# Patient Record
Sex: FEMALE | Race: WHITE | NOT HISPANIC OR LATINO | Employment: UNEMPLOYED | URBAN - METROPOLITAN AREA
[De-identification: names, ages, dates, MRNs, and addresses within clinical notes are randomized per-mention and may not be internally consistent; named-entity substitution may affect disease eponyms.]

---

## 2018-03-01 ENCOUNTER — AMBULATORY CONVERSION ENCOUNTER (OUTPATIENT)
Dept: FAMILY MEDICINE | Facility: CLINIC | Age: 33
End: 2018-03-01

## 2018-03-14 RX ORDER — QUETIAPINE FUMARATE 200 MG/1
1 TABLET, FILM COATED ORAL 2 TIMES DAILY
COMMUNITY
Start: 2018-02-08 | End: 2018-03-15 | Stop reason: SDUPTHER

## 2018-03-15 RX ORDER — BUTALBITAL, ACETAMINOPHEN AND CAFFEINE 50; 325; 40 MG/1; MG/1; MG/1
1-2 TABLET ORAL SEE ADMIN INSTRUCTIONS
Qty: 20 TABLET | Refills: 0 | Status: SHIPPED | OUTPATIENT
Start: 2018-03-15 | End: 2018-03-15 | Stop reason: SDUPTHER

## 2018-03-15 RX ORDER — BUTALBITAL, ACETAMINOPHEN AND CAFFEINE 50; 325; 40 MG/1; MG/1; MG/1
1-2 TABLET ORAL SEE ADMIN INSTRUCTIONS
COMMUNITY
Start: 2018-02-07 | End: 2018-03-15 | Stop reason: SDUPTHER

## 2018-03-15 RX ORDER — BUTALBITAL, ACETAMINOPHEN AND CAFFEINE 50; 325; 40 MG/1; MG/1; MG/1
1-2 TABLET ORAL SEE ADMIN INSTRUCTIONS
Qty: 20 TABLET | Refills: 0 | Status: SHIPPED | OUTPATIENT
Start: 2018-03-15 | End: 2018-12-10 | Stop reason: SDUPTHER

## 2018-04-10 RX ORDER — QUETIAPINE FUMARATE 200 MG/1
200 TABLET, FILM COATED ORAL 2 TIMES DAILY
Qty: 60 TABLET | Refills: 0 | Status: SHIPPED | OUTPATIENT
Start: 2018-04-10 | End: 2018-05-08 | Stop reason: SDUPTHER

## 2018-04-24 ENCOUNTER — TELEPHONE (OUTPATIENT)
Dept: FAMILY MEDICINE | Facility: CLINIC | Age: 33
End: 2018-04-24

## 2018-04-24 RX ORDER — BUTALBITAL, ACETAMINOPHEN AND CAFFEINE 50; 325; 40 MG/1; MG/1; MG/1
1 TABLET ORAL EVERY 4 HOURS PRN
COMMUNITY
End: 2018-05-16 | Stop reason: SDUPTHER

## 2018-04-25 RX ORDER — BUTALBITAL, ACETAMINOPHEN AND CAFFEINE 50; 325; 40 MG/1; MG/1; MG/1
1 TABLET ORAL EVERY 4 HOURS PRN
OUTPATIENT
Start: 2018-04-25

## 2018-05-08 RX ORDER — QUETIAPINE FUMARATE 200 MG/1
200 TABLET, FILM COATED ORAL 2 TIMES DAILY
Qty: 60 TABLET | Refills: 0 | Status: SHIPPED | OUTPATIENT
Start: 2018-05-08 | End: 2018-06-04 | Stop reason: SDUPTHER

## 2018-05-16 ENCOUNTER — TELEPHONE (OUTPATIENT)
Dept: FAMILY MEDICINE | Facility: CLINIC | Age: 33
End: 2018-05-16

## 2018-05-16 RX ORDER — PREDNISONE 10 MG/1
4 TABLET ORAL DAILY
COMMUNITY
Start: 2017-05-30 | End: 2018-05-16 | Stop reason: SDUPTHER

## 2018-05-16 NOTE — TELEPHONE ENCOUNTER
Pt would like Rx Prednisone 10mg 1 time a day and Rx Fioricet -40 mg called into [facility]. If any questions pt can be reached at

## 2018-05-18 RX ORDER — BUTALBITAL, ACETAMINOPHEN AND CAFFEINE 50; 325; 40 MG/1; MG/1; MG/1
1 TABLET ORAL EVERY 4 HOURS PRN
Qty: 40 TABLET | Refills: 0 | OUTPATIENT
Start: 2018-05-18 | End: 2018-06-28 | Stop reason: SDUPTHER

## 2018-05-18 RX ORDER — PREDNISONE 10 MG/1
10 TABLET ORAL DAILY
Qty: 20 TABLET | Refills: 1 | Status: SHIPPED | OUTPATIENT
Start: 2018-05-18 | End: 2018-06-11 | Stop reason: SDUPTHER

## 2018-06-04 RX ORDER — QUETIAPINE FUMARATE 200 MG/1
200 TABLET, FILM COATED ORAL 2 TIMES DAILY
Qty: 60 TABLET | Refills: 0 | Status: SHIPPED | OUTPATIENT
Start: 2018-06-04 | End: 2018-06-28 | Stop reason: SDUPTHER

## 2018-06-04 NOTE — TELEPHONE ENCOUNTER
Patient is out of Seroquel and has been all weekend. She schedule appt for Thursday. Is there anyway you could fill this for her today.

## 2018-06-06 PROBLEM — M50.90 CERVICAL DISC DISEASE: Status: ACTIVE | Noted: 2018-06-06

## 2018-06-06 RX ORDER — PRAZOSIN HYDROCHLORIDE 1 MG/1
1 CAPSULE ORAL DAILY
COMMUNITY
Start: 2017-01-30 | End: 2019-04-07

## 2018-06-06 RX ORDER — CALCIUM CARBONATE/VITAMIN D3 500-10/5ML
1 LIQUID (ML) ORAL 2 TIMES DAILY
COMMUNITY
Start: 2017-05-09 | End: 2019-04-07

## 2018-06-06 RX ORDER — ONDANSETRON 4 MG/1
1 TABLET, ORALLY DISINTEGRATING ORAL DAILY
COMMUNITY
Start: 2017-11-27 | End: 2020-07-09

## 2018-06-06 RX ORDER — PANTOPRAZOLE SODIUM 40 MG/1
1 TABLET, DELAYED RELEASE ORAL DAILY
COMMUNITY
Start: 2017-05-09 | End: 2019-04-07

## 2018-06-06 RX ORDER — CYCLOBENZAPRINE HCL 10 MG
1 TABLET ORAL 2 TIMES DAILY
COMMUNITY
Start: 2017-11-27 | End: 2019-04-07

## 2018-06-06 RX ORDER — DICYCLOMINE HYDROCHLORIDE 10 MG/1
1 CAPSULE ORAL 3 TIMES DAILY
COMMUNITY
Start: 2017-11-27 | End: 2019-04-07

## 2018-06-06 RX ORDER — DULOXETIN HYDROCHLORIDE 60 MG/1
1 CAPSULE, DELAYED RELEASE ORAL DAILY
COMMUNITY
Start: 2017-06-29 | End: 2018-06-11 | Stop reason: SDUPTHER

## 2018-06-11 ENCOUNTER — OFFICE VISIT (OUTPATIENT)
Dept: FAMILY MEDICINE | Facility: CLINIC | Age: 33
End: 2018-06-11

## 2018-06-11 VITALS
TEMPERATURE: 98.2 F | SYSTOLIC BLOOD PRESSURE: 130 MMHG | WEIGHT: 267 LBS | OXYGEN SATURATION: 98 % | HEART RATE: 84 BPM | DIASTOLIC BLOOD PRESSURE: 90 MMHG | HEIGHT: 63 IN | BODY MASS INDEX: 47.31 KG/M2

## 2018-06-11 DIAGNOSIS — F41.9 ANXIETY: ICD-10-CM

## 2018-06-11 DIAGNOSIS — G43.909 MIGRAINE WITHOUT STATUS MIGRAINOSUS, NOT INTRACTABLE, UNSPECIFIED MIGRAINE TYPE: Primary | ICD-10-CM

## 2018-06-11 PROCEDURE — 99214 OFFICE O/P EST MOD 30 MIN: CPT | Performed by: FAMILY MEDICINE

## 2018-06-11 RX ORDER — CARISOPRODOL 350 MG/1
350 TABLET ORAL 2 TIMES DAILY
Qty: 56 TABLET | Refills: 0 | Status: SHIPPED | OUTPATIENT
Start: 2018-06-11 | End: 2018-07-09

## 2018-06-11 RX ORDER — DULOXETIN HYDROCHLORIDE 60 MG/1
60 CAPSULE, DELAYED RELEASE ORAL DAILY
Qty: 90 CAPSULE | Refills: 0 | Status: SHIPPED | OUTPATIENT
Start: 2018-06-11 | End: 2018-11-29 | Stop reason: SDUPTHER

## 2018-06-11 RX ORDER — PREDNISONE 10 MG/1
10 TABLET ORAL DAILY
Qty: 90 TABLET | Refills: 1 | Status: SHIPPED | OUTPATIENT
Start: 2018-06-11 | End: 2018-09-09 | Stop reason: HOSPADM

## 2018-06-11 NOTE — PROGRESS NOTES
Signs stable. Pt seen for renewal of Cymbalta, Prednisone and #28 Soma 350 for 2 weeks of muscle spasm.  Migraine stable, Physical exam unchanged and stablal

## 2018-06-12 PROBLEM — G43.909 MIGRAINE WITHOUT STATUS MIGRAINOSUS, NOT INTRACTABLE: Status: ACTIVE | Noted: 2018-06-12

## 2018-06-12 ASSESSMENT — ENCOUNTER SYMPTOMS: NERVOUS/ANXIOUS: 1

## 2018-06-12 NOTE — PROGRESS NOTES
"  Subjective     Patient ID: Carolyn Redmond is a 32 y.o. female.    Signs stable. Pt seen for renewal of Cymbalta, Prednisone and #28 Soma 350 for 2 weeks of muscle spasm.  Migraine stable, Physical exam unchanged and stable.         Review of Systems   Psychiatric/Behavioral: The patient is nervous/anxious.    All other systems reviewed and are negative.      Objective     Vitals:    06/11/18 1436   BP: 130/90   BP Location: Left upper arm   Patient Position: Sitting   Pulse: 84   Temp: 36.8 °C (98.2 °F)   TempSrc: Oral   SpO2: 98%   Weight: 121 kg (267 lb)   Height: 1.6 m (5' 3\")     Body mass index is 47.3 kg/m².    Physical Exam   Constitutional: She is oriented to person, place, and time. She appears well-developed and well-nourished.   HENT:   Head: Normocephalic.   Eyes: Pupils are equal, round, and reactive to light.   Cardiovascular: Normal rate, regular rhythm and normal heart sounds.    Pulmonary/Chest: Effort normal and breath sounds normal.   Neurological: She is alert and oriented to person, place, and time.   Skin: Skin is warm and dry.   Psychiatric: Her mood appears anxious.   Vitals reviewed.      Assessment/Plan   Problem List Items Addressed This Visit     Anxiety    Migraine without status migrainosus, not intractable - Primary    Relevant Medications    DULoxetine (CYMBALTA) 60 mg capsule    carisoprodol (SOMA) 350 mg tablet            "

## 2018-06-28 RX ORDER — QUETIAPINE FUMARATE 200 MG/1
200 TABLET, FILM COATED ORAL 2 TIMES DAILY
Qty: 60 TABLET | Refills: 0 | Status: SHIPPED | OUTPATIENT
Start: 2018-06-28 | End: 2018-07-19 | Stop reason: SDUPTHER

## 2018-06-28 RX ORDER — BUTALBITAL, ACETAMINOPHEN AND CAFFEINE 50; 325; 40 MG/1; MG/1; MG/1
1 TABLET ORAL EVERY 4 HOURS PRN
Qty: 40 TABLET | Refills: 0 | OUTPATIENT
Start: 2018-06-28 | End: 2018-09-19 | Stop reason: SDUPTHER

## 2018-06-28 NOTE — TELEPHONE ENCOUNTER
Last seen 6/11/18 Dr Bran  Last filled Quetiapine #60 6/4/ 18 Dr Bran    Fioricet 5/11/18 #40 Dr Bran

## 2018-07-19 RX ORDER — QUETIAPINE FUMARATE 200 MG/1
200 TABLET, FILM COATED ORAL 2 TIMES DAILY
Qty: 60 TABLET | Refills: 0 | Status: SHIPPED | OUTPATIENT
Start: 2018-07-19 | End: 2018-08-07 | Stop reason: SDUPTHER

## 2018-08-07 ENCOUNTER — TELEPHONE (OUTPATIENT)
Dept: FAMILY MEDICINE | Facility: CLINIC | Age: 33
End: 2018-08-07

## 2018-08-07 RX ORDER — QUETIAPINE FUMARATE 200 MG/1
200 TABLET, FILM COATED ORAL 2 TIMES DAILY
Qty: 60 TABLET | Refills: 0 | Status: SHIPPED | OUTPATIENT
Start: 2018-08-07 | End: 2018-08-30 | Stop reason: SDUPTHER

## 2018-08-07 NOTE — TELEPHONE ENCOUNTER
PATIENT WOULD LIKE TO TALK TO YOU, STATES LEFT [facility] IN [place] IN [place] AND NEEDS NEW SCRIPT. PLEASE CALL [phone]

## 2018-08-13 RX ORDER — BUTALBITAL, ACETAMINOPHEN AND CAFFEINE 50; 325; 40 MG/1; MG/1; MG/1
1 TABLET ORAL EVERY 4 HOURS PRN
Qty: 40 TABLET | Refills: 0 | OUTPATIENT
Start: 2018-08-13

## 2018-08-30 RX ORDER — QUETIAPINE FUMARATE 200 MG/1
200 TABLET, FILM COATED ORAL 2 TIMES DAILY
Qty: 60 TABLET | Refills: 0 | Status: SHIPPED | OUTPATIENT
Start: 2018-08-30 | End: 2018-09-26 | Stop reason: SDUPTHER

## 2018-08-30 NOTE — TELEPHONE ENCOUNTER
FYI - SEEMS AS THOUGH SHE KEEPS SWITCHING PHARMACIES. LAST FEW REFILL REQUEST SHE KEPT CHANGING TO ANOTHER PHARMACY

## 2018-09-13 NOTE — TELEPHONE ENCOUNTER
Last visit 06/11/18  Last refill 08/30/18    Carolyn had script filled at [facility] is the regular pharmacy. Still has 16 days left,

## 2018-09-19 RX ORDER — BUTALBITAL, ACETAMINOPHEN AND CAFFEINE 50; 325; 40 MG/1; MG/1; MG/1
1 TABLET ORAL EVERY 4 HOURS PRN
Qty: 40 TABLET | Refills: 0 | Status: SHIPPED | OUTPATIENT
Start: 2018-09-19 | End: 2018-11-08 | Stop reason: SDUPTHER

## 2018-09-21 RX ORDER — DULOXETIN HYDROCHLORIDE 60 MG/1
60 CAPSULE, DELAYED RELEASE ORAL DAILY
Qty: 90 CAPSULE | Refills: 0 | Status: SHIPPED | OUTPATIENT
Start: 2018-09-21 | End: 2019-04-07

## 2018-09-24 ENCOUNTER — HOSPITAL ENCOUNTER (EMERGENCY)
Facility: HOSPITAL | Age: 33
Discharge: HOME | End: 2018-09-24
Attending: EMERGENCY MEDICINE | Admitting: EMERGENCY MEDICINE

## 2018-09-24 VITALS
BODY MASS INDEX: 35.44 KG/M2 | HEART RATE: 90 BPM | HEIGHT: 63 IN | DIASTOLIC BLOOD PRESSURE: 74 MMHG | RESPIRATION RATE: 18 BRPM | TEMPERATURE: 98 F | OXYGEN SATURATION: 98 % | SYSTOLIC BLOOD PRESSURE: 140 MMHG | WEIGHT: 200 LBS

## 2018-09-24 DIAGNOSIS — R10.13 EPIGASTRIC PAIN: Primary | ICD-10-CM

## 2018-09-24 LAB
ALBUMIN SERPL-MCNC: 4.8 G/DL (ref 3.4–5)
ALP SERPL-CCNC: 141 IU/L (ref 35–126)
ALT SERPL-CCNC: 54 IU/L (ref 11–54)
ANION GAP SERPL CALC-SCNC: 13 MEQ/L (ref 3–15)
AST SERPL-CCNC: 43 IU/L (ref 15–41)
BASOPHILS # BLD: 0.02 K/UL (ref 0.01–0.1)
BASOPHILS NFR BLD: 0.2 %
BILIRUB SERPL-MCNC: 1.1 MG/DL (ref 0.3–1.2)
BUN SERPL-MCNC: 9 MG/DL (ref 8–20)
CALCIUM SERPL-MCNC: 9.6 MG/DL (ref 8.9–10.3)
CHLORIDE SERPL-SCNC: 105 MEQ/L (ref 98–109)
CO2 SERPL-SCNC: 20 MEQ/L (ref 22–32)
CREAT SERPL-MCNC: 0.7 MG/DL (ref 0.6–1.1)
DIFFERENTIAL METHOD BLD: ABNORMAL
EOSINOPHIL # BLD: 0.02 K/UL (ref 0.04–0.36)
EOSINOPHIL NFR BLD: 0.2 %
ERYTHROCYTE [DISTWIDTH] IN BLOOD BY AUTOMATED COUNT: 14.2 % (ref 11.7–14.4)
GFR SERPL CREATININE-BSD FRML MDRD: >60 ML/MIN/1.73M*2
GLUCOSE SERPL-MCNC: 104 MG/DL (ref 70–99)
HCG UR QL: NEGATIVE
HCT VFR BLDCO AUTO: 47.1 % (ref 35–45)
HGB BLD-MCNC: 15.3 G/DL (ref 11.8–15.7)
IMM GRANULOCYTES # BLD AUTO: 0.09 K/UL (ref 0–0.08)
IMM GRANULOCYTES NFR BLD AUTO: 0.9 %
LIPASE SERPL-CCNC: 30 U/L (ref 20–51)
LYMPHOCYTES # BLD: 1.54 K/UL (ref 1.2–3.5)
LYMPHOCYTES NFR BLD: 15.3 %
MCH RBC QN AUTO: 28.9 PG (ref 28–33.2)
MCHC RBC AUTO-ENTMCNC: 32.5 G/DL (ref 32.2–35.5)
MCV RBC AUTO: 88.9 FL (ref 83–98)
MONOCYTES # BLD: 0.6 K/UL (ref 0.28–0.8)
MONOCYTES NFR BLD: 6 %
NEUTROPHILS # BLD: 7.77 K/UL (ref 1.7–7)
NEUTS SEG NFR BLD: 77.4 %
NRBC BLD-RTO: 0 %
PDW BLD AUTO: 9.6 FL (ref 9.4–12.3)
PLATELET # BLD AUTO: 268 K/UL (ref 150–369)
POTASSIUM SERPL-SCNC: 4.9 MEQ/L (ref 3.6–5.1)
PROT SERPL-MCNC: 9.2 G/DL (ref 6–8.2)
RBC # BLD AUTO: 5.3 M/UL (ref 3.93–5.22)
SODIUM SERPL-SCNC: 138 MEQ/L (ref 136–144)
WBC # BLD AUTO: 10.04 K/UL (ref 3.8–10.5)

## 2018-09-24 PROCEDURE — 85025 COMPLETE CBC W/AUTO DIFF WBC: CPT | Performed by: PHYSICIAN ASSISTANT

## 2018-09-24 PROCEDURE — 83690 ASSAY OF LIPASE: CPT | Performed by: PHYSICIAN ASSISTANT

## 2018-09-24 PROCEDURE — 25800000 HC PHARMACY IV SOLUTIONS: Performed by: PHYSICIAN ASSISTANT

## 2018-09-24 PROCEDURE — 36415 COLL VENOUS BLD VENIPUNCTURE: CPT | Performed by: PHYSICIAN ASSISTANT

## 2018-09-24 PROCEDURE — 96375 TX/PRO/DX INJ NEW DRUG ADDON: CPT

## 2018-09-24 PROCEDURE — 96361 HYDRATE IV INFUSION ADD-ON: CPT

## 2018-09-24 PROCEDURE — 80053 COMPREHEN METABOLIC PANEL: CPT | Performed by: PHYSICIAN ASSISTANT

## 2018-09-24 PROCEDURE — 99284 EMERGENCY DEPT VISIT MOD MDM: CPT | Mod: 25

## 2018-09-24 PROCEDURE — 3E0337Z INTRODUCTION OF ELECTROLYTIC AND WATER BALANCE SUBSTANCE INTO PERIPHERAL VEIN, PERCUTANEOUS APPROACH: ICD-10-PCS | Performed by: EMERGENCY MEDICINE

## 2018-09-24 PROCEDURE — 63600000 HC DRUGS/DETAIL CODE: Performed by: PHYSICIAN ASSISTANT

## 2018-09-24 PROCEDURE — 3E033GC INTRODUCTION OF OTHER THERAPEUTIC SUBSTANCE INTO PERIPHERAL VEIN, PERCUTANEOUS APPROACH: ICD-10-PCS | Performed by: EMERGENCY MEDICINE

## 2018-09-24 PROCEDURE — 63600000 HC DRUGS/DETAIL CODE: Performed by: EMERGENCY MEDICINE

## 2018-09-24 PROCEDURE — 96374 THER/PROPH/DIAG INJ IV PUSH: CPT

## 2018-09-24 PROCEDURE — 84703 CHORIONIC GONADOTROPIN ASSAY: CPT | Performed by: PHYSICIAN ASSISTANT

## 2018-09-24 PROCEDURE — 63700000 HC SELF-ADMINISTRABLE DRUG: Performed by: PHYSICIAN ASSISTANT

## 2018-09-24 RX ORDER — MORPHINE SULFATE 4 MG/ML
4 INJECTION, SOLUTION INTRAMUSCULAR; INTRAVENOUS ONCE
Status: COMPLETED | OUTPATIENT
Start: 2018-09-24 | End: 2018-09-24

## 2018-09-24 RX ORDER — PREDNISONE 10 MG/1
10 TABLET ORAL DAILY
COMMUNITY
End: 2018-11-01 | Stop reason: SDUPTHER

## 2018-09-24 RX ORDER — ONDANSETRON 4 MG/1
4 TABLET, ORALLY DISINTEGRATING ORAL ONCE
Status: COMPLETED | OUTPATIENT
Start: 2018-09-24 | End: 2018-09-24

## 2018-09-24 RX ORDER — DIPHENHYDRAMINE HYDROCHLORIDE 50 MG/ML
50 INJECTION INTRAMUSCULAR; INTRAVENOUS ONCE
Status: COMPLETED | OUTPATIENT
Start: 2018-09-24 | End: 2018-09-24

## 2018-09-24 RX ORDER — METOCLOPRAMIDE HYDROCHLORIDE 5 MG/ML
5 INJECTION INTRAMUSCULAR; INTRAVENOUS ONCE
Status: DISCONTINUED | OUTPATIENT
Start: 2018-09-24 | End: 2018-09-24 | Stop reason: HOSPADM

## 2018-09-24 RX ADMIN — DIPHENHYDRAMINE HYDROCHLORIDE 50 MG: 50 INJECTION INTRAMUSCULAR; INTRAVENOUS at 20:09

## 2018-09-24 RX ADMIN — SODIUM CHLORIDE 1000 ML: 9 INJECTION, SOLUTION INTRAVENOUS at 17:45

## 2018-09-24 RX ADMIN — MORPHINE SULFATE 4 MG: 4 INJECTION INTRAVENOUS at 17:45

## 2018-09-24 RX ADMIN — ONDANSETRON 4 MG: 4 TABLET, ORALLY DISINTEGRATING ORAL at 17:46

## 2018-09-24 ASSESSMENT — ENCOUNTER SYMPTOMS
NAUSEA: 1
DIAPHORESIS: 1
HEMATURIA: 0
DYSURIA: 0
FATIGUE: 1
FEVER: 0
DIARRHEA: 1
ABDOMINAL PAIN: 1
VOMITING: 1
FREQUENCY: 0
CHILLS: 1

## 2018-09-24 NOTE — ED PROVIDER NOTES
HPI     Chief Complaint   Patient presents with   • Vomiting   • Abdominal Pain   • Diarrhea     33 y.o. female with pmh of cholecystectomy and pancreatitis presents to ED c/o upper abdominal pain and vomiting x last night. Notes pain is sharp and stabbing localized to the epigastric region. Pain radiates across the upper abdomen. Pt. Is unable to tolerate po intake at this time due to nausea and continuous non-bloody vomiting.  Admits to associated diarrhea x yesterday. Pt states her symptoms began with persistent nausea and fatigue x 1 week ago. She has not been taking any of her daily medications for the past week secondary to nausea. Admits similar symptoms in the past with previous episodes of pancreatitis. Admits to chills, diaphoresis, decreased urine output. Denies dysuria, hematuria, fever, black/ bloody stool, vaginal pain/ discharge.    GI: [facility]    History provided by:  Patient and relative   used: No         Patient History     Past Medical History:   Diagnosis Date   • Anxiety    • Depressed    • Endocarditis    • Migraines    • Pancreatitis    • Vasculitis (CMS/HCC) (HCC)        Past Surgical History:   Procedure Laterality Date   • CHOLECYSTECTOMY     • ERCP     • GALLBLADDER SURGERY         Family History   Problem Relation Age of Onset   • No Known Problems Mother    • Lung cancer Father        Social History   Substance Use Topics   • Smoking status: Current Every Day Smoker   • Smokeless tobacco: Current User   • Alcohol use Yes      Comment: social       Systems Reviewed from Nursing Triage:          Review of Systems     Review of Systems   Constitutional: Positive for chills, diaphoresis and fatigue. Negative for fever.   Gastrointestinal: Positive for abdominal pain, diarrhea, nausea and vomiting.   Genitourinary: Positive for decreased urine volume. Negative for dysuria, frequency and hematuria.   Musculoskeletal: Negative for gait problem.        Physical Exam     ED  "Triage Vitals [09/24/18 1639]   Temp Heart Rate Resp BP SpO2   36.1 °C (97 °F) 91 (!) 22 130/88 100 %      Temp Source Heart Rate Source Patient Position BP Location FiO2 (%) (Set)   Oral -- Sitting Right upper arm --                     Patient Vitals for the past 24 hrs:   BP Temp Temp src Pulse Resp SpO2 Height Weight   09/24/18 1639 130/88 36.1 °C (97 °F) Oral 91 (!) 22 100 % 1.6 m (5' 3\") 90.7 kg (200 lb)           Physical Exam   Constitutional: She is oriented to person, place, and time. She appears well-developed and well-nourished.   HENT:   Head: Normocephalic and atraumatic.   Right Ear: External ear normal.   Left Ear: External ear normal.   Nose: Nose normal.   Neck: Neck supple.   Cardiovascular: Normal rate, regular rhythm and normal heart sounds.    Pulmonary/Chest: Effort normal and breath sounds normal. No respiratory distress. She has no decreased breath sounds. She has no wheezes. She has no rhonchi. She has no rales.   Abdominal: Soft. Bowel sounds are normal. There is tenderness (upper abdominal tenderness). There is rebound and guarding. There is no CVA tenderness.   Neurological: She is alert and oriented to person, place, and time.   Skin: Skin is warm and dry.   Nursing note and vitals reviewed.           Procedures    ED Course & MDM     Labs Reviewed - No data to display    No orders to display           MDM         ED Course as of Sep 27 1932   Mon Sep 24, 2018   1707 Impression: epigastric abdominal pain, N/V/D  Plan: CT abd/pelv, labs, lipase, HCG, zofran, fluids, morphine  REU complete  [MM]   1806 Pt. Notes continued nausea- will give reglan and observe  [MM]      ED Course User Index  [MM] Kathleen Lorenzo PA         Clinical Impressions as of Sep 27 1932   Epigastric pain      By signing my name below, Fiona TUBBS, attest that this documentation has been prepared under the direction and in the presence of NHI Lorenzo PA-C    9/24/2018 5:01 PM      Kathleen TUBBS, personally " performed the services described in this documentation, as documented by the scribe in my presence, and it is both accurate and complete.  9/27/2018 7:35 PM           Fiona Manrique  09/24/18 1709       Kathleen Lorenzo PA  09/27/18 1935

## 2018-09-25 NOTE — ED ATTESTATION NOTE
I have personally seen and examined the patient.  I reviewed and agree with physician assistant / nurse practitioner’s assessment and plan of care, with the following exceptions: None  My examination, assessment, and plan of care of Carolyn Redmond is as follows:    The patient is a 33-year-old female with past history of pancreatitis, cholecystectomy, chronic abdominal pain under care of a pain management specialist who presents emergency department complaining of nausea and vomiting.  The patient states she was unable to tolerate p.o.'s at home.  The patient denies fever, chills, diarrhea, constipation, chest pain, dyspnea, or rash.  The patient states this is her typical exacerbation of her chronic abdominal pain.  The patient states she takes OxyContin 4 times a day at home for similar pain but that because of the vomiting she was unable to tolerate p.o.'s.  The patient states she took Zofran at home without relief.    Physical exam: Vital signs reviewed.  General: Patient appears comfortable, sitting up in bed, relaxed.  Patient does not appear in severe pain.  HEENT: NCAT, EOMI, MMM, sclera anicteric.  Neck:: Supple, nontender, full range of motion.  Chest: Lungs clear to auscultation bilaterally.  Heart: Regular rate and rhythm no murmurs rubs or gallops.  Abdomen: Soft, mild tenderness epigastric.  No guarding, no rebound, 2+ femoral pulses present bilaterally, normal active bowel sounds.  Extremities: No cyanosis clubbing or edema.  Skin: Warm and dry, no rash, no petechiae.    Plan: Check labs, IV fluids, pain medication, Zofran IV.    The patient's labs and medical records were reviewed.  The patient was apparently seen at an outside hospital [facility] and was given Zofran prior to her arrival at [place].  The patient left presumably AMA.  The patient was not forthcoming about this interaction and denied recent visit to an outside hospital.  The patient has no current evidence of infection  or acute pancreatitis.  I discussed with the patient the need to follow-up with her chronic pain specialist.  The patient agrees to follow-up with her specialist tomorrow.  The patient attempted to negotiate for additional IV narcotic pain medication. I suspect drug-seeking behavior.  I advised patient avoid narcotic pain medication due to side effect of nausea and vomiting which may exacerbate her complaint of nausea and vomiting.    Impression: Acute exacerbation of chronic abdominal pain. Suspect drug-seeking behavior.        I was physically present for the key/critical portions of the following procedures: None       Nestor Rg MD  09/24/18 3449

## 2018-09-26 ENCOUNTER — TELEPHONE (OUTPATIENT)
Dept: FAMILY MEDICINE | Facility: CLINIC | Age: 33
End: 2018-09-26

## 2018-09-26 RX ORDER — QUETIAPINE FUMARATE 200 MG/1
200 TABLET, FILM COATED ORAL 2 TIMES DAILY
Qty: 60 TABLET | Refills: 3 | Status: SHIPPED | OUTPATIENT
Start: 2018-09-26 | End: 2019-11-08

## 2018-10-25 ENCOUNTER — TELEPHONE (OUTPATIENT)
Dept: FAMILY MEDICINE | Facility: CLINIC | Age: 33
End: 2018-10-25

## 2018-10-25 RX ORDER — FLUTICASONE PROPIONATE 110 UG/1
1 AEROSOL, METERED RESPIRATORY (INHALATION) 2 TIMES DAILY
Qty: 12 G | Refills: 1 | Status: SHIPPED | OUTPATIENT
Start: 2018-10-25 | End: 2019-04-07

## 2018-10-26 ENCOUNTER — TELEPHONE (OUTPATIENT)
Dept: FAMILY MEDICINE | Facility: CLINIC | Age: 33
End: 2018-10-26

## 2018-10-26 NOTE — TELEPHONE ENCOUNTER
Patient called advised she is having pain on right side from glut to ankle for over 1 week . Does not remember hurting leg.. Advised to go to urgent care but declined due to no insurance. Would like a muscle relaxer sent to pharm Any questions patient can be reached at

## 2018-11-02 RX ORDER — PREDNISONE 10 MG/1
10 TABLET ORAL DAILY
Qty: 90 TABLET | Refills: 0 | Status: SHIPPED | OUTPATIENT
Start: 2018-11-02 | End: 2019-11-08

## 2018-11-06 ENCOUNTER — TELEPHONE (OUTPATIENT)
Dept: FAMILY MEDICINE | Facility: CLINIC | Age: 33
End: 2018-11-06

## 2018-11-08 RX ORDER — BUTALBITAL, ACETAMINOPHEN AND CAFFEINE 50; 325; 40 MG/1; MG/1; MG/1
1 TABLET ORAL EVERY 4 HOURS PRN
Qty: 40 TABLET | Refills: 0 | Status: SHIPPED | OUTPATIENT
Start: 2018-11-08 | End: 2020-07-09

## 2018-11-08 RX ORDER — BUTALBITAL, ACETAMINOPHEN AND CAFFEINE 50; 325; 40 MG/1; MG/1; MG/1
1 TABLET ORAL EVERY 4 HOURS PRN
Qty: 40 TABLET | Refills: 0 | Status: CANCELLED | OUTPATIENT
Start: 2018-11-08

## 2018-11-26 ENCOUNTER — TELEPHONE (OUTPATIENT)
Dept: FAMILY MEDICINE | Facility: CLINIC | Age: 33
End: 2018-11-26

## 2018-11-29 ENCOUNTER — TELEPHONE (OUTPATIENT)
Dept: FAMILY MEDICINE | Facility: CLINIC | Age: 33
End: 2018-11-29

## 2018-11-29 RX ORDER — DULOXETIN HYDROCHLORIDE 60 MG/1
60 CAPSULE, DELAYED RELEASE ORAL DAILY
Qty: 90 CAPSULE | Refills: 0 | Status: SHIPPED | OUTPATIENT
Start: 2018-11-29 | End: 2019-11-08

## 2018-11-29 NOTE — TELEPHONE ENCOUNTER
Patient has appt [facility] for Monday. Needs cymbalta refill. Wanted to come tomorrow but nothing open

## 2018-12-10 RX ORDER — BUTALBITAL, ACETAMINOPHEN AND CAFFEINE 50; 325; 40 MG/1; MG/1; MG/1
1-2 TABLET ORAL SEE ADMIN INSTRUCTIONS
Qty: 20 TABLET | Refills: 0 | Status: SHIPPED | OUTPATIENT
Start: 2018-12-10 | End: 2019-01-11 | Stop reason: SDUPTHER

## 2018-12-10 NOTE — TELEPHONE ENCOUNTER
[facility] [place]

## 2018-12-10 NOTE — TELEPHONE ENCOUNTER
Last seen 6/11/18 Dr Bran  Last filled 11/8/18 #40 Dr Stout    Patient says you gave her enough for he week end but headaches are bad and she does have appointment to see you on Thursday at 1:00

## 2018-12-11 PROBLEM — R00.2 PALPITATIONS: Status: ACTIVE | Noted: 2018-06-15

## 2018-12-11 PROBLEM — L95.9 VASCULITIS DETERMINED BY BIOPSY OF SKIN: Status: ACTIVE | Noted: 2017-09-26

## 2018-12-11 PROBLEM — G43.019 INTRACTABLE MIGRAINE WITHOUT AURA: Status: ACTIVE | Noted: 2017-09-26

## 2018-12-11 PROBLEM — R40.4 TRANSIENT ALTERATION OF AWARENESS: Status: ACTIVE | Noted: 2017-11-22

## 2018-12-11 PROBLEM — S06.0XAA CONCUSSION: Status: ACTIVE | Noted: 2017-11-10

## 2018-12-11 PROBLEM — G47.52 REM BEHAVIORAL DISORDER: Status: ACTIVE | Noted: 2017-09-26

## 2018-12-11 RX ORDER — NAPROXEN 500 MG/1
500 TABLET ORAL EVERY 12 HOURS PRN
COMMUNITY
Start: 2018-08-16 | End: 2020-07-09

## 2018-12-11 RX ORDER — ZOLPIDEM TARTRATE 10 MG/1
1 TABLET ORAL NIGHTLY PRN
Refills: 0 | COMMUNITY
Start: 2018-11-27 | End: 2019-04-07

## 2018-12-11 RX ORDER — NALOXONE HYDROCHLORIDE 4 MG/.1ML
1 SPRAY NASAL
Refills: 0 | COMMUNITY
Start: 2018-10-24 | End: 2019-04-07

## 2018-12-11 RX ORDER — SUMATRIPTAN 20 MG/1
1 SPRAY NASAL ONCE AS NEEDED
COMMUNITY
Start: 2018-05-25 | End: 2020-07-09

## 2018-12-11 RX ORDER — SERTRALINE HYDROCHLORIDE 50 MG/1
50 TABLET, FILM COATED ORAL DAILY
COMMUNITY
End: 2019-04-07

## 2018-12-11 RX ORDER — TOPIRAMATE 50 MG/1
1 TABLET, FILM COATED ORAL 2 TIMES DAILY
COMMUNITY
Start: 2017-12-20 | End: 2019-04-07

## 2018-12-11 RX ORDER — TERAZOSIN 2 MG/1
2 CAPSULE ORAL DAILY
COMMUNITY
Start: 2018-06-02 | End: 2019-04-07

## 2019-01-11 RX ORDER — ZOLPIDEM TARTRATE 10 MG/1
10 TABLET ORAL NIGHTLY PRN
Qty: 30 TABLET | Refills: 0 | OUTPATIENT
Start: 2019-01-11

## 2019-01-11 RX ORDER — BUTALBITAL, ACETAMINOPHEN AND CAFFEINE 50; 325; 40 MG/1; MG/1; MG/1
1-2 TABLET ORAL SEE ADMIN INSTRUCTIONS
Qty: 10 TABLET | Refills: 0 | Status: SHIPPED | OUTPATIENT
Start: 2019-01-11 | End: 2019-01-15 | Stop reason: SDUPTHER

## 2019-01-11 NOTE — TELEPHONE ENCOUNTER
Patient called for refill of Fioricet -40mg 1-2 tabs every 4 hrsPRN sent to [facility] contact  last seen 6/11/18  Patient stated she has had insurance issue that is why she has not been here. Is presently looking for a new PCP would appreciate one more refill

## 2019-01-11 NOTE — TELEPHONE ENCOUNTER
Patient also advised having difficulty with sleep which can be causing her headaches would like to know if you can also send refill [id number] 1/d [facility] to  [facility] [address] only one more time has appt with new PCP in Mid February  Pt contact

## 2019-01-15 RX ORDER — BUTALBITAL, ACETAMINOPHEN AND CAFFEINE 50; 325; 40 MG/1; MG/1; MG/1
1-2 TABLET ORAL SEE ADMIN INSTRUCTIONS
Qty: 10 TABLET | Refills: 0 | Status: SHIPPED | OUTPATIENT
Start: 2019-01-15 | End: 2019-01-25

## 2019-04-06 ENCOUNTER — HOSPITAL ENCOUNTER (EMERGENCY)
Facility: HOSPITAL | Age: 34
Discharge: HOME | End: 2019-04-06
Attending: EMERGENCY MEDICINE | Admitting: EMERGENCY MEDICINE
Payer: COMMERCIAL

## 2019-04-06 ENCOUNTER — APPOINTMENT (EMERGENCY)
Dept: RADIOLOGY | Facility: HOSPITAL | Age: 34
End: 2019-04-06
Attending: EMERGENCY MEDICINE
Payer: COMMERCIAL

## 2019-04-06 VITALS
HEART RATE: 70 BPM | DIASTOLIC BLOOD PRESSURE: 58 MMHG | RESPIRATION RATE: 18 BRPM | WEIGHT: 220 LBS | OXYGEN SATURATION: 96 % | HEIGHT: 62 IN | TEMPERATURE: 97.8 F | SYSTOLIC BLOOD PRESSURE: 113 MMHG | BODY MASS INDEX: 40.48 KG/M2

## 2019-04-06 DIAGNOSIS — B34.9 VIRAL SYNDROME: Primary | ICD-10-CM

## 2019-04-06 DIAGNOSIS — Z86.79 HISTORY OF VASCULITIS: ICD-10-CM

## 2019-04-06 LAB
ALBUMIN SERPL-MCNC: 3.7 G/DL (ref 3.4–5)
ALP SERPL-CCNC: 72 IU/L (ref 35–126)
ALT SERPL-CCNC: 37 IU/L (ref 11–54)
ANION GAP SERPL CALC-SCNC: 11 MEQ/L (ref 3–15)
APTT PPP: 28 SEC (ref 23–35)
AST SERPL-CCNC: 28 IU/L (ref 15–41)
B-HCG UR QL: NEGATIVE
BACTERIA URNS QL MICRO: ABNORMAL /HPF
BASOPHILS # BLD: 0.03 K/UL (ref 0.01–0.1)
BASOPHILS NFR BLD: 0.3 %
BILIRUB SERPL-MCNC: 0.8 MG/DL (ref 0.3–1.2)
BILIRUB UR QL STRIP.AUTO: NEGATIVE MG/DL
BUN SERPL-MCNC: 14 MG/DL (ref 8–20)
CALCIUM SERPL-MCNC: 9.1 MG/DL (ref 8.9–10.3)
CHLORIDE SERPL-SCNC: 100 MEQ/L (ref 98–109)
CLARITY UR REFRACT.AUTO: ABNORMAL
CO2 SERPL-SCNC: 26 MEQ/L (ref 22–32)
COLOR UR AUTO: YELLOW
CREAT SERPL-MCNC: 0.7 MG/DL
CRP SERPL-MCNC: 13 MG/L
DIFFERENTIAL METHOD BLD: ABNORMAL
EOSINOPHIL # BLD: 0.02 K/UL (ref 0.04–0.36)
EOSINOPHIL NFR BLD: 0.2 %
ERYTHROCYTE [DISTWIDTH] IN BLOOD BY AUTOMATED COUNT: 15.9 % (ref 11.7–14.4)
ERYTHROCYTE [SEDIMENTATION RATE] IN BLOOD BY WESTERGREN METHOD: 25 MM/HR
FLUAV RNA SPEC QL NAA+PROBE: NEGATIVE
FLUBV RNA SPEC QL NAA+PROBE: NEGATIVE
GFR SERPL CREATININE-BSD FRML MDRD: >60 ML/MIN/1.73M*2
GLUCOSE SERPL-MCNC: 120 MG/DL (ref 70–99)
GLUCOSE UR STRIP.AUTO-MCNC: NEGATIVE MG/DL
HCT VFR BLDCO AUTO: 36.2 %
HGB BLD-MCNC: 11.6 G/DL
HGB UR QL STRIP.AUTO: NEGATIVE
HYALINE CASTS #/AREA URNS LPF: ABNORMAL /LPF
IMM GRANULOCYTES # BLD AUTO: 0.04 K/UL (ref 0–0.08)
IMM GRANULOCYTES NFR BLD AUTO: 0.4 %
INR PPP: 0.9 INR
KETONES UR STRIP.AUTO-MCNC: NEGATIVE MG/DL
LEUKOCYTE ESTERASE UR QL STRIP.AUTO: NEGATIVE
LIPASE SERPL-CCNC: 35 U/L (ref 20–51)
LYMPHOCYTES # BLD: 1.62 K/UL (ref 1.2–3.5)
LYMPHOCYTES NFR BLD: 14.6 %
MCH RBC QN AUTO: 28.3 PG (ref 28–33.2)
MCHC RBC AUTO-ENTMCNC: 32 G/DL (ref 32.2–35.5)
MCV RBC AUTO: 88.3 FL (ref 83–98)
MONOCYTES # BLD: 0.41 K/UL (ref 0.28–0.8)
MONOCYTES NFR BLD: 3.7 %
NEUTROPHILS # BLD: 8.99 K/UL (ref 1.7–7)
NEUTS SEG NFR BLD: 80.8 %
NITRITE UR QL STRIP.AUTO: NEGATIVE
NRBC BLD-RTO: 0 %
PDW BLD AUTO: 9.5 FL (ref 9.4–12.3)
PH UR STRIP.AUTO: 7.5 [PH]
PLATELET # BLD AUTO: 239 K/UL
POCT TEST: NORMAL
POTASSIUM SERPL-SCNC: 4.2 MEQ/L (ref 3.6–5.1)
PROT SERPL-MCNC: 7.1 G/DL (ref 6–8.2)
PROT UR QL STRIP.AUTO: NEGATIVE
PROTHROMBIN TIME: 12.2 SEC (ref 12.2–14.5)
RBC # BLD AUTO: 4.1 M/UL (ref 3.93–5.22)
RBC #/AREA URNS HPF: ABNORMAL /HPF
RSV RNA SPEC QL NAA+PROBE: NEGATIVE
SODIUM SERPL-SCNC: 137 MEQ/L (ref 136–144)
SP GR UR REFRACT.AUTO: 1.02
SQUAMOUS URNS QL MICRO: 1 /HPF
UROBILINOGEN UR STRIP-ACNC: 1 EU/DL
WBC # BLD AUTO: 11.11 K/UL
WBC #/AREA URNS HPF: ABNORMAL /HPF

## 2019-04-06 PROCEDURE — 87631 RESP VIRUS 3-5 TARGETS: CPT | Performed by: EMERGENCY MEDICINE

## 2019-04-06 PROCEDURE — 85610 PROTHROMBIN TIME: CPT | Performed by: EMERGENCY MEDICINE

## 2019-04-06 PROCEDURE — 96374 THER/PROPH/DIAG INJ IV PUSH: CPT

## 2019-04-06 PROCEDURE — 99284 EMERGENCY DEPT VISIT MOD MDM: CPT | Mod: 25

## 2019-04-06 PROCEDURE — 36415 COLL VENOUS BLD VENIPUNCTURE: CPT | Performed by: EMERGENCY MEDICINE

## 2019-04-06 PROCEDURE — 80053 COMPREHEN METABOLIC PANEL: CPT | Performed by: EMERGENCY MEDICINE

## 2019-04-06 PROCEDURE — 83690 ASSAY OF LIPASE: CPT | Performed by: EMERGENCY MEDICINE

## 2019-04-06 PROCEDURE — 96376 TX/PRO/DX INJ SAME DRUG ADON: CPT

## 2019-04-06 PROCEDURE — 3E033GC INTRODUCTION OF OTHER THERAPEUTIC SUBSTANCE INTO PERIPHERAL VEIN, PERCUTANEOUS APPROACH: ICD-10-PCS | Performed by: EMERGENCY MEDICINE

## 2019-04-06 PROCEDURE — 63600000 HC DRUGS/DETAIL CODE: Performed by: EMERGENCY MEDICINE

## 2019-04-06 PROCEDURE — 85730 THROMBOPLASTIN TIME PARTIAL: CPT | Performed by: EMERGENCY MEDICINE

## 2019-04-06 PROCEDURE — 96375 TX/PRO/DX INJ NEW DRUG ADDON: CPT

## 2019-04-06 PROCEDURE — 86140 C-REACTIVE PROTEIN: CPT | Performed by: EMERGENCY MEDICINE

## 2019-04-06 PROCEDURE — 81003 URINALYSIS AUTO W/O SCOPE: CPT | Performed by: EMERGENCY MEDICINE

## 2019-04-06 PROCEDURE — 85652 RBC SED RATE AUTOMATED: CPT | Performed by: EMERGENCY MEDICINE

## 2019-04-06 PROCEDURE — 85025 COMPLETE CBC W/AUTO DIFF WBC: CPT | Performed by: EMERGENCY MEDICINE

## 2019-04-06 PROCEDURE — 71046 X-RAY EXAM CHEST 2 VIEWS: CPT

## 2019-04-06 RX ORDER — ONDANSETRON HYDROCHLORIDE 2 MG/ML
4 INJECTION, SOLUTION INTRAVENOUS ONCE
Status: COMPLETED | OUTPATIENT
Start: 2019-04-06 | End: 2019-04-06

## 2019-04-06 RX ORDER — METOCLOPRAMIDE HYDROCHLORIDE 5 MG/ML
10 INJECTION INTRAMUSCULAR; INTRAVENOUS ONCE
Status: COMPLETED | OUTPATIENT
Start: 2019-04-06 | End: 2019-04-06

## 2019-04-06 RX ORDER — KETOROLAC TROMETHAMINE 15 MG/ML
15 INJECTION, SOLUTION INTRAMUSCULAR; INTRAVENOUS ONCE
Status: COMPLETED | OUTPATIENT
Start: 2019-04-06 | End: 2019-04-06

## 2019-04-06 RX ORDER — DIPHENHYDRAMINE HCL 50 MG/ML
25 VIAL (ML) INJECTION ONCE
Status: COMPLETED | OUTPATIENT
Start: 2019-04-06 | End: 2019-04-06

## 2019-04-06 RX ADMIN — ONDANSETRON HYDROCHLORIDE 4 MG: 2 SOLUTION INTRAMUSCULAR; INTRAVENOUS at 15:53

## 2019-04-06 RX ADMIN — METOCLOPRAMIDE 10 MG: 5 INJECTION, SOLUTION INTRAMUSCULAR; INTRAVENOUS at 21:37

## 2019-04-06 RX ADMIN — KETOROLAC TROMETHAMINE 15 MG: 15 INJECTION, SOLUTION INTRAMUSCULAR; INTRAVENOUS at 15:54

## 2019-04-06 RX ADMIN — KETOROLAC TROMETHAMINE 15 MG: 15 INJECTION, SOLUTION INTRAMUSCULAR; INTRAVENOUS at 19:41

## 2019-04-06 RX ADMIN — DIPHENHYDRAMINE HYDROCHLORIDE 25 MG: 50 INJECTION INTRAMUSCULAR; INTRAVENOUS at 21:37

## 2019-04-06 ASSESSMENT — ENCOUNTER SYMPTOMS
SHORTNESS OF BREATH: 0
BRUISES/BLEEDS EASILY: 1
FATIGUE: 1
SORE THROAT: 0
DIZZINESS: 1
LIGHT-HEADEDNESS: 1
BACK PAIN: 0
SEIZURES: 0
VOMITING: 0
PALPITATIONS: 0
HEMATURIA: 0
MYALGIAS: 1
CHILLS: 0
COUGH: 1
NAUSEA: 1
ABDOMINAL PAIN: 1
COLOR CHANGE: 0
EYE PAIN: 0
ARTHRALGIAS: 1
DYSURIA: 0
FEVER: 1

## 2019-04-06 NOTE — ED PROVIDER NOTES
HPI     Chief Complaint   Patient presents with   • Rash       33-year-old female with history of pancreatitis, migraines, endocarditis, vasculitis presents with rash. Symptoms started approximately 2 weeks ago, noting fatigue, malaise, joint stiffness, myalgias.  Also associated nonproductive cough and nausea.  Scattered rashes noted along back, legs, with evidence of old lesions along her right arm.  Patient states she saw her primary doctor last week who prescribed a cream for the rash and cough suppressant.    Patient states this is similar to her history of vasculitis, she states she has been on prednisone 10 mg for over 2 years however not entirely compliant on a daily basis.    No new exposures in the past 2 weeks.             Patient History     Past Medical History:   Diagnosis Date   • Anxiety    • Depressed    • Endocarditis    • Migraines    • Pancreatitis    • Vasculitis (CMS/HCC) (HCC)        Past Surgical History:   Procedure Laterality Date   • CHOLECYSTECTOMY     • ERCP     • GALLBLADDER SURGERY         Family History   Problem Relation Age of Onset   • No Known Problems Mother    • Lung cancer Father        Social History   Substance Use Topics   • Smoking status: Current Every Day Smoker     Packs/day: 0.50     Types: Cigarettes   • Smokeless tobacco: Current User   • Alcohol use Yes      Comment: social       Systems Reviewed from Nursing Triage:          Review of Systems     Review of Systems   Constitutional: Positive for fatigue and fever. Negative for chills.   HENT: Negative for ear pain and sore throat.    Eyes: Negative for pain and visual disturbance.   Respiratory: Positive for cough. Negative for shortness of breath.    Cardiovascular: Negative for chest pain and palpitations.   Gastrointestinal: Positive for abdominal pain and nausea. Negative for vomiting.   Genitourinary: Negative for dysuria and hematuria.   Musculoskeletal: Positive for arthralgias and myalgias. Negative for back  "pain.   Skin: Positive for rash. Negative for color change.   Neurological: Positive for dizziness and light-headedness. Negative for seizures and syncope.   Hematological: Bruises/bleeds easily.   All other systems reviewed and are negative.       Physical Exam     ED Triage Vitals [04/06/19 1431]   Temp Heart Rate Resp BP SpO2   36.4 °C (97.6 °F) 70 18 (!) 146/76 97 %      Temp Source Heart Rate Source Patient Position BP Location FiO2 (%) (Set)   Temporal Monitor Sitting Right upper arm --                     Patient Vitals for the past 24 hrs:   BP Temp Temp src Pulse Resp SpO2 Height Weight   04/06/19 2103 (!) 113/58 36.6 °C (97.8 °F) Temporal 70 18 96 % - -   04/06/19 1955 125/60 36.7 °C (98 °F) Oral 83 18 95 % - -   04/06/19 1743 124/64 - - 86 16 98 % - -   04/06/19 1658 138/75 - - 76 16 98 % - -   04/06/19 1558 138/80 - - 68 16 98 % - -   04/06/19 1431 (!) 146/76 36.4 °C (97.6 °F) Temporal 70 18 97 % 1.575 m (5' 2\") 99.8 kg (220 lb)           Physical Exam   Constitutional: She appears well-developed and well-nourished. No distress.   HENT:   Head: Normocephalic and atraumatic.   Mouth/Throat: Oropharynx is clear and moist.   Eyes: Conjunctivae are normal.   Neck: Neck supple.   Cardiovascular: Normal rate, regular rhythm and normal heart sounds.    No murmur heard.  Pulmonary/Chest: Effort normal and breath sounds normal. No respiratory distress.   Abdominal: Soft. There is tenderness (Epigastric).   Musculoskeletal: She exhibits no edema.   Neurological: She is alert.   Skin: Skin is warm and dry. She is not diaphoretic.   Scattered ecchymosis primarily noted along abdomen, bilateral lower extremities low back.  Focal nonblanching papule, noted along right inner thigh.   Psychiatric: She has a normal mood and affect.   Nursing note and vitals reviewed.           Procedures    ED Course & MDM     Labs Reviewed   COMPREHENSIVE METABOLIC PANEL - Abnormal        Result Value    Sodium 137      Potassium 4.2   "    Chloride 100      CO2 26      BUN 14      Creatinine 0.7      Glucose 120 (*)     Calcium 9.1      AST (SGOT) 28      ALT (SGPT) 37      Alkaline Phosphatase 72      Total Protein 7.1      Albumin 3.7      Bilirubin, Total 0.8      eGFR >60.0      Anion Gap 11     CBC - Abnormal     WBC 11.11 (*)     RBC 4.10      Hemoglobin 11.6 (*)     Hematocrit 36.2      MCV 88.3      MCH 28.3      MCHC 32.0 (*)     RDW 15.9 (*)     Platelets 239      MPV 9.5     DIFF COUNT - Abnormal     Differential Type Auto      nRBC 0.0      Immature Granulocytes 0.4      Neutrophils 80.8      Lymphocytes 14.6      Monocytes 3.7      Eosinophils 0.2      Basophils 0.3      Immature Granulocytes, Absolute 0.04      Neutrophils, Absolute 8.99 (*)     Lymphocytes, Absolute 1.62      Monocytes, Absolute 0.41      Eosinophils, Absolute 0.02 (*)     Basophils, Absolute 0.03     UA REFLEX CULTURE (MACROSCOPIC) - Abnormal     Color, Urine Yellow      Clarity, Urine Cloudy (*)     Specific Gravity, Urine 1.018      pH, Urine 7.5      Leukocyte Esterase Negative      Nitrite, Urine Negative      Protein, Urine Negative      Glucose, Urine Negative      Ketones, Urine Negative      Urobilinogen, Urine 1.0      Bilirubin, Urine Negative      Blood, Urine Negative     UA MICROSCOPIC - Abnormal     RBC, Urine 0 TO 4      WBC, Urine 0 TO 3      Squamous Epithelial +1 (*)     Hyaline Cast None Seen      Bacteria, Urine Rare (*)    C-REACTIVE PROTEIN - Abnormal     CRP 13.00 (*)    RSV AND INFLUENZA A/B NUCLEIC ACIDS, PCR - Normal    Influenza A Negative      Influenza B Negative      Respiratory Syncytial Virus Negative     LIPASE - Normal    Lipase 35     PROTIME-INR - Normal    PT 12.2      INR 0.9     PTT - Normal    PTT 28     SEDIMENTATION RATE - Normal    Sed Rate 25     CBC AND DIFFERENTIAL    Narrative:     The following orders were created for panel order CBC and differential.  Procedure                               Abnormality         Status                      ---------                               -----------         ------                     CBC[[id number]]                           Abnormal            Final result               Diff Count[[id number]]                    Abnormal            Final result                 Please view results for these tests on the individual orders.   URINALYSIS REFLEX CULTURE    Narrative:     The following orders were created for panel order Urinalysis w/ reflex culture.  Procedure                               Abnormality         Status                     ---------                               -----------         ------                     UA Reflex to Culture (Mac...[[id number]]  Abnormal            Final result               UA Microscopic[[id number]]                Abnormal            Final result                 Please view results for these tests on the individual orders.   RAINBOW DRAW PANEL    Narrative:     The following orders were created for panel order [place] Draw Panel.  Procedure                               Abnormality         Status                     ---------                               -----------         ------                     RAINBOW RED[[id number]]                                       In process                 RAINBOW LT GREEN[[id number]]                                  In process                 RAINBOW GOLD[[id number]]                                      In process                   Please view results for these tests on the individual orders.   BEDSIDE PREGNANCY, URINE   POCT BHCG, URINE, QUAL (BEAKER)    POCT BhCG, Urine Qual Negative      POC Test POC     RAINBOW RED   RAINBOW LT GREEN   RAINBOW GOLD       X-RAY CHEST 2 VIEWS   Final Result   IMPRESSION: No active disease in the chest      COMMENT: The current two view examination of the chest was compared to that   dated 4/5/2017.      The lungs remain well aerated and clear.  The cardiomediastinal silhouette is   normal in size and  configuration.  The costophrenic sulci and bony thorax are   intact.                  MDM  Number of Diagnoses or Management Options  Diagnosis management comments: Multiple complaints including fevers, malaise, myalgias, arthralgias in the setting of history of vasculitis.  Also notes cough and nausea.  Presentation similar to past episode of vasculitis.  Also, these may be infectious in origin.  Differential includes viral syndrome, flu, pneumonia.  Patient does have a history of iatrogenic pancreatitis so this is on the differential given nausea.  Lower suspicion for gastritis, hepatobiliary pathology.  Concern for coagulopathy given atraumatic ecchymosis. Will obtain chest x-ray, labs, urinalysis.  Will treat symptomatically with Zofran and Toradol.           ED Course as of Apr 06 2209   Sat Apr 06, 2019   1641 Platelets: 239 [CT]   1642 Protime: 12.2 [CT]   1642 INR: 0.9 [CT]   1642 PTT: 28 [CT]   1655 IMPRESSION: No active disease in the chest X-RAY CHEST 2 VIEWS [CT]   1702 Basophils, Absolute: 0.03 [CT]   1834 Squamous Epithelial: (!) +1 [CT]   1928  I discussed unremarkable workup with the patient.  The patient did not feel comfortable going home so observation was offered to the patient.  [facility] paged for observation admission.  [CT]      ED Course User Index  [CT] Graham Healy MD         Clinical Impressions as of Apr 06 2209   Viral syndrome   History of vasculitis        Graham Healy MD  Resident  04/06/19 2209

## 2019-04-06 NOTE — ED ATTESTATION NOTE
"-----------------------------------------------------------  ED Attending Note.  4/6/2019, 3:51 PM    I supervised care provided by the Resident (Niraj). We have discussed the case. I have reviewed their note and agree with the plan of treatment. I personally interviewed the patient and examined the patient.    Carolyn Redmond is a 33 y.o. female with the following   Past Medical History:   Diagnosis Date   • Anxiety    • Depressed    • Endocarditis    • Migraines    • Pancreatitis    • Vasculitis (CMS/HCC) ([facility])    ,   Patient Active Problem List   Diagnosis   • Anxiety   • Cervical disc disease   • Chronic pancreatitis (CMS/HCC)   • Depression   • Insomnia   • Migraine without status migrainosus, not intractable   • Concussion   • Intractable migraine without aura   • Palpitations   • REM behavioral disorder   • Transient alteration of awareness   • Vasculitis determined by biopsy of skin (CMS/HCC) ([facility])    and   Past Surgical History:   Procedure Laterality Date   • CHOLECYSTECTOMY     • ERCP     • GALLBLADDER SURGERY      who comes to the ED today with   Chief Complaint   Patient presents with   • Rash   PMH as above sx of myalgias, arthralgias, rash (areas of ecchymosis), c/w prior sx of vasculitis began 2 weeks ago.     PHYSICAL EXAM  Vital Signs: BP (!) 146/76 (BP Location: Right upper arm, Patient Position: Sitting)   Pulse 70   Temp 36.4 °C (97.6 °F) (Temporal)   Resp 18   Ht 1.575 m (5' 2\")   Wt 99.8 kg (220 lb)   LMP 03/29/2019 (Exact Date)   SpO2 97%   BMI 40.24 kg/m²   Physical Exam   Constitutional: She appears well-developed and well-nourished. No distress.   Cardiovascular: Normal rate, regular rhythm and normal heart sounds.    Pulmonary/Chest: Effort normal and breath sounds normal. No respiratory distress.   Abdominal: Soft. Bowel sounds are normal.   Multiple areas of ecchymosis to abdominal wall.   Vitals reviewed.      RESULTS: LABS, IMAGING, EKG  SpO2: 97 % on room air. " Interpretation: normal  Labs Reviewed   COMPREHENSIVE METABOLIC PANEL - Abnormal        Result Value    Sodium 137      Potassium 4.2      Chloride 100      CO2 26      BUN 14      Creatinine 0.7      Glucose 120 (*)     Calcium 9.1      AST (SGOT) 28      ALT (SGPT) 37      Alkaline Phosphatase 72      Total Protein 7.1      Albumin 3.7      Bilirubin, Total 0.8      eGFR >60.0      Anion Gap 11     CBC - Abnormal     WBC 11.11 (*)     RBC 4.10      Hemoglobin 11.6 (*)     Hematocrit 36.2      MCV 88.3      MCH 28.3      MCHC 32.0 (*)     RDW 15.9 (*)     Platelets 239      MPV 9.5     DIFF COUNT - Abnormal     Differential Type Auto      nRBC 0.0      Immature Granulocytes 0.4      Neutrophils 80.8      Lymphocytes 14.6      Monocytes 3.7      Eosinophils 0.2      Basophils 0.3      Immature Granulocytes, Absolute 0.04      Neutrophils, Absolute 8.99 (*)     Lymphocytes, Absolute 1.62      Monocytes, Absolute 0.41      Eosinophils, Absolute 0.02 (*)     Basophils, Absolute 0.03     UA REFLEX CULTURE (MACROSCOPIC) - Abnormal     Color, Urine Yellow      Clarity, Urine Cloudy (*)     Specific Gravity, Urine 1.018      pH, Urine 7.5      Leukocyte Esterase Negative      Nitrite, Urine Negative      Protein, Urine Negative      Glucose, Urine Negative      Ketones, Urine Negative      Urobilinogen, Urine 1.0      Bilirubin, Urine Negative      Blood, Urine Negative     UA MICROSCOPIC - Abnormal     RBC, Urine 0 TO 4      WBC, Urine 0 TO 3      Squamous Epithelial +1 (*)     Hyaline Cast None Seen      Bacteria, Urine Rare (*)    RSV AND INFLUENZA A/B NUCLEIC ACIDS, PCR - Normal    Influenza A Negative      Influenza B Negative      Respiratory Syncytial Virus Negative     LIPASE - Normal    Lipase 35     PROTIME-INR - Normal    PT 12.2      INR 0.9     PTT - Normal    PTT 28     CBC AND DIFFERENTIAL    Narrative:     The following orders were created for panel order CBC and differential.  Procedure                                Abnormality         Status                     ---------                               -----------         ------                     CBC[[id number]]                           Abnormal            Final result               Diff Count[[id number]]                    Abnormal            Final result                 Please view results for these tests on the individual orders.   URINALYSIS REFLEX CULTURE    Narrative:     The following orders were created for panel order Urinalysis w/ reflex culture.  Procedure                               Abnormality         Status                     ---------                               -----------         ------                     UA Reflex to Culture (Mac...[[id number]]  Abnormal            Final result               UA Microscopic[[id number]]                Abnormal            Final result                 Please view results for these tests on the individual orders.   RAINBOW DRAW PANEL    Narrative:     The following orders were created for panel order [facility] Draw Panel.  Procedure                               Abnormality         Status                     ---------                               -----------         ------                     RAINBOW RED[[id number]]                                       In process                 RAINBOW LT GREEN[[id number]]                                  In process                 RAINBOW GOLD[[id number]]                                      In process                   Please view results for these tests on the individual orders.   BEDSIDE PREGNANCY, URINE   POCT BHCG, URINE, QUAL (BEAKER)    POCT BhCG, Urine Qual Negative      POC Test POC     RAINBOW RED   RAINBOW LT GREEN   RAINBOW GOLD     X-RAY CHEST 2 VIEWS   Final Result   IMPRESSION: No active disease in the chest      COMMENT: The current two view examination of the chest was compared to that   dated 4/5/2017.      The lungs remain well aerated and clear.  The cardiomediastinal  silhouette is   normal in size and configuration.  The costophrenic sulci and bony thorax are   intact.          CLINICAL IMPRESSION  Final diagnoses:   [B34.9] Viral syndrome   [Z86.79] History of vasculitis       -----------------------------  Марина Barrow MD  4/6/2019, 4:45 PM  -----------------------------------------------------------     Марина Barrow MD  04/07/19 [id number]

## 2019-04-07 ENCOUNTER — APPOINTMENT (EMERGENCY)
Dept: RADIOLOGY | Facility: HOSPITAL | Age: 34
End: 2019-04-07
Attending: EMERGENCY MEDICINE
Payer: COMMERCIAL

## 2019-04-07 ENCOUNTER — HOSPITAL ENCOUNTER (INPATIENT)
Facility: HOSPITAL | Age: 34
LOS: 2 days | Discharge: HOME | End: 2019-04-09
Attending: HOSPITALIST | Admitting: HOSPITALIST
Payer: COMMERCIAL

## 2019-04-07 DIAGNOSIS — R10.84 GENERALIZED ABDOMINAL PAIN: Primary | ICD-10-CM

## 2019-04-07 PROBLEM — R52 PAIN: Status: ACTIVE | Noted: 2019-04-07

## 2019-04-07 LAB
ALBUMIN SERPL-MCNC: 4.2 G/DL (ref 3.4–5)
ALP SERPL-CCNC: 78 IU/L (ref 35–126)
ALT SERPL-CCNC: 41 IU/L (ref 11–54)
ANION GAP SERPL CALC-SCNC: 11 MEQ/L (ref 3–15)
AST SERPL-CCNC: 32 IU/L (ref 15–41)
BASOPHILS # BLD: 0.05 K/UL (ref 0.01–0.1)
BASOPHILS NFR BLD: 0.4 %
BILIRUB SERPL-MCNC: 0.9 MG/DL (ref 0.3–1.2)
BUN SERPL-MCNC: 11 MG/DL (ref 8–20)
CALCIUM SERPL-MCNC: 9.5 MG/DL (ref 8.9–10.3)
CHLORIDE SERPL-SCNC: 101 MEQ/L (ref 98–109)
CO2 SERPL-SCNC: 27 MEQ/L (ref 22–32)
CREAT SERPL-MCNC: 0.8 MG/DL
CRP SERPL-MCNC: 9.7 MG/L
DIFFERENTIAL METHOD BLD: ABNORMAL
EOSINOPHIL # BLD: 0.05 K/UL (ref 0.04–0.36)
EOSINOPHIL NFR BLD: 0.4 %
ERYTHROCYTE [DISTWIDTH] IN BLOOD BY AUTOMATED COUNT: 16.2 % (ref 11.7–14.4)
ERYTHROCYTE [SEDIMENTATION RATE] IN BLOOD BY WESTERGREN METHOD: 22 MM/HR
GFR SERPL CREATININE-BSD FRML MDRD: >60 ML/MIN/1.73M*2
GLUCOSE SERPL-MCNC: 108 MG/DL (ref 70–99)
HCT VFR BLDCO AUTO: 41.2 %
HGB BLD-MCNC: 13 G/DL
IMM GRANULOCYTES # BLD AUTO: 0.05 K/UL (ref 0–0.08)
IMM GRANULOCYTES NFR BLD AUTO: 0.4 %
LIPASE SERPL-CCNC: 36 U/L (ref 20–51)
LYMPHOCYTES # BLD: 2.22 K/UL (ref 1.2–3.5)
LYMPHOCYTES NFR BLD: 18.7 %
MCH RBC QN AUTO: 28.1 PG (ref 28–33.2)
MCHC RBC AUTO-ENTMCNC: 31.6 G/DL (ref 32.2–35.5)
MCV RBC AUTO: 89.2 FL (ref 83–98)
MONOCYTES # BLD: 0.47 K/UL (ref 0.28–0.8)
MONOCYTES NFR BLD: 4 %
NEUTROPHILS # BLD: 9.05 K/UL (ref 1.7–7)
NEUTS SEG NFR BLD: 76.1 %
NRBC BLD-RTO: 0 %
PDW BLD AUTO: 9.7 FL (ref 9.4–12.3)
PLATELET # BLD AUTO: 275 K/UL
POTASSIUM SERPL-SCNC: 4 MEQ/L (ref 3.6–5.1)
PROT SERPL-MCNC: 8.3 G/DL (ref 6–8.2)
RBC # BLD AUTO: 4.62 M/UL (ref 3.93–5.22)
SODIUM SERPL-SCNC: 139 MEQ/L (ref 136–144)
WBC # BLD AUTO: 11.89 K/UL

## 2019-04-07 PROCEDURE — 99222 1ST HOSP IP/OBS MODERATE 55: CPT | Performed by: HOSPITALIST

## 2019-04-07 PROCEDURE — 85652 RBC SED RATE AUTOMATED: CPT | Performed by: HOSPITALIST

## 2019-04-07 PROCEDURE — 80053 COMPREHEN METABOLIC PANEL: CPT | Performed by: PHYSICIAN ASSISTANT

## 2019-04-07 PROCEDURE — 93005 ELECTROCARDIOGRAM TRACING: CPT | Performed by: HOSPITALIST

## 2019-04-07 PROCEDURE — 86140 C-REACTIVE PROTEIN: CPT | Performed by: HOSPITALIST

## 2019-04-07 PROCEDURE — 12000000 HC ROOM AND CARE MED/SURG

## 2019-04-07 PROCEDURE — 85025 COMPLETE CBC W/AUTO DIFF WBC: CPT | Performed by: PHYSICIAN ASSISTANT

## 2019-04-07 PROCEDURE — 96374 THER/PROPH/DIAG INJ IV PUSH: CPT

## 2019-04-07 PROCEDURE — 99285 EMERGENCY DEPT VISIT HI MDM: CPT | Mod: 25

## 2019-04-07 PROCEDURE — 63600000 HC DRUGS/DETAIL CODE: Performed by: HOSPITALIST

## 2019-04-07 PROCEDURE — 63700000 HC SELF-ADMINISTRABLE DRUG: Performed by: HOSPITALIST

## 2019-04-07 PROCEDURE — 83690 ASSAY OF LIPASE: CPT | Performed by: EMERGENCY MEDICINE

## 2019-04-07 PROCEDURE — 25800000 HC PHARMACY IV SOLUTIONS: Performed by: HOSPITALIST

## 2019-04-07 PROCEDURE — 63600000 HC DRUGS/DETAIL CODE: Performed by: PHYSICIAN ASSISTANT

## 2019-04-07 PROCEDURE — 74176 CT ABD & PELVIS W/O CONTRAST: CPT

## 2019-04-07 PROCEDURE — 36415 COLL VENOUS BLD VENIPUNCTURE: CPT | Performed by: PHYSICIAN ASSISTANT

## 2019-04-07 RX ORDER — DOCUSATE SODIUM 100 MG/1
100 CAPSULE, LIQUID FILLED ORAL 2 TIMES DAILY
Status: DISCONTINUED | OUTPATIENT
Start: 2019-04-07 | End: 2019-04-09 | Stop reason: HOSPADM

## 2019-04-07 RX ORDER — ONDANSETRON HYDROCHLORIDE 2 MG/ML
4 INJECTION, SOLUTION INTRAVENOUS EVERY 8 HOURS PRN
Status: DISCONTINUED | OUTPATIENT
Start: 2019-04-07 | End: 2019-04-07 | Stop reason: SDUPTHER

## 2019-04-07 RX ORDER — BISACODYL 10 MG/1
10 SUPPOSITORY RECTAL DAILY PRN
Status: DISCONTINUED | OUTPATIENT
Start: 2019-04-07 | End: 2019-04-09 | Stop reason: HOSPADM

## 2019-04-07 RX ORDER — NAPROXEN 250 MG/1
500 TABLET ORAL EVERY 12 HOURS PRN
Status: DISCONTINUED | OUTPATIENT
Start: 2019-04-07 | End: 2019-04-07

## 2019-04-07 RX ORDER — ONDANSETRON 4 MG/1
4 TABLET, ORALLY DISINTEGRATING ORAL EVERY 8 HOURS PRN
Status: DISCONTINUED | OUTPATIENT
Start: 2019-04-07 | End: 2019-04-07 | Stop reason: SDUPTHER

## 2019-04-07 RX ORDER — ONDANSETRON HYDROCHLORIDE 2 MG/ML
4 INJECTION, SOLUTION INTRAVENOUS EVERY 8 HOURS PRN
Status: DISCONTINUED | OUTPATIENT
Start: 2019-04-07 | End: 2019-04-09 | Stop reason: HOSPADM

## 2019-04-07 RX ORDER — ACETAMINOPHEN 650 MG/20.3ML
650 LIQUID ORAL EVERY 4 HOURS PRN
Status: DISCONTINUED | OUTPATIENT
Start: 2019-04-07 | End: 2019-04-08

## 2019-04-07 RX ORDER — MORPHINE SULFATE 4 MG/ML
4 INJECTION, SOLUTION INTRAMUSCULAR; INTRAVENOUS ONCE
Status: COMPLETED | OUTPATIENT
Start: 2019-04-07 | End: 2019-04-07

## 2019-04-07 RX ORDER — DIPHENHYDRAMINE HCL 50 MG/ML
25 VIAL (ML) INJECTION EVERY 6 HOURS PRN
Status: DISCONTINUED | OUTPATIENT
Start: 2019-04-07 | End: 2019-04-08

## 2019-04-07 RX ORDER — OXYCODONE HYDROCHLORIDE 5 MG/1
5 TABLET ORAL EVERY 6 HOURS PRN
Status: DISCONTINUED | OUTPATIENT
Start: 2019-04-07 | End: 2019-04-08

## 2019-04-07 RX ORDER — SODIUM CHLORIDE 9 MG/ML
INJECTION, SOLUTION INTRAVENOUS CONTINUOUS
Status: DISCONTINUED | OUTPATIENT
Start: 2019-04-07 | End: 2019-04-09 | Stop reason: HOSPADM

## 2019-04-07 RX ORDER — ZOLPIDEM TARTRATE 10 MG/1
10 TABLET ORAL NIGHTLY PRN
Status: DISCONTINUED | OUTPATIENT
Start: 2019-04-07 | End: 2019-04-09 | Stop reason: HOSPADM

## 2019-04-07 RX ORDER — ACETAMINOPHEN 650 MG/1
650 SUPPOSITORY RECTAL EVERY 4 HOURS PRN
Status: DISCONTINUED | OUTPATIENT
Start: 2019-04-07 | End: 2019-04-08

## 2019-04-07 RX ORDER — ONDANSETRON HYDROCHLORIDE 2 MG/ML
4 INJECTION, SOLUTION INTRAVENOUS ONCE
Status: COMPLETED | OUTPATIENT
Start: 2019-04-07 | End: 2019-04-07

## 2019-04-07 RX ORDER — DULOXETIN HYDROCHLORIDE 60 MG/1
60 CAPSULE, DELAYED RELEASE ORAL DAILY
Status: DISCONTINUED | OUTPATIENT
Start: 2019-04-08 | End: 2019-04-09 | Stop reason: HOSPADM

## 2019-04-07 RX ORDER — TRAMADOL HYDROCHLORIDE 50 MG/1
50 TABLET ORAL EVERY 6 HOURS PRN
Status: DISCONTINUED | OUTPATIENT
Start: 2019-04-07 | End: 2019-04-07 | Stop reason: SINTOL

## 2019-04-07 RX ORDER — SUMATRIPTAN 6 MG/.5ML
6 INJECTION, SOLUTION SUBCUTANEOUS 2 TIMES DAILY PRN
Status: DISCONTINUED | OUTPATIENT
Start: 2019-04-07 | End: 2019-04-09 | Stop reason: HOSPADM

## 2019-04-07 RX ORDER — ENOXAPARIN SODIUM 100 MG/ML
40 INJECTION SUBCUTANEOUS
Status: DISCONTINUED | OUTPATIENT
Start: 2019-04-07 | End: 2019-04-09 | Stop reason: HOSPADM

## 2019-04-07 RX ORDER — NAPROXEN 250 MG/1
500 TABLET ORAL EVERY 12 HOURS PRN
Status: DISCONTINUED | OUTPATIENT
Start: 2019-04-07 | End: 2019-04-08

## 2019-04-07 RX ORDER — DEXTROSE 40 %
15-30 GEL (GRAM) ORAL AS NEEDED
Status: DISCONTINUED | OUTPATIENT
Start: 2019-04-07 | End: 2019-04-09 | Stop reason: HOSPADM

## 2019-04-07 RX ORDER — BUTALBITAL, ACETAMINOPHEN AND CAFFEINE 50; 325; 40 MG/1; MG/1; MG/1
1 TABLET ORAL EVERY 4 HOURS PRN
Status: DISCONTINUED | OUTPATIENT
Start: 2019-04-07 | End: 2019-04-09 | Stop reason: HOSPADM

## 2019-04-07 RX ORDER — ONDANSETRON 4 MG/1
4 TABLET, ORALLY DISINTEGRATING ORAL EVERY 8 HOURS PRN
Status: DISCONTINUED | OUTPATIENT
Start: 2019-04-07 | End: 2019-04-09 | Stop reason: HOSPADM

## 2019-04-07 RX ORDER — DEXTROSE 50 % IN WATER (D50W) INTRAVENOUS SYRINGE
25 AS NEEDED
Status: DISCONTINUED | OUTPATIENT
Start: 2019-04-07 | End: 2019-04-09 | Stop reason: HOSPADM

## 2019-04-07 RX ORDER — DIPHENHYDRAMINE HCL 25 MG
25 CAPSULE ORAL EVERY 6 HOURS PRN
Status: DISCONTINUED | OUTPATIENT
Start: 2019-04-07 | End: 2019-04-08

## 2019-04-07 RX ORDER — SENNOSIDES 8.6 MG/1
1 TABLET ORAL 2 TIMES DAILY PRN
Status: DISCONTINUED | OUTPATIENT
Start: 2019-04-07 | End: 2019-04-09 | Stop reason: HOSPADM

## 2019-04-07 RX ORDER — OXYCODONE HYDROCHLORIDE 5 MG/1
5 TABLET ORAL EVERY 6 HOURS PRN
Status: DISCONTINUED | OUTPATIENT
Start: 2019-04-07 | End: 2019-04-07

## 2019-04-07 RX ORDER — QUETIAPINE FUMARATE 200 MG/1
400 TABLET, FILM COATED ORAL NIGHTLY PRN
Status: DISCONTINUED | OUTPATIENT
Start: 2019-04-07 | End: 2019-04-09 | Stop reason: HOSPADM

## 2019-04-07 RX ORDER — ACETAMINOPHEN 325 MG/1
650 TABLET ORAL EVERY 4 HOURS PRN
Status: DISCONTINUED | OUTPATIENT
Start: 2019-04-07 | End: 2019-04-08

## 2019-04-07 RX ORDER — MORPHINE SULFATE 4 MG/ML
4 INJECTION, SOLUTION INTRAMUSCULAR; INTRAVENOUS
Status: DISCONTINUED | OUTPATIENT
Start: 2019-04-07 | End: 2019-04-08

## 2019-04-07 RX ORDER — PREDNISONE 10 MG/1
10 TABLET ORAL DAILY
Status: DISCONTINUED | OUTPATIENT
Start: 2019-04-08 | End: 2019-04-09 | Stop reason: HOSPADM

## 2019-04-07 RX ORDER — IBUPROFEN 200 MG
16-32 TABLET ORAL AS NEEDED
Status: DISCONTINUED | OUTPATIENT
Start: 2019-04-07 | End: 2019-04-09 | Stop reason: HOSPADM

## 2019-04-07 RX ADMIN — MORPHINE SULFATE 4 MG: 4 INJECTION INTRAVENOUS at 13:45

## 2019-04-07 RX ADMIN — SODIUM CHLORIDE: 9 INJECTION, SOLUTION INTRAVENOUS at 20:31

## 2019-04-07 RX ADMIN — MORPHINE SULFATE 4 MG: 4 INJECTION INTRAVENOUS at 18:48

## 2019-04-07 RX ADMIN — MORPHINE SULFATE 4 MG: 4 INJECTION INTRAVENOUS at 15:49

## 2019-04-07 RX ADMIN — MORPHINE SULFATE 4 MG: 4 INJECTION INTRAVENOUS at 22:38

## 2019-04-07 RX ADMIN — ONDANSETRON HYDROCHLORIDE 4 MG: 2 SOLUTION INTRAMUSCULAR; INTRAVENOUS at 15:49

## 2019-04-07 RX ADMIN — ONDANSETRON HYDROCHLORIDE 4 MG: 2 SOLUTION INTRAMUSCULAR; INTRAVENOUS at 13:45

## 2019-04-07 RX ADMIN — ZOLPIDEM TARTRATE 10 MG: 10 TABLET, FILM COATED ORAL at 22:37

## 2019-04-07 RX ADMIN — QUETIAPINE FUMARATE 400 MG: 200 TABLET ORAL at 22:37

## 2019-04-07 ASSESSMENT — ENCOUNTER SYMPTOMS
CHILLS: 0
ARTHRALGIAS: 0
FEVER: 0
SHORTNESS OF BREATH: 0
DYSURIA: 0
JOINT SWELLING: 0
COUGH: 0
ABDOMINAL PAIN: 1
HEADACHES: 0
DIARRHEA: 0
NAUSEA: 1
MYALGIAS: 1
PALPITATIONS: 0
FATIGUE: 1
VOMITING: 0
DIFFICULTY URINATING: 0
SORE THROAT: 0

## 2019-04-07 ASSESSMENT — COGNITIVE AND FUNCTIONAL STATUS - GENERAL
STANDING UP FROM CHAIR USING ARMS: 4 - NONE
TOILETING: 4 - NONE
WALKING IN HOSPITAL ROOM: 4 - NONE
HELP NEEDED FOR BATHING: 4 - NONE
CLIMB 3 TO 5 STEPS WITH RAILING: 4 - NONE
DRESSING REGULAR LOWER BODY CLOTHING: 4 - NONE
HELP NEEDED FOR PERSONAL GROOMING: 4 - NONE
MOVING TO AND FROM BED TO CHAIR: 4 - NONE
EATING MEALS: 4 - NONE
DRESSING REGULAR UPPER BODY CLOTHING: 4 - NONE

## 2019-04-07 NOTE — DISCHARGE INSTRUCTIONS
Labs that were done here are unremarkable.  We do not think you have anything serious that we can diagnose at the moment.  May use Tylenol for discomfort.  Be sure to try to drink lots of fluids.  Follow-up with your doctor and your rheumatologist.  A copy of all the labs that were done here are attached.

## 2019-04-07 NOTE — ED ATTESTATION NOTE
Procedures  Physical Exam  Review of Systems    4/7/20192:26 PM  I have personally seen and examined the patient. I have reviewed and agree with the PA/NP/Resident's assessment and ED plan of care. My examination, assessment and plan of care of Carolyn Redmond is as follows:    Patient is a 33-year-old female who presents with multiple complaints including upper abdominal pain, patient was seen in the emergency department yesterday and had workup which did not reveal any acute issues, returns today with continued upper abdominal pain, no fevers reported    Exam:   Heart: RRR, normal S1/S2  Lungs: CTA bilaterally  Abdo: soft, mild epigastric tenderness    Plan: Patient is a 33-year-old female who presents with upper abdominal pain, patient was seen yesterday with similar complaints, will review that workup, recheck labs and imaging, reevaluate afterwards          Godshall, Duane K, MD  04/07/19 [id number]

## 2019-04-07 NOTE — ED PROVIDER NOTES
HPI     Chief Complaint   Patient presents with   • Abdominal Pain     33 y.o. female with pmh of endocarditis, pancreatitis, vasculitis, and migraines presents to ED with multiple complaints x 2 weeks. Pt reports  malaise, nausea, abdominal pain and generalized myalgias. Notes associated rash on back and bilateral legs. Pt was seen here yesterday for the same issue all labs were unremarkable. The pt was offered admission but elected to go home.Today, pt notes symptoms have not improved. Pt has been taking 10 mg prednisone for 1 year as directed by her rheumatologist. Denies vomiting, diarrhea, fever, cough,urinary symptoms, chest pain, headache.           History provided by:  Patient   used: No         Patient History     Past Medical History:   Diagnosis Date   • Anxiety    • Depressed    • Endocarditis    • Migraines    • Pancreatitis    • Vasculitis (CMS/HCC) (HCC)        Past Surgical History:   Procedure Laterality Date   • CHOLECYSTECTOMY     • ERCP     • GALLBLADDER SURGERY         Family History   Problem Relation Age of Onset   • No Known Problems Mother    • Lung cancer Father        Social History   Substance Use Topics   • Smoking status: Current Every Day Smoker     Packs/day: 0.50     Types: Cigarettes   • Smokeless tobacco: Current User   • Alcohol use Yes      Comment: social       Systems Reviewed from Nursing Triage:          Review of Systems     Review of Systems   Constitutional: Positive for fatigue. Negative for chills and fever.   HENT: Negative for ear pain and sore throat.    Respiratory: Negative for cough and shortness of breath.    Cardiovascular: Negative for chest pain, palpitations and leg swelling.   Gastrointestinal: Positive for abdominal pain and nausea. Negative for diarrhea and vomiting.   Endocrine: Negative for polyuria.   Genitourinary: Negative for difficulty urinating and dysuria.   Musculoskeletal: Positive for myalgias. Negative for arthralgias and  "joint swelling.   Skin: Positive for rash.   Neurological: Negative for headaches.        Physical Exam     ED Triage Vitals [04/07/19 1146]   Temp Heart Rate Resp BP SpO2   36.8 °C (98.3 °F) 68 16 (!) 148/78 99 %      Temp Source Heart Rate Source Patient Position BP Location FiO2 (%) (Set)   Temporal -- -- -- --                     Patient Vitals for the past 24 hrs:   BP Temp Temp src Pulse Resp SpO2 Height Weight   04/07/19 1146 (!) 148/78 36.8 °C (98.3 °F) Temporal 68 16 99 % 1.6 m (5' 3\") 99.8 kg (220 lb)           Physical Exam   Constitutional: She is oriented to person, place, and time. She appears well-developed. No distress.   HENT:   Head: Normocephalic and atraumatic.   Eyes: Conjunctivae are normal.   Neck: Neck supple.   Cardiovascular: Normal rate, regular rhythm and normal heart sounds.    No murmur heard.  Pulmonary/Chest: Effort normal and breath sounds normal. No respiratory distress. She exhibits no tenderness.   Abdominal: Soft. There is no tenderness.   Musculoskeletal: Normal range of motion. She exhibits no tenderness.   Neurological: She is alert and oriented to person, place, and time.   Skin: Skin is warm and dry. Capillary refill takes less than 2 seconds. Rash noted. She is not diaphoretic.   Scattered ecchymosis to abdomen, bilateral lower extremities and lower back.    Focal nonblanching papule, noted along right inner thigh.   Nursing note and vitals reviewed.           Procedures    ED Course & MDM     Labs Reviewed - No data to display    No orders to display               MDM         ED Course as of Apr 08 1754   Sun Apr 07, 2019   1215 Impression: rash  likely vasculitis and abdominal pain x 2 weeks   Plan: zofran, morphine, labs   [KP]   1415 WBC: (!) 11.89 [KP]   1456 Will CT abd   [KP]   1523 CT unremarkable.   [KP]   1544 D/w [facility] [place]   [KP]      ED Course User Index  [KP] Ivett Rodriguez PA C         Clinical Impressions as of Apr 08 1754   Generalized abdominal pain    "       By signing my name below, I, Suzette Hansel, attest that this documentation has been prepared under the direction and in the presence of ADÁN Rodriguez PA-C.    4/7/2019 12:08 PM           Ivett Rodriguez PA C  04/08/19 7107

## 2019-04-07 NOTE — H&P
Hospital Medicine Service -  History & Physical        CHIEF COMPLAINT   Chief Complaint   Patient presents with   • Abdominal Pain       HISTORY OF PRESENT ILLNESS      Carolyn Redmond is a 33 y.o. female who came in for generalized uncomfortable and pain progressively in the last 2 weeks.  She was seen by ER physician yesterday and CT of the abdomen did not show any significant finding or pathology process.  The patient still complaining nauseous and generalized pain including abdominal pain.  She noticed skin rash at the low back skin and right thigh with vasculitis rash features since patient had a similar skin rash with vasculitis before.  She also feel the pain uncontrolled by home medication and requesting IV morphine here.  She denies fever chills.  no vomiting.  Voiding habit and bowel movement habit has no changes.  Clinical patient appears stressed and want to see rheumatologist as soon as possible.  PAST MEDICAL AND SURGICAL HISTORY      Past Medical History:   Diagnosis Date   • Anxiety    • Depressed    • Endocarditis    • Migraines    • Pancreatitis    • Vasculitis (CMS/HCC) (HCC)      Past Surgical History:   Procedure Laterality Date   • CHOLECYSTECTOMY     • ERCP     • GALLBLADDER SURGERY       MEDICATIONS      Prior to Admission medications    Medication Sig Start Date End Date Taking? Authorizing Provider   butalbital-acetaminophen-caff (FIORICET, ESGIC) -40 mg per tablet Take 1 tablet by mouth every 4 (four) hours as needed for headaches. 11/8/18   Yanick Stout, DO   DULoxetine (CYMBALTA) 60 mg capsule Take 1 capsule (60 mg total) by mouth daily. 11/29/18 2/27/19  Kwame Bran, DO   naproxen (NAPROSYN) 500 mg tablet Take 500 mg by mouth every 12 (twelve) hours as needed. 8/16/18   Bari Ji MD   ondansetron ODT (ZOFRAN ODT) 4 mg disintegrating tablet Take 1 tablet by mouth daily. 11/27/17   Bari Ji MD   predniSONE (DELTASONE) 10 mg tablet Take 1  tablet (10 mg total) by mouth daily. 11/2/18   Yanick Stout, DO   QUEtiapine (SEROquel) 200 mg tablet Take 1 tablet (200 mg total) by mouth 2 (two) times a day. 9/26/18   Yanick Stout DO   SUMAtriptan (IMITREX) 20 mg/actuation nasal spray Administer 1 spray into affected nostril(s) once as needed. 5/25/18   Bari Ji MD   cyclobenzaprine (FLEXERIL) 10 mg tablet Take 1 tablet by mouth 2 (two) times a day. 11/27/17 4/7/19  Bari Ji MD   dicyclomine (BENTYL) 10 mg capsule Take 1 tablet by mouth 3 (three) times a day. 11/27/17 4/7/19  Bari Ji MD   DULoxetine (CYMBALTA) 60 mg capsule Take 1 capsule (60 mg total) by mouth daily. 9/21/18 4/7/19  Kwame Bran DO   fluticasone HFA (FLOVENT HFA) 110 mcg/actuation inhaler Inhale 1 puff 2 (two) times a day. Rinse mouth with water after use to reduce aftertaste and incidence of candidiasis. Do not swallow. 10/25/18 4/7/19  Kwame Bran DO   magnesium oxide 400 mg capsule Take 1 tablet by mouth 2 (two) times a day. 5/9/17 4/7/19  Bari Ji MD   NARCAN 4 mg/actuation spray,non-aerosol nasal spray Administer 1 spray into one nostril. Instill 1 spray in nostril 1 nostril if needed for opiod overdose may re... (refer to prescription notes) 10/24/18 4/7/19  Bari Ji MD   oxycodone HCl (OXYCODONE ORAL) Take 10 mg by mouth 4 (four) times a day.  4/7/19  Bari Ji MD   pantoprazole (PROTONIX) 40 mg EC tablet Take 1 mg by mouth daily. 5/9/17 4/7/19  Bari Ji MD   prazosin (MINIPRESS) 1 mg capsule Take 1 mg by mouth daily. 1/30/17 4/7/19  Bari Ji MD   sertraline (ZOLOFT) 50 mg tablet Take 50 mg by mouth daily.  4/7/19  ProviderBari MD   terazosin (HYTRIN) 2 mg capsule Take 2 mg by mouth daily. 6/2/18 4/7/19  Bari Ji MD   topiramate (TOPAMAX) 50 mg tablet Take 1 tablet by mouth 2 (two) times a day. 12/20/17 4/7/19  Bari Ji MD   zolpidem  "(AMBIEN) 10 mg tablet Take 1 tablet by mouth nightly as needed. 11/27/18 4/7/19  Provider, Historical, MD     ALLERGIES      Cefazolin; Ibuprofen; Metronidazole; and Tramadol  FAMILY HISTORY      Family History   Problem Relation Age of Onset   • No Known Problems Mother    • Lung cancer Father      SOCIAL HISTORY      Social History     Social History   • Marital status: Single     Spouse name: N/A   • Number of children: N/A   • Years of education: N/A     Social History Main Topics   • Smoking status: Current Every Day Smoker     Packs/day: 0.50     Types: Cigarettes   • Smokeless tobacco: Current User   • Alcohol use Yes      Comment: social   • Drug use: Yes     Types: Marijuana   • Sexual activity: Defer     Other Topics Concern   • None     Social History Narrative   • None     REVIEW OF SYSTEMS      Review of Systems  Constitutional: Negative for fatigue and unexpected weight change.   Eyes: Negative for visual disturbance.   Respiratory: Negative for cough and shortness of breath.    Cardiovascular: Negative for chest pain and palpitations.   Gastrointestinal: Negative for abdominal pain, constipation, diarrhea, nausea and vomiting.   Genitourinary: Negative for difficulty urinating.   Musculoskeletal: Negative for arthralgias.   Skin: Negative for rash.   Neurological: Negative for dizziness and headaches.   Hematological: Does not bruise/bleed easily.   Psychiatric/Behavioral: Negative for confusion and dysphoric mood  PHYSICAL EXAMINATION      /75 (BP Location: Left upper arm, Patient Position: Sitting)   Pulse 62   Temp 36.5 °C (97.7 °F) (Temporal)   Resp 16   Ht 1.6 m (5' 3\")   Wt 99.8 kg (220 lb)   LMP 03/29/2019 (Exact Date)   SpO2 97%   BMI 38.97 kg/m²   Body mass index is 38.97 kg/m².  Physical Exam  Constitutional: She is oriented to person, place, and time. She appears well-developed and well-nourished. No distress.   HENT:   Right Ear: External ear normal.   Left Ear: External ear " normal.   Nose: Nose normal.   Eyes: EOM are normal. Pupils are equal, round, and reactive to light. Right eye exhibits no discharge. Left eye exhibits no discharge.   Neck: Normal range of motion. Neck supple.   Cardiovascular: Normal rate, regular rhythm, normal heart sounds and intact distal pulses.    Pulmonary/Chest: Effort normal and breath sounds normal.   Abdominal: Bowel sounds are normal. There is no tenderness.   Musculoskeletal: Normal range of motion.   Neurological: She is alert and oriented to person, place, and time.   Skin: Skin is warm and dry. Multiple skin elevated nonblanchable erythemal rashes at back and front ABD and R thigh shin, tender.   LABS / IMAGING / EKG        Labs  Lab Results   Component Value Date    WBC 11.89 (H) 04/07/2019    HGB 13.0 04/07/2019    HCT 41.2 04/07/2019    MCV 89.2 04/07/2019     04/07/2019     Lab Results   Component Value Date    GLUCOSE 108 (H) 04/07/2019    CALCIUM 9.5 04/07/2019     04/07/2019    K 4.0 04/07/2019    CO2 27 04/07/2019     04/07/2019    BUN 11 04/07/2019    CREATININE 0.8 04/07/2019     )  Lab Results   Component Value Date    INR 0.9 04/06/2019    INR 1.0 04/16/2017    INR 1.1 03/24/2017       Imaging  Ct Abdomen Pelvis Without Iv Contrast    Result Date: 4/7/2019  IMPRESSION: 1.  No evidence of acute inflammatory or obstructive pathology in the abdomen or pelvis. 2.  Trace bilateral pleural effusions.     X-ray Chest 2 Views    Result Date: 4/6/2019  IMPRESSION: No active disease in the chest COMMENT: The current two view examination of the chest was compared to that dated 4/5/2017. The lungs remain well aerated and clear.  The cardiomediastinal silhouette is normal in size and configuration.  The costophrenic sulci and bony thorax are intact.        ASSESSMENT AND PLAN         Assessment   Pain   Assessment & Plan    All over, with vasculitis Rash with pain  Want IV Morphine  Pain Service evaluation     Vasculitis  determined by biopsy of skin (CMS/HCC) (HCC)   Assessment & Plan    Low back , extremities/R thigh  With tenderness.   Possible Rheumatoid disease related vasculitis.  Consult Dr. Harper for assessment and Tx plan  C/w Prednisone 10mg daily for now     Insomnia   Assessment & Plan    On Seroquel and Ambien     Depression   Assessment & Plan    Cymbarta             VTE Assessment: Padua    Code Status: No Order  Estimated discharge date: 4/10/2019   No discharge date for patient encounter.     Ty Leon MD  4/7/2019  5:10 PM

## 2019-04-07 NOTE — ASSESSMENT & PLAN NOTE
Low back , extremities/R thigh  With tenderness.   Waiting for the Consult of  Dr. Harper for assessment and Tx plan

## 2019-04-08 PROBLEM — F11.20 OPIOID TYPE DEPENDENCE, CONTINUOUS USE (CMS/HCC): Status: ACTIVE | Noted: 2019-04-08

## 2019-04-08 PROBLEM — F41.9 ANXIETY: Status: ACTIVE | Noted: 2019-04-08

## 2019-04-08 PROBLEM — K59.01 SLOW TRANSIT CONSTIPATION: Status: ACTIVE | Noted: 2019-04-08

## 2019-04-08 PROBLEM — R10.10 PAIN OF UPPER ABDOMEN: Status: ACTIVE | Noted: 2019-04-08

## 2019-04-08 LAB
ATRIAL RATE: 60
P AXIS: 18
PR INTERVAL: 128
QRS DURATION: 88
QT INTERVAL: 446
QTC CALCULATION(BAZETT): 446
R AXIS: 11
T WAVE AXIS: 30
VENTRICULAR RATE: 60

## 2019-04-08 PROCEDURE — 12000000 HC ROOM AND CARE MED/SURG

## 2019-04-08 PROCEDURE — 99233 SBSQ HOSP IP/OBS HIGH 50: CPT | Performed by: HOSPITALIST

## 2019-04-08 PROCEDURE — 63600000 HC DRUGS/DETAIL CODE: Performed by: HOSPITALIST

## 2019-04-08 PROCEDURE — 63700000 HC SELF-ADMINISTRABLE DRUG: Performed by: HOSPITALIST

## 2019-04-08 PROCEDURE — 63700000 HC SELF-ADMINISTRABLE DRUG: Performed by: NURSE PRACTITIONER

## 2019-04-08 PROCEDURE — 99253 IP/OBS CNSLTJ NEW/EST LOW 45: CPT | Performed by: NURSE PRACTITIONER

## 2019-04-08 RX ORDER — LORAZEPAM 1 MG/1
1 TABLET ORAL EVERY 4 HOURS PRN
Status: DISCONTINUED | OUTPATIENT
Start: 2019-04-08 | End: 2019-04-09 | Stop reason: HOSPADM

## 2019-04-08 RX ORDER — NAPROXEN 250 MG/1
500 TABLET ORAL EVERY 12 HOURS PRN
Status: DISCONTINUED | OUTPATIENT
Start: 2019-04-08 | End: 2019-04-09 | Stop reason: HOSPADM

## 2019-04-08 RX ORDER — IBUPROFEN 200 MG
1 TABLET ORAL DAILY
Status: DISCONTINUED | OUTPATIENT
Start: 2019-04-08 | End: 2019-04-09 | Stop reason: HOSPADM

## 2019-04-08 RX ORDER — ACETAMINOPHEN 325 MG/1
650 TABLET ORAL EVERY 6 HOURS
Status: DISCONTINUED | OUTPATIENT
Start: 2019-04-08 | End: 2019-04-08

## 2019-04-08 RX ORDER — ACETAMINOPHEN 325 MG/1
650 TABLET ORAL EVERY 8 HOURS
Status: DISCONTINUED | OUTPATIENT
Start: 2019-04-08 | End: 2019-04-09 | Stop reason: HOSPADM

## 2019-04-08 RX ORDER — OXYCODONE HYDROCHLORIDE 5 MG/1
5 TABLET ORAL EVERY 6 HOURS PRN
Status: DISCONTINUED | OUTPATIENT
Start: 2019-04-09 | End: 2019-04-09 | Stop reason: HOSPADM

## 2019-04-08 RX ORDER — OXYCODONE HYDROCHLORIDE 5 MG/1
5 TABLET ORAL EVERY 4 HOURS PRN
Status: DISPENSED | OUTPATIENT
Start: 2019-04-08 | End: 2019-04-09

## 2019-04-08 RX ORDER — DIPHENHYDRAMINE HCL 25 MG
25 CAPSULE ORAL EVERY 6 HOURS PRN
Status: DISCONTINUED | OUTPATIENT
Start: 2019-04-08 | End: 2019-04-09 | Stop reason: HOSPADM

## 2019-04-08 RX ORDER — PANTOPRAZOLE SODIUM 40 MG/1
40 TABLET, DELAYED RELEASE ORAL DAILY
Status: DISCONTINUED | OUTPATIENT
Start: 2019-04-08 | End: 2019-04-09 | Stop reason: HOSPADM

## 2019-04-08 RX ORDER — OXYCODONE HYDROCHLORIDE 5 MG/1
5 TABLET ORAL EVERY 4 HOURS PRN
Status: DISCONTINUED | OUTPATIENT
Start: 2019-04-08 | End: 2019-04-08

## 2019-04-08 RX ADMIN — MORPHINE SULFATE 4 MG: 4 INJECTION INTRAVENOUS at 06:36

## 2019-04-08 RX ADMIN — PREDNISONE 10 MG: 10 TABLET ORAL at 08:27

## 2019-04-08 RX ADMIN — QUETIAPINE FUMARATE 400 MG: 200 TABLET ORAL at 21:47

## 2019-04-08 RX ADMIN — MORPHINE SULFATE 4 MG: 4 INJECTION INTRAVENOUS at 09:56

## 2019-04-08 RX ADMIN — ONDANSETRON 4 MG: 2 INJECTION INTRAMUSCULAR; INTRAVENOUS at 08:30

## 2019-04-08 RX ADMIN — ACETAMINOPHEN 650 MG: 325 TABLET, FILM COATED ORAL at 21:47

## 2019-04-08 RX ADMIN — BUTALBITAL, ACETAMINOPHEN AND CAFFEINE 1 TABLET: 50; 325; 40 TABLET ORAL at 08:27

## 2019-04-08 RX ADMIN — DULOXETINE 60 MG: 60 CAPSULE, DELAYED RELEASE ORAL at 08:28

## 2019-04-08 RX ADMIN — ACETAMINOPHEN 650 MG: 325 TABLET, FILM COATED ORAL at 15:32

## 2019-04-08 RX ADMIN — OXYCODONE HYDROCHLORIDE 5 MG: 5 TABLET ORAL at 16:54

## 2019-04-08 RX ADMIN — OXYCODONE HYDROCHLORIDE 5 MG: 5 TABLET ORAL at 21:47

## 2019-04-08 RX ADMIN — DIPHENHYDRAMINE HYDROCHLORIDE 25 MG: 25 CAPSULE ORAL at 13:38

## 2019-04-08 RX ADMIN — MORPHINE SULFATE 4 MG: 4 INJECTION INTRAVENOUS at 03:16

## 2019-04-08 RX ADMIN — ZOLPIDEM TARTRATE 10 MG: 10 TABLET, FILM COATED ORAL at 21:47

## 2019-04-08 RX ADMIN — BUTALBITAL, ACETAMINOPHEN AND CAFFEINE 1 TABLET: 50; 325; 40 TABLET ORAL at 12:58

## 2019-04-08 RX ADMIN — NICOTINE 1 PATCH: 14 PATCH TRANSDERMAL at 09:56

## 2019-04-08 RX ADMIN — PANTOPRAZOLE SODIUM 40 MG: 40 TABLET, DELAYED RELEASE ORAL at 15:33

## 2019-04-08 RX ADMIN — LORAZEPAM 1 MG: 1 TABLET ORAL at 18:08

## 2019-04-08 RX ADMIN — MORPHINE SULFATE 4 MG: 4 INJECTION INTRAVENOUS at 12:58

## 2019-04-08 RX ADMIN — NAPROXEN 500 MG: 250 TABLET ORAL at 15:39

## 2019-04-08 NOTE — PROGRESS NOTES
Hospital Medicine Service -  Daily Progress Note       SUBJECTIVE     Interval History: No acute events overnight. Anxious.   Pain stable.     OBJECTIVE        Vital signs in last 24 hours:  Temp:  [36.7 °C (98 °F)-37.1 °C (98.8 °F)] 37.1 °C (98.8 °F)  Heart Rate:  [62-81] 66  Resp:  [17-18] 18  BP: (125-160)/(60-83) 149/83  No intake/output data recorded.    PHYSICAL EXAMINATION        GEN: well-developed and well-nourished; not in acute distress  HEENT: normocephalic; atraumatic  NECK: no JVD; no bruits  CARDIO: regular rate and rhythm; no murmurs or rubs  RESP: clear to auscultation bilaterally; no rales, rhonchi, or wheezes  ABD: soft, non-distended, non-tender, normal bowel sounds  EXT: no cyanosis, clubbing, or edema  SKIN: clean, dry, warm, and intact  MUSCULOSKELETAL: no injury or deformity  NEURO: alert and oriented x 3; nonfocal  BEHAVIOR/EMOTIONAL: appropriate; cooperative     LABS / IMAGING / TELE        Labs  Lab Results   Component Value Date    WBC 11.89 (H) 04/07/2019    HGB 13.0 04/07/2019    HCT 41.2 04/07/2019    MCV 89.2 04/07/2019     04/07/2019     Lab Results   Component Value Date    GLUCOSE 108 (H) 04/07/2019    CALCIUM 9.5 04/07/2019     04/07/2019    K 4.0 04/07/2019    CO2 27 04/07/2019     04/07/2019    BUN 11 04/07/2019    CREATININE 0.8 04/07/2019     Lab Results   Component Value Date    INR 0.9 04/06/2019    INR 1.0 04/16/2017    INR 1.1 03/24/2017       Imaging  Ct Abdomen Pelvis Without Iv Contrast    Result Date: 4/7/2019  IMPRESSION: 1.  No evidence of acute inflammatory or obstructive pathology in the abdomen or pelvis. 2.  Trace bilateral pleural effusions.     X-ray Chest 2 Views    Result Date: 4/6/2019  IMPRESSION: No active disease in the chest COMMENT: The current two view examination of the chest was compared to that dated 4/5/2017. The lungs remain well aerated and clear.  The cardiomediastinal silhouette is normal in size and configuration.  The  costophrenic sulci and bony thorax are intact.          ASSESSMENT AND PLAN      Pain   Assessment & Plan    All over, with vasculitis Rash with pain  Want IV Morphine  Pain Service evaluation     Vasculitis determined by biopsy of skin (CMS/HCC) ([facility])   Assessment & Plan    Low back , extremities/R thigh  With tenderness.   Waiting for the Consult of  Dr. Harper for assessment and Tx plan     Anxiety   Assessment & Plan    Want Ativan  monitoring     Insomnia   Assessment & Plan    On Seroquel and Ambien     Depression   Assessment & Plan    Cymbarta          VTE Assessment: Padua    Code Status: Full Code  Estimated discharge date: 4/10/2019     Ty Leon MD  4/8/2019  5:09 PM

## 2019-04-08 NOTE — ASSESSMENT & PLAN NOTE
"Unclear etiology. Reports ongoing intermittent abdominal pain for the past 2 years since having post ERCP pancreatitis. She reports worsening pain over the past 2 weeks that was not relieved by  Home regimen of suboxone 8mg-2mg. Located across upper abdomen in epigastric area, wrapping around from right to left side. Severe at times. Feels like \"squeezing pressure.\" Unable to report any alleviating factors. Last utilized suboxone 4 days ago. Has received morphine 4 mg IV (x6) since last evening. She also has oxycodone available which she has not utilized. No clear etiology of pain per workup thus far. Possibly related to vasculitis flair? Rheumatology has been consulted. She does have new rash over various aspects of her body. She has chronic back pain and now left ankle pain due to recent fracture in February which is now healing. I spoke with Dr. Edvin Lang who she currently follows as an outpatient. She is aware and gives permission to contact him. He recommends an opioid sparing approach to pain management and provided some direction for patient to resume suboxone after discharge.       Changd acetaminophen to scheduled 650 mg po q 8 hours (pt receiving fiorecet for headache so will not schedule q 6 hours)  Stop IV morphine- discussed with patient  Continue oxycodone 5 mg. Change to q 4 hours to end tomorrow morning at 0600. Then decrease to 5 mg q 6 hours.   Continue naprosyn 500 mg po q 12 hours prn-ordered by medical team  Would NOT discharge with any opioid scripts. Patient has outpatient pain management provider.   Added protonix 40 mg po daily with being prescribed steroid and PPI.                   "

## 2019-04-08 NOTE — ASSESSMENT & PLAN NOTE
Patient reports loose stools at the time of admission but reports that now she is moving bowels daily only once. She is at risk for opioid induced constipation.       Continue senokot 1 po bid prn constipation

## 2019-04-08 NOTE — PLAN OF CARE
Problem: Patient Care Overview  Goal: Discharge Needs Assessment  MSW CC met with pt. Pt is expected for dc to home, once medically stable. Pt states he lives at home with a roommate in a 2 story house. No needs identified. Pt was with her mother. Pt was waiting to see rhumatology. MS[facility] CC following to assist, as needed.     04/07/19 2019   DC Needs Assessment   Concerns To Be Addressed no discharge needs identified   Readmission Within The Last 30 Days no previous admission in last 30 days   Provider Choice List(s) Given no   Anticipated Discharge Disposition home without services   Equipment Needed After Discharge none   Activity/Self Care ROS   Equipment Currently Used at Home none   Current Health   Anticipated Changes Related to Illness none

## 2019-04-08 NOTE — CONSULTS
Palliative Care Consult      Patient Name: Carolyn Redmond                                                                                        Patient MRN: [id number]  Date / Time Note Created: 4/8/2019 3:19 PM  Attending Physician: Ty Leon MD  Referring Physician: No ref. provider found  Primary Care Physician: Pt States, No Pcp   This is Hospital Day: 2      Reason for Consult:  Pain (Palliative Care)    HPI      Source: chart review, the patient and RN caring for patient.     Carolyn Redmond is an 33 y.o. female with a history of vasculitis and chronic pain, utilizing suboxone, admitted yesterday with 2 week history of skin rash with recent history of increased abdominal pain that was unrelieved by home medications. She was admitted and started on IV morphine 4 mg q 3 hours prn which she reports is not helping her pain at this time. She Follows with Dr. Edvin Lang for suboxone therapy and has been utilizing suboxone 8mg-2mg film bid and this was last filled 3/19. She stopped utilizing this 4 days ago due to nausea that was accompanying pain.   Imaging studies have not identified acute cause of pain and lipase was not elevated. She reports that she has had intermittent abdominal pain for the past 2 years ever since she developed post ERCP pancreatitis.       In addition to abdominal pain patient has new onset of rash on lower back, abdominal with various lesions over lower extremities. She is due to appointment with new rheumatologist on the 18th but could not wait until then due to pain and discomfort.      Currently denies nausea and has been tolerating food. She has intermittent fever and chills, abdominal pain, headache. She feels fatigued.     Reports that she feels tense and anxious. Reports that suboxone was helping her pain prior to new onset of worsening of pain.              Chart Review:  The following portions of the patient’s history were reviewed and updated as appropriate:    Code  Status History:  Current Code Status: Full Code  Prior to Consult: Full Code  Code status was changed during visit?      Past Family History  Family History   Problem Relation Age of Onset   • No Known Problems Mother    • Lung cancer Father        Past Medical History  Past Medical History:   Diagnosis Date   • Anxiety    • Depressed    • Endocarditis    • Migraines    • Pancreatitis    • Vasculitis (CMS/HCC) (HCC)        Past Surgical History  Past Surgical History:   Procedure Laterality Date   • CHOLECYSTECTOMY     • ERCP     • GALLBLADDER SURGERY         Social History   Social History     Social History   • Marital status: Single     Spouse name: N/A   • Number of children: N/A   • Years of education: N/A     Social History Main Topics   • Smoking status: Current Every Day Smoker     Packs/day: 0.50     Types: Cigarettes   • Smokeless tobacco: Current User   • Alcohol use Yes      Comment: social   • Drug use: Yes     Types: Marijuana   • Sexual activity: Defer     Other Topics Concern   • None     Social History Narrative   • None       Worship:  No Synagogue on file  Jehovah's witness / Spiritual:   No spiritual concerns identified    Review of Systems:  All other systems reviewed and negative except as noted in the HPI.    Home Medications:  •  butalbital-acetaminophen-caff, Take 1 tablet by mouth every 4 (four) hours as needed for headaches.  •  DULoxetine, Take 1 capsule (60 mg total) by mouth daily.  •  naproxen, Take 500 mg by mouth every 12 (twelve) hours as needed.  •  ondansetron ODT, Take 1 tablet by mouth daily.  •  predniSONE, Take 1 tablet (10 mg total) by mouth daily.  •  QUEtiapine, Take 1 tablet (200 mg total) by mouth 2 (two) times a day.  •  SUMAtriptan, Administer 1 spray into affected nostril(s) once as needed.     [place], [place]  AWARxE query reviewed with the following concerns: Utilizing suboxone. Multiple prescribers.      Current Medications:  •  acetaminophen, 650 mg, oral,  q8h JAIDEN  •  bisacodyl, 10 mg, rectal, Daily PRN  •  butalbital-acetaminophen-caff, 1 tablet, oral, q4h PRN  •  glucose, 16-32 g of dextrose, oral, PRN **OR** dextrose, 15-30 g of dextrose, oral, PRN **OR** glucagon, 1 mg, intramuscular, PRN **OR** dextrose in water, 25 mL, intravenous, PRN  •  diphenhydrAMINE, 25 mg, oral, q6h PRN  •  docusate sodium, 100 mg, oral, BID  •  DULoxetine, 60 mg, oral, Daily  •  enoxaparin, 40 mg, subcutaneous, Daily (6p)  •  naproxen, 500 mg, oral, q12h PRN  •  nicotine, 1 patch, transdermal, Daily  •  ondansetron ODT, 4 mg, oral, q8h PRN **OR** ondansetron, 4 mg, intravenous, q8h PRN  •  oxyCODONE, 5 mg, oral, q4h PRN  •  [START ON 4/9/2019] oxyCODONE, 5 mg, oral, q6h PRN  •  pantoprazole, 40 mg, oral, Daily  •  predniSONE, 10 mg, oral, Daily  •  QUEtiapine, 400 mg, oral, Nightly PRN  •  senna, 1 tablet, oral, 2x daily PRN  •  sodium chloride 0.9 %, , intravenous, Continuous  •  SUMAtriptan succinate, 6 mg, subcutaneous, 2x daily PRN  •  zolpidem, 10 mg, oral, Nightly PRN    Allergies:  Allergies   Allergen Reactions   • Cefazolin    • Ibuprofen      Other reaction(s): Unknown   • Metronidazole      Other reaction(s): Nausea/vomiting/tremulousness   • Tramadol        Capacity for Medical Decision Making: intact    Emergency Contact: Rolando Corbin; Relation: Friend; Ching Redmond; Relation: Mother        Objective    Physical Exam:  General:   No Acute Distress  Eyes:  Sclera Anicteric  ENMT:  Mucus Membranes Moist  CV:  Rate  normal  Lungs:  Respiratory Rate  Normal  Abdomen:  Bowel Sounds present  Psych:  Appropriate and Cooperative  Neuro:  Awake, Alert and Oriented  person, place, time/date and situation  Skin:  Rash present  MS: No sarcopenia. Recent fracture of left ankle that is healing. .     Vitals:  Vitals:    04/08/19 0600   BP: (!) 149/83   Pulse: 66   Resp: 18   Temp: 37.1 °C (98.8 °F)   SpO2: 97%       Intake & Output:  No intake/output data recorded.    Laboratory  Studies:  CBC Results       04/07/19 04/06/19 09/24/18                    1344 1550 1747         WBC 11.89 (H) 11.11 (H) 10.04         RBC 4.62 4.10 5.30 (H)         HGB 13.0 11.6 (L) 15.3         HCT 41.2 36.2 47.1 (H)         MCV 89.2 88.3 88.9         MCH 28.1 28.3 28.9         MCHC 31.6 (L) 32.0 (L) 32.5          239 268                     CMP Results       04/07/19 04/06/19 09/24/18                    1344 1550 1747          137 138         K 4.0 4.2 4.9         Cl 101 100 105         CO2 27 26 20 (L)         Glucose 108 (H) 120 (H) 104 (H)         BUN 11 14 9         Creatinine 0.8 0.7 0.7         Calcium 9.5 9.1 9.6         Anion Gap 11 11 13         AST 32 28 43 (H)         ALT 41 37 54         Albumin 4.2 3.7 4.8         EGFR &gt;60.0 &gt;60.0 &gt;60.0         Comment for K at 1344 on 04/07/19:    Results obtained on plasma. Plasma Potassium values may be up to 0.4 mEQ/L less than serum values. The differences may be greater for patients with high platelet or white cell counts.    Comment for K at 1550 on 04/06/19:    Results obtained on plasma. Plasma Potassium values may be up to 0.4 mEQ/L less than serum values. The differences may be greater for patients with high platelet or white cell counts.    Comment for K at 1747 on 09/24/18:    Results obtained on plasma. Plasma Potassium values may be up to 0.4 mEQ/L less than serum values. The differences may be greater for patients with high platelet or white cell counts.  MODERATE HEMOLYSIS, RESULT MAY BE INCREASED.    Comment for AST at 1747 on 09/24/18:    MODERATE HEMOLYSIS, RESULT MAY BE INCREASED.    Comment for ALT at 1747 on 09/24/18:    MODERATE HEMOLYSIS, RESULT MAY BE INCREASED.        Troponin I Results       05/03/17 04/05/17                       1606 0945          Troponin I &lt;0.02  A rise or fall of troponin with at least one abnormal value is consistent with myocardial injury or necrosis in the presence of appropriate clinical,  ECG, and/or imaging abnormalities.   &lt;0.02  A rise or fall of troponin with at least one abnormal value is consistent with myocardial injury or necrosis in the presence of appropriate clinical, ECG, and/or imaging abnormalities.                         ABG Results       03/21/17 2115           pH 7.48 (H)           pCO2 29.0 (L)           pO2 66 (L)           HCO3 21.6           Base Excess NEG 1.0           Source Of Oxygen ROOM AIR                           Labs reveiwed. Mild elevation in CRP, creatinine, wnl,     Imaging and Other Studies:     Radiology Imaging   XR CHEST 2 VW    Narrative CLINICAL HISTORY: Cough.  Fever.      Impression IMPRESSION: No active disease in the chest    COMMENT: The current two view examination of the chest was compared to that  dated 4/5/2017.    The lungs remain well aerated and clear.  The cardiomediastinal silhouette is  normal in size and configuration.  The costophrenic sulci and bony thorax are  intact.                                                                       Cardiac Imaging   ECHOCARDIOGRAM STRESS TEST     Narrative Ordered by an unspecified provider.        Imagining reviewed.     CT of abdomen 4/7    IMPRESSION:  1.  No evidence of acute inflammatory or obstructive pathology in the abdomen or  pelvis.  2.  Trace bilateral pleural effusions.             Assessment & Plan  Opioid type dependence, continuous use (CMS/[facility]) ([facility])   Assessment & Plan    [place] patient has been utilizing opioids for at least 2 years for chronic pain and now has been following with Dr. Edvin Lang as outpatient for suboxone. Opioid use has decreased over that period of time and now she is utilizing suboxone 8 mg-2 mg for the past 3 months per [place] PDM.   Per Dr. Lang he reports that he does have a plan to continue weaning her. He prefers to minimize opioid use here as much as possible.  He provided instructions for patient to resume suboxone at home  "after discharge which will be shared with patient. He also reported that the office is open Tuesday and Thursday this week for her resume appointment. She needs to call  for appointment time, [place], phone is [phone].      Per , patient should resume suboxone per induction instructions that she has at home, 24 hours after last dose of opioid.   See plan for pain which was discussed with Dr. Lang.   DO not discharge with prescriptions for opioids              Slow transit constipation   Assessment & Plan    Patient reports loose stools at the time of admission but reports that now she is moving bowels daily only once. She is at risk for opioid induced constipation.       Continue senokot 1 po bid prn constipation        Pain of upper abdomen   Assessment & Plan    Unclear etiology. Reports ongoing intermittent abdominal pain for the past 2 years since having post ERCP pancreatitis. She reports worsening pain over the past 2 weeks that was not relieved by  Home regimen of suboxone 8mg-2mg. Located across upper abdomen in epigastric area, wrapping around from right to left side. Severe at times. Feels like \"squeezing pressure.\" Unable to report any alleviating factors. Last utilized suboxone 4 days ago. Has received morphine 4 mg IV (x6) since last evening. She also has oxycodone available which she has not utilized. No clear etiology of pain per workup thus far. Possibly related to vasculitis flair? Rheumatology has been consulted. She does have new rash over various aspects of her body. She has chronic back pain and now left ankle pain due to recent fracture in February which is now healing. I spoke with Dr. Edvin Lang who she currently follows as an outpatient. She is aware and gives permission to contact him. He recommends an opioid sparing approach to pain management and provided some direction for patient to resume suboxone after discharge.       Changd acetaminophen to scheduled 650 " mg po q 8 hours (pt receiving fiorecet for headache so will not schedule q 6 hours)  Stop IV morphine- discussed with patient  Continue oxycodone 5 mg. Change to q 4 hours to end tomorrow morning at 0600. Then decrease to 5 mg q 6 hours.   Continue naprosyn 500 mg po q 12 hours prn-ordered by medical team  Would NOT discharge with any opioid scripts. Patient has outpatient pain management provider.   Added protonix 40 mg po daily with being prescribed steroid and PPI.                            Discussed with RN, patient, Mother at bedside, Dr. Gold. .. Thank you for the opportunity to participate in this patient's hospital plan of care.       SHERITA Cannon  Office [phone]

## 2019-04-08 NOTE — PLAN OF CARE
Problem: Patient Care Overview  Goal: Plan of Care Review  Outcome: Ongoing (interventions implemented as appropriate)   04/07/19 6035   Coping/Psychosocial   Plan Of Care Reviewed With patient   Plan of Care Review   Progress no change   Outcome Summary Pt. amb in room with steady gait.      Goal: Individualization & Mutuality  Outcome: Ongoing (interventions implemented as appropriate)      Problem: Fall Risk (Adult)  Goal: Identify Related Risk Factors and Signs and Symptoms  Outcome: Ongoing (interventions implemented as appropriate)    Goal: Absence of Falls  Outcome: Ongoing (interventions implemented as appropriate)      Problem: Pain, Acute (Adult)  Goal: Identify Related Risk Factors and Signs and Symptoms  Outcome: Ongoing (interventions implemented as appropriate)    Goal: Acceptable Pain Control/Comfort Level  Outcome: Ongoing (interventions implemented as appropriate)

## 2019-04-08 NOTE — ASSESSMENT & PLAN NOTE
[place] patient has been utilizing opioids for at least 2 years for chronic pain and now has been following with Dr. Edvin Lang as outpatient for suboxone. Opioid use has decreased over that period of time and now she is utilizing suboxone 8 mg-2 mg for the past 3 months per [place] PD[place].   Per Dr. Lang he reports that he does have a plan to continue weaning her. He prefers to minimize opioid use here as much as possible.  He provided instructions for patient to resume suboxone at home after discharge which will be shared with patient. He also reported that the office is open Tuesday and Thursday this week for her resume appointment. She needs to call  for appointment time, [place], phone is [phone].      Per , patient should resume suboxone per induction instructions that she has at home, 24 hours after last dose of opioid.   See plan for pain which was discussed with Dr. Lang.   DO not discharge with prescriptions for opioids

## 2019-04-08 NOTE — PATIENT CARE CONFERENCE
Care Progression Rounds Note  Date: 4/8/2019  Time: 10:35 AM     Patient Name: Carolyn Redmond     Medical Record Number: [id number]   YOB: 1985  Sex: Female      Room/Bed: 4209    Admitting Diagnosis: Generalized abdominal pain [R10.84]   Admit Date/Time: 4/7/2019 11:48 AM    Primary Diagnosis: No Principal Problem: There is no principal problem currently on the Problem List. Please update the Problem List and refresh.  Principal Problem: No Principal Problem: There is no principal problem currently on the Problem List. Please update the Problem List and refresh.  x  GMLOS: pending  Anticipated Discharge Date: 4/10/2019    AM-PAC  Mobility Score: 24    Discharge Planning:  Concerns To Be Addressed: no discharge needs identified  Anticipated Discharge Disposition: home without services    Barriers to Discharge:  Barriers to Discharge: Consult pending  Comment: Pm to see,     Participants:  , social work/services, nursing, physical therapy

## 2019-04-09 VITALS
BODY MASS INDEX: 40.75 KG/M2 | HEART RATE: 72 BPM | SYSTOLIC BLOOD PRESSURE: 131 MMHG | DIASTOLIC BLOOD PRESSURE: 63 MMHG | WEIGHT: 230 LBS | OXYGEN SATURATION: 96 % | RESPIRATION RATE: 16 BRPM | HEIGHT: 63 IN | TEMPERATURE: 98 F

## 2019-04-09 PROCEDURE — 83516 IMMUNOASSAY NONANTIBODY: CPT | Performed by: INTERNAL MEDICINE

## 2019-04-09 PROCEDURE — 86431 RHEUMATOID FACTOR QUANT: CPT | Performed by: INTERNAL MEDICINE

## 2019-04-09 PROCEDURE — 99238 HOSP IP/OBS DSCHRG MGMT 30/<: CPT | Performed by: HOSPITALIST

## 2019-04-09 PROCEDURE — 63600000 HC DRUGS/DETAIL CODE: Performed by: HOSPITALIST

## 2019-04-09 PROCEDURE — 82784 ASSAY IGA/IGD/IGG/IGM EACH: CPT | Performed by: INTERNAL MEDICINE

## 2019-04-09 PROCEDURE — 86706 HEP B SURFACE ANTIBODY: CPT | Performed by: INTERNAL MEDICINE

## 2019-04-09 PROCEDURE — 87340 HEPATITIS B SURFACE AG IA: CPT | Performed by: INTERNAL MEDICINE

## 2019-04-09 PROCEDURE — 86803 HEPATITIS C AB TEST: CPT | Performed by: INTERNAL MEDICINE

## 2019-04-09 PROCEDURE — 36415 COLL VENOUS BLD VENIPUNCTURE: CPT | Performed by: INTERNAL MEDICINE

## 2019-04-09 PROCEDURE — 63700000 HC SELF-ADMINISTRABLE DRUG: Performed by: NURSE PRACTITIONER

## 2019-04-09 PROCEDURE — 86235 NUCLEAR ANTIGEN ANTIBODY: CPT | Performed by: INTERNAL MEDICINE

## 2019-04-09 PROCEDURE — 86038 ANTINUCLEAR ANTIBODIES: CPT | Performed by: INTERNAL MEDICINE

## 2019-04-09 PROCEDURE — 63700000 HC SELF-ADMINISTRABLE DRUG: Performed by: HOSPITALIST

## 2019-04-09 PROCEDURE — 86200 CCP ANTIBODY: CPT | Performed by: INTERNAL MEDICINE

## 2019-04-09 RX ORDER — PREGABALIN 75 MG/1
75 CAPSULE ORAL NIGHTLY
Status: DISCONTINUED | OUTPATIENT
Start: 2019-04-09 | End: 2019-04-09 | Stop reason: HOSPADM

## 2019-04-09 RX ORDER — PREGABALIN 75 MG/1
75 CAPSULE ORAL NIGHTLY
Qty: 10 CAPSULE | Refills: 0 | Status: SHIPPED | OUTPATIENT
Start: 2019-04-09 | End: 2019-11-08

## 2019-04-09 RX ADMIN — PREDNISONE 10 MG: 10 TABLET ORAL at 09:37

## 2019-04-09 RX ADMIN — NICOTINE 1 PATCH: 14 PATCH TRANSDERMAL at 09:39

## 2019-04-09 RX ADMIN — ACETAMINOPHEN 650 MG: 325 TABLET, FILM COATED ORAL at 05:56

## 2019-04-09 RX ADMIN — BUTALBITAL, ACETAMINOPHEN AND CAFFEINE 1 TABLET: 50; 325; 40 TABLET ORAL at 12:49

## 2019-04-09 RX ADMIN — OXYCODONE HYDROCHLORIDE 5 MG: 5 TABLET ORAL at 12:47

## 2019-04-09 RX ADMIN — LORAZEPAM 1 MG: 1 TABLET ORAL at 10:01

## 2019-04-09 RX ADMIN — NAPROXEN 500 MG: 250 TABLET ORAL at 10:01

## 2019-04-09 RX ADMIN — OXYCODONE HYDROCHLORIDE 5 MG: 5 TABLET ORAL at 05:55

## 2019-04-09 RX ADMIN — DULOXETINE 60 MG: 60 CAPSULE, DELAYED RELEASE ORAL at 09:37

## 2019-04-09 RX ADMIN — PANTOPRAZOLE SODIUM 40 MG: 40 TABLET, DELAYED RELEASE ORAL at 09:37

## 2019-04-09 RX ADMIN — LORAZEPAM 1 MG: 1 TABLET ORAL at 06:00

## 2019-04-09 RX ADMIN — ONDANSETRON 4 MG: 2 INJECTION INTRAMUSCULAR; INTRAVENOUS at 06:42

## 2019-04-09 NOTE — PROGRESS NOTES
Hospital Medicine Service  Daily Progress Note       SUBJECTIVE     Patient reports abdominal pain about the same.  Looks comfortable     OBJECTIVE        Vital Signs  Temp:  [36.7 °C (98 °F)-36.9 °C (98.4 °F)] 36.7 °C (98 °F)  Heart Rate:  [72-91] 72  Resp:  [16-18] 16  BP: (131-151)/(63-84) 131/63     SpO2 Readings from Last 3 Encounters:   04/09/19 96%   04/06/19 96%   09/24/18 98%       No intake/output data recorded.    PHYSICAL EXAMINATION        GEN:; not in acute distress  HEENT: normocephalic; atraumatic    CARDIO: regular rate and rhythm;   RESP: decreased at bases  ABD: soft, non-tender, normal bowel sounds  EXT: no  edema  NEURO: alert and oriented x 3; nonfocal  Skin-low back erythematous rash very minimal       LABS / IMAGING / TELE        Labs  Lab Results   Component Value Date    WBC 11.89 (H) 04/07/2019    HGB 13.0 04/07/2019    HCT 41.2 04/07/2019    MCV 89.2 04/07/2019     04/07/2019     Lab Results   Component Value Date    GLUCOSE 108 (H) 04/07/2019    CALCIUM 9.5 04/07/2019     04/07/2019    K 4.0 04/07/2019    CO2 27 04/07/2019     04/07/2019    BUN 11 04/07/2019    CREATININE 0.8 04/07/2019     Lab Results   Component Value Date    INR 0.9 04/06/2019    INR 1.0 04/16/2017    INR 1.1 03/24/2017       Imaging  Ct Abdomen Pelvis Without Iv Contrast    Result Date: 4/7/2019  IMPRESSION: 1.  No evidence of acute inflammatory or obstructive pathology in the abdomen or pelvis. 2.  Trace bilateral pleural effusions.     X-ray Chest 2 Views    Result Date: 4/6/2019  IMPRESSION: No active disease in the chest COMMENT: The current two view examination of the chest was compared to that dated 4/5/2017. The lungs remain well aerated and clear.  The cardiomediastinal silhouette is normal in size and configuration.  The costophrenic sulci and bony thorax are intact.          ASSESSMENT AND PLAN      * Pain   Assessment & Plan    All over, with vasculitis Rash with pain    Pain Service  evaluation noted-not send on any opioid prescriptions at discharge.  Patient to start Suboxone 24 hours of her last dose of oxycodone.     Vasculitis determined by biopsy of skin (CMS/HCC) ([facility])   Assessment & Plan    Low back , extremities/R thigh  With tenderness.   Rheumatology noted-commended pregabalin 75 mg at night for fibromyalgia pain     Insomnia   Assessment & Plan    On Seroquel and Ambien     Depression   Assessment & Plan    Cymbarta     Discharge home today and follow-up with rheumatology as outpatient.  Patient reports she has rheumatology appointment on 4/18/19     VTE Assessment:  Code Status: Full Code  Estimated discharge date: 4/10/2019     Melinda Wang MD  4/9/2019  11:24 AM

## 2019-04-09 NOTE — DISCHARGE SUMMARY
Hospital Medicine Service -  Inpatient Discharge Summary        BRIEF OVERVIEW   Admitting Provider: Ty Leon MD  Attending Provider: Melinda Wang,Ezekiel Attending phys phone: [phone]  PCP: Chaitanya States, No Pcp [phone]    Admission Date: 4/7/2019  Discharge Date: 4/9/2019     DISCHARGE DIAGNOSES      Primary Discharge Diagnosis  Pain    Secondary Discharge Diagnoses  Active Hospital Problems    Diagnosis Date Noted   • Pain 04/07/2019     Priority: High   • Pain of upper abdomen 04/08/2019   • Slow transit constipation 04/08/2019   • Opioid type dependence, continuous use (CMS/[facility]) ([facility]) 04/08/2019   • Anxiety 04/08/2019   • Vasculitis determined by biopsy of skin (CMS/[facility]) ([facility]) 09/26/2017   • Insomnia 06/01/2016   • Depression 06/01/2016      Resolved Hospital Problems    Diagnosis Date Noted Date Resolved   No resolved problems to display.     SUMMARY OF HOSPITALIZATION      Presenting Problem/History of Present Illness  Generalized abdominal pain  Carolyn Redmond is a 33 y.o. female who came in for generalized uncomfortable and pain progressively in the last 2 weeks.  She was seen by ER physician yesterday and CT of the abdomen did not show any significant finding or pathology process.  The patient still complaining nauseous and generalized pain including abdominal pain.  She noticed skin rash at the low back skin and right thigh with vasculitis rash features since patient had a similar skin rash with vasculitis before.  See history and physical done by Dr. leon on 4/7/19 for details      Hospital Course  See below.  Patient was seen by rheumatology.  Rheumatology recommended pregabalin 75 mg nightly-patient failed gabapentin as outpatient  Patient will not be prescribed any narcotics at discharge.   PDMP was checked-patient was given pregabalin 75 mg tablets 10 tablets prescription.  Patient had blood work ordered by rheumatology which are all pending has JOHNNA and ANCA anti-CCP antibody  rheumatoid factor, cryoglobulin, hepatitis antibodies which are all pending  Patient was advised to follow-up with rheumatology as outpatient.  Patient reports that she is seeing a rheumatologist on the 19th next week.  Exam on Day of Discharge  See note same day    * Pain   Assessment & Plan    All over, with vasculitis Rash with pain    Pain Service evaluation noted-not send on any opioid prescriptions at discharge.  Patient to start Suboxone 24 hours of her last dose of oxycodone.     Vasculitis determined by biopsy of skin (CMS/HCC) ([facility])   Assessment & Plan    Low back , extremities/R thigh  With tenderness.   Rheumatology noted-commended pregabalin 75 mg at night for fibromyalgia pain     Insomnia   Assessment & Plan    On Seroquel and Ambien     Depression   Assessment & Plan    Cymbarta         Consults During Admission  IP CONSULT TO CASE MANAGEMENT  IP CONSULT TO PAIN/PALLIATIVE CARE  IP CONSULT TO PAIN/PALLIATIVE CARE  IP CONSULT TO RHEUMATOLOGY    DISCHARGE MEDICATIONS      Medication List      START taking these medications    pregabalin 75 mg capsule  Commonly known as:  LYRICA  Take 1 capsule (75 mg total) by mouth nightly for 10 days.  Dose:  75 mg        CONTINUE taking these medications    butalbital-acetaminophen-caff -40 mg per tablet  Commonly known as:  FIORICET, ESGIC  Take 1 tablet by mouth every 4 (four) hours as needed for headaches.  Dose:  1 tablet     DULoxetine 60 mg capsule  Commonly known as:  CYMBALTA  Take 1 capsule (60 mg total) by mouth daily.  Dose:  60 mg     naproxen 500 mg tablet  Commonly known as:  NAPROSYN  Take 500 mg by mouth every 12 (twelve) hours as needed.  Dose:  500 mg     predniSONE 10 mg tablet  Commonly known as:  DELTASONE  Take 1 tablet (10 mg total) by mouth daily.  Dose:  10 mg     QUEtiapine 200 mg tablet  Commonly known as:  SEROquel  Take 1 tablet (200 mg total) by mouth 2 (two) times a day.  Dose:  200 mg     SUMAtriptan 20 mg/actuation nasal  spray  Commonly known as:  IMITREX  Administer 1 spray into affected nostril(s) once as needed.  Dose:  1 spray     ZOFRAN ODT 4 mg disintegrating tablet  Take 1 tablet by mouth daily.  Dose:  1 tablet  Generic drug:  ondansetron ODT           Ruy Carolyn A   Home Medication Instructions CARROLL:[id number]    Printed on:04/09/19 9017   Medication Information                      butalbital-acetaminophen-caff (FIORICET, ESGIC) -40 mg per tablet  Take 1 tablet by mouth every 4 (four) hours as needed for headaches.             DULoxetine (CYMBALTA) 60 mg capsule  Take 1 capsule (60 mg total) by mouth daily.             naproxen (NAPROSYN) 500 mg tablet  Take 500 mg by mouth every 12 (twelve) hours as needed.             ondansetron ODT (ZOFRAN ODT) 4 mg disintegrating tablet  Take 1 tablet by mouth daily.             predniSONE (DELTASONE) 10 mg tablet  Take 1 tablet (10 mg total) by mouth daily.             pregabalin (LYRICA) 75 mg capsule  Take 1 capsule (75 mg total) by mouth nightly for 10 days.             QUEtiapine (SEROquel) 200 mg tablet  Take 1 tablet (200 mg total) by mouth 2 (two) times a day.             SUMAtriptan (IMITREX) 20 mg/actuation nasal spray  Administer 1 spray into affected nostril(s) once as needed.                    PROCEDURES / LABS / IMAGING        Pertinent Labs  WBC   Date Value Ref Range Status   04/07/2019 11.89 (H) 3.80 - 10.50 K/uL Final     Hemoglobin   Date Value Ref Range Status   04/07/2019 13.0 11.8 - 15.7 g/dL Final     Hematocrit   Date Value Ref Range Status   04/07/2019 41.2 35.0 - 45.0 % Final     MCV   Date Value Ref Range Status   04/07/2019 89.2 83.0 - 98.0 fL Final     Platelets   Date Value Ref Range Status   04/07/2019 275 150 - 369 K/uL Final     Glucose   Date Value Ref Range Status   04/07/2019 108 (H) 70 - 99 mg/dL Final     Calcium   Date Value Ref Range Status   04/07/2019 9.5 8.9 - 10.3 mg/dL Final     Sodium   Date Value Ref Range Status   04/07/2019  139 136 - 144 mEQ/L Final     Potassium   Date Value Ref Range Status   04/07/2019 4.0 3.6 - 5.1 mEQ/L Final     Comment:       Results obtained on plasma. Plasma Potassium values may be up to 0.4 mEQ/L less than serum values. The differences may be greater for patients with high platelet or white cell counts.     CO2   Date Value Ref Range Status   04/07/2019 27 22 - 32 mEQ/L Final     Chloride   Date Value Ref Range Status   04/07/2019 101 98 - 109 mEQ/L Final     BUN   Date Value Ref Range Status   04/07/2019 11 8 - 20 mg/dL Final     Creatinine   Date Value Ref Range Status   04/07/2019 0.8 0.6 - 1.1 mg/dL Final     INR   Date Value Ref Range Status   04/06/2019 0.9   INR Final     Comment:       INR has no defined significance when PT is within Reference Range.       Pertinent Imaging  Ct Abdomen Pelvis Without Iv Contrast    Result Date: 4/7/2019  IMPRESSION: 1.  No evidence of acute inflammatory or obstructive pathology in the abdomen or pelvis. 2.  Trace bilateral pleural effusions.     X-ray Chest 2 Views    Result Date: 4/6/2019  IMPRESSION: No active disease in the chest COMMENT: The current two view examination of the chest was compared to that dated 4/5/2017. The lungs remain well aerated and clear.  The cardiomediastinal silhouette is normal in size and configuration.  The costophrenic sulci and bony thorax are intact.      OUTPATIENT  FOLLOW-UP / REFERRALS / PENDING TESTS        Instructions     Follow-up with rheumatologist next week     Start Suboxone 24 hours after last dose of pain medication here in the hospital as recommended by pain management specialist.     Follow-up with pain management specialist dr.greg shah as before      Test Results Pending at Discharge  Unresulted Labs     Start     Ordered    04/09/19 0806  Cryoglobulin  Once      04/09/19 0805    04/09/19 0806  IgG Subclasses Panel  Once      04/09/19 0805    04/09/19 0805  Hepatitis C antibody  Once      04/09/19 0805    04/09/19  0805  JOHNNA, IFA  Once      04/09/19 0805    04/09/19 0805  Sjogrens syndrome-A&B extractable nuclear antibody  Once      04/09/19 0805    04/09/19 0805  Rheumatoid factor  Once      04/09/19 0805    04/09/19 0805  CCP IgG/IgA Antibodies  Once      04/09/19 0805    04/09/19 0805  ANCA-Myeloperoxidase IgG Antibody  Once      04/09/19 0805    04/09/19 0805  ANCA-PR3 IgG Antibody  Once      04/09/19 0805    04/09/19 0804  Hepatitis B surface antigen  Once      04/09/19 0805    04/09/19 0804  Hepatitis B surface antibody  Once      04/09/19 0805      Please send copy of this discharge summary to Dr. Edvin Lang pain management specialist and also dr.victoria harper at [facility] medical specialists    DISCHARGE DISPOSITION      Disposition: Home     Code Status At Discharge: Full Code    Physician Order for Life-Sustaining Treatment Document Status      No documents found

## 2019-04-09 NOTE — NURSING NOTE
D/C INSTRUCTIONS REVIEWED WITH PT AND MOTHER, SCRIPT PROVIDED FOR dR. Oumar ESPOSITO PAGED FOR SCRIPT FOR NOTE TO COVER HOSPITALIZATION

## 2019-04-09 NOTE — ASSESSMENT & PLAN NOTE
All over, with vasculitis Rash with pain    Pain Service evaluation noted-not send on any opioid prescriptions at discharge.  Patient to start Suboxone 24 hours of her last dose of oxycodone.

## 2019-04-09 NOTE — PATIENT CARE CONFERENCE
Care Progression Rounds Note  Date: 4/9/2019  Time: 10:16 AM     Patient Name: Carolyn Redmond     Medical Record Number: [id number]   YOB: 1985  Sex: Female      Room/Bed: 4209    Admitting Diagnosis: Generalized abdominal pain [R10.84]   Admit Date/Time: 4/7/2019 11:48 AM    Primary Diagnosis: No Principal Problem: There is no principal problem currently on the Problem List. Please update the Problem List and refresh.  Principal Problem: No Principal Problem: There is no principal problem currently on the Problem List. Please update the Problem List and refresh.    GMLOS: pending  Anticipated Discharge Date: 4/10/2019    AM-PAC  Mobility Score: 24    Discharge Planning:  Concerns To Be Addressed: no discharge needs identified  Anticipated Discharge Disposition: home without services    Barriers to Discharge:  Barriers to Discharge: Consult pending  Comment: RHemotology to see then D/C      Participants:  , social work/services, nursing, physical therapy
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
## 2019-04-09 NOTE — ASSESSMENT & PLAN NOTE
Low back , extremities/R thigh  With tenderness.   Rheumatology noted-commended pregabalin 75 mg at night for fibromyalgia pain

## 2019-04-09 NOTE — PLAN OF CARE
Problem: Patient Care Overview  Goal: Plan of Care Review  Outcome: Ongoing (interventions implemented as appropriate)   04/08/19 5265   Coping/Psychosocial   Plan Of Care Reviewed With patient   Plan of Care Review   Progress no change   Outcome Summary Amb in padmini [place].        Problem: Fall Risk (Adult)  Goal: Identify Related Risk Factors and Signs and Symptoms  Outcome: Ongoing (interventions implemented as appropriate)    Goal: Absence of Falls  Outcome: Ongoing (interventions implemented as appropriate)      Problem: Pain, Acute (Adult)  Goal: Identify Related Risk Factors and Signs and Symptoms  Outcome: Ongoing (interventions implemented as appropriate)    Goal: Acceptable Pain Control/Comfort Level  Outcome: Ongoing (interventions implemented as appropriate)

## 2019-04-09 NOTE — PROGRESS NOTES
Patient was seen this afternoon for smoking cessation eduction. She smokes .5 pack of cigarettes a day for 4 years. Thinking of quitting. Educated her on the programs and products to assist in the quitting process. Accepted a cessation packet. Agreed to follow-up calls.

## 2019-04-09 NOTE — DISCHARGE INSTRUCTIONS
Follow-up with rheumatologist next week    Start Suboxone 24 hours after last dose of pain medication here in the hospital as recommended by pain management specialist.    Follow-up with pain management specialist dr.greg shah as before    Can take otc prilosec if taking naproxen at home

## 2019-04-10 LAB
ENA SS-A IGG SER-ACNC: 10 AU/ML
ENA SS-B IGG SER-ACNC: 14 AU/ML
HBV SURFACE AB SER QL: NONREACTIVE
HBV SURFACE AG SER QL: NONREACTIVE
HCV AB SER QL: NONREACTIVE
MYELOPEROXIDASE AB SER IA-ACNC: <0.3 U/ML
PROTEINASE3 AB SER IA-ACNC: <0.7 U/ML
RHEUMATOID FACT SERPL-ACNC: <20 IU/ML

## 2019-04-11 LAB — ANA SER QL HEP2 SUBST: NEGATIVE

## 2019-04-12 LAB
IGG SERPL-MCNC: 910 MG/DL (ref 694–1618)
IGG1 SER-MCNC: 388 MG/DL (ref 382–929)
IGG2 SER-MCNC: 278 MG/DL (ref 241–700)
IGG3 SER-MCNC: 62 MG/DL (ref 22–178)
IGG4 SER-MCNC: 106.7 MG/DL (ref 4–86)

## 2019-04-16 LAB — CCP IGA+IGG SERPL IA-ACNC: <8 UNITS

## 2019-07-26 ENCOUNTER — TRANSCRIBE ORDERS (OUTPATIENT)
Dept: SCHEDULING | Age: 34
End: 2019-07-26

## 2019-07-26 DIAGNOSIS — R33.9 RETENTION OF URINE: Primary | ICD-10-CM

## 2019-08-18 ENCOUNTER — APPOINTMENT (EMERGENCY)
Dept: RADIOLOGY | Facility: HOSPITAL | Age: 34
End: 2019-08-18
Attending: EMERGENCY MEDICINE
Payer: COMMERCIAL

## 2019-08-18 ENCOUNTER — HOSPITAL ENCOUNTER (EMERGENCY)
Facility: HOSPITAL | Age: 34
Discharge: HOME | End: 2019-08-18
Attending: EMERGENCY MEDICINE
Payer: COMMERCIAL

## 2019-08-18 VITALS
BODY MASS INDEX: 37.21 KG/M2 | HEIGHT: 63 IN | OXYGEN SATURATION: 100 % | HEART RATE: 88 BPM | WEIGHT: 210 LBS | RESPIRATION RATE: 16 BRPM | DIASTOLIC BLOOD PRESSURE: 53 MMHG | SYSTOLIC BLOOD PRESSURE: 98 MMHG | TEMPERATURE: 99 F

## 2019-08-18 DIAGNOSIS — N39.0 LOWER URINARY TRACT INFECTIOUS DISEASE: Primary | ICD-10-CM

## 2019-08-18 LAB
ANION GAP SERPL CALC-SCNC: 8 MEQ/L (ref 3–15)
BACTERIA URNS QL MICRO: 1 /HPF
BASOPHILS # BLD: 0.03 K/UL (ref 0.01–0.1)
BASOPHILS NFR BLD: 0.3 %
BILIRUB UR QL STRIP.AUTO: NEGATIVE MG/DL
BUN SERPL-MCNC: 7 MG/DL (ref 8–20)
CALCIUM SERPL-MCNC: 8.7 MG/DL (ref 8.9–10.3)
CHLORIDE SERPL-SCNC: 101 MEQ/L (ref 98–109)
CLARITY UR REFRACT.AUTO: ABNORMAL
CO2 SERPL-SCNC: 26 MEQ/L (ref 22–32)
COLOR UR AUTO: YELLOW
CREAT SERPL-MCNC: 0.9 MG/DL
DIFFERENTIAL METHOD BLD: NORMAL
EOSINOPHIL # BLD: 0.09 K/UL (ref 0.04–0.36)
EOSINOPHIL NFR BLD: 0.9 %
ERYTHROCYTE [DISTWIDTH] IN BLOOD BY AUTOMATED COUNT: 13.4 % (ref 11.7–14.4)
GFR SERPL CREATININE-BSD FRML MDRD: >60 ML/MIN/1.73M*2
GLUCOSE SERPL-MCNC: 100 MG/DL (ref 70–99)
GLUCOSE UR STRIP.AUTO-MCNC: NEGATIVE MG/DL
HCG UR QL: NEGATIVE
HCT VFR BLDCO AUTO: 33.5 %
HGB BLD-MCNC: 11 G/DL
HGB UR QL STRIP.AUTO: 3
HYALINE CASTS #/AREA URNS LPF: ABNORMAL /LPF
IMM GRANULOCYTES # BLD AUTO: 0.03 K/UL (ref 0–0.08)
IMM GRANULOCYTES NFR BLD AUTO: 0.3 %
KETONES UR STRIP.AUTO-MCNC: NEGATIVE MG/DL
LEUKOCYTE ESTERASE UR QL STRIP.AUTO: 3
LYMPHOCYTES # BLD: 3.29 K/UL (ref 1.2–3.5)
LYMPHOCYTES NFR BLD: 34.3 %
MCH RBC QN AUTO: 28.1 PG (ref 28–33.2)
MCHC RBC AUTO-ENTMCNC: 32.8 G/DL (ref 32.2–35.5)
MCV RBC AUTO: 85.5 FL (ref 83–98)
MONOCYTES # BLD: 0.63 K/UL (ref 0.28–0.8)
MONOCYTES NFR BLD: 6.6 %
NEUTROPHILS # BLD: 5.51 K/UL (ref 1.7–7)
NEUTS SEG NFR BLD: 57.6 %
NITRITE UR QL STRIP.AUTO: NEGATIVE
NRBC BLD-RTO: 0 %
PDW BLD AUTO: 9.7 FL (ref 9.4–12.3)
PH UR STRIP.AUTO: 6.5 [PH]
PLATELET # BLD AUTO: 221 K/UL
POTASSIUM SERPL-SCNC: 3.8 MEQ/L (ref 3.6–5.1)
PROT UR QL STRIP.AUTO: 1
RBC # BLD AUTO: 3.92 M/UL (ref 3.93–5.22)
RBC #/AREA URNS HPF: ABNORMAL /HPF
SODIUM SERPL-SCNC: 135 MEQ/L (ref 136–144)
SP GR UR REFRACT.AUTO: 1.01
SQUAMOUS URNS QL MICRO: 1 /HPF
UROBILINOGEN UR STRIP-ACNC: 0.2 EU/DL
WBC # BLD AUTO: 9.58 K/UL
WBC #/AREA URNS HPF: ABNORMAL /HPF

## 2019-08-18 PROCEDURE — 99284 EMERGENCY DEPT VISIT MOD MDM: CPT | Mod: 25

## 2019-08-18 PROCEDURE — 63700000 HC SELF-ADMINISTRABLE DRUG: Performed by: PHYSICIAN ASSISTANT

## 2019-08-18 PROCEDURE — 84703 CHORIONIC GONADOTROPIN ASSAY: CPT | Performed by: PHYSICIAN ASSISTANT

## 2019-08-18 PROCEDURE — 74176 CT ABD & PELVIS W/O CONTRAST: CPT

## 2019-08-18 PROCEDURE — 85025 COMPLETE CBC W/AUTO DIFF WBC: CPT | Performed by: PHYSICIAN ASSISTANT

## 2019-08-18 PROCEDURE — 36415 COLL VENOUS BLD VENIPUNCTURE: CPT | Performed by: PHYSICIAN ASSISTANT

## 2019-08-18 PROCEDURE — 25800000 HC PHARMACY IV SOLUTIONS: Performed by: PHYSICIAN ASSISTANT

## 2019-08-18 PROCEDURE — 3E03329 INTRODUCTION OF OTHER ANTI-INFECTIVE INTO PERIPHERAL VEIN, PERCUTANEOUS APPROACH: ICD-10-PCS | Performed by: EMERGENCY MEDICINE

## 2019-08-18 PROCEDURE — 96365 THER/PROPH/DIAG IV INF INIT: CPT

## 2019-08-18 PROCEDURE — 81003 URINALYSIS AUTO W/O SCOPE: CPT | Performed by: PHYSICIAN ASSISTANT

## 2019-08-18 PROCEDURE — 63600000 HC DRUGS/DETAIL CODE: Performed by: PHYSICIAN ASSISTANT

## 2019-08-18 PROCEDURE — 80048 BASIC METABOLIC PNL TOTAL CA: CPT | Mod: 59 | Performed by: PHYSICIAN ASSISTANT

## 2019-08-18 PROCEDURE — 87086 URINE CULTURE/COLONY COUNT: CPT | Performed by: PHYSICIAN ASSISTANT

## 2019-08-18 RX ORDER — CEFUROXIME AXETIL 500 MG/1
500 TABLET ORAL 2 TIMES DAILY
Qty: 14 TABLET | Refills: 0 | Status: SHIPPED | OUTPATIENT
Start: 2019-08-18 | End: 2019-08-25

## 2019-08-18 RX ORDER — POLYETHYLENE GLYCOL 3350 17 G/17G
17 POWDER, FOR SOLUTION ORAL DAILY
Qty: 3 PACKET | Refills: 0 | Status: SHIPPED | OUTPATIENT
Start: 2019-08-18 | End: 2020-12-09 | Stop reason: ENTERED-IN-ERROR

## 2019-08-18 RX ORDER — ONDANSETRON HYDROCHLORIDE 2 MG/ML
4 INJECTION, SOLUTION INTRAVENOUS ONCE
Status: COMPLETED | OUTPATIENT
Start: 2019-08-18 | End: 2019-08-18

## 2019-08-18 RX ORDER — PHENAZOPYRIDINE HYDROCHLORIDE 200 MG/1
200 TABLET, FILM COATED ORAL 3 TIMES DAILY PRN
Qty: 6 TABLET | Refills: 0 | Status: SHIPPED | OUTPATIENT
Start: 2019-08-18 | End: 2019-08-20

## 2019-08-18 RX ORDER — ONDANSETRON 4 MG/1
4 TABLET, FILM COATED ORAL EVERY 8 HOURS PRN
Qty: 12 TABLET | Refills: 0 | Status: SHIPPED | OUTPATIENT
Start: 2019-08-18 | End: 2020-07-09

## 2019-08-18 RX ORDER — PHENAZOPYRIDINE HYDROCHLORIDE 95 MG/1
190 TABLET ORAL ONCE
Status: COMPLETED | OUTPATIENT
Start: 2019-08-18 | End: 2019-08-18

## 2019-08-18 RX ADMIN — SODIUM CHLORIDE 1000 ML: 9 INJECTION, SOLUTION INTRAVENOUS at 16:56

## 2019-08-18 RX ADMIN — CEFTRIAXONE SODIUM 1 G: 1 INJECTION, POWDER, FOR SOLUTION INTRAVENOUS at 17:36

## 2019-08-18 RX ADMIN — ONDANSETRON 4 MG: 2 INJECTION INTRAMUSCULAR; INTRAVENOUS at 16:56

## 2019-08-18 RX ADMIN — PHENAZOPYRIDINE HYDROCHLORIDE 190 MG: 95 TABLET ORAL at 16:57

## 2019-08-18 ASSESSMENT — ENCOUNTER SYMPTOMS
BACK PAIN: 1
ABDOMINAL PAIN: 1
FATIGUE: 1
DYSURIA: 1
VOMITING: 1
DIARRHEA: 0
FEVER: 0
CHILLS: 0
HEMATURIA: 0
NAUSEA: 1
SHORTNESS OF BREATH: 0

## 2019-08-18 NOTE — ED ATTESTATION NOTE
I have personally seen and examined the patient.  I reviewed and agree with physician assistant / nurse practitioner’s assessment and plan of care, with the following exceptions: None  My examination, assessment, and plan of care of Carolyn Redmond is as follows:     Exam: Well-appearing, no acute distress, awake alert oriented x3, regular rate and rhythm, lungs clear to auscultation, mild bilateral CVA tenderness to palpation, abdomen soft, mild suprapubic tenderness palpation, normal pulses    Assessment and plan: Patient with likely early Shay, no stone, antibiotics and close outpatient valve given strict return follow-up instructions     Bobby Marsh,   08/18/19 1949

## 2019-08-22 LAB
BACTERIA UR CULT: ABNORMAL
BACTERIA UR CULT: ABNORMAL

## 2019-09-29 ENCOUNTER — APPOINTMENT (EMERGENCY)
Dept: RADIOLOGY | Facility: HOSPITAL | Age: 34
End: 2019-09-29
Attending: EMERGENCY MEDICINE
Payer: COMMERCIAL

## 2019-09-29 ENCOUNTER — HOSPITAL ENCOUNTER (EMERGENCY)
Facility: HOSPITAL | Age: 34
Discharge: HOME | End: 2019-09-29
Attending: EMERGENCY MEDICINE
Payer: COMMERCIAL

## 2019-09-29 VITALS
HEIGHT: 62 IN | HEART RATE: 72 BPM | DIASTOLIC BLOOD PRESSURE: 57 MMHG | TEMPERATURE: 96.8 F | BODY MASS INDEX: 36.8 KG/M2 | RESPIRATION RATE: 17 BRPM | SYSTOLIC BLOOD PRESSURE: 100 MMHG | WEIGHT: 200 LBS | OXYGEN SATURATION: 97 %

## 2019-09-29 DIAGNOSIS — B34.9 VIRAL SYNDROME: Primary | ICD-10-CM

## 2019-09-29 LAB
ANION GAP SERPL CALC-SCNC: 6 MEQ/L (ref 3–15)
BASOPHILS # BLD: 0.02 K/UL (ref 0.01–0.1)
BASOPHILS NFR BLD: 0.2 %
BUN SERPL-MCNC: 15 MG/DL (ref 8–20)
CALCIUM SERPL-MCNC: 8.7 MG/DL (ref 8.9–10.3)
CHLORIDE SERPL-SCNC: 106 MEQ/L (ref 98–109)
CO2 SERPL-SCNC: 22 MEQ/L (ref 22–32)
CREAT SERPL-MCNC: 0.7 MG/DL
D DIMER PPP IA.FEU-MCNC: 0.47 UG/ML FEU (ref 0–0.5)
DIFFERENTIAL METHOD BLD: NORMAL
EOSINOPHIL # BLD: 0.19 K/UL (ref 0.04–0.36)
EOSINOPHIL NFR BLD: 2.3 %
ERYTHROCYTE [DISTWIDTH] IN BLOOD BY AUTOMATED COUNT: 13.4 % (ref 11.7–14.4)
GFR SERPL CREATININE-BSD FRML MDRD: >60 ML/MIN/1.73M*2
GLUCOSE SERPL-MCNC: 100 MG/DL (ref 70–99)
HCG UR QL: NEGATIVE
HCT VFR BLDCO AUTO: 36.6 %
HGB BLD-MCNC: 11.9 G/DL
IMM GRANULOCYTES # BLD AUTO: 0.01 K/UL (ref 0–0.08)
IMM GRANULOCYTES NFR BLD AUTO: 0.1 %
LYMPHOCYTES # BLD: 3.14 K/UL (ref 1.2–3.5)
LYMPHOCYTES NFR BLD: 38.3 %
MCH RBC QN AUTO: 27.4 PG (ref 28–33.2)
MCHC RBC AUTO-ENTMCNC: 32.5 G/DL (ref 32.2–35.5)
MCV RBC AUTO: 84.3 FL (ref 83–98)
MONOCYTES # BLD: 0.42 K/UL (ref 0.28–0.8)
MONOCYTES NFR BLD: 5.1 %
NEUTROPHILS # BLD: 4.42 K/UL (ref 1.7–7)
NEUTS SEG NFR BLD: 54 %
NRBC BLD-RTO: 0 %
PDW BLD AUTO: 9.7 FL (ref 9.4–12.3)
PLATELET # BLD AUTO: 243 K/UL
POTASSIUM SERPL-SCNC: 4.1 MEQ/L (ref 3.6–5.1)
RBC # BLD AUTO: 4.34 M/UL (ref 3.93–5.22)
SODIUM SERPL-SCNC: 134 MEQ/L (ref 136–144)
TROPONIN I SERPL-MCNC: <0.02 NG/ML
WBC # BLD AUTO: 8.2 K/UL

## 2019-09-29 PROCEDURE — 71046 X-RAY EXAM CHEST 2 VIEWS: CPT

## 2019-09-29 PROCEDURE — 85025 COMPLETE CBC W/AUTO DIFF WBC: CPT | Performed by: EMERGENCY MEDICINE

## 2019-09-29 PROCEDURE — 84484 ASSAY OF TROPONIN QUANT: CPT

## 2019-09-29 PROCEDURE — 93005 ELECTROCARDIOGRAM TRACING: CPT

## 2019-09-29 PROCEDURE — 84484 ASSAY OF TROPONIN QUANT: CPT | Performed by: EMERGENCY MEDICINE

## 2019-09-29 PROCEDURE — 84703 CHORIONIC GONADOTROPIN ASSAY: CPT | Performed by: EMERGENCY MEDICINE

## 2019-09-29 PROCEDURE — 93005 ELECTROCARDIOGRAM TRACING: CPT | Performed by: EMERGENCY MEDICINE

## 2019-09-29 PROCEDURE — 99283 EMERGENCY DEPT VISIT LOW MDM: CPT | Mod: 25

## 2019-09-29 PROCEDURE — 36415 COLL VENOUS BLD VENIPUNCTURE: CPT

## 2019-09-29 PROCEDURE — 80048 BASIC METABOLIC PNL TOTAL CA: CPT | Performed by: EMERGENCY MEDICINE

## 2019-09-29 PROCEDURE — 63700000 HC SELF-ADMINISTRABLE DRUG: Performed by: EMERGENCY MEDICINE

## 2019-09-29 PROCEDURE — 80048 BASIC METABOLIC PNL TOTAL CA: CPT

## 2019-09-29 PROCEDURE — 84703 CHORIONIC GONADOTROPIN ASSAY: CPT

## 2019-09-29 PROCEDURE — 85025 COMPLETE CBC W/AUTO DIFF WBC: CPT

## 2019-09-29 PROCEDURE — 85379 FIBRIN DEGRADATION QUANT: CPT | Performed by: PHYSICIAN ASSISTANT

## 2019-09-29 RX ORDER — ACETAMINOPHEN 325 MG/1
650 TABLET ORAL ONCE
Status: COMPLETED | OUTPATIENT
Start: 2019-09-29 | End: 2019-09-29

## 2019-09-29 RX ORDER — BENZONATATE 100 MG/1
100 CAPSULE ORAL
Qty: 21 CAPSULE | Refills: 0 | Status: SHIPPED | OUTPATIENT
Start: 2019-09-29 | End: 2019-10-09

## 2019-09-29 RX ADMIN — ACETAMINOPHEN 650 MG: 325 TABLET, FILM COATED ORAL at 18:59

## 2019-09-29 ASSESSMENT — ENCOUNTER SYMPTOMS
SEIZURES: 0
PALPITATIONS: 0
NAUSEA: 0
DIARRHEA: 0
FEVER: 0
SHORTNESS OF BREATH: 1
CHEST TIGHTNESS: 0
PSYCHIATRIC NEGATIVE: 1
HEADACHES: 0
DIZZINESS: 0
SORE THROAT: 0
TROUBLE SWALLOWING: 0
ABDOMINAL PAIN: 0
DYSURIA: 0
COLOR CHANGE: 0
BACK PAIN: 0
LIGHT-HEADEDNESS: 0
COUGH: 1
NECK PAIN: 0
HEMATURIA: 0
WEAKNESS: 0
FLANK PAIN: 0
VOMITING: 0
CHILLS: 0

## 2019-09-29 NOTE — ED ATTESTATION NOTE
Agree with Pas findings and plan.I evaluated and performed a history and physical examination of the patient.  34-year-old lady has had about 3 weeks of constant chest pain and coughing.  She was placed on antibiotic which ended today but is continued with the cough.  She has slight stuffy nose.  Denies sore throat or fever.  She is somewhat short of breath.  Denies leg pain or swelling.  Still smokes but states she is quitting and encouraged to do so.  Examination alert lady pleasant cooperative.Cor- rrr without murmur, rub or gallop.Lungs-clear to auscultation. Abdomen soft and nontender.  Legs show no stigmata of DVT.  Considered PE,  CAD, TAD, vascular disease, carditis, valvular disease and abdominal etiologies and others.       Saeed Gonzalez MD  09/29/19 1933

## 2019-09-29 NOTE — ED PROVIDER NOTES
HPI     Chief Complaint   Patient presents with   • Cough   • Chest Pain     Patient is a 35-year-old female with past medical history of anxiety pancreatitis fibromyalgia depression migraines who was presented to the emergency room cough and chest pain.  Patient reports the symptoms started about 3 weeks ago a week after this started she went to the urgent care center who told her that she probably had bronchitis she started her on doxycycline as well as something for cough.  Patient reports the last dose of doxycycline ended today and she is still not feeling any better patient reports couple days ago she did have a fever but does not know what the temperature was.  Patient reports continues to have a cough and some pain to her chest little bit of shortness of breath so she came into the emergency room for evaluation.      History provided by:  Patient  Chest Pain   Pain location:  L chest  Pain quality: pressure and tightness    Pain radiates to:  Does not radiate  Pain severity:  Mild  Onset quality:  Gradual  Duration:  3 weeks  Timing:  Intermittent  Progression:  Waxing and waning  Relieved by:  Nothing  Worsened by:  Nothing  Ineffective treatments: antibiotics, cough medicine.  Associated symptoms: cough and shortness of breath    Associated symptoms: no abdominal pain, no back pain, no dizziness, no dysphagia, no fever, no headache, no nausea, no palpitations, no vomiting and no weakness         Patient History     Past Medical History:   Diagnosis Date   • Anxiety    • Depressed    • Endocarditis    • Fibromyalgia    • Migraines    • Pancreatitis    • Vasculitis (CMS/HCC)        Past Surgical History:   Procedure Laterality Date   • CHOLECYSTECTOMY     • ERCP     • GALLBLADDER SURGERY         Family History   Problem Relation Age of Onset   • No Known Problems Biological Mother    • Lung cancer Biological Father        Social History   Substance Use Topics   • Smoking status: Current Every Day Smoker      "Packs/day: 0.50     Types: Cigarettes   • Smokeless tobacco: Current User   • Alcohol use Yes      Comment: social       Systems Reviewed from Nursing Triage:  Tobacco  Allergies  Meds  Problems  Med Hx  Surg Hx  Soc Hx         Review of Systems     Review of Systems   Constitutional: Negative for chills and fever.   HENT: Negative for sore throat and trouble swallowing.    Respiratory: Positive for cough and shortness of breath. Negative for chest tightness.    Cardiovascular: Positive for chest pain. Negative for palpitations.   Gastrointestinal: Negative for abdominal pain, diarrhea, nausea and vomiting.   Genitourinary: Negative for dysuria, flank pain and hematuria.   Musculoskeletal: Negative for back pain and neck pain.   Skin: Negative for color change and rash.   Neurological: Negative for dizziness, seizures, syncope, weakness, light-headedness and headaches.   Psychiatric/Behavioral: Negative.    All other systems reviewed and are negative.       Physical Exam     ED Triage Vitals   Temp Heart Rate Resp BP SpO2   09/29/19 1638 09/29/19 1638 09/29/19 1638 09/29/19 1638 09/29/19 1638   36.6 °C (97.9 °F) 84 18 112/65 98 %      Temp Source Heart Rate Source Patient Position BP Location FiO2 (%) (Set)   09/29/19 1946 09/29/19 1946 09/29/19 1946 09/29/19 1946 --   Tympanic Monitor Sitting Right upper arm                      Patient Vitals for the past 24 hrs:   BP Temp Temp src Pulse Resp SpO2 Height Weight   09/29/19 1946 (!) 100/57 (!) 36 °C (96.8 °F) Tympanic 72 17 97 % - -   09/29/19 1638 112/65 36.6 °C (97.9 °F) - 84 18 98 % 1.575 m (5' 2\") 90.7 kg (200 lb)           Physical Exam   Constitutional: She is oriented to person, place, and time. She appears well-developed and well-nourished. No distress.   HENT:   Head: Normocephalic and atraumatic.   Neck: Normal range of motion. Neck supple.   Cardiovascular: Normal rate and regular rhythm.    No murmur heard.  Pulmonary/Chest: Effort normal and " breath sounds normal. No respiratory distress. She has no wheezes. She has no rales. She exhibits no tenderness.   Abdominal: Soft. Bowel sounds are normal. She exhibits no distension. There is no tenderness. There is no rebound and no guarding.   Musculoskeletal: Normal range of motion.   Neurological: She is alert and oriented to person, place, and time. No cranial nerve deficit or sensory deficit. Coordination normal.   Skin: Skin is warm and dry. Capillary refill takes less than 2 seconds. She is not diaphoretic.   Psychiatric: She has a normal mood and affect. Her behavior is normal.   Nursing note and vitals reviewed.           ECG 12 lead  Date/Time: 9/29/2019 5:58 PM  Performed by: ADI JUÁREZ  Authorized by: GISELLE KING     ECG reviewed by ED Physician in the absence of a cardiologist: yes    Rate:     ECG rate:  77    ECG rate assessment: normal    Rhythm:     Rhythm: sinus rhythm    Ectopy:     Ectopy: none    QRS:     QRS axis:  Normal    QRS intervals:  Normal  Conduction:     Conduction: normal    ST segments:     ST segments:  Non-specific  T waves:     T waves: normal            ED Course & MDM     Labs Reviewed   BASIC METABOLIC PANEL - Abnormal        Result Value    Sodium 134 (*)     Potassium 4.1      Chloride 106      CO2 22      BUN 15      Creatinine 0.7      Glucose 100 (*)     Calcium 8.7 (*)     eGFR >60.0      Anion Gap 6     CBC - Abnormal     WBC 8.20      RBC 4.34      Hemoglobin 11.9      Hematocrit 36.6      MCV 84.3      MCH 27.4 (*)     MCHC 32.5      RDW 13.4      Platelets 243      MPV 9.7     TROPONIN I - Normal    Troponin I <0.02     BHCG, SERUM, QUAL - Normal    Preg Test, Serum Negative     D-DIMER - Normal    D-Dimer, Quant 0.47     CBC AND DIFFERENTIAL    Narrative:     The following orders were created for panel order CBC and differential.  Procedure                               Abnormality         Status                     ---------                                -----------         ------                     CBC[[id number]]                           Abnormal            Final result               Diff Count[[id number]]                                        Final result                 Please view results for these tests on the individual orders.   DIFF COUNT    Differential Type Auto      nRBC 0.0      Immature Granulocytes 0.1      Neutrophils 54.0      Lymphocytes 38.3      Monocytes 5.1      Eosinophils 2.3      Basophils 0.2      Immature Granulocytes, Absolute 0.01      Neutrophils, Absolute 4.42      Lymphocytes, Absolute 3.14      Monocytes, Absolute 0.42      Eosinophils, Absolute 0.19      Basophils, Absolute 0.02         ECG 12 lead    (Results Pending)   X-RAY CHEST 2 VIEWS    (Results Pending)               [facility]         ED Course as of Sep 30 0032   Sun Sep 29, 2019   3891 I asked the patient specifically about vaping.  Patient reports she did vape but the last time she did was about a month ago.  [TK]   1838 Pt reports she does take Oral Birth control pills will add on a D-dimer  [TK]   1839 EKG no change from prior  [FK]      ED Course User Index  [FK] Saeed Gonzalez MD  [TK] Ronald Aden PA C         Clinical Impressions as of Sep 30 0032   Viral syndrome        Ronald Aden PA C  09/30/19 0032

## 2019-09-29 NOTE — DISCHARGE INSTRUCTIONS
Please call and follow up with your primary care DrRupinder in the next 2-3 days. Return here if your symptoms change, get worse or if you have any other concerns.

## 2019-09-30 LAB
ATRIAL RATE: 77
P AXIS: 19
PR INTERVAL: 136
QRS DURATION: 98
QT INTERVAL: 390
QTC CALCULATION(BAZETT): 441
R AXIS: 40
T WAVE AXIS: 38
VENTRICULAR RATE: 77

## 2019-11-07 ENCOUNTER — HOSPITAL ENCOUNTER (EMERGENCY)
Facility: HOSPITAL | Age: 34
End: 2019-11-08
Attending: STUDENT IN AN ORGANIZED HEALTH CARE EDUCATION/TRAINING PROGRAM | Admitting: STUDENT IN AN ORGANIZED HEALTH CARE EDUCATION/TRAINING PROGRAM
Payer: COMMERCIAL

## 2019-11-07 DIAGNOSIS — T50.904A DRUG OVERDOSE, UNDETERMINED INTENT, INITIAL ENCOUNTER: Primary | ICD-10-CM

## 2019-11-07 LAB
ALBUMIN SERPL-MCNC: 3.5 G/DL (ref 3.4–5)
ALP SERPL-CCNC: 58 IU/L (ref 35–126)
ALT SERPL-CCNC: 16 IU/L (ref 11–54)
ANION GAP SERPL CALC-SCNC: 8 MEQ/L (ref 3–15)
APAP SERPL-MCNC: <10 UG/ML (ref 10–30)
AST SERPL-CCNC: 22 IU/L (ref 15–41)
BASOPHILS # BLD: 0.03 K/UL (ref 0.01–0.1)
BASOPHILS NFR BLD: 0.5 %
BILIRUB SERPL-MCNC: 0.3 MG/DL (ref 0.3–1.2)
BUN SERPL-MCNC: 8 MG/DL (ref 8–20)
CALCIUM SERPL-MCNC: 8.7 MG/DL (ref 8.9–10.3)
CHLORIDE SERPL-SCNC: 102 MEQ/L (ref 98–109)
CO2 SERPL-SCNC: 24 MEQ/L (ref 22–32)
CREAT SERPL-MCNC: 0.8 MG/DL
DIFFERENTIAL METHOD BLD: NORMAL
EOSINOPHIL # BLD: 0.21 K/UL (ref 0.04–0.36)
EOSINOPHIL NFR BLD: 3.3 %
ERYTHROCYTE [DISTWIDTH] IN BLOOD BY AUTOMATED COUNT: 13.8 % (ref 11.7–14.4)
ETHANOL SERPL-MCNC: <5 MG/DL
GFR SERPL CREATININE-BSD FRML MDRD: >60 ML/MIN/1.73M*2
GLUCOSE SERPL-MCNC: 106 MG/DL (ref 70–99)
HCG UR QL: NEGATIVE
HCT VFR BLDCO AUTO: 35.9 %
HGB BLD-MCNC: 11.9 G/DL (ref 11.8–15.7)
IMM GRANULOCYTES # BLD AUTO: 0.01 K/UL (ref 0–0.08)
IMM GRANULOCYTES NFR BLD AUTO: 0.2 %
LYMPHOCYTES # BLD: 3.08 K/UL (ref 1.2–3.5)
LYMPHOCYTES NFR BLD: 47.7 %
MCH RBC QN AUTO: 27 PG (ref 28–33.2)
MCHC RBC AUTO-ENTMCNC: 33.1 G/DL (ref 32.2–35.5)
MCV RBC AUTO: 81.4 FL (ref 83–98)
MONOCYTES # BLD: 0.47 K/UL (ref 0.28–0.8)
MONOCYTES NFR BLD: 7.3 %
NEUTROPHILS # BLD: 2.66 K/UL (ref 1.7–7)
NEUTS SEG NFR BLD: 41 %
NRBC BLD-RTO: 0 %
PDW BLD AUTO: 9.6 FL (ref 9.4–12.3)
PLATELET # BLD AUTO: 202 K/UL
POTASSIUM SERPL-SCNC: 3.7 MEQ/L (ref 3.6–5.1)
PROT SERPL-MCNC: 7 G/DL (ref 6–8.2)
RBC # BLD AUTO: 4.41 M/UL (ref 3.93–5.22)
SALICYLATES SERPL-MCNC: <4 MG/DL
SODIUM SERPL-SCNC: 134 MEQ/L (ref 136–144)
WBC # BLD AUTO: 6.46 K/UL

## 2019-11-07 PROCEDURE — G0480 DRUG TEST DEF 1-7 CLASSES: HCPCS | Performed by: PHYSICIAN ASSISTANT

## 2019-11-07 PROCEDURE — 25800000 HC PHARMACY IV SOLUTIONS: Performed by: PHYSICIAN ASSISTANT

## 2019-11-07 PROCEDURE — 99284 EMERGENCY DEPT VISIT MOD MDM: CPT | Mod: 25

## 2019-11-07 PROCEDURE — 84703 CHORIONIC GONADOTROPIN ASSAY: CPT | Performed by: PHYSICIAN ASSISTANT

## 2019-11-07 PROCEDURE — 93005 ELECTROCARDIOGRAM TRACING: CPT | Performed by: PHYSICIAN ASSISTANT

## 2019-11-07 PROCEDURE — 80053 COMPREHEN METABOLIC PANEL: CPT | Performed by: PHYSICIAN ASSISTANT

## 2019-11-07 PROCEDURE — 96360 HYDRATION IV INFUSION INIT: CPT

## 2019-11-07 PROCEDURE — 36415 COLL VENOUS BLD VENIPUNCTURE: CPT | Performed by: PHYSICIAN ASSISTANT

## 2019-11-07 PROCEDURE — 85025 COMPLETE CBC W/AUTO DIFF WBC: CPT | Performed by: PHYSICIAN ASSISTANT

## 2019-11-07 RX ADMIN — SODIUM CHLORIDE 1000 ML: 9 INJECTION, SOLUTION INTRAVENOUS at 21:40

## 2019-11-08 VITALS
OXYGEN SATURATION: 100 % | SYSTOLIC BLOOD PRESSURE: 147 MMHG | HEART RATE: 82 BPM | DIASTOLIC BLOOD PRESSURE: 79 MMHG | RESPIRATION RATE: 18 BRPM | WEIGHT: 220 LBS | HEIGHT: 62 IN | TEMPERATURE: 98.1 F | BODY MASS INDEX: 40.48 KG/M2

## 2019-11-08 LAB
AMPHET UR QL SCN: ABNORMAL
ATRIAL RATE: 100
BARBITURATES UR QL SCN: POSITIVE
BENZODIAZ UR QL SCN: ABNORMAL
CANNABINOIDS UR QL SCN: ABNORMAL
COCAINE UR QL SCN: ABNORMAL
OPIATES UR QL SCN: ABNORMAL
P AXIS: 40
PCP UR QL SCN: ABNORMAL
PR INTERVAL: 166
QRS DURATION: 98
QT INTERVAL: 346
QTC CALCULATION(BAZETT): 446
R AXIS: 66
T WAVE AXIS: 33
VENTRICULAR RATE: 100

## 2019-11-08 PROCEDURE — 63700000 HC SELF-ADMINISTRABLE DRUG: Performed by: EMERGENCY MEDICINE

## 2019-11-08 PROCEDURE — 63700000 HC SELF-ADMINISTRABLE DRUG: Performed by: PHYSICIAN ASSISTANT

## 2019-11-08 PROCEDURE — 3E0337Z INTRODUCTION OF ELECTROLYTIC AND WATER BALANCE SUBSTANCE INTO PERIPHERAL VEIN, PERCUTANEOUS APPROACH: ICD-10-PCS | Performed by: STUDENT IN AN ORGANIZED HEALTH CARE EDUCATION/TRAINING PROGRAM

## 2019-11-08 PROCEDURE — 80307 DRUG TEST PRSMV CHEM ANLYZR: CPT | Performed by: PHYSICIAN ASSISTANT

## 2019-11-08 PROCEDURE — 96360 HYDRATION IV INFUSION INIT: CPT

## 2019-11-08 PROCEDURE — 99284 EMERGENCY DEPT VISIT MOD MDM: CPT | Mod: 25

## 2019-11-08 RX ORDER — TAMSULOSIN HYDROCHLORIDE 0.4 MG/1
0.4 CAPSULE ORAL ONCE
Status: COMPLETED | OUTPATIENT
Start: 2019-11-08 | End: 2019-11-08

## 2019-11-08 RX ORDER — BUPRENORPHINE HYDROCHLORIDE AND NALOXONE HYDROCHLORIDE DIHYDRATE 8; 2 MG/1; MG/1
1 TABLET SUBLINGUAL 3 TIMES DAILY
COMMUNITY
End: 2021-02-03 | Stop reason: HOSPADM

## 2019-11-08 RX ORDER — BUPRENORPHINE HYDROCHLORIDE AND NALOXONE HYDROCHLORIDE DIHYDRATE 8; 2 MG/1; MG/1
1 TABLET SUBLINGUAL ONCE
Status: COMPLETED | OUTPATIENT
Start: 2019-11-08 | End: 2019-11-08

## 2019-11-08 RX ORDER — PREGABALIN 75 MG/1
75 CAPSULE ORAL ONCE
Status: COMPLETED | OUTPATIENT
Start: 2019-11-08 | End: 2019-11-08

## 2019-11-08 RX ORDER — SULFAMETHOXAZOLE AND TRIMETHOPRIM 800; 160 MG/1; MG/1
1 TABLET ORAL ONCE
Status: COMPLETED | OUTPATIENT
Start: 2019-11-08 | End: 2019-11-08

## 2019-11-08 RX ORDER — SULFAMETHOXAZOLE AND TRIMETHOPRIM 400; 80 MG/1; MG/1
1 TABLET ORAL 2 TIMES DAILY
COMMUNITY
End: 2020-07-09

## 2019-11-08 RX ORDER — METOPROLOL SUCCINATE 25 MG/1
25 TABLET, EXTENDED RELEASE ORAL DAILY
Status: ON HOLD | COMMUNITY
End: 2021-02-02 | Stop reason: SDUPTHER

## 2019-11-08 RX ORDER — PRAZOSIN HYDROCHLORIDE 1 MG/1
2 CAPSULE ORAL NIGHTLY
COMMUNITY
End: 2021-02-03 | Stop reason: HOSPADM

## 2019-11-08 RX ORDER — DM/P-EPHED/ACETAMINOPH/DOXYLAM 30-7.5/3
2 LIQUID (ML) ORAL EVERY 2 HOUR PRN
Status: DISCONTINUED | OUTPATIENT
Start: 2019-11-08 | End: 2019-11-08 | Stop reason: HOSPADM

## 2019-11-08 RX ORDER — TAMSULOSIN HYDROCHLORIDE 0.4 MG/1
0.4 CAPSULE ORAL EVERY MORNING
COMMUNITY
End: 2020-07-09

## 2019-11-08 RX ORDER — BUTALBITAL, ACETAMINOPHEN AND CAFFEINE 50; 325; 40 MG/1; MG/1; MG/1
1 TABLET ORAL ONCE
Status: COMPLETED | OUTPATIENT
Start: 2019-11-08 | End: 2019-11-08

## 2019-11-08 RX ORDER — PREGABALIN 75 MG/1
150 CAPSULE ORAL 2 TIMES DAILY
COMMUNITY
End: 2021-02-03 | Stop reason: HOSPADM

## 2019-11-08 RX ORDER — METOPROLOL SUCCINATE 25 MG/1
25 TABLET, EXTENDED RELEASE ORAL ONCE
Status: COMPLETED | OUTPATIENT
Start: 2019-11-08 | End: 2019-11-08

## 2019-11-08 RX ORDER — BUPROPION HYDROCHLORIDE 75 MG/1
75 TABLET ORAL ONCE
Status: COMPLETED | OUTPATIENT
Start: 2019-11-08 | End: 2019-11-08

## 2019-11-08 RX ORDER — CHLORPROMAZINE HYDROCHLORIDE 100 MG/1
200 TABLET, FILM COATED ORAL NIGHTLY
COMMUNITY
End: 2021-02-03 | Stop reason: HOSPADM

## 2019-11-08 RX ORDER — BUPROPION HYDROCHLORIDE 75 MG/1
75 TABLET ORAL 3 TIMES DAILY
Status: ON HOLD | COMMUNITY
End: 2021-02-02 | Stop reason: SDUPTHER

## 2019-11-08 RX ORDER — FLUTICASONE PROPIONATE 50 MCG
2 SPRAY, SUSPENSION (ML) NASAL DAILY PRN
Status: ON HOLD | COMMUNITY
End: 2021-02-02 | Stop reason: SDUPTHER

## 2019-11-08 RX ORDER — PAROXETINE 10 MG/1
20 TABLET, FILM COATED ORAL DAILY
COMMUNITY
End: 2021-02-03 | Stop reason: HOSPADM

## 2019-11-08 RX ADMIN — BUPRENORPHINE AND NALOXONE 1 TABLET: 8; 2 TABLET SUBLINGUAL at 06:55

## 2019-11-08 RX ADMIN — BUPROPION HYDROCHLORIDE 75 MG: 75 TABLET ORAL at 06:56

## 2019-11-08 RX ADMIN — SULFAMETHOXAZOLE AND TRIMETHOPRIM 1 TABLET: 800; 160 TABLET ORAL at 06:53

## 2019-11-08 RX ADMIN — PREGABALIN 75 MG: 75 CAPSULE ORAL at 06:54

## 2019-11-08 RX ADMIN — METOPROLOL SUCCINATE 25 MG: 25 TABLET, EXTENDED RELEASE ORAL at 06:55

## 2019-11-08 RX ADMIN — BUPRENORPHINE AND NALOXONE 1 TABLET: 8; 2 TABLET SUBLINGUAL at 14:02

## 2019-11-08 RX ADMIN — TAMSULOSIN HYDROCHLORIDE 0.4 MG: 0.4 CAPSULE ORAL at 06:54

## 2019-11-08 RX ADMIN — BUPROPION HYDROCHLORIDE 75 MG: 75 TABLET ORAL at 14:03

## 2019-11-08 RX ADMIN — NICOTINE POLACRILEX 2 MG: 2 LOZENGE ORAL at 11:56

## 2019-11-08 RX ADMIN — NICOTINE POLACRILEX 2 MG: 2 LOZENGE ORAL at 02:01

## 2019-11-08 RX ADMIN — BUTALBITAL, ACETAMINOPHEN AND CAFFEINE 1 TABLET: 50; 325; 40 TABLET ORAL at 09:17

## 2019-11-08 NOTE — ED PROVIDER NOTES
HPI     Chief Complaint   Patient presents with   • Drug Overdose       35 y/o F with PMH fibromyalgia, anxiety, migraines, substance abuse (on Suboxone) presents today for possible drug overdose. EMS and police were called by staff at longterm house for altered mental status and falling asleep during group. Pt reports took 2 10 mg Ambien tablets today for sleep aid. Bottle found by patient shows only 14 tablets in bottle (was filled today and 30 were dispensed). Pt unsure why there are tablets missing. Pt denies suicidal thoughts or attempt to overdose. History very limited as patient appears intoxicated.       History provided by:  Patient, EMS personnel and police       Patient History     Past Medical History:   Diagnosis Date   • Anxiety    • Depressed    • Endocarditis    • Fibromyalgia    • Migraines    • Pancreatitis    • Vasculitis (CMS/HCC)        Past Surgical History:   Procedure Laterality Date   • CHOLECYSTECTOMY     • ERCP     • GALLBLADDER SURGERY         Family History   Problem Relation Age of Onset   • No Known Problems Biological Mother    • Lung cancer Biological Father        Social History     Tobacco Use   • Smoking status: Current Every Day Smoker     Packs/day: 0.50     Types: Cigarettes   • Smokeless tobacco: Current User   Substance Use Topics   • Alcohol use: Yes     Comment: social   • Drug use: Yes     Types: Marijuana       Systems Reviewed from Nursing Triage:          Review of Systems     Review of Systems   Unable to perform ROS: Psychiatric disorder        Physical Exam     ED Triage Vitals   Temp Heart Rate Resp BP SpO2   11/07/19 2134 11/07/19 2315 11/07/19 2134 11/07/19 2134 11/07/19 2134   36.7 °C (98.1 °F) 76 16 112/69 98 %      Temp Source Heart Rate Source Patient Position BP Location FiO2 (%) (Set)   11/07/19 2134 11/08/19 0305 11/07/19 2134 11/07/19 2134 --   Tympanic Monitor Lying Right upper arm              Heart Rate: 1115 (11/07/19 2134)  Rhythm Strip Interpretation:  "Sinus Tachycardia (11/07/19 2134)    Patient Vitals for the past 24 hrs:   BP Temp Temp src Pulse Resp SpO2 Height Weight   11/08/19 1344 (!) 147/79 -- -- 82 18 100 % -- --   11/08/19 0655 131/74 -- -- 78 -- -- -- --   11/08/19 0616 131/61 -- -- 76 16 100 % -- --   11/08/19 0530 130/65 -- -- 74 16 99 % -- --   11/08/19 0405 (!) 125/58 -- -- 79 15 100 % -- --   11/08/19 0305 120/63 -- -- -- 16 99 % -- --   11/08/19 0207 127/62 -- -- 62 16 100 % -- --   11/08/19 0146 121/75 -- -- 80 16 100 % -- --   11/08/19 0107 103/75 -- -- 80 16 95 % -- --   11/07/19 2315 100/61 -- -- 76 16 100 % -- --   11/07/19 2134 112/69 36.7 °C (98.1 °F) Tympanic -- 16 98 % 1.575 m (5' 2\") 99.8 kg (220 lb)                                       Physical Exam   Constitutional: She is oriented to person, place, and time. She appears well-developed and well-nourished. No distress.   HENT:   Head: Atraumatic.   Eyes: Pupils are equal, round, and reactive to light. EOM are normal.   Neck: Normal range of motion. Neck supple.   Cardiovascular: Regular rhythm. Tachycardia present.   Pulmonary/Chest: Effort normal and breath sounds normal.   Abdominal: Soft. There is no tenderness.   Neurological: She is oriented to person, place, and time.   Somnolent but easily arousable  Pt with slight slurring of speech and appears slightly confused, otherwise neuro non-focal   Skin: Skin is warm and dry.   Psychiatric: Her speech is tangential and slurred. She expresses no homicidal and no suicidal ideation.   Nursing note and vitals reviewed.           Procedures    ED Course & MDM     Labs Reviewed   COMPREHENSIVE METABOLIC PANEL - Abnormal       Result Value    Sodium 134 (*)     Potassium 3.7      Chloride 102      CO2 24      BUN 8      Creatinine 0.8      Glucose 106 (*)     Calcium 8.7 (*)     AST (SGOT) 22      ALT (SGPT) 16      Alkaline Phosphatase 58      Total Protein 7.0      Albumin 3.5      Bilirubin, Total 0.3      eGFR >60.0      Anion Gap 8   "   ER TOXICOLOGY SCR, SERUM - Abnormal    Salicylate <4.0      Acetaminophen <10.0 (*)     Ethanol <5     DRUG SCREEN PANEL, URINE - Abnormal    PCP Scrn, Ur None Detected      Benzodiazepine Ur Qual None Detected      Cocaine Screen, Urine None Detected      Amphetamine+Methamphetamine Screen, Ur None Detected      Cannabinoid Screen, Urine None Detected      Opiate Scrn, Ur None Detected      Barbiturate Screen, Ur Positive (*)    CBC - Abnormal    WBC 6.46      RBC 4.41      Hemoglobin 11.9      Hematocrit 35.9      MCV 81.4 (*)     MCH 27.0 (*)     MCHC 33.1      RDW 13.8      Platelets 202      MPV 9.6     BHCG, SERUM, QUAL - Normal    Preg Test, Serum Negative     CBC AND DIFFERENTIAL    Narrative:     The following orders were created for panel order CBC and differential.  Procedure                               Abnormality         Status                     ---------                               -----------         ------                     CBC[[id number]]                          Abnormal            Final result               Diff Count[[id number]]                                       Final result                 Please view results for these tests on the individual orders.   DIFF COUNT    Differential Type Auto      nRBC 0.0      Immature Granulocytes 0.2      Neutrophils 41.0      Lymphocytes 47.7      Monocytes 7.3      Eosinophils 3.3      Basophils 0.5      Immature Granulocytes, Absolute 0.01      Neutrophils, Absolute 2.66      Lymphocytes, Absolute 3.08      Monocytes, Absolute 0.47      Eosinophils, Absolute 0.21      Basophils, Absolute 0.03         ECG 12 lead                     MDM         ED Course as of Nov 08 1403   Thu Nov 07, 2019   2132 Police brought patient in ED. Back up 302 filed. Pt appears confused and somnolent but easily arousable. Pt states only took 2 tablets of Ambien but 16 tablets missing (Ambien was filled today). Concern for intentional overdose even though patient is  denying. Will place on 1:1 and d/w poison control    [NH]   2135 EKG 21:35 - nsr 100 bpm, nl axis, good rwp, no st/t wave changes, qt/qtc 346/446 ms.     [NS]   2137 D/w poison control-slurred speech, hallucinations, CMS depression, ataxia  4-6 hour observation    [NH]   2139 Pt seen and evaluated along with [facility]. Police and EMS provided history. Concern for intentional overdose of Ambien, possibly 14 tablets. Police filled out 302. Pt is drowsy with poor thought process. No sign or report of trauma. Will check crisis screening labs. Monitor on tele. Poison control contacted and recommend 6 hour observation. Will monitor in ED until medically cleared. Pt informed and is cooperative with plan at this time.     [NS]   2218 Labs unremarkable. Pt sleeping, easily arousable O2 sat 99%.     [NH]   Fri Nov 08, 2019   0022 Pt remains sleeping but easily arousable, on end tital CO2    [NH]   0212 Pt is still sleeping but normal end tital CO2    [NH]   0300 SW evaluated patient. Pt still adamant that she did not intentionally overdose. Will consult telepsych    [NH]   0602 Telepsych evaluated patient, pt now agreeable to sign 201. 302 will be upheld if needed.    [NH]   0635 Pt resting comfortably in bed, requesting morning medications. Case signed out to attending.    [NH]   0907 1 Fioricet ordered for pt's HA    [TC]   1321 Pt's afternoon dose of wellbutrin ordered    [TC]      ED Course User Index  [NH] Breann Stephens PA C  [NS] Alfonso Mendez,   [TC] Lena Mcleod PA C         Clinical Impressions as of Nov 08 1403   Drug overdose, undetermined intent, initial encounter          Breann Stephens PA C  11/08/19 1403

## 2019-11-08 NOTE — ED NOTES
Met with pt bedside, she states she only took 3-4 pills to sleep, denies this was a suicide attempt. Explained 302 process and rights, pt verbalized understanding.   Spoke with Rosita at [facility] , pt repeatedly falling asleep during evening meeting, 911 called and came to residence. Confirmed nighttime meds: Lyrica 75mg, Sulfamethoxazole, Wellbutrin 75mg, Chlorpromazine 200mg, Buprenorphine HCl-Naloxone 8-2mg SL Film. Med rec w/ [email or web address] documents.   250a Faxed 302 to access center, confirmed consult with Morenita.   530a Dr Acosta met with pt, inpt, pt is agreeable.   Met with pt bedside, reviewed: 201, rights and transfer consent.    Spoke with Valentino at [facility], can review, faxed clinical.  Spoke with Jose in pharmacy about pt's Suboxone, they have the pt's dosage in-house, Dr Lizama can call in a verbal.

## 2019-11-08 NOTE — ED ATTESTATION NOTE
I saw and evaluated the patient, participated in the management, and agree with the findings in the above note. We discussed the case and the treatment plan.  Hx: [facility] EMS secondary to suspected intentional overdose. Reportedly pt was out with mother today and had a prescription of Ambien filled; 30 tablets. Routinely pt is suppose to turn over medication to recovery home staff but did not. Pt was found to be drowsy and pill bottle with only 14 tablets remaining were discovered. Pt arrives to ED drowsy and unable to provide meaningful history.   Exam: vs reviewed, drowsy, head at/nc, dry mucous membranes, PERRL, neck supple, lungs ctab, cardiac rrr without m/r/g, abd soft nt/nd, extremity exam unremarkable.      Alfonso Mendez, DO  11/07/19 6606
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
## 2019-11-08 NOTE — ED NOTES
11/08/2019 @ 10:00 -  called Patty at the [facility] Admissions Office and she advised the patient's referral would be the next one reviewed.  She called this author back a short time later and accepted the patient with Receiving Physician being Dr. Ocampo and UR being Herber Bauman.  Patty agreed to complete the pre-cert and asked this author to call the patient's group home to obtain a 7-day supply of her Suboxone prescription.  This author called the [facility]onia who agreed to send over the requested medication with the patient's clothes and toiletry items.   then called back to Patty and she advised this author could schedule transport once the medication was received by the ED.    11/08/2019 @ 11:45 -  was advised the patient's medication and personal supplies have been delivered to the patient's ED RN.  This author then called Patient at [facility] who provided an ETA of 13:30.  This author then called the [facility] and obtained the pre-certification number for the patient's transport trip (#[id number]).  This author the called back to [facility] and provided the authorization number to Ty.   updated the patient with her placement and transportation arrangements and provided her with a [facility] brochure.  The patient 201 and transport forms were completed by the overnight .  This author has placed the patient's completed transport packet on her ED chart.

## 2020-07-09 ENCOUNTER — HOSPITAL ENCOUNTER (EMERGENCY)
Facility: HOSPITAL | Age: 35
Discharge: HOME | End: 2020-07-09
Attending: EMERGENCY MEDICINE
Payer: COMMERCIAL

## 2020-07-09 VITALS
SYSTOLIC BLOOD PRESSURE: 150 MMHG | HEART RATE: 112 BPM | BODY MASS INDEX: 39.56 KG/M2 | WEIGHT: 215 LBS | DIASTOLIC BLOOD PRESSURE: 81 MMHG | RESPIRATION RATE: 20 BRPM | TEMPERATURE: 98.1 F | HEIGHT: 62 IN | OXYGEN SATURATION: 96 %

## 2020-07-09 DIAGNOSIS — T19.2XXA FOREIGN BODY IN VAGINA, INITIAL ENCOUNTER: Primary | ICD-10-CM

## 2020-07-09 PROCEDURE — 99281 EMR DPT VST MAYX REQ PHY/QHP: CPT

## 2020-07-09 ASSESSMENT — ENCOUNTER SYMPTOMS
COUGH: 0
SHORTNESS OF BREATH: 0
BACK PAIN: 1
CHILLS: 0
FEVER: 0
ABDOMINAL PAIN: 0

## 2020-07-09 NOTE — ED PROVIDER NOTES
HPI     Chief Complaint   Patient presents with   • Foreign Body in Vagina           35 y/o female presents for evaluation of foreign body in vagina since last night. States she put tampon in vagina at 6 PM last night and had sex 2-3 hours later (forgot she had tampon in). States she realized around midnight that she still had tampon in. States she can feel tampon with fingers when she bears down but unable to retrieve it. Reports low back pain which is worse with movement, no pain otherwise. No fevers, abdominal pain, vaginal discharge, or vaginal bleeding.           Patient History     Past Medical History:   Diagnosis Date   • Anxiety    • Depressed    • Endocarditis    • Fibromyalgia    • Migraines    • Pancreatitis    • Vasculitis (CMS/HCC)        Past Surgical History:   Procedure Laterality Date   • CHOLECYSTECTOMY     • ERCP     • GALLBLADDER SURGERY         Family History   Problem Relation Age of Onset   • No Known Problems Biological Mother    • Lung cancer Biological Father        Social History     Tobacco Use   • Smoking status: Former Smoker     Packs/day: 0.50     Types: Cigarettes   • Smokeless tobacco: Former User   Substance Use Topics   • Alcohol use: Not Currently     Comment: social   • Drug use: Not Currently     Types: Marijuana       Systems Reviewed from Nursing Triage:          Review of Systems     Review of Systems   Constitutional: Negative for chills and fever.   Respiratory: Negative for cough and shortness of breath.    Cardiovascular: Negative for chest pain.   Gastrointestinal: Negative for abdominal pain.   Genitourinary: Negative for vaginal bleeding, vaginal discharge and vaginal pain.   Musculoskeletal: Positive for back pain.        Physical Exam     ED Triage Vitals [07/09/20 0954]   Temp Pulse Resp BP SpO2   36.7 °C (98.1 °F) -- 20 (!) 150/81 96 %      Temp Source Heart Rate Source Patient Position BP Location FiO2 (%) (Set)   Oral -- Sitting Right upper arm --               "       Patient Vitals for the past 24 hrs:   BP Temp Temp src Resp SpO2 Height Weight   07/09/20 0954 (!) 150/81 36.7 °C (98.1 °F) Oral 20 96 % 1.575 m (5' 2\") 97.5 kg (215 lb)                                          Physical Exam   Constitutional: She is oriented to person, place, and time. She appears well-developed and well-nourished.   HENT:   Head: Normocephalic.   Eyes: EOM are normal.   Neck: Neck supple.   Cardiovascular: Normal rate, regular rhythm and normal heart sounds.   Pulmonary/Chest: Effort normal and breath sounds normal.   Abdominal: Soft. There is no tenderness.   Genitourinary:   Genitourinary Comments: Vagina: tampon within vaginal vault, normal exam otherwise   Neurological: She is alert and oriented to person, place, and time.   Skin: Skin is warm and dry.   Psychiatric: She has a normal mood and affect.   Nursing note and vitals reviewed.           Procedures    Labs Reviewed - No data to display    No orders to display               ED Course & MDM     MDM         ED Course as of Jul 09 1028   Thu Jul 09, 2020   1017 Mild hypertension, mild tachycardia, normal VS otherwise. Tampon removed with forceps without difficulty.    [TT]   1025 Repeat . States she has tachycardia at baseline and did not take her metoprolol this morning, she will take it when she gets home.    [TT]      ED Course User Index  [TT] Aby Vega PA C         Clinical Impressions as of Jul 09 1028   Foreign body in vagina, initial encounter - removed        Aby Vega PA C  07/09/20 1028    "

## 2020-07-09 NOTE — ED ATTESTATION NOTE
The patient was evaluated and managed by the physician assistant / nurse practitioner.     Hiram Luna MD  07/09/20 8544

## 2020-07-09 NOTE — DISCHARGE INSTRUCTIONS
Return to the ER for severe pain, high fevers, significant vaginal discharge, or any other concerning symptoms.

## 2020-07-10 ENCOUNTER — HOSPITAL ENCOUNTER (EMERGENCY)
Facility: HOSPITAL | Age: 35
Discharge: HOME | End: 2020-07-10
Attending: EMERGENCY MEDICINE | Admitting: EMERGENCY MEDICINE
Payer: COMMERCIAL

## 2020-07-10 VITALS
RESPIRATION RATE: 20 BRPM | TEMPERATURE: 98.3 F | DIASTOLIC BLOOD PRESSURE: 86 MMHG | HEART RATE: 105 BPM | SYSTOLIC BLOOD PRESSURE: 130 MMHG | OXYGEN SATURATION: 97 %

## 2020-07-10 DIAGNOSIS — Z13.9 ENCOUNTER FOR MEDICAL SCREENING EXAMINATION: Primary | ICD-10-CM

## 2020-07-10 LAB
AMPHET UR QL SCN: NOT DETECTED
BARBITURATES UR QL SCN: NOT DETECTED
BENZODIAZ UR QL SCN: NOT DETECTED
CANNABINOIDS UR QL SCN: NOT DETECTED
COCAINE UR QL SCN: NOT DETECTED
OPIATES UR QL SCN: NOT DETECTED
PCP UR QL SCN: NOT DETECTED

## 2020-07-10 PROCEDURE — 99283 EMERGENCY DEPT VISIT LOW MDM: CPT

## 2020-07-10 PROCEDURE — 80307 DRUG TEST PRSMV CHEM ANLYZR: CPT | Performed by: PHYSICIAN ASSISTANT

## 2020-07-10 ASSESSMENT — ENCOUNTER SYMPTOMS
HEADACHES: 0
NAUSEA: 0
FATIGUE: 0
HEMATURIA: 0
HALLUCINATIONS: 0
CONFUSION: 1
BACK PAIN: 0
WOUND: 0
WEAKNESS: 0
SHORTNESS OF BREATH: 0
NECK STIFFNESS: 0
FEVER: 0
ABDOMINAL PAIN: 0
CHILLS: 0
LIGHT-HEADEDNESS: 0
DIZZINESS: 0
VOMITING: 0
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
## 2020-07-10 NOTE — ED ATTESTATION NOTE
The patient was evaluated and managed by the physician assistant / nurse practitioner.       Ritika Watkins,   07/10/20 1957

## 2020-08-05 ENCOUNTER — APPOINTMENT (EMERGENCY)
Dept: RADIOLOGY | Facility: HOSPITAL | Age: 35
End: 2020-08-05
Attending: EMERGENCY MEDICINE
Payer: COMMERCIAL

## 2020-08-05 ENCOUNTER — HOSPITAL ENCOUNTER (EMERGENCY)
Facility: HOSPITAL | Age: 35
Discharge: HOME | End: 2020-08-05
Attending: EMERGENCY MEDICINE
Payer: COMMERCIAL

## 2020-08-05 VITALS
BODY MASS INDEX: 41.41 KG/M2 | HEART RATE: 98 BPM | RESPIRATION RATE: 16 BRPM | SYSTOLIC BLOOD PRESSURE: 108 MMHG | OXYGEN SATURATION: 94 % | DIASTOLIC BLOOD PRESSURE: 88 MMHG | TEMPERATURE: 98.7 F | HEIGHT: 62 IN | WEIGHT: 225 LBS

## 2020-08-05 DIAGNOSIS — S92.302A CLOSED DISPLACED FRACTURE OF METATARSAL BONE OF LEFT FOOT, UNSPECIFIED METATARSAL, INITIAL ENCOUNTER: Primary | ICD-10-CM

## 2020-08-05 PROCEDURE — 99283 EMERGENCY DEPT VISIT LOW MDM: CPT | Mod: 25

## 2020-08-05 PROCEDURE — 73630 X-RAY EXAM OF FOOT: CPT | Mod: LT

## 2020-08-05 PROCEDURE — 63700000 HC SELF-ADMINISTRABLE DRUG: Performed by: PHYSICIAN ASSISTANT

## 2020-08-05 RX ORDER — NAPROXEN 250 MG/1
500 TABLET ORAL ONCE
Status: COMPLETED | OUTPATIENT
Start: 2020-08-05 | End: 2020-08-05

## 2020-08-05 RX ORDER — NAPROXEN 500 MG/1
500 TABLET ORAL 2 TIMES DAILY WITH MEALS
Qty: 20 TABLET | Refills: 0 | Status: SHIPPED | OUTPATIENT
Start: 2020-08-05 | End: 2020-12-09 | Stop reason: ENTERED-IN-ERROR

## 2020-08-05 RX ADMIN — NAPROXEN 500 MG: 250 TABLET ORAL at 17:30

## 2020-08-05 ASSESSMENT — ENCOUNTER SYMPTOMS
NAUSEA: 0
BACK PAIN: 0
NECK STIFFNESS: 0
FEVER: 0
VOMITING: 0
SHORTNESS OF BREATH: 0
COLOR CHANGE: 0
NUMBNESS: 0
HEADACHES: 0
NECK PAIN: 0
WEAKNESS: 0

## 2020-08-05 NOTE — ED ATTESTATION NOTE
The patient was evaluated and managed by the physician assistant / nurse practitioner.     Jamshid Sanford MD  08/05/20 5534

## 2020-08-05 NOTE — ED PROVIDER NOTES
HPI     Chief Complaint   Patient presents with   • Foot Injury       34-year-old female here for left foot injury that occurred just prior to arrival.  Patient reports that she got her foot caught on her flip-flop and rolled her foot complains to pain to the top of the foot.  Is able to apply minimal weight.  Denies associated injuries.  Has not taken anything for pain prior to arrival.  Denies numbness, tingling, head or neck injury.      Foot Injury   Associated symptoms: no back pain, no fever and no neck pain         Patient History     Past Medical History:   Diagnosis Date   • Anxiety    • Depressed    • Endocarditis    • Fibromyalgia    • Migraines    • Pancreatitis    • Vasculitis (CMS/HCC)        Past Surgical History:   Procedure Laterality Date   • CHOLECYSTECTOMY     • ERCP     • GALLBLADDER SURGERY         Family History   Problem Relation Age of Onset   • No Known Problems Biological Mother    • Lung cancer Biological Father        Social History     Tobacco Use   • Smoking status: Former Smoker     Packs/day: 0.50     Types: Cigarettes   • Smokeless tobacco: Former User   • Tobacco comment: vapes    Substance Use Topics   • Alcohol use: Not Currently     Comment: social   • Drug use: Yes     Types: Marijuana       Systems Reviewed from Nursing Triage:          Review of Systems     Review of Systems   Constitutional: Negative for fever.   Respiratory: Negative for shortness of breath.    Cardiovascular: Negative for chest pain.   Gastrointestinal: Negative for nausea and vomiting.   Musculoskeletal: Negative for back pain, neck pain and neck stiffness.   Skin: Negative for color change and rash.   Neurological: Negative for weakness, numbness and headaches.        Physical Exam     ED Triage Vitals [08/05/20 1626]   Temp Heart Rate Resp BP SpO2   37.1 °C (98.7 °F) 98 16 108/88 94 %      Temp Source Heart Rate Source Patient Position BP Location FiO2 (%) (Set)   Oral Monitor Sitting Left upper arm --  "      Pulse Ox %: 95 % (08/05/20 1721)  Pulse Ox Interpretation: Normal (08/05/20 1721)          Patient Vitals for the past 24 hrs:   BP Temp Temp src Pulse Resp SpO2 Height Weight   08/05/20 1626 108/88 37.1 °C (98.7 °F) Oral 98 16 94 % 1.575 m (5' 2\") 102 kg (225 lb)                                          Physical Exam   Constitutional: She is oriented to person, place, and time. She appears well-developed and well-nourished. No distress.   HENT:   Head: Normocephalic and atraumatic.   Right Ear: External ear normal.   Left Ear: External ear normal.   Neck: Neck supple.   Cardiovascular: Normal rate.   Pulmonary/Chest: Effort normal.   Musculoskeletal: Normal range of motion. She exhibits edema and tenderness.   Tender palpation to dorsal aspect of left foot.  Strong pedal pulse.  There is some bruising and moderate swelling.  Cap refill less than 2 seconds.  Good sensation distally.   Neurological: She is alert and oriented to person, place, and time.   Skin: Skin is warm. She is not diaphoretic.   Nursing note and vitals reviewed.           Procedures    Labs Reviewed - No data to display    X-RAY FOOT LEFT 3+ VIEWS   Final Result   IMPRESSION: Comminuted, displaced fractures of the second, third and fourth   metatarsal heads.  No left foot dislocation.  Soft tissue swelling greatest over   the dorsal forefoot.                  ED Course & [facility]         ED Course as of Aug 05 1742   Wed Aug 05, 2020   1714 Explained to patient in detail that she has 3 broken bones in her foot and she will likely need surgical intervention.  I told her she should call podiatry tomorrow morning for follow-up appointment.  She should rest ice and elevate.  Patient requested a walking boot as she does not feel like she can ambulate with crutches, safely  Pt currently appears to be intoxicated and I do not feel as If she can safely walk to with crutches, either.  Will place ortho boot  And have close ortho/podiatry follow up "     [DE]   1719 Tried to ambulate pt with crutches and she was unable to maintain balance,   Cane and ortho boot provided     [DE]   1725 Pt asking for something stronger than naproxen- I explained I did not feel comfortable given her anything stronger as she already seems intoxicated at this point and is also on suboxone     [DE]      ED Course User Index  [DE] Monik Draper, RAVI CASE         Clinical Impressions as of Aug 05 1742   Closed displaced fracture of metatarsal bone of left foot, unspecified metatarsal, initial encounter - multiple        Monik Draper PA C  08/05/20 1742

## 2020-12-09 ENCOUNTER — HOSPITAL ENCOUNTER (INPATIENT)
Facility: HOSPITAL | Age: 35
LOS: 56 days | Discharge: HOME | End: 2021-02-03
Attending: EMERGENCY MEDICINE | Admitting: HOSPITALIST
Payer: COMMERCIAL

## 2020-12-09 ENCOUNTER — APPOINTMENT (EMERGENCY)
Dept: RADIOLOGY | Facility: HOSPITAL | Age: 35
End: 2020-12-09
Attending: EMERGENCY MEDICINE
Payer: COMMERCIAL

## 2020-12-09 ENCOUNTER — APPOINTMENT (EMERGENCY)
Dept: RADIOLOGY | Facility: HOSPITAL | Age: 35
End: 2020-12-09
Payer: COMMERCIAL

## 2020-12-09 DIAGNOSIS — F11.10 OPIOID ABUSE (CMS/HCC): ICD-10-CM

## 2020-12-09 DIAGNOSIS — I76 SEPTIC EMBOLISM (CMS/HCC): ICD-10-CM

## 2020-12-09 DIAGNOSIS — M46.26 INFECTION OF LUMBAR SPINE (CMS/HCC): ICD-10-CM

## 2020-12-09 DIAGNOSIS — F11.20: ICD-10-CM

## 2020-12-09 DIAGNOSIS — I33.0 ACUTE BACTERIAL ENDOCARDITIS: ICD-10-CM

## 2020-12-09 DIAGNOSIS — M54.50 ACUTE LEFT-SIDED LOW BACK PAIN WITHOUT SCIATICA: ICD-10-CM

## 2020-12-09 DIAGNOSIS — I33.0 TRICUSPID VALVE VEGETATION: Primary | ICD-10-CM

## 2020-12-09 LAB
ALBUMIN SERPL-MCNC: 3 G/DL (ref 3.4–5)
ALP SERPL-CCNC: 85 IU/L (ref 35–126)
ALT SERPL-CCNC: 11 IU/L (ref 11–54)
AMPHET UR QL SCN: NOT DETECTED
ANION GAP SERPL CALC-SCNC: 11 MEQ/L (ref 3–15)
AST SERPL-CCNC: 14 IU/L (ref 15–41)
BACTERIA URNS QL MICRO: 1 /HPF
BARBITURATES UR QL SCN: POSITIVE
BASOPHILS # BLD: 0.02 K/UL (ref 0.01–0.1)
BASOPHILS NFR BLD: 0.2 %
BENZODIAZ UR QL SCN: NOT DETECTED
BILIRUB SERPL-MCNC: 0.4 MG/DL (ref 0.3–1.2)
BILIRUB UR QL STRIP.AUTO: NEGATIVE MG/DL
BUN SERPL-MCNC: 8 MG/DL (ref 8–20)
CALCIUM SERPL-MCNC: 8.9 MG/DL (ref 8.9–10.3)
CANNABINOIDS UR QL SCN: POSITIVE
CHLORIDE SERPL-SCNC: 103 MEQ/L (ref 98–109)
CLARITY UR REFRACT.AUTO: ABNORMAL
CO2 SERPL-SCNC: 22 MEQ/L (ref 22–32)
COCAINE UR QL SCN: NOT DETECTED
COLOR UR AUTO: YELLOW
CREAT SERPL-MCNC: 0.8 MG/DL (ref 0.6–1.1)
CRP SERPL-MCNC: 214.2 MG/L
DIFFERENTIAL METHOD BLD: ABNORMAL
EOSINOPHIL # BLD: 0.13 K/UL (ref 0.04–0.36)
EOSINOPHIL NFR BLD: 1.5 %
ERYTHROCYTE [DISTWIDTH] IN BLOOD BY AUTOMATED COUNT: 14.3 % (ref 11.7–14.4)
GFR SERPL CREATININE-BSD FRML MDRD: >60 ML/MIN/1.73M*2
GLUCOSE SERPL-MCNC: 92 MG/DL (ref 70–99)
GLUCOSE UR STRIP.AUTO-MCNC: NEGATIVE MG/DL
HCG UR QL: NEGATIVE
HCT VFR BLDCO AUTO: 28.2 % (ref 35–45)
HGB BLD-MCNC: 9.1 G/DL (ref 11.8–15.7)
HGB UR QL STRIP.AUTO: NEGATIVE
HYALINE CASTS #/AREA URNS LPF: ABNORMAL /LPF
IMM GRANULOCYTES # BLD AUTO: 0.12 K/UL (ref 0–0.08)
IMM GRANULOCYTES NFR BLD AUTO: 1.4 %
KETONES UR STRIP.AUTO-MCNC: NEGATIVE MG/DL
LACTATE SERPL-SCNC: 0.7 MMOL/L (ref 0.4–2)
LEUKOCYTE ESTERASE UR QL STRIP.AUTO: NEGATIVE
LYMPHOCYTES # BLD: 2.29 K/UL (ref 1.2–3.5)
LYMPHOCYTES NFR BLD: 25.8 %
MCH RBC QN AUTO: 27.2 PG (ref 28–33.2)
MCHC RBC AUTO-ENTMCNC: 32.3 G/DL (ref 32.2–35.5)
MCV RBC AUTO: 84.4 FL (ref 83–98)
MONOCYTES # BLD: 0.78 K/UL (ref 0.28–0.8)
MONOCYTES NFR BLD: 8.8 %
NEUTROPHILS # BLD: 5.54 K/UL (ref 1.7–7)
NEUTS SEG NFR BLD: 62.3 %
NITRITE UR QL STRIP.AUTO: NEGATIVE
NRBC BLD-RTO: 0 %
OPIATES UR QL SCN: NOT DETECTED
PCP UR QL SCN: NOT DETECTED
PDW BLD AUTO: 10.3 FL (ref 9.4–12.3)
PH UR STRIP.AUTO: 6.5 [PH]
PLATELET # BLD AUTO: 162 K/UL (ref 150–369)
POTASSIUM SERPL-SCNC: 4.3 MEQ/L (ref 3.6–5.1)
PROT SERPL-MCNC: 7.2 G/DL (ref 6–8.2)
PROT UR QL STRIP.AUTO: NEGATIVE
RBC # BLD AUTO: 3.34 M/UL (ref 3.93–5.22)
RBC #/AREA URNS HPF: ABNORMAL /HPF
SODIUM SERPL-SCNC: 136 MEQ/L (ref 136–144)
SP GR UR REFRACT.AUTO: 1.01
SQUAMOUS URNS QL MICRO: 2 /HPF
TROPONIN I SERPL-MCNC: <0.02 NG/ML
UROBILINOGEN UR STRIP-ACNC: 0.2 EU/DL
WBC # BLD AUTO: 8.88 K/UL (ref 3.8–10.5)
WBC #/AREA URNS HPF: ABNORMAL /HPF

## 2020-12-09 PROCEDURE — 99285 EMERGENCY DEPT VISIT HI MDM: CPT | Mod: 25

## 2020-12-09 PROCEDURE — 25800000 HC PHARMACY IV SOLUTIONS: Performed by: HOSPITALIST

## 2020-12-09 PROCEDURE — 84703 CHORIONIC GONADOTROPIN ASSAY: CPT | Performed by: PHYSICIAN ASSISTANT

## 2020-12-09 PROCEDURE — G1004 CDSM NDSC: HCPCS

## 2020-12-09 PROCEDURE — 63600000 HC DRUGS/DETAIL CODE: Performed by: PHYSICIAN ASSISTANT

## 2020-12-09 PROCEDURE — 85025 COMPLETE CBC W/AUTO DIFF WBC: CPT | Performed by: PHYSICIAN ASSISTANT

## 2020-12-09 PROCEDURE — 84484 ASSAY OF TROPONIN QUANT: CPT | Performed by: PHYSICIAN ASSISTANT

## 2020-12-09 PROCEDURE — 87040 BLOOD CULTURE FOR BACTERIA: CPT | Performed by: PHYSICIAN ASSISTANT

## 2020-12-09 PROCEDURE — 25800000 HC PHARMACY IV SOLUTIONS: Performed by: PHYSICIAN ASSISTANT

## 2020-12-09 PROCEDURE — 83605 ASSAY OF LACTIC ACID: CPT | Performed by: PHYSICIAN ASSISTANT

## 2020-12-09 PROCEDURE — 72158 MRI LUMBAR SPINE W/O & W/DYE: CPT | Mod: MG

## 2020-12-09 PROCEDURE — A9579 GAD-BASE MR CONTRAST NOS,1ML: HCPCS | Performed by: PHYSICIAN ASSISTANT

## 2020-12-09 PROCEDURE — 63600105 HC IODINE BASED CONTRAST: Mod: JW | Performed by: PHYSICIAN ASSISTANT

## 2020-12-09 PROCEDURE — 80307 DRUG TEST PRSMV CHEM ANLYZR: CPT | Performed by: PHYSICIAN ASSISTANT

## 2020-12-09 PROCEDURE — 63600000 HC DRUGS/DETAIL CODE: Performed by: HOSPITALIST

## 2020-12-09 PROCEDURE — 20600000 HC ROOM AND CARE INTERMEDIATE/TELEMETRY

## 2020-12-09 PROCEDURE — 96376 TX/PRO/DX INJ SAME DRUG ADON: CPT | Mod: 59

## 2020-12-09 PROCEDURE — 63700000 HC SELF-ADMINISTRABLE DRUG: Performed by: PHYSICIAN ASSISTANT

## 2020-12-09 PROCEDURE — 96374 THER/PROPH/DIAG INJ IV PUSH: CPT | Mod: 59

## 2020-12-09 PROCEDURE — 63700000 HC SELF-ADMINISTRABLE DRUG: Performed by: HOSPITALIST

## 2020-12-09 PROCEDURE — 96375 TX/PRO/DX INJ NEW DRUG ADDON: CPT | Mod: 59

## 2020-12-09 PROCEDURE — 71045 X-RAY EXAM CHEST 1 VIEW: CPT

## 2020-12-09 PROCEDURE — 80053 COMPREHEN METABOLIC PANEL: CPT | Performed by: PHYSICIAN ASSISTANT

## 2020-12-09 PROCEDURE — 93005 ELECTROCARDIOGRAM TRACING: CPT | Performed by: PHYSICIAN ASSISTANT

## 2020-12-09 PROCEDURE — 81001 URINALYSIS AUTO W/SCOPE: CPT | Performed by: PHYSICIAN ASSISTANT

## 2020-12-09 PROCEDURE — 36415 COLL VENOUS BLD VENIPUNCTURE: CPT | Performed by: PHYSICIAN ASSISTANT

## 2020-12-09 PROCEDURE — 86140 C-REACTIVE PROTEIN: CPT | Performed by: PHYSICIAN ASSISTANT

## 2020-12-09 PROCEDURE — A9585 GADOBUTROL INJECTION: HCPCS | Performed by: PHYSICIAN ASSISTANT

## 2020-12-09 RX ORDER — BUTALBITAL, ACETAMINOPHEN AND CAFFEINE 50; 325; 40 MG/1; MG/1; MG/1
1 TABLET ORAL EVERY 6 HOURS PRN
Status: DISCONTINUED | OUTPATIENT
Start: 2020-12-09 | End: 2020-12-10

## 2020-12-09 RX ORDER — GADOBUTROL 604.72 MG/ML
0.1 INJECTION INTRAVENOUS
Status: COMPLETED | OUTPATIENT
Start: 2020-12-09 | End: 2020-12-09

## 2020-12-09 RX ORDER — MORPHINE SULFATE 4 MG/ML
INJECTION, SOLUTION INTRAMUSCULAR; INTRAVENOUS
Status: DISPENSED
Start: 2020-12-09 | End: 2020-12-10

## 2020-12-09 RX ORDER — SODIUM CHLORIDE 9 MG/ML
INJECTION, SOLUTION INTRAVENOUS CONTINUOUS
Status: DISCONTINUED | OUTPATIENT
Start: 2020-12-10 | End: 2020-12-14

## 2020-12-09 RX ORDER — METOPROLOL SUCCINATE 25 MG/1
25 TABLET, EXTENDED RELEASE ORAL DAILY
Status: DISCONTINUED | OUTPATIENT
Start: 2020-12-10 | End: 2021-02-03 | Stop reason: HOSPADM

## 2020-12-09 RX ORDER — HEPARIN SODIUM 5000 [USP'U]/ML
5000 INJECTION, SOLUTION INTRAVENOUS; SUBCUTANEOUS EVERY 8 HOURS
Status: DISCONTINUED | OUTPATIENT
Start: 2020-12-10 | End: 2020-12-20

## 2020-12-09 RX ORDER — FLUTICASONE PROPIONATE 50 MCG
2 SPRAY, SUSPENSION (ML) NASAL DAILY PRN
Status: DISCONTINUED | OUTPATIENT
Start: 2020-12-09 | End: 2021-02-03 | Stop reason: HOSPADM

## 2020-12-09 RX ORDER — CYCLOBENZAPRINE HCL 10 MG
10 TABLET ORAL 3 TIMES DAILY PRN
COMMUNITY
End: 2021-02-03 | Stop reason: HOSPADM

## 2020-12-09 RX ORDER — HYDROMORPHONE HYDROCHLORIDE 1 MG/ML
0.5 INJECTION, SOLUTION INTRAMUSCULAR; INTRAVENOUS; SUBCUTANEOUS EVERY 30 MIN PRN
Status: COMPLETED | OUTPATIENT
Start: 2020-12-09 | End: 2020-12-10

## 2020-12-09 RX ORDER — DEXTROSE 40 %
15-30 GEL (GRAM) ORAL AS NEEDED
Status: DISCONTINUED | OUTPATIENT
Start: 2020-12-09 | End: 2021-02-03 | Stop reason: HOSPADM

## 2020-12-09 RX ORDER — ZOLPIDEM TARTRATE 5 MG/1
10 TABLET ORAL NIGHTLY
Status: DISCONTINUED | OUTPATIENT
Start: 2020-12-10 | End: 2021-02-03 | Stop reason: HOSPADM

## 2020-12-09 RX ORDER — PAROXETINE 10 MG/1
20 TABLET, FILM COATED ORAL DAILY
Status: DISCONTINUED | OUTPATIENT
Start: 2020-12-10 | End: 2020-12-10

## 2020-12-09 RX ORDER — POLYETHYLENE GLYCOL 3350 17 G/17G
17 POWDER, FOR SOLUTION ORAL DAILY
Status: DISCONTINUED | OUTPATIENT
Start: 2020-12-10 | End: 2020-12-09

## 2020-12-09 RX ORDER — BUPRENORPHINE 2 MG/1
2 TABLET SUBLINGUAL EVERY 8 HOURS PRN
Status: DISCONTINUED | OUTPATIENT
Start: 2020-12-09 | End: 2020-12-09

## 2020-12-09 RX ORDER — CHLORPROMAZINE HYDROCHLORIDE 25 MG/1
200 TABLET, FILM COATED ORAL NIGHTLY
Status: DISCONTINUED | OUTPATIENT
Start: 2020-12-10 | End: 2020-12-10

## 2020-12-09 RX ORDER — ACETAMINOPHEN 325 MG/1
650 TABLET ORAL ONCE
Status: COMPLETED | OUTPATIENT
Start: 2020-12-09 | End: 2020-12-09

## 2020-12-09 RX ORDER — DEXTROSE 50 % IN WATER (D50W) INTRAVENOUS SYRINGE
25 AS NEEDED
Status: DISCONTINUED | OUTPATIENT
Start: 2020-12-09 | End: 2021-02-03 | Stop reason: HOSPADM

## 2020-12-09 RX ORDER — BUPROPION HYDROCHLORIDE 75 MG/1
75 TABLET ORAL 3 TIMES DAILY
Status: DISCONTINUED | OUTPATIENT
Start: 2020-12-10 | End: 2020-12-15

## 2020-12-09 RX ORDER — PRAZOSIN HYDROCHLORIDE 1 MG/1
2 CAPSULE ORAL NIGHTLY
Status: DISCONTINUED | OUTPATIENT
Start: 2020-12-10 | End: 2021-02-03 | Stop reason: HOSPADM

## 2020-12-09 RX ORDER — MORPHINE SULFATE 4 MG/ML
4 INJECTION, SOLUTION INTRAMUSCULAR; INTRAVENOUS ONCE
Status: DISCONTINUED | OUTPATIENT
Start: 2020-12-09 | End: 2020-12-09

## 2020-12-09 RX ORDER — KETOROLAC TROMETHAMINE 15 MG/ML
15 INJECTION, SOLUTION INTRAMUSCULAR; INTRAVENOUS ONCE
Status: COMPLETED | OUTPATIENT
Start: 2020-12-09 | End: 2020-12-09

## 2020-12-09 RX ORDER — DIPHENHYDRAMINE HCL 50 MG/ML
25 VIAL (ML) INJECTION ONCE
Status: COMPLETED | OUTPATIENT
Start: 2020-12-09 | End: 2020-12-09

## 2020-12-09 RX ORDER — HYDROMORPHONE HYDROCHLORIDE 1 MG/ML
1 INJECTION, SOLUTION INTRAMUSCULAR; INTRAVENOUS; SUBCUTANEOUS ONCE
Status: COMPLETED | OUTPATIENT
Start: 2020-12-09 | End: 2020-12-09

## 2020-12-09 RX ORDER — HYDROMORPHONE HYDROCHLORIDE 1 MG/ML
0.5 INJECTION, SOLUTION INTRAMUSCULAR; INTRAVENOUS; SUBCUTANEOUS ONCE
Status: COMPLETED | OUTPATIENT
Start: 2020-12-09 | End: 2020-12-09

## 2020-12-09 RX ORDER — METOCLOPRAMIDE HYDROCHLORIDE 5 MG/ML
10 INJECTION INTRAMUSCULAR; INTRAVENOUS ONCE
Status: COMPLETED | OUTPATIENT
Start: 2020-12-09 | End: 2020-12-09

## 2020-12-09 RX ORDER — QUETIAPINE FUMARATE 100 MG/1
100 TABLET, FILM COATED ORAL NIGHTLY
Status: DISCONTINUED | OUTPATIENT
Start: 2020-12-10 | End: 2020-12-27

## 2020-12-09 RX ORDER — IBUPROFEN 200 MG
16-32 TABLET ORAL AS NEEDED
Status: DISCONTINUED | OUTPATIENT
Start: 2020-12-09 | End: 2021-02-03 | Stop reason: HOSPADM

## 2020-12-09 RX ORDER — BUTALBITAL, ACETAMINOPHEN AND CAFFEINE 50; 325; 40 MG/1; MG/1; MG/1
2 TABLET ORAL EVERY 6 HOURS PRN
COMMUNITY
Start: 2020-11-11 | End: 2020-12-09 | Stop reason: ENTERED-IN-ERROR

## 2020-12-09 RX ORDER — BUTALBITAL, ACETAMINOPHEN AND CAFFEINE 300; 40; 50 MG/1; MG/1; MG/1
2 CAPSULE ORAL EVERY 4 HOURS PRN
Status: ON HOLD | COMMUNITY
End: 2021-02-02 | Stop reason: SDUPTHER

## 2020-12-09 RX ORDER — ZOLPIDEM TARTRATE 10 MG/1
10 TABLET ORAL NIGHTLY
Status: ON HOLD | COMMUNITY
End: 2021-02-02 | Stop reason: SDUPTHER

## 2020-12-09 RX ORDER — QUETIAPINE FUMARATE 100 MG/1
100 TABLET, FILM COATED ORAL NIGHTLY
Status: ON HOLD | COMMUNITY
End: 2021-02-02 | Stop reason: SDUPTHER

## 2020-12-09 RX ORDER — PREGABALIN 75 MG/1
150 CAPSULE ORAL 2 TIMES DAILY
Status: DISCONTINUED | OUTPATIENT
Start: 2020-12-09 | End: 2021-02-03 | Stop reason: HOSPADM

## 2020-12-09 RX ORDER — BUPRENORPHINE HYDROCHLORIDE 8 MG/1
8 TABLET SUBLINGUAL EVERY 8 HOURS PRN
Status: DISCONTINUED | OUTPATIENT
Start: 2020-12-09 | End: 2020-12-10

## 2020-12-09 RX ORDER — KETOROLAC TROMETHAMINE 15 MG/ML
15 INJECTION, SOLUTION INTRAMUSCULAR; INTRAVENOUS EVERY 6 HOURS PRN
Status: DISPENSED | OUTPATIENT
Start: 2020-12-09 | End: 2020-12-13

## 2020-12-09 RX ORDER — VANCOMYCIN/0.9 % SOD CHLORIDE 1.5G/250ML
1.5 PLASTIC BAG, INJECTION (ML) INTRAVENOUS ONCE
Status: COMPLETED | OUTPATIENT
Start: 2020-12-09 | End: 2020-12-09

## 2020-12-09 RX ORDER — DIAZEPAM 10 MG/2ML
5 INJECTION INTRAMUSCULAR EVERY 8 HOURS PRN
Status: DISCONTINUED | OUTPATIENT
Start: 2020-12-09 | End: 2020-12-14

## 2020-12-09 RX ADMIN — SODIUM CHLORIDE 1000 ML: 9 INJECTION, SOLUTION INTRAVENOUS at 22:15

## 2020-12-09 RX ADMIN — HEPARIN SODIUM 5000 UNITS: 5000 INJECTION, SOLUTION INTRAVENOUS; SUBCUTANEOUS at 23:56

## 2020-12-09 RX ADMIN — DIPHENHYDRAMINE HYDROCHLORIDE 25 MG: 50 INJECTION INTRAMUSCULAR; INTRAVENOUS at 14:03

## 2020-12-09 RX ADMIN — BUTALBITAL, ACETAMINOPHEN AND CAFFEINE 1 TABLET: 50; 325; 40 TABLET ORAL at 23:40

## 2020-12-09 RX ADMIN — HYDROMORPHONE HYDROCHLORIDE 0.5 MG: 1 INJECTION, SOLUTION INTRAMUSCULAR; INTRAVENOUS; SUBCUTANEOUS at 13:07

## 2020-12-09 RX ADMIN — SODIUM CHLORIDE 1000 ML: 9 INJECTION, SOLUTION INTRAVENOUS at 20:25

## 2020-12-09 RX ADMIN — ACETAMINOPHEN 650 MG: 325 TABLET, FILM COATED ORAL at 19:53

## 2020-12-09 RX ADMIN — ZOLPIDEM TARTRATE 10 MG: 5 TABLET, COATED ORAL at 23:55

## 2020-12-09 RX ADMIN — QUETIAPINE 100 MG: 100 TABLET, FILM COATED ORAL at 23:40

## 2020-12-09 RX ADMIN — KETOROLAC TROMETHAMINE 15 MG: 15 INJECTION, SOLUTION INTRAMUSCULAR; INTRAVENOUS at 18:59

## 2020-12-09 RX ADMIN — GADOBUTROL 10.5 ML: 604.72 INJECTION INTRAVENOUS at 18:08

## 2020-12-09 RX ADMIN — HYDROMORPHONE HYDROCHLORIDE 1 MG: 1 INJECTION, SOLUTION INTRAMUSCULAR; INTRAVENOUS; SUBCUTANEOUS at 16:22

## 2020-12-09 RX ADMIN — METOCLOPRAMIDE 10 MG: 5 INJECTION, SOLUTION INTRAMUSCULAR; INTRAVENOUS at 14:03

## 2020-12-09 RX ADMIN — VANCOMYCIN HYDROCHLORIDE 1500 MG: 1 INJECTION, POWDER, LYOPHILIZED, FOR SOLUTION INTRAVENOUS at 20:58

## 2020-12-09 RX ADMIN — SODIUM CHLORIDE: 9 INJECTION, SOLUTION INTRAVENOUS at 23:43

## 2020-12-09 RX ADMIN — IOHEXOL 80 ML: 350 INJECTION, SOLUTION INTRAVENOUS at 20:15

## 2020-12-09 RX ADMIN — PREGABALIN 150 MG: 75 CAPSULE ORAL at 23:40

## 2020-12-09 RX ADMIN — HYDROMORPHONE HYDROCHLORIDE 0.5 MG: 1 INJECTION, SOLUTION INTRAMUSCULAR; INTRAVENOUS; SUBCUTANEOUS at 20:26

## 2020-12-09 RX ADMIN — PRAZOSIN HYDROCHLORIDE 2 MG: 1 CAPSULE ORAL at 23:40

## 2020-12-09 RX ADMIN — HYDROMORPHONE HYDROCHLORIDE 0.5 MG: 1 INJECTION, SOLUTION INTRAMUSCULAR; INTRAVENOUS; SUBCUTANEOUS at 23:57

## 2020-12-09 ASSESSMENT — ENCOUNTER SYMPTOMS
CONFUSION: 0
ABDOMINAL PAIN: 0
SHORTNESS OF BREATH: 0
WEAKNESS: 0
NUMBNESS: 0
WOUND: 0
FEVER: 1
BACK PAIN: 1

## 2020-12-09 NOTE — Clinical Note
Patient position: flat. JANUSZ probe inserted with jaw lift maneuver. Probe insertion attempts: 1. JANUSZ in progress. Complications: no complications.

## 2020-12-09 NOTE — ED ATTESTATION NOTE
I have personally seen and examined the patient.  I reviewed and agree with physician assistant / nurse practitioner’s assessment and plan of care, with the following exceptions: None  My examination, assessment, and plan of care of  Carolyn Redmond is as follows:       Vital Signs Review: Vital signs have been reviewed. The oxygen saturation is  SpO2: 97 % which is normal.     The patient is a 35-year-old female with past medical history of anxiety, migraines, vasculitis, depression, pancreatitis, endocarditis, IV drug abuse, who presents to the emergency department for evaluation of back pain.  Patient states she had onset of lower back pain lumbar region 2 weeks ago.  The patient's pain has been constant, progressively worsening.  The patient was seen in urgent care and had x-rays done that by the patient report showed scoliosis and DJD.  The patient was hospitalized at [facility] 3 days ago where they were concerned enough to order MRI testing.  The patient left the hospital AMA prior to imaging.  Patient does report having intermittent fevers at home.  The patient complains of continued pain in the lumbar back more toward the left.  The patient complains the pain is worse with movement.  She denies bowel or bladder incontinence, abdominal pain, nausea, vomiting, diarrhea, constipation, dysuria, frequency, urgency, new focal neurologic weakness, numbness, or rash.    Physical exam: Vital signs reviewed.  General: Patient appears comfortable sitting up in bed.  Patient does appear uncomfortable with back pain in the lumbar region with any attempted movement.  The patient was unable to sit up in bed due to back pain.  HEENT: NCAT, EOMI, MMM.  Neck: Supple, nontender, full range of motion.  Chest: Lungs clear to auscultation bilaterally, no rales.  Heart: Regular rate and rhythm no murmurs.  Abdomen: Soft, nontender, obese, no guarding, no rebound, no pulsatile mass.  Extremities: No cyanosis, clubbing,  edema, or calf tenderness.  Back: Moderate tenderness palpation over the lumbar spine lower and left paraspinal muscles.  Skin: Warm and dry, no rash.  Neuro: GCS 15.  5/5 strength bilateral lower extremities however strength testing limited to hip flexion due to pain and lower back with attempted hip flexion.  5/5 dorsiflexion and plantarflexion.  Sensation normal.  DTRs +1 bilateral infrapatellar.      Plan: Check labs, IV fluids, blood cultures, MRI lumbar spine rule out spinal epidural abscess.    Labs Reviewed   CBC AND DIFF - Abnormal       Result Value    WBC 8.88      RBC 3.34 (*)     Hemoglobin 9.1 (*)     Hematocrit 28.2 (*)     MCV 84.4      MCH 27.2 (*)     MCHC 32.3      RDW 14.3      Platelets 162      MPV 10.3      Differential Type Auto      nRBC 0.0      Immature Granulocytes 1.4      Neutrophils 62.3      Lymphocytes 25.8      Monocytes 8.8      Eosinophils 1.5      Basophils 0.2      Immature Granulocytes, Absolute 0.12 (*)     Neutrophils, Absolute 5.54      Lymphocytes, Absolute 2.29      Monocytes, Absolute 0.78      Eosinophils, Absolute 0.13      Basophils, Absolute 0.02     COMPREHENSIVE METABOLIC PANEL - Abnormal    Sodium 136      Potassium 4.3      Chloride 103      CO2 22      BUN 8      Creatinine 0.8      Glucose 92      Calcium 8.9      AST (SGOT) 14 (*)     ALT (SGPT) 11      Alkaline Phosphatase 85      Total Protein 7.2      Albumin 3.0 (*)     Bilirubin, Total 0.4      eGFR >60.0      Anion Gap 11     DRUG SCREEN PANEL, URINE - Abnormal    PCP Scrn, Ur Not Detected      Benzodiazepine Ur Qual Not Detected      Cocaine Screen, Urine Not Detected      Amphetamine+Methamphetamine Screen, Ur Not Detected      Cannabinoid Screen, Urine Positive (*)     Opiate Scrn, Ur Not Detected      Barbiturate Screen, Ur Positive (*)    UA REFLEX CULTURE (MACROSCOPIC) - Abnormal    Color, Urine Yellow      Clarity, Urine Cloudy (*)     Specific Gravity, Urine 1.006      pH, Urine 6.5      Leukocyte  Esterase Negative      Nitrite, Urine Negative      Protein, Urine Negative      Glucose, Urine Negative      Ketones, Urine Negative      Urobilinogen, Urine 0.2      Bilirubin, Urine Negative      Blood, Urine Negative     C-REACTIVE PROTEIN - Abnormal    .20 (*)    UA MICROSCOPIC - Abnormal    WBC, Urine 0 TO 3      Squamous Epithelial +2 (*)     Hyaline Cast 0 TO 2 (*)     Bacteria, Urine +1 (*)     RBC, Urine 0 TO 4     BHCG, SERUM, QUAL - Normal    Preg Test, Serum Negative     LACTATE, VENOUS - Normal    Lactate 0.7     BLOOD CULTURE   BLOOD CULTURE   URINALYSIS REFLEX CULTURE (ED AND OUTPATIENT ONLY)    Narrative:     The following orders were created for panel order UA with reflex culture.  Procedure                               Abnormality         Status                     ---------                               -----------         ------                     UA Reflex to Culture (Ma...[[id number]]  Abnormal            Final result               UA Microscopic[[id number]]               Abnormal            Final result                 Please view results for these tests on the individual orders.   RAINBOW DRAW PANEL    Narrative:     The following orders were created for panel order [place] Draw Panel.  Procedure                               Abnormality         Status                     ---------                               -----------         ------                     RAINBOW RED[[id number]]                                      In process                 RAINBOW LT BLUE[[id number]]                                  In process                 RAINBOW LT GREEN[[id number]]                                 In process                   Please view results for these tests on the individual orders.   TROPONIN I   RAINBOW RED   RAINBOW LT BLUE   RAINBOW LT GREEN     MRI pending.  Patient signed out to Dr. Monzon MRI results pending.  Disposition pending.      Impression: Acute low back pain.  History  NERIS TUBBS was physically present for the key/critical portions of the following procedures: None     Nestor Rg MD  12/09/20 7078

## 2020-12-09 NOTE — ED PROVIDER NOTES
HPI     Chief Complaint   Patient presents with   • Fever   • Back Pain       HPI    34 yo female with h/o fibromyalgia, endocarditis, pancreatitis, migraines, sepsis, and previous IV drug use present with fever and back pain x 1 week.  Tmax 105F. Pt was admitted to [facility] for sepsis workup but left AMA this morning. She was given last dose vancomycin last night.  CT brain WNL, CT abd/pelvis - no kidney stones, echo WNL except questionable tricuspid vegitation, US right upper extremity - superficial thrombus distal basilic vein, no DVT. COVID/flu/RSV negative 12/7/20.     Patient History     Past Medical History:   Diagnosis Date   • Anxiety    • Depressed    • Endocarditis    • Fibromyalgia    • Migraines    • Pancreatitis    • Vasculitis (CMS/HCC)        Past Surgical History:   Procedure Laterality Date   • CHOLECYSTECTOMY     • ERCP     • GALLBLADDER SURGERY         Family History   Problem Relation Age of Onset   • No Known Problems Biological Mother    • Lung cancer Biological Father        Social History     Tobacco Use   • Smoking status: Former Smoker     Packs/day: 0.50     Types: Cigarettes   • Smokeless tobacco: Former User   • Tobacco comment: vapes    Substance Use Topics   • Alcohol use: Not Currently     Comment: social   • Drug use: Yes     Types: Marijuana       Systems Reviewed from Nursing Triage:  Allergies          Review of Systems     Review of Systems   Constitutional: Positive for fever.   HENT: Negative for congestion.    Eyes: Negative for visual disturbance.   Respiratory: Negative for shortness of breath.    Cardiovascular: Negative for chest pain.   Gastrointestinal: Negative for abdominal pain.   Musculoskeletal: Positive for back pain.   Skin: Negative for wound.   Neurological: Negative for weakness and numbness.   Psychiatric/Behavioral: Negative for confusion.        Physical Exam     ED Vitals    Date/Time Temp Pulse Resp BP SpO2 [facility]   12/09/20 2114 37.3 °C (99.2 °F)  112 18 139/61 96 % PB   12/09/20 2014 -- 115 18 145/68 96 % PB   12/09/20 1953 38.8 °C (101.8 °F) -- -- -- -- MTT   12/09/20 1850 38.2 °C (100.8 °F) -- 16 141/77 98 % MTT   12/09/20 1627 37.6 °C (99.7 °F) -- 18 161/94 97 % MTT   12/09/20 1310 37.7 °C (99.8 °F) 104 16 139/65 96 % MTT   12/09/20 1120 37.4 °C (99.4 °F) 112 18 139/83 97 % BL          Pulse Ox %: 97 % (12/09/20 1202)  Pulse Ox Interpretation: Normal (12/09/20 1202)  Heart Rate: 112 (12/09/20 1202)  Rhythm Strip Interpretation: Sinus Tachycardia (12/09/20 1202)                                       Physical Exam  Vitals signs and nursing note reviewed.   Constitutional:       Appearance: Normal appearance. She is well-developed.   HENT:      Head: Normocephalic and atraumatic.      Mouth/Throat:      Mouth: Mucous membranes are moist.   Eyes:      Extraocular Movements: Extraocular movements intact.   Neck:      Musculoskeletal: Neck supple.   Cardiovascular:      Rate and Rhythm: Normal rate and regular rhythm.   Pulmonary:      Effort: Pulmonary effort is normal.      Breath sounds: Normal breath sounds.   Abdominal:      Palpations: Abdomen is soft.      Tenderness: There is no abdominal tenderness.   Musculoskeletal: Normal range of motion.   Skin:     General: Skin is warm and dry.   Neurological:      Mental Status: She is alert and oriented to person, place, and time.      Sensory: No sensory deficit.      Motor: No weakness.   Psychiatric:         Mood and Affect: Mood normal.              Procedures    Results     Procedure Component Value Units Date/Time    Troponin I [[id number]]  (Normal) Collected: 12/09/20 1348    Specimen: Blood, Venous Updated: 12/09/20 1537     Troponin I <0.02 ng/mL     C-reactive protein [[id number]]  (Abnormal) Collected: 12/09/20 1348    Specimen: Blood, Venous Updated: 12/09/20 1443     .20 mg/L     Comprehensive metabolic panel [[id number]]  (Abnormal) Collected: 12/09/20 1348    Specimen: Blood, Venous Updated:  12/09/20 1417     Sodium 136 mEQ/L      Potassium 4.3 mEQ/L      Comment: Results obtained on plasma. Plasma Potassium values may be up to 0.4 mEQ/L less than serum values. The differences may be greater for patients with high platelet or white cell counts.        Chloride 103 mEQ/L      CO2 22 mEQ/L      BUN 8 mg/dL      Creatinine 0.8 mg/dL      Glucose 92 mg/dL      Calcium 8.9 mg/dL      AST (SGOT) 14 IU/L      ALT (SGPT) 11 IU/L      Alkaline Phosphatase 85 IU/L      Total Protein 7.2 g/dL      Comment: Test performed on plasma which typically contains approximately 0.4 g/dL more protein than serum.        Albumin 3.0 g/dL      Bilirubin, Total 0.4 mg/dL      eGFR >60.0 mL/min/1.73m*2      Anion Gap 11 mEQ/L     BhCG, Serum, Qual [[id number]]  (Normal) Collected: 12/09/20 1348    Specimen: Blood, Venous Updated: 12/09/20 1411     Preg Test, Serum Negative    Blood Culture Blood, Venous [[id number]] Collected: 12/09/20 1348    Specimen: Blood, Venous Updated: 12/09/20 1404    CBC and differential [[id number]]  (Abnormal) Collected: 12/09/20 1348    Specimen: Blood, Venous Updated: 12/09/20 1400     WBC 8.88 K/uL      RBC 3.34 M/uL      Hemoglobin 9.1 g/dL      Hematocrit 28.2 %      MCV 84.4 fL      MCH 27.2 pg      MCHC 32.3 g/dL      RDW 14.3 %      Platelets 162 K/uL      MPV 10.3 fL      Differential Type Auto     nRBC 0.0 %      Immature Granulocytes 1.4 %      Neutrophils 62.3 %      Lymphocytes 25.8 %      Monocytes 8.8 %      Eosinophils 1.5 %      Basophils 0.2 %      Immature Granulocytes, Absolute 0.12 K/uL      Neutrophils, Absolute 5.54 K/uL      Lymphocytes, Absolute 2.29 K/uL      Monocytes, Absolute 0.78 K/uL      Eosinophils, Absolute 0.13 K/uL      Basophils, Absolute 0.02 K/uL     Lactate, w/ reflex repeat if > 2.0 [[id number]]  (Normal) Collected: 12/09/20 1349    Specimen: Blood, Venous Updated: 12/09/20 1359     Lactate 0.7 mmol/L     RAINBOW LT BLUE [[id number]] Collected: 12/09/20 1343     Specimen: Blood, Venous Updated: 12/09/20 1357    RAINBOW RED [[id number]] Collected: 12/09/20 1348    Specimen: Blood, Venous Updated: 12/09/20 1356    [place] Draw Panel [[id number]] Collected: 12/09/20 1348    Specimen: Blood, Venous Updated: 12/09/20 1356    Narrative:      The following orders were created for panel order [place] Draw Panel.  Procedure                               Abnormality         Status                     ---------                               -----------         ------                     RAINBOW RED[[id number]]                                      In process                 RAINBOW LT BLUE[[id number]]                                  In process                 RAINBOW LT GREEN[[id number]]                                 In process                   Please view results for these tests on the individual orders.    RAINBOW LT GREEN [[id number]] Collected: 12/09/20 1348    Specimen: Blood, Venous Updated: 12/09/20 1356    Blood Culture Blood, Venous [[id number]] Collected: 12/09/20 1304    Specimen: Blood, Venous Updated: 12/09/20 1310    UA with reflex culture [[id number]]  (Abnormal) Collected: 12/09/20 1202    Specimen: Urine, Clean Catch Updated: 12/09/20 1232    Narrative:      The following orders were created for panel order UA with reflex culture.  Procedure                               Abnormality         Status                     ---------                               -----------         ------                     UA Reflex to Culture (Ma...[[id number]]  Abnormal            Final result               UA Microscopic[[id number]]               Abnormal            Final result                 Please view results for these tests on the individual orders.    UA Microscopic [[id number]]  (Abnormal) Collected: 12/09/20 1202    Specimen: Urine, Clean Catch Updated: 12/09/20 1232     WBC, Urine 0 TO 3 /HPF      Squamous Epithelial +2 /hpf      Hyaline Cast 0 TO 2 /lpf      Bacteria, Urine +1 /HPF       RBC, Urine 0 TO 4 /HPF     Urine drug screen (UDS) [[id number]]  (Abnormal) Collected: 12/09/20 1202    Specimen: Urine, Clean Catch Updated: 12/09/20 1226     PCP Scrn, Ur Not Detected     Comment: Assay Detects: phencyclidine in urine. Lowest detectable concentration is 25 ng/mL of phencyclidine.        Benzodiazepine Ur Qual Not Detected     Comment: Assay Detects: benzodiazepines and metabolites at varying concentrations. Lowest detectable concentration is 200 ng/mL of oxazepam.        Cocaine Screen, Urine Not Detected     Comment: Assay Detects: benzoylecgonine and cocaine in urine. Lowest detectable concentration is 300 ng/mL of benzoylecgonine.        Amphetamine+Methamphetamine Screen, Ur Not Detected     Comment: Assay Detects: d-methamphetamine, d-amphetamine, methlyenedioxyamphetamine (MDA), and methlyenendioxymethamphetamine (MDMA) in urine. Lowest detectable concentration is 1000 ng/mL of d-methamphetamine.  Assay is less sensitive to MDA and MDMA (lowest detectable concentration, 2500 ng/mL) and could produce a false negative result. If MDMA overdose is suspected and the result is negative, a more specific test should be requested.        Cannabinoid Screen, Urine Positive     Comment: Confirmation will be ordered upon request. If clinically warranted, please call [phone] to request drug confirmatory test. This specimen may be used for confirmation and will be saved for 10 days. Unconfirmed results are to be used for medical purposes (not legal).  Assay Detects: cannabinoid metabolites in urine. Lowest detectable concentration is 50 ng/mL        Opiate Scrn, Ur Not Detected     Comment: Assay Detects: codeine, dihydrocodeine, hydrocodone, hydromorphone, levorphanol, morphine, morphine-3-glucuronide, norcodeine, oxycodone in urine. Lowest detectable concentration is 300 ng/mL of morphine.        Barbiturate Screen, Ur Positive     Comment: Confirmation will be ordered upon request. If  clinically warranted, please call [phone] to request drug confirmatory test. This specimen may be used for confirmation and will be saved for 10 days. Unconfirmed results are to be used for medical purposes (not legal).  Assay Detects: alphenal, amobarbital, aprobarbital, barbital, butabarbital, butalbital, butethal, diallybarbital, pentobarbital, secobarbital,talbutal, and thiopental. Lowest detectable concentration is 200 ng/mL of secobarbital.       UA Reflex to Culture (Macroscopic) [[id number]]  (Abnormal) Collected: 12/09/20 1202    Specimen: Urine, Clean Catch Updated: 12/09/20 1213     Color, Urine Yellow     Clarity, Urine Cloudy     Specific Gravity, Urine 1.006     pH, Urine 6.5     Leukocyte Esterase Negative     Comment: Results can be falsely negative due to high specific gravity, some antibiotics, glucose >3 g/dl, or WBC other than neutrophils.        Nitrite, Urine Negative     Protein, Urine Negative     Glucose, Urine Negative mg/dL      Ketones, Urine Negative mg/dL      Urobilinogen, Urine 0.2 EU/dL      Bilirubin, Urine Negative mg/dL      Blood, Urine Negative     Comment: The sensitivity of the occult blood test is equivalent to approximately 4 intact RBC/HPF.             Imaging Results          CT CHEST PULMONARY EMBOLISM WITH IV CONTRAST (Final result)  Result time 12/09/20 21:27:35    Final result                 Impression:    IMPRESSION:  1.  No evidence of main, lobar or segmental pulmonary embolism.  2.  However, there are multifocal nodular lesions throughout all lung fields  including a cavitary lesion in the right upper lobe all suspicious for septic  emboli.  Some of the tiny subsegmental pulmonary arterial branches demonstrate  hypoenhancement and it is difficult to determinate if this is related to tiny  pulmonary emboli or technique.  3.  Small bilateral pleural effusions.  4.  Mosaic pattern at the lung bases could represent mild edema or sequela of  small airways or small  vessels disease.  5.  Follow-up chest CT after appropriate treatment is recommended to document  lung opacities and mediastinal lymphadenopathy which is probably reactive.  6.  Additional findings discussed below.        Finding:    Other   Acuity: Critical  Status:  CLOSED    Critical read back for the first impression performed with Bertha Bowie PA  9:25 PM 12/9/2020    COMMENT:  Comparison: Chest x-ray from 12/9/2020  Technique: Chest CT pulmonary embolism protocol performed with intravenous  contrast.  80 mL Omnipaque 350 given.  CT DOSE:  One or more dose reduction techniques (e.g. automated exposure  control, adjustment of the mA and/or kV according to patient size, use of  iterative reconstruction technique) utilized for this examination.    FINDINGS:  LUNG, PLEURA AND LARGE AIRWAYS: The trachea and proximal bronchi remain clear.  There are multifocal nodular lesions throughout the lungs.  For example in the  right upper lobe there is a cavitary lesion measuring 1.4 cm on axial image 63.  A 1.6 cm nodular lesion in the left upper lobe on image 69.  Multiple additional  lesions are present elsewhere.  His are suspicious for septic emboli  particularly given the history of endocarditis and sepsis.  There are small bilateral pleural effusions.  There is a nonspecific mild degree of groundglass opacity in the lower lung  fields.  This could represent mild degree of pulmonary edema or possibly a  mosaic pattern from small airways or small vessel disease.  No pneumothorax.      VESSELS: Normal caliber of the thoracic aorta.  Thoracic aorta appears within  normal limits.  Main pulmonary artery is normal in caliber.  No evidence of a  main, lobar or segmental pulmonary embolism.  However, the subsegmental vessels  are difficult to assess and some demonstrate a slightly hypoechoic enhancing  appearance possibly artifact from bolus timing although tiny subsegmental  pulmonary emboli would be difficult to exclude.   For example, in the lingula  there is some hypoenhancement of the subsegmental vessels on image 116 where  subsegmental PE cannot be excluded.  No evidence of right heart strain.  HEART: normal size. No pericardial effusion.  MEDIASTINUM AND PAULINA: There is a pathologically enlarged lymph node in the AP  window measuring 1.8 cm short axis.  Additional prominent but nonpathologic by  size video lymph nodes elsewhere.  CHEST WALL AND LOWER NECK: within normal limits.    UPPER ABDOMEN:Limited assessment of the upper abdominal structures with this  technique.  There is splenomegaly with the spleen measuring 14.5 cm.  The liver  may also be enlarged but is incompletely visualized.  Cholecystectomy changes.    BONES: No suspicious bony erosive changes.  Multilevel Schmorl's nodes and  degenerative changes in the spine are noted.  Calcific tendinosis of the right  rotator cuff.  There is limited assessment of the spine with this technique.  No  gross paraspinal inflammatory changes.               Narrative:    CLINICAL HISTORY: PE suspected, high pretest prob   with h/o fibromyalgia,  endocarditis, pancreatitis, migraines, sepsis, and previous IV drug use present  with fever and back pain x 1 week. Tmax 105F. Pt was admitted to [facility] for sepsis workup but left AMA this morning. She was given last dose  vancomycin last night. CT brain WNL, CT abd/pelvis - no kidney stones, echo  WNL, US right upper extremity - superficial thrombus distal basilic vein, no  DVT. COVID/flu/RSV negative 12/7/20.                                 MRI THORACIC SPINE WITH AND WITHOUT CONTRAST (Final result)  Result time 12/09/20 19:39:38    Final result                 Impression:    IMPRESSION:    Diffuse anterior and posterior epidural enhancement within lumbar spine more  pronounced at L4-L5 raising the suspicion for underlying infectious  process/phlegmon.  Left greater than right L4-L5 facet joint signal and  enhancement which  although may be degenerative in nature, early changes of  septic facet arthritis is in the differential.  Follow-up is recommended.    Thoracic and lumbar degenerative change as described more pronounced at L4-L5.    Ring-enhancing lesion in right upper lobe which may be infectious in etiology  including in the setting of septic emboli.  Correlation with CT chest with  contrast is recommended to exclude other etiologies.    The results were discussed with Rylee Bowie on 12/9/2020 7:35 PM.             Narrative:    CLINICAL HISTORY: Extradural or subdural abscess    COMPARISON: None    COMMENT:  Technique: MRI of the thoracic and lumbar spine performed with and without 10.5  cc of Gadavist gadolinium contrast.    Thoracic spine:  The study is motion degraded.  There is normal thoracic kyphosis with anatomic  alignment.  There are scattered Schmorl's nodes.  There is no edema within the  vertebral bodies.  There is no abnormal enhancement.  There are a few scattered  tiny disc herniations and minimal disc bulges with mild central canal narrowing  and T9-T10 and T10-T11.. The spinal cord is normal in signal and caliber.  There  is a 1.5 cm right upper lung nodular density.  There is suggestion for  peripheral enhancement with central non-enhancement on the postcontrast images.    Lumbar spine:  There is normal lumbar lordosis. The vertebral body heights are maintained.  There is facet arthropathy with fluid signal noted within the left greater than  the right L4-L5 facet joints.  Although this may be degenerative in nature,  early infectious involvement cannot be excluded especially given the enhancement  around the facets more so along the left facets.  There is also diffuse anterior  and posterior epidural enhancement from the level of L2 through the sacrum but  more pronounced around L4 and L5.  There is no rim-enhancing collection. The  conus is at T12 and is normal in signal and morphology.    L1-2: No disc  herniation, central or foraminal narrowing.  L2-3: No disc herniation, central or foraminal narrowing.  L3-4: Facet arthropathy.  No disc herniation, central or foraminal narrowing.  L4-5: There is mild anterolisthesis of L4 on L5.  There is a disc bulge with  superimposed right foraminal disc herniation resulting in moderate central canal  narrowing and severe right and moderate to severe left foraminal narrowing.  L5-S1: Disc bulge with superimposed central disc herniation with mild inferior  extrusion resulting in mild central canal narrowing noting that the thecal sac  is tapering at this point.  There is facet arthropathy with mild foraminal  narrowing.                               MRI LUMBAR SPINE WITH AND WITHOUT CONTRAST (Final result)  Result time 12/09/20 19:39:38    Final result                 Impression:    IMPRESSION:    Diffuse anterior and posterior epidural enhancement within lumbar spine more  pronounced at L4-L5 raising the suspicion for underlying infectious  process/phlegmon.  Left greater than right L4-L5 facet joint signal and  enhancement which although may be degenerative in nature, early changes of  septic facet arthritis is in the differential.  Follow-up is recommended.    Thoracic and lumbar degenerative change as described more pronounced at L4-L5.    Ring-enhancing lesion in right upper lobe which may be infectious in etiology  including in the setting of septic emboli.  Correlation with CT chest with  contrast is recommended to exclude other etiologies.    The results were discussed with Rylee Bowie on 12/9/2020 7:35 PM.             Narrative:    CLINICAL HISTORY: Extradural or subdural abscess    COMPARISON: None    COMMENT:  Technique: MRI of the thoracic and lumbar spine performed with and without 10.5  cc of Gadavist gadolinium contrast.    Thoracic spine:  The study is motion degraded.  There is normal thoracic kyphosis with anatomic  alignment.  There are scattered Schmorl's  nodes.  There is no edema within the  vertebral bodies.  There is no abnormal enhancement.  There are a few scattered  tiny disc herniations and minimal disc bulges with mild central canal narrowing  and T9-T10 and T10-T11.. The spinal cord is normal in signal and caliber.  There  is a 1.5 cm right upper lung nodular density.  There is suggestion for  peripheral enhancement with central non-enhancement on the postcontrast images.    Lumbar spine:  There is normal lumbar lordosis. The vertebral body heights are maintained.  There is facet arthropathy with fluid signal noted within the left greater than  the right L4-L5 facet joints.  Although this may be degenerative in nature,  early infectious involvement cannot be excluded especially given the enhancement  around the facets more so along the left facets.  There is also diffuse anterior  and posterior epidural enhancement from the level of L2 through the sacrum but  more pronounced around L4 and L5.  There is no rim-enhancing collection. The  conus is at T12 and is normal in signal and morphology.    L1-2: No disc herniation, central or foraminal narrowing.  L2-3: No disc herniation, central or foraminal narrowing.  L3-4: Facet arthropathy.  No disc herniation, central or foraminal narrowing.  L4-5: There is mild anterolisthesis of L4 on L5.  There is a disc bulge with  superimposed right foraminal disc herniation resulting in moderate central canal  narrowing and severe right and moderate to severe left foraminal narrowing.  L5-S1: Disc bulge with superimposed central disc herniation with mild inferior  extrusion resulting in mild central canal narrowing noting that the thecal sac  is tapering at this point.  There is facet arthropathy with mild foraminal  narrowing.                               X-RAY CHEST 1 VIEW (Final result)  Result time 12/09/20 13:37:44    Final result                 Impression:    IMPRESSION:  No radiographic evidence of acute  cardiopulmonary disease.             Narrative:      CLINICAL HISTORY: Fever.    COMMENT:  A portable upright AP view of the chest was performed.    Comparison: 9/29/2019    Cardiac monitoring leads obscure portions of the underlying lungs.  There is no  focal consolidation or pleural effusion. The cardiac and mediastinal structures  are unremarkable.  A corticated ossific density structure is present adjacent to  the right humeral head.                                          ED Course & MDM     MDM         ED Course as of Dec 09 2156   Wed Dec 09, 2020   1241 Unable to get IV. Waiting for IV team. IM morphine ordered. MRI notified of stat MRI order and will try to expedite.    [KK]   1605 Update from MRI, will call for patient in 20 min    [KK]   1934 Call from radiology - Contrast enhancing lesion in lumbar spine ?phlegmon infection, no abscess. L4/L5 facet arthritic changes with possible infetion; rim enhancing lesions in right lung, r/o septic emboli. Recommend CT chest with IV contrast, ok to give contrast now following MRI    [KK]   2001 Case discussed and MRI results reviewed with Dr. Melvin of neurosurgery who agrees with treatment plan for vancomycin and admission for further evaluation. Pt currently at CT for r/o septic emboli.    [KK]   2124 Call from radiology - septic emboli, cannot r/o subsegmental emboli    [KK]      ED Course User Index  [KK] Bertha Bowie PA C         Clinical Impressions as of Dec 09 2156   Acute left-sided low back pain without sciatica   Infection of lumbar spine (CMS/HCC)   Septic embolism (CMS/HCC)        Bertha Bowie PA C  12/09/20 2156

## 2020-12-10 PROBLEM — I33.0 ACUTE BACTERIAL ENDOCARDITIS: Status: ACTIVE | Noted: 2020-12-10

## 2020-12-10 PROBLEM — D64.9 ANEMIA: Status: ACTIVE | Noted: 2020-12-10

## 2020-12-10 PROBLEM — M46.50: Status: ACTIVE | Noted: 2020-12-10

## 2020-12-10 PROBLEM — F11.10 OPIOID ABUSE (CMS/HCC): Status: ACTIVE | Noted: 2020-12-10

## 2020-12-10 PROBLEM — F39 UNSPECIFIED MOOD (AFFECTIVE) DISORDER (CMS/HCC): Status: ACTIVE | Noted: 2020-12-10

## 2020-12-10 PROBLEM — K59.03 DRUG-INDUCED CONSTIPATION: Status: ACTIVE | Noted: 2020-12-10

## 2020-12-10 PROBLEM — I10 ESSENTIAL HYPERTENSION: Status: ACTIVE | Noted: 2020-12-10

## 2020-12-10 PROBLEM — I33.0 TRICUSPID VALVE VEGETATION: Status: ACTIVE | Noted: 2020-12-10

## 2020-12-10 LAB
ANION GAP SERPL CALC-SCNC: 10 MEQ/L (ref 3–15)
ATRIAL RATE: 108
BUN SERPL-MCNC: 8 MG/DL (ref 8–20)
CALCIUM SERPL-MCNC: 8.6 MG/DL (ref 8.9–10.3)
CHLORIDE SERPL-SCNC: 103 MEQ/L (ref 98–109)
CO2 SERPL-SCNC: 25 MEQ/L (ref 22–32)
CREAT SERPL-MCNC: 0.8 MG/DL (ref 0.6–1.1)
CRP SERPL-MCNC: 184.2 MG/L
ERYTHROCYTE [DISTWIDTH] IN BLOOD BY AUTOMATED COUNT: 14.4 % (ref 11.7–14.4)
GFR SERPL CREATININE-BSD FRML MDRD: >60 ML/MIN/1.73M*2
GLUCOSE SERPL-MCNC: 112 MG/DL (ref 70–99)
HCT VFR BLDCO AUTO: 28.7 % (ref 35–45)
HGB BLD-MCNC: 9.2 G/DL (ref 11.8–15.7)
MAGNESIUM SERPL-MCNC: 2.3 MG/DL (ref 1.8–2.5)
MCH RBC QN AUTO: 27.4 PG (ref 28–33.2)
MCHC RBC AUTO-ENTMCNC: 32.1 G/DL (ref 32.2–35.5)
MCV RBC AUTO: 85.4 FL (ref 83–98)
P AXIS: 49
PDW BLD AUTO: 10.5 FL (ref 9.4–12.3)
PLATELET # BLD AUTO: 186 K/UL (ref 150–369)
POTASSIUM SERPL-SCNC: 4 MEQ/L (ref 3.6–5.1)
PR INTERVAL: 142
QRS DURATION: 92
QT INTERVAL: 342
QTC CALCULATION(BAZETT): 458
R AXIS: 61
RBC # BLD AUTO: 3.36 M/UL (ref 3.93–5.22)
SODIUM SERPL-SCNC: 138 MEQ/L (ref 136–144)
T WAVE AXIS: 35
VENTRICULAR RATE: 108
WBC # BLD AUTO: 7.05 K/UL (ref 3.8–10.5)

## 2020-12-10 PROCEDURE — 87040 BLOOD CULTURE FOR BACTERIA: CPT | Performed by: HOSPITALIST

## 2020-12-10 PROCEDURE — 85027 COMPLETE CBC AUTOMATED: CPT | Performed by: HOSPITALIST

## 2020-12-10 PROCEDURE — 25800000 HC PHARMACY IV SOLUTIONS: Performed by: HOSPITALIST

## 2020-12-10 PROCEDURE — 99222 1ST HOSP IP/OBS MODERATE 55: CPT | Performed by: HOSPITALIST

## 2020-12-10 PROCEDURE — 82310 ASSAY OF CALCIUM: CPT | Performed by: HOSPITALIST

## 2020-12-10 PROCEDURE — 63700000 HC SELF-ADMINISTRABLE DRUG: Performed by: NURSE PRACTITIONER

## 2020-12-10 PROCEDURE — 63600000 HC DRUGS/DETAIL CODE: Performed by: PHYSICIAN ASSISTANT

## 2020-12-10 PROCEDURE — 20600000 HC ROOM AND CARE INTERMEDIATE/TELEMETRY

## 2020-12-10 PROCEDURE — 63600000 HC DRUGS/DETAIL CODE: Performed by: HOSPITALIST

## 2020-12-10 PROCEDURE — 99223 1ST HOSP IP/OBS HIGH 75: CPT | Performed by: NURSE PRACTITIONER

## 2020-12-10 PROCEDURE — 86140 C-REACTIVE PROTEIN: CPT | Performed by: HOSPITALIST

## 2020-12-10 PROCEDURE — 99233 SBSQ HOSP IP/OBS HIGH 50: CPT | Performed by: HOSPITALIST

## 2020-12-10 PROCEDURE — 25000000 HC PHARMACY GENERAL: Performed by: HOSPITALIST

## 2020-12-10 PROCEDURE — 99222 1ST HOSP IP/OBS MODERATE 55: CPT | Performed by: INTERNAL MEDICINE

## 2020-12-10 PROCEDURE — 63700000 HC SELF-ADMINISTRABLE DRUG: Performed by: HOSPITALIST

## 2020-12-10 PROCEDURE — 36415 COLL VENOUS BLD VENIPUNCTURE: CPT | Performed by: HOSPITALIST

## 2020-12-10 PROCEDURE — 99254 IP/OBS CNSLTJ NEW/EST MOD 60: CPT | Performed by: NEUROLOGICAL SURGERY

## 2020-12-10 PROCEDURE — 99253 IP/OBS CNSLTJ NEW/EST LOW 45: CPT | Performed by: PSYCHIATRY & NEUROLOGY

## 2020-12-10 PROCEDURE — 83735 ASSAY OF MAGNESIUM: CPT | Performed by: HOSPITALIST

## 2020-12-10 RX ORDER — ACETAMINOPHEN 325 MG/1
650 TABLET ORAL EVERY 4 HOURS PRN
Status: DISCONTINUED | OUTPATIENT
Start: 2020-12-10 | End: 2020-12-14

## 2020-12-10 RX ORDER — VANCOMYCIN HYDROCHLORIDE
1250
Status: DISCONTINUED | OUTPATIENT
Start: 2020-12-10 | End: 2020-12-11

## 2020-12-10 RX ORDER — HYDROMORPHONE HYDROCHLORIDE 1 MG/ML
1 INJECTION, SOLUTION INTRAMUSCULAR; INTRAVENOUS; SUBCUTANEOUS ONCE
Status: COMPLETED | OUTPATIENT
Start: 2020-12-10 | End: 2020-12-10

## 2020-12-10 RX ORDER — NALOXONE HYDROCHLORIDE 0.4 MG/ML
0.2 INJECTION, SOLUTION INTRAMUSCULAR; INTRAVENOUS; SUBCUTANEOUS ONCE AS NEEDED
Status: DISCONTINUED | OUTPATIENT
Start: 2020-12-10 | End: 2021-02-03 | Stop reason: HOSPADM

## 2020-12-10 RX ORDER — OXYCODONE HYDROCHLORIDE 5 MG/1
5 TABLET ORAL EVERY 4 HOURS PRN
Status: DISCONTINUED | OUTPATIENT
Start: 2020-12-10 | End: 2020-12-11

## 2020-12-10 RX ORDER — POTASSIUM CHLORIDE 750 MG/1
40 TABLET, FILM COATED, EXTENDED RELEASE ORAL DAILY PRN
Status: DISCONTINUED | OUTPATIENT
Start: 2020-12-10 | End: 2021-02-03 | Stop reason: HOSPADM

## 2020-12-10 RX ORDER — POLYETHYLENE GLYCOL 3350 17 G/17G
17 POWDER, FOR SOLUTION ORAL DAILY
Status: DISCONTINUED | OUTPATIENT
Start: 2020-12-10 | End: 2020-12-19

## 2020-12-10 RX ORDER — BUTALBITAL, ACETAMINOPHEN AND CAFFEINE 50; 325; 40 MG/1; MG/1; MG/1
2 TABLET ORAL EVERY 8 HOURS PRN
Status: DISCONTINUED | OUTPATIENT
Start: 2020-12-10 | End: 2020-12-14

## 2020-12-10 RX ORDER — LIDOCAINE 560 MG/1
1 PATCH PERCUTANEOUS; TOPICAL; TRANSDERMAL DAILY
Status: DISCONTINUED | OUTPATIENT
Start: 2020-12-10 | End: 2021-02-03 | Stop reason: HOSPADM

## 2020-12-10 RX ORDER — VANCOMYCIN HYDROCHLORIDE
1250
Status: DISCONTINUED | OUTPATIENT
Start: 2020-12-10 | End: 2020-12-10

## 2020-12-10 RX ORDER — POTASSIUM CHLORIDE 14.9 MG/ML
20 INJECTION INTRAVENOUS DAILY PRN
Status: DISCONTINUED | OUTPATIENT
Start: 2020-12-10 | End: 2021-02-03 | Stop reason: HOSPADM

## 2020-12-10 RX ORDER — POTASSIUM CHLORIDE 750 MG/1
20 TABLET, FILM COATED, EXTENDED RELEASE ORAL DAILY PRN
Status: DISCONTINUED | OUTPATIENT
Start: 2020-12-10 | End: 2021-02-03 | Stop reason: HOSPADM

## 2020-12-10 RX ADMIN — DIAZEPAM 5 MG: 5 INJECTION, SOLUTION INTRAMUSCULAR; INTRAVENOUS at 20:13

## 2020-12-10 RX ADMIN — BUPROPION HYDROCHLORIDE 75 MG: 75 TABLET, FILM COATED ORAL at 13:52

## 2020-12-10 RX ADMIN — OXYCODONE HYDROCHLORIDE 5 MG: 5 TABLET ORAL at 17:43

## 2020-12-10 RX ADMIN — VANCOMYCIN HYDROCHLORIDE 1750 MG: 1 INJECTION, POWDER, LYOPHILIZED, FOR SOLUTION INTRAVENOUS at 11:57

## 2020-12-10 RX ADMIN — DIAZEPAM 5 MG: 5 INJECTION, SOLUTION INTRAMUSCULAR; INTRAVENOUS at 12:02

## 2020-12-10 RX ADMIN — KETOROLAC TROMETHAMINE 15 MG: 15 INJECTION, SOLUTION INTRAMUSCULAR; INTRAVENOUS at 06:12

## 2020-12-10 RX ADMIN — BUPROPION HYDROCHLORIDE 75 MG: 75 TABLET, FILM COATED ORAL at 08:29

## 2020-12-10 RX ADMIN — SODIUM CHLORIDE: 9 INJECTION, SOLUTION INTRAVENOUS at 12:07

## 2020-12-10 RX ADMIN — METOPROLOL SUCCINATE 25 MG: 25 TABLET, EXTENDED RELEASE ORAL at 08:29

## 2020-12-10 RX ADMIN — DIAZEPAM 5 MG: 5 INJECTION, SOLUTION INTRAMUSCULAR; INTRAVENOUS at 04:15

## 2020-12-10 RX ADMIN — PRAZOSIN HYDROCHLORIDE 2 MG: 1 CAPSULE ORAL at 21:25

## 2020-12-10 RX ADMIN — PREGABALIN 150 MG: 75 CAPSULE ORAL at 08:29

## 2020-12-10 RX ADMIN — VANCOMYCIN HYDROCHLORIDE 1250 MG: 500 INJECTION, POWDER, LYOPHILIZED, FOR SOLUTION INTRAVENOUS at 20:14

## 2020-12-10 RX ADMIN — BUTALBITAL, ACETAMINOPHEN AND CAFFEINE 1 TABLET: 50; 325; 40 TABLET ORAL at 06:15

## 2020-12-10 RX ADMIN — KETOROLAC TROMETHAMINE 15 MG: 15 INJECTION, SOLUTION INTRAMUSCULAR; INTRAVENOUS at 11:56

## 2020-12-10 RX ADMIN — HYDROMORPHONE HYDROCHLORIDE 0.5 MG: 1 INJECTION, SOLUTION INTRAMUSCULAR; INTRAVENOUS; SUBCUTANEOUS at 04:17

## 2020-12-10 RX ADMIN — QUETIAPINE 100 MG: 100 TABLET, FILM COATED ORAL at 21:29

## 2020-12-10 RX ADMIN — ACETAMINOPHEN 650 MG: 325 TABLET, FILM COATED ORAL at 17:43

## 2020-12-10 RX ADMIN — BUTALBITAL, ACETAMINOPHEN AND CAFFEINE 2 TABLET: 50; 325; 40 TABLET ORAL at 17:43

## 2020-12-10 RX ADMIN — HYDROMORPHONE HYDROCHLORIDE 1 MG: 1 INJECTION, SOLUTION INTRAMUSCULAR; INTRAVENOUS; SUBCUTANEOUS at 06:51

## 2020-12-10 RX ADMIN — PAROXETINE HYDROCHLORIDE 20 MG: 10 TABLET, FILM COATED ORAL at 08:29

## 2020-12-10 RX ADMIN — BUTALBITAL, ACETAMINOPHEN AND CAFFEINE 1 TABLET: 50; 325; 40 TABLET ORAL at 11:55

## 2020-12-10 RX ADMIN — KETOROLAC TROMETHAMINE 15 MG: 15 INJECTION, SOLUTION INTRAMUSCULAR; INTRAVENOUS at 17:44

## 2020-12-10 RX ADMIN — ZOLPIDEM TARTRATE 10 MG: 5 TABLET, COATED ORAL at 21:29

## 2020-12-10 RX ADMIN — PREGABALIN 150 MG: 75 CAPSULE ORAL at 20:12

## 2020-12-10 ASSESSMENT — ENCOUNTER SYMPTOMS
WEAKNESS: 0
HEMOPTYSIS: 0
MYALGIAS: 0
NIGHT SWEATS: 0
ODYNOPHAGIA: 0
NAUSEA: 0
BRUISES/BLEEDS EASILY: 0
CLAUDICATION: 0
HEMATOCHEZIA: 0
FALLS: 0
JOINT SWELLING: 0
ALTERED MENTAL STATUS: 0
BACK PAIN: 1
BLURRED VISION: 0
NERVOUS/ANXIOUS: 0
DIARRHEA: 0
SNORING: 0
LIGHT-HEADEDNESS: 0
LOSS OF BALANCE: 0
DEPRESSION: 0
ORTHOPNEA: 0
SHORTNESS OF BREATH: 0
FOCAL WEAKNESS: 0
BRIEF PARALYSIS: 0
HEMATEMESIS: 0
VOMITING: 0
NEAR-SYNCOPE: 0
WEIGHT GAIN: 0
SPUTUM PRODUCTION: 0
COUGH: 0
WEIGHT LOSS: 0
DYSPNEA ON EXERTION: 0
MUSCLE CRAMPS: 0
SLEEP DISTURBANCES DUE TO BREATHING: 0
DOUBLE VISION: 0
WHEEZING: 0
HEMATURIA: 0
PALPITATIONS: 0
HEADACHES: 0
FREQUENCY: 0
FEVER: 1
IRREGULAR HEARTBEAT: 0
DIAPHORESIS: 0
DIZZINESS: 0
SYNCOPE: 0
ABDOMINAL PAIN: 0
PND: 0

## 2020-12-10 NOTE — ASSESSMENT & PLAN NOTE
-Evidence of tricuspid valve endocarditis as outlined in detailed report of JANUSZ above.  Today she is hemodynamically stable  -ID on board and aware  -Continue ABX recs per ID  -Need to monitor closely as outpatient as at some point she could require surgical intervention.     12/29: check f/u limited echo this thursday

## 2020-12-10 NOTE — CONSULTS
Cardiology Consult Note  Subjective     Carolyn Redmond is a 35 y.o. female who was admitted for Septic embolism (CMS/Formerly [facility]) [I76]  Infection of lumbar spine (CMS/Formerly [facility]) [M46.26]  Acute left-sided low back pain without sciatica [M54.5]. @Carolyn Redmond was referred by Dr. Angela for patient co-management. Carolyn Redmond is a 35-year-old female known to me from a history of prior endocarditis 3 years ago.  She presented to an outside hospital with delirium and high fever when her mother called 911.  She left the hospital AGAINST MEDICAL ADVICE came to our facility.  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  She did have high fever and very severe low back pain but feels better today after antibiotics.  She has no weakness, dysarthria, visual changes.  For 2 weeks    Outside records reviewed. Pertinent radiology results reviewed. Pertinent lab results reviewed..    Medical History:   Past Medical History:   Diagnosis Date   • Anxiety    • Depressed    • Endocarditis    • Fibromyalgia    • Migraines    • Pancreatitis    • Vasculitis (CMS/Formerly [facility])        Surgical History:   Past Surgical History:   Procedure Laterality Date   • CHOLECYSTECTOMY     • ERCP     • GALLBLADDER SURGERY         Social History:   Social History     Social History Narrative   • Not on file       Family History:   Family History   Problem Relation Age of Onset   • No Known Problems Biological Mother    • Lung cancer Biological Father        Allergies: Amoxicillin, Nsaids (non-steroidal anti-inflammatory drug), Trazodone, and Tramadol    Current Facility-Administered Medications   Medication Dose Route Frequency Provider Last Rate Last Dose   • buprenorphine HCL (SUBUTEX) SL tablet 8 mg  8 mg sublingual q8h PRN Perfecto Angela MD       • buPROPion (WELLBUTRIN) tablet 75 mg  75 mg oral TID Perfecto Angela MD   75 mg at 12/10/20 0829   • butalbital-acetaminophen-caff (FIORICET,  ESGIC) per tablet 1 tablet  1 tablet oral q6h PRN Perfecto Angela MD   1 tablet at 12/10/20 0615   • chlorproMAZINE (THORAZINE) tablet 200 mg  200 mg oral Nightly Perfecto Angela MD       • glucose chewable tablet 16-32 g of dextrose  16-32 g of dextrose oral PRN Perfecto Angela MD        Or   • dextrose 40 % oral gel 15-30 g of dextrose  15-30 g of dextrose oral PRN Perfecto Angela MD        Or   • glucagon (GLUCAGEN) injection 1 mg  1 mg intramuscular PRN Perfecto Angela MD        Or   • dextrose in water injection 12.5 g  25 mL intravenous PRN Perfecto Angela MD       • diazePAM (VALIUM) injection 5 mg  5 mg intravenous q8h PRN Perfecto Angela MD   5 mg at 12/10/20 0415   • fluticasone propionate (FLONASE) 50 mcg/actuation nasal spray 2 spray  2 spray Each Nostril Daily PRN Perfecto Angela MD       • heparin (porcine) 5,000 unit/mL injection 5,000 Units  5,000 Units subcutaneous q8h JAIDEN Perfecto Angela MD   5,000 Units at 12/09/20 2356   • ketorolac (TORADOL) injection 15 mg  15 mg intravenous q6h PRN Perfecto Angela MD   15 mg at 12/10/20 0612   • magnesium sulfate IVPB 2g in 50 mL NSS/D5W/SWFI  2 g intravenous PRN Perfecto Angela MD       • metoprolol succinate XL (TOPROL-XL) 24 hr ER tablet 25 mg  25 mg oral Daily Perfecto Angela MD   25 mg at 12/10/20 0829   • PARoxetine (PAXIL) tablet 20 mg  20 mg oral Daily Perfecto Angela MD   20 mg at 12/10/20 0829   • potassium chloride (KLOR-CON) tablet extended release 20 mEq  20 mEq oral Daily PRN Perfecto Angela MD        Or   • potassium chloride (KLOR-CON) tablet extended release 40 mEq  40 mEq oral Daily PRN Perfecto Angela MD        Or   • potassium chloride 20 mEq in 100 mL IVPB  (premix)  20 mEq intravenous Daily PRN Perfecto Angela MD        Or   • potassium chloride (KCL) 40 mEq/250 mL IVPB in NSS 40 mEq  40 mEq intravenous Daily PRN Perfecto Angela MD       • prazosin (MINIPRESS) capsule 2 mg  2 mg oral Nightly Julio César,  Perfecto CASE MD   2 mg at 12/09/20 2340   • pregabalin (LYRICA) capsule 150 mg  150 mg oral BID Perfecto Angela MD   150 mg at 12/10/20 0829   • QUEtiapine (SEROquel) tablet 100 mg  100 mg oral Nightly Perfecto Angela MD   100 mg at 12/09/20 2340   • sodium chloride 0.9 % infusion   intravenous Continuous Perfecto Angela MD 80 mL/hr at 12/09/20 2343     • zolpidem (AMBIEN) tablet 10 mg  10 mg oral Nightly Perfecto Angela MD   10 mg at 12/09/20 2355       Review of Systems  Review of Systems   Constitution: Positive for fever and malaise/fatigue. Negative for diaphoresis, night sweats, weight gain and weight loss.   HENT: Negative for odynophagia.    Eyes: Negative for blurred vision, double vision and visual disturbance.   Cardiovascular: Negative for chest pain, claudication, dyspnea on exertion, irregular heartbeat, leg swelling, near-syncope, orthopnea, palpitations, paroxysmal nocturnal dyspnea and syncope.   Respiratory: Negative for cough, hemoptysis, shortness of breath, sleep disturbances due to breathing, snoring, sputum production and wheezing.    Endocrine: Negative for polyuria.   Hematologic/Lymphatic: Negative for bleeding problem. Does not bruise/bleed easily.   Skin: Negative for rash.   Musculoskeletal: Positive for back pain. Negative for falls, joint pain, joint swelling, muscle cramps, muscle weakness and myalgias.   Gastrointestinal: Negative for abdominal pain, diarrhea, hematemesis, hematochezia, melena, nausea and vomiting.   Genitourinary: Negative for frequency, hematuria and nocturia.   Neurological: Negative for brief paralysis, dizziness, focal weakness, headaches, light-headedness, loss of balance and weakness.   Psychiatric/Behavioral: Negative for altered mental status and depression. The patient is not nervous/anxious.        Objective     Vitals:    12/10/20 0742   BP: 120/76   Pulse: 99   Resp: 18   Temp: 37.3 °C (99.1 °F)   SpO2: 97%       Physical Exam   Constitutional: She  is oriented to person, place, and time. No distress.   Obese   HENT:   Head: Normocephalic.   Eyes: Pupils are equal, round, and reactive to light. Conjunctivae are normal.   Neck: Neck supple. No JVD present. No tracheal deviation present. No thyromegaly present.   Cardiovascular: Regular rhythm and normal heart sounds. Tachycardia present. Exam reveals no gallop and no friction rub.   No murmur heard.  Distant, no murmur   Pulmonary/Chest: Breath sounds normal. No stridor. No respiratory distress. She has no wheezes. She has no rales. She exhibits no tenderness.   Abdominal: Bowel sounds are normal. She exhibits no distension and no mass. There is no abdominal tenderness. There is no rebound and no guarding.   Musculoskeletal: Normal range of motion.         General: No tenderness, deformity or edema.   Neurological: She is alert and oriented to person, place, and time. No cranial nerve deficit.   Skin: Skin is warm and dry. She is not diaphoretic.   Psychiatric: She has a normal mood and affect. Her behavior is normal. Judgment normal.        Labs   Lab Results   Component Value Date    WBC 7.05 12/10/2020    HGB 9.2 (L) 12/10/2020    HCT 28.7 (L) 12/10/2020     12/10/2020    ALT 11 12/09/2020    AST 14 (L) 12/09/2020     12/09/2020    K 4.3 12/09/2020     12/09/2020    CREATININE 0.8 12/09/2020    BUN 8 12/09/2020    CO2 22 12/09/2020    INR 0.9 04/06/2019    MG 2.2 06/14/2017       Imaging  Mri Thoracic Spine With And Without Contrast    Result Date: 12/9/2020  CLINICAL HISTORY: Extradural or subdural abscess COMPARISON: None COMMENT: Technique: MRI of the thoracic and lumbar spine performed with and without 10.5 cc of Gadavist gadolinium contrast. Thoracic spine: The study is motion degraded.  There is normal thoracic kyphosis with anatomic alignment.  There are scattered Schmorl's nodes.  There is no edema within the vertebral bodies.  There is no abnormal enhancement.  There are a  few scattered tiny disc herniations and minimal disc bulges with mild central canal narrowing and T9-T10 and T10-T11.. The spinal cord is normal in signal and caliber.  There is a 1.5 cm right upper lung nodular density.  There is suggestion for peripheral enhancement with central non-enhancement on the postcontrast images. Lumbar spine: There is normal lumbar lordosis. The vertebral body heights are maintained. There is facet arthropathy with fluid signal noted within the left greater than the right L4-L5 facet joints.  Although this may be degenerative in nature, early infectious involvement cannot be excluded especially given the enhancement around the facets more so along the left facets.  There is also diffuse anterior and posterior epidural enhancement from the level of L2 through the sacrum but more pronounced around L4 and L5.  There is no rim-enhancing collection. The conus is at T12 and is normal in signal and morphology. L1-2: No disc herniation, central or foraminal narrowing. L2-3: No disc herniation, central or foraminal narrowing. L3-4: Facet arthropathy.  No disc herniation, central or foraminal narrowing. L4-5: There is mild anterolisthesis of L4 on L5.  There is a disc bulge with superimposed right foraminal disc herniation resulting in moderate central canal narrowing and severe right and moderate to severe left foraminal narrowing. L5-S1: Disc bulge with superimposed central disc herniation with mild inferior extrusion resulting in mild central canal narrowing noting that the thecal sac is tapering at this point.  There is facet arthropathy with mild foraminal narrowing.     IMPRESSION: Diffuse anterior and posterior epidural enhancement within lumbar spine more pronounced at L4-L5 raising the suspicion for underlying infectious process/phlegmon.  Left greater than right L4-L5 facet joint signal and enhancement which although may be degenerative in nature, early changes of septic facet arthritis  is in the differential.  Follow-up is recommended. Thoracic and lumbar degenerative change as described more pronounced at L4-L5. Ring-enhancing lesion in right upper lobe which may be infectious in etiology including in the setting of septic emboli.  Correlation with CT chest with contrast is recommended to exclude other etiologies. The results were discussed with Rylee Bowie on 12/9/2020 7:35 PM.    Mri Lumbar Spine With And Without Contrast    Result Date: 12/9/2020  CLINICAL HISTORY: Extradural or subdural abscess COMPARISON: None COMMENT: Technique: MRI of the thoracic and lumbar spine performed with and without 10.5 cc of Gadavist gadolinium contrast. Thoracic spine: The study is motion degraded.  There is normal thoracic kyphosis with anatomic alignment.  There are scattered Schmorl's nodes.  There is no edema within the vertebral bodies.  There is no abnormal enhancement.  There are a few scattered tiny disc herniations and minimal disc bulges with mild central canal narrowing and T9-T10 and T10-T11.. The spinal cord is normal in signal and caliber.  There is a 1.5 cm right upper lung nodular density.  There is suggestion for peripheral enhancement with central non-enhancement on the postcontrast images. Lumbar spine: There is normal lumbar lordosis. The vertebral body heights are maintained. There is facet arthropathy with fluid signal noted within the left greater than the right L4-L5 facet joints.  Although this may be degenerative in nature, early infectious involvement cannot be excluded especially given the enhancement around the facets more so along the left facets.  There is also diffuse anterior and posterior epidural enhancement from the level of L2 through the sacrum but more pronounced around L4 and L5.  There is no rim-enhancing collection. The conus is at T12 and is normal in signal and morphology. L1-2: No disc herniation, central or foraminal narrowing. L2-3: No disc herniation, central or  foraminal narrowing. L3-4: Facet arthropathy.  No disc herniation, central or foraminal narrowing. L4-5: There is mild anterolisthesis of L4 on L5.  There is a disc bulge with superimposed right foraminal disc herniation resulting in moderate central canal narrowing and severe right and moderate to severe left foraminal narrowing. L5-S1: Disc bulge with superimposed central disc herniation with mild inferior extrusion resulting in mild central canal narrowing noting that the thecal sac is tapering at this point.  There is facet arthropathy with mild foraminal narrowing.     IMPRESSION: Diffuse anterior and posterior epidural enhancement within lumbar spine more pronounced at L4-L5 raising the suspicion for underlying infectious process/phlegmon.  Left greater than right L4-L5 facet joint signal and enhancement which although may be degenerative in nature, early changes of septic facet arthritis is in the differential.  Follow-up is recommended. Thoracic and lumbar degenerative change as described more pronounced at L4-L5. Ring-enhancing lesion in right upper lobe which may be infectious in etiology including in the setting of septic emboli.  Correlation with CT chest with contrast is recommended to exclude other etiologies. The results were discussed with Rylee Bowie on 12/9/2020 7:35 PM.    X-ray Chest 1 View    Result Date: 12/9/2020  CLINICAL HISTORY: Fever. COMMENT: A portable upright AP view of the chest was performed. Comparison: 9/29/2019 Cardiac monitoring leads obscure portions of the underlying lungs.  There is no focal consolidation or pleural effusion. The cardiac and mediastinal structures are unremarkable.  A corticated ossific density structure is present adjacent to the right humeral head.     IMPRESSION: No radiographic evidence of acute cardiopulmonary disease.    Ct Chest Pulmonary Embolism With Iv Contrast    Result Date: 12/9/2020  CLINICAL HISTORY: PE suspected, high pretest prob   with h/o  fibromyalgia, endocarditis, pancreatitis, migraines, sepsis, and previous IV drug use present with fever and back pain x 1 week. Tmax 105F. Pt was admitted to [facility] for sepsis workup but left AMA this morning. She was given last dose vancomycin last night. CT brain WNL, CT abd/pelvis - no kidney stones, echo WNL, US right upper extremity - superficial thrombus distal basilic vein, no DVT. COVID/flu/RSV negative 12/7/20.     IMPRESSION: 1.  No evidence of main, lobar or segmental pulmonary embolism. 2.  However, there are multifocal nodular lesions throughout all lung fields including a cavitary lesion in the right upper lobe all suspicious for septic emboli.  Some of the tiny subsegmental pulmonary arterial branches demonstrate hypoenhancement and it is difficult to determinate if this is related to tiny pulmonary emboli or technique. 3.  Small bilateral pleural effusions. 4.  Mosaic pattern at the lung bases could represent mild edema or sequela of small airways or small vessels disease. 5.  Follow-up chest CT after appropriate treatment is recommended to document lung opacities and mediastinal lymphadenopathy which is probably reactive. 6.  Additional findings discussed below. Finding:    Other   Acuity: Critical  Status:  CLOSED Critical read back for the first impression performed with Bertha Bowie PA 9:25 PM 12/9/2020 COMMENT: Comparison: Chest x-ray from 12/9/2020 Technique: Chest CT pulmonary embolism protocol performed with intravenous contrast.  80 mL Omnipaque 350 given. CT DOSE:  One or more dose reduction techniques (e.g. automated exposure control, adjustment of the mA and/or kV according to patient size, use of iterative reconstruction technique) utilized for this examination. FINDINGS: LUNG, PLEURA AND LARGE AIRWAYS: The trachea and proximal bronchi remain clear. There are multifocal nodular lesions throughout the lungs.  For example in the right upper lobe there is a cavitary  lesion measuring 1.4 cm on axial image 63. A 1.6 cm nodular lesion in the left upper lobe on image 69.  Multiple additional lesions are present elsewhere.  His are suspicious for septic emboli particularly given the history of endocarditis and sepsis. There are small bilateral pleural effusions. There is a nonspecific mild degree of groundglass opacity in the lower lung fields.  This could represent mild degree of pulmonary edema or possibly a mosaic pattern from small airways or small vessel disease. No pneumothorax. VESSELS: Normal caliber of the thoracic aorta.  Thoracic aorta appears within normal limits.  Main pulmonary artery is normal in caliber.  No evidence of a main, lobar or segmental pulmonary embolism.  However, the subsegmental vessels are difficult to assess and some demonstrate a slightly hypoechoic enhancing appearance possibly artifact from bolus timing although tiny subsegmental pulmonary emboli would be difficult to exclude.  For example, in the lingula there is some hypoenhancement of the subsegmental vessels on image 116 where subsegmental PE cannot be excluded.  No evidence of right heart strain. HEART: normal size. No pericardial effusion. MEDIASTINUM AND PAULINA: There is a pathologically enlarged lymph node in the AP window measuring 1.8 cm short axis.  Additional prominent but nonpathologic by size video lymph nodes elsewhere. CHEST WALL AND LOWER NECK: within normal limits. UPPER ABDOMEN:Limited assessment of the upper abdominal structures with this technique.  There is splenomegaly with the spleen measuring 14.5 cm.  The liver may also be enlarged but is incompletely visualized.  Cholecystectomy changes. BONES: No suspicious bony erosive changes.  Multilevel Schmorl's nodes and degenerative changes in the spine are noted.  Calcific tendinosis of the right rotator cuff.  There is limited assessment of the spine with this technique.  No gross paraspinal inflammatory changes.     CT chest  12/9/2020 reviewed by me personally-right ventricle appears enlarged.  There are suspected multiple bilateral septic pulmonary nodules.    MRI personally reviewed by me in the spine 12/9/2020-L4-L5 disc bases are abnormal along with the retropulsion appears to be disc material or phlegmon.    ECG   sinus tachycardia otherwise normal    Telemetry -reviewed by me personally  sinus tachycardia  No significant arrhythmia, bradycardia, pauses      35 y.o. female being consulted for {reason for consult:[id number]    Assessment and Plan:  Acute bacterial endocarditis  Assessment & Plan  Presumptive tricuspid valve endocarditis with multiple bilateral septic pulmonary emboli and abnormal echo on 12/8.  Has epidural phlegmon lumbar spine.  Plan:  JANUSZ tomorrow morning.  Antibiotics per ID.  Depending on the nature and size of the vegetations, Angiocath vegetation extraction has been a useful modality with good success in recent patients with right-sided endocarditis.    Tricuspid valve vegetation  Assessment & Plan  As detailed in the overview, suspected tricuspid valve endocarditis.  I doubt this is due to old disease since she has septic pulmonary emboli.  She was at higher risk for infection given endocarditis in 2017.  JANUSZ pending.    Essential hypertension  Assessment & Plan  Overall stable.    * Septic embolism (CMS/HCC)  Assessment & Plan  Pulmonary emboli noted on CT scan.  RV appears generous on this same CT.  JANUSZ pending.          Speech recognition software was used to create this note. Please disregard any inadvertent translation errors. Please call the office for any clarifications.    Yanick Eldridge, DO FACC, FACOI  12/10/2020  10:07 AM

## 2020-12-10 NOTE — CONSULTS
Neurosurgery Consultation    Requesting Provider:  Dr. Angela    Patient seen and examined 12/10/2020    CC: Low back pain, fever    Reason for Consultation:  Epidural infection of lumbar spine    History of Present Illness:   Carolyn Redmond is a 35 y.o. female, with significant past medical history of anxiety, depression, endocarditis, fibromyalgia, migraines, pancreatitis, IV drug use (denies any recent use) and vasculitis, who presented to [place] emergency department after signing out AMA from [facility] where she presented with about 3 days of fever and progressively worsening low back pain over 2 weeks.  Patient states all of her care has been at [place] which is why she signed out from [place].    She notes that she is having low back pain nonradiating down both legs to about the knee.  Pain is worse when she is standing up and moving around better when she is at rest.  Pain is 10 out of 10 in nature and severe described as a sharp stabbing pain.  Denies any weakness, saddle anesthesia.  She notes 2 episodes of incontinence overnight but has not had any daytime bowel/bladder incontinence.  She notes that her fevers have improved but she is having some intermittent chills.  She denies any chest pain, shortness of breath, abdominal pain, nausea, vomiting or diarrhea.  Denies any neck pain or upper extremity paresthesias.    Medical History:   Past Medical History:   Diagnosis Date   • Anxiety    • Depressed    • Endocarditis    • Fibromyalgia    • Migraines    • Pancreatitis    • Vasculitis (CMS/[facility])        Surgical History:   Past Surgical History:   Procedure Laterality Date   • CHOLECYSTECTOMY     • ERCP     • GALLBLADDER SURGERY         Family History: Reviewed and deemed not pertinent to current admission.    Social History:   Social History     Socioeconomic History   • Marital status: Single     Spouse name: None   • Number of children: None   • Years of education: None   • Highest education  level: None   Occupational History   • None   Social Needs   • Financial resource strain: None   • Food insecurity     Worry: None     Inability: None   • Transportation needs     Medical: None     Non-medical: None   Tobacco Use   • Smoking status: Former Smoker     Packs/day: 0.50     Types: Cigarettes   • Smokeless tobacco: Former User   • Tobacco comment: vapes    Substance and Sexual Activity   • Alcohol use: Not Currently     Comment: social   • Drug use: Yes     Types: Marijuana   • Sexual activity: Defer   Lifestyle   • Physical activity     Days per week: None     Minutes per session: None   • Stress: None   Relationships   • Social connections     Talks on phone: None     Gets together: None     Attends [employer] service: None     Active member of club or organization: None     Attends meetings of clubs or organizations: None     Relationship status: None   • Intimate partner violence     Fear of current or ex partner: None     Emotionally abused: None     Physically abused: None     Forced sexual activity: None   Other Topics Concern   • None   Social History Narrative   • None       Allergies: Amoxicillin, Nsaids (non-steroidal anti-inflammatory drug), Trazodone, and Tramadol    Home Medications:   •  buprenorphine-naloxone, Place 1 tablet under the tongue 3 (three) times a day. 6a, 11a, 4p  •  buPROPion, Take 75 mg by mouth 3 (three) times a day.  •  butalbital-acetaminophen-caff, Take 2 capsules by mouth every 4 (four) hours as needed for headaches.  •  cyclobenzaprine, Take 10 mg by mouth 3 (three) times a day as needed for muscle spasms.  •  fluticasone propionate, Administer 2 sprays into each nostril daily as needed.    •  metoprolol succinate XL, Take 25 mg by mouth daily.  •  PARoxetine, Take 20 mg by mouth daily.    •  prazosin, Take 2 mg by mouth nightly.  •  pregabalin, Take 150 mg by mouth 2 (two) times a day. 8a & 8p    •  QUEtiapine, Take 100 mg by mouth nightly.  •  zolpidem, Take 10 mg by  mouth nightly.  •  chlorproMAZINE, Take 200 mg by mouth nightly.    Current Medications:   •  buprenorphine, 8 mg, sublingual, q8h PRN  •  buPROPion, 75 mg, oral, TID  •  butalbital-acetaminophen-caff, 1 tablet, oral, q6h PRN  •  chlorproMAZINE, 200 mg, oral, Nightly  •  glucose, 16-32 g of dextrose, oral, PRN **OR** dextrose, 15-30 g of dextrose, oral, PRN **OR** glucagon, 1 mg, intramuscular, PRN **OR** dextrose in water, 25 mL, intravenous, PRN  •  diazePAM, 5 mg, intravenous, q8h PRN  •  fluticasone propionate, 2 spray, Each Nostril, Daily PRN  •  heparin (porcine), 5,000 Units, subcutaneous, q8h JAIDEN  •  ketorolac, 15 mg, intravenous, q6h PRN  •  magnesium sulfate, 2 g, intravenous, PRN  •  metoprolol succinate XL, 25 mg, oral, Daily  •  PARoxetine, 20 mg, oral, Daily  •  potassium chloride, 20 mEq, oral, Daily PRN **OR** potassium chloride, 40 mEq, oral, Daily PRN **OR** potassium chloride, 20 mEq, intravenous, Daily PRN **OR** potassium chloride, 40 mEq, intravenous, Daily PRN  •  prazosin, 2 mg, oral, Nightly  •  pregabalin, 150 mg, oral, BID  •  QUEtiapine, 100 mg, oral, Nightly  •  sodium chloride 0.9 %, , intravenous, Continuous  •  vancomycin, 15-20 mg/kg, intravenous, PRN **AND** [] IV Vancomycin Therapy by Pharmacy Protocol, , , Once  •  zolpidem, 10 mg, oral, Nightly    Review of Systems: A 14 point review of systems was performed and aside from what is mentioned above is otherwise negative.    General physical examination:  Physical Examination:  Physical Exam   Constitutional: Oriented to person, place, and time. Appears well-developed and well-nourished. No distress.  HENT:   Head: Normocephalic and atraumatic.   Right Ear: External ear normal.   Left Ear: External ear normal.   Nose: Nose normal.   Mouth/Throat: Oropharynx is clear and moist.   Eyes: Conjunctivae and EOM are normal. Pupils are equal, round, and reactive to light. No scleral icterus.   Neck: Normal range of motion. Neck  supple. No JVD present. No tracheal deviation present.   Cardiovascular: Normal rate and regular rhythm.    Pulmonary/Chest: Effort normal   Abdominal: Soft. Exhibits no distension. There is no tenderness. There is no rebound and no guarding.   Musculoskeletal: Diffuse midline tenderness in the lumbar spine with no step-off or deformity.  Patient tearful after minimal palpation to the lumbar spine.   Skin: Skin is warm and dry. No rash noted. No erythema.   Psychiatric: Normal mood and affect. Speech is normal and behavior is normal. Thought content normal.   Vitals reviewed.    Neurologic examination:    Mental status:  The patient is alert, attentive, and oriented. Speech is clear and fluent with good repetition, comprehension, and naming.  Remote and recent memory are normal as well as fund of knowledge.    Cranial nerves:  CN II: Visual fields are full to confrontation.  Pupils are equal and briskly reactive to light.   CN III, IV, VI: Extra-occular muscles are intact  CN V: Facial sensation is intact and equal in V1-V3 distributions bilaterally.   CN VII: Face is symmetric with normal eye closure and smile.  CN VII: Hearing is normal to rubbing fingers  CN IX, X: Palate elevates symmetrically. Phonation is normal.  CN XI: Head turning and shoulder shrug are intact  CN XII: Tongue is midline with normal movements and no atrophy.    Motor:     Deltoid Biceps Triceps Wrist ext Finger ext Hand Intrinsics Hip flexion Knee ext Dorsi-  flexion EHL Plantar Flexion   R 5 5 5 5 5 5 5 5 5 5 5   L 5 5 5 5 5 5 5 5 5 5 5     There is no pronator drift. Muscle bulk and tone are normal.     Reflexes:  Reflexes are 2+ and symmetric at the biceps, brachialis, triceps, patellar, and Achilli's. There is no Stephens's sign or ankle clonus.    Sensory:  Intact light touch, pinprick, and position sense, throughout all 4 extremities.    Coordination:  There is no dysmetria on finger-to-nose testing.      Gait:  Deferred    Data  Review:    Labs  WBC   Date Value Ref Range Status   12/10/2020 7.05 3.80 - 10.50 K/uL Final     Hemoglobin   Date Value Ref Range Status   12/10/2020 9.2 (L) 11.8 - 15.7 g/dL Final     Hematocrit   Date Value Ref Range Status   12/10/2020 28.7 (L) 35.0 - 45.0 % Final     MCV   Date Value Ref Range Status   12/10/2020 85.4 83.0 - 98.0 fL Final     Platelets   Date Value Ref Range Status   12/10/2020 186 150 - 369 K/uL Final     Sodium   Date Value Ref Range Status   12/10/2020 138 136 - 144 mEQ/L Final     Potassium   Date Value Ref Range Status   12/10/2020 4.0 3.6 - 5.1 mEQ/L Final     BUN   Date Value Ref Range Status   12/10/2020 8 8 - 20 mg/dL Final     Creatinine   Date Value Ref Range Status   12/10/2020 0.8 0.6 - 1.1 mg/dL Final     PT   Date Value Ref Range Status   04/06/2019 12.2 12.2 - 14.5 sec Final     PTT   Date Value Ref Range Status   04/06/2019 28 23 - 35 sec Final     INR   Date Value Ref Range Status   04/06/2019 0.9   INR Final     Comment:       INR has no defined significance when PT is within Reference Range.       Imaging:  Independent review of all imaging was done by myself as well as review of the radiologists readings and comparison to prior films.     Lumbar and thoracic MRI 12/9/2020  Diffuse anterior and posterior epidural enhancement within lumbar spine more   pronounced at L4-L5 raising the suspicion for underlying infectious   process/phlegmon.  Left greater than right L4-L5 facet joint signal and   enhancement which although may be degenerative in nature, early changes of   septic facet arthritis is in the differential.  Follow-up is recommended.     Thoracic and lumbar degenerative change as described more pronounced at L4-L5.     Ring-enhancing lesion in right upper lobe which may be infectious in etiology   including in the setting of septic emboli.  Correlation with CT chest with   contrast is recommended to exclude other etiologies.     Non-contrast scan                                            Post-contrast scan      CT chest pulmonary embolism study 12/9/2020    1.  No evidence of main, lobar or segmental pulmonary embolism.   2.  However, there are multifocal nodular lesions throughout all lung fields   including a cavitary lesion in the right upper lobe all suspicious for septic   emboli.  Some of the tiny subsegmental pulmonary arterial branches demonstrate   hypoenhancement and it is difficult to determinate if this is related to tiny   pulmonary emboli or technique.   3.  Small bilateral pleural effusions.   4.  Mosaic pattern at the lung bases could represent mild edema or sequela of   small airways or small vessels disease.   5.  Follow-up chest CT after appropriate treatment is recommended to document   lung opacities and mediastinal lymphadenopathy which is probably reactive.   6.  Additional findings discussed below.     Assessment and Plan:  In summary, Carolyn Redmond is a 35 y.o. female ith significant past medical history of anxiety, depression, endocarditis, fibromyalgia, migraines, pancreatitis, IV drug use and vasculitis, who presented to [place] emergency department after signing out AMA from [facility] where she presented with about 3 days of fever and progressively worsening low back pain over 2 weeks.  She appears comfortable resting in bed but notes when she moves she has bilateral radicular symptoms in the L4 distribution.  On exam, she is full strength throughout without any pathologic reflexes.  Her MRI imaging does not demonstrate any significant cord compression or fluid collection that would require immediate neurosurgical intervention.  She has maintained alignment on her MRI with no significant bony erosion.  Her pain appears fairly well controlled and pain management has been consulted.    -No acute surgical intervention required  -Pending infectious disease consult.  Management with IV antibiotic for 6 to 8 further guidance.  Repeat imaging at  completion of antibiotics and follow-up with neurosurgery at that time.  -Blood cultures obtained 12/9/2020.  Results pending.  -She could use an LSO brace as needed  -Multimodal pain control per pain management   - Management of endocarditis per cardiology  - Indications to proceed with surgery would include neurological loss attributed to instability, nerve compression, stenosis or an epidural abscess, severe mechanical pain due to the destructive process that limits ambulation and mobility, development of riki instability across the infected segment or persistent infection in the disc space or bones that does not clear with appropriate antibiotic therapy.  --Please call neurosurgery with any additional questions    RAVI Renteria     Patient seen and examined with Dr. Ness.     Patient seen earlier today. Signature timestamp does not reflect patient encounter time.   More than 50% of the time is spent counseling the patient and family and coordinating care.

## 2020-12-10 NOTE — ASSESSMENT & PLAN NOTE
-MSSA positive BC  -Septic emboli to lungs in addition to lumbar spine infection.  This may be the etiology of her back pain & pleuritic pain.    -Secondary to tricuspid endocarditis.  I discussed treatment, intravenous antibiotics, close clinical follow-up and periodic echocardiograms.  Recommended she return in 1-2 weeks for cardiac evaluation after discharge.  This was stressed.  -Continue tx per ID.  Stable on exam.    12/29: patient on IV Cefazolin day 21/42 through PICC line.  Patient in good spirits today.  Pain much better since methadone started.  Denies any chest pain or sob.  Off tele.  General lab work and VS have been stable.  Will check limited echo this Thursday.     1/21 update:  I personally reviewed the echo from 12/30.  There is mild thickening of the septal leaflet and mild associated tricuspid regurgitation but no definite vegetation seen.

## 2020-12-10 NOTE — ASSESSMENT & PLAN NOTE
(unstable)    Pt at risk for constipation secondary to methadone use.  LBM unknown.        Plan:  - Pt is not currently on bowel regimen.  Would recommend senna 1tab PO BID PRN and miralax daily PRN  - Monitor bowel movements and adjust regimen accordingly.

## 2020-12-10 NOTE — ASSESSMENT & PLAN NOTE
1. methadone 65mg qam (add 5mg) (Hold for sedation; O2 < 95; SBP <100; DBP < 60; HR < 60; RR< 10; QTc > 470).  Most recent qtc 440 - please recheck.  2. DC 1:1 sitter for now but please insure patient swallows all pills given  3. Patient would benefit from inpatient rehab where she can receive iv antibioitcs  4. Please encourage her to do intake with methadone clinic so that she can continue treatment without a lapse upon discharge.  5. Pain management consult appreciated.

## 2020-12-10 NOTE — ASSESSMENT & PLAN NOTE
Neurovascular intact  Pain control with oxycodone 10 mg q4 hours plus toradol as discussed with palliative care

## 2020-12-10 NOTE — CONSULTS
"Patient Name: Carolyn Redmond                                                                                        Patient MRN: [id number]  Attending Physician: Dominique Powell DO  Referring Physician: No ref. provider found  Primary Care Physician: Katie Brewer CRNP   This is Hospital Day: 2    Capacity for Medical Decision Making: pt has capacity for medical decision making but poor insight to addiction issues      Healthcare Proxy: in the absence of a POA it would be her Mother  Emergency Contact: Rolando Corbin; Relation: Friend    Reason for Consult:  pain management        Assessment and Plan  Drug-induced constipation  Assessment & Plan  Abdomen soft, non tender, pt reports bm this am  Uses miralax at home prn    Pt at risk for opioid induced constipation  Add miralax daily, hold for diarrhea    Opioid abuse (CMS/[facility])  Assessment & Plan  35yoF admitted with acute bacterial endocarditis, suspected tricuspid valve endocarditis likely related to IVDAbuse   Imaging shows c/f current IV injections though pt denies.  Patient appears to have some early osteo/discitis? Septic arthritis of L4-5, epidural space.      Patient with long h/o IVDA, opioid misuse and etoh   UDS on admission + cannabinoids and barbituates (likely fioricet)  PMDP has 23 prescribers and 114 scripts in past 12 months  Psych and SW consulted for addiction support  Recommend inpt treatment to complete antibiotic course if IV delivery is recommended    She was on suboxone until 8/2020 for addiction treatment but has been also misusing opioids, she states that was percocet but imaging is concerning for current IVDA.  Pt states she did inject fentanyl and heroin \"but not for months\".  She reports stopping her suboxone when getting to treatment w/ Dr. Brewer in [place] was too difficult.    UDS on admission + for cannabinoids and barbituates (fioricet)  Pt states she currently sees her PCP Dr. Mcleod in [place] for pain related " "issues, she does not want to go back on suboxone though recommended by psych.   She denies withdrawal and shows no s/s of same.    ID following for ABx  pending JANUSZ    Psych and Social Work following for addiction support  Recommend inpt med psych if pt requires long course of IV ABx  Pt is at HIGH risk for opioid abuse relapse based on ORT tool.  Her Father  of etoh.  She lives with her Mother and aunt and other family members.  She states she feels safe at home and that no one is using drugs illegally in the house.  She is resistant to psych input.    Opioid type dependence, continuous use (CMS/[facility])  Assessment & Plan        Pain  Assessment & Plan  Pt has a history of vasculitis, chronic abdominal and back pain, fibromyalgia, mirgraines and intermittent pancreatitis. She was at [facility] for lower back pain x 2 weeks and left AMA telling me \"I didn't like the way they were treating me\"    Neurosurgery following for worsening low back pain over 2 weeks.  Per Neurosurgery MRI imaging does not demonstrate significant central canal stenosis or empyema that would require immediate neurosurgical intervention and maintained alignment on her MRI with no significant bony erosion. She has mild enhancement of the posterior epidural space and bilateral L4-5 facet joints, concerning for early osteomyelitis.      On exam pt reports left lumbar pain non radiating, stabbing, constant goes to 15/10, and reduced to 7/10 with prn IV dilaudid.  She asks about Ketamine and states her pain is best controlled with IV Dilaudid.  She refuses MSIR stating it 'upsets my stomach\".  Lengthy discussion about risks and benefits of opioid therapy.  Relayed my concerns for relapse in addiction.  Much emotional support provided    No acute Neurosurgical intervention required at this lizzie  IV Abx per ID for possible osteo/discitis  LSO brace as needed  Monitor for any neurological defiicts    Multimodal Pain Management " Plan  Creatinine 0.8. liver enzymes wnl  Tylenol prn, do not exceed 4gms per day from all sources  Fioricet for migraines  Toradol q6hrs prn x 2 days   When toradol complete transition to Ibuprofen 400mg po q6hrs prn, consider adding ppi  Pregabalin 150mg po q12hrs., monitor CrCl currenlty wnl  Add oxycodone 5mg po q4hrs prn, taper down as able  Add lidoderm patch lower back  Continuous pulse ox  Make sure patient has no opioids in her personal belongings  Do NOT recommend discharge with Opioids  Psych following  Pt encouraged to pursue a new pain management physician  At this time she will f/u with her PCP Dr. [facility] [place] on Discharge  I discontinued prn Subutex as pt is refusing                  Anxiety  Assessment & Plan  Pt with long history of anxiety  Patient has a h/o suicidality and le[facility]ings [facility] [place].  She is currently on many psych meds including wellbutrin, paxil and seroquel  She denies using thorazine but was given this at outside hospital and refused it here    Psych following  Avoid benzos with opioid therapy          HPI    Source: pt, RN, attending, chart review    Subjective:    Carolyn Redmond is an 35 y.o. female admitted with lower back pain after leaving [facility].  On exam she endorses L  lumbar pain non radiating, stabbing, constant goes to 15/10, and reduced to 7/10 with prn IV dilaudid. She states she has intermittent migraines and uses fioricet with refief.  Pt denies cp, sob, n/v/d/c at this time    Chart Review:  The following portions of the patient’s history were reviewed and updated as appropriate:    Code Status History:  Current Code Status: Full Code    Past Family History  Family History   Problem Relation Age of Onset   • No Known Problems Biological Mother    • Lung cancer Biological Father        Past Medical History  Past Medical History:   Diagnosis Date   • Anxiety    • Depressed    • Endocarditis    • Fibromyalgia    • Migraines    •  Pancreatitis    • Vasculitis (CMS/HCC)        Past Surgical History  Past Surgical History:   Procedure Laterality Date   • CHOLECYSTECTOMY     • ERCP     • GALLBLADDER SURGERY         Social History   Social History     Socioeconomic History   • Marital status: Single     Spouse name: None   • Number of children: None   • Years of education: None   • Highest education level: None   Occupational History   • None   Social Needs   • Financial resource strain: None   • Food insecurity     Worry: None     Inability: None   • Transportation needs     Medical: None     Non-medical: None   Tobacco Use   • Smoking status: Former Smoker     Packs/day: 0.50     Types: Cigarettes   • Smokeless tobacco: Former User   • Tobacco comment: vapes    Substance and Sexual Activity   • Alcohol use: Not Currently     Comment: social   • Drug use: Yes     Types: Marijuana   • Sexual activity: Defer   Lifestyle   • Physical activity     Days per week: None     Minutes per session: None   • Stress: None   Relationships   • Social connections     Talks on phone: None     Gets together: None     Attends Congregation service: None     Active member of club or organization: None     Attends meetings of clubs or organizations: None     Relationship status: None   • Intimate partner violence     Fear of current or ex partner: None     Emotionally abused: None     Physically abused: None     Forced sexual activity: None   Other Topics Concern   • None   Social History Narrative   • None       Mu-ism:  No Yazdanism on file  Spiritism / Spiritual:   Identified spiritual needs and/or concerns:  consult placed    Review of Systems:  Reviewed and All other systems reviewed and negative except as noted in the HPI.    Home Medications:  •  buprenorphine-naloxone, Place 1 tablet under the tongue 3 (three) times a day. 6a, 11a, 4p  •  buPROPion, Take 75 mg by mouth 3 (three) times a day.  •  butalbital-acetaminophen-caff, Take 2 capsules by mouth  every 4 (four) hours as needed for headaches.  •  cyclobenzaprine, Take 10 mg by mouth 3 (three) times a day as needed for muscle spasms.  •  fluticasone propionate, Administer 2 sprays into each nostril daily as needed.    •  metoprolol succinate XL, Take 25 mg by mouth daily.  •  PARoxetine, Take 20 mg by mouth daily.    •  prazosin, Take 2 mg by mouth nightly.  •  pregabalin, Take 150 mg by mouth 2 (two) times a day. 8a & 8p    •  QUEtiapine, Take 100 mg by mouth nightly.  •  zolpidem, Take 10 mg by mouth nightly.  •  chlorproMAZINE, Take 200 mg by mouth nightly.  [facility] AWARxE query reviewed with the following concerns: 23 providers and 114 scripts in one year period    Current Medications:  •  acetaminophen, 650 mg, oral, q4h PRN  •  buPROPion, 75 mg, oral, TID  •  butalbital-acetaminophen-caff, 2 tablet, oral, q8h PRN  •  chlorproMAZINE, 200 mg, oral, Nightly  •  glucose, 16-32 g of dextrose, oral, PRN **OR** dextrose, 15-30 g of dextrose, oral, PRN **OR** glucagon, 1 mg, intramuscular, PRN **OR** dextrose in water, 25 mL, intravenous, PRN  •  diazePAM, 5 mg, intravenous, q8h PRN  •  fluticasone propionate, 2 spray, Each Nostril, Daily PRN  •  heparin (porcine), 5,000 Units, subcutaneous, q8h JAIDEN  •  ketorolac, 15 mg, intravenous, q6h PRN  •  lidocaine, 1 patch, Topical, Daily  •  magnesium sulfate, 2 g, intravenous, PRN  •  metoprolol succinate XL, 25 mg, oral, Daily  •  oxyCODONE, 5 mg, oral, q4h PRN  •  [START ON 12/11/2020] PARoxetine, 30 mg, oral, Daily  •  polyethylene glycol, 17 g, oral, Daily  •  potassium chloride, 20 mEq, oral, Daily PRN **OR** potassium chloride, 40 mEq, oral, Daily PRN **OR** potassium chloride, 20 mEq, intravenous, Daily PRN **OR** potassium chloride, 40 mEq, intravenous, Daily PRN  •  prazosin, 2 mg, oral, Nightly  •  pregabalin, 150 mg, oral, BID  •  QUEtiapine, 100 mg, oral, Nightly  •  sodium chloride 0.9 %, , intravenous, Continuous  •  vancomycin,  1,250 mg, intravenous, q8h INT **AND** [] IV Vancomycin Therapy by Pharmacy Protocol, , , Once  •  zolpidem, 10 mg, oral, Nightly    Allergies:  Allergies   Allergen Reactions   • Amoxicillin    • Nsaids (Non-Steroidal Anti-Inflammatory Drug) Nausea Only   • Trazodone      Other reaction(s): Headaches, Other (see comments)   • Tramadol Palpitations       Objective    Physical Exam:  General:   No Acute Distress, obese  Eyes:  Sclera Anicteric, EOM intact   ENMT:  Mmm  CV:  HRR, Radial Pulses 2+  Bilateral  Extremities: no edema    Lungs:  Effort normal, CTA, no rales/rhonchi/wheeze  Abdomen:  Soft, non tender, + Bowel Sounds   Psych:  Anxious   Neuro:  Awake and Alert, moves all extremities   Skin:  No rash, no jaundice, ?IV injection sites on hands?    Vitals:  Vitals:    12/10/20 1919   BP: (!) 153/90   Pulse: 78   Resp: 18   Temp: 36.7 °C (98.1 °F)   SpO2: 97%       Intake & Output:  I/O last 3 completed shifts:  In: 1840 [P.O.:840; IV Piggyback:1000]  Out: 600 [Urine:600]    Laboratory Studies:    CBC Results       12/10/20 12/09/20 11/07/19                    0948 1348 2138         WBC 7.05 8.88 6.46         RBC 3.36 3.34 4.41         HGB 9.2 9.1 11.9         HCT 28.7 28.2 35.9         MCV 85.4 84.4 81.4         MCH 27.4 27.2 27.0         MCHC 32.1 32.3 33.1          162 202                     CMP Results       12/10/20 12/09/20 11/07/19                    0948 1348 2139          136 134         K 4.0 4.3 3.7         Cl 103 103 102         CO2 25 22 24         Glucose 112 92 106         BUN 8 8 8         Creatinine 0.8 0.8 0.8         Calcium 8.6 8.9 8.7         Anion Gap 10 11 8         AST -- 14 22         ALT -- 11 16         Albumin -- 3.0 3.5         EGFR >60.0 >60.0 >60.0         Comment for K at 1348 on 20: Results obtained on plasma. Plasma Potassium values may be up to 0.4 mEQ/L less than serum values. The differences may be greater for patients with high platelet or white  cell counts.    Comment for K at 2139 on 11/07/19:   Results obtained on plasma. Plasma Potassium values may be up to 0.4 mEQ/L less than serum values. The differences may be greater for patients with high platelet or white cell counts.            Imaging and Other Studies:     Radiology Imaging   XR CHEST 1 VW    Narrative CLINICAL HISTORY: Fever.    COMMENT:  A portable upright AP view of the chest was performed.    Comparison: 9/29/2019    Cardiac monitoring leads obscure portions of the underlying lungs.  There is no  focal consolidation or pleural effusion. The cardiac and mediastinal structures  are unremarkable.  A corticated ossific density structure is present adjacent to  the right humeral head.      Impression IMPRESSION:  No radiographic evidence of acute cardiopulmonary disease.                                                                       Cardiac Imaging   ECHOCARDIOGRAM STRESS TEST     Narrative Ordered by an unspecified provider.           Discussed with pt, RN, Attending..     SHERITA Baer  Palliative Care Nurse Practitioner  [facility]  [phone] [facility] office  [address] [place] [address]  Pager [phone]    SHERITA Barrera  12/10/2020  7:29 PM

## 2020-12-10 NOTE — ASSESSMENT & PLAN NOTE
Suspected tricuspid valve endocarditis  Sepsis due to acute bacterial endocarditis present on admission  Continue antibiotics  Blood cultures from [place] [facility]

## 2020-12-10 NOTE — CONSULTS
CC: The patient is a 35 y.o. female seen on consultation for opioid use disorder and affective disorder    REASON FOR CONSULT:   The patient is seen on consultation along with  and medical student after presenting to the hospital with endocarditis and infectious process around spine.  Patient has a known history of opioid use disorder and was using IV heroin a few years ago.  She states that she is no longer using IV drugs for several years but had been buying heroin/fentanyl on the streets and snorting it over the last couple of months.  At this time she is likely facing 6 weeks of IV antibiotics.  She also states that she is intermittently struggled with anxiety and depression through the years.  She is currently on a combination of Wellbutrin and Paxil as well as Seroquel for sleep.  She denies any psychotic symptoms denies major depressive symptoms denies any thoughts to harm/kill herself or anyone else whatsoever.  However she does state that she has been a little bit down recently and will have trouble getting out of bed and attacking the day.  She acknowledges that sometimes it is difficult to tease out what is related to substance use/withdrawal and was related to true depression.  Having said that she is interested in potentially increasing Paxil at this time.    SOCIAL HISTORY:   The patient is originally from [place] and went to [employer].  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  She is not  has no children currently lives with her mother as well as aunt and uncle.    PAST PSYCHIATRIC HISTORY:  Patient states she has been in psychiatric care intermittently for many years.  She denies ever having any psychosis/delusions or sarah/hypomania.  She denies ever self injuring but does endorse to suicide attempts in the past and 3 psychiatric hospitalizations surrounding those attempts as well as depressive episodes.  She  was most recently at [facility] in 2019 following her most recent suicide attempt.  Again she denies any thoughts to harm/kill herself or anyone else whatsoever at this time.  She also states that she struggles with anxiety intermittently but denies this is a major symptom at this time.  She is interested in making some changes to her antidepressants and even answer questions she has around ketamine and ECT options.  She is appreciative of the information.    SUBSTANCE HISTORY:  The patient denies ever abusing cocaine, nonprescribed pills, marijuana, alcohol.  She denies any history of withdrawal seizure shakes or DTs.  She states that she started using Vicodin to treat headaches around age 22 and then in 2016 began buying drugs on the streets and used IV for a time and developed tricuspid vegetations.  She states that she was on Suboxone for a period of time and now will take it sporadically but has not been on regular regimen.  She is reluctant to consider methadone because she does not want to have to go to a methadone clinic every day.  She is also not really made motivated to restart Suboxone maintenance that she is interested in being on regular opioids to deal with pain that she has although she is not observed to be in a lot of pain at this time.  She is willing to consider going to a place like [facility] for rehab/IV antibiotics but would like to think about it.    FAMILY HISTORY:  The patient's father struggled with alcoholism and her maternal aunt has bipolar disorder.    LEGAL HISTORY:  Denies ever being arrested    CURRENT MEDICATIONS:  •  buprenorphine, 8 mg, sublingual, q8h PRN  •  buPROPion, 75 mg, oral, TID  •  butalbital-acetaminophen-caff, 1 tablet, oral, q6h PRN  •  chlorproMAZINE, 200 mg, oral, Nightly  •  glucose, 16-32 g of dextrose, oral, PRN **OR** dextrose, 15-30 g of dextrose, oral, PRN **OR** glucagon, 1 mg, intramuscular, PRN **OR** dextrose in water, 25 mL, intravenous, PRN  •   diazePAM, 5 mg, intravenous, q8h PRN  •  fluticasone propionate, 2 spray, Each Nostril, Daily PRN  •  heparin (porcine), 5,000 Units, subcutaneous, q8h JAIDEN  •  ketorolac, 15 mg, intravenous, q6h PRN  •  magnesium sulfate, 2 g, intravenous, PRN  •  metoprolol succinate XL, 25 mg, oral, Daily  •  PARoxetine, 20 mg, oral, Daily  •  potassium chloride, 20 mEq, oral, Daily PRN **OR** potassium chloride, 40 mEq, oral, Daily PRN **OR** potassium chloride, 20 mEq, intravenous, Daily PRN **OR** potassium chloride, 40 mEq, intravenous, Daily PRN  •  prazosin, 2 mg, oral, Nightly  •  pregabalin, 150 mg, oral, BID  •  QUEtiapine, 100 mg, oral, Nightly  •  sodium chloride 0.9 %, , intravenous, Continuous  •  vancomycin, 1,750 mg, intravenous, Once  •  vancomycin, 15-20 mg/kg, intravenous, PRN **AND** [] IV Vancomycin Therapy by Pharmacy Protocol, , , Once  •  zolpidem, 10 mg, oral, Nightly    Home Medications:  •  buprenorphine-naloxone, Place 1 tablet under the tongue 3 (three) times a day. 6a, 11a, 4p  •  buPROPion, Take 75 mg by mouth 3 (three) times a day.  •  butalbital-acetaminophen-caff, Take 2 capsules by mouth every 4 (four) hours as needed for headaches.  •  cyclobenzaprine, Take 10 mg by mouth 3 (three) times a day as needed for muscle spasms.  •  fluticasone propionate, Administer 2 sprays into each nostril daily as needed.    •  metoprolol succinate XL, Take 25 mg by mouth daily.  •  PARoxetine, Take 20 mg by mouth daily.    •  prazosin, Take 2 mg by mouth nightly.  •  pregabalin, Take 150 mg by mouth 2 (two) times a day. 8a & 8p    •  QUEtiapine, Take 100 mg by mouth nightly.  •  zolpidem, Take 10 mg by mouth nightly.  •  chlorproMAZINE, Take 200 mg by mouth nightly.    [place]IT[place]:  2020  1   2020  Zolpidem Tartrate 10 MG Tablet  30.00  30 Ja Stevie   [id number]   Pen (2815)   [place]   [place]  1   2020  Pregabalin 150 MG Capsule  60.00  30 Ja Cor    [id number]   Pen (1816)   0   [place]   11/26/2020  4   11/25/2020  Zolpidem Tartrate 10 MG Tablet  14.00  14 Ma Benjy   [id number]   Pen (1816)   [place]   11/18/2020  4   10/19/2020  Pregabalin 150 MG Capsule  34.00  17 [facility]   [id number]   Pen (1816)   2   [place]       MEDICAL HISTORY:   Past Medical History:   Diagnosis Date   • Anxiety    • Depressed    • Endocarditis    • Fibromyalgia    • Migraines    • Pancreatitis    • Vasculitis (CMS/HCC)        SURGICAL HISTORY:   Past Surgical History:   Procedure Laterality Date   • CHOLECYSTECTOMY     • ERCP     • GALLBLADDER SURGERY         Vital Signs for the last 24 hours:  Temp:  [36.8 °C (98.2 °F)-38.8 °C (101.8 °F)] 37.3 °C (99.1 °F)  Heart Rate:  [] 99  Resp:  [16-18] 18  BP: (119-161)/(61-94) 120/76    ROS:   Psychiatric: denies sleep difficulties. No trouble with attention  Neurologic: denies headaches    Medication Side-effects: Discussed risks and benefits of paroxetine as well as buprenorphine with patient.    MSE:  Orientation: AAO x3  Behavior: Appropriate  Appearance: well groomed  Eye Contact: Fair throughout  Mood: “i've been a little depressed”  Affect: incongruent; mostly stable and appropriate  Speech: Coherent  Thought Process: Goal Directed and linear   Suicidal Ideation: Denies suicidal thoughts; intent or plan  Homicidal Ideation: Denies having homicidal thoughts, intent or plan  Hallucinations: Denies  Delusions: Denies  Cognition: No gross cognitive deficits  Memory: No gross memory deficits  Insight: marginal  Judgment: Fair     Outpatient Psychiatrist - None (but she states she has an appointment next month with a doctor whose name she cannot recall)  Outpatient Therapist - None    Assessment and Plan:    The patient is a  35 y.o. female with a past medical history significant for multiple comorbidities as above and a past psychiatric history significant for opioid dependence and affective symptoms seen now on  consultation.    Unspecified mood (affective) disorder (CMS/[facility])  Assessment & Plan  Patient with a history of depression and anxiety that is likely exacerbated by her substance use issues.  She is interested in making a slight change to increase paroxetine to 30 mg qam, which is reasonable.  She is aware of the risk of serotonin syndrome and the difficulties associated with paroxetine discontinuation.  She also asks some insightful questions about ketamine treatment and ECT and information provided so she can gather additional outpatient information about ECT at [facility] should she be a candidate at some point.  1. Increase paroxetine to 30mg qam  2. Continue bupropion 75mg TID  3.  to please speak with patient about options for inpatient/outpatient rehab and follow-up with a psychiatrist/therapist      Opioid type dependence, continuous use (CMS/[facility])  Assessment & Plan  Patient acknowledges continued street opioid use over the last few months though she insists she has not used IV.  In any case she is an excellent candidate for MAT and has reportedly done well on suboxone in the past but is reluctant to restart now.  Cannot rule out her desire for using prescription opioid full agonists in the hospital clouding her judgment about restarting buprenorphine.  1. Recommend restarting buprenorphine but patient currently refusing  2.  to please speak with patient about options for inpatient rehab at [facility] or [facility] where she can do rehab while receiving iv antibiotics.  Patient is contemplating this.        The undersigned spent 60 minutes in treatment; greater than 50% of time in consultation    This patient note may have been dictated using speech recognition software.  Inadvertent speech recognition errors should be disregarded.  Please do not hesitate to call undersigned for clarifications.

## 2020-12-10 NOTE — CONSULTS
Reason for Consult: septic emboli    History of Present Illness:      Carolyn Redmond is a 35 y.o. female with    M obesity  Depression c suicide attempt  IVDU, denies use in long time  Pancreatitis, chr pain  Vasculitis  choly    2wk ago c low lumbar pain, sudden onset and worsening  Went to [facility] c MSSA on 12/7, 12/8  Pt wanted to be xfer to PH  MRI c L4/5 inflam change  Fever here to 38.8      Allergies:   Amoxicillin, Nsaids (non-steroidal anti-inflammatory drug), Trazodone, and Tramadol    Current Facility-Administered Medications   Medication Dose Route Frequency Provider Last Rate Last Dose   • buprenorphine HCL (SUBUTEX) SL tablet 8 mg  8 mg sublingual q8h PRN Perfecto Angela MD       • buPROPion (WELLBUTRIN) tablet 75 mg  75 mg oral TID Perfecto Angela MD   75 mg at 12/10/20 1352   • butalbital-acetaminophen-caff (FIORICET, ESGIC) per tablet 1 tablet  1 tablet oral q6h PRN Perfecto Angela MD   1 tablet at 12/10/20 1155   • chlorproMAZINE (THORAZINE) tablet 200 mg  200 mg oral Nightly Perfecto Angela MD       • glucose chewable tablet 16-32 g of dextrose  16-32 g of dextrose oral PRN Perfecto Angela MD        Or   • dextrose 40 % oral gel 15-30 g of dextrose  15-30 g of dextrose oral PRN Perfecto Angela MD        Or   • glucagon (GLUCAGEN) injection 1 mg  1 mg intramuscular PRN Perfecto Angela MD        Or   • dextrose in water injection 12.5 g  25 mL intravenous PRN Perfecto Angela MD       • diazePAM (VALIUM) injection 5 mg  5 mg intravenous q8h PRN Perfecto Angela MD   5 mg at 12/10/20 1202   • fluticasone propionate (FLONASE) 50 mcg/actuation nasal spray 2 spray  2 spray Each Nostril Daily PRN Perfecto Angela MD       • heparin (porcine) 5,000 unit/mL injection 5,000 Units  5,000 Units subcutaneous q8h [facility] Perfecto Angela MD   5,000 Units at 12/09/20 1784   • ketorolac (TORADOL) injection 15 mg  15 mg intravenous q6h PRN Perfecto Angela MD   15 mg at 12/10/20  1156   • magnesium sulfate IVPB 2g in 50 mL NSS/D5W/SWFI  2 g intravenous PRN Perfecto Angela MD       • metoprolol succinate XL (TOPROL-XL) 24 hr ER tablet 25 mg  25 mg oral Daily Perfecto Angela MD   25 mg at 12/10/20 0829   • [START ON 12/11/2020] PARoxetine (PAXIL) tablet 30 mg  30 mg oral Daily Marlon Padron MD       • potassium chloride (KLOR-CON) tablet extended release 20 mEq  20 mEq oral Daily PRN Perfecto Angela MD        Or   • potassium chloride (KLOR-CON) tablet extended release 40 mEq  40 mEq oral Daily PRN Perfecto Angela MD        Or   • potassium chloride 20 mEq in 100 mL IVPB  (premix)  20 mEq intravenous Daily PRN Perfecto Angela MD        Or   • potassium chloride (KCL) 40 mEq/250 mL IVPB in NSS 40 mEq  40 mEq intravenous Daily PRN Perfecto Angela MD       • prazosin (MINIPRESS) capsule 2 mg  2 mg oral Nightly Perfecto Angela MD   2 mg at 12/09/20 2340   • pregabalin (LYRICA) capsule 150 mg  150 mg oral BID Perfecto Angela MD   150 mg at 12/10/20 0829   • QUEtiapine (SEROquel) tablet 100 mg  100 mg oral Nightly Perfecto Angela MD   100 mg at 12/09/20 2340   • sodium chloride 0.9 % infusion   intravenous Continuous Perfecto Angela MD 80 mL/hr at 12/10/20 1207     • vancomycin 1.25 gram/250 mL IVPB in NSS  1,250 mg intravenous q8h INT Dominique Powell DO       • zolpidem (AMBIEN) tablet 10 mg  10 mg oral Nightly Perfecto Angela MD   10 mg at 12/09/20 2355      Social History:   Lives c parents  Former tob, EtOH  MJ    Family History: NC    Review of Systems  Negative x as stated above    Temp:  [36.8 °C (98.2 °F)-38.8 °C (101.8 °F)] 36.9 °C (98.4 °F)  Heart Rate:  [] 94  Resp:  [16-18] 18  BP: (119-161)/(61-94) 131/79  SpO2 Readings from Last 3 Encounters:   12/10/20 96%   08/05/20 94%   07/10/20 97%     Physical Exam  WD M obese NAD    Head: Atraumatic  Mouth: Clear  Skin: No rash  Sclera: Anicteric  Neck: Supple  LN:  No significant enlargement  Lungs: clr  x bases  CV: Nl S1 and S2, no m, no embolic changes  Abd: Soft, NT, no HSM  Extr: No jt effusions, no edema  Neuro: Alert, lucid    Labs  I have reviewed the patient's pertinent labs.     Imaging:     Indep Rev    Mri T and L Spine With And Without Contrast  Result Date: 12/9/2020  IMPRESSION: Diffuse anterior and posterior epidural enhancement within lumbar spine more pronounced at L4-L5 raising the suspicion for underlying infectious process/phlegmon.  Left greater than right L4-L5 facet joint signal and enhancement     Ct Chest Pulmonary Embolism With Iv Contrast  Result Date: 12/9/2020  IMPRESSION: 1.  No evidence of main, lobar or segmental pulmonary embolism. 2.  However, there are multifocal nodular lesions throughout all lung fields including a cavitary lesion in the right upper lobe all suspicious for septic emboli. 3.  Small bilateral pleural effusions. 4.  Mosaic pattern at the lung bases could represent mild edema or sequela of small airways or small vessels disease    Impression    MSSA TV IE c emboli to lungs  JANUSZ for staging  Give cefazolin  BC here pnd    Above indicates that pt is likely still to be using IVDU    ASHLEY Jones MD  Pager [phone]

## 2020-12-10 NOTE — H&P
Hospital Medicine Service -  History & Physical        CHIEF COMPLAINT     Chief Complaint   Patient presents with   • Fever   • Back Pain        HISTORY OF PRESENT ILLNESS      35 y.o. female with a past medical history of anxiety Disorder with suicide attempt on June 2017, migraines, Chronic abdominal pain, vasculitis, depression, pancreatitis, endocarditis 2017, IV drug abuse. Patient denies any use of IV drug use over the past year as patient lives with at home with mother. Patient presents to the emergency department for evaluation of back pain.  Patient states she had onset of lower back pain lumbar region 2 weeks ago.  The patient's pain has been constant, progressively worsening.  The patient was seen in urgent care and had x-rays done that by the patient report showed scoliosis and DJD.  The patient was hospitalized at [facility] 3 days ago where they were concerned enough to order MRI testing.  She was given last dose vancomycin last night.  CT brain WNL, CT abd/pelvis - no kidney stones, echo WNL except questionable tricuspid vegetation, US right upper extremity - superficial thrombus distal basilic vein, no DVT. COVID/flu/RSV negative 12/7/20The patient left the hospital AMA prior to imaging.  Patient does report having intermittent fevers at home.  The patient complains of continued pain in the lumbar back more toward the left.  The patient complains the pain is worse with movement .Here in the ED patient was requesting for Dilaudid which relieve her pain.  Patient did present a SIRS presentation as patient's CRP was 214 troponins were 0.02 lactic acid was 0.7 WBC was 8.8 hemoglobin 9.1.  MRI of the thoracic and lumbar sacral area revealed a diffuse anterior and posterior epidural enhancement within the lumbar spine at the L4-L5 for an infectious process.  CT of the chest showed multi nodule lesions on the lung fields especially on the right upper lobe suspicious for septic emboli.  Patient  is willing to stay and will be admitted for further evaluation and treatment    PAST MEDICAL AND SURGICAL HISTORY      Past Medical History:   Diagnosis Date   • Anxiety    • Depressed    • Endocarditis    • Fibromyalgia    • Migraines    • Pancreatitis    • Vasculitis (CMS/HCC)        Past Surgical History:   Procedure Laterality Date   • CHOLECYSTECTOMY     • ERCP     • GALLBLADDER SURGERY         MEDICATIONS      Prior to Admission medications    Medication Sig Start Date End Date Taking? Authorizing Provider   buprenorphine-naloxone (SUBOXONE) 8-2 mg per SL tablet Place 1 tablet under the tongue 3 (three) times a day. 6a, 11a, 4p   Yes Bari Ji MD   buPROPion (WELLBUTRIN) 75 mg tablet Take 75 mg by mouth 3 (three) times a day.   Yes June Mchugh   butalbital-acetaminophen-caff -40 mg per capsule Take 2 capsules by mouth every 4 (four) hours as needed for headaches.   Yes Bari Ji MD   cyclobenzaprine (FLEXERIL) 10 mg tablet Take 10 mg by mouth 3 (three) times a day as needed for muscle spasms.   Yes Bari Ji MD   fluticasone propionate (FLONASE) 50 mcg/actuation nasal spray Administer 2 sprays into each nostril daily as needed.     Yes Shona Dewey NP   metoprolol succinate XL (TOPROL-XL) 25 mg 24 hr tablet Take 25 mg by mouth daily.   Yes Shona Dewey NP   PARoxetine (PAXIL) 10 mg tablet Take 20 mg by mouth daily.     Yes June Mchugh   prazosin (MINIPRESS) 1 mg capsule Take 2 mg by mouth nightly.   Yes June Mchugh   pregabalin (LYRICA) 75 mg capsule Take 150 mg by mouth 2 (two) times a day. 8a & 8p     Yes Shona Dewey NP   QUEtiapine (SEROquel) 100 mg tablet Take 100 mg by mouth nightly.   Yes ProviderBari MD   zolpidem (AMBIEN) 10 mg tablet Take 10 mg by mouth nightly.   Yes Bari Ji MD   chlorproMAZINE (THORAZINE) 100 mg tablet Take 200 mg by mouth nightly.    June Mchugh       ALLERGIES      Amoxicillin, Nsaids  (non-steroidal anti-inflammatory drug), Trazodone, and Tramadol    FAMILY HISTORY      Family History   Problem Relation Age of Onset   • No Known Problems Biological Mother    • Lung cancer Biological Father        SOCIAL HISTORY      Social History     Socioeconomic History   • Marital status: Single     Spouse name: None   • Number of children: None   • Years of education: None   • Highest education level: None   Occupational History   • None   Social Needs   • Financial resource strain: None   • Food insecurity     Worry: None     Inability: None   • Transportation needs     Medical: None     Non-medical: None   Tobacco Use   • Smoking status: Former Smoker     Packs/day: 0.50     Types: Cigarettes   • Smokeless tobacco: Former User   • Tobacco comment: vapes    Substance and Sexual Activity   • Alcohol use: Not Currently     Comment: social   • Drug use: Yes     Types: Marijuana   • Sexual activity: Defer   Lifestyle   • Physical activity     Days per week: None     Minutes per session: None   • Stress: None   Relationships   • Social connections     Talks on phone: None     Gets together: None     Attends [employer] service: None     Active member of club or organization: None     Attends meetings of clubs or organizations: None     Relationship status: None   • Intimate partner violence     Fear of current or ex partner: None     Emotionally abused: None     Physically abused: None     Forced sexual activity: None   Other Topics Concern   • None   Social History Narrative   • None       REVIEW OF SYSTEMS        Review of Systems  Constitutional: Negative for fatigue and unexpected weight change. Fever and chills  Eyes: Negative for visual disturbance. Mouth no sore throat  Respiratory: Negative for cough and shortness of breath.    Cardiovascular: Negative for chest pain and palpitations.   Gastrointestinal: Negative for abdominal pain, constipation, diarrhea, nausea and vomiting. PoorAppetite  Genitourinary:  Negative for difficulty urinating. no hematuria no frequency no urgency no discharge  Musculoskeletal: Negative for arthralgias. Back pain  Skin:Right arm rash.   Neurological: On and off dizziness and headaches.   Hematological: Does not bruise/bleed easily.   Psychiatric/Behavioral: Negative for confusion and dysphoric mood no suicidal ideations          PHYSICAL EXAMINATION      Temp:  [36.8 °C (98.2 °F)-38.8 °C (101.8 °F)] 36.8 °C (98.2 °F)  Heart Rate:  [104-115] 105  Resp:  [16-18] 18  BP: (126-161)/(61-94) 126/78  Body mass index is 43.13 kg/m².    General exam : appears age stated, well nourished, not in distress  Head: atraumatic, normocephalic  Eyes : PERRLA, EOMI, no pallor, no icterus  ENT: no lesions, oropharynx pink, mucous membranes moist   Neck: supple, no Lymph nodes, no Thyromegaly, no JVD   CVS : normal rate, normal rhythm, S1 and S2 heard, 2/6 murmurs, rubs or gallops  Resp:normal accessory muscle usage, clear to auscultation Bilaterally  Abdomen : soft, Nt, BS +, no organomegaly   Extremities : no edema, no cyanosis   MSK: no DJD, no joint swellings, Paraspinous tenderness SLR at 20-30 degrees bilaterally  Skin: intact, warm, Erythema rash right biceps  Neuro: AAO x3, CN 2-12 intact,  motor strength in all extremities, sensations, DTRs, coordination intact.  Psych: normal mood.cooperative      LABS / IMAGING / EKG        Labs  CBC Results       12/09/20 11/07/19 09/29/19                    1348 2138 1639         WBC 8.88 6.46 8.20         RBC 3.34 4.41 4.34         HGB 9.1 11.9 11.9         HCT 28.2 35.9 36.6         MCV 84.4 81.4 84.3         MCH 27.2 27.0 27.4         MCHC 32.3 33.1 32.5          202 243                     CMP Results       12/09/20 11/07/19 09/29/19                    1348 2139 1639          134 134         K 4.3 3.7 4.1         Cl 103 102 106         CO2 22 24 22         Glucose 92 106 100         BUN 8 8 15         Creatinine 0.8 0.8 0.7         Calcium 8.9  8.7 8.7         Anion Gap 11 8 6         AST 14 22 --         ALT 11 16 --         Albumin 3.0 3.5 --         EGFR >60.0 >60.0 >60.0         Comment for K at 1348 on 12/09/20: Results obtained on plasma. Plasma Potassium values may be up to 0.4 mEQ/L less than serum values. The differences may be greater for patients with high platelet or white cell counts.    Comment for K at 2139 on 11/07/19:   Results obtained on plasma. Plasma Potassium values may be up to 0.4 mEQ/L less than serum values. The differences may be greater for patients with high platelet or white cell counts.    Comment for K at 1639 on 09/29/19:   Results obtained on plasma. Plasma Potassium values may be up to 0.4 mEQ/L less than serum values. The differences may be greater for patients with high platelet or white cell counts.          Troponin I Results       12/09/20 09/29/19 05/03/17                    1348 1639 1606         Troponin I <0.02 <0.02 <0.02  A rise or fall of troponin with at least one abnormal value is consistent with myocardial injury or necrosis in the presence of appropriate clinical, ECG, and/or imaging abnormalities.                       Microbiology Results     ** No results found for the last 720 hours. **        UA Results       12/09/20                          1202           Color Yellow           Clarity Cloudy           Glucose Negative           Bilirubin Negative           Ketones Negative           Sp Grav 1.006           Blood Negative           Ph 6.5           Protein Negative           Urobilinogen 0.2           Nitrite Negative           Leuk Est Negative           WBC 0 TO 3           RBC 0 TO 4           Bacteria +1           Comment for Blood at 1202 on 12/09/20: The sensitivity of the occult blood test is equivalent to approximately 4 intact RBC/HPF.    Comment for Leuk Est at 1202 on 12/09/20: Results can be falsely negative due to high specific gravity, some antibiotics, glucose >3 g/dl, or WBC other  than neutrophils.          Imaging  CT CHEST PULMONARY EMBOLISM WITH IV CONTRAST   Final Result   IMPRESSION:   1.  No evidence of main, lobar or segmental pulmonary embolism.   2.  However, there are multifocal nodular lesions throughout all lung fields   including a cavitary lesion in the right upper lobe all suspicious for septic   emboli.  Some of the tiny subsegmental pulmonary arterial branches demonstrate   hypoenhancement and it is difficult to determinate if this is related to tiny   pulmonary emboli or technique.   3.  Small bilateral pleural effusions.   4.  Mosaic pattern at the lung bases could represent mild edema or sequela of   small airways or small vessels disease.   5.  Follow-up chest CT after appropriate treatment is recommended to document   lung opacities and mediastinal lymphadenopathy which is probably reactive.   6.  Additional findings discussed below.            Finding:    Other   Acuity: Critical  Status:  CLOSED      Critical read back for the first impression performed with Bertha Bowie PA   9:25 PM 12/9/2020      COMMENT:   Comparison: Chest x-ray from 12/9/2020   Technique: Chest CT pulmonary embolism protocol performed with intravenous   contrast.  80 mL Omnipaque 350 given.   CT DOSE:  One or more dose reduction techniques (e.g. automated exposure   control, adjustment of the mA and/or kV according to patient size, use of   iterative reconstruction technique) utilized for this examination.      FINDINGS:   LUNG, PLEURA AND LARGE AIRWAYS: The trachea and proximal bronchi remain clear.   There are multifocal nodular lesions throughout the lungs.  For example in the   right upper lobe there is a cavitary lesion measuring 1.4 cm on axial image 63.   A 1.6 cm nodular lesion in the left upper lobe on image 69.  Multiple additional   lesions are present elsewhere.  His are suspicious for septic emboli   particularly given the history of endocarditis and sepsis.   There are small  bilateral pleural effusions.   There is a nonspecific mild degree of groundglass opacity in the lower lung   fields.  This could represent mild degree of pulmonary edema or possibly a   mosaic pattern from small airways or small vessel disease.   No pneumothorax.         VESSELS: Normal caliber of the thoracic aorta.  Thoracic aorta appears within   normal limits.  Main pulmonary artery is normal in caliber.  No evidence of a   main, lobar or segmental pulmonary embolism.  However, the subsegmental vessels   are difficult to assess and some demonstrate a slightly hypoechoic enhancing   appearance possibly artifact from bolus timing although tiny subsegmental   pulmonary emboli would be difficult to exclude.  For example, in the lingula   there is some hypoenhancement of the subsegmental vessels on image 116 where   subsegmental PE cannot be excluded.  No evidence of right heart strain.   HEART: normal size. No pericardial effusion.   MEDIASTINUM AND PAULINA: There is a pathologically enlarged lymph node in the AP   window measuring 1.8 cm short axis.  Additional prominent but nonpathologic by   size video lymph nodes elsewhere.   CHEST WALL AND LOWER NECK: within normal limits.      UPPER ABDOMEN:Limited assessment of the upper abdominal structures with this   technique.  There is splenomegaly with the spleen measuring 14.5 cm.  The liver   may also be enlarged but is incompletely visualized.  Cholecystectomy changes.      BONES: No suspicious bony erosive changes.  Multilevel Schmorl's nodes and   degenerative changes in the spine are noted.  Calcific tendinosis of the right   rotator cuff.  There is limited assessment of the spine with this technique.  No   gross paraspinal inflammatory changes.         MRI THORACIC SPINE WITH AND WITHOUT CONTRAST   Final Result   IMPRESSION:      Diffuse anterior and posterior epidural enhancement within lumbar spine more   pronounced at L4-L5 raising the suspicion for underlying  infectious   process/phlegmon.  Left greater than right L4-L5 facet joint signal and   enhancement which although may be degenerative in nature, early changes of   septic facet arthritis is in the differential.  Follow-up is recommended.      Thoracic and lumbar degenerative change as described more pronounced at L4-L5.      Ring-enhancing lesion in right upper lobe which may be infectious in etiology   including in the setting of septic emboli.  Correlation with CT chest with   contrast is recommended to exclude other etiologies.      The results were discussed with Rylee Bowie on 12/9/2020 7:35 PM.      MRI LUMBAR SPINE WITH AND WITHOUT CONTRAST   Final Result   IMPRESSION:      Diffuse anterior and posterior epidural enhancement within lumbar spine more   pronounced at L4-L5 raising the suspicion for underlying infectious   process/phlegmon.  Left greater than right L4-L5 facet joint signal and   enhancement which although may be degenerative in nature, early changes of   septic facet arthritis is in the differential.  Follow-up is recommended.      Thoracic and lumbar degenerative change as described more pronounced at L4-L5.      Ring-enhancing lesion in right upper lobe which may be infectious in etiology   including in the setting of septic emboli.  Correlation with CT chest with   contrast is recommended to exclude other etiologies.      The results were discussed with Rylee Bowie on 12/9/2020 7:35 PM.      X-RAY CHEST 1 VIEW   Final Result   IMPRESSION:   No radiographic evidence of acute cardiopulmonary disease.      ECG 12 lead   ED Interpretation   Rhythm: Sinus tachycardia  Rate: 108  P waves: Normal interval  QRS: Normal QRS  Axis: Normal  ST Segments: No obvious ST elevation or ischemia                    ECG/Telemetry  reviewed by me    ASSESSMENT AND PLAN           * Septic embolism (CMS/HCC)  Assessment & Plan  To telemetry under [facility]  IV Vanco  Await blood cultures  Trends CRP  Consult  ID    Tricuspid valve vegetation  Assessment & Plan  Monitor on telemetry  Continue O2 therapy to maintain saturations  Trend troponin  Obtain ECHO from [place]  Cardiology consult    Anemia  Assessment & Plan  Repeat labs in the a.m.      Septic arthritis of vertebra (CMS/HCC)  Assessment & Plan  Same as above  Consult neurosurgery  Neurovascular intact  Pain control      Essential hypertension  Assessment & Plan  Continue with home meds  bp parameters for hypotension  dvt prophylaxis with heparin   Pt is full code        VTE Assessment: Padua VTE Score: 5  VTE Prophylaxis Plan:   Code Status: Full Code       Perfecto Angela MD  12/10/2020

## 2020-12-10 NOTE — ASSESSMENT & PLAN NOTE
(unstable)   Pt has a history of vasculitis, chronic abdominal and back pain, fibromyalgia, mirgraines and intermittent pancreatitis. On admission MRI imaging showed enhancement of the posterior epidural space and bilateral L4-5 facet joints, concerning for early osteomyelitis.  On IV antibiotics for  osteo/discitis and TV endocarditis.      1/24 Pt reports 7/10 low back pain, chronic in nature.  Back pain has increased since hospitalization due to limited activity.  She is currently on multi-modal regimen, including tylenol atc, lido patch, methadone, lyrica.  Pt is asking whether she could be given dose of methadone at night.  I spoke with psych (Dr. Padron) about split dosing and unfortunately, this cannot be continued as outpt at her methadone clinic so would not be a good long term plan for her.  I encouraged pt to continue multimodal regimen as well as using heating pad, stretching.  If pain is severe, I recommend utilizing motrin PRN.  She is agreeable to plan.      Multi-modal pain regimen recommend.   Cr- 0.8, CrCl-112.  Liver enzymes WNL on  1/3/2021    Would recommend the following:  - continue methadone 60mg daily per psych.   - continue acetaminophen 650mg PO q6hr.  Recheck liver enzymes  - C/w ibuprofen 600mg PO q8hr PRN    Note Epic states that pt has allergy to NSAIDs with nausea as only symptom.  Pt has previously taken on this admission.  .     - continue lyrica 150mg PO BID (home med per PDMP)  - continue lidocaine patch  - Reiki consult  - Heating pad   - Encourage mobility  - note pt is currently taking baclofen 10mg PO TID.  Pt is at risk for baclofen withdrawal if this is abruptly discontinued.     - do NOT recommend discharge with Opioids  - recommend pt follow-up with outpatient pain management specialist at [facility].

## 2020-12-10 NOTE — PROGRESS NOTES
Hospital Medicine Service -  Daily Progress Note       SUBJECTIVE   Interval History: Patient with complaint of pain in her lower back. Has had persistent headache.     OBJECTIVE      Vital signs in last 24 hours:  Temp:  [36.8 °C (98.2 °F)-38.8 °C (101.8 °F)] 37.1 °C (98.8 °F)  Heart Rate:  [] 98  Resp:  [16-18] 18  BP: (110-145)/(61-82) 110/80    Intake/Output Summary (Last 24 hours) at 12/10/2020 1700  Last data filed at 12/10/2020 1300  Gross per 24 hour   Intake 1840 ml   Output 600 ml   Net 1240 ml       PHYSICAL EXAMINATION      Physical Exam  Constitutional:       General: She is not in acute distress.     Appearance: She is well-developed.   HENT:      Head: Normocephalic and atraumatic.   Eyes:      Pupils: Pupils are equal, round, and reactive to light.   Cardiovascular:      Rate and Rhythm: Normal rate and regular rhythm.      Heart sounds: No murmur.   Pulmonary:      Effort: Pulmonary effort is normal.      Breath sounds: Normal breath sounds. No wheezing or rales.   Abdominal:      General: Bowel sounds are normal.      Palpations: Abdomen is soft.   Skin:     General: Skin is warm and dry.      Findings: No rash.   Neurological:      Mental Status: She is alert and oriented to person, place, and time.        LABS / IMAGING / TELE      Labs  Lab Results   Component Value Date    GLUCOSE 112 (H) 12/10/2020    CALCIUM 8.6 (L) 12/10/2020     12/10/2020    K 4.0 12/10/2020    CO2 25 12/10/2020     12/10/2020    BUN 8 12/10/2020    CREATININE 0.8 12/10/2020     Lab Results   Component Value Date    WBC 7.05 12/10/2020    HGB 9.2 (L) 12/10/2020    HCT 28.7 (L) 12/10/2020    MCV 85.4 12/10/2020     12/10/2020     Microbiology Results     Procedure Component Value Units Date/Time    Blood Culture Blood, Venous [[id number]] Collected: 12/09/20 1304    Specimen: Blood, Venous Updated: 12/10/20 1501     Culture No growth at 18-24 hours        Imaging  Mri Thoracic Spine With And  Without Contrast    Result Date: 12/9/2020  IMPRESSION: Diffuse anterior and posterior epidural enhancement within lumbar spine more pronounced at L4-L5 raising the suspicion for underlying infectious process/phlegmon.  Left greater than right L4-L5 facet joint signal and enhancement which although may be degenerative in nature, early changes of septic facet arthritis is in the differential.  Follow-up is recommended. Thoracic and lumbar degenerative change as described more pronounced at L4-L5. Ring-enhancing lesion in right upper lobe which may be infectious in etiology including in the setting of septic emboli.  Correlation with CT chest with contrast is recommended to exclude other etiologies. The results were discussed with Rylee Bowie on 12/9/2020 7:35 PM.    Mri Lumbar Spine With And Without Contrast    Result Date: 12/9/2020  IMPRESSION: Diffuse anterior and posterior epidural enhancement within lumbar spine more pronounced at L4-L5 raising the suspicion for underlying infectious process/phlegmon.  Left greater than right L4-L5 facet joint signal and enhancement which although may be degenerative in nature, early changes of septic facet arthritis is in the differential.  Follow-up is recommended. Thoracic and lumbar degenerative change as described more pronounced at L4-L5. Ring-enhancing lesion in right upper lobe which may be infectious in etiology including in the setting of septic emboli.  Correlation with CT chest with contrast is recommended to exclude other etiologies. The results were discussed with Rylee Bowie on 12/9/2020 7:35 PM.    X-ray Chest 1 View    Result Date: 12/9/2020  IMPRESSION: No radiographic evidence of acute cardiopulmonary disease.    Ct Chest Pulmonary Embolism With Iv Contrast    Result Date: 12/9/2020  IMPRESSION: 1.  No evidence of main, lobar or segmental pulmonary embolism. 2.  However, there are multifocal nodular lesions throughout all lung fields including a cavitary  lesion in the right upper lobe all suspicious for septic emboli.  Some of the tiny subsegmental pulmonary arterial branches demonstrate hypoenhancement and it is difficult to determinate if this is related to tiny pulmonary emboli or technique. 3.  Small bilateral pleural effusions. 4.  Mosaic pattern at the lung bases could represent mild edema or sequela of small airways or small vessels disease. 5.  Follow-up chest CT after appropriate treatment is recommended to document lung opacities and mediastinal lymphadenopathy which is probably reactive. 6.  Additional findings discussed below. Finding:    Other   Acuity: Critical  Status:  CLOSED Critical read back for the first impression performed with Bertha Bowie PA 9:25 PM 12/9/2020 COMMENT: Comparison: Chest x-ray from 12/9/2020 Technique: Chest CT pulmonary embolism protocol performed with intravenous contrast.  80 mL Omnipaque 350 given. CT DOSE:  One or more dose reduction techniques (e.g. automated exposure control, adjustment of the mA and/or kV according to patient size, use of iterative reconstruction technique) utilized for this examination. FINDINGS: LUNG, PLEURA AND LARGE AIRWAYS: The trachea and proximal bronchi remain clear. There are multifocal nodular lesions throughout the lungs.  For example in the right upper lobe there is a cavitary lesion measuring 1.4 cm on axial image 63. A 1.6 cm nodular lesion in the left upper lobe on image 69.  Multiple additional lesions are present elsewhere.  His are suspicious for septic emboli particularly given the history of endocarditis and sepsis. There are small bilateral pleural effusions. There is a nonspecific mild degree of groundglass opacity in the lower lung fields.  This could represent mild degree of pulmonary edema or possibly a mosaic pattern from small airways or small vessel disease. No pneumothorax. VESSELS: Normal caliber of the thoracic aorta.  Thoracic aorta appears within normal limits.   Main pulmonary artery is normal in caliber.  No evidence of a main, lobar or segmental pulmonary embolism.  However, the subsegmental vessels are difficult to assess and some demonstrate a slightly hypoechoic enhancing appearance possibly artifact from bolus timing although tiny subsegmental pulmonary emboli would be difficult to exclude.  For example, in the lingula there is some hypoenhancement of the subsegmental vessels on image 116 where subsegmental PE cannot be excluded.  No evidence of right heart strain. HEART: normal size. No pericardial effusion. MEDIASTINUM AND PAULINA: There is a pathologically enlarged lymph node in the AP window measuring 1.8 cm short axis.  Additional prominent but nonpathologic by size video lymph nodes elsewhere. CHEST WALL AND LOWER NECK: within normal limits. UPPER ABDOMEN:Limited assessment of the upper abdominal structures with this technique.  There is splenomegaly with the spleen measuring 14.5 cm.  The liver may also be enlarged but is incompletely visualized.  Cholecystectomy changes. BONES: No suspicious bony erosive changes.  Multilevel Schmorl's nodes and degenerative changes in the spine are noted.  Calcific tendinosis of the right rotator cuff.  There is limited assessment of the spine with this technique.  No gross paraspinal inflammatory changes.       ECG/Telemetry  I have independently reviewed the telemetry. No events for the last 24 hours.    ASSESSMENT AND PLAN      * Acute bacterial endocarditis  Assessment & Plan  Suspected tricuspid valve endocarditis  Continue antibiotics  Obtain more cultures today  Follow-up cultures from [place], latest was +GPC in clusters    Tricuspid valve vegetation  Assessment & Plan  Suspected on outpatient TTE, plan for JANUSZ tomorrow  Continue antibiotics    Anemia  Assessment & Plan        Septic arthritis of vertebra (CMS/HCC)  Assessment & Plan  Consult neurosurgery  Neurovascular intact  Pain control      Essential  hypertension  Assessment & Plan  Continue with home meds  bp parameters for hypotension    Septic embolism (CMS/HCC)  Assessment & Plan  Continue IV vancomycin  Blood cultures performed on admission  Evidence of discitis on MRI, neurosurgery consulted as well as ID         VTE Assessment: Padua VTE Score: 5  Code Status: Full Code  Estimated discharge date: 12/11/2020     Dominique Powell DO  12/10/2020  5:00 PM

## 2020-12-10 NOTE — ASSESSMENT & PLAN NOTE
Patient with history of depression and addiction.  1. Continue paroxetine 30mg qam  2. Continue bupropion 75mg TID  3. Continue quetiapine 100mg qhs  4.  to please speak with patient about options for inpatient/outpatient rehab and follow-up with a psychiatrist/therapist

## 2020-12-10 NOTE — ASSESSMENT & PLAN NOTE
35yoF admitted with tricuspid valve endocarditis with severe TR, septic pulmonary emboli and seeding of lumbar spine.   Pt completed 6w of antibiotics on 1/18/21.  MRI spine 1/19/21 improvement in the previously seen diffuse anterior and posterior epidural enhancement within the lumbar spine with only mildly prominent enhancement persisting from L4 to L5 and residual enhancement about the left greater than right facet joints.  Due to imaging, pt is being continued on IV antibiotics for two more weeks.      Patient with history of opioid misuse.  Urine drug screen on 12/9 positive for cannabinoids and barbituates.  [facility] has 21 prescribers and 110 scripts in past 12 months.  Per PDMP, pt previously was on suboxone, most recent script dating back to Aug 2020.      Plan:   - Currently on methadon 60mg PO daily ordered by psych  - Psych and SW consulted for addiction support  - Recommend inpt treatment to complete IV antibiotic course.   - Pt is at HIGH risk for opioid abuse relapse based on ORT tool.

## 2020-12-10 NOTE — ASSESSMENT & PLAN NOTE
-Pulmonary emboli noted on CT scan.  -JANUSZ demonstrating tricuspid endocarditis  -See endocarditis section for details.    12/29: pleuritic pain improved significantly.  Expect that this will resolve.

## 2020-12-10 NOTE — CONSULTS
Behavioral Health Consult    Chief Complaint/Reason for consult: opioid use/ affective d/o.    Interval History:     Carolyn Redmond is a 35 y.o. female who was admitted with endocarditis and infection. She has a known hx of opioid use and had at one point been using by IV. This was a few years ago and she reports she has not used by IV recently, however had a relapse and bought heroin/fentanyl on the streets the last few months, but she has inhaled. As well the pt discussed she has struggled with depression/anxiety over the years and currently she is taking wellbutrin and paxil as well as seroquel for sleep. She denies any past psychotic and manic symptoms and any thoughts of major depression/Suicide.     SOCIAL HISTORY:   The pt reports that she is originally from and grew up in [place], went to [employer]  and then went to [employer]. 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     PAST PSYCHIATRIC HISTORY:  The patient admits that she has been in and out of psychiatric care for years. She admits to 3 previous hospitalizations and endorsed prior suicide attempt and further depressive symptoms. Last hospitalization was at [facility] in 2019, following the attempted suicide by OD. She reports struggling with anxiety, but seems to think this is well managed at this time with the use of paxil. Discussed with psychiatry increasing the paxil as well the pt had further questions in regards to ketamine as well as ECT tx, to which this was discussed. The patient denies any history of major depressions, psychosis/delusions, sarah/hypomania.  The patient denies ever self-injuring or attempting suicide or being inpatient on a psychiatric unit.      Suicide Attempts: no     Risk Factors: Previous suicide attempt(s) and Alcohol and/or substance abuse     Protective Factors: Effective and accessible mental health care  and  Connectedness to individuals, family, community, and social institutions   Current Psychiatrist: no  Past psychiatric Hospitalization: yes - the pt reports 3 and last hospitalization at 2019 at [facility]  Medication Trials:  no  ECT trials: no, the pt is interested in ECT information.     SUBSTANCE HISTORY:  The pt admits hx of opiate use by way of prescription and reports beginning to buy from the streets as well as then used heroin/fentanyl. Admits to use by IV for a short period of time. Denies any use of cocaine, ETOH and any history of withdrawal seizures, shakes or dts    Substance Use History:  Substance use: opioids  Consequences of use: No  Past D&A Treatment: No    Current Medications:  •  buprenorphine, 8 mg, sublingual, q8h PRN•  buPROPion, 75 mg, oral, TID•  butalbital-acetaminophen-caff, 1 tablet, oral, q6h PRN•  chlorproMAZINE, 200 mg, oral, Nightly•  glucose, 16-32 g of dextrose, oral, PRN **OR** dextrose, 15-30 g of dextrose, oral, PRN **OR** glucagon, 1 mg, intramuscular, PRN **OR** dextrose in water, 25 mL, intravenous, PRN•  diazePAM, 5 mg, intravenous, q8h PRN•  fluticasone propionate, 2 spray, Each Nostril, Daily PRN•  heparin (porcine), 5,000 Units, subcutaneous, q8h JAIDEN•  ketorolac, 15 mg, intravenous, q6h PRN•  magnesium sulfate, 2 g, intravenous, PRN•  metoprolol succinate XL, 25 mg, oral, Daily•  [START ON 2020] PARoxetine, 30 mg, oral, Daily•  potassium chloride, 20 mEq, oral, Daily PRN **OR** potassium chloride, 40 mEq, oral, Daily PRN **OR** potassium chloride, 20 mEq, intravenous, Daily PRN **OR** potassium chloride, 40 mEq, intravenous, Daily PRN•  prazosin, 2 mg, oral, Nightly•  pregabalin, 150 mg, oral, BID•  QUEtiapine, 100 mg, oral, Nightly•  sodium chloride 0.9 %, , intravenous, Continuous•  vancomycin, 1,750 mg, intravenous, Once•  vancomycin, 15-20 mg/kg, intravenous, PRN **AND** [] IV Vancomycin Therapy by Pharmacy Protocol, , , Once•  zolpidem, 10 mg, oral,  Nightly    Home Medications:  •  buprenorphine-naloxone, Place 1 tablet under the tongue 3 (three) times a day. 6a, 11a, 4p•  buPROPion, Take 75 mg by mouth 3 (three) times a day.•  butalbital-acetaminophen-caff, Take 2 capsules by mouth every 4 (four) hours as needed for headaches.•  cyclobenzaprine, Take 10 mg by mouth 3 (three) times a day as needed for muscle spasms.•  fluticasone propionate, Administer 2 sprays into each nostril daily as needed.  •  metoprolol succinate XL, Take 25 mg by mouth daily.•  PARoxetine, Take 20 mg by mouth daily.  •  prazosin, Take 2 mg by mouth nightly.•  pregabalin, Take 150 mg by mouth 2 (two) times a day. 8a & 8p  •  QUEtiapine, Take 100 mg by mouth nightly.•  zolpidem, Take 10 mg by mouth nightly.•  chlorproMAZINE, Take 200 mg by mouth nightly.    Objective    Vital Signs for the last 24 hours:  Temp:  [36.8 °C (98.2 °F)-38.8 °C (101.8 °F)] 37.3 °C (99.1 °F)  Heart Rate:  [] 99  Resp:  [16-18] 18  BP: (119-161)/(61-94) 120/76    MENTAL STATUS EXAM  Appearance: well groomed  Gait and Motor: no abnormal movements  Speech: normal rate/rhythm/volume  Mood: 'depressed'  Affect: normal  Associations: coherent  Thought Process: goal-directed  Thought Content: no auditory or visual hallucinations.  Suicidality/Homicidality: denies  Judgement/Insight: fair  Orientation: AAOx3  Memory: recall  Attention: alert  Knowledge: normal  Language: normal    Outpatient Psychiatrist - None, following with PCP currently and states she has an appointment next month with a new psychiatrist  Outpatient Therapist - None    Assessment    35 y.o. female being consulted for opioid use and affective d/o. At this time the pt is very calm and cooperative and has understandable concerns for her presentation medically and current infections. Recommendation from psychiatry to restart buprenorphine but patient currently refusing. Began discussion about the benefits of the pt transferring to a setting such  as [facility] where she can receive iv antibiotics.      Plan    Will continue to follow with the pt for further needs and dispo if necessary. Provided resources to the pt for outpt follow as well and ECT information as requested by the pt.

## 2020-12-10 NOTE — PROGRESS NOTES
Vancomycin Dosing by Pharmacy Consult Initiated    Carolyn Redmond is a 35 y.o. female who has been consulted for vancomycin dosing for endocarditis, bloodstream infection, and bone/joint infection prescribed by Dr. Dominique Powell .    Reviewed relevant clinical data including weight, renal function, previous vancomycin doses, and vancomycin levels:  Creatinine   Date/Time Value Ref Range Status   12/10/2020 0948 0.8 0.6 - 1.1 mg/dL Final   12/09/2020 1348 0.8 0.6 - 1.1 mg/dL Final           Vancomycin Administrations (last 96 hours)       Date/Time Action Medication Dose Rate    12/09/20 2058 New Bag    vancomycin 1.5 g/500 mL IVPB in NSS 1,500 mg 250 mL/hr            Assessment/Plan  The patient is ordered vancomycin dosing by pharmacy.    The dose will be based on:         Wt Readings from Last 1 Encounters:   12/09/20 107 kg (235 lb 12.8 oz)         Estimated Creatinine Clearance: 115.1 mL/min by (C-G formula)      Patient received 1500 mg in ED. Will add 500 mg to first maintenance dose to complete 2000 mg loading dose, so will initiate a one time dose of 1750 mg IV x1 at 1157 today and maintenance dose of 1250 mg IV every 8 hours starting 12/10 @ 2000.    Target a trough of 15-20 ug/mL per vancomycin dosing per pharmacy order.  Pharmacy will continue to follow the patient's vancomycin dosing daily.      Please call vancomycin levels to the pharmacy.  Marissa Cushing, LauryD

## 2020-12-11 ENCOUNTER — APPOINTMENT (INPATIENT)
Dept: CARDIOLOGY | Facility: HOSPITAL | Age: 35
End: 2020-12-11
Attending: INTERNAL MEDICINE
Payer: COMMERCIAL

## 2020-12-11 ENCOUNTER — ANESTHESIA (INPATIENT)
Dept: CARDIOLOGY | Facility: HOSPITAL | Age: 35
End: 2020-12-11
Payer: COMMERCIAL

## 2020-12-11 ENCOUNTER — ANESTHESIA EVENT (INPATIENT)
Dept: CARDIOLOGY | Facility: HOSPITAL | Age: 35
End: 2020-12-11
Payer: COMMERCIAL

## 2020-12-11 LAB
ANION GAP SERPL CALC-SCNC: 12 MEQ/L (ref 3–15)
BASOPHILS # BLD: 0.04 K/UL (ref 0.01–0.1)
BASOPHILS NFR BLD: 0.5 %
BSA FOR ECHO PROCEDURE: 2.16 M2
BUN SERPL-MCNC: 7 MG/DL (ref 8–20)
CALCIUM SERPL-MCNC: 9 MG/DL (ref 8.9–10.3)
CHLORIDE SERPL-SCNC: 105 MEQ/L (ref 98–109)
CO2 SERPL-SCNC: 23 MEQ/L (ref 22–32)
CREAT SERPL-MCNC: 0.6 MG/DL (ref 0.6–1.1)
DATE+TIME DOSE: 459
DATE+TIME DOSE: ABNORMAL
DIFFERENTIAL METHOD BLD: ABNORMAL
EOSINOPHIL # BLD: 0.25 K/UL (ref 0.04–0.36)
EOSINOPHIL NFR BLD: 3.1 %
ERYTHROCYTE [DISTWIDTH] IN BLOOD BY AUTOMATED COUNT: 14.5 % (ref 11.7–14.4)
EST RIGHT VENT SYSTOLIC PRESSURE BY TRICUSPID REGURGITATION JET: 48 MMHG
GFR SERPL CREATININE-BSD FRML MDRD: >60 ML/MIN/1.73M*2
GLUCOSE SERPL-MCNC: 95 MG/DL (ref 70–99)
HCT VFR BLDCO AUTO: 26.5 % (ref 35–45)
HGB BLD-MCNC: 8.5 G/DL (ref 11.8–15.7)
IMM GRANULOCYTES # BLD AUTO: 0.23 K/UL (ref 0–0.08)
IMM GRANULOCYTES NFR BLD AUTO: 2.9 %
LYMPHOCYTES # BLD: 2.29 K/UL (ref 1.2–3.5)
LYMPHOCYTES NFR BLD: 28.7 %
MCH RBC QN AUTO: 26.8 PG (ref 28–33.2)
MCHC RBC AUTO-ENTMCNC: 32.1 G/DL (ref 32.2–35.5)
MCV RBC AUTO: 83.6 FL (ref 83–98)
MONOCYTES # BLD: 0.65 K/UL (ref 0.28–0.8)
MONOCYTES NFR BLD: 8.2 %
NEUTROPHILS # BLD: 4.51 K/UL (ref 1.7–7)
NEUTS SEG NFR BLD: 56.6 %
NRBC BLD-RTO: 0 %
PDW BLD AUTO: 10.1 FL (ref 9.4–12.3)
PLATELET # BLD AUTO: 186 K/UL (ref 150–369)
POTASSIUM SERPL-SCNC: 3.9 MEQ/L (ref 3.6–5.1)
RBC # BLD AUTO: 3.17 M/UL (ref 3.93–5.22)
SODIUM SERPL-SCNC: 140 MEQ/L (ref 136–144)
TR MAX PG: 44 MMHG
TRICUSPID VALVE PEAK REGURGITATION VELOCITY: 3.3 M/S
VANCOMYCIN TROUGH SERPL-MCNC: 21.9 UG/ML (ref 10–15)
WBC # BLD AUTO: 7.97 K/UL (ref 3.8–10.5)

## 2020-12-11 PROCEDURE — 99233 SBSQ HOSP IP/OBS HIGH 50: CPT | Performed by: NURSE PRACTITIONER

## 2020-12-11 PROCEDURE — 25000000 HC PHARMACY GENERAL: Performed by: INTERNAL MEDICINE

## 2020-12-11 PROCEDURE — 63600000 HC DRUGS/DETAIL CODE: Performed by: NURSE PRACTITIONER

## 2020-12-11 PROCEDURE — 37000001 HC ANESTHESIA GENERAL

## 2020-12-11 PROCEDURE — 63600000 HC DRUGS/DETAIL CODE: Performed by: HOSPITALIST

## 2020-12-11 PROCEDURE — 93312 ECHO TRANSESOPHAGEAL: CPT | Mod: 26 | Performed by: INTERNAL MEDICINE

## 2020-12-11 PROCEDURE — 63700000 HC SELF-ADMINISTRABLE DRUG: Performed by: HOSPITALIST

## 2020-12-11 PROCEDURE — 25800000 HC PHARMACY IV SOLUTIONS: Performed by: HOSPITALIST

## 2020-12-11 PROCEDURE — 25800000 HC PHARMACY IV SOLUTIONS: Performed by: INTERNAL MEDICINE

## 2020-12-11 PROCEDURE — 36415 COLL VENOUS BLD VENIPUNCTURE: CPT | Performed by: HOSPITALIST

## 2020-12-11 PROCEDURE — 20600000 HC ROOM AND CARE INTERMEDIATE/TELEMETRY

## 2020-12-11 PROCEDURE — 63600000 HC DRUGS/DETAIL CODE: Performed by: NURSE ANESTHETIST, CERTIFIED REGISTERED

## 2020-12-11 PROCEDURE — 63700000 HC SELF-ADMINISTRABLE DRUG: Performed by: PSYCHIATRY & NEUROLOGY

## 2020-12-11 PROCEDURE — 87040 BLOOD CULTURE FOR BACTERIA: CPT | Performed by: HOSPITALIST

## 2020-12-11 PROCEDURE — B24BZZ4 ULTRASONOGRAPHY OF HEART WITH AORTA, TRANSESOPHAGEAL: ICD-10-PCS | Performed by: INTERNAL MEDICINE

## 2020-12-11 PROCEDURE — 80048 BASIC METABOLIC PNL TOTAL CA: CPT | Performed by: HOSPITALIST

## 2020-12-11 PROCEDURE — 93312 ECHO TRANSESOPHAGEAL: CPT

## 2020-12-11 PROCEDURE — 25000000 HC PHARMACY GENERAL: Performed by: HOSPITALIST

## 2020-12-11 PROCEDURE — 63600000 HC DRUGS/DETAIL CODE: Performed by: ANESTHESIOLOGY

## 2020-12-11 PROCEDURE — 63600000 HC DRUGS/DETAIL CODE: Performed by: INTERNAL MEDICINE

## 2020-12-11 PROCEDURE — 99233 SBSQ HOSP IP/OBS HIGH 50: CPT | Performed by: HOSPITALIST

## 2020-12-11 PROCEDURE — 71000001 HC PACU PHASE 1 INITIAL 30MIN

## 2020-12-11 PROCEDURE — 71000011 HC PACU PHASE 1 EA ADDL MIN

## 2020-12-11 PROCEDURE — 85025 COMPLETE CBC W/AUTO DIFF WBC: CPT | Performed by: HOSPITALIST

## 2020-12-11 PROCEDURE — 99233 SBSQ HOSP IP/OBS HIGH 50: CPT | Performed by: INTERNAL MEDICINE

## 2020-12-11 PROCEDURE — 80202 ASSAY OF VANCOMYCIN: CPT | Performed by: HOSPITALIST

## 2020-12-11 PROCEDURE — 25000000 HC PHARMACY GENERAL: Performed by: NURSE ANESTHETIST, CERTIFIED REGISTERED

## 2020-12-11 PROCEDURE — 63700000 HC SELF-ADMINISTRABLE DRUG: Performed by: NURSE PRACTITIONER

## 2020-12-11 RX ORDER — HYDROMORPHONE HYDROCHLORIDE 1 MG/ML
INJECTION, SOLUTION INTRAMUSCULAR; INTRAVENOUS; SUBCUTANEOUS AS NEEDED
Status: DISCONTINUED | OUTPATIENT
Start: 2020-12-11 | End: 2020-12-11 | Stop reason: SURG

## 2020-12-11 RX ORDER — DIPHENHYDRAMINE HCL 25 MG
4 CAPSULE ORAL EVERY 4 HOURS PRN
Status: DISCONTINUED | OUTPATIENT
Start: 2020-12-11 | End: 2021-02-03 | Stop reason: HOSPADM

## 2020-12-11 RX ORDER — SODIUM CHLORIDE 9 MG/ML
INJECTION, SOLUTION INTRAVENOUS CONTINUOUS
Status: ACTIVE | OUTPATIENT
Start: 2020-12-11 | End: 2020-12-11

## 2020-12-11 RX ORDER — ONDANSETRON HYDROCHLORIDE 2 MG/ML
4 INJECTION, SOLUTION INTRAVENOUS
Status: DISCONTINUED | OUTPATIENT
Start: 2020-12-11 | End: 2020-12-19

## 2020-12-11 RX ORDER — PROPOFOL 10 MG/ML
INJECTION, EMULSION INTRAVENOUS AS NEEDED
Status: DISCONTINUED | OUTPATIENT
Start: 2020-12-11 | End: 2020-12-11 | Stop reason: SURG

## 2020-12-11 RX ORDER — HYDROMORPHONE HYDROCHLORIDE 1 MG/ML
1 INJECTION, SOLUTION INTRAMUSCULAR; INTRAVENOUS; SUBCUTANEOUS EVERY 10 MIN PRN
Status: DISCONTINUED | OUTPATIENT
Start: 2020-12-11 | End: 2020-12-12

## 2020-12-11 RX ORDER — FENTANYL CITRATE 50 UG/ML
INJECTION, SOLUTION INTRAMUSCULAR; INTRAVENOUS AS NEEDED
Status: DISCONTINUED | OUTPATIENT
Start: 2020-12-11 | End: 2020-12-11 | Stop reason: SURG

## 2020-12-11 RX ORDER — MIDAZOLAM HYDROCHLORIDE 2 MG/2ML
INJECTION, SOLUTION INTRAMUSCULAR; INTRAVENOUS AS NEEDED
Status: DISCONTINUED | OUTPATIENT
Start: 2020-12-11 | End: 2020-12-11 | Stop reason: SURG

## 2020-12-11 RX ORDER — OXYCODONE HYDROCHLORIDE 5 MG/1
10 TABLET ORAL EVERY 4 HOURS PRN
Status: DISCONTINUED | OUTPATIENT
Start: 2020-12-11 | End: 2020-12-16

## 2020-12-11 RX ORDER — LIDOCAINE HYDROCHLORIDE 10 MG/ML
INJECTION, SOLUTION INFILTRATION; PERINEURAL AS NEEDED
Status: DISCONTINUED | OUTPATIENT
Start: 2020-12-11 | End: 2020-12-11 | Stop reason: SURG

## 2020-12-11 RX ORDER — HYDROMORPHONE HYDROCHLORIDE 1 MG/ML
1 INJECTION, SOLUTION INTRAMUSCULAR; INTRAVENOUS; SUBCUTANEOUS EVERY 10 MIN PRN
Status: DISCONTINUED | OUTPATIENT
Start: 2020-12-11 | End: 2020-12-11

## 2020-12-11 RX ADMIN — PAROXETINE 30 MG: 20 TABLET, FILM COATED ORAL at 08:19

## 2020-12-11 RX ADMIN — NICOTINE POLACRILEX 4 MG: 4 GUM, CHEWING ORAL at 07:07

## 2020-12-11 RX ADMIN — MIDAZOLAM HYDROCHLORIDE 2 MG: 1 INJECTION, SOLUTION INTRAMUSCULAR; INTRAVENOUS at 09:59

## 2020-12-11 RX ADMIN — KETOROLAC TROMETHAMINE 15 MG: 15 INJECTION, SOLUTION INTRAMUSCULAR; INTRAVENOUS at 16:33

## 2020-12-11 RX ADMIN — HEPARIN SODIUM 5000 UNITS: 5000 INJECTION, SOLUTION INTRAVENOUS; SUBCUTANEOUS at 13:35

## 2020-12-11 RX ADMIN — KETOROLAC TROMETHAMINE 15 MG: 15 INJECTION, SOLUTION INTRAMUSCULAR; INTRAVENOUS at 05:44

## 2020-12-11 RX ADMIN — SODIUM CHLORIDE: 9 INJECTION, SOLUTION INTRAVENOUS at 09:52

## 2020-12-11 RX ADMIN — DIAZEPAM 5 MG: 5 INJECTION, SOLUTION INTRAMUSCULAR; INTRAVENOUS at 04:59

## 2020-12-11 RX ADMIN — BUPROPION HYDROCHLORIDE 75 MG: 75 TABLET, FILM COATED ORAL at 13:44

## 2020-12-11 RX ADMIN — OXYCODONE HYDROCHLORIDE 10 MG: 5 TABLET ORAL at 17:58

## 2020-12-11 RX ADMIN — PRAZOSIN HYDROCHLORIDE 2 MG: 1 CAPSULE ORAL at 21:09

## 2020-12-11 RX ADMIN — CEFAZOLIN SODIUM 2 G: 10 POWDER, FOR SOLUTION INTRAVENOUS at 20:15

## 2020-12-11 RX ADMIN — VANCOMYCIN HYDROCHLORIDE 1250 MG: 500 INJECTION, POWDER, LYOPHILIZED, FOR SOLUTION INTRAVENOUS at 04:59

## 2020-12-11 RX ADMIN — OXYCODONE HYDROCHLORIDE 10 MG: 5 TABLET ORAL at 23:49

## 2020-12-11 RX ADMIN — PREGABALIN 150 MG: 75 CAPSULE ORAL at 08:18

## 2020-12-11 RX ADMIN — OXYCODONE HYDROCHLORIDE 5 MG: 5 TABLET ORAL at 01:38

## 2020-12-11 RX ADMIN — ZOLPIDEM TARTRATE 10 MG: 5 TABLET, COATED ORAL at 21:09

## 2020-12-11 RX ADMIN — BUPROPION HYDROCHLORIDE 75 MG: 75 TABLET, FILM COATED ORAL at 08:18

## 2020-12-11 RX ADMIN — QUETIAPINE 100 MG: 100 TABLET, FILM COATED ORAL at 21:09

## 2020-12-11 RX ADMIN — HYDROMORPHONE HYDROCHLORIDE 1 MG: 1 INJECTION, SOLUTION INTRAMUSCULAR; INTRAVENOUS; SUBCUTANEOUS at 10:53

## 2020-12-11 RX ADMIN — SUCCINYLCHOLINE CHLORIDE 120 MG: 20 INJECTION, SOLUTION INTRAMUSCULAR; INTRAVENOUS; PARENTERAL at 10:04

## 2020-12-11 RX ADMIN — DIAZEPAM 5 MG: 5 INJECTION, SOLUTION INTRAMUSCULAR; INTRAVENOUS at 16:34

## 2020-12-11 RX ADMIN — FENTANYL CITRATE 100 MCG: 50 INJECTION INTRAMUSCULAR; INTRAVENOUS at 09:59

## 2020-12-11 RX ADMIN — OXYCODONE HYDROCHLORIDE 5 MG: 5 TABLET ORAL at 05:46

## 2020-12-11 RX ADMIN — OXYCODONE HYDROCHLORIDE 10 MG: 5 TABLET ORAL at 13:43

## 2020-12-11 RX ADMIN — METOPROLOL SUCCINATE 25 MG: 25 TABLET, EXTENDED RELEASE ORAL at 08:18

## 2020-12-11 RX ADMIN — SODIUM CHLORIDE: 9 INJECTION, SOLUTION INTRAVENOUS at 04:59

## 2020-12-11 RX ADMIN — HYDROMORPHONE HYDROCHLORIDE 1 MG: 1 INJECTION, SOLUTION INTRAMUSCULAR; INTRAVENOUS; SUBCUTANEOUS at 09:55

## 2020-12-11 RX ADMIN — LIDOCAINE HYDROCHLORIDE 5 ML: 10 INJECTION, SOLUTION INFILTRATION; PERINEURAL at 10:04

## 2020-12-11 RX ADMIN — BUTALBITAL, ACETAMINOPHEN AND CAFFEINE 2 TABLET: 50; 325; 40 TABLET ORAL at 20:03

## 2020-12-11 RX ADMIN — PREGABALIN 150 MG: 75 CAPSULE ORAL at 20:03

## 2020-12-11 RX ADMIN — NICOTINE POLACRILEX 4 MG: 4 GUM, CHEWING ORAL at 16:34

## 2020-12-11 RX ADMIN — PROPOFOL 200 MG: 10 INJECTION, EMULSION INTRAVENOUS at 10:04

## 2020-12-11 RX ADMIN — BUTALBITAL, ACETAMINOPHEN AND CAFFEINE 2 TABLET: 50; 325; 40 TABLET ORAL at 04:58

## 2020-12-11 ASSESSMENT — ENCOUNTER SYMPTOMS
WEIGHT GAIN: 0
HEMATOCHEZIA: 0
IRREGULAR HEARTBEAT: 0
DIZZINESS: 0
SNORING: 0
NERVOUS/ANXIOUS: 0
WEAKNESS: 0
WHEEZING: 0
DYSPNEA ON EXERTION: 0
FALLS: 0
HEMOPTYSIS: 0
HEMATEMESIS: 0
SHORTNESS OF BREATH: 0
PND: 0
COUGH: 0
NAUSEA: 0
HEMATURIA: 0
CLAUDICATION: 0
ABDOMINAL PAIN: 0
MYALGIAS: 0
ODYNOPHAGIA: 0
ALTERED MENTAL STATUS: 0
MUSCLE CRAMPS: 0
SPUTUM PRODUCTION: 0
BLURRED VISION: 0
LIGHT-HEADEDNESS: 0
ORTHOPNEA: 0
BRUISES/BLEEDS EASILY: 0
PALPITATIONS: 0
LOSS OF BALANCE: 0
HEADACHES: 1
DOUBLE VISION: 0
SLEEP DISTURBANCES DUE TO BREATHING: 0
WEIGHT LOSS: 0
DIARRHEA: 0
DEPRESSION: 0
FOCAL WEAKNESS: 0
VOMITING: 0
BRIEF PARALYSIS: 0
DIAPHORESIS: 0
FEVER: 1
NEAR-SYNCOPE: 0
FREQUENCY: 0
SYNCOPE: 0
HEADACHES: 0
NIGHT SWEATS: 1
JOINT SWELLING: 0

## 2020-12-11 NOTE — ANESTHESIOLOGIST PRE-PROCEDURE ATTESTATION
Pre-Procedure Patient Identification:  I am the Primary Anesthesiologist and have identified the patient on 12/11/20 at 9:51 AM.   I have confirmed the following procedure(s) * No procedures listed * will be performed by the following surgeon/proceduralist * No surgeons listed *.

## 2020-12-11 NOTE — PROGRESS NOTES
Cardiology Daily Progress Note       Subjective    Carolyn Redmond is a 35 y.o. female who was admitted for Septic embolism (CMS/[facility]) [I76]  Infection of lumbar spine (CMS/[facility]) [M46.26]  Acute left-sided low back pain without sciatica [M54.5].          @Summary@      Amoxicillin, Nsaids (non-steroidal anti-inflammatory drug), Trazodone, and Tramadol  Current Facility-Administered Medications   Medication Dose Route Frequency Provider Last Rate Last Dose   • acetaminophen (TYLENOL) tablet 650 mg  650 mg oral q4h PRN Jacki Boss CRNP   650 mg at 12/10/20 1743   • buPROPion (WELLBUTRIN) tablet 75 mg  75 mg oral TID Perfecto Angela MD   75 mg at 12/11/20 1344   • butalbital-acetaminophen-caff (FIORICET, ESGIC) per tablet 2 tablet  2 tablet oral q8h PRN Dominique Powell DO   2 tablet at 12/11/20 0458   • glucose chewable tablet 16-32 g of dextrose  16-32 g of dextrose oral PRN Perfecto Angela MD        Or   • dextrose 40 % oral gel 15-30 g of dextrose  15-30 g of dextrose oral PRN Perfecto Angela MD        Or   • glucagon (GLUCAGEN) injection 1 mg  1 mg intramuscular PRN Perfecto Angela MD        Or   • dextrose in water injection 12.5 g  25 mL intravenous PRN Perfecto Angela MD       • diazePAM (VALIUM) injection 5 mg  5 mg intravenous q8h PRN Perfecto Angela MD   5 mg at 12/11/20 0459   • fluticasone propionate (FLONASE) 50 mcg/actuation nasal spray 2 spray  2 spray Each Nostril Daily PRN Perfecto Angela MD       • heparin (porcine) 5,000 unit/mL injection 5,000 Units  5,000 Units subcutaneous q8h JAIDEN Perfecto Angela MD   5,000 Units at 12/11/20 1335   • HYDROmorphone (DILAUDID) 1 mg/mL injection 1 mg  1 mg intravenous q10 min PRN Judy Rosales MD       • ketorolac (TORADOL) injection 15 mg  15 mg intravenous q6h PRN Jacki Boss CRNP   15 mg at 12/11/20 0544   • lidocaine (ASPERCREME) 4 % topical patch 1 patch  1 patch Topical Daily Jacki Boss CRNP       • magnesium  sulfate IVPB 2g in 50 mL NSS/D5W/SWFI  2 g intravenous PRN Perfecto Angela MD       • metoprolol succinate XL (TOPROL-XL) 24 hr ER tablet 25 mg  25 mg oral Daily Perfecto Angela MD   25 mg at 12/11/20 0818   • naloxone (NARCAN) injection 0.2 mg  0.2 mg intravenous Once PRN Jacki Boss CRNP       • nicotine polacrilex (NICORETTE) gum 4 mg  4 mg buccal q4h PRN Perfecto Angela MD   4 mg at 12/11/20 0707   • ondansetron (ZOFRAN) injection 4 mg  4 mg intravenous q15 min PRN Judy Rosales MD       • oxyCODONE (ROXICODONE) immediate release tablet 10 mg  10 mg oral q4h PRN Jacki Boss CRNP   10 mg at 12/11/20 1343   • PARoxetine (PAXIL) tablet 30 mg  30 mg oral Daily Marlon Padron MD   30 mg at 12/11/20 0819   • polyethylene glycol (MIRALAX) 17 gram packet 17 g  17 g oral Daily Jacki Boss CRNP       • potassium chloride (KLOR-CON) tablet extended release 20 mEq  20 mEq oral Daily PRN Perfecto Angela MD        Or   • potassium chloride (KLOR-CON) tablet extended release 40 mEq  40 mEq oral Daily PRN Perfecto Angela MD        Or   • potassium chloride 20 mEq in 100 mL IVPB  (premix)  20 mEq intravenous Daily PRN Perfecto Angela MD        Or   • potassium chloride (KCL) 40 mEq/250 mL IVPB in NSS 40 mEq  40 mEq intravenous Daily PRN Perfecto Angela MD       • prazosin (MINIPRESS) capsule 2 mg  2 mg oral Nightly Perfecto Angela MD   2 mg at 12/10/20 2125   • pregabalin (LYRICA) capsule 150 mg  150 mg oral BID Perfecto Angela MD   150 mg at 12/11/20 0818   • QUEtiapine (SEROquel) tablet 100 mg  100 mg oral Nightly Perfecto Angela MD   100 mg at 12/10/20 2129   • sodium chloride 0.9 % infusion   intravenous Continuous Perfecto Angela MD 80 mL/hr at 12/11/20 0459     • vancomycin 1 g/250 mL IVPB in NSS  1,000 mg intravenous q8h INT Dominique Powell DO       • zolpidem (AMBIEN) tablet 10 mg  10 mg oral Nightly Perfecto Angela MD   10 mg at 12/10/20 2129       Interval  History: Complains of recurrent fevers, chills, diaphoresis all night long with difficulty sleeping.  No chest pain, dyspnea, time, edema.    Review of Systems   Constitution: Positive for fever, malaise/fatigue and night sweats. Negative for diaphoresis, weight gain and weight loss.   HENT: Negative for odynophagia.    Eyes: Negative for blurred vision, double vision and visual disturbance.   Cardiovascular: Negative for chest pain, claudication, dyspnea on exertion, irregular heartbeat, leg swelling, near-syncope, orthopnea, palpitations, paroxysmal nocturnal dyspnea and syncope.   Respiratory: Negative for cough, hemoptysis, shortness of breath, sleep disturbances due to breathing, snoring, sputum production and wheezing.    Endocrine: Negative for polyuria.   Hematologic/Lymphatic: Negative for bleeding problem. Does not bruise/bleed easily.   Skin: Negative for rash.   Musculoskeletal: Negative for falls, joint pain, joint swelling, muscle cramps, muscle weakness and myalgias.   Gastrointestinal: Negative for abdominal pain, diarrhea, hematemesis, hematochezia, melena, nausea and vomiting.   Genitourinary: Negative for frequency, hematuria and nocturia.   Neurological: Negative for brief paralysis, dizziness, focal weakness, headaches, light-headedness, loss of balance and weakness.   Psychiatric/Behavioral: Negative for altered mental status and depression. The patient is not nervous/anxious.        Objective   Vital signs in last 24 hours:  Temp:  [36.6 °C (97.8 °F)-37.1 °C (98.8 °F)] 36.7 °C (98 °F)  Heart Rate:  [] 91  Resp:  [14-18] 16  BP: (110-165)/(60-96) 126/77      Intake/Output Summary (Last 24 hours) at 12/11/2020 6373  Last data filed at 12/10/2020 2000  Gross per 24 hour   Intake --   Output 725 ml   Net -725 ml     Intake/Output this shift:  No intake/output data recorded.    Physical Exam   Constitutional: She is oriented to person, place, and time. She appears well-developed. No distress.    HENT:   Head: Normocephalic.   Eyes: Pupils are equal, round, and reactive to light. Conjunctivae are normal.   Neck: Neck supple. No JVD present. No tracheal deviation present. No thyromegaly present.   Cardiovascular: Normal rate, regular rhythm and normal heart sounds. Exam reveals no gallop and no friction rub.   No murmur heard.  Pulmonary/Chest: Breath sounds normal. No stridor. No respiratory distress. She has no wheezes. She has no rales. She exhibits no tenderness.   Abdominal: Bowel sounds are normal. She exhibits no distension and no mass. There is no abdominal tenderness. There is no rebound and no guarding.   Musculoskeletal: Normal range of motion.         General: No tenderness, deformity or edema.   Neurological: She is alert and oriented to person, place, and time. No cranial nerve deficit.   Skin: Skin is warm and dry. No rash noted. She is not diaphoretic. No erythema. No pallor.   Psychiatric: She has a normal mood and affect. Her behavior is normal. Judgment normal.           Labs   Lab Results   Component Value Date    WBC 7.97 12/11/2020    HGB 8.5 (L) 12/11/2020    HCT 26.5 (L) 12/11/2020     12/11/2020    ALT 11 12/09/2020    AST 14 (L) 12/09/2020     12/11/2020    K 3.9 12/11/2020     12/11/2020    CREATININE 0.6 12/11/2020    BUN 7 (L) 12/11/2020    CO2 23 12/11/2020    INR 0.9 04/06/2019       Imaging  Significant findings include: See separate JANUSZ report regarding 0.6 x 1.1 cm tricuspid valve vegetation      Telemetry  Normal sinus rhythm      Assessment & Plan    Tricuspid valve vegetation  Assessment & Plan  As detailed in the overview, suspected tricuspid valve endocarditis.  I doubt this is due to old disease since she has septic pulmonary emboli.  She was at higher risk for infection given endocarditis in 2017.  JANUSZ completed.  Full report pending this afternoon.  Normal mitral, aortic, pulmonic valves.  Tricuspid valve vegetation is sessile and not pedunculated  located on the body of the (anterior) tricuspid leaflet.  There is a small perforation of the leaflet.  95% of the regurgitation occurs through the valve orifice.  Moderate to severe regurgitation.  Normal AV, MV, PV LV, RV, atria.  No PFO by injection of agitated saline.      Essential hypertension  Assessment & Plan  Overall stable.    Septic embolism (CMS/HCC)  Assessment & Plan  Pulmonary emboli noted on CT scan.  RV appears generous on this same CT.  JANUSZ pending.    * Acute bacterial endocarditis  Assessment & Plan  Present of endocarditis with multiple bilateral septic pulmonary emboli and abnormal echo on 12/8.  Has epidural phlegmon lumbar spine.  Plan:  JANUSZ today   antibiotics per ID.    12/11:  Tricuspid endocarditis confirmed.  The lesion is sessile and is on the atrial and ventricular side of the valve.  There is a small leaflet perforation.  There is borderline severe regurgitation.  Size of the vegetation will be measured offline but greater than 1 cm largely nonmobile with a tiny mobile component on the surface of the vegetation..             Yanick Eldridge DO FAC, FACOI  12/11/2020  3:53 PM     This patient note has been dictated using speech recognition software. Inadvertent speech recognition errors should be disregarded. Please do not hesitate to call my office for clarifications.

## 2020-12-11 NOTE — PATIENT CARE CONFERENCE
Care Progression Rounds Note  Date: 12/11/2020  Time: 10:28 AM     Patient Name: Carolyn Redmond     Medical Record Number: [id number]   YOB: 1985  Sex: Female      Room/Bed: 3023    Admitting Diagnosis: Septic embolism (CMS/[facility]) [I76]  Infection of lumbar spine (CMS/[facility]) [M46.26]  Acute left-sided low back pain without sciatica [M54.5]   Admit Date/Time: 12/9/2020 11:10 AM    Primary Diagnosis: Acute bacterial endocarditis  Principal Problem: Acute bacterial endocarditis    GMLOS: pending  Anticipated Discharge Date: 12/11/2020    AM-PAC  Mobility Score:      Discharge Planning:       Barriers to Discharge:  Barriers to Discharge: Medical issues not resolved    Participants:  nursing, social work/services,

## 2020-12-11 NOTE — PROGRESS NOTES
MSWCC attempted to meet with patient, introduced self and role.  Patient shared that she has a migraine and recently had a procedure, she does not feel up to talking right now.  She requested that MSWCC return at a later time.

## 2020-12-11 NOTE — ANESTHESIA POSTPROCEDURE EVALUATION
Patient: Carolyn Redmond    Procedure Summary     Date: 12/11/20 Room / Location: [facility]; [facility] Cardiac Cath/Electrophysiology Holding    Anesthesia Start: 1001 Anesthesia Stop: 1040    Procedure: TRANSESOPHAGEAL ECHO (JANUSZ) Diagnosis:       Septic embolism (CMS/HCC)      Tricuspid valve vegetation    Scheduled Providers: Yanick Blas DO Responsible Provider: Judy Rosales MD    Anesthesia Type: general ASA Status: 3          Anesthesia Type: general  PACU Vitals  12/11/2020 1033 - 12/11/2020 1042      12/11/2020  1036             BP:  116/65    Temp:  36.7 °C (98.1 °F)    Pulse:  (!) 109    Resp:  15    SpO2:  100 %            Anesthesia Post Evaluation    Pain management: satisfactory to patient  Patient location during evaluation: PACU  Patient participation: complete - patient participated  Level of consciousness: awake and alert  Cardiovascular status: acceptable  Airway Patency: adequate  Respiratory status: acceptable  Hydration status: stable  Anesthetic complications: no

## 2020-12-11 NOTE — ASSESSMENT & PLAN NOTE
Pt with history of anxiety.  Pt e is currently taking wellbutrin 75mg, paxil 30mg, seroquel 100mg and ambien 10mg.         Plan:  - Psych following  - Avoid benzos with opioid therapy  - Recommend pulse ox to monitor for resp depression

## 2020-12-11 NOTE — ANESTHESIA PREPROCEDURE EVALUATION
Relevant Problems   CARDIOVASCULAR   (+) Essential hypertension   (+) Tricuspid valve vegetation      HEMATOLOGY   (+) Anemia      MUSCULOSKELETAL   (+) Septic arthritis of vertebra (CMS/HCC)      NEUROLOGY   (+) Anxiety   (+) Depression   (+) Opioid abuse (CMS/HCC)      Other   (+) Acute bacterial endocarditis   (+) Septic arthritis of vertebra (CMS/HCC)       Anesthesia ROS/MED HX    Anesthesia History    Previous anesthetics  Pulmonary - neg  Neuro/Psych    Headaches   Substance abuse  Cardiovascular   Valvular problems/murmurs   hypertension   Covid19 Test Reviewed  Comments: Denies loose  Hematological - neg  GI/Hepatic- neg  Musculoskeletal- neg  Renal Disease- neg  Endo/Other- neg  Body Habitus: Morbidly Obese  ROS/MED HX Comments:    Cardiology: Endocarditis w septic emboli       Past Surgical History:   Procedure Laterality Date   • CHOLECYSTECTOMY     • ERCP     • GALLBLADDER SURGERY         Physical Exam    Airway   Mallampati: I   TM distance: >3 FB   Neck ROM: full  Cardiovascular - normal   Rhythm: regular   Rate: normalPulmonary - normal   clear to auscultation  Other Findings   Denies loose  Dental    Teeth Problems: missing        Anesthesia Plan    Plan: general    Technique: general endotracheal     Airway: oral intubation and video laryngoscope   ASA 3  Anesthetic plan and risks discussed with: patient       Wt Readings from Last 3 Encounters:   12/09/20 107 kg (235 lb 12.8 oz)   08/05/20 102 kg (225 lb)   07/09/20 97.5 kg (215 lb)       Temp Readings from Last 3 Encounters:   12/11/20 36.6 °C (97.8 °F) (Oral)   08/05/20 37.1 °C (98.7 °F) (Oral)   07/10/20 36.8 °C (98.3 °F) (Oral)       BP Readings from Last 3 Encounters:   12/11/20 137/80   08/05/20 108/88   07/10/20 130/86       Pulse Readings from Last 3 Encounters:   12/11/20 89   08/05/20 98   07/10/20 (!) 105       Lab Results   Component Value Date    WBC 7.97 12/11/2020    HGB 8.5 (L) 12/11/2020    HCT 26.5 (L) 12/11/2020    MCV 83.6  12/11/2020     12/11/2020       Lab Results   Component Value Date    GLUCOSE 95 12/11/2020    CALCIUM 9.0 12/11/2020     12/11/2020    K 3.9 12/11/2020    CO2 23 12/11/2020     12/11/2020    BUN 7 (L) 12/11/2020    CREATININE 0.6 12/11/2020       Lab Results   Component Value Date    HCGPREGUR Negative 04/06/2019             Current Facility-Administered Medications   Medication Dose Route Frequency Provider Last Rate Last Dose   • acetaminophen (TYLENOL) tablet 650 mg  650 mg oral q4h PRN Jacki Boss CRNP   650 mg at 12/10/20 1743   • buPROPion (WELLBUTRIN) tablet 75 mg  75 mg oral TID Perfecto Angela MD   75 mg at 12/11/20 0818   • butalbital-acetaminophen-caff (FIORICET, ESGIC) per tablet 2 tablet  2 tablet oral q8h PRN Dominique Powell DO   2 tablet at 12/11/20 0458   • glucose chewable tablet 16-32 g of dextrose  16-32 g of dextrose oral PRN Perfecto Angela MD        Or   • dextrose 40 % oral gel 15-30 g of dextrose  15-30 g of dextrose oral PRN Perfecto Angela MD        Or   • glucagon (GLUCAGEN) injection 1 mg  1 mg intramuscular PRN Perfecto Angela MD        Or   • dextrose in water injection 12.5 g  25 mL intravenous PRN Perfecto Angela MD       • diazePAM (VALIUM) injection 5 mg  5 mg intravenous q8h PRN Perfecto Angela MD   5 mg at 12/11/20 0457   • fluticasone propionate (FLONASE) 50 mcg/actuation nasal spray 2 spray  2 spray Each Nostril Daily PRN Perfecto Angela MD       • heparin (porcine) 5,000 unit/mL injection 5,000 Units  5,000 Units subcutaneous q8h JAIDEN Perfecto Angela MD   5,000 Units at 12/09/20 7181   • ketorolac (TORADOL) injection 15 mg  15 mg intravenous q6h PRN Perfecto Angela MD   15 mg at 12/11/20 0567   • lidocaine (ASPERCREME) 4 % topical patch 1 patch  1 patch Topical Daily Jacki oBss CRNP       • magnesium sulfate IVPB 2g in 50 mL NSS/D5W/SWFI  2 g intravenous PRN Perfecto Angela MD       • metoprolol succinate XL (TOPROL-XL)  24 hr ER tablet 25 mg  25 mg oral Daily Perfecto Angela MD   25 mg at 12/11/20 0818   • naloxone (NARCAN) injection 0.2 mg  0.2 mg intravenous Once PRN Jacki Boss CRNP       • nicotine polacrilex (NICORETTE) gum 4 mg  4 mg buccal q4h PRN Perfecto Angela MD   4 mg at 12/11/20 0707   • oxyCODONE (ROXICODONE) immediate release tablet 5 mg  5 mg oral q4h PRN Jacki Boss CRNP   5 mg at 12/11/20 0546   • PARoxetine (PAXIL) tablet 30 mg  30 mg oral Daily Marlon Padron MD   30 mg at 12/11/20 0819   • polyethylene glycol (MIRALAX) 17 gram packet 17 g  17 g oral Daily Jacki Boss CRNP       • potassium chloride (KLOR-CON) tablet extended release 20 mEq  20 mEq oral Daily PRN Perfecto Angela MD        Or   • potassium chloride (KLOR-CON) tablet extended release 40 mEq  40 mEq oral Daily PRN Perfecto Angela MD        Or   • potassium chloride 20 mEq in 100 mL IVPB  (premix)  20 mEq intravenous Daily PRN Perfecto Angela MD        Or   • potassium chloride (KCL) 40 mEq/250 mL IVPB in NSS 40 mEq  40 mEq intravenous Daily PRN Perfecto Angela MD       • prazosin (MINIPRESS) capsule 2 mg  2 mg oral Nightly Perfecto Angela MD   2 mg at 12/10/20 2125   • pregabalin (LYRICA) capsule 150 mg  150 mg oral BID Perfecto Angela MD   150 mg at 12/11/20 0818   • QUEtiapine (SEROquel) tablet 100 mg  100 mg oral Nightly Perfecto Angela MD   100 mg at 12/10/20 2129   • sodium chloride 0.9 % infusion   intravenous Continuous Perfecto Angela MD 80 mL/hr at 12/11/20 0459     • vancomycin 1.25 gram/250 mL IVPB in NSS  1,250 mg intravenous q8h INT Dominique Powell DO   Stopped at 12/11/20 0711   • zolpidem (AMBIEN) tablet 10 mg  10 mg oral Nightly Perfecto Angela MD   10 mg at 12/10/20 2124       Prior to Admission medications    Medication Sig Start Date End Date Taking? Authorizing Provider   buprenorphine-naloxone (SUBOXONE) 8-2 mg per SL tablet Place 1 tablet under the tongue 3 (three) times  a day. 6a, 11a, 4p   Yes ProviderBari MD   buPROPion (WELLBUTRIN) 75 mg tablet Take 75 mg by mouth 3 (three) times a day.   Yes June Mchugh   butalbital-acetaminophen-caff -40 mg per capsule Take 2 capsules by mouth every 4 (four) hours as needed for headaches.   Yes Bari Ji MD   cyclobenzaprine (FLEXERIL) 10 mg tablet Take 10 mg by mouth 3 (three) times a day as needed for muscle spasms.   Yes Bari Ji MD   fluticasone propionate (FLONASE) 50 mcg/actuation nasal spray Administer 2 sprays into each nostril daily as needed.     Yes Shona Dewey NP   metoprolol succinate XL (TOPROL-XL) 25 mg 24 hr tablet Take 25 mg by mouth daily.   Yes Shona Dewey NP   PARoxetine (PAXIL) 10 mg tablet Take 20 mg by mouth daily.     Yes June Mchugh   prazosin (MINIPRESS) 1 mg capsule Take 2 mg by mouth nightly.   Yes June Mchugh   pregabalin (LYRICA) 75 mg capsule Take 150 mg by mouth 2 (two) times a day. 8a & 8p     Yes Shona Dewey NP   QUEtiapine (SEROquel) 100 mg tablet Take 100 mg by mouth nightly.   Yes ProviderBari MD   zolpidem (AMBIEN) 10 mg tablet Take 10 mg by mouth nightly.   Yes ProviderBari MD   chlorproMAZINE (THORAZINE) 100 mg tablet Take 200 mg by mouth nightly.    June Mchugh       Patient Active Problem List   Diagnosis   • Anxiety   • Cervical disc disease   • Chronic pancreatitis (CMS/[facility])   • Depression   • Insomnia   • Migraine without status migrainosus, not intractable   • Concussion   • Intractable migraine without aura   • Palpitations   • REM behavioral disorder   • Transient alteration of awareness   • Vasculitis determined by biopsy of skin (CMS/[facility])   • Generalized abdominal pain   • Pain   • Pain of upper abdomen   • Slow transit constipation   • Opioid type dependence, continuous use (CMS/[facility])   • Anxiety   • Septic embolism (CMS/[facility])   • Essential hypertension   • Septic arthritis of vertebra (CMS/[facility])   • Anemia   •  Tricuspid valve vegetation   • Acute bacterial endocarditis   • Unspecified mood (affective) disorder (CMS/HCC)   • Opioid abuse (CMS/HCC)   • Drug-induced constipation       Past Medical History:   Diagnosis Date   • Anxiety    • Depressed    • Endocarditis    • Fibromyalgia    • Migraines    • Pancreatitis    • Vasculitis (CMS/HCC)        Past Surgical History:   Procedure Laterality Date   • CHOLECYSTECTOMY     • ERCP     • GALLBLADDER SURGERY

## 2020-12-11 NOTE — POST-PROCEDURE NOTE
Pt cleared for transfer to floor per dr jara at [facility]. Pt stated her back pain level has improved. Pt with stable VS and no other c/o pain. Pt seen by dr Blas at bedside post procedure. Patient education complete and pt verbalizes understanding.

## 2020-12-11 NOTE — PROGRESS NOTES
12/11/20    Pharmacokinetic Consult Discontinued -- IV Vancomycin Therapy Dosing by Pharmacist Sign Off    Received order to discontinue consult Pharmacy to Dose IV Vancomycin.     All Vancomycin IV orders have been discontinued by Dr Jones.    Pharmacy will no longer be dosing IV Vancomycin.    Isis Whitlock, LauryD

## 2020-12-11 NOTE — PROGRESS NOTES
[facility] Palliative Care Progress Note    Patient Name: Carolyn Redmond  Patient MRN: [id number]  Date / Time: 12/11/2020 1:15 PM   Attending Physician: Dominique Powell, DO    This is Hospital Day: 3    Capacity for Medical Decision Making: pt has capacity for medical decision making but poor insight to addiction issues     Healthcare Proxy: in the absence of a POA it would be her Mother  Emergency Contact: Rolando Corbin; Relation: Friend     Conversation/Goals of Care:  F/u pain management      Interval History (Subjective)  Source: pt, RN, Attending, chart review    saw pt jointly with attending Dr. Santos.  Pt reports stabbing L lumbar pain radiating down bilateral buttock to feet minimally relieved with current dose of oxyIR prn x 5/24hrs.  Pt states pain is 7/10 after medication received for JANUSZ (fentanyl, dilaudid per anesthesia).  Pt is also taking valium prn for spasms x3/24hours.  Education reinforced on safe pain management and tapering opioids as able.  ID following for ABx which should relieve pain over time.        Drug-induced constipation  Assessment & Plan  Abdomen soft, non tender, pt reports bm this am  Uses miralax at home prn    Pt at risk for opioid induced constipation  Add miralax daily, hold for diarrhea    Opioid abuse (CMS/[facility])  Assessment & Plan  35yoF admitted with acute bacterial endocarditis, suspected tricuspid valve endocarditis likely related to IVDAbuse   Imaging shows c/f current IV injections though pt denies.  Patient appears to have some early osteo/discitis? Septic arthritis of L4-5, epidural space.      Patient with long h/o IVDA, opioid misuse and etoh   UDS on admission + cannabinoids and barbituates (likely fioricet)  PMDP has 23 prescribers and 114 scripts in past 12 months  Psych and SW consulted for addiction support  Recommend inpt treatment to complete antibiotic course if IV delivery is recommended    She was on suboxone until 8/2020 for addiction  "treatment but has been also misusing opioids, she states that was percocet but imaging is concerning for current IVDA.  Pt states she did inject fentanyl and heroin \"but not for months\".  She reports stopping her suboxone when getting to treatment w/ Dr. Brewer in [place] was too difficult.    UDS on admission + for cannabinoids and barbituates (fioricet)  Pt states she currently sees her PCP Dr. Mcleod in [place] for pain related issues, she does not want to go back on suboxone though recommended by psych.   She denies withdrawal and shows no s/s of same.    ID following for ABx  JANUSZ    Psych and Social Work following for addiction support  Recommend inpt med psych if pt requires long course of IV ABx  Pt is at HIGH risk for opioid abuse relapse based on ORT tool.  Her Father  of etoh.  She lives with her Mother and aunt and other family members.  She states she feels safe at home and that no one is using drugs illegally in the house.  She is resistant to psych input.    Opioid type dependence, continuous use (CMS/[facility])  Assessment & Plan        Pain  Assessment & Plan  Pt has a history of vasculitis, chronic abdominal and back pain, fibromyalgia, mirgraines and intermittent pancreatitis. She was at [facility] for lower back pain x 2 weeks and left AMA telling me \"I didn't like the way they were treating me\"    Neurosurgery following for worsening low back pain over 2 weeks.  Per Neurosurgery MRI imaging does not demonstrate significant central canal stenosis or empyema that would require immediate neurosurgical intervention and maintained alignment on her MRI with no significant bony erosion. She has mild enhancement of the posterior epidural space and bilateral L4-5 facet joints, concerning for early osteomyelitis.      On exam pt reports left lumbar pain non radiating, stabbing, constant goes to 15/10, and reduced to 7/10 with prn IV dilaudid.  She asks about Ketamine and states her pain " "is best controlled with IV Dilaudid.  She refuses MSIR stating it 'upsets my stomach\".  Lengthy discussion about risks and benefits of opioid therapy.  Relayed my concerns for relapse in addiction.  Much emotional support provided    12/11 saw pt jointly with attending Dr. Santos.  Pt reports stabbing L lumbar pain radiating down bilateral buttock to feet minimally relieved with current dose of oxyIR prn x 5/24hrs.  Pt states pain is 7/10 after medication received for JANUSZ.      No acute Neurosurgical intervention required at this lizzie  IV Abx per ID for possible osteo/discitis  LSO brace as needed  Monitor for any neurological defiicts    Multimodal Pain Management Plan  Creatinine 0.8. liver enzymes wnl  Tylenol prn, do not exceed 4gms per day from all sources  Fioricet for migraines  Extend Toradol q6hrs prn x 2 days   When toradol complete transition to Ibuprofen 400mg po q6hrs prn, consider adding ppi  Pregabalin 150mg po q12hrs., monitor CrCl currenlty wnl  Increase oxycodone to 10mg po q4hrs prn, taper down as able  Add lidoderm patch lower back  Continuous pulse ox  Make sure patient has no opioids in her personal belongings  Do NOT recommend discharge with Opioids  Attending agreeable with plan as above  Psych following  Pt encouraged to pursue a new pain management physician  At this time she will f/u with her PCP Dr. [facility] [place] on Discharge  I discontinued prn Subutex as pt is refusing                  Anxiety  Assessment & Plan  Pt with long history of anxiety  Patient has a h/o suicidality and [facility] ama.  She is currently on many psych meds including wellbutrin, paxil and seroquel  She denies using thorazine but was given this at outside hospital and refused it here    Psych following  Avoid benzos with opioid therapy  Recommend pulse ox to monitor for resp depression          Current Medications:  •  acetaminophen, 650 mg, oral, q4h PRN  •  buPROPion, 75 mg, oral, TID  •  " butalbital-acetaminophen-caff, 2 tablet, oral, q8h PRN  •  glucose, 16-32 g of dextrose, oral, PRN **OR** dextrose, 15-30 g of dextrose, oral, PRN **OR** glucagon, 1 mg, intramuscular, PRN **OR** dextrose in water, 25 mL, intravenous, PRN  •  diazePAM, 5 mg, intravenous, q8h PRN  •  fluticasone propionate, 2 spray, Each Nostril, Daily PRN  •  heparin (porcine), 5,000 Units, subcutaneous, q8h JAIDEN  •  HYDROmorphone, 1 mg, intravenous, q10 min PRN  •  ketorolac, 15 mg, intravenous, q6h PRN  •  lidocaine, 1 patch, Topical, Daily  •  magnesium sulfate, 2 g, intravenous, PRN  •  metoprolol succinate XL, 25 mg, oral, Daily  •  naloxone, 0.2 mg, intravenous, Once PRN  •  nicotine polacrilex, 4 mg, buccal, q4h PRN  •  ondansetron, 4 mg, intravenous, q15 min PRN  •  oxyCODONE, 10 mg, oral, q4h PRN  •  PARoxetine, 30 mg, oral, Daily  •  polyethylene glycol, 17 g, oral, Daily  •  potassium chloride, 20 mEq, oral, Daily PRN **OR** potassium chloride, 40 mEq, oral, Daily PRN **OR** potassium chloride, 20 mEq, intravenous, Daily PRN **OR** potassium chloride, 40 mEq, intravenous, Daily PRN  •  prazosin, 2 mg, oral, Nightly  •  pregabalin, 150 mg, oral, BID  •  QUEtiapine, 100 mg, oral, Nightly  •  sodium chloride 0.9 %, , intravenous, Continuous  •  sodium chloride 0.9 %, , intravenous, Continuous  •  vancomycin, 1,250 mg, intravenous, q8h INT **AND** [] IV Vancomycin Therapy by Pharmacy Protocol, , , Once  •  zolpidem, 10 mg, oral, Nightly    Objective    Physical Exam:  General:   No Acute Distress, obese  Eyes:  Sclera Anicteric, EOM intact   ENMT:  Mmm  CV:  HRR  Extremities: no edema  Lungs:  No accessory breathing  Abdomen:  Soft, non distended  Psych: Anxious   Neuro:  Awake and Alert, moves all extremities   Skin:  No rash, no jaundice       Vitals:  Vitals:    20 1120   BP: 137/76   Pulse: 86   Resp: 15   Temp:    SpO2: 96%       Intake & Output:  I/O last 3 completed shifts:  In: 1840 [P.O.:840; IV  Piggyback:1000]  Out: 1325 [Urine:1325]    Laboratory Studies:    CBC Results       12/11/20 12/10/20 12/09/20                    0444 0948 1348         WBC 7.97 7.05 8.88         RBC 3.17 3.36 3.34         HGB 8.5 9.2 9.1         HCT 26.5 28.7 28.2         MCV 83.6 85.4 84.4         MCH 26.8 27.4 27.2         MCHC 32.1 32.1 32.3          186 162                     CMP Results       12/11/20 12/10/20 12/09/20                    0444 0948 1348          138 136         K 3.9 4.0 4.3         Cl 105 103 103         CO2 23 25 22         Glucose 95 112 92         BUN 7 8 8         Creatinine 0.6 0.8 0.8         Calcium 9.0 8.6 8.9         Anion Gap 12 10 11         AST -- -- 14         ALT -- -- 11         Albumin -- -- 3.0         EGFR >60.0 >60.0 >60.0         Comment for K at 1348 on 12/09/20: Results obtained on plasma. Plasma Potassium values may be up to 0.4 mEQ/L less than serum values. The differences may be greater for patients with high platelet or white cell counts.            Imaging and Other Studies:     Radiology Imaging   XR CHEST 1 VW    Narrative CLINICAL HISTORY: Fever.    COMMENT:  A portable upright AP view of the chest was performed.    Comparison: 9/29/2019    Cardiac monitoring leads obscure portions of the underlying lungs.  There is no  focal consolidation or pleural effusion. The cardiac and mediastinal structures  are unremarkable.  A corticated ossific density structure is present adjacent to  the right humeral head.      Impression IMPRESSION:  No radiographic evidence of acute cardiopulmonary disease.                                                                       Cardiac Imaging   ECHOCARDIOGRAM STRESS TEST     Narrative Ordered by an unspecified provider.         Discussed with pt, RN, Attending..     SHERITA Barrera  Palliative Care Nurse Practitioner  [facility]  [phone] [facility] office  [address] [place] [phone]  Pager  [phone]

## 2020-12-11 NOTE — PROGRESS NOTES
Vancomycin Dosing by Pharmacy Consult Follow up    Carolyn Redmond is a 35 y.o. female who has been consulted for vancomycin dosing for endocarditis, bone/joint, bloodstream infection.    Reviewed relevant clinical data including weight, renal function, previous vancomycin doses, and vancomycin levels  Creatinine   Date/Time Value Ref Range Status   12/11/2020 0444 0.6 0.6 - 1.1 mg/dL Final   12/10/2020 0948 0.8 0.6 - 1.1 mg/dL Final   12/09/2020 1348 0.8 0.6 - 1.1 mg/dL Final     Vancomycin Tr   Date/Time Value Ref Range Status   12/11/2020 1341 21.9 (HH) 10.0 - 15.0 ug/mL Final     Comment:     For all patients with complicated infections with methicillin resistant Staphylococcus aureus (MRSA), e.g. endocarditis, osteomyelitis, meningitis, pneumonia; the vancomycin trough therpeutic range is 15-20 ug/mL.          Vancomycin Administrations (last 96 hours)       Date/Time Action Medication Dose Rate    12/11/20 1337 New Bag    vancomycin 1.25 gram/250 mL IVPB in NSS 1,250 mg 166.7 mL/hr    12/11/20 0459 New Bag    vancomycin 1.25 gram/250 mL IVPB in NSS 1,250 mg 166.7 mL/hr    12/10/20 2014 New Bag    vancomycin 1.25 gram/250 mL IVPB in NSS 1,250 mg 166.7 mL/hr    12/10/20 1157 New Bag    vancomycin (VANCOCIN) 1,750 mg in sodium chloride 0.9 % 500 mL IVPB 1,750 mg 250 mL/hr    12/09/20 2058 New Bag    vancomycin 1.5 g/500 mL IVPB in NSS 1,500 mg 250 mL/hr              Assessment/Plan  The patient is ordered vancomycin dosing by pharmacy.      The patient’s renal function; is stable    Vancomycin trough level 21.9 mcg/ml on maintenance dose of 1250 mg IV Q 8 H with a goal trough 15-20 ug/mL.      Will adjust dose to vancomycin 1000 mg IV Q 8 H .      Next trough:  12/12/2020 @ 21:00    Pharmacy will continue to follow the patient’s vancomycin dosing daily during this course of therapy.      Please call vancomycin levels to the pharmacy.  Isis Whitlock, LauryD

## 2020-12-11 NOTE — PROGRESS NOTES
Hospital Medicine Service -  Daily Progress Note       SUBJECTIVE   Interval History: Had significant pain following JANUSZ. Notes difficulty with getting out of bed and walking to the bathroom. No fevers.      OBJECTIVE      Vital signs in last 24 hours:  Temp:  [36.6 °C (97.8 °F)-36.9 °C (98.5 °F)] 36.7 °C (98 °F)  Heart Rate:  [] 91  Resp:  [14-18] 16  BP: (116-165)/(60-96) 126/77    Intake/Output Summary (Last 24 hours) at 12/11/2020 1801  Last data filed at 12/10/2020 2000  Gross per 24 hour   Intake --   Output 725 ml   Net -725 ml       PHYSICAL EXAMINATION      Physical Exam  Constitutional:       Appearance: She is well-developed.   HENT:      Head: Normocephalic and atraumatic.   Eyes:      Pupils: Pupils are equal, round, and reactive to light.   Cardiovascular:      Rate and Rhythm: Normal rate and regular rhythm.      Heart sounds: No murmur.   Pulmonary:      Effort: Pulmonary effort is normal.      Breath sounds: Normal breath sounds. No wheezing or rales.   Abdominal:      General: Bowel sounds are normal.      Palpations: Abdomen is soft.   Musculoskeletal:         General: No swelling.   Skin:     General: Skin is warm and dry.      Findings: No rash.   Neurological:      Mental Status: She is alert and oriented to person, place, and time.        LABS / IMAGING / TELE      Labs  Lab Results   Component Value Date    GLUCOSE 95 12/11/2020    CALCIUM 9.0 12/11/2020     12/11/2020    K 3.9 12/11/2020    CO2 23 12/11/2020     12/11/2020    BUN 7 (L) 12/11/2020    CREATININE 0.6 12/11/2020     Lab Results   Component Value Date    WBC 7.97 12/11/2020    HGB 8.5 (L) 12/11/2020    HCT 26.5 (L) 12/11/2020    MCV 83.6 12/11/2020     12/11/2020     Microbiology Results       Procedure Component Value Units Date/Time    Blood Culture Blood, Venous [[id number]] Collected: 12/09/20 1348    Specimen: Blood, Venous Updated: 12/10/20 1800     Culture No growth at 18-24 hours    Blood  Culture Blood, Venous [[id number]] Collected: 12/09/20 1304    Specimen: Blood, Venous Updated: 12/11/20 1501     Culture No growth at 48 hours          Imaging  Mri Thoracic Spine With And Without Contrast    Result Date: 12/9/2020  IMPRESSION: Diffuse anterior and posterior epidural enhancement within lumbar spine more pronounced at L4-L5 raising the suspicion for underlying infectious process/phlegmon.  Left greater than right L4-L5 facet joint signal and enhancement which although may be degenerative in nature, early changes of septic facet arthritis is in the differential.  Follow-up is recommended. Thoracic and lumbar degenerative change as described more pronounced at L4-L5. Ring-enhancing lesion in right upper lobe which may be infectious in etiology including in the setting of septic emboli.  Correlation with CT chest with contrast is recommended to exclude other etiologies. The results were discussed with Rylee Bowie on 12/9/2020 7:35 PM.    Mri Lumbar Spine With And Without Contrast    Result Date: 12/9/2020  IMPRESSION: Diffuse anterior and posterior epidural enhancement within lumbar spine more pronounced at L4-L5 raising the suspicion for underlying infectious process/phlegmon.  Left greater than right L4-L5 facet joint signal and enhancement which although may be degenerative in nature, early changes of septic facet arthritis is in the differential.  Follow-up is recommended. Thoracic and lumbar degenerative change as described more pronounced at L4-L5. Ring-enhancing lesion in right upper lobe which may be infectious in etiology including in the setting of septic emboli.  Correlation with CT chest with contrast is recommended to exclude other etiologies. The results were discussed with Rylee Bowie on 12/9/2020 7:35 PM.    X-ray Chest 1 View    Result Date: 12/9/2020  IMPRESSION: No radiographic evidence of acute cardiopulmonary disease.    Ct Chest Pulmonary Embolism With Iv Contrast    Result Date:  12/9/2020  IMPRESSION: 1.  No evidence of main, lobar or segmental pulmonary embolism. 2.  However, there are multifocal nodular lesions throughout all lung fields including a cavitary lesion in the right upper lobe all suspicious for septic emboli.  Some of the tiny subsegmental pulmonary arterial branches demonstrate hypoenhancement and it is difficult to determinate if this is related to tiny pulmonary emboli or technique. 3.  Small bilateral pleural effusions. 4.  Mosaic pattern at the lung bases could represent mild edema or sequela of small airways or small vessels disease. 5.  Follow-up chest CT after appropriate treatment is recommended to document lung opacities and mediastinal lymphadenopathy which is probably reactive. 6.  Additional findings discussed below. Finding:    Other   Acuity: Critical  Status:  CLOSED Critical read back for the first impression performed with Bertha Bowie PA 9:25 PM 12/9/2020 COMMENT: Comparison: Chest x-ray from 12/9/2020 Technique: Chest CT pulmonary embolism protocol performed with intravenous contrast.  80 mL Omnipaque 350 given. CT DOSE:  One or more dose reduction techniques (e.g. automated exposure control, adjustment of the mA and/or kV according to patient size, use of iterative reconstruction technique) utilized for this examination. FINDINGS: LUNG, PLEURA AND LARGE AIRWAYS: The trachea and proximal bronchi remain clear. There are multifocal nodular lesions throughout the lungs.  For example in the right upper lobe there is a cavitary lesion measuring 1.4 cm on axial image 63. A 1.6 cm nodular lesion in the left upper lobe on image 69.  Multiple additional lesions are present elsewhere.  His are suspicious for septic emboli particularly given the history of endocarditis and sepsis. There are small bilateral pleural effusions. There is a nonspecific mild degree of groundglass opacity in the lower lung fields.  This could represent mild degree of pulmonary edema  or possibly a mosaic pattern from small airways or small vessel disease. No pneumothorax. VESSELS: Normal caliber of the thoracic aorta.  Thoracic aorta appears within normal limits.  Main pulmonary artery is normal in caliber.  No evidence of a main, lobar or segmental pulmonary embolism.  However, the subsegmental vessels are difficult to assess and some demonstrate a slightly hypoechoic enhancing appearance possibly artifact from bolus timing although tiny subsegmental pulmonary emboli would be difficult to exclude.  For example, in the lingula there is some hypoenhancement of the subsegmental vessels on image 116 where subsegmental PE cannot be excluded.  No evidence of right heart strain. HEART: normal size. No pericardial effusion. MEDIASTINUM AND PAULINA: There is a pathologically enlarged lymph node in the AP window measuring 1.8 cm short axis.  Additional prominent but nonpathologic by size video lymph nodes elsewhere. CHEST WALL AND LOWER NECK: within normal limits. UPPER ABDOMEN:Limited assessment of the upper abdominal structures with this technique.  There is splenomegaly with the spleen measuring 14.5 cm.  The liver may also be enlarged but is incompletely visualized.  Cholecystectomy changes. BONES: No suspicious bony erosive changes.  Multilevel Schmorl's nodes and degenerative changes in the spine are noted.  Calcific tendinosis of the right rotator cuff.  There is limited assessment of the spine with this technique.  No gross paraspinal inflammatory changes.       ECG/Telemetry  I have independently reviewed the telemetry. No events for the last 24 hours.    ASSESSMENT AND PLAN      * Acute bacterial endocarditis  Assessment & Plan  Suspected tricuspid valve endocarditis  Sepsis due to acute bacterial endocarditis present on admission  Continue antibiotics  [facility] from [facility]    Tricuspid valve vegetation  Assessment & Plan  Sessile vegetation on tricuspid valve leaflet  Continue  antibiotics    Anemia  Assessment & Plan        Septic arthritis of vertebra (CMS/HCC)  Assessment & Plan  Neurovascular intact  Pain control with oxycodone 10 mg q4 hours plus toradol as discussed with palliative care      Essential hypertension  Assessment & Plan  Continue with home meds  bp parameters for hypotension    Septic embolism (CMS/HCC)  Assessment & Plan  Continue IV vancomycin  Blood cultures performed on admission  Evidence of discitis on MRI, neurosurgery consulted as well as ID    Morbid obesity     VTE Assessment: Padua VTE Score: 5  Code Status: Full Code  Estimated discharge date: 12/11/2020     Dominique Powell DO  12/11/2020  6:01 PM

## 2020-12-11 NOTE — PROGRESS NOTES
Major Events:  JANUSZ noted    Subjective:  placid    Temp:  [36.6 °C (97.8 °F)-36.9 °C (98.5 °F)] 36.7 °C (98 °F)  Heart Rate:  [] 91  Resp:  [14-18] 16  BP: (116-165)/(60-96) 126/77     SpO2 Readings from Last 3 Encounters:   12/11/20 95%   08/05/20 94%   07/10/20 97%     Anti-infectives (From admission, onward)    Start     Dose/Rate Route Frequency Ordered Stop    12/11/20 2130  vancomycin 1 g/250 mL IVPB in NSS      1,000 mg  166.7 mL/hr over 90 Minutes intravenous Every 8 hours interval 12/11/20 1457          Physical Exam:  Skin: No rash  Sclera: Anicteric  Lungs: clr  CV: S1, S2 nl, soft m, no embolic changes  Abd: Soft, nt, no hsm  Extr: No jt eff, no edema  Neuro: Alert, lucid    Lab Results   Component Value Date    WBC 7.97 12/11/2020    HGB 8.5 (L) 12/11/2020    HCT 26.5 (L) 12/11/2020    MCV 83.6 12/11/2020     12/11/2020     Lab Results   Component Value Date    GLUCOSE 95 12/11/2020    CALCIUM 9.0 12/11/2020     12/11/2020    K 3.9 12/11/2020    CO2 23 12/11/2020     12/11/2020    BUN 7 (L) 12/11/2020    CREATININE 0.6 12/11/2020     Lab Results   Component Value Date    ALBUMIN 3.0 (L) 12/09/2020    BILITOT 0.4 12/09/2020    ALKPHOS 85 12/09/2020    BILIDIR <0.1 04/12/2017    AST 14 (L) 12/09/2020    ALT 11 12/09/2020    PROTEIN 7.2 12/09/2020     Microbiology Results     Procedure Component Value Units Date/Time    Blood Culture Blood, Venous [[id number]] Collected: 12/09/20 1348    Specimen: Blood, Venous Updated: 12/11/20 1801     Culture No growth at 48 hours    Blood Culture Blood, Venous [[id number]] Collected: 12/09/20 1304    Specimen: Blood, Venous Updated: 12/11/20 1501     Culture No growth at 48 hours      Impression    MSSA TV IE c pul emboli  Also c lumbar infection  BC here neg so far    Disc cefazolin c pt  She had stomach distress c cefazolin  Disc risk benefit of cefazolin/vanco  She is willing to try cefazolin here    If similar rxn will go with vanco    YS  MD Karen  Pager [phone]

## 2020-12-11 NOTE — ANESTHESIA PROCEDURE NOTES
Airway  Urgency: elective    Start Time: 12/11/2020 10:05 AM  Airway not difficult    General Information and Staff    Patient location during procedure: OR  Anesthesiologist: Judy Rosales MD  Resident/CRNA: Krys Terrazas CRNA  Performed: anesthesiologist and resident/CRNA     Indications and Patient Condition  Indications for airway management: anesthesia  Sedation level: general  Preoxygenated: yes  Patient position: sniffing  MILS maintained throughout  Mask difficulty assessment: 0 - not attempted    Final Airway Details  Final airway type: endotracheal airway      Successful airway: ETT  Cuffed: yes   Successful intubation technique: direct laryngoscopy  Facilitating devices/methods: intubating stylet  Endotracheal tube insertion site: oral  Blade: Choudhary  Blade size: #2  ETT size (mm): 7.5  Cormack-Lehane Classification: grade I - full view of glottis  Placement verified by: chest auscultation and capnometry   Cuff volume (mL): 7  Measured from: lips  ETT to lips (cm): 22  Number of attempts at approach: 1  Ventilation between attempts: none  Number of other approaches attempted: 0  Atraumatic airway insertion

## 2020-12-12 LAB
ANION GAP SERPL CALC-SCNC: 15 MEQ/L (ref 3–15)
BASOPHILS # BLD: 0.04 K/UL (ref 0.01–0.1)
BASOPHILS NFR BLD: 0.5 %
BUN SERPL-MCNC: 6 MG/DL (ref 8–20)
CALCIUM SERPL-MCNC: 9.2 MG/DL (ref 8.9–10.3)
CHLORIDE SERPL-SCNC: 102 MEQ/L (ref 98–109)
CO2 SERPL-SCNC: 21 MEQ/L (ref 22–32)
CREAT SERPL-MCNC: 0.7 MG/DL (ref 0.6–1.1)
DIFFERENTIAL METHOD BLD: ABNORMAL
EOSINOPHIL # BLD: 0.17 K/UL (ref 0.04–0.36)
EOSINOPHIL NFR BLD: 2 %
ERYTHROCYTE [DISTWIDTH] IN BLOOD BY AUTOMATED COUNT: 14.3 % (ref 11.7–14.4)
GFR SERPL CREATININE-BSD FRML MDRD: >60 ML/MIN/1.73M*2
GLUCOSE SERPL-MCNC: 88 MG/DL (ref 70–99)
HCT VFR BLDCO AUTO: 31.9 % (ref 35–45)
HGB BLD-MCNC: 10.1 G/DL (ref 11.8–15.7)
IMM GRANULOCYTES # BLD AUTO: 0.37 K/UL (ref 0–0.08)
IMM GRANULOCYTES NFR BLD AUTO: 4.5 %
LYMPHOCYTES # BLD: 1.58 K/UL (ref 1.2–3.5)
LYMPHOCYTES NFR BLD: 19 %
MCH RBC QN AUTO: 26.9 PG (ref 28–33.2)
MCHC RBC AUTO-ENTMCNC: 31.7 G/DL (ref 32.2–35.5)
MCV RBC AUTO: 84.8 FL (ref 83–98)
MONOCYTES # BLD: 0.53 K/UL (ref 0.28–0.8)
MONOCYTES NFR BLD: 6.4 %
NEUTROPHILS # BLD: 5.62 K/UL (ref 1.7–7)
NEUTS SEG NFR BLD: 67.6 %
NRBC BLD-RTO: 0 %
PDW BLD AUTO: 9.9 FL (ref 9.4–12.3)
PLATELET # BLD AUTO: 276 K/UL (ref 150–369)
POTASSIUM SERPL-SCNC: 4.5 MEQ/L (ref 3.6–5.1)
RBC # BLD AUTO: 3.76 M/UL (ref 3.93–5.22)
SODIUM SERPL-SCNC: 138 MEQ/L (ref 136–144)
WBC # BLD AUTO: 8.31 K/UL (ref 3.8–10.5)

## 2020-12-12 PROCEDURE — 63700000 HC SELF-ADMINISTRABLE DRUG: Performed by: PSYCHIATRY & NEUROLOGY

## 2020-12-12 PROCEDURE — 25800000 HC PHARMACY IV SOLUTIONS: Performed by: INTERNAL MEDICINE

## 2020-12-12 PROCEDURE — 99232 SBSQ HOSP IP/OBS MODERATE 35: CPT | Performed by: INTERNAL MEDICINE

## 2020-12-12 PROCEDURE — 63600000 HC DRUGS/DETAIL CODE: Performed by: INTERNAL MEDICINE

## 2020-12-12 PROCEDURE — 80048 BASIC METABOLIC PNL TOTAL CA: CPT | Performed by: HOSPITALIST

## 2020-12-12 PROCEDURE — 85025 COMPLETE CBC W/AUTO DIFF WBC: CPT | Performed by: HOSPITALIST

## 2020-12-12 PROCEDURE — 63600000 HC DRUGS/DETAIL CODE: Performed by: HOSPITALIST

## 2020-12-12 PROCEDURE — 63700000 HC SELF-ADMINISTRABLE DRUG: Performed by: NURSE PRACTITIONER

## 2020-12-12 PROCEDURE — 25000000 HC PHARMACY GENERAL: Performed by: INTERNAL MEDICINE

## 2020-12-12 PROCEDURE — 20600000 HC ROOM AND CARE INTERMEDIATE/TELEMETRY

## 2020-12-12 PROCEDURE — 63600000 HC DRUGS/DETAIL CODE: Performed by: NURSE PRACTITIONER

## 2020-12-12 PROCEDURE — 63700000 HC SELF-ADMINISTRABLE DRUG: Performed by: HOSPITALIST

## 2020-12-12 RX ADMIN — ZOLPIDEM TARTRATE 10 MG: 5 TABLET, COATED ORAL at 21:10

## 2020-12-12 RX ADMIN — ACETAMINOPHEN 650 MG: 325 TABLET, FILM COATED ORAL at 12:21

## 2020-12-12 RX ADMIN — OXYCODONE HYDROCHLORIDE 10 MG: 5 TABLET ORAL at 12:23

## 2020-12-12 RX ADMIN — CEFAZOLIN SODIUM 2 G: 10 POWDER, FOR SOLUTION INTRAVENOUS at 03:04

## 2020-12-12 RX ADMIN — PRAZOSIN HYDROCHLORIDE 2 MG: 1 CAPSULE ORAL at 21:10

## 2020-12-12 RX ADMIN — BUPROPION HYDROCHLORIDE 75 MG: 75 TABLET, FILM COATED ORAL at 14:21

## 2020-12-12 RX ADMIN — OXYCODONE HYDROCHLORIDE 10 MG: 5 TABLET ORAL at 03:57

## 2020-12-12 RX ADMIN — OXYCODONE HYDROCHLORIDE 10 MG: 5 TABLET ORAL at 07:53

## 2020-12-12 RX ADMIN — PREGABALIN 150 MG: 75 CAPSULE ORAL at 20:26

## 2020-12-12 RX ADMIN — DIAZEPAM 5 MG: 5 INJECTION, SOLUTION INTRAMUSCULAR; INTRAVENOUS at 10:14

## 2020-12-12 RX ADMIN — BUPROPION HYDROCHLORIDE 75 MG: 75 TABLET, FILM COATED ORAL at 08:00

## 2020-12-12 RX ADMIN — PAROXETINE 30 MG: 20 TABLET, FILM COATED ORAL at 08:01

## 2020-12-12 RX ADMIN — PREGABALIN 150 MG: 75 CAPSULE ORAL at 07:59

## 2020-12-12 RX ADMIN — ACETAMINOPHEN 650 MG: 325 TABLET, FILM COATED ORAL at 20:25

## 2020-12-12 RX ADMIN — KETOROLAC TROMETHAMINE 15 MG: 15 INJECTION, SOLUTION INTRAMUSCULAR; INTRAVENOUS at 07:53

## 2020-12-12 RX ADMIN — DIAZEPAM 5 MG: 5 INJECTION, SOLUTION INTRAMUSCULAR; INTRAVENOUS at 01:02

## 2020-12-12 RX ADMIN — NICOTINE POLACRILEX 4 MG: 4 GUM, CHEWING ORAL at 08:21

## 2020-12-12 RX ADMIN — CEFAZOLIN SODIUM 2 G: 10 POWDER, FOR SOLUTION INTRAVENOUS at 20:26

## 2020-12-12 RX ADMIN — CEFAZOLIN SODIUM 2 G: 10 POWDER, FOR SOLUTION INTRAVENOUS at 10:14

## 2020-12-12 RX ADMIN — ACETAMINOPHEN 650 MG: 325 TABLET, FILM COATED ORAL at 16:22

## 2020-12-12 RX ADMIN — METOPROLOL SUCCINATE 25 MG: 25 TABLET, EXTENDED RELEASE ORAL at 08:00

## 2020-12-12 RX ADMIN — OXYCODONE HYDROCHLORIDE 10 MG: 5 TABLET ORAL at 20:25

## 2020-12-12 RX ADMIN — DIAZEPAM 5 MG: 5 INJECTION, SOLUTION INTRAMUSCULAR; INTRAVENOUS at 18:13

## 2020-12-12 RX ADMIN — OXYCODONE HYDROCHLORIDE 10 MG: 5 TABLET ORAL at 16:19

## 2020-12-12 RX ADMIN — QUETIAPINE 100 MG: 100 TABLET, FILM COATED ORAL at 21:10

## 2020-12-12 RX ADMIN — NICOTINE POLACRILEX 4 MG: 4 GUM, CHEWING ORAL at 14:35

## 2020-12-12 RX ADMIN — ACETAMINOPHEN 650 MG: 325 TABLET, FILM COATED ORAL at 07:52

## 2020-12-12 NOTE — ASSESSMENT & PLAN NOTE
Suspected tricuspid valve endocarditis  Sepsis due to acute bacterial endocarditis present on admission  Continue antibiotics  Blood cultures from [facility]  Repeat blood cultures NGTD  PICC line in place

## 2020-12-12 NOTE — ASSESSMENT & PLAN NOTE
-Methicillin sensitive staph bacteremia with tricuspid, cultures at [facility], endocarditis  -Evidence of discitis on MRI, plan per neurosurgery is follow-up MRI after completion of antibiotics  -Continue tx per ID, neuro sx

## 2020-12-12 NOTE — ASSESSMENT & PLAN NOTE
Neurovascular intact  Palliative care medicine and psych following   Off oxycodone, on methadone

## 2020-12-12 NOTE — PROGRESS NOTES
Cardiology Daily Progress Note       Subjective    Carolyn Redmond is a 35 y.o. female.    Overview: PMH of IV drug abuse, suicide attempt 2017, vasculitis, known to Dr. Eldridge from a history of prior endocarditis 3 years ago. Admitted for lumbar back pain and intermittent fevers.  CT of the chest demonstrating multi nodule lesions on the lung fields especially on the right upper lobe suspicious for septic emboli.  Also evidence of Lumbar infection on MRI imaging.  MSSA positive blood cultures.      This was followed by JANUSZ with bubble study and found to have 1 x 0.6 cm echolucent spongy sessile mass attached to the mid body of the posterior tricuspid leaflet consistent with vegetation and severe TR.  ID following and currently receiving cefazolin, will need long term IV ABX.     Interval history 12/12: patient still with on and off again low grade fevers however appears relatively stable.  VS & lab work stable.  No events noted on tele.  Continue tx for IE per ID recs.         Amoxicillin, Nsaids (non-steroidal anti-inflammatory drug), Trazodone, and Tramadol  Current Facility-Administered Medications   Medication Dose Route Frequency Provider Last Rate Last Dose   • acetaminophen (TYLENOL) tablet 650 mg  650 mg oral q4h PRN Jacki Boss CRNP   650 mg at 12/12/20 1221   • buPROPion (WELLBUTRIN) tablet 75 mg  75 mg oral TID Perfecto Angela MD   75 mg at 12/12/20 0800   • butalbital-acetaminophen-caff (FIORICET, ESGIC) per tablet 2 tablet  2 tablet oral q8h PRN Dominique Powell DO   2 tablet at 12/11/20 2003   • ceFAZolin (ANCEF) NSS IVPB piggyback 2 g  2 g intravenous q8h INT Vikas Jones MD   Stopped at 12/12/20 1131   • glucose chewable tablet 16-32 g of dextrose  16-32 g of dextrose oral PRN Perfecto Angela MD        Or   • dextrose 40 % oral gel 15-30 g of dextrose  15-30 g of dextrose oral PRN Perfecto Angela MD        Or   • glucagon (GLUCAGEN) injection 1 mg  1 mg intramuscular PRN  Perfecto Angela MD        Or   • dextrose in water injection 12.5 g  25 mL intravenous PRN Perfecto Angela MD       • diazePAM (VALIUM) injection 5 mg  5 mg intravenous q8h PRN Perfecto Angela MD   5 mg at 12/12/20 1014   • fluticasone propionate (FLONASE) 50 mcg/actuation nasal spray 2 spray  2 spray Each Nostril Daily PRN Perfecto Angela MD       • heparin (porcine) 5,000 unit/mL injection 5,000 Units  5,000 Units subcutaneous q8h JAIDEN Perfecto Angela MD   5,000 Units at 12/11/20 1335   • ketorolac (TORADOL) injection 15 mg  15 mg intravenous q6h PRN Jacki Boss CRNP   15 mg at 12/12/20 0753   • lidocaine (ASPERCREME) 4 % topical patch 1 patch  1 patch Topical Daily Jacki Boss CRNP       • magnesium sulfate IVPB 2g in 50 mL NSS/D5W/SWFI  2 g intravenous PRN Perfecto Angela MD       • metoprolol succinate XL (TOPROL-XL) 24 hr ER tablet 25 mg  25 mg oral Daily Perfecto Angela MD   25 mg at 12/12/20 0800   • naloxone (NARCAN) injection 0.2 mg  0.2 mg intravenous Once PRN Jacki Boss CRNP       • nicotine polacrilex (NICORETTE) gum 4 mg  4 mg buccal q4h PRN Perfecto Angela MD   4 mg at 12/12/20 0821   • ondansetron (ZOFRAN) injection 4 mg  4 mg intravenous q15 min PRN Judy Rosales MD       • oxyCODONE (ROXICODONE) immediate release tablet 10 mg  10 mg oral q4h PRN Jacki Boss CRNP   10 mg at 12/12/20 1223   • PARoxetine (PAXIL) tablet 30 mg  30 mg oral Daily Marlon Padron MD   30 mg at 12/12/20 0801   • polyethylene glycol (MIRALAX) 17 gram packet 17 g  17 g oral Daily Jacki Boss CRNP       • potassium chloride (KLOR-CON) tablet extended release 20 mEq  20 mEq oral Daily PRN Perfecto Angela MD        Or   • potassium chloride (KLOR-CON) tablet extended release 40 mEq  40 mEq oral Daily PRN Perfecto Angela MD        Or   • potassium chloride 20 mEq in 100 mL IVPB  (premix)  20 mEq intravenous Daily PRN Perfecto Angela MD        Or   • potassium  "chloride (KCL) 40 mEq/250 mL IVPB in NSS 40 mEq  40 mEq intravenous Daily PRN Perfecto Angela MD       • prazosin (MINIPRESS) capsule 2 mg  2 mg oral Nightly Perfecto Angela MD   2 mg at 12/11/20 2109   • pregabalin (LYRICA) capsule 150 mg  150 mg oral BID Perfecto Angela MD   150 mg at 12/12/20 0759   • QUEtiapine (SEROquel) tablet 100 mg  100 mg oral Nightly Perfecto Angela MD   100 mg at 12/11/20 2109   • sodium chloride 0.9 % infusion   intravenous Continuous Perfecto Angela MD 80 mL/hr at 12/11/20 0459     • zolpidem (AMBIEN) tablet 10 mg  10 mg oral Nightly Perfecto Angela MD   10 mg at 12/11/20 2109         Objective   Visit Vitals  /84 (BP Location: Right upper arm, Patient Position: Lying)   Pulse 82   Temp 37.1 °C (98.7 °F) (Oral)   Resp 18   Ht 1.575 m (5' 2\")   Wt 107 kg (235 lb 12.8 oz)   LMP 11/18/2020   SpO2 92%   Breastfeeding No   BMI 43.13 kg/m²       No intake or output data in the 24 hours ending 12/12/20 1317    Physical Exam   Constitutional: She is oriented to person, place, and time. She appears well-developed and well-nourished. No distress.   HENT:   Head: Normocephalic.   Neck: Normal range of motion.   Pulmonary/Chest: Breath sounds normal. No respiratory distress. She has no wheezes. She exhibits no tenderness.   Abdominal: Soft. She exhibits no distension. There is no abdominal tenderness.   Musculoskeletal: Normal range of motion.   Neurological: She is alert and oriented to person, place, and time.   Skin: Skin is warm and dry. She is not diaphoretic.   Psychiatric: She has a normal mood and affect.       Labs   Lab Results   Component Value Date    WBC 8.31 12/12/2020    HGB 10.1 (L) 12/12/2020    HCT 31.9 (L) 12/12/2020     12/12/2020    ALT 11 12/09/2020    AST 14 (L) 12/09/2020     12/12/2020    K 4.5 12/12/2020     12/12/2020    CREATININE 0.7 12/12/2020    BUN 6 (L) 12/12/2020    CO2 21 (L) 12/12/2020    INR 0.9 04/06/2019    GLUCOSE 88 " 12/12/2020    TROPONINI <0.02 12/09/2020       Imaging      Cardiac Imaging   TRANSESOPHAGEAL ECHO (JANUSZ) 12/11/2020    Narrative · Tricuspid valve endocarditis.  · 1 x 0.6 cm echolucent spongy sessile mass attached to the mid body of   the posterior tricuspid leaflet consistent with vegetation. There is a   tiny mobile component on the atrial surface of the mass. There appears to   be a smaller segment of vegetation on the ventricular surface of the valve   leaflet opposite the larger component. There is a tiny perforation at the   edge of the vegetation 5 mm from the annulus of the leaflet.  · Borderline severe tricuspid regurgitation through the valve orifice.   There is a small regurgitant jet through the perforation.  · Estimated RVSP = 48 mmHg assuming a right atrial pressure 5 mmHg  · Normal-appearing aortic, mitral, pulmonic valves. Trace mitral   regurgitation.  · Normal-sized LV. Normal LV wall thickness.  · Preserved LV systolic function. Estimated EF 60- 65%. Normal LV septal   wall motion. No regional wall motion abnormalities.  · Normal RV size and function.  · Normal-sized LA. Intact atrial septum. Normal pulmonary veins  · Normal-sized RA. No PFO by saline contrast injection or color-flow   imaging.  · Trace mitral valve regurgitation.  · Normal-sized IVC. Normal SVC  · Normal aorta.          Telemetry  NSR, some tachycardia at times    Assessment & Plan    Tricuspid valve vegetation  Overview  JANUSZ 12/11/20  · Tricuspid valve endocarditis.  · 1 x 0.6 cm echolucent spongy sessile mass attached to the mid body of the posterior tricuspid leaflet consistent with vegetation. There is a tiny mobile component on the atrial surface of the mass. There appears to be a smaller segment of vegetation on the ventricular surface of the valve leaflet opposite the larger component. There is a tiny perforation at the edge of the vegetation 5 mm from the annulus of the leaflet.  · Borderline severe tricuspid  regurgitation through the valve orifice. There is a small regurgitant jet through the perforation.  · Estimated RVSP = 48 mmHg assuming a right atrial pressure 5 mmHg  · Normal-appearing aortic, mitral, pulmonic valves. Trace mitral regurgitation.  · Normal-sized LV. Normal LV wall thickness.  · Preserved LV systolic function. Estimated EF 60- 65%. Normal LV septal wall motion. No regional wall motion abnormalities.  · Normal RV size and function.  · Normal-sized LA. Intact atrial septum. Normal pulmonary veins  · Normal-sized RA. No PFO by saline contrast injection or color-flow imaging.  · Trace mitral valve regurgitation.  · Normal-sized IVC. Normal SVC  · Normal aorta.    Assessment & Plan  -Evidence of tricuspid valve endocarditis as outlined in detailed report of JANUSZ above  -ID on board and aware  -Continue ABX recs per ID  -Need to monitor closely as outpatient as at some point will likely need replacement      Septic arthritis of vertebra (CMS/HCC)  Overview  MRI Spine 12/2020  Diffuse anterior and posterior epidural enhancement within lumbar spine more  pronounced at L4-L5 raising the suspicion for underlying infectious  process/phlegmon.    Assessment & Plan  -Due to IE confirmed via JANUSZ yesterday  -Continue tx per ID    Essential hypertension  Assessment & Plan  Overall stable.    Septic embolism (CMS/HCC)  Assessment & Plan  -Pulmonary emboli noted on CT scan.  -JANUSZ demonstrating tricuspid endocarditis  -See endocarditis section for details    * Acute bacterial endocarditis  Assessment & Plan  -MSSA positive BC  -Septic emboli to lungs in addition to lumbar spine infection  -Secondary to tricuspid endocarditis which was confirmed with JANUSZ yesterday  -Continue tx per ID          Discussed with SHERITA Manley  12/12/2020  1:17 PM     This patient note has been dictated using speech recognition software. Inadvertent speech recognition errors should be disregarded. Please do not hesitate to call  my office for clarifications.

## 2020-12-12 NOTE — ASSESSMENT & PLAN NOTE
Continue IV abx per ID   Blood cultures performed on admission, NGTD  Evidence of discitis on MRI, plan per neurosurgery is follow-up MRI after completion of antibiotics

## 2020-12-12 NOTE — PROGRESS NOTES
Hospital Medicine Service -  Daily Progress Note       SUBJECTIVE   Interval History: no overnight events  C/o back pain, more tolerable, no dyspnea, chest pain, abd pain/n/v, afebrile      OBJECTIVE      Vital signs in last 24 hours:  Temp:  [36.7 °C (98 °F)-37.2 °C (99 °F)] 37.2 °C (99 °F)  Heart Rate:  [] 93  Resp:  [14-18] 18  BP: (122-171)/(60-99) 133/78  No intake or output data in the 24 hours ending 12/12/20 1037    PHYSICAL EXAMINATION      Physical Exam   Constitutional: NAD  Neck: Supple. No JVD.    Cardiovascular: Normal rate, regular rhythm    Pulmonary/Chest: Effort normal and breath sounds normal. No wheezing or crackles.  Abdominal: Soft. Bowel sounds are normal. There is no tenderness, no rebound and no guarding.   Musculoskeletal: No edema   Neurological: Alert and oriented       LINES, CATHETERS, DRAINS, AIRWAYS, AND WOUNDS   Lines, Drains, Airways, Wounds:  Peripheral IV 12/11/20 Right Forearm (Active)   Number of days: 1       Comments:      LABS / IMAGING / TELE      Labs  Lab Results   Component Value Date    WBC 7.97 12/11/2020    HGB 8.5 (L) 12/11/2020    HCT 26.5 (L) 12/11/2020     12/11/2020    ALT 11 12/09/2020    AST 14 (L) 12/09/2020     12/11/2020    K 3.9 12/11/2020     12/11/2020    CREATININE 0.6 12/11/2020    BUN 7 (L) 12/11/2020    CO2 23 12/11/2020    INR 0.9 04/06/2019       Imaging  Mri Thoracic Spine With And Without Contrast    Result Date: 12/9/2020  IMPRESSION: Diffuse anterior and posterior epidural enhancement within lumbar spine more pronounced at L4-L5 raising the suspicion for underlying infectious process/phlegmon.  Left greater than right L4-L5 facet joint signal and enhancement which although may be degenerative in nature, early changes of septic facet arthritis is in the differential.  Follow-up is recommended. Thoracic and lumbar degenerative change as described more pronounced at L4-L5. Ring-enhancing lesion in right upper lobe which  may be infectious in etiology including in the setting of septic emboli.  Correlation with CT chest with contrast is recommended to exclude other etiologies. The results were discussed with Rylee Bowie on 12/9/2020 7:35 PM.    Mri Lumbar Spine With And Without Contrast    Result Date: 12/9/2020  IMPRESSION: Diffuse anterior and posterior epidural enhancement within lumbar spine more pronounced at L4-L5 raising the suspicion for underlying infectious process/phlegmon.  Left greater than right L4-L5 facet joint signal and enhancement which although may be degenerative in nature, early changes of septic facet arthritis is in the differential.  Follow-up is recommended. Thoracic and lumbar degenerative change as described more pronounced at L4-L5. Ring-enhancing lesion in right upper lobe which may be infectious in etiology including in the setting of septic emboli.  Correlation with CT chest with contrast is recommended to exclude other etiologies. The results were discussed with Rylee Bowie on 12/9/2020 7:35 PM.    X-ray Chest 1 View    Result Date: 12/9/2020  IMPRESSION: No radiographic evidence of acute cardiopulmonary disease.    Ct Chest Pulmonary Embolism With Iv Contrast    Result Date: 12/9/2020  IMPRESSION: 1.  No evidence of main, lobar or segmental pulmonary embolism. 2.  However, there are multifocal nodular lesions throughout all lung fields including a cavitary lesion in the right upper lobe all suspicious for septic emboli.  Some of the tiny subsegmental pulmonary arterial branches demonstrate hypoenhancement and it is difficult to determinate if this is related to tiny pulmonary emboli or technique. 3.  Small bilateral pleural effusions. 4.  Mosaic pattern at the lung bases could represent mild edema or sequela of small airways or small vessels disease. 5.  Follow-up chest CT after appropriate treatment is recommended to document lung opacities and mediastinal lymphadenopathy which is probably  reactive. 6.  Additional findings discussed below. Finding:    Other   Acuity: Critical  Status:  CLOSED Critical read back for the first impression performed with Bertha Bowie PA 9:25 PM 12/9/2020 COMMENT: Comparison: Chest x-ray from 12/9/2020 Technique: Chest CT pulmonary embolism protocol performed with intravenous contrast.  80 mL Omnipaque 350 given. CT DOSE:  One or more dose reduction techniques (e.g. automated exposure control, adjustment of the mA and/or kV according to patient size, use of iterative reconstruction technique) utilized for this examination. FINDINGS: LUNG, PLEURA AND LARGE AIRWAYS: The trachea and proximal bronchi remain clear. There are multifocal nodular lesions throughout the lungs.  For example in the right upper lobe there is a cavitary lesion measuring 1.4 cm on axial image 63. A 1.6 cm nodular lesion in the left upper lobe on image 69.  Multiple additional lesions are present elsewhere.  His are suspicious for septic emboli particularly given the history of endocarditis and sepsis. There are small bilateral pleural effusions. There is a nonspecific mild degree of groundglass opacity in the lower lung fields.  This could represent mild degree of pulmonary edema or possibly a mosaic pattern from small airways or small vessel disease. No pneumothorax. VESSELS: Normal caliber of the thoracic aorta.  Thoracic aorta appears within normal limits.  Main pulmonary artery is normal in caliber.  No evidence of a main, lobar or segmental pulmonary embolism.  However, the subsegmental vessels are difficult to assess and some demonstrate a slightly hypoechoic enhancing appearance possibly artifact from bolus timing although tiny subsegmental pulmonary emboli would be difficult to exclude.  For example, in the lingula there is some hypoenhancement of the subsegmental vessels on image 116 where subsegmental PE cannot be excluded.  No evidence of right heart strain. HEART: normal size. No  pericardial effusion. MEDIASTINUM AND PAULINA: There is a pathologically enlarged lymph node in the AP window measuring 1.8 cm short axis.  Additional prominent but nonpathologic by size video lymph nodes elsewhere. CHEST WALL AND LOWER NECK: within normal limits. UPPER ABDOMEN:Limited assessment of the upper abdominal structures with this technique.  There is splenomegaly with the spleen measuring 14.5 cm.  The liver may also be enlarged but is incompletely visualized.  Cholecystectomy changes. BONES: No suspicious bony erosive changes.  Multilevel Schmorl's nodes and degenerative changes in the spine are noted.  Calcific tendinosis of the right rotator cuff.  There is limited assessment of the spine with this technique.  No gross paraspinal inflammatory changes.         ECG/Telemetry  I have independently reviewed the telemetry. No events for the last 24 hours.    ASSESSMENT AND PLAN      Tricuspid valve vegetation  Assessment & Plan  Sessile vegetation on tricuspid valve leaflet  Continue antibiotics  Cards consulted.     Septic arthritis of vertebra (CMS/HCC)  Assessment & Plan  Neurovascular intact  Pain control with oxycodone 10 mg q4 hours plus toradol as discussed with palliative care      Essential hypertension  Assessment & Plan  Continue with home meds  bp parameters for hypotension    Septic embolism (CMS/HCC)  Assessment & Plan  Continue IV abx per ID   Blood cultures performed on admission, NGTD  Evidence of discitis on MRI, neurosurgery consulted as well as ID    * Acute bacterial endocarditis  Assessment & Plan  Suspected tricuspid valve endocarditis  Sepsis due to acute bacterial endocarditis present on admission  Continue antibiotics  Blood cultures from [place] [facility]         VTE Assessment: [place] VTE Score: 5  VTE Prophylaxis Plan: heparin  Code Status: Full Code  Estimated Discharge Date: 12/14/2020  Disposition Planning: rehab     Gen Lennie MD  12/12/2020

## 2020-12-12 NOTE — PLAN OF CARE
Problem: Adult Inpatient Plan of Care  Goal: Plan of Care Review  Flowsheets (Taken 12/12/2020 1707)  Progress: improving  Plan of Care Reviewed With: patient  Outcome Summary: PT AAOX4. pain controlled with PO  pain meds. VSS after pain controlled. IVcefozolin continue. will continue to monitor   Plan of Care Review  Plan of Care Reviewed With: patient  Progress: improving  Outcome Summary: PT AAOX4. pain controlled with PO  pain meds. VSS after pain controlled. IVcefozolin continue. will continue to monitor

## 2020-12-13 PROCEDURE — 63700000 HC SELF-ADMINISTRABLE DRUG: Performed by: PSYCHIATRY & NEUROLOGY

## 2020-12-13 PROCEDURE — 63700000 HC SELF-ADMINISTRABLE DRUG: Performed by: HOSPITALIST

## 2020-12-13 PROCEDURE — 63600000 HC DRUGS/DETAIL CODE: Mod: JW | Performed by: HOSPITALIST

## 2020-12-13 PROCEDURE — 25000000 HC PHARMACY GENERAL: Performed by: INTERNAL MEDICINE

## 2020-12-13 PROCEDURE — 25800000 HC PHARMACY IV SOLUTIONS: Performed by: INTERNAL MEDICINE

## 2020-12-13 PROCEDURE — 99232 SBSQ HOSP IP/OBS MODERATE 35: CPT | Performed by: INTERNAL MEDICINE

## 2020-12-13 PROCEDURE — 63700000 HC SELF-ADMINISTRABLE DRUG: Performed by: NURSE PRACTITIONER

## 2020-12-13 PROCEDURE — 20600000 HC ROOM AND CARE INTERMEDIATE/TELEMETRY

## 2020-12-13 PROCEDURE — 63700000 HC SELF-ADMINISTRABLE DRUG: Performed by: INTERNAL MEDICINE

## 2020-12-13 PROCEDURE — 63600000 HC DRUGS/DETAIL CODE: Performed by: INTERNAL MEDICINE

## 2020-12-13 RX ORDER — CLONIDINE HYDROCHLORIDE 0.1 MG/1
0.1 TABLET ORAL EVERY 6 HOURS PRN
Status: DISCONTINUED | OUTPATIENT
Start: 2020-12-13 | End: 2020-12-14

## 2020-12-13 RX ORDER — CLONIDINE HYDROCHLORIDE 0.1 MG/1
0.1 TABLET ORAL EVERY 6 HOURS PRN
Status: DISCONTINUED | OUTPATIENT
Start: 2020-12-13 | End: 2020-12-13

## 2020-12-13 RX ADMIN — OXYCODONE HYDROCHLORIDE 10 MG: 5 TABLET ORAL at 18:06

## 2020-12-13 RX ADMIN — OXYCODONE HYDROCHLORIDE 10 MG: 5 TABLET ORAL at 13:51

## 2020-12-13 RX ADMIN — METOPROLOL SUCCINATE 25 MG: 25 TABLET, EXTENDED RELEASE ORAL at 09:36

## 2020-12-13 RX ADMIN — ACETAMINOPHEN 650 MG: 325 TABLET, FILM COATED ORAL at 13:51

## 2020-12-13 RX ADMIN — ACETAMINOPHEN 650 MG: 325 TABLET, FILM COATED ORAL at 00:25

## 2020-12-13 RX ADMIN — ACETAMINOPHEN 650 MG: 325 TABLET, FILM COATED ORAL at 04:19

## 2020-12-13 RX ADMIN — OXYCODONE HYDROCHLORIDE 10 MG: 5 TABLET ORAL at 22:12

## 2020-12-13 RX ADMIN — ZOLPIDEM TARTRATE 10 MG: 5 TABLET, COATED ORAL at 21:03

## 2020-12-13 RX ADMIN — ACETAMINOPHEN 650 MG: 325 TABLET, FILM COATED ORAL at 22:12

## 2020-12-13 RX ADMIN — PREGABALIN 150 MG: 75 CAPSULE ORAL at 20:15

## 2020-12-13 RX ADMIN — NICOTINE POLACRILEX 4 MG: 4 GUM, CHEWING ORAL at 09:37

## 2020-12-13 RX ADMIN — OXYCODONE HYDROCHLORIDE 10 MG: 5 TABLET ORAL at 00:25

## 2020-12-13 RX ADMIN — OXYCODONE HYDROCHLORIDE 10 MG: 5 TABLET ORAL at 09:33

## 2020-12-13 RX ADMIN — DIAZEPAM 5 MG: 5 INJECTION, SOLUTION INTRAMUSCULAR; INTRAVENOUS at 03:20

## 2020-12-13 RX ADMIN — OXYCODONE HYDROCHLORIDE 10 MG: 5 TABLET ORAL at 04:19

## 2020-12-13 RX ADMIN — PRAZOSIN HYDROCHLORIDE 2 MG: 1 CAPSULE ORAL at 21:03

## 2020-12-13 RX ADMIN — DIAZEPAM 5 MG: 5 INJECTION, SOLUTION INTRAMUSCULAR; INTRAVENOUS at 20:15

## 2020-12-13 RX ADMIN — BUPROPION HYDROCHLORIDE 75 MG: 75 TABLET, FILM COATED ORAL at 09:36

## 2020-12-13 RX ADMIN — DIAZEPAM 5 MG: 5 INJECTION, SOLUTION INTRAMUSCULAR; INTRAVENOUS at 11:17

## 2020-12-13 RX ADMIN — PAROXETINE 30 MG: 20 TABLET, FILM COATED ORAL at 09:36

## 2020-12-13 RX ADMIN — BUPROPION HYDROCHLORIDE 75 MG: 75 TABLET, FILM COATED ORAL at 13:52

## 2020-12-13 RX ADMIN — CEFAZOLIN SODIUM 2 G: 10 POWDER, FOR SOLUTION INTRAVENOUS at 03:15

## 2020-12-13 RX ADMIN — CLONIDINE HYDROCHLORIDE 0.1 MG: 0.1 TABLET ORAL at 09:59

## 2020-12-13 RX ADMIN — CEFAZOLIN SODIUM 2 G: 10 POWDER, FOR SOLUTION INTRAVENOUS at 18:06

## 2020-12-13 RX ADMIN — QUETIAPINE 100 MG: 100 TABLET, FILM COATED ORAL at 21:04

## 2020-12-13 RX ADMIN — PREGABALIN 150 MG: 75 CAPSULE ORAL at 09:32

## 2020-12-13 RX ADMIN — ACETAMINOPHEN 650 MG: 325 TABLET, FILM COATED ORAL at 18:06

## 2020-12-13 RX ADMIN — CEFAZOLIN SODIUM 2 G: 10 POWDER, FOR SOLUTION INTRAVENOUS at 11:17

## 2020-12-13 NOTE — PROGRESS NOTES
Cardiology Daily Progress Note       Subjective    Carolyn Redmond is a 35 y.o. female.    Overview: PMH of IV drug abuse, suicide attempt 2017, vasculitis, known to Dr. Eldridge from a history of prior endocarditis 3 years ago. Admitted for lumbar back pain and intermittent fevers.  CT of the chest demonstrating multi nodule lesions on the lung fields especially on the right upper lobe suspicious for septic emboli.  Also evidence of Lumbar infection on MRI imaging.  MSSA positive blood cultures.       This was followed by [facility] with bubble study and found to have 1 x 0.6 cm echolucent spongy sessile mass attached to the mid body of the posterior tricuspid leaflet consistent with vegetation and severe TR.  ID following and currently receiving cefazolin, will need long term IV ABX.     Interval history: She does have back pain which is pleuritic proximal septic emboli.  This is stable but she is struggling with the issue of adequate analgesia without narcotic use which could precipitate her addiction issue but also wanting to get back on Suboxone.  She denies any dyspnea, palpitation, tachycardia or orthopnea.    We had a long discussion as she was tearful about everything.  I agree with psychiatry for help to adjust medications and pain relief in the setting of her narcotic addiction problem and wanting to get back on Suboxone.  In addition, all mental health issues could benefit from intervention.        Amoxicillin, Nsaids (non-steroidal anti-inflammatory drug), Trazodone, and Tramadol  Current Facility-Administered Medications   Medication Dose Route Frequency Provider Last Rate Last Dose   • acetaminophen (TYLENOL) tablet 650 mg  650 mg oral q4h PRN Jacki Boss CRNP   650 mg at 12/13/20 1219   • buPROPion (WELLBUTRIN) tablet 75 mg  75 mg oral TID Perfecto Angela MD   75 mg at 12/12/20 1421   • butalbital-acetaminophen-caff (FIORICET, ESGIC) per tablet 2 tablet  2 tablet oral q8h PRN Andre  Dominique SANCHEZ DO   2 tablet at 12/11/20 2003   • ceFAZolin (ANCEF) NSS IVPB piggyback 2 g  2 g intravenous q8h INT Vikas Jones MD   Stopped at 12/13/20 0345   • cloNIDine (CATAPRES) tablet 0.1 mg  0.1 mg oral q6h PRN Gen Hogue MD       • glucose chewable tablet 16-32 g of dextrose  16-32 g of dextrose oral PRN Perfecto Angela MD        Or   • dextrose 40 % oral gel 15-30 g of dextrose  15-30 g of dextrose oral PRN Perfecto Angela MD        Or   • glucagon (GLUCAGEN) injection 1 mg  1 mg intramuscular PRN Perfecto Angela MD        Or   • dextrose in water injection 12.5 g  25 mL intravenous PRN Perfecto Angela MD       • diazePAM (VALIUM) injection 5 mg  5 mg intravenous q8h PRN Perfecto Angela MD   5 mg at 12/13/20 0320   • fluticasone propionate (FLONASE) 50 mcg/actuation nasal spray 2 spray  2 spray Each Nostril Daily PRN Perfecto Angela MD       • heparin (porcine) 5,000 unit/mL injection 5,000 Units  5,000 Units subcutaneous q8h JAIDEN Perfecto Angela MD   5,000 Units at 12/11/20 1335   • ketorolac (TORADOL) injection 15 mg  15 mg intravenous q6h PRN Jacki Boss CRNP   15 mg at 12/12/20 0753   • lidocaine (ASPERCREME) 4 % topical patch 1 patch  1 patch Topical Daily Jacki Boss CRNP       • magnesium sulfate IVPB 2g in 50 mL NSS/D5W/SWFI  2 g intravenous PRN Perfecto Angela MD       • metoprolol succinate XL (TOPROL-XL) 24 hr ER tablet 25 mg  25 mg oral Daily Perfecto Angela MD   25 mg at 12/12/20 0800   • naloxone (NARCAN) injection 0.2 mg  0.2 mg intravenous Once PRN Jacki Boss CRNP       • nicotine polacrilex (NICORETTE) gum 4 mg  4 mg buccal q4h PRN Perfecto Angela MD   4 mg at 12/12/20 1435   • ondansetron (ZOFRAN) injection 4 mg  4 mg intravenous q15 min PRN Judy Rosales MD       • oxyCODONE (ROXICODONE) immediate release tablet 10 mg  10 mg oral q4h PRN Jacki Boss CRNP   10 mg at 12/13/20 9623   • PARoxetine (PAXIL) tablet 30 mg  30 mg oral Daily  "Marlon Padron MD   30 mg at 12/12/20 0801   • polyethylene glycol (MIRALAX) 17 gram packet 17 g  17 g oral Daily Jacki Boss CRNP       • potassium chloride (KLOR-CON) tablet extended release 20 mEq  20 mEq oral Daily PRN Perfecto Angela MD        Or   • potassium chloride (KLOR-CON) tablet extended release 40 mEq  40 mEq oral Daily PRN Perfecto Angela MD        Or   • potassium chloride 20 mEq in 100 mL IVPB  (premix)  20 mEq intravenous Daily PRN Perfecto Angela MD        Or   • potassium chloride (KCL) 40 mEq/250 mL IVPB in NSS 40 mEq  40 mEq intravenous Daily PRN Perfecto Angela MD       • prazosin (MINIPRESS) capsule 2 mg  2 mg oral Nightly Perfecto Angela MD   2 mg at 12/12/20 2110   • pregabalin (LYRICA) capsule 150 mg  150 mg oral BID Perfecto Angela MD   150 mg at 12/12/20 2026   • QUEtiapine (SEROquel) tablet 100 mg  100 mg oral Nightly Perfecto Angela MD   100 mg at 12/12/20 2110   • sodium chloride 0.9 % infusion   intravenous Continuous Perfecto Angela MD 80 mL/hr at 12/11/20 0459     • zolpidem (AMBIEN) tablet 10 mg  10 mg oral Nightly Perfecto Angela MD   10 mg at 12/12/20 2110         Objective   Visit Vitals  BP (!) 157/98 (BP Location: Left upper arm, Patient Position: Sitting)   Pulse 95   Temp 36.8 °C (98.2 °F) (Oral)   Resp 18   Ht 1.575 m (5' 2\")   Wt 107 kg (235 lb 12.8 oz)   LMP 11/18/2020   SpO2 94%   Breastfeeding No   BMI 43.13 kg/m²       No intake or output data in the 24 hours ending 12/13/20 0928    Physical Exam   Constitutional: She is oriented to person, place, and time.   HENT:   Mouth/Throat: Oropharynx is clear and moist.   Neck: No JVD present. No tracheal deviation present.   Cardiovascular: Regular rhythm. Exam reveals no gallop and no friction rub.   Murmur heard.  Very soft tricuspid regurg murmur   Pulmonary/Chest: Effort normal and breath sounds normal. No respiratory distress. She has no wheezes. She has no rales.   Abdominal: Soft. Bowel " sounds are normal. She exhibits no distension.   Musculoskeletal:         General: No tenderness or edema.   Neurological: She is alert and oriented to person, place, and time. No cranial nerve deficit.   Skin: Skin is warm and dry. She is not diaphoretic.   Psychiatric: She has a normal mood and affect.   Vitals reviewed.      Labs   Lab Results   Component Value Date    WBC 8.31 12/12/2020    HGB 10.1 (L) 12/12/2020    HCT 31.9 (L) 12/12/2020     12/12/2020    ALT 11 12/09/2020    AST 14 (L) 12/09/2020     12/12/2020    K 4.5 12/12/2020     12/12/2020    CREATININE 0.7 12/12/2020    BUN 6 (L) 12/12/2020    CO2 21 (L) 12/12/2020    INR 0.9 04/06/2019    GLUCOSE 88 12/12/2020    TROPONINI <0.02 12/09/2020       Imaging  No new imaging for review    Cardiac Imaging   TRANSESOPHAGEAL ECHO ([facility]) 12/11/2020    Narrative · Tricuspid valve endocarditis.  · 1 x 0.6 cm echolucent spongy sessile mass attached to the mid body of   the posterior tricuspid leaflet consistent with vegetation. There is a   tiny mobile component on the atrial surface of the mass. There appears to   be a smaller segment of vegetation on the ventricular surface of the valve   leaflet opposite the larger component. There is a tiny perforation at the   edge of the vegetation 5 mm from the annulus of the leaflet.  · Borderline severe tricuspid regurgitation through the valve orifice.   There is a small regurgitant jet through the perforation.  · Estimated RVSP = 48 mmHg assuming a right atrial pressure 5 mmHg  · Normal-appearing aortic, mitral, pulmonic valves. Trace mitral   regurgitation.  · Normal-sized LV. Normal LV wall thickness.  · Preserved LV systolic function. Estimated EF 60- 65%. Normal LV septal   wall motion. No regional wall motion abnormalities.  · Normal RV size and function.  · Normal-sized LA. Intact atrial septum. Normal pulmonary veins  · Normal-sized RA. No PFO by saline contrast injection or color-flow    imaging.  · Trace mitral valve regurgitation.  · Normal-sized IVC. Normal SVC  · Normal aorta.          Telemetry  On my review sinus without significant arrhythmias    Assessment & Plan    Tricuspid valve vegetation  Overview  JAN[facility]Z 12/11/20  · Tricuspid valve endocarditis.  · 1 x 0.6 cm echolucent spongy sessile mass attached to the mid body of the posterior tricuspid leaflet consistent with vegetation. There is a tiny mobile component on the atrial surface of the mass. There appears to be a smaller segment of vegetation on the ventricular surface of the valve leaflet opposite the larger component. There is a tiny perforation at the edge of the vegetation 5 mm from the annulus of the leaflet.  · Borderline severe tricuspid regurgitation through the valve orifice. There is a small regurgitant jet through the perforation.  · Estimated RVSP = 48 mmHg assuming a right atrial pressure 5 mmHg  · Normal-appearing aortic, mitral, pulmonic valves. Trace mitral regurgitation.  · Normal-sized LV. Normal LV wall thickness.  · Preserved LV systolic function. Estimated EF 60- 65%. Normal LV septal wall motion. No regional wall motion abnormalities.  · Normal RV size and function.  · Normal-sized LA. Intact atrial septum. Normal pulmonary veins  · Normal-sized RA. No PFO by saline contrast injection or color-flow imaging.  · Trace mitral valve regurgitation.  · Normal-sized IVC. Normal SVC  · Normal aorta.    Assessment & Plan  -Evidence of tricuspid valve endocarditis as outlined in detailed report of JANUSZ above.  Today she is hemodynamically stable  -ID on board and aware  -Continue ABX recs per ID  -Need to monitor closely as outpatient as at some point she could require surgical intervention.      Septic embolism (CMS/HCC)  Assessment & Plan  -Pulmonary emboli noted on CT scan.  -JANUSZ demonstrating tricuspid endocarditis  -See endocarditis section for details    * Acute bacterial endocarditis  Assessment & Plan  -MSSA positive  BC  -Septic emboli to lungs in addition to lumbar spine infection.  This may be the etiology of her back pain etc. which is pleuritic.  Of note she is quite upset as treating pain in someone with an opioid issue is obviously a very difficult problem.  Agree with psychiatry for help with medical management and she wants to go back on Suboxone but on the other hand, understandably not unreasonable, also wants relief from her pain.  -Secondary to tricuspid endocarditis which was confirmed with JAN[facility] yesterday  -Continue tx per ID.  Stable on exam.        Septic arthritis of vertebra (CMS/HCC)  Overview  MRI Spine 12/2020  Diffuse anterior and posterior epidural enhancement within lumbar spine more  pronounced at L4-L5 raising the suspicion for underlying infectious  process/phlegmon.    Assessment & Plan  -Due to IE confirmed via JAN[facility] yesterday  -Continue tx per ID    Essential hypertension  Assessment & Plan  Overall stable.           Michael Loza, DO  12/13/2020  9:28 AM     This patient note has been dictated using speech recognition software. Inadvertent speech recognition errors should be disregarded. Please do not hesitate to call my office for clarifications.

## 2020-12-13 NOTE — PLAN OF CARE
Problem: Adult Inpatient Plan of Care  Goal: Plan of Care Review  Flowsheets (Taken 12/13/2020 1774)  Progress: no change  Outcome Summary: PT AAOx4. pain controlled with oxy and tylenol Q 4 hours. Pt complains of sweats and chills CIWA 6.Catapress order for withdrawal PRN V Cefazolin  Q 8 hours continued.HR tachy with exertion will continue to monitor   Plan of Care Review  Plan of Care Reviewed With: patient  Progress: no change  Outcome Summary: PT AAOx4. pain controlled with oxy and tylenol Q 4 hours. Pt complains of sweats and chills CIWA 6.Catapress order for withdrawal PRN V Cefazolin  Q 8 hours continued.HR tachy with exertion will continue to monitor

## 2020-12-13 NOTE — PLAN OF CARE
Plan of Care Review  Plan of Care Reviewed With: patient  Progress: improving  Outcome Summary: Pt has been controlled with oxycodone 10mg with Tylenol every 4hours. Pt has not asked any more pain meds. Afebrile overnight,but reported that having chills and sweating. will continue to monitor pt telly.

## 2020-12-13 NOTE — PROGRESS NOTES
Hospital Medicine Service -  Daily Progress Note       SUBJECTIVE   Interval History: no overnight events  C/o intermittent sweating, chills, not sleeping well, no dyspnea, chest pain, abd pain/n/v, afebrile.      OBJECTIVE      Vital signs in last 24 hours:  Temp:  [36.4 °C (97.6 °F)-37.1 °C (98.7 °F)] 36.8 °C (98.2 °F)  Heart Rate:  [] 95  Resp:  [18] 18  BP: (120-157)/(66-98) 157/98  No intake or output data in the 24 hours ending 12/13/20 0949    PHYSICAL EXAMINATION      Physical Exam   Constitutional: NAD  Neck: Supple. No JVD.    Cardiovascular: Normal rate, regular rhythm    Pulmonary/Chest: Effort normal and breath sounds normal. No wheezing or crackles.  Abdominal: Soft. Bowel sounds are normal. There is no tenderness, no rebound and no guarding.   Musculoskeletal: No edema   Neurological: Alert and oriented       LINES, CATHETERS, DRAINS, AIRWAYS, AND WOUNDS   Lines, Drains, Airways, Wounds:  Peripheral IV 12/11/20 Right Forearm (Active)   Number of days: 1       Comments:      LABS / IMAGING / TELE      Labs  Lab Results   Component Value Date    WBC 8.31 12/12/2020    HGB 10.1 (L) 12/12/2020    HCT 31.9 (L) 12/12/2020     12/12/2020    ALT 11 12/09/2020    AST 14 (L) 12/09/2020     12/12/2020    K 4.5 12/12/2020     12/12/2020    CREATININE 0.7 12/12/2020    BUN 6 (L) 12/12/2020    CO2 21 (L) 12/12/2020    INR 0.9 04/06/2019       Imaging  Mri Thoracic Spine With And Without Contrast    Result Date: 12/9/2020  IMPRESSION: Diffuse anterior and posterior epidural enhancement within lumbar spine more pronounced at L4-L5 raising the suspicion for underlying infectious process/phlegmon.  Left greater than right L4-L5 facet joint signal and enhancement which although may be degenerative in nature, early changes of septic facet arthritis is in the differential.  Follow-up is recommended. Thoracic and lumbar degenerative change as described more pronounced at L4-L5. Ring-enhancing  lesion in right upper lobe which may be infectious in etiology including in the setting of septic emboli.  Correlation with CT chest with contrast is recommended to exclude other etiologies. The results were discussed with Rylee Bowie on 12/9/2020 7:35 PM.    Mri Lumbar Spine With And Without Contrast    Result Date: 12/9/2020  IMPRESSION: Diffuse anterior and posterior epidural enhancement within lumbar spine more pronounced at L4-L5 raising the suspicion for underlying infectious process/phlegmon.  Left greater than right L4-L5 facet joint signal and enhancement which although may be degenerative in nature, early changes of septic facet arthritis is in the differential.  Follow-up is recommended. Thoracic and lumbar degenerative change as described more pronounced at L4-L5. Ring-enhancing lesion in right upper lobe which may be infectious in etiology including in the setting of septic emboli.  Correlation with CT chest with contrast is recommended to exclude other etiologies. The results were discussed with Rylee Bowie on 12/9/2020 7:35 PM.    X-ray Chest 1 View    Result Date: 12/9/2020  IMPRESSION: No radiographic evidence of acute cardiopulmonary disease.    Ct Chest Pulmonary Embolism With Iv Contrast    Result Date: 12/9/2020  IMPRESSION: 1.  No evidence of main, lobar or segmental pulmonary embolism. 2.  However, there are multifocal nodular lesions throughout all lung fields including a cavitary lesion in the right upper lobe all suspicious for septic emboli.  Some of the tiny subsegmental pulmonary arterial branches demonstrate hypoenhancement and it is difficult to determinate if this is related to tiny pulmonary emboli or technique. 3.  Small bilateral pleural effusions. 4.  Mosaic pattern at the lung bases could represent mild edema or sequela of small airways or small vessels disease. 5.  Follow-up chest CT after appropriate treatment is recommended to document lung opacities and mediastinal  lymphadenopathy which is probably reactive. 6.  Additional findings discussed below. Finding:    Other   Acuity: Critical  Status:  CLOSED Critical read back for the first impression performed with Bertha Bowie PA 9:25 PM 12/9/2020 COMMENT: Comparison: Chest x-ray from 12/9/2020 Technique: Chest CT pulmonary embolism protocol performed with intravenous contrast.  80 mL Omnipaque 350 given. CT DOSE:  One or more dose reduction techniques (e.g. automated exposure control, adjustment of the mA and/or kV according to patient size, use of iterative reconstruction technique) utilized for this examination. FINDINGS: LUNG, PLEURA AND LARGE AIRWAYS: The trachea and proximal bronchi remain clear. There are multifocal nodular lesions throughout the lungs.  For example in the right upper lobe there is a cavitary lesion measuring 1.4 cm on axial image 63. A 1.6 cm nodular lesion in the left upper lobe on image 69.  Multiple additional lesions are present elsewhere.  His are suspicious for septic emboli particularly given the history of endocarditis and sepsis. There are small bilateral pleural effusions. There is a nonspecific mild degree of groundglass opacity in the lower lung fields.  This could represent mild degree of pulmonary edema or possibly a mosaic pattern from small airways or small vessel disease. No pneumothorax. VESSELS: Normal caliber of the thoracic aorta.  Thoracic aorta appears within normal limits.  Main pulmonary artery is normal in caliber.  No evidence of a main, lobar or segmental pulmonary embolism.  However, the subsegmental vessels are difficult to assess and some demonstrate a slightly hypoechoic enhancing appearance possibly artifact from bolus timing although tiny subsegmental pulmonary emboli would be difficult to exclude.  For example, in the lingula there is some hypoenhancement of the subsegmental vessels on image 116 where subsegmental PE cannot be excluded.  No evidence of right heart  strain. HEART: normal size. No pericardial effusion. MEDIASTINUM AND PAULINA: There is a pathologically enlarged lymph node in the AP window measuring 1.8 cm short axis.  Additional prominent but nonpathologic by size video lymph nodes elsewhere. CHEST WALL AND LOWER NECK: within normal limits. UPPER ABDOMEN:Limited assessment of the upper abdominal structures with this technique.  There is splenomegaly with the spleen measuring 14.5 cm.  The liver may also be enlarged but is incompletely visualized.  Cholecystectomy changes. BONES: No suspicious bony erosive changes.  Multilevel Schmorl's nodes and degenerative changes in the spine are noted.  Calcific tendinosis of the right rotator cuff.  There is limited assessment of the spine with this technique.  No gross paraspinal inflammatory changes.         ECG/Telemetry  I have independently reviewed the telemetry. No events for the last 24 hours.    ASSESSMENT AND PLAN      Tricuspid valve vegetation  Overview  JANUSZ 12/11/20  · Tricuspid valve endocarditis.  · 1 x 0.6 cm echolucent spongy sessile mass attached to the mid body of the posterior tricuspid leaflet consistent with vegetation. There is a tiny mobile component on the atrial surface of the mass. There appears to be a smaller segment of vegetation on the ventricular surface of the valve leaflet opposite the larger component. There is a tiny perforation at the edge of the vegetation 5 mm from the annulus of the leaflet.  · Borderline severe tricuspid regurgitation through the valve orifice. There is a small regurgitant jet through the perforation.  · Estimated RVSP = 48 mmHg assuming a right atrial pressure 5 mmHg  · Normal-appearing aortic, mitral, pulmonic valves. Trace mitral regurgitation.  · Normal-sized LV. Normal LV wall thickness.  · Preserved LV systolic function. Estimated EF 60- 65%. Normal LV septal wall motion. No regional wall motion abnormalities.  · Normal RV size and function.  · Normal-sized LA.  Intact atrial septum. Normal pulmonary veins  · Normal-sized RA. No PFO by saline contrast injection or color-flow imaging.  · Trace mitral valve regurgitation.  · Normal-sized IVC. Normal SVC  · Normal aorta.    Assessment & Plan  Sessile vegetation on tricuspid valve leaflet  Continue antibiotics  Cards consulted.     Septic arthritis of vertebra (CMS/HCC)  Overview  MRI Spine 12/2020  Diffuse anterior and posterior epidural enhancement within lumbar spine more  pronounced at L4-L5 raising the suspicion for underlying infectious  process/phlegmon.    Assessment & Plan  Neurovascular intact  Pain control with oxycodone 10 mg q4 hours plus toradol as discussed with palliative care      Essential hypertension  Assessment & Plan  Continue with home meds  bp parameters for hypotension    Septic embolism (CMS/HCC)  Assessment & Plan  Continue IV abx per ID   Blood cultures performed on admission, NGTD  Evidence of discitis on MRI, neurosurgery consulted as well as ID    Depression  Assessment & Plan  Continue home meds   Psych consulted.     * Acute bacterial endocarditis  Assessment & Plan  Suspected tricuspid valve endocarditis  Sepsis due to acute bacterial endocarditis present on admission  Continue antibiotics  Blood cultures from [facility]       VTE Assessment: [place] VTE Score: 5  VTE Prophylaxis Plan: heparin  Code Status: Full Code  Estimated Discharge Date: 12/14/2020  Disposition Planning: rehab     Gen Lennie MD  12/13/2020

## 2020-12-14 LAB
BACTERIA BLD CULT: NORMAL
BACTERIA BLD CULT: NORMAL

## 2020-12-14 PROCEDURE — 63700000 HC SELF-ADMINISTRABLE DRUG: Performed by: INTERNAL MEDICINE

## 2020-12-14 PROCEDURE — 63700000 HC SELF-ADMINISTRABLE DRUG: Performed by: NURSE PRACTITIONER

## 2020-12-14 PROCEDURE — 99232 SBSQ HOSP IP/OBS MODERATE 35: CPT | Performed by: INTERNAL MEDICINE

## 2020-12-14 PROCEDURE — 99233 SBSQ HOSP IP/OBS HIGH 50: CPT | Performed by: PSYCHIATRY & NEUROLOGY

## 2020-12-14 PROCEDURE — 20600000 HC ROOM AND CARE INTERMEDIATE/TELEMETRY

## 2020-12-14 PROCEDURE — 63700000 HC SELF-ADMINISTRABLE DRUG: Performed by: PSYCHIATRY & NEUROLOGY

## 2020-12-14 PROCEDURE — 63700000 HC SELF-ADMINISTRABLE DRUG: Performed by: HOSPITALIST

## 2020-12-14 PROCEDURE — 99233 SBSQ HOSP IP/OBS HIGH 50: CPT | Performed by: NURSE PRACTITIONER

## 2020-12-14 PROCEDURE — 63600000 HC DRUGS/DETAIL CODE: Performed by: INTERNAL MEDICINE

## 2020-12-14 PROCEDURE — 63600000 HC DRUGS/DETAIL CODE: Mod: JW | Performed by: HOSPITALIST

## 2020-12-14 PROCEDURE — 25800000 HC PHARMACY IV SOLUTIONS: Performed by: INTERNAL MEDICINE

## 2020-12-14 PROCEDURE — 25000000 HC PHARMACY GENERAL: Performed by: INTERNAL MEDICINE

## 2020-12-14 RX ORDER — ACETAMINOPHEN 325 MG/1
975 TABLET ORAL EVERY 8 HOURS
Status: DISPENSED | OUTPATIENT
Start: 2020-12-14 | End: 2020-12-16

## 2020-12-14 RX ORDER — CYCLOBENZAPRINE HCL 10 MG
10 TABLET ORAL 3 TIMES DAILY PRN
Status: DISCONTINUED | OUTPATIENT
Start: 2020-12-14 | End: 2021-01-22

## 2020-12-14 RX ORDER — KETOROLAC TROMETHAMINE 15 MG/ML
15 INJECTION, SOLUTION INTRAMUSCULAR; INTRAVENOUS EVERY 6 HOURS PRN
Status: DISPENSED | OUTPATIENT
Start: 2020-12-14 | End: 2020-12-16

## 2020-12-14 RX ORDER — CLONIDINE HYDROCHLORIDE 0.1 MG/1
0.2 TABLET ORAL EVERY 6 HOURS PRN
Status: DISCONTINUED | OUTPATIENT
Start: 2020-12-14 | End: 2020-12-16

## 2020-12-14 RX ADMIN — ACETAMINOPHEN 650 MG: 325 TABLET, FILM COATED ORAL at 02:11

## 2020-12-14 RX ADMIN — BUPROPION HYDROCHLORIDE 75 MG: 75 TABLET, FILM COATED ORAL at 13:13

## 2020-12-14 RX ADMIN — CLONIDINE HYDROCHLORIDE 0.2 MG: 0.1 TABLET ORAL at 10:30

## 2020-12-14 RX ADMIN — DIAZEPAM 5 MG: 5 INJECTION, SOLUTION INTRAMUSCULAR; INTRAVENOUS at 05:21

## 2020-12-14 RX ADMIN — OXYCODONE HYDROCHLORIDE 10 MG: 5 TABLET ORAL at 02:11

## 2020-12-14 RX ADMIN — ZOLPIDEM TARTRATE 10 MG: 5 TABLET, COATED ORAL at 21:16

## 2020-12-14 RX ADMIN — OXYCODONE HYDROCHLORIDE 10 MG: 5 TABLET ORAL at 10:30

## 2020-12-14 RX ADMIN — PAROXETINE 30 MG: 20 TABLET, FILM COATED ORAL at 08:48

## 2020-12-14 RX ADMIN — CEFAZOLIN SODIUM 2 G: 10 POWDER, FOR SOLUTION INTRAVENOUS at 02:04

## 2020-12-14 RX ADMIN — CEFAZOLIN SODIUM 2 G: 10 POWDER, FOR SOLUTION INTRAVENOUS at 10:55

## 2020-12-14 RX ADMIN — BUTALBITAL, ACETAMINOPHEN AND CAFFEINE 2 TABLET: 50; 325; 40 TABLET ORAL at 02:27

## 2020-12-14 RX ADMIN — METOPROLOL SUCCINATE 25 MG: 25 TABLET, EXTENDED RELEASE ORAL at 08:47

## 2020-12-14 RX ADMIN — PREGABALIN 150 MG: 75 CAPSULE ORAL at 08:47

## 2020-12-14 RX ADMIN — OXYCODONE HYDROCHLORIDE 10 MG: 5 TABLET ORAL at 15:01

## 2020-12-14 RX ADMIN — POLYETHYLENE GLYCOL 3350 17 G: 17 POWDER, FOR SOLUTION ORAL at 08:48

## 2020-12-14 RX ADMIN — BUPROPION HYDROCHLORIDE 75 MG: 75 TABLET, FILM COATED ORAL at 20:14

## 2020-12-14 RX ADMIN — NICOTINE POLACRILEX 4 MG: 4 GUM, CHEWING ORAL at 12:59

## 2020-12-14 RX ADMIN — PREGABALIN 150 MG: 75 CAPSULE ORAL at 20:13

## 2020-12-14 RX ADMIN — OXYCODONE HYDROCHLORIDE 10 MG: 5 TABLET ORAL at 06:25

## 2020-12-14 RX ADMIN — OXYCODONE HYDROCHLORIDE 10 MG: 5 TABLET ORAL at 20:13

## 2020-12-14 RX ADMIN — DIAZEPAM 5 MG: 5 INJECTION, SOLUTION INTRAMUSCULAR; INTRAVENOUS at 12:59

## 2020-12-14 RX ADMIN — PRAZOSIN HYDROCHLORIDE 2 MG: 1 CAPSULE ORAL at 21:16

## 2020-12-14 RX ADMIN — ACETAMINOPHEN 650 MG: 325 TABLET, FILM COATED ORAL at 06:25

## 2020-12-14 RX ADMIN — CEFAZOLIN SODIUM 2 G: 10 POWDER, FOR SOLUTION INTRAVENOUS at 20:09

## 2020-12-14 RX ADMIN — ACETAMINOPHEN 975 MG: 325 TABLET, FILM COATED ORAL at 16:08

## 2020-12-14 RX ADMIN — BUPROPION HYDROCHLORIDE 75 MG: 75 TABLET, FILM COATED ORAL at 08:48

## 2020-12-14 RX ADMIN — QUETIAPINE 100 MG: 100 TABLET, FILM COATED ORAL at 21:16

## 2020-12-14 RX ADMIN — BUTALBITAL, ACETAMINOPHEN AND CAFFEINE 2 TABLET: 50; 325; 40 TABLET ORAL at 15:01

## 2020-12-14 NOTE — PATIENT CARE CONFERENCE
Care Progression Rounds Note  Date: 12/14/2020  Time: 10:24 AM     Patient Name: Carolyn Redmond     Medical Record Number: [id number]   YOB: 1985  Sex: Female      Room/Bed: 3023    Admitting Diagnosis: Septic embolism (CMS/[facility]) [I76]  Infection of lumbar spine (CMS/[facility]) [M46.26]  Acute left-sided low back pain without sciatica [M54.5]   Admit Date/Time: 12/9/2020 11:10 AM    Primary Diagnosis: Acute bacterial endocarditis  Principal Problem: Acute bacterial endocarditis    GMLOS: 7.1  Anticipated Discharge Date: 12/11/2020    AM-PAC  Mobility Score:      Discharge Planning:       Barriers to Discharge:  Barriers to Discharge: Medical issues not resolved  Comment: IV antibiotisc    Participants:  , social work/services, nursing

## 2020-12-14 NOTE — PROGRESS NOTES
Psychiatry Progress Note    Chief Complaint/Reason for follow-up: opioid dependence and mood disorder    Interval History: The patient is seen again for follow-up in the setting of need for several weeks iv antibiotics for tricuspid vegetation, now with emboli to lung and lumbar infection.  The patient has elected to take oxycodone 10mg prn q4hrs through the weekend in lieu of starting suboxone or methadone.  We again discuss both of these options.  She is agreeable to transition to [facility] rehab to continue antibiotics if they will accept her.  She did not agree to switching to an opioid maintenance medication at this time.  She denies any thoughts to harm/kill herself or anyone else whatsoever.    Review of Systems:   Sleep:  Fair and Appetite: Fair    Vital Signs for the last 24 hours:  Temp:  [36.4 °C (97.5 °F)-36.9 °C (98.4 °F)] 36.6 °C (97.9 °F)  Heart Rate:  [] 77  Resp:  [18] 18  BP: (113-159)/(54-94) 132/75      Current Facility-Administered Medications:   •  acetaminophen (TYLENOL) tablet 650 mg, 650 mg, oral, q4h PRN, Jacki Boss CRNP, 650 mg at 12/14/20 0625  •  buPROPion (WELLBUTRIN) tablet 75 mg, 75 mg, oral, TID, Perfecto Angela MD, 75 mg at 12/14/20 1313  •  butalbital-acetaminophen-caff (FIORICET, ESGIC) per tablet 2 tablet, 2 tablet, oral, q8h PRN, Dominique Powell DO, 2 tablet at 12/14/20 0227  •  ceFAZolin (ANCEF) NSS IVPB piggyback 2 g, 2 g, intravenous, q8h INT, Vikas Jones MD, Stopped at 12/14/20 1129  •  cloNIDine (CATAPRES) tablet 0.2 mg, 0.2 mg, oral, q6h PRN, Gen Hogue MD, 0.2 mg at 12/14/20 1030  •  glucose chewable tablet 16-32 g of dextrose, 16-32 g of dextrose, oral, PRN **OR** dextrose 40 % oral gel 15-30 g of dextrose, 15-30 g of dextrose, oral, PRN **OR** glucagon (GLUCAGEN) injection 1 mg, 1 mg, intramuscular, PRN **OR** dextrose in water injection 12.5 g, 25 mL, intravenous, PRN, Perfecto Angela MD  •  diazePAM (VALIUM) injection 5 mg, 5 mg,  intravenous, q8h PRN, Perfecto Angela MD, 5 mg at 12/14/20 1259  •  fluticasone propionate (FLONASE) 50 mcg/actuation nasal spray 2 spray, 2 spray, Each Nostril, Daily PRN, Perfecto Angela MD  •  heparin (porcine) 5,000 unit/mL injection 5,000 Units, 5,000 Units, subcutaneous, q8h JAIDEN, Perfecto Angela MD, 5,000 Units at 12/11/20 1335  •  lidocaine (ASPERCREME) 4 % topical patch 1 patch, 1 patch, Topical, Daily, Jacki Boss CRNP  •  magnesium sulfate IVPB 2g in 50 mL NSS/D5W/SWFI, 2 g, intravenous, PRN, Perfecto Angela MD  •  metoprolol succinate XL (TOPROL-XL) 24 hr ER tablet 25 mg, 25 mg, oral, Daily, Perfecto Angela MD, 25 mg at 12/14/20 0847  •  naloxone (NARCAN) injection 0.2 mg, 0.2 mg, intravenous, Once PRN, Jakci Boss CRNP  •  nicotine polacrilex (NICORETTE) gum 4 mg, 4 mg, buccal, q4h PRN, Perfecto Angela MD, 4 mg at 12/14/20 1259  •  ondansetron (ZOFRAN) injection 4 mg, 4 mg, intravenous, q15 min PRN, Judy Rosales MD  •  oxyCODONE (ROXICODONE) immediate release tablet 10 mg, 10 mg, oral, q4h PRN, Jacki Boss CRNP, 10 mg at 12/14/20 1030  •  PARoxetine (PAXIL) tablet 30 mg, 30 mg, oral, Daily, Marlon Padron MD, 30 mg at 12/14/20 0848  •  polyethylene glycol (MIRALAX) 17 gram packet 17 g, 17 g, oral, Daily, Jacki Boss CRNP, 17 g at 12/14/20 0848  •  potassium chloride (KLOR-CON) tablet extended release 20 mEq, 20 mEq, oral, Daily PRN **OR** potassium chloride (KLOR-CON) tablet extended release 40 mEq, 40 mEq, oral, Daily PRN **OR** potassium chloride 20 mEq in 100 mL IVPB  (premix), 20 mEq, intravenous, Daily PRN **OR** potassium chloride (KCL) 40 mEq/250 mL IVPB in NSS 40 mEq, 40 mEq, intravenous, Daily PRN, Perfecto Angela MD  •  prazosin (MINIPRESS) capsule 2 mg, 2 mg, oral, Nightly, Perfecto Angela MD, 2 mg at 12/13/20 2103  •  pregabalin (LYRICA) capsule 150 mg, 150 mg, oral, BID, Perfecto Angela MD, 150 mg at 12/14/20 0847  •  QUEtiapine  (SEROquel) tablet 100 mg, 100 mg, oral, Nightly, Perfecto Angela MD, 100 mg at 12/13/20 2104  •  zolpidem (AMBIEN) tablet 10 mg, 10 mg, oral, Nightly, Perfecto Angela MD, 10 mg at 12/13/20 2103    MSE:  Orientation: AAO x 3  Behavior: Appropriate  Appearance: well groomed but a bit sweaty  Eye Contact: Fair throughout  Mood: “just really stressed”  Affect: congruent; somewhat tearful, mostly stable and appropriate  Speech: Coherent  Thought Process: Goal Directed and linear   Suicidal Ideation: Denies suicidal thoughts; intent or plan; denies passive death wish  Homicidal Ideation: Denies having homicidal thoughts, intent or plan  Hallucinations: Denies  Delusions: Denies  Cognition: No gross cognitive deficits  Memory: Remote; Immediate; all intact  Insight: marginal  Judgment: marginal      Assessment/Plan  Unspecified mood (affective) disorder (CMS/HCC)  Assessment & Plan  Patient with history of depression and addiction.  1. Continue paroxetine 30mg qam  2. Continue bupropion 75mg TID  3.  to please speak with patient about options for inpatient/outpatient rehab and follow-up with a psychiatrist/therapist      Tricuspid valve vegetation  Overview  JANUSZ 12/11/20  · Tricuspid valve endocarditis.  · 1 x 0.6 cm echolucent spongy sessile mass attached to the mid body of the posterior tricuspid leaflet consistent with vegetation. There is a tiny mobile component on the atrial surface of the mass. There appears to be a smaller segment of vegetation on the ventricular surface of the valve leaflet opposite the larger component. There is a tiny perforation at the edge of the vegetation 5 mm from the annulus of the leaflet.  · Borderline severe tricuspid regurgitation through the valve orifice. There is a small regurgitant jet through the perforation.  · Estimated RVSP = 48 mmHg assuming a right atrial pressure 5 mmHg  · Normal-appearing aortic, mitral, pulmonic valves. Trace mitral  regurgitation.  · Normal-sized LV. Normal LV wall thickness.  · Preserved LV systolic function. Estimated EF 60- 65%. Normal LV septal wall motion. No regional wall motion abnormalities.  · Normal RV size and function.  · Normal-sized LA. Intact atrial septum. Normal pulmonary veins  · Normal-sized RA. No PFO by saline contrast injection or color-flow imaging.  · Trace mitral valve regurgitation.  · Normal-sized IVC. Normal SVC  · Normal aorta.    Septic arthritis of vertebra (CMS/[facility])  Overview  MRI Spine 12/2020  Diffuse anterior and posterior epidural enhancement within lumbar spine more  pronounced at L4-L5 raising the suspicion for underlying infectious  process/phlegmon.  Left greater than right L4-L5 facet joint signal and  enhancement which although may be degenerative in nature, early changes of  septic facet arthritis is in the differential.  Follow-up is recommended.     Thoracic and lumbar degenerative change as described more pronounced at L4-L5.     Ring-enhancing lesion in right upper lobe which may be infectious in etiology  including in the setting of septic emboli.  Correlation with CT chest with  contrast is recommended to exclude other etiologies.    Methicillin sensitive staphylococcal aureus cultures done at [facility] per ID note.    Opioid type dependence, continuous use (CMS/[facility])  Assessment & Plan  Patient acknowledges continued street opioid use over the last few months and now admits to relapse on IV drugs and feels very guilty and mad at herself for the relapse.  As I feared, her desire to continue on opioids in the hospital seems to be overwhelming any willingness to start suboxone and the idea of a methadone clinic is possible but she is not willing to commit to that yet and wants to still think about it.  1. Recommend restarting buprenorphine but patient currently refusing  2.  to please speak with patient about options for inpatient rehab at [place] or  equivalent where she can do rehab while receiving iv antibiotics.  Patient is agreeable to this.      Spent 45 minutes with patient, > 50% in consultation    Marlon Padron MD  12/14/2020

## 2020-12-14 NOTE — NURSING NOTE
Writer entered room to flush line and start 1900 IV Abx. Line would not flush and appears dislodged. Writer paged IV team.

## 2020-12-14 NOTE — PROGRESS NOTES
Cardiology Daily Progress Note       Subjective    Carolyn Redmond is a 35 y.o. female.    Overview:PMH of IV drug , suicide attempt 2017, vasculitis, known to Dr. Eldridge from a history of prior endocarditis 3 years ago. Admitted for lumbar back pain and intermittent fevers.  CT of the chest demonstrating multi nodule lesions on the lung fields especially on the right upper lobe suspicious for septic emboli.  Also evidence of Lumbar infection on MRI imaging.  MSSA positive blood cultures.  The cultures were performed at [facility].  This was followed by JANUSZ with bubble study and found to have 1 x 0.6 cm echolucent spongy sessile mass attached to the mid body of the posterior tricuspid leaflet consistent with vegetation and severe TR.  ID following and currently receiving cefazolin, will need long term IV ABX.     Interval history: She continues with back pain and is concerned about how to treat this as an outpatient balancing history of narcotic addiction, significant pain, desire to be comfortable but also desire not to restart any pathway towards narcotic use.    A long discussion about this.  First and foremost recommended discussion with psychiatry as how to balance these needs.  In addition discussed the use of nonsteroidals for the pleuritic pain relief short-term.  GI side effects reviewed.  She has had GI side effects in the past.  Discussed that Celebrex although it still has GI side effects the side effects are less with this selective inhibitor.  This may be a partial but not a complete option of course, for pain relief for her spinal infection as well as pain which is pleuritic from her septic pulmonary emboli.  .        Amoxicillin, Nsaids (non-steroidal anti-inflammatory drug), Trazodone, and Tramadol  Current Facility-Administered Medications   Medication Dose Route Frequency Provider Last Rate Last Dose   • acetaminophen (TYLENOL) tablet 650 mg  650 mg oral q4h PRN Jacki Boss  JUAREZNP   650 mg at 12/14/20 0625   • buPROPion (WELLBUTRIN) tablet 75 mg  75 mg oral TID Perfecto Angela MD   75 mg at 12/14/20 0848   • butalbital-acetaminophen-caff (FIORICET, ESGIC) per tablet 2 tablet  2 tablet oral q8h PRN Dominique Pwoell DO   2 tablet at 12/14/20 0227   • ceFAZolin (ANCEF) NSS IVPB piggyback 2 g  2 g intravenous q8h INT Vikas Jones MD   Stopped at 12/14/20 0234   • cloNIDine (CATAPRES) tablet 0.1 mg  0.1 mg oral q6h PRN Gen Hogue MD   0.1 mg at 12/13/20 0959   • glucose chewable tablet 16-32 g of dextrose  16-32 g of dextrose oral PRN Perfecto Angela MD        Or   • dextrose 40 % oral gel 15-30 g of dextrose  15-30 g of dextrose oral PRN Perfecto Angela MD        Or   • glucagon (GLUCAGEN) injection 1 mg  1 mg intramuscular PRN Perfecto Angela MD        Or   • dextrose in water injection 12.5 g  25 mL intravenous PRN Perfecto Angela MD       • diazePAM (VALIUM) injection 5 mg  5 mg intravenous q8h PRN Perfecto Angela MD   5 mg at 12/14/20 0521   • fluticasone propionate (FLONASE) 50 mcg/actuation nasal spray 2 spray  2 spray Each Nostril Daily PRN Perfecto Angela MD       • heparin (porcine) 5,000 unit/mL injection 5,000 Units  5,000 Units subcutaneous q8h JAIDEN Perfecto Angela MD   5,000 Units at 12/11/20 1335   • lidocaine (ASPERCREME) 4 % topical patch 1 patch  1 patch Topical Daily Jacki Boss CRNP       • magnesium sulfate IVPB 2g in 50 mL NSS/D5W/SWFI  2 g intravenous PRN Perfecto Angela MD       • metoprolol succinate XL (TOPROL-XL) 24 hr ER tablet 25 mg  25 mg oral Daily Perfecto Angela MD   25 mg at 12/14/20 0847   • naloxone (NARCAN) injection 0.2 mg  0.2 mg intravenous Once PRN Jacki Boss CRNP       • nicotine polacrilex (NICORETTE) gum 4 mg  4 mg buccal q4h PRN Perfecto Angela MD   4 mg at 12/13/20 0937   • ondansetron (ZOFRAN) injection 4 mg  4 mg intravenous q15 min PRN Judy Rosales MD       • oxyCODONE (ROXICODONE) immediate release  "tablet 10 mg  10 mg oral q4h PRN Jacki Boss CRNP   10 mg at 12/14/20 0625   • PARoxetine (PAXIL) tablet 30 mg  30 mg oral Daily Marlon Padron MD   30 mg at 12/14/20 0848   • polyethylene glycol (MIRALAX) 17 gram packet 17 g  17 g oral Daily Jacki Boss CRNP   17 g at 12/14/20 0848   • potassium chloride (KLOR-CON) tablet extended release 20 mEq  20 mEq oral Daily PRN Perfecto Angela MD        Or   • potassium chloride (KLOR-CON) tablet extended release 40 mEq  40 mEq oral Daily PRN Perfecto Angela MD        Or   • potassium chloride 20 mEq in 100 mL IVPB  (premix)  20 mEq intravenous Daily PRN Perfecto Angela MD        Or   • potassium chloride (KCL) 40 mEq/250 mL IVPB in NSS 40 mEq  40 mEq intravenous Daily PRN Perfecto Angela MD       • prazosin (MINIPRESS) capsule 2 mg  2 mg oral Nightly Perfecto Angela MD   2 mg at 12/13/20 2103   • pregabalin (LYRICA) capsule 150 mg  150 mg oral BID Perfecto Angela MD   150 mg at 12/14/20 0847   • QUEtiapine (SEROquel) tablet 100 mg  100 mg oral Nightly Perfecto Angela MD   100 mg at 12/13/20 2104   • sodium chloride 0.9 % infusion   intravenous Continuous Perfecto Angela MD 80 mL/hr at 12/11/20 0459     • zolpidem (AMBIEN) tablet 10 mg  10 mg oral Nightly Perfecto Angela MD   10 mg at 12/13/20 2103         Objective   Visit Vitals  /82 (BP Location: Right upper arm, Patient Position: Lying)   Pulse 96   Temp 36.4 °C (97.6 °F) (Oral)   Resp 18   Ht 1.575 m (5' 2\")   Wt 107 kg (235 lb 12.8 oz)   LMP 11/18/2020   SpO2 95%   Breastfeeding No   BMI 43.13 kg/m²       No intake or output data in the 24 hours ending 12/14/20 0932    Physical Exam   Constitutional: She is oriented to person, place, and time.   HENT:   Mouth/Throat: Oropharynx is clear and moist.   Neck: No JVD present. No tracheal deviation present.   Cardiovascular: Regular rhythm. Exam reveals no gallop and no friction rub.   Murmur heard.  Very soft systolic murmur at " the left lower sternal border   Pulmonary/Chest: Effort normal and breath sounds normal. No respiratory distress. She has no wheezes. She has no rales.   Abdominal: Soft. Bowel sounds are normal. She exhibits no distension.   Musculoskeletal:         General: No tenderness or edema.   Neurological: She is alert and oriented to person, place, and time. No cranial nerve deficit.   Skin: Skin is warm and dry. She is not diaphoretic.   Psychiatric: She has a normal mood and affect.   Vitals reviewed.      Labs   Lab Results   Component Value Date    WBC 8.31 12/12/2020    HGB 10.1 (L) 12/12/2020    HCT 31.9 (L) 12/12/2020     12/12/2020    ALT 11 12/09/2020    AST 14 (L) 12/09/2020     12/12/2020    K 4.5 12/12/2020     12/12/2020    CREATININE 0.7 12/12/2020    BUN 6 (L) 12/12/2020    CO2 21 (L) 12/12/2020    INR 0.9 04/06/2019    GLUCOSE 88 12/12/2020    TROPONINI <0.02 12/09/2020       Imaging  No new imaging studies.  Some pertinent results embedded below.      Cardiac Imaging   TRANSESOPHAGEAL ECHO (JANUSZ) 12/11/2020    Narrative · Tricuspid valve endocarditis.  · 1 x 0.6 cm echolucent spongy sessile mass attached to the mid body of   the posterior tricuspid leaflet consistent with vegetation. There is a   tiny mobile component on the atrial surface of the mass. There appears to   be a smaller segment of vegetation on the ventricular surface of the valve   leaflet opposite the larger component. There is a tiny perforation at the   edge of the vegetation 5 mm from the annulus of the leaflet.  · Borderline severe tricuspid regurgitation through the valve orifice.   There is a small regurgitant jet through the perforation.  · Estimated RVSP = 48 mmHg assuming a right atrial pressure 5 mmHg  · Normal-appearing aortic, mitral, pulmonic valves. Trace mitral   regurgitation.  · Normal-sized LV. Normal LV wall thickness.  · Preserved LV systolic function. Estimated EF 60- 65%. Normal LV septal   wall  motion. No regional wall motion abnormalities.  · Normal RV size and function.  · Normal-sized LA. Intact atrial septum. Normal pulmonary veins  · Normal-sized RA. No PFO by saline contrast injection or color-flow   imaging.  · Trace mitral valve regurgitation.  · Normal-sized IVC. Normal SVC  · Normal aorta.          Telemetry  On my review no significant arrhythmias, sinus    Assessment & Plan    Tricuspid valve vegetation  Overview  JANUSZ 12/11/20  · Tricuspid valve endocarditis.  · 1 x 0.6 cm echolucent spongy sessile mass attached to the mid body of the posterior tricuspid leaflet consistent with vegetation. There is a tiny mobile component on the atrial surface of the mass. There appears to be a smaller segment of vegetation on the ventricular surface of the valve leaflet opposite the larger component. There is a tiny perforation at the edge of the vegetation 5 mm from the annulus of the leaflet.  · Borderline severe tricuspid regurgitation through the valve orifice. There is a small regurgitant jet through the perforation.  · Estimated RVSP = 48 mmHg assuming a right atrial pressure 5 mmHg  · Normal-appearing aortic, mitral, pulmonic valves. Trace mitral regurgitation.  · Normal-sized LV. Normal LV wall thickness.  · Preserved LV systolic function. Estimated EF 60- 65%. Normal LV septal wall motion. No regional wall motion abnormalities.  · Normal RV size and function.  · Normal-sized LA. Intact atrial septum. Normal pulmonary veins  · Normal-sized RA. No PFO by saline contrast injection or color-flow imaging.  · Trace mitral valve regurgitation.  · Normal-sized IVC. Normal SVC  · Normal aorta.    Assessment & Plan  -Evidence of tricuspid valve endocarditis as outlined in detailed report of JANUSZ above.  Today she is hemodynamically stable  -ID on board and aware  -Continue ABX recs per ID  -Need to monitor closely as outpatient as at some point she could require surgical intervention.      Septic embolism  (CMS/[facility])  Assessment & Plan  -Pulmonary emboli noted on CT scan.  -JANUSZ demonstrating tricuspid endocarditis  -See endocarditis section for details.    She does have pleuritic pain at times with pleuritic pain nonsteroidals can be helpful.  She has some GI intolerance in the past with NSAIDs.  Perhaps using Celebrex which has less, but I discussed not no, GI side effects is an option for oral treatment until the pulmonary infarct pain subsides.    * Acute bacterial endocarditis  Assessment & Plan  -MSSA positive BC  -Septic emboli to lungs in addition to lumbar spine infection.  This may be the etiology of her back pain etc. which is pleuritic.  Of note she is quite upset as treating pain in someone with an opioid issue is obviously a very difficult problem.  Agree with psychiatry assistance with medical management and she wants to go back on Suboxone but on the other hand, understandably not unreasonable, also wants relief from her pain.  -Secondary to tricuspid endocarditis.  I discussed treatment, intravenous antibiotics, close clinical follow-up and periodic echocardiograms.  Recommended she return in 1-2 weeks for cardiac evaluation after discharge.  This was stressed.  -Continue tx per ID.  Stable on exam.        Septic arthritis of vertebra (CMS/[facility])  Overview  MRI Spine 12/2020  Diffuse anterior and posterior epidural enhancement within lumbar spine more  pronounced at L4-L5 raising the suspicion for underlying infectious  process/phlegmon.  Left greater than right L4-L5 facet joint signal and  enhancement which although may be degenerative in nature, early changes of  septic facet arthritis is in the differential.  Follow-up is recommended.     Thoracic and lumbar degenerative change as described more pronounced at L4-L5.     Ring-enhancing lesion in right upper lobe which may be infectious in etiology  including in the setting of septic emboli.  Correlation with CT chest with  contrast is recommended to  exclude other etiologies.    Methicillin sensitive staphylococcal aureus cultures done at [facility] per ID note.    Assessment & Plan  -Methicillin sensitive staph bacteremia with tricuspid, cultures at [facility], endocarditis  -Continue tx per ID    Essential hypertension  Assessment & Plan  Overall stable.         Discussed with MARIA A Loza DO  12/14/2020  9:32 AM     This patient note has been dictated using speech recognition software. Inadvertent speech recognition errors should be disregarded. Please do not hesitate to call my office for clarifications.

## 2020-12-14 NOTE — PROGRESS NOTES
"Palliative Care Progress Note    Patient Name: Carolyn Redmond  Patient MRN: [id number]  Date / Time: 12/14/2020 3:33 PM   Attending Physician: Gen Hogue MD    This is Hospital Day: 6        Pain  Assessment & Plan  Pt has a history of vasculitis, chronic abdominal and back pain, fibromyalgia, mirgraines and intermittent pancreatitis. She was at [facility] for 2 days w/ lower back pain and endocarditis on 12/9/20 and left AMA telling me \"I didn't like the way they were treating me\"    Neurosurgery following for worsening low back pain over 2 weeks.  Per Neurosurgery MRI imaging does not demonstrate significant central canal stenosis or empyema that would require immediate neurosurgical intervention and maintained alignment on her MRI with no significant bony erosion. She has mild enhancement of the posterior epidural space and bilateral L4-5 facet joints, concerning for early osteomyelitis.      12/14: saw patient w/ Susan Lesch, patient reports continued pain in her low back, at worse 15/10 at best 7/10 can be sharp and spasm like; feels the IV valium has not helped and was taking flexeril prior to admission and that was more helpful; she asked the oxycodone increased to every 3 hours prn, she is agreeable to restarting anti-inflammatory, she has tried robaxin but says what worked the best is SOMA, she denies any recent gastritis or GI bleeding ice/heat are not helpful and lidocaine patch not working due to her excessive sweating. Her sleep is poor and tells me ambien is not even working, I offered melatonin and she said that makes her stomach sick. She did find benefit with Reiki therapy.     No acute Neurosurgical intervention required at this lizzie  IV Abx per ID for possible osteo/discitis and TV endocarditis  LSO brace as needed  Monitor for any neurological defiicts    Multimodal Pain Management Plan  Creatinine 0.7. liver enzymes wnl  Stop IV Valium  Start flexeril 10 mg every 8 hours prn  Stop " "Fiorcet (used 1 dose in 72 hours)  Start tylenol 975 mg every 8 hours x 3 days  Restart Toradol q6hrs prn x 2 days (she has received 7 doses since admission and none in 2 days)   When toradol complete transition to Ibuprofen 400mg po q6hrs prn, consider adding ppi  Pregabalin 150mg po q12hrs., monitor CrCl currenlty wnl  C/w oxycodone to 10mg po q4hrs prn, taper down as able  lidoderm patch lower back  Reiki  Continuous pulse ox  Make sure patient has no opioids in her personal belongings  Do NOT recommend discharge with Opioids  Attending agreeable with plan as above  Psych following  Pt encouraged to pursue a new pain management physician  At this time she will f/u with her PCP Dr. 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  Patient appears to have some early osteo/discitis? Septic arthritis of L4-5, epidural space.      Patient with long h/o IVDA, opioid misuse and etoh   UDS on admission + cannabinoids and barbituates (likely fioricet)  PMDP has 23 prescribers and 114 scripts in past 12 months  Psych and SW consulted for addiction support  Recommend inpt treatment to complete antibiotic course if IV delivery is recommended    She was on suboxone until 8/2020 for addiction treatment but has been also misusing opioids, she states that was percocet but imaging is concerning for current IVDA.  Pt states she did inject fentanyl and heroin \"but not for months\".  She reports stopping her suboxone when getting to treatment w/ Dr. Brewer in [place] was too difficult.    UDS on admission + for cannabinoids and barbituates (fioricet)  Pt " states she currently sees her PCP Dr. Mcleod in [place] for pain related issues, she does not want to go back on suboxone though recommended by psych.   She denies withdrawal and shows no s/s of same.    ID following for ABx  JANUSZ 12/11   Psych and Social Work following for addiction support  Recommend inpt med psych if pt requires long course of IV ABx  Pt is at HIGH risk for opioid abuse relapse based on ORT tool.      Tricuspid valve vegetation  Overview  JANUSZ 12/11/20  · Tricuspid valve endocarditis.  · 1 x 0.6 cm echolucent spongy sessile mass attached to the mid body of the posterior tricuspid leaflet consistent with vegetation. There is a tiny mobile component on the atrial surface of the mass. There appears to be a smaller segment of vegetation on the ventricular surface of the valve leaflet opposite the larger component. There is a tiny perforation at the edge of the vegetation 5 mm from the annulus of the leaflet.  · Borderline severe tricuspid regurgitation through the valve orifice. There is a small regurgitant jet through the perforation.  · Estimated RVSP = 48 mmHg assuming a right atrial pressure 5 mmHg  · Normal-appearing aortic, mitral, pulmonic valves. Trace mitral regurgitation.  · Normal-sized LV. Normal LV wall thickness.  · Preserved LV systolic function. Estimated EF 60- 65%. Normal LV septal wall motion. No regional wall motion abnormalities.  · Normal RV size and function.  · Normal-sized LA. Intact atrial septum. Normal pulmonary veins  · Normal-sized RA. No PFO by saline contrast injection or color-flow imaging.  · Trace mitral valve regurgitation.  · Normal-sized IVC. Normal SVC  · Normal aorta.    Septic arthritis of vertebra (CMS/HCC)  Overview  MRI Spine 12/2020  Diffuse anterior and posterior epidural enhancement within lumbar spine more  pronounced at L4-L5 raising the suspicion for underlying infectious  process/phlegmon.  Left greater than right L4-L5 facet joint signal  and  enhancement which although may be degenerative in nature, early changes of  septic facet arthritis is in the differential.  Follow-up is recommended.     Thoracic and lumbar degenerative change as described more pronounced at L4-L5.     Ring-enhancing lesion in right upper lobe which may be infectious in etiology  including in the setting of septic emboli.  Correlation with CT chest with  contrast is recommended to exclude other etiologies.    Methicillin sensitive staphylococcal aureus cultures done at [facility] per ID note.    Opioid type dependence, continuous use (CMS/[facility])  Assessment & Plan        Anxiety  Assessment & Plan  Pt with long history of anxiety  Patient has a h/o suicidality and [facility] ama.  She is currently on many psych meds including wellbutrin, paxil and seroquel  She denies using thorazine but was given this at outside hospital and refused it here    Psych following  Avoid benzos with opioid therapy  Recommend pulse ox to monitor for resp depression            Interval History (Subjective)    Source: chart review and the patient      Conversation/Goals of Care    Saw patient w/ Susan Lesch, patient reports continued pain in her low back, at worse 15/10 at best 7/10 can be sharp and spasm like; feels the IV valium has not helped and was taking flexeril prior to admission and that was more helpful; she asked the oxycodone increased to every 3 hours prn, she is agreeable to restarting anti-inflammatory, she has tried robaxin but says what worked the best is SOMA, she denies any recent gastritis or GI bleeding ice/heat are not helpful and lidocaine patch not working due to her excessive sweating. Her sleep is poor and tells me ambien is not even working, I offered melatonin and she said that makes her stomach sick. She did find benefit with Reiki therapy.       Current Medications:  •  acetaminophen, 975 mg, oral, q8h JAIDEN  •  buPROPion, 75 mg, oral, TID  •  ceFAZolin, 2 g,  intravenous, q8h INT  •  cloNIDine, 0.2 mg, oral, q6h PRN  •  cyclobenzaprine, 10 mg, oral, 3x daily PRN  •  glucose, 16-32 g of dextrose, oral, PRN **OR** dextrose, 15-30 g of dextrose, oral, PRN **OR** glucagon, 1 mg, intramuscular, PRN **OR** dextrose in water, 25 mL, intravenous, PRN  •  fluticasone propionate, 2 spray, Each Nostril, Daily PRN  •  heparin (porcine), 5,000 Units, subcutaneous, q8h JAIDEN  •  ketorolac, 15 mg, intravenous, q6h PRN  •  lidocaine, 1 patch, Topical, Daily  •  magnesium sulfate, 2 g, intravenous, PRN  •  metoprolol succinate XL, 25 mg, oral, Daily  •  naloxone, 0.2 mg, intravenous, Once PRN  •  nicotine polacrilex, 4 mg, buccal, q4h PRN  •  ondansetron, 4 mg, intravenous, q15 min PRN  •  oxyCODONE, 10 mg, oral, q4h PRN  •  PARoxetine, 30 mg, oral, Daily  •  polyethylene glycol, 17 g, oral, Daily  •  potassium chloride, 20 mEq, oral, Daily PRN **OR** potassium chloride, 40 mEq, oral, Daily PRN **OR** potassium chloride, 20 mEq, intravenous, Daily PRN **OR** potassium chloride, 40 mEq, intravenous, Daily PRN  •  prazosin, 2 mg, oral, Nightly  •  pregabalin, 150 mg, oral, BID  •  QUEtiapine, 100 mg, oral, Nightly  •  zolpidem, 10 mg, oral, Nightly      Objective    Physical Exam:  Physical Exam  Vitals signs and nursing note reviewed.   Constitutional:       General: She is not in acute distress.     Appearance: Normal appearance. She is obese. She is not ill-appearing, toxic-appearing or diaphoretic.   HENT:      Head: Normocephalic and atraumatic.   Eyes:      General: No scleral icterus.     Conjunctiva/sclera: Conjunctivae normal.   Cardiovascular:      Rate and Rhythm: Normal rate.   Pulmonary:      Effort: No respiratory distress.      Breath sounds: No wheezing.   Abdominal:      General: There is no distension.   Musculoskeletal:         General: Swelling present.   Skin:     General: Skin is warm and dry.      Coloration: Skin is pale.   Neurological:      Mental Status: She is  alert and oriented to person, place, and time.      Comments: Grossly non-focal   Psychiatric:         Behavior: Behavior normal.      Comments: anxious         Vitals:  Vitals:    12/14/20 1525   BP:    Pulse: 91   Resp:    Temp:    SpO2:        Intake & Output:  No intake/output data recorded.      Laboratory Studies: results reviewed, significant for anemia    CBC  Results from last 7 days   Lab Units 12/12/20  1048 12/11/20  0444 12/10/20  0948   WBC K/uL 8.31 7.97 7.05   RBC M/uL 3.76* 3.17* 3.36*   HEMOGLOBIN g/dL 10.1* 8.5* 9.2*   HEMATOCRIT % 31.9* 26.5* 28.7*   MCV fL 84.8 83.6 85.4   MCH pg 26.9* 26.8* 27.4*   MCHC g/dL 31.7* 32.1* 32.1*   PLATELETS K/uL 276 186 186   RDW % 14.3 14.5* 14.4   MPV fL 9.9 10.1 10.5     BMP  Results from last 7 days   Lab Units 12/12/20  1048 12/11/20  0444 12/10/20  0948 12/09/20  1348   SODIUM mEQ/L 138 140 138 136   POTASSIUM mEQ/L 4.5 3.9 4.0 4.3   CHLORIDE mEQ/L 102 105 103 103   CO2 mEQ/L 21* 23 25 22   BUN mg/dL 6* 7* 8 8   CREATININE mg/dL 0.7 0.6 0.8 0.8   CALCIUM mg/dL 9.2 9.0 8.6* 8.9   ALBUMIN g/dL  --   --   --  3.0*   BILIRUBIN TOTAL mg/dL  --   --   --  0.4   ALK PHOS IU/L  --   --   --  85   ALT IU/L  --   --   --  11   AST IU/L  --   --   --  14*   GLUCOSE mg/dL 88 95 112* 92     Coag     Total CK   Date Value Ref Range Status   03/21/2017 16 15 - 200 U/L Final     Troponin I   Date Value Ref Range Status   12/09/2020 <0.02 <0.05 ng/mL Final          Imaging  Recent imaging reviewed by me  No results found.    Imaging and Other Studies:     Radiology Imaging   XR CHEST 1 VW    Narrative CLINICAL HISTORY: Fever.    COMMENT:  A portable upright AP view of the chest was performed.    Comparison: 9/29/2019    Cardiac monitoring leads obscure portions of the underlying lungs.  There is no  focal consolidation or pleural effusion. The cardiac and mediastinal structures  are unremarkable.  A corticated ossific density structure is present adjacent to  the right  humeral head.      Impression IMPRESSION:  No radiographic evidence of acute cardiopulmonary disease.          Tele reviewed  JANUSZ reviewed    Discussed with [facility] and nursing and SW      SHERITA Toribio

## 2020-12-14 NOTE — PROGRESS NOTES
Hospital Medicine Service -  Daily Progress Note       SUBJECTIVE   Interval History: no overnight events  C/o uncontrolled back pain, does not want narcotics weaned at this point, no dyspnea or chest pain, afebrile.      OBJECTIVE      Vital signs in last 24 hours:  Temp:  [36.4 °C (97.5 °F)-37.2 °C (98.9 °F)] 36.4 °C (97.6 °F)  Heart Rate:  [] 96  Resp:  [18] 18  BP: (113-159)/(54-94) 125/82  No intake or output data in the 24 hours ending 12/14/20 1017    PHYSICAL EXAMINATION      Physical Exam   Constitutional: NAD  Neck: Supple. No JVD.    Cardiovascular: Normal rate, regular rhythm    Pulmonary/Chest: Effort normal and breath sounds normal. No wheezing or crackles.  Abdominal: Soft. Bowel sounds are normal. There is no tenderness, no rebound and no guarding.   Musculoskeletal: No edema   Neurological: Alert and oriented       LINES, CATHETERS, DRAINS, AIRWAYS, AND WOUNDS   Lines, Drains, Airways, Wounds:  Peripheral IV 12/11/20 Right Forearm (Active)   Number of days: 1       Comments:      LABS / IMAGING / TELE      Labs  Lab Results   Component Value Date    WBC 8.31 12/12/2020    HGB 10.1 (L) 12/12/2020    HCT 31.9 (L) 12/12/2020     12/12/2020    ALT 11 12/09/2020    AST 14 (L) 12/09/2020     12/12/2020    K 4.5 12/12/2020     12/12/2020    CREATININE 0.7 12/12/2020    BUN 6 (L) 12/12/2020    CO2 21 (L) 12/12/2020    INR 0.9 04/06/2019       Imaging  Mri Thoracic Spine With And Without Contrast    Result Date: 12/9/2020  IMPRESSION: Diffuse anterior and posterior epidural enhancement within lumbar spine more pronounced at L4-L5 raising the suspicion for underlying infectious process/phlegmon.  Left greater than right L4-L5 facet joint signal and enhancement which although may be degenerative in nature, early changes of septic facet arthritis is in the differential.  Follow-up is recommended. Thoracic and lumbar degenerative change as described more pronounced at L4-L5.  Ring-enhancing lesion in right upper lobe which may be infectious in etiology including in the setting of septic emboli.  Correlation with CT chest with contrast is recommended to exclude other etiologies. The results were discussed with Rylee Bowie on 12/9/2020 7:35 PM.    Mri Lumbar Spine With And Without Contrast    Result Date: 12/9/2020  IMPRESSION: Diffuse anterior and posterior epidural enhancement within lumbar spine more pronounced at L4-L5 raising the suspicion for underlying infectious process/phlegmon.  Left greater than right L4-L5 facet joint signal and enhancement which although may be degenerative in nature, early changes of septic facet arthritis is in the differential.  Follow-up is recommended. Thoracic and lumbar degenerative change as described more pronounced at L4-L5. Ring-enhancing lesion in right upper lobe which may be infectious in etiology including in the setting of septic emboli.  Correlation with CT chest with contrast is recommended to exclude other etiologies. The results were discussed with Rylee Bowie on 12/9/2020 7:35 PM.    X-ray Chest 1 View    Result Date: 12/9/2020  IMPRESSION: No radiographic evidence of acute cardiopulmonary disease.    Ct Chest Pulmonary Embolism With Iv Contrast    Result Date: 12/9/2020  IMPRESSION: 1.  No evidence of main, lobar or segmental pulmonary embolism. 2.  However, there are multifocal nodular lesions throughout all lung fields including a cavitary lesion in the right upper lobe all suspicious for septic emboli.  Some of the tiny subsegmental pulmonary arterial branches demonstrate hypoenhancement and it is difficult to determinate if this is related to tiny pulmonary emboli or technique. 3.  Small bilateral pleural effusions. 4.  Mosaic pattern at the lung bases could represent mild edema or sequela of small airways or small vessels disease. 5.  Follow-up chest CT after appropriate treatment is recommended to document lung opacities and  mediastinal lymphadenopathy which is probably reactive. 6.  Additional findings discussed below. Finding:    Other   Acuity: Critical  Status:  CLOSED Critical read back for the first impression performed with Bertha Bowie PA 9:25 PM 12/9/2020 COMMENT: Comparison: Chest x-ray from 12/9/2020 Technique: Chest CT pulmonary embolism protocol performed with intravenous contrast.  80 mL Omnipaque 350 given. CT DOSE:  One or more dose reduction techniques (e.g. automated exposure control, adjustment of the mA and/or kV according to patient size, use of iterative reconstruction technique) utilized for this examination. FINDINGS: LUNG, PLEURA AND LARGE AIRWAYS: The trachea and proximal bronchi remain clear. There are multifocal nodular lesions throughout the lungs.  For example in the right upper lobe there is a cavitary lesion measuring 1.4 cm on axial image 63. A 1.6 cm nodular lesion in the left upper lobe on image 69.  Multiple additional lesions are present elsewhere.  His are suspicious for septic emboli particularly given the history of endocarditis and sepsis. There are small bilateral pleural effusions. There is a nonspecific mild degree of groundglass opacity in the lower lung fields.  This could represent mild degree of pulmonary edema or possibly a mosaic pattern from small airways or small vessel disease. No pneumothorax. VESSELS: Normal caliber of the thoracic aorta.  Thoracic aorta appears within normal limits.  Main pulmonary artery is normal in caliber.  No evidence of a main, lobar or segmental pulmonary embolism.  However, the subsegmental vessels are difficult to assess and some demonstrate a slightly hypoechoic enhancing appearance possibly artifact from bolus timing although tiny subsegmental pulmonary emboli would be difficult to exclude.  For example, in the lingula there is some hypoenhancement of the subsegmental vessels on image 116 where subsegmental PE cannot be excluded.  No evidence of  right heart strain. HEART: normal size. No pericardial effusion. MEDIASTINUM AND PAULINA: There is a pathologically enlarged lymph node in the AP window measuring 1.8 cm short axis.  Additional prominent but nonpathologic by size video lymph nodes elsewhere. CHEST WALL AND LOWER NECK: within normal limits. UPPER ABDOMEN:Limited assessment of the upper abdominal structures with this technique.  There is splenomegaly with the spleen measuring 14.5 cm.  The liver may also be enlarged but is incompletely visualized.  Cholecystectomy changes. BONES: No suspicious bony erosive changes.  Multilevel Schmorl's nodes and degenerative changes in the spine are noted.  Calcific tendinosis of the right rotator cuff.  There is limited assessment of the spine with this technique.  No gross paraspinal inflammatory changes.         ECG/Telemetry  I have independently reviewed the telemetry. No events for the last 24 hours.    ASSESSMENT AND PLAN      Tricuspid valve vegetation  Overview  JANUSZ 12/11/20  · Tricuspid valve endocarditis.  · 1 x 0.6 cm echolucent spongy sessile mass attached to the mid body of the posterior tricuspid leaflet consistent with vegetation. There is a tiny mobile component on the atrial surface of the mass. There appears to be a smaller segment of vegetation on the ventricular surface of the valve leaflet opposite the larger component. There is a tiny perforation at the edge of the vegetation 5 mm from the annulus of the leaflet.  · Borderline severe tricuspid regurgitation through the valve orifice. There is a small regurgitant jet through the perforation.  · Estimated RVSP = 48 mmHg assuming a right atrial pressure 5 mmHg  · Normal-appearing aortic, mitral, pulmonic valves. Trace mitral regurgitation.  · Normal-sized LV. Normal LV wall thickness.  · Preserved LV systolic function. Estimated EF 60- 65%. Normal LV septal wall motion. No regional wall motion abnormalities.  · Normal RV size and  function.  · Normal-sized LA. Intact atrial septum. Normal pulmonary veins  · Normal-sized RA. No PFO by saline contrast injection or color-flow imaging.  · Trace mitral valve regurgitation.  · Normal-sized IVC. Normal SVC  · Normal aorta.    Assessment & Plan  Sessile vegetation on tricuspid valve leaflet  Continue antibiotics  Cards consulted.     Septic arthritis of vertebra (CMS/HCC)  Overview  MRI Spine 12/2020  Diffuse anterior and posterior epidural enhancement within lumbar spine more  pronounced at L4-L5 raising the suspicion for underlying infectious  process/phlegmon.  Left greater than right L4-L5 facet joint signal and  enhancement which although may be degenerative in nature, early changes of  septic facet arthritis is in the differential.  Follow-up is recommended.     Thoracic and lumbar degenerative change as described more pronounced at L4-L5.     Ring-enhancing lesion in right upper lobe which may be infectious in etiology  including in the setting of septic emboli.  Correlation with CT chest with  contrast is recommended to exclude other etiologies.    Methicillin sensitive staphylococcal aureus cultures done at [facility] per ID note.    Assessment & Plan  Neurovascular intact  Pain control with oxycodone 10 mg q4 hours plus toradol as discussed with palliative care  Palliative care medicine following   Will need to wean oxycodone prior to discharge.       Essential hypertension  Assessment & Plan  Continue with home meds  bp parameters for hypotension    Septic embolism (CMS/HCC)  Assessment & Plan  Continue IV abx per ID   Blood cultures performed on admission, NGTD  Evidence of discitis on MRI, neurosurgery consulted as well as ID    Depression  Assessment & Plan  Continue home meds   Psych consulted.     * Acute bacterial endocarditis  Assessment & Plan  Suspected tricuspid valve endocarditis  Sepsis due to acute bacterial endocarditis present on admission  Continue  antibiotics  Blood cultures from [facility]       VTE Assessment: [place] VTE Score: 5  VTE Prophylaxis Plan: heparin  Code Status: Full Code  Estimated Discharge Date: 12/16/2020  Disposition Planning: rehab     Gen Lennie MD  12/14/2020

## 2020-12-14 NOTE — PROGRESS NOTES
Major Events:  none    Subjective:  insomnia    Temp:  [36.4 °C (97.5 °F)-37.1 °C (98.8 °F)] 37.1 °C (98.8 °F)  Heart Rate:  [] 77  Resp:  [16-18] 16  BP: (113-159)/(54-94) 139/82     SpO2 Readings from Last 3 Encounters:   12/14/20 98%   08/05/20 94%   07/10/20 97%     Anti-infectives (From admission, onward)    Start     Dose/Rate Route Frequency Ordered Stop    12/11/20 1900  ceFAZolin (ANCEF) NSS IVPB piggyback 2 g      2 g  100 mL/hr over 30 Minutes intravenous Every 8 hours interval 12/11/20 1823          Physical Exam:  Skin: No rash  Sclera: Anicteric  Lungs: clr  CV: S1, S2 nl, soft m, no embolic changes  Abd: Soft, nt, no hsm  Extr: No jt eff, no edema  Neuro: Alert, lucid    Lab Results   Component Value Date    WBC 8.31 12/12/2020    HGB 10.1 (L) 12/12/2020    HCT 31.9 (L) 12/12/2020    MCV 84.8 12/12/2020     12/12/2020     Lab Results   Component Value Date    GLUCOSE 88 12/12/2020    CALCIUM 9.2 12/12/2020     12/12/2020    K 4.5 12/12/2020    CO2 21 (L) 12/12/2020     12/12/2020    BUN 6 (L) 12/12/2020    CREATININE 0.7 12/12/2020     Lab Results   Component Value Date    ALBUMIN 3.0 (L) 12/09/2020    BILITOT 0.4 12/09/2020    ALKPHOS 85 12/09/2020    BILIDIR <0.1 04/12/2017    AST 14 (L) 12/09/2020    ALT 11 12/09/2020    PROTEIN 7.2 12/09/2020     Microbiology Results     Procedure Component Value Units Date/Time    Blood Culture Blood, Venous [[id number]] Collected: 12/09/20 1348    Specimen: Blood, Venous Updated: 12/11/20 1801     Culture No growth at 48 hours    Blood Culture Blood, Venous [[id number]] Collected: 12/09/20 1304    Specimen: Blood, Venous Updated: 12/11/20 1501     Culture No growth at 48 hours      Impression    MSSA TV IE c pul emboli  Also c lumbar infection  BC here neg so far  Tolerating cefazolin well  Now day 7/42  Check crp tomorrow    YS MD Karen  Pager [phone]

## 2020-12-14 NOTE — PROGRESS NOTES
Behavioral Health Progress Note    Chief Complaint/Reason for follow-up: opioid dependence/ mood disorder    Interval History: The pt is seen on follow up again with psychiatry and medical student. The pt will need up to 6 weeks antibiotics for multiple conditions and at this time as well is being medicated with oxycodone 10mg pRN, she has been in need q4hrs.     Much discussion with the pt in regards to further dispo. Discussed that the pt will need to go to a tx facility in order to receive this level of care, and she will not be able to go home. Much discussion of the option of transferring to [facility] and answered any questions the pt had. The pt has some reservations in regards to placement at the rehab, therefore further discussed a possibility of looking into a SNF as well.     Review of Systems:   Sleep:  Good, Appetite: Good and GI: No complaints    Vital Signs for the last 24 hours:  Temp:  [36.4 °C (97.5 °F)-36.9 °C (98.4 °F)] 36.6 °C (97.9 °F)  Heart Rate:  [] 77  Resp:  [18] 18  BP: (113-159)/(54-94) 132/75    Mental Status Exam:  Appearance: well groomed  Gait and Motor: no abnormal movements  Speech: normal rate/rhythm/volume  Mood: 'stressed'  Affect: normal  Associations: coherent  Thought Process: goal-directed  Thought Content: no auditory or visual hallucinations.  Suicidality/Homicidality: denies  Judgement/Insight: fair   Orientation: AAOx3   Memory: recall   Attention: alert  Knowledge: normal  Language: normal    Outpatient Psychiatrist: none  Outpatient Therapist: none    Assessment/Plan    At this time the pt will continue paroxetine 30 mg qam as well as bupropion 75 mg TID. She continues to prefer to remain on oxycodone while here in the hospital, however conversation occurred to explain beginning suboxone as well as the option of beginning methadone and the follow up with a clinic there after her tx. The pt in not committable to these options at this point.    Behavioral Health Clinician will continue to follow along with the pt, met with Care Coordinator PREET to discuss dispo who will further works towards placement for the pt as long as she remains agreeable.

## 2020-12-15 LAB
BACTERIA BLD CULT: NORMAL
BACTERIA BLD CULT: NORMAL
CRP SERPL-MCNC: 26.1 MG/L

## 2020-12-15 PROCEDURE — 25800000 HC PHARMACY IV SOLUTIONS: Performed by: INTERNAL MEDICINE

## 2020-12-15 PROCEDURE — 99232 SBSQ HOSP IP/OBS MODERATE 35: CPT | Performed by: INTERNAL MEDICINE

## 2020-12-15 PROCEDURE — 63600000 HC DRUGS/DETAIL CODE: Performed by: ANESTHESIOLOGY

## 2020-12-15 PROCEDURE — 63700000 HC SELF-ADMINISTRABLE DRUG: Performed by: NURSE PRACTITIONER

## 2020-12-15 PROCEDURE — 20600000 HC ROOM AND CARE INTERMEDIATE/TELEMETRY

## 2020-12-15 PROCEDURE — 63700000 HC SELF-ADMINISTRABLE DRUG: Performed by: INTERNAL MEDICINE

## 2020-12-15 PROCEDURE — 63700000 HC SELF-ADMINISTRABLE DRUG: Performed by: PSYCHIATRY & NEUROLOGY

## 2020-12-15 PROCEDURE — 63600000 HC DRUGS/DETAIL CODE: Performed by: INTERNAL MEDICINE

## 2020-12-15 PROCEDURE — 63600000 HC DRUGS/DETAIL CODE: Performed by: NURSE PRACTITIONER

## 2020-12-15 PROCEDURE — 25000000 HC PHARMACY GENERAL: Performed by: INTERNAL MEDICINE

## 2020-12-15 PROCEDURE — 99233 SBSQ HOSP IP/OBS HIGH 50: CPT | Performed by: PSYCHIATRY & NEUROLOGY

## 2020-12-15 PROCEDURE — 63700000 HC SELF-ADMINISTRABLE DRUG: Performed by: HOSPITALIST

## 2020-12-15 PROCEDURE — 86140 C-REACTIVE PROTEIN: CPT | Performed by: INTERNAL MEDICINE

## 2020-12-15 RX ORDER — BUPROPION HYDROCHLORIDE 75 MG/1
75 TABLET ORAL ONCE
Status: DISCONTINUED | OUTPATIENT
Start: 2020-12-15 | End: 2020-12-15 | Stop reason: DRUGHIGH

## 2020-12-15 RX ORDER — BUPROPION HYDROCHLORIDE 75 MG/1
150 TABLET ORAL DAILY
Status: DISCONTINUED | OUTPATIENT
Start: 2020-12-15 | End: 2020-12-15 | Stop reason: DRUGHIGH

## 2020-12-15 RX ORDER — BUPROPION HYDROCHLORIDE 75 MG/1
75 TABLET ORAL 3 TIMES DAILY
Status: DISCONTINUED | OUTPATIENT
Start: 2020-12-15 | End: 2021-02-03 | Stop reason: HOSPADM

## 2020-12-15 RX ORDER — BUPROPION HYDROCHLORIDE 75 MG/1
75 TABLET ORAL
Status: DISCONTINUED | OUTPATIENT
Start: 2020-12-15 | End: 2020-12-15 | Stop reason: DRUGHIGH

## 2020-12-15 RX ADMIN — ACETAMINOPHEN 975 MG: 325 TABLET, FILM COATED ORAL at 14:09

## 2020-12-15 RX ADMIN — CEFAZOLIN SODIUM 2 G: 10 POWDER, FOR SOLUTION INTRAVENOUS at 11:29

## 2020-12-15 RX ADMIN — METOPROLOL SUCCINATE 25 MG: 25 TABLET, EXTENDED RELEASE ORAL at 09:02

## 2020-12-15 RX ADMIN — BUPROPION HYDROCHLORIDE 75 MG: 75 TABLET, FILM COATED ORAL at 12:36

## 2020-12-15 RX ADMIN — CLONIDINE HYDROCHLORIDE 0.2 MG: 0.1 TABLET ORAL at 17:15

## 2020-12-15 RX ADMIN — OXYCODONE HYDROCHLORIDE 10 MG: 5 TABLET ORAL at 00:39

## 2020-12-15 RX ADMIN — CLONIDINE HYDROCHLORIDE 0.2 MG: 0.1 TABLET ORAL at 09:19

## 2020-12-15 RX ADMIN — QUETIAPINE 100 MG: 100 TABLET, FILM COATED ORAL at 21:03

## 2020-12-15 RX ADMIN — OXYCODONE HYDROCHLORIDE 10 MG: 5 TABLET ORAL at 04:18

## 2020-12-15 RX ADMIN — OXYCODONE HYDROCHLORIDE 10 MG: 5 TABLET ORAL at 18:16

## 2020-12-15 RX ADMIN — CEFAZOLIN SODIUM 2 G: 10 POWDER, FOR SOLUTION INTRAVENOUS at 19:10

## 2020-12-15 RX ADMIN — CEFAZOLIN SODIUM 2 G: 10 POWDER, FOR SOLUTION INTRAVENOUS at 03:17

## 2020-12-15 RX ADMIN — ZOLPIDEM TARTRATE 10 MG: 5 TABLET, COATED ORAL at 21:03

## 2020-12-15 RX ADMIN — ONDANSETRON HYDROCHLORIDE 4 MG: 2 SOLUTION INTRAMUSCULAR; INTRAVENOUS at 14:10

## 2020-12-15 RX ADMIN — ACETAMINOPHEN 975 MG: 325 TABLET, FILM COATED ORAL at 21:02

## 2020-12-15 RX ADMIN — PREGABALIN 150 MG: 75 CAPSULE ORAL at 08:58

## 2020-12-15 RX ADMIN — OXYCODONE HYDROCHLORIDE 10 MG: 5 TABLET ORAL at 08:57

## 2020-12-15 RX ADMIN — NICOTINE POLACRILEX 4 MG: 4 GUM, CHEWING ORAL at 18:50

## 2020-12-15 RX ADMIN — OXYCODONE HYDROCHLORIDE 10 MG: 5 TABLET ORAL at 14:09

## 2020-12-15 RX ADMIN — BUPROPION HYDROCHLORIDE 75 MG: 75 TABLET, FILM COATED ORAL at 17:15

## 2020-12-15 RX ADMIN — PREGABALIN 150 MG: 75 CAPSULE ORAL at 21:02

## 2020-12-15 RX ADMIN — PAROXETINE 30 MG: 20 TABLET, FILM COATED ORAL at 09:01

## 2020-12-15 RX ADMIN — CYCLOBENZAPRINE HYDROCHLORIDE 10 MG: 5 TABLET, FILM COATED ORAL at 06:30

## 2020-12-15 RX ADMIN — NICOTINE POLACRILEX 4 MG: 4 GUM, CHEWING ORAL at 06:30

## 2020-12-15 RX ADMIN — KETOROLAC TROMETHAMINE 15 MG: 15 INJECTION, SOLUTION INTRAMUSCULAR; INTRAVENOUS at 06:05

## 2020-12-15 RX ADMIN — BUPROPION HYDROCHLORIDE 75 MG: 75 TABLET, FILM COATED ORAL at 09:01

## 2020-12-15 RX ADMIN — NICOTINE POLACRILEX 4 MG: 4 GUM, CHEWING ORAL at 12:36

## 2020-12-15 ASSESSMENT — ENCOUNTER SYMPTOMS
JOINT SWELLING: 0
BLURRED VISION: 0
ABDOMINAL PAIN: 0
WEIGHT GAIN: 0
IRREGULAR HEARTBEAT: 0
VOMITING: 0
MYALGIAS: 0
DIARRHEA: 0
ORTHOPNEA: 0
NIGHT SWEATS: 1
HEADACHES: 0
HEMATOCHEZIA: 0
DIZZINESS: 0
FEVER: 0
SLEEP DISTURBANCES DUE TO BREATHING: 0
LOSS OF BALANCE: 0
DOUBLE VISION: 0
LIGHT-HEADEDNESS: 0
SNORING: 0
DYSPNEA ON EXERTION: 0
HEMATURIA: 0
NAUSEA: 0
HEMATEMESIS: 0
FALLS: 0
FREQUENCY: 0
DEPRESSION: 0
SPUTUM PRODUCTION: 0
PND: 0
NEAR-SYNCOPE: 0
BRUISES/BLEEDS EASILY: 0
WEAKNESS: 0
HEMOPTYSIS: 0
FOCAL WEAKNESS: 0
DIAPHORESIS: 0
SYNCOPE: 0
ODYNOPHAGIA: 0
PALPITATIONS: 0
WEIGHT LOSS: 0
NERVOUS/ANXIOUS: 0
WHEEZING: 0
BRIEF PARALYSIS: 0
COUGH: 0
MUSCLE CRAMPS: 0
ALTERED MENTAL STATUS: 0
SHORTNESS OF BREATH: 0
CLAUDICATION: 0

## 2020-12-15 NOTE — BEHAVIORAL HEALTH CRISIS PROGRESS NOTE
Behavioral Health Progress Note    Chief Complaint/Reason for follow-up: opioid dependence/ mood disorder    Interval History: The pt is seen again on follow up along with psychiatry and medical student. Much discussion in the way of beginning methadone for pain control and answered many questions the pt had in regards to starting this dose as well as the follow up maintenance. Continued questions in regards to her dcp and where she will get follow up tx. Again explained to the pt the follow up options of where she will be able to receive IV antibiotics. The pt is very understanding and knowledgeable about her current medical condition and tx and need for 6 weeks of antibiotics.     Review of Systems:   Sleep:  Good, Appetite: Good and GI: No complaints    Vital Signs for the last 24 hours:  Temp:  [36.4 °C (97.5 °F)-37.1 °C (98.8 °F)] 36.6 °C (97.9 °F)  Heart Rate:  [77-98] 94  Resp:  [16-18] 16  BP: (132-164)/(75-82) 142/79    Mental Status Exam:  Appearance: well groomed  Gait and Motor: no abnormal movements  Speech: normal rate/rhythm/volume  Mood: 'good'  Affect: normal  Associations: coherent  Thought Process: goal-directed  Thought Content: no auditory or visual hallucinations.  Suicidality/Homicidality: denies  Judgement/Insight: fair  Orientation: AAOx3   Memory: recall   Attention: alert  Knowledge: normal  Language: normal    Outpatient Psychiatrist: none  Outpatient Therapist: none    Assessment/Plan  Will continue to follow with the pt for support, the pt is thinking about starting methadone as form of pain management. She is understanding that she cannot remain in the hospital to receive the antibiotics, as she had reviewed this with [facility] therefore she is open to being placed in a facility for this tx.

## 2020-12-15 NOTE — PROGRESS NOTES
Hospital Medicine Service -  Daily Progress Note       SUBJECTIVE   Interval History: no overnight events  C/o poor sleep and oral intake, back pain appears better controlled, no dyspnea or chest pain, afebrile       OBJECTIVE      Vital signs in last 24 hours:  Temp:  [36.4 °C (97.5 °F)-37.1 °C (98.8 °F)] 36.6 °C (97.9 °F)  Heart Rate:  [77-98] 94  Resp:  [16-18] 16  BP: (132-164)/(75-82) 142/79  No intake or output data in the 24 hours ending 12/15/20 0955    PHYSICAL EXAMINATION      Physical Exam   Constitutional: NAD  Neck: Supple. No JVD.    Cardiovascular: Normal rate, regular rhythm    Pulmonary/Chest: Effort normal and breath sounds normal. No wheezing or crackles.  Abdominal: Soft. Bowel sounds are normal. There is no tenderness, no rebound and no guarding.   Musculoskeletal: No edema   Neurological: Alert and oriented       LINES, CATHETERS, DRAINS, AIRWAYS, AND WOUNDS   Lines, Drains, Airways, Wounds:  Peripheral IV 12/11/20 Right Forearm (Active)   Number of days: 1       Comments:      LABS / IMAGING / TELE      Labs  Lab Results   Component Value Date    WBC 8.31 12/12/2020    HGB 10.1 (L) 12/12/2020    HCT 31.9 (L) 12/12/2020     12/12/2020    ALT 11 12/09/2020    AST 14 (L) 12/09/2020     12/12/2020    K 4.5 12/12/2020     12/12/2020    CREATININE 0.7 12/12/2020    BUN 6 (L) 12/12/2020    CO2 21 (L) 12/12/2020    INR 0.9 04/06/2019       Imaging  Mri Thoracic Spine With And Without Contrast    Result Date: 12/9/2020  IMPRESSION: Diffuse anterior and posterior epidural enhancement within lumbar spine more pronounced at L4-L5 raising the suspicion for underlying infectious process/phlegmon.  Left greater than right L4-L5 facet joint signal and enhancement which although may be degenerative in nature, early changes of septic facet arthritis is in the differential.  Follow-up is recommended. Thoracic and lumbar degenerative change as described more pronounced at L4-L5.  Ring-enhancing lesion in right upper lobe which may be infectious in etiology including in the setting of septic emboli.  Correlation with CT chest with contrast is recommended to exclude other etiologies. The results were discussed with Rylee Bowie on 12/9/2020 7:35 PM.    Mri Lumbar Spine With And Without Contrast    Result Date: 12/9/2020  IMPRESSION: Diffuse anterior and posterior epidural enhancement within lumbar spine more pronounced at L4-L5 raising the suspicion for underlying infectious process/phlegmon.  Left greater than right L4-L5 facet joint signal and enhancement which although may be degenerative in nature, early changes of septic facet arthritis is in the differential.  Follow-up is recommended. Thoracic and lumbar degenerative change as described more pronounced at L4-L5. Ring-enhancing lesion in right upper lobe which may be infectious in etiology including in the setting of septic emboli.  Correlation with CT chest with contrast is recommended to exclude other etiologies. The results were discussed with Rylee Bowie on 12/9/2020 7:35 PM.    X-ray Chest 1 View    Result Date: 12/9/2020  IMPRESSION: No radiographic evidence of acute cardiopulmonary disease.    Ct Chest Pulmonary Embolism With Iv Contrast    Result Date: 12/9/2020  IMPRESSION: 1.  No evidence of main, lobar or segmental pulmonary embolism. 2.  However, there are multifocal nodular lesions throughout all lung fields including a cavitary lesion in the right upper lobe all suspicious for septic emboli.  Some of the tiny subsegmental pulmonary arterial branches demonstrate hypoenhancement and it is difficult to determinate if this is related to tiny pulmonary emboli or technique. 3.  Small bilateral pleural effusions. 4.  Mosaic pattern at the lung bases could represent mild edema or sequela of small airways or small vessels disease. 5.  Follow-up chest CT after appropriate treatment is recommended to document lung opacities and  mediastinal lymphadenopathy which is probably reactive. 6.  Additional findings discussed below. Finding:    Other   Acuity: Critical  Status:  CLOSED Critical read back for the first impression performed with Bertha Bowie PA 9:25 PM 12/9/2020 COMMENT: Comparison: Chest x-ray from 12/9/2020 Technique: Chest CT pulmonary embolism protocol performed with intravenous contrast.  80 mL Omnipaque 350 given. CT DOSE:  One or more dose reduction techniques (e.g. automated exposure control, adjustment of the mA and/or kV according to patient size, use of iterative reconstruction technique) utilized for this examination. FINDINGS: LUNG, PLEURA AND LARGE AIRWAYS: The trachea and proximal bronchi remain clear. There are multifocal nodular lesions throughout the lungs.  For example in the right upper lobe there is a cavitary lesion measuring 1.4 cm on axial image 63. A 1.6 cm nodular lesion in the left upper lobe on image 69.  Multiple additional lesions are present elsewhere.  His are suspicious for septic emboli particularly given the history of endocarditis and sepsis. There are small bilateral pleural effusions. There is a nonspecific mild degree of groundglass opacity in the lower lung fields.  This could represent mild degree of pulmonary edema or possibly a mosaic pattern from small airways or small vessel disease. No pneumothorax. VESSELS: Normal caliber of the thoracic aorta.  Thoracic aorta appears within normal limits.  Main pulmonary artery is normal in caliber.  No evidence of a main, lobar or segmental pulmonary embolism.  However, the subsegmental vessels are difficult to assess and some demonstrate a slightly hypoechoic enhancing appearance possibly artifact from bolus timing although tiny subsegmental pulmonary emboli would be difficult to exclude.  For example, in the lingula there is some hypoenhancement of the subsegmental vessels on image 116 where subsegmental PE cannot be excluded.  No evidence of  right heart strain. HEART: normal size. No pericardial effusion. MEDIASTINUM AND PAULINA: There is a pathologically enlarged lymph node in the AP window measuring 1.8 cm short axis.  Additional prominent but nonpathologic by size video lymph nodes elsewhere. CHEST WALL AND LOWER NECK: within normal limits. UPPER ABDOMEN:Limited assessment of the upper abdominal structures with this technique.  There is splenomegaly with the spleen measuring 14.5 cm.  The liver may also be enlarged but is incompletely visualized.  Cholecystectomy changes. BONES: No suspicious bony erosive changes.  Multilevel Schmorl's nodes and degenerative changes in the spine are noted.  Calcific tendinosis of the right rotator cuff.  There is limited assessment of the spine with this technique.  No gross paraspinal inflammatory changes.         ECG/Telemetry  I have independently reviewed the telemetry. No events for the last 24 hours.    ASSESSMENT AND PLAN      Tricuspid valve vegetation  Overview  JANUSZ 12/11/20  · Tricuspid valve endocarditis.  · 1 x 0.6 cm echolucent spongy sessile mass attached to the mid body of the posterior tricuspid leaflet consistent with vegetation. There is a tiny mobile component on the atrial surface of the mass. There appears to be a smaller segment of vegetation on the ventricular surface of the valve leaflet opposite the larger component. There is a tiny perforation at the edge of the vegetation 5 mm from the annulus of the leaflet.  · Borderline severe tricuspid regurgitation through the valve orifice. There is a small regurgitant jet through the perforation.  · Estimated RVSP = 48 mmHg assuming a right atrial pressure 5 mmHg  · Normal-appearing aortic, mitral, pulmonic valves. Trace mitral regurgitation.  · Normal-sized LV. Normal LV wall thickness.  · Preserved LV systolic function. Estimated EF 60- 65%. Normal LV septal wall motion. No regional wall motion abnormalities.  · Normal RV size and  function.  · Normal-sized LA. Intact atrial septum. Normal pulmonary veins  · Normal-sized RA. No PFO by saline contrast injection or color-flow imaging.  · Trace mitral valve regurgitation.  · Normal-sized IVC. Normal SVC  · Normal aorta.    Assessment & Plan  Sessile vegetation on tricuspid valve leaflet  Continue antibiotics  Cards consulted.     Septic arthritis of vertebra (CMS/HCC)  Overview  MRI Spine 12/2020  Diffuse anterior and posterior epidural enhancement within lumbar spine more  pronounced at L4-L5 raising the suspicion for underlying infectious  process/phlegmon.  Left greater than right L4-L5 facet joint signal and  enhancement which although may be degenerative in nature, early changes of  septic facet arthritis is in the differential.  Follow-up is recommended.     Thoracic and lumbar degenerative change as described more pronounced at L4-L5.     Ring-enhancing lesion in right upper lobe which may be infectious in etiology  including in the setting of septic emboli.  Correlation with CT chest with  contrast is recommended to exclude other etiologies.    Methicillin sensitive staphylococcal aureus cultures done at [facility] per ID note.    Assessment & Plan  Neurovascular intact  Pain control with oxycodone 10 mg q4 hours plus toradol as discussed with palliative care  Palliative care medicine following   Will need to wean oxycodone prior to discharge.       Essential hypertension  Assessment & Plan  Continue with home meds  bp parameters for hypotension    Septic embolism (CMS/HCC)  Assessment & Plan  Continue IV abx per ID   Blood cultures performed on admission, NGTD  Evidence of discitis on MRI, neurosurgery consulted as well as ID    Depression  Assessment & Plan  Continue home meds   Psych consulted.     * Acute bacterial endocarditis  Assessment & Plan  Suspected tricuspid valve endocarditis  Sepsis due to acute bacterial endocarditis present on admission  Continue  antibiotics  Blood cultures from [facility]       VTE Assessment: [place] VTE Score: 5  VTE Prophylaxis Plan: heparin  Code Status: Full Code  Estimated Discharge Date: 12/16/2020  Disposition Planning: rehab     Gen Lennie MD  12/15/2020

## 2020-12-15 NOTE — PATIENT CARE CONFERENCE
Care Progression Rounds Note  Date: 12/15/2020  Time: 10:29 AM     Patient Name: Carolyn Redmond     Medical Record Number: [id number]   YOB: 1985  Sex: Female      Room/Bed: 3023    Admitting Diagnosis: Septic embolism (CMS/[facility]) [I76]  Infection of lumbar spine (CMS/[facility]) [M46.26]  Acute left-sided low back pain without sciatica [M54.5]   Admit Date/Time: 12/9/2020 11:10 AM    Primary Diagnosis: Acute bacterial endocarditis  Principal Problem: Acute bacterial endocarditis    GMLOS: 4.8  Anticipated Discharge Date: 12/11/2020    AM-PAC  Mobility Score:      Discharge Planning:       Barriers to Discharge:  Barriers to Discharge: Medical issues not resolved  Comment: d/c with IV atb to psych    Participants:  , nursing, pharmacy, advanced practice provider

## 2020-12-15 NOTE — PROGRESS NOTES
MD indicated pt is stable for transfer to an inpatient program for treatment and IV antibiotics.  Call placed to admissions/[facility] and clinical faxed.  MSW left a message for admissions at [facility].      Addendum @1531: MSWCC spoke with admissions/[facility], clinical was received, however, they do not have a bed available today.  MSW was advised to call back tomorrow.  Call placed to [facility], MSW was advised that this facility is unable to accept patients on IV abx.  MSW contacted [facility] inquiring if the facility would consider a patient with known history of IV drug use and need for IV abx, [facility] stated that she would consider patient and the facility accepts [facility].  MSW met with patient updating her that [facility] does not have a bed available at this time.  MSWCC spoke with her regarding the [facility] and pt stated that she would look up the facility, however, she is not comfortable with a SNF due to concerns for COVID and interest in engaging in a treatment program.  [facility] also spoke with pt regarding [facility] and pt does not wish to consider this option.  Patient shared that she has been to [facility] for past substance use treatment and requested that MSWCC check in to see if this program can provide IV abx.  Message left for [facility], call received directing [facility] to contact [place]  or Kathleen (who is not working today) [phone].  Message left for [facility].

## 2020-12-15 NOTE — PROGRESS NOTES
Psychiatry Progress Note    Chief Complaint/Reason for follow-up: opioid dependence and mood disorder    Interval History: The patient is seen again for follow-up along with social work and medical student.  She continues to contemplate initiation of methadone maintenance treatment but fears being in withdrawal/pain as it is uptitrated.  She also continues to be curious about [facility] for discharge.  Awaiting updates from social work as to the viability of this option.  She states her mood is okay but she just doesn't feel great physically.  Much emotional support offered.  She denies any thoughts to harm/kill herself or anyone else. She states she is committed to receiving the 6 weeks of iv antibiotics being recommended by ID.    Review of Systems:   Sleep:  Fair and Appetite: Fair    Vital Signs for the last 24 hours:  Temp:  [36.4 °C (97.5 °F)-37.1 °C (98.8 °F)] 36.6 °C (97.9 °F)  Heart Rate:  [77-98] 94  Resp:  [16-18] 16  BP: (132-164)/(75-82) 142/79      Current Facility-Administered Medications:   •  acetaminophen (TYLENOL) tablet 975 mg, 975 mg, oral, q8h JAIDEN, Marizol Ortiz CRNP, 975 mg at 12/14/20 1608  •  buPROPion (WELLBUTRIN) tablet 75 mg, 75 mg, oral, TID, Perfecto Angela MD, 75 mg at 12/15/20 0901  •  ceFAZolin (ANCEF) NSS IVPB piggyback 2 g, 2 g, intravenous, q8h INT, Vikas Jones MD, Stopped at 12/15/20 0413  •  cloNIDine (CATAPRES) tablet 0.2 mg, 0.2 mg, oral, q6h PRN, Gen Hogue MD, 0.2 mg at 12/15/20 0919  •  cyclobenzaprine (FLEXERIL) tablet 10 mg, 10 mg, oral, 3x daily PRN, Marizol Ortiz CRNP, 10 mg at 12/15/20 0630  •  glucose chewable tablet 16-32 g of dextrose, 16-32 g of dextrose, oral, PRN **OR** dextrose 40 % oral gel 15-30 g of dextrose, 15-30 g of dextrose, oral, PRN **OR** glucagon (GLUCAGEN) injection 1 mg, 1 mg, intramuscular, PRN **OR** dextrose in water injection 12.5 g, 25 mL, intravenous, PRN, Perfecto Angela MD  •  fluticasone propionate (FLONASE) 50  mcg/actuation nasal spray 2 spray, 2 spray, Each Nostril, Daily PRN, Perfecto Angela MD  •  heparin (porcine) 5,000 unit/mL injection 5,000 Units, 5,000 Units, subcutaneous, q8h JAIDEN, Perfecto Angela MD, 5,000 Units at 12/11/20 1335  •  ketorolac (TORADOL) injection 15 mg, 15 mg, intravenous, q6h PRN, Marizol Ortiz CRNP, 15 mg at 12/15/20 0605  •  lidocaine (ASPERCREME) 4 % topical patch 1 patch, 1 patch, Topical, Daily, Jacki Boss CRNP  •  magnesium sulfate IVPB 2g in 50 mL NSS/D5W/SWFI, 2 g, intravenous, PRN, Perfecto Angela MD  •  metoprolol succinate XL (TOPROL-XL) 24 hr ER tablet 25 mg, 25 mg, oral, Daily, Perfecto Angela MD, 25 mg at 12/15/20 0902  •  naloxone (NARCAN) injection 0.2 mg, 0.2 mg, intravenous, Once PRN, Jacki Boss CRNP  •  nicotine polacrilex (NICORETTE) gum 4 mg, 4 mg, buccal, q4h PRN, Perfecto Angela MD, 4 mg at 12/15/20 0630  •  ondansetron (ZOFRAN) injection 4 mg, 4 mg, intravenous, q15 min PRN, Judy Rosales MD  •  oxyCODONE (ROXICODONE) immediate release tablet 10 mg, 10 mg, oral, q4h PRN, Jacki Boss CRNP, 10 mg at 12/15/20 0857  •  PARoxetine (PAXIL) tablet 30 mg, 30 mg, oral, Daily, Marlon Padron MD, 30 mg at 12/15/20 0901  •  polyethylene glycol (MIRALAX) 17 gram packet 17 g, 17 g, oral, Daily, DENISHA'Jacki Mendez CRNP, 17 g at 12/14/20 0848  •  potassium chloride (KLOR-CON) tablet extended release 20 mEq, 20 mEq, oral, Daily PRN **OR** potassium chloride (KLOR-CON) tablet extended release 40 mEq, 40 mEq, oral, Daily PRN **OR** potassium chloride 20 mEq in 100 mL IVPB  (premix), 20 mEq, intravenous, Daily PRN **OR** potassium chloride (KCL) 40 mEq/250 mL IVPB in NSS 40 mEq, 40 mEq, intravenous, Daily PRN, Perfecto nAgela MD  •  prazosin (MINIPRESS) capsule 2 mg, 2 mg, oral, Nightly, Perfecto Angela MD, 2 mg at 12/14/20 2116  •  pregabalin (LYRICA) capsule 150 mg, 150 mg, oral, BID, Perfecto Angela MD, 150 mg at 12/15/20 0858  •   QUEtiapine (SEROquel) tablet 100 mg, 100 mg, oral, Nightly, Perfecto Angela MD, 100 mg at 12/14/20 2116  •  zolpidem (AMBIEN) tablet 10 mg, 10 mg, oral, Nightly, Perfecto Angela MD, 10 mg at 12/14/20 2116    MSE:  Orientation: AAO x3  Behavior: Appropriate  Appearance: well groomed  Eye Contact: Fair throughout  Mood: “kind of all over the place”  Affect: perhaps a bit anxious/overwhelmed but mostly stable and appropriate  Speech: Coherent  Thought Process: Goal Directed and linear   Suicidal Ideation: Denies suicidal thoughts; intent or plan; denies passive death wish  Homicidal Ideation: Denies having homicidal thoughts, intent or plan  Hallucinations: Denies  Delusions: Denies  Cognition: No gross cognitive deficits  Memory: Remote; Immediate; all intact  Insight: Fair  Judgment: Fair      Assessment/Plan  Unspecified mood (affective) disorder (CMS/HCC)  Assessment & Plan  Patient with history of depression and addiction.  1. Continue paroxetine 30mg qam  2. Continue bupropion 75mg TID  3.  to please speak with patient about options for inpatient/outpatient rehab and follow-up with a psychiatrist/therapist      Tricuspid valve vegetation  Overview  JANUSZ 12/11/20  · Tricuspid valve endocarditis.  · 1 x 0.6 cm echolucent spongy sessile mass attached to the mid body of the posterior tricuspid leaflet consistent with vegetation. There is a tiny mobile component on the atrial surface of the mass. There appears to be a smaller segment of vegetation on the ventricular surface of the valve leaflet opposite the larger component. There is a tiny perforation at the edge of the vegetation 5 mm from the annulus of the leaflet.  · Borderline severe tricuspid regurgitation through the valve orifice. There is a small regurgitant jet through the perforation.  · Estimated RVSP = 48 mmHg assuming a right atrial pressure 5 mmHg  · Normal-appearing aortic, mitral, pulmonic valves. Trace mitral  regurgitation.  · Normal-sized LV. Normal LV wall thickness.  · Preserved LV systolic function. Estimated EF 60- 65%. Normal LV septal wall motion. No regional wall motion abnormalities.  · Normal RV size and function.  · Normal-sized LA. Intact atrial septum. Normal pulmonary veins  · Normal-sized RA. No PFO by saline contrast injection or color-flow imaging.  · Trace mitral valve regurgitation.  · Normal-sized IVC. Normal SVC  · Normal aorta.    Septic arthritis of vertebra (CMS/[facility])  Overview  MRI Spine 12/2020  Diffuse anterior and posterior epidural enhancement within lumbar spine more  pronounced at L4-L5 raising the suspicion for underlying infectious  process/phlegmon.  Left greater than right L4-L5 facet joint signal and  enhancement which although may be degenerative in nature, early changes of  septic facet arthritis is in the differential.  Follow-up is recommended.     Thoracic and lumbar degenerative change as described more pronounced at L4-L5.     Ring-enhancing lesion in right upper lobe which may be infectious in etiology  including in the setting of septic emboli.  Correlation with CT chest with  contrast is recommended to exclude other etiologies.    Methicillin sensitive staphylococcal aureus cultures done at [facility] per ID note.    Opioid type dependence, continuous use (CMS/[facility])  Assessment & Plan  1. Recommend MAT. patient currently contemplating  2.  to please speak with patient about options for inpatient rehab at [place] or [facility] where she can do rehab while receiving iv antibiotics.  Patient is agreeable to this.      Spent 35 minutes with patient, > 50% in consultation    Marlon Padron MD  12/15/2020

## 2020-12-15 NOTE — PROGRESS NOTES
Cardiology Daily Progress Note       Subjective    Carolyn Redmond is a 35 y.o. female who was admitted for Septic embolism (CMS/[facility]) [I76]  Infection of lumbar spine (CMS/[facility]) [M46.26]  Acute left-sided low back pain without sciatica [M54.5].              Amoxicillin, Nsaids (non-steroidal anti-inflammatory drug), Trazodone, and Tramadol  Current Facility-Administered Medications   Medication Dose Route Frequency Provider Last Rate Last Dose   • acetaminophen (TYLENOL) tablet 975 mg  975 mg oral q8h [facility] Marizol Ortiz CRNP   975 mg at 12/14/20 1608   • buPROPion (WELLBUTRIN) tablet 75 mg  75 mg oral TID Perfecto Angela MD   75 mg at 12/15/20 0901   • ceFAZolin (ANCEF) NSS IVPB piggyback 2 g  2 g intravenous q8h INT Vikas Jones MD   Stopped at 12/15/20 0413   • cloNIDine (CATAPRES) tablet 0.2 mg  0.2 mg oral q6h PRN Gen Hogue MD   0.2 mg at 12/15/20 0919   • cyclobenzaprine (FLEXERIL) tablet 10 mg  10 mg oral 3x daily PRN Marizol Ortiz CRNP   10 mg at 12/15/20 0630   • glucose chewable tablet 16-32 g of dextrose  16-32 g of dextrose oral PRN Perfecto Angela MD        Or   • dextrose 40 % oral gel 15-30 g of dextrose  15-30 g of dextrose oral PRN Perfecto Angela MD        Or   • glucagon (GLUCAGEN) injection 1 mg  1 mg intramuscular PRN Perfecto Angela MD        Or   • dextrose in water injection 12.5 g  25 mL intravenous PRN Perfecto Angela MD       • fluticasone propionate (FLONASE) 50 mcg/actuation nasal spray 2 spray  2 spray Each Nostril Daily PRN Perfecto Angela MD       • heparin (porcine) 5,000 unit/mL injection 5,000 Units  5,000 Units subcutaneous q8h [facility] Perfecto Angela MD   5,000 Units at 12/11/20 1335   • ketorolac (TORADOL) injection 15 mg  15 mg intravenous q6h PRN Marizol Ortiz CRNP   15 mg at 12/15/20 0605   • lidocaine (ASPERCREME) 4 % topical patch 1 patch  1 patch Topical Daily Jacki Boss CRNP       • magnesium sulfate IVPB 2g in 50 mL  NSS/D5W/SWFI  2 g intravenous PRN Perfecto Angela MD       • metoprolol succinate XL (TOPROL-XL) 24 hr ER tablet 25 mg  25 mg oral Daily Perfecto Angela MD   25 mg at 12/15/20 0902   • naloxone (NARCAN) injection 0.2 mg  0.2 mg intravenous Once PRN Jacki Boss CRNP       • nicotine polacrilex (NICORETTE) gum 4 mg  4 mg buccal q4h PRN Perfecto Angela MD   4 mg at 12/15/20 0630   • ondansetron (ZOFRAN) injection 4 mg  4 mg intravenous q15 min PRN Judy Rosales MD       • oxyCODONE (ROXICODONE) immediate release tablet 10 mg  10 mg oral q4h PRN Jacki Boss CRNP   10 mg at 12/15/20 0857   • PARoxetine (PAXIL) tablet 30 mg  30 mg oral Daily Marlon Padron MD   30 mg at 12/15/20 0901   • polyethylene glycol (MIRALAX) 17 gram packet 17 g  17 g oral Daily Jacki Boss CRNP   17 g at 12/14/20 0848   • potassium chloride (KLOR-CON) tablet extended release 20 mEq  20 mEq oral Daily PRN Perfecto Angela MD        Or   • potassium chloride (KLOR-CON) tablet extended release 40 mEq  40 mEq oral Daily PRN Perfecto Angela MD        Or   • potassium chloride 20 mEq in 100 mL IVPB  (premix)  20 mEq intravenous Daily PRN Perfecto Angela MD        Or   • potassium chloride (KCL) 40 mEq/250 mL IVPB in NSS 40 mEq  40 mEq intravenous Daily PRN Perfecto Angela MD       • prazosin (MINIPRESS) capsule 2 mg  2 mg oral Nightly Perfecto Angela MD   2 mg at 12/14/20 2116   • pregabalin (LYRICA) capsule 150 mg  150 mg oral BID Perfecto Angela MD   150 mg at 12/15/20 0858   • QUEtiapine (SEROquel) tablet 100 mg  100 mg oral Nightly Perfecto Angela MD   100 mg at 12/14/20 2116   • zolpidem (AMBIEN) tablet 10 mg  10 mg oral Nightly Perfecto Angela MD   10 mg at 12/14/20 2116       Interval History: complains of Back pain.     Review of Systems   Constitution: Positive for night sweats. Negative for diaphoresis, fever, malaise/fatigue, weight gain and weight loss.   HENT: Negative for odynophagia.     Eyes: Negative for blurred vision, double vision and visual disturbance.   Cardiovascular: Negative for chest pain, claudication, dyspnea on exertion, irregular heartbeat, leg swelling, near-syncope, orthopnea, palpitations, paroxysmal nocturnal dyspnea and syncope.   Respiratory: Negative for cough, hemoptysis, shortness of breath, sleep disturbances due to breathing, snoring, sputum production and wheezing.    Endocrine: Negative for polyuria.   Hematologic/Lymphatic: Negative for bleeding problem. Does not bruise/bleed easily.   Skin: Negative for rash.   Musculoskeletal: Negative for falls, joint pain, joint swelling, muscle cramps, muscle weakness and myalgias.   Gastrointestinal: Negative for abdominal pain, diarrhea, hematemesis, hematochezia, melena, nausea and vomiting.   Genitourinary: Negative for frequency, hematuria and nocturia.   Neurological: Negative for brief paralysis, dizziness, focal weakness, headaches, light-headedness, loss of balance and weakness.   Psychiatric/Behavioral: Negative for altered mental status and depression. The patient is not nervous/anxious.        Objective   Vital signs in last 24 hours:  Temp:  [36.4 °C (97.5 °F)-37.1 °C (98.8 °F)] 36.6 °C (97.9 °F)  Heart Rate:  [77-98] 94  Resp:  [16-18] 16  BP: (132-164)/(75-82) 142/79    No intake or output data in the 24 hours ending 12/15/20 1002  Intake/Output this shift:  No intake/output data recorded.    Physical Exam   Constitutional: She is oriented to person, place, and time. She appears well-developed. No distress.   HENT:   Head: Normocephalic.   Eyes: Pupils are equal, round, and reactive to light. Conjunctivae are normal.   Neck: Neck supple. No JVD present. No tracheal deviation present. No thyromegaly present.   Cardiovascular: Normal rate, regular rhythm and normal heart sounds. Exam reveals no gallop and no friction rub.   No murmur heard.  Pulmonary/Chest: Breath sounds normal. No stridor. No respiratory distress.  She has no wheezes. She has no rales. She exhibits no tenderness.   Abdominal: Bowel sounds are normal. She exhibits no distension and no mass. There is no abdominal tenderness. There is no rebound and no guarding.   Musculoskeletal: Normal range of motion.         General: No tenderness, deformity or edema.   Neurological: She is alert and oriented to person, place, and time. No cranial nerve deficit.   Skin: Skin is warm and dry. No rash noted. She is not diaphoretic. No erythema. No pallor.   Psychiatric: She has a normal mood and affect. Her behavior is normal. Judgment normal.           Labs   Lab Results   Component Value Date    WBC 8.31 12/12/2020    HGB 10.1 (L) 12/12/2020    HCT 31.9 (L) 12/12/2020     12/12/2020    ALT 11 12/09/2020    AST 14 (L) 12/09/2020     12/12/2020    K 4.5 12/12/2020     12/12/2020    CREATININE 0.7 12/12/2020    BUN 6 (L) 12/12/2020    CO2 21 (L) 12/12/2020    INR 0.9 04/06/2019       Imaging  Significant findings include: Chest CT 1214 reviewed by me personally-normal heart size, multiple nodular densities in the lungs with tiny cavitations.  Septic pulmonary emboli suspected.      Telemetry  Sinus rhythm      Assessment & Plan    Tricuspid valve vegetation  Overview  JANUSZ 12/11/20  · Tricuspid valve endocarditis.  · 1 x 0.6 cm echolucent spongy sessile mass attached to the mid body of the posterior tricuspid leaflet consistent with vegetation. There is a tiny mobile component on the atrial surface of the mass. There appears to be a smaller segment of vegetation on the ventricular surface of the valve leaflet opposite the larger component. There is a tiny perforation at the edge of the vegetation 5 mm from the annulus of the leaflet.  · Borderline severe tricuspid regurgitation through the valve orifice. There is a small regurgitant jet through the perforation.  · Estimated RVSP = 48 mmHg assuming a right atrial pressure 5 mmHg  · Normal-appearing aortic,  mitral, pulmonic valves. Trace mitral regurgitation.  · Normal-sized LV. Normal LV wall thickness.  · Preserved LV systolic function. Estimated EF 60- 65%. Normal LV septal wall motion. No regional wall motion abnormalities.  · Normal RV size and function.  · Normal-sized LA. Intact atrial septum. Normal pulmonary veins  · Normal-sized RA. No PFO by saline contrast injection or color-flow imaging.  · Trace mitral valve regurgitation.  · Normal-sized IVC. Normal SVC  · Normal aorta.    Assessment & Plan  -Evidence of tricuspid valve endocarditis as outlined in detailed report of JANUSZ above.  Today she is hemodynamically stable  -ID on board and aware  -Continue ABX recs per ID  -Need to monitor closely as outpatient as at some point she could require surgical intervention.      Septic arthritis of vertebra (CMS/HCC)  Overview  MRI Spine 12/2020  Diffuse anterior and posterior epidural enhancement within lumbar spine more  pronounced at L4-L5 raising the suspicion for underlying infectious  process/phlegmon.  Left greater than right L4-L5 facet joint signal and  enhancement which although may be degenerative in nature, early changes of  septic facet arthritis is in the differential.  Follow-up is recommended.     Thoracic and lumbar degenerative change as described more pronounced at L4-L5.     Ring-enhancing lesion in right upper lobe which may be infectious in etiology  including in the setting of septic emboli.  Correlation with CT chest with  contrast is recommended to exclude other etiologies.    Methicillin sensitive staphylococcal aureus cultures done at [facility] per ID note.    Assessment & Plan  -Methicillin sensitive staph bacteremia with tricuspid, cultures at [facility], endocarditis  -Continue tx per ID    Essential hypertension  Assessment & Plan  Overall stable.    Septic embolism (CMS/HCC)  Assessment & Plan  -Pulmonary emboli noted on CT scan.  -JANUSZ demonstrating tricuspid  endocarditis  -See endocarditis section for details.    She does have pleuritic pain at times with pleuritic pain nonsteroidals can be helpful.  She has some GI intolerance in the past with NSAIDs.  Perhaps using Celebrex which has less, but I discussed not no, GI side effects is an option for oral treatment until the pulmonary infarct pain subsides.    * Acute bacterial endocarditis  Assessment & Plan  -MSSA positive BC  -Septic emboli to lungs in addition to lumbar spine infection.  This may be the etiology of her back pain etc. which is pleuritic.  Of note she is quite upset as treating pain in someone with an opioid issue is obviously a very difficult problem.  Agree with psychiatry assistance with medical management and she wants to go back on Suboxone but on the other hand, understandably not unreasonable, also wants relief from her pain.  -Secondary to tricuspid endocarditis.  I discussed treatment, intravenous antibiotics, close clinical follow-up and periodic echocardiograms.  Recommended she return in 1-2 weeks for cardiac evaluation after discharge.  This was stressed.  -Continue tx per ID.  Stable on exam.        Stable endocarditis with septic pulmonary emboli in lumbar spine septic arthritis and phlegmon.  To new antibiotic therapy.    D/C tele       Yanick Eldridge DO FAC, FACOI  12/15/2020  10:02 AM     This patient note has been dictated using speech recognition software. Inadvertent speech recognition errors should be disregarded. Please do not hesitate to call my office for clarifications.

## 2020-12-15 NOTE — PROGRESS NOTES
Major Events:  none    Subjective:  insomnia    Temp:  [36.4 °C (97.5 °F)-37.1 °C (98.8 °F)] 36.4 °C (97.6 °F)  Heart Rate:  [77-98] 83  Resp:  [16-18] 18  BP: (111-164)/(65-82) 111/65     SpO2 Readings from Last 3 Encounters:   12/15/20 99%   08/05/20 94%   07/10/20 97%     Anti-infectives (From admission, onward)    Start     Dose/Rate Route Frequency Ordered Stop    12/11/20 1900  ceFAZolin (ANCEF) NSS IVPB piggyback 2 g      2 g  100 mL/hr over 30 Minutes intravenous Every 8 hours interval 12/11/20 1823          Physical Exam:  Skin: No rash  Sclera: Anicteric  Lungs: clr  CV: S1, S2 nl, soft m, no embolic changes  Abd: Soft, nt, no hsm  Extr: No jt eff, no edema  Neuro: Alert, lucid    Lab Results   Component Value Date    WBC 8.31 12/12/2020    HGB 10.1 (L) 12/12/2020    HCT 31.9 (L) 12/12/2020    MCV 84.8 12/12/2020     12/12/2020     Lab Results   Component Value Date    GLUCOSE 88 12/12/2020    CALCIUM 9.2 12/12/2020     12/12/2020    K 4.5 12/12/2020    CO2 21 (L) 12/12/2020     12/12/2020    BUN 6 (L) 12/12/2020    CREATININE 0.7 12/12/2020     Lab Results   Component Value Date    ALBUMIN 3.0 (L) 12/09/2020    BILITOT 0.4 12/09/2020    ALKPHOS 85 12/09/2020    BILIDIR <0.1 04/12/2017    AST 14 (L) 12/09/2020    ALT 11 12/09/2020    PROTEIN 7.2 12/09/2020     Microbiology Results     Procedure Component Value Units Date/Time    Blood Culture Blood, Venous [[id number]] Collected: 12/09/20 1348    Specimen: Blood, Venous Updated: 12/11/20 1801     Culture No growth at 48 hours    Blood Culture Blood, Venous [[id number]] Collected: 12/09/20 1304    Specimen: Blood, Venous Updated: 12/11/20 1501     Culture No growth at 48 hours      Impression    MSSA TV IE c pul emboli  Also c lumbar infection  BC here neg so far  Tolerating cefazolin well  Now day 8/42  CRP was 184 now 26    YS MD Karen  Pager [phone]

## 2020-12-16 LAB — BACTERIA BLD CULT: NORMAL

## 2020-12-16 PROCEDURE — 99232 SBSQ HOSP IP/OBS MODERATE 35: CPT | Performed by: INTERNAL MEDICINE

## 2020-12-16 PROCEDURE — 25800000 HC PHARMACY IV SOLUTIONS: Performed by: INTERNAL MEDICINE

## 2020-12-16 PROCEDURE — 25000000 HC PHARMACY GENERAL: Performed by: INTERNAL MEDICINE

## 2020-12-16 PROCEDURE — 63700000 HC SELF-ADMINISTRABLE DRUG: Performed by: INTERNAL MEDICINE

## 2020-12-16 PROCEDURE — 63600000 HC DRUGS/DETAIL CODE: Performed by: INTERNAL MEDICINE

## 2020-12-16 PROCEDURE — 99233 SBSQ HOSP IP/OBS HIGH 50: CPT | Performed by: PSYCHIATRY & NEUROLOGY

## 2020-12-16 PROCEDURE — 99231 SBSQ HOSP IP/OBS SF/LOW 25: CPT | Performed by: INTERNAL MEDICINE

## 2020-12-16 PROCEDURE — 20600000 HC ROOM AND CARE INTERMEDIATE/TELEMETRY

## 2020-12-16 PROCEDURE — 63700000 HC SELF-ADMINISTRABLE DRUG: Performed by: HOSPITALIST

## 2020-12-16 PROCEDURE — 63700000 HC SELF-ADMINISTRABLE DRUG: Performed by: PSYCHIATRY & NEUROLOGY

## 2020-12-16 PROCEDURE — 63700000 HC SELF-ADMINISTRABLE DRUG: Performed by: NURSE PRACTITIONER

## 2020-12-16 PROCEDURE — 63600000 HC DRUGS/DETAIL CODE: Performed by: NURSE PRACTITIONER

## 2020-12-16 RX ORDER — METHADONE HYDROCHLORIDE 10 MG/1
20 TABLET ORAL
Status: DISCONTINUED | OUTPATIENT
Start: 2020-12-16 | End: 2020-12-17

## 2020-12-16 RX ADMIN — OXYCODONE HYDROCHLORIDE 10 MG: 5 TABLET ORAL at 01:03

## 2020-12-16 RX ADMIN — CEFAZOLIN SODIUM 2 G: 10 POWDER, FOR SOLUTION INTRAVENOUS at 10:56

## 2020-12-16 RX ADMIN — ZOLPIDEM TARTRATE 10 MG: 5 TABLET, COATED ORAL at 21:05

## 2020-12-16 RX ADMIN — QUETIAPINE 100 MG: 100 TABLET, FILM COATED ORAL at 21:05

## 2020-12-16 RX ADMIN — PREGABALIN 150 MG: 75 CAPSULE ORAL at 20:19

## 2020-12-16 RX ADMIN — CEFAZOLIN SODIUM 2 G: 10 POWDER, FOR SOLUTION INTRAVENOUS at 03:24

## 2020-12-16 RX ADMIN — PRAZOSIN HYDROCHLORIDE 2 MG: 1 CAPSULE ORAL at 21:04

## 2020-12-16 RX ADMIN — CLONIDINE HYDROCHLORIDE 0.2 MG: 0.1 TABLET ORAL at 08:28

## 2020-12-16 RX ADMIN — CEFAZOLIN SODIUM 2 G: 10 POWDER, FOR SOLUTION INTRAVENOUS at 20:19

## 2020-12-16 RX ADMIN — ACETAMINOPHEN 975 MG: 325 TABLET, FILM COATED ORAL at 05:08

## 2020-12-16 RX ADMIN — BUPROPION HYDROCHLORIDE 75 MG: 75 TABLET, FILM COATED ORAL at 16:00

## 2020-12-16 RX ADMIN — BUPROPION HYDROCHLORIDE 75 MG: 75 TABLET, FILM COATED ORAL at 11:00

## 2020-12-16 RX ADMIN — BUPROPION HYDROCHLORIDE 75 MG: 75 TABLET, FILM COATED ORAL at 08:23

## 2020-12-16 RX ADMIN — OXYCODONE HYDROCHLORIDE 10 MG: 5 TABLET ORAL at 05:08

## 2020-12-16 RX ADMIN — KETOROLAC TROMETHAMINE 15 MG: 15 INJECTION, SOLUTION INTRAMUSCULAR; INTRAVENOUS at 08:29

## 2020-12-16 RX ADMIN — METOPROLOL SUCCINATE 25 MG: 25 TABLET, EXTENDED RELEASE ORAL at 08:23

## 2020-12-16 RX ADMIN — PREGABALIN 150 MG: 75 CAPSULE ORAL at 08:24

## 2020-12-16 RX ADMIN — OXYCODONE HYDROCHLORIDE 10 MG: 5 TABLET ORAL at 09:12

## 2020-12-16 RX ADMIN — NICOTINE POLACRILEX 4 MG: 4 GUM, CHEWING ORAL at 08:29

## 2020-12-16 RX ADMIN — PAROXETINE 30 MG: 20 TABLET, FILM COATED ORAL at 08:24

## 2020-12-16 RX ADMIN — METHADONE HYDROCHLORIDE 20 MG: 10 TABLET ORAL at 13:00

## 2020-12-16 ASSESSMENT — ENCOUNTER SYMPTOMS
SNORING: 0
DOUBLE VISION: 0
IRREGULAR HEARTBEAT: 0
MYALGIAS: 0
LOSS OF BALANCE: 0
ODYNOPHAGIA: 0
BLURRED VISION: 0
NERVOUS/ANXIOUS: 0
ABDOMINAL PAIN: 0
NEAR-SYNCOPE: 0
ORTHOPNEA: 0
HEADACHES: 0
WEIGHT LOSS: 0
SHORTNESS OF BREATH: 0
SLEEP DISTURBANCES DUE TO BREATHING: 0
FREQUENCY: 0
WEIGHT GAIN: 0
PALPITATIONS: 0
HEMATEMESIS: 0
HEMOPTYSIS: 0
NAUSEA: 0
WEAKNESS: 0
FALLS: 0
DIZZINESS: 0
COUGH: 0
DIARRHEA: 0
BRIEF PARALYSIS: 0
ALTERED MENTAL STATUS: 0
DYSPNEA ON EXERTION: 0
SPUTUM PRODUCTION: 0
HEMATOCHEZIA: 0
CLAUDICATION: 0
DEPRESSION: 0
BACK PAIN: 1
VOMITING: 0
MUSCLE CRAMPS: 0
NIGHT SWEATS: 0
LIGHT-HEADEDNESS: 0
WHEEZING: 0
FOCAL WEAKNESS: 0
FEVER: 0
DIAPHORESIS: 0
HEMATURIA: 0
JOINT SWELLING: 0
SYNCOPE: 0
BRUISES/BLEEDS EASILY: 0
PND: 0

## 2020-12-16 NOTE — PATIENT CARE CONFERENCE
Care Progression Rounds Note  Date: 12/16/2020  Time: 10:43 AM     Patient Name: Carolyn Redmond     Medical Record Number: [id number]   YOB: 1985  Sex: Female      Room/Bed: 3023    Admitting Diagnosis: Septic embolism (CMS/[facility]) [I76]  Infection of lumbar spine (CMS/[facility]) [M46.26]  Acute left-sided low back pain without sciatica [M54.5]   Admit Date/Time: 12/9/2020 11:10 AM    Primary Diagnosis: Acute bacterial endocarditis  Principal Problem: Acute bacterial endocarditis    GMLOS: 4.8  Anticipated Discharge Date: 12/11/2020    AM-PAC  Mobility Score:      Discharge Planning:       Barriers to Discharge:  Barriers to Discharge: Facility availability  Comment: needs OP IV ATB at psych    Participants:  , nursing, social work/services

## 2020-12-16 NOTE — PROGRESS NOTES
Cardiology Daily Progress Note       Subjective    Carolyn Redmond is a 35 y.o. female who was admitted for Septic embolism (CMS/[facility]) [I76]  Infection of lumbar spine (CMS/[facility]) [M46.26]  Acute left-sided low back pain without sciatica [M54.5].          @Summary@      Amoxicillin, Nsaids (non-steroidal anti-inflammatory drug), Trazodone, and Tramadol  Current Facility-Administered Medications   Medication Dose Route Frequency Provider Last Rate Last Dose   • acetaminophen (TYLENOL) tablet 975 mg  975 mg oral q8h JAIDEN Marizol Ortiz CRNP   975 mg at 12/16/20 0508   • buPROPion (WELLBUTRIN) tablet 75 mg  75 mg oral TID Marlon Padron MD   75 mg at 12/16/20 1100   • ceFAZolin (ANCEF) NSS IVPB piggyback 2 g  2 g intravenous q8h INT Vikas Jones  mL/hr at 12/16/20 1056 2 g at 12/16/20 1056   • cyclobenzaprine (FLEXERIL) tablet 10 mg  10 mg oral 3x daily PRN Marizol Ortiz CRNP   10 mg at 12/15/20 0630   • glucose chewable tablet 16-32 g of dextrose  16-32 g of dextrose oral PRN Perfecto Angela MD        Or   • dextrose 40 % oral gel 15-30 g of dextrose  15-30 g of dextrose oral PRN Perfecto Angela MD        Or   • glucagon (GLUCAGEN) injection 1 mg  1 mg intramuscular PRN Perfecto Angela MD        Or   • dextrose in water injection 12.5 g  25 mL intravenous PRN Perfecto Angela MD       • fluticasone propionate (FLONASE) 50 mcg/actuation nasal spray 2 spray  2 spray Each Nostril Daily PRN Perfecto Angela MD       • heparin (porcine) 5,000 unit/mL injection 5,000 Units  5,000 Units subcutaneous q8h [facility] Perfecto Angela MD   5,000 Units at 12/11/20 1335   • ketorolac (TORADOL) injection 15 mg  15 mg intravenous q6h PRN Marizol Ortiz CRNP   15 mg at 12/16/20 0829   • lidocaine (ASPERCREME) 4 % topical patch 1 patch  1 patch Topical Daily Jacki Boss CRNP       • magnesium sulfate IVPB 2g in 50 mL NSS/D5W/SWFI  2 g intravenous PRN Perfecto Angela MD       •  metoprolol succinate XL (TOPROL-XL) 24 hr ER tablet 25 mg  25 mg oral Daily Perfecto Angela MD   25 mg at 12/16/20 0823   • naloxone (NARCAN) injection 0.2 mg  0.2 mg intravenous Once PRN Jacki Boss CRNP       • nicotine polacrilex (NICORETTE) gum 4 mg  4 mg buccal q4h PRN Perfecto Angela MD   4 mg at 12/16/20 0829   • ondansetron (ZOFRAN) injection 4 mg  4 mg intravenous q15 min PRN Judy Rosales MD   4 mg at 12/15/20 1410   • PARoxetine (PAXIL) tablet 30 mg  30 mg oral Daily Marlon Padron MD   30 mg at 12/16/20 0824   • polyethylene glycol (MIRALAX) 17 gram packet 17 g  17 g oral Daily Jacki Boss CRNP   17 g at 12/14/20 0848   • potassium chloride (KLOR-CON) tablet extended release 20 mEq  20 mEq oral Daily PRN Perfecto Angela MD        Or   • potassium chloride (KLOR-CON) tablet extended release 40 mEq  40 mEq oral Daily PRN Perfecto Angela MD        Or   • potassium chloride 20 mEq in 100 mL IVPB  (premix)  20 mEq intravenous Daily PRN Perfecto Angela MD        Or   • potassium chloride (KCL) 40 mEq/250 mL IVPB in NSS 40 mEq  40 mEq intravenous Daily PRN Perfecto Angela MD       • prazosin (MINIPRESS) capsule 2 mg  2 mg oral Nightly Perfecto Angela MD   2 mg at 12/14/20 2116   • pregabalin (LYRICA) capsule 150 mg  150 mg oral BID Perfecto Angela MD   150 mg at 12/16/20 0824   • QUEtiapine (SEROquel) tablet 100 mg  100 mg oral Nightly Perfecto Angela MD   100 mg at 12/15/20 2103   • zolpidem (AMBIEN) tablet 10 mg  10 mg oral Nightly Perfecto Angela MD   10 mg at 12/15/20 2103       Interval History: complains of Shaking chills but otherwise no fevers.  No chest pain or pressure or dyspnea.  No coughing wheezing..     Review of Systems   Constitution: Negative for diaphoresis, fever, malaise/fatigue, night sweats, weight gain and weight loss.   HENT: Negative for odynophagia.    Eyes: Negative for blurred vision, double vision and visual disturbance.    Cardiovascular: Negative for chest pain, claudication, dyspnea on exertion, irregular heartbeat, leg swelling, near-syncope, orthopnea, palpitations, paroxysmal nocturnal dyspnea and syncope.   Respiratory: Negative for cough, hemoptysis, shortness of breath, sleep disturbances due to breathing, snoring, sputum production and wheezing.    Endocrine: Negative for polyuria.   Hematologic/Lymphatic: Negative for bleeding problem. Does not bruise/bleed easily.   Skin: Negative for rash.   Musculoskeletal: Positive for back pain. Negative for falls, joint pain, joint swelling, muscle cramps, muscle weakness and myalgias.   Gastrointestinal: Negative for abdominal pain, diarrhea, hematemesis, hematochezia, melena, nausea and vomiting.   Genitourinary: Negative for frequency, hematuria and nocturia.   Neurological: Negative for brief paralysis, dizziness, focal weakness, headaches, light-headedness, loss of balance and weakness.   Psychiatric/Behavioral: Negative for altered mental status and depression. The patient is not nervous/anxious.        Objective   Vital signs in last 24 hours:  Temp:  [36.1 °C (97 °F)-36.8 °C (98.2 °F)] 36.4 °C (97.5 °F)  Heart Rate:  [62-86] 85  Resp:  [16-18] 18  BP: (105-124)/(55-81) 124/77      Intake/Output Summary (Last 24 hours) at 12/16/2020 1123  Last data filed at 12/15/2020 2102  Gross per 24 hour   Intake 120 ml   Output --   Net 120 ml     Intake/Output this shift:  No intake/output data recorded.    Physical Exam   Constitutional: She is oriented to person, place, and time. She appears well-developed. No distress.   HENT:   Head: Normocephalic.   Eyes: Pupils are equal, round, and reactive to light. Conjunctivae are normal.   Neck: Neck supple. No JVD present. No tracheal deviation present. No thyromegaly present.   Cardiovascular: Normal rate, regular rhythm and normal heart sounds. Exam reveals no gallop and no friction rub.   No murmur heard.  Pulmonary/Chest: Breath sounds  normal. No stridor. No respiratory distress. She has no wheezes. She has no rales. She exhibits no tenderness.   Abdominal: Bowel sounds are normal. She exhibits no distension and no mass. There is no abdominal tenderness. There is no rebound and no guarding.   Musculoskeletal: Normal range of motion.         General: No tenderness, deformity or edema.   Neurological: She is alert and oriented to person, place, and time. No cranial nerve deficit.   Skin: Skin is warm and dry. No rash noted. She is not diaphoretic. No erythema. No pallor.   Psychiatric: She has a normal mood and affect. Her behavior is normal. Judgment normal.           Labs   Lab Results   Component Value Date    WBC 8.31 12/12/2020    HGB 10.1 (L) 12/12/2020    HCT 31.9 (L) 12/12/2020     12/12/2020    ALT 11 12/09/2020    AST 14 (L) 12/09/2020     12/12/2020    K 4.5 12/12/2020     12/12/2020    CREATININE 0.7 12/12/2020    BUN 6 (L) 12/12/2020    CO2 21 (L) 12/12/2020    INR 0.9 04/06/2019       Imaging  Not applicable      Telemetry  n/a      Assessment & Plan    Tricuspid valve vegetation  Overview  JANUSZ 12/11/20  · Tricuspid valve endocarditis.  · 1 x 0.6 cm echolucent spongy sessile mass attached to the mid body of the posterior tricuspid leaflet consistent with vegetation. There is a tiny mobile component on the atrial surface of the mass. There appears to be a smaller segment of vegetation on the ventricular surface of the valve leaflet opposite the larger component. There is a tiny perforation at the edge of the vegetation 5 mm from the annulus of the leaflet.  · Borderline severe tricuspid regurgitation through the valve orifice. There is a small regurgitant jet through the perforation.  · Estimated RVSP = 48 mmHg assuming a right atrial pressure 5 mmHg  · Normal-appearing aortic, mitral, pulmonic valves. Trace mitral regurgitation.  · Normal-sized LV. Normal LV wall thickness.  · Preserved LV systolic function.  Estimated EF 60- 65%. Normal LV septal wall motion. No regional wall motion abnormalities.  · Normal RV size and function.  · Normal-sized LA. Intact atrial septum. Normal pulmonary veins  · Normal-sized RA. No PFO by saline contrast injection or color-flow imaging.  · Trace mitral valve regurgitation.  · Normal-sized IVC. Normal SVC  · Normal aorta.    Assessment & Plan  -Evidence of tricuspid valve endocarditis as outlined in detailed report of JANUSZ above.  Today she is hemodynamically stable  -ID on board and aware  -Continue ABX recs per ID  -Need to monitor closely as outpatient as at some point she could require surgical intervention.      Septic arthritis of vertebra (CMS/HCC)  Overview  MRI Spine 12/2020  Diffuse anterior and posterior epidural enhancement within lumbar spine more  pronounced at L4-L5 raising the suspicion for underlying infectious  process/phlegmon.  Left greater than right L4-L5 facet joint signal and  enhancement which although may be degenerative in nature, early changes of  septic facet arthritis is in the differential.  Follow-up is recommended.     Thoracic and lumbar degenerative change as described more pronounced at L4-L5.     Ring-enhancing lesion in right upper lobe which may be infectious in etiology  including in the setting of septic emboli.  Correlation with CT chest with  contrast is recommended to exclude other etiologies.    Methicillin sensitive staphylococcal aureus cultures done at [facility] per ID note.    Assessment & Plan  -Methicillin sensitive staph bacteremia with tricuspid, cultures at [facility], endocarditis  -Continue tx per ID    Essential hypertension  Assessment & Plan  Overall stable.    Septic embolism (CMS/HCC)  Assessment & Plan  -Pulmonary emboli noted on CT scan.  -JANUSZ demonstrating tricuspid endocarditis  -See endocarditis section for details.    She does have pleuritic pain at times with pleuritic pain nonsteroidals can be helpful.   She has some GI intolerance in the past with NSAIDs.  Perhaps using Celebrex which has less, but I discussed not no, GI side effects is an option for oral treatment until the pulmonary infarct pain subsides.    * Acute bacterial endocarditis  Assessment & Plan  -MSSA positive BC  -Septic emboli to lungs in addition to lumbar spine infection.  This may be the etiology of her back pain etc. which is pleuritic.  Of note she is quite upset as treating pain in someone with an opioid issue is obviously a very difficult problem.  Agree with psychiatry assistance with medical management and she wants to go back on Suboxone but on the other hand, understandably not unreasonable, also wants relief from her pain.  -Secondary to tricuspid endocarditis.  I discussed treatment, intravenous antibiotics, close clinical follow-up and periodic echocardiograms.  Recommended she return in 1-2 weeks for cardiac evaluation after discharge.  This was stressed.  -Continue tx per ID.  Stable on exam.          Stable for discharge.  To continue antibiotics and skilled nursing center acceptable.  Follow-up with me in about 6 weeks in the office.  Echo in about 8 to 10 weeks         Yanick Eldridge, DO FACC, FACOI  12/16/2020  11:23 AM     This patient note has been dictated using speech recognition software. Inadvertent speech recognition errors should be disregarded. Please do not hesitate to call my office for clarifications.

## 2020-12-16 NOTE — CONSULTS
Nutrition Status Classification: Mild nutritional compromise     Clinical Course: Patient is a 35 y.o. female who was admitted on 12/9/2020 with a diagnosis of Septic embolism (CMS/[facility]) [I76]  Infection of lumbar spine (CMS/[facility]) [M46.26]  Acute left-sided low back pain without sciatica [M54.5].   Past Medical History:   Diagnosis Date   • Anxiety    • Depressed    • Endocarditis    • Fibromyalgia    • Migraines    • Pancreatitis    • Tachycardia    • Vasculitis (CMS/HCC)      Past Surgical History:   Procedure Laterality Date   • CHOLECYSTECTOMY     • ERCP     • GALLBLADDER SURGERY         Nutrition Interventions/Recommendations:   1. Continue current diet and encourage intake  2. Monitor % meal intake and tolerance       - discussed appropriate choices for better tolerance  3. Will add Ensure TID (220kcal, 9g protein) d/t poor intake  4. Monitor and treat labs/lytes, replete prn  5. Monitor meds  6. Monitor GI symptoms, diarrhea  7. Diet education attached to pt's chart for reference on d/c         Dietary Orders   (From admission, onward)             Start     Ordered    12/11/20 1201  Adult Diet Regular; RD/LDN may adjust order  Diet effective now     Question Answer Comment   Diet Texture Regular    Delegation of Authority. Diet orders written by PA/CRNPs may not be adjusted by RD/LDNs. RD/LDN may adjust order        12/11/20 1201                Reason for Assessment  Reason For Assessment: identified at risk by screening criteria(LOS x 8 days)  Diagnosis: other (see comments)(bacterial endocarditis)    [facility] Nutrition Screen Tool  Has patient lost weight without trying?: 0-->No  If yes,how much weight has been lost?: 0-->Patient has not lost weight  Has patient been eating poorly due to decreased appetite?: 0-->No  [facility] Nutrition Screen Score: 0    Nutrition/Diet History  Typical Food/Fluid Intake: (small meals and snacks)  Diet Prior to Admission: (regular (low fat, low fiber))  Intake (%): 75%  Food  "Allergies: other (see comments)(NKFA)  Factors Affecting Nutritional Intake: anorexia, nausea, pain    Physical Findings  Overall Physical Appearance: obese  Gastrointestinal: other (see comments), nausea, diarrhea(pain)  Last Bowel Movement: 12/15/20  Skin: other (see comments)(no active wound)    Last Bowel Movement: 12/15/20    Nutrition Order  Nutrition Order Review: meets nutritional requirements  Nutrition Order Comments: (regular)    Anthropometrics  Height: 157.5 cm (5' 2\")           Current Weight  Weight Method: Bed scale  Weight: 107 kg (235 lb 12.8 oz)    Ideal Body Weight (IBW)  Ideal Body Weight (IBW) (kg): 50.43  % Ideal Body Weight: 211.36         Body Mass Index (BMI)  BMI (Calculated): 43.1  BMI (kg/m2): 43.07  BMI Assessment: BMI 40 or greater: obesity grade III                        Labs/Procedures/Meds  Lab Results Reviewed: reviewed      Results from last 7 days   Lab Units 12/12/20  1048 12/11/20  0444 12/10/20  0948   SODIUM mEQ/L 138 140 138   POTASSIUM mEQ/L 4.5 3.9 4.0   CHLORIDE mEQ/L 102 105 103   CO2 mEQ/L 21* 23 25   BUN mg/dL 6* 7* 8   CREATININE mg/dL 0.7 0.6 0.8   GLUCOSE mg/dL 88 95 112*   CALCIUM mg/dL 9.2 9.0 8.6*      Results from last 7 days   Lab Units 12/09/20  1348   ALK PHOS IU/L 85   BILIRUBIN TOTAL mg/dL 0.4   ALBUMIN g/dL 3.0*   ALT IU/L 11   AST IU/L 14*          No results found for: HGBA1C  Lab Results   Component Value Date    [id number] 346 04/18/2017     Lab Results   Component Value Date    CALCIUM 9.2 12/12/2020    PHOS 3.2 03/25/2017     Results from last 7 days   Lab Units 12/12/20  1048 12/11/20  0444 12/10/20  0948   WBC K/uL 8.31 7.97 7.05   HEMOGLOBIN g/dL 10.1* 8.5* 9.2*   HEMATOCRIT % 31.9* 26.5* 28.7*   PLATELETS K/uL 276 186 186               Results from last 7 days   Lab Units 12/10/20  0948   MAGNESIUM mg/dL 2.3               Medications  Pertinent Medications Reviewed: reviewed  • acetaminophen  975 mg oral q8h JAIDEN   • buPROPion  75 mg oral TID   • " ceFAZolin  2 g intravenous q8h INT   • heparin (porcine)  5,000 Units subcutaneous q8h JAIDEN   • lidocaine  1 patch Topical Daily   • metoprolol succinate XL  25 mg oral Daily   • PARoxetine  30 mg oral Daily   • polyethylene glycol  17 g oral Daily   • prazosin  2 mg oral Nightly   • pregabalin  150 mg oral BID   • QUEtiapine  100 mg oral Nightly   • zolpidem  10 mg oral Nightly                              Weights (last 7 days)     Date/Time   Weight    12/09/20 2313   107 kg (235 lb 12.8 oz)    12/09/20 1120   107 kg (235 lb)                Clinical Comments:  Pt seen for LOS approaching 8 days. Admitted with back pain. PMH includes anxiety disorder with suicide attempt June 2017, chronic abdominal pain, depression and IV drug abuse. Now with acute bacterial endocarditis and septic arthritis of vertebra. She is being followed by psych and social work, plan for inpatient treatment program on d/c. Pt visited, she is only able to pick at her meals and says she has diarrhea that started this morning. She has a history of pancreatitis and says her ongoing GI issues are a result. We discussed nutrition supplements while her intake remains sub-optimal and pt very much in agreement. Will order. Also discussed diet going forward. Nutrition will continue to follow.             Date: 12/16/20  Signature: JERALD Shirley

## 2020-12-16 NOTE — PROGRESS NOTES
Call placed to [facility] Admissions Department, [facility] was advised that the facility does not have a Level 4 bed available today. [facility] contacted Kathleen/[facility]  who reports this program does not accept [facility].  She states [facility] may contact [employer] to see if they will authorize an out of [facility] network agreement.  She requested that [facility] fax clinical to her attention at fax # [phone] - clinical faxed.  She does not have a bed available today.  She states if Israel[facility]ammad will consider an out of [facility] network agreement [facility] should give Paul[facility] Fiore Katt/[facility] phone number - [phone].    Addendum @1144: [facility] met with pt, pt placed her mother on speaker phone noting she wanted her mother to be a part of the discussion.  [facility] advised pt that [facility] has contacted [facility] and they do not accept [facility].  [facility] advised pt that Kathleen/ Abdoulaye encouraged [facility] to fax clinical (as pt would need to be approved for the program) and [facility] an [facility] of [facility] network agreement.  [facility] per Kathleen's report does not have availability at this time.  [facility] advised pt that [facility] contacted [facility] and the facility does not have a level 4 bed available at this time.  [facility] advised pt that she is medically stable for discharger per discussion with MD and that [facility] will continue to work on placement for pt.  Patient states she will not consider going to [facility] and will get a  if needed.  [facility] updated Dr. Padron, and Aris/Behavioral Health Clinician regarding the above and pt's questions regarding outpatient f/u for Methadone.

## 2020-12-16 NOTE — PROGRESS NOTES
"Psychiatry Progress Note    Chief Complaint/Reason for follow-up: opioid dependence and methadone    Interval History: the patient is much more upbeat today.  She is interested in going to a rehab where she can get treatment for addiction as well as iv antibiotics.  She also states she did \"a ton\" of research on methadone over the last 24 hours and understands the risks and benefits including qtc prolongation and associated arrhthymias and states she would like to give it a try.  She has been using opioids since 2011 and has been on suboxone for many years but now finds herself back on iv street drugs and with a very dangerous infection and states she is committed to methadone initiation and outpatient follow-up at a clinic.  She denies feelings of depression and denies any thoughts to harm/kill herself or anyone else whatsoever.    Review of Systems:   Sleep:  Fair and Appetite: Poor    Vital Signs for the last 24 hours:  Temp:  [36.1 °C (97 °F)-36.8 °C (98.2 °F)] 36.4 °C (97.5 °F)  Heart Rate:  [62-86] 85  Resp:  [16-18] 18  BP: (105-124)/(55-81) 124/77      Current Facility-Administered Medications:   •  acetaminophen (TYLENOL) tablet 975 mg, 975 mg, oral, q8h JAIDEN, Marizol Ortiz CRNP, 975 mg at 12/16/20 0508  •  buPROPion (WELLBUTRIN) tablet 75 mg, 75 mg, oral, TID, Marlon Padron MD, 75 mg at 12/16/20 1100  •  ceFAZolin (ANCEF) NSS IVPB piggyback 2 g, 2 g, intravenous, q8h INT, Vikas Jones MD, Last Rate: 100 mL/hr at 12/16/20 1056, 2 g at 12/16/20 1056  •  cloNIDine (CATAPRES) tablet 0.2 mg, 0.2 mg, oral, q6h PRN, Gen Hogue MD, 0.2 mg at 12/16/20 0828  •  cyclobenzaprine (FLEXERIL) tablet 10 mg, 10 mg, oral, 3x daily PRN, Marizol Ortiz CRNP, 10 mg at 12/15/20 0630  •  glucose chewable tablet 16-32 g of dextrose, 16-32 g of dextrose, oral, PRN **OR** dextrose 40 % oral gel 15-30 g of dextrose, 15-30 g of dextrose, oral, PRN **OR** glucagon (GLUCAGEN) injection 1 mg, 1 mg, " intramuscular, PRN **OR** dextrose in water injection 12.5 g, 25 mL, intravenous, PRN, Perfecto Angela MD  •  fluticasone propionate (FLONASE) 50 mcg/actuation nasal spray 2 spray, 2 spray, Each Nostril, Daily PRN, Perfecto Angela MD  •  heparin (porcine) 5,000 unit/mL injection 5,000 Units, 5,000 Units, subcutaneous, q8h JAIDEN, Perfecto Angela MD, 5,000 Units at 12/11/20 1335  •  ketorolac (TORADOL) injection 15 mg, 15 mg, intravenous, q6h PRN, Marizol Ortiz CRNP, 15 mg at 12/16/20 0829  •  lidocaine (ASPERCREME) 4 % topical patch 1 patch, 1 patch, Topical, Daily, Jacki Boss CRNP  •  magnesium sulfate IVPB 2g in 50 mL NSS/D5W/SWFI, 2 g, intravenous, PRN, Perfecto Angela MD  •  metoprolol succinate XL (TOPROL-XL) 24 hr ER tablet 25 mg, 25 mg, oral, Daily, Perfecto Angela MD, 25 mg at 12/16/20 0823  •  naloxone (NARCAN) injection 0.2 mg, 0.2 mg, intravenous, Once PRN, Jacki Boss CRNP  •  nicotine polacrilex (NICORETTE) gum 4 mg, 4 mg, buccal, q4h PRN, Perfecto Angela MD, 4 mg at 12/16/20 0829  •  ondansetron (ZOFRAN) injection 4 mg, 4 mg, intravenous, q15 min PRN, Judy Rosales MD, 4 mg at 12/15/20 1410  •  oxyCODONE (ROXICODONE) immediate release tablet 10 mg, 10 mg, oral, q4h PRN, Jacki Boss CRNP, 10 mg at 12/16/20 0912  •  PARoxetine (PAXIL) tablet 30 mg, 30 mg, oral, Daily, Marlon Padron MD, 30 mg at 12/16/20 0824  •  polyethylene glycol (MIRALAX) 17 gram packet 17 g, 17 g, oral, Daily, Jacki Boss CRNP, 17 g at 12/14/20 0848  •  potassium chloride (KLOR-CON) tablet extended release 20 mEq, 20 mEq, oral, Daily PRN **OR** potassium chloride (KLOR-CON) tablet extended release 40 mEq, 40 mEq, oral, Daily PRN **OR** potassium chloride 20 mEq in 100 mL IVPB  (premix), 20 mEq, intravenous, Daily PRN **OR** potassium chloride (KCL) 40 mEq/250 mL IVPB in NSS 40 mEq, 40 mEq, intravenous, Daily PRN, Perfecto Angela MD  •  prazosin (MINIPRESS) capsule 2 mg, 2  mg, oral, Nightly, Perfecto Angela MD, 2 mg at 12/14/20 2116  •  pregabalin (LYRICA) capsule 150 mg, 150 mg, oral, BID, Perfecto Angela MD, 150 mg at 12/16/20 0824  •  QUEtiapine (SEROquel) tablet 100 mg, 100 mg, oral, Nightly, Perfecto Angela MD, 100 mg at 12/15/20 2103  •  zolpidem (AMBIEN) tablet 10 mg, 10 mg, oral, Nightly, Perfecto Angela MD, 10 mg at 12/15/20 2103    MSE:  Orientation: AAO x3  Behavior: Appropriate  Appearance: well groomed  Eye Contact: Fair throughout  Mood: “pretty good today”  Affect: congruent; mostly stable and appropriate  Speech: Coherent  Thought Process: Goal Directed and linear   Suicidal Ideation: Denies suicidal thoughts; intent or plan; denies passive death wish  Homicidal Ideation: Denies having homicidal thoughts, intent or plan  Hallucinations: Denies  Delusions: Denies  Cognition: No gross cognitive deficits  Memory: Remote; Immediate; all intact  Insight: Fair  Judgment: Fair      Assessment/Plan  Unspecified mood (affective) disorder (CMS/HCC)  Assessment & Plan  Patient with history of depression and addiction.  1. Continue paroxetine 30mg qam  2. Continue bupropion 75mg TID  3.  to please speak with patient about options for inpatient/outpatient rehab and follow-up with a psychiatrist/therapist      Tricuspid valve vegetation  Overview  JANUSZ 12/11/20  · Tricuspid valve endocarditis.  · 1 x 0.6 cm echolucent spongy sessile mass attached to the mid body of the posterior tricuspid leaflet consistent with vegetation. There is a tiny mobile component on the atrial surface of the mass. There appears to be a smaller segment of vegetation on the ventricular surface of the valve leaflet opposite the larger component. There is a tiny perforation at the edge of the vegetation 5 mm from the annulus of the leaflet.  · Borderline severe tricuspid regurgitation through the valve orifice. There is a small regurgitant jet through the perforation.  · Estimated RVSP = 48  mmHg assuming a right atrial pressure 5 mmHg  · Normal-appearing aortic, mitral, pulmonic valves. Trace mitral regurgitation.  · Normal-sized LV. Normal LV wall thickness.  · Preserved LV systolic function. Estimated EF 60- 65%. Normal LV septal wall motion. No regional wall motion abnormalities.  · Normal RV size and function.  · Normal-sized LA. Intact atrial septum. Normal pulmonary veins  · Normal-sized RA. No PFO by saline contrast injection or color-flow imaging.  · Trace mitral valve regurgitation.  · Normal-sized IVC. Normal SVC  · Normal aorta.    Septic arthritis of vertebra (CMS/HCC)  Overview  MRI Spine 12/2020  Diffuse anterior and posterior epidural enhancement within lumbar spine more  pronounced at L4-L5 raising the suspicion for underlying infectious  process/phlegmon.  Left greater than right L4-L5 facet joint signal and  enhancement which although may be degenerative in nature, early changes of  septic facet arthritis is in the differential.  Follow-up is recommended.     Thoracic and lumbar degenerative change as described more pronounced at L4-L5.     Ring-enhancing lesion in right upper lobe which may be infectious in etiology  including in the setting of septic emboli.  Correlation with CT chest with  contrast is recommended to exclude other etiologies.    Methicillin sensitive staphylococcal aureus cultures done at [facility] per ID note.    Opioid type dependence, continuous use (CMS/HCC)  Assessment & Plan  1. QTc reviewed.    2. DC PRN opioids  3. Initiate methadone 20mg today (Hold for sedation; O2 < 95; SBP <100; DBP < 60; HR < 60; RR< 10; QTc > 470)  4.  to please speak with patient about options for inpatient rehab at [facility] or [facility] where she can do rehab while receiving iv antibiotics.  Patient is agreeable to this.    Spent 35 minutes with patient, > 50% in consultation    Marlon Padron MD  12/16/2020

## 2020-12-16 NOTE — PROGRESS NOTES
Hospital Medicine Service -  Daily Progress Note       SUBJECTIVE   Interval History: no overnight events  Feeling better, back pain more tolerable, no chest pain, dyspnea, abd pain/n/v, afebrile      OBJECTIVE      Vital signs in last 24 hours:  Temp:  [36.1 °C (97 °F)-36.8 °C (98.2 °F)] 36.4 °C (97.5 °F)  Heart Rate:  [62-86] 85  Resp:  [16-18] 18  BP: (105-124)/(55-81) 124/77    Intake/Output Summary (Last 24 hours) at 12/16/2020 0921  Last data filed at 12/15/2020 2102  Gross per 24 hour   Intake 120 ml   Output --   Net 120 ml       PHYSICAL EXAMINATION      Physical Exam   Constitutional: NAD  Neck: Supple. No JVD.    Cardiovascular: Normal rate, regular rhythm    Pulmonary/Chest: Effort normal and breath sounds normal. No wheezing or crackles.  Abdominal: Soft. Bowel sounds are normal. There is no tenderness, no rebound and no guarding.   Musculoskeletal: No edema   Neurological: Alert and oriented       LINES, CATHETERS, DRAINS, AIRWAYS, AND WOUNDS   Lines, Drains, Airways, Wounds:  Peripheral IV 12/11/20 Right Forearm (Active)   Number of days: 1       Comments:      LABS / IMAGING / TELE      Labs  Lab Results   Component Value Date    WBC 8.31 12/12/2020    HGB 10.1 (L) 12/12/2020    HCT 31.9 (L) 12/12/2020     12/12/2020    ALT 11 12/09/2020    AST 14 (L) 12/09/2020     12/12/2020    K 4.5 12/12/2020     12/12/2020    CREATININE 0.7 12/12/2020    BUN 6 (L) 12/12/2020    CO2 21 (L) 12/12/2020    INR 0.9 04/06/2019       Imaging  Mri Thoracic Spine With And Without Contrast    Result Date: 12/9/2020  IMPRESSION: Diffuse anterior and posterior epidural enhancement within lumbar spine more pronounced at L4-L5 raising the suspicion for underlying infectious process/phlegmon.  Left greater than right L4-L5 facet joint signal and enhancement which although may be degenerative in nature, early changes of septic facet arthritis is in the differential.  Follow-up is recommended. Thoracic and  lumbar degenerative change as described more pronounced at L4-L5. Ring-enhancing lesion in right upper lobe which may be infectious in etiology including in the setting of septic emboli.  Correlation with CT chest with contrast is recommended to exclude other etiologies. The results were discussed with Rylee Bowie on 12/9/2020 7:35 PM.    Mri Lumbar Spine With And Without Contrast    Result Date: 12/9/2020  IMPRESSION: Diffuse anterior and posterior epidural enhancement within lumbar spine more pronounced at L4-L5 raising the suspicion for underlying infectious process/phlegmon.  Left greater than right L4-L5 facet joint signal and enhancement which although may be degenerative in nature, early changes of septic facet arthritis is in the differential.  Follow-up is recommended. Thoracic and lumbar degenerative change as described more pronounced at L4-L5. Ring-enhancing lesion in right upper lobe which may be infectious in etiology including in the setting of septic emboli.  Correlation with CT chest with contrast is recommended to exclude other etiologies. The results were discussed with Rylee Bowie on 12/9/2020 7:35 PM.    X-ray Chest 1 View    Result Date: 12/9/2020  IMPRESSION: No radiographic evidence of acute cardiopulmonary disease.    Ct Chest Pulmonary Embolism With Iv Contrast    Result Date: 12/9/2020  IMPRESSION: 1.  No evidence of main, lobar or segmental pulmonary embolism. 2.  However, there are multifocal nodular lesions throughout all lung fields including a cavitary lesion in the right upper lobe all suspicious for septic emboli.  Some of the tiny subsegmental pulmonary arterial branches demonstrate hypoenhancement and it is difficult to determinate if this is related to tiny pulmonary emboli or technique. 3.  Small bilateral pleural effusions. 4.  Mosaic pattern at the lung bases could represent mild edema or sequela of small airways or small vessels disease. 5.  Follow-up chest CT after  appropriate treatment is recommended to document lung opacities and mediastinal lymphadenopathy which is probably reactive. 6.  Additional findings discussed below. Finding:    Other   Acuity: Critical  Status:  CLOSED Critical read back for the first impression performed with Bertha Bowie PA 9:25 PM 12/9/2020 COMMENT: Comparison: Chest x-ray from 12/9/2020 Technique: Chest CT pulmonary embolism protocol performed with intravenous contrast.  80 mL Omnipaque 350 given. CT DOSE:  One or more dose reduction techniques (e.g. automated exposure control, adjustment of the mA and/or kV according to patient size, use of iterative reconstruction technique) utilized for this examination. FINDINGS: LUNG, PLEURA AND LARGE AIRWAYS: The trachea and proximal bronchi remain clear. There are multifocal nodular lesions throughout the lungs.  For example in the right upper lobe there is a cavitary lesion measuring 1.4 cm on axial image 63. A 1.6 cm nodular lesion in the left upper lobe on image 69.  Multiple additional lesions are present elsewhere.  His are suspicious for septic emboli particularly given the history of endocarditis and sepsis. There are small bilateral pleural effusions. There is a nonspecific mild degree of groundglass opacity in the lower lung fields.  This could represent mild degree of pulmonary edema or possibly a mosaic pattern from small airways or small vessel disease. No pneumothorax. VESSELS: Normal caliber of the thoracic aorta.  Thoracic aorta appears within normal limits.  Main pulmonary artery is normal in caliber.  No evidence of a main, lobar or segmental pulmonary embolism.  However, the subsegmental vessels are difficult to assess and some demonstrate a slightly hypoechoic enhancing appearance possibly artifact from bolus timing although tiny subsegmental pulmonary emboli would be difficult to exclude.  For example, in the lingula there is some hypoenhancement of the subsegmental vessels on  image 116 where subsegmental PE cannot be excluded.  No evidence of right heart strain. HEART: normal size. No pericardial effusion. MEDIASTINUM AND PAULINA: There is a pathologically enlarged lymph node in the AP window measuring 1.8 cm short axis.  Additional prominent but nonpathologic by size video lymph nodes elsewhere. CHEST WALL AND LOWER NECK: within normal limits. UPPER ABDOMEN:Limited assessment of the upper abdominal structures with this technique.  There is splenomegaly with the spleen measuring 14.5 cm.  The liver may also be enlarged but is incompletely visualized.  Cholecystectomy changes. BONES: No suspicious bony erosive changes.  Multilevel Schmorl's nodes and degenerative changes in the spine are noted.  Calcific tendinosis of the right rotator cuff.  There is limited assessment of the spine with this technique.  No gross paraspinal inflammatory changes.         ECG/Telemetry  I have independently reviewed the telemetry. No events for the last 24 hours.    ASSESSMENT AND PLAN      Tricuspid valve vegetation  Overview  JANUSZ 12/11/20  · Tricuspid valve endocarditis.  · 1 x 0.6 cm echolucent spongy sessile mass attached to the mid body of the posterior tricuspid leaflet consistent with vegetation. There is a tiny mobile component on the atrial surface of the mass. There appears to be a smaller segment of vegetation on the ventricular surface of the valve leaflet opposite the larger component. There is a tiny perforation at the edge of the vegetation 5 mm from the annulus of the leaflet.  · Borderline severe tricuspid regurgitation through the valve orifice. There is a small regurgitant jet through the perforation.  · Estimated RVSP = 48 mmHg assuming a right atrial pressure 5 mmHg  · Normal-appearing aortic, mitral, pulmonic valves. Trace mitral regurgitation.  · Normal-sized LV. Normal LV wall thickness.  · Preserved LV systolic function. Estimated EF 60- 65%. Normal LV septal wall motion. No regional  wall motion abnormalities.  · Normal RV size and function.  · Normal-sized LA. Intact atrial septum. Normal pulmonary veins  · Normal-sized RA. No PFO by saline contrast injection or color-flow imaging.  · Trace mitral valve regurgitation.  · Normal-sized IVC. Normal SVC  · Normal aorta.    Assessment & Plan  Sessile vegetation on tricuspid valve leaflet  Continue antibiotics  Cards consulted.     Septic arthritis of vertebra (CMS/HCC)  Overview  MRI Spine 12/2020  Diffuse anterior and posterior epidural enhancement within lumbar spine more  pronounced at L4-L5 raising the suspicion for underlying infectious  process/phlegmon.  Left greater than right L4-L5 facet joint signal and  enhancement which although may be degenerative in nature, early changes of  septic facet arthritis is in the differential.  Follow-up is recommended.     Thoracic and lumbar degenerative change as described more pronounced at L4-L5.     Ring-enhancing lesion in right upper lobe which may be infectious in etiology  including in the setting of septic emboli.  Correlation with CT chest with  contrast is recommended to exclude other etiologies.    Methicillin sensitive staphylococcal aureus cultures done at [facility] per ID note.    Assessment & Plan  Neurovascular intact  Pain control with oxycodone 10 mg q4 hours plus toradol as discussed with palliative care  Palliative care medicine following   Will need to wean oxycodone prior to discharge.       Essential hypertension  Assessment & Plan  Continue with home meds  bp parameters for hypotension    Septic embolism (CMS/HCC)  Assessment & Plan  Continue IV abx per ID   Blood cultures performed on admission, NGTD  Evidence of discitis on MRI, neurosurgery consulted as well as ID    Depression  Assessment & Plan  Continue home meds   Psych consulted.     * Acute bacterial endocarditis  Assessment & Plan  Suspected tricuspid valve endocarditis  Sepsis due to acute bacterial endocarditis  present on admission  Continue antibiotics  Blood cultures from [place] [facility]       VTE Assessment: Padua VTE Score: 5  VTE Prophylaxis Plan: heparin  Code Status: Full Code  Estimated Discharge Date: 12/16/2020  Disposition Planning: rehab     Gen Lennie MD  12/16/2020

## 2020-12-16 NOTE — PLAN OF CARE
Problem: Adult Inpatient Plan of Care  Goal: Plan of Care Review  Outcome: Progressing  Flowsheets (Taken 12/15/2020 2022)  Progress: improving  Plan of Care Reviewed With: patient  Outcome Summary:   Pt reports feeling much better today   IV ancef continues. PRN clonidine given for withdrawal sx's. Pain management plan reviewed.   Plan of Care Review  Plan of Care Reviewed With: patient  Progress: improving  Outcome Summary: Pt reports feeling much better today; IV ancef continues. PRN clonidine given for withdrawal sx's. Pain management plan reviewed.

## 2020-12-17 ENCOUNTER — APPOINTMENT (INPATIENT)
Dept: RADIOLOGY | Facility: HOSPITAL | Age: 35
End: 2020-12-17
Attending: INTERNAL MEDICINE
Payer: COMMERCIAL

## 2020-12-17 LAB — SARS-COV-2 RNA RESP QL NAA+PROBE: NEGATIVE

## 2020-12-17 PROCEDURE — 63700000 HC SELF-ADMINISTRABLE DRUG: Performed by: HOSPITALIST

## 2020-12-17 PROCEDURE — 63600000 HC DRUGS/DETAIL CODE: Performed by: INTERNAL MEDICINE

## 2020-12-17 PROCEDURE — 63700000 HC SELF-ADMINISTRABLE DRUG: Performed by: PSYCHIATRY & NEUROLOGY

## 2020-12-17 PROCEDURE — U0002 COVID-19 LAB TEST NON-CDC: HCPCS | Performed by: INTERNAL MEDICINE

## 2020-12-17 PROCEDURE — 25000000 HC PHARMACY GENERAL: Performed by: INTERNAL MEDICINE

## 2020-12-17 PROCEDURE — 99233 SBSQ HOSP IP/OBS HIGH 50: CPT | Performed by: PSYCHIATRY & NEUROLOGY

## 2020-12-17 PROCEDURE — 12000000 HC ROOM AND CARE MED/SURG

## 2020-12-17 PROCEDURE — 25800000 HC PHARMACY IV SOLUTIONS: Performed by: INTERNAL MEDICINE

## 2020-12-17 PROCEDURE — 99232 SBSQ HOSP IP/OBS MODERATE 35: CPT | Performed by: INTERNAL MEDICINE

## 2020-12-17 PROCEDURE — 76705 ECHO EXAM OF ABDOMEN: CPT

## 2020-12-17 PROCEDURE — 63700000 HC SELF-ADMINISTRABLE DRUG: Performed by: INTERNAL MEDICINE

## 2020-12-17 RX ORDER — BUTALBITAL, ACETAMINOPHEN AND CAFFEINE 50; 325; 40 MG/1; MG/1; MG/1
2 TABLET ORAL EVERY 6 HOURS PRN
Status: DISCONTINUED | OUTPATIENT
Start: 2020-12-17 | End: 2021-01-03

## 2020-12-17 RX ORDER — METHADONE HYDROCHLORIDE 10 MG/1
20 TABLET ORAL DAILY
Status: DISCONTINUED | OUTPATIENT
Start: 2020-12-17 | End: 2020-12-21

## 2020-12-17 RX ORDER — BUTALBITAL, ACETAMINOPHEN AND CAFFEINE 50; 325; 40 MG/1; MG/1; MG/1
1 TABLET ORAL EVERY 6 HOURS PRN
Status: DISCONTINUED | OUTPATIENT
Start: 2020-12-17 | End: 2020-12-17

## 2020-12-17 RX ORDER — METHADONE HYDROCHLORIDE 10 MG/1
10 TABLET ORAL DAILY PRN
Status: DISCONTINUED | OUTPATIENT
Start: 2020-12-17 | End: 2021-01-12

## 2020-12-17 RX ADMIN — METHADONE HYDROCHLORIDE 20 MG: 10 TABLET ORAL at 11:17

## 2020-12-17 RX ADMIN — BUTALBITAL, ACETAMINOPHEN AND CAFFEINE 2 TABLET: 50; 325; 40 TABLET ORAL at 09:06

## 2020-12-17 RX ADMIN — NICOTINE POLACRILEX 4 MG: 4 GUM, CHEWING ORAL at 16:51

## 2020-12-17 RX ADMIN — QUETIAPINE 100 MG: 100 TABLET, FILM COATED ORAL at 22:56

## 2020-12-17 RX ADMIN — PREGABALIN 150 MG: 75 CAPSULE ORAL at 20:59

## 2020-12-17 RX ADMIN — PAROXETINE 30 MG: 20 TABLET, FILM COATED ORAL at 09:06

## 2020-12-17 RX ADMIN — METHADONE HYDROCHLORIDE 10 MG: 10 TABLET ORAL at 18:39

## 2020-12-17 RX ADMIN — METOPROLOL SUCCINATE 25 MG: 25 TABLET, EXTENDED RELEASE ORAL at 09:06

## 2020-12-17 RX ADMIN — NICOTINE POLACRILEX 4 MG: 4 GUM, CHEWING ORAL at 11:27

## 2020-12-17 RX ADMIN — NICOTINE POLACRILEX 4 MG: 4 GUM, CHEWING ORAL at 06:38

## 2020-12-17 RX ADMIN — BUTALBITAL, ACETAMINOPHEN AND CAFFEINE 2 TABLET: 50; 325; 40 TABLET ORAL at 18:39

## 2020-12-17 RX ADMIN — ZOLPIDEM TARTRATE 10 MG: 5 TABLET, COATED ORAL at 22:55

## 2020-12-17 RX ADMIN — BUPROPION HYDROCHLORIDE 75 MG: 75 TABLET, FILM COATED ORAL at 12:14

## 2020-12-17 RX ADMIN — PREGABALIN 150 MG: 75 CAPSULE ORAL at 09:06

## 2020-12-17 RX ADMIN — BUPROPION HYDROCHLORIDE 75 MG: 75 TABLET, FILM COATED ORAL at 16:51

## 2020-12-17 RX ADMIN — CEFAZOLIN SODIUM 2 G: 10 POWDER, FOR SOLUTION INTRAVENOUS at 22:57

## 2020-12-17 RX ADMIN — CEFAZOLIN SODIUM 2 G: 10 POWDER, FOR SOLUTION INTRAVENOUS at 03:52

## 2020-12-17 RX ADMIN — NICOTINE POLACRILEX 4 MG: 4 GUM, CHEWING ORAL at 22:55

## 2020-12-17 RX ADMIN — CEFAZOLIN SODIUM 2 G: 10 POWDER, FOR SOLUTION INTRAVENOUS at 12:14

## 2020-12-17 RX ADMIN — BUPROPION HYDROCHLORIDE 75 MG: 75 TABLET, FILM COATED ORAL at 09:05

## 2020-12-17 NOTE — PLAN OF CARE
Plan of Care Review  Plan of Care Reviewed With: patient  Progress: improving  Outcome Summary: Since starting Methadone, pt feels so much better as per pt. Pt didn't ask any extra pain med or relaxants. VSS. waiting for placement.

## 2020-12-17 NOTE — PROGRESS NOTES
Psychiatry Progress Note    Chief Complaint/Reason for follow-up: opioid dependence and depression    Interval History: The patient tolerated first dose of methadone well.  She states she feels much better and is bright and positive and forward thinking.  She denies excessive sedation and denies any thoughts to harm/kill herself or anyone else whatsoever.  Still awaiting placement in a rehab facility that can handle iv antibiotics.    Review of Systems:   Sleep:  Good and Appetite: Good    Vital Signs for the last 24 hours:  Temp:  [36.4 °C (97.5 °F)-36.7 °C (98.1 °F)] 36.7 °C (98 °F)  Heart Rate:  [71-99] 99  Resp:  [18] 18  BP: (102-166)/(60-93) 133/67      Current Facility-Administered Medications:   •  buPROPion (WELLBUTRIN) tablet 75 mg, 75 mg, oral, TID, Marlon Padron MD, 75 mg at 12/17/20 0905  •  butalbital-acetaminophen-caff (FIORICET, ESGIC) per tablet 2 tablet, 2 tablet, oral, q6h PRN, Gen Hogue MD, 2 tablet at 12/17/20 0906  •  ceFAZolin (ANCEF) NSS IVPB piggyback 2 g, 2 g, intravenous, q8h INT, Vikas Jones MD, Stopped at 12/17/20 0424  •  cyclobenzaprine (FLEXERIL) tablet 10 mg, 10 mg, oral, 3x daily PRN, Marizol Ortiz CRNP, 10 mg at 12/15/20 0630  •  glucose chewable tablet 16-32 g of dextrose, 16-32 g of dextrose, oral, PRN **OR** dextrose 40 % oral gel 15-30 g of dextrose, 15-30 g of dextrose, oral, PRN **OR** glucagon (GLUCAGEN) injection 1 mg, 1 mg, intramuscular, PRN **OR** dextrose in water injection 12.5 g, 25 mL, intravenous, PRN, Perfecot Angela MD  •  fluticasone propionate (FLONASE) 50 mcg/actuation nasal spray 2 spray, 2 spray, Each Nostril, Daily PRN, Perfecto Angela MD  •  heparin (porcine) 5,000 unit/mL injection 5,000 Units, 5,000 Units, subcutaneous, q8h JAIDEN, Perfecto Angela MD, 5,000 Units at 12/11/20 1335  •  lidocaine (ASPERCREME) 4 % topical patch 1 patch, 1 patch, Topical, Daily, Jacki Boss CRNP  •  magnesium sulfate IVPB 2g in 50 mL  NSS/D5W/SWFI, 2 g, intravenous, PRN, Perfecto Angela MD  •  methadone (DOLOPHINE) tablet 20 mg, 20 mg, oral, Daily (1p), Marlon Padron MD, 20 mg at 12/16/20 1300  •  metoprolol succinate XL (TOPROL-XL) 24 hr ER tablet 25 mg, 25 mg, oral, Daily, Perfecto Angela MD, 25 mg at 12/17/20 0906  •  naloxone (NARCAN) injection 0.2 mg, 0.2 mg, intravenous, Once PRN, Jacki Boss CRNP  •  nicotine polacrilex (NICORETTE) gum 4 mg, 4 mg, buccal, q4h PRN, Perfecto Angela MD, 4 mg at 12/17/20 0638  •  ondansetron (ZOFRAN) injection 4 mg, 4 mg, intravenous, q15 min PRN, Wex, Judy ROSARIO MD, 4 mg at 12/15/20 1410  •  PARoxetine (PAXIL) tablet 30 mg, 30 mg, oral, Daily, Marlon Padron MD, 30 mg at 12/17/20 0906  •  polyethylene glycol (MIRALAX) 17 gram packet 17 g, 17 g, oral, Daily, Jacki Boss CRNP, 17 g at 12/14/20 0848  •  potassium chloride (KLOR-CON) tablet extended release 20 mEq, 20 mEq, oral, Daily PRN **OR** potassium chloride (KLOR-CON) tablet extended release 40 mEq, 40 mEq, oral, Daily PRN **OR** potassium chloride 20 mEq in 100 mL IVPB  (premix), 20 mEq, intravenous, Daily PRN **OR** potassium chloride (KCL) 40 mEq/250 mL IVPB in NSS 40 mEq, 40 mEq, intravenous, Daily PRN, Perfecto Angela MD  •  prazosin (MINIPRESS) capsule 2 mg, 2 mg, oral, Nightly, Perfecto Angela MD, 2 mg at 12/16/20 2104  •  pregabalin (LYRICA) capsule 150 mg, 150 mg, oral, BID, Perfecto Angela MD, 150 mg at 12/17/20 0906  •  QUEtiapine (SEROquel) tablet 100 mg, 100 mg, oral, Nightly, Perfecto Angela MD, 100 mg at 12/16/20 2105  •  zolpidem (AMBIEN) tablet 10 mg, 10 mg, oral, Nightly, Perfecto Angela MD, 10 mg at 12/16/20 2105    MSE:  Orientation: AAO x3  Behavior: Appropriate  Appearance: well groomed  Eye Contact: Fair throughout  Mood: “pretty good”  Affect: congruent; stable and appropriate  Speech: Coherent  Thought Process: Goal Directed and linear   Suicidal Ideation: Denies suicidal thoughts;  intent or plan; denies passive death wish  Homicidal Ideation: Denies having homicidal thoughts, intent or plan  Hallucinations: Denies  Delusions: Denies  Cognition: No gross cognitive deficits  Memory: Remote; Immediate; all intact  Insight: Fair  Judgment: Fair      Assessment/Plan  Unspecified mood (affective) disorder (CMS/HCC)  Assessment & Plan  Patient with history of depression and addiction.  1. Continue paroxetine 30mg qam  2. Continue bupropion 75mg TID  3.  to please speak with patient about options for inpatient/outpatient rehab and follow-up with a psychiatrist/therapist      Tricuspid valve vegetation  Overview  JANUSZ 12/11/20  · Tricuspid valve endocarditis.  · 1 x 0.6 cm echolucent spongy sessile mass attached to the mid body of the posterior tricuspid leaflet consistent with vegetation. There is a tiny mobile component on the atrial surface of the mass. There appears to be a smaller segment of vegetation on the ventricular surface of the valve leaflet opposite the larger component. There is a tiny perforation at the edge of the vegetation 5 mm from the annulus of the leaflet.  · Borderline severe tricuspid regurgitation through the valve orifice. There is a small regurgitant jet through the perforation.  · Estimated RVSP = 48 mmHg assuming a right atrial pressure 5 mmHg  · Normal-appearing aortic, mitral, pulmonic valves. Trace mitral regurgitation.  · Normal-sized LV. Normal LV wall thickness.  · Preserved LV systolic function. Estimated EF 60- 65%. Normal LV septal wall motion. No regional wall motion abnormalities.  · Normal RV size and function.  · Normal-sized LA. Intact atrial septum. Normal pulmonary veins  · Normal-sized RA. No PFO by saline contrast injection or color-flow imaging.  · Trace mitral valve regurgitation.  · Normal-sized IVC. Normal SVC  · Normal aorta.    Septic arthritis of vertebra (CMS/HCC)  Overview  MRI Spine 12/2020  Diffuse anterior and posterior epidural  enhancement within lumbar spine more  pronounced at L4-L5 raising the suspicion for underlying infectious  process/phlegmon.  Left greater than right L4-L5 facet joint signal and  enhancement which although may be degenerative in nature, early changes of  septic facet arthritis is in the differential.  Follow-up is recommended.     Thoracic and lumbar degenerative change as described more pronounced at L4-L5.     Ring-enhancing lesion in right upper lobe which may be infectious in etiology  including in the setting of septic emboli.  Correlation with CT chest with  contrast is recommended to exclude other etiologies.    Methicillin sensitive staphylococcal aureus cultures done at [facility] per ID note.    Opioid type dependence, continuous use (CMS/[facility])  Assessment & Plan  1. Continue methadone 20mg today (Hold for sedation; O2 < 95; SBP <100; DBP < 60; HR < 60; RR< 10; QTc > 470)  2. Add a prn dose of methadone 10mg if patient requires for total dose in a day of 30mg.  Long discussion had about this and patient would like the option to increase today.  3.  to please speak with patient about options for inpatient rehab at [facility] or [facility] where she can do rehab while receiving iv antibiotics.  Patient is agreeable to this.      Spent 35 minutes with patient, > 50% in consultation    Marlon Padron MD  12/17/2020

## 2020-12-17 NOTE — PATIENT CARE CONFERENCE
Care Progression Rounds Note  Date: 12/17/2020  Time: 10:42 AM     Patient Name: Carolyn Redmond     Medical Record Number: [id number]   YOB: 1985  Sex: Female      Room/Bed: 3023    Admitting Diagnosis: Septic embolism (CMS/[facility]) [I76]  Infection of lumbar spine (CMS/[facility]) [M46.26]  Acute left-sided low back pain without sciatica [M54.5]   Admit Date/Time: 12/9/2020 11:10 AM    Primary Diagnosis: Acute bacterial endocarditis  Principal Problem: Acute bacterial endocarditis    GMLOS: 4.8  Anticipated Discharge Date: 12/11/2020    AM-PAC  Mobility Score:      Discharge Planning:       Barriers to Discharge:  Barriers to Discharge: None  Comment: d/c placement for IV ATB    Participants:  advanced practice provider, , nursing, social work/services

## 2020-12-17 NOTE — PROGRESS NOTES
Message left for Kathleen/[facility] inquiring about availability.  Call placed to [place] [place] the facility has a level 4 bed available for a male, they do not have a level 4 female bed available.

## 2020-12-17 NOTE — BEHAVIORAL HEALTH CRISIS PROGRESS NOTE
Behavioral Health Progress Note    Chief Complaint/Reason for follow-up: opioid dependence/mood disorder    Interval History: The pt is seen on follow up with psychiatry today. She is very pleasant and states she is feeling a lot better. Discussion of her first dosing of methadone and the pt felt that it was very beneficial for her. She states that she was able to get up out of bed and walk around a little bit as well as take a shower. She does states around 1am she began feeling a bit uncomfortable, however overall she felt it to be more beneficial for her. Discussion of increasing her dose and it will be increased today to 20mg in the AM and 10gPRN for the pt to take later in the day.   The pt discussed that she has reached out to a personal connection she has who works at [facility] and states how she will be following up this morning to see if this person can assist with helping to get her into the tx. As well discussed with the pt follow up after her  Medical care for methadone and any other outpt mental health/ substance abuse needs. Discussed IOP programs the pt has completed prior as well as beginning something new. The pt is agreeable to have this clinician follow up with [facility] as well as [facility] to see if she can begin IOP while in the hospital, while she awaits transfer for antibiotic care.     Review of Systems:   Sleep:  Good, Appetite: Good and GI: No complaints    Vital Signs for the last 24 hours:  Temp:  [36.4 °C (97.5 °F)-36.7 °C (98.1 °F)] 36.7 °C (98 °F)  Heart Rate:  [71-99] 99  Resp:  [18] 18  BP: (102-166)/(60-93) 133/67    Mental Status Exam:  Appearance: well groomed  Gait and Motor: no abnormal movements  Speech: normal rate/rhythm/volume  Mood: 'much better'  Affect: normal  Associations: coherent  Thought Process: goal-directed  Thought Content: no auditory or visual hallucinations.  Suicidality/Homicidality: denies  Judgement/Insight: fair   Orientation: AAOx3   Memory:  recall   Attention: alert  Knowledge: normal  Language: normal    Outpatient Psychiatrist: none  Outpatient Therapist: none    Assessment/Plan  At this time the pt is still awaiting placement for nursing care for IV antibiotics. Will be following up with the pt for information in regards to a follow up methadone clinic as well as IOP mental health/substance abuse care.    1250 Have been in communication with [facility] who has checked that they are able to set the pt up for IOP while the pt is in the hospital.   Spoke to Natalie/ [facility] and set the pt up for an IOP intake. The first available is Monday at 2pm with Chelsey. [facility] will send the pt an email to [email or web address], and on Monday she will need to log into Zoom 10 minutes early.     The pt is set up with [facility], scheduled for Monday @ 2pm

## 2020-12-17 NOTE — PROGRESS NOTES
Hospital Medicine Service -  Daily Progress Note       SUBJECTIVE   Interval History: no overnight events  C/o migraine headache, intermittent epigastric pain, back pain better controlled with methadone, no other complaints, afebrile       OBJECTIVE      Vital signs in last 24 hours:  Temp:  [36.4 °C (97.5 °F)-36.7 °C (98.1 °F)] 36.7 °C (98 °F)  Heart Rate:  [71-99] 99  Resp:  [18] 18  BP: (102-166)/(60-93) 133/67    Intake/Output Summary (Last 24 hours) at 12/17/2020 0958  Last data filed at 12/16/2020 1200  Gross per 24 hour   Intake 100 ml   Output --   Net 100 ml       PHYSICAL EXAMINATION      Physical Exam   Constitutional: NAD  Neck: Supple. No JVD.    Cardiovascular: Normal rate, regular rhythm    Pulmonary/Chest: Effort normal and breath sounds normal. No wheezing or crackles.  Abdominal: Soft. Bowel sounds are normal. There is no tenderness, no rebound and no guarding.   Musculoskeletal: No edema   Neurological: Alert and oriented       LINES, CATHETERS, DRAINS, AIRWAYS, AND WOUNDS   Lines, Drains, Airways, Wounds:  Peripheral IV 12/11/20 Right Forearm (Active)   Number of days: 1       Comments:      LABS / IMAGING / TELE      Labs  Lab Results   Component Value Date    WBC 8.31 12/12/2020    HGB 10.1 (L) 12/12/2020    HCT 31.9 (L) 12/12/2020     12/12/2020    ALT 11 12/09/2020    AST 14 (L) 12/09/2020     12/12/2020    K 4.5 12/12/2020     12/12/2020    CREATININE 0.7 12/12/2020    BUN 6 (L) 12/12/2020    CO2 21 (L) 12/12/2020    INR 0.9 04/06/2019       Imaging  Mri Thoracic Spine With And Without Contrast    Result Date: 12/9/2020  IMPRESSION: Diffuse anterior and posterior epidural enhancement within lumbar spine more pronounced at L4-L5 raising the suspicion for underlying infectious process/phlegmon.  Left greater than right L4-L5 facet joint signal and enhancement which although may be degenerative in nature, early changes of septic facet arthritis is in the differential.   Follow-up is recommended. Thoracic and lumbar degenerative change as described more pronounced at L4-L5. Ring-enhancing lesion in right upper lobe which may be infectious in etiology including in the setting of septic emboli.  Correlation with CT chest with contrast is recommended to exclude other etiologies. The results were discussed with Rylee Bowie on 12/9/2020 7:35 PM.    Mri Lumbar Spine With And Without Contrast    Result Date: 12/9/2020  IMPRESSION: Diffuse anterior and posterior epidural enhancement within lumbar spine more pronounced at L4-L5 raising the suspicion for underlying infectious process/phlegmon.  Left greater than right L4-L5 facet joint signal and enhancement which although may be degenerative in nature, early changes of septic facet arthritis is in the differential.  Follow-up is recommended. Thoracic and lumbar degenerative change as described more pronounced at L4-L5. Ring-enhancing lesion in right upper lobe which may be infectious in etiology including in the setting of septic emboli.  Correlation with CT chest with contrast is recommended to exclude other etiologies. The results were discussed with Rylee Bowie on 12/9/2020 7:35 PM.    X-ray Chest 1 View    Result Date: 12/9/2020  IMPRESSION: No radiographic evidence of acute cardiopulmonary disease.    Ct Chest Pulmonary Embolism With Iv Contrast    Result Date: 12/9/2020  IMPRESSION: 1.  No evidence of main, lobar or segmental pulmonary embolism. 2.  However, there are multifocal nodular lesions throughout all lung fields including a cavitary lesion in the right upper lobe all suspicious for septic emboli.  Some of the tiny subsegmental pulmonary arterial branches demonstrate hypoenhancement and it is difficult to determinate if this is related to tiny pulmonary emboli or technique. 3.  Small bilateral pleural effusions. 4.  Mosaic pattern at the lung bases could represent mild edema or sequela of small airways or small vessels  disease. 5.  Follow-up chest CT after appropriate treatment is recommended to document lung opacities and mediastinal lymphadenopathy which is probably reactive. 6.  Additional findings discussed below. Finding:    Other   Acuity: Critical  Status:  CLOSED Critical read back for the first impression performed with Bertha Bowie PA 9:25 PM 12/9/2020 COMMENT: Comparison: Chest x-ray from 12/9/2020 Technique: Chest CT pulmonary embolism protocol performed with intravenous contrast.  80 mL Omnipaque 350 given. CT DOSE:  One or more dose reduction techniques (e.g. automated exposure control, adjustment of the mA and/or kV according to patient size, use of iterative reconstruction technique) utilized for this examination. FINDINGS: LUNG, PLEURA AND LARGE AIRWAYS: The trachea and proximal bronchi remain clear. There are multifocal nodular lesions throughout the lungs.  For example in the right upper lobe there is a cavitary lesion measuring 1.4 cm on axial image 63. A 1.6 cm nodular lesion in the left upper lobe on image 69.  Multiple additional lesions are present elsewhere.  His are suspicious for septic emboli particularly given the history of endocarditis and sepsis. There are small bilateral pleural effusions. There is a nonspecific mild degree of groundglass opacity in the lower lung fields.  This could represent mild degree of pulmonary edema or possibly a mosaic pattern from small airways or small vessel disease. No pneumothorax. VESSELS: Normal caliber of the thoracic aorta.  Thoracic aorta appears within normal limits.  Main pulmonary artery is normal in caliber.  No evidence of a main, lobar or segmental pulmonary embolism.  However, the subsegmental vessels are difficult to assess and some demonstrate a slightly hypoechoic enhancing appearance possibly artifact from bolus timing although tiny subsegmental pulmonary emboli would be difficult to exclude.  For example, in the lingula there is some  hypoenhancement of the subsegmental vessels on image 116 where subsegmental PE cannot be excluded.  No evidence of right heart strain. HEART: normal size. No pericardial effusion. MEDIASTINUM AND PAULINA: There is a pathologically enlarged lymph node in the AP window measuring 1.8 cm short axis.  Additional prominent but nonpathologic by size video lymph nodes elsewhere. CHEST WALL AND LOWER NECK: within normal limits. UPPER ABDOMEN:Limited assessment of the upper abdominal structures with this technique.  There is splenomegaly with the spleen measuring 14.5 cm.  The liver may also be enlarged but is incompletely visualized.  Cholecystectomy changes. BONES: No suspicious bony erosive changes.  Multilevel Schmorl's nodes and degenerative changes in the spine are noted.  Calcific tendinosis of the right rotator cuff.  There is limited assessment of the spine with this technique.  No gross paraspinal inflammatory changes.         ECG/Telemetry  I have independently reviewed the telemetry. No events for the last 24 hours.    ASSESSMENT AND PLAN      Tricuspid valve vegetation  Overview  JANUSZ 12/11/20  · Tricuspid valve endocarditis.  · 1 x 0.6 cm echolucent spongy sessile mass attached to the mid body of the posterior tricuspid leaflet consistent with vegetation. There is a tiny mobile component on the atrial surface of the mass. There appears to be a smaller segment of vegetation on the ventricular surface of the valve leaflet opposite the larger component. There is a tiny perforation at the edge of the vegetation 5 mm from the annulus of the leaflet.  · Borderline severe tricuspid regurgitation through the valve orifice. There is a small regurgitant jet through the perforation.  · Estimated RVSP = 48 mmHg assuming a right atrial pressure 5 mmHg  · Normal-appearing aortic, mitral, pulmonic valves. Trace mitral regurgitation.  · Normal-sized LV. Normal LV wall thickness.  · Preserved LV systolic function. Estimated EF 60-  65%. Normal LV septal wall motion. No regional wall motion abnormalities.  · Normal RV size and function.  · Normal-sized LA. Intact atrial septum. Normal pulmonary veins  · Normal-sized RA. No PFO by saline contrast injection or color-flow imaging.  · Trace mitral valve regurgitation.  · Normal-sized IVC. Normal SVC  · Normal aorta.    Assessment & Plan  Sessile vegetation on tricuspid valve leaflet  Continue antibiotics  Cards consulted.     Septic arthritis of vertebra (CMS/HCC)  Overview  MRI Spine 12/2020  Diffuse anterior and posterior epidural enhancement within lumbar spine more  pronounced at L4-L5 raising the suspicion for underlying infectious  process/phlegmon.  Left greater than right L4-L5 facet joint signal and  enhancement which although may be degenerative in nature, early changes of  septic facet arthritis is in the differential.  Follow-up is recommended.     Thoracic and lumbar degenerative change as described more pronounced at L4-L5.     Ring-enhancing lesion in right upper lobe which may be infectious in etiology  including in the setting of septic emboli.  Correlation with CT chest with  contrast is recommended to exclude other etiologies.    Methicillin sensitive staphylococcal aureus cultures done at [facility] per ID note.    Assessment & Plan  Neurovascular intact  Palliative care medicine and psych following   Off oxycodone, on methadone       Essential hypertension  Assessment & Plan  Continue with home meds  bp parameters for hypotension    Septic embolism (CMS/HCC)  Assessment & Plan  Continue IV abx per ID   Blood cultures performed on admission, NGTD  Evidence of discitis on MRI, neurosurgery consulted as well as ID    Depression  Assessment & Plan  Continue home meds   Psych consulted.     * Acute bacterial endocarditis  Assessment & Plan  Suspected tricuspid valve endocarditis  Sepsis due to acute bacterial endocarditis present on admission  Continue antibiotics  Blood  cultures from [facility]       VTE Assessment: Padua VTE Score: 5  VTE Prophylaxis Plan: heparin  Code Status: Full Code  Estimated Discharge Date: 12/16/2020  Disposition Planning: rehab     Gen Lennie MD  12/17/2020

## 2020-12-18 ENCOUNTER — APPOINTMENT (INPATIENT)
Dept: RADIOLOGY | Facility: HOSPITAL | Age: 35
End: 2020-12-18
Attending: INTERNAL MEDICINE
Payer: COMMERCIAL

## 2020-12-18 PROCEDURE — 02HV33Z INSERTION OF INFUSION DEVICE INTO SUPERIOR VENA CAVA, PERCUTANEOUS APPROACH: ICD-10-PCS | Performed by: INTERNAL MEDICINE

## 2020-12-18 PROCEDURE — 63700000 HC SELF-ADMINISTRABLE DRUG: Performed by: NURSE PRACTITIONER

## 2020-12-18 PROCEDURE — 99232 SBSQ HOSP IP/OBS MODERATE 35: CPT | Performed by: INTERNAL MEDICINE

## 2020-12-18 PROCEDURE — C1751 CATH, INF, PER/CENT/MIDLINE: HCPCS

## 2020-12-18 PROCEDURE — 63700000 HC SELF-ADMINISTRABLE DRUG: Performed by: INTERNAL MEDICINE

## 2020-12-18 PROCEDURE — 71045 X-RAY EXAM CHEST 1 VIEW: CPT

## 2020-12-18 PROCEDURE — 25000000 HC PHARMACY GENERAL: Performed by: INTERNAL MEDICINE

## 2020-12-18 PROCEDURE — 12000000 HC ROOM AND CARE MED/SURG

## 2020-12-18 PROCEDURE — 63700000 HC SELF-ADMINISTRABLE DRUG: Performed by: HOSPITALIST

## 2020-12-18 PROCEDURE — 25800000 HC PHARMACY IV SOLUTIONS: Performed by: INTERNAL MEDICINE

## 2020-12-18 PROCEDURE — 36573 INSJ PICC RS&I 5 YR+: CPT

## 2020-12-18 PROCEDURE — 63700000 HC SELF-ADMINISTRABLE DRUG: Performed by: PSYCHIATRY & NEUROLOGY

## 2020-12-18 PROCEDURE — 63600000 HC DRUGS/DETAIL CODE: Performed by: INTERNAL MEDICINE

## 2020-12-18 RX ORDER — SODIUM CHLORIDE 0.9 % (FLUSH) 0.9 %
10 SYRINGE (ML) INJECTION AS NEEDED
Status: DISCONTINUED | OUTPATIENT
Start: 2020-12-18 | End: 2020-12-29

## 2020-12-18 RX ORDER — SODIUM CHLORIDE 0.9 % (FLUSH) 0.9 %
10 SYRINGE (ML) INJECTION
Status: DISCONTINUED | OUTPATIENT
Start: 2020-12-18 | End: 2020-12-29

## 2020-12-18 RX ORDER — IBUPROFEN 200 MG
1 TABLET ORAL DAILY
Status: DISCONTINUED | OUTPATIENT
Start: 2020-12-18 | End: 2021-02-03 | Stop reason: HOSPADM

## 2020-12-18 RX ADMIN — METHADONE HYDROCHLORIDE 10 MG: 10 TABLET ORAL at 16:28

## 2020-12-18 RX ADMIN — NICOTINE POLACRILEX 4 MG: 4 GUM, CHEWING ORAL at 21:55

## 2020-12-18 RX ADMIN — METHADONE HYDROCHLORIDE 20 MG: 10 TABLET ORAL at 08:17

## 2020-12-18 RX ADMIN — BUPROPION HYDROCHLORIDE 75 MG: 75 TABLET, FILM COATED ORAL at 12:26

## 2020-12-18 RX ADMIN — BUPROPION HYDROCHLORIDE 75 MG: 75 TABLET, FILM COATED ORAL at 08:18

## 2020-12-18 RX ADMIN — BUTALBITAL, ACETAMINOPHEN AND CAFFEINE 2 TABLET: 50; 325; 40 TABLET ORAL at 06:55

## 2020-12-18 RX ADMIN — NICOTINE POLACRILEX 4 MG: 4 GUM, CHEWING ORAL at 09:20

## 2020-12-18 RX ADMIN — PREGABALIN 150 MG: 75 CAPSULE ORAL at 08:19

## 2020-12-18 RX ADMIN — BUPROPION HYDROCHLORIDE 75 MG: 75 TABLET, FILM COATED ORAL at 16:29

## 2020-12-18 RX ADMIN — Medication 1 PATCH: at 12:26

## 2020-12-18 RX ADMIN — ZOLPIDEM TARTRATE 10 MG: 5 TABLET, COATED ORAL at 21:03

## 2020-12-18 RX ADMIN — QUETIAPINE 100 MG: 100 TABLET, FILM COATED ORAL at 21:03

## 2020-12-18 RX ADMIN — METOPROLOL SUCCINATE 25 MG: 25 TABLET, EXTENDED RELEASE ORAL at 08:17

## 2020-12-18 RX ADMIN — PAROXETINE 30 MG: 20 TABLET, FILM COATED ORAL at 08:17

## 2020-12-18 RX ADMIN — PREGABALIN 150 MG: 75 CAPSULE ORAL at 20:56

## 2020-12-18 RX ADMIN — PRAZOSIN HYDROCHLORIDE 2 MG: 1 CAPSULE ORAL at 21:01

## 2020-12-18 RX ADMIN — CEFAZOLIN SODIUM 2 G: 10 POWDER, FOR SOLUTION INTRAVENOUS at 06:56

## 2020-12-18 RX ADMIN — CYCLOBENZAPRINE HYDROCHLORIDE 10 MG: 5 TABLET, FILM COATED ORAL at 21:03

## 2020-12-18 RX ADMIN — CEFAZOLIN SODIUM 2 G: 10 POWDER, FOR SOLUTION INTRAVENOUS at 17:20

## 2020-12-18 RX ADMIN — Medication 10 ML: at 20:56

## 2020-12-18 RX ADMIN — BUTALBITAL, ACETAMINOPHEN AND CAFFEINE 2 TABLET: 50; 325; 40 TABLET ORAL at 18:57

## 2020-12-18 RX ADMIN — NICOTINE POLACRILEX 4 MG: 4 GUM, CHEWING ORAL at 16:29

## 2020-12-18 RX ADMIN — BUTALBITAL, ACETAMINOPHEN AND CAFFEINE 2 TABLET: 50; 325; 40 TABLET ORAL at 13:00

## 2020-12-18 RX ADMIN — CYCLOBENZAPRINE HYDROCHLORIDE 10 MG: 5 TABLET, FILM COATED ORAL at 09:15

## 2020-12-18 NOTE — BEHAVIORAL HEALTH CRISIS PROGRESS NOTE
Behavioral Health Progress Note    Chief Complaint/Reason for follow-up: opioid dependence/mood disorder    Interval History: The pt again today states she is feeling a lot better. She states she tolerated the methadone well and it lasted longer than with just the 10mg BID the day before. The pt reports that she logged into two virtual AA meetings and plans to continue. She continues to be in good spirits and states she is doing a lot of research into a facility to transfer to for the antibiotic tx.     Vital Signs for the last 24 hours:  Temp:  [36.5 °C (97.7 °F)-36.6 °C (97.9 °F)] 36.6 °C (97.9 °F)  Heart Rate:  [68-85] 85  Resp:  [16-18] 16  BP: (113-135)/(61-80) 135/77    Assessment/Plan  Yesterday the pt was set up with [facility] to begin an intake on Monday, however call from Natalie/ [facility] who informed the pt's chart was reviewed and due to her being started on methadone they will not be able to treat her.   Call to [facility] where the pt has been before for tx, as well they explain they will not treat her under methadone.   Call to [facility] to inquire if they work with any [facility] services for their patients, VM received will try and call back.     1245 Follow up call to the [facility], JOSE left. No one answering.     1345 Follow up with the pt again to discuss further how she felt on the increase dose of methadone. She reports she feels really good. Again today she is able to be up and ambulating, stated she took a shower again.     She has requested for  to come talk to her in regards to placement as she has been researching into some places and as well states she has called [facility] daily to check their bed status. Discussed a few nursing facilities as well that could accommodate the pt's need for antibiotic tx, ie [facility], [facility]. The pt as well states she has spoken to a few friends who were in need of tx and discussed the [facility] and she  looked into them and she is concerned about the care provided, however has further questions for SW.     Sent message to [facility]B SW with updates on pt and request to speak to her. Will continue to follow along with the pt for further needs.

## 2020-12-18 NOTE — DISCHARGE INSTRUCTIONS
PICC Insertion    A peripherally inserted central catheter (PICC) is a form of IV access that allows medicines and IV fluids to be quickly distributed throughout the body. The PICC is a thin, flexible tube (catheter) that is inserted into a vein in your arm or leg. The PICC is guided through your veins until the tip sits in a large vein just outside your heart (superior vena cava or SVC). After the PICC is inserted, a chest X-ray may be done to make sure that it is in the correct place.  Only a health care provider trained in PICC insertion will do the procedure.  You may have a PICC placed:  · To give medicines and nutrition.  · To rapidly give fluids or blood products.  · To take frequent blood samples.  · If there is a problem placing a peripheral intravenous (PIV) catheter.  Tell a health care provider about:  · Any allergies you have.  · All medicines you are taking, including vitamins, herbs, eye drops, creams, and over-the-counter medicines.  · Any problems you or family members have had with anesthetic medicines.  · Any blood disorders you have.  · Any surgeries you have had.  · Any medical conditions you have.  · Whether you are pregnant or may be pregnant.  What are the risks?  Generally, this is a safe procedure. However, problems may occur, including:  · Bleeding. This may happen at the insertion site or internally.  · Infection. This may occur at the insertion site or in the blood.  · Movement or malposition of the PICC.  · Inflammation of the vein (phlebitis).  · Nerve injury or irritation.  · Clot formation at the tip of the PICC line.  · Blood clot in the lung (pulmonary embolus).  · Injury or collapse of the lung (pneumothorax).  · Injury to the large blood vessels or the heart. This is rare.  What happens before the procedure?  · Ask your health care provider about:  ? Changing or stopping your regular medicines. This is especially important if you are taking diabetes medicines or blood  thinners.  ? Taking over-the-counter medicines, vitamins, herbs, and supplements.  ? Taking medicines such as aspirin and ibuprofen. These medicines can thin your blood. Do not take these medicines unless your health care provider tells you to take them.  · You may have blood tests. These tests can help tell how well your blood clots.  · Plan to have someone take you home from the hospital or clinic.  · Plan to have a responsible adult care for you for at least 24 hours after you leave the hospital or clinic. This is important.  · Follow instructions from your health care provider about eating or drinking restrictions.  What happens during the procedure?  · To reduce your risk of infection:  ? Your health care team will wash or sanitize their hands.  ? Your skin will be washed with soap.  ? Hair may be removed from the surgical area.  · Your health care provider will identify a vein into which the PICC line will be inserted. This may be done using ultrasound or X-ray guidance (fluoroscopy).  · You will be given one or more of the following:  ? A medicine to help you relax (sedative).  ? A medicine to numb the area (local anesthetic).  · A tourniquet will be placed on your arm.  · A small needle will be inserted into the vein and then a small guidewire will be advanced into the SVC.  · The catheter will be advanced over the guidewire and moved into position. The guidewire will be removed.  · The catheter will be flushed and blood will be drawn back to make sure it is in the vein.  · If this was done without X-ray guidance, an X-ray will be needed to make sure that the catheter tip is in the correct position.  · Once the placement is confirmed, the PICC will be secured to the skin. This may be done with a device, such as tape, or sutures (stitches).  · A bandage (dressing) will be placed over the PICC insertion site.  The procedure may vary among health care providers and hospitals.  What happens after the  procedure?  · You will be told how to care for your PICC line.  · Do not drive for 24 hours if you were given a sedative.  · Your blood pressure, heart rate, breathing rate, and blood oxygen level will be monitored until the medicines you were given have worn off.  Summary  · A peripherally inserted central catheter (PICC) is a form of IV access that allows medicines and IV fluids to be quickly distributed throughout the body.  · The PICC is a long, thin, flexible tube that is inserted into a vein in your arm or leg and then guided to a large vein (SVC) in your chest.  · You will be instructed on how to care for your PICC line.  This information is not intended to replace advice given to you by your health care provider. Make sure you discuss any questions you have with your health care provider.  Document Released: 10/08/2014 Document Revised: 11/30/2018 Document Reviewed: 02/12/2018  [employer] Patient Education © 2020 [employer]  PICC Home Care Guide    A peripherally inserted central catheter (PICC) is a form of IV access that allows medicines and IV fluids to be quickly distributed throughout the body. The PICC is a long, thin, flexible tube (catheter) that is inserted into a vein in the upper arm. The catheter ends in a large vein in the chest (superior vena cava, or SVC). After the PICC is inserted, a chest X-ray may be done to make sure that it is in the correct place.  A PICC may be placed for different reasons, such as:  · To give medicines and liquid nutrition.  · To give IV fluids and blood products.  · If there is trouble placing a peripheral intravenous (PIV) catheter.  If taken care of properly, a PICC can remain in place for several months. Having a PICC can also allow a person to go home from the hospital sooner. Medicine and PICC care can be managed at home by a family member, caregiver, or home health care team.  What are the risks?  Generally, having a PICC is safe. However, problems may occur,  including:  · A blood clot (thrombus) forming in or at the tip of the PICC.  · A blood clot forming in a vein (deep vein thrombosis) or traveling to the lung (pulmonary embolism).  · Inflammation of the vein (phlebitis) in which the PICC is placed.  · Infection. Central line associated blood stream infection (CLABSI) is a serious infection that often requires hospitalization.  · PICC movement (malposition). The PICC tip may move from its original position due to excessive physical activity, forceful coughing, sneezing, or vomiting.  · A break or cut in the PICC. It is important not to use scissors near the PICC.  · Nerve or tendon irritation or injury during PICC insertion.  How to take care of your PICC  Preventing problems  · You and any caregivers should wash your hands often with soap. Wash hands:  ? Before touching the PICC line or the infusion device.  ? Before changing a bandage (dressing).  · Flush the PICC as told by your health care provider. Let your health care provider know right away if the PICC is hard to flush or does not flush. Do not use force to flush the PICC.  · Do not use a syringe that is less than 10 mL to flush the PICC.  · Avoid blood pressure checks on the arm in which the PICC is placed.  · Never pull or tug on the PICC.  · Do not take the PICC out yourself. Only a trained clinical professional should remove the PICC.  · Use clean and sterile supplies only. Keep the supplies in a dry place. Do not reuse needles, syringes, or any other supplies. Doing that can lead to infection.  · Keep pets and children away from your PICC line.  · Check the PICC insertion site every day for signs of infection. Check for:  ? Leakage.  ? Redness, swelling, or pain.  ? Fluid or blood.  ? Warmth.  ? Pus or a bad smell.  PICC dressing care  · Keep your PICC bandage (dressing) clean and dry to prevent infection.  · Do not take baths, swim, or use a hot tub until your health care provider approves. Ask your  health care provider if you can take showers. You may only be allowed to take sponge baths for bathing. When you are allowed to shower:  ? Ask your health care provider to teach you how to wrap the PICC line.  ? Cover the PICC line with clear plastic wrap and tape to keep it dry while showering.  · Follow instructions from your health care provider about how to take care of your insertion site and dressing. Make sure you:  ? Wash your hands with soap and water before you change your bandage (dressing). If soap and water are not available, use hand .  ? Change your dressing as told by your health care provider.  ? Leave stitches (sutures), skin glue, or adhesive strips in place. These skin closures may need to stay in place for 2 weeks or longer. If adhesive strip edges start to loosen and curl up, you may trim the loose edges. Do not remove adhesive strips completely unless your health care provider tells you to do that.  · Change your PICC dressing if it becomes loose or wet.  General instructions    · Carry your PICC identification card or wear a medical alert bracelet at all times.  · Keep the tube clamped at all times, unless it is being used.  · Carry a smooth-edge clamp with you at all times to place on the tube if it breaks.  · Do not use scissors or sharp objects near the tube.  · You may bend your arm and move it freely. If your PICC is near or at the bend of your elbow, avoid activity with repeated motion at the elbow.  · Avoid lifting heavy objects as told by your health care provider.  · Keep all follow-up visits as told by your health care provider. This is important.  Disposal of supplies  · Throw away any syringes in a disposal container that is meant for sharp items (sharps container). You can buy a sharps container from a pharmacy, or you can make one by using an empty hard plastic bottle with a cover.  · Place any used dressings or infusion bags into a plastic bag. Throw that bag in the  "trash.  Contact a health care provider if:  · You have pain in your arm, ear, face, or teeth.  · You have a fever or chills.  · You have redness, swelling, or pain around the insertion site.  · You have fluid or blood coming from the insertion site.  · Your insertion site feels warm to the touch.  · You have pus or a bad smell coming from the insertion site.  · Your skin feels hard and raised around the insertion site.  Get help right away if:  · Your PICC is accidentally pulled all the way out. If this happens, cover the insertion site with a bandage or gauze dressing. Do not throw the PICC away. Your health care provider will need to check it.  · Your PICC was tugged or pulled and has partially come out. Do not  push the PICC back in.  · You cannot flush the PICC, it is hard to flush, or the PICC leaks around the insertion site when it is flushed.  · You hear a \"flushing\" sound when the PICC is flushed.  · You feel your heart racing or skipping beats.  · There is a hole or tear in the PICC.  · You have swelling in the arm in which the PICC was inserted.  · You have a red streak going up your arm from where the PICC was inserted.  Summary  · A peripherally inserted central catheter (PICC) is a long, thin, flexible tube (catheter) that is inserted into a vein in the upper arm.  · The PICC is inserted using a sterile technique by a specially trained nurse or physician. Only a trained clinical professional should remove it.  · Keep your PICC identification card with you at all times.  · Avoid blood pressure checks on the arm in which the PICC is placed.  · If cared for properly, a PICC can remain in place for several months. Having a PICC can also allow a person to go home from the hospital sooner.  This information is not intended to replace advice given to you by your health care provider. Make sure you discuss any questions you have with your health care provider.  Document Released: 06/23/2004 Document Revised: " 11/30/2018 Document Reviewed: 01/20/2018  [employer] Patient Education © 2020 [employer]

## 2020-12-18 NOTE — DISCHARGE INSTR - ACTIVITY
PICC Information:    Insertion Date: {DATE:12/18/20    Length: 37    Chest X-Ray Results: svc    External Catheter Length: 0    Last Dressing Change: 12/18/20    Power Inject PICC Line? : yes    Last Cap Change: 12/18/20    Keep this information in a safe place and share with your provider  and/or infusion nurse as needed.

## 2020-12-18 NOTE — PROGRESS NOTES
Hospital Medicine Service -  Daily Progress Note       SUBJECTIVE   Interval History: poor peripheral IV access overnight   Feeling better, pain better controlled, no other complaints, afebrile      OBJECTIVE      Vital signs in last 24 hours:  Temp:  [36.5 °C (97.7 °F)-36.7 °C (98 °F)] 36.6 °C (97.9 °F)  Heart Rate:  [68-85] 85  Resp:  [16-18] 16  BP: (113-135)/(61-80) 135/77  No intake or output data in the 24 hours ending 12/18/20 1046    PHYSICAL EXAMINATION      Physical Exam   Constitutional: NAD  Neck: Supple. No JVD.    Cardiovascular: Normal rate, regular rhythm    Pulmonary/Chest: Effort normal and breath sounds normal. No wheezing or crackles.  Abdominal: Soft. Bowel sounds are normal. There is no tenderness, no rebound and no guarding.   Musculoskeletal: No edema   Neurological: Alert and oriented       LINES, CATHETERS, DRAINS, AIRWAYS, AND WOUNDS   Lines, Drains, Airways, Wounds:  Peripheral IV 12/11/20 Right Forearm (Active)   Number of days: 1       Comments:      LABS / IMAGING / TELE      Labs  Lab Results   Component Value Date    WBC 8.31 12/12/2020    HGB 10.1 (L) 12/12/2020    HCT 31.9 (L) 12/12/2020     12/12/2020    ALT 11 12/09/2020    AST 14 (L) 12/09/2020     12/12/2020    K 4.5 12/12/2020     12/12/2020    CREATININE 0.7 12/12/2020    BUN 6 (L) 12/12/2020    CO2 21 (L) 12/12/2020    INR 0.9 04/06/2019       Imaging  Mri Thoracic Spine With And Without Contrast    Result Date: 12/9/2020  IMPRESSION: Diffuse anterior and posterior epidural enhancement within lumbar spine more pronounced at L4-L5 raising the suspicion for underlying infectious process/phlegmon.  Left greater than right L4-L5 facet joint signal and enhancement which although may be degenerative in nature, early changes of septic facet arthritis is in the differential.  Follow-up is recommended. Thoracic and lumbar degenerative change as described more pronounced at L4-L5. Ring-enhancing lesion in right  upper lobe which may be infectious in etiology including in the setting of septic emboli.  Correlation with CT chest with contrast is recommended to exclude other etiologies. The results were discussed with Rylee Bowie on 12/9/2020 7:35 PM.    Mri Lumbar Spine With And Without Contrast    Result Date: 12/9/2020  IMPRESSION: Diffuse anterior and posterior epidural enhancement within lumbar spine more pronounced at L4-L5 raising the suspicion for underlying infectious process/phlegmon.  Left greater than right L4-L5 facet joint signal and enhancement which although may be degenerative in nature, early changes of septic facet arthritis is in the differential.  Follow-up is recommended. Thoracic and lumbar degenerative change as described more pronounced at L4-L5. Ring-enhancing lesion in right upper lobe which may be infectious in etiology including in the setting of septic emboli.  Correlation with CT chest with contrast is recommended to exclude other etiologies. The results were discussed with Rylee Bowie on 12/9/2020 7:35 PM.    X-ray Chest 1 View    Result Date: 12/9/2020  IMPRESSION: No radiographic evidence of acute cardiopulmonary disease.    Ct Chest Pulmonary Embolism With Iv Contrast    Result Date: 12/9/2020  IMPRESSION: 1.  No evidence of main, lobar or segmental pulmonary embolism. 2.  However, there are multifocal nodular lesions throughout all lung fields including a cavitary lesion in the right upper lobe all suspicious for septic emboli.  Some of the tiny subsegmental pulmonary arterial branches demonstrate hypoenhancement and it is difficult to determinate if this is related to tiny pulmonary emboli or technique. 3.  Small bilateral pleural effusions. 4.  Mosaic pattern at the lung bases could represent mild edema or sequela of small airways or small vessels disease. 5.  Follow-up chest CT after appropriate treatment is recommended to document lung opacities and mediastinal lymphadenopathy which is  probably reactive. 6.  Additional findings discussed below. Finding:    Other   Acuity: Critical  Status:  CLOSED Critical read back for the first impression performed with Bertha Bowie PA 9:25 PM 12/9/2020 COMMENT: Comparison: Chest x-ray from 12/9/2020 Technique: Chest CT pulmonary embolism protocol performed with intravenous contrast.  80 mL Omnipaque 350 given. CT DOSE:  One or more dose reduction techniques (e.g. automated exposure control, adjustment of the mA and/or kV according to patient size, use of iterative reconstruction technique) utilized for this examination. FINDINGS: LUNG, PLEURA AND LARGE AIRWAYS: The trachea and proximal bronchi remain clear. There are multifocal nodular lesions throughout the lungs.  For example in the right upper lobe there is a cavitary lesion measuring 1.4 cm on axial image 63. A 1.6 cm nodular lesion in the left upper lobe on image 69.  Multiple additional lesions are present elsewhere.  His are suspicious for septic emboli particularly given the history of endocarditis and sepsis. There are small bilateral pleural effusions. There is a nonspecific mild degree of groundglass opacity in the lower lung fields.  This could represent mild degree of pulmonary edema or possibly a mosaic pattern from small airways or small vessel disease. No pneumothorax. VESSELS: Normal caliber of the thoracic aorta.  Thoracic aorta appears within normal limits.  Main pulmonary artery is normal in caliber.  No evidence of a main, lobar or segmental pulmonary embolism.  However, the subsegmental vessels are difficult to assess and some demonstrate a slightly hypoechoic enhancing appearance possibly artifact from bolus timing although tiny subsegmental pulmonary emboli would be difficult to exclude.  For example, in the lingula there is some hypoenhancement of the subsegmental vessels on image 116 where subsegmental PE cannot be excluded.  No evidence of right heart strain. HEART: normal size.  No pericardial effusion. MEDIASTINUM AND PAULINA: There is a pathologically enlarged lymph node in the AP window measuring 1.8 cm short axis.  Additional prominent but nonpathologic by size video lymph nodes elsewhere. CHEST WALL AND LOWER NECK: within normal limits. UPPER ABDOMEN:Limited assessment of the upper abdominal structures with this technique.  There is splenomegaly with the spleen measuring 14.5 cm.  The liver may also be enlarged but is incompletely visualized.  Cholecystectomy changes. BONES: No suspicious bony erosive changes.  Multilevel Schmorl's nodes and degenerative changes in the spine are noted.  Calcific tendinosis of the right rotator cuff.  There is limited assessment of the spine with this technique.  No gross paraspinal inflammatory changes.     Ultrasound Abdomen Limited    Result Date: 12/17/2020  IMPRESSION: Mild increase in echotexture of the liver may represent fibrofatty infiltration. COMMENT: Ultrasound evaluation of the right upper quadrant was performed and compared to a CT exam dated 8/18/2019.  The pancreas is of normal echotexture and unremarkable.  The aorta is obscured by bowel gas.  The liver is 17.6 cm in length and of increased echogenicity consistent with fibrofatty infiltration. There is no ductal dilatation or mass lesion.  Common bile duct measures 2.3 mm at the pito hepatis.  The right kidney is 10.5 cm in length.  There is no nephrolithiasis, hydronephrosis or mass lesion.  The renal cortex of normal thickness and echotexture.        ECG/Telemetry  I have independently reviewed the telemetry. No events for the last 24 hours.    ASSESSMENT AND PLAN      Tricuspid valve vegetation  Overview  JANUSZ 12/11/20  · Tricuspid valve endocarditis.  · 1 x 0.6 cm echolucent spongy sessile mass attached to the mid body of the posterior tricuspid leaflet consistent with vegetation. There is a tiny mobile component on the atrial surface of the mass. There appears to be a smaller segment  of vegetation on the ventricular surface of the valve leaflet opposite the larger component. There is a tiny perforation at the edge of the vegetation 5 mm from the annulus of the leaflet.  · Borderline severe tricuspid regurgitation through the valve orifice. There is a small regurgitant jet through the perforation.  · Estimated RVSP = 48 mmHg assuming a right atrial pressure 5 mmHg  · Normal-appearing aortic, mitral, pulmonic valves. Trace mitral regurgitation.  · Normal-sized LV. Normal LV wall thickness.  · Preserved LV systolic function. Estimated EF 60- 65%. Normal LV septal wall motion. No regional wall motion abnormalities.  · Normal RV size and function.  · Normal-sized LA. Intact atrial septum. Normal pulmonary veins  · Normal-sized RA. No PFO by saline contrast injection or color-flow imaging.  · Trace mitral valve regurgitation.  · Normal-sized IVC. Normal SVC  · Normal aorta.    Assessment & Plan  Sessile vegetation on tricuspid valve leaflet  Continue antibiotics  Cards consulted.     Septic arthritis of vertebra (CMS/HCC)  Overview  MRI Spine 12/2020  Diffuse anterior and posterior epidural enhancement within lumbar spine more  pronounced at L4-L5 raising the suspicion for underlying infectious  process/phlegmon.  Left greater than right L4-L5 facet joint signal and  enhancement which although may be degenerative in nature, early changes of  septic facet arthritis is in the differential.  Follow-up is recommended.     Thoracic and lumbar degenerative change as described more pronounced at L4-L5.     Ring-enhancing lesion in right upper lobe which may be infectious in etiology  including in the setting of septic emboli.  Correlation with CT chest with  contrast is recommended to exclude other etiologies.    Methicillin sensitive staphylococcal aureus cultures done at [facility] per ID note.    Assessment & Plan  Neurovascular intact  Palliative care medicine and psych following   Off oxycodone,  on methadone       Essential hypertension  Assessment & Plan  Continue with home meds  bp parameters for hypotension    Septic embolism (CMS/HCC)  Assessment & Plan  Continue IV abx per ID   Blood cultures performed on admission, NGTD  Evidence of discitis on MRI, neurosurgery consulted as well as ID    Depression  Assessment & Plan  Continue home meds   Psych consulted.     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

## 2020-12-18 NOTE — PATIENT CARE CONFERENCE
Care Progression Rounds Note  Date: 12/18/2020  Time: 10:33 AM     Patient Name: Carolyn Redmond     Medical Record Number: [id number]   YOB: 1985  Sex: Female      Room/Bed: 4211    Admitting Diagnosis: Septic embolism (CMS/[facility]) [I76]  Infection of lumbar spine (CMS/[facility]) [M46.26]  Acute left-sided low back pain without sciatica [M54.5]   Admit Date/Time: 12/9/2020 11:10 AM    Primary Diagnosis: Acute bacterial endocarditis  Principal Problem: Acute bacterial endocarditis    GMLOS: 4.8  Anticipated Discharge Date: 12/11/2020    AM-PAC  Mobility Score:      Discharge Planning:       Barriers to Discharge:  Barriers to Discharge: Medical issues not resolved  Comment: endocarditis, IV meds    Participants:  social work/services, nursing

## 2020-12-18 NOTE — NURSING NOTE
Nursing report given to Dorian on 4B this afternoon; no covid test obtained at ; MD ordered and specimen sent. Results received and pt transferred to 4B room 4211 with personal belongings

## 2020-12-18 NOTE — NURSING NOTE
Patient was unable to get dose earlier this afternoon because PICC line was not placed yet. PICC line now placed and position confirmed.

## 2020-12-19 PROCEDURE — 63700000 HC SELF-ADMINISTRABLE DRUG: Performed by: HOSPITALIST

## 2020-12-19 PROCEDURE — 99232 SBSQ HOSP IP/OBS MODERATE 35: CPT | Performed by: HOSPITALIST

## 2020-12-19 PROCEDURE — 63700000 HC SELF-ADMINISTRABLE DRUG: Performed by: NURSE PRACTITIONER

## 2020-12-19 PROCEDURE — 25000000 HC PHARMACY GENERAL: Performed by: INTERNAL MEDICINE

## 2020-12-19 PROCEDURE — 25800000 HC PHARMACY IV SOLUTIONS: Performed by: INTERNAL MEDICINE

## 2020-12-19 PROCEDURE — 63700000 HC SELF-ADMINISTRABLE DRUG: Performed by: PSYCHIATRY & NEUROLOGY

## 2020-12-19 PROCEDURE — 63600000 HC DRUGS/DETAIL CODE: Performed by: INTERNAL MEDICINE

## 2020-12-19 PROCEDURE — 63700000 HC SELF-ADMINISTRABLE DRUG: Performed by: INTERNAL MEDICINE

## 2020-12-19 PROCEDURE — 12000000 HC ROOM AND CARE MED/SURG

## 2020-12-19 RX ORDER — HYDROXYZINE HYDROCHLORIDE 25 MG/1
25 TABLET, FILM COATED ORAL NIGHTLY PRN
Status: DISCONTINUED | OUTPATIENT
Start: 2020-12-19 | End: 2021-01-16

## 2020-12-19 RX ORDER — HYDROCORTISONE ACETATE PRAMOXINE HCL 2.5; 1 G/100G; G/100G
CREAM TOPICAL 4 TIMES DAILY PRN
Status: DISCONTINUED | OUTPATIENT
Start: 2020-12-19 | End: 2021-02-03 | Stop reason: HOSPADM

## 2020-12-19 RX ORDER — TRIMETHOBENZAMIDE HYDROCHLORIDE 300 MG/1
300 CAPSULE ORAL EVERY 8 HOURS PRN
Status: DISCONTINUED | OUTPATIENT
Start: 2020-12-19 | End: 2020-12-20

## 2020-12-19 RX ORDER — PANTOPRAZOLE SODIUM 40 MG/1
40 TABLET, DELAYED RELEASE ORAL
Status: DISCONTINUED | OUTPATIENT
Start: 2020-12-19 | End: 2021-01-02

## 2020-12-19 RX ADMIN — CEFAZOLIN SODIUM 2 G: 10 POWDER, FOR SOLUTION INTRAVENOUS at 08:36

## 2020-12-19 RX ADMIN — PANTOPRAZOLE SODIUM 40 MG: 40 TABLET, DELAYED RELEASE ORAL at 21:21

## 2020-12-19 RX ADMIN — QUETIAPINE 100 MG: 100 TABLET, FILM COATED ORAL at 20:57

## 2020-12-19 RX ADMIN — TRIMETHOBENZAMIDE HYDROCHLORIDE 300 MG: 300 CAPSULE ORAL at 18:03

## 2020-12-19 RX ADMIN — PAROXETINE 30 MG: 20 TABLET, FILM COATED ORAL at 08:28

## 2020-12-19 RX ADMIN — PRAMOXINE HYDROCHLORIDE AND HYDROCORTISONE ACETATE: 10; 25 CREAM TOPICAL at 16:43

## 2020-12-19 RX ADMIN — Medication 10 ML: at 20:58

## 2020-12-19 RX ADMIN — PSYLLIUM HUSK 1 PACKET: 3.4 POWDER ORAL at 12:20

## 2020-12-19 RX ADMIN — BUPROPION HYDROCHLORIDE 75 MG: 75 TABLET, FILM COATED ORAL at 08:28

## 2020-12-19 RX ADMIN — Medication 10 ML: at 12:21

## 2020-12-19 RX ADMIN — NICOTINE POLACRILEX 4 MG: 4 GUM, CHEWING ORAL at 08:36

## 2020-12-19 RX ADMIN — PREGABALIN 150 MG: 75 CAPSULE ORAL at 08:29

## 2020-12-19 RX ADMIN — PRAZOSIN HYDROCHLORIDE 2 MG: 1 CAPSULE ORAL at 21:01

## 2020-12-19 RX ADMIN — CEFAZOLIN SODIUM 2 G: 10 POWDER, FOR SOLUTION INTRAVENOUS at 17:46

## 2020-12-19 RX ADMIN — CYCLOBENZAPRINE HYDROCHLORIDE 10 MG: 5 TABLET, FILM COATED ORAL at 20:57

## 2020-12-19 RX ADMIN — PROBIOTIC PRODUCT - TAB 1 TABLET: TAB at 21:21

## 2020-12-19 RX ADMIN — BUTALBITAL, ACETAMINOPHEN AND CAFFEINE 2 TABLET: 50; 325; 40 TABLET ORAL at 14:49

## 2020-12-19 RX ADMIN — PREGABALIN 150 MG: 75 CAPSULE ORAL at 20:57

## 2020-12-19 RX ADMIN — Medication 10 ML: at 03:10

## 2020-12-19 RX ADMIN — CYCLOBENZAPRINE HYDROCHLORIDE 10 MG: 5 TABLET, FILM COATED ORAL at 12:21

## 2020-12-19 RX ADMIN — NICOTINE POLACRILEX 4 MG: 4 GUM, CHEWING ORAL at 13:44

## 2020-12-19 RX ADMIN — BUPROPION HYDROCHLORIDE 75 MG: 75 TABLET, FILM COATED ORAL at 12:21

## 2020-12-19 RX ADMIN — BUTALBITAL, ACETAMINOPHEN AND CAFFEINE 2 TABLET: 50; 325; 40 TABLET ORAL at 08:27

## 2020-12-19 RX ADMIN — METHADONE HYDROCHLORIDE 10 MG: 10 TABLET ORAL at 16:25

## 2020-12-19 RX ADMIN — BUPROPION HYDROCHLORIDE 75 MG: 75 TABLET, FILM COATED ORAL at 16:36

## 2020-12-19 RX ADMIN — METHADONE HYDROCHLORIDE 20 MG: 10 TABLET ORAL at 08:27

## 2020-12-19 RX ADMIN — Medication 1 PATCH: at 08:31

## 2020-12-19 RX ADMIN — ZOLPIDEM TARTRATE 10 MG: 5 TABLET, COATED ORAL at 20:58

## 2020-12-19 RX ADMIN — BUTALBITAL, ACETAMINOPHEN AND CAFFEINE 2 TABLET: 50; 325; 40 TABLET ORAL at 20:57

## 2020-12-19 RX ADMIN — CEFAZOLIN SODIUM 2 G: 10 POWDER, FOR SOLUTION INTRAVENOUS at 01:35

## 2020-12-19 RX ADMIN — METOPROLOL SUCCINATE 25 MG: 25 TABLET, EXTENDED RELEASE ORAL at 08:32

## 2020-12-19 NOTE — NURSING NOTE
Pt night uneventful, reports chronic lower back pain that radiates to b/l, prn pain medication given as requested. Ad oz independently in room. Pt denies n/v, dizziness, numbness/tingling, sob or chest. VSS. Call bell within reach, bed alarm refused.

## 2020-12-19 NOTE — PROGRESS NOTES
Hospital Medicine Service -  Daily Progress Note       SUBJECTIVE   Interval History: Feels much better overall. She has had 1-2 loose BM's over the last 24 hours. She notes she has an inflamed hemorrhoid.     OBJECTIVE      Vital signs in last 24 hours:  Temp:  [36.7 °C (98 °F)-37 °C (98.6 °F)] 36.7 °C (98 °F)  Heart Rate:  [68-96] 87  Resp:  [17-18] 18  BP: ()/(53-82) 121/65    Intake/Output Summary (Last 24 hours) at 12/19/2020 1203  Last data filed at 12/19/2020 0310  Gross per 24 hour   Intake 60 ml   Output --   Net 60 ml       PHYSICAL EXAMINATION      Physical Exam  Constitutional:       Appearance: She is well-developed.   HENT:      Head: Normocephalic and atraumatic.   Eyes:      Pupils: Pupils are equal, round, and reactive to light.   Cardiovascular:      Rate and Rhythm: Normal rate and regular rhythm.   Pulmonary:      Effort: Pulmonary effort is normal.      Breath sounds: Normal breath sounds. No wheezing or rales.   Abdominal:      General: Bowel sounds are normal.      Palpations: Abdomen is soft.   Skin:     General: Skin is warm and dry.      Findings: No rash.   Neurological:      Mental Status: She is alert and oriented to person, place, and time.        LABS / IMAGING / TELE      Labs  Lab Results   Component Value Date    GLUCOSE 88 12/12/2020    CALCIUM 9.2 12/12/2020     12/12/2020    K 4.5 12/12/2020    CO2 21 (L) 12/12/2020     12/12/2020    BUN 6 (L) 12/12/2020    CREATININE 0.7 12/12/2020     Lab Results   Component Value Date    WBC 8.31 12/12/2020    HGB 10.1 (L) 12/12/2020    HCT 31.9 (L) 12/12/2020    MCV 84.8 12/12/2020     12/12/2020     Microbiology Results     Procedure Component Value Units Date/Time    SARS-CoV-2 (COVID-19), PCR Nasopharynx [[id number]]  (Normal) Collected: 12/17/20 9021    Specimen: Nasopharyngeal Swab from Nasopharynx Updated: 12/17/20 2812    Narrative:      The following orders were created for panel order SARS-CoV-2 (COVID-19),  PCR Nasopharynx.  Procedure                               Abnormality         Status                     ---------                               -----------         ------                     SARS-CoV-2 (COVID-19), P...[[id number]]  Normal              Final result                 Please view results for these tests on the individual orders.    SARS-CoV-2 (COVID-19), PCR Nasopharynx [[id number]]  (Normal) Collected: 12/17/20 1653    Specimen: Nasopharyngeal Swab from Nasopharynx Updated: 12/17/20 1747     SARS-CoV-2 (COVID-19) Negative    Blood Culture Blood, Venous [[id number]] Collected: 12/11/20 1339    Specimen: Blood, Venous Updated: 12/16/20 1801     Culture No growth at 120 hours    Blood Culture Blood, Venous [[id number]] Collected: 12/10/20 1504    Specimen: Blood, Venous Updated: 12/15/20 2100     Culture No growth at 120 hours    Blood Culture Blood, Venous [[id number]] Collected: 12/10/20 1504    Specimen: Blood, Venous Updated: 12/15/20 2100     Culture No growth at 120 hours    Blood Culture Blood, Venous [[id number]] Collected: 12/09/20 1348    Specimen: Blood, Venous Updated: 12/14/20 1800     Culture No growth at 120 hours    Blood Culture Blood, Venous [[id number]] Collected: 12/09/20 1304    Specimen: Blood, Venous Updated: 12/14/20 1501     Culture No growth at 120 hours        Imaging  Mri Thoracic Spine With And Without Contrast    Result Date: 12/9/2020  IMPRESSION: Diffuse anterior and posterior epidural enhancement within lumbar spine more pronounced at L4-L5 raising the suspicion for underlying infectious process/phlegmon.  Left greater than right L4-L5 facet joint signal and enhancement which although may be degenerative in nature, early changes of septic facet arthritis is in the differential.  Follow-up is recommended. Thoracic and lumbar degenerative change as described more pronounced at L4-L5. Ring-enhancing lesion in right upper lobe which may be infectious in etiology including in the  setting of septic emboli.  Correlation with CT chest with contrast is recommended to exclude other etiologies. The results were discussed with Rylee Bowie on 12/9/2020 7:35 PM.    Mri Lumbar Spine With And Without Contrast    Result Date: 12/9/2020  IMPRESSION: Diffuse anterior and posterior epidural enhancement within lumbar spine more pronounced at L4-L5 raising the suspicion for underlying infectious process/phlegmon.  Left greater than right L4-L5 facet joint signal and enhancement which although may be degenerative in nature, early changes of septic facet arthritis is in the differential.  Follow-up is recommended. Thoracic and lumbar degenerative change as described more pronounced at L4-L5. Ring-enhancing lesion in right upper lobe which may be infectious in etiology including in the setting of septic emboli.  Correlation with CT chest with contrast is recommended to exclude other etiologies. The results were discussed with Rylee Bowie on 12/9/2020 7:35 PM.    X-ray Chest 1 View    Result Date: 12/18/2020  IMPRESSION: Interval placement of a right-sided PICC line catheter as described above.     X-ray Chest 1 View    Result Date: 12/18/2020  IMPRESSION: Interval placement of a right-sided PICC line catheter as described above.     X-ray Chest 1 View    Result Date: 12/9/2020  IMPRESSION: No radiographic evidence of acute cardiopulmonary disease.    Ct Chest Pulmonary Embolism With Iv Contrast    Result Date: 12/9/2020  IMPRESSION: 1.  No evidence of main, lobar or segmental pulmonary embolism. 2.  However, there are multifocal nodular lesions throughout all lung fields including a cavitary lesion in the right upper lobe all suspicious for septic emboli.  Some of the tiny subsegmental pulmonary arterial branches demonstrate hypoenhancement and it is difficult to determinate if this is related to tiny pulmonary emboli or technique. 3.  Small bilateral pleural effusions. 4.  Mosaic pattern at the lung bases  could represent mild edema or sequela of small airways or small vessels disease. 5.  Follow-up chest CT after appropriate treatment is recommended to document lung opacities and mediastinal lymphadenopathy which is probably reactive. 6.  Additional findings discussed below. Finding:    Other   Acuity: Critical  Status:  CLOSED Critical read back for the first impression performed with Bertha Bowie PA 9:25 PM 12/9/2020 COMMENT: Comparison: Chest x-ray from 12/9/2020 Technique: Chest CT pulmonary embolism protocol performed with intravenous contrast.  80 mL Omnipaque 350 given. CT DOSE:  One or more dose reduction techniques (e.g. automated exposure control, adjustment of the mA and/or kV according to patient size, use of iterative reconstruction technique) utilized for this examination. FINDINGS: LUNG, PLEURA AND LARGE AIRWAYS: The trachea and proximal bronchi remain clear. There are multifocal nodular lesions throughout the lungs.  For example in the right upper lobe there is a cavitary lesion measuring 1.4 cm on axial image 63. A 1.6 cm nodular lesion in the left upper lobe on image 69.  Multiple additional lesions are present elsewhere.  His are suspicious for septic emboli particularly given the history of endocarditis and sepsis. There are small bilateral pleural effusions. There is a nonspecific mild degree of groundglass opacity in the lower lung fields.  This could represent mild degree of pulmonary edema or possibly a mosaic pattern from small airways or small vessel disease. No pneumothorax. VESSELS: Normal caliber of the thoracic aorta.  Thoracic aorta appears within normal limits.  Main pulmonary artery is normal in caliber.  No evidence of a main, lobar or segmental pulmonary embolism.  However, the subsegmental vessels are difficult to assess and some demonstrate a slightly hypoechoic enhancing appearance possibly artifact from bolus timing although tiny subsegmental pulmonary emboli would be  difficult to exclude.  For example, in the lingula there is some hypoenhancement of the subsegmental vessels on image 116 where subsegmental PE cannot be excluded.  No evidence of right heart strain. HEART: normal size. No pericardial effusion. MEDIASTINUM AND PAULINA: There is a pathologically enlarged lymph node in the AP window measuring 1.8 cm short axis.  Additional prominent but nonpathologic by size video lymph nodes elsewhere. CHEST WALL AND LOWER NECK: within normal limits. UPPER ABDOMEN:Limited assessment of the upper abdominal structures with this technique.  There is splenomegaly with the spleen measuring 14.5 cm.  The liver may also be enlarged but is incompletely visualized.  Cholecystectomy changes. BONES: No suspicious bony erosive changes.  Multilevel Schmorl's nodes and degenerative changes in the spine are noted.  Calcific tendinosis of the right rotator cuff.  There is limited assessment of the spine with this technique.  No gross paraspinal inflammatory changes.     Ultrasound Abdomen Limited    Result Date: 12/17/2020  IMPRESSION: Mild increase in echotexture of the liver may represent fibrofatty infiltration. COMMENT: Ultrasound evaluation of the right upper quadrant was performed and compared to a CT exam dated 8/18/2019.  The pancreas is of normal echotexture and unremarkable.  The aorta is obscured by bowel gas.  The liver is 17.6 cm in length and of increased echogenicity consistent with fibrofatty infiltration. There is no ductal dilatation or mass lesion.  Common bile duct measures 2.3 mm at the pito hepatis.  The right kidney is 10.5 cm in length.  There is no nephrolithiasis, hydronephrosis or mass lesion.  The renal cortex of normal thickness and echotexture.      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  · 1 x 0.6 cm echolucent spongy sessile mass attached to the mid body of the posterior tricuspid leaflet consistent with vegetation. There is a tiny mobile component on the atrial surface of the mass. There appears to be a smaller segment of vegetation on the ventricular surface of the valve leaflet opposite the larger component. There is a tiny perforation at the edge of the vegetation 5 mm from the annulus of the leaflet.  · Borderline severe tricuspid regurgitation through the valve orifice. There is a small regurgitant jet through the perforation.  · Estimated RVSP = 48 mmHg assuming a right atrial pressure 5 mmHg  · Normal-appearing aortic, mitral, pulmonic valves. Trace mitral regurgitation.  · Normal-sized LV. Normal LV wall thickness.  · Preserved LV systolic function. Estimated EF 60- 65%. Normal LV septal wall motion. No regional wall motion abnormalities.  · Normal RV size and function.  · Normal-sized LA. Intact atrial septum. Normal pulmonary veins  · Normal-sized RA. No PFO by saline contrast injection or color-flow imaging.  · Trace mitral valve regurgitation.  · Normal-sized IVC. Normal SVC  · Normal aorta.    Assessment & Plan  Sessile vegetation on tricuspid valve leaflet  Continue antibiotics    Septic arthritis of vertebra (CMS/HCC)  Overview  MRI Spine 12/2020  Diffuse anterior and posterior epidural enhancement within lumbar spine more  pronounced at L4-L5 raising the suspicion for underlying infectious  process/phlegmon.  Left greater than right L4-L5 facet joint signal and  enhancement which although may be degenerative in nature, early changes of  septic facet arthritis is in the differential.  Follow-up is recommended.     Thoracic and lumbar degenerative change as described more pronounced at L4-L5.     Ring-enhancing lesion in right upper  lobe which may be infectious in etiology  including in the setting of septic emboli.  Correlation with CT chest with  contrast is recommended to exclude other etiologies.    Methicillin sensitive staphylococcal aureus cultures done at [facility] per ID note.    Assessment & Plan  Neurovascular intact  Palliative care medicine and psych following   Off oxycodone, on methadone       Essential hypertension  Assessment & Plan  Continue with home meds  bp parameters for hypotension    Septic embolism (CMS/HCC)  Assessment & Plan  Continue IV abx per ID   Blood cultures performed on admission, NGTD  Evidence of discitis on MRI, plan per neurosurgery is follow-up MRI after completion of antibiotics    Depression  Assessment & Plan  Continue home meds          VTE Assessment: [place] VTE Score: 5  Code Status: Full Code  Estimated discharge date: 12/11/2020     Dominique Powell DO  12/19/2020  12:03 PM

## 2020-12-19 NOTE — NURSING NOTE
Patient is keeping her family updated. Spoke to her mom today. Reviewed plan of care with patient.

## 2020-12-20 PROCEDURE — 93005 ELECTROCARDIOGRAM TRACING: CPT | Performed by: HOSPITALIST

## 2020-12-20 PROCEDURE — 63700000 HC SELF-ADMINISTRABLE DRUG: Performed by: HOSPITALIST

## 2020-12-20 PROCEDURE — 25000000 HC PHARMACY GENERAL: Performed by: INTERNAL MEDICINE

## 2020-12-20 PROCEDURE — 25800000 HC PHARMACY IV SOLUTIONS: Performed by: INTERNAL MEDICINE

## 2020-12-20 PROCEDURE — 63700000 HC SELF-ADMINISTRABLE DRUG: Performed by: NURSE PRACTITIONER

## 2020-12-20 PROCEDURE — 63700000 HC SELF-ADMINISTRABLE DRUG: Performed by: PSYCHIATRY & NEUROLOGY

## 2020-12-20 PROCEDURE — 63600000 HC DRUGS/DETAIL CODE: Performed by: INTERNAL MEDICINE

## 2020-12-20 PROCEDURE — 63700000 HC SELF-ADMINISTRABLE DRUG: Performed by: INTERNAL MEDICINE

## 2020-12-20 PROCEDURE — 99232 SBSQ HOSP IP/OBS MODERATE 35: CPT | Performed by: HOSPITALIST

## 2020-12-20 PROCEDURE — 12000000 HC ROOM AND CARE MED/SURG

## 2020-12-20 RX ORDER — TRIMETHOBENZAMIDE HYDROCHLORIDE 300 MG/1
300 CAPSULE ORAL EVERY 8 HOURS
Status: DISCONTINUED | OUTPATIENT
Start: 2020-12-20 | End: 2021-01-16

## 2020-12-20 RX ADMIN — CEFAZOLIN SODIUM 2 G: 10 POWDER, FOR SOLUTION INTRAVENOUS at 08:40

## 2020-12-20 RX ADMIN — METHADONE HYDROCHLORIDE 20 MG: 10 TABLET ORAL at 08:36

## 2020-12-20 RX ADMIN — ZOLPIDEM TARTRATE 10 MG: 5 TABLET, COATED ORAL at 21:13

## 2020-12-20 RX ADMIN — BUTALBITAL, ACETAMINOPHEN AND CAFFEINE 2 TABLET: 50; 325; 40 TABLET ORAL at 20:46

## 2020-12-20 RX ADMIN — Medication 10 ML: at 12:57

## 2020-12-20 RX ADMIN — PREGABALIN 150 MG: 75 CAPSULE ORAL at 07:46

## 2020-12-20 RX ADMIN — NICOTINE POLACRILEX 4 MG: 4 GUM, CHEWING ORAL at 12:56

## 2020-12-20 RX ADMIN — CYCLOBENZAPRINE HYDROCHLORIDE 10 MG: 5 TABLET, FILM COATED ORAL at 16:36

## 2020-12-20 RX ADMIN — PREGABALIN 150 MG: 75 CAPSULE ORAL at 20:46

## 2020-12-20 RX ADMIN — Medication 1 PATCH: at 08:40

## 2020-12-20 RX ADMIN — BUPROPION HYDROCHLORIDE 75 MG: 75 TABLET, FILM COATED ORAL at 07:46

## 2020-12-20 RX ADMIN — PROBIOTIC PRODUCT - TAB 1 TABLET: TAB at 08:36

## 2020-12-20 RX ADMIN — PSYLLIUM HUSK 1 PACKET: 3.4 POWDER ORAL at 08:39

## 2020-12-20 RX ADMIN — QUETIAPINE 100 MG: 100 TABLET, FILM COATED ORAL at 21:13

## 2020-12-20 RX ADMIN — PROBIOTIC PRODUCT - TAB 1 TABLET: TAB at 20:46

## 2020-12-20 RX ADMIN — Medication 10 ML: at 20:48

## 2020-12-20 RX ADMIN — CYCLOBENZAPRINE HYDROCHLORIDE 10 MG: 5 TABLET, FILM COATED ORAL at 08:53

## 2020-12-20 RX ADMIN — PAROXETINE 30 MG: 20 TABLET, FILM COATED ORAL at 08:38

## 2020-12-20 RX ADMIN — BUPROPION HYDROCHLORIDE 75 MG: 75 TABLET, FILM COATED ORAL at 12:57

## 2020-12-20 RX ADMIN — PANTOPRAZOLE SODIUM 40 MG: 40 TABLET, DELAYED RELEASE ORAL at 05:47

## 2020-12-20 RX ADMIN — BUTALBITAL, ACETAMINOPHEN AND CAFFEINE 2 TABLET: 50; 325; 40 TABLET ORAL at 12:56

## 2020-12-20 RX ADMIN — BUPROPION HYDROCHLORIDE 75 MG: 75 TABLET, FILM COATED ORAL at 16:36

## 2020-12-20 RX ADMIN — BUTALBITAL, ACETAMINOPHEN AND CAFFEINE 2 TABLET: 50; 325; 40 TABLET ORAL at 05:46

## 2020-12-20 RX ADMIN — NICOTINE POLACRILEX 4 MG: 4 GUM, CHEWING ORAL at 08:53

## 2020-12-20 RX ADMIN — TRIMETHOBENZAMIDE HYDROCHLORIDE 300 MG: 300 CAPSULE ORAL at 16:36

## 2020-12-20 RX ADMIN — Medication 10 ML: at 04:24

## 2020-12-20 RX ADMIN — CEFAZOLIN SODIUM 2 G: 10 POWDER, FOR SOLUTION INTRAVENOUS at 16:48

## 2020-12-20 RX ADMIN — METHADONE HYDROCHLORIDE 10 MG: 10 TABLET ORAL at 16:47

## 2020-12-20 RX ADMIN — METOPROLOL SUCCINATE 25 MG: 25 TABLET, EXTENDED RELEASE ORAL at 08:37

## 2020-12-20 RX ADMIN — CEFAZOLIN SODIUM 2 G: 10 POWDER, FOR SOLUTION INTRAVENOUS at 00:40

## 2020-12-20 RX ADMIN — PRAZOSIN HYDROCHLORIDE 2 MG: 1 CAPSULE ORAL at 21:13

## 2020-12-20 NOTE — NURSING NOTE
Pt refused bed alarm and chair alarm; pt educated on fall risks and fall prevention while in the hospital. Pt educated on medication side effects as well. Pt verbalized understanding and continues to refuse all safety alarms. Will continue to monitor.

## 2020-12-20 NOTE — NURSING NOTE
Reassessed patient. No change in previous assessment. Pt OOB ambulatory in room throughout shift. Neurovascular checks unchanged; VSS. Afebrile. Pt denies N/V during shift; pt reports adequate pain control with medication regimen. Will continue to monitor.

## 2020-12-20 NOTE — PROGRESS NOTES
MSW CC following to assist for dc planning needs. MSW CC met with pt. Pt was very conversational. She knows what her options are and informed [facility] of the work she is doing to look into things and she sees that there are no beds available at this time. She said that is ok. She is aware that [facility] called [facility] today and they said they have no beds still and are unsure of when one may open. MSW CC following to assist, as needed. Pt was appreciative of [facility] stopping into the room with an update.    PLAN: pt is for IV antibiotic treatment.

## 2020-12-20 NOTE — ASSESSMENT & PLAN NOTE
Suspected tricuspid valve endocarditis  Sepsis due to acute bacterial endocarditis present on admission  Continue antibiotics  Blood cultures from [facility]  Repeat blood cultures NGTD  PICC line in place  Had nausea again with cefazolin, will utilize tigan prior to antibiotics

## 2020-12-20 NOTE — NURSING NOTE
Pt showed me a photo from her phone which she reports is her emesis from earlier today, and that it has blood in it.  She requests that I tell the physician that she is having epigastric pain.  Her mom thinks she may have an ulcer.  Requested Carafate from on call NP, and instead protonix and bacid were ordered.    Pt also insisted on taking her Prazosin, even though her DBP was below parameter.  She insisted that she takes it for night terrors.  I suggested that all the meds I was giving her (and she insisted on taking all at the same time) could further drop her BP.  She still insisted on taking it.  Will continue to monitor

## 2020-12-20 NOTE — PROGRESS NOTES
Hospital Medicine Service -  Daily Progress Note       SUBJECTIVE   Interval History: Had nausea and vomiting last night. Now resolved. She has had intermittent nausea associated with cefazolin. No fevers. Pain reasonably well controlled.     OBJECTIVE      Vital signs in last 24 hours:  Temp:  [36.6 °C (97.9 °F)-36.9 °C (98.4 °F)] 36.6 °C (97.9 °F)  Heart Rate:  [73-84] 84  Resp:  [17-19] 18  BP: (114-130)/(58-75) 122/58    Intake/Output Summary (Last 24 hours) at 12/20/2020 1246  Last data filed at 12/19/2020 1816  Gross per 24 hour   Intake 100 ml   Output --   Net 100 ml       PHYSICAL EXAMINATION      Physical Exam  Constitutional:       General: She is not in acute distress.     Appearance: She is well-developed.   HENT:      Head: Normocephalic and atraumatic.   Cardiovascular:      Rate and Rhythm: Normal rate and regular rhythm.      Heart sounds: No murmur.   Pulmonary:      Effort: Pulmonary effort is normal.      Breath sounds: Normal breath sounds. No wheezing or rales.   Abdominal:      General: Bowel sounds are normal.      Palpations: Abdomen is soft.   Skin:     General: Skin is warm and dry.      Findings: No rash.   Neurological:      Mental Status: She is alert and oriented to person, place, and time.        LABS / IMAGING / TELE      Labs  Lab Results   Component Value Date    GLUCOSE 88 12/12/2020    CALCIUM 9.2 12/12/2020     12/12/2020    K 4.5 12/12/2020    CO2 21 (L) 12/12/2020     12/12/2020    BUN 6 (L) 12/12/2020    CREATININE 0.7 12/12/2020     Lab Results   Component Value Date    WBC 8.31 12/12/2020    HGB 10.1 (L) 12/12/2020    HCT 31.9 (L) 12/12/2020    MCV 84.8 12/12/2020     12/12/2020     Microbiology Results     Procedure Component Value Units Date/Time    SARS-CoV-2 (COVID-19), PCR Nasopharynx [[id number]]  (Normal) Collected: 12/17/20 0341    Specimen: Nasopharyngeal Swab from Nasopharynx Updated: 12/17/20 2611    Narrative:      The following orders were  created for panel order SARS-CoV-2 (COVID-19), PCR Nasopharynx.  Procedure                               Abnormality         Status                     ---------                               -----------         ------                     SARS-CoV-2 (COVID-19), P...[[id number]]  Normal              Final result                 Please view results for these tests on the individual orders.    SARS-CoV-2 (COVID-19), PCR Nasopharynx [[id number]]  (Normal) Collected: 12/17/20 1653    Specimen: Nasopharyngeal Swab from Nasopharynx Updated: 12/17/20 1747     SARS-CoV-2 (COVID-19) Negative    Blood Culture Blood, Venous [[id number]] Collected: 12/11/20 1339    Specimen: Blood, Venous Updated: 12/16/20 1801     Culture No growth at 120 hours    Blood Culture Blood, Venous [[id number]] Collected: 12/10/20 1504    Specimen: Blood, Venous Updated: 12/15/20 2100     Culture No growth at 120 hours    Blood Culture Blood, Venous [[id number]] Collected: 12/10/20 1504    Specimen: Blood, Venous Updated: 12/15/20 2100     Culture No growth at 120 hours    Blood Culture Blood, Venous [[id number]] Collected: 12/09/20 1348    Specimen: Blood, Venous Updated: 12/14/20 1800     Culture No growth at 120 hours    Blood Culture Blood, Venous [[id number]] Collected: 12/09/20 1304    Specimen: Blood, Venous Updated: 12/14/20 1501     Culture No growth at 120 hours        Imaging  Mri Thoracic Spine With And Without Contrast    Result Date: 12/9/2020  IMPRESSION: Diffuse anterior and posterior epidural enhancement within lumbar spine more pronounced at L4-L5 raising the suspicion for underlying infectious process/phlegmon.  Left greater than right L4-L5 facet joint signal and enhancement which although may be degenerative in nature, early changes of septic facet arthritis is in the differential.  Follow-up is recommended. Thoracic and lumbar degenerative change as described more pronounced at L4-L5. Ring-enhancing lesion in right upper lobe which may  be infectious in etiology including in the setting of septic emboli.  Correlation with CT chest with contrast is recommended to exclude other etiologies. The results were discussed with Rylee Bowie on 12/9/2020 7:35 PM.    Mri Lumbar Spine With And Without Contrast    Result Date: 12/9/2020  IMPRESSION: Diffuse anterior and posterior epidural enhancement within lumbar spine more pronounced at L4-L5 raising the suspicion for underlying infectious process/phlegmon.  Left greater than right L4-L5 facet joint signal and enhancement which although may be degenerative in nature, early changes of septic facet arthritis is in the differential.  Follow-up is recommended. Thoracic and lumbar degenerative change as described more pronounced at L4-L5. Ring-enhancing lesion in right upper lobe which may be infectious in etiology including in the setting of septic emboli.  Correlation with CT chest with contrast is recommended to exclude other etiologies. The results were discussed with Rylee Bowie on 12/9/2020 7:35 PM.    X-ray Chest 1 View    Result Date: 12/18/2020  IMPRESSION: Interval placement of a right-sided PICC line catheter as described above.     X-ray Chest 1 View    Result Date: 12/18/2020  IMPRESSION: Interval placement of a right-sided PICC line catheter as described above.     X-ray Chest 1 View    Result Date: 12/9/2020  IMPRESSION: No radiographic evidence of acute cardiopulmonary disease.    Ct Chest Pulmonary Embolism With Iv Contrast    Result Date: 12/9/2020  IMPRESSION: 1.  No evidence of main, lobar or segmental pulmonary embolism. 2.  However, there are multifocal nodular lesions throughout all lung fields including a cavitary lesion in the right upper lobe all suspicious for septic emboli.  Some of the tiny subsegmental pulmonary arterial branches demonstrate hypoenhancement and it is difficult to determinate if this is related to tiny pulmonary emboli or technique. 3.  Small bilateral pleural  effusions. 4.  Mosaic pattern at the lung bases could represent mild edema or sequela of small airways or small vessels disease. 5.  Follow-up chest CT after appropriate treatment is recommended to document lung opacities and mediastinal lymphadenopathy which is probably reactive. 6.  Additional findings discussed below. Finding:    Other   Acuity: Critical  Status:  CLOSED Critical read back for the first impression performed with Bertah Bowie PA 9:25 PM 12/9/2020 COMMENT: Comparison: Chest x-ray from 12/9/2020 Technique: Chest CT pulmonary embolism protocol performed with intravenous contrast.  80 mL Omnipaque 350 given. CT DOSE:  One or more dose reduction techniques (e.g. automated exposure control, adjustment of the mA and/or kV according to patient size, use of iterative reconstruction technique) utilized for this examination. FINDINGS: LUNG, PLEURA AND LARGE AIRWAYS: The trachea and proximal bronchi remain clear. There are multifocal nodular lesions throughout the lungs.  For example in the right upper lobe there is a cavitary lesion measuring 1.4 cm on axial image 63. A 1.6 cm nodular lesion in the left upper lobe on image 69.  Multiple additional lesions are present elsewhere.  His are suspicious for septic emboli particularly given the history of endocarditis and sepsis. There are small bilateral pleural effusions. There is a nonspecific mild degree of groundglass opacity in the lower lung fields.  This could represent mild degree of pulmonary edema or possibly a mosaic pattern from small airways or small vessel disease. No pneumothorax. VESSELS: Normal caliber of the thoracic aorta.  Thoracic aorta appears within normal limits.  Main pulmonary artery is normal in caliber.  No evidence of a main, lobar or segmental pulmonary embolism.  However, the subsegmental vessels are difficult to assess and some demonstrate a slightly hypoechoic enhancing appearance possibly artifact from bolus timing although  tiny subsegmental pulmonary emboli would be difficult to exclude.  For example, in the lingula there is some hypoenhancement of the subsegmental vessels on image 116 where subsegmental PE cannot be excluded.  No evidence of right heart strain. HEART: normal size. No pericardial effusion. MEDIASTINUM AND PAULINA: There is a pathologically enlarged lymph node in the AP window measuring 1.8 cm short axis.  Additional prominent but nonpathologic by size video lymph nodes elsewhere. CHEST WALL AND LOWER NECK: within normal limits. UPPER ABDOMEN:Limited assessment of the upper abdominal structures with this technique.  There is splenomegaly with the spleen measuring 14.5 cm.  The liver may also be enlarged but is incompletely visualized.  Cholecystectomy changes. BONES: No suspicious bony erosive changes.  Multilevel Schmorl's nodes and degenerative changes in the spine are noted.  Calcific tendinosis of the right rotator cuff.  There is limited assessment of the spine with this technique.  No gross paraspinal inflammatory changes.     Ultrasound Abdomen Limited    Result Date: 12/17/2020  IMPRESSION: Mild increase in echotexture of the liver may represent fibrofatty infiltration. COMMENT: Ultrasound evaluation of the right upper quadrant was performed and compared to a CT exam dated 8/18/2019.  The pancreas is of normal echotexture and unremarkable.  The aorta is obscured by bowel gas.  The liver is 17.6 cm in length and of increased echogenicity consistent with fibrofatty infiltration. There is no ductal dilatation or mass lesion.  Common bile duct measures 2.3 mm at the pito hepatis.  The right kidney is 10.5 cm in length.  There is no nephrolithiasis, hydronephrosis or mass lesion.  The renal cortex of normal thickness and echotexture.      ASSESSMENT AND PLAN      * Acute bacterial endocarditis  Assessment & Plan  Suspected tricuspid valve endocarditis  Sepsis due to acute bacterial endocarditis present on  admission  Continue antibiotics  Blood cultures from [place] [facility]  Repeat blood cultures NGTD  PICC line in place  Had nausea again with cefazolin, will utilize tigan prior to antibiotics    Opioid abuse (CMS/[facility])  Assessment & Plan  Currently on methadone  Will need continued outpatient treatment    Essential hypertension  Assessment & Plan  Continue with home meds  bp parameters for hypotension    Septic embolism (CMS/[facility])  Assessment & Plan  Continue IV abx per ID   Blood cultures performed on admission, NGTD  Evidence of discitis on MRI, plan per neurosurgery is follow-up MRI after completion of antibiotics         VTE Assessment: Padua VTE Score: 5  Code Status: Full Code  Estimated discharge date: 12/11/2020     Dominique Powell DO  12/20/2020  12:46 PM

## 2020-12-21 LAB
ANION GAP SERPL CALC-SCNC: 11 MEQ/L (ref 3–15)
ATRIAL RATE: 89
BASOPHILS # BLD: 0.04 K/UL (ref 0.01–0.1)
BASOPHILS NFR BLD: 0.6 %
BUN SERPL-MCNC: 7 MG/DL (ref 8–20)
CALCIUM SERPL-MCNC: 9.2 MG/DL (ref 8.9–10.3)
CHLORIDE SERPL-SCNC: 103 MEQ/L (ref 98–109)
CO2 SERPL-SCNC: 23 MEQ/L (ref 22–32)
CREAT SERPL-MCNC: 0.7 MG/DL (ref 0.6–1.1)
CRP SERPL-MCNC: 6.2 MG/L
DIFFERENTIAL METHOD BLD: ABNORMAL
EOSINOPHIL # BLD: 0.22 K/UL (ref 0.04–0.36)
EOSINOPHIL NFR BLD: 3.1 %
ERYTHROCYTE [DISTWIDTH] IN BLOOD BY AUTOMATED COUNT: 14.8 % (ref 11.7–14.4)
ERYTHROCYTE [SEDIMENTATION RATE] IN BLOOD BY WESTERGREN METHOD: 46 MM/HR
GFR SERPL CREATININE-BSD FRML MDRD: >60 ML/MIN/1.73M*2
GLUCOSE SERPL-MCNC: 96 MG/DL (ref 70–99)
HCT VFR BLDCO AUTO: 36.9 % (ref 35–45)
HGB BLD-MCNC: 11.3 G/DL (ref 11.8–15.7)
IMM GRANULOCYTES # BLD AUTO: 0.03 K/UL (ref 0–0.08)
IMM GRANULOCYTES NFR BLD AUTO: 0.4 %
LYMPHOCYTES # BLD: 3.01 K/UL (ref 1.2–3.5)
LYMPHOCYTES NFR BLD: 41.7 %
MCH RBC QN AUTO: 26.8 PG (ref 28–33.2)
MCHC RBC AUTO-ENTMCNC: 30.6 G/DL (ref 32.2–35.5)
MCV RBC AUTO: 87.4 FL (ref 83–98)
MONOCYTES # BLD: 0.4 K/UL (ref 0.28–0.8)
MONOCYTES NFR BLD: 5.5 %
NEUTROPHILS # BLD: 3.51 K/UL (ref 1.7–7)
NEUTS SEG NFR BLD: 48.7 %
NRBC BLD-RTO: 0 %
P AXIS: 44
PDW BLD AUTO: 10.5 FL (ref 9.4–12.3)
PLATELET # BLD AUTO: 380 K/UL (ref 150–369)
POTASSIUM SERPL-SCNC: 4.2 MEQ/L (ref 3.6–5.1)
PR INTERVAL: 146
QRS DURATION: 100
QT INTERVAL: 382
QTC CALCULATION(BAZETT): 464
R AXIS: 20
RBC # BLD AUTO: 4.22 M/UL (ref 3.93–5.22)
SODIUM SERPL-SCNC: 137 MEQ/L (ref 136–144)
T WAVE AXIS: 32
VENTRICULAR RATE: 89
WBC # BLD AUTO: 7.21 K/UL (ref 3.8–10.5)

## 2020-12-21 PROCEDURE — 12000000 HC ROOM AND CARE MED/SURG

## 2020-12-21 PROCEDURE — 80048 BASIC METABOLIC PNL TOTAL CA: CPT | Performed by: HOSPITALIST

## 2020-12-21 PROCEDURE — 85025 COMPLETE CBC W/AUTO DIFF WBC: CPT | Performed by: HOSPITALIST

## 2020-12-21 PROCEDURE — 63700000 HC SELF-ADMINISTRABLE DRUG: Performed by: NURSE PRACTITIONER

## 2020-12-21 PROCEDURE — 99231 SBSQ HOSP IP/OBS SF/LOW 25: CPT | Mod: GT | Performed by: PSYCHIATRY & NEUROLOGY

## 2020-12-21 PROCEDURE — 63700000 HC SELF-ADMINISTRABLE DRUG: Performed by: STUDENT IN AN ORGANIZED HEALTH CARE EDUCATION/TRAINING PROGRAM

## 2020-12-21 PROCEDURE — 25000000 HC PHARMACY GENERAL: Performed by: INTERNAL MEDICINE

## 2020-12-21 PROCEDURE — 93005 ELECTROCARDIOGRAM TRACING: CPT | Performed by: HOSPITALIST

## 2020-12-21 PROCEDURE — 63600000 HC DRUGS/DETAIL CODE: Performed by: INTERNAL MEDICINE

## 2020-12-21 PROCEDURE — 85652 RBC SED RATE AUTOMATED: CPT | Performed by: HOSPITALIST

## 2020-12-21 PROCEDURE — 63700000 HC SELF-ADMINISTRABLE DRUG: Performed by: INTERNAL MEDICINE

## 2020-12-21 PROCEDURE — 63700000 HC SELF-ADMINISTRABLE DRUG: Performed by: PSYCHIATRY & NEUROLOGY

## 2020-12-21 PROCEDURE — 36415 COLL VENOUS BLD VENIPUNCTURE: CPT | Performed by: HOSPITALIST

## 2020-12-21 PROCEDURE — 63700000 HC SELF-ADMINISTRABLE DRUG: Performed by: HOSPITALIST

## 2020-12-21 PROCEDURE — 86140 C-REACTIVE PROTEIN: CPT | Performed by: HOSPITALIST

## 2020-12-21 PROCEDURE — 25800000 HC PHARMACY IV SOLUTIONS: Performed by: INTERNAL MEDICINE

## 2020-12-21 PROCEDURE — 99232 SBSQ HOSP IP/OBS MODERATE 35: CPT | Performed by: STUDENT IN AN ORGANIZED HEALTH CARE EDUCATION/TRAINING PROGRAM

## 2020-12-21 RX ORDER — METHADONE HYDROCHLORIDE 10 MG/1
30 TABLET ORAL DAILY
Status: DISCONTINUED | OUTPATIENT
Start: 2020-12-22 | End: 2020-12-29

## 2020-12-21 RX ORDER — METHADONE HYDROCHLORIDE 10 MG/1
5 TABLET ORAL ONCE
Status: COMPLETED | OUTPATIENT
Start: 2020-12-21 | End: 2020-12-21

## 2020-12-21 RX ADMIN — NICOTINE POLACRILEX 4 MG: 4 GUM, CHEWING ORAL at 13:49

## 2020-12-21 RX ADMIN — PROBIOTIC PRODUCT - TAB 1 TABLET: TAB at 08:19

## 2020-12-21 RX ADMIN — METOPROLOL SUCCINATE 25 MG: 25 TABLET, EXTENDED RELEASE ORAL at 08:19

## 2020-12-21 RX ADMIN — TRIMETHOBENZAMIDE HYDROCHLORIDE 300 MG: 300 CAPSULE ORAL at 08:20

## 2020-12-21 RX ADMIN — Medication 10 ML: at 18:42

## 2020-12-21 RX ADMIN — BUPROPION HYDROCHLORIDE 75 MG: 75 TABLET, FILM COATED ORAL at 08:18

## 2020-12-21 RX ADMIN — Medication 10 ML: at 11:58

## 2020-12-21 RX ADMIN — PREGABALIN 150 MG: 75 CAPSULE ORAL at 08:20

## 2020-12-21 RX ADMIN — QUETIAPINE 100 MG: 100 TABLET, FILM COATED ORAL at 21:17

## 2020-12-21 RX ADMIN — ZOLPIDEM TARTRATE 10 MG: 5 TABLET, COATED ORAL at 21:17

## 2020-12-21 RX ADMIN — PSYLLIUM HUSK 1 PACKET: 3.4 POWDER ORAL at 08:17

## 2020-12-21 RX ADMIN — PROBIOTIC PRODUCT - TAB 1 TABLET: TAB at 21:17

## 2020-12-21 RX ADMIN — NICOTINE POLACRILEX 4 MG: 4 GUM, CHEWING ORAL at 08:32

## 2020-12-21 RX ADMIN — CEFAZOLIN SODIUM 2 G: 10 POWDER, FOR SOLUTION INTRAVENOUS at 18:03

## 2020-12-21 RX ADMIN — METHADONE HYDROCHLORIDE 10 MG: 10 TABLET ORAL at 18:03

## 2020-12-21 RX ADMIN — METHADONE HYDROCHLORIDE 20 MG: 10 TABLET ORAL at 08:19

## 2020-12-21 RX ADMIN — TRIMETHOBENZAMIDE HYDROCHLORIDE 300 MG: 300 CAPSULE ORAL at 01:11

## 2020-12-21 RX ADMIN — CEFAZOLIN SODIUM 2 G: 10 POWDER, FOR SOLUTION INTRAVENOUS at 01:52

## 2020-12-21 RX ADMIN — PREGABALIN 150 MG: 75 CAPSULE ORAL at 21:17

## 2020-12-21 RX ADMIN — Medication 10 ML: at 04:15

## 2020-12-21 RX ADMIN — NICOTINE POLACRILEX 4 MG: 4 GUM, CHEWING ORAL at 21:18

## 2020-12-21 RX ADMIN — Medication 1 PATCH: at 08:27

## 2020-12-21 RX ADMIN — PRAZOSIN HYDROCHLORIDE 2 MG: 1 CAPSULE ORAL at 21:17

## 2020-12-21 RX ADMIN — BUTALBITAL, ACETAMINOPHEN AND CAFFEINE 2 TABLET: 50; 325; 40 TABLET ORAL at 05:40

## 2020-12-21 RX ADMIN — BUPROPION HYDROCHLORIDE 75 MG: 75 TABLET, FILM COATED ORAL at 17:18

## 2020-12-21 RX ADMIN — CYCLOBENZAPRINE HYDROCHLORIDE 10 MG: 5 TABLET, FILM COATED ORAL at 21:18

## 2020-12-21 RX ADMIN — BUTALBITAL, ACETAMINOPHEN AND CAFFEINE 2 TABLET: 50; 325; 40 TABLET ORAL at 13:41

## 2020-12-21 RX ADMIN — BUPROPION HYDROCHLORIDE 75 MG: 75 TABLET, FILM COATED ORAL at 11:57

## 2020-12-21 RX ADMIN — METHADONE HYDROCHLORIDE 5 MG: 10 TABLET ORAL at 18:42

## 2020-12-21 RX ADMIN — PAROXETINE 30 MG: 20 TABLET, FILM COATED ORAL at 08:19

## 2020-12-21 RX ADMIN — HYDROXYZINE HYDROCHLORIDE 25 MG: 25 TABLET, FILM COATED ORAL at 21:19

## 2020-12-21 RX ADMIN — CEFAZOLIN SODIUM 2 G: 10 POWDER, FOR SOLUTION INTRAVENOUS at 08:20

## 2020-12-21 RX ADMIN — TRIMETHOBENZAMIDE HYDROCHLORIDE 300 MG: 300 CAPSULE ORAL at 17:18

## 2020-12-21 RX ADMIN — CYCLOBENZAPRINE HYDROCHLORIDE 10 MG: 5 TABLET, FILM COATED ORAL at 08:32

## 2020-12-21 RX ADMIN — PANTOPRAZOLE SODIUM 40 MG: 40 TABLET, DELAYED RELEASE ORAL at 08:18

## 2020-12-21 NOTE — ASSESSMENT & PLAN NOTE
-Tricuspid valve endocarditis  -Sepsis due to acute bacterial endocarditis present on admission  -Blood cultures from [place] [facility]  -Repeat blood cultures NGTD  -PICC line in place  -JANUSZ with TV endocarditis, Limited echo 12/30 with no major change in TV/TR  -Continue abx (started 12/9, changed to Cefazolin) -6 wks, (1/19/20 last day),  today is 25/42  -Checking labs every monday

## 2020-12-21 NOTE — ASSESSMENT & PLAN NOTE
-CT PE protocol 12/9  multifocal nodular lesions throughout all lung fields including a cavitary lesion RUL concerning for septic emboli  -Septic emboli, infectious endocarditis, discitis, MSSA  -Blood cultures performed on admission, NGTD  -Continue IV abx per ID total duration 42 days  -Will need f/u chest CT to document resollution

## 2020-12-21 NOTE — ASSESSMENT & PLAN NOTE
-MRI with L4-5 infecitous process  -Neurovascular intact  -Evidence of discitis on MRI, plan per neurosurgery is follow-up MRI after completion of antibiotics

## 2020-12-21 NOTE — PROGRESS NOTES
Hospital Medicine Service -  Daily Progress Note       SUBJECTIVE   Interval History: Seen and examined at bedside. Complains of diarrhea early morning, resolved now. Denies any nausea/vomiting, but does have decreased appetite.     Awaiting placement.      OBJECTIVE      Vital signs in last 24 hours:  Temp:  [36.6 °C (97.9 °F)-36.8 °C (98.3 °F)] 36.6 °C (97.9 °F)  Heart Rate:  [71-88] 88  Resp:  [16-18] 16  BP: (115-120)/(54-70) 116/69  No intake or output data in the 24 hours ending 12/21/20 2371    PHYSICAL EXAMINATION      Physical Exam   Constitutional:       General: She is not in acute distress.     Appearance: She is well-developed.   HENT:      Head: Normocephalic and atraumatic.   Cardiovascular:      Rate and Rhythm: Normal rate and regular rhythm.      Heart sounds: No murmur.   Pulmonary:      Effort: Pulmonary effort is normal.      Breath sounds: Normal breath sounds. No wheezing or rales.   Abdominal:      General: Bowel sounds are normal.      Palpations: Abdomen is soft.   Skin:     General: Skin is warm and dry.      Findings: No rash.   Neurological:      Mental Status: She is alert and oriented to person, place, and time.        LINES, CATHETERS, DRAINS, AIRWAYS, AND WOUNDS   Lines, Drains, Airways, Wounds:  PICC Single Lumen 12/18/20 Right (Active)   Number of days: 3       Comments:      LABS / IMAGING / TELE      Labs  Lab Results   Component Value Date    WBC 7.21 12/21/2020    HGB 11.3 (L) 12/21/2020    HCT 36.9 12/21/2020    MCV 87.4 12/21/2020     (H) 12/21/2020     Lab Results   Component Value Date    GLUCOSE 96 12/21/2020    CALCIUM 9.2 12/21/2020     12/21/2020    K 4.2 12/21/2020    CO2 23 12/21/2020     12/21/2020    BUN 7 (L) 12/21/2020    CREATININE 0.7 12/21/2020     Lab Results   Component Value Date    INR 0.9 04/06/2019    INR 1.0 04/16/2017    INR 1.1 03/24/2017       Micro  Microbiology Results     Procedure Component Value Units Date/Time    SARS-CoV-2  (COVID-19), PCR Nasopharynx [[id number]]  (Normal) Collected: 12/17/20 1653    Specimen: Nasopharyngeal Swab from Nasopharynx Updated: 12/17/20 1747    Narrative:      The following orders were created for panel order SARS-CoV-2 (COVID-19), PCR Nasopharynx.  Procedure                               Abnormality         Status                     ---------                               -----------         ------                     SARS-CoV-2 (COVID-19), P...[[id number]]  Normal              Final result                 Please view results for these tests on the individual orders.    SARS-CoV-2 (COVID-19), PCR Nasopharynx [[id number]]  (Normal) Collected: 12/17/20 1653    Specimen: Nasopharyngeal Swab from Nasopharynx Updated: 12/17/20 1747     SARS-CoV-2 (COVID-19) Negative    Blood Culture Blood, Venous [[id number]] Collected: 12/11/20 1339    Specimen: Blood, Venous Updated: 12/16/20 1801     Culture No growth at 120 hours    Blood Culture Blood, Venous [[id number]] Collected: 12/10/20 1504    Specimen: Blood, Venous Updated: 12/15/20 2100     Culture No growth at 120 hours    Blood Culture Blood, Venous [[id number]] Collected: 12/10/20 1504    Specimen: Blood, Venous Updated: 12/15/20 2100     Culture No growth at 120 hours    Blood Culture Blood, Venous [[id number]] Collected: 12/09/20 1348    Specimen: Blood, Venous Updated: 12/14/20 1800     Culture No growth at 120 hours    Blood Culture Blood, Venous [[id number]] Collected: 12/09/20 1304    Specimen: Blood, Venous Updated: 12/14/20 1501     Culture No growth at 120 hours          Imaging  Mri Thoracic Spine With And Without Contrast    Result Date: 12/9/2020  IMPRESSION: Diffuse anterior and posterior epidural enhancement within lumbar spine more pronounced at L4-L5 raising the suspicion for underlying infectious process/phlegmon.  Left greater than right L4-L5 facet joint signal and enhancement which although may be degenerative in nature, early changes of septic  facet arthritis is in the differential.  Follow-up is recommended. Thoracic and lumbar degenerative change as described more pronounced at L4-L5. Ring-enhancing lesion in right upper lobe which may be infectious in etiology including in the setting of septic emboli.  Correlation with CT chest with contrast is recommended to exclude other etiologies. The results were discussed with Rylee Bowie on 12/9/2020 7:35 PM.    Mri Lumbar Spine With And Without Contrast    Result Date: 12/9/2020  IMPRESSION: Diffuse anterior and posterior epidural enhancement within lumbar spine more pronounced at L4-L5 raising the suspicion for underlying infectious process/phlegmon.  Left greater than right L4-L5 facet joint signal and enhancement which although may be degenerative in nature, early changes of septic facet arthritis is in the differential.  Follow-up is recommended. Thoracic and lumbar degenerative change as described more pronounced at L4-L5. Ring-enhancing lesion in right upper lobe which may be infectious in etiology including in the setting of septic emboli.  Correlation with CT chest with contrast is recommended to exclude other etiologies. The results were discussed with Rylee Bowie on 12/9/2020 7:35 PM.    X-ray Chest 1 View    Result Date: 12/18/2020  IMPRESSION: Interval placement of a right-sided PICC line catheter as described above.     X-ray Chest 1 View    Result Date: 12/18/2020  IMPRESSION: Interval placement of a right-sided PICC line catheter as described above.     X-ray Chest 1 View    Result Date: 12/9/2020  IMPRESSION: No radiographic evidence of acute cardiopulmonary disease.    Ct Chest Pulmonary Embolism With Iv Contrast    Result Date: 12/9/2020  IMPRESSION: 1.  No evidence of main, lobar or segmental pulmonary embolism. 2.  However, there are multifocal nodular lesions throughout all lung fields including a cavitary lesion in the right upper lobe all suspicious for septic emboli.  Some of the tiny  subsegmental pulmonary arterial branches demonstrate hypoenhancement and it is difficult to determinate if this is related to tiny pulmonary emboli or technique. 3.  Small bilateral pleural effusions. 4.  Mosaic pattern at the lung bases could represent mild edema or sequela of small airways or small vessels disease. 5.  Follow-up chest CT after appropriate treatment is recommended to document lung opacities and mediastinal lymphadenopathy which is probably reactive. 6.  Additional findings discussed below. Finding:    Other   Acuity: Critical  Status:  CLOSED Critical read back for the first impression performed with Bertha Bowie PA 9:25 PM 12/9/2020 COMMENT: Comparison: Chest x-ray from 12/9/2020 Technique: Chest CT pulmonary embolism protocol performed with intravenous contrast.  80 mL Omnipaque 350 given. CT DOSE:  One or more dose reduction techniques (e.g. automated exposure control, adjustment of the mA and/or kV according to patient size, use of iterative reconstruction technique) utilized for this examination. FINDINGS: LUNG, PLEURA AND LARGE AIRWAYS: The trachea and proximal bronchi remain clear. There are multifocal nodular lesions throughout the lungs.  For example in the right upper lobe there is a cavitary lesion measuring 1.4 cm on axial image 63. A 1.6 cm nodular lesion in the left upper lobe on image 69.  Multiple additional lesions are present elsewhere.  His are suspicious for septic emboli particularly given the history of endocarditis and sepsis. There are small bilateral pleural effusions. There is a nonspecific mild degree of groundglass opacity in the lower lung fields.  This could represent mild degree of pulmonary edema or possibly a mosaic pattern from small airways or small vessel disease. No pneumothorax. VESSELS: Normal caliber of the thoracic aorta.  Thoracic aorta appears within normal limits.  Main pulmonary artery is normal in caliber.  No evidence of a main, lobar or  segmental pulmonary embolism.  However, the subsegmental vessels are difficult to assess and some demonstrate a slightly hypoechoic enhancing appearance possibly artifact from bolus timing although tiny subsegmental pulmonary emboli would be difficult to exclude.  For example, in the lingula there is some hypoenhancement of the subsegmental vessels on image 116 where subsegmental PE cannot be excluded.  No evidence of right heart strain. HEART: normal size. No pericardial effusion. MEDIASTINUM AND PAULINA: There is a pathologically enlarged lymph node in the AP window measuring 1.8 cm short axis.  Additional prominent but nonpathologic by size video lymph nodes elsewhere. CHEST WALL AND LOWER NECK: within normal limits. UPPER ABDOMEN:Limited assessment of the upper abdominal structures with this technique.  There is splenomegaly with the spleen measuring 14.5 cm.  The liver may also be enlarged but is incompletely visualized.  Cholecystectomy changes. BONES: No suspicious bony erosive changes.  Multilevel Schmorl's nodes and degenerative changes in the spine are noted.  Calcific tendinosis of the right rotator cuff.  There is limited assessment of the spine with this technique.  No gross paraspinal inflammatory changes.     Ultrasound Abdomen Limited    Result Date: 12/17/2020  IMPRESSION: Mild increase in echotexture of the liver may represent fibrofatty infiltration. COMMENT: Ultrasound evaluation of the right upper quadrant was performed and compared to a CT exam dated 8/18/2019.  The pancreas is of normal echotexture and unremarkable.  The aorta is obscured by bowel gas.  The liver is 17.6 cm in length and of increased echogenicity consistent with fibrofatty infiltration. There is no ductal dilatation or mass lesion.  Common bile duct measures 2.3 mm at the pito hepatis.  The right kidney is 10.5 cm in length.  There is no nephrolithiasis, hydronephrosis or mass lesion.  The renal cortex of normal thickness and  echotexture.          ASSESSMENT AND PLAN      Opioid abuse (CMS/[facility])  Assessment & Plan        Tricuspid valve vegetation  Assessment & Plan  -Sessile vegetation on tricuspid valve leaflet  -Continue antibiotics- with Cefazolin- today is Day 14    Septic arthritis of vertebra (CMS/[facility])  Assessment & Plan  -Neurovascular intact  -Palliative care medicine and psych following   -Off oxycodone, on methadone       Essential hypertension  Assessment & Plan  -Continue with home meds  -bp parameters for hypotension    Septic embolism (CMS/[facility])  Assessment & Plan  -Continue IV abx per ID   -Blood cultures performed on admission, NGTD  -Evidence of discitis on MRI, plan per neurosurgery is follow-up MRI after completion of antibiotics    Opioid type dependence, continuous use (CMS/[facility])  Assessment & Plan  -Currently on methadone  -Requests psych consult for adjusting methadone dosage as she reports symptoms of withdrawal with the current dose  -Tele psych consult placed      * Acute bacterial endocarditis  Assessment & Plan  -Suspected tricuspid valve endocarditis  -Sepsis due to acute bacterial endocarditis present on admission  -Blood cultures from [place] [facility]  -Repeat blood cultures NGTD  -PICC line in place  -Continue antibiotic Cefazolin- will require 6 week therapy- today is day 14  -Had nausea again with cefazolin, will utilize tigan prior to antibiotics       VTE Assessment: Padua VTE Score: 5  VTE Prophylaxis Plan: Ambulation  Code Status: Full Code  Estimated Discharge Date: 12/11/2020  Disposition Planning: placement     Mike Jordan MD  12/21/2020

## 2020-12-21 NOTE — ASSESSMENT & PLAN NOTE
-likely still using IV drugs, given presentation  -Psych consulted, increased methadone to 40mg scheduled + 10mg PRN.  Requests repeat telepsych consult to increase dosing further  -QTc monitoring with EKG, ok so far.  -Palliative/Pain consult, patient reports continued pain.   -Doesn't like Lidoderm patch; it makes her sweat.  Will schedule Tylenol

## 2020-12-21 NOTE — CONSULTS
This exam was done through synchronous audio-video services.  The patient consented to the telehealth exam.    The provider was located in: [place]  The patient was located in: [place] [address]  The following other people were present during the exam: Nurse via telemedicine  Inpatient Psychiatric Consultation   Follow-up Note         Subjective     Interval History  Re-consulted to comment on pt methadone dose changes.    She reports today she is doing well. States she gets withdrawal symptoms, and some worsening of pain in evening with cravings. Discussed how she is not interested in ideally split dosing, and that it may be her morning dose is not holding her appropriately yet. Willing to work with titration and understands standing dose cannot be above 40mg without a place for her to continue treatment.    Inpatient Medications    Current Facility-Administered Medications:   •  buPROPion (WELLBUTRIN) tablet 75 mg, 75 mg, oral, TID, Marlon Padron MD, 75 mg at 12/21/20 1157  •  butalbital-acetaminophen-caff (FIORICET, ESGIC) per tablet 2 tablet, 2 tablet, oral, q6h PRN, Gen Hogue MD, 2 tablet at 12/21/20 1341  •  ceFAZolin (ANCEF) NSS IVPB piggyback 2 g, 2 g, intravenous, q8h INT, Vikas Jones MD, Stopped at 12/21/20 0850  •  cyclobenzaprine (FLEXERIL) tablet 10 mg, 10 mg, oral, 3x daily PRN, Marizol Ortiz CRNP, 10 mg at 12/21/20 0832  •  glucose chewable tablet 16-32 g of dextrose, 16-32 g of dextrose, oral, PRN **OR** dextrose 40 % oral gel 15-30 g of dextrose, 15-30 g of dextrose, oral, PRN **OR** glucagon (GLUCAGEN) injection 1 mg, 1 mg, intramuscular, PRN **OR** dextrose in water injection 12.5 g, 25 mL, intravenous, PRN, Perfecto Angela MD  •  fluticasone propionate (FLONASE) 50 mcg/actuation nasal spray 2 spray, 2 spray, Each Nostril, Daily PRN, Perfecto Angela MD  •  hydrocortisone-pramoxine (ANALPRAM-HC) 2.5-1 % rectal cream, , rectal, 4x daily PRN, Dominique Powell, DO,  Given at 12/19/20 1643  •  hydrOXYzine (ATARAX) tablet 25 mg, 25 mg, oral, Nightly PRN, Dominique Powell, DO  •  lactobacillus acidophilus complex (BACID) 1 billion cell- 250 mg tablet 1 tablet, 1 tablet, oral, BID, Heidi Toscano CRNP, 1 tablet at 12/21/20 0819  •  lidocaine (ASPERCREME) 4 % topical patch 1 patch, 1 patch, Topical, Daily, Jacki Boss CRNP  •  magnesium sulfate IVPB 2g in 50 mL NSS/D5W/SWFI, 2 g, intravenous, PRN, Perfecto Angela MD  •  methadone (DOLOPHINE) tablet 10 mg, 10 mg, oral, Daily PRN, Marlon Padron MD, 10 mg at 12/20/20 1647  •  methadone (DOLOPHINE) tablet 20 mg, 20 mg, oral, Daily, Marlon Padron MD, 20 mg at 12/21/20 0819  •  metoprolol succinate XL (TOPROL-XL) 24 hr ER tablet 25 mg, 25 mg, oral, Daily, Perfecto Angela MD, 25 mg at 12/21/20 0819  •  naloxone (NARCAN) injection 0.2 mg, 0.2 mg, intravenous, Once PRN, Jacki Boss CRNP  •  nicotine (NICODERM CQ) 14 mg/24 hr patch 1 patch, 1 patch, transdermal, Daily, Gen Hogue MD, 1 patch at 12/21/20 0827  •  nicotine polacrilex (NICORETTE) gum 4 mg, 4 mg, buccal, q4h PRN, Perfecto Angela MD, 4 mg at 12/21/20 1349  •  pantoprazole (PROTONIX) tablet,delayed release (DR/EC) 40 mg, 40 mg, oral, Daily before breakfast, Heidi Toscano CRNP, 40 mg at 12/21/20 0818  •  PARoxetine (PAXIL) tablet 30 mg, 30 mg, oral, Daily, Marlon Padron MD, 30 mg at 12/21/20 0819  •  potassium chloride (KLOR-CON) tablet extended release 20 mEq, 20 mEq, oral, Daily PRN **OR** potassium chloride (KLOR-CON) tablet extended release 40 mEq, 40 mEq, oral, Daily PRN **OR** potassium chloride 20 mEq in 100 mL IVPB  (premix), 20 mEq, intravenous, Daily PRN **OR** potassium chloride (KCL) 40 mEq/250 mL IVPB in NSS 40 mEq, 40 mEq, intravenous, Daily PRN, Perfecto Angela MD  •  prazosin (MINIPRESS) capsule 2 mg, 2 mg, oral, Nightly, Perfecto Angela MD, 2 mg at 12/20/20 2113  •  pregabalin (LYRICA) capsule 150 mg, 150  "mg, oral, BID, Perfecto Angela MD, 150 mg at 12/21/20 0820  •  psyllium husk powder 1 packet, 1 packet, oral, Daily, Dominique Powell DO, 1 packet at 12/21/20 0817  •  QUEtiapine (SEROquel) tablet 100 mg, 100 mg, oral, Nightly, Perfecto Angela MD, 100 mg at 12/20/20 2113  •  sodium chloride flush 10 mL, 10 mL, intravenous, q8h INT, Gen Hogue MD, 10 mL at 12/21/20 1158  •  sodium chloride flush 10 mL, 10 mL, intravenous, PRN, Gen Hogue MD  •  trimethobenzamide (TIGAN) capsule 300 mg, 300 mg, oral, q8h JAIDEN, Dominique Powell DO, 300 mg at 12/21/20 0820  •  zolpidem (AMBIEN) tablet 10 mg, 10 mg, oral, Nightly, Perfecto Angela MD, 10 mg at 12/20/20 2113          Objective     Vitals    Range and most recent vitals over last 24 hours  Temp:  [36.6 °C (97.9 °F)-36.8 °C (98.3 °F)] 36.6 °C (97.9 °F)  Heart Rate:  [71-88] 88  Resp:  [16-18] 16  BP: (115-120)/(54-70) 116/69     Body mass index is 43.13 kg/m².      Mental Status  Examination:  Appearance: 35 y.o. female black sweater, dirty blonde hair, relatively well kempt   Behavior: Calm, cooperative   Speech: Normal rate, tone, rhythm   Mood: \"I'm alright\"   Affect: Slightly constricted   Thought Process: Linear, logical, goal directed   Thought Content: Denies SI/HI, A/VH   Insight: Fair to poor   Judgment: poor   Orientation AAOX3       Results  Labs:  CBC Results       12/21/20 12/12/20 12/11/20                    0553 1048 0444         WBC 7.21 8.31 7.97         RBC 4.22 3.76 3.17         HGB 11.3 10.1 8.5         HCT 36.9 31.9 26.5         MCV 87.4 84.8 83.6         MCH 26.8 26.9 26.8         MCHC 30.6 31.7 32.1          276 186         Comment for PLT at 1048 on 12/12/20: ALL RESULTS HAVE BEEN RECHECKED. SPECIMEN QUALITY CHECKED        CMP Results       12/21/20 12/12/20 12/11/20                    0553 1048 0444          138 140         K 4.2 4.5 3.9         Cl 103 102 105         CO2 23 21 23         Glucose 96 88 95         BUN 7 6 7     "     Creatinine 0.7 0.7 0.6         Calcium 9.2 9.2 9.0         Anion Gap 11 15 12         EGFR >60.0 >60.0 >60.0                     Other labs: UDS not pos for opiates. No methadone or buprenorphine or fentanyl checked on admission.    Imaging:  I have reviewed the Imaging from the last 24 hrs.      QTc: 464 with a heart rate of 89, drops to 430s with [place], obtained on 12/20/20.    Has used 10mg PRN daily since started on Methadone for last 5 days.        Assessment/Plan     Assessment:  Carolyn Redmond is a 35 y.o. female with PMH pancreatitis, endocarditis, migraine HA's, vasculitis, PPH OUD severe, mood disorder (substance vs primary)    Recommendations:   - Can increase standing methadone to 30mg in AM with another 10mg PRN in the evening.  - Rec monitoring of QTc to ensure not increasing  - For tonight can add 5mg for 15mg total dose tonight only.  - Cont buproprion 75mg TID, nicotine patch, paxil 30mg daily, prazosin 2mg PO qHS, seroquel 100mg PO qHS, ambien 10mg PO qHS per primary consult team  - Agree with long term MMT while on IV antibiotics      Cali Alfonso MD

## 2020-12-21 NOTE — PATIENT CARE CONFERENCE
Care Progression Rounds Note  Date: 12/21/2020  Time: 10:33 AM     Patient Name: Carolyn Redmond     Medical Record Number: [id number]   YOB: 1985  Sex: Female      Room/Bed: 4211    Admitting Diagnosis: Septic embolism (CMS/[facility]) [I76]  Infection of lumbar spine (CMS/[facility]) [M46.26]  Acute left-sided low back pain without sciatica [M54.5]   Admit Date/Time: 12/9/2020 11:10 AM    Primary Diagnosis: Acute bacterial endocarditis  Principal Problem: Acute bacterial endocarditis    GMLOS: 4.8  Anticipated Discharge Date: 12/11/2020    AM-PAC  Mobility Score:      Discharge Planning:       Barriers to Discharge:  Barriers to Discharge: Medical issues not resolved  Comment: IV antibiotics.     Participants:  social work/services, nursing

## 2020-12-21 NOTE — STUDENT
Pt looks a lot better than when saw last week. Her color has returned and she was ambulating while in room without any difficulty. Her only complaint is some residual epigastric pain which she attributes to vomiting a small amount of blood. Pt reports that she is a lot happier and feeling the most like herself than she has had in a long time. She attributes this to having several deep conversations with her mother and being engaged in virtual AA meetings and an online support group during her stay.     She is still awaiting a bed to become available at [facility]. She is fine with waiting stating that it will give her more time to continue to be stabilized prior to her transfer. Her only concern is being able to receive her medications while at [facility]- in specific her Lyrica, Ambien, and Furocet. Reassured patient that will discuss with care team and verify if she would be able to receive those medications while she is there.     She reports that the Methadone is helping her a lot. She does report that she is experiencing some anxiety and restlessness between doses, specifically between her morning (20mg) and afternoon (10mg) dose. Because of this she is hoping to adjust her afternoon dose to 20mg for a total of 40mg daily, rather than the current 30mg. Or even adjusting her morning dose to 25mg and afternoon dose to 15mg. Told patient would discuss with care team her concerns and update her.     Discussed in depth with Hubert MACK to coordinate patient's care.

## 2020-12-22 LAB
ATRIAL RATE: 88
P AXIS: 20
PR INTERVAL: 138
QRS DURATION: 96
QT INTERVAL: 366
QTC CALCULATION(BAZETT): 442
R AXIS: 39
T WAVE AXIS: 34
VENTRICULAR RATE: 88

## 2020-12-22 PROCEDURE — 63700000 HC SELF-ADMINISTRABLE DRUG: Performed by: HOSPITALIST

## 2020-12-22 PROCEDURE — 63700000 HC SELF-ADMINISTRABLE DRUG: Performed by: INTERNAL MEDICINE

## 2020-12-22 PROCEDURE — 63600000 HC DRUGS/DETAIL CODE: Performed by: INTERNAL MEDICINE

## 2020-12-22 PROCEDURE — 63700000 HC SELF-ADMINISTRABLE DRUG: Performed by: PSYCHIATRY & NEUROLOGY

## 2020-12-22 PROCEDURE — 63700000 HC SELF-ADMINISTRABLE DRUG: Performed by: NURSE PRACTITIONER

## 2020-12-22 PROCEDURE — 93005 ELECTROCARDIOGRAM TRACING: CPT | Performed by: HOSPITALIST

## 2020-12-22 PROCEDURE — 99232 SBSQ HOSP IP/OBS MODERATE 35: CPT | Performed by: STUDENT IN AN ORGANIZED HEALTH CARE EDUCATION/TRAINING PROGRAM

## 2020-12-22 PROCEDURE — 25800000 HC PHARMACY IV SOLUTIONS: Performed by: INTERNAL MEDICINE

## 2020-12-22 PROCEDURE — 12000000 HC ROOM AND CARE MED/SURG

## 2020-12-22 PROCEDURE — 93005 ELECTROCARDIOGRAM TRACING: CPT | Performed by: STUDENT IN AN ORGANIZED HEALTH CARE EDUCATION/TRAINING PROGRAM

## 2020-12-22 PROCEDURE — 63700000 HC SELF-ADMINISTRABLE DRUG: Performed by: STUDENT IN AN ORGANIZED HEALTH CARE EDUCATION/TRAINING PROGRAM

## 2020-12-22 PROCEDURE — 25000000 HC PHARMACY GENERAL: Performed by: INTERNAL MEDICINE

## 2020-12-22 RX ADMIN — METHADONE HYDROCHLORIDE 10 MG: 10 TABLET ORAL at 16:03

## 2020-12-22 RX ADMIN — CEFAZOLIN SODIUM 2 G: 10 POWDER, FOR SOLUTION INTRAVENOUS at 01:59

## 2020-12-22 RX ADMIN — BUPROPION HYDROCHLORIDE 75 MG: 75 TABLET, FILM COATED ORAL at 07:55

## 2020-12-22 RX ADMIN — BUTALBITAL, ACETAMINOPHEN AND CAFFEINE 2 TABLET: 50; 325; 40 TABLET ORAL at 20:05

## 2020-12-22 RX ADMIN — Medication 10 ML: at 20:58

## 2020-12-22 RX ADMIN — Medication 10 ML: at 02:00

## 2020-12-22 RX ADMIN — METHADONE HYDROCHLORIDE 30 MG: 10 TABLET ORAL at 08:23

## 2020-12-22 RX ADMIN — CEFAZOLIN SODIUM 2 G: 10 POWDER, FOR SOLUTION INTRAVENOUS at 09:26

## 2020-12-22 RX ADMIN — Medication 1 PATCH: at 08:23

## 2020-12-22 RX ADMIN — PROBIOTIC PRODUCT - TAB 1 TABLET: TAB at 20:58

## 2020-12-22 RX ADMIN — TRIMETHOBENZAMIDE HYDROCHLORIDE 300 MG: 300 CAPSULE ORAL at 08:22

## 2020-12-22 RX ADMIN — PANTOPRAZOLE SODIUM 40 MG: 40 TABLET, DELAYED RELEASE ORAL at 06:36

## 2020-12-22 RX ADMIN — PAROXETINE 30 MG: 20 TABLET, FILM COATED ORAL at 08:22

## 2020-12-22 RX ADMIN — CYCLOBENZAPRINE HYDROCHLORIDE 10 MG: 5 TABLET, FILM COATED ORAL at 16:31

## 2020-12-22 RX ADMIN — NICOTINE POLACRILEX 4 MG: 4 GUM, CHEWING ORAL at 16:31

## 2020-12-22 RX ADMIN — PREGABALIN 150 MG: 75 CAPSULE ORAL at 20:58

## 2020-12-22 RX ADMIN — BUPROPION HYDROCHLORIDE 75 MG: 75 TABLET, FILM COATED ORAL at 16:03

## 2020-12-22 RX ADMIN — PRAZOSIN HYDROCHLORIDE 2 MG: 1 CAPSULE ORAL at 20:58

## 2020-12-22 RX ADMIN — BUTALBITAL, ACETAMINOPHEN AND CAFFEINE 2 TABLET: 50; 325; 40 TABLET ORAL at 14:10

## 2020-12-22 RX ADMIN — QUETIAPINE 100 MG: 100 TABLET, FILM COATED ORAL at 20:58

## 2020-12-22 RX ADMIN — ZOLPIDEM TARTRATE 10 MG: 5 TABLET, COATED ORAL at 20:58

## 2020-12-22 RX ADMIN — NICOTINE POLACRILEX 4 MG: 4 GUM, CHEWING ORAL at 08:22

## 2020-12-22 RX ADMIN — METOPROLOL SUCCINATE 25 MG: 25 TABLET, EXTENDED RELEASE ORAL at 08:22

## 2020-12-22 RX ADMIN — TRIMETHOBENZAMIDE HYDROCHLORIDE 300 MG: 300 CAPSULE ORAL at 16:03

## 2020-12-22 RX ADMIN — Medication 10 ML: at 11:42

## 2020-12-22 RX ADMIN — PROBIOTIC PRODUCT - TAB 1 TABLET: TAB at 08:22

## 2020-12-22 RX ADMIN — BUTALBITAL, ACETAMINOPHEN AND CAFFEINE 2 TABLET: 50; 325; 40 TABLET ORAL at 06:32

## 2020-12-22 RX ADMIN — PREGABALIN 150 MG: 75 CAPSULE ORAL at 07:55

## 2020-12-22 RX ADMIN — BUPROPION HYDROCHLORIDE 75 MG: 75 TABLET, FILM COATED ORAL at 11:41

## 2020-12-22 RX ADMIN — PSYLLIUM HUSK 1 PACKET: 3.4 POWDER ORAL at 08:22

## 2020-12-22 RX ADMIN — CEFAZOLIN SODIUM 2 G: 10 POWDER, FOR SOLUTION INTRAVENOUS at 16:32

## 2020-12-22 RX ADMIN — CYCLOBENZAPRINE HYDROCHLORIDE 10 MG: 5 TABLET, FILM COATED ORAL at 08:22

## 2020-12-22 NOTE — PROGRESS NOTES
Hospital Medicine Service -  Daily Progress Note       SUBJECTIVE   Interval History: Seen and examined at bedside. No complaints of diarrhea today morning. Denies any new symptoms.     Awaiting placement.      OBJECTIVE      Vital signs in last 24 hours:  Temp:  [36.5 °C (97.7 °F)-36.7 °C (98.1 °F)] 36.5 °C (97.7 °F)  Heart Rate:  [] 85  Resp:  [15-16] 16  BP: (112-137)/(65-84) 112/65    Intake/Output Summary (Last 24 hours) at 12/22/2020 1456  Last data filed at 12/22/2020 0956  Gross per 24 hour   Intake 150 ml   Output --   Net 150 ml       PHYSICAL EXAMINATION      Physical Exam   Constitutional:       General: She is not in acute distress.     Appearance: She is well-developed.   HENT:      Head: Normocephalic and atraumatic.   Cardiovascular:      Rate and Rhythm: Normal rate and regular rhythm.      Heart sounds: No murmur.   Pulmonary:      Effort: Pulmonary effort is normal.      Breath sounds: Normal breath sounds. No wheezing or rales.   Abdominal:      General: Bowel sounds are normal.      Palpations: Abdomen is soft.   Skin:     General: Skin is warm and dry.      Findings: No rash.   Neurological:      Mental Status: She is alert and oriented to person, place, and time.        LINES, CATHETERS, DRAINS, AIRWAYS, AND WOUNDS   Lines, Drains, Airways, Wounds:  PICC Single Lumen 12/18/20 Right (Active)   Number of days: 3       Comments:      LABS / IMAGING / TELE      Labs  Lab Results   Component Value Date    WBC 7.21 12/21/2020    HGB 11.3 (L) 12/21/2020    HCT 36.9 12/21/2020    MCV 87.4 12/21/2020     (H) 12/21/2020     Lab Results   Component Value Date    GLUCOSE 96 12/21/2020    CALCIUM 9.2 12/21/2020     12/21/2020    K 4.2 12/21/2020    CO2 23 12/21/2020     12/21/2020    BUN 7 (L) 12/21/2020    CREATININE 0.7 12/21/2020     Lab Results   Component Value Date    INR 0.9 04/06/2019    INR 1.0 04/16/2017    INR 1.1 03/24/2017       Micro  Microbiology Results      Procedure Component Value Units Date/Time    SARS-CoV-2 (COVID-19), PCR Nasopharynx [[id number]]  (Normal) Collected: 12/17/20 1653    Specimen: Nasopharyngeal Swab from Nasopharynx Updated: 12/17/20 1747    Narrative:      The following orders were created for panel order SARS-CoV-2 (COVID-19), PCR Nasopharynx.  Procedure                               Abnormality         Status                     ---------                               -----------         ------                     SARS-CoV-2 (COVID-19), P...[[id number]]  Normal              Final result                 Please view results for these tests on the individual orders.    SARS-CoV-2 (COVID-19), PCR Nasopharynx [[id number]]  (Normal) Collected: 12/17/20 1653    Specimen: Nasopharyngeal Swab from Nasopharynx Updated: 12/17/20 1747     SARS-CoV-2 (COVID-19) Negative    Blood Culture Blood, Venous [[id number]] Collected: 12/11/20 1339    Specimen: Blood, Venous Updated: 12/16/20 1801     Culture No growth at 120 hours    Blood Culture Blood, Venous [[id number]] Collected: 12/10/20 1504    Specimen: Blood, Venous Updated: 12/15/20 2100     Culture No growth at 120 hours    Blood Culture Blood, Venous [[id number]] Collected: 12/10/20 1504    Specimen: Blood, Venous Updated: 12/15/20 2100     Culture No growth at 120 hours    Blood Culture Blood, Venous [[id number]] Collected: 12/09/20 1348    Specimen: Blood, Venous Updated: 12/14/20 1800     Culture No growth at 120 hours    Blood Culture Blood, Venous [[id number]] Collected: 12/09/20 1304    Specimen: Blood, Venous Updated: 12/14/20 1501     Culture No growth at 120 hours          Imaging  Mri Thoracic Spine With And Without Contrast    Result Date: 12/9/2020  IMPRESSION: Diffuse anterior and posterior epidural enhancement within lumbar spine more pronounced at L4-L5 raising the suspicion for underlying infectious process/phlegmon.  Left greater than right L4-L5 facet joint signal and enhancement which although  may be degenerative in nature, early changes of septic facet arthritis is in the differential.  Follow-up is recommended. Thoracic and lumbar degenerative change as described more pronounced at L4-L5. Ring-enhancing lesion in right upper lobe which may be infectious in etiology including in the setting of septic emboli.  Correlation with CT chest with contrast is recommended to exclude other etiologies. The results were discussed with Rylee Bowie on 12/9/2020 7:35 PM.    Mri Lumbar Spine With And Without Contrast    Result Date: 12/9/2020  IMPRESSION: Diffuse anterior and posterior epidural enhancement within lumbar spine more pronounced at L4-L5 raising the suspicion for underlying infectious process/phlegmon.  Left greater than right L4-L5 facet joint signal and enhancement which although may be degenerative in nature, early changes of septic facet arthritis is in the differential.  Follow-up is recommended. Thoracic and lumbar degenerative change as described more pronounced at L4-L5. Ring-enhancing lesion in right upper lobe which may be infectious in etiology including in the setting of septic emboli.  Correlation with CT chest with contrast is recommended to exclude other etiologies. The results were discussed with Rylee Bowie on 12/9/2020 7:35 PM.    X-ray Chest 1 View    Result Date: 12/18/2020  IMPRESSION: Interval placement of a right-sided PICC line catheter as described above.     X-ray Chest 1 View    Result Date: 12/18/2020  IMPRESSION: Interval placement of a right-sided PICC line catheter as described above.     X-ray Chest 1 View    Result Date: 12/9/2020  IMPRESSION: No radiographic evidence of acute cardiopulmonary disease.    Ct Chest Pulmonary Embolism With Iv Contrast    Result Date: 12/9/2020  IMPRESSION: 1.  No evidence of main, lobar or segmental pulmonary embolism. 2.  However, there are multifocal nodular lesions throughout all lung fields including a cavitary lesion in the right upper  lobe all suspicious for septic emboli.  Some of the tiny subsegmental pulmonary arterial branches demonstrate hypoenhancement and it is difficult to determinate if this is related to tiny pulmonary emboli or technique. 3.  Small bilateral pleural effusions. 4.  Mosaic pattern at the lung bases could represent mild edema or sequela of small airways or small vessels disease. 5.  Follow-up chest CT after appropriate treatment is recommended to document lung opacities and mediastinal lymphadenopathy which is probably reactive. 6.  Additional findings discussed below. Finding:    Other   Acuity: Critical  Status:  CLOSED Critical read back for the first impression performed with Bertha Bowie PA 9:25 PM 12/9/2020 COMMENT: Comparison: Chest x-ray from 12/9/2020 Technique: Chest CT pulmonary embolism protocol performed with intravenous contrast.  80 mL Omnipaque 350 given. CT DOSE:  One or more dose reduction techniques (e.g. automated exposure control, adjustment of the mA and/or kV according to patient size, use of iterative reconstruction technique) utilized for this examination. FINDINGS: LUNG, PLEURA AND LARGE AIRWAYS: The trachea and proximal bronchi remain clear. There are multifocal nodular lesions throughout the lungs.  For example in the right upper lobe there is a cavitary lesion measuring 1.4 cm on axial image 63. A 1.6 cm nodular lesion in the left upper lobe on image 69.  Multiple additional lesions are present elsewhere.  His are suspicious for septic emboli particularly given the history of endocarditis and sepsis. There are small bilateral pleural effusions. There is a nonspecific mild degree of groundglass opacity in the lower lung fields.  This could represent mild degree of pulmonary edema or possibly a mosaic pattern from small airways or small vessel disease. No pneumothorax. VESSELS: Normal caliber of the thoracic aorta.  Thoracic aorta appears within normal limits.  Main pulmonary artery is  normal in caliber.  No evidence of a main, lobar or segmental pulmonary embolism.  However, the subsegmental vessels are difficult to assess and some demonstrate a slightly hypoechoic enhancing appearance possibly artifact from bolus timing although tiny subsegmental pulmonary emboli would be difficult to exclude.  For example, in the lingula there is some hypoenhancement of the subsegmental vessels on image 116 where subsegmental PE cannot be excluded.  No evidence of right heart strain. HEART: normal size. No pericardial effusion. MEDIASTINUM AND PAULINA: There is a pathologically enlarged lymph node in the AP window measuring 1.8 cm short axis.  Additional prominent but nonpathologic by size video lymph nodes elsewhere. CHEST WALL AND LOWER NECK: within normal limits. UPPER ABDOMEN:Limited assessment of the upper abdominal structures with this technique.  There is splenomegaly with the spleen measuring 14.5 cm.  The liver may also be enlarged but is incompletely visualized.  Cholecystectomy changes. BONES: No suspicious bony erosive changes.  Multilevel Schmorl's nodes and degenerative changes in the spine are noted.  Calcific tendinosis of the right rotator cuff.  There is limited assessment of the spine with this technique.  No gross paraspinal inflammatory changes.     Ultrasound Abdomen Limited    Result Date: 12/17/2020  IMPRESSION: Mild increase in echotexture of the liver may represent fibrofatty infiltration. COMMENT: Ultrasound evaluation of the right upper quadrant was performed and compared to a CT exam dated 8/18/2019.  The pancreas is of normal echotexture and unremarkable.  The aorta is obscured by bowel gas.  The liver is 17.6 cm in length and of increased echogenicity consistent with fibrofatty infiltration. There is no ductal dilatation or mass lesion.  Common bile duct measures 2.3 mm at the pito hepatis.  The right kidney is 10.5 cm in length.  There is no nephrolithiasis, hydronephrosis or mass  lesion.  The renal cortex of normal thickness and echotexture.          ASSESSMENT AND PLAN      Opioid abuse (CMS/[facility])  Assessment & Plan        Tricuspid valve vegetation  Assessment & Plan  -Sessile vegetation on tricuspid valve leaflet  -Continue antibiotics- with Cefazolin- today is Day 15    Septic arthritis of vertebra (CMS/[facility])  Assessment & Plan  -Neurovascular intact  -Palliative care medicine and psych following   -Off oxycodone, on methadone       Essential hypertension  Assessment & Plan  -Continue with home meds  -bp parameters for hypotension    Septic embolism (CMS/[facility])  Assessment & Plan  -Continue IV abx per ID   -Blood cultures performed on admission, NGTD  -Evidence of discitis on MRI, plan per neurosurgery is follow-up MRI after completion of antibiotics    Opioid type dependence, continuous use (CMS/[facility])  Assessment & Plan  -Currently on methadone- adjustment of dosage   -Psych recs appreciated      * Acute bacterial endocarditis  Assessment & Plan  -Suspected tricuspid valve endocarditis  -Sepsis due to acute bacterial endocarditis present on admission  -Blood cultures from [facility]  -Repeat blood cultures NGTD  -PICC line in place  -Continue antibiotic Cefazolin- will require 6 week therapy- today is day 15  -Had nausea again with cefazolin, will utilize tigan prior to antibiotics       VTE Assessment: Padua VTE Score: 5  VTE Prophylaxis Plan: Ambulation  Code Status: Full Code  Estimated Discharge Date: 12/11/2020  Disposition Planning: placement     Miek Jordan MD  12/22/2020

## 2020-12-22 NOTE — PROGRESS NOTES
GIOVANY SOLORIO called [facility] today to check about pts placement with her IV antibiotics. [facility] said that they dont have bed available at this time for this pt. GIOVANY SOLORIO also call [facility] [place]  or [facility] . Message left for [facility] and GIOVANY SOLORIO spoke with [facility] who said that they dont accept pts insurance. She said that Macarena Bolivar can be contacted in finance at [facility] at [phone] to see if she can assist with a single case agreement for this pt. They said that pt has a [facility] address while living in [facility] so that would be a problem for them for payment so GIOVANY SOLORIO contacted Macarena on her phone as she is working from home. GIOVANY SOLORIO reached Macarena at [phone]. She said she hasnt had this request before so she will need time to look into it. GIOVANY SOLORIO provided information to help. She wants to look into it. She will call GIOVANY SOLORIO back. She said she isnt sure they have the capability to do that but she will look into it.    PLAN: pt needs IV antibiotics.

## 2020-12-22 NOTE — BEHAVIORAL HEALTH CRISIS PROGRESS NOTE
Behavioral Health Progress Note    Chief Complaint/Reason for follow-up: opioid dependence/mood disorder     Interval History: Follow up with the pt along with the medical student. The pt again seems to be in good spirits and reports she is continuing to complete tx within recovery. She states she is attending at least 1 AA virtual meeting a day. Discussed her current methadone dose as she was seen by Dr. Alfonso yesterday and changed dosing to 30mg am and 10mg PRN at night. She feels comfortable with this dosing at this time.     Vital Signs for the last 24 hours:  Temp:  [36.5 °C (97.7 °F)-36.7 °C (98.1 °F)] 36.5 °C (97.7 °F)  Heart Rate:  [] 85  Resp:  [15-16] 16  BP: (112-137)/(65-84) 112/65    Assessment/Plan  Will continue to be following with the pt in regards to her management of methadone and continued recovering. Awaiting placement for the pt for her IV antibiotic tx at this time.

## 2020-12-23 PROCEDURE — 63700000 HC SELF-ADMINISTRABLE DRUG: Performed by: STUDENT IN AN ORGANIZED HEALTH CARE EDUCATION/TRAINING PROGRAM

## 2020-12-23 PROCEDURE — 63700000 HC SELF-ADMINISTRABLE DRUG: Performed by: INTERNAL MEDICINE

## 2020-12-23 PROCEDURE — 200200 PR NO CHARGE: Performed by: NURSE PRACTITIONER

## 2020-12-23 PROCEDURE — 25000000 HC PHARMACY GENERAL: Performed by: INTERNAL MEDICINE

## 2020-12-23 PROCEDURE — 63600000 HC DRUGS/DETAIL CODE: Performed by: INTERNAL MEDICINE

## 2020-12-23 PROCEDURE — 99232 SBSQ HOSP IP/OBS MODERATE 35: CPT | Performed by: STUDENT IN AN ORGANIZED HEALTH CARE EDUCATION/TRAINING PROGRAM

## 2020-12-23 PROCEDURE — 63700000 HC SELF-ADMINISTRABLE DRUG: Performed by: PSYCHIATRY & NEUROLOGY

## 2020-12-23 PROCEDURE — 25800000 HC PHARMACY IV SOLUTIONS: Performed by: INTERNAL MEDICINE

## 2020-12-23 PROCEDURE — 63700000 HC SELF-ADMINISTRABLE DRUG: Performed by: NURSE PRACTITIONER

## 2020-12-23 PROCEDURE — 63700000 HC SELF-ADMINISTRABLE DRUG: Performed by: HOSPITALIST

## 2020-12-23 PROCEDURE — 12000000 HC ROOM AND CARE MED/SURG

## 2020-12-23 RX ADMIN — NICOTINE POLACRILEX 4 MG: 4 GUM, CHEWING ORAL at 21:13

## 2020-12-23 RX ADMIN — PANTOPRAZOLE SODIUM 40 MG: 40 TABLET, DELAYED RELEASE ORAL at 06:11

## 2020-12-23 RX ADMIN — PROBIOTIC PRODUCT - TAB 1 TABLET: TAB at 07:47

## 2020-12-23 RX ADMIN — NICOTINE POLACRILEX 4 MG: 4 GUM, CHEWING ORAL at 11:51

## 2020-12-23 RX ADMIN — PREGABALIN 150 MG: 75 CAPSULE ORAL at 07:47

## 2020-12-23 RX ADMIN — PROBIOTIC PRODUCT - TAB 1 TABLET: TAB at 19:44

## 2020-12-23 RX ADMIN — Medication 10 ML: at 10:37

## 2020-12-23 RX ADMIN — PREGABALIN 150 MG: 75 CAPSULE ORAL at 19:45

## 2020-12-23 RX ADMIN — TRIMETHOBENZAMIDE HYDROCHLORIDE 300 MG: 300 CAPSULE ORAL at 16:58

## 2020-12-23 RX ADMIN — ZOLPIDEM TARTRATE 10 MG: 5 TABLET, COATED ORAL at 21:13

## 2020-12-23 RX ADMIN — METOPROLOL SUCCINATE 25 MG: 25 TABLET, EXTENDED RELEASE ORAL at 07:54

## 2020-12-23 RX ADMIN — BUPROPION HYDROCHLORIDE 75 MG: 75 TABLET, FILM COATED ORAL at 11:51

## 2020-12-23 RX ADMIN — Medication 1 PATCH: at 07:51

## 2020-12-23 RX ADMIN — BUTALBITAL, ACETAMINOPHEN AND CAFFEINE 2 TABLET: 50; 325; 40 TABLET ORAL at 13:28

## 2020-12-23 RX ADMIN — BUPROPION HYDROCHLORIDE 75 MG: 75 TABLET, FILM COATED ORAL at 15:16

## 2020-12-23 RX ADMIN — BUTALBITAL, ACETAMINOPHEN AND CAFFEINE 2 TABLET: 50; 325; 40 TABLET ORAL at 19:45

## 2020-12-23 RX ADMIN — TRIMETHOBENZAMIDE HYDROCHLORIDE 300 MG: 300 CAPSULE ORAL at 09:06

## 2020-12-23 RX ADMIN — CEFAZOLIN SODIUM 2 G: 10 POWDER, FOR SOLUTION INTRAVENOUS at 09:42

## 2020-12-23 RX ADMIN — CEFAZOLIN SODIUM 2 G: 10 POWDER, FOR SOLUTION INTRAVENOUS at 02:02

## 2020-12-23 RX ADMIN — CYCLOBENZAPRINE HYDROCHLORIDE 10 MG: 5 TABLET, FILM COATED ORAL at 07:46

## 2020-12-23 RX ADMIN — BUPROPION HYDROCHLORIDE 75 MG: 75 TABLET, FILM COATED ORAL at 07:47

## 2020-12-23 RX ADMIN — Medication 10 ML: at 19:48

## 2020-12-23 RX ADMIN — Medication 10 ML: at 02:31

## 2020-12-23 RX ADMIN — PAROXETINE 30 MG: 20 TABLET, FILM COATED ORAL at 07:46

## 2020-12-23 RX ADMIN — METHADONE HYDROCHLORIDE 10 MG: 10 TABLET ORAL at 16:06

## 2020-12-23 RX ADMIN — BUTALBITAL, ACETAMINOPHEN AND CAFFEINE 2 TABLET: 50; 325; 40 TABLET ORAL at 06:11

## 2020-12-23 RX ADMIN — NICOTINE POLACRILEX 4 MG: 4 GUM, CHEWING ORAL at 07:49

## 2020-12-23 RX ADMIN — CEFAZOLIN SODIUM 2 G: 10 POWDER, FOR SOLUTION INTRAVENOUS at 17:28

## 2020-12-23 RX ADMIN — METHADONE HYDROCHLORIDE 30 MG: 10 TABLET ORAL at 07:48

## 2020-12-23 RX ADMIN — QUETIAPINE 100 MG: 100 TABLET, FILM COATED ORAL at 21:12

## 2020-12-23 NOTE — PATIENT CARE CONFERENCE
Care Progression Rounds Note  Date: 12/23/2020  Time: 11:10 AM     Patient Name: Carloyn Redmond     Medical Record Number: [id number]   YOB: 1985  Sex: Female      Room/Bed: 4211    Admitting Diagnosis: Septic embolism (CMS/[facility]) [I76]  Infection of lumbar spine (CMS/[facility]) [M46.26]  Acute left-sided low back pain without sciatica [M54.5]   Admit Date/Time: 12/9/2020 11:10 AM    Primary Diagnosis: Acute bacterial endocarditis  Principal Problem: Acute bacterial endocarditis    GMLOS: 4.8  Anticipated Discharge Date: 12/11/2020    AM-PAC  Mobility Score:      Discharge Planning:       Barriers to Discharge:  Barriers to Discharge: Social barriers (other), Medical issues not resolved  Comment: difficult placement due to IV abx and hx of IV drug use    Participants:  nursing, social work/services

## 2020-12-23 NOTE — PROGRESS NOTES
Discussed with attending, Dr. Jordan, patient stable, pending dc.  Palliative Care will sign off at this time.   SHERITA Baer  Palliative Care  [phone]

## 2020-12-23 NOTE — PROGRESS NOTES
Hospital Medicine Service -  Daily Progress Note       SUBJECTIVE   Interval History: Seen and examined at bedside. No new complaints.    Awaiting placement.      OBJECTIVE      Vital signs in last 24 hours:  Temp:  [36.5 °C (97.7 °F)-36.8 °C (98.2 °F)] 36.6 °C (97.9 °F)  Heart Rate:  [69-89] 80  Resp:  [15-16] 16  BP: (110-127)/(57-79) 119/68    Intake/Output Summary (Last 24 hours) at 12/23/2020 1116  Last data filed at 12/23/2020 1037  Gross per 24 hour   Intake 160 ml   Output --   Net 160 ml       PHYSICAL EXAMINATION      Physical Exam   Constitutional:       General: She is not in acute distress.     Appearance: She is well-developed.   HENT:      Head: Normocephalic and atraumatic.   Cardiovascular:      Rate and Rhythm: Normal rate and regular rhythm.      Heart sounds: No murmur.   Pulmonary:      Effort: Pulmonary effort is normal.      Breath sounds: Normal breath sounds. No wheezing or rales.   Abdominal:      General: Bowel sounds are normal.      Palpations: Abdomen is soft.   Skin:     General: Skin is warm and dry.      Findings: No rash.   Neurological:      Mental Status: She is alert and oriented to person, place, and time.        LINES, CATHETERS, DRAINS, AIRWAYS, AND WOUNDS   Lines, Drains, Airways, Wounds:  PICC Single Lumen 12/18/20 Right (Active)   Number of days: 3       Comments:      LABS / IMAGING / TELE      Labs  Lab Results   Component Value Date    WBC 7.21 12/21/2020    HGB 11.3 (L) 12/21/2020    HCT 36.9 12/21/2020    MCV 87.4 12/21/2020     (H) 12/21/2020     Lab Results   Component Value Date    GLUCOSE 96 12/21/2020    CALCIUM 9.2 12/21/2020     12/21/2020    K 4.2 12/21/2020    CO2 23 12/21/2020     12/21/2020    BUN 7 (L) 12/21/2020    CREATININE 0.7 12/21/2020     Lab Results   Component Value Date    INR 0.9 04/06/2019    INR 1.0 04/16/2017    INR 1.1 03/24/2017       Micro  Microbiology Results     Procedure Component Value Units Date/Time     SARS-CoV-2 (COVID-19), PCR Nasopharynx [[id number]]  (Normal) Collected: 12/17/20 1653    Specimen: Nasopharyngeal Swab from Nasopharynx Updated: 12/17/20 1747    Narrative:      The following orders were created for panel order SARS-CoV-2 (COVID-19), PCR Nasopharynx.  Procedure                               Abnormality         Status                     ---------                               -----------         ------                     SARS-CoV-2 (COVID-19), P...[[id number]]  Normal              Final result                 Please view results for these tests on the individual orders.    SARS-CoV-2 (COVID-19), PCR Nasopharynx [[id number]]  (Normal) Collected: 12/17/20 1653    Specimen: Nasopharyngeal Swab from Nasopharynx Updated: 12/17/20 1747     SARS-CoV-2 (COVID-19) Negative    Blood Culture Blood, Venous [[id number]] Collected: 12/11/20 1339    Specimen: Blood, Venous Updated: 12/16/20 1801     Culture No growth at 120 hours    Blood Culture Blood, Venous [[id number]] Collected: 12/10/20 1504    Specimen: Blood, Venous Updated: 12/15/20 2100     Culture No growth at 120 hours    Blood Culture Blood, Venous [[id number]] Collected: 12/10/20 1504    Specimen: Blood, Venous Updated: 12/15/20 2100     Culture No growth at 120 hours    Blood Culture Blood, Venous [[id number]] Collected: 12/09/20 1348    Specimen: Blood, Venous Updated: 12/14/20 1800     Culture No growth at 120 hours    Blood Culture Blood, Venous [[id number]] Collected: 12/09/20 1304    Specimen: Blood, Venous Updated: 12/14/20 1501     Culture No growth at 120 hours          Imaging  Mri Thoracic Spine With And Without Contrast    Result Date: 12/9/2020  IMPRESSION: Diffuse anterior and posterior epidural enhancement within lumbar spine more pronounced at L4-L5 raising the suspicion for underlying infectious process/phlegmon.  Left greater than right L4-L5 facet joint signal and enhancement which although may be degenerative in nature, early changes of  septic facet arthritis is in the differential.  Follow-up is recommended. Thoracic and lumbar degenerative change as described more pronounced at L4-L5. Ring-enhancing lesion in right upper lobe which may be infectious in etiology including in the setting of septic emboli.  Correlation with CT chest with contrast is recommended to exclude other etiologies. The results were discussed with Rylee Bowie on 12/9/2020 7:35 PM.    Mri Lumbar Spine With And Without Contrast    Result Date: 12/9/2020  IMPRESSION: Diffuse anterior and posterior epidural enhancement within lumbar spine more pronounced at L4-L5 raising the suspicion for underlying infectious process/phlegmon.  Left greater than right L4-L5 facet joint signal and enhancement which although may be degenerative in nature, early changes of septic facet arthritis is in the differential.  Follow-up is recommended. Thoracic and lumbar degenerative change as described more pronounced at L4-L5. Ring-enhancing lesion in right upper lobe which may be infectious in etiology including in the setting of septic emboli.  Correlation with CT chest with contrast is recommended to exclude other etiologies. The results were discussed with Rylee Bowie on 12/9/2020 7:35 PM.    X-ray Chest 1 View    Result Date: 12/18/2020  IMPRESSION: Interval placement of a right-sided PICC line catheter as described above.     X-ray Chest 1 View    Result Date: 12/18/2020  IMPRESSION: Interval placement of a right-sided PICC line catheter as described above.     X-ray Chest 1 View    Result Date: 12/9/2020  IMPRESSION: No radiographic evidence of acute cardiopulmonary disease.    Ct Chest Pulmonary Embolism With Iv Contrast    Result Date: 12/9/2020  IMPRESSION: 1.  No evidence of main, lobar or segmental pulmonary embolism. 2.  However, there are multifocal nodular lesions throughout all lung fields including a cavitary lesion in the right upper lobe all suspicious for septic emboli.  Some of  the tiny subsegmental pulmonary arterial branches demonstrate hypoenhancement and it is difficult to determinate if this is related to tiny pulmonary emboli or technique. 3.  Small bilateral pleural effusions. 4.  Mosaic pattern at the lung bases could represent mild edema or sequela of small airways or small vessels disease. 5.  Follow-up chest CT after appropriate treatment is recommended to document lung opacities and mediastinal lymphadenopathy which is probably reactive. 6.  Additional findings discussed below. Finding:    Other   Acuity: Critical  Status:  CLOSED Critical read back for the first impression performed with Bertha Bowie PA 9:25 PM 12/9/2020 COMMENT: Comparison: Chest x-ray from 12/9/2020 Technique: Chest CT pulmonary embolism protocol performed with intravenous contrast.  80 mL Omnipaque 350 given. CT DOSE:  One or more dose reduction techniques (e.g. automated exposure control, adjustment of the mA and/or kV according to patient size, use of iterative reconstruction technique) utilized for this examination. FINDINGS: LUNG, PLEURA AND LARGE AIRWAYS: The trachea and proximal bronchi remain clear. There are multifocal nodular lesions throughout the lungs.  For example in the right upper lobe there is a cavitary lesion measuring 1.4 cm on axial image 63. A 1.6 cm nodular lesion in the left upper lobe on image 69.  Multiple additional lesions are present elsewhere.  His are suspicious for septic emboli particularly given the history of endocarditis and sepsis. There are small bilateral pleural effusions. There is a nonspecific mild degree of groundglass opacity in the lower lung fields.  This could represent mild degree of pulmonary edema or possibly a mosaic pattern from small airways or small vessel disease. No pneumothorax. VESSELS: Normal caliber of the thoracic aorta.  Thoracic aorta appears within normal limits.  Main pulmonary artery is normal in caliber.  No evidence of a main, lobar or  segmental pulmonary embolism.  However, the subsegmental vessels are difficult to assess and some demonstrate a slightly hypoechoic enhancing appearance possibly artifact from bolus timing although tiny subsegmental pulmonary emboli would be difficult to exclude.  For example, in the lingula there is some hypoenhancement of the subsegmental vessels on image 116 where subsegmental PE cannot be excluded.  No evidence of right heart strain. HEART: normal size. No pericardial effusion. MEDIASTINUM AND PAULINA: There is a pathologically enlarged lymph node in the AP window measuring 1.8 cm short axis.  Additional prominent but nonpathologic by size video lymph nodes elsewhere. CHEST WALL AND LOWER NECK: within normal limits. UPPER ABDOMEN:Limited assessment of the upper abdominal structures with this technique.  There is splenomegaly with the spleen measuring 14.5 cm.  The liver may also be enlarged but is incompletely visualized.  Cholecystectomy changes. BONES: No suspicious bony erosive changes.  Multilevel Schmorl's nodes and degenerative changes in the spine are noted.  Calcific tendinosis of the right rotator cuff.  There is limited assessment of the spine with this technique.  No gross paraspinal inflammatory changes.     Ultrasound Abdomen Limited    Result Date: 12/17/2020  IMPRESSION: Mild increase in echotexture of the liver may represent fibrofatty infiltration. COMMENT: Ultrasound evaluation of the right upper quadrant was performed and compared to a CT exam dated 8/18/2019.  The pancreas is of normal echotexture and unremarkable.  The aorta is obscured by bowel gas.  The liver is 17.6 cm in length and of increased echogenicity consistent with fibrofatty infiltration. There is no ductal dilatation or mass lesion.  Common bile duct measures 2.3 mm at the pito hepatis.  The right kidney is 10.5 cm in length.  There is no nephrolithiasis, hydronephrosis or mass lesion.  The renal cortex of normal thickness and  echotexture.          ASSESSMENT AND PLAN      Tricuspid valve vegetation  Assessment & Plan  -Sessile vegetation on tricuspid valve leaflet  -Continue antibiotics- with Cefazolin- today is Day 16    Septic arthritis of vertebra (CMS/[facility])  Assessment & Plan  -Neurovascular intact  -Evidence of discitis on MRI, plan per neurosurgery is follow-up MRI after completion of antibiotics      Essential hypertension  Assessment & Plan  -Continue with home meds  -bp parameters for hypotension    Septic embolism (CMS/[facility])  Assessment & Plan  -Continue IV abx per ID   -Blood cultures performed on admission, NGTD  -Evidence of discitis on MRI, plan per neurosurgery is follow-up MRI after completion of antibiotics    Opioid type dependence, continuous use (CMS/[facility])  Assessment & Plan  -Currently on methadone- adjustment of dosage   -Psych recs appreciated      * Acute bacterial endocarditis  Assessment & Plan  -Suspected tricuspid valve endocarditis  -Sepsis due to acute bacterial endocarditis present on admission  -Blood cultures from [facility] [facility]  -Repeat blood cultures NGTD  -PICC line in place  -Continue antibiotic Cefazolin- will require 6 week therapy- today is day 16  -Had nausea again with cefazolin, will utilize tigan prior to antibiotics       VTE Assessment: Padua VTE Score: 5  VTE Prophylaxis Plan: Ambulation  Code Status: Full Code  Estimated Discharge Date: 12/11/2020  Disposition Planning: placement     Mike Jordan MD  12/23/2020

## 2020-12-24 PROCEDURE — 63600000 HC DRUGS/DETAIL CODE: Performed by: INTERNAL MEDICINE

## 2020-12-24 PROCEDURE — 63700000 HC SELF-ADMINISTRABLE DRUG: Performed by: NURSE PRACTITIONER

## 2020-12-24 PROCEDURE — 12000000 HC ROOM AND CARE MED/SURG

## 2020-12-24 PROCEDURE — 63700000 HC SELF-ADMINISTRABLE DRUG: Performed by: HOSPITALIST

## 2020-12-24 PROCEDURE — 63700000 HC SELF-ADMINISTRABLE DRUG: Performed by: INTERNAL MEDICINE

## 2020-12-24 PROCEDURE — 63700000 HC SELF-ADMINISTRABLE DRUG: Performed by: STUDENT IN AN ORGANIZED HEALTH CARE EDUCATION/TRAINING PROGRAM

## 2020-12-24 PROCEDURE — 25800000 HC PHARMACY IV SOLUTIONS: Performed by: INTERNAL MEDICINE

## 2020-12-24 PROCEDURE — 25000000 HC PHARMACY GENERAL: Performed by: INTERNAL MEDICINE

## 2020-12-24 PROCEDURE — 99232 SBSQ HOSP IP/OBS MODERATE 35: CPT | Performed by: STUDENT IN AN ORGANIZED HEALTH CARE EDUCATION/TRAINING PROGRAM

## 2020-12-24 PROCEDURE — 63700000 HC SELF-ADMINISTRABLE DRUG: Performed by: PSYCHIATRY & NEUROLOGY

## 2020-12-24 RX ADMIN — PROBIOTIC PRODUCT - TAB 1 TABLET: TAB at 21:27

## 2020-12-24 RX ADMIN — CEFAZOLIN SODIUM 2 G: 10 POWDER, FOR SOLUTION INTRAVENOUS at 17:08

## 2020-12-24 RX ADMIN — BUTALBITAL, ACETAMINOPHEN AND CAFFEINE 2 TABLET: 50; 325; 40 TABLET ORAL at 12:28

## 2020-12-24 RX ADMIN — ZOLPIDEM TARTRATE 10 MG: 5 TABLET, COATED ORAL at 21:27

## 2020-12-24 RX ADMIN — TRIMETHOBENZAMIDE HYDROCHLORIDE 300 MG: 300 CAPSULE ORAL at 16:38

## 2020-12-24 RX ADMIN — PREGABALIN 150 MG: 75 CAPSULE ORAL at 08:36

## 2020-12-24 RX ADMIN — NICOTINE POLACRILEX 4 MG: 4 GUM, CHEWING ORAL at 08:35

## 2020-12-24 RX ADMIN — PROBIOTIC PRODUCT - TAB 1 TABLET: TAB at 08:36

## 2020-12-24 RX ADMIN — CEFAZOLIN SODIUM 2 G: 10 POWDER, FOR SOLUTION INTRAVENOUS at 09:41

## 2020-12-24 RX ADMIN — NICOTINE POLACRILEX 4 MG: 4 GUM, CHEWING ORAL at 16:38

## 2020-12-24 RX ADMIN — BUPROPION HYDROCHLORIDE 75 MG: 75 TABLET, FILM COATED ORAL at 08:35

## 2020-12-24 RX ADMIN — Medication 1 PATCH: at 08:37

## 2020-12-24 RX ADMIN — PAROXETINE 30 MG: 20 TABLET, FILM COATED ORAL at 08:36

## 2020-12-24 RX ADMIN — BUPROPION HYDROCHLORIDE 75 MG: 75 TABLET, FILM COATED ORAL at 15:45

## 2020-12-24 RX ADMIN — METHADONE HYDROCHLORIDE 30 MG: 10 TABLET ORAL at 08:36

## 2020-12-24 RX ADMIN — PRAZOSIN HYDROCHLORIDE 2 MG: 1 CAPSULE ORAL at 21:27

## 2020-12-24 RX ADMIN — Medication 10 ML: at 21:28

## 2020-12-24 RX ADMIN — CEFAZOLIN SODIUM 2 G: 10 POWDER, FOR SOLUTION INTRAVENOUS at 01:13

## 2020-12-24 RX ADMIN — BUTALBITAL, ACETAMINOPHEN AND CAFFEINE 2 TABLET: 50; 325; 40 TABLET ORAL at 18:06

## 2020-12-24 RX ADMIN — TRIMETHOBENZAMIDE HYDROCHLORIDE 300 MG: 300 CAPSULE ORAL at 08:36

## 2020-12-24 RX ADMIN — BUPROPION HYDROCHLORIDE 75 MG: 75 TABLET, FILM COATED ORAL at 12:28

## 2020-12-24 RX ADMIN — PREGABALIN 150 MG: 75 CAPSULE ORAL at 21:27

## 2020-12-24 RX ADMIN — NICOTINE POLACRILEX 4 MG: 4 GUM, CHEWING ORAL at 12:36

## 2020-12-24 RX ADMIN — CYCLOBENZAPRINE HYDROCHLORIDE 10 MG: 5 TABLET, FILM COATED ORAL at 16:38

## 2020-12-24 RX ADMIN — QUETIAPINE 100 MG: 100 TABLET, FILM COATED ORAL at 21:27

## 2020-12-24 RX ADMIN — BUTALBITAL, ACETAMINOPHEN AND CAFFEINE 2 TABLET: 50; 325; 40 TABLET ORAL at 06:02

## 2020-12-24 RX ADMIN — METOPROLOL SUCCINATE 25 MG: 25 TABLET, EXTENDED RELEASE ORAL at 08:35

## 2020-12-24 RX ADMIN — METHADONE HYDROCHLORIDE 10 MG: 10 TABLET ORAL at 18:06

## 2020-12-24 RX ADMIN — PANTOPRAZOLE SODIUM 40 MG: 40 TABLET, DELAYED RELEASE ORAL at 08:36

## 2020-12-24 RX ADMIN — CYCLOBENZAPRINE HYDROCHLORIDE 10 MG: 5 TABLET, FILM COATED ORAL at 08:36

## 2020-12-24 NOTE — PROGRESS NOTES
Nutrition Status Classification: Mild nutritional compromise     Clinical Course: Patient is a 35 y.o. female who was admitted on 12/9/2020 with a diagnosis of Septic embolism (CMS/[facility]) [I76]  Infection of lumbar spine (CMS/[facility]) [M46.26]  Acute left-sided low back pain without sciatica [M54.5].   Past Medical History:   Diagnosis Date   • Anxiety    • Depressed    • Endocarditis    • Fibromyalgia    • Migraines    • Pancreatitis    • Tachycardia    • Vasculitis (CMS/HCC)      Past Surgical History:   Procedure Laterality Date   • CHOLECYSTECTOMY     • ERCP     • GALLBLADDER SURGERY         Nutrition Interventions/Recommendations:     1. Continue Regular diet  2. Continue Boost Plus (360 kcals / 14 g protein each)- BID   3. Treat and trend labs /lytes  4. Pt requests standing scale weight today- RN aware         Dietary Orders   (From admission, onward)             Start     Ordered    12/16/20 0934  Dietary nutrition supplements  Once     Question Answer Comment   Select Supplement (PH): Ensure Compact Chocolate    Meal period Lunch        12/16/20 0933    12/16/20 0933  Dietary nutrition supplements  Once     Question Answer Comment   Select Supplement (PH): Ensure Compact Vanilla    Meal period Breakfast Dinner        12/16/20 0933    12/11/20 1201  Adult Diet Regular; RD/LDN may adjust order  Diet effective now     Question Answer Comment   Diet Texture Regular    Delegation of Authority. Diet orders written by PA/CRStephen may not be adjusted by RD/LDNs. RD/LDN may adjust order        12/11/20 1201                Reason for Assessment  Reason For Assessment: per organizational policy(follow up)  Diagnosis: other (see comments)(bacterial endocarditis)    [facility] Nutrition Screen Tool  Has patient lost weight without trying?: 0-->No  If yes,how much weight has been lost?: 0-->Patient has not lost weight  Has patient been eating poorly due to decreased appetite?: 0-->No  MST Nutrition Screen Score: 0    Nutrition/Diet  "History  Typical Food/Fluid Intake: (small meals and snacks)  Diet Prior to Admission: (regular (low fat, low fiber))  Intake (%): 100%  Food Allergies: other (see comments)(NKFA)  Factors Affecting Nutritional Intake: anorexia, nausea, pain    Physical Findings  Overall Physical Appearance: obese  Gastrointestinal: other (see comments), nausea, diarrhea(pain)  Last Bowel Movement: 12/22/20  Skin: other (see comments)(no active wound)    Last Bowel Movement: 12/22/20    Nutrition Order  Nutrition Order Review: meets nutritional requirements  Nutrition Order Comments: (regular)    Anthropometrics  Height: 157.5 cm (5' 2\")           Current Weight  Weight Method: Bed scale  Weight: 102 kg (225 lb 11.2 oz)    Ideal Body Weight (IBW)  Ideal Body Weight (IBW) (kg): 50.43  % Ideal Body Weight: 211.36         Body Mass Index (BMI)  BMI (Calculated): 41.3  BMI (kg/m2): 43.07  BMI Assessment: BMI 40 or greater: obesity grade III                        Labs/Procedures/Meds  Lab Results Reviewed: reviewed      Results from last 7 days   Lab Units 12/21/20  0553   SODIUM mEQ/L 137   POTASSIUM mEQ/L 4.2   CHLORIDE mEQ/L 103   CO2 mEQ/L 23   BUN mg/dL 7*   CREATININE mg/dL 0.7   GLUCOSE mg/dL 96   CALCIUM mg/dL 9.2               No results found for: HGBA1C  Lab Results   Component Value Date    ALESDOXV05 346 04/18/2017     Lab Results   Component Value Date    CALCIUM 9.2 12/21/2020    PHOS 3.2 03/25/2017     Results from last 7 days   Lab Units 12/21/20  0553   WBC K/uL 7.21   HEMOGLOBIN g/dL 11.3*   HEMATOCRIT % 36.9   PLATELETS K/uL 380*                             Medications  Pertinent Medications Reviewed: reviewed  • buPROPion  75 mg oral TID   • ceFAZolin  2 g intravenous q8h INT   • lactobacillus acidophilus complex  1 tablet oral BID   • lidocaine  1 patch Topical Daily   • methadone  30 mg oral Daily   • metoprolol succinate XL  25 mg oral Daily   • nicotine  1 patch transdermal Daily   • pantoprazole  40 mg oral " Daily before breakfast   • PARoxetine  30 mg oral Daily   • prazosin  2 mg oral Nightly   • pregabalin  150 mg oral BID   • psyllium husk  1 packet oral Daily   • QUEtiapine  100 mg oral Nightly   • sodium chloride  10 mL intravenous q8h INT   • trimethobenzamide  300 mg oral q8h JAIDEN   • zolpidem  10 mg oral Nightly                              Weights (last 7 days)     Date/Time   Weight    12/24/20 1054   102 kg (225 lb 11.2 oz)                Clinical Comments:    Pt seen in follow up. Pt reports overall appetite is improving however she has experienced occasional vomiting due to antibiotics. She was started on PPI and she is eating yogurt at least 1-2x /day. She does like the Boost shakes and drinks about 1 / day if she doesn't eat well. Encouraged snacks / meals as tolerated. Standing weight obtained today- down 10 lb in 2 weeks (5%) - significant. Pt is happy to lose weight however. BMI 41; obese.         Date: 12/24/20  Signature: Malu Luis RD

## 2020-12-24 NOTE — PATIENT CARE CONFERENCE
Care Progression Rounds Note  Date: 12/24/2020  Time: 10:34 AM     Patient Name: Carolyn Redmond     Medical Record Number: [id number]   YOB: 1985  Sex: Female      Room/Bed: 4211    Admitting Diagnosis: Septic embolism (CMS/[facility]) [I76]  Infection of lumbar spine (CMS/[facility]) [M46.26]  Acute left-sided low back pain without sciatica [M54.5]   Admit Date/Time: 12/9/2020 11:10 AM    Primary Diagnosis: Acute bacterial endocarditis  Principal Problem: Acute bacterial endocarditis    GMLOS: 4.8  Anticipated Discharge Date: 12/11/2020    AM-PAC  Mobility Score:      Discharge Planning:       Barriers to Discharge:  Barriers to Discharge: Medical issues not resolved  Comment: placement for iv antibiotics    Participants:  social work/services, nursing

## 2020-12-24 NOTE — PROGRESS NOTES
Hospital Medicine Service -  Daily Progress Note       SUBJECTIVE   Interval History: Seen and examined at bedside. No new complaints.    Awaiting placement.      OBJECTIVE      Vital signs in last 24 hours:  Temp:  [36.7 °C (98.1 °F)-36.8 °C (98.2 °F)] 36.7 °C (98.1 °F)  Heart Rate:  [72-88] 78  Resp:  [18] 18  BP: ()/(56-68) 87/56    Intake/Output Summary (Last 24 hours) at 12/24/2020 1451  Last data filed at 12/23/2020 1805  Gross per 24 hour   Intake 50 ml   Output --   Net 50 ml       PHYSICAL EXAMINATION      Physical Exam   Constitutional:       General: She is not in acute distress.     Appearance: She is well-developed.   HENT:      Head: Normocephalic and atraumatic.   Cardiovascular:      Rate and Rhythm: Normal rate and regular rhythm.      Heart sounds: No murmur.   Pulmonary:      Effort: Pulmonary effort is normal.      Breath sounds: Normal breath sounds. No wheezing or rales.   Abdominal:      General: Bowel sounds are normal.      Palpations: Abdomen is soft.   Skin:     General: Skin is warm and dry.      Findings: No rash.   Neurological:      Mental Status: She is alert and oriented to person, place, and time.        LINES, CATHETERS, DRAINS, AIRWAYS, AND WOUNDS   Lines, Drains, Airways, Wounds:  PICC Single Lumen 12/18/20 Right (Active)   Number of days: 3       Comments:      LABS / IMAGING / TELE      Labs  Lab Results   Component Value Date    WBC 7.21 12/21/2020    HGB 11.3 (L) 12/21/2020    HCT 36.9 12/21/2020    MCV 87.4 12/21/2020     (H) 12/21/2020     Lab Results   Component Value Date    GLUCOSE 96 12/21/2020    CALCIUM 9.2 12/21/2020     12/21/2020    K 4.2 12/21/2020    CO2 23 12/21/2020     12/21/2020    BUN 7 (L) 12/21/2020    CREATININE 0.7 12/21/2020     Lab Results   Component Value Date    INR 0.9 04/06/2019    INR 1.0 04/16/2017    INR 1.1 03/24/2017       Micro  Microbiology Results     Procedure Component Value Units Date/Time    SARS-CoV-2  (COVID-19), PCR Nasopharynx [[id number]]  (Normal) Collected: 12/17/20 1653    Specimen: Nasopharyngeal Swab from Nasopharynx Updated: 12/17/20 1747    Narrative:      The following orders were created for panel order SARS-CoV-2 (COVID-19), PCR Nasopharynx.  Procedure                               Abnormality         Status                     ---------                               -----------         ------                     SARS-CoV-2 (COVID-19), P...[[id number]]  Normal              Final result                 Please view results for these tests on the individual orders.    SARS-CoV-2 (COVID-19), PCR Nasopharynx [[id number]]  (Normal) Collected: 12/17/20 1653    Specimen: Nasopharyngeal Swab from Nasopharynx Updated: 12/17/20 1747     SARS-CoV-2 (COVID-19) Negative    Blood Culture Blood, Venous [[id number]] Collected: 12/11/20 1339    Specimen: Blood, Venous Updated: 12/16/20 1801     Culture No growth at 120 hours    Blood Culture Blood, Venous [[id number]] Collected: 12/10/20 1504    Specimen: Blood, Venous Updated: 12/15/20 2100     Culture No growth at 120 hours    Blood Culture Blood, Venous [[id number]] Collected: 12/10/20 1504    Specimen: Blood, Venous Updated: 12/15/20 2100     Culture No growth at 120 hours    Blood Culture Blood, Venous [[id number]] Collected: 12/09/20 1348    Specimen: Blood, Venous Updated: 12/14/20 1800     Culture No growth at 120 hours    Blood Culture Blood, Venous [[id number]] Collected: 12/09/20 1304    Specimen: Blood, Venous Updated: 12/14/20 1501     Culture No growth at 120 hours          Imaging  Mri Thoracic Spine With And Without Contrast    Result Date: 12/9/2020  IMPRESSION: Diffuse anterior and posterior epidural enhancement within lumbar spine more pronounced at L4-L5 raising the suspicion for underlying infectious process/phlegmon.  Left greater than right L4-L5 facet joint signal and enhancement which although may be degenerative in nature, early changes of septic  facet arthritis is in the differential.  Follow-up is recommended. Thoracic and lumbar degenerative change as described more pronounced at L4-L5. Ring-enhancing lesion in right upper lobe which may be infectious in etiology including in the setting of septic emboli.  Correlation with CT chest with contrast is recommended to exclude other etiologies. The results were discussed with Rylee Bowie on 12/9/2020 7:35 PM.    Mri Lumbar Spine With And Without Contrast    Result Date: 12/9/2020  IMPRESSION: Diffuse anterior and posterior epidural enhancement within lumbar spine more pronounced at L4-L5 raising the suspicion for underlying infectious process/phlegmon.  Left greater than right L4-L5 facet joint signal and enhancement which although may be degenerative in nature, early changes of septic facet arthritis is in the differential.  Follow-up is recommended. Thoracic and lumbar degenerative change as described more pronounced at L4-L5. Ring-enhancing lesion in right upper lobe which may be infectious in etiology including in the setting of septic emboli.  Correlation with CT chest with contrast is recommended to exclude other etiologies. The results were discussed with Rylee Bowie on 12/9/2020 7:35 PM.    X-ray Chest 1 View    Result Date: 12/18/2020  IMPRESSION: Interval placement of a right-sided PICC line catheter as described above.     X-ray Chest 1 View    Result Date: 12/18/2020  IMPRESSION: Interval placement of a right-sided PICC line catheter as described above.     X-ray Chest 1 View    Result Date: 12/9/2020  IMPRESSION: No radiographic evidence of acute cardiopulmonary disease.    Ct Chest Pulmonary Embolism With Iv Contrast    Result Date: 12/9/2020  IMPRESSION: 1.  No evidence of main, lobar or segmental pulmonary embolism. 2.  However, there are multifocal nodular lesions throughout all lung fields including a cavitary lesion in the right upper lobe all suspicious for septic emboli.  Some of the tiny  subsegmental pulmonary arterial branches demonstrate hypoenhancement and it is difficult to determinate if this is related to tiny pulmonary emboli or technique. 3.  Small bilateral pleural effusions. 4.  Mosaic pattern at the lung bases could represent mild edema or sequela of small airways or small vessels disease. 5.  Follow-up chest CT after appropriate treatment is recommended to document lung opacities and mediastinal lymphadenopathy which is probably reactive. 6.  Additional findings discussed below. Finding:    Other   Acuity: Critical  Status:  CLOSED Critical read back for the first impression performed with Bertha Bowie PA 9:25 PM 12/9/2020 COMMENT: Comparison: Chest x-ray from 12/9/2020 Technique: Chest CT pulmonary embolism protocol performed with intravenous contrast.  80 mL Omnipaque 350 given. CT DOSE:  One or more dose reduction techniques (e.g. automated exposure control, adjustment of the mA and/or kV according to patient size, use of iterative reconstruction technique) utilized for this examination. FINDINGS: LUNG, PLEURA AND LARGE AIRWAYS: The trachea and proximal bronchi remain clear. There are multifocal nodular lesions throughout the lungs.  For example in the right upper lobe there is a cavitary lesion measuring 1.4 cm on axial image 63. A 1.6 cm nodular lesion in the left upper lobe on image 69.  Multiple additional lesions are present elsewhere.  His are suspicious for septic emboli particularly given the history of endocarditis and sepsis. There are small bilateral pleural effusions. There is a nonspecific mild degree of groundglass opacity in the lower lung fields.  This could represent mild degree of pulmonary edema or possibly a mosaic pattern from small airways or small vessel disease. No pneumothorax. VESSELS: Normal caliber of the thoracic aorta.  Thoracic aorta appears within normal limits.  Main pulmonary artery is normal in caliber.  No evidence of a main, lobar or  segmental pulmonary embolism.  However, the subsegmental vessels are difficult to assess and some demonstrate a slightly hypoechoic enhancing appearance possibly artifact from bolus timing although tiny subsegmental pulmonary emboli would be difficult to exclude.  For example, in the lingula there is some hypoenhancement of the subsegmental vessels on image 116 where subsegmental PE cannot be excluded.  No evidence of right heart strain. HEART: normal size. No pericardial effusion. MEDIASTINUM AND PAULINA: There is a pathologically enlarged lymph node in the AP window measuring 1.8 cm short axis.  Additional prominent but nonpathologic by size video lymph nodes elsewhere. CHEST WALL AND LOWER NECK: within normal limits. UPPER ABDOMEN:Limited assessment of the upper abdominal structures with this technique.  There is splenomegaly with the spleen measuring 14.5 cm.  The liver may also be enlarged but is incompletely visualized.  Cholecystectomy changes. BONES: No suspicious bony erosive changes.  Multilevel Schmorl's nodes and degenerative changes in the spine are noted.  Calcific tendinosis of the right rotator cuff.  There is limited assessment of the spine with this technique.  No gross paraspinal inflammatory changes.     Ultrasound Abdomen Limited    Result Date: 12/17/2020  IMPRESSION: Mild increase in echotexture of the liver may represent fibrofatty infiltration. COMMENT: Ultrasound evaluation of the right upper quadrant was performed and compared to a CT exam dated 8/18/2019.  The pancreas is of normal echotexture and unremarkable.  The aorta is obscured by bowel gas.  The liver is 17.6 cm in length and of increased echogenicity consistent with fibrofatty infiltration. There is no ductal dilatation or mass lesion.  Common bile duct measures 2.3 mm at the pito hepatis.  The right kidney is 10.5 cm in length.  There is no nephrolithiasis, hydronephrosis or mass lesion.  The renal cortex of normal thickness and  echotexture.          ASSESSMENT AND PLAN      Tricuspid valve vegetation  Assessment & Plan  -Sessile vegetation on tricuspid valve leaflet  -Continue antibiotics- with Cefazolin- today is Day 17    Septic arthritis of vertebra (CMS/[facility])  Assessment & Plan  -Neurovascular intact  -Evidence of discitis on MRI, plan per neurosurgery is follow-up MRI after completion of antibiotics      Essential hypertension  Assessment & Plan  -Continue with home meds  -bp parameters for hypotension    Septic embolism (CMS/[facility])  Assessment & Plan  -Continue IV abx per ID   -Blood cultures performed on admission, NGTD  -Evidence of discitis on MRI, plan per neurosurgery is follow-up MRI after completion of antibiotics    Opioid type dependence, continuous use (CMS/[facility])  Assessment & Plan  -Currently on methadone- adjustment of dosage   -Psych recs appreciated      * Acute bacterial endocarditis  Assessment & Plan  -Suspected tricuspid valve endocarditis  -Sepsis due to acute bacterial endocarditis present on admission  -Blood cultures from [place] [facility]  -Repeat blood cultures NGTD  -PICC line in place  -Continue antibiotic Cefazolin- will require 6 week therapy- today is day 17  -Had nausea again with cefazolin, will utilize tigan prior to antibiotics       VTE Assessment: Padua VTE Score: 5  VTE Prophylaxis Plan: Ambulation  Code Status: Full Code  Estimated Discharge Date: 12/11/2020  Disposition Planning: placement     Mike Jordan MD  12/24/2020

## 2020-12-24 NOTE — PROGRESS NOTES
GIOVANY SOLORIO contacted [facility]. They reviewed the case but said they cannot accept the pt due to pts methodone. They are not aware of another facility that may be able to assist. GIOVANY SOLORIO also called [facility] today to ask them if they have availability. A message was left for admissions, due to them being short staffed. Awaiting return call.    PLAN: dc with iv antibiotics.    Addendum- 2:39 PM-[facility] called back and said they dont have any beds available on that unit at this time. He said that they dont expect a bed to open for about 2 weeks. He said a call can be made back to him for follow up, around that time, at [phone] and GIOVANY SOLORIO believes his name is Lu.

## 2020-12-25 PROCEDURE — 63700000 HC SELF-ADMINISTRABLE DRUG: Performed by: HOSPITALIST

## 2020-12-25 PROCEDURE — 63700000 HC SELF-ADMINISTRABLE DRUG: Performed by: NURSE PRACTITIONER

## 2020-12-25 PROCEDURE — 25000000 HC PHARMACY GENERAL: Performed by: INTERNAL MEDICINE

## 2020-12-25 PROCEDURE — 25800000 HC PHARMACY IV SOLUTIONS: Performed by: INTERNAL MEDICINE

## 2020-12-25 PROCEDURE — 63700000 HC SELF-ADMINISTRABLE DRUG: Performed by: STUDENT IN AN ORGANIZED HEALTH CARE EDUCATION/TRAINING PROGRAM

## 2020-12-25 PROCEDURE — 12000000 HC ROOM AND CARE MED/SURG

## 2020-12-25 PROCEDURE — 63700000 HC SELF-ADMINISTRABLE DRUG: Performed by: PSYCHIATRY & NEUROLOGY

## 2020-12-25 PROCEDURE — 63700000 HC SELF-ADMINISTRABLE DRUG: Performed by: INTERNAL MEDICINE

## 2020-12-25 PROCEDURE — 99232 SBSQ HOSP IP/OBS MODERATE 35: CPT | Performed by: STUDENT IN AN ORGANIZED HEALTH CARE EDUCATION/TRAINING PROGRAM

## 2020-12-25 PROCEDURE — 63600000 HC DRUGS/DETAIL CODE: Performed by: INTERNAL MEDICINE

## 2020-12-25 RX ADMIN — BUPROPION HYDROCHLORIDE 75 MG: 75 TABLET, FILM COATED ORAL at 11:54

## 2020-12-25 RX ADMIN — TRIMETHOBENZAMIDE HYDROCHLORIDE 300 MG: 300 CAPSULE ORAL at 08:06

## 2020-12-25 RX ADMIN — QUETIAPINE 100 MG: 100 TABLET, FILM COATED ORAL at 21:11

## 2020-12-25 RX ADMIN — Medication 10 ML: at 20:00

## 2020-12-25 RX ADMIN — PREGABALIN 150 MG: 75 CAPSULE ORAL at 07:51

## 2020-12-25 RX ADMIN — BUPROPION HYDROCHLORIDE 75 MG: 75 TABLET, FILM COATED ORAL at 16:24

## 2020-12-25 RX ADMIN — NICOTINE POLACRILEX 4 MG: 4 GUM, CHEWING ORAL at 07:52

## 2020-12-25 RX ADMIN — TRIMETHOBENZAMIDE HYDROCHLORIDE 300 MG: 300 CAPSULE ORAL at 17:51

## 2020-12-25 RX ADMIN — CYCLOBENZAPRINE HYDROCHLORIDE 10 MG: 5 TABLET, FILM COATED ORAL at 08:08

## 2020-12-25 RX ADMIN — BUTALBITAL, ACETAMINOPHEN AND CAFFEINE 2 TABLET: 50; 325; 40 TABLET ORAL at 13:59

## 2020-12-25 RX ADMIN — METHADONE HYDROCHLORIDE 10 MG: 10 TABLET ORAL at 16:24

## 2020-12-25 RX ADMIN — PREGABALIN 150 MG: 75 CAPSULE ORAL at 19:55

## 2020-12-25 RX ADMIN — ZOLPIDEM TARTRATE 10 MG: 5 TABLET, COATED ORAL at 21:11

## 2020-12-25 RX ADMIN — PAROXETINE 30 MG: 20 TABLET, FILM COATED ORAL at 08:05

## 2020-12-25 RX ADMIN — BUTALBITAL, ACETAMINOPHEN AND CAFFEINE 2 TABLET: 50; 325; 40 TABLET ORAL at 19:56

## 2020-12-25 RX ADMIN — CEFAZOLIN SODIUM 2 G: 10 POWDER, FOR SOLUTION INTRAVENOUS at 18:20

## 2020-12-25 RX ADMIN — PROBIOTIC PRODUCT - TAB 1 TABLET: TAB at 08:07

## 2020-12-25 RX ADMIN — METOPROLOL SUCCINATE 25 MG: 25 TABLET, EXTENDED RELEASE ORAL at 08:06

## 2020-12-25 RX ADMIN — BUTALBITAL, ACETAMINOPHEN AND CAFFEINE 2 TABLET: 50; 325; 40 TABLET ORAL at 06:23

## 2020-12-25 RX ADMIN — BUPROPION HYDROCHLORIDE 75 MG: 75 TABLET, FILM COATED ORAL at 07:52

## 2020-12-25 RX ADMIN — Medication 1 PATCH: at 08:09

## 2020-12-25 RX ADMIN — METHADONE HYDROCHLORIDE 30 MG: 10 TABLET ORAL at 08:07

## 2020-12-25 RX ADMIN — PRAZOSIN HYDROCHLORIDE 2 MG: 1 CAPSULE ORAL at 21:11

## 2020-12-25 RX ADMIN — Medication 10 ML: at 10:42

## 2020-12-25 RX ADMIN — PANTOPRAZOLE SODIUM 40 MG: 40 TABLET, DELAYED RELEASE ORAL at 06:23

## 2020-12-25 RX ADMIN — CEFAZOLIN SODIUM 2 G: 10 POWDER, FOR SOLUTION INTRAVENOUS at 10:07

## 2020-12-25 RX ADMIN — PROBIOTIC PRODUCT - TAB 1 TABLET: TAB at 19:56

## 2020-12-25 NOTE — PROGRESS NOTES
Hospital Medicine Service -  Daily Progress Note       SUBJECTIVE   Interval History: Overnight events noted- IV line now functioning properly.  No new complaints.    Awaiting placement.      OBJECTIVE      Vital signs in last 24 hours:  Temp:  [36.7 °C (98.1 °F)-36.9 °C (98.4 °F)] 36.9 °C (98.4 °F)  Heart Rate:  [67-91] 91  Resp:  [18] 18  BP: ()/(56-71) 112/70  No intake or output data in the 24 hours ending 12/25/20 1137    PHYSICAL EXAMINATION      Physical Exam   Constitutional:       General: She is not in acute distress.     Appearance: She is well-developed.   HENT:      Head: Normocephalic and atraumatic.   Cardiovascular:      Rate and Rhythm: Normal rate and regular rhythm.      Heart sounds: No murmur.   Pulmonary:      Effort: Pulmonary effort is normal.      Breath sounds: Normal breath sounds. No wheezing or rales.   Abdominal:      General: Bowel sounds are normal.      Palpations: Abdomen is soft.   Skin:     General: Skin is warm and dry.      Findings: No rash.   Neurological:      Mental Status: She is alert and oriented to person, place, and time.        LINES, CATHETERS, DRAINS, AIRWAYS, AND WOUNDS   Lines, Drains, Airways, Wounds:  PICC Single Lumen 12/18/20 Right (Active)   Number of days: 3       Comments:      LABS / IMAGING / TELE      Labs  Lab Results   Component Value Date    WBC 7.21 12/21/2020    HGB 11.3 (L) 12/21/2020    HCT 36.9 12/21/2020    MCV 87.4 12/21/2020     (H) 12/21/2020     Lab Results   Component Value Date    GLUCOSE 96 12/21/2020    CALCIUM 9.2 12/21/2020     12/21/2020    K 4.2 12/21/2020    CO2 23 12/21/2020     12/21/2020    BUN 7 (L) 12/21/2020    CREATININE 0.7 12/21/2020     Lab Results   Component Value Date    INR 0.9 04/06/2019    INR 1.0 04/16/2017    INR 1.1 03/24/2017       Micro  Microbiology Results     Procedure Component Value Units Date/Time    SARS-CoV-2 (COVID-19), PCR Nasopharynx [[id number]]  (Normal) Collected: 12/17/20  1653    Specimen: Nasopharyngeal Swab from Nasopharynx Updated: 12/17/20 1747    Narrative:      The following orders were created for panel order SARS-CoV-2 (COVID-19), PCR Nasopharynx.  Procedure                               Abnormality         Status                     ---------                               -----------         ------                     SARS-CoV-2 (COVID-19), P...[[id number]]  Normal              Final result                 Please view results for these tests on the individual orders.    SARS-CoV-2 (COVID-19), PCR Nasopharynx [[id number]]  (Normal) Collected: 12/17/20 1653    Specimen: Nasopharyngeal Swab from Nasopharynx Updated: 12/17/20 1747     SARS-CoV-2 (COVID-19) Negative    Blood Culture Blood, Venous [[id number]] Collected: 12/11/20 1339    Specimen: Blood, Venous Updated: 12/16/20 1801     Culture No growth at 120 hours    Blood Culture Blood, Venous [[id number]] Collected: 12/10/20 1504    Specimen: Blood, Venous Updated: 12/15/20 2100     Culture No growth at 120 hours    Blood Culture Blood, Venous [[id number]] Collected: 12/10/20 1504    Specimen: Blood, Venous Updated: 12/15/20 2100     Culture No growth at 120 hours    Blood Culture Blood, Venous [[id number]] Collected: 12/09/20 1348    Specimen: Blood, Venous Updated: 12/14/20 1800     Culture No growth at 120 hours    Blood Culture Blood, Venous [[id number]] Collected: 12/09/20 1304    Specimen: Blood, Venous Updated: 12/14/20 1501     Culture No growth at 120 hours          Imaging  Mri Thoracic Spine With And Without Contrast    Result Date: 12/9/2020  IMPRESSION: Diffuse anterior and posterior epidural enhancement within lumbar spine more pronounced at L4-L5 raising the suspicion for underlying infectious process/phlegmon.  Left greater than right L4-L5 facet joint signal and enhancement which although may be degenerative in nature, early changes of septic facet arthritis is in the differential.  Follow-up is recommended.  Thoracic and lumbar degenerative change as described more pronounced at L4-L5. Ring-enhancing lesion in right upper lobe which may be infectious in etiology including in the setting of septic emboli.  Correlation with CT chest with contrast is recommended to exclude other etiologies. The results were discussed with Rylee Bowie on 12/9/2020 7:35 PM.    Mri Lumbar Spine With And Without Contrast    Result Date: 12/9/2020  IMPRESSION: Diffuse anterior and posterior epidural enhancement within lumbar spine more pronounced at L4-L5 raising the suspicion for underlying infectious process/phlegmon.  Left greater than right L4-L5 facet joint signal and enhancement which although may be degenerative in nature, early changes of septic facet arthritis is in the differential.  Follow-up is recommended. Thoracic and lumbar degenerative change as described more pronounced at L4-L5. Ring-enhancing lesion in right upper lobe which may be infectious in etiology including in the setting of septic emboli.  Correlation with CT chest with contrast is recommended to exclude other etiologies. The results were discussed with Rylee Bowie on 12/9/2020 7:35 PM.    X-ray Chest 1 View    Result Date: 12/18/2020  IMPRESSION: Interval placement of a right-sided PICC line catheter as described above.     X-ray Chest 1 View    Result Date: 12/18/2020  IMPRESSION: Interval placement of a right-sided PICC line catheter as described above.     X-ray Chest 1 View    Result Date: 12/9/2020  IMPRESSION: No radiographic evidence of acute cardiopulmonary disease.    Ct Chest Pulmonary Embolism With Iv Contrast    Result Date: 12/9/2020  IMPRESSION: 1.  No evidence of main, lobar or segmental pulmonary embolism. 2.  However, there are multifocal nodular lesions throughout all lung fields including a cavitary lesion in the right upper lobe all suspicious for septic emboli.  Some of the tiny subsegmental pulmonary arterial branches demonstrate  hypoenhancement and it is difficult to determinate if this is related to tiny pulmonary emboli or technique. 3.  Small bilateral pleural effusions. 4.  Mosaic pattern at the lung bases could represent mild edema or sequela of small airways or small vessels disease. 5.  Follow-up chest CT after appropriate treatment is recommended to document lung opacities and mediastinal lymphadenopathy which is probably reactive. 6.  Additional findings discussed below. Finding:    Other   Acuity: Critical  Status:  CLOSED Critical read back for the first impression performed with Bertha Bowie PA 9:25 PM 12/9/2020 COMMENT: Comparison: Chest x-ray from 12/9/2020 Technique: Chest CT pulmonary embolism protocol performed with intravenous contrast.  80 mL Omnipaque 350 given. CT DOSE:  One or more dose reduction techniques (e.g. automated exposure control, adjustment of the mA and/or kV according to patient size, use of iterative reconstruction technique) utilized for this examination. FINDINGS: LUNG, PLEURA AND LARGE AIRWAYS: The trachea and proximal bronchi remain clear. There are multifocal nodular lesions throughout the lungs.  For example in the right upper lobe there is a cavitary lesion measuring 1.4 cm on axial image 63. A 1.6 cm nodular lesion in the left upper lobe on image 69.  Multiple additional lesions are present elsewhere.  His are suspicious for septic emboli particularly given the history of endocarditis and sepsis. There are small bilateral pleural effusions. There is a nonspecific mild degree of groundglass opacity in the lower lung fields.  This could represent mild degree of pulmonary edema or possibly a mosaic pattern from small airways or small vessel disease. No pneumothorax. VESSELS: Normal caliber of the thoracic aorta.  Thoracic aorta appears within normal limits.  Main pulmonary artery is normal in caliber.  No evidence of a main, lobar or segmental pulmonary embolism.  However, the subsegmental  vessels are difficult to assess and some demonstrate a slightly hypoechoic enhancing appearance possibly artifact from bolus timing although tiny subsegmental pulmonary emboli would be difficult to exclude.  For example, in the lingula there is some hypoenhancement of the subsegmental vessels on image 116 where subsegmental PE cannot be excluded.  No evidence of right heart strain. HEART: normal size. No pericardial effusion. MEDIASTINUM AND PAULINA: There is a pathologically enlarged lymph node in the AP window measuring 1.8 cm short axis.  Additional prominent but nonpathologic by size video lymph nodes elsewhere. CHEST WALL AND LOWER NECK: within normal limits. UPPER ABDOMEN:Limited assessment of the upper abdominal structures with this technique.  There is splenomegaly with the spleen measuring 14.5 cm.  The liver may also be enlarged but is incompletely visualized.  Cholecystectomy changes. BONES: No suspicious bony erosive changes.  Multilevel Schmorl's nodes and degenerative changes in the spine are noted.  Calcific tendinosis of the right rotator cuff.  There is limited assessment of the spine with this technique.  No gross paraspinal inflammatory changes.     Ultrasound Abdomen Limited    Result Date: 12/17/2020  IMPRESSION: Mild increase in echotexture of the liver may represent fibrofatty infiltration. COMMENT: Ultrasound evaluation of the right upper quadrant was performed and compared to a CT exam dated 8/18/2019.  The pancreas is of normal echotexture and unremarkable.  The aorta is obscured by bowel gas.  The liver is 17.6 cm in length and of increased echogenicity consistent with fibrofatty infiltration. There is no ductal dilatation or mass lesion.  Common bile duct measures 2.3 mm at the pito hepatis.  The right kidney is 10.5 cm in length.  There is no nephrolithiasis, hydronephrosis or mass lesion.  The renal cortex of normal thickness and echotexture.          ASSESSMENT AND PLAN      Tricuspid  valve vegetation  Assessment & Plan  -Sessile vegetation on tricuspid valve leaflet  -Continue antibiotics- with Cefazolin- today is Day 18    Septic arthritis of vertebra (CMS/[facility])  Assessment & Plan  -Neurovascular intact  -Evidence of discitis on MRI, plan per neurosurgery is follow-up MRI after completion of antibiotics      Essential hypertension  Assessment & Plan  -Continue with home meds  -bp parameters for hypotension    Septic embolism (CMS/[facility])  Assessment & Plan  -Continue IV abx per ID   -Blood cultures performed on admission, NGTD  -Evidence of discitis on MRI, plan per neurosurgery is follow-up MRI after completion of antibiotics    Opioid type dependence, continuous use (CMS/[facility])  Assessment & Plan  -Currently on methadone- adjustment of dosage   -Psych recs appreciated      * Acute bacterial endocarditis  Assessment & Plan  -Suspected tricuspid valve endocarditis  -Sepsis due to acute bacterial endocarditis present on admission  -Blood cultures from [place] [facility]  -Repeat blood cultures NGTD  -PICC line in place  -Continue antibiotic Cefazolin- will require 6 week therapy- today is day 18  -Had nausea again with cefazolin, will utilize tigan prior to antibiotics       VTE Assessment: Padua VTE Score: 5  VTE Prophylaxis Plan: Ambulation  Code Status: Full Code  Estimated Discharge Date: 12/11/2020  Disposition Planning: placement     Mike Jordan MD  12/25/2020

## 2020-12-25 NOTE — NURSING NOTE
Attempted to administered 0130 Cefazolin per MAR, Patient PICC line clogged. Of note, PICC line flushed without difficulty per MAR. Had patient reposition arm and neck, pulsed flush to no success. Called ICU and paged supervisor to have PICC line flushed, unable at this time due to IV team unavailable. Attempted to start new peripheral IV, unable to attain IV access. Paged [facility] to notify to situation, patient to miss 0130 dose of cefazolin, IV team to be paged at 0700. No further interactions at this time, patient remains in stable condition with no complaints.

## 2020-12-26 PROCEDURE — 63700000 HC SELF-ADMINISTRABLE DRUG: Performed by: NURSE PRACTITIONER

## 2020-12-26 PROCEDURE — 63600000 HC DRUGS/DETAIL CODE: Performed by: INTERNAL MEDICINE

## 2020-12-26 PROCEDURE — 63700000 HC SELF-ADMINISTRABLE DRUG: Performed by: HOSPITALIST

## 2020-12-26 PROCEDURE — 25800000 HC PHARMACY IV SOLUTIONS: Performed by: INTERNAL MEDICINE

## 2020-12-26 PROCEDURE — 12000000 HC ROOM AND CARE MED/SURG

## 2020-12-26 PROCEDURE — 25000000 HC PHARMACY GENERAL: Performed by: INTERNAL MEDICINE

## 2020-12-26 PROCEDURE — 63700000 HC SELF-ADMINISTRABLE DRUG: Performed by: STUDENT IN AN ORGANIZED HEALTH CARE EDUCATION/TRAINING PROGRAM

## 2020-12-26 PROCEDURE — 99232 SBSQ HOSP IP/OBS MODERATE 35: CPT | Performed by: INTERNAL MEDICINE

## 2020-12-26 PROCEDURE — 63700000 HC SELF-ADMINISTRABLE DRUG: Performed by: PSYCHIATRY & NEUROLOGY

## 2020-12-26 PROCEDURE — 63700000 HC SELF-ADMINISTRABLE DRUG: Performed by: INTERNAL MEDICINE

## 2020-12-26 RX ORDER — FAMOTIDINE 10 MG/1
10 TABLET ORAL 2 TIMES DAILY PRN
Status: DISCONTINUED | OUTPATIENT
Start: 2020-12-26 | End: 2020-12-28

## 2020-12-26 RX ADMIN — PROBIOTIC PRODUCT - TAB 1 TABLET: TAB at 08:24

## 2020-12-26 RX ADMIN — PANTOPRAZOLE SODIUM 40 MG: 40 TABLET, DELAYED RELEASE ORAL at 08:23

## 2020-12-26 RX ADMIN — PREGABALIN 150 MG: 75 CAPSULE ORAL at 08:23

## 2020-12-26 RX ADMIN — BUTALBITAL, ACETAMINOPHEN AND CAFFEINE 2 TABLET: 50; 325; 40 TABLET ORAL at 12:23

## 2020-12-26 RX ADMIN — CEFAZOLIN SODIUM 2 G: 10 POWDER, FOR SOLUTION INTRAVENOUS at 02:08

## 2020-12-26 RX ADMIN — BUPROPION HYDROCHLORIDE 75 MG: 75 TABLET, FILM COATED ORAL at 16:10

## 2020-12-26 RX ADMIN — PREGABALIN 150 MG: 75 CAPSULE ORAL at 19:49

## 2020-12-26 RX ADMIN — Medication 10 ML: at 02:40

## 2020-12-26 RX ADMIN — NICOTINE POLACRILEX 4 MG: 4 GUM, CHEWING ORAL at 08:33

## 2020-12-26 RX ADMIN — TRIMETHOBENZAMIDE HYDROCHLORIDE 300 MG: 300 CAPSULE ORAL at 08:23

## 2020-12-26 RX ADMIN — METHADONE HYDROCHLORIDE 30 MG: 10 TABLET ORAL at 08:23

## 2020-12-26 RX ADMIN — Medication 10 ML: at 19:30

## 2020-12-26 RX ADMIN — TRIMETHOBENZAMIDE HYDROCHLORIDE 300 MG: 300 CAPSULE ORAL at 16:10

## 2020-12-26 RX ADMIN — ZOLPIDEM TARTRATE 10 MG: 5 TABLET, COATED ORAL at 21:09

## 2020-12-26 RX ADMIN — QUETIAPINE 100 MG: 100 TABLET, FILM COATED ORAL at 21:05

## 2020-12-26 RX ADMIN — BUTALBITAL, ACETAMINOPHEN AND CAFFEINE 2 TABLET: 50; 325; 40 TABLET ORAL at 06:17

## 2020-12-26 RX ADMIN — PAROXETINE 30 MG: 20 TABLET, FILM COATED ORAL at 08:23

## 2020-12-26 RX ADMIN — CYCLOBENZAPRINE HYDROCHLORIDE 10 MG: 5 TABLET, FILM COATED ORAL at 08:33

## 2020-12-26 RX ADMIN — PROBIOTIC PRODUCT - TAB 1 TABLET: TAB at 19:49

## 2020-12-26 RX ADMIN — Medication 10 ML: at 11:53

## 2020-12-26 RX ADMIN — BUPROPION HYDROCHLORIDE 75 MG: 75 TABLET, FILM COATED ORAL at 11:52

## 2020-12-26 RX ADMIN — METOPROLOL SUCCINATE 25 MG: 25 TABLET, EXTENDED RELEASE ORAL at 08:23

## 2020-12-26 RX ADMIN — PRAZOSIN HYDROCHLORIDE 2 MG: 1 CAPSULE ORAL at 21:05

## 2020-12-26 RX ADMIN — Medication 1 PATCH: at 08:24

## 2020-12-26 RX ADMIN — CEFAZOLIN SODIUM 2 G: 10 POWDER, FOR SOLUTION INTRAVENOUS at 16:52

## 2020-12-26 RX ADMIN — METHADONE HYDROCHLORIDE 10 MG: 10 TABLET ORAL at 16:10

## 2020-12-26 RX ADMIN — BUTALBITAL, ACETAMINOPHEN AND CAFFEINE 2 TABLET: 50; 325; 40 TABLET ORAL at 19:49

## 2020-12-26 RX ADMIN — NICOTINE POLACRILEX 4 MG: 4 GUM, CHEWING ORAL at 16:10

## 2020-12-26 RX ADMIN — BUPROPION HYDROCHLORIDE 75 MG: 75 TABLET, FILM COATED ORAL at 08:23

## 2020-12-26 RX ADMIN — Medication 10 ML: at 19:55

## 2020-12-26 RX ADMIN — CEFAZOLIN SODIUM 2 G: 10 POWDER, FOR SOLUTION INTRAVENOUS at 09:13

## 2020-12-26 NOTE — PLAN OF CARE
Plan of Care Review  Plan of Care Reviewed With: patient  Progress: improving  Outcome Summary: Patient cooperative with care. Ambulating independently in room. Reports having a migraine. Medicated with fiorcet per request. Assessment otherwise unchanged.

## 2020-12-26 NOTE — ASSESSMENT & PLAN NOTE
-continue buproprion 75mg TID, nicotine patch, paxil 30mg daily, prazosin 2mg PO qHS, ambien 10mg PO qHS, seroquel 100mg QHS.  -patient appreciates seeing psych

## 2020-12-26 NOTE — PROGRESS NOTES
Hospital Medicine Service  Daily Progress Note     SUBJECTIVE     Interval History: No acute events overnight. Rimma helping with abx-associated nausea.   Wants to talk with psych again, increased anxiety due to her prolonged hospitalization. Reports no hx of anxiolytic abuse in the past. Has not had much response with hydroxyzine in the past. Discussed that due to IVDU opioid history, at risk of benzo dependence/abuse.     ROS negative except those mentioned above.    Last Bowel Movement: 12/25/20     OBJECTIVE     Vital signs in last 24 hours:  Temp:  [36.4 °C (97.5 °F)-36.8 °C (98.2 °F)] 36.7 °C (98.1 °F)  Heart Rate:  [69-81] 81  Resp:  [16-18] 18  BP: ()/(52-78) 121/70  No intake or output data in the 24 hours ending 12/26/20 1338    Weight trend (per Epic records)  Wt Readings from Last 3 Encounters:   12/24/20 102 kg (225 lb 11.2 oz)   08/05/20 102 kg (225 lb)   07/09/20 97.5 kg (215 lb)      PHYSICAL EXAMINATION     Physical Exam    GEN: Awake, resting comfortably, no acute distress  HEENT: normocephalic, atraumatic, EOMI, no conjunctival injection, hearing intact grossly  CV: RRR, S1+S2, no murmurs, rubs, gallops  RESP: Clear to auscultation bilaterally, no wheezes rales ronchi  GI: Soft, mild-tender to palpation upper/epigastric, no guarding, rebound. BS present  : baker not present  MSK: No edema. PICC RUE.   Neuro: A&O x3, no focal deficits  Psych: appropriate affect and mood, cooperative     LINES, CATHETERS, DRAINS, AIRWAYS, AND WOUNDS   Lines, Drains, Airways, Wounds:  PICC Single Lumen 12/18/20 Right (Active)   Number of days: 8       Comments:    LABS / IMAGING / TELE     Labs  Reviewed     Results from last 7 days   Lab Units 12/21/20  0553   WBC K/uL 7.21   HEMOGLOBIN g/dL 11.3*   HEMATOCRIT % 36.9   MCV fL 87.4   PLATELETS K/uL 380*       Results from last 7 days   Lab Units 12/21/20  0553   SODIUM mEQ/L 137   POTASSIUM mEQ/L 4.2   CHLORIDE mEQ/L 103   CO2 mEQ/L 23   BUN mg/dL 7*    CREATININE mg/dL 0.7   GLUCOSE mg/dL 96                               Results from last 7 days   Lab Units 12/21/20  0553   SED RATE mm/hr 46*   CRP mg/L 6.20         Imaging  Reviewed     Mri Thoracic Spine With And Without Contrast    Result Date: 12/9/2020  IMPRESSION: Diffuse anterior and posterior epidural enhancement within lumbar spine more pronounced at L4-L5 raising the suspicion for underlying infectious process/phlegmon.  Left greater than right L4-L5 facet joint signal and enhancement which although may be degenerative in nature, early changes of septic facet arthritis is in the differential.  Follow-up is recommended. Thoracic and lumbar degenerative change as described more pronounced at L4-L5. Ring-enhancing lesion in right upper lobe which may be infectious in etiology including in the setting of septic emboli.  Correlation with CT chest with contrast is recommended to exclude other etiologies. The results were discussed with Rylee Bowie on 12/9/2020 7:35 PM.    Mri Lumbar Spine With And Without Contrast    Result Date: 12/9/2020  IMPRESSION: Diffuse anterior and posterior epidural enhancement within lumbar spine more pronounced at L4-L5 raising the suspicion for underlying infectious process/phlegmon.  Left greater than right L4-L5 facet joint signal and enhancement which although may be degenerative in nature, early changes of septic facet arthritis is in the differential.  Follow-up is recommended. Thoracic and lumbar degenerative change as described more pronounced at L4-L5. Ring-enhancing lesion in right upper lobe which may be infectious in etiology including in the setting of septic emboli.  Correlation with CT chest with contrast is recommended to exclude other etiologies. The results were discussed with Rylee Bowie on 12/9/2020 7:35 PM.    X-ray Chest 1 View    Result Date: 12/18/2020  IMPRESSION: Interval placement of a right-sided PICC line catheter as described above.     X-ray Chest  1 View    Result Date: 12/18/2020  IMPRESSION: Interval placement of a right-sided PICC line catheter as described above.     X-ray Chest 1 View    Result Date: 12/9/2020  IMPRESSION: No radiographic evidence of acute cardiopulmonary disease.    Ct Chest Pulmonary Embolism With Iv Contrast    Result Date: 12/9/2020  IMPRESSION: 1.  No evidence of main, lobar or segmental pulmonary embolism. 2.  However, there are multifocal nodular lesions throughout all lung fields including a cavitary lesion in the right upper lobe all suspicious for septic emboli.  Some of the tiny subsegmental pulmonary arterial branches demonstrate hypoenhancement and it is difficult to determinate if this is related to tiny pulmonary emboli or technique. 3.  Small bilateral pleural effusions. 4.  Mosaic pattern at the lung bases could represent mild edema or sequela of small airways or small vessels disease. 5.  Follow-up chest CT after appropriate treatment is recommended to document lung opacities and mediastinal lymphadenopathy which is probably reactive. 6.  Additional findings discussed below. Finding:    Other   Acuity: Critical  Status:  CLOSED Critical read back for the first impression performed with Bertha Bowie PA 9:25 PM 12/9/2020 COMMENT: Comparison: Chest x-ray from 12/9/2020 Technique: Chest CT pulmonary embolism protocol performed with intravenous contrast.  80 mL Omnipaque 350 given. CT DOSE:  One or more dose reduction techniques (e.g. automated exposure control, adjustment of the mA and/or kV according to patient size, use of iterative reconstruction technique) utilized for this examination. FINDINGS: LUNG, PLEURA AND LARGE AIRWAYS: The trachea and proximal bronchi remain clear. There are multifocal nodular lesions throughout the lungs.  For example in the right upper lobe there is a cavitary lesion measuring 1.4 cm on axial image 63. A 1.6 cm nodular lesion in the left upper lobe on image 69.  Multiple additional  lesions are present elsewhere.  His are suspicious for septic emboli particularly given the history of endocarditis and sepsis. There are small bilateral pleural effusions. There is a nonspecific mild degree of groundglass opacity in the lower lung fields.  This could represent mild degree of pulmonary edema or possibly a mosaic pattern from small airways or small vessel disease. No pneumothorax. VESSELS: Normal caliber of the thoracic aorta.  Thoracic aorta appears within normal limits.  Main pulmonary artery is normal in caliber.  No evidence of a main, lobar or segmental pulmonary embolism.  However, the subsegmental vessels are difficult to assess and some demonstrate a slightly hypoechoic enhancing appearance possibly artifact from bolus timing although tiny subsegmental pulmonary emboli would be difficult to exclude.  For example, in the lingula there is some hypoenhancement of the subsegmental vessels on image 116 where subsegmental PE cannot be excluded.  No evidence of right heart strain. HEART: normal size. No pericardial effusion. MEDIASTINUM AND PAULINA: There is a pathologically enlarged lymph node in the AP window measuring 1.8 cm short axis.  Additional prominent but nonpathologic by size video lymph nodes elsewhere. CHEST WALL AND LOWER NECK: within normal limits. UPPER ABDOMEN:Limited assessment of the upper abdominal structures with this technique.  There is splenomegaly with the spleen measuring 14.5 cm.  The liver may also be enlarged but is incompletely visualized.  Cholecystectomy changes. BONES: No suspicious bony erosive changes.  Multilevel Schmorl's nodes and degenerative changes in the spine are noted.  Calcific tendinosis of the right rotator cuff.  There is limited assessment of the spine with this technique.  No gross paraspinal inflammatory changes.     Ultrasound Abdomen Limited    Result Date: 12/17/2020  IMPRESSION: Mild increase in echotexture of the liver may represent fibrofatty  infiltration. COMMENT: Ultrasound evaluation of the right upper quadrant was performed and compared to a CT exam dated 8/18/2019.  The pancreas is of normal echotexture and unremarkable.  The aorta is obscured by bowel gas.  The liver is 17.6 cm in length and of increased echogenicity consistent with fibrofatty infiltration. There is no ductal dilatation or mass lesion.  Common bile duct measures 2.3 mm at the pito hepatis.  The right kidney is 10.5 cm in length.  There is no nephrolithiasis, hydronephrosis or mass lesion.  The renal cortex of normal thickness and echotexture.      ECG/Telemetry  Med-surg    Echo  Cardiac Imaging   TRANSESOPHAGEAL ECHO (JANUSZ) 12/11/2020    Narrative · Tricuspid valve endocarditis.  · 1 x 0.6 cm echolucent spongy sessile mass attached to the mid body of   the posterior tricuspid leaflet consistent with vegetation. There is a   tiny mobile component on the atrial surface of the mass. There appears to   be a smaller segment of vegetation on the ventricular surface of the valve   leaflet opposite the larger component. There is a tiny perforation at the   edge of the vegetation 5 mm from the annulus of the leaflet.  · Borderline severe tricuspid regurgitation through the valve orifice.   There is a small regurgitant jet through the perforation.  · Estimated RVSP = 48 mmHg assuming a right atrial pressure 5 mmHg  · Normal-appearing aortic, mitral, pulmonic valves. Trace mitral   regurgitation.  · Normal-sized LV. Normal LV wall thickness.  · Preserved LV systolic function. Estimated EF 60- 65%. Normal LV septal   wall motion. No regional wall motion abnormalities.  · Normal RV size and function.  · Normal-sized LA. Intact atrial septum. Normal pulmonary veins  · Normal-sized RA. No PFO by saline contrast injection or color-flow   imaging.  · Trace mitral valve regurgitation.  · Normal-sized IVC. Normal SVC  · Normal aorta.          Scheduled Meds:  • buPROPion  75 mg oral TID   •  ceFAZolin  2 g intravenous q8h INT   • lactobacillus acidophilus complex  1 tablet oral BID   • lidocaine  1 patch Topical Daily   • methadone  30 mg oral Daily   • metoprolol succinate XL  25 mg oral Daily   • nicotine  1 patch transdermal Daily   • pantoprazole  40 mg oral Daily before breakfast   • PARoxetine  30 mg oral Daily   • prazosin  2 mg oral Nightly   • pregabalin  150 mg oral BID   • psyllium husk  1 packet oral Daily   • QUEtiapine  100 mg oral Nightly   • sodium chloride  10 mL intravenous q8h INT   • trimethobenzamide  300 mg oral q8h JAIDEN   • zolpidem  10 mg oral Nightly     Continuous Infusions:  PRN Meds:.butalbital-acetaminophen-caff  •  cyclobenzaprine  •  glucose **OR** dextrose **OR** glucagon **OR** dextrose in water  •  famotidine  •  fluticasone propionate  •  hydrocortisone-pramoxine  •  hydrOXYzine  •  magnesium sulfate  •  methadone  •  naloxone  •  nicotine polacrilex  •  potassium chloride **OR** potassium chloride **OR** potassium chloride **OR** potassium chloride  •  sodium chloride     ASSESSMENT AND PLAN     Drug-induced constipation  Assessment & Plan  -bowel regimen prn    Opioid abuse (CMS/HCC)  Assessment & Plan        Unspecified mood (affective) disorder (CMS/HCC)  Assessment & Plan  -see anxiety/depression  -seen by psych, wants to talk to psych again for anxiety. Discussed increased risk of benzo dependence given her opioid dependence.    Tricuspid valve vegetation  Assessment & Plan  -Sessile vegetation on tricuspid valve leaflet  -Continue antibiotics- with Cefazolin- today is Day 14    Septic arthritis of vertebra (CMS/HCC)  Assessment & Plan  -Neurovascular intact  -seen by neurosurgery -- Rec no acute surgical intervention required. Repeat imaging at completion of antibiotics and follow-up at that time.      Essential hypertension  Assessment & Plan  -Continue with home meds  -bp parameters for hypotension    Septic embolism (CMS/HCC)  Assessment & Plan  -Septic  emboli, infectious endocarditis, discitis, MSSA  -Blood cultures performed on admission, NGTD  -Continue IV abx per ID total duration 42 days  -Evidence of discitis on MRI, plan per neurosurgery is follow-up MRI after completion of antibiotics    Opioid type dependence, continuous use (CMS/HCC)  Assessment & Plan  -likely still using IV drugs, given presentation  -Psych consulted, increased methadone to 30mg this admission  -QTc monitoring      Depression  Assessment & Plan  -continue current medication regimen as per psych, as below:  -buproprion 75mg TID, nicotine patch, paxil 30mg daily, prazosin 2mg PO qHS, seroquel 100mg PO qHS, ambien 10mg PO qHS    Anxiety  Assessment & Plan  -continue current medication regimen as per psych    * Acute bacterial endocarditis  Assessment & Plan  -Suspected tricuspid valve endocarditis  -Sepsis due to acute bacterial endocarditis present on admission  -Blood cultures from [facility]  -Repeat blood cultures NGTD  -PICC line in place  -Continue antibiotic (started 12/9 and streamlined to Cefazolin) - will require 6 week therapy- today is day 18/42          Plan of care discussed with: patient, nursing    VTE Prophylaxis Plan: [place] VTE Score: 2  Code Status: Full Code  Estimated Discharge Date: 12/11/2020  Disposition Planning: medically stable for discharge but needs 42 days of IV abx and needs skilled nursing placement due to IVDU history.      Lili Crawford MD  12/26/2020          Dietary Orders   (From admission, onward)             Start     Ordered    12/16/20 0934  Dietary nutrition supplements  Once     Question Answer Comment   Select Supplement (PH): Ensure Compact Chocolate    Meal period Lunch        12/16/20 0933    12/16/20 0933  Dietary nutrition supplements  Once     Question Answer Comment   Select Supplement (PH): Ensure Compact Vanilla    Meal period Breakfast Dinner        12/16/20 0933    12/11/20 1201  Adult Diet Regular; RD/LDN may adjust order   Diet effective now     Question Answer Comment   Diet Texture Regular    Delegation of Authority. Diet orders written by PA/CRNPs may not be adjusted by RD/LDNs. RD/LDN may adjust order        12/11/20 1200

## 2020-12-27 PROCEDURE — 63700000 HC SELF-ADMINISTRABLE DRUG: Performed by: INTERNAL MEDICINE

## 2020-12-27 PROCEDURE — 63700000 HC SELF-ADMINISTRABLE DRUG: Performed by: NURSE PRACTITIONER

## 2020-12-27 PROCEDURE — 63600000 HC DRUGS/DETAIL CODE: Performed by: INTERNAL MEDICINE

## 2020-12-27 PROCEDURE — 25800000 HC PHARMACY IV SOLUTIONS: Performed by: INTERNAL MEDICINE

## 2020-12-27 PROCEDURE — 12000000 HC ROOM AND CARE MED/SURG

## 2020-12-27 PROCEDURE — 63700000 HC SELF-ADMINISTRABLE DRUG: Performed by: HOSPITALIST

## 2020-12-27 PROCEDURE — 99233 SBSQ HOSP IP/OBS HIGH 50: CPT | Performed by: INTERNAL MEDICINE

## 2020-12-27 PROCEDURE — 25000000 HC PHARMACY GENERAL: Performed by: INTERNAL MEDICINE

## 2020-12-27 PROCEDURE — 99232 SBSQ HOSP IP/OBS MODERATE 35: CPT | Mod: GT | Performed by: PSYCHIATRY & NEUROLOGY

## 2020-12-27 PROCEDURE — 63700000 HC SELF-ADMINISTRABLE DRUG: Performed by: PSYCHIATRY & NEUROLOGY

## 2020-12-27 PROCEDURE — 63700000 HC SELF-ADMINISTRABLE DRUG: Performed by: STUDENT IN AN ORGANIZED HEALTH CARE EDUCATION/TRAINING PROGRAM

## 2020-12-27 RX ORDER — QUETIAPINE FUMARATE 25 MG/1
25 TABLET, FILM COATED ORAL
Status: DISCONTINUED | OUTPATIENT
Start: 2020-12-27 | End: 2020-12-29

## 2020-12-27 RX ADMIN — BUTALBITAL, ACETAMINOPHEN AND CAFFEINE 2 TABLET: 50; 325; 40 TABLET ORAL at 06:43

## 2020-12-27 RX ADMIN — Medication 1 PATCH: at 08:29

## 2020-12-27 RX ADMIN — METOPROLOL SUCCINATE 25 MG: 25 TABLET, EXTENDED RELEASE ORAL at 15:15

## 2020-12-27 RX ADMIN — BUPROPION HYDROCHLORIDE 75 MG: 75 TABLET, FILM COATED ORAL at 15:35

## 2020-12-27 RX ADMIN — Medication 10 ML: at 20:03

## 2020-12-27 RX ADMIN — BUPROPION HYDROCHLORIDE 75 MG: 75 TABLET, FILM COATED ORAL at 12:49

## 2020-12-27 RX ADMIN — ZOLPIDEM TARTRATE 10 MG: 5 TABLET, COATED ORAL at 21:09

## 2020-12-27 RX ADMIN — BUPROPION HYDROCHLORIDE 75 MG: 75 TABLET, FILM COATED ORAL at 08:27

## 2020-12-27 RX ADMIN — QUETIAPINE 150 MG: 100 TABLET, FILM COATED ORAL at 21:12

## 2020-12-27 RX ADMIN — PREGABALIN 150 MG: 75 CAPSULE ORAL at 20:03

## 2020-12-27 RX ADMIN — TRIMETHOBENZAMIDE HYDROCHLORIDE 300 MG: 300 CAPSULE ORAL at 16:35

## 2020-12-27 RX ADMIN — NICOTINE POLACRILEX 4 MG: 4 GUM, CHEWING ORAL at 08:27

## 2020-12-27 RX ADMIN — PAROXETINE 30 MG: 20 TABLET, FILM COATED ORAL at 08:27

## 2020-12-27 RX ADMIN — BUTALBITAL, ACETAMINOPHEN AND CAFFEINE 2 TABLET: 50; 325; 40 TABLET ORAL at 12:49

## 2020-12-27 RX ADMIN — CEFAZOLIN SODIUM 2 G: 10 POWDER, FOR SOLUTION INTRAVENOUS at 18:30

## 2020-12-27 RX ADMIN — METHADONE HYDROCHLORIDE 10 MG: 10 TABLET ORAL at 16:43

## 2020-12-27 RX ADMIN — CYCLOBENZAPRINE HYDROCHLORIDE 10 MG: 5 TABLET, FILM COATED ORAL at 08:27

## 2020-12-27 RX ADMIN — PROBIOTIC PRODUCT - TAB 1 TABLET: TAB at 08:27

## 2020-12-27 RX ADMIN — QUETIAPINE 25 MG: 25 TABLET, FILM COATED ORAL at 12:49

## 2020-12-27 RX ADMIN — CEFAZOLIN SODIUM 2 G: 10 POWDER, FOR SOLUTION INTRAVENOUS at 01:38

## 2020-12-27 RX ADMIN — CEFAZOLIN SODIUM 2 G: 10 POWDER, FOR SOLUTION INTRAVENOUS at 09:56

## 2020-12-27 RX ADMIN — CYCLOBENZAPRINE HYDROCHLORIDE 10 MG: 5 TABLET, FILM COATED ORAL at 21:12

## 2020-12-27 RX ADMIN — TRIMETHOBENZAMIDE HYDROCHLORIDE 300 MG: 300 CAPSULE ORAL at 08:50

## 2020-12-27 RX ADMIN — Medication 10 ML: at 12:50

## 2020-12-27 RX ADMIN — Medication 10 ML: at 03:30

## 2020-12-27 RX ADMIN — BUTALBITAL, ACETAMINOPHEN AND CAFFEINE 2 TABLET: 50; 325; 40 TABLET ORAL at 20:03

## 2020-12-27 RX ADMIN — PROBIOTIC PRODUCT - TAB 1 TABLET: TAB at 20:03

## 2020-12-27 RX ADMIN — PANTOPRAZOLE SODIUM 40 MG: 40 TABLET, DELAYED RELEASE ORAL at 08:28

## 2020-12-27 RX ADMIN — METHADONE HYDROCHLORIDE 30 MG: 10 TABLET ORAL at 08:50

## 2020-12-27 RX ADMIN — PREGABALIN 150 MG: 75 CAPSULE ORAL at 08:27

## 2020-12-27 RX ADMIN — PRAZOSIN HYDROCHLORIDE 2 MG: 1 CAPSULE ORAL at 21:08

## 2020-12-27 NOTE — PROGRESS NOTES
Hospital Medicine Service  Daily Progress Note     SUBJECTIVE     Interval History: No acute events overnight. Staying positive. Has her usual migraines. fioricet and methadone are helpful. Feels better after talking with psych today.    ROS negative except those mentioned above.    Last Bowel Movement: 12/26/20     OBJECTIVE     Vital signs in last 24 hours:  Temp:  [36.6 °C (97.9 °F)-36.9 °C (98.4 °F)] 36.9 °C (98.4 °F)  Heart Rate:  [68-87] 87  Resp:  [16-18] 18  BP: (106-144)/(51-63) 144/63    Intake/Output Summary (Last 24 hours) at 12/27/2020 1204  Last data filed at 12/26/2020 1652  Gross per 24 hour   Intake 50 ml   Output --   Net 50 ml       Weight trend (per Epic records)  Wt Readings from Last 3 Encounters:   12/24/20 102 kg (225 lb 11.2 oz)   08/05/20 102 kg (225 lb)   07/09/20 97.5 kg (215 lb)      PHYSICAL EXAMINATION     Physical Exam    GEN: Awake, resting comfortably, no acute distress  HEENT: normocephalic, atraumatic, EOMI, no conjunctival injection, hearing intact grossly  CV: RRR, S1+S2, no murmurs, rubs, gallops  RESP: Clear to auscultation bilaterally, no wheezes rales ronchi  GI: Soft, mild-tender to palpation upper/epigastric, no guarding, rebound. BS present  : baker not present  MSK: No edema. PICC RUE.   Neuro: A&O x3, no focal deficits  Psych: appropriate affect and mood, cooperative     LINES, CATHETERS, DRAINS, AIRWAYS, AND WOUNDS   Lines, Drains, Airways, Wounds:  PICC Single Lumen 12/18/20 Right (Active)   Number of days: 8       Comments:    LABS / IMAGING / TELE     Labs  Reviewed     Results from last 7 days   Lab Units 12/21/20  0553   WBC K/uL 7.21   HEMOGLOBIN g/dL 11.3*   HEMATOCRIT % 36.9   MCV fL 87.4   PLATELETS K/uL 380*       Results from last 7 days   Lab Units 12/21/20  0553   SODIUM mEQ/L 137   POTASSIUM mEQ/L 4.2   CHLORIDE mEQ/L 103   CO2 mEQ/L 23   BUN mg/dL 7*   CREATININE mg/dL 0.7   GLUCOSE mg/dL 96                               Results from last 7 days    Lab Units 12/21/20  0553   SED RATE mm/hr 46*   CRP mg/L 6.20         Imaging  Reviewed     Mri Thoracic Spine With And Without Contrast    Result Date: 12/9/2020  IMPRESSION: Diffuse anterior and posterior epidural enhancement within lumbar spine more pronounced at L4-L5 raising the suspicion for underlying infectious process/phlegmon.  Left greater than right L4-L5 facet joint signal and enhancement which although may be degenerative in nature, early changes of septic facet arthritis is in the differential.  Follow-up is recommended. Thoracic and lumbar degenerative change as described more pronounced at L4-L5. Ring-enhancing lesion in right upper lobe which may be infectious in etiology including in the setting of septic emboli.  Correlation with CT chest with contrast is recommended to exclude other etiologies. The results were discussed with Rylee Bowie on 12/9/2020 7:35 PM.    Mri Lumbar Spine With And Without Contrast    Result Date: 12/9/2020  IMPRESSION: Diffuse anterior and posterior epidural enhancement within lumbar spine more pronounced at L4-L5 raising the suspicion for underlying infectious process/phlegmon.  Left greater than right L4-L5 facet joint signal and enhancement which although may be degenerative in nature, early changes of septic facet arthritis is in the differential.  Follow-up is recommended. Thoracic and lumbar degenerative change as described more pronounced at L4-L5. Ring-enhancing lesion in right upper lobe which may be infectious in etiology including in the setting of septic emboli.  Correlation with CT chest with contrast is recommended to exclude other etiologies. The results were discussed with Rylee Bowie on 12/9/2020 7:35 PM.    X-ray Chest 1 View    Result Date: 12/18/2020  IMPRESSION: Interval placement of a right-sided PICC line catheter as described above.     X-ray Chest 1 View    Result Date: 12/18/2020  IMPRESSION: Interval placement of a right-sided PICC line  catheter as described above.     X-ray Chest 1 View    Result Date: 12/9/2020  IMPRESSION: No radiographic evidence of acute cardiopulmonary disease.    Ct Chest Pulmonary Embolism With Iv Contrast    Result Date: 12/9/2020  IMPRESSION: 1.  No evidence of main, lobar or segmental pulmonary embolism. 2.  However, there are multifocal nodular lesions throughout all lung fields including a cavitary lesion in the right upper lobe all suspicious for septic emboli.  Some of the tiny subsegmental pulmonary arterial branches demonstrate hypoenhancement and it is difficult to determinate if this is related to tiny pulmonary emboli or technique. 3.  Small bilateral pleural effusions. 4.  Mosaic pattern at the lung bases could represent mild edema or sequela of small airways or small vessels disease. 5.  Follow-up chest CT after appropriate treatment is recommended to document lung opacities and mediastinal lymphadenopathy which is probably reactive. 6.  Additional findings discussed below. Finding:    Other   Acuity: Critical  Status:  CLOSED Critical read back for the first impression performed with Bertha Bowie PA 9:25 PM 12/9/2020 COMMENT: Comparison: Chest x-ray from 12/9/2020 Technique: Chest CT pulmonary embolism protocol performed with intravenous contrast.  80 mL Omnipaque 350 given. CT DOSE:  One or more dose reduction techniques (e.g. automated exposure control, adjustment of the mA and/or kV according to patient size, use of iterative reconstruction technique) utilized for this examination. FINDINGS: LUNG, PLEURA AND LARGE AIRWAYS: The trachea and proximal bronchi remain clear. There are multifocal nodular lesions throughout the lungs.  For example in the right upper lobe there is a cavitary lesion measuring 1.4 cm on axial image 63. A 1.6 cm nodular lesion in the left upper lobe on image 69.  Multiple additional lesions are present elsewhere.  His are suspicious for septic emboli particularly given the  history of endocarditis and sepsis. There are small bilateral pleural effusions. There is a nonspecific mild degree of groundglass opacity in the lower lung fields.  This could represent mild degree of pulmonary edema or possibly a mosaic pattern from small airways or small vessel disease. No pneumothorax. VESSELS: Normal caliber of the thoracic aorta.  Thoracic aorta appears within normal limits.  Main pulmonary artery is normal in caliber.  No evidence of a main, lobar or segmental pulmonary embolism.  However, the subsegmental vessels are difficult to assess and some demonstrate a slightly hypoechoic enhancing appearance possibly artifact from bolus timing although tiny subsegmental pulmonary emboli would be difficult to exclude.  For example, in the lingula there is some hypoenhancement of the subsegmental vessels on image 116 where subsegmental PE cannot be excluded.  No evidence of right heart strain. HEART: normal size. No pericardial effusion. MEDIASTINUM AND PAULINA: There is a pathologically enlarged lymph node in the AP window measuring 1.8 cm short axis.  Additional prominent but nonpathologic by size video lymph nodes elsewhere. CHEST WALL AND LOWER NECK: within normal limits. UPPER ABDOMEN:Limited assessment of the upper abdominal structures with this technique.  There is splenomegaly with the spleen measuring 14.5 cm.  The liver may also be enlarged but is incompletely visualized.  Cholecystectomy changes. BONES: No suspicious bony erosive changes.  Multilevel Schmorl's nodes and degenerative changes in the spine are noted.  Calcific tendinosis of the right rotator cuff.  There is limited assessment of the spine with this technique.  No gross paraspinal inflammatory changes.     Ultrasound Abdomen Limited    Result Date: 12/17/2020  IMPRESSION: Mild increase in echotexture of the liver may represent fibrofatty infiltration. COMMENT: Ultrasound evaluation of the right upper quadrant was performed and  compared to a CT exam dated 8/18/2019.  The pancreas is of normal echotexture and unremarkable.  The aorta is obscured by bowel gas.  The liver is 17.6 cm in length and of increased echogenicity consistent with fibrofatty infiltration. There is no ductal dilatation or mass lesion.  Common bile duct measures 2.3 mm at the pito hepatis.  The right kidney is 10.5 cm in length.  There is no nephrolithiasis, hydronephrosis or mass lesion.  The renal cortex of normal thickness and echotexture.      ECG/Telemetry  Med-surg    Echo  Cardiac Imaging   TRANSESOPHAGEAL ECHO (JANUSZ) 12/11/2020    Narrative · Tricuspid valve endocarditis.  · 1 x 0.6 cm echolucent spongy sessile mass attached to the mid body of   the posterior tricuspid leaflet consistent with vegetation. There is a   tiny mobile component on the atrial surface of the mass. There appears to   be a smaller segment of vegetation on the ventricular surface of the valve   leaflet opposite the larger component. There is a tiny perforation at the   edge of the vegetation 5 mm from the annulus of the leaflet.  · Borderline severe tricuspid regurgitation through the valve orifice.   There is a small regurgitant jet through the perforation.  · Estimated RVSP = 48 mmHg assuming a right atrial pressure 5 mmHg  · Normal-appearing aortic, mitral, pulmonic valves. Trace mitral   regurgitation.  · Normal-sized LV. Normal LV wall thickness.  · Preserved LV systolic function. Estimated EF 60- 65%. Normal LV septal   wall motion. No regional wall motion abnormalities.  · Normal RV size and function.  · Normal-sized LA. Intact atrial septum. Normal pulmonary veins  · Normal-sized RA. No PFO by saline contrast injection or color-flow   imaging.  · Trace mitral valve regurgitation.  · Normal-sized IVC. Normal SVC  · Normal aorta.          Scheduled Meds:  • buPROPion  75 mg oral TID   • ceFAZolin  2 g intravenous q8h INT   • lactobacillus acidophilus complex  1 tablet oral BID   •  lidocaine  1 patch Topical Daily   • methadone  30 mg oral Daily   • metoprolol succinate XL  25 mg oral Daily   • nicotine  1 patch transdermal Daily   • pantoprazole  40 mg oral Daily before breakfast   • PARoxetine  30 mg oral Daily   • prazosin  2 mg oral Nightly   • pregabalin  150 mg oral BID   • psyllium husk  1 packet oral Daily   • QUEtiapine  150 mg oral Nightly   • QUEtiapine  25 mg oral Daily (1p)   • sodium chloride  10 mL intravenous q8h INT   • trimethobenzamide  300 mg oral q8h JAIDEN   • zolpidem  10 mg oral Nightly     Continuous Infusions:  PRN Meds:.butalbital-acetaminophen-caff  •  cyclobenzaprine  •  glucose **OR** dextrose **OR** glucagon **OR** dextrose in water  •  famotidine  •  fluticasone propionate  •  hydrocortisone-pramoxine  •  hydrOXYzine  •  magnesium sulfate  •  methadone  •  naloxone  •  nicotine polacrilex  •  potassium chloride **OR** potassium chloride **OR** potassium chloride **OR** potassium chloride  •  sodium chloride     ASSESSMENT AND PLAN     Drug-induced constipation  Assessment & Plan  -bowel regimen prn    Opioid abuse (CMS/HCC)  Assessment & Plan        Unspecified mood (affective) disorder (CMS/HCC)  Assessment & Plan  -seen by psych, wants to talk to psych again for anxiety. Discussed increased risk of benzo dependence given her opioid dependence.  -seen by psych again 12/27 -- add seroquel 25mg at 1pm, increase to 50mg if not working. Increase evening seroquel to 150mg.   -continue buproprion 75mg TID, nicotine patch, paxil 30mg daily, prazosin 2mg PO qHS, ambien 10mg PO qHS    Tricuspid valve vegetation  Assessment & Plan  -Sessile vegetation on tricuspid valve leaflet  -Continue antibiotics- with Cefazolin- today is Day 14    Septic arthritis of vertebra (CMS/HCC)  Assessment & Plan  -Neurovascular intact  -seen by neurosurgery -- Rec no acute surgical intervention required. Repeat imaging at completion of antibiotics and follow-up at that time.      Essential  hypertension  Assessment & Plan  -Continue with home meds  -bp parameters for hypotension    Septic embolism (CMS/[facility])  Assessment & Plan  -Septic emboli, infectious endocarditis, discitis, MSSA  -Blood cultures performed on admission, NGTD  -Continue IV abx per ID total duration 42 days  -Evidence of discitis on MRI, plan per neurosurgery is follow-up MRI after completion of antibiotics    Opioid type dependence, continuous use (CMS/HCC)  Assessment & Plan  -likely still using IV drugs, given presentation  -Psych consulted, increased methadone to 30mg this admission  -QTc monitoring      Migraine without status migrainosus, not intractable  Assessment & Plan  -Continue home Fioricet  -Continue methadone for opioid dependence  -Ice for additional relief     Insomnia  Assessment & Plan  -Seroquel dose adjustment as rec by psych -- increase to 150mg QHS  -Continue rest of current meds    Depression  Assessment & Plan  -see mood d/w section      Anxiety  Assessment & Plan  -continue current medication regimen as per psych    * Acute bacterial endocarditis  Assessment & Plan  -Suspected tricuspid valve endocarditis  -Sepsis due to acute bacterial endocarditis present on admission  -Blood cultures from [place] MSSA  -Repeat blood cultures NGTD  -PICC line in place  -Continue antibiotic (started 12/9 and streamlined to Cefazolin) - will require 6 week therapy- today is day 19/42          Plan of care discussed with: patient, nursing  Ok to take shower after covering RUE PICC area and securing it and keeping it covered/protected.     VTE Prophylaxis Plan: [place] VTE Score: 2  Code Status: Full Code  Estimated Discharge Date: 12/11/2020  Disposition Planning: medically stable for discharge but needs 42 days of IV abx and needs skilled nursing placement due to IVDU history.      Lili Crawford MD  12/27/2020          Dietary Orders   (From admission, onward)             Start     Ordered    12/16/20 0934  Dietary  nutrition supplements  Once     Question Answer Comment   Select Supplement (PH): Ensure Compact Chocolate    Meal period Lunch        12/16/20 0933    12/16/20 0933  Dietary nutrition supplements  Once     Question Answer Comment   Select Supplement (PH): Ensure Compact Vanilla    Meal period Breakfast Dinner        12/16/20 0933    12/11/20 1201  Adult Diet Regular; RD/LDN may adjust order  Diet effective now     Question Answer Comment   Diet Texture Regular    Delegation of Authority. Diet orders written by PA/Jose may not be adjusted by RD/LDNs. RD/LDN may adjust order        12/11/20 1201

## 2020-12-27 NOTE — ASSESSMENT & PLAN NOTE
-S/p Sumatriptan, Fioricet, IV Benadryl.  No relief and describes ongoing migraine since admit  -Continue Methadone for opioid dependence, dose increased to 40mg scheduled + 10mg PRN per psych  -She requests neuro consult

## 2020-12-27 NOTE — CONSULTS
"This exam was done through synchronous audio-video services.  The patient consented to the telehealth exam.    The provider was located in: [place]  The patient was located in: [place] [id number]  The following other people were present during the exam: Nurse via telemedicine  Inpatient Psychiatric Consultation   Follow-up Note         Subjective     Interval History  Re-consulted to comment on pt anxiety    Discussed with her - she reports 4-5 days ago her anxiety and depression got worse in the afternoon \"until I fall asleep\" - reports this is feeling she needs to jump out of skin. Very frustrated.  She has 25 days left of IV antibiotics and \"I just want to see if there is anything I can do\" Does AA meetings on zoom. Tried to get SW to get virtual IOP \"they can't find one that accepts my insurance\" Is trying but is worried it will get worse. Reports wakes up well.    Does report some withdrawal symptoms at night. Unclear if due to medications not working for sleep or in withdrawal. Asked if can do 25-15 instead.    Inpatient Medications    Current Facility-Administered Medications:   •  buPROPion (WELLBUTRIN) tablet 75 mg, 75 mg, oral, TID, Marlon Padron MD, 75 mg at 12/27/20 0827  •  butalbital-acetaminophen-caff (FIORICET, ESGIC) per tablet 2 tablet, 2 tablet, oral, q6h PRN, Gen Hogue MD, 2 tablet at 12/27/20 0643  •  ceFAZolin (ANCEF) NSS IVPB piggyback 2 g, 2 g, intravenous, q8h INT, Vikas Jones MD, Last Rate: 100 mL/hr at 12/27/20 0956, 2 g at 12/27/20 0956  •  cyclobenzaprine (FLEXERIL) tablet 10 mg, 10 mg, oral, 3x daily PRN, Marizol Ortiz CRNP, 10 mg at 12/27/20 0827  •  glucose chewable tablet 16-32 g of dextrose, 16-32 g of dextrose, oral, PRN **OR** dextrose 40 % oral gel 15-30 g of dextrose, 15-30 g of dextrose, oral, PRN **OR** glucagon (GLUCAGEN) injection 1 mg, 1 mg, intramuscular, PRN **OR** dextrose in water injection 12.5 g, 25 mL, intravenous, PRN, Perfecto Angela, " MD  •  famotidine (PEPCID) tablet 10 mg, 10 mg, oral, 2x daily PRN, Lili Crawford MD  •  fluticasone propionate (FLONASE) 50 mcg/actuation nasal spray 2 spray, 2 spray, Each Nostril, Daily PRN, Perfecto Angela MD  •  hydrocortisone-pramoxine (ANALPRAM-HC) 2.5-1 % rectal cream, , rectal, 4x daily PRN, Dominique Pwoell, , Given at 12/19/20 1643  •  hydrOXYzine (ATARAX) tablet 25 mg, 25 mg, oral, Nightly PRN, Dominique Powell DO, 25 mg at 12/21/20 2119  •  lactobacillus acidophilus complex (BACID) 1 billion cell- 250 mg tablet 1 tablet, 1 tablet, oral, BID, Heidi Toscano CRNP, 1 tablet at 12/27/20 0827  •  lidocaine (ASPERCREME) 4 % topical patch 1 patch, 1 patch, Topical, Daily, Jacki Boss CRNP  •  magnesium sulfate IVPB 2g in 50 mL NSS/D5W/SWFI, 2 g, intravenous, PRN, Perfecto Angela MD  •  methadone (DOLOPHINE) tablet 10 mg, 10 mg, oral, Daily PRN, Marlon Padron MD, 10 mg at 12/26/20 1610  •  methadone (DOLOPHINE) tablet 30 mg, 30 mg, oral, Daily, Mike Jordan MD, 30 mg at 12/27/20 0850  •  metoprolol succinate XL (TOPROL-XL) 24 hr ER tablet 25 mg, 25 mg, oral, Daily, Perfecto Angela MD, 25 mg at 12/26/20 0823  •  naloxone (NARCAN) injection 0.2 mg, 0.2 mg, intravenous, Once PRN, Jacki Boss CRNP  •  nicotine (NICODERM CQ) 14 mg/24 hr patch 1 patch, 1 patch, transdermal, Daily, Gen Hogue MD, 1 patch at 12/27/20 0829  •  nicotine polacrilex (NICORETTE) gum 4 mg, 4 mg, buccal, q4h PRN, Perfecto Angela MD, 4 mg at 12/27/20 0827  •  pantoprazole (PROTONIX) tablet,delayed release (DR/EC) 40 mg, 40 mg, oral, Daily before breakfast, Heidi Toscano CRNP, 40 mg at 12/27/20 0828  •  PARoxetine (PAXIL) tablet 30 mg, 30 mg, oral, Daily, Marlon Padron MD, 30 mg at 12/27/20 0827  •  potassium chloride (KLOR-CON) tablet extended release 20 mEq, 20 mEq, oral, Daily PRN **OR** potassium chloride (KLOR-CON) tablet extended release 40 mEq, 40 mEq, oral, Daily PRN **OR**  "potassium chloride 20 mEq in 100 mL IVPB  (premix), 20 mEq, intravenous, Daily PRN **OR** potassium chloride (KCL) 40 mEq/250 mL IVPB in NSS 40 mEq, 40 mEq, intravenous, Daily PRN, Perfecto Angela MD  •  prazosin (MINIPRESS) capsule 2 mg, 2 mg, oral, Nightly, Perfecto Angela MD, 2 mg at 12/26/20 2105  •  pregabalin (LYRICA) capsule 150 mg, 150 mg, oral, BID, Perfecto Angela MD, 150 mg at 12/27/20 0827  •  psyllium husk powder 1 packet, 1 packet, oral, Daily, Dominique Powell DO, 1 packet at 12/22/20 0822  •  QUEtiapine (SEROquel) tablet 100 mg, 100 mg, oral, Nightly, Perfecto Angela MD, 100 mg at 12/26/20 2105  •  sodium chloride flush 10 mL, 10 mL, intravenous, q8h INT, Gen Hogue MD, 10 mL at 12/27/20 0330  •  sodium chloride flush 10 mL, 10 mL, intravenous, PRN, Gen Hogue MD, 10 mL at 12/26/20 1955  •  trimethobenzamide (TIGAN) capsule 300 mg, 300 mg, oral, q8h JAIDEN, Dominique Powell DO, 300 mg at 12/27/20 0850  •  zolpidem (AMBIEN) tablet 10 mg, 10 mg, oral, Nightly, Perfecto Angela MD, 10 mg at 12/26/20 2109          Objective     Vitals    Range and most recent vitals over last 24 hours  Temp:  [36.6 °C (97.9 °F)-36.9 °C (98.4 °F)] 36.9 °C (98.4 °F)  Heart Rate:  [68-87] 87  Resp:  [16-18] 18  BP: (106-144)/(51-63) 144/63     Body mass index is 41.28 kg/m².      Mental Status  Examination:  Appearance: 35 y.o. female black T-shirt, glasses, brown hair.   Behavior: Calm, cooperative   Speech: Normal rate, tone, rhythm   Mood: \"I'm okay for now\"   Affect: normal   Thought Process: Linear, logical, goal directed   Thought Content: Denies SI/HI, A/VH   Insight: Fair to poor   Judgment: poor   Orientation AAOX3       Results  Labs:  CBC Results       12/21/20 12/12/20 12/11/20                    0553 [id number] [id number]         WBC 7.21 8.31 7.97         RBC 4.22 3.76 3.17         HGB 11.3 10.1 8.5         HCT 36.9 31.9 26.5         MCV 87.4 84.8 83.6         MCH 26.8 26.9 26.8         MCHC 30.6 31.7 32.1     "      276 [id number]         Comment for PLT at 1048 on 12/12/20: ALL RESULTS HAVE BEEN RECHECKED. SPECIMEN QUALITY CHECKED        CMP Results       12/21/20 12/12/20 12/11/20                    0553 1048 0444          138 140         K 4.2 4.5 3.9         Cl 103 102 105         CO2 23 21 23         Glucose 96 88 95         BUN 7 6 7         Creatinine 0.7 0.7 0.6         Calcium 9.2 9.2 9.0         Anion Gap 11 15 12         EGFR >60.0 >60.0 >60.0                     Other labs: UDS not pos for opiates. No methadone or buprenorphine or fentanyl checked on admission.    Imaging:  I have reviewed the Imaging from the last 24 hrs.      QTc: 464 with a heart rate of 89, drops to 430s with Rickman, obtained on 12/20/20.    Has used 10mg PRN daily since started on Methadone for last 5 days.        Assessment/Plan     Assessment:  Carolyn Redmond is a 35 y.o. female with PMH pancreatitis, endocarditis, migraine HA's, vasculitis, PPH OUD severe, mood disorder (substance vs primary)    Recommendations:   - Cont methadone 30mg with another 10mg PRN in the evening.  - Rec monitoring of QTc to ensure not increasing  - Cont buproprion 75mg TID, nicotine patch, paxil 30mg daily, prazosin 2mg PO qHS, ambien 10mg PO qHS per primary consult team  - Agree with long term MMT while on IV antibiotics  - Can add Seroquel 25mg Q1PM.  - Can inc to max 50mg if not working  - Inc qHS seroquel to 150mg PO qHS  - If seroquel not helpful in afternoon, consider adding 1mg minipress to 1PM instead - if bp allows it  - If these do not work, can try to split methadone dosing to 25 in AM instead of 30mg, and then inc afternoon to 15mg.    Cali Alfonso MD

## 2020-12-28 PROBLEM — R10.13 DYSPEPSIA: Status: ACTIVE | Noted: 2020-12-28

## 2020-12-28 LAB
ANION GAP SERPL CALC-SCNC: 8 MEQ/L (ref 3–15)
BUN SERPL-MCNC: 12 MG/DL (ref 8–20)
CALCIUM SERPL-MCNC: 9.1 MG/DL (ref 8.9–10.3)
CHLORIDE SERPL-SCNC: 104 MEQ/L (ref 98–109)
CO2 SERPL-SCNC: 27 MEQ/L (ref 22–32)
CREAT SERPL-MCNC: 0.8 MG/DL (ref 0.6–1.1)
ERYTHROCYTE [DISTWIDTH] IN BLOOD BY AUTOMATED COUNT: 15.4 % (ref 11.7–14.4)
GFR SERPL CREATININE-BSD FRML MDRD: >60 ML/MIN/1.73M*2
GLUCOSE SERPL-MCNC: 111 MG/DL (ref 70–99)
HCT VFR BLDCO AUTO: 36.4 % (ref 35–45)
HGB BLD-MCNC: 11.2 G/DL (ref 11.8–15.7)
MCH RBC QN AUTO: 27.3 PG (ref 28–33.2)
MCHC RBC AUTO-ENTMCNC: 30.8 G/DL (ref 32.2–35.5)
MCV RBC AUTO: 88.6 FL (ref 83–98)
PDW BLD AUTO: 10.8 FL (ref 9.4–12.3)
PLATELET # BLD AUTO: 239 K/UL (ref 150–369)
POTASSIUM SERPL-SCNC: 3.9 MEQ/L (ref 3.6–5.1)
RBC # BLD AUTO: 4.11 M/UL (ref 3.93–5.22)
SODIUM SERPL-SCNC: 139 MEQ/L (ref 136–144)
WBC # BLD AUTO: 7.02 K/UL (ref 3.8–10.5)

## 2020-12-28 PROCEDURE — 25000000 HC PHARMACY GENERAL: Performed by: INTERNAL MEDICINE

## 2020-12-28 PROCEDURE — 63600000 HC DRUGS/DETAIL CODE: Performed by: INTERNAL MEDICINE

## 2020-12-28 PROCEDURE — 63700000 HC SELF-ADMINISTRABLE DRUG: Performed by: INTERNAL MEDICINE

## 2020-12-28 PROCEDURE — 25800000 HC PHARMACY IV SOLUTIONS: Performed by: INTERNAL MEDICINE

## 2020-12-28 PROCEDURE — 63700000 HC SELF-ADMINISTRABLE DRUG: Performed by: HOSPITALIST

## 2020-12-28 PROCEDURE — 63700000 HC SELF-ADMINISTRABLE DRUG: Performed by: NURSE PRACTITIONER

## 2020-12-28 PROCEDURE — 63700000 HC SELF-ADMINISTRABLE DRUG: Performed by: PSYCHIATRY & NEUROLOGY

## 2020-12-28 PROCEDURE — 63700000 HC SELF-ADMINISTRABLE DRUG: Performed by: STUDENT IN AN ORGANIZED HEALTH CARE EDUCATION/TRAINING PROGRAM

## 2020-12-28 PROCEDURE — 36415 COLL VENOUS BLD VENIPUNCTURE: CPT | Performed by: INTERNAL MEDICINE

## 2020-12-28 PROCEDURE — 80048 BASIC METABOLIC PNL TOTAL CA: CPT | Performed by: INTERNAL MEDICINE

## 2020-12-28 PROCEDURE — 99233 SBSQ HOSP IP/OBS HIGH 50: CPT | Performed by: INTERNAL MEDICINE

## 2020-12-28 PROCEDURE — 85027 COMPLETE CBC AUTOMATED: CPT | Performed by: INTERNAL MEDICINE

## 2020-12-28 PROCEDURE — 12000000 HC ROOM AND CARE MED/SURG

## 2020-12-28 RX ORDER — FAMOTIDINE 10 MG/1
10 TABLET ORAL
Status: DISCONTINUED | OUTPATIENT
Start: 2020-12-28 | End: 2021-02-03 | Stop reason: HOSPADM

## 2020-12-28 RX ORDER — ALUMINUM HYDROXIDE, MAGNESIUM HYDROXIDE, AND SIMETHICONE 1200; 120; 1200 MG/30ML; MG/30ML; MG/30ML
30 SUSPENSION ORAL EVERY 6 HOURS PRN
Status: DISCONTINUED | OUTPATIENT
Start: 2020-12-28 | End: 2021-02-03 | Stop reason: HOSPADM

## 2020-12-28 RX ADMIN — PROBIOTIC PRODUCT - TAB 1 TABLET: TAB at 08:27

## 2020-12-28 RX ADMIN — BUPROPION HYDROCHLORIDE 75 MG: 75 TABLET, FILM COATED ORAL at 11:46

## 2020-12-28 RX ADMIN — Medication 10 ML: at 03:15

## 2020-12-28 RX ADMIN — METOPROLOL SUCCINATE 25 MG: 25 TABLET, EXTENDED RELEASE ORAL at 08:29

## 2020-12-28 RX ADMIN — PREGABALIN 150 MG: 75 CAPSULE ORAL at 21:27

## 2020-12-28 RX ADMIN — BUTALBITAL, ACETAMINOPHEN AND CAFFEINE 2 TABLET: 50; 325; 40 TABLET ORAL at 05:47

## 2020-12-28 RX ADMIN — Medication 10 ML: at 18:32

## 2020-12-28 RX ADMIN — CEFAZOLIN SODIUM 2 G: 10 POWDER, FOR SOLUTION INTRAVENOUS at 18:31

## 2020-12-28 RX ADMIN — TRIMETHOBENZAMIDE HYDROCHLORIDE 300 MG: 300 CAPSULE ORAL at 17:56

## 2020-12-28 RX ADMIN — METHADONE HYDROCHLORIDE 30 MG: 10 TABLET ORAL at 08:28

## 2020-12-28 RX ADMIN — BUTALBITAL, ACETAMINOPHEN AND CAFFEINE 2 TABLET: 50; 325; 40 TABLET ORAL at 19:59

## 2020-12-28 RX ADMIN — PANTOPRAZOLE SODIUM 40 MG: 40 TABLET, DELAYED RELEASE ORAL at 05:47

## 2020-12-28 RX ADMIN — ZOLPIDEM TARTRATE 10 MG: 5 TABLET, COATED ORAL at 21:28

## 2020-12-28 RX ADMIN — METHADONE HYDROCHLORIDE 10 MG: 10 TABLET ORAL at 18:04

## 2020-12-28 RX ADMIN — PAROXETINE 30 MG: 20 TABLET, FILM COATED ORAL at 08:28

## 2020-12-28 RX ADMIN — PROBIOTIC PRODUCT - TAB 1 TABLET: TAB at 21:27

## 2020-12-28 RX ADMIN — QUETIAPINE 25 MG: 25 TABLET, FILM COATED ORAL at 12:33

## 2020-12-28 RX ADMIN — CEFAZOLIN SODIUM 2 G: 10 POWDER, FOR SOLUTION INTRAVENOUS at 02:59

## 2020-12-28 RX ADMIN — BUTALBITAL, ACETAMINOPHEN AND CAFFEINE 2 TABLET: 50; 325; 40 TABLET ORAL at 11:46

## 2020-12-28 RX ADMIN — PRAZOSIN HYDROCHLORIDE 2 MG: 1 CAPSULE ORAL at 21:28

## 2020-12-28 RX ADMIN — BUPROPION HYDROCHLORIDE 75 MG: 75 TABLET, FILM COATED ORAL at 08:17

## 2020-12-28 RX ADMIN — FAMOTIDINE 10 MG: 10 TABLET, FILM COATED ORAL at 16:07

## 2020-12-28 RX ADMIN — Medication 1 PATCH: at 08:29

## 2020-12-28 RX ADMIN — QUETIAPINE 150 MG: 100 TABLET, FILM COATED ORAL at 21:27

## 2020-12-28 RX ADMIN — Medication 10 ML: at 11:47

## 2020-12-28 RX ADMIN — PREGABALIN 150 MG: 75 CAPSULE ORAL at 08:17

## 2020-12-28 RX ADMIN — CEFAZOLIN SODIUM 2 G: 10 POWDER, FOR SOLUTION INTRAVENOUS at 09:40

## 2020-12-28 RX ADMIN — TRIMETHOBENZAMIDE HYDROCHLORIDE 300 MG: 300 CAPSULE ORAL at 08:29

## 2020-12-28 RX ADMIN — BUPROPION HYDROCHLORIDE 75 MG: 75 TABLET, FILM COATED ORAL at 16:07

## 2020-12-28 NOTE — NURSING NOTE
Pt showered today, independent in room, pt pleasant, psych consult done . Pt resting,  Family brought clothes in bag checked, given to patient.

## 2020-12-28 NOTE — PLAN OF CARE
Plan of Care Review  Plan of Care Reviewed With: patient  Progress: no change  Outcome Summary: patient pleasant, cooperative.  right single lumen PICC flushes at times and does not at times.  independent in room.  migrane HA relieved with fiorcet.

## 2020-12-28 NOTE — PROGRESS NOTES
Hospital Medicine Service  Daily Progress Note     SUBJECTIVE     Interval History: No acute events overnight. Regurgitation after meals and acid reflux. Not really nauseated per say that causes vomiting. PICC didn't flush last night but when IV team came by today, flushed okay. This has been a recurring event.     ROS negative except those mentioned above.    Last Bowel Movement: 12/26/20     OBJECTIVE     Vital signs in last 24 hours:  Temp:  [36.7 °C (98.1 °F)-37.2 °C (98.9 °F)] 36.7 °C (98.1 °F)  Heart Rate:  [] 85  Resp:  [16-18] 18  BP: (113-145)/(78-85) 113/85    Intake/Output Summary (Last 24 hours) at 12/28/2020 1145  Last data filed at 12/28/2020 0259  Gross per 24 hour   Intake 50 ml   Output --   Net 50 ml       Weight trend (per Epic records)  Wt Readings from Last 3 Encounters:   12/24/20 102 kg (225 lb 11.2 oz)   08/05/20 102 kg (225 lb)   07/09/20 97.5 kg (215 lb)      PHYSICAL EXAMINATION     Physical Exam    GEN: Awake, resting comfortably, no acute distress  HEENT: normocephalic, atraumatic, EOMI, no conjunctival injection, hearing intact grossly  CV: RRR, S1+S2, no murmurs, rubs, gallops  RESP: Clear to auscultation bilaterally, no wheezes rales ronchi  GI: Soft, mild-tender to palpation upper/epigastric, no guarding, rebound. BS present  : baker not present  MSK: No edema. PICC RUE.   Neuro: A&O x3, no focal deficits  Psych: appropriate affect and mood, cooperative     LINES, CATHETERS, DRAINS, AIRWAYS, AND WOUNDS   Lines, Drains, Airways, Wounds:  PICC Single Lumen 12/18/20 Right (Active)   Number of days: 8       Comments:    LABS / IMAGING / TELE     Labs  Reviewed     Results from last 7 days   Lab Units 12/28/20  0253   WBC K/uL 7.02   HEMOGLOBIN g/dL 11.2*   HEMATOCRIT % 36.4   MCV fL 88.6   PLATELETS K/uL 239       Results from last 7 days   Lab Units 12/28/20  0253   SODIUM mEQ/L 139   POTASSIUM mEQ/L 3.9   CHLORIDE mEQ/L 104   CO2 mEQ/L 27   BUN mg/dL 12   CREATININE mg/dL  0.8   GLUCOSE mg/dL 111*                                       Imaging  Reviewed     Mri Thoracic Spine With And Without Contrast    Result Date: 12/9/2020  IMPRESSION: Diffuse anterior and posterior epidural enhancement within lumbar spine more pronounced at L4-L5 raising the suspicion for underlying infectious process/phlegmon.  Left greater than right L4-L5 facet joint signal and enhancement which although may be degenerative in nature, early changes of septic facet arthritis is in the differential.  Follow-up is recommended. Thoracic and lumbar degenerative change as described more pronounced at L4-L5. Ring-enhancing lesion in right upper lobe which may be infectious in etiology including in the setting of septic emboli.  Correlation with CT chest with contrast is recommended to exclude other etiologies. The results were discussed with Rylee Bowie on 12/9/2020 7:35 PM.    Mri Lumbar Spine With And Without Contrast    Result Date: 12/9/2020  IMPRESSION: Diffuse anterior and posterior epidural enhancement within lumbar spine more pronounced at L4-L5 raising the suspicion for underlying infectious process/phlegmon.  Left greater than right L4-L5 facet joint signal and enhancement which although may be degenerative in nature, early changes of septic facet arthritis is in the differential.  Follow-up is recommended. Thoracic and lumbar degenerative change as described more pronounced at L4-L5. Ring-enhancing lesion in right upper lobe which may be infectious in etiology including in the setting of septic emboli.  Correlation with CT chest with contrast is recommended to exclude other etiologies. The results were discussed with Rylee Bowie on 12/9/2020 7:35 PM.    X-ray Chest 1 View    Result Date: 12/18/2020  IMPRESSION: Interval placement of a right-sided PICC line catheter as described above.     X-ray Chest 1 View    Result Date: 12/18/2020  IMPRESSION: Interval placement of a right-sided PICC line catheter as  described above.     X-ray Chest 1 View    Result Date: 12/9/2020  IMPRESSION: No radiographic evidence of acute cardiopulmonary disease.    Ct Chest Pulmonary Embolism With Iv Contrast    Result Date: 12/9/2020  IMPRESSION: 1.  No evidence of main, lobar or segmental pulmonary embolism. 2.  However, there are multifocal nodular lesions throughout all lung fields including a cavitary lesion in the right upper lobe all suspicious for septic emboli.  Some of the tiny subsegmental pulmonary arterial branches demonstrate hypoenhancement and it is difficult to determinate if this is related to tiny pulmonary emboli or technique. 3.  Small bilateral pleural effusions. 4.  Mosaic pattern at the lung bases could represent mild edema or sequela of small airways or small vessels disease. 5.  Follow-up chest CT after appropriate treatment is recommended to document lung opacities and mediastinal lymphadenopathy which is probably reactive. 6.  Additional findings discussed below. Finding:    Other   Acuity: Critical  Status:  CLOSED Critical read back for the first impression performed with Bertha Bowie PA 9:25 PM 12/9/2020 COMMENT: Comparison: Chest x-ray from 12/9/2020 Technique: Chest CT pulmonary embolism protocol performed with intravenous contrast.  80 mL Omnipaque 350 given. CT DOSE:  One or more dose reduction techniques (e.g. automated exposure control, adjustment of the mA and/or kV according to patient size, use of iterative reconstruction technique) utilized for this examination. FINDINGS: LUNG, PLEURA AND LARGE AIRWAYS: The trachea and proximal bronchi remain clear. There are multifocal nodular lesions throughout the lungs.  For example in the right upper lobe there is a cavitary lesion measuring 1.4 cm on axial image 63. A 1.6 cm nodular lesion in the left upper lobe on image 69.  Multiple additional lesions are present elsewhere.  His are suspicious for septic emboli particularly given the history of  endocarditis and sepsis. There are small bilateral pleural effusions. There is a nonspecific mild degree of groundglass opacity in the lower lung fields.  This could represent mild degree of pulmonary edema or possibly a mosaic pattern from small airways or small vessel disease. No pneumothorax. VESSELS: Normal caliber of the thoracic aorta.  Thoracic aorta appears within normal limits.  Main pulmonary artery is normal in caliber.  No evidence of a main, lobar or segmental pulmonary embolism.  However, the subsegmental vessels are difficult to assess and some demonstrate a slightly hypoechoic enhancing appearance possibly artifact from bolus timing although tiny subsegmental pulmonary emboli would be difficult to exclude.  For example, in the lingula there is some hypoenhancement of the subsegmental vessels on image 116 where subsegmental PE cannot be excluded.  No evidence of right heart strain. HEART: normal size. No pericardial effusion. MEDIASTINUM AND PAULINA: There is a pathologically enlarged lymph node in the AP window measuring 1.8 cm short axis.  Additional prominent but nonpathologic by size video lymph nodes elsewhere. CHEST WALL AND LOWER NECK: within normal limits. UPPER ABDOMEN:Limited assessment of the upper abdominal structures with this technique.  There is splenomegaly with the spleen measuring 14.5 cm.  The liver may also be enlarged but is incompletely visualized.  Cholecystectomy changes. BONES: No suspicious bony erosive changes.  Multilevel Schmorl's nodes and degenerative changes in the spine are noted.  Calcific tendinosis of the right rotator cuff.  There is limited assessment of the spine with this technique.  No gross paraspinal inflammatory changes.     Ultrasound Abdomen Limited    Result Date: 12/17/2020  IMPRESSION: Mild increase in echotexture of the liver may represent fibrofatty infiltration. COMMENT: Ultrasound evaluation of the right upper quadrant was performed and compared to a  CT exam dated 8/18/2019.  The pancreas is of normal echotexture and unremarkable.  The aorta is obscured by bowel gas.  The liver is 17.6 cm in length and of increased echogenicity consistent with fibrofatty infiltration. There is no ductal dilatation or mass lesion.  Common bile duct measures 2.3 mm at the pito hepatis.  The right kidney is 10.5 cm in length.  There is no nephrolithiasis, hydronephrosis or mass lesion.  The renal cortex of normal thickness and echotexture.      ECG/Telemetry  Med-surg    Echo  Cardiac Imaging   TRANSESOPHAGEAL ECHO (JANUSZ) 12/11/2020    Narrative · Tricuspid valve endocarditis.  · 1 x 0.6 cm echolucent spongy sessile mass attached to the mid body of   the posterior tricuspid leaflet consistent with vegetation. There is a   tiny mobile component on the atrial surface of the mass. There appears to   be a smaller segment of vegetation on the ventricular surface of the valve   leaflet opposite the larger component. There is a tiny perforation at the   edge of the vegetation 5 mm from the annulus of the leaflet.  · Borderline severe tricuspid regurgitation through the valve orifice.   There is a small regurgitant jet through the perforation.  · Estimated RVSP = 48 mmHg assuming a right atrial pressure 5 mmHg  · Normal-appearing aortic, mitral, pulmonic valves. Trace mitral   regurgitation.  · Normal-sized LV. Normal LV wall thickness.  · Preserved LV systolic function. Estimated EF 60- 65%. Normal LV septal   wall motion. No regional wall motion abnormalities.  · Normal RV size and function.  · Normal-sized LA. Intact atrial septum. Normal pulmonary veins  · Normal-sized RA. No PFO by saline contrast injection or color-flow   imaging.  · Trace mitral valve regurgitation.  · Normal-sized IVC. Normal SVC  · Normal aorta.          Scheduled Meds:  • buPROPion  75 mg oral TID   • ceFAZolin  2 g intravenous q8h INT   • famotidine  10 mg oral BID AC   • lactobacillus acidophilus complex  1  tablet oral BID   • lidocaine  1 patch Topical Daily   • methadone  30 mg oral Daily   • metoprolol succinate XL  25 mg oral Daily   • nicotine  1 patch transdermal Daily   • pantoprazole  40 mg oral Daily before breakfast   • PARoxetine  30 mg oral Daily   • prazosin  2 mg oral Nightly   • pregabalin  150 mg oral BID   • psyllium husk  1 packet oral Daily   • QUEtiapine  150 mg oral Nightly   • QUEtiapine  25 mg oral Daily (1p)   • sodium chloride  10 mL intravenous q8h INT   • trimethobenzamide  300 mg oral q8h JAIDEN   • zolpidem  10 mg oral Nightly     Continuous Infusions:  PRN Meds:.•  alum-mag hydroxide-simeth  •  butalbital-acetaminophen-caff  •  cyclobenzaprine  •  glucose **OR** dextrose **OR** glucagon **OR** dextrose in water  •  fluticasone propionate  •  hydrocortisone-pramoxine  •  hydrOXYzine  •  magnesium sulfate  •  methadone  •  naloxone  •  nicotine polacrilex  •  potassium chloride **OR** potassium chloride **OR** potassium chloride **OR** potassium chloride  •  sodium chloride     ASSESSMENT AND PLAN     * Acute bacterial endocarditis  Assessment & Plan  -Suspected tricuspid valve endocarditis  -Sepsis due to acute bacterial endocarditis present on admission  [facility]  -Repeat blood cultures NGTD  -PICC line in place  -Continue antibiotic (started 12/9 and streamlined to Cefazolin) - will require 6 week therapy (1/19/20 last day) - today is day 20/42    Dyspepsia  Assessment & Plan  -dyspepsia with meals with episodes of acid reflux regurgitation within 30 min.    -continue daily protonix, can uptitrate to BID if needed  -change pepcid to BID before breakfast/dinner (her main meals)  -maalox PRN    Drug-induced constipation  Assessment & Plan  -bowel regimen prn    Opioid abuse (CMS/HCC)  Assessment & Plan        Unspecified mood (affective) disorder (CMS/HCC)  Assessment & Plan  -seen by psych, wants to talk to psych again for anxiety. Discussed increased risk of benzo  dependence given her opioid dependence.  -seen by psych again 12/27 -- add seroquel 25mg at 1pm, increase to 50mg if not working. Increase evening seroquel to 150mg.   -continue buproprion 75mg TID, nicotine patch, paxil 30mg daily, prazosin 2mg PO qHS, ambien 10mg PO qHS    Tricuspid valve vegetation  Assessment & Plan  -Sessile vegetation on tricuspid valve leaflet  -Continue antibiotics- with Cefazolin    Septic arthritis of vertebra (CMS/HCC)  Assessment & Plan  -Neurovascular intact  -seen by neurosurgery -- Rec no acute surgical intervention required. Repeat imaging at completion of antibiotics and follow-up at that time.      Essential hypertension  Assessment & Plan  -Continue with home meds  -bp parameters for hypotension    Septic embolism (CMS/[facility])  Assessment & Plan  -Septic emboli, infectious endocarditis, discitis, MSSA  -Blood cultures performed on admission, NGTD  -Continue IV abx per ID total duration 42 days  -Evidence of discitis on MRI, plan per neurosurgery is follow-up MRI after completion of antibiotics    Opioid type dependence, continuous use (CMS/[facility])  Assessment & Plan  -likely still using IV drugs, given presentation  -Psych consulted, increased methadone to 30mg this admission  -QTc monitoring      Migraine without status migrainosus, not intractable  Assessment & Plan  -Continue home Fioricet  -Continue methadone for opioid dependence  -Ice for additional relief     Insomnia  Assessment & Plan  -Seroquel dose adjustment as rec by psych -- increase to 150mg QHS  -Continue rest of current meds    Depression  Assessment & Plan  -see mood d/w section      Anxiety  Assessment & Plan  -continue current medication regimen as per psych          Plan of care discussed with: patient, nursing    VTE Prophylaxis Plan: Padua VTE Score: 2  Code Status: Full Code  Estimated Discharge Date: 12/11/2020  Disposition Planning: medically stable for discharge but needs 42 days of IV abx and needs skilled  nursing placement due to IVDU history.      Lili Crawford MD  12/28/2020          Dietary Orders   (From admission, onward)             Start     Ordered    12/16/20 0934  Dietary nutrition supplements  Once     Question Answer Comment   Select Supplement (PH): Ensure Compact Chocolate    Meal period Lunch        12/16/20 0933    12/16/20 0933  Dietary nutrition supplements  Once     Question Answer Comment   Select Supplement (PH): Ensure Compact Vanilla    Meal period Breakfast Dinner        12/16/20 0933    12/11/20 1201  Adult Diet Regular; RD/LDN may adjust order  Diet effective now     Question Answer Comment   Diet Texture Regular    Delegation of Authority. Diet orders written by PA/CRStephen may not be adjusted by RD/LDNs. RD/LDN may adjust order        12/11/20 1201

## 2020-12-28 NOTE — PATIENT CARE CONFERENCE
Care Progression Rounds Note  Date: 12/28/2020  Time: 10:58 AM     Patient Name: Carolyn Redmond     Medical Record Number: [id number]   YOB: 1985  Sex: Female      Room/Bed: 4211    Admitting Diagnosis: Septic embolism (CMS/[facility]) [I76]  Infection of lumbar spine (CMS/[facility]) [M46.26]  Acute left-sided low back pain without sciatica [M54.5]   Admit Date/Time: 12/9/2020 11:10 AM    Primary Diagnosis: Acute bacterial endocarditis  Principal Problem: Acute bacterial endocarditis    GMLOS: 4.8  Anticipated Discharge Date: 12/11/2020    AM-PAC  Mobility Score:      Discharge Planning:       Barriers to Discharge:  Barriers to Discharge: Medical issues not resolved, Social barriers (other)  Comment: Methadone use    Participants:  , nursing, physician, physical therapy, occupational therapy

## 2020-12-28 NOTE — ASSESSMENT & PLAN NOTE
-dyspepsia with meals with episodes of acid reflux regurgitation within 30 min.  -Protonix BID, Pepcid BID  -maalox PRN

## 2020-12-29 PROCEDURE — 63600000 HC DRUGS/DETAIL CODE: Performed by: INTERNAL MEDICINE

## 2020-12-29 PROCEDURE — 99232 SBSQ HOSP IP/OBS MODERATE 35: CPT | Performed by: INTERNAL MEDICINE

## 2020-12-29 PROCEDURE — 63700000 HC SELF-ADMINISTRABLE DRUG: Performed by: STUDENT IN AN ORGANIZED HEALTH CARE EDUCATION/TRAINING PROGRAM

## 2020-12-29 PROCEDURE — 63700000 HC SELF-ADMINISTRABLE DRUG: Performed by: HOSPITALIST

## 2020-12-29 PROCEDURE — 25000000 HC PHARMACY GENERAL: Performed by: INTERNAL MEDICINE

## 2020-12-29 PROCEDURE — 63700000 HC SELF-ADMINISTRABLE DRUG: Performed by: INTERNAL MEDICINE

## 2020-12-29 PROCEDURE — 25800000 HC PHARMACY IV SOLUTIONS: Performed by: INTERNAL MEDICINE

## 2020-12-29 PROCEDURE — 99233 SBSQ HOSP IP/OBS HIGH 50: CPT | Performed by: PSYCHIATRY & NEUROLOGY

## 2020-12-29 PROCEDURE — 12000000 HC ROOM AND CARE MED/SURG

## 2020-12-29 PROCEDURE — 63700000 HC SELF-ADMINISTRABLE DRUG: Performed by: PSYCHIATRY & NEUROLOGY

## 2020-12-29 PROCEDURE — 63700000 HC SELF-ADMINISTRABLE DRUG: Performed by: NURSE PRACTITIONER

## 2020-12-29 RX ORDER — SUMATRIPTAN SUCCINATE 50 MG/1
50 TABLET ORAL ONCE AS NEEDED
Status: COMPLETED | OUTPATIENT
Start: 2020-12-29 | End: 2021-01-01

## 2020-12-29 RX ORDER — METHADONE HYDROCHLORIDE 10 MG/1
40 TABLET ORAL DAILY
Status: DISCONTINUED | OUTPATIENT
Start: 2020-12-30 | End: 2020-12-31

## 2020-12-29 RX ORDER — QUETIAPINE FUMARATE 100 MG/1
100 TABLET, FILM COATED ORAL NIGHTLY
Status: DISCONTINUED | OUTPATIENT
Start: 2020-12-29 | End: 2021-02-03 | Stop reason: HOSPADM

## 2020-12-29 RX ADMIN — BUTALBITAL, ACETAMINOPHEN AND CAFFEINE 2 TABLET: 50; 325; 40 TABLET ORAL at 06:29

## 2020-12-29 RX ADMIN — ZOLPIDEM TARTRATE 10 MG: 5 TABLET, COATED ORAL at 21:10

## 2020-12-29 RX ADMIN — METOPROLOL SUCCINATE 25 MG: 25 TABLET, EXTENDED RELEASE ORAL at 08:19

## 2020-12-29 RX ADMIN — TRIMETHOBENZAMIDE HYDROCHLORIDE 300 MG: 300 CAPSULE ORAL at 08:18

## 2020-12-29 RX ADMIN — QUETIAPINE FUMARATE 100 MG: 100 TABLET ORAL at 21:10

## 2020-12-29 RX ADMIN — BUTALBITAL, ACETAMINOPHEN AND CAFFEINE 2 TABLET: 50; 325; 40 TABLET ORAL at 12:52

## 2020-12-29 RX ADMIN — Medication 1 PATCH: at 08:20

## 2020-12-29 RX ADMIN — Medication 10 ML: at 10:22

## 2020-12-29 RX ADMIN — CEFAZOLIN SODIUM 2 G: 10 POWDER, FOR SOLUTION INTRAVENOUS at 10:22

## 2020-12-29 RX ADMIN — QUETIAPINE 25 MG: 25 TABLET, FILM COATED ORAL at 11:59

## 2020-12-29 RX ADMIN — PANTOPRAZOLE SODIUM 40 MG: 40 TABLET, DELAYED RELEASE ORAL at 08:18

## 2020-12-29 RX ADMIN — METHADONE HYDROCHLORIDE 30 MG: 10 TABLET ORAL at 08:19

## 2020-12-29 RX ADMIN — TRIMETHOBENZAMIDE HYDROCHLORIDE 300 MG: 300 CAPSULE ORAL at 16:22

## 2020-12-29 RX ADMIN — Medication 10 ML: at 02:18

## 2020-12-29 RX ADMIN — PRAZOSIN HYDROCHLORIDE 2 MG: 1 CAPSULE ORAL at 21:09

## 2020-12-29 RX ADMIN — FAMOTIDINE 10 MG: 10 TABLET, FILM COATED ORAL at 16:22

## 2020-12-29 RX ADMIN — CEFAZOLIN SODIUM 2 G: 10 POWDER, FOR SOLUTION INTRAVENOUS at 19:03

## 2020-12-29 RX ADMIN — PROBIOTIC PRODUCT - TAB 1 TABLET: TAB at 08:18

## 2020-12-29 RX ADMIN — CEFAZOLIN SODIUM 2 G: 10 POWDER, FOR SOLUTION INTRAVENOUS at 01:48

## 2020-12-29 RX ADMIN — PREGABALIN 150 MG: 75 CAPSULE ORAL at 08:19

## 2020-12-29 RX ADMIN — FAMOTIDINE 10 MG: 10 TABLET, FILM COATED ORAL at 08:18

## 2020-12-29 RX ADMIN — BUTALBITAL, ACETAMINOPHEN AND CAFFEINE 2 TABLET: 50; 325; 40 TABLET ORAL at 20:07

## 2020-12-29 RX ADMIN — PROBIOTIC PRODUCT - TAB 1 TABLET: TAB at 20:03

## 2020-12-29 RX ADMIN — BUPROPION HYDROCHLORIDE 75 MG: 75 TABLET, FILM COATED ORAL at 08:19

## 2020-12-29 RX ADMIN — PAROXETINE 30 MG: 20 TABLET, FILM COATED ORAL at 08:18

## 2020-12-29 RX ADMIN — METHADONE HYDROCHLORIDE 10 MG: 10 TABLET ORAL at 18:06

## 2020-12-29 RX ADMIN — PREGABALIN 150 MG: 75 CAPSULE ORAL at 20:03

## 2020-12-29 RX ADMIN — BUPROPION HYDROCHLORIDE 75 MG: 75 TABLET, FILM COATED ORAL at 16:22

## 2020-12-29 RX ADMIN — BUPROPION HYDROCHLORIDE 75 MG: 75 TABLET, FILM COATED ORAL at 11:59

## 2020-12-29 NOTE — ASSESSMENT & PLAN NOTE
-hx of IV drug abuse  -admitted with IE  -palliative care team and psych on board for recommendations   -problems in addition to dependence include depression, anxiety and insomnia    1/21 update: patient in good spirits today even though she found out today she has to stay in hospital for 2 more weeks of antibiotics..  She tells me her pain is fairly well controlled

## 2020-12-29 NOTE — PROGRESS NOTES
Major Events:  none    Subjective:  HA    Temp:  [36.5 °C (97.7 °F)-37.1 °C (98.8 °F)] 36.8 °C (98.3 °F)  Heart Rate:  [70-82] 82  Resp:  [16] 16  BP: ()/(51-81) 121/63     SpO2 Readings from Last 3 Encounters:   12/29/20 97%   08/05/20 94%   07/10/20 97%     Anti-infectives (From admission, onward)    Start     Dose/Rate Route Frequency Ordered Stop    12/11/20 1900  ceFAZolin (ANCEF) NSS IVPB piggyback 2 g      2 g  100 mL/hr over 30 Minutes intravenous Every 8 hours interval 12/11/20 1823          Physical Exam:  Skin: No rash  Sclera: Anicteric  Lungs: clr  CV: S1, S2 nl, soft m, no embolic changes  Abd: Soft, nt, no hsm  Extr: No jt eff, no edema  Neuro: Alert, lucid    Lab Results   Component Value Date    WBC 7.02 12/28/2020    HGB 11.2 (L) 12/28/2020    HCT 36.4 12/28/2020    MCV 88.6 12/28/2020     12/28/2020     Lab Results   Component Value Date    GLUCOSE 111 (H) 12/28/2020    CALCIUM 9.1 12/28/2020     12/28/2020    K 3.9 12/28/2020    CO2 27 12/28/2020     12/28/2020    BUN 12 12/28/2020    CREATININE 0.8 12/28/2020     Lab Results   Component Value Date    ALBUMIN 3.0 (L) 12/09/2020    BILITOT 0.4 12/09/2020    ALKPHOS 85 12/09/2020    BILIDIR <0.1 04/12/2017    AST 14 (L) 12/09/2020    ALT 11 12/09/2020    PROTEIN 7.2 12/09/2020       Impression    MSSA TV IE c pul emboli  Also c lumbar infection  BC here neg so far  Tolerating cefazolin well  Now day 22/42  CRP was 184; 6 on 12/21    ASHLEY Jones MD  Pager [phone]     64

## 2020-12-29 NOTE — NURSING NOTE
Have not heard from Dr Padron.  Pt requesting tele psych.  Paged Dr Crawford.  Pt agitated, screaming at me and demanding her methadone.  Explained to pt I can not give with the order parameters that I have.  Pt still not understanding.  Waiting to hear about tele psych.

## 2020-12-29 NOTE — NURSING NOTE
Pt upset she can not receive prn methadone at this time as her COW score is a 5.  The order states to give if >7.  Explained to pt I can not give at this time.  Pt wants to speak to Dr Padron.  Paged him and tried calling him.  Have not heard back.  Made  aware of the situation and the nursing supervisor as pt might escalate.  Made pt aware that I did notified Dr Padron

## 2020-12-29 NOTE — NURSING NOTE
Patient VSS, continues to refuse bed alarm, ambulates independently in room, voiding in bathroom w/o difficulty, BM today, continues to c/o head/headache pains and occasional lower back pain and 1 episode of nausea which resolved with encouraged sips of water and sleep. Tolerating regular diet, encouraged to get OOB and ambulate today but patient refused. No difficulties flushing RUE PICC line, continued w/ IV antibiotics. Call bell in reach, will continue to monitor.

## 2020-12-29 NOTE — PROGRESS NOTES
Hospital Medicine Service  Daily Progress Note     SUBJECTIVE     Interval History: pretty bad migraine overnight, wants to see if triptan can abort it. Still with anxiety, only been few days with seroquel. Worried about weight gain though and wants to speak with Dr. Padron.    ROS negative except those mentioned above.    Last Bowel Movement: 12/29/20     OBJECTIVE     Vital signs in last 24 hours:  Temp:  [36.5 °C (97.7 °F)-37.1 °C (98.8 °F)] 36.8 °C (98.3 °F)  Heart Rate:  [70-82] 82  Resp:  [16] 16  BP: ()/(51-81) 121/63    Intake/Output Summary (Last 24 hours) at 12/29/2020 1129  Last data filed at 12/29/2020 0218  Gross per 24 hour   Intake 60 ml   Output --   Net 60 ml       Weight trend (per Epic records)  Wt Readings from Last 3 Encounters:   12/24/20 102 kg (225 lb 11.2 oz)   08/05/20 102 kg (225 lb)   07/09/20 97.5 kg (215 lb)      PHYSICAL EXAMINATION     Physical Exam    GEN: Awake, resting comfortably, no acute distress  HEENT: normocephalic, atraumatic, EOMI, no conjunctival injection, hearing intact grossly  CV: RRR, S1+S2, no murmurs, rubs, gallops  RESP: Clear to auscultation bilaterally, no wheezes rales ronchi  GI: Soft, mild-tender to palpation upper/epigastric, no guarding, rebound. BS present  : baker not present  MSK: No edema. PICC RUE.   Neuro: A&O x3, no focal deficits  Psych: appropriate affect and mood, cooperative     LINES, CATHETERS, DRAINS, AIRWAYS, AND WOUNDS   Lines, Drains, Airways, Wounds:  PICC Single Lumen 12/18/20 Right (Active)   Number of days: 8       Comments:    LABS / IMAGING / TELE     Labs  Reviewed     Results from last 7 days   Lab Units 12/28/20  0253   WBC K/uL 7.02   HEMOGLOBIN g/dL 11.2*   HEMATOCRIT % 36.4   MCV fL 88.6   PLATELETS K/uL 239       Results from last 7 days   Lab Units 12/28/20  0253   SODIUM mEQ/L 139   POTASSIUM mEQ/L 3.9   CHLORIDE mEQ/L 104   CO2 mEQ/L 27   BUN mg/dL 12   CREATININE mg/dL 0.8   GLUCOSE mg/dL 111*                                        Imaging  Reviewed     Mri Thoracic Spine With And Without Contrast    Result Date: 12/9/2020  IMPRESSION: Diffuse anterior and posterior epidural enhancement within lumbar spine more pronounced at L4-L5 raising the suspicion for underlying infectious process/phlegmon.  Left greater than right L4-L5 facet joint signal and enhancement which although may be degenerative in nature, early changes of septic facet arthritis is in the differential.  Follow-up is recommended. Thoracic and lumbar degenerative change as described more pronounced at L4-L5. Ring-enhancing lesion in right upper lobe which may be infectious in etiology including in the setting of septic emboli.  Correlation with CT chest with contrast is recommended to exclude other etiologies. The results were discussed with Rylee Bowie on 12/9/2020 7:35 PM.    Mri Lumbar Spine With And Without Contrast    Result Date: 12/9/2020  IMPRESSION: Diffuse anterior and posterior epidural enhancement within lumbar spine more pronounced at L4-L5 raising the suspicion for underlying infectious process/phlegmon.  Left greater than right L4-L5 facet joint signal and enhancement which although may be degenerative in nature, early changes of septic facet arthritis is in the differential.  Follow-up is recommended. Thoracic and lumbar degenerative change as described more pronounced at L4-L5. Ring-enhancing lesion in right upper lobe which may be infectious in etiology including in the setting of septic emboli.  Correlation with CT chest with contrast is recommended to exclude other etiologies. The results were discussed with Rylee Bowie on 12/9/2020 7:35 PM.    X-ray Chest 1 View    Result Date: 12/18/2020  IMPRESSION: Interval placement of a right-sided PICC line catheter as described above.     X-ray Chest 1 View    Result Date: 12/18/2020  IMPRESSION: Interval placement of a right-sided PICC line catheter as described above.     X-ray Chest 1  View    Result Date: 12/9/2020  IMPRESSION: No radiographic evidence of acute cardiopulmonary disease.    Ct Chest Pulmonary Embolism With Iv Contrast    Result Date: 12/9/2020  IMPRESSION: 1.  No evidence of main, lobar or segmental pulmonary embolism. 2.  However, there are multifocal nodular lesions throughout all lung fields including a cavitary lesion in the right upper lobe all suspicious for septic emboli.  Some of the tiny subsegmental pulmonary arterial branches demonstrate hypoenhancement and it is difficult to determinate if this is related to tiny pulmonary emboli or technique. 3.  Small bilateral pleural effusions. 4.  Mosaic pattern at the lung bases could represent mild edema or sequela of small airways or small vessels disease. 5.  Follow-up chest CT after appropriate treatment is recommended to document lung opacities and mediastinal lymphadenopathy which is probably reactive. 6.  Additional findings discussed below. Finding:    Other   Acuity: Critical  Status:  CLOSED Critical read back for the first impression performed with Bertha Bowie PA 9:25 PM 12/9/2020 COMMENT: Comparison: Chest x-ray from 12/9/2020 Technique: Chest CT pulmonary embolism protocol performed with intravenous contrast.  80 mL Omnipaque 350 given. CT DOSE:  One or more dose reduction techniques (e.g. automated exposure control, adjustment of the mA and/or kV according to patient size, use of iterative reconstruction technique) utilized for this examination. FINDINGS: LUNG, PLEURA AND LARGE AIRWAYS: The trachea and proximal bronchi remain clear. There are multifocal nodular lesions throughout the lungs.  For example in the right upper lobe there is a cavitary lesion measuring 1.4 cm on axial image 63. A 1.6 cm nodular lesion in the left upper lobe on image 69.  Multiple additional lesions are present elsewhere.  His are suspicious for septic emboli particularly given the history of endocarditis and sepsis. There are small  bilateral pleural effusions. There is a nonspecific mild degree of groundglass opacity in the lower lung fields.  This could represent mild degree of pulmonary edema or possibly a mosaic pattern from small airways or small vessel disease. No pneumothorax. VESSELS: Normal caliber of the thoracic aorta.  Thoracic aorta appears within normal limits.  Main pulmonary artery is normal in caliber.  No evidence of a main, lobar or segmental pulmonary embolism.  However, the subsegmental vessels are difficult to assess and some demonstrate a slightly hypoechoic enhancing appearance possibly artifact from bolus timing although tiny subsegmental pulmonary emboli would be difficult to exclude.  For example, in the lingula there is some hypoenhancement of the subsegmental vessels on image 116 where subsegmental PE cannot be excluded.  No evidence of right heart strain. HEART: normal size. No pericardial effusion. MEDIASTINUM AND PAULINA: There is a pathologically enlarged lymph node in the AP window measuring 1.8 cm short axis.  Additional prominent but nonpathologic by size video lymph nodes elsewhere. CHEST WALL AND LOWER NECK: within normal limits. UPPER ABDOMEN:Limited assessment of the upper abdominal structures with this technique.  There is splenomegaly with the spleen measuring 14.5 cm.  The liver may also be enlarged but is incompletely visualized.  Cholecystectomy changes. BONES: No suspicious bony erosive changes.  Multilevel Schmorl's nodes and degenerative changes in the spine are noted.  Calcific tendinosis of the right rotator cuff.  There is limited assessment of the spine with this technique.  No gross paraspinal inflammatory changes.     Ultrasound Abdomen Limited    Result Date: 12/17/2020  IMPRESSION: Mild increase in echotexture of the liver may represent fibrofatty infiltration. COMMENT: Ultrasound evaluation of the right upper quadrant was performed and compared to a CT exam dated 8/18/2019.  The pancreas is  of normal echotexture and unremarkable.  The aorta is obscured by bowel gas.  The liver is 17.6 cm in length and of increased echogenicity consistent with fibrofatty infiltration. There is no ductal dilatation or mass lesion.  Common bile duct measures 2.3 mm at the pito hepatis.  The right kidney is 10.5 cm in length.  There is no nephrolithiasis, hydronephrosis or mass lesion.  The renal cortex of normal thickness and echotexture.      ECG/Telemetry  Med-surg    Echo  Cardiac Imaging   TRANSESOPHAGEAL ECHO (JANUSZ) 12/11/2020    Narrative · Tricuspid valve endocarditis.  · 1 x 0.6 cm echolucent spongy sessile mass attached to the mid body of   the posterior tricuspid leaflet consistent with vegetation. There is a   tiny mobile component on the atrial surface of the mass. There appears to   be a smaller segment of vegetation on the ventricular surface of the valve   leaflet opposite the larger component. There is a tiny perforation at the   edge of the vegetation 5 mm from the annulus of the leaflet.  · Borderline severe tricuspid regurgitation through the valve orifice.   There is a small regurgitant jet through the perforation.  · Estimated RVSP = 48 mmHg assuming a right atrial pressure 5 mmHg  · Normal-appearing aortic, mitral, pulmonic valves. Trace mitral   regurgitation.  · Normal-sized LV. Normal LV wall thickness.  · Preserved LV systolic function. Estimated EF 60- 65%. Normal LV septal   wall motion. No regional wall motion abnormalities.  · Normal RV size and function.  · Normal-sized LA. Intact atrial septum. Normal pulmonary veins  · Normal-sized RA. No PFO by saline contrast injection or color-flow   imaging.  · Trace mitral valve regurgitation.  · Normal-sized IVC. Normal SVC  · Normal aorta.          Scheduled Meds:  • buPROPion  75 mg oral TID   • ceFAZolin  2 g intravenous q8h INT   • famotidine  10 mg oral BID AC   • lactobacillus acidophilus complex  1 tablet oral BID   • lidocaine  1 patch  Topical Daily   • methadone  30 mg oral Daily   • metoprolol succinate XL  25 mg oral Daily   • nicotine  1 patch transdermal Daily   • pantoprazole  40 mg oral Daily before breakfast   • PARoxetine  30 mg oral Daily   • prazosin  2 mg oral Nightly   • pregabalin  150 mg oral BID   • psyllium husk  1 packet oral Daily   • QUEtiapine  150 mg oral Nightly   • QUEtiapine  25 mg oral Daily (1p)   • sodium chloride  10 mL intravenous q8h INT   • trimethobenzamide  300 mg oral q8h JAIDEN   • zolpidem  10 mg oral Nightly     Continuous Infusions:  PRN Meds:.•  alum-mag hydroxide-simeth  •  butalbital-acetaminophen-caff  •  cyclobenzaprine  •  glucose **OR** dextrose **OR** glucagon **OR** dextrose in water  •  fluticasone propionate  •  hydrocortisone-pramoxine  •  hydrOXYzine  •  magnesium sulfate  •  methadone  •  naloxone  •  nicotine polacrilex  •  potassium chloride **OR** potassium chloride **OR** potassium chloride **OR** potassium chloride  •  sodium chloride  •  SUMAtriptan     ASSESSMENT AND PLAN     * Acute bacterial endocarditis  Assessment & Plan  -Tricuspid valve endocarditis  -Sepsis due to acute bacterial endocarditis present on admission  -Blood cultures from [place] [facility]  -Repeat blood cultures NGTD  -PICC line in place  -Continue antibiotic (started 12/9 and streamlined to Cefazolin) - will require 6 week therapy (1/19/20 last day) - today is day 21/42    Dyspepsia  Assessment & Plan  -dyspepsia with meals with episodes of acid reflux regurgitation within 30 min.    -continue daily protonix, can uptitrate to BID if needed  -change pepcid to BID before breakfast/dinner (her main meals)  -maalox PRN    Drug-induced constipation  Assessment & Plan  -bowel regimen prn    Opioid abuse (CMS/HCC)  Assessment & Plan        Unspecified mood (affective) disorder (CMS/HCC)  Assessment & Plan  -seen by psych, wants to talk to psych again for anxiety. Discussed increased risk of benzo dependence given her opioid  dependence.  -seen by psych again 12/27 -- add seroquel 25mg at 1pm, increase to 50mg if not working. Increase evening seroquel to 150mg. Patient does not feel any effect, is worried about seroquel due to hx of weight gain.  -continue buproprion 75mg TID, nicotine patch, paxil 30mg daily, prazosin 2mg PO qHS, ambien 10mg PO qHS  -Wants to talk with psych again (Dr. Padron), message sent.     Tricuspid valve vegetation  Assessment & Plan  -Sessile vegetation on tricuspid valve leaflet  -Continue antibiotics- with Cefazolin    Septic arthritis of vertebra (CMS/HCC)  Assessment & Plan  -Neurovascular intact  -seen by neurosurgery -- Rec no acute surgical intervention required. Repeat imaging at completion of antibiotics and follow-up at that time.      Essential hypertension  Assessment & Plan  -Continue with home meds  -bp parameters for hypotension    Septic embolism (CMS/HCC)  Assessment & Plan  -Septic emboli, infectious endocarditis, discitis, MSSA  -Blood cultures performed on admission, NGTD  -Continue IV abx per ID total duration 42 days  -Evidence of discitis on MRI, plan per neurosurgery is follow-up MRI after completion of antibiotics    Opioid type dependence, continuous use (CMS/HCC)  Assessment & Plan  -likely still using IV drugs, given presentation  -Psych consulted, increased methadone to 30mg this admission  -QTc monitoring      Migraine without status migrainosus, not intractable  Assessment & Plan  -Continue home Fioricet  -Continue methadone for opioid dependence  -Ice for additional relief   -sumatriptan 50mg x1 PRN     Insomnia  Assessment & Plan  -Seroquel dose adjustment as rec by psych -- increase to 150mg QHS  -Continue rest of current meds    Depression  Assessment & Plan  -see mood d/o section      Anxiety  Assessment & Plan  -continue current medication regimen as per psych          Plan of care discussed with: patient, nursing    VTE Prophylaxis Plan: Padua VTE Score: 2  Code Status:  Full Code  Estimated Discharge Date: 12/11/2020  Disposition Planning: medically stable for discharge but needs 42 days of IV abx and needs skilled nursing placement due to IVDU history.      Lili Crawford MD  12/29/2020          Dietary Orders   (From admission, onward)             Start     Ordered    12/16/20 0934  Dietary nutrition supplements  Once     Question Answer Comment   Select Supplement (PH): Ensure Compact Chocolate    Meal period Lunch        12/16/20 0933    12/16/20 0933  Dietary nutrition supplements  Once     Question Answer Comment   Select Supplement (PH): Ensure Compact Vanilla    Meal period Breakfast Dinner        12/16/20 0933    12/11/20 1201  Adult Diet Regular; RD/LDN may adjust order  Diet effective now     Question Answer Comment   Diet Texture Regular    Delegation of Authority. Diet orders written by PA/Jsoe may not be adjusted by RD/LDNs. RD/LDN may adjust order        12/11/20 1201

## 2020-12-29 NOTE — PROGRESS NOTES
Psychiatry Progress Note    Chief Complaint/Reason for follow-up: opioid dependence and depression    Interval History: Patient seen again for follow-up.  She states she went up on quetiapine with Dr. Alfonso but it hasn't seemed to change things.  She feels much of her depression and anxiety is situational facing the spectre of three more weeks here in [place].  She again reiterates willingness to go to [employer] if possible.  She also states she would like to continue going up on methadone as she has continued to have cravings in the evenings.  She is trying to call methadone clinic to see if they would continue twice daily dosing but for now she would like to go to 40mg qam.  She is also scheduled for echo tomorrow and qtc should be reviewed at that time by cardiology for safety of continued methadone use.  She denies any thoughts to harm/kill herself or anyone else whatsoever.    Review of Systems:   Sleep:  Fair and Appetite: Good    Vital Signs for the last 24 hours:  Temp:  [36.5 °C (97.7 °F)-37.1 °C (98.8 °F)] 36.8 °C (98.3 °F)  Heart Rate:  [70-82] 82  Resp:  [16] 16  BP: ()/(51-81) 121/63      Current Facility-Administered Medications:   •  alum-mag hydroxide-simeth (MAALOX) 200-200-20 mg/5 mL suspension 30 mL, 30 mL, oral, q6h PRN, Lili Crawford MD  •  buPROPion (WELLBUTRIN) tablet 75 mg, 75 mg, oral, TID, Marlon Padron MD, 75 mg at 12/29/20 1159  •  butalbital-acetaminophen-caff (FIORICET, ESGIC) per tablet 2 tablet, 2 tablet, oral, q6h PRN, Gen Hogue MD, 2 tablet at 12/29/20 0629  •  ceFAZolin (ANCEF) NSS IVPB piggyback 2 g, 2 g, intravenous, q8h INT, Vikas Jones MD, Stopped at 12/29/20 1126  •  cyclobenzaprine (FLEXERIL) tablet 10 mg, 10 mg, oral, 3x daily PRN, Marizol Ortiz CRNP, 10 mg at 12/27/20 2112  •  glucose chewable tablet 16-32 g of dextrose, 16-32 g of dextrose, oral, PRN **OR** dextrose 40 % oral gel 15-30 g of dextrose, 15-30 g of dextrose, oral, PRN **OR**  glucagon (GLUCAGEN) injection 1 mg, 1 mg, intramuscular, PRN **OR** dextrose in water injection 12.5 g, 25 mL, intravenous, PRN, Perfecto Angela MD  •  famotidine (PEPCID) tablet 10 mg, 10 mg, oral, BID AC, Lili Crawford MD, 10 mg at 12/29/20 0818  •  fluticasone propionate (FLONASE) 50 mcg/actuation nasal spray 2 spray, 2 spray, Each Nostril, Daily PRN, Perfecto Angela MD  •  hydrocortisone-pramoxine (ANALPRAM-HC) 2.5-1 % rectal cream, , rectal, 4x daily PRN, Dominique Powell DO, Given at 12/19/20 1643  •  hydrOXYzine (ATARAX) tablet 25 mg, 25 mg, oral, Nightly PRN, Dominique Powell DO, 25 mg at 12/21/20 2119  •  lactobacillus acidophilus complex (BACID) 1 billion cell- 250 mg tablet 1 tablet, 1 tablet, oral, BID, Heidi Toscano CRNP, 1 tablet at 12/29/20 0818  •  lidocaine (ASPERCREME) 4 % topical patch 1 patch, 1 patch, Topical, Daily, Jacki Boss CRNP  •  magnesium sulfate IVPB 2g in 50 mL NSS/D5W/SWFI, 2 g, intravenous, PRN, Perfecto Angela MD  •  methadone (DOLOPHINE) tablet 10 mg, 10 mg, oral, Daily PRN, Marlon Padron MD, 10 mg at 12/28/20 1804  •  [START ON 12/30/2020] methadone (DOLOPHINE) tablet 40 mg, 40 mg, oral, Daily, Marlon Padron MD  •  metoprolol succinate XL (TOPROL-XL) 24 hr ER tablet 25 mg, 25 mg, oral, Daily, Lili Crawford MD, 25 mg at 12/29/20 0819  •  naloxone (NARCAN) injection 0.2 mg, 0.2 mg, intravenous, Once PRN, Jacki Boss CRNP  •  nicotine (NICODERM CQ) 14 mg/24 hr patch 1 patch, 1 patch, transdermal, Daily, Gen Hogue MD, Stopped at 12/29/20 1243  •  nicotine polacrilex (NICORETTE) gum 4 mg, 4 mg, buccal, q4h PRN, Perfecto Angela MD, 4 mg at 12/27/20 0827  •  pantoprazole (PROTONIX) tablet,delayed release (DR/EC) 40 mg, 40 mg, oral, Daily before breakfast, Heidi Toscano CRNP, 40 mg at 12/29/20 0818  •  PARoxetine (PAXIL) tablet 30 mg, 30 mg, oral, Daily, Marlon Padron MD, 30 mg at 12/29/20 0818  •  potassium  chloride (KLOR-CON) tablet extended release 20 mEq, 20 mEq, oral, Daily PRN **OR** potassium chloride (KLOR-CON) tablet extended release 40 mEq, 40 mEq, oral, Daily PRN **OR** potassium chloride 20 mEq in 100 mL IVPB  (premix), 20 mEq, intravenous, Daily PRN **OR** potassium chloride (KCL) 40 mEq/250 mL IVPB in NSS 40 mEq, 40 mEq, intravenous, Daily PRN, Perfecto Angela MD  •  prazosin (MINIPRESS) capsule 2 mg, 2 mg, oral, Nightly, Perfecto Angela MD, 2 mg at 12/28/20 2128  •  pregabalin (LYRICA) capsule 150 mg, 150 mg, oral, BID, Perfecto Angela MD, 150 mg at 12/29/20 0819  •  psyllium husk powder 1 packet, 1 packet, oral, Daily, Dominique Powell DO, 1 packet at 12/22/20 0822  •  QUEtiapine (SEROquel) tablet 100 mg, 100 mg, oral, Nightly, Marlon Padron MD  •  sodium chloride flush 10 mL, 10 mL, intravenous, q8h INT, Gen Hogue MD, 10 mL at 12/29/20 1022  •  sodium chloride flush 10 mL, 10 mL, intravenous, PRN, Gen Hogue MD, 10 mL at 12/26/20 1955  •  SUMAtriptan (IMITREX) tablet 50 mg, 50 mg, oral, Once PRN, Lili Crawford MD  •  trimethobenzamide (TIGAN) capsule 300 mg, 300 mg, oral, q8h JAIDEN, Dominique Powell DO, 300 mg at 12/29/20 0818  •  zolpidem (AMBIEN) tablet 10 mg, 10 mg, oral, Nightly, Perfecto Angela MD, 10 mg at 12/28/20 2128    MSE:  Orientation: AAO x3  Behavior: Appropriate  Appearance: well groomed  Eye Contact: Fair throughout  Mood: “pretty good”  Affect: congruent; mostly stable and appropriate  Speech: Coherent  Thought Process: Goal Directed and linear   Suicidal Ideation: Denies suicidal thoughts; intent or plan; denies passive death wish  Homicidal Ideation: Denies having homicidal thoughts, intent or plan  Hallucinations: Denies  Delusions: Denies  Cognition: No gross cognitive deficits  Memory: Remote; Immediate; all intact  Insight: Fair  Judgment: Marginal      Assessment/Plan  Unspecified mood (affective) disorder (CMS/HCC)  Assessment & Plan  Patient with  history of depression and addiction.  1. Continue paroxetine 30mg qam  2. Continue bupropion 75mg TID  3. DC afternoon dose of quetiapine and reduce evening dose to 100mg again.  This will reduce risk of weight gain, qtc prolongation, and sedation.  4.  to please speak with patient about options for inpatient/outpatient rehab and follow-up with a psychiatrist/therapist      Septic arthritis of vertebra (CMS/[facility])  Overview  MRI Spine 12/2020  Diffuse anterior and posterior epidural enhancement within lumbar spine more  pronounced at L4-L5 raising the suspicion for underlying infectious  process/phlegmon.  Left greater than right L4-L5 facet joint signal and  enhancement which although may be degenerative in nature, early changes of  septic facet arthritis is in the differential.  Follow-up is recommended.     Thoracic and lumbar degenerative change as described more pronounced at L4-L5.     Ring-enhancing lesion in right upper lobe which may be infectious in etiology  including in the setting of septic emboli.  Correlation with CT chest with  contrast is recommended to exclude other etiologies.    Methicillin sensitive staphylococcal aureus cultures done at [facility] per ID note.    Opioid type dependence, continuous use (CMS/[facility])  Assessment & Plan  1. Increase methadone to 40mg today (Hold for sedation; O2 < 95; SBP <100; DBP < 60; HR < 60; RR< 10; QTc > 470)  2. Add a prn dose of methadone 10mg if patient requires for total dose in a day of 50mg.  Long discussion had about this and patient would like the option to increase today.  3.  to please speak with patient about options for inpatient rehab at [place] or [facility] where she can do rehab while receiving iv antibiotics.  Patient is agreeable to this.      Spent 35 minutes with patient, > 50% in consultation    Marlon Padron MD  12/29/2020

## 2020-12-29 NOTE — PROGRESS NOTES
Cardiology Daily Progress Note       Subjective    Carolyn Redmond is a 35 y.o. female.    Overview: PMH of IV drug abuse, suicide attempt 2017, vasculitis, known to Dr. Eldridge from a history of prior endocarditis 3 years ago. Admitted for lumbar back pain and intermittent fevers.  CT of the chest demonstrating multi nodule lesions demonstrating septic emboli.  Also evidence of Lumbar infection on MRI imaging, discitis, neurosx following.  MSSA positive blood cultures.  Has picc line and ID following for long term ABX (42 days).      This was followed by [facility] with bubble study and found to have 1 x 0.6 cm echolucent spongy sessile mass attached to the mid body of the posterior tricuspid leaflet consistent with vegetation and severe TR.  ID following and currently receiving cefazolin, will need long term IV ABX.     Hospital problems include ongoing pain management, anxiety, depression and insomnia.  Palliative care and psych on board.  Started on methadone, Seroquel, bupropion.     Interval history 12/29: patient on IV Cefazolin day 21/42 through PICC line.  Patient in good spirits today.  Pain much better since methadone started.  Denies any chest pain or sob.  Off tele.  General lab work and VS have been stable.  Will check limited echo this Thursday.         Amoxicillin, Nsaids (non-steroidal anti-inflammatory drug), Trazodone, and Tramadol  Current Facility-Administered Medications   Medication Dose Route Frequency Provider Last Rate Last Admin   • alum-mag hydroxide-simeth (MAALOX) 200-200-20 mg/5 mL suspension 30 mL  30 mL oral q6h PRN Lili Crawford MD       • buPROPion (WELLBUTRIN) tablet 75 mg  75 mg oral TID Marlon Padron MD   75 mg at 12/29/20 0819   • butalbital-acetaminophen-caff (FIORICET, ESGIC) per tablet 2 tablet  2 tablet oral q6h PRN Gen Hogue MD   2 tablet at 12/29/20 0629   • ceFAZolin (ANCEF) NSS IVPB piggyback 2 g  2 g intravenous q8h INT Vikas Jones MD   Stopped at  12/29/20 1126   • cyclobenzaprine (FLEXERIL) tablet 10 mg  10 mg oral 3x daily PRN Marizol Ortiz CRNP   10 mg at 12/27/20 2112   • glucose chewable tablet 16-32 g of dextrose  16-32 g of dextrose oral PRN Perfecto Angela MD        Or   • dextrose 40 % oral gel 15-30 g of dextrose  15-30 g of dextrose oral PRN Perfecto Angela MD        Or   • glucagon (GLUCAGEN) injection 1 mg  1 mg intramuscular PRN Perfecto Angela MD        Or   • dextrose in water injection 12.5 g  25 mL intravenous PRN Perfecto Angela MD       • famotidine (PEPCID) tablet 10 mg  10 mg oral BID AC Lili Crawford MD   10 mg at 12/29/20 0818   • fluticasone propionate (FLONASE) 50 mcg/actuation nasal spray 2 spray  2 spray Each Nostril Daily PRN Perfecto Angela MD       • hydrocortisone-pramoxine (ANALPRAM-HC) 2.5-1 % rectal cream   rectal 4x daily PRN Dominique Powell, DO   Given at 12/19/20 1643   • hydrOXYzine (ATARAX) tablet 25 mg  25 mg oral Nightly PRN Dominique Powell DO   25 mg at 12/21/20 2119   • lactobacillus acidophilus complex (BACID) 1 billion cell- 250 mg tablet 1 tablet  1 tablet oral BID Heidi Toscano CRNP   1 tablet at 12/29/20 0818   • lidocaine (ASPERCREME) 4 % topical patch 1 patch  1 patch Topical Daily Jacki Boss CRNP       • magnesium sulfate IVPB 2g in 50 mL NSS/D5W/SWFI  2 g intravenous PRN Perfecto Angela MD       • methadone (DOLOPHINE) tablet 10 mg  10 mg oral Daily PRN Marlon Padron MD   10 mg at 12/28/20 1804   • methadone (DOLOPHINE) tablet 30 mg  30 mg oral Daily Mike Jordan MD   30 mg at 12/29/20 0819   • metoprolol succinate XL (TOPROL-XL) 24 hr ER tablet 25 mg  25 mg oral Daily Lili Crawford MD   25 mg at 12/29/20 0819   • naloxone (NARCAN) injection 0.2 mg  0.2 mg intravenous Once PRN Jacki Boss CRNP       • nicotine (NICODERM CQ) 14 mg/24 hr patch 1 patch  1 patch transdermal Daily Gen Hogue MD   1 patch at 12/29/20 0820   • nicotine  polacrilex (NICORETTE) gum 4 mg  4 mg buccal q4h PRN Perfecto Angela MD   4 mg at 12/27/20 0827   • pantoprazole (PROTONIX) tablet,delayed release (DR/EC) 40 mg  40 mg oral Daily before breakfast Heidi Toscano CRNP   40 mg at 12/29/20 0818   • PARoxetine (PAXIL) tablet 30 mg  30 mg oral Daily Marlon Padron MD   30 mg at 12/29/20 0818   • potassium chloride (KLOR-CON) tablet extended release 20 mEq  20 mEq oral Daily PRN Perfecto Angela MD        Or   • potassium chloride (KLOR-CON) tablet extended release 40 mEq  40 mEq oral Daily PRN Perfecto Angela MD        Or   • potassium chloride 20 mEq in 100 mL IVPB  (premix)  20 mEq intravenous Daily PRN Perfecto Angela MD        Or   • potassium chloride (KCL) 40 mEq/250 mL IVPB in NSS 40 mEq  40 mEq intravenous Daily PRN Perfecto Angela MD       • prazosin (MINIPRESS) capsule 2 mg  2 mg oral Nightly Perfecto Angela MD   2 mg at 12/28/20 2128   • pregabalin (LYRICA) capsule 150 mg  150 mg oral BID Perfecto Angela MD   150 mg at 12/29/20 0819   • psyllium husk powder 1 packet  1 packet oral Daily Dominique Powell DO   1 packet at 12/22/20 0822   • QUEtiapine (SEROquel) tablet 150 mg  150 mg oral Nightly Lili Crawford MD   150 mg at 12/28/20 2127   • QUEtiapine (SEROquel) tablet 25 mg  25 mg oral Daily (1p) Lili Crawford MD   25 mg at 12/28/20 1233   • sodium chloride flush 10 mL  10 mL intravenous q8h INT Gen Hogue MD   10 mL at 12/29/20 1022   • sodium chloride flush 10 mL  10 mL intravenous PRN Gen Hogue MD   10 mL at 12/26/20 1955   • SUMAtriptan (IMITREX) tablet 50 mg  50 mg oral Once PRN Lili Crawford MD       • trimethobenzamide (TIGAN) capsule 300 mg  300 mg oral q8h [facility] Dominique Powell DO   300 mg at 12/29/20 0818   • zolpidem (AMBIEN) tablet 10 mg  10 mg oral Nightly Perfecto Angela MD   10 mg at 12/28/20 2128         Objective   Visit Vitals  /63   Pulse 82   Temp 36.8 °C (98.3 °F) (Oral)   Resp 16   Ht  "1.575 m (5' 2\")   Wt 102 kg (225 lb 11.2 oz)   LMP 11/18/2020   SpO2 97%   Breastfeeding No   BMI 41.28 kg/m²         Intake/Output Summary (Last 24 hours) at 12/29/2020 1152  Last data filed at 12/29/2020 0218  Gross per 24 hour   Intake 60 ml   Output --   Net 60 ml       Physical Exam   Constitutional: She is oriented to person, place, and time. She appears well-developed and well-nourished. No distress.   HENT:   Head: Normocephalic.   Neck: Normal range of motion.   Cardiovascular: Normal rate and regular rhythm.   Murmur heard.   Low-pitched midsystolic murmur is present with a grade of 1/6 at the upper right sternal border.  Pulmonary/Chest: Breath sounds normal. No respiratory distress. She has no wheezes. She exhibits no tenderness.   Abdominal: Soft. She exhibits no distension. There is no abdominal tenderness.   Musculoskeletal: Normal range of motion.   Neurological: She is alert and oriented to person, place, and time.   Skin: Skin is warm and dry. She is not diaphoretic.   Psychiatric: She has a normal mood and affect.       Labs   Lab Results   Component Value Date    WBC 7.02 12/28/2020    HGB 11.2 (L) 12/28/2020    HCT 36.4 12/28/2020     12/28/2020    ALT 11 12/09/2020    AST 14 (L) 12/09/2020     12/28/2020    K 3.9 12/28/2020     12/28/2020    CREATININE 0.8 12/28/2020    BUN 12 12/28/2020    CO2 27 12/28/2020    INR 0.9 04/06/2019    GLUCOSE 111 (H) 12/28/2020    TROPONINI <0.02 12/09/2020       Imaging      Cardiac Imaging   TRANSESOPHAGEAL ECHO ([facility]) 12/11/2020    Narrative · Tricuspid valve endocarditis.  · 1 x 0.6 cm echolucent spongy sessile mass attached to the mid body of   the posterior tricuspid leaflet consistent with vegetation. There is a   tiny mobile component on the atrial surface of the mass. There appears to   be a smaller segment of vegetation on the ventricular surface of the valve   leaflet opposite the larger component. There is a tiny perforation at the "   edge of the vegetation 5 mm from the annulus of the leaflet.  · Borderline severe tricuspid regurgitation through the valve orifice.   There is a small regurgitant jet through the perforation.  · Estimated RVSP = 48 mmHg assuming a right atrial pressure 5 mmHg  · Normal-appearing aortic, mitral, pulmonic valves. Trace mitral   regurgitation.  · Normal-sized LV. Normal LV wall thickness.  · Preserved LV systolic function. Estimated EF 60- 65%. Normal LV septal   wall motion. No regional wall motion abnormalities.  · Normal RV size and function.  · Normal-sized LA. Intact atrial septum. Normal pulmonary veins  · Normal-sized RA. No PFO by saline contrast injection or color-flow   imaging.  · Trace mitral valve regurgitation.  · Normal-sized IVC. Normal SVC  · Normal aorta.          Telemetry  Off tele    Assessment & Plan    Tricuspid valve vegetation  Assessment & Plan  -Evidence of tricuspid valve endocarditis as outlined in detailed report of JANUSZ above.  Today she is hemodynamically stable  -ID on board and aware  -Continue ABX recs per ID  -Need to monitor closely as outpatient as at some point she could require surgical intervention.     12/29: check f/u limited echo this thursday    Septic arthritis of vertebra (CMS/HCC)  Overview  MRI Spine 12/2020  Diffuse anterior and posterior epidural enhancement within lumbar spine more  pronounced at L4-L5 raising the suspicion for underlying infectious  process/phlegmon.  Left greater than right L4-L5 facet joint signal and  enhancement which although may be degenerative in nature, early changes of  septic facet arthritis is in the differential.  Follow-up is recommended.     Thoracic and lumbar degenerative change as described more pronounced at L4-L5.     Ring-enhancing lesion in right upper lobe which may be infectious in etiology  including in the setting of septic emboli.  Correlation with CT chest with  contrast is recommended to exclude other  etiologies.    Methicillin sensitive staphylococcal aureus cultures done at [facility] per ID note.    Assessment & Plan  -Methicillin sensitive staph bacteremia with tricuspid, cultures at [facility], endocarditis  -Evidence of discitis on MRI, plan per neurosurgery is follow-up MRI after completion of antibiotics  -Continue tx per ID, neuro sx    Essential hypertension  Assessment & Plan  Overall stable.    Septic embolism (CMS/HCC)  Assessment & Plan  -Pulmonary emboli noted on CT scan.  -JANUSZ demonstrating tricuspid endocarditis  -See endocarditis section for details.    12/29: pleuritic pain improved significantly.  Expect that this will resolve.     Opioid type dependence, continuous use (CMS/HCC)  Assessment & Plan  -hx of IV drug abuse  -admitted with IE  -palliative care team and psych on board for recommendations   -problems in addition to dependence include depression, anxiety and insomnia    12/29: patient in good spirits today.  Pain much better since initiation of methadone.  Also started on Seroquel & bupropion.  Continue tx per palliative care team and psych.         * Acute bacterial endocarditis  Assessment & Plan  -MSSA positive BC  -Septic emboli to lungs in addition to lumbar spine infection.  This may be the etiology of her back pain & pleuritic pain.    -Secondary to tricuspid endocarditis.  I discussed treatment, intravenous antibiotics, close clinical follow-up and periodic echocardiograms.  Recommended she return in 1-2 weeks for cardiac evaluation after discharge.  This was stressed.  -Continue tx per ID.  Stable on exam.    12/29: patient on IV Cefazolin day 21/42 through PICC line.  Patient in good spirits today.  Pain much better since methadone started.  Denies any chest pain or sob.  Off tele.  General lab work and VS have been stable.  Will check limited echo this Thursday.           Discussed with SHERITA Manley  12/29/2020  11:52 AM     This patient note  has been dictated using speech recognition software. Inadvertent speech recognition errors should be disregarded. Please do not hesitate to call my office for clarifications.

## 2020-12-29 NOTE — PROGRESS NOTES
12/29/2020   14:34 Called [facility] spoke with Ky - he stated that he will have a bed tomorrow or Thursday and to e-mail clinicals to: [email or web address] ATTN: Ky. Clinicals were sent. SHAUNNA Fraga

## 2020-12-30 ENCOUNTER — APPOINTMENT (INPATIENT)
Dept: CARDIOLOGY | Facility: HOSPITAL | Age: 35
End: 2020-12-30
Attending: NURSE PRACTITIONER
Payer: COMMERCIAL

## 2020-12-30 LAB
ATRIAL RATE: 72
ATRIAL RATE: 72
BSA FOR ECHO PROCEDURE: 2.11 M2
P AXIS: 34
P AXIS: 38
PR INTERVAL: 138
PR INTERVAL: 142
QRS DURATION: 88
QRS DURATION: 90
QT INTERVAL: 372
QT INTERVAL: 382
QTC CALCULATION(BAZETT): 407
QTC CALCULATION(BAZETT): 418
R AXIS: 46
R AXIS: 59
T WAVE AXIS: 43
T WAVE AXIS: 51
TR MAX PG: 25 MMHG
TRICUSPID VALVE PEAK REGURGITATION VELOCITY: 2.49 M/S
VENTRICULAR RATE: 72
VENTRICULAR RATE: 72

## 2020-12-30 PROCEDURE — 93321 DOPPLER ECHO F-UP/LMTD STD: CPT | Mod: 26 | Performed by: INTERNAL MEDICINE

## 2020-12-30 PROCEDURE — 25800000 HC PHARMACY IV SOLUTIONS: Performed by: INTERNAL MEDICINE

## 2020-12-30 PROCEDURE — 93005 ELECTROCARDIOGRAM TRACING: CPT | Performed by: INTERNAL MEDICINE

## 2020-12-30 PROCEDURE — 93325 DOPPLER ECHO COLOR FLOW MAPG: CPT | Mod: 26 | Performed by: INTERNAL MEDICINE

## 2020-12-30 PROCEDURE — 12000000 HC ROOM AND CARE MED/SURG

## 2020-12-30 PROCEDURE — 93308 TTE F-UP OR LMTD: CPT | Mod: 26 | Performed by: INTERNAL MEDICINE

## 2020-12-30 PROCEDURE — 25000000 HC PHARMACY GENERAL: Performed by: INTERNAL MEDICINE

## 2020-12-30 PROCEDURE — 63700000 HC SELF-ADMINISTRABLE DRUG: Performed by: PSYCHIATRY & NEUROLOGY

## 2020-12-30 PROCEDURE — 63700000 HC SELF-ADMINISTRABLE DRUG: Performed by: INTERNAL MEDICINE

## 2020-12-30 PROCEDURE — 63600000 HC DRUGS/DETAIL CODE: Performed by: INTERNAL MEDICINE

## 2020-12-30 PROCEDURE — 63700000 HC SELF-ADMINISTRABLE DRUG: Performed by: HOSPITALIST

## 2020-12-30 PROCEDURE — 93308 TTE F-UP OR LMTD: CPT

## 2020-12-30 PROCEDURE — 99231 SBSQ HOSP IP/OBS SF/LOW 25: CPT | Performed by: INTERNAL MEDICINE

## 2020-12-30 PROCEDURE — 63700000 HC SELF-ADMINISTRABLE DRUG: Performed by: NURSE PRACTITIONER

## 2020-12-30 RX ADMIN — PROBIOTIC PRODUCT - TAB 1 TABLET: TAB at 08:12

## 2020-12-30 RX ADMIN — BUPROPION HYDROCHLORIDE 75 MG: 75 TABLET, FILM COATED ORAL at 16:31

## 2020-12-30 RX ADMIN — TRIMETHOBENZAMIDE HYDROCHLORIDE 300 MG: 300 CAPSULE ORAL at 08:13

## 2020-12-30 RX ADMIN — TRIMETHOBENZAMIDE HYDROCHLORIDE 300 MG: 300 CAPSULE ORAL at 18:51

## 2020-12-30 RX ADMIN — FAMOTIDINE 10 MG: 10 TABLET, FILM COATED ORAL at 16:31

## 2020-12-30 RX ADMIN — BUPROPION HYDROCHLORIDE 75 MG: 75 TABLET, FILM COATED ORAL at 08:12

## 2020-12-30 RX ADMIN — PROBIOTIC PRODUCT - TAB 1 TABLET: TAB at 19:58

## 2020-12-30 RX ADMIN — QUETIAPINE FUMARATE 100 MG: 100 TABLET ORAL at 21:18

## 2020-12-30 RX ADMIN — CEFAZOLIN SODIUM 2 G: 10 POWDER, FOR SOLUTION INTRAVENOUS at 11:38

## 2020-12-30 RX ADMIN — BUTALBITAL, ACETAMINOPHEN AND CAFFEINE 2 TABLET: 50; 325; 40 TABLET ORAL at 13:03

## 2020-12-30 RX ADMIN — CEFAZOLIN SODIUM 2 G: 10 POWDER, FOR SOLUTION INTRAVENOUS at 01:33

## 2020-12-30 RX ADMIN — PREGABALIN 150 MG: 75 CAPSULE ORAL at 08:13

## 2020-12-30 RX ADMIN — ZOLPIDEM TARTRATE 10 MG: 5 TABLET, COATED ORAL at 21:18

## 2020-12-30 RX ADMIN — FAMOTIDINE 10 MG: 10 TABLET, FILM COATED ORAL at 08:13

## 2020-12-30 RX ADMIN — BUPROPION HYDROCHLORIDE 75 MG: 75 TABLET, FILM COATED ORAL at 11:38

## 2020-12-30 RX ADMIN — BUTALBITAL, ACETAMINOPHEN AND CAFFEINE 2 TABLET: 50; 325; 40 TABLET ORAL at 18:50

## 2020-12-30 RX ADMIN — METOPROLOL SUCCINATE 25 MG: 25 TABLET, EXTENDED RELEASE ORAL at 08:13

## 2020-12-30 RX ADMIN — PAROXETINE 30 MG: 20 TABLET, FILM COATED ORAL at 08:12

## 2020-12-30 RX ADMIN — PANTOPRAZOLE SODIUM 40 MG: 40 TABLET, DELAYED RELEASE ORAL at 08:13

## 2020-12-30 RX ADMIN — CEFAZOLIN SODIUM 2 G: 10 POWDER, FOR SOLUTION INTRAVENOUS at 19:19

## 2020-12-30 RX ADMIN — METHADONE HYDROCHLORIDE 40 MG: 10 TABLET ORAL at 08:12

## 2020-12-30 RX ADMIN — BUTALBITAL, ACETAMINOPHEN AND CAFFEINE 2 TABLET: 50; 325; 40 TABLET ORAL at 06:38

## 2020-12-30 RX ADMIN — Medication 1 PATCH: at 08:13

## 2020-12-30 RX ADMIN — PREGABALIN 150 MG: 75 CAPSULE ORAL at 19:57

## 2020-12-30 RX ADMIN — METHADONE HYDROCHLORIDE 10 MG: 10 TABLET ORAL at 16:34

## 2020-12-30 NOTE — PROGRESS NOTES
GIOVANY CC contacted [facility] and spoke with pts doctor. [facility] said that they dont have a bed for the pt. They suggested continuing to follow up. GIOVANY SOLORIO spoke with pts doctor this AM and updated her on that progress and told her she would remain updated when these calls are made to see if a bed is available at [facility] for pt. Pt is napping at this time. GIOVANY SOLORIO following.    PLAN: pt will continue to get IV antibiotics for treatment and will see if there is bed availability for pt at [facility] during her hospital stay.

## 2020-12-30 NOTE — PROGRESS NOTES
Chief Complaint/Reason for follow-up: opioid dependence/mood disorder    Interval History: Follow up with patient. The patient is in good spirits and states she is feeling better. Patient does report having some questions about hearing that there may possibly be a bed at [facility] tomorrow, and states she has requested to speak with CCSW. SW did update CCSW of this request. Pt also states she has been feeling tired due to the medication, but has been getting good sleep. Pt is hopeful for placement at [facility] soon, but states in the meantime she is very comfortable at [facility] and likes the staff a lot.    Vital Signs for the last 24 hours:  Temp:  [36.8 °C (98.2 °F)-36.9 °C (98.4 °F)] 36.9 °C (98.4 °F)  Heart Rate:  [69-81] 81  Resp:  [16-18] 18  BP: (108-120)/(54-71) 108/54    Assessment/Plan  SW will continue to follow with patient and provided support throughout her continued recovery. Pt is awaiting placement for IV antibiotics treatment at this time.

## 2020-12-30 NOTE — PROGRESS NOTES
Hospital Medicine Service  Daily Progress Note     SUBJECTIVE     Interval History: CORY. Patient reports that she does better with her migraines when she is on her OCPs (used for this purpose). No OCP on formulary here at [facility] and patient will need to bring her own med to use per pharm. 40mg of methadone this AM with lasting effect so far.     ROS negative except those mentioned above.    Last Bowel Movement: 12/29/20     OBJECTIVE     Vital signs in last 24 hours:  Temp:  [36.8 °C (98.2 °F)-37 °C (98.6 °F)] 36.9 °C (98.4 °F)  Heart Rate:  [69-81] 81  Resp:  [16-18] 18  BP: (108-120)/(54-71) 108/54  No intake or output data in the 24 hours ending 12/30/20 1145    Weight trend (per Epic records)  Wt Readings from Last 3 Encounters:   12/30/20 102 kg (225 lb)   08/05/20 102 kg (225 lb)   07/09/20 97.5 kg (215 lb)      PHYSICAL EXAMINATION     Physical Exam    GEN: Awake, resting comfortably, no acute distress, appears staged age  HEENT: normocephalic, atraumatic, EOMI, no conjunctival injection, hearing intact grossly  CV: RRR, S1+S2, no murmurs, rubs, gallops  RESP: Clear to auscultation bilaterally, no wheezes rales ronchi  GI: Soft, non-tender to palpation, no guarding, rebound. BS present  : baker not present  MSK: No edema, no joint effusion  Neuro: A&O x3, no focal deficits  Psych: appropriate affect and mood, cooperative     LINES, CATHETERS, DRAINS, AIRWAYS, AND WOUNDS   Lines, Drains, Airways, Wounds:  PICC Single Lumen 12/18/20 Right (Active)   Number of days: 12       Comments:    LABS / IMAGING / TELE     Labs  Reviewed     Results from last 7 days   Lab Units 12/28/20  0253   WBC K/uL 7.02   HEMOGLOBIN g/dL 11.2*   HEMATOCRIT % 36.4   MCV fL 88.6   PLATELETS K/uL 239       Results from last 7 days   Lab Units 12/28/20  0253   SODIUM mEQ/L 139   POTASSIUM mEQ/L 3.9   CHLORIDE mEQ/L 104   CO2 mEQ/L 27   BUN mg/dL 12   CREATININE mg/dL 0.8   GLUCOSE mg/dL 111*                                        Imaging  Reviewed     Mri Thoracic Spine With And Without Contrast    Result Date: 12/9/2020  IMPRESSION: Diffuse anterior and posterior epidural enhancement within lumbar spine more pronounced at L4-L5 raising the suspicion for underlying infectious process/phlegmon.  Left greater than right L4-L5 facet joint signal and enhancement which although may be degenerative in nature, early changes of septic facet arthritis is in the differential.  Follow-up is recommended. Thoracic and lumbar degenerative change as described more pronounced at L4-L5. Ring-enhancing lesion in right upper lobe which may be infectious in etiology including in the setting of septic emboli.  Correlation with CT chest with contrast is recommended to exclude other etiologies. The results were discussed with Rylee Bowie on 12/9/2020 7:35 PM.    Mri Lumbar Spine With And Without Contrast    Result Date: 12/9/2020  IMPRESSION: Diffuse anterior and posterior epidural enhancement within lumbar spine more pronounced at L4-L5 raising the suspicion for underlying infectious process/phlegmon.  Left greater than right L4-L5 facet joint signal and enhancement which although may be degenerative in nature, early changes of septic facet arthritis is in the differential.  Follow-up is recommended. Thoracic and lumbar degenerative change as described more pronounced at L4-L5. Ring-enhancing lesion in right upper lobe which may be infectious in etiology including in the setting of septic emboli.  Correlation with CT chest with contrast is recommended to exclude other etiologies. The results were discussed with Rylee Bowie on 12/9/2020 7:35 PM.    X-ray Chest 1 View    Result Date: 12/18/2020  IMPRESSION: Interval placement of a right-sided PICC line catheter as described above.     X-ray Chest 1 View    Result Date: 12/18/2020  IMPRESSION: Interval placement of a right-sided PICC line catheter as described above.     X-ray Chest 1 View    Result Date:  12/9/2020  IMPRESSION: No radiographic evidence of acute cardiopulmonary disease.    Ct Chest Pulmonary Embolism With Iv Contrast    Result Date: 12/9/2020  IMPRESSION: 1.  No evidence of main, lobar or segmental pulmonary embolism. 2.  However, there are multifocal nodular lesions throughout all lung fields including a cavitary lesion in the right upper lobe all suspicious for septic emboli.  Some of the tiny subsegmental pulmonary arterial branches demonstrate hypoenhancement and it is difficult to determinate if this is related to tiny pulmonary emboli or technique. 3.  Small bilateral pleural effusions. 4.  Mosaic pattern at the lung bases could represent mild edema or sequela of small airways or small vessels disease. 5.  Follow-up chest CT after appropriate treatment is recommended to document lung opacities and mediastinal lymphadenopathy which is probably reactive. 6.  Additional findings discussed below. Finding:    Other   Acuity: Critical  Status:  CLOSED Critical read back for the first impression performed with Bertha Bowie PA 9:25 PM 12/9/2020 COMMENT: Comparison: Chest x-ray from 12/9/2020 Technique: Chest CT pulmonary embolism protocol performed with intravenous contrast.  80 mL Omnipaque 350 given. CT DOSE:  One or more dose reduction techniques (e.g. automated exposure control, adjustment of the mA and/or kV according to patient size, use of iterative reconstruction technique) utilized for this examination. FINDINGS: LUNG, PLEURA AND LARGE AIRWAYS: The trachea and proximal bronchi remain clear. There are multifocal nodular lesions throughout the lungs.  For example in the right upper lobe there is a cavitary lesion measuring 1.4 cm on axial image 63. A 1.6 cm nodular lesion in the left upper lobe on image 69.  Multiple additional lesions are present elsewhere.  His are suspicious for septic emboli particularly given the history of endocarditis and sepsis. There are small bilateral pleural  effusions. There is a nonspecific mild degree of groundglass opacity in the lower lung fields.  This could represent mild degree of pulmonary edema or possibly a mosaic pattern from small airways or small vessel disease. No pneumothorax. VESSELS: Normal caliber of the thoracic aorta.  Thoracic aorta appears within normal limits.  Main pulmonary artery is normal in caliber.  No evidence of a main, lobar or segmental pulmonary embolism.  However, the subsegmental vessels are difficult to assess and some demonstrate a slightly hypoechoic enhancing appearance possibly artifact from bolus timing although tiny subsegmental pulmonary emboli would be difficult to exclude.  For example, in the lingula there is some hypoenhancement of the subsegmental vessels on image 116 where subsegmental PE cannot be excluded.  No evidence of right heart strain. HEART: normal size. No pericardial effusion. MEDIASTINUM AND PAULINA: There is a pathologically enlarged lymph node in the AP window measuring 1.8 cm short axis.  Additional prominent but nonpathologic by size video lymph nodes elsewhere. CHEST WALL AND LOWER NECK: within normal limits. UPPER ABDOMEN:Limited assessment of the upper abdominal structures with this technique.  There is splenomegaly with the spleen measuring 14.5 cm.  The liver may also be enlarged but is incompletely visualized.  Cholecystectomy changes. BONES: No suspicious bony erosive changes.  Multilevel Schmorl's nodes and degenerative changes in the spine are noted.  Calcific tendinosis of the right rotator cuff.  There is limited assessment of the spine with this technique.  No gross paraspinal inflammatory changes.     Ultrasound Abdomen Limited    Result Date: 12/17/2020  IMPRESSION: Mild increase in echotexture of the liver may represent fibrofatty infiltration. COMMENT: Ultrasound evaluation of the right upper quadrant was performed and compared to a CT exam dated 8/18/2019.  The pancreas is of normal  echotexture and unremarkable.  The aorta is obscured by bowel gas.  The liver is 17.6 cm in length and of increased echogenicity consistent with fibrofatty infiltration. There is no ductal dilatation or mass lesion.  Common bile duct measures 2.3 mm at the pito hepatis.  The right kidney is 10.5 cm in length.  There is no nephrolithiasis, hydronephrosis or mass lesion.  The renal cortex of normal thickness and echotexture.      ECG/Telemetry  Med-surg    Echo  Cardiac Imaging   TRANSTHORACIC ECHO (TTE) LIMITED 12/30/2020    Narrative · Normal-sized LV.  · Normal LV systolic function. Wall motion appears grossly normal.  · Normal-sized RV. Normal RV systolic function.  · Normal-sized LA.  · Normal-sized RA.  · Tricuspid valve primarily anterior leaflet appears mildly thickened with   increased echogenicity. Distinct vegetation could not be appreciated as it   was on transesophageal echo. Tricuspid regurgitation. Mild but was   difficult to evaluate as the jet appears to begin in the proximity of the   anterior leaflet and hugs tricuspid valve and the intra-atrial septum.   Previously, by JANUSZ, identified leaflet perforation.  · This was a limited echocardiogram to evaluate tricuspid valve   endocarditis after just over 3 weeks of antibiotic therapy. By   transthoracic echo no major change in the tricuspid regurgitation.          Scheduled Meds:  • buPROPion  75 mg oral TID   • ceFAZolin  2 g intravenous q8h INT   • famotidine  10 mg oral BID AC   • lactobacillus acidophilus complex  1 tablet oral BID   • lidocaine  1 patch Topical Daily   • methadone  40 mg oral Daily   • metoprolol succinate XL  25 mg oral Daily   • nicotine  1 patch transdermal Daily   • pantoprazole  40 mg oral Daily before breakfast   • PARoxetine  30 mg oral Daily   • prazosin  2 mg oral Nightly   • pregabalin  150 mg oral BID   • psyllium husk  1 packet oral Daily   • QUEtiapine  100 mg oral Nightly   • trimethobenzamide  300 mg oral q8h JAIDEN    • zolpidem  10 mg oral Nightly     Continuous Infusions:  PRN Meds:.•  alum-mag hydroxide-simeth  •  butalbital-acetaminophen-caff  •  cyclobenzaprine  •  glucose **OR** dextrose **OR** glucagon **OR** dextrose in water  •  fluticasone propionate  •  hydrocortisone-pramoxine  •  hydrOXYzine  •  magnesium sulfate  •  methadone  •  naloxone  •  nicotine polacrilex  •  potassium chloride **OR** potassium chloride **OR** potassium chloride **OR** potassium chloride  •  SUMAtriptan     ASSESSMENT AND PLAN     * Acute bacterial endocarditis  Assessment & Plan  -Tricuspid valve endocarditis  -Sepsis due to acute bacterial endocarditis present on admission  -Blood cultures from [place] [facility]  -Repeat blood cultures NGTD  -PICC line in place  -Continue antibiotic (started 12/9 and streamlined to Cefazolin) - will require 6 week therapy (1/19/20 last day) - today is day 22/42  -Limited echo today    Dyspepsia  Assessment & Plan  -dyspepsia with meals with episodes of acid reflux regurgitation within 30 min.    -continue daily protonix, can uptitrate to BID if needed  -change pepcid to BID before breakfast/dinner (her main meals)  -maalox PRN    Drug-induced constipation  Assessment & Plan  -bowel regimen prn    Opioid abuse (CMS/HCC)  Assessment & Plan        Unspecified mood (affective) disorder (CMS/HCC)  Assessment & Plan  -seen by psych, wants to talk to psych again for anxiety. Discussed increased risk of benzo dependence given her opioid dependence.  -seen by psych again 12/27 -- add seroquel 25mg at 1pm, increase to 50mg if not working. Increase evening seroquel to 150mg. However, patient does not feel any effect, is worried about seroquel due to hx of weight gain.   -continue buproprion 75mg TID, nicotine patch, paxil 30mg daily, prazosin 2mg PO qHS, ambien 10mg PO qHS, seroquel 100mg QHS.  -patient appreciates seeing psych    Tricuspid valve vegetation  Assessment & Plan  -Sessile vegetation on tricuspid valve  leaflet  -Continue antibiotics- with Cefazolin    Septic arthritis of vertebra (CMS/HCC)  Overview  MRI Spine 12/2020  Diffuse anterior and posterior epidural enhancement within lumbar spine more  pronounced at L4-L5 raising the suspicion for underlying infectious  process/phlegmon.  Left greater than right L4-L5 facet joint signal and  enhancement which although may be degenerative in nature, early changes of  septic facet arthritis is in the differential.  Follow-up is recommended.     Thoracic and lumbar degenerative change as described more pronounced at L4-L5.     Ring-enhancing lesion in right upper lobe which may be infectious in etiology  including in the setting of septic emboli.  Correlation with CT chest with  contrast is recommended to exclude other etiologies.    Methicillin sensitive staphylococcal aureus cultures done at [facility] per ID note.    Assessment & Plan  -Neurovascular intact  -seen by neurosurgery -- Rec no acute surgical intervention required. Repeat imaging at completion of antibiotics and follow-up at that time.      Essential hypertension  Assessment & Plan  -Continue with home meds  -bp parameters for hypotension    Septic embolism (CMS/[facility])  Assessment & Plan  -Septic emboli, infectious endocarditis, discitis, MSSA  -Blood cultures performed on admission, NGTD  -Continue IV abx per ID total duration 42 days  -Evidence of discitis on MRI, plan per neurosurgery is follow-up MRI after completion of antibiotics    Opioid type dependence, continuous use (CMS/HCC)  Assessment & Plan  -likely still using IV drugs, given presentation  -Psych consulted, increased methadone to 40mg scheduled + 10mg PRN  -QTc monitoring with EKG, ok so far.      Migraine without status migrainosus, not intractable  Assessment & Plan  -Continue home Fioricet  -Continue methadone for opioid dependence, dose increased to 40mg scheduled + 10mg PRN per psych  -Ice for additional relief   -sumatriptan 50mg  x1 PRN     Insomnia  Assessment & Plan  -Seroquel dose adjustment as rec by psych -- increase to 150mg QHS  -Continue rest of current meds    Depression  Assessment & Plan  -see mood d/o section      Anxiety  Assessment & Plan  -continue current medication regimen as per psych          Plan of care discussed with: patient, nursing, SW    VTE Prophylaxis Plan: ambulate, padua 2  Code Status: Full Code  Estimated Discharge Date: 12/11/2020  Disposition Planning: medically ready for DC awaiting SNF bed for IV abx in setting of IVDU hx. Possible bed today/tomorrow is what SNF said yesterday, but as of now no beds.      Lili Crawford MD  12/30/2020          Dietary Orders   (From admission, onward)             Start     Ordered    12/16/20 0934  Dietary nutrition supplements  Once     Question Answer Comment   Select Supplement (PH): Ensure Compact Chocolate    Meal period Lunch        12/16/20 0933    12/16/20 0933  Dietary nutrition supplements  Once     Question Answer Comment   Select Supplement (PH): Ensure Compact Vanilla    Meal period Breakfast Dinner        12/16/20 0933    12/11/20 1201  Adult Diet Regular; RD/LDN may adjust order  Diet effective now     Question Answer Comment   Diet Texture Regular    Delegation of Authority. Diet orders written by PA/Jose may not be adjusted by RD/LDNs. RD/LDN may adjust order        12/11/20 1201

## 2020-12-30 NOTE — PATIENT CARE CONFERENCE
Care Progression Rounds Note  Date: 12/30/2020  Time: 10:22 AM     Patient Name: Carolyn Redmond     Medical Record Number: [id number]   YOB: 1985  Sex: Female      Room/Bed: 4211    Admitting Diagnosis: Septic embolism (CMS/[facility]) [I76]  Infection of lumbar spine (CMS/[facility]) [M46.26]  Acute left-sided low back pain without sciatica [M54.5]   Admit Date/Time: 12/9/2020 11:10 AM    Primary Diagnosis: Acute bacterial endocarditis  Principal Problem: Acute bacterial endocarditis    GMLOS: 4.8  Anticipated Discharge Date: 12/11/2020    AM-PAC  Mobility Score:      Discharge Planning:       Barriers to Discharge:  Barriers to Discharge: Medical issues not resolved  Comment: Following for IV needs outpt.    Participants:  social work/services, nursing

## 2020-12-31 PROCEDURE — 63700000 HC SELF-ADMINISTRABLE DRUG: Performed by: NURSE PRACTITIONER

## 2020-12-31 PROCEDURE — 63700000 HC SELF-ADMINISTRABLE DRUG: Performed by: HOSPITALIST

## 2020-12-31 PROCEDURE — 63700000 HC SELF-ADMINISTRABLE DRUG: Performed by: INTERNAL MEDICINE

## 2020-12-31 PROCEDURE — 25000000 HC PHARMACY GENERAL: Performed by: INTERNAL MEDICINE

## 2020-12-31 PROCEDURE — 12000000 HC ROOM AND CARE MED/SURG

## 2020-12-31 PROCEDURE — 63700000 HC SELF-ADMINISTRABLE DRUG: Performed by: PSYCHIATRY & NEUROLOGY

## 2020-12-31 PROCEDURE — 25800000 HC PHARMACY IV SOLUTIONS: Performed by: INTERNAL MEDICINE

## 2020-12-31 PROCEDURE — 99231 SBSQ HOSP IP/OBS SF/LOW 25: CPT | Performed by: INTERNAL MEDICINE

## 2020-12-31 PROCEDURE — 63600000 HC DRUGS/DETAIL CODE: Performed by: INTERNAL MEDICINE

## 2020-12-31 RX ORDER — METHADONE HYDROCHLORIDE 10 MG/1
40 TABLET ORAL
Status: DISCONTINUED | OUTPATIENT
Start: 2021-01-01 | End: 2021-01-05

## 2020-12-31 RX ADMIN — BUTALBITAL, ACETAMINOPHEN AND CAFFEINE 2 TABLET: 50; 325; 40 TABLET ORAL at 21:56

## 2020-12-31 RX ADMIN — METHADONE HYDROCHLORIDE 40 MG: 10 TABLET ORAL at 08:32

## 2020-12-31 RX ADMIN — BUTALBITAL, ACETAMINOPHEN AND CAFFEINE 2 TABLET: 50; 325; 40 TABLET ORAL at 13:54

## 2020-12-31 RX ADMIN — BUPROPION HYDROCHLORIDE 75 MG: 75 TABLET, FILM COATED ORAL at 08:34

## 2020-12-31 RX ADMIN — NICOTINE POLACRILEX 4 MG: 4 GUM, CHEWING ORAL at 11:50

## 2020-12-31 RX ADMIN — TRIMETHOBENZAMIDE HYDROCHLORIDE 300 MG: 300 CAPSULE ORAL at 20:14

## 2020-12-31 RX ADMIN — TRIMETHOBENZAMIDE HYDROCHLORIDE 300 MG: 300 CAPSULE ORAL at 01:52

## 2020-12-31 RX ADMIN — PROBIOTIC PRODUCT - TAB 1 TABLET: TAB at 20:14

## 2020-12-31 RX ADMIN — PRAZOSIN HYDROCHLORIDE 2 MG: 1 CAPSULE ORAL at 21:09

## 2020-12-31 RX ADMIN — PROBIOTIC PRODUCT - TAB 1 TABLET: TAB at 08:33

## 2020-12-31 RX ADMIN — FAMOTIDINE 10 MG: 10 TABLET, FILM COATED ORAL at 08:34

## 2020-12-31 RX ADMIN — BUPROPION HYDROCHLORIDE 75 MG: 75 TABLET, FILM COATED ORAL at 11:31

## 2020-12-31 RX ADMIN — CEFAZOLIN SODIUM 2 G: 10 POWDER, FOR SOLUTION INTRAVENOUS at 20:15

## 2020-12-31 RX ADMIN — METHADONE HYDROCHLORIDE 10 MG: 10 TABLET ORAL at 16:24

## 2020-12-31 RX ADMIN — BUTALBITAL, ACETAMINOPHEN AND CAFFEINE 2 TABLET: 50; 325; 40 TABLET ORAL at 01:52

## 2020-12-31 RX ADMIN — FAMOTIDINE 10 MG: 10 TABLET, FILM COATED ORAL at 16:24

## 2020-12-31 RX ADMIN — CEFAZOLIN SODIUM 2 G: 10 POWDER, FOR SOLUTION INTRAVENOUS at 12:00

## 2020-12-31 RX ADMIN — TRIMETHOBENZAMIDE HYDROCHLORIDE 300 MG: 300 CAPSULE ORAL at 11:31

## 2020-12-31 RX ADMIN — CYCLOBENZAPRINE HYDROCHLORIDE 10 MG: 5 TABLET, FILM COATED ORAL at 11:50

## 2020-12-31 RX ADMIN — PREGABALIN 150 MG: 75 CAPSULE ORAL at 20:14

## 2020-12-31 RX ADMIN — CEFAZOLIN SODIUM 2 G: 10 POWDER, FOR SOLUTION INTRAVENOUS at 04:26

## 2020-12-31 RX ADMIN — Medication 1 PATCH: at 08:45

## 2020-12-31 RX ADMIN — QUETIAPINE FUMARATE 100 MG: 100 TABLET ORAL at 21:10

## 2020-12-31 RX ADMIN — PREGABALIN 150 MG: 75 CAPSULE ORAL at 08:32

## 2020-12-31 RX ADMIN — PAROXETINE 30 MG: 20 TABLET, FILM COATED ORAL at 08:32

## 2020-12-31 RX ADMIN — PANTOPRAZOLE SODIUM 40 MG: 40 TABLET, DELAYED RELEASE ORAL at 08:34

## 2020-12-31 RX ADMIN — BUPROPION HYDROCHLORIDE 75 MG: 75 TABLET, FILM COATED ORAL at 16:24

## 2020-12-31 RX ADMIN — ZOLPIDEM TARTRATE 10 MG: 5 TABLET, COATED ORAL at 21:10

## 2020-12-31 RX ADMIN — METOPROLOL SUCCINATE 25 MG: 25 TABLET, EXTENDED RELEASE ORAL at 08:34

## 2020-12-31 NOTE — PATIENT CARE CONFERENCE
Care Progression Rounds Note  Date: 12/31/2020  Time: 10:31 AM     Patient Name: Carolyn Redmond     Medical Record Number: [id number]   YOB: 1985  Sex: Female      Room/Bed: 4211    Admitting Diagnosis: Septic embolism (CMS/[facility]) [I76]  Infection of lumbar spine (CMS/[facility]) [M46.26]  Acute left-sided low back pain without sciatica [M54.5]   Admit Date/Time: 12/9/2020 11:10 AM    Primary Diagnosis: Acute bacterial endocarditis  Principal Problem: Acute bacterial endocarditis    GMLOS: 4.8  Anticipated Discharge Date: 12/11/2020    AM-PAC  Mobility Score:      Discharge Planning:       Barriers to Discharge:  Barriers to Discharge: Medical issues not resolved  Comment: iv antibiotics    Participants:  social work/services, nursing

## 2020-12-31 NOTE — PROGRESS NOTES
[facility] called [facility]. They said they had someone with COVID and they are currently not admitting pts. They suggested another call next week but they are unsure what they may be able to do at that time. [facility] following to assist.    PLAN: iv antibiotics

## 2020-12-31 NOTE — PROGRESS NOTES
Nutrition Status Classification: Mild nutritional compromise     Clinical Course: Patient is a 35 y.o. female who was admitted on 12/9/2020 with a diagnosis of Septic embolism (CMS/[facility]) [I76]  Infection of lumbar spine (CMS/[facility]) [M46.26]  Acute left-sided low back pain without sciatica [M54.5].   Past Medical History:   Diagnosis Date   • Anxiety    • Depressed    • Endocarditis    • Fibromyalgia    • Migraines    • Pancreatitis    • Tachycardia    • Vasculitis (CMS/[facility])      Past Surgical History:   Procedure Laterality Date   • CHOLECYSTECTOMY     • ERCP     • GALLBLADDER SURGERY         Nutrition Interventions/Recommendations:     1. Continue Regular diet  2. Continue Boost Plus (360 kcals / 14 g protein each)- BID  3. Trend weights  4. Anti-emetics as needed         Dietary Orders   (From admission, onward)             Start     Ordered    12/31/20 0843  Dietary nutrition supplements  Once     Question Answer Comment   Select Supplement (PH): Boost Plus Chocolate    Meal period Lunch Dinner        12/31/20 0843    12/11/20 1201  Adult Diet Regular; RD/LDN may adjust order  Diet effective now     Question Answer Comment   Diet Texture Regular    Delegation of Authority. Diet orders written by PA/CRNPs may not be adjusted by RD/LDNs. RD/LDN may adjust order        12/11/20 1201                Reason for Assessment  Reason For Assessment: per organizational policy(follow up)  Diagnosis: other (see comments)(bacterial endocarditis)    MST Nutrition Screen Tool  Has patient lost weight without trying?: 0-->No  If yes,how much weight has been lost?: 0-->Patient has not lost weight  Has patient been eating poorly due to decreased appetite?: 0-->No  MST Nutrition Screen Score: 0    Nutrition/Diet History  Typical Food/Fluid Intake: (small meals and snacks)  Diet Prior to Admission: (regular (low fat, low fiber))  Intake (%): 100%  Food Allergies: other (see comments)(NKFA)  Factors Affecting Nutritional Intake:  "anorexia, nausea, pain    Physical Findings  Overall Physical Appearance: obese  Gastrointestinal: other (see comments), nausea, diarrhea(pain)  Last Bowel Movement: 12/30/20  Skin: other (see comments)(no active wound)    Last Bowel Movement: 12/30/20    Nutrition Order  Nutrition Order Review: meets nutritional requirements  Nutrition Order Comments: (regular)    Anthropometrics  Height: 157.5 cm (5' 2\")           Current Weight  Weight Method: Bed scale  Weight: 102 kg (225 lb)    Ideal Body Weight (IBW)  Ideal Body Weight (IBW) (kg): 50.43  % Ideal Body Weight: 202.37         Body Mass Index (BMI)  BMI (Calculated): 41.1  BMI (kg/m2): 41.24  BMI Assessment: BMI 40 or greater: obesity grade III                        Labs/Procedures/Meds  Lab Results Reviewed: reviewed      Results from last 7 days   Lab Units 12/28/20  0253   SODIUM mEQ/L 139   POTASSIUM mEQ/L 3.9   CHLORIDE mEQ/L 104   CO2 mEQ/L 27   BUN mg/dL 12   CREATININE mg/dL 0.8   GLUCOSE mg/dL 111*   CALCIUM mg/dL 9.1               No results found for: HGBA1C  Lab Results   Component Value Date    [id number] 346 04/18/2017     Lab Results   Component Value Date    CALCIUM 9.1 12/28/2020    PHOS 3.2 03/25/2017     Results from last 7 days   Lab Units 12/28/20  0253   WBC K/uL 7.02   HEMOGLOBIN g/dL 11.2*   HEMATOCRIT % 36.4   PLATELETS K/uL 239                             Medications  Pertinent Medications Reviewed: reviewed  • buPROPion  75 mg oral TID   • ceFAZolin  2 g intravenous q8h INT   • famotidine  10 mg oral BID AC   • lactobacillus acidophilus complex  1 tablet oral BID   • lidocaine  1 patch Topical Daily   • methadone  40 mg oral Daily   • metoprolol succinate XL  25 mg oral Daily   • nicotine  1 patch transdermal Daily   • pantoprazole  40 mg oral Daily before breakfast   • PARoxetine  30 mg oral Daily   • prazosin  2 mg oral Nightly   • pregabalin  150 mg oral BID   • psyllium husk  1 packet oral Daily   • QUEtiapine  100 mg oral Nightly "   • trimethobenzamide  300 mg oral q8h JAIDEN   • zolpidem  10 mg oral Nightly                              Weights (last 7 days)     Date/Time   Weight    12/30/20 0842   102 kg (225 lb)    12/24/20 1054   102 kg (225 lb 11.2 oz)                Clinical Comments:      Pt seen in follow up. Reports she is still vomiting daily (?antibiotic related) but has figured out which foods to avoid and does feel better overall. She is managing to keep some foods down. Breakfast- greek yogurt, granola and blueberries, home fries. She likes the Boost Plus shakes and drinks at least 1 / day. Her weight is stable.       Date: 12/31/20  Signature: Malu Luis RD

## 2020-12-31 NOTE — PROGRESS NOTES
Hospital Medicine Service  Daily Progress Note     SUBJECTIVE     Interval History: wants to try methadone earlier in the morning as she wakes up usually between 4-5 AM and pain kicks in when she is awake.  She had a rough night with migraine, also vomited.  Now better.     ROS negative except those mentioned above.    Last Bowel Movement: 12/30/20     OBJECTIVE     Vital signs in last 24 hours:  Temp:  [36.8 °C (98.2 °F)-37 °C (98.6 °F)] 36.8 °C (98.2 °F)  Heart Rate:  [73-92] 82  Resp:  [18] 18  BP: (108-146)/(58-89) 146/89  No intake or output data in the 24 hours ending 12/31/20 1630    Weight trend (per Epic records)  Wt Readings from Last 3 Encounters:   12/30/20 102 kg (225 lb)   08/05/20 102 kg (225 lb)   07/09/20 97.5 kg (215 lb)      PHYSICAL EXAMINATION     Physical Exam    GEN: Awake, resting comfortably, no acute distress, appears staged age  HEENT: normocephalic, atraumatic, EOMI, no conjunctival injection, hearing intact grossly  CV: RRR, S1+S2, no murmurs, rubs, gallops  RESP: Clear to auscultation bilaterally, no wheezes rales ronchi  GI: Soft, non-tender to palpation, no guarding, rebound. BS present  : baker not present  MSK: No edema, no joint effusion  Neuro: A&O x3, no focal deficits  Psych: appropriate affect and mood, cooperative     LINES, CATHETERS, DRAINS, AIRWAYS, AND WOUNDS   Lines, Drains, Airways, Wounds:  PICC Single Lumen 12/18/20 Right (Active)   Number of days: 12       Comments:    LABS / IMAGING / TELE     Labs  Reviewed     Results from last 7 days   Lab Units 12/28/20  0253   WBC K/uL 7.02   HEMOGLOBIN g/dL 11.2*   HEMATOCRIT % 36.4   MCV fL 88.6   PLATELETS K/uL 239       Results from last 7 days   Lab Units 12/28/20  0253   SODIUM mEQ/L 139   POTASSIUM mEQ/L 3.9   CHLORIDE mEQ/L 104   CO2 mEQ/L 27   BUN mg/dL 12   CREATININE mg/dL 0.8   GLUCOSE mg/dL 111*                                       Imaging  Reviewed     Mri Thoracic Spine With And Without Contrast    Result  Date: 12/9/2020  IMPRESSION: Diffuse anterior and posterior epidural enhancement within lumbar spine more pronounced at L4-L5 raising the suspicion for underlying infectious process/phlegmon.  Left greater than right L4-L5 facet joint signal and enhancement which although may be degenerative in nature, early changes of septic facet arthritis is in the differential.  Follow-up is recommended. Thoracic and lumbar degenerative change as described more pronounced at L4-L5. Ring-enhancing lesion in right upper lobe which may be infectious in etiology including in the setting of septic emboli.  Correlation with CT chest with contrast is recommended to exclude other etiologies. The results were discussed with Rylee Bowie on 12/9/2020 7:35 PM.    Mri Lumbar Spine With And Without Contrast    Result Date: 12/9/2020  IMPRESSION: Diffuse anterior and posterior epidural enhancement within lumbar spine more pronounced at L4-L5 raising the suspicion for underlying infectious process/phlegmon.  Left greater than right L4-L5 facet joint signal and enhancement which although may be degenerative in nature, early changes of septic facet arthritis is in the differential.  Follow-up is recommended. Thoracic and lumbar degenerative change as described more pronounced at L4-L5. Ring-enhancing lesion in right upper lobe which may be infectious in etiology including in the setting of septic emboli.  Correlation with CT chest with contrast is recommended to exclude other etiologies. The results were discussed with Rylee Bowie on 12/9/2020 7:35 PM.    X-ray Chest 1 View    Result Date: 12/18/2020  IMPRESSION: Interval placement of a right-sided PICC line catheter as described above.     X-ray Chest 1 View    Result Date: 12/18/2020  IMPRESSION: Interval placement of a right-sided PICC line catheter as described above.     X-ray Chest 1 View    Result Date: 12/9/2020  IMPRESSION: No radiographic evidence of acute cardiopulmonary  disease.    Ct Chest Pulmonary Embolism With Iv Contrast    Result Date: 12/9/2020  IMPRESSION: 1.  No evidence of main, lobar or segmental pulmonary embolism. 2.  However, there are multifocal nodular lesions throughout all lung fields including a cavitary lesion in the right upper lobe all suspicious for septic emboli.  Some of the tiny subsegmental pulmonary arterial branches demonstrate hypoenhancement and it is difficult to determinate if this is related to tiny pulmonary emboli or technique. 3.  Small bilateral pleural effusions. 4.  Mosaic pattern at the lung bases could represent mild edema or sequela of small airways or small vessels disease. 5.  Follow-up chest CT after appropriate treatment is recommended to document lung opacities and mediastinal lymphadenopathy which is probably reactive. 6.  Additional findings discussed below. Finding:    Other   Acuity: Critical  Status:  CLOSED Critical read back for the first impression performed with Bertha Bowie PA 9:25 PM 12/9/2020 COMMENT: Comparison: Chest x-ray from 12/9/2020 Technique: Chest CT pulmonary embolism protocol performed with intravenous contrast.  80 mL Omnipaque 350 given. CT DOSE:  One or more dose reduction techniques (e.g. automated exposure control, adjustment of the mA and/or kV according to patient size, use of iterative reconstruction technique) utilized for this examination. FINDINGS: LUNG, PLEURA AND LARGE AIRWAYS: The trachea and proximal bronchi remain clear. There are multifocal nodular lesions throughout the lungs.  For example in the right upper lobe there is a cavitary lesion measuring 1.4 cm on axial image 63. A 1.6 cm nodular lesion in the left upper lobe on image 69.  Multiple additional lesions are present elsewhere.  His are suspicious for septic emboli particularly given the history of endocarditis and sepsis. There are small bilateral pleural effusions. There is a nonspecific mild degree of groundglass opacity in the  lower lung fields.  This could represent mild degree of pulmonary edema or possibly a mosaic pattern from small airways or small vessel disease. No pneumothorax. VESSELS: Normal caliber of the thoracic aorta.  Thoracic aorta appears within normal limits.  Main pulmonary artery is normal in caliber.  No evidence of a main, lobar or segmental pulmonary embolism.  However, the subsegmental vessels are difficult to assess and some demonstrate a slightly hypoechoic enhancing appearance possibly artifact from bolus timing although tiny subsegmental pulmonary emboli would be difficult to exclude.  For example, in the lingula there is some hypoenhancement of the subsegmental vessels on image 116 where subsegmental PE cannot be excluded.  No evidence of right heart strain. HEART: normal size. No pericardial effusion. MEDIASTINUM AND PAULINA: There is a pathologically enlarged lymph node in the AP window measuring 1.8 cm short axis.  Additional prominent but nonpathologic by size video lymph nodes elsewhere. CHEST WALL AND LOWER NECK: within normal limits. UPPER ABDOMEN:Limited assessment of the upper abdominal structures with this technique.  There is splenomegaly with the spleen measuring 14.5 cm.  The liver may also be enlarged but is incompletely visualized.  Cholecystectomy changes. BONES: No suspicious bony erosive changes.  Multilevel Schmorl's nodes and degenerative changes in the spine are noted.  Calcific tendinosis of the right rotator cuff.  There is limited assessment of the spine with this technique.  No gross paraspinal inflammatory changes.     Ultrasound Abdomen Limited    Result Date: 12/17/2020  IMPRESSION: Mild increase in echotexture of the liver may represent fibrofatty infiltration. COMMENT: Ultrasound evaluation of the right upper quadrant was performed and compared to a CT exam dated 8/18/2019.  The pancreas is of normal echotexture and unremarkable.  The aorta is obscured by bowel gas.  The liver is  17.6 cm in length and of increased echogenicity consistent with fibrofatty infiltration. There is no ductal dilatation or mass lesion.  Common bile duct measures 2.3 mm at the pito hepatis.  The right kidney is 10.5 cm in length.  There is no nephrolithiasis, hydronephrosis or mass lesion.  The renal cortex of normal thickness and echotexture.      ECG/Telemetry  Med-surg    Echo  Cardiac Imaging   TRANSTHORACIC ECHO (TTE) LIMITED 12/30/2020    Narrative · Normal-sized LV.  · Normal LV systolic function. Wall motion appears grossly normal.  · Normal-sized RV. Normal RV systolic function.  · Normal-sized LA.  · Normal-sized RA.  · Tricuspid valve primarily anterior leaflet appears mildly thickened with   increased echogenicity. Distinct vegetation could not be appreciated as it   was on transesophageal echo. Tricuspid regurgitation. Mild but was   difficult to evaluate as the jet appears to begin in the proximity of the   anterior leaflet and hugs tricuspid valve and the intra-atrial septum.   Previously, by JANUSZ, identified leaflet perforation.  · This was a limited echocardiogram to evaluate tricuspid valve   endocarditis after just over 3 weeks of antibiotic therapy. By   transthoracic echo no major change in the tricuspid regurgitation.          Scheduled Meds:  • buPROPion  75 mg oral TID   • ceFAZolin  2 g intravenous q8h INT   • famotidine  10 mg oral BID AC   • lactobacillus acidophilus complex  1 tablet oral BID   • lidocaine  1 patch Topical Daily   • [START ON 1/1/2021] methadone  40 mg oral Daily (6a)   • metoprolol succinate XL  25 mg oral Daily   • nicotine  1 patch transdermal Daily   • pantoprazole  40 mg oral Daily before breakfast   • PARoxetine  30 mg oral Daily   • prazosin  2 mg oral Nightly   • pregabalin  150 mg oral BID   • psyllium husk  1 packet oral Daily   • QUEtiapine  100 mg oral Nightly   • trimethobenzamide  300 mg oral q8h JAIDEN   • zolpidem  10 mg oral Nightly     Continuous  Infusions:  PRN Meds:.•  alum-mag hydroxide-simeth  •  butalbital-acetaminophen-caff  •  cyclobenzaprine  •  glucose **OR** dextrose **OR** glucagon **OR** dextrose in water  •  fluticasone propionate  •  hydrocortisone-pramoxine  •  hydrOXYzine  •  magnesium sulfate  •  methadone  •  naloxone  •  nicotine polacrilex  •  potassium chloride **OR** potassium chloride **OR** potassium chloride **OR** potassium chloride  •  SUMAtriptan     ASSESSMENT AND PLAN     * Acute bacterial endocarditis  Assessment & Plan  -Tricuspid valve endocarditis  -Sepsis due to acute bacterial endocarditis present on admission  -Blood cultures from [facility]  -Repeat blood cultures NGTD  -PICC line in place  -Continue antibiotic (started 12/9 and streamlined to Cefazolin) - will require 6 week therapy (1/19/20 last day) - today is day 23/42  -Limited echo 12/30 with no major change in TV/TR      Dyspepsia  Assessment & Plan  -dyspepsia with meals with episodes of acid reflux regurgitation within 30 min.    -continue daily protonix, can uptitrate to BID if needed  -change pepcid to BID before breakfast/dinner (her main meals)  -maalox PRN    Drug-induced constipation  Assessment & Plan  -bowel regimen prn    Opioid abuse (CMS/HCC)  Assessment & Plan        Unspecified mood (affective) disorder (CMS/HCC)  Assessment & Plan  -seen by psych, wants to talk to psych again for anxiety. Discussed increased risk of benzo dependence given her opioid dependence.  -seen by psych again 12/27 -- add seroquel 25mg at 1pm, increase to 50mg if not working. Increase evening seroquel to 150mg. However, patient does not feel any effect, is worried about seroquel due to hx of weight gain.   -continue buproprion 75mg TID, nicotine patch, paxil 30mg daily, prazosin 2mg PO qHS, ambien 10mg PO qHS, seroquel 100mg QHS.  -patient appreciates seeing psych    Tricuspid valve vegetation  Assessment & Plan  -Sessile vegetation on tricuspid valve leaflet  -Continue  antibiotics- with Cefazolin    Septic arthritis of vertebra (CMS/[facility])  Overview  MRI Spine 12/2020  Diffuse anterior and posterior epidural enhancement within lumbar spine more  pronounced at L4-L5 raising the suspicion for underlying infectious  process/phlegmon.  Left greater than right L4-L5 facet joint signal and  enhancement which although may be degenerative in nature, early changes of  septic facet arthritis is in the differential.  Follow-up is recommended.     Thoracic and lumbar degenerative change as described more pronounced at L4-L5.     Ring-enhancing lesion in right upper lobe which may be infectious in etiology  including in the setting of septic emboli.  Correlation with CT chest with  contrast is recommended to exclude other etiologies.    Methicillin sensitive staphylococcal aureus cultures done at [facility] per ID note.    Assessment & Plan  -Neurovascular intact  -seen by neurosurgery -- Rec no acute surgical intervention required. Repeat imaging at completion of antibiotics and follow-up at that time.      Essential hypertension  Assessment & Plan  -Continue with home meds  -bp parameters for hypotension    Septic embolism (CMS/[facility])  Assessment & Plan  -Septic emboli, infectious endocarditis, discitis, MSSA  -Blood cultures performed on admission, NGTD  -Continue IV abx per ID total duration 42 days  -Evidence of discitis on MRI, plan per neurosurgery is follow-up MRI after completion of antibiotics    Opioid type dependence, continuous use (CMS/[facility])  Assessment & Plan  -likely still using IV drugs, given presentation  -Psych consulted, increased methadone to 40mg scheduled + 10mg PRN  -QTc monitoring with EKG, ok so far.      Migraine without status migrainosus, not intractable  Assessment & Plan  -Continue home Fioricet  -Continue methadone for opioid dependence, dose increased to 40mg scheduled + 10mg PRN per psych  -Ice for additional relief   -sumatriptan 50mg x1 PRN      Insomnia  Assessment & Plan  -Seroquel dose adjustment as rec by psych -- increase to 150mg QHS  -Continue rest of current meds    Depression  Assessment & Plan  -see mood d/o section      Anxiety  Assessment & Plan  -continue current medication regimen as per psych          Plan of care discussed with: patient, nursing, SW    VTE Prophylaxis Plan: ambulate, padua 2  Code Status: Full Code  Estimated Discharge Date: 12/11/2020  Disposition Planning: medically ready for DC awaiting SNF bed for IV abx in setting of IVDU hx. [place] bed pending but none avail so far.     Lili Crawford MD  12/31/2020          Dietary Orders   (From admission, onward)             Start     Ordered    12/31/20 0843  Dietary nutrition supplements  Once     Question Answer Comment   Select Supplement (PH): Boost Plus Chocolate    Meal period Lunch Dinner        12/31/20 0843    12/11/20 1201  Adult Diet Regular; RD/LDN may adjust order  Diet effective now     Question Answer Comment   Diet Texture Regular    Delegation of Authority. Diet orders written by PA/Jose may not be adjusted by RD/LDNs. RD/LDN may adjust order        12/11/20 1201

## 2021-01-01 PROCEDURE — 63700000 HC SELF-ADMINISTRABLE DRUG: Performed by: INTERNAL MEDICINE

## 2021-01-01 PROCEDURE — 25000000 HC PHARMACY GENERAL: Performed by: INTERNAL MEDICINE

## 2021-01-01 PROCEDURE — 63600000 HC DRUGS/DETAIL CODE: Performed by: INTERNAL MEDICINE

## 2021-01-01 PROCEDURE — 63700000 HC SELF-ADMINISTRABLE DRUG: Performed by: PSYCHIATRY & NEUROLOGY

## 2021-01-01 PROCEDURE — 99232 SBSQ HOSP IP/OBS MODERATE 35: CPT | Performed by: INTERNAL MEDICINE

## 2021-01-01 PROCEDURE — 25800000 HC PHARMACY IV SOLUTIONS: Performed by: INTERNAL MEDICINE

## 2021-01-01 PROCEDURE — 63700000 HC SELF-ADMINISTRABLE DRUG: Performed by: HOSPITALIST

## 2021-01-01 PROCEDURE — 63700000 HC SELF-ADMINISTRABLE DRUG: Performed by: NURSE PRACTITIONER

## 2021-01-01 PROCEDURE — 12000000 HC ROOM AND CARE MED/SURG

## 2021-01-01 RX ORDER — DIPHENHYDRAMINE HCL 50 MG/ML
25 VIAL (ML) INJECTION EVERY 6 HOURS PRN
Status: DISCONTINUED | OUTPATIENT
Start: 2021-01-01 | End: 2021-01-27

## 2021-01-01 RX ADMIN — BUPROPION HYDROCHLORIDE 75 MG: 75 TABLET, FILM COATED ORAL at 16:59

## 2021-01-01 RX ADMIN — TRIMETHOBENZAMIDE HYDROCHLORIDE 300 MG: 300 CAPSULE ORAL at 11:04

## 2021-01-01 RX ADMIN — TRIMETHOBENZAMIDE HYDROCHLORIDE 300 MG: 300 CAPSULE ORAL at 19:10

## 2021-01-01 RX ADMIN — BUPROPION HYDROCHLORIDE 75 MG: 75 TABLET, FILM COATED ORAL at 11:04

## 2021-01-01 RX ADMIN — FAMOTIDINE 10 MG: 10 TABLET, FILM COATED ORAL at 16:59

## 2021-01-01 RX ADMIN — PAROXETINE 30 MG: 20 TABLET, FILM COATED ORAL at 08:32

## 2021-01-01 RX ADMIN — Medication 1 PATCH: at 08:33

## 2021-01-01 RX ADMIN — DIPHENHYDRAMINE HYDROCHLORIDE 25 MG: 50 INJECTION INTRAMUSCULAR; INTRAVENOUS at 14:20

## 2021-01-01 RX ADMIN — PROBIOTIC PRODUCT - TAB 1 TABLET: TAB at 19:35

## 2021-01-01 RX ADMIN — METHADONE HYDROCHLORIDE 40 MG: 10 TABLET ORAL at 05:24

## 2021-01-01 RX ADMIN — BUTALBITAL, ACETAMINOPHEN AND CAFFEINE 2 TABLET: 50; 325; 40 TABLET ORAL at 11:04

## 2021-01-01 RX ADMIN — SUMATRIPTAN SUCCINATE 50 MG: 50 TABLET ORAL at 21:11

## 2021-01-01 RX ADMIN — BUTALBITAL, ACETAMINOPHEN AND CAFFEINE 2 TABLET: 50; 325; 40 TABLET ORAL at 17:51

## 2021-01-01 RX ADMIN — CYCLOBENZAPRINE HYDROCHLORIDE 10 MG: 5 TABLET, FILM COATED ORAL at 11:04

## 2021-01-01 RX ADMIN — PREGABALIN 150 MG: 75 CAPSULE ORAL at 08:32

## 2021-01-01 RX ADMIN — ZOLPIDEM TARTRATE 10 MG: 5 TABLET, COATED ORAL at 21:11

## 2021-01-01 RX ADMIN — CEFAZOLIN SODIUM 2 G: 10 POWDER, FOR SOLUTION INTRAVENOUS at 04:17

## 2021-01-01 RX ADMIN — PRAZOSIN HYDROCHLORIDE 2 MG: 1 CAPSULE ORAL at 21:11

## 2021-01-01 RX ADMIN — QUETIAPINE FUMARATE 100 MG: 100 TABLET ORAL at 21:11

## 2021-01-01 RX ADMIN — PANTOPRAZOLE SODIUM 40 MG: 40 TABLET, DELAYED RELEASE ORAL at 08:33

## 2021-01-01 RX ADMIN — BUPROPION HYDROCHLORIDE 75 MG: 75 TABLET, FILM COATED ORAL at 08:33

## 2021-01-01 RX ADMIN — PREGABALIN 150 MG: 75 CAPSULE ORAL at 19:46

## 2021-01-01 RX ADMIN — NICOTINE POLACRILEX 4 MG: 4 GUM, CHEWING ORAL at 17:51

## 2021-01-01 RX ADMIN — CEFAZOLIN SODIUM 2 G: 10 POWDER, FOR SOLUTION INTRAVENOUS at 11:31

## 2021-01-01 RX ADMIN — METOPROLOL SUCCINATE 25 MG: 25 TABLET, EXTENDED RELEASE ORAL at 08:33

## 2021-01-01 RX ADMIN — BUTALBITAL, ACETAMINOPHEN AND CAFFEINE 2 TABLET: 50; 325; 40 TABLET ORAL at 04:23

## 2021-01-01 RX ADMIN — PROBIOTIC PRODUCT - TAB 1 TABLET: TAB at 08:33

## 2021-01-01 RX ADMIN — METHADONE HYDROCHLORIDE 10 MG: 10 TABLET ORAL at 12:46

## 2021-01-01 RX ADMIN — CEFAZOLIN SODIUM 2 G: 10 POWDER, FOR SOLUTION INTRAVENOUS at 19:45

## 2021-01-01 RX ADMIN — FAMOTIDINE 10 MG: 10 TABLET, FILM COATED ORAL at 08:33

## 2021-01-01 NOTE — PLAN OF CARE
Plan of Care Review  Plan of Care Reviewed With: patient  Progress: improving  Outcome Summary: Pain controlled. Remains on IV ancef 2grams q 8 hours

## 2021-01-01 NOTE — PROGRESS NOTES
Hospital Medicine Service  Daily Progress Note     SUBJECTIVE     Interval History: though timing of methoadone was pulled forward, with continued pain, nausea, vomiting. +migrain, abdominal pain, back pain. Having BM and urinating w/o issues.     ROS negative except those mentioned above.    Last Bowel Movement: 01/01/21     OBJECTIVE     Vital signs in last 24 hours:  Temp:  [36.7 °C (98.1 °F)-36.8 °C (98.2 °F)] 36.7 °C (98.1 °F)  Heart Rate:  [69-86] 86  Resp:  [17-18] 18  BP: (114-146)/(63-89) 114/77    Intake/Output Summary (Last 24 hours) at 1/1/2021 1334  Last data filed at 1/1/2021 0609  Gross per 24 hour   Intake 800 ml   Output --   Net 800 ml       Weight trend (per Epic records)  Wt Readings from Last 3 Encounters:   12/30/20 102 kg (225 lb)   08/05/20 102 kg (225 lb)   07/09/20 97.5 kg (215 lb)      PHYSICAL EXAMINATION     Physical Exam    GEN: Awake, appears tired, no acute distress, appears staged age  HEENT: normocephalic, atraumatic, EOMI, no conjunctival injection, hearing intact grossly  CV: RRR, S1+S2, no murmurs, rubs, gallops  RESP: Clear to auscultation bilaterally, no wheezes rales ronchi  GI: Soft, non-tender to palpation, no guarding, rebound. BS present  : baker not present  MSK: No edema, no joint effusion  Neuro: A&O x3, no focal deficits  Psych: appropriate affect and mood, cooperative     LINES, CATHETERS, DRAINS, AIRWAYS, AND WOUNDS   Lines, Drains, Airways, Wounds:  PICC Single Lumen 12/18/20 Right (Active)   Number of days: 12       Comments:    LABS / IMAGING / TELE     Labs  Reviewed     Results from last 7 days   Lab Units 12/28/20  0253   WBC K/uL 7.02   HEMOGLOBIN g/dL 11.2*   HEMATOCRIT % 36.4   MCV fL 88.6   PLATELETS K/uL 239       Results from last 7 days   Lab Units 12/28/20  0253   SODIUM mEQ/L 139   POTASSIUM mEQ/L 3.9   CHLORIDE mEQ/L 104   CO2 mEQ/L 27   BUN mg/dL 12   CREATININE mg/dL 0.8   GLUCOSE mg/dL 111*                                        Imaging  Reviewed     Mri Thoracic Spine With And Without Contrast    Result Date: 12/9/2020  IMPRESSION: Diffuse anterior and posterior epidural enhancement within lumbar spine more pronounced at L4-L5 raising the suspicion for underlying infectious process/phlegmon.  Left greater than right L4-L5 facet joint signal and enhancement which although may be degenerative in nature, early changes of septic facet arthritis is in the differential.  Follow-up is recommended. Thoracic and lumbar degenerative change as described more pronounced at L4-L5. Ring-enhancing lesion in right upper lobe which may be infectious in etiology including in the setting of septic emboli.  Correlation with CT chest with contrast is recommended to exclude other etiologies. The results were discussed with Rylee Bowie on 12/9/2020 7:35 PM.    Mri Lumbar Spine With And Without Contrast    Result Date: 12/9/2020  IMPRESSION: Diffuse anterior and posterior epidural enhancement within lumbar spine more pronounced at L4-L5 raising the suspicion for underlying infectious process/phlegmon.  Left greater than right L4-L5 facet joint signal and enhancement which although may be degenerative in nature, early changes of septic facet arthritis is in the differential.  Follow-up is recommended. Thoracic and lumbar degenerative change as described more pronounced at L4-L5. Ring-enhancing lesion in right upper lobe which may be infectious in etiology including in the setting of septic emboli.  Correlation with CT chest with contrast is recommended to exclude other etiologies. The results were discussed with Rylee Bowie on 12/9/2020 7:35 PM.    X-ray Chest 1 View    Result Date: 12/18/2020  IMPRESSION: Interval placement of a right-sided PICC line catheter as described above.     X-ray Chest 1 View    Result Date: 12/18/2020  IMPRESSION: Interval placement of a right-sided PICC line catheter as described above.     X-ray Chest 1 View    Result Date:  12/9/2020  IMPRESSION: No radiographic evidence of acute cardiopulmonary disease.    Ct Chest Pulmonary Embolism With Iv Contrast    Result Date: 12/9/2020  IMPRESSION: 1.  No evidence of main, lobar or segmental pulmonary embolism. 2.  However, there are multifocal nodular lesions throughout all lung fields including a cavitary lesion in the right upper lobe all suspicious for septic emboli.  Some of the tiny subsegmental pulmonary arterial branches demonstrate hypoenhancement and it is difficult to determinate if this is related to tiny pulmonary emboli or technique. 3.  Small bilateral pleural effusions. 4.  Mosaic pattern at the lung bases could represent mild edema or sequela of small airways or small vessels disease. 5.  Follow-up chest CT after appropriate treatment is recommended to document lung opacities and mediastinal lymphadenopathy which is probably reactive. 6.  Additional findings discussed below. Finding:    Other   Acuity: Critical  Status:  CLOSED Critical read back for the first impression performed with Bertha Bowie PA 9:25 PM 12/9/2020 COMMENT: Comparison: Chest x-ray from 12/9/2020 Technique: Chest CT pulmonary embolism protocol performed with intravenous contrast.  80 mL Omnipaque 350 given. CT DOSE:  One or more dose reduction techniques (e.g. automated exposure control, adjustment of the mA and/or kV according to patient size, use of iterative reconstruction technique) utilized for this examination. FINDINGS: LUNG, PLEURA AND LARGE AIRWAYS: The trachea and proximal bronchi remain clear. There are multifocal nodular lesions throughout the lungs.  For example in the right upper lobe there is a cavitary lesion measuring 1.4 cm on axial image 63. A 1.6 cm nodular lesion in the left upper lobe on image 69.  Multiple additional lesions are present elsewhere.  His are suspicious for septic emboli particularly given the history of endocarditis and sepsis. There are small bilateral pleural  effusions. There is a nonspecific mild degree of groundglass opacity in the lower lung fields.  This could represent mild degree of pulmonary edema or possibly a mosaic pattern from small airways or small vessel disease. No pneumothorax. VESSELS: Normal caliber of the thoracic aorta.  Thoracic aorta appears within normal limits.  Main pulmonary artery is normal in caliber.  No evidence of a main, lobar or segmental pulmonary embolism.  However, the subsegmental vessels are difficult to assess and some demonstrate a slightly hypoechoic enhancing appearance possibly artifact from bolus timing although tiny subsegmental pulmonary emboli would be difficult to exclude.  For example, in the lingula there is some hypoenhancement of the subsegmental vessels on image 116 where subsegmental PE cannot be excluded.  No evidence of right heart strain. HEART: normal size. No pericardial effusion. MEDIASTINUM AND PAULINA: There is a pathologically enlarged lymph node in the AP window measuring 1.8 cm short axis.  Additional prominent but nonpathologic by size video lymph nodes elsewhere. CHEST WALL AND LOWER NECK: within normal limits. UPPER ABDOMEN:Limited assessment of the upper abdominal structures with this technique.  There is splenomegaly with the spleen measuring 14.5 cm.  The liver may also be enlarged but is incompletely visualized.  Cholecystectomy changes. BONES: No suspicious bony erosive changes.  Multilevel Schmorl's nodes and degenerative changes in the spine are noted.  Calcific tendinosis of the right rotator cuff.  There is limited assessment of the spine with this technique.  No gross paraspinal inflammatory changes.     Ultrasound Abdomen Limited    Result Date: 12/17/2020  IMPRESSION: Mild increase in echotexture of the liver may represent fibrofatty infiltration. COMMENT: Ultrasound evaluation of the right upper quadrant was performed and compared to a CT exam dated 8/18/2019.  The pancreas is of normal  echotexture and unremarkable.  The aorta is obscured by bowel gas.  The liver is 17.6 cm in length and of increased echogenicity consistent with fibrofatty infiltration. There is no ductal dilatation or mass lesion.  Common bile duct measures 2.3 mm at the pito hepatis.  The right kidney is 10.5 cm in length.  There is no nephrolithiasis, hydronephrosis or mass lesion.  The renal cortex of normal thickness and echotexture.      ECG/Telemetry  Med-surg    Echo  Cardiac Imaging   TRANSTHORACIC ECHO (TTE) LIMITED 12/30/2020    Narrative · Normal-sized LV.  · Normal LV systolic function. Wall motion appears grossly normal.  · Normal-sized RV. Normal RV systolic function.  · Normal-sized LA.  · Normal-sized RA.  · Tricuspid valve primarily anterior leaflet appears mildly thickened with   increased echogenicity. Distinct vegetation could not be appreciated as it   was on transesophageal echo. Tricuspid regurgitation. Mild but was   difficult to evaluate as the jet appears to begin in the proximity of the   anterior leaflet and hugs tricuspid valve and the intra-atrial septum.   Previously, by JANUSZ, identified leaflet perforation.  · This was a limited echocardiogram to evaluate tricuspid valve   endocarditis after just over 3 weeks of antibiotic therapy. By   transthoracic echo no major change in the tricuspid regurgitation.          Scheduled Meds:  • buPROPion  75 mg oral TID   • ceFAZolin  2 g intravenous q8h INT   • famotidine  10 mg oral BID AC   • lactobacillus acidophilus complex  1 tablet oral BID   • lidocaine  1 patch Topical Daily   • methadone  40 mg oral Daily (6a)   • metoprolol succinate XL  25 mg oral Daily   • nicotine  1 patch transdermal Daily   • pantoprazole  40 mg oral Daily before breakfast   • PARoxetine  30 mg oral Daily   • prazosin  2 mg oral Nightly   • pregabalin  150 mg oral BID   • psyllium husk  1 packet oral Daily   • QUEtiapine  100 mg oral Nightly   • trimethobenzamide  300 mg oral q8h  JAIDEN   • zolpidem  10 mg oral Nightly     Continuous Infusions:  PRN Meds:.•  alum-mag hydroxide-simeth  •  butalbital-acetaminophen-caff  •  cyclobenzaprine  •  glucose **OR** dextrose **OR** glucagon **OR** dextrose in water  •  diphenhydrAMINE  •  fluticasone propionate  •  hydrocortisone-pramoxine  •  hydrOXYzine  •  magnesium sulfate  •  methadone  •  naloxone  •  nicotine polacrilex  •  potassium chloride **OR** potassium chloride **OR** potassium chloride **OR** potassium chloride  •  SUMAtriptan     ASSESSMENT AND PLAN     * Acute bacterial endocarditis  Assessment & Plan  -Tricuspid valve endocarditis  -Sepsis due to acute bacterial endocarditis present on admission  -Blood cultures from [facility]  -Repeat blood cultures NGTD  -PICC line in place  -Limited echo 12/30 with no major change in TV/TR    -Continue antibiotic (started 12/9 and streamlined to Cefazolin) - will require 6 week therapy (1/19/20 last day), and today is day 24/42  -Checking labs every monday      Dyspepsia  Assessment & Plan  -dyspepsia with meals with episodes of acid reflux regurgitation within 30 min.    -continue daily protonix, can uptitrate to BID if needed  -change pepcid to BID before breakfast/dinner (her main meals)  -maalox PRN    Drug-induced constipation  Assessment & Plan  -Resolved  -Continue bowel regimen    Opioid abuse (CMS/HCC)  Assessment & Plan        Unspecified mood (affective) disorder (CMS/HCC)  Assessment & Plan  -continue buproprion 75mg TID, nicotine patch, paxil 30mg daily, prazosin 2mg PO qHS, ambien 10mg PO qHS, seroquel 100mg QHS.  -patient appreciates seeing psych    Tricuspid valve vegetation  Assessment & Plan  -Sessile vegetation on tricuspid valve leaflet  -Continue antibiotics    Septic arthritis of vertebra (CMS/HCC)  Overview  MRI Spine 12/2020  Diffuse anterior and posterior epidural enhancement within lumbar spine more  pronounced at L4-L5 raising the suspicion for underlying  infectious  process/phlegmon.  Left greater than right L4-L5 facet joint signal and  enhancement which although may be degenerative in nature, early changes of  septic facet arthritis is in the differential.  Follow-up is recommended.     Thoracic and lumbar degenerative change as described more pronounced at L4-L5.     Ring-enhancing lesion in right upper lobe which may be infectious in etiology  including in the setting of septic emboli.  Correlation with CT chest with  contrast is recommended to exclude other etiologies.    Methicillin sensitive staphylococcal aureus cultures done at [facility] per ID note.    Assessment & Plan  -Neurovascular intact  -Evidence of discitis on MRI, plan per neurosurgery is follow-up MRI after completion of antibiotics      Essential hypertension  Assessment & Plan  -Continue with home meds  -bp parameters for hypotension    Septic embolism (CMS/HCC)  Assessment & Plan  -Septic emboli, infectious endocarditis, discitis, MSSA  -Blood cultures performed on admission, NGTD  -Continue IV abx per ID total duration 42 days    Opioid type dependence, continuous use (CMS/[facility])  Assessment & Plan  -likely still using IV drugs, given presentation  -Psych consulted, increased methadone to 40mg scheduled + 10mg PRN  -QTc monitoring with EKG, ok so far.  -Palliative/Pain consult, patient reports continued pain.       Migraine without status migrainosus, not intractable  Assessment & Plan  -Continue home Fioricet  -Continue methadone for opioid dependence, dose increased to 40mg scheduled + 10mg PRN per psych  -Ice for additional relief   -sumatriptan 50mg x1 PRN   -Will add IV benadryl PRN    Insomnia  Assessment & Plan  -Continue seroquel, hydroxyzine, prazosin, zolpidem    Depression  Assessment & Plan  -see mood d/o section      Anxiety  Assessment & Plan  -continue current medication regimen as per psych          Plan of care discussed with: patient, nursing    VTE Prophylaxis Plan:  ambulate, padua 2  Code Status: Full Code  Estimated Discharge Date: 12/11/2020 <-- medically ready for DC  Disposition Planning: medically ready for DC awaiting SNF bed for IV abx in setting of IVDU hx. [facility] has been contacted but no bed so far.     Lili Crawford MD  1/1/2021          Dietary Orders   (From admission, onward)             Start     Ordered    12/31/20 0843  Dietary nutrition supplements  Once     Question Answer Comment   Select Supplement (PH): Boost Plus Chocolate    Meal period Lunch Dinner        12/31/20 0843    12/11/20 1201  Adult Diet Regular; RD/LDN may adjust order  Diet effective now     Question Answer Comment   Diet Texture Regular    Delegation of Authority. Diet orders written by PA/CRStephen may not be adjusted by RD/LDNs. RD/LDN may adjust order        12/11/20 1201

## 2021-01-02 PROBLEM — G43.919 MIGRAINE, INTRACTABLE: Status: ACTIVE | Noted: 2018-06-12

## 2021-01-02 PROCEDURE — 63700000 HC SELF-ADMINISTRABLE DRUG: Performed by: INTERNAL MEDICINE

## 2021-01-02 PROCEDURE — 63700000 HC SELF-ADMINISTRABLE DRUG: Performed by: HOSPITALIST

## 2021-01-02 PROCEDURE — 12000000 HC ROOM AND CARE MED/SURG

## 2021-01-02 PROCEDURE — 63700000 HC SELF-ADMINISTRABLE DRUG: Performed by: NURSE PRACTITIONER

## 2021-01-02 PROCEDURE — 99233 SBSQ HOSP IP/OBS HIGH 50: CPT | Performed by: INTERNAL MEDICINE

## 2021-01-02 PROCEDURE — 25800000 HC PHARMACY IV SOLUTIONS: Performed by: INTERNAL MEDICINE

## 2021-01-02 PROCEDURE — 63600000 HC DRUGS/DETAIL CODE: Performed by: INTERNAL MEDICINE

## 2021-01-02 PROCEDURE — 25000000 HC PHARMACY GENERAL: Performed by: INTERNAL MEDICINE

## 2021-01-02 PROCEDURE — 63700000 HC SELF-ADMINISTRABLE DRUG: Performed by: PSYCHIATRY & NEUROLOGY

## 2021-01-02 RX ORDER — PANTOPRAZOLE SODIUM 40 MG/1
40 TABLET, DELAYED RELEASE ORAL 2 TIMES DAILY
Status: DISCONTINUED | OUTPATIENT
Start: 2021-01-02 | End: 2021-02-03 | Stop reason: HOSPADM

## 2021-01-02 RX ORDER — ACETAMINOPHEN 325 MG/1
650 TABLET ORAL EVERY 8 HOURS
Status: DISCONTINUED | OUTPATIENT
Start: 2021-01-02 | End: 2021-01-11

## 2021-01-02 RX ADMIN — NICOTINE POLACRILEX 4 MG: 4 GUM, CHEWING ORAL at 12:44

## 2021-01-02 RX ADMIN — Medication 1 PATCH: at 08:20

## 2021-01-02 RX ADMIN — ZOLPIDEM TARTRATE 10 MG: 5 TABLET, COATED ORAL at 21:16

## 2021-01-02 RX ADMIN — METHADONE HYDROCHLORIDE 40 MG: 10 TABLET ORAL at 05:06

## 2021-01-02 RX ADMIN — FAMOTIDINE 10 MG: 10 TABLET, FILM COATED ORAL at 07:04

## 2021-01-02 RX ADMIN — BUTALBITAL, ACETAMINOPHEN AND CAFFEINE 2 TABLET: 50; 325; 40 TABLET ORAL at 11:07

## 2021-01-02 RX ADMIN — PAROXETINE 30 MG: 20 TABLET, FILM COATED ORAL at 08:20

## 2021-01-02 RX ADMIN — QUETIAPINE FUMARATE 100 MG: 100 TABLET ORAL at 21:16

## 2021-01-02 RX ADMIN — METOPROLOL SUCCINATE 25 MG: 25 TABLET, EXTENDED RELEASE ORAL at 08:20

## 2021-01-02 RX ADMIN — CYCLOBENZAPRINE HYDROCHLORIDE 10 MG: 5 TABLET, FILM COATED ORAL at 21:16

## 2021-01-02 RX ADMIN — BUPROPION HYDROCHLORIDE 75 MG: 75 TABLET, FILM COATED ORAL at 08:19

## 2021-01-02 RX ADMIN — TRIMETHOBENZAMIDE HYDROCHLORIDE 300 MG: 300 CAPSULE ORAL at 11:07

## 2021-01-02 RX ADMIN — NICOTINE POLACRILEX 4 MG: 4 GUM, CHEWING ORAL at 18:03

## 2021-01-02 RX ADMIN — CEFAZOLIN SODIUM 2 G: 10 POWDER, FOR SOLUTION INTRAVENOUS at 04:26

## 2021-01-02 RX ADMIN — DIPHENHYDRAMINE HYDROCHLORIDE 25 MG: 50 INJECTION INTRAMUSCULAR; INTRAVENOUS at 20:01

## 2021-01-02 RX ADMIN — PANTOPRAZOLE SODIUM 40 MG: 40 TABLET, DELAYED RELEASE ORAL at 07:04

## 2021-01-02 RX ADMIN — BUTALBITAL, ACETAMINOPHEN AND CAFFEINE 2 TABLET: 50; 325; 40 TABLET ORAL at 18:03

## 2021-01-02 RX ADMIN — PANTOPRAZOLE SODIUM 40 MG: 40 TABLET, DELAYED RELEASE ORAL at 19:36

## 2021-01-02 RX ADMIN — BUTALBITAL, ACETAMINOPHEN AND CAFFEINE 2 TABLET: 50; 325; 40 TABLET ORAL at 05:07

## 2021-01-02 RX ADMIN — PRAZOSIN HYDROCHLORIDE 2 MG: 1 CAPSULE ORAL at 21:16

## 2021-01-02 RX ADMIN — CEFAZOLIN SODIUM 2 G: 10 POWDER, FOR SOLUTION INTRAVENOUS at 20:01

## 2021-01-02 RX ADMIN — DIPHENHYDRAMINE HYDROCHLORIDE 25 MG: 50 INJECTION INTRAMUSCULAR; INTRAVENOUS at 13:47

## 2021-01-02 RX ADMIN — PROBIOTIC PRODUCT - TAB 1 TABLET: TAB at 19:36

## 2021-01-02 RX ADMIN — CYCLOBENZAPRINE HYDROCHLORIDE 10 MG: 5 TABLET, FILM COATED ORAL at 08:19

## 2021-01-02 RX ADMIN — BUPROPION HYDROCHLORIDE 75 MG: 75 TABLET, FILM COATED ORAL at 11:52

## 2021-01-02 RX ADMIN — METHADONE HYDROCHLORIDE 10 MG: 10 TABLET ORAL at 12:43

## 2021-01-02 RX ADMIN — BUPROPION HYDROCHLORIDE 75 MG: 75 TABLET, FILM COATED ORAL at 15:44

## 2021-01-02 RX ADMIN — TRIMETHOBENZAMIDE HYDROCHLORIDE 300 MG: 300 CAPSULE ORAL at 18:52

## 2021-01-02 RX ADMIN — FAMOTIDINE 10 MG: 10 TABLET, FILM COATED ORAL at 15:44

## 2021-01-02 RX ADMIN — CEFAZOLIN SODIUM 2 G: 10 POWDER, FOR SOLUTION INTRAVENOUS at 11:52

## 2021-01-02 RX ADMIN — PROBIOTIC PRODUCT - TAB 1 TABLET: TAB at 08:19

## 2021-01-02 RX ADMIN — DIPHENHYDRAMINE HYDROCHLORIDE 25 MG: 50 INJECTION INTRAMUSCULAR; INTRAVENOUS at 07:05

## 2021-01-02 RX ADMIN — PREGABALIN 150 MG: 75 CAPSULE ORAL at 19:36

## 2021-01-02 RX ADMIN — PREGABALIN 150 MG: 75 CAPSULE ORAL at 08:19

## 2021-01-02 NOTE — PLAN OF CARE
Plan of Care Review  Plan of Care Reviewed With: patient  Progress: no change  Outcome Summary: Patient's PICC line dressing changed today. She appears tearful but then cheers up after she is given what she requests. Independent in the room. Complains of back pain but yet refuses lidocaine patch. No nausea/vomiting today.

## 2021-01-02 NOTE — PROGRESS NOTES
Hospital Medicine Service -  Daily Progress Note       SUBJECTIVE   Interval History: Multiple complaints - ongoing migraine since admit.  Ongoing back pain.  Ongoing GERD.  Can't sleep.   OBJECTIVE      Vital signs in last 24 hours:  Temp:  [36.3 °C (97.4 °F)-36.7 °C (98.1 °F)] 36.7 °C (98.1 °F)  Heart Rate:  [66-74] 74  Resp:  [16-18] 18  BP: (103-125)/(55-67) 122/67    Intake/Output Summary (Last 24 hours) at 1/2/2021 1643  Last data filed at 1/2/2021 0507  Gross per 24 hour   Intake 950 ml   Output --   Net 950 ml       PHYSICAL EXAMINATION      Physical Exam  Vitals signs and nursing note reviewed.   Constitutional:       Appearance: Normal appearance.   HENT:      Head: Normocephalic.   Cardiovascular:      Rate and Rhythm: Normal rate and regular rhythm.      Pulses: Normal pulses.   Pulmonary:      Effort: Pulmonary effort is normal.      Breath sounds: Normal breath sounds.   Abdominal:      General: Abdomen is flat. Bowel sounds are normal. There is no distension.      Tenderness: There is no abdominal tenderness.   Musculoskeletal:         General: No swelling.      Right lower leg: No edema.      Left lower leg: No edema.   Skin:     General: Skin is warm and dry.   Neurological:      General: No focal deficit present.      Mental Status: She is alert and oriented to person, place, and time.   Psychiatric:         Mood and Affect: Mood normal.         Behavior: Behavior normal.         Thought Content: Thought content normal.         Judgment: Judgment normal.           LABS / IMAGING / TELE      Labs  Lab Results   Component Value Date    WBC 7.02 12/28/2020    HGB 11.2 (L) 12/28/2020    HCT 36.4 12/28/2020    MCV 88.6 12/28/2020     12/28/2020     Lab Results   Component Value Date    GLUCOSE 111 (H) 12/28/2020    CALCIUM 9.1 12/28/2020     12/28/2020    K 3.9 12/28/2020    CO2 27 12/28/2020     12/28/2020    BUN 12 12/28/2020    CREATININE 0.8 12/28/2020     Lab Results    Component Value Date    ALT 11 12/09/2020    AST 14 (L) 12/09/2020     (H) 04/18/2017    ALKPHOS 85 12/09/2020    BILITOT 0.4 12/09/2020     Lab Results   Component Value Date    INR 0.9 04/06/2019    INR 1.0 04/16/2017    INR 1.1 03/24/2017       Imaging  Mri Thoracic Spine With And Without Contrast    Result Date: 12/9/2020  IMPRESSION: Diffuse anterior and posterior epidural enhancement within lumbar spine more pronounced at L4-L5 raising the suspicion for underlying infectious process/phlegmon.  Left greater than right L4-L5 facet joint signal and enhancement which although may be degenerative in nature, early changes of septic facet arthritis is in the differential.  Follow-up is recommended. Thoracic and lumbar degenerative change as described more pronounced at L4-L5. Ring-enhancing lesion in right upper lobe which may be infectious in etiology including in the setting of septic emboli.  Correlation with CT chest with contrast is recommended to exclude other etiologies. The results were discussed with Rylee Bowie on 12/9/2020 7:35 PM.    Mri Lumbar Spine With And Without Contrast    Result Date: 12/9/2020  IMPRESSION: Diffuse anterior and posterior epidural enhancement within lumbar spine more pronounced at L4-L5 raising the suspicion for underlying infectious process/phlegmon.  Left greater than right L4-L5 facet joint signal and enhancement which although may be degenerative in nature, early changes of septic facet arthritis is in the differential.  Follow-up is recommended. Thoracic and lumbar degenerative change as described more pronounced at L4-L5. Ring-enhancing lesion in right upper lobe which may be infectious in etiology including in the setting of septic emboli.  Correlation with CT chest with contrast is recommended to exclude other etiologies. The results were discussed with Rylee Bowie on 12/9/2020 7:35 PM.    X-ray Chest 1 View    Result Date: 12/18/2020  IMPRESSION: Interval  placement of a right-sided PICC line catheter as described above.     X-ray Chest 1 View    Result Date: 12/18/2020  IMPRESSION: Interval placement of a right-sided PICC line catheter as described above.     X-ray Chest 1 View    Result Date: 12/9/2020  IMPRESSION: No radiographic evidence of acute cardiopulmonary disease.    Ct Chest Pulmonary Embolism With Iv Contrast    Result Date: 12/9/2020  IMPRESSION: 1.  No evidence of main, lobar or segmental pulmonary embolism. 2.  However, there are multifocal nodular lesions throughout all lung fields including a cavitary lesion in the right upper lobe all suspicious for septic emboli.  Some of the tiny subsegmental pulmonary arterial branches demonstrate hypoenhancement and it is difficult to determinate if this is related to tiny pulmonary emboli or technique. 3.  Small bilateral pleural effusions. 4.  Mosaic pattern at the lung bases could represent mild edema or sequela of small airways or small vessels disease. 5.  Follow-up chest CT after appropriate treatment is recommended to document lung opacities and mediastinal lymphadenopathy which is probably reactive. 6.  Additional findings discussed below. Finding:    Other   Acuity: Critical  Status:  CLOSED Critical read back for the first impression performed with Bertha Bowie PA 9:25 PM 12/9/2020 COMMENT: Comparison: Chest x-ray from 12/9/2020 Technique: Chest CT pulmonary embolism protocol performed with intravenous contrast.  80 mL Omnipaque 350 given. CT DOSE:  One or more dose reduction techniques (e.g. automated exposure control, adjustment of the mA and/or kV according to patient size, use of iterative reconstruction technique) utilized for this examination. FINDINGS: LUNG, PLEURA AND LARGE AIRWAYS: The trachea and proximal bronchi remain clear. There are multifocal nodular lesions throughout the lungs.  For example in the right upper lobe there is a cavitary lesion measuring 1.4 cm on axial image 63. A  1.6 cm nodular lesion in the left upper lobe on image 69.  Multiple additional lesions are present elsewhere.  His are suspicious for septic emboli particularly given the history of endocarditis and sepsis. There are small bilateral pleural effusions. There is a nonspecific mild degree of groundglass opacity in the lower lung fields.  This could represent mild degree of pulmonary edema or possibly a mosaic pattern from small airways or small vessel disease. No pneumothorax. VESSELS: Normal caliber of the thoracic aorta.  Thoracic aorta appears within normal limits.  Main pulmonary artery is normal in caliber.  No evidence of a main, lobar or segmental pulmonary embolism.  However, the subsegmental vessels are difficult to assess and some demonstrate a slightly hypoechoic enhancing appearance possibly artifact from bolus timing although tiny subsegmental pulmonary emboli would be difficult to exclude.  For example, in the lingula there is some hypoenhancement of the subsegmental vessels on image 116 where subsegmental PE cannot be excluded.  No evidence of right heart strain. HEART: normal size. No pericardial effusion. MEDIASTINUM AND PAULINA: There is a pathologically enlarged lymph node in the AP window measuring 1.8 cm short axis.  Additional prominent but nonpathologic by size video lymph nodes elsewhere. CHEST WALL AND LOWER NECK: within normal limits. UPPER ABDOMEN:Limited assessment of the upper abdominal structures with this technique.  There is splenomegaly with the spleen measuring 14.5 cm.  The liver may also be enlarged but is incompletely visualized.  Cholecystectomy changes. BONES: No suspicious bony erosive changes.  Multilevel Schmorl's nodes and degenerative changes in the spine are noted.  Calcific tendinosis of the right rotator cuff.  There is limited assessment of the spine with this technique.  No gross paraspinal inflammatory changes.     Ultrasound Abdomen Limited    Result Date:  12/17/2020  IMPRESSION: Mild increase in echotexture of the liver may represent fibrofatty infiltration. COMMENT: Ultrasound evaluation of the right upper quadrant was performed and compared to a CT exam dated 8/18/2019.  The pancreas is of normal echotexture and unremarkable.  The aorta is obscured by bowel gas.  The liver is 17.6 cm in length and of increased echogenicity consistent with fibrofatty infiltration. There is no ductal dilatation or mass lesion.  Common bile duct measures 2.3 mm at the pito hepatis.  The right kidney is 10.5 cm in length.  There is no nephrolithiasis, hydronephrosis or mass lesion.  The renal cortex of normal thickness and echotexture.       ECG/Telemetry     ASSESSMENT AND PLAN      * Acute bacterial endocarditis  Assessment & Plan  -Tricuspid valve endocarditis  -Sepsis due to acute bacterial endocarditis present on admission  -Blood cultures from [place] MSS[facility]  -Repeat blood cultures NGTD  -PICC line in place  -JANUSZ with TV endocarditis, Limited echo 12/30 with no major change in TV/TR  -Continue abx (started 12/9, changed to Cefazolin) -6 wks, (1/19/20 last day),  today is 25/42  -Checking labs every monday      Septic arthritis of vertebra (CMS/HCC)  Assessment & Plan  -MRI with L4-5 infecitous process  -Neurovascular intact  -Evidence of discitis on MRI, plan per neurosurgery is follow-up MRI after completion of antibiotics      Septic embolism (CMS/HCC)  Assessment & Plan  -CT PE protocol 12/9  multifocal nodular lesions throughout all lung fields including a cavitary lesion RUL concerning for septic emboli  -Septic emboli, infectious endocarditis, discitis, MSSA  -Blood cultures performed on admission, NGTD  -Continue IV abx per ID total duration 42 days  -Will need f/u chest CT to document resollution    Opioid type dependence, continuous use (CMS/HCC)  Assessment & Plan  -likely still using IV drugs, given presentation  -Psych consulted, increased methadone to 40mg scheduled  + 10mg PRN.  Requests repeat telepsych consult to increase dosing further  -QTc monitoring with EKG, ok so far.  -Palliative/Pain consult, patient reports continued pain.   -Doesn't like Lidoderm patch; it makes her sweat.  Will schedule Tylenol      Migraine, intractable  Assessment & Plan  -S/p Sumatriptan, Fioricet, IV Benadryl.  No relief and describes ongoing migraine since admit  -Continue Methadone for opioid dependence, dose increased to 40mg scheduled + 10mg PRN per psych  -She requests neuro consult    Dyspepsia  Assessment & Plan  -dyspepsia with meals with episodes of acid reflux regurgitation within 30 min.  -Protonix BID, Pepcid BID  -maalox PRN      Unspecified mood (affective) disorder (CMS/HCC)  Assessment & Plan  -continue buproprion 75mg TID, nicotine patch, paxil 30mg daily, prazosin 2mg PO qHS, ambien 10mg PO qHS, seroquel 100mg QHS.  -patient appreciates seeing psych       VTE Assessment: Pad[place] VTE Score: 2  VTE Prophylaxis Plan: Ambulating in room  Code Status: Full Code  Estimated Discharge Date:  12/11/2020     José Manuel Morris MD  1/2/2021  4:43 PM

## 2021-01-03 LAB
ALBUMIN SERPL-MCNC: 3.3 G/DL (ref 3.4–5)
ALP SERPL-CCNC: 65 IU/L (ref 35–126)
ALT SERPL-CCNC: 10 IU/L (ref 11–54)
ANION GAP SERPL CALC-SCNC: 10 MEQ/L (ref 3–15)
AST SERPL-CCNC: 21 IU/L (ref 15–41)
BILIRUB SERPL-MCNC: 0.4 MG/DL (ref 0.3–1.2)
BUN SERPL-MCNC: 13 MG/DL (ref 8–20)
CALCIUM SERPL-MCNC: 9.2 MG/DL (ref 8.9–10.3)
CHLORIDE SERPL-SCNC: 101 MEQ/L (ref 98–109)
CO2 SERPL-SCNC: 28 MEQ/L (ref 22–32)
CREAT SERPL-MCNC: 0.7 MG/DL (ref 0.6–1.1)
ERYTHROCYTE [DISTWIDTH] IN BLOOD BY AUTOMATED COUNT: 15.8 % (ref 11.7–14.4)
GFR SERPL CREATININE-BSD FRML MDRD: >60 ML/MIN/1.73M*2
GLUCOSE SERPL-MCNC: 95 MG/DL (ref 70–99)
HCT VFR BLDCO AUTO: 35.3 % (ref 35–45)
HGB BLD-MCNC: 11.2 G/DL (ref 11.8–15.7)
LIPASE SERPL-CCNC: 37 U/L (ref 20–51)
MAGNESIUM SERPL-MCNC: 2.2 MG/DL (ref 1.8–2.5)
MCH RBC QN AUTO: 28.1 PG (ref 28–33.2)
MCHC RBC AUTO-ENTMCNC: 31.7 G/DL (ref 32.2–35.5)
MCV RBC AUTO: 88.5 FL (ref 83–98)
PDW BLD AUTO: 10.4 FL (ref 9.4–12.3)
PLATELET # BLD AUTO: 173 K/UL (ref 150–369)
POTASSIUM SERPL-SCNC: 4.5 MEQ/L (ref 3.6–5.1)
PROT SERPL-MCNC: 6.6 G/DL (ref 6–8.2)
RBC # BLD AUTO: 3.99 M/UL (ref 3.93–5.22)
SODIUM SERPL-SCNC: 139 MEQ/L (ref 136–144)
WBC # BLD AUTO: 6.03 K/UL (ref 3.8–10.5)

## 2021-01-03 PROCEDURE — 63700000 HC SELF-ADMINISTRABLE DRUG: Performed by: HOSPITALIST

## 2021-01-03 PROCEDURE — 85027 COMPLETE CBC AUTOMATED: CPT | Performed by: INTERNAL MEDICINE

## 2021-01-03 PROCEDURE — 63700000 HC SELF-ADMINISTRABLE DRUG: Performed by: NURSE PRACTITIONER

## 2021-01-03 PROCEDURE — 25000000 HC PHARMACY GENERAL: Performed by: INTERNAL MEDICINE

## 2021-01-03 PROCEDURE — 83690 ASSAY OF LIPASE: CPT | Performed by: INTERNAL MEDICINE

## 2021-01-03 PROCEDURE — 63600000 HC DRUGS/DETAIL CODE: Performed by: INTERNAL MEDICINE

## 2021-01-03 PROCEDURE — 63700000 HC SELF-ADMINISTRABLE DRUG: Performed by: STUDENT IN AN ORGANIZED HEALTH CARE EDUCATION/TRAINING PROGRAM

## 2021-01-03 PROCEDURE — 99223 1ST HOSP IP/OBS HIGH 75: CPT | Mod: 25 | Performed by: STUDENT IN AN ORGANIZED HEALTH CARE EDUCATION/TRAINING PROGRAM

## 2021-01-03 PROCEDURE — 80053 COMPREHEN METABOLIC PANEL: CPT | Performed by: INTERNAL MEDICINE

## 2021-01-03 PROCEDURE — 64405 NJX AA&/STRD GR OCPL NRV: CPT | Mod: LT | Performed by: STUDENT IN AN ORGANIZED HEALTH CARE EDUCATION/TRAINING PROGRAM

## 2021-01-03 PROCEDURE — 12000000 HC ROOM AND CARE MED/SURG

## 2021-01-03 PROCEDURE — 36415 COLL VENOUS BLD VENIPUNCTURE: CPT | Performed by: INTERNAL MEDICINE

## 2021-01-03 PROCEDURE — 83735 ASSAY OF MAGNESIUM: CPT | Performed by: INTERNAL MEDICINE

## 2021-01-03 PROCEDURE — 99232 SBSQ HOSP IP/OBS MODERATE 35: CPT | Performed by: INTERNAL MEDICINE

## 2021-01-03 PROCEDURE — 20550 NJX 1 TENDON SHEATH/LIGAMENT: CPT | Mod: 50,XU | Performed by: STUDENT IN AN ORGANIZED HEALTH CARE EDUCATION/TRAINING PROGRAM

## 2021-01-03 PROCEDURE — 63700000 HC SELF-ADMINISTRABLE DRUG: Performed by: INTERNAL MEDICINE

## 2021-01-03 PROCEDURE — 64400 NJX AA&/STRD TRIGEMINAL NRV: CPT | Performed by: STUDENT IN AN ORGANIZED HEALTH CARE EDUCATION/TRAINING PROGRAM

## 2021-01-03 PROCEDURE — 63700000 HC SELF-ADMINISTRABLE DRUG: Performed by: PSYCHIATRY & NEUROLOGY

## 2021-01-03 PROCEDURE — 3E0T3BZ INTRODUCTION OF ANESTHETIC AGENT INTO PERIPHERAL NERVES AND PLEXI, PERCUTANEOUS APPROACH: ICD-10-PCS | Performed by: STUDENT IN AN ORGANIZED HEALTH CARE EDUCATION/TRAINING PROGRAM

## 2021-01-03 PROCEDURE — 25800000 HC PHARMACY IV SOLUTIONS: Performed by: INTERNAL MEDICINE

## 2021-01-03 PROCEDURE — 3E0T33Z INTRODUCTION OF ANTI-INFLAMMATORY INTO PERIPHERAL NERVES AND PLEXI, PERCUTANEOUS APPROACH: ICD-10-PCS | Performed by: STUDENT IN AN ORGANIZED HEALTH CARE EDUCATION/TRAINING PROGRAM

## 2021-01-03 RX ORDER — PHENOBARBITAL 32.4 MG/1
32.4 TABLET ORAL 2 TIMES DAILY
Status: COMPLETED | OUTPATIENT
Start: 2021-01-03 | End: 2021-01-06

## 2021-01-03 RX ADMIN — Medication 1 PATCH: at 08:30

## 2021-01-03 RX ADMIN — ACETAMINOPHEN 650 MG: 325 TABLET, FILM COATED ORAL at 16:47

## 2021-01-03 RX ADMIN — CYCLOBENZAPRINE HYDROCHLORIDE 10 MG: 5 TABLET, FILM COATED ORAL at 05:12

## 2021-01-03 RX ADMIN — BUPROPION HYDROCHLORIDE 75 MG: 75 TABLET, FILM COATED ORAL at 15:13

## 2021-01-03 RX ADMIN — ZOLPIDEM TARTRATE 10 MG: 5 TABLET, COATED ORAL at 21:00

## 2021-01-03 RX ADMIN — CEFAZOLIN SODIUM 2 G: 10 POWDER, FOR SOLUTION INTRAVENOUS at 19:45

## 2021-01-03 RX ADMIN — ACETAMINOPHEN 650 MG: 325 TABLET, FILM COATED ORAL at 21:00

## 2021-01-03 RX ADMIN — METHADONE HYDROCHLORIDE 10 MG: 10 TABLET ORAL at 15:13

## 2021-01-03 RX ADMIN — CEFAZOLIN SODIUM 2 G: 10 POWDER, FOR SOLUTION INTRAVENOUS at 11:49

## 2021-01-03 RX ADMIN — NICOTINE POLACRILEX 4 MG: 4 GUM, CHEWING ORAL at 08:29

## 2021-01-03 RX ADMIN — QUETIAPINE FUMARATE 100 MG: 100 TABLET ORAL at 21:00

## 2021-01-03 RX ADMIN — PROBIOTIC PRODUCT - TAB 1 TABLET: TAB at 19:47

## 2021-01-03 RX ADMIN — BUPROPION HYDROCHLORIDE 75 MG: 75 TABLET, FILM COATED ORAL at 11:49

## 2021-01-03 RX ADMIN — PREGABALIN 150 MG: 75 CAPSULE ORAL at 08:30

## 2021-01-03 RX ADMIN — BUTALBITAL, ACETAMINOPHEN AND CAFFEINE 2 TABLET: 50; 325; 40 TABLET ORAL at 11:49

## 2021-01-03 RX ADMIN — FAMOTIDINE 10 MG: 10 TABLET, FILM COATED ORAL at 16:40

## 2021-01-03 RX ADMIN — TRIMETHOBENZAMIDE HYDROCHLORIDE 300 MG: 300 CAPSULE ORAL at 11:09

## 2021-01-03 RX ADMIN — DIPHENHYDRAMINE HYDROCHLORIDE 25 MG: 50 INJECTION INTRAMUSCULAR; INTRAVENOUS at 08:29

## 2021-01-03 RX ADMIN — DIPHENHYDRAMINE HYDROCHLORIDE 25 MG: 50 INJECTION INTRAMUSCULAR; INTRAVENOUS at 18:05

## 2021-01-03 RX ADMIN — NICOTINE POLACRILEX 4 MG: 4 GUM, CHEWING ORAL at 15:13

## 2021-01-03 RX ADMIN — PREGABALIN 150 MG: 75 CAPSULE ORAL at 19:47

## 2021-01-03 RX ADMIN — FAMOTIDINE 10 MG: 10 TABLET, FILM COATED ORAL at 08:29

## 2021-01-03 RX ADMIN — BUPROPION HYDROCHLORIDE 75 MG: 75 TABLET, FILM COATED ORAL at 08:30

## 2021-01-03 RX ADMIN — PROBIOTIC PRODUCT - TAB 1 TABLET: TAB at 08:38

## 2021-01-03 RX ADMIN — PAROXETINE 30 MG: 20 TABLET, FILM COATED ORAL at 08:29

## 2021-01-03 RX ADMIN — METHADONE HYDROCHLORIDE 40 MG: 10 TABLET ORAL at 05:12

## 2021-01-03 RX ADMIN — PRAZOSIN HYDROCHLORIDE 2 MG: 1 CAPSULE ORAL at 21:00

## 2021-01-03 RX ADMIN — PHENOBARBITAL 32.4 MG: 32.4 TABLET ORAL at 16:40

## 2021-01-03 RX ADMIN — METOPROLOL SUCCINATE 25 MG: 25 TABLET, EXTENDED RELEASE ORAL at 08:29

## 2021-01-03 RX ADMIN — TRIMETHOBENZAMIDE HYDROCHLORIDE 300 MG: 300 CAPSULE ORAL at 18:56

## 2021-01-03 RX ADMIN — PANTOPRAZOLE SODIUM 40 MG: 40 TABLET, DELAYED RELEASE ORAL at 08:29

## 2021-01-03 RX ADMIN — PANTOPRAZOLE SODIUM 40 MG: 40 TABLET, DELAYED RELEASE ORAL at 19:47

## 2021-01-03 RX ADMIN — CEFAZOLIN SODIUM 2 G: 10 POWDER, FOR SOLUTION INTRAVENOUS at 04:28

## 2021-01-03 NOTE — ASSESSMENT & PLAN NOTE
-likely still using IV drugs, given presentation  -Psych consulted, increased methadone to 40mg scheduled + 10mg PRN.    -QTc monitoring with EKG, ok so far.  -Palliative/Pain consult, patient reports continued pain.   -Lidoderm patch makes her sweat.  Scheduled Tylenol  -Despite significant improvement after injections for migraine, still desires telepsych consult to consider increasing Methadone, given ongoing back pain

## 2021-01-03 NOTE — ASSESSMENT & PLAN NOTE
-CT chest 12/9  multifocal nodular lesions all lung fields, cavitary lesion RUL c/f septic emboli  -Septic emboli, infectious endocarditis, discitis, MSSA  -Blood cultures performed on admission, NGTD  -Continue IV abx per ID total duration 42 days  -Will need f/u chest CT to document resolution

## 2021-01-03 NOTE — PROGRESS NOTES
Hospital Medicine Service -  Daily Progress Note       SUBJECTIVE   Interval History: Looks (and says she feels) dramatically better than yesterday.  Received 3 injections from Dr Goznalez which helped tremendously.  Still has back pain.    OBJECTIVE      Vital signs in last 24 hours:  Temp:  [36.4 °C (97.5 °F)-36.7 °C (98.1 °F)] 36.7 °C (98 °F)  Heart Rate:  [74-81] 75  Resp:  [18] 18  BP: (117-136)/(60-83) 124/83    Intake/Output Summary (Last 24 hours) at 1/3/2021 1546  Last data filed at 1/2/2021 1803  Gross per 24 hour   Intake 50 ml   Output --   Net 50 ml       PHYSICAL EXAMINATION      Physical Exam  Vitals signs and nursing note reviewed.   Constitutional:       Appearance: Normal appearance.   Cardiovascular:      Pulses: Normal pulses.      Heart sounds: Normal heart sounds.   Pulmonary:      Effort: Pulmonary effort is normal.      Breath sounds: Normal breath sounds.   Abdominal:      General: Abdomen is flat. Bowel sounds are normal. There is no distension.      Palpations: There is no mass.      Tenderness: There is no abdominal tenderness.      Hernia: No hernia is present.   Musculoskeletal:         General: No swelling or tenderness.   Skin:     General: Skin is warm and dry.   Neurological:      General: No focal deficit present.      Mental Status: She is alert and oriented to person, place, and time.   Psychiatric:         Mood and Affect: Mood normal.         Behavior: Behavior normal.         Thought Content: Thought content normal.         Judgment: Judgment normal.           LABS / IMAGING / TELE      Labs  Lab Results   Component Value Date    WBC 6.03 01/03/2021    HGB 11.2 (L) 01/03/2021    HCT 35.3 01/03/2021    MCV 88.5 01/03/2021     01/03/2021     Lab Results   Component Value Date    GLUCOSE 95 01/03/2021    CALCIUM 9.2 01/03/2021     01/03/2021    K 4.5 01/03/2021    CO2 28 01/03/2021     01/03/2021    BUN 13 01/03/2021    CREATININE 0.7 01/03/2021     Lab Results    Component Value Date    ALT 10 (L) 01/03/2021    AST 21 01/03/2021     (H) 04/18/2017    ALKPHOS 65 01/03/2021    BILITOT 0.4 01/03/2021     Lab Results   Component Value Date    INR 0.9 04/06/2019    INR 1.0 04/16/2017    INR 1.1 03/24/2017       Imaging  Mri Thoracic Spine With And Without Contrast    Result Date: 12/9/2020  IMPRESSION: Diffuse anterior and posterior epidural enhancement within lumbar spine more pronounced at L4-L5 raising the suspicion for underlying infectious process/phlegmon.  Left greater than right L4-L5 facet joint signal and enhancement which although may be degenerative in nature, early changes of septic facet arthritis is in the differential.  Follow-up is recommended. Thoracic and lumbar degenerative change as described more pronounced at L4-L5. Ring-enhancing lesion in right upper lobe which may be infectious in etiology including in the setting of septic emboli.  Correlation with CT chest with contrast is recommended to exclude other etiologies. The results were discussed with Rylee Bowie on 12/9/2020 7:35 PM.    Mri Lumbar Spine With And Without Contrast    Result Date: 12/9/2020  IMPRESSION: Diffuse anterior and posterior epidural enhancement within lumbar spine more pronounced at L4-L5 raising the suspicion for underlying infectious process/phlegmon.  Left greater than right L4-L5 facet joint signal and enhancement which although may be degenerative in nature, early changes of septic facet arthritis is in the differential.  Follow-up is recommended. Thoracic and lumbar degenerative change as described more pronounced at L4-L5. Ring-enhancing lesion in right upper lobe which may be infectious in etiology including in the setting of septic emboli.  Correlation with CT chest with contrast is recommended to exclude other etiologies. The results were discussed with Rylee Bowie on 12/9/2020 7:35 PM.    X-ray Chest 1 View    Result Date: 12/18/2020  IMPRESSION: Interval  placement of a right-sided PICC line catheter as described above.     X-ray Chest 1 View    Result Date: 12/18/2020  IMPRESSION: Interval placement of a right-sided PICC line catheter as described above.     X-ray Chest 1 View    Result Date: 12/9/2020  IMPRESSION: No radiographic evidence of acute cardiopulmonary disease.    Ct Chest Pulmonary Embolism With Iv Contrast    Result Date: 12/9/2020  IMPRESSION: 1.  No evidence of main, lobar or segmental pulmonary embolism. 2.  However, there are multifocal nodular lesions throughout all lung fields including a cavitary lesion in the right upper lobe all suspicious for septic emboli.  Some of the tiny subsegmental pulmonary arterial branches demonstrate hypoenhancement and it is difficult to determinate if this is related to tiny pulmonary emboli or technique. 3.  Small bilateral pleural effusions. 4.  Mosaic pattern at the lung bases could represent mild edema or sequela of small airways or small vessels disease. 5.  Follow-up chest CT after appropriate treatment is recommended to document lung opacities and mediastinal lymphadenopathy which is probably reactive. 6.  Additional findings discussed below. Finding:    Other   Acuity: Critical  Status:  CLOSED Critical read back for the first impression performed with Bertha Bowie PA 9:25 PM 12/9/2020 COMMENT: Comparison: Chest x-ray from 12/9/2020 Technique: Chest CT pulmonary embolism protocol performed with intravenous contrast.  80 mL Omnipaque 350 given. CT DOSE:  One or more dose reduction techniques (e.g. automated exposure control, adjustment of the mA and/or kV according to patient size, use of iterative reconstruction technique) utilized for this examination. FINDINGS: LUNG, PLEURA AND LARGE AIRWAYS: The trachea and proximal bronchi remain clear. There are multifocal nodular lesions throughout the lungs.  For example in the right upper lobe there is a cavitary lesion measuring 1.4 cm on axial image 63. A  1.6 cm nodular lesion in the left upper lobe on image 69.  Multiple additional lesions are present elsewhere.  His are suspicious for septic emboli particularly given the history of endocarditis and sepsis. There are small bilateral pleural effusions. There is a nonspecific mild degree of groundglass opacity in the lower lung fields.  This could represent mild degree of pulmonary edema or possibly a mosaic pattern from small airways or small vessel disease. No pneumothorax. VESSELS: Normal caliber of the thoracic aorta.  Thoracic aorta appears within normal limits.  Main pulmonary artery is normal in caliber.  No evidence of a main, lobar or segmental pulmonary embolism.  However, the subsegmental vessels are difficult to assess and some demonstrate a slightly hypoechoic enhancing appearance possibly artifact from bolus timing although tiny subsegmental pulmonary emboli would be difficult to exclude.  For example, in the lingula there is some hypoenhancement of the subsegmental vessels on image 116 where subsegmental PE cannot be excluded.  No evidence of right heart strain. HEART: normal size. No pericardial effusion. MEDIASTINUM AND PAULINA: There is a pathologically enlarged lymph node in the AP window measuring 1.8 cm short axis.  Additional prominent but nonpathologic by size video lymph nodes elsewhere. CHEST WALL AND LOWER NECK: within normal limits. UPPER ABDOMEN:Limited assessment of the upper abdominal structures with this technique.  There is splenomegaly with the spleen measuring 14.5 cm.  The liver may also be enlarged but is incompletely visualized.  Cholecystectomy changes. BONES: No suspicious bony erosive changes.  Multilevel Schmorl's nodes and degenerative changes in the spine are noted.  Calcific tendinosis of the right rotator cuff.  There is limited assessment of the spine with this technique.  No gross paraspinal inflammatory changes.     Ultrasound Abdomen Limited    Result Date:  12/17/2020  IMPRESSION: Mild increase in echotexture of the liver may represent fibrofatty infiltration. COMMENT: Ultrasound evaluation of the right upper quadrant was performed and compared to a CT exam dated 8/18/2019.  The pancreas is of normal echotexture and unremarkable.  The aorta is obscured by bowel gas.  The liver is 17.6 cm in length and of increased echogenicity consistent with fibrofatty infiltration. There is no ductal dilatation or mass lesion.  Common bile duct measures 2.3 mm at the pito hepatis.  The right kidney is 10.5 cm in length.  There is no nephrolithiasis, hydronephrosis or mass lesion.  The renal cortex of normal thickness and echotexture.       ECG/Telemetry     ASSESSMENT AND PLAN      * Acute bacterial endocarditis  Assessment & Plan  -Tricuspid valve endocarditis  -Sepsis from acute bacterial endocarditis present on admission  -Blood cultures from [place] MSSA, repeat cx NGTD  -PICC line in place  -JANUSZ with TV endocarditis, Limited echo 12/30 with no major change in TV/TR  -Continue abx (started 12/9, changed to Cefazolin) -6 wks, (1/19/20 last day),  today is 26/42  -Checking labs every Monday      Septic arthritis of vertebra (CMS/HCC)  Assessment & Plan  -MRI with L4-5 infectious process  -Neurovascular intact  -Per neurosurg, f/u MRI after completion of abx      Septic embolism (CMS/HCC)  Assessment & Plan  -CT chest 12/9  multifocal nodular lesions all lung fields, cavitary lesion RUL c/f septic emboli  -Septic emboli, infectious endocarditis, discitis, MSSA  -Blood cultures performed on admission, NGTD  -Continue IV abx per ID total duration 42 days  -Will need f/u chest CT to document resolution    Opioid type dependence, continuous use (CMS/HCC)  Assessment & Plan  -likely still using IV drugs, given presentation  -Psych consulted, increased methadone to 40mg scheduled + 10mg PRN.    -QTc monitoring with EKG, ok so far.  -Palliative/Pain consult, patient reports continued  pain.   -Lidoderm patch makes her sweat.  Scheduled Tylenol  -Despite significant improvement after injections for migraine, still desires telepsych consult to consider increasing Methadone, given ongoing back pain    Migraine, intractable  Assessment & Plan  -S/p Sumatriptan, Fioricet, IV Benadryl.  No relief and describes ongoing migraine since admit  -Continue Methadone for opioid dependence, dose increased to 40mg scheduled + 10mg PRN per psych  -Seen by Dr Gonzalez who gave injections with immediate relief, along with scheduling 3 days Pheonbarb to try to wean from Fioricet    Dyspepsia  Assessment & Plan  -With meals  -Protonix BID, Pepcid BID  -maalox PRN       VTE Assessment: Pad[place] VTE Score: 2  VTE Prophylaxis Plan: Ambulating in room   Code Status: Full Code  Estimated Discharge Date:  12/11/2020     José Manuel Morris MD  1/3/2021  3:46 PM

## 2021-01-03 NOTE — CONSULTS
Neurology Consult Note    Assessment/Plan   Migraine, intractable  Assessment & Plan  Received some relief from her headache from the Fioricet.  I gave her left occipital nerve block, left supraorbital nerve block, and bilateral levator scapula tendon injections and the patient reports almost immediate relief of symptoms.    We extensively discussed the medication overuse as a contributing factor to her headaches, and I will give her 3 days of phenobarbital to detox her from short acting barbiturates.  She is agreeable to try to avoid using Fioricet for the next week.  Also discussed body mechanics that are contributing to her headaches.    Also extensively discussed the new treatments that she will qualify her for excluding Emgality, Ubrelvy and Nurtec for which she will see me in the office    * Acute bacterial endocarditis  Assessment & Plan  IV antibiotics      Subjective:   Subjective   Neurology consult for intractable migraine  Carolyn Redmond is a 35 y.o. right handed female who was admitted for bacterial endocarditis, on long-duration antibiotics.    She has a longstanding history of migraines in her teens, they have generally been reasonably well controlled in the past, she gets 1 or 2 headaches per week.  Initially she was treated with short acting opioids, and got in trouble with the them in her 20s which she is upfront about.    She has been on multiple long-term preventive medications, including Inderal, Topamax.  Previous short acting medications used and currently being used are Fioricet and sumatriptan.  Sumatriptan usually does not work, and Reglan and Toradol do not help her.    Her present headache is throbbing, with associated photophobia, and has persisted for the past 2 weeks.  She attributes it to poor sleep, not eating well, and the stress of being sick.  Associated with neck pain.    Review of Systems  All other systems reviewed and negative except as noted in the HPI.    Allergies:  Amoxicillin, Nsaids (non-steroidal anti-inflammatory drug), Trazodone, and Tramadol    Medical History:   Past Medical History:   Diagnosis Date   • Anxiety    • Depressed    • Endocarditis    • Fibromyalgia    • Migraines    • Pancreatitis    • Tachycardia    • Vasculitis (CMS/HCC)        Surgical History:   Past Surgical History:   Procedure Laterality Date   • CHOLECYSTECTOMY     • ERCP     • GALLBLADDER SURGERY         Social History:   Social History     Socioeconomic History   • Marital status: Single     Spouse name: None   • Number of children: None   • Years of education: None   • Highest education level: None   Occupational History   • None   Social Needs   • Financial resource strain: None   • Food insecurity     Worry: None     Inability: None   • Transportation needs     Medical: None     Non-medical: None   Tobacco Use   • Smoking status: Former Smoker     Packs/day: 0.50     Types: Cigarettes   • Smokeless tobacco: Current User   • Tobacco comment: quit 1 year ago, vapes currently   Substance and Sexual Activity   • Alcohol use: Not Currently     Comment: social   • Drug use: Yes     Types: Marijuana   • Sexual activity: Defer   Lifestyle   • Physical activity     Days per week: None     Minutes per session: None   • Stress: None   Relationships   • Social connections     Talks on phone: None     Gets together: None     Attends Jew service: None     Active member of club or organization: None     Attends meetings of clubs or organizations: None     Relationship status: None   • Intimate partner violence     Fear of current or ex partner: None     Emotionally abused: None     Physically abused: None     Forced sexual activity: None   Other Topics Concern   • None   Social History Narrative   • None       Family History:   Family History   Problem Relation Age of Onset   • No Known Problems Biological Mother    • Lung cancer Biological Father        Objective     Current Facility-Administered  "Medications   Medication Dose Route Frequency   • acetaminophen  650 mg oral q8h JAIDEN   • alum-mag hydroxide-simeth  30 mL oral q6h PRN   • buPROPion  75 mg oral TID   • butalbital-acetaminophen-caff  2 tablet oral q6h PRN   • ceFAZolin  2 g intravenous q8h INT   • cyclobenzaprine  10 mg oral 3x daily PRN   • glucose  16-32 g of dextrose oral PRN    Or   • dextrose  15-30 g of dextrose oral PRN    Or   • glucagon  1 mg intramuscular PRN    Or   • dextrose in water  25 mL intravenous PRN   • diphenhydrAMINE  25 mg intravenous q6h PRN   • famotidine  10 mg oral BID AC   • fluticasone propionate  2 spray Each Nostril Daily PRN   • hydrocortisone-pramoxine   rectal 4x daily PRN   • hydrOXYzine  25 mg oral Nightly PRN   • lactobacillus acidophilus complex  1 tablet oral BID   • lidocaine  1 patch Topical Daily   • magnesium sulfate  2 g intravenous PRN   • methadone  10 mg oral Daily PRN   • methadone  40 mg oral Daily (6a)   • metoprolol succinate XL  25 mg oral Daily   • naloxone  0.2 mg intravenous Once PRN   • nicotine  1 patch transdermal Daily   • nicotine polacrilex  4 mg buccal q4h PRN   • pantoprazole  40 mg oral BID   • PARoxetine  30 mg oral Daily   • PHENobarbitaL  32.4 mg oral BID   • potassium chloride  20 mEq oral Daily PRN    Or   • potassium chloride  40 mEq oral Daily PRN    Or   • potassium chloride  20 mEq intravenous Daily PRN    Or   • potassium chloride  40 mEq intravenous Daily PRN   • prazosin  2 mg oral Nightly   • pregabalin  150 mg oral BID   • psyllium husk  1 packet oral Daily   • QUEtiapine  100 mg oral Nightly   • trimethobenzamide  300 mg oral q8h JAIDEN   • zolpidem  10 mg oral Nightly       Visit Vitals  /60 (BP Location: Left upper arm, Patient Position: Lying)   Pulse 74   Temp 36.7 °C (98.1 °F) (Oral)   Resp 18   Ht 1.575 m (5' 2\")   Wt 102 kg (225 lb)   LMP 11/18/2020   SpO2 98%   Breastfeeding No   BMI 41.15 kg/m²       Neuro Exam  Gen: NAD,   HEENT: MMM  Eyes: no scleral " icterus, PERRLA  CV:  no peripheral edema  P:  not labored  GI: nontender  PSY: AAOx3, no aphasia, no dysarthria, nl I/J  MSK: 5/5 b/l UE/LE, no drift, no atrophy, nl tone, no abnormal movements.  Tender left supraorbital nerve, tender left great occipital nerve, tender bilateral levator scapulae tendon, mildly tender right Erb's point with palpated rib subluxation though Ivon and Ulloa signs are negative  CN: VFF, EOMI, no nystagmus, symmetric, facial sensation nl, hearing nl,  traps 5/5, tongue/uvula/palate midline  S:  LT/PP/T/V nl  DTR: 2+ and symmetric, Toes ??  Coord: FNF intact      Labs  Viewed, including sodium 139    Neurologic Studies  Reviewed reports of MRI thoracic and lumbar spine, epidural enhancement at L4-L5 concerning for infection      Deni Gonzalez MD  1/3/2021 2:23 PM

## 2021-01-03 NOTE — PROCEDURES
Lidocaine and steroid Injection Note       After risks, benefits indications, potential complications and alternatives were explained verbal consent was obtained from patient. All questions were answered.      Medication used:   2.5 cc of Lidocaine 2%  1.5 mg of Celestone  3 cc syringe, 22g x 1.5 in needle  3 injections were prepared this way  Then in a 0.5 cc insulin syringe, 0.25 cc lidocaine was drawn    The injection area was identified and the skin prepped with alcohol and the alcohol allowed to dry.    Once negative aspiration was confirmed, the medicines were injected and the needle removed.      The patient did tolerated the procedure well and there were no complications.      Location injected:    Left great occipital nerve at the splenius capitis  Bilateral levator scapulae tendon  Each injected with contents of 3 cc syringe   then the left Supra orbital nerve was injected with 0.25 cc of lidocaine using the insulin syringe

## 2021-01-03 NOTE — PLAN OF CARE
Plan of Care Review  Plan of Care Reviewed With: patient  Progress: improving  Outcome Summary: Patient reports that her migraine feels better since getting the nerve blocks by neuro. Started on phenobarb. Independent in the room. Assessment unchanged.

## 2021-01-03 NOTE — ASSESSMENT & PLAN NOTE
Headaches much better after cycle broken.  Counseled her again about medication overuse headache, okay to take Fioricet but no more than 4 days/month, and over-the-counter medications no more than 15 days/month.  Encouraged her to try again sumatriptan hand at lower doses, which may become effective again now that her headaches are not as severe.    For her wake-up headaches, I recommended a sleep study, as of sleep apnea is a cause for daily headaches that is not amenable to medication.    Also again discussed the new treatments that she will qualify her for excluding Emgality, Ubrelvy and Nurtec for which she will see me in the office, though may be difficult with [facility] first.

## 2021-01-03 NOTE — ASSESSMENT & PLAN NOTE
-Tricuspid valve endocarditis  -Sepsis from acute bacterial endocarditis present on admission  -Blood cultures from [place] [facility], repeat cx NGTD  -PICC line in place  -JANUSZ with TV endocarditis, Limited echo 12/30 with no major change in TV/TR  -Continue abx (started 12/9, changed to Cefazolin) -6 wks, (1/19/20 last day),  today is 26/42  -Checking labs every Monday

## 2021-01-03 NOTE — ASSESSMENT & PLAN NOTE
-MRI with L4-5 infectious process  -Neurovascular intact  -Per neurosurg, f/u MRI after completion of abx

## 2021-01-04 LAB
ANION GAP SERPL CALC-SCNC: 10 MEQ/L (ref 3–15)
BUN SERPL-MCNC: 14 MG/DL (ref 8–20)
CALCIUM SERPL-MCNC: 9.6 MG/DL (ref 8.9–10.3)
CHLORIDE SERPL-SCNC: 103 MEQ/L (ref 98–109)
CO2 SERPL-SCNC: 25 MEQ/L (ref 22–32)
CREAT SERPL-MCNC: 0.7 MG/DL (ref 0.6–1.1)
ERYTHROCYTE [DISTWIDTH] IN BLOOD BY AUTOMATED COUNT: 15.7 % (ref 11.7–14.4)
GFR SERPL CREATININE-BSD FRML MDRD: >60 ML/MIN/1.73M*2
GLUCOSE SERPL-MCNC: 107 MG/DL (ref 70–99)
HCT VFR BLDCO AUTO: 37.7 % (ref 35–45)
HGB BLD-MCNC: 12 G/DL (ref 11.8–15.7)
MCH RBC QN AUTO: 27.8 PG (ref 28–33.2)
MCHC RBC AUTO-ENTMCNC: 31.8 G/DL (ref 32.2–35.5)
MCV RBC AUTO: 87.3 FL (ref 83–98)
PDW BLD AUTO: 10.3 FL (ref 9.4–12.3)
PLATELET # BLD AUTO: 197 K/UL (ref 150–369)
POTASSIUM SERPL-SCNC: 4.3 MEQ/L (ref 3.6–5.1)
RBC # BLD AUTO: 4.32 M/UL (ref 3.93–5.22)
SARS-COV-2 RNA RESP QL NAA+PROBE: NEGATIVE
SODIUM SERPL-SCNC: 138 MEQ/L (ref 136–144)
WBC # BLD AUTO: 8.05 K/UL (ref 3.8–10.5)

## 2021-01-04 PROCEDURE — 63700000 HC SELF-ADMINISTRABLE DRUG: Performed by: NURSE PRACTITIONER

## 2021-01-04 PROCEDURE — 63700000 HC SELF-ADMINISTRABLE DRUG: Performed by: INTERNAL MEDICINE

## 2021-01-04 PROCEDURE — 63700000 HC SELF-ADMINISTRABLE DRUG: Performed by: HOSPITALIST

## 2021-01-04 PROCEDURE — 80048 BASIC METABOLIC PNL TOTAL CA: CPT | Performed by: INTERNAL MEDICINE

## 2021-01-04 PROCEDURE — 63700000 HC SELF-ADMINISTRABLE DRUG: Performed by: PSYCHIATRY & NEUROLOGY

## 2021-01-04 PROCEDURE — 36415 COLL VENOUS BLD VENIPUNCTURE: CPT | Performed by: INTERNAL MEDICINE

## 2021-01-04 PROCEDURE — 63600000 HC DRUGS/DETAIL CODE: Performed by: INTERNAL MEDICINE

## 2021-01-04 PROCEDURE — U0002 COVID-19 LAB TEST NON-CDC: HCPCS | Performed by: INTERNAL MEDICINE

## 2021-01-04 PROCEDURE — 25000000 HC PHARMACY GENERAL: Performed by: INTERNAL MEDICINE

## 2021-01-04 PROCEDURE — 85027 COMPLETE CBC AUTOMATED: CPT | Performed by: INTERNAL MEDICINE

## 2021-01-04 PROCEDURE — 12000000 HC ROOM AND CARE MED/SURG

## 2021-01-04 PROCEDURE — 99232 SBSQ HOSP IP/OBS MODERATE 35: CPT | Performed by: INTERNAL MEDICINE

## 2021-01-04 PROCEDURE — 25800000 HC PHARMACY IV SOLUTIONS: Performed by: INTERNAL MEDICINE

## 2021-01-04 PROCEDURE — 99233 SBSQ HOSP IP/OBS HIGH 50: CPT | Performed by: PSYCHIATRY & NEUROLOGY

## 2021-01-04 PROCEDURE — 63700000 HC SELF-ADMINISTRABLE DRUG: Performed by: STUDENT IN AN ORGANIZED HEALTH CARE EDUCATION/TRAINING PROGRAM

## 2021-01-04 RX ORDER — KETOROLAC TROMETHAMINE 30 MG/ML
30 INJECTION, SOLUTION INTRAMUSCULAR; INTRAVENOUS EVERY 6 HOURS PRN
Status: DISPENSED | OUTPATIENT
Start: 2021-01-04 | End: 2021-01-09

## 2021-01-04 RX ADMIN — CEFAZOLIN SODIUM 2 G: 10 POWDER, FOR SOLUTION INTRAVENOUS at 20:04

## 2021-01-04 RX ADMIN — PHENOBARBITAL 32.4 MG: 32.4 TABLET ORAL at 08:27

## 2021-01-04 RX ADMIN — TRIMETHOBENZAMIDE HYDROCHLORIDE 300 MG: 300 CAPSULE ORAL at 11:05

## 2021-01-04 RX ADMIN — PROBIOTIC PRODUCT - TAB 1 TABLET: TAB at 20:04

## 2021-01-04 RX ADMIN — PREGABALIN 150 MG: 75 CAPSULE ORAL at 08:27

## 2021-01-04 RX ADMIN — TRIMETHOBENZAMIDE HYDROCHLORIDE 300 MG: 300 CAPSULE ORAL at 19:13

## 2021-01-04 RX ADMIN — METHADONE HYDROCHLORIDE 40 MG: 10 TABLET ORAL at 05:05

## 2021-01-04 RX ADMIN — KETOROLAC TROMETHAMINE 30 MG: 30 INJECTION, SOLUTION INTRAMUSCULAR at 15:21

## 2021-01-04 RX ADMIN — ACETAMINOPHEN 650 MG: 325 TABLET, FILM COATED ORAL at 21:07

## 2021-01-04 RX ADMIN — BUPROPION HYDROCHLORIDE 75 MG: 75 TABLET, FILM COATED ORAL at 15:21

## 2021-01-04 RX ADMIN — CEFAZOLIN SODIUM 2 G: 10 POWDER, FOR SOLUTION INTRAVENOUS at 11:34

## 2021-01-04 RX ADMIN — DIPHENHYDRAMINE HYDROCHLORIDE 25 MG: 50 INJECTION INTRAMUSCULAR; INTRAVENOUS at 21:07

## 2021-01-04 RX ADMIN — NICOTINE POLACRILEX 4 MG: 4 GUM, CHEWING ORAL at 10:13

## 2021-01-04 RX ADMIN — ACETAMINOPHEN 650 MG: 325 TABLET, FILM COATED ORAL at 13:39

## 2021-01-04 RX ADMIN — KETOROLAC TROMETHAMINE 30 MG: 30 INJECTION, SOLUTION INTRAMUSCULAR at 21:07

## 2021-01-04 RX ADMIN — QUETIAPINE FUMARATE 100 MG: 100 TABLET ORAL at 21:07

## 2021-01-04 RX ADMIN — PHENOBARBITAL 32.4 MG: 32.4 TABLET ORAL at 20:05

## 2021-01-04 RX ADMIN — METOPROLOL SUCCINATE 25 MG: 25 TABLET, EXTENDED RELEASE ORAL at 08:27

## 2021-01-04 RX ADMIN — BUPROPION HYDROCHLORIDE 75 MG: 75 TABLET, FILM COATED ORAL at 08:27

## 2021-01-04 RX ADMIN — PREGABALIN 150 MG: 75 CAPSULE ORAL at 20:04

## 2021-01-04 RX ADMIN — DIPHENHYDRAMINE HYDROCHLORIDE 25 MG: 50 INJECTION INTRAMUSCULAR; INTRAVENOUS at 13:39

## 2021-01-04 RX ADMIN — Medication 1 PATCH: at 08:29

## 2021-01-04 RX ADMIN — PROBIOTIC PRODUCT - TAB 1 TABLET: TAB at 08:27

## 2021-01-04 RX ADMIN — FAMOTIDINE 10 MG: 10 TABLET, FILM COATED ORAL at 15:21

## 2021-01-04 RX ADMIN — ACETAMINOPHEN 650 MG: 325 TABLET, FILM COATED ORAL at 05:06

## 2021-01-04 RX ADMIN — PANTOPRAZOLE SODIUM 40 MG: 40 TABLET, DELAYED RELEASE ORAL at 20:04

## 2021-01-04 RX ADMIN — PAROXETINE 30 MG: 20 TABLET, FILM COATED ORAL at 08:26

## 2021-01-04 RX ADMIN — CYCLOBENZAPRINE HYDROCHLORIDE 10 MG: 5 TABLET, FILM COATED ORAL at 08:36

## 2021-01-04 RX ADMIN — FAMOTIDINE 10 MG: 10 TABLET, FILM COATED ORAL at 08:27

## 2021-01-04 RX ADMIN — METHADONE HYDROCHLORIDE 10 MG: 10 TABLET ORAL at 13:38

## 2021-01-04 RX ADMIN — ZOLPIDEM TARTRATE 10 MG: 5 TABLET, COATED ORAL at 21:07

## 2021-01-04 RX ADMIN — PANTOPRAZOLE SODIUM 40 MG: 40 TABLET, DELAYED RELEASE ORAL at 08:27

## 2021-01-04 RX ADMIN — DIPHENHYDRAMINE HYDROCHLORIDE 25 MG: 50 INJECTION INTRAMUSCULAR; INTRAVENOUS at 08:29

## 2021-01-04 RX ADMIN — CEFAZOLIN SODIUM 2 G: 10 POWDER, FOR SOLUTION INTRAVENOUS at 05:07

## 2021-01-04 RX ADMIN — BUPROPION HYDROCHLORIDE 75 MG: 75 TABLET, FILM COATED ORAL at 11:05

## 2021-01-04 RX ADMIN — PRAZOSIN HYDROCHLORIDE 2 MG: 1 CAPSULE ORAL at 21:07

## 2021-01-04 NOTE — PROGRESS NOTES
Hospital Medicine Service -  Daily Progress Note       SUBJECTIVE   - Pt reports she had dramatic improvement with the pain shot but it only lasted 4 hours.   - wants ketorolac ordered for HA  - seen by psychiatry this AM - Quetiapine dose adjusted.    OBJECTIVE      Vital signs in last 24 hours:  Temp:  [36.3 °C (97.3 °F)-36.6 °C (97.9 °F)] 36.5 °C (97.7 °F)  Heart Rate:  [77-82] 78  Resp:  [16-17] 16  BP: (137-146)/(72-81) 137/81  No intake or output data in the 24 hours ending 01/04/21 1513    PHYSICAL EXAMINATION      Physical Exam  Vitals signs and nursing note reviewed.   Constitutional:       Appearance: She is well-developed.   HENT:      Head: Normocephalic and atraumatic.      Mouth/Throat:      Mouth: Mucous membranes are moist.   Eyes:      Pupils: Pupils are equal, round, and reactive to light.   Neck:      Musculoskeletal: Normal range of motion.   Cardiovascular:      Rate and Rhythm: Regular rhythm.   Pulmonary:      Effort: Pulmonary effort is normal.   Abdominal:      Palpations: Abdomen is soft.   Skin:     General: Skin is dry.   Neurological:      Mental Status: She is alert and oriented to person, place, and time.        LABS / IMAGING / TELE      Labs  Lab Results   Component Value Date    GLUCOSE 107 (H) 01/04/2021    CALCIUM 9.6 01/04/2021     01/04/2021    K 4.3 01/04/2021    CO2 25 01/04/2021     01/04/2021    BUN 14 01/04/2021    CREATININE 0.7 01/04/2021     Lab Results   Component Value Date    WBC 8.05 01/04/2021    HGB 12.0 01/04/2021    HCT 37.7 01/04/2021    MCV 87.3 01/04/2021     01/04/2021     Microbiology Results     Procedure Component Value Units Date/Time    SARS-CoV-2 (COVID-19), PCR Nasopharynx [[id number]]  (Normal) Collected: 12/17/20 1653    Specimen: Nasopharyngeal Swab from Nasopharynx Updated: 12/17/20 1035    Narrative:      The following orders were created for panel order SARS-CoV-2 (COVID-19), PCR Nasopharynx.  Procedure                                Abnormality         Status                     ---------                               -----------         ------                     SARS-CoV-2 (COVID-19), P...[[id number]]  Normal              Final result                 Please view results for these tests on the individual orders.    SARS-CoV-2 (COVID-19), PCR Nasopharynx [[id number]]  (Normal) Collected: 12/17/20 1653    Specimen: Nasopharyngeal Swab from Nasopharynx Updated: 12/17/20 1747     SARS-CoV-2 (COVID-19) Negative    Blood Culture Blood, Venous [[id number]] Collected: 12/11/20 1339    Specimen: Blood, Venous Updated: 12/16/20 1801     Culture No growth at 120 hours    Blood Culture Blood, Venous [[id number]] Collected: 12/10/20 1504    Specimen: Blood, Venous Updated: 12/15/20 2100     Culture No growth at 120 hours    Blood Culture Blood, Venous [[id number]] Collected: 12/10/20 1504    Specimen: Blood, Venous Updated: 12/15/20 2100     Culture No growth at 120 hours    Blood Culture Blood, Venous [[id number]] Collected: 12/09/20 1348    Specimen: Blood, Venous Updated: 12/14/20 1800     Culture No growth at 120 hours    Blood Culture Blood, Venous [[id number]] Collected: 12/09/20 1304    Specimen: Blood, Venous Updated: 12/14/20 1501     Culture No growth at 120 hours        Imaging  Mri Thoracic Spine With And Without Contrast    Result Date: 12/9/2020  IMPRESSION: Diffuse anterior and posterior epidural enhancement within lumbar spine more pronounced at L4-L5 raising the suspicion for underlying infectious process/phlegmon.  Left greater than right L4-L5 facet joint signal and enhancement which although may be degenerative in nature, early changes of septic facet arthritis is in the differential.  Follow-up is recommended. Thoracic and lumbar degenerative change as described more pronounced at L4-L5. Ring-enhancing lesion in right upper lobe which may be infectious in etiology including in the setting of septic emboli.  Correlation with CT chest  with contrast is recommended to exclude other etiologies. The results were discussed with Rylee Bowie on 12/9/2020 7:35 PM.    Mri Lumbar Spine With And Without Contrast    Result Date: 12/9/2020  IMPRESSION: Diffuse anterior and posterior epidural enhancement within lumbar spine more pronounced at L4-L5 raising the suspicion for underlying infectious process/phlegmon.  Left greater than right L4-L5 facet joint signal and enhancement which although may be degenerative in nature, early changes of septic facet arthritis is in the differential.  Follow-up is recommended. Thoracic and lumbar degenerative change as described more pronounced at L4-L5. Ring-enhancing lesion in right upper lobe which may be infectious in etiology including in the setting of septic emboli.  Correlation with CT chest with contrast is recommended to exclude other etiologies. The results were discussed with Rylee Bowie on 12/9/2020 7:35 PM.    X-ray Chest 1 View    Result Date: 12/18/2020  IMPRESSION: Interval placement of a right-sided PICC line catheter as described above.     X-ray Chest 1 View    Result Date: 12/18/2020  IMPRESSION: Interval placement of a right-sided PICC line catheter as described above.     X-ray Chest 1 View    Result Date: 12/9/2020  IMPRESSION: No radiographic evidence of acute cardiopulmonary disease.    Ct Chest Pulmonary Embolism With Iv Contrast    Result Date: 12/9/2020  IMPRESSION: 1.  No evidence of main, lobar or segmental pulmonary embolism. 2.  However, there are multifocal nodular lesions throughout all lung fields including a cavitary lesion in the right upper lobe all suspicious for septic emboli.  Some of the tiny subsegmental pulmonary arterial branches demonstrate hypoenhancement and it is difficult to determinate if this is related to tiny pulmonary emboli or technique. 3.  Small bilateral pleural effusions. 4.  Mosaic pattern at the lung bases could represent mild edema or sequela of small airways  or small vessels disease. 5.  Follow-up chest CT after appropriate treatment is recommended to document lung opacities and mediastinal lymphadenopathy which is probably reactive. 6.  Additional findings discussed below. Finding:    Other   Acuity: Critical  Status:  CLOSED Critical read back for the first impression performed with Bertha Bowie PA 9:25 PM 12/9/2020 COMMENT: Comparison: Chest x-ray from 12/9/2020 Technique: Chest CT pulmonary embolism protocol performed with intravenous contrast.  80 mL Omnipaque 350 given. CT DOSE:  One or more dose reduction techniques (e.g. automated exposure control, adjustment of the mA and/or kV according to patient size, use of iterative reconstruction technique) utilized for this examination. FINDINGS: LUNG, PLEURA AND LARGE AIRWAYS: The trachea and proximal bronchi remain clear. There are multifocal nodular lesions throughout the lungs.  For example in the right upper lobe there is a cavitary lesion measuring 1.4 cm on axial image 63. A 1.6 cm nodular lesion in the left upper lobe on image 69.  Multiple additional lesions are present elsewhere.  His are suspicious for septic emboli particularly given the history of endocarditis and sepsis. There are small bilateral pleural effusions. There is a nonspecific mild degree of groundglass opacity in the lower lung fields.  This could represent mild degree of pulmonary edema or possibly a mosaic pattern from small airways or small vessel disease. No pneumothorax. VESSELS: Normal caliber of the thoracic aorta.  Thoracic aorta appears within normal limits.  Main pulmonary artery is normal in caliber.  No evidence of a main, lobar or segmental pulmonary embolism.  However, the subsegmental vessels are difficult to assess and some demonstrate a slightly hypoechoic enhancing appearance possibly artifact from bolus timing although tiny subsegmental pulmonary emboli would be difficult to exclude.  For example, in the lingula there is  some hypoenhancement of the subsegmental vessels on image 116 where subsegmental PE cannot be excluded.  No evidence of right heart strain. HEART: normal size. No pericardial effusion. MEDIASTINUM AND PAULINA: There is a pathologically enlarged lymph node in the AP window measuring 1.8 cm short axis.  Additional prominent but nonpathologic by size video lymph nodes elsewhere. CHEST WALL AND LOWER NECK: within normal limits. UPPER ABDOMEN:Limited assessment of the upper abdominal structures with this technique.  There is splenomegaly with the spleen measuring 14.5 cm.  The liver may also be enlarged but is incompletely visualized.  Cholecystectomy changes. BONES: No suspicious bony erosive changes.  Multilevel Schmorl's nodes and degenerative changes in the spine are noted.  Calcific tendinosis of the right rotator cuff.  There is limited assessment of the spine with this technique.  No gross paraspinal inflammatory changes.     Ultrasound Abdomen Limited    Result Date: 12/17/2020  IMPRESSION: Mild increase in echotexture of the liver may represent fibrofatty infiltration. COMMENT: Ultrasound evaluation of the right upper quadrant was performed and compared to a CT exam dated 8/18/2019.  The pancreas is of normal echotexture and unremarkable.  The aorta is obscured by bowel gas.  The liver is 17.6 cm in length and of increased echogenicity consistent with fibrofatty infiltration. There is no ductal dilatation or mass lesion.  Common bile duct measures 2.3 mm at the pito hepatis.  The right kidney is 10.5 cm in length.  There is no nephrolithiasis, hydronephrosis or mass lesion.  The renal cortex of normal thickness and echotexture.      • acetaminophen  650 mg oral q8h JAIDEN   • buPROPion  75 mg oral TID   • ceFAZolin  2 g intravenous q8h INT   • famotidine  10 mg oral BID AC   • lactobacillus acidophilus complex  1 tablet oral BID   • lidocaine  1 patch Topical Daily   • methadone  40 mg oral Daily (6a)   • metoprolol  succinate XL  25 mg oral Daily   • nicotine  1 patch transdermal Daily   • pantoprazole  40 mg oral BID   • PARoxetine  30 mg oral Daily   • PHENobarbitaL  32.4 mg oral BID   • prazosin  2 mg oral Nightly   • pregabalin  150 mg oral BID   • psyllium husk  1 packet oral Daily   • QUEtiapine  100 mg oral Nightly   • trimethobenzamide  300 mg oral q8h JAIDEN   • zolpidem  10 mg oral Nightly     ECG/Telemetry  I have independently reviewed the telemetry. No events for the last 24 hours.    ASSESSMENT AND PLAN      * Acute bacterial endocarditis  Assessment & Plan  -Tricuspid valve endocarditis  -Sepsis from acute bacterial endocarditis present on admission  -Blood cultures from [place] [facility], repeat cx NGTD  -PICC line in place  -JANUSZ with TV endocarditis, Limited echo 12/30 with no major change in TV/TR  -Continue abx (started 12/9, changed to Cefazolin) -6 wks, (1/19/20 last day)  -Checking labs every Monday      Dyspepsia  Assessment & Plan  -With meals  -Protonix BID, Pepcid BID  -maalox PRN    Drug-induced constipation  Assessment & Plan  -Resolved  -Continue bowel regimen    Opioid abuse (CMS/HCC)  Assessment & Plan        Tricuspid valve vegetation  Assessment & Plan  -Sessile vegetation on tricuspid valve leaflet  -Continue antibiotics    Anemia  Assessment & Plan        Septic arthritis of vertebra (CMS/HCC)  Assessment & Plan  -MRI with L4-5 infectious process  -Neurovascular intact  -Per neurosurg, f/u MRI after completion of abx      Essential hypertension  Assessment & Plan  -Continue with home meds  -bp parameters for hypotension    Septic embolism (CMS/HCC)  Assessment & Plan  -CT chest 12/9  multifocal nodular lesions all lung fields, cavitary lesion RUL c/f septic emboli  -Septic emboli, infectious endocarditis, discitis, MSSA  -Blood cultures performed on admission, NGTD  -Continue IV abx per ID total duration 42 days  -Will need f/u chest CT to document resolution    Opioid type dependence, continuous  use (CMS/[facility])  Assessment & Plan  -likely still using IV drugs, given presentation  -Psych consulted, increased methadone to 40mg scheduled + 10mg PRN.    -QTc monitoring with EKG, ok so far.  -Palliative/Pain consult, patient reports continued pain.   -Lidoderm patch makes her sweat.  Scheduled Tylenol  -Despite significant improvement after injections for migraine, still desires telepsych consult to consider increasing Methadone, given ongoing back pain    Migraine, intractable  Assessment & Plan  -S/p Sumatriptan, Fioricet, IV Benadryl.  No relief and describes ongoing migraine since admit  -Continue Methadone for opioid dependence, dose increased to 40mg scheduled + 10mg PRN per psych  -Seen by Dr Gonzalez who gave injections with immediate relief, along with scheduling 3 days Pheonbarb to try to wean from Fioricet    Insomnia  Assessment & Plan  -Continue seroquel, hydroxyzine, prazosin, zolpidem    Depression  Assessment & Plan  -see mood d/o section      Anxiety  Assessment & Plan  -continue current medication regimen as per psych             VTE Assessment: [place] VTE Score: 2  Code Status: Full Code  Estimated discharge date: 12/11/2020     Miguel Gay MD  1/4/2021  3:13 PM

## 2021-01-04 NOTE — NURSING NOTE
Patient frequent c/o headache, states nothing is helping her, only thing helped her was nerve block yesterday but for only 3 hrs, medicated frequently with meds per request. Independent in room, encouraged to ambulate in hallway, appetite good, eating several ice creams, yogurts and water ice.

## 2021-01-04 NOTE — PROGRESS NOTES
[facility] contacted [facility] twice this AM. Spoke with admissions and left a message for the person at [facility] who is following pts case. They said at this time there is no bed for this pt. They are aware of her needs and that they received clinical last week. [facility] following to assist further, as needed.    PLAN: continue to follow for placement for IV antibiotics.

## 2021-01-04 NOTE — PROGRESS NOTES
Psychiatry Progress Note    Chief Complaint/Reason for follow-up: opioid use disorder    Interval History: patient seen again for follow-up in the setting of ongoing headaches, pain and cravings for opioids.  She states she starts to get anxious and irritable earlier and earlier and is interested in considering going up on methadone again.  I remind her she is being tapered from barbiturates and is also sick with infection.  She agrees to give it another day and revisit for increase on Wednesday if indicated.  Denies any thoughts to harm/kill herself or anyone else whatsoever.    Review of Systems:   Sleep:  Fair and Appetite: Fair    Vital Signs for the last 24 hours:  Temp:  [36.3 °C (97.3 °F)-36.6 °C (97.9 °F)] 36.3 °C (97.3 °F)  Heart Rate:  [77-82] 82  Resp:  [16-17] 16  BP: (140-146)/(72-81) 146/81      Current Facility-Administered Medications:   •  acetaminophen (TYLENOL) tablet 650 mg, 650 mg, oral, q8h JAIDEN, José Manuel Morris MD, 650 mg at 01/04/21 1339  •  alum-mag hydroxide-simeth (MAALOX) 200-200-20 mg/5 mL suspension 30 mL, 30 mL, oral, q6h PRN, Lili Crawford MD  •  buPROPion (WELLBUTRIN) tablet 75 mg, 75 mg, oral, TID, Marlon Padron MD, 75 mg at 01/04/21 1105  •  ceFAZolin (ANCEF) NSS IVPB piggyback 2 g, 2 g, intravenous, q8h INT, Vikas Jones MD, Stopped at 01/04/21 1228  •  cyclobenzaprine (FLEXERIL) tablet 10 mg, 10 mg, oral, 3x daily PRN, Marizol Ortiz CRNP, 10 mg at 01/04/21 0836  •  glucose chewable tablet 16-32 g of dextrose, 16-32 g of dextrose, oral, PRN **OR** dextrose 40 % oral gel 15-30 g of dextrose, 15-30 g of dextrose, oral, PRN **OR** glucagon (GLUCAGEN) injection 1 mg, 1 mg, intramuscular, PRN **OR** dextrose in water injection 12.5 g, 25 mL, intravenous, PRN, Perfecto Angela MD  •  diphenhydrAMINE (BENADRYL) injection 25 mg, 25 mg, intravenous, q6h PRN, Lili Crawford MD, 25 mg at 01/04/21 6115  •  famotidine (PEPCID) tablet 10 mg, 10 mg, oral, BID AC,  Lili Crawford MD, 10 mg at 01/04/21 0827  •  fluticasone propionate (FLONASE) 50 mcg/actuation nasal spray 2 spray, 2 spray, Each Nostril, Daily PRN, Perfecto Angela MD  •  hydrocortisone-pramoxine (ANALPRAM-HC) 2.5-1 % rectal cream, , rectal, 4x daily PRN, Dominique Powell, DO, Given at 12/19/20 1643  •  hydrOXYzine (ATARAX) tablet 25 mg, 25 mg, oral, Nightly PRN, Dominique Powell, DO, 25 mg at 12/21/20 2119  •  lactobacillus acidophilus complex (BACID) 1 billion cell- 250 mg tablet 1 tablet, 1 tablet, oral, BID, Heidi Toscano CRNP, 1 tablet at 01/04/21 0827  •  lidocaine (ASPERCREME) 4 % topical patch 1 patch, 1 patch, Topical, Daily, Jacki Boss CRNP  •  magnesium sulfate IVPB 2g in 50 mL NSS/D5W/SWFI, 2 g, intravenous, PRN, Perfecto Angela MD  •  methadone (DOLOPHINE) tablet 10 mg, 10 mg, oral, Daily PRN, Lili Crawford MD, 10 mg at 01/04/21 1338  •  methadone (DOLOPHINE) tablet 40 mg, 40 mg, oral, Daily (6a), Lili Crawford MD, 40 mg at 01/04/21 0505  •  metoprolol succinate XL (TOPROL-XL) 24 hr ER tablet 25 mg, 25 mg, oral, Daily, Lili Crawford MD, 25 mg at 01/04/21 0827  •  naloxone (NARCAN) injection 0.2 mg, 0.2 mg, intravenous, Once PRN, Jacki Boss CRNP  •  nicotine (NICODERM CQ) 14 mg/24 hr patch 1 patch, 1 patch, transdermal, Daily, Gen Hogue MD, 1 patch at 01/04/21 0829  •  nicotine polacrilex (NICORETTE) gum 4 mg, 4 mg, buccal, q4h PRN, Perfecto Angela MD, 4 mg at 01/04/21 1013  •  pantoprazole (PROTONIX) tablet,delayed release (DR/EC) 40 mg, 40 mg, oral, BID, José Manuel Morris MD, 40 mg at 01/04/21 0827  •  PARoxetine (PAXIL) tablet 30 mg, 30 mg, oral, Daily, Marlon Padron MD, 30 mg at 01/04/21 0826  •  PHENobarbitaL (LUMINAL) tablet 32.4 mg, 32.4 mg, oral, BID, Deni Gonzalez MD, 32.4 mg at 01/04/21 0827  •  potassium chloride (KLOR-CON) tablet extended release 20 mEq, 20 mEq, oral, Daily PRN **OR** potassium chloride (KLOR-CON) tablet extended  release 40 mEq, 40 mEq, oral, Daily PRN **OR** potassium chloride 20 mEq in 100 mL IVPB  (premix), 20 mEq, intravenous, Daily PRN **OR** potassium chloride (KCL) 40 mEq/250 mL IVPB in NSS 40 mEq, 40 mEq, intravenous, Daily PRN, Perfecto Angela MD  •  prazosin (MINIPRESS) capsule 2 mg, 2 mg, oral, Nightly, Perfecto Angela MD, 2 mg at 01/03/21 2100  •  pregabalin (LYRICA) capsule 150 mg, 150 mg, oral, BID, Perfecto Angela MD, 150 mg at 01/04/21 0827  •  psyllium husk powder 1 packet, 1 packet, oral, Daily, Dominique Powell DO, 1 packet at 12/22/20 0822  •  QUEtiapine (SEROquel) tablet 100 mg, 100 mg, oral, Nightly, Marlon Padron MD, 100 mg at 01/03/21 2100  •  trimethobenzamide (TIGAN) capsule 300 mg, 300 mg, oral, q8h JAIDEN, Dominique Powell DO, 300 mg at 01/04/21 1105  •  zolpidem (AMBIEN) tablet 10 mg, 10 mg, oral, Nightly, Perfecto Angela MD, 10 mg at 01/03/21 2100    MSE:  Orientation: AAO x3  Behavior: Appropriate  Appearance: well groomed  Eye Contact: Fair throughout  Mood: “i just don't feel great physically”  Affect: stable and appropriate but tired/worn out appearing  Speech: Coherent  Thought Process: Goal Directed and linear   Suicidal Ideation: Denies suicidal thoughts; intent or plan; denies passive death wish  Homicidal Ideation: Denies having homicidal thoughts, intent or plan  Hallucinations: Denies  Delusions: Denies  Cognition: No gross cognitive deficits  Memory: Remote; Immediate; all intact  Insight: Fair  Judgment: Fair      Assessment/Plan  Unspecified mood (affective) disorder (CMS/HCC)  Assessment & Plan  Patient with history of depression and addiction.  1. Continue paroxetine 30mg qam  2. Continue bupropion 75mg TID  3. DC afternoon dose of quetiapine and reduce evening dose to 100mg again.  This will reduce risk of weight gain, qtc prolongation, and sedation.  4.  to please speak with patient about options for inpatient/outpatient rehab and follow-up  with a psychiatrist/therapist      Opioid type dependence, continuous use (CMS/[facility])  Assessment & Plan  1. Consider for increase in methadone to 50mg qam on Wednesday (Hold for sedation; O2 < 95; SBP <100; DBP < 60; HR < 60; RR< 10; QTc > 470)  2. Add a prn dose of methadone 10mg if patient requires for total dose in a day of 50mg.  Long discussion had about this and patient would like the option to increase today.  3.  to please speak with patient about options for inpatient rehab at [place] or [facility] where she can do rehab while receiving iv antibiotics.  Patient is agreeable to this.      Spent 35 minutes with patient, > 50% in consultation    Marlon Padron MD  1/4/2021

## 2021-01-04 NOTE — PATIENT CARE CONFERENCE
Care Progression Rounds Note  Date: 1/4/2021  Time: 10:19 AM     Patient Name: Carolyn Redmond     Medical Record Number: [id number]   YOB: 1985  Sex: Female      Room/Bed: 4211    Admitting Diagnosis: Septic embolism (CMS/[facility]) [I76]  Infection of lumbar spine (CMS/[facility]) [M46.26]  Acute left-sided low back pain without sciatica [M54.5]   Admit Date/Time: 12/9/2020 11:10 AM    Primary Diagnosis: Acute bacterial endocarditis  Principal Problem: Acute bacterial endocarditis    GMLOS: 4.8  Anticipated Discharge Date: 12/11/2020    AM-PAC  Mobility Score:      Discharge Planning:       Barriers to Discharge:  Barriers to Discharge: Medical issues not resolved  Comment: iv antibiotics placement    Participants:  social work/services, nursing

## 2021-01-04 NOTE — ASSESSMENT & PLAN NOTE
Left message for mom to call back.  Cristin Seymour RN     Stable mood today  No SI, HI  Cont with po meds  outpt psych f/u

## 2021-01-05 PROCEDURE — 63700000 HC SELF-ADMINISTRABLE DRUG: Performed by: INTERNAL MEDICINE

## 2021-01-05 PROCEDURE — 63700000 HC SELF-ADMINISTRABLE DRUG: Performed by: NURSE PRACTITIONER

## 2021-01-05 PROCEDURE — 12000000 HC ROOM AND CARE MED/SURG

## 2021-01-05 PROCEDURE — 63600000 HC DRUGS/DETAIL CODE: Performed by: INTERNAL MEDICINE

## 2021-01-05 PROCEDURE — 63700000 HC SELF-ADMINISTRABLE DRUG: Performed by: HOSPITALIST

## 2021-01-05 PROCEDURE — 25000000 HC PHARMACY GENERAL: Performed by: INTERNAL MEDICINE

## 2021-01-05 PROCEDURE — 25800000 HC PHARMACY IV SOLUTIONS: Performed by: INTERNAL MEDICINE

## 2021-01-05 PROCEDURE — 99233 SBSQ HOSP IP/OBS HIGH 50: CPT | Performed by: PSYCHIATRY & NEUROLOGY

## 2021-01-05 PROCEDURE — 63700000 HC SELF-ADMINISTRABLE DRUG: Performed by: STUDENT IN AN ORGANIZED HEALTH CARE EDUCATION/TRAINING PROGRAM

## 2021-01-05 PROCEDURE — 63700000 HC SELF-ADMINISTRABLE DRUG: Performed by: PSYCHIATRY & NEUROLOGY

## 2021-01-05 PROCEDURE — 99232 SBSQ HOSP IP/OBS MODERATE 35: CPT | Performed by: INTERNAL MEDICINE

## 2021-01-05 RX ORDER — METHADONE HYDROCHLORIDE 10 MG/1
50 TABLET ORAL
Status: DISCONTINUED | OUTPATIENT
Start: 2021-01-06 | End: 2021-01-14

## 2021-01-05 RX ADMIN — KETOROLAC TROMETHAMINE 30 MG: 30 INJECTION, SOLUTION INTRAMUSCULAR at 05:03

## 2021-01-05 RX ADMIN — PANTOPRAZOLE SODIUM 40 MG: 40 TABLET, DELAYED RELEASE ORAL at 08:25

## 2021-01-05 RX ADMIN — BUPROPION HYDROCHLORIDE 75 MG: 75 TABLET, FILM COATED ORAL at 11:47

## 2021-01-05 RX ADMIN — CYCLOBENZAPRINE HYDROCHLORIDE 10 MG: 5 TABLET, FILM COATED ORAL at 08:25

## 2021-01-05 RX ADMIN — CEFAZOLIN SODIUM 2 G: 10 POWDER, FOR SOLUTION INTRAVENOUS at 04:29

## 2021-01-05 RX ADMIN — METHADONE HYDROCHLORIDE 10 MG: 10 TABLET ORAL at 14:49

## 2021-01-05 RX ADMIN — FAMOTIDINE 10 MG: 10 TABLET, FILM COATED ORAL at 15:45

## 2021-01-05 RX ADMIN — BUPROPION HYDROCHLORIDE 75 MG: 75 TABLET, FILM COATED ORAL at 08:24

## 2021-01-05 RX ADMIN — QUETIAPINE FUMARATE 100 MG: 100 TABLET ORAL at 21:27

## 2021-01-05 RX ADMIN — PHENOBARBITAL 32.4 MG: 32.4 TABLET ORAL at 20:23

## 2021-01-05 RX ADMIN — ZOLPIDEM TARTRATE 10 MG: 5 TABLET, COATED ORAL at 21:27

## 2021-01-05 RX ADMIN — METHADONE HYDROCHLORIDE 40 MG: 10 TABLET ORAL at 05:02

## 2021-01-05 RX ADMIN — METOPROLOL SUCCINATE 25 MG: 25 TABLET, EXTENDED RELEASE ORAL at 08:25

## 2021-01-05 RX ADMIN — DIPHENHYDRAMINE HYDROCHLORIDE 25 MG: 50 INJECTION INTRAMUSCULAR; INTRAVENOUS at 05:03

## 2021-01-05 RX ADMIN — ACETAMINOPHEN 650 MG: 325 TABLET, FILM COATED ORAL at 21:26

## 2021-01-05 RX ADMIN — CEFAZOLIN SODIUM 2 G: 10 POWDER, FOR SOLUTION INTRAVENOUS at 20:26

## 2021-01-05 RX ADMIN — DIPHENHYDRAMINE HYDROCHLORIDE 25 MG: 50 INJECTION INTRAMUSCULAR; INTRAVENOUS at 18:43

## 2021-01-05 RX ADMIN — KETOROLAC TROMETHAMINE 30 MG: 30 INJECTION, SOLUTION INTRAMUSCULAR at 12:51

## 2021-01-05 RX ADMIN — ACETAMINOPHEN 650 MG: 325 TABLET, FILM COATED ORAL at 14:49

## 2021-01-05 RX ADMIN — KETOROLAC TROMETHAMINE 30 MG: 30 INJECTION, SOLUTION INTRAMUSCULAR at 18:43

## 2021-01-05 RX ADMIN — DIPHENHYDRAMINE HYDROCHLORIDE 25 MG: 50 INJECTION INTRAMUSCULAR; INTRAVENOUS at 12:51

## 2021-01-05 RX ADMIN — BUPROPION HYDROCHLORIDE 75 MG: 75 TABLET, FILM COATED ORAL at 15:45

## 2021-01-05 RX ADMIN — PHENOBARBITAL 32.4 MG: 32.4 TABLET ORAL at 08:25

## 2021-01-05 RX ADMIN — CEFAZOLIN SODIUM 2 G: 10 POWDER, FOR SOLUTION INTRAVENOUS at 11:47

## 2021-01-05 RX ADMIN — PROBIOTIC PRODUCT - TAB 1 TABLET: TAB at 20:23

## 2021-01-05 RX ADMIN — PAROXETINE 30 MG: 20 TABLET, FILM COATED ORAL at 08:24

## 2021-01-05 RX ADMIN — NICOTINE POLACRILEX 4 MG: 4 GUM, CHEWING ORAL at 08:25

## 2021-01-05 RX ADMIN — PREGABALIN 150 MG: 75 CAPSULE ORAL at 20:23

## 2021-01-05 RX ADMIN — FAMOTIDINE 10 MG: 10 TABLET, FILM COATED ORAL at 08:25

## 2021-01-05 RX ADMIN — PANTOPRAZOLE SODIUM 40 MG: 40 TABLET, DELAYED RELEASE ORAL at 20:23

## 2021-01-05 RX ADMIN — PROBIOTIC PRODUCT - TAB 1 TABLET: TAB at 08:25

## 2021-01-05 RX ADMIN — TRIMETHOBENZAMIDE HYDROCHLORIDE 300 MG: 300 CAPSULE ORAL at 11:11

## 2021-01-05 RX ADMIN — Medication 1 PATCH: at 08:34

## 2021-01-05 RX ADMIN — PREGABALIN 150 MG: 75 CAPSULE ORAL at 08:25

## 2021-01-05 RX ADMIN — ACETAMINOPHEN 650 MG: 325 TABLET, FILM COATED ORAL at 05:03

## 2021-01-05 RX ADMIN — PRAZOSIN HYDROCHLORIDE 2 MG: 1 CAPSULE ORAL at 21:27

## 2021-01-05 RX ADMIN — TRIMETHOBENZAMIDE HYDROCHLORIDE 300 MG: 300 CAPSULE ORAL at 18:43

## 2021-01-05 NOTE — PROGRESS NOTES
Hospital Medicine Service -  Daily Progress Note       SUBJECTIVE   - states HA much improved with toradol   - tolerating po well    OBJECTIVE      Vital signs in last 24 hours:  Temp:  [36.4 °C (97.5 °F)-36.8 °C (98.2 °F)] 36.8 °C (98.2 °F)  Heart Rate:  [69-79] 73  Resp:  [16-18] 18  BP: (111-144)/(55-87) 144/84  No intake or output data in the 24 hours ending 01/05/21 1527    PHYSICAL EXAMINATION      Physical Exam  Vitals signs and nursing note reviewed.   Cardiovascular:      Rate and Rhythm: Normal rate and regular rhythm.      Pulses: Normal pulses.   Abdominal:      General: Abdomen is flat.   Musculoskeletal: Normal range of motion.   Neurological:      Mental Status: She is oriented to person, place, and time.        LABS / IMAGING / TELE      Labs  Lab Results   Component Value Date    GLUCOSE 107 (H) 01/04/2021    CALCIUM 9.6 01/04/2021     01/04/2021    K 4.3 01/04/2021    CO2 25 01/04/2021     01/04/2021    BUN 14 01/04/2021    CREATININE 0.7 01/04/2021     Lab Results   Component Value Date    WBC 8.05 01/04/2021    HGB 12.0 01/04/2021    HCT 37.7 01/04/2021    MCV 87.3 01/04/2021     01/04/2021     Microbiology Results     Procedure Component Value Units Date/Time    SARS-CoV-2 (COVID-19), PCR Nasopharynx [[id number]]  (Normal) Collected: 01/04/21 1627    Specimen: Nasopharyngeal Swab from Nasopharynx Updated: 01/04/21 3179    Narrative:      The following orders were created for panel order SARS-CoV-2 (COVID-19), PCR Nasopharynx.  Procedure                               Abnormality         Status                     ---------                               -----------         ------                     SARS-CoV-2 (COVID-19), P...[[id number]]  Normal              Final result                 Please view results for these tests on the individual orders.    SARS-CoV-2 (COVID-19), PCR Nasopharynx [[id number]]  (Normal) Collected: 01/04/21 1627    Specimen: Nasopharyngeal Swab from  Nasopharynx Updated: 01/04/21 1732     SARS-CoV-2 (COVID-19) Negative    SARS-CoV-2 (COVID-19), PCR Nasopharynx [[id number]]  (Normal) Collected: 12/17/20 1653    Specimen: Nasopharyngeal Swab from Nasopharynx Updated: 12/17/20 1747    Narrative:      The following orders were created for panel order SARS-CoV-2 (COVID-19), PCR Nasopharynx.  Procedure                               Abnormality         Status                     ---------                               -----------         ------                     SARS-CoV-2 (COVID-19), P...[[id number]]  Normal              Final result                 Please view results for these tests on the individual orders.    SARS-CoV-2 (COVID-19), PCR Nasopharynx [[id number]]  (Normal) Collected: 12/17/20 1653    Specimen: Nasopharyngeal Swab from Nasopharynx Updated: 12/17/20 1747     SARS-CoV-2 (COVID-19) Negative    Blood Culture Blood, Venous [[id number]] Collected: 12/11/20 1339    Specimen: Blood, Venous Updated: 12/16/20 1801     Culture No growth at 120 hours    Blood Culture Blood, Venous [[id number]] Collected: 12/10/20 1504    Specimen: Blood, Venous Updated: 12/15/20 2100     Culture No growth at 120 hours    Blood Culture Blood, Venous [[id number]] Collected: 12/10/20 1504    Specimen: Blood, Venous Updated: 12/15/20 2100     Culture No growth at 120 hours    Blood Culture Blood, Venous [[id number]] Collected: 12/09/20 1348    Specimen: Blood, Venous Updated: 12/14/20 1800     Culture No growth at 120 hours    Blood Culture Blood, Venous [[id number]] Collected: 12/09/20 1304    Specimen: Blood, Venous Updated: 12/14/20 1501     Culture No growth at 120 hours        Imaging  Mri Thoracic Spine With And Without Contrast    Result Date: 12/9/2020  IMPRESSION: Diffuse anterior and posterior epidural enhancement within lumbar spine more pronounced at L4-L5 raising the suspicion for underlying infectious process/phlegmon.  Left greater than right L4-L5 facet joint signal and  enhancement which although may be degenerative in nature, early changes of septic facet arthritis is in the differential.  Follow-up is recommended. Thoracic and lumbar degenerative change as described more pronounced at L4-L5. Ring-enhancing lesion in right upper lobe which may be infectious in etiology including in the setting of septic emboli.  Correlation with CT chest with contrast is recommended to exclude other etiologies. The results were discussed with Rylee Bowie on 12/9/2020 7:35 PM.    Mri Lumbar Spine With And Without Contrast    Result Date: 12/9/2020  IMPRESSION: Diffuse anterior and posterior epidural enhancement within lumbar spine more pronounced at L4-L5 raising the suspicion for underlying infectious process/phlegmon.  Left greater than right L4-L5 facet joint signal and enhancement which although may be degenerative in nature, early changes of septic facet arthritis is in the differential.  Follow-up is recommended. Thoracic and lumbar degenerative change as described more pronounced at L4-L5. Ring-enhancing lesion in right upper lobe which may be infectious in etiology including in the setting of septic emboli.  Correlation with CT chest with contrast is recommended to exclude other etiologies. The results were discussed with Rylee Bowie on 12/9/2020 7:35 PM.    X-ray Chest 1 View    Result Date: 12/18/2020  IMPRESSION: Interval placement of a right-sided PICC line catheter as described above.     X-ray Chest 1 View    Result Date: 12/18/2020  IMPRESSION: Interval placement of a right-sided PICC line catheter as described above.     X-ray Chest 1 View    Result Date: 12/9/2020  IMPRESSION: No radiographic evidence of acute cardiopulmonary disease.    Ct Chest Pulmonary Embolism With Iv Contrast    Result Date: 12/9/2020  IMPRESSION: 1.  No evidence of main, lobar or segmental pulmonary embolism. 2.  However, there are multifocal nodular lesions throughout all lung fields including a cavitary  lesion in the right upper lobe all suspicious for septic emboli.  Some of the tiny subsegmental pulmonary arterial branches demonstrate hypoenhancement and it is difficult to determinate if this is related to tiny pulmonary emboli or technique. 3.  Small bilateral pleural effusions. 4.  Mosaic pattern at the lung bases could represent mild edema or sequela of small airways or small vessels disease. 5.  Follow-up chest CT after appropriate treatment is recommended to document lung opacities and mediastinal lymphadenopathy which is probably reactive. 6.  Additional findings discussed below. Finding:    Other   Acuity: Critical  Status:  CLOSED Critical read back for the first impression performed with Bertha Bowie PA 9:25 PM 12/9/2020 COMMENT: Comparison: Chest x-ray from 12/9/2020 Technique: Chest CT pulmonary embolism protocol performed with intravenous contrast.  80 mL Omnipaque 350 given. CT DOSE:  One or more dose reduction techniques (e.g. automated exposure control, adjustment of the mA and/or kV according to patient size, use of iterative reconstruction technique) utilized for this examination. FINDINGS: LUNG, PLEURA AND LARGE AIRWAYS: The trachea and proximal bronchi remain clear. There are multifocal nodular lesions throughout the lungs.  For example in the right upper lobe there is a cavitary lesion measuring 1.4 cm on axial image 63. A 1.6 cm nodular lesion in the left upper lobe on image 69.  Multiple additional lesions are present elsewhere.  His are suspicious for septic emboli particularly given the history of endocarditis and sepsis. There are small bilateral pleural effusions. There is a nonspecific mild degree of groundglass opacity in the lower lung fields.  This could represent mild degree of pulmonary edema or possibly a mosaic pattern from small airways or small vessel disease. No pneumothorax. VESSELS: Normal caliber of the thoracic aorta.  Thoracic aorta appears within normal limits.   Main pulmonary artery is normal in caliber.  No evidence of a main, lobar or segmental pulmonary embolism.  However, the subsegmental vessels are difficult to assess and some demonstrate a slightly hypoechoic enhancing appearance possibly artifact from bolus timing although tiny subsegmental pulmonary emboli would be difficult to exclude.  For example, in the lingula there is some hypoenhancement of the subsegmental vessels on image 116 where subsegmental PE cannot be excluded.  No evidence of right heart strain. HEART: normal size. No pericardial effusion. MEDIASTINUM AND PAULINA: There is a pathologically enlarged lymph node in the AP window measuring 1.8 cm short axis.  Additional prominent but nonpathologic by size video lymph nodes elsewhere. CHEST WALL AND LOWER NECK: within normal limits. UPPER ABDOMEN:Limited assessment of the upper abdominal structures with this technique.  There is splenomegaly with the spleen measuring 14.5 cm.  The liver may also be enlarged but is incompletely visualized.  Cholecystectomy changes. BONES: No suspicious bony erosive changes.  Multilevel Schmorl's nodes and degenerative changes in the spine are noted.  Calcific tendinosis of the right rotator cuff.  There is limited assessment of the spine with this technique.  No gross paraspinal inflammatory changes.     Ultrasound Abdomen Limited    Result Date: 12/17/2020  IMPRESSION: Mild increase in echotexture of the liver may represent fibrofatty infiltration. COMMENT: Ultrasound evaluation of the right upper quadrant was performed and compared to a CT exam dated 8/18/2019.  The pancreas is of normal echotexture and unremarkable.  The aorta is obscured by bowel gas.  The liver is 17.6 cm in length and of increased echogenicity consistent with fibrofatty infiltration. There is no ductal dilatation or mass lesion.  Common bile duct measures 2.3 mm at the pito hepatis.  The right kidney is 10.5 cm in length.  There is no  nephrolithiasis, hydronephrosis or mass lesion.  The renal cortex of normal thickness and echotexture.      • acetaminophen  650 mg oral q8h JAIDEN   • buPROPion  75 mg oral TID   • ceFAZolin  2 g intravenous q8h INT   • famotidine  10 mg oral BID AC   • lactobacillus acidophilus complex  1 tablet oral BID   • lidocaine  1 patch Topical Daily   • [START ON 1/6/2021] methadone  50 mg oral Daily (6a)   • metoprolol succinate XL  25 mg oral Daily   • nicotine  1 patch transdermal Daily   • pantoprazole  40 mg oral BID   • PARoxetine  30 mg oral Daily   • PHENobarbitaL  32.4 mg oral BID   • prazosin  2 mg oral Nightly   • pregabalin  150 mg oral BID   • psyllium husk  1 packet oral Daily   • QUEtiapine  100 mg oral Nightly   • trimethobenzamide  300 mg oral q8h JAIDEN   • zolpidem  10 mg oral Nightly         ASSESSMENT AND PLAN      * Acute bacterial endocarditis  Assessment & Plan  -Tricuspid valve endocarditis  -Sepsis from acute bacterial endocarditis present on admission  -Blood cultures from [place] MSSA, repeat cx NGTD  -PICC line in place  -JANUSZ with TV endocarditis, Limited echo 12/30 with no major change in TV/TR  -Continue abx (started 12/9, changed to Cefazolin) -6 wks, (1/19/20 last day)  -Checking labs every Monday      Dyspepsia  Assessment & Plan  -With meals  -Protonix BID, Pepcid BID  -maalox PRN    Drug-induced constipation  Assessment & Plan  -Resolved  -Continue bowel regimen    Opioid abuse (CMS/HCC)  Assessment & Plan        Tricuspid valve vegetation  Assessment & Plan  -Sessile vegetation on tricuspid valve leaflet  -Continue antibiotics    Anemia  Assessment & Plan        Septic arthritis of vertebra (CMS/HCC)  Assessment & Plan  -MRI with L4-5 infectious process  -Neurovascular intact  -Per neurosurg, f/u MRI after completion of abx      Essential hypertension  Assessment & Plan  -Continue with home meds  -bp parameters for hypotension    Septic embolism (CMS/HCC)  Assessment & Plan  -CT chest 12/9   multifocal nodular lesions all lung fields, cavitary lesion RUL c/f septic emboli  -Septic emboli, infectious endocarditis, discitis, MSSA  -Blood cultures performed on admission, NGTD  -Continue IV abx per ID total duration 42 days  -Will need f/u chest CT to document resolution    Opioid type dependence, continuous use (CMS/HCC)  Assessment & Plan  -likely still using IV drugs, given presentation  -Psych consulted, increased methadone to 40mg scheduled + 10mg PRN.    -QTc monitoring with EKG, ok so far.  -Palliative/Pain consult, patient reports continued pain.   -Lidoderm patch makes her sweat.  Scheduled Tylenol  -Despite significant improvement after injections for migraine, still desires telepsych consult to consider increasing Methadone, given ongoing back pain    Migraine, intractable  Assessment & Plan  -S/p Sumatriptan, Fioricet, IV Benadryl.  No relief and describes ongoing migraine since admit  -Continue Methadone for opioid dependence, dose increased to 40mg scheduled + 10mg PRN per psych  -Seen by Dr Gonzalez who gave injections with immediate relief, along with scheduling 3 days Pheonbarb to try to wean from Fioricet    Insomnia  Assessment & Plan  -Continue seroquel, hydroxyzine, prazosin, zolpidem    Depression  Assessment & Plan  -see mood d/o section      Anxiety  Assessment & Plan  -continue current medication regimen as per psych           VTE Assessment: Padua VTE Score: 2  Code Status: Full Code  Estimated discharge date: 12/11/2020     Miguel Gay MD  1/5/2021  3:27 PM                What Type Of Note Output Would You Prefer (Optional)?: Standard Output How Severe Are Your Spot(S)?: severe Hpi Title: Evaluation of Skin Lesions Additional History: Patient wants to find out if any of the growths are malignant because they are too numerous for the patient to self-monitor. New lesions appear frequently. Patient is not sure of either changes nor exact duration of each leasion because there are too many to track.

## 2021-01-05 NOTE — PROGRESS NOTES
1/5/2021  Called [facility] and left a message for Santos trying to understand what happen to the bed I discussed with him last week and what his projection is for the future. SHAUNNA Fraga

## 2021-01-05 NOTE — PROGRESS NOTES
Psychiatry Progress Note    Chief Complaint/Reason for follow-up: opioid dependence    Interval History: The patient is seen again for follow-up.  She states headache is much improved coming off of barbiturates.  She states she feels strongly she would like to increase methadone tomorrow and denies any excess sedation at this time.  QTc from last week 418.  She denies any depression or thoughts to harm/kill herself or anyone else whatsoever.    Review of Systems:   Sleep:  Good and Appetite: Good    Vital Signs for the last 24 hours:  Temp:  [36.4 °C (97.6 °F)-36.6 °C (97.9 °F)] 36.4 °C (97.6 °F)  Heart Rate:  [69-78] 77  Resp:  [16] 16  BP: (111-138)/(55-87) 125/76      Current Facility-Administered Medications:   •  acetaminophen (TYLENOL) tablet 650 mg, 650 mg, oral, q8h JAIDEN, José Manuel Morris MD, 650 mg at 01/05/21 0503  •  alum-mag hydroxide-simeth (MAALOX) 200-200-20 mg/5 mL suspension 30 mL, 30 mL, oral, q6h PRN, Lili Crawford MD  •  buPROPion (WELLBUTRIN) tablet 75 mg, 75 mg, oral, TID, Marlon Padron MD, 75 mg at 01/05/21 1147  •  ceFAZolin (ANCEF) NSS IVPB piggyback 2 g, 2 g, intravenous, q8h INT, Vikas Jones MD, Last Rate: 100 mL/hr at 01/05/21 1147, 2 g at 01/05/21 1147  •  cyclobenzaprine (FLEXERIL) tablet 10 mg, 10 mg, oral, 3x daily PRN, Marizol Ortiz CRNP, 10 mg at 01/05/21 0825  •  glucose chewable tablet 16-32 g of dextrose, 16-32 g of dextrose, oral, PRN **OR** dextrose 40 % oral gel 15-30 g of dextrose, 15-30 g of dextrose, oral, PRN **OR** glucagon (GLUCAGEN) injection 1 mg, 1 mg, intramuscular, PRN **OR** dextrose in water injection 12.5 g, 25 mL, intravenous, PRN, Perfecto Angela MD  •  diphenhydrAMINE (BENADRYL) injection 25 mg, 25 mg, intravenous, q6h PRN, Lili Crawford MD, 25 mg at 01/05/21 0503  •  famotidine (PEPCID) tablet 10 mg, 10 mg, oral, BID AC, Lili Crawford MD, 10 mg at 01/05/21 0825  •  fluticasone propionate (FLONASE) 50 mcg/actuation nasal  spray 2 spray, 2 spray, Each Nostril, Daily PRN, Perfecto Angela MD  •  hydrocortisone-pramoxine (ANALPRAM-HC) 2.5-1 % rectal cream, , rectal, 4x daily PRN, Dominique Powell DO, Given at 12/19/20 1643  •  hydrOXYzine (ATARAX) tablet 25 mg, 25 mg, oral, Nightly PRN, Dominique Powell DO, 25 mg at 12/21/20 2119  •  ketorolac (TORADOL) injection 30 mg, 30 mg, intravenous, q6h PRN, Miguel Gay MD, 30 mg at 01/05/21 0503  •  lactobacillus acidophilus complex (BACID) 1 billion cell- 250 mg tablet 1 tablet, 1 tablet, oral, BID, Heidi Toscano CRNP, 1 tablet at 01/05/21 0825  •  lidocaine (ASPERCREME) 4 % topical patch 1 patch, 1 patch, Topical, Daily, Jacki Boss CRNP  •  magnesium sulfate IVPB 2g in 50 mL NSS/D5W/SWFI, 2 g, intravenous, PRN, Perfecto Angela MD  •  methadone (DOLOPHINE) tablet 10 mg, 10 mg, oral, Daily PRN, Lili Crawford MD, 10 mg at 01/04/21 1338  •  methadone (DOLOPHINE) tablet 40 mg, 40 mg, oral, Daily (6a), Lili Crawford MD, 40 mg at 01/05/21 0502  •  metoprolol succinate XL (TOPROL-XL) 24 hr ER tablet 25 mg, 25 mg, oral, Daily, Lili Crawford MD, 25 mg at 01/05/21 0825  •  naloxone (NARCAN) injection 0.2 mg, 0.2 mg, intravenous, Once PRN, Jacki Boss CRNP  •  nicotine (NICODERM CQ) 14 mg/24 hr patch 1 patch, 1 patch, transdermal, Daily, Gen Hogue MD, 1 patch at 01/05/21 0834  •  nicotine polacrilex (NICORETTE) gum 4 mg, 4 mg, buccal, q4h PRN, Perfecto Angela MD, 4 mg at 01/05/21 0825  •  pantoprazole (PROTONIX) tablet,delayed release (DR/EC) 40 mg, 40 mg, oral, BID, José Manuel Morris MD, 40 mg at 01/05/21 0825  •  PARoxetine (PAXIL) tablet 30 mg, 30 mg, oral, Daily, Marlon Padron MD, 30 mg at 01/05/21 0824  •  PHENobarbitaL (LUMINAL) tablet 32.4 mg, 32.4 mg, oral, BID, Deni Gonzalez MD, 32.4 mg at 01/05/21 0825  •  potassium chloride (KLOR-CON) tablet extended release 20 mEq, 20 mEq, oral, Daily PRN **OR** potassium chloride (KLOR-CON) tablet  extended release 40 mEq, 40 mEq, oral, Daily PRN **OR** potassium chloride 20 mEq in 100 mL IVPB  (premix), 20 mEq, intravenous, Daily PRN **OR** potassium chloride (KCL) 40 mEq/250 mL IVPB in NSS 40 mEq, 40 mEq, intravenous, Daily PRN, Perfecto Angela MD  •  prazosin (MINIPRESS) capsule 2 mg, 2 mg, oral, Nightly, Perfecto Angela MD, 2 mg at 01/04/21 2107  •  pregabalin (LYRICA) capsule 150 mg, 150 mg, oral, BID, Perfecto Angela MD, 150 mg at 01/05/21 0825  •  psyllium husk powder 1 packet, 1 packet, oral, Daily, Dominique Powell DO, 1 packet at 12/22/20 0822  •  QUEtiapine (SEROquel) tablet 100 mg, 100 mg, oral, Nightly, Marlon Padron MD, 100 mg at 01/04/21 2107  •  trimethobenzamide (TIGAN) capsule 300 mg, 300 mg, oral, q8h JAIDEN, Dominique Powell DO, 300 mg at 01/05/21 1111  •  zolpidem (AMBIEN) tablet 10 mg, 10 mg, oral, Nightly, Perfecto Angela MD, 10 mg at 01/04/21 2107    MSE:  Orientation: AAO x 3  Behavior: Appropriate  Appearance: well groomed  Eye Contact: Fair throughout  Mood: “pretty good today”  Affect: congruent; mosre stable and appropriate  Speech: Coherent  Thought Process: Goal Directed and linear   Suicidal Ideation: Denies suicidal thoughts; intent or plan  Homicidal Ideation: Denies having homicidal thoughts, intent or plan  Hallucinations: Denies  Delusions: Denies  Cognition: No gross cognitive deficits  Memory: Remote; Immediate; all intact  Insight: Fair  Judgment: Fair      Assessment/Plan  Unspecified mood (affective) disorder (CMS/HCC)  Assessment & Plan  Patient with history of depression and addiction.  1. Continue paroxetine 30mg qam  2. Continue bupropion 75mg TID  3. DC afternoon dose of quetiapine and reduce evening dose to 100mg again.  This will reduce risk of weight gain, qtc prolongation, and sedation.  4.  to please speak with patient about options for inpatient/outpatient rehab and follow-up with a psychiatrist/therapist      Opioid type  dependence, continuous use (CMS/[facility])  Assessment & Plan  1. Consider for increase in methadone to 50mg qam on Wednesday (Hold for sedation; O2 < 95; SBP <100; DBP < 60; HR < 60; RR< 10; QTc > 470)  2. Add a prn dose of methadone 10mg if patient requires for total dose in a day of 50mg.  Long discussion had about this and patient would like the option to increase today.  3.  to please speak with patient about options for inpatient rehab at [place] or [facility] where she can do rehab while receiving iv antibiotics.  Patient is agreeable to this.        Spent 35 minutes in total treatment including chart review; consultation with patient; and note writing    Marlon Padron MD  1/5/2021

## 2021-01-06 PROCEDURE — 63700000 HC SELF-ADMINISTRABLE DRUG: Performed by: NURSE PRACTITIONER

## 2021-01-06 PROCEDURE — 63700000 HC SELF-ADMINISTRABLE DRUG: Performed by: INTERNAL MEDICINE

## 2021-01-06 PROCEDURE — 63700000 HC SELF-ADMINISTRABLE DRUG: Performed by: HOSPITALIST

## 2021-01-06 PROCEDURE — 25000000 HC PHARMACY GENERAL: Performed by: INTERNAL MEDICINE

## 2021-01-06 PROCEDURE — 99232 SBSQ HOSP IP/OBS MODERATE 35: CPT | Performed by: INTERNAL MEDICINE

## 2021-01-06 PROCEDURE — 63600000 HC DRUGS/DETAIL CODE: Performed by: INTERNAL MEDICINE

## 2021-01-06 PROCEDURE — 25800000 HC PHARMACY IV SOLUTIONS: Performed by: INTERNAL MEDICINE

## 2021-01-06 PROCEDURE — 63700000 HC SELF-ADMINISTRABLE DRUG: Performed by: STUDENT IN AN ORGANIZED HEALTH CARE EDUCATION/TRAINING PROGRAM

## 2021-01-06 PROCEDURE — 63700000 HC SELF-ADMINISTRABLE DRUG: Performed by: PSYCHIATRY & NEUROLOGY

## 2021-01-06 PROCEDURE — 12000000 HC ROOM AND CARE MED/SURG

## 2021-01-06 RX ADMIN — KETOROLAC TROMETHAMINE 30 MG: 30 INJECTION, SOLUTION INTRAMUSCULAR at 06:39

## 2021-01-06 RX ADMIN — BUPROPION HYDROCHLORIDE 75 MG: 75 TABLET, FILM COATED ORAL at 11:35

## 2021-01-06 RX ADMIN — METHADONE HYDROCHLORIDE 10 MG: 10 TABLET ORAL at 13:59

## 2021-01-06 RX ADMIN — PROBIOTIC PRODUCT - TAB 1 TABLET: TAB at 08:27

## 2021-01-06 RX ADMIN — METOPROLOL SUCCINATE 25 MG: 25 TABLET, EXTENDED RELEASE ORAL at 08:27

## 2021-01-06 RX ADMIN — ACETAMINOPHEN 650 MG: 325 TABLET, FILM COATED ORAL at 13:57

## 2021-01-06 RX ADMIN — CYCLOBENZAPRINE HYDROCHLORIDE 10 MG: 5 TABLET, FILM COATED ORAL at 08:37

## 2021-01-06 RX ADMIN — PREGABALIN 150 MG: 75 CAPSULE ORAL at 20:24

## 2021-01-06 RX ADMIN — DIPHENHYDRAMINE HYDROCHLORIDE 25 MG: 50 INJECTION INTRAMUSCULAR; INTRAVENOUS at 12:49

## 2021-01-06 RX ADMIN — CEFAZOLIN SODIUM 2 G: 10 POWDER, FOR SOLUTION INTRAVENOUS at 04:30

## 2021-01-06 RX ADMIN — KETOROLAC TROMETHAMINE 30 MG: 30 INJECTION, SOLUTION INTRAMUSCULAR at 06:38

## 2021-01-06 RX ADMIN — DIPHENHYDRAMINE HYDROCHLORIDE 25 MG: 50 INJECTION INTRAMUSCULAR; INTRAVENOUS at 06:41

## 2021-01-06 RX ADMIN — PROBIOTIC PRODUCT - TAB 1 TABLET: TAB at 20:24

## 2021-01-06 RX ADMIN — ZOLPIDEM TARTRATE 10 MG: 5 TABLET, COATED ORAL at 21:54

## 2021-01-06 RX ADMIN — BUPROPION HYDROCHLORIDE 75 MG: 75 TABLET, FILM COATED ORAL at 08:27

## 2021-01-06 RX ADMIN — METHADONE HYDROCHLORIDE 50 MG: 10 TABLET ORAL at 06:33

## 2021-01-06 RX ADMIN — CEFAZOLIN SODIUM 2 G: 10 POWDER, FOR SOLUTION INTRAVENOUS at 20:25

## 2021-01-06 RX ADMIN — FAMOTIDINE 10 MG: 10 TABLET, FILM COATED ORAL at 15:49

## 2021-01-06 RX ADMIN — NICOTINE POLACRILEX 4 MG: 4 GUM, CHEWING ORAL at 08:37

## 2021-01-06 RX ADMIN — CEFAZOLIN SODIUM 2 G: 10 POWDER, FOR SOLUTION INTRAVENOUS at 11:35

## 2021-01-06 RX ADMIN — DIPHENHYDRAMINE HYDROCHLORIDE 25 MG: 50 INJECTION INTRAMUSCULAR; INTRAVENOUS at 18:47

## 2021-01-06 RX ADMIN — PAROXETINE 30 MG: 20 TABLET, FILM COATED ORAL at 08:27

## 2021-01-06 RX ADMIN — PHENOBARBITAL 32.4 MG: 32.4 TABLET ORAL at 08:27

## 2021-01-06 RX ADMIN — ACETAMINOPHEN 650 MG: 325 TABLET, FILM COATED ORAL at 06:32

## 2021-01-06 RX ADMIN — TRIMETHOBENZAMIDE HYDROCHLORIDE 300 MG: 300 CAPSULE ORAL at 11:06

## 2021-01-06 RX ADMIN — TRIMETHOBENZAMIDE HYDROCHLORIDE 300 MG: 300 CAPSULE ORAL at 20:24

## 2021-01-06 RX ADMIN — ACETAMINOPHEN 650 MG: 325 TABLET, FILM COATED ORAL at 21:53

## 2021-01-06 RX ADMIN — BUPROPION HYDROCHLORIDE 75 MG: 75 TABLET, FILM COATED ORAL at 15:49

## 2021-01-06 RX ADMIN — Medication 1 PATCH: at 08:28

## 2021-01-06 RX ADMIN — PREGABALIN 150 MG: 75 CAPSULE ORAL at 08:27

## 2021-01-06 RX ADMIN — QUETIAPINE FUMARATE 100 MG: 100 TABLET ORAL at 22:00

## 2021-01-06 RX ADMIN — FAMOTIDINE 10 MG: 10 TABLET, FILM COATED ORAL at 08:27

## 2021-01-06 RX ADMIN — PANTOPRAZOLE SODIUM 40 MG: 40 TABLET, DELAYED RELEASE ORAL at 08:27

## 2021-01-06 RX ADMIN — KETOROLAC TROMETHAMINE 30 MG: 30 INJECTION, SOLUTION INTRAMUSCULAR at 18:47

## 2021-01-06 RX ADMIN — PANTOPRAZOLE SODIUM 40 MG: 40 TABLET, DELAYED RELEASE ORAL at 20:24

## 2021-01-06 RX ADMIN — KETOROLAC TROMETHAMINE 30 MG: 30 INJECTION, SOLUTION INTRAMUSCULAR at 12:48

## 2021-01-06 RX ADMIN — NICOTINE POLACRILEX 4 MG: 4 GUM, CHEWING ORAL at 12:53

## 2021-01-06 NOTE — PROGRESS NOTES
Hospital Medicine Service -  Daily Progress Note       SUBJECTIVE   - no ac issues ovenight ntoed  - HA better now    OBJECTIVE      Vital signs in last 24 hours:  Temp:  [36.4 °C (97.6 °F)-36.6 °C (97.8 °F)] 36.4 °C (97.6 °F)  Heart Rate:  [65-84] 84  Resp:  [18] 18  BP: (121-135)/(70-98) 135/98  No intake or output data in the 24 hours ending 01/06/21 1631    PHYSICAL EXAMINATION      Physical Exam  Vitals signs and nursing note reviewed.   Constitutional:       Appearance: She is obese.   Neck:      Musculoskeletal: Normal range of motion.   Cardiovascular:      Rate and Rhythm: Normal rate.   Pulmonary:      Effort: Pulmonary effort is normal.   Abdominal:      General: Abdomen is flat.   Musculoskeletal: Normal range of motion.   Neurological:      Mental Status: She is oriented to person, place, and time.        LABS / IMAGING / TELE      Labs  Lab Results   Component Value Date    GLUCOSE 107 (H) 01/04/2021    CALCIUM 9.6 01/04/2021     01/04/2021    K 4.3 01/04/2021    CO2 25 01/04/2021     01/04/2021    BUN 14 01/04/2021    CREATININE 0.7 01/04/2021     Lab Results   Component Value Date    WBC 8.05 01/04/2021    HGB 12.0 01/04/2021    HCT 37.7 01/04/2021    MCV 87.3 01/04/2021     01/04/2021     Microbiology Results     Procedure Component Value Units Date/Time    SARS-CoV-2 (COVID-19), PCR Nasopharynx [[id number]]  (Normal) Collected: 01/04/21 1627    Specimen: Nasopharyngeal Swab from Nasopharynx Updated: 01/04/21 1732    Narrative:      The following orders were created for panel order SARS-CoV-2 (COVID-19), PCR Nasopharynx.  Procedure                               Abnormality         Status                     ---------                               -----------         ------                     SARS-CoV-2 (COVID-19), P...[[id number]]  Normal              Final result                 Please view results for these tests on the individual orders.    SARS-CoV-2 (COVID-19), PCR  Nasopharynx [[id number]]  (Normal) Collected: 01/04/21 1627    Specimen: Nasopharyngeal Swab from Nasopharynx Updated: 01/04/21 1732     SARS-CoV-2 (COVID-19) Negative    SARS-CoV-2 (COVID-19), PCR Nasopharynx [[id number]]  (Normal) Collected: 12/17/20 1653    Specimen: Nasopharyngeal Swab from Nasopharynx Updated: 12/17/20 1747    Narrative:      The following orders were created for panel order SARS-CoV-2 (COVID-19), PCR Nasopharynx.  Procedure                               Abnormality         Status                     ---------                               -----------         ------                     SARS-CoV-2 (COVID-19), P...[[id number]]  Normal              Final result                 Please view results for these tests on the individual orders.    SARS-CoV-2 (COVID-19), PCR Nasopharynx [[id number]]  (Normal) Collected: 12/17/20 1653    Specimen: Nasopharyngeal Swab from Nasopharynx Updated: 12/17/20 1747     SARS-CoV-2 (COVID-19) Negative    Blood Culture Blood, Venous [[id number]] Collected: 12/11/20 1339    Specimen: Blood, Venous Updated: 12/16/20 1801     Culture No growth at 120 hours    Blood Culture Blood, Venous [[id number]] Collected: 12/10/20 1504    Specimen: Blood, Venous Updated: 12/15/20 2100     Culture No growth at 120 hours    Blood Culture Blood, Venous [[id number]] Collected: 12/10/20 1504    Specimen: Blood, Venous Updated: 12/15/20 2100     Culture No growth at 120 hours    Blood Culture Blood, Venous [[id number]] Collected: 12/09/20 1348    Specimen: Blood, Venous Updated: 12/14/20 1800     Culture No growth at 120 hours    Blood Culture Blood, Venous [[id number]] Collected: 12/09/20 1304    Specimen: Blood, Venous Updated: 12/14/20 1501     Culture No growth at 120 hours        Imaging  Mri Thoracic Spine With And Without Contrast    Result Date: 12/9/2020  IMPRESSION: Diffuse anterior and posterior epidural enhancement within lumbar spine more pronounced at L4-L5 raising the suspicion for  underlying infectious process/phlegmon.  Left greater than right L4-L5 facet joint signal and enhancement which although may be degenerative in nature, early changes of septic facet arthritis is in the differential.  Follow-up is recommended. Thoracic and lumbar degenerative change as described more pronounced at L4-L5. Ring-enhancing lesion in right upper lobe which may be infectious in etiology including in the setting of septic emboli.  Correlation with CT chest with contrast is recommended to exclude other etiologies. The results were discussed with Rylee Bowie on 12/9/2020 7:35 PM.    Mri Lumbar Spine With And Without Contrast    Result Date: 12/9/2020  IMPRESSION: Diffuse anterior and posterior epidural enhancement within lumbar spine more pronounced at L4-L5 raising the suspicion for underlying infectious process/phlegmon.  Left greater than right L4-L5 facet joint signal and enhancement which although may be degenerative in nature, early changes of septic facet arthritis is in the differential.  Follow-up is recommended. Thoracic and lumbar degenerative change as described more pronounced at L4-L5. Ring-enhancing lesion in right upper lobe which may be infectious in etiology including in the setting of septic emboli.  Correlation with CT chest with contrast is recommended to exclude other etiologies. The results were discussed with Rylee Bowie on 12/9/2020 7:35 PM.    X-ray Chest 1 View    Result Date: 12/18/2020  IMPRESSION: Interval placement of a right-sided PICC line catheter as described above.     X-ray Chest 1 View    Result Date: 12/18/2020  IMPRESSION: Interval placement of a right-sided PICC line catheter as described above.     X-ray Chest 1 View    Result Date: 12/9/2020  IMPRESSION: No radiographic evidence of acute cardiopulmonary disease.    Ct Chest Pulmonary Embolism With Iv Contrast    Result Date: 12/9/2020  IMPRESSION: 1.  No evidence of main, lobar or segmental pulmonary embolism. 2.   However, there are multifocal nodular lesions throughout all lung fields including a cavitary lesion in the right upper lobe all suspicious for septic emboli.  Some of the tiny subsegmental pulmonary arterial branches demonstrate hypoenhancement and it is difficult to determinate if this is related to tiny pulmonary emboli or technique. 3.  Small bilateral pleural effusions. 4.  Mosaic pattern at the lung bases could represent mild edema or sequela of small airways or small vessels disease. 5.  Follow-up chest CT after appropriate treatment is recommended to document lung opacities and mediastinal lymphadenopathy which is probably reactive. 6.  Additional findings discussed below. Finding:    Other   Acuity: Critical  Status:  CLOSED Critical read back for the first impression performed with Bertha Bowie PA 9:25 PM 12/9/2020 COMMENT: Comparison: Chest x-ray from 12/9/2020 Technique: Chest CT pulmonary embolism protocol performed with intravenous contrast.  80 mL Omnipaque 350 given. CT DOSE:  One or more dose reduction techniques (e.g. automated exposure control, adjustment of the mA and/or kV according to patient size, use of iterative reconstruction technique) utilized for this examination. FINDINGS: LUNG, PLEURA AND LARGE AIRWAYS: The trachea and proximal bronchi remain clear. There are multifocal nodular lesions throughout the lungs.  For example in the right upper lobe there is a cavitary lesion measuring 1.4 cm on axial image 63. A 1.6 cm nodular lesion in the left upper lobe on image 69.  Multiple additional lesions are present elsewhere.  His are suspicious for septic emboli particularly given the history of endocarditis and sepsis. There are small bilateral pleural effusions. There is a nonspecific mild degree of groundglass opacity in the lower lung fields.  This could represent mild degree of pulmonary edema or possibly a mosaic pattern from small airways or small vessel disease. No pneumothorax.  VESSELS: Normal caliber of the thoracic aorta.  Thoracic aorta appears within normal limits.  Main pulmonary artery is normal in caliber.  No evidence of a main, lobar or segmental pulmonary embolism.  However, the subsegmental vessels are difficult to assess and some demonstrate a slightly hypoechoic enhancing appearance possibly artifact from bolus timing although tiny subsegmental pulmonary emboli would be difficult to exclude.  For example, in the lingula there is some hypoenhancement of the subsegmental vessels on image 116 where subsegmental PE cannot be excluded.  No evidence of right heart strain. HEART: normal size. No pericardial effusion. MEDIASTINUM AND PAULINA: There is a pathologically enlarged lymph node in the AP window measuring 1.8 cm short axis.  Additional prominent but nonpathologic by size video lymph nodes elsewhere. CHEST WALL AND LOWER NECK: within normal limits. UPPER ABDOMEN:Limited assessment of the upper abdominal structures with this technique.  There is splenomegaly with the spleen measuring 14.5 cm.  The liver may also be enlarged but is incompletely visualized.  Cholecystectomy changes. BONES: No suspicious bony erosive changes.  Multilevel Schmorl's nodes and degenerative changes in the spine are noted.  Calcific tendinosis of the right rotator cuff.  There is limited assessment of the spine with this technique.  No gross paraspinal inflammatory changes.     Ultrasound Abdomen Limited    Result Date: 12/17/2020  IMPRESSION: Mild increase in echotexture of the liver may represent fibrofatty infiltration. COMMENT: Ultrasound evaluation of the right upper quadrant was performed and compared to a CT exam dated 8/18/2019.  The pancreas is of normal echotexture and unremarkable.  The aorta is obscured by bowel gas.  The liver is 17.6 cm in length and of increased echogenicity consistent with fibrofatty infiltration. There is no ductal dilatation or mass lesion.  Common bile duct measures 2.3  mm at the pito hepatis.  The right kidney is 10.5 cm in length.  There is no nephrolithiasis, hydronephrosis or mass lesion.  The renal cortex of normal thickness and echotexture.      • acetaminophen  650 mg oral q8h JAIDEN   • buPROPion  75 mg oral TID   • ceFAZolin  2 g intravenous q8h INT   • famotidine  10 mg oral BID AC   • lactobacillus acidophilus complex  1 tablet oral BID   • lidocaine  1 patch Topical Daily   • methadone  50 mg oral Daily (6a)   • metoprolol succinate XL  25 mg oral Daily   • nicotine  1 patch transdermal Daily   • pantoprazole  40 mg oral BID   • PARoxetine  30 mg oral Daily   • prazosin  2 mg oral Nightly   • pregabalin  150 mg oral BID   • psyllium husk  1 packet oral Daily   • QUEtiapine  100 mg oral Nightly   • trimethobenzamide  300 mg oral q8h JAIDEN   • zolpidem  10 mg oral Nightly       ASSESSMENT AND PLAN      * Acute bacterial endocarditis  Assessment & Plan  -Tricuspid valve endocarditis  -Sepsis from acute bacterial endocarditis present on admission  -Blood cultures from [place] [facility], repeat cx NGTD  -PICC line in place  -JANUSZ with TV endocarditis, Limited echo 12/30 with no major change in TV/TR  -Continue abx (started 12/9, changed to Cefazolin) -6 wks, (1/19/20 last day)  -Checking labs every Monday      Dyspepsia  Assessment & Plan  -With meals  -Protonix BID, Pepcid BID  -maalox PRN    Drug-induced constipation  Assessment & Plan  -Resolved  -Continue bowel regimen    Opioid abuse (CMS/HCC)  Assessment & Plan        Tricuspid valve vegetation  Assessment & Plan  -Sessile vegetation on tricuspid valve leaflet  -Continue antibiotics    Anemia  Assessment & Plan        Septic arthritis of vertebra (CMS/HCC)  Assessment & Plan  -MRI with L4-5 infectious process  -Neurovascular intact  -Per neurosurg, f/u MRI after completion of abx      Essential hypertension  Assessment & Plan  -Continue with home meds  -bp parameters for hypotension    Septic embolism (CMS/HCC)  Assessment &  Plan  -CT chest 12/9  multifocal nodular lesions all lung fields, cavitary lesion RUL c/f septic emboli  -Septic emboli, infectious endocarditis, discitis, MSSA  -Blood cultures performed on admission, NGTD  -Continue IV abx per ID total duration 42 days  -Will need f/u chest CT to document resolution    Opioid type dependence, continuous use (CMS/[facility])  Assessment & Plan  -likely still using IV drugs, given presentation  -Psych consulted, increased methadone to 40mg scheduled + 10mg PRN.    -QTc monitoring with EKG, ok so far.  -Palliative/Pain consult, patient reports continued pain.   -Lidoderm patch makes her sweat.  Scheduled Tylenol  -Despite significant improvement after injections for migraine, still desires telepsych consult to consider increasing Methadone, given ongoing back pain    Migraine, intractable  Assessment & Plan  -S/p Sumatriptan, Fioricet, IV Benadryl.  No relief and describes ongoing migraine since admit  -Continue Methadone for opioid dependence, dose increased from 40 > 50mg  + 10mg PRN per psych  -Seen by Dr Gonzalez who gave injections with immediate relief, along with scheduling 3 days Pheonbarb( finished it )       Insomnia  Assessment & Plan  -Continue seroquel, hydroxyzine, prazosin, zolpidem    Depression  Assessment & Plan  -see mood d/o section      Anxiety  Assessment & Plan  -continue current medication regimen as per psych         VTE Assessment: Padua VTE Score: 2  Code Status: Full Code  Estimated discharge date: 12/11/2020     Miguel Gay MD  1/6/2021  4:31 PM

## 2021-01-06 NOTE — PATIENT CARE CONFERENCE
Care Progression Rounds Note  Date: 1/6/2021  Time: 10:25 AM     Patient Name: Carolyn Redmond     Medical Record Number: [id number]   YOB: 1985  Sex: Female      Room/Bed: 4211    Admitting Diagnosis: Septic embolism (CMS/[facility]) [I76]  Infection of lumbar spine (CMS/[facility]) [M46.26]  Acute left-sided low back pain without sciatica [M54.5]   Admit Date/Time: 12/9/2020 11:10 AM    Primary Diagnosis: Acute bacterial endocarditis  Principal Problem: Acute bacterial endocarditis    GMLOS: 4.8  Anticipated Discharge Date: 12/11/2020    AM-PAC  Mobility Score:      Discharge Planning:       Barriers to Discharge:  Barriers to Discharge: Medical issues not resolved  Comment: iv antibiotics treatment    Participants:  social work/services, nursing

## 2021-01-07 PROCEDURE — 63700000 HC SELF-ADMINISTRABLE DRUG: Performed by: HOSPITALIST

## 2021-01-07 PROCEDURE — 63700000 HC SELF-ADMINISTRABLE DRUG: Performed by: NURSE PRACTITIONER

## 2021-01-07 PROCEDURE — 25000000 HC PHARMACY GENERAL: Performed by: INTERNAL MEDICINE

## 2021-01-07 PROCEDURE — 63700000 HC SELF-ADMINISTRABLE DRUG: Performed by: INTERNAL MEDICINE

## 2021-01-07 PROCEDURE — 63600000 HC DRUGS/DETAIL CODE: Performed by: INTERNAL MEDICINE

## 2021-01-07 PROCEDURE — 63700000 HC SELF-ADMINISTRABLE DRUG: Performed by: PSYCHIATRY & NEUROLOGY

## 2021-01-07 PROCEDURE — 93005 ELECTROCARDIOGRAM TRACING: CPT | Performed by: INTERNAL MEDICINE

## 2021-01-07 PROCEDURE — 99232 SBSQ HOSP IP/OBS MODERATE 35: CPT | Performed by: INTERNAL MEDICINE

## 2021-01-07 PROCEDURE — 12000000 HC ROOM AND CARE MED/SURG

## 2021-01-07 PROCEDURE — 25800000 HC PHARMACY IV SOLUTIONS: Performed by: INTERNAL MEDICINE

## 2021-01-07 PROCEDURE — 99232 SBSQ HOSP IP/OBS MODERATE 35: CPT | Performed by: PSYCHIATRY & NEUROLOGY

## 2021-01-07 RX ADMIN — NICOTINE POLACRILEX 4 MG: 4 GUM, CHEWING ORAL at 19:43

## 2021-01-07 RX ADMIN — FAMOTIDINE 10 MG: 10 TABLET, FILM COATED ORAL at 07:54

## 2021-01-07 RX ADMIN — NICOTINE POLACRILEX 4 MG: 4 GUM, CHEWING ORAL at 09:58

## 2021-01-07 RX ADMIN — PREGABALIN 150 MG: 75 CAPSULE ORAL at 19:43

## 2021-01-07 RX ADMIN — BUPROPION HYDROCHLORIDE 75 MG: 75 TABLET, FILM COATED ORAL at 11:09

## 2021-01-07 RX ADMIN — TRIMETHOBENZAMIDE HYDROCHLORIDE 300 MG: 300 CAPSULE ORAL at 11:09

## 2021-01-07 RX ADMIN — Medication 1 PATCH: at 08:00

## 2021-01-07 RX ADMIN — METHADONE HYDROCHLORIDE 10 MG: 10 TABLET ORAL at 16:02

## 2021-01-07 RX ADMIN — ACETAMINOPHEN 650 MG: 325 TABLET, FILM COATED ORAL at 06:11

## 2021-01-07 RX ADMIN — PROBIOTIC PRODUCT - TAB 1 TABLET: TAB at 08:23

## 2021-01-07 RX ADMIN — CYCLOBENZAPRINE HYDROCHLORIDE 10 MG: 5 TABLET, FILM COATED ORAL at 15:35

## 2021-01-07 RX ADMIN — CEFAZOLIN SODIUM 2 G: 10 POWDER, FOR SOLUTION INTRAVENOUS at 04:30

## 2021-01-07 RX ADMIN — QUETIAPINE FUMARATE 100 MG: 100 TABLET ORAL at 21:21

## 2021-01-07 RX ADMIN — CEFAZOLIN SODIUM 2 G: 10 POWDER, FOR SOLUTION INTRAVENOUS at 11:14

## 2021-01-07 RX ADMIN — PREGABALIN 150 MG: 75 CAPSULE ORAL at 07:55

## 2021-01-07 RX ADMIN — METOPROLOL SUCCINATE 25 MG: 25 TABLET, EXTENDED RELEASE ORAL at 08:24

## 2021-01-07 RX ADMIN — CEFAZOLIN SODIUM 2 G: 10 POWDER, FOR SOLUTION INTRAVENOUS at 19:43

## 2021-01-07 RX ADMIN — DIPHENHYDRAMINE HYDROCHLORIDE 25 MG: 50 INJECTION INTRAMUSCULAR; INTRAVENOUS at 12:18

## 2021-01-07 RX ADMIN — TRIMETHOBENZAMIDE HYDROCHLORIDE 300 MG: 300 CAPSULE ORAL at 18:32

## 2021-01-07 RX ADMIN — METHADONE HYDROCHLORIDE 50 MG: 10 TABLET ORAL at 06:11

## 2021-01-07 RX ADMIN — KETOROLAC TROMETHAMINE 30 MG: 30 INJECTION, SOLUTION INTRAMUSCULAR at 12:21

## 2021-01-07 RX ADMIN — BUPROPION HYDROCHLORIDE 75 MG: 75 TABLET, FILM COATED ORAL at 07:55

## 2021-01-07 RX ADMIN — NICOTINE POLACRILEX 4 MG: 4 GUM, CHEWING ORAL at 15:35

## 2021-01-07 RX ADMIN — FAMOTIDINE 10 MG: 10 TABLET, FILM COATED ORAL at 16:36

## 2021-01-07 RX ADMIN — ZOLPIDEM TARTRATE 10 MG: 5 TABLET, COATED ORAL at 21:20

## 2021-01-07 RX ADMIN — PRAZOSIN HYDROCHLORIDE 2 MG: 1 CAPSULE ORAL at 21:20

## 2021-01-07 RX ADMIN — BUPROPION HYDROCHLORIDE 75 MG: 75 TABLET, FILM COATED ORAL at 15:29

## 2021-01-07 RX ADMIN — DIPHENHYDRAMINE HYDROCHLORIDE 25 MG: 50 INJECTION INTRAMUSCULAR; INTRAVENOUS at 06:14

## 2021-01-07 RX ADMIN — PAROXETINE 30 MG: 20 TABLET, FILM COATED ORAL at 08:24

## 2021-01-07 RX ADMIN — KETOROLAC TROMETHAMINE 30 MG: 30 INJECTION, SOLUTION INTRAMUSCULAR at 06:14

## 2021-01-07 RX ADMIN — KETOROLAC TROMETHAMINE 30 MG: 30 INJECTION, SOLUTION INTRAMUSCULAR at 18:36

## 2021-01-07 RX ADMIN — PROBIOTIC PRODUCT - TAB 1 TABLET: TAB at 19:43

## 2021-01-07 RX ADMIN — ACETAMINOPHEN 650 MG: 325 TABLET, FILM COATED ORAL at 15:29

## 2021-01-07 RX ADMIN — ACETAMINOPHEN 650 MG: 325 TABLET, FILM COATED ORAL at 21:21

## 2021-01-07 RX ADMIN — PANTOPRAZOLE SODIUM 40 MG: 40 TABLET, DELAYED RELEASE ORAL at 08:24

## 2021-01-07 RX ADMIN — PANTOPRAZOLE SODIUM 40 MG: 40 TABLET, DELAYED RELEASE ORAL at 19:43

## 2021-01-07 RX ADMIN — DIPHENHYDRAMINE HYDROCHLORIDE 25 MG: 50 INJECTION INTRAMUSCULAR; INTRAVENOUS at 18:32

## 2021-01-07 NOTE — PROGRESS NOTES
Call made to Timmy Durand this morning. A message was left. awaiting for a return call at this time.    PLAN: working on placement for IV antibiotics.

## 2021-01-07 NOTE — PATIENT CARE CONFERENCE
Care Progression Rounds Note  Date: 1/7/2021  Time: 10:13 AM     Patient Name: Carolyn Redmond     Medical Record Number: [id number]   YOB: 1985  Sex: Female      Room/Bed: 4211    Admitting Diagnosis: Septic embolism (CMS/[facility]) [I76]  Infection of lumbar spine (CMS/[facility]) [M46.26]  Acute left-sided low back pain without sciatica [M54.5]   Admit Date/Time: 12/9/2020 11:10 AM    Primary Diagnosis: Acute bacterial endocarditis  Principal Problem: Acute bacterial endocarditis    GMLOS: 4.8  Anticipated Discharge Date: 12/11/2020    AM-PAC  Mobility Score:      Discharge Planning:       Barriers to Discharge:  Barriers to Discharge: Medical issues not resolved  Comment: iv antibiotics    Participants:  social work/services, nursing

## 2021-01-07 NOTE — PROGRESS NOTES
Psychiatry Progress Note    Chief Complaint/Reason for follow-up: opioid dependence    Interval History: Patient seen feeling very good today.  Pleasant and cooperative.  States she feels comfortable on current dose of methadone and mood is euthymic without any thoughts to harm/kill herself or anyone else whatsoever.      Review of Systems:   Sleep:  Good and Appetite: Good    Vital Signs for the last 24 hours:  Temp:  [36.4 °C (97.6 °F)-37.1 °C (98.7 °F)] 37.1 °C (98.7 °F)  Heart Rate:  [71-87] 87  Resp:  [16-18] 18  BP: (119-136)/(60-98) 124/70      Current Facility-Administered Medications:   •  acetaminophen (TYLENOL) tablet 650 mg, 650 mg, oral, q8h JAIDEN, José Manuel Morris MD, 650 mg at 01/07/21 0611  •  alum-mag hydroxide-simeth (MAALOX) 200-200-20 mg/5 mL suspension 30 mL, 30 mL, oral, q6h PRN, Lili Crawford MD  •  buPROPion (WELLBUTRIN) tablet 75 mg, 75 mg, oral, TID, Marlon Padron MD, 75 mg at 01/07/21 1109  •  ceFAZolin (ANCEF) NSS IVPB piggyback 2 g, 2 g, intravenous, q8h INT, Vikas Jones MD, Stopped at 01/07/21 1144  •  cyclobenzaprine (FLEXERIL) tablet 10 mg, 10 mg, oral, 3x daily PRN, Marizol Ortiz CRNP, 10 mg at 01/06/21 0837  •  glucose chewable tablet 16-32 g of dextrose, 16-32 g of dextrose, oral, PRN **OR** dextrose 40 % oral gel 15-30 g of dextrose, 15-30 g of dextrose, oral, PRN **OR** glucagon (GLUCAGEN) injection 1 mg, 1 mg, intramuscular, PRN **OR** dextrose in water injection 12.5 g, 25 mL, intravenous, PRN, Perfecto Angela MD  •  diphenhydrAMINE (BENADRYL) injection 25 mg, 25 mg, intravenous, q6h PRN, Lili Crawford.S., MD, 25 mg at 01/07/21 0614  •  famotidine (PEPCID) tablet 10 mg, 10 mg, oral, BID Ty SMITH Jessica Y.S., MD, 10 mg at 01/07/21 0754  •  fluticasone propionate (FLONASE) 50 mcg/actuation nasal spray 2 spray, 2 spray, Each Nostril, Daily PRN, Perfecto Angela MD  •  hydrocortisone-pramoxine (ANALPRAM-HC) 2.5-1 % rectal cream, , rectal, 4x daily PRN,  Dominique Powell DO, Given at 12/19/20 1643  •  hydrOXYzine (ATARAX) tablet 25 mg, 25 mg, oral, Nightly PRN, Dominique Powell DO, 25 mg at 12/21/20 2119  •  ketorolac (TORADOL) injection 30 mg, 30 mg, intravenous, q6h PRN, Miguel Gay MD, 30 mg at 01/07/21 0614  •  lactobacillus acidophilus complex (BACID) 1 billion cell- 250 mg tablet 1 tablet, 1 tablet, oral, BID, Heidi Toscano CRNP, 1 tablet at 01/07/21 0823  •  lidocaine (ASPERCREME) 4 % topical patch 1 patch, 1 patch, Topical, Daily, Jacki Boss CRNP  •  magnesium sulfate IVPB 2g in 50 mL NSS/D5W/SWFI, 2 g, intravenous, PRN, Perfecto Angela MD  •  methadone (DOLOPHINE) tablet 10 mg, 10 mg, oral, Daily PRN, Lili Crawford MD, 10 mg at 01/06/21 1359  •  methadone (DOLOPHINE) tablet 50 mg, 50 mg, oral, Daily (6a), Marlon Padron MD, 50 mg at 01/07/21 0611  •  metoprolol succinate XL (TOPROL-XL) 24 hr ER tablet 25 mg, 25 mg, oral, Daily, Lili Crawford MD, 25 mg at 01/07/21 0824  •  naloxone (NARCAN) injection 0.2 mg, 0.2 mg, intravenous, Once PRN, Jacki Boss CRNP  •  nicotine (NICODERM CQ) 14 mg/24 hr patch 1 patch, 1 patch, transdermal, Daily, Gen Hogue MD, 1 patch at 01/07/21 0800  •  nicotine polacrilex (NICORETTE) gum 4 mg, 4 mg, buccal, q4h PRN, Perfecto Angela MD, 4 mg at 01/07/21 0958  •  pantoprazole (PROTONIX) tablet,delayed release (DR/EC) 40 mg, 40 mg, oral, BID, José Manuel Morris MD, 40 mg at 01/07/21 0824  •  PARoxetine (PAXIL) tablet 30 mg, 30 mg, oral, Daily, Marlon Padron MD, 30 mg at 01/07/21 0824  •  potassium chloride (KLOR-CON) tablet extended release 20 mEq, 20 mEq, oral, Daily PRN **OR** potassium chloride (KLOR-CON) tablet extended release 40 mEq, 40 mEq, oral, Daily PRN **OR** potassium chloride 20 mEq in 100 mL IVPB  (premix), 20 mEq, intravenous, Daily PRN **OR** potassium chloride (KCL) 40 mEq/250 mL IVPB in NSS 40 mEq, 40 mEq, intravenous, Daily PRN, Perfecto Angela MD  •   prazosin (MINIPRESS) capsule 2 mg, 2 mg, oral, Nightly, Perfecto Angela MD, 2 mg at 01/05/21 2127  •  pregabalin (LYRICA) capsule 150 mg, 150 mg, oral, BID, Perfecto Angela MD, 150 mg at 01/07/21 0755  •  psyllium husk powder 1 packet, 1 packet, oral, Daily, Dominique Powell DO, 1 packet at 12/22/20 0822  •  QUEtiapine (SEROquel) tablet 100 mg, 100 mg, oral, Nightly, Marlon Padron MD, 100 mg at 01/06/21 2200  •  trimethobenzamide (TIGAN) capsule 300 mg, 300 mg, oral, q8h JAIDEN, Dominique Powell DO, 300 mg at 01/07/21 1109  •  zolpidem (AMBIEN) tablet 10 mg, 10 mg, oral, Nightly, Perfecto Angela MD, 10 mg at 01/06/21 2154    MSE:  Orientation: AAO x3  Behavior: Appropriate  Appearance: well groomed; wearing makeup today  Eye Contact: Fair throughout  Mood: “good”  Affect: congruent; stable and appropriate  Speech: Coherent  Thought Process: Goal Directed and linear   Suicidal Ideation: Denies suicidal thoughts; intent or plan; denies passive death wish  Homicidal Ideation: Denies having homicidal thoughts, intent or plan  Hallucinations: Denies  Delusions: Denies  Cognition: No gross cognitive deficits  Memory: Remote; Immediate; all intact  Insight: Fair  Judgment: Fair      Assessment/Plan  Unspecified mood (affective) disorder (CMS/[facility])  Assessment & Plan  Patient with history of depression and addiction.  1. Continue paroxetine 30mg qam  2. Continue bupropion 75mg TID  3. DC afternoon dose of quetiapine and reduce evening dose to 100mg again.  This will reduce risk of weight gain, qtc prolongation, and sedation.  4.  to please speak with patient about options for inpatient/outpatient rehab and follow-up with a psychiatrist/therapist      Opioid type dependence, continuous use (CMS/[facility])  Assessment & Plan  1. Continue methadone 50mg qam (Hold for sedation; O2 < 95; SBP <100; DBP < 60; HR < 60; RR< 10; QTc > 470)  2. Patient states she is trying to see if she can go without evening  "prn dose of methadone 10mg but is \"nervous\" to forgo it.  3.  to please speak with patient about options for inpatient rehab at [place] or [facility] where she can do rehab while receiving iv antibiotics.  Patient is agreeable to this.      Spent 25 minutes in total treatment including chart review; consultation with patient; and note writing    Marlon Padron MD  1/7/2021      "

## 2021-01-07 NOTE — PROGRESS NOTES
Hospital Medicine Service -  Daily Progress Note       SUBJECTIVE   - no ac issues ovenight ntoed  - HA better now    OBJECTIVE      Vital signs in last 24 hours:  Temp:  [36.4 °C (97.6 °F)-37.1 °C (98.7 °F)] 37.1 °C (98.7 °F)  Heart Rate:  [71-87] 87  Resp:  [16-18] 18  BP: (119-136)/(60-98) 124/70  No intake or output data in the 24 hours ending 01/07/21 1057    PHYSICAL EXAMINATION      Physical Exam  Vitals signs and nursing note reviewed.   Constitutional:       Appearance: She is obese.   Cardiovascular:      Rate and Rhythm: Normal rate.   Pulmonary:      Effort: Pulmonary effort is normal.   Abdominal:      General: Abdomen is flat. Bowel sounds are normal.   Neurological:      Mental Status: She is oriented to person, place, and time.        LABS / IMAGING / TELE      Labs  Lab Results   Component Value Date    GLUCOSE 107 (H) 01/04/2021    CALCIUM 9.6 01/04/2021     01/04/2021    K 4.3 01/04/2021    CO2 25 01/04/2021     01/04/2021    BUN 14 01/04/2021    CREATININE 0.7 01/04/2021     Lab Results   Component Value Date    WBC 8.05 01/04/2021    HGB 12.0 01/04/2021    HCT 37.7 01/04/2021    MCV 87.3 01/04/2021     01/04/2021     Microbiology Results     Procedure Component Value Units Date/Time    SARS-CoV-2 (COVID-19), PCR Nasopharynx [[id number]]  (Normal) Collected: 01/04/21 1627    Specimen: Nasopharyngeal Swab from Nasopharynx Updated: 01/04/21 2493    Narrative:      The following orders were created for panel order SARS-CoV-2 (COVID-19), PCR Nasopharynx.  Procedure                               Abnormality         Status                     ---------                               -----------         ------                     SARS-CoV-2 (COVID-19), P...[[id number]]  Normal              Final result                 Please view results for these tests on the individual orders.    SARS-CoV-2 (COVID-19), PCR Nasopharynx [[id number]]  (Normal) Collected: 01/04/21 1627    Specimen:  Nasopharyngeal Swab from Nasopharynx Updated: 01/04/21 1732     SARS-CoV-2 (COVID-19) Negative    SARS-CoV-2 (COVID-19), PCR Nasopharynx [[id number]]  (Normal) Collected: 12/17/20 1653    Specimen: Nasopharyngeal Swab from Nasopharynx Updated: 12/17/20 1747    Narrative:      The following orders were created for panel order SARS-CoV-2 (COVID-19), PCR Nasopharynx.  Procedure                               Abnormality         Status                     ---------                               -----------         ------                     SARS-CoV-2 (COVID-19), P...[[id number]]  Normal              Final result                 Please view results for these tests on the individual orders.    SARS-CoV-2 (COVID-19), PCR Nasopharynx [[id number]]  (Normal) Collected: 12/17/20 1653    Specimen: Nasopharyngeal Swab from Nasopharynx Updated: 12/17/20 1747     SARS-CoV-2 (COVID-19) Negative    Blood Culture Blood, Venous [[id number]] Collected: 12/11/20 1339    Specimen: Blood, Venous Updated: 12/16/20 1801     Culture No growth at 120 hours    Blood Culture Blood, Venous [[id number]] Collected: 12/10/20 1504    Specimen: Blood, Venous Updated: 12/15/20 2100     Culture No growth at 120 hours    Blood Culture Blood, Venous [[id number]] Collected: 12/10/20 1504    Specimen: Blood, Venous Updated: 12/15/20 2100     Culture No growth at 120 hours    Blood Culture Blood, Venous [[id number]] Collected: 12/09/20 1348    Specimen: Blood, Venous Updated: 12/14/20 1800     Culture No growth at 120 hours    Blood Culture Blood, Venous [[id number]] Collected: 12/09/20 1304    Specimen: Blood, Venous Updated: 12/14/20 1501     Culture No growth at 120 hours        Imaging  Mri Thoracic Spine With And Without Contrast    Result Date: 12/9/2020  IMPRESSION: Diffuse anterior and posterior epidural enhancement within lumbar spine more pronounced at L4-L5 raising the suspicion for underlying infectious process/phlegmon.  Left greater than right L4-L5  facet joint signal and enhancement which although may be degenerative in nature, early changes of septic facet arthritis is in the differential.  Follow-up is recommended. Thoracic and lumbar degenerative change as described more pronounced at L4-L5. Ring-enhancing lesion in right upper lobe which may be infectious in etiology including in the setting of septic emboli.  Correlation with CT chest with contrast is recommended to exclude other etiologies. The results were discussed with Rylee Bowie on 12/9/2020 7:35 PM.    Mri Lumbar Spine With And Without Contrast    Result Date: 12/9/2020  IMPRESSION: Diffuse anterior and posterior epidural enhancement within lumbar spine more pronounced at L4-L5 raising the suspicion for underlying infectious process/phlegmon.  Left greater than right L4-L5 facet joint signal and enhancement which although may be degenerative in nature, early changes of septic facet arthritis is in the differential.  Follow-up is recommended. Thoracic and lumbar degenerative change as described more pronounced at L4-L5. Ring-enhancing lesion in right upper lobe which may be infectious in etiology including in the setting of septic emboli.  Correlation with CT chest with contrast is recommended to exclude other etiologies. The results were discussed with Rylee Bowie on 12/9/2020 7:35 PM.    X-ray Chest 1 View    Result Date: 12/18/2020  IMPRESSION: Interval placement of a right-sided PICC line catheter as described above.     X-ray Chest 1 View    Result Date: 12/18/2020  IMPRESSION: Interval placement of a right-sided PICC line catheter as described above.     X-ray Chest 1 View    Result Date: 12/9/2020  IMPRESSION: No radiographic evidence of acute cardiopulmonary disease.    Ct Chest Pulmonary Embolism With Iv Contrast    Result Date: 12/9/2020  IMPRESSION: 1.  No evidence of main, lobar or segmental pulmonary embolism. 2.  However, there are multifocal nodular lesions throughout all lung  fields including a cavitary lesion in the right upper lobe all suspicious for septic emboli.  Some of the tiny subsegmental pulmonary arterial branches demonstrate hypoenhancement and it is difficult to determinate if this is related to tiny pulmonary emboli or technique. 3.  Small bilateral pleural effusions. 4.  Mosaic pattern at the lung bases could represent mild edema or sequela of small airways or small vessels disease. 5.  Follow-up chest CT after appropriate treatment is recommended to document lung opacities and mediastinal lymphadenopathy which is probably reactive. 6.  Additional findings discussed below. Finding:    Other   Acuity: Critical  Status:  CLOSED Critical read back for the first impression performed with Bertha Bowie PA 9:25 PM 12/9/2020 COMMENT: Comparison: Chest x-ray from 12/9/2020 Technique: Chest CT pulmonary embolism protocol performed with intravenous contrast.  80 mL Omnipaque 350 given. CT DOSE:  One or more dose reduction techniques (e.g. automated exposure control, adjustment of the mA and/or kV according to patient size, use of iterative reconstruction technique) utilized for this examination. FINDINGS: LUNG, PLEURA AND LARGE AIRWAYS: The trachea and proximal bronchi remain clear. There are multifocal nodular lesions throughout the lungs.  For example in the right upper lobe there is a cavitary lesion measuring 1.4 cm on axial image 63. A 1.6 cm nodular lesion in the left upper lobe on image 69.  Multiple additional lesions are present elsewhere.  His are suspicious for septic emboli particularly given the history of endocarditis and sepsis. There are small bilateral pleural effusions. There is a nonspecific mild degree of groundglass opacity in the lower lung fields.  This could represent mild degree of pulmonary edema or possibly a mosaic pattern from small airways or small vessel disease. No pneumothorax. VESSELS: Normal caliber of the thoracic aorta.  Thoracic aorta  appears within normal limits.  Main pulmonary artery is normal in caliber.  No evidence of a main, lobar or segmental pulmonary embolism.  However, the subsegmental vessels are difficult to assess and some demonstrate a slightly hypoechoic enhancing appearance possibly artifact from bolus timing although tiny subsegmental pulmonary emboli would be difficult to exclude.  For example, in the lingula there is some hypoenhancement of the subsegmental vessels on image 116 where subsegmental PE cannot be excluded.  No evidence of right heart strain. HEART: normal size. No pericardial effusion. MEDIASTINUM AND PAULINA: There is a pathologically enlarged lymph node in the AP window measuring 1.8 cm short axis.  Additional prominent but nonpathologic by size video lymph nodes elsewhere. CHEST WALL AND LOWER NECK: within normal limits. UPPER ABDOMEN:Limited assessment of the upper abdominal structures with this technique.  There is splenomegaly with the spleen measuring 14.5 cm.  The liver may also be enlarged but is incompletely visualized.  Cholecystectomy changes. BONES: No suspicious bony erosive changes.  Multilevel Schmorl's nodes and degenerative changes in the spine are noted.  Calcific tendinosis of the right rotator cuff.  There is limited assessment of the spine with this technique.  No gross paraspinal inflammatory changes.     Ultrasound Abdomen Limited    Result Date: 12/17/2020  IMPRESSION: Mild increase in echotexture of the liver may represent fibrofatty infiltration. COMMENT: Ultrasound evaluation of the right upper quadrant was performed and compared to a CT exam dated 8/18/2019.  The pancreas is of normal echotexture and unremarkable.  The aorta is obscured by bowel gas.  The liver is 17.6 cm in length and of increased echogenicity consistent with fibrofatty infiltration. There is no ductal dilatation or mass lesion.  Common bile duct measures 2.3 mm at the pito hepatis.  The right kidney is 10.5 cm in  length.  There is no nephrolithiasis, hydronephrosis or mass lesion.  The renal cortex of normal thickness and echotexture.      • acetaminophen  650 mg oral q8h JAIDEN   • buPROPion  75 mg oral TID   • ceFAZolin  2 g intravenous q8h INT   • famotidine  10 mg oral BID AC   • lactobacillus acidophilus complex  1 tablet oral BID   • lidocaine  1 patch Topical Daily   • methadone  50 mg oral Daily (6a)   • metoprolol succinate XL  25 mg oral Daily   • nicotine  1 patch transdermal Daily   • pantoprazole  40 mg oral BID   • PARoxetine  30 mg oral Daily   • prazosin  2 mg oral Nightly   • pregabalin  150 mg oral BID   • psyllium husk  1 packet oral Daily   • QUEtiapine  100 mg oral Nightly   • trimethobenzamide  300 mg oral q8h JAIDEN   • zolpidem  10 mg oral Nightly       ASSESSMENT AND PLAN      * Acute bacterial endocarditis  Assessment & Plan  -Tricuspid valve endocarditis  -Sepsis from acute bacterial endocarditis present on admission  -Blood cultures from [place] MSSA, repeat cx NGTD  -PICC line in place  -JANUSZ with TV endocarditis, Limited echo 12/30 with no major change in TV/TR  -Continue abx (started 12/9, changed to Cefazolin) -6 wks, (1/19/20 last day)  -Checking labs every Monday      Dyspepsia  Assessment & Plan  -With meals  -Protonix BID, Pepcid BID  -maalox PRN    Drug-induced constipation  Assessment & Plan  -Resolved  -Continue bowel regimen    Opioid abuse (CMS/HCC)  Assessment & Plan        Tricuspid valve vegetation  Assessment & Plan  -Sessile vegetation on tricuspid valve leaflet  -Continue antibiotics    Anemia  Assessment & Plan        Septic arthritis of vertebra (CMS/HCC)  Assessment & Plan  -MRI with L4-5 infectious process  -Neurovascular intact  -Per neurosurg, f/u MRI after completion of abx      Essential hypertension  Assessment & Plan  -Continue with home meds  -bp parameters for hypotension    Septic embolism (CMS/HCC)  Assessment & Plan  -CT chest 12/9  multifocal nodular lesions all lung  fields, cavitary lesion RUL c/f septic emboli  -Septic emboli, infectious endocarditis, discitis, MSSA  -Blood cultures performed on admission, NGTD  -Continue IV abx per ID total duration 42 days  -Will need f/u chest CT to document resolution    Opioid type dependence, continuous use (CMS/[facility])  Assessment & Plan  -likely still using IV drugs, given presentation  -Psych consulted, increased methadone to 40mg scheduled + 10mg PRN.    -QTc monitoring with EKG, ok so far.  -Palliative/Pain consult, patient reports continued pain.   -Lidoderm patch makes her sweat.  Scheduled Tylenol  -Despite significant improvement after injections for migraine, still desires telepsych consult to consider increasing Methadone, given ongoing back pain    Migraine, intractable  Assessment & Plan  -S/p Sumatriptan, Fioricet, IV Benadryl.  No relief and describes ongoing migraine since admit  -Continue Methadone for opioid dependence, dose increased from 40 > 50mg  + 10mg PRN per psych  -Seen by Dr Gonzalez who gave injections with immediate relief, along with scheduling 3 days Pheonbarb( finished it )       Insomnia  Assessment & Plan  -Continue seroquel, hydroxyzine, prazosin, zolpidem    Depression  Assessment & Plan  -see mood d/o section      Anxiety  Assessment & Plan  -continue current medication regimen as per psych         VTE Assessment: Padua VTE Score: 2  Code Status: Full Code  Estimated discharge date: 12/11/2020     Miguel Gay MD  1/7/2021  10:57 AM

## 2021-01-08 LAB
ATRIAL RATE: 89
P AXIS: 46
PR INTERVAL: 164
QRS DURATION: 94
QT INTERVAL: 354
QTC CALCULATION(BAZETT): 430
R AXIS: 37
T WAVE AXIS: 39
VENTRICULAR RATE: 89

## 2021-01-08 PROCEDURE — 63700000 HC SELF-ADMINISTRABLE DRUG: Performed by: INTERNAL MEDICINE

## 2021-01-08 PROCEDURE — 63700000 HC SELF-ADMINISTRABLE DRUG: Performed by: HOSPITALIST

## 2021-01-08 PROCEDURE — 63600000 HC DRUGS/DETAIL CODE: Performed by: INTERNAL MEDICINE

## 2021-01-08 PROCEDURE — 99232 SBSQ HOSP IP/OBS MODERATE 35: CPT | Performed by: INTERNAL MEDICINE

## 2021-01-08 PROCEDURE — 63700000 HC SELF-ADMINISTRABLE DRUG: Performed by: NURSE PRACTITIONER

## 2021-01-08 PROCEDURE — 25800000 HC PHARMACY IV SOLUTIONS: Performed by: INTERNAL MEDICINE

## 2021-01-08 PROCEDURE — 63700000 HC SELF-ADMINISTRABLE DRUG: Performed by: PSYCHIATRY & NEUROLOGY

## 2021-01-08 PROCEDURE — 12000000 HC ROOM AND CARE MED/SURG

## 2021-01-08 PROCEDURE — 25000000 HC PHARMACY GENERAL: Performed by: INTERNAL MEDICINE

## 2021-01-08 RX ADMIN — PREGABALIN 150 MG: 75 CAPSULE ORAL at 08:30

## 2021-01-08 RX ADMIN — PREGABALIN 150 MG: 75 CAPSULE ORAL at 20:21

## 2021-01-08 RX ADMIN — KETOROLAC TROMETHAMINE 30 MG: 30 INJECTION, SOLUTION INTRAMUSCULAR at 05:55

## 2021-01-08 RX ADMIN — DIPHENHYDRAMINE HYDROCHLORIDE 25 MG: 50 INJECTION INTRAMUSCULAR; INTRAVENOUS at 05:55

## 2021-01-08 RX ADMIN — FAMOTIDINE 10 MG: 10 TABLET, FILM COATED ORAL at 08:29

## 2021-01-08 RX ADMIN — PAROXETINE 30 MG: 20 TABLET, FILM COATED ORAL at 08:29

## 2021-01-08 RX ADMIN — ACETAMINOPHEN 650 MG: 325 TABLET, FILM COATED ORAL at 22:16

## 2021-01-08 RX ADMIN — METHADONE HYDROCHLORIDE 10 MG: 10 TABLET ORAL at 13:58

## 2021-01-08 RX ADMIN — DIPHENHYDRAMINE HYDROCHLORIDE 25 MG: 50 INJECTION INTRAMUSCULAR; INTRAVENOUS at 12:41

## 2021-01-08 RX ADMIN — BUPROPION HYDROCHLORIDE 75 MG: 75 TABLET, FILM COATED ORAL at 16:47

## 2021-01-08 RX ADMIN — NICOTINE POLACRILEX 4 MG: 4 GUM, CHEWING ORAL at 22:17

## 2021-01-08 RX ADMIN — CEFAZOLIN SODIUM 2 G: 10 POWDER, FOR SOLUTION INTRAVENOUS at 12:41

## 2021-01-08 RX ADMIN — PROBIOTIC PRODUCT - TAB 1 TABLET: TAB at 20:21

## 2021-01-08 RX ADMIN — Medication 1 PATCH: at 08:28

## 2021-01-08 RX ADMIN — NICOTINE POLACRILEX 4 MG: 4 GUM, CHEWING ORAL at 08:42

## 2021-01-08 RX ADMIN — DIPHENHYDRAMINE HYDROCHLORIDE 25 MG: 50 INJECTION INTRAMUSCULAR; INTRAVENOUS at 20:21

## 2021-01-08 RX ADMIN — PANTOPRAZOLE SODIUM 40 MG: 40 TABLET, DELAYED RELEASE ORAL at 20:21

## 2021-01-08 RX ADMIN — QUETIAPINE FUMARATE 100 MG: 100 TABLET ORAL at 22:17

## 2021-01-08 RX ADMIN — FAMOTIDINE 10 MG: 10 TABLET, FILM COATED ORAL at 16:47

## 2021-01-08 RX ADMIN — CYCLOBENZAPRINE HYDROCHLORIDE 10 MG: 5 TABLET, FILM COATED ORAL at 08:41

## 2021-01-08 RX ADMIN — CYCLOBENZAPRINE HYDROCHLORIDE 10 MG: 5 TABLET, FILM COATED ORAL at 22:17

## 2021-01-08 RX ADMIN — PROBIOTIC PRODUCT - TAB 1 TABLET: TAB at 08:31

## 2021-01-08 RX ADMIN — METOPROLOL SUCCINATE 25 MG: 25 TABLET, EXTENDED RELEASE ORAL at 08:29

## 2021-01-08 RX ADMIN — PRAZOSIN HYDROCHLORIDE 2 MG: 1 CAPSULE ORAL at 22:18

## 2021-01-08 RX ADMIN — ACETAMINOPHEN 650 MG: 325 TABLET, FILM COATED ORAL at 13:57

## 2021-01-08 RX ADMIN — NICOTINE POLACRILEX 4 MG: 4 GUM, CHEWING ORAL at 12:42

## 2021-01-08 RX ADMIN — BUPROPION HYDROCHLORIDE 75 MG: 75 TABLET, FILM COATED ORAL at 08:31

## 2021-01-08 RX ADMIN — BUPROPION HYDROCHLORIDE 75 MG: 75 TABLET, FILM COATED ORAL at 11:38

## 2021-01-08 RX ADMIN — ZOLPIDEM TARTRATE 10 MG: 5 TABLET, COATED ORAL at 22:17

## 2021-01-08 RX ADMIN — TRIMETHOBENZAMIDE HYDROCHLORIDE 300 MG: 300 CAPSULE ORAL at 11:37

## 2021-01-08 RX ADMIN — KETOROLAC TROMETHAMINE 30 MG: 30 INJECTION, SOLUTION INTRAMUSCULAR at 12:42

## 2021-01-08 RX ADMIN — CEFAZOLIN SODIUM 2 G: 10 POWDER, FOR SOLUTION INTRAVENOUS at 20:30

## 2021-01-08 RX ADMIN — KETOROLAC TROMETHAMINE 30 MG: 30 INJECTION, SOLUTION INTRAMUSCULAR at 20:21

## 2021-01-08 RX ADMIN — METHADONE HYDROCHLORIDE 50 MG: 10 TABLET ORAL at 05:54

## 2021-01-08 RX ADMIN — PANTOPRAZOLE SODIUM 40 MG: 40 TABLET, DELAYED RELEASE ORAL at 08:31

## 2021-01-08 RX ADMIN — CEFAZOLIN SODIUM 2 G: 10 POWDER, FOR SOLUTION INTRAVENOUS at 04:57

## 2021-01-08 RX ADMIN — TRIMETHOBENZAMIDE HYDROCHLORIDE 300 MG: 300 CAPSULE ORAL at 20:21

## 2021-01-08 RX ADMIN — ACETAMINOPHEN 650 MG: 325 TABLET, FILM COATED ORAL at 05:55

## 2021-01-08 NOTE — PROGRESS NOTES
[facility] called [facility] today to see what may be available for this pt soon. No answer at this time as [facility] was transferred to a line for admissions and placed on hold for 15 mins. RN stated that pts last day of the medication is 1/19. [facility] following to assist as needed.    Addendum- 1:27 PM-When admissions came back to the phone they stated that there are no beds on that unit and they dont expect a bed to open up for 2 weeks. Pt is done there medication prior to that. [facility] following to assist, as needed.    PLAN: pt is to complete her iv antibiotic dose.

## 2021-01-08 NOTE — PROGRESS NOTES
Major Events:  none    Subjective:  HA    Temp:  [36.2 °C (97.2 °F)-36.5 °C (97.7 °F)] 36.5 °C (97.7 °F)  Heart Rate:  [78-91] 78  Resp:  [16-18] 18  BP: (124-143)/(62-92) 139/88     SpO2 Readings from Last 3 Encounters:   01/08/21 99%   08/05/20 94%   07/10/20 97%     Anti-infectives (From admission, onward)    Start     Dose/Rate Route Frequency Ordered Stop    12/11/20 1900  ceFAZolin (ANCEF) NSS IVPB piggyback 2 g      2 g  100 mL/hr over 30 Minutes intravenous Every 8 hours interval 12/11/20 1823          Physical Exam:  Skin: No rash  Sclera: Anicteric  Lungs: clr  CV: S1, S2 nl, soft m, no embolic changes  Abd: Soft, nt, no hsm  Extr: No jt eff, no edema  Neuro: Alert, lucid    Lab Results   Component Value Date    WBC 8.05 01/04/2021    HGB 12.0 01/04/2021    HCT 37.7 01/04/2021    MCV 87.3 01/04/2021     01/04/2021     Lab Results   Component Value Date    GLUCOSE 107 (H) 01/04/2021    CALCIUM 9.6 01/04/2021     01/04/2021    K 4.3 01/04/2021    CO2 25 01/04/2021     01/04/2021    BUN 14 01/04/2021    CREATININE 0.7 01/04/2021     Lab Results   Component Value Date    ALBUMIN 3.3 (L) 01/03/2021    BILITOT 0.4 01/03/2021    ALKPHOS 65 01/03/2021    BILIDIR <0.1 04/12/2017    AST 21 01/03/2021    ALT 10 (L) 01/03/2021    PROTEIN 6.6 01/03/2021       Impression    MSSA TV IE c pul emboli  c lumbar infection    Tolerating cefazolin well  Now day 32/42    ASHLEY Jones MD  Pager [phone]

## 2021-01-08 NOTE — PROGRESS NOTES
Clinical Nutrition Note    Clinical Comments:  Chart reviewed in f/u. Pt states migraine pain has subsided and appetite/po intake has improved. Pt is receiving multiple ONS daily. Given obesity, will decrease frequency of supplements as they are no longer appropriate at this time and will cause wt gain if pt continues to eat well. No further nutrition intervention. Nutrition services will sign off. Please consult PRN with any future nutrition questions/concerns.       Discussed with: patient                                  Date: 01/08/21  Signature: Malu Rosado RD

## 2021-01-08 NOTE — PROGRESS NOTES
Hospital Medicine Service -  Daily Progress Note       SUBJECTIVE   Issues overnight noted.  Patient denies any new clinical complaints.   OBJECTIVE      Vital signs in last 24 hours:  Temp:  [36.2 °C (97.2 °F)-36.5 °C (97.7 °F)] 36.5 °C (97.7 °F)  Heart Rate:  [78-88] 78  Resp:  [18] 18  BP: (124-139)/(62-88) 139/88  No intake or output data in the 24 hours ending 01/08/21 1616    PHYSICAL EXAMINATION      Physical Exam  Vitals signs and nursing note reviewed.   Cardiovascular:      Rate and Rhythm: Normal rate.   Pulmonary:      Effort: Pulmonary effort is normal.   Abdominal:      Palpations: Abdomen is soft.   Musculoskeletal: Normal range of motion.   Skin:     General: Skin is warm.   Neurological:      Mental Status: She is oriented to person, place, and time.        LABS / IMAGING / TELE      Labs  Lab Results   Component Value Date    GLUCOSE 107 (H) 01/04/2021    CALCIUM 9.6 01/04/2021     01/04/2021    K 4.3 01/04/2021    CO2 25 01/04/2021     01/04/2021    BUN 14 01/04/2021    CREATININE 0.7 01/04/2021     Lab Results   Component Value Date    WBC 8.05 01/04/2021    HGB 12.0 01/04/2021    HCT 37.7 01/04/2021    MCV 87.3 01/04/2021     01/04/2021     Microbiology Results     Procedure Component Value Units Date/Time    SARS-CoV-2 (COVID-19), PCR Nasopharynx [[id number]]  (Normal) Collected: 01/04/21 1627    Specimen: Nasopharyngeal Swab from Nasopharynx Updated: 01/04/21 0687    Narrative:      The following orders were created for panel order SARS-CoV-2 (COVID-19), PCR Nasopharynx.  Procedure                               Abnormality         Status                     ---------                               -----------         ------                     SARS-CoV-2 (COVID-19), P...[[id number]]  Normal              Final result                 Please view results for these tests on the individual orders.    SARS-CoV-2 (COVID-19), PCR Nasopharynx [[id number]]  (Normal) Collected: 01/04/21  1627    Specimen: Nasopharyngeal Swab from Nasopharynx Updated: 01/04/21 1732     SARS-CoV-2 (COVID-19) Negative    SARS-CoV-2 (COVID-19), PCR Nasopharynx [[id number]]  (Normal) Collected: 12/17/20 1653    Specimen: Nasopharyngeal Swab from Nasopharynx Updated: 12/17/20 1747    Narrative:      The following orders were created for panel order SARS-CoV-2 (COVID-19), PCR Nasopharynx.  Procedure                               Abnormality         Status                     ---------                               -----------         ------                     SARS-CoV-2 (COVID-19), P...[[id number]]  Normal              Final result                 Please view results for these tests on the individual orders.    SARS-CoV-2 (COVID-19), PCR Nasopharynx [[id number]]  (Normal) Collected: 12/17/20 1653    Specimen: Nasopharyngeal Swab from Nasopharynx Updated: 12/17/20 1747     SARS-CoV-2 (COVID-19) Negative    Blood Culture Blood, Venous [[id number]] Collected: 12/11/20 1339    Specimen: Blood, Venous Updated: 12/16/20 1801     Culture No growth at 120 hours    Blood Culture Blood, Venous [[id number]] Collected: 12/10/20 1504    Specimen: Blood, Venous Updated: 12/15/20 2100     Culture No growth at 120 hours    Blood Culture Blood, Venous [[id number]] Collected: 12/10/20 1504    Specimen: Blood, Venous Updated: 12/15/20 2100     Culture No growth at 120 hours        Imaging  Mri Thoracic Spine With And Without Contrast    Result Date: 12/9/2020  IMPRESSION: Diffuse anterior and posterior epidural enhancement within lumbar spine more pronounced at L4-L5 raising the suspicion for underlying infectious process/phlegmon.  Left greater than right L4-L5 facet joint signal and enhancement which although may be degenerative in nature, early changes of septic facet arthritis is in the differential.  Follow-up is recommended. Thoracic and lumbar degenerative change as described more pronounced at L4-L5. Ring-enhancing lesion in right upper  lobe which may be infectious in etiology including in the setting of septic emboli.  Correlation with CT chest with contrast is recommended to exclude other etiologies. The results were discussed with Rylee Bowie on 12/9/2020 7:35 PM.    Mri Lumbar Spine With And Without Contrast    Result Date: 12/9/2020  IMPRESSION: Diffuse anterior and posterior epidural enhancement within lumbar spine more pronounced at L4-L5 raising the suspicion for underlying infectious process/phlegmon.  Left greater than right L4-L5 facet joint signal and enhancement which although may be degenerative in nature, early changes of septic facet arthritis is in the differential.  Follow-up is recommended. Thoracic and lumbar degenerative change as described more pronounced at L4-L5. Ring-enhancing lesion in right upper lobe which may be infectious in etiology including in the setting of septic emboli.  Correlation with CT chest with contrast is recommended to exclude other etiologies. The results were discussed with Rylee Bowie on 12/9/2020 7:35 PM.    X-ray Chest 1 View    Result Date: 12/18/2020  IMPRESSION: Interval placement of a right-sided PICC line catheter as described above.     X-ray Chest 1 View    Result Date: 12/18/2020  IMPRESSION: Interval placement of a right-sided PICC line catheter as described above.     Ct Chest Pulmonary Embolism With Iv Contrast    Result Date: 12/9/2020  IMPRESSION: 1.  No evidence of main, lobar or segmental pulmonary embolism. 2.  However, there are multifocal nodular lesions throughout all lung fields including a cavitary lesion in the right upper lobe all suspicious for septic emboli.  Some of the tiny subsegmental pulmonary arterial branches demonstrate hypoenhancement and it is difficult to determinate if this is related to tiny pulmonary emboli or technique. 3.  Small bilateral pleural effusions. 4.  Mosaic pattern at the lung bases could represent mild edema or sequela of small airways or small  vessels disease. 5.  Follow-up chest CT after appropriate treatment is recommended to document lung opacities and mediastinal lymphadenopathy which is probably reactive. 6.  Additional findings discussed below. Finding:    Other   Acuity: Critical  Status:  CLOSED Critical read back for the first impression performed with Bertha Bowie PA 9:25 PM 12/9/2020 COMMENT: Comparison: Chest x-ray from 12/9/2020 Technique: Chest CT pulmonary embolism protocol performed with intravenous contrast.  80 mL Omnipaque 350 given. CT DOSE:  One or more dose reduction techniques (e.g. automated exposure control, adjustment of the mA and/or kV according to patient size, use of iterative reconstruction technique) utilized for this examination. FINDINGS: LUNG, PLEURA AND LARGE AIRWAYS: The trachea and proximal bronchi remain clear. There are multifocal nodular lesions throughout the lungs.  For example in the right upper lobe there is a cavitary lesion measuring 1.4 cm on axial image 63. A 1.6 cm nodular lesion in the left upper lobe on image 69.  Multiple additional lesions are present elsewhere.  His are suspicious for septic emboli particularly given the history of endocarditis and sepsis. There are small bilateral pleural effusions. There is a nonspecific mild degree of groundglass opacity in the lower lung fields.  This could represent mild degree of pulmonary edema or possibly a mosaic pattern from small airways or small vessel disease. No pneumothorax. VESSELS: Normal caliber of the thoracic aorta.  Thoracic aorta appears within normal limits.  Main pulmonary artery is normal in caliber.  No evidence of a main, lobar or segmental pulmonary embolism.  However, the subsegmental vessels are difficult to assess and some demonstrate a slightly hypoechoic enhancing appearance possibly artifact from bolus timing although tiny subsegmental pulmonary emboli would be difficult to exclude.  For example, in the lingula there is some  hypoenhancement of the subsegmental vessels on image 116 where subsegmental PE cannot be excluded.  No evidence of right heart strain. HEART: normal size. No pericardial effusion. MEDIASTINUM AND PAULINA: There is a pathologically enlarged lymph node in the AP window measuring 1.8 cm short axis.  Additional prominent but nonpathologic by size video lymph nodes elsewhere. CHEST WALL AND LOWER NECK: within normal limits. UPPER ABDOMEN:Limited assessment of the upper abdominal structures with this technique.  There is splenomegaly with the spleen measuring 14.5 cm.  The liver may also be enlarged but is incompletely visualized.  Cholecystectomy changes. BONES: No suspicious bony erosive changes.  Multilevel Schmorl's nodes and degenerative changes in the spine are noted.  Calcific tendinosis of the right rotator cuff.  There is limited assessment of the spine with this technique.  No gross paraspinal inflammatory changes.     Ultrasound Abdomen Limited    Result Date: 12/17/2020  IMPRESSION: Mild increase in echotexture of the liver may represent fibrofatty infiltration. COMMENT: Ultrasound evaluation of the right upper quadrant was performed and compared to a CT exam dated 8/18/2019.  The pancreas is of normal echotexture and unremarkable.  The aorta is obscured by bowel gas.  The liver is 17.6 cm in length and of increased echogenicity consistent with fibrofatty infiltration. There is no ductal dilatation or mass lesion.  Common bile duct measures 2.3 mm at the pito hepatis.  The right kidney is 10.5 cm in length.  There is no nephrolithiasis, hydronephrosis or mass lesion.  The renal cortex of normal thickness and echotexture.      • acetaminophen  650 mg oral q8h JAIDEN   • buPROPion  75 mg oral TID   • ceFAZolin  2 g intravenous q8h INT   • famotidine  10 mg oral BID AC   • lactobacillus acidophilus complex  1 tablet oral BID   • lidocaine  1 patch Topical Daily   • methadone  50 mg oral Daily (6a)   • metoprolol  succinate XL  25 mg oral Daily   • nicotine  1 patch transdermal Daily   • pantoprazole  40 mg oral BID   • PARoxetine  30 mg oral Daily   • prazosin  2 mg oral Nightly   • pregabalin  150 mg oral BID   • psyllium husk  1 packet oral Daily   • QUEtiapine  100 mg oral Nightly   • trimethobenzamide  300 mg oral q8h JAIDEN   • zolpidem  10 mg oral Nightly     ASSESSMENT AND PLAN      * Acute bacterial endocarditis  Assessment & Plan  -Tricuspid valve endocarditis  -Sepsis from acute bacterial endocarditis present on admission  -Blood cultures from [place] [facility], repeat cx NGTD  -PICC line in place  -JANUSZ with TV endocarditis, Limited echo 12/30 with no major change in TV/TR  -Continue abx (started 12/9, changed to Cefazolin) -6 wks, (1/19/20 last day)  -Checking labs every Monday      Dyspepsia  Assessment & Plan  -With meals  -Protonix BID, Pepcid BID  -maalox PRN    Drug-induced constipation  Assessment & Plan  -Resolved  -Continue bowel regimen    Opioid abuse (CMS/[facility])  Assessment & Plan        Tricuspid valve vegetation  Assessment & Plan  -Sessile vegetation on tricuspid valve leaflet  -Continue antibiotics    Anemia  Assessment & Plan        Septic arthritis of vertebra (CMS/HCC)  Assessment & Plan  -MRI with L4-5 infectious process  -Neurovascular intact  -Per neurosurg, f/u MRI after completion of abx      Essential hypertension  Assessment & Plan  -Continue with home meds  -bp parameters for hypotension    Septic embolism (CMS/HCC)  Assessment & Plan  -CT chest 12/9  multifocal nodular lesions all lung fields, cavitary lesion RUL c/f septic emboli  -Septic emboli, infectious endocarditis, discitis, MSSA  -Blood cultures performed on admission, NGTD  -Continue IV abx per ID total duration 42 days  -Will need f/u chest CT to document resolution    Opioid type dependence, continuous use (CMS/HCC)  Assessment & Plan  -likely still using IV drugs, given presentation  -Psych consulted, increased methadone to 40mg  scheduled + 10mg PRN.    -QTc monitoring with EKG, ok so far.  -Palliative/Pain consult, patient reports continued pain.   -Lidoderm patch makes her sweat.  Scheduled Tylenol  -Despite significant improvement after injections for migraine, still desires telepsych consult to consider increasing Methadone, given ongoing back pain    Migraine, intractable  Assessment & Plan  -S/p Sumatriptan, Fioricet, IV Benadryl.  No relief and describes ongoing migraine since admit  -Continue Methadone for opioid dependence, dose increased from 40 > 50mg  + 10mg PRN per psych  -Seen by Dr Gonzalez who gave injections with immediate relief, along with scheduling 3 days Pheonbarb( finished it )       Insomnia  Assessment & Plan  -Continue seroquel, hydroxyzine, prazosin, zolpidem    Depression  Assessment & Plan  -see mood d/o section      Anxiety  Assessment & Plan  -continue current medication regimen as per psych           VTE Assessment: Padua VTE Score: 2  Code Status: Full Code  Estimated discharge date: 12/11/2020     Miguel Gay MD  1/8/2021  4:16 PM

## 2021-01-09 PROCEDURE — 63700000 HC SELF-ADMINISTRABLE DRUG: Performed by: HOSPITALIST

## 2021-01-09 PROCEDURE — 12000000 HC ROOM AND CARE MED/SURG

## 2021-01-09 PROCEDURE — 63700000 HC SELF-ADMINISTRABLE DRUG: Performed by: INTERNAL MEDICINE

## 2021-01-09 PROCEDURE — 63600000 HC DRUGS/DETAIL CODE: Performed by: INTERNAL MEDICINE

## 2021-01-09 PROCEDURE — 63700000 HC SELF-ADMINISTRABLE DRUG: Performed by: NURSE PRACTITIONER

## 2021-01-09 PROCEDURE — 1123F ACP DISCUSS/DSCN MKR DOCD: CPT | Performed by: INTERNAL MEDICINE

## 2021-01-09 PROCEDURE — 25800000 HC PHARMACY IV SOLUTIONS: Performed by: INTERNAL MEDICINE

## 2021-01-09 PROCEDURE — 99232 SBSQ HOSP IP/OBS MODERATE 35: CPT | Performed by: INTERNAL MEDICINE

## 2021-01-09 PROCEDURE — 25000000 HC PHARMACY GENERAL: Performed by: INTERNAL MEDICINE

## 2021-01-09 PROCEDURE — 63700000 HC SELF-ADMINISTRABLE DRUG: Performed by: PSYCHIATRY & NEUROLOGY

## 2021-01-09 RX ADMIN — METHADONE HYDROCHLORIDE 10 MG: 10 TABLET ORAL at 13:59

## 2021-01-09 RX ADMIN — NICOTINE POLACRILEX 4 MG: 4 GUM, CHEWING ORAL at 07:56

## 2021-01-09 RX ADMIN — FAMOTIDINE 10 MG: 10 TABLET, FILM COATED ORAL at 07:50

## 2021-01-09 RX ADMIN — KETOROLAC TROMETHAMINE 30 MG: 30 INJECTION, SOLUTION INTRAMUSCULAR at 11:58

## 2021-01-09 RX ADMIN — PANTOPRAZOLE SODIUM 40 MG: 40 TABLET, DELAYED RELEASE ORAL at 19:43

## 2021-01-09 RX ADMIN — PREGABALIN 150 MG: 75 CAPSULE ORAL at 19:43

## 2021-01-09 RX ADMIN — ACETAMINOPHEN 650 MG: 325 TABLET, FILM COATED ORAL at 13:59

## 2021-01-09 RX ADMIN — ZOLPIDEM TARTRATE 10 MG: 5 TABLET, COATED ORAL at 21:13

## 2021-01-09 RX ADMIN — PRAZOSIN HYDROCHLORIDE 2 MG: 1 CAPSULE ORAL at 21:13

## 2021-01-09 RX ADMIN — DIPHENHYDRAMINE HYDROCHLORIDE 25 MG: 50 INJECTION INTRAMUSCULAR; INTRAVENOUS at 05:48

## 2021-01-09 RX ADMIN — PANTOPRAZOLE SODIUM 40 MG: 40 TABLET, DELAYED RELEASE ORAL at 07:50

## 2021-01-09 RX ADMIN — CEFAZOLIN SODIUM 2 G: 10 POWDER, FOR SOLUTION INTRAVENOUS at 19:42

## 2021-01-09 RX ADMIN — DIPHENHYDRAMINE HYDROCHLORIDE 25 MG: 50 INJECTION INTRAMUSCULAR; INTRAVENOUS at 19:54

## 2021-01-09 RX ADMIN — TRIMETHOBENZAMIDE HYDROCHLORIDE 300 MG: 300 CAPSULE ORAL at 19:43

## 2021-01-09 RX ADMIN — CYCLOBENZAPRINE HYDROCHLORIDE 10 MG: 5 TABLET, FILM COATED ORAL at 19:50

## 2021-01-09 RX ADMIN — BUPROPION HYDROCHLORIDE 75 MG: 75 TABLET, FILM COATED ORAL at 07:51

## 2021-01-09 RX ADMIN — PREGABALIN 150 MG: 75 CAPSULE ORAL at 07:50

## 2021-01-09 RX ADMIN — FAMOTIDINE 10 MG: 10 TABLET, FILM COATED ORAL at 16:04

## 2021-01-09 RX ADMIN — PROBIOTIC PRODUCT - TAB 1 TABLET: TAB at 19:42

## 2021-01-09 RX ADMIN — DIPHENHYDRAMINE HYDROCHLORIDE 25 MG: 50 INJECTION INTRAMUSCULAR; INTRAVENOUS at 11:59

## 2021-01-09 RX ADMIN — TRIMETHOBENZAMIDE HYDROCHLORIDE 300 MG: 300 CAPSULE ORAL at 10:39

## 2021-01-09 RX ADMIN — PROBIOTIC PRODUCT - TAB 1 TABLET: TAB at 07:50

## 2021-01-09 RX ADMIN — METOPROLOL SUCCINATE 25 MG: 25 TABLET, EXTENDED RELEASE ORAL at 07:50

## 2021-01-09 RX ADMIN — Medication 1 PATCH: at 07:52

## 2021-01-09 RX ADMIN — METHADONE HYDROCHLORIDE 50 MG: 10 TABLET ORAL at 05:48

## 2021-01-09 RX ADMIN — ACETAMINOPHEN 650 MG: 325 TABLET, FILM COATED ORAL at 21:13

## 2021-01-09 RX ADMIN — CEFAZOLIN SODIUM 2 G: 10 POWDER, FOR SOLUTION INTRAVENOUS at 11:22

## 2021-01-09 RX ADMIN — KETOROLAC TROMETHAMINE 30 MG: 30 INJECTION, SOLUTION INTRAMUSCULAR at 05:47

## 2021-01-09 RX ADMIN — PAROXETINE 30 MG: 20 TABLET, FILM COATED ORAL at 07:50

## 2021-01-09 RX ADMIN — CEFAZOLIN SODIUM 2 G: 10 POWDER, FOR SOLUTION INTRAVENOUS at 04:43

## 2021-01-09 RX ADMIN — BUPROPION HYDROCHLORIDE 75 MG: 75 TABLET, FILM COATED ORAL at 11:22

## 2021-01-09 RX ADMIN — ACETAMINOPHEN 650 MG: 325 TABLET, FILM COATED ORAL at 05:48

## 2021-01-09 RX ADMIN — CYCLOBENZAPRINE HYDROCHLORIDE 10 MG: 5 TABLET, FILM COATED ORAL at 07:56

## 2021-01-09 RX ADMIN — QUETIAPINE FUMARATE 100 MG: 100 TABLET ORAL at 21:13

## 2021-01-09 RX ADMIN — BUPROPION HYDROCHLORIDE 75 MG: 75 TABLET, FILM COATED ORAL at 16:04

## 2021-01-09 NOTE — PROGRESS NOTES
Hospital Medicine Service -  Daily Progress Note       SUBJECTIVE   Interval History: No events overnight, feeling well today, mild HA symptoms, but otherwise okay. Eating okay, no CP, SOB, abd pain, n/V/d/C. ROS negative.        OBJECTIVE      Vital signs in last 24 hours:  Temp:  [36.7 °C (98.1 °F)-37 °C (98.6 °F)] 37 °C (98.6 °F)  Heart Rate:  [77-86] 78  Resp:  [16-18] 16  BP: (115-145)/(60-95) 122/60    Intake/Output Summary (Last 24 hours) at 1/9/2021 1610  Last data filed at 1/9/2021 1122  Gross per 24 hour   Intake 50 ml   Output --   Net 50 ml       PHYSICAL EXAMINATION      Physical Exam  Vitals signs and nursing note reviewed.   Constitutional:       General: She is not in acute distress.     Appearance: Normal appearance. She is not ill-appearing, toxic-appearing or diaphoretic.   HENT:      Head: Normocephalic and atraumatic.      Right Ear: External ear normal.      Left Ear: External ear normal.      Nose: Nose normal.      Mouth/Throat:      Pharynx: No oropharyngeal exudate or posterior oropharyngeal erythema.   Eyes:      General:         Right eye: No discharge.         Left eye: No discharge.      Pupils: Pupils are equal, round, and reactive to light.   Neck:      Musculoskeletal: Normal range of motion.   Cardiovascular:      Rate and Rhythm: Normal rate and regular rhythm.      Heart sounds: Murmur present.   Pulmonary:      Effort: Pulmonary effort is normal. No respiratory distress.      Breath sounds: Normal breath sounds. No stridor. No wheezing, rhonchi or rales.   Chest:      Chest wall: No tenderness.   Abdominal:      General: Abdomen is flat. Bowel sounds are normal. There is no distension.      Palpations: There is no mass.      Tenderness: There is no abdominal tenderness. There is no guarding or rebound.      Hernia: No hernia is present.   Musculoskeletal:      Right lower leg: No edema.      Left lower leg: No edema.   Skin:     General: Skin is warm and dry.      Coloration:  Skin is not jaundiced or pale.      Findings: No bruising, erythema, lesion or rash.   Neurological:      General: No focal deficit present.      Mental Status: She is alert and oriented to person, place, and time. Mental status is at baseline.   Psychiatric:         Mood and Affect: Mood normal.         Behavior: Behavior normal.         Thought Content: Thought content normal.         Judgment: Judgment normal.        LINES, CATHETERS, DRAINS, AIRWAYS, AND WOUNDS   Lines, Drains, Airways, Wounds:  PICC Single Lumen 12/18/20 Right (Active)   Number of days: 22       Comments:      LABS / IMAGING / TELE      Labs  No new labs.    SARS-CoV-2 (COVID-19) (no units)   Date/Time Value   01/04/2021 1627 Negative     No new imaging or cardio studies.       ASSESSMENT AND PLAN      Dyspepsia  Assessment & Plan  Stable, cont with PPI  Diet as tolerated.       Drug-induced constipation  Assessment & Plan  Stable, diet as tolerated, prn meds.       Opioid abuse (CMS/HCC)  Assessment & Plan  Counseled to quit using drugs  Will need to f/u with Psych and SW for rehab options.       Tricuspid valve vegetation  Assessment & Plan  See plan for endocarditis.       Anemia  Assessment & Plan  Hb stable at baseline, cont to monitor.   Most likely menstrual loss      Septic arthritis of vertebra (CMS/HCC)  Assessment & Plan  Con with IV abx to finish 6 weeks of Abx as per ID  Neurosurgery f/u  Pain control.         Essential hypertension  Assessment & Plan  BP stable, cont with po meds, low salt diet.       Septic embolism (CMS/HCC)  Assessment & Plan  Complication of infectious endocarditis  Cont with IV abx  Repeat CT chest as outpt.       Opioid type dependence, continuous use (CMS/HCC)  Assessment & Plan  Rehab options with psych and sW.       Migraine, intractable  Assessment & Plan  Cont with po meds as per Neuro,   Still with some mild symptoms this AM      Insomnia  Assessment & Plan  Prn meds      Depression  Assessment &  Plan  Stable mood today  No SI, HI  Cont with po meds  outpt psych f/u      Anxiety  Assessment & Plan  Stable mood today, no SI, HI  Cont with po meds.       * Acute bacterial endocarditis  Assessment & Plan  Slowly improving, cont with IV abx, day 33/42  ID input noted           VTE Assessment: Padua VTE Score: 2  VTE Prophylaxis Plan: SCD  Code Status: Full Code  Estimated Discharge Date: 1/19/2021   Disposition Planning: DC when finished with IV Abx.      Cali Gaitan MD  1/9/2021

## 2021-01-10 PROBLEM — D64.9 ANEMIA: Status: RESOLVED | Noted: 2020-12-10 | Resolved: 2021-01-10

## 2021-01-10 LAB
AMPHET UR QL SCN: NOT DETECTED
BARBITURATES UR QL SCN: POSITIVE
BENZODIAZ UR QL SCN: NOT DETECTED
CANNABINOIDS UR QL SCN: NOT DETECTED
COCAINE UR QL SCN: NOT DETECTED
OPIATES UR QL SCN: NOT DETECTED
PCP UR QL SCN: NOT DETECTED
SARS-COV-2 RNA RESP QL NAA+PROBE: NEGATIVE

## 2021-01-10 PROCEDURE — 63700000 HC SELF-ADMINISTRABLE DRUG: Performed by: INTERNAL MEDICINE

## 2021-01-10 PROCEDURE — 80307 DRUG TEST PRSMV CHEM ANLYZR: CPT | Performed by: INTERNAL MEDICINE

## 2021-01-10 PROCEDURE — 63700000 HC SELF-ADMINISTRABLE DRUG: Performed by: PSYCHIATRY & NEUROLOGY

## 2021-01-10 PROCEDURE — 63700000 HC SELF-ADMINISTRABLE DRUG: Performed by: NURSE PRACTITIONER

## 2021-01-10 PROCEDURE — 25000000 HC PHARMACY GENERAL: Performed by: INTERNAL MEDICINE

## 2021-01-10 PROCEDURE — 25800000 HC PHARMACY IV SOLUTIONS: Performed by: INTERNAL MEDICINE

## 2021-01-10 PROCEDURE — 99233 SBSQ HOSP IP/OBS HIGH 50: CPT | Performed by: INTERNAL MEDICINE

## 2021-01-10 PROCEDURE — 63700000 HC SELF-ADMINISTRABLE DRUG: Performed by: HOSPITALIST

## 2021-01-10 PROCEDURE — 63600000 HC DRUGS/DETAIL CODE: Performed by: INTERNAL MEDICINE

## 2021-01-10 PROCEDURE — 12000000 HC ROOM AND CARE MED/SURG

## 2021-01-10 PROCEDURE — U0002 COVID-19 LAB TEST NON-CDC: HCPCS | Performed by: INTERNAL MEDICINE

## 2021-01-10 RX ADMIN — TRIMETHOBENZAMIDE HYDROCHLORIDE 300 MG: 300 CAPSULE ORAL at 11:51

## 2021-01-10 RX ADMIN — PANTOPRAZOLE SODIUM 40 MG: 40 TABLET, DELAYED RELEASE ORAL at 20:15

## 2021-01-10 RX ADMIN — PREGABALIN 150 MG: 75 CAPSULE ORAL at 08:22

## 2021-01-10 RX ADMIN — CEFAZOLIN SODIUM 2 G: 10 POWDER, FOR SOLUTION INTRAVENOUS at 12:23

## 2021-01-10 RX ADMIN — DIPHENHYDRAMINE HYDROCHLORIDE 25 MG: 50 INJECTION INTRAMUSCULAR; INTRAVENOUS at 20:20

## 2021-01-10 RX ADMIN — ZOLPIDEM TARTRATE 10 MG: 5 TABLET, COATED ORAL at 21:31

## 2021-01-10 RX ADMIN — PRAZOSIN HYDROCHLORIDE 2 MG: 1 CAPSULE ORAL at 21:30

## 2021-01-10 RX ADMIN — FAMOTIDINE 10 MG: 10 TABLET, FILM COATED ORAL at 16:30

## 2021-01-10 RX ADMIN — BUPROPION HYDROCHLORIDE 75 MG: 75 TABLET, FILM COATED ORAL at 16:30

## 2021-01-10 RX ADMIN — PREGABALIN 150 MG: 75 CAPSULE ORAL at 20:18

## 2021-01-10 RX ADMIN — ACETAMINOPHEN 650 MG: 325 TABLET, FILM COATED ORAL at 14:36

## 2021-01-10 RX ADMIN — PROBIOTIC PRODUCT - TAB 1 TABLET: TAB at 20:15

## 2021-01-10 RX ADMIN — CEFAZOLIN SODIUM 2 G: 10 POWDER, FOR SOLUTION INTRAVENOUS at 20:27

## 2021-01-10 RX ADMIN — TRIMETHOBENZAMIDE HYDROCHLORIDE 300 MG: 300 CAPSULE ORAL at 20:12

## 2021-01-10 RX ADMIN — ACETAMINOPHEN 650 MG: 325 TABLET, FILM COATED ORAL at 21:29

## 2021-01-10 RX ADMIN — Medication 1 PATCH: at 08:22

## 2021-01-10 RX ADMIN — BUPROPION HYDROCHLORIDE 75 MG: 75 TABLET, FILM COATED ORAL at 11:51

## 2021-01-10 RX ADMIN — CYCLOBENZAPRINE HYDROCHLORIDE 10 MG: 5 TABLET, FILM COATED ORAL at 08:22

## 2021-01-10 RX ADMIN — PANTOPRAZOLE SODIUM 40 MG: 40 TABLET, DELAYED RELEASE ORAL at 08:22

## 2021-01-10 RX ADMIN — METHADONE HYDROCHLORIDE 50 MG: 10 TABLET ORAL at 05:01

## 2021-01-10 RX ADMIN — CYCLOBENZAPRINE HYDROCHLORIDE 10 MG: 5 TABLET, FILM COATED ORAL at 20:19

## 2021-01-10 RX ADMIN — QUETIAPINE FUMARATE 100 MG: 100 TABLET ORAL at 21:30

## 2021-01-10 RX ADMIN — PROBIOTIC PRODUCT - TAB 1 TABLET: TAB at 08:22

## 2021-01-10 RX ADMIN — DIPHENHYDRAMINE HYDROCHLORIDE 25 MG: 50 INJECTION INTRAMUSCULAR; INTRAVENOUS at 04:34

## 2021-01-10 RX ADMIN — CEFAZOLIN SODIUM 2 G: 10 POWDER, FOR SOLUTION INTRAVENOUS at 04:25

## 2021-01-10 RX ADMIN — METOPROLOL SUCCINATE 25 MG: 25 TABLET, EXTENDED RELEASE ORAL at 08:22

## 2021-01-10 RX ADMIN — FAMOTIDINE 10 MG: 10 TABLET, FILM COATED ORAL at 08:22

## 2021-01-10 RX ADMIN — DIPHENHYDRAMINE HYDROCHLORIDE 25 MG: 50 INJECTION INTRAMUSCULAR; INTRAVENOUS at 12:24

## 2021-01-10 RX ADMIN — ACETAMINOPHEN 650 MG: 325 TABLET, FILM COATED ORAL at 05:00

## 2021-01-10 RX ADMIN — PAROXETINE 30 MG: 20 TABLET, FILM COATED ORAL at 08:22

## 2021-01-10 RX ADMIN — NICOTINE POLACRILEX 4 MG: 4 GUM, CHEWING ORAL at 08:21

## 2021-01-10 RX ADMIN — BUPROPION HYDROCHLORIDE 75 MG: 75 TABLET, FILM COATED ORAL at 08:22

## 2021-01-10 RX ADMIN — METHADONE HYDROCHLORIDE 10 MG: 10 TABLET ORAL at 19:02

## 2021-01-10 NOTE — NURSING NOTE
At 1500 pt appeared to be high.  Pt lethargic, but arousable.  Falling asleep during conversation.  Stumbling around the room.  Dr Barajas made aware and recommended that security search belongings.  Spoke with nursing supervisor and got the okay to search belongings and pt gave permission for us to search.  Security found a powder substance rolled up in a piece of paper in a medication folder in the bathroom.   Also found 3 pills pt was trying to hide in her blanket.  Pt stated she was holding onto them because she didn't need them at the time they were given and she didn't want to bother us when she did need them.  She stated they were from yesterdays dose and it was the 50mg methadone and 2 flexeril.  Pharmacy confirmed meds.  Pt agreeable to urine drug screen.  Nursing supervisor instructed pt she needs to wear a gown instead of her street clothes.  Pt refused stated they make her warm and itchy.  Pt asked for a dose of methadone at 1700 but still seems high, eyes glassy.  Dr Barajas  Made aware and agrees to hold for now.  Made pt aware and I will reassess later.  Pt cooperative.  Pt refused to be alarm for safety

## 2021-01-10 NOTE — PROGRESS NOTES
[facility] Medicine Service -  Daily Progress Note       SUBJECTIVE   Interval History: No acute events overnight. Nursing noted this afternoon that patient seemed more drowsy then baseline. Patient seen and examined. Pt drowsy but arousable, falling asleep during conversation. Denies any pain. Denies any f/c, cp, sob, abd pain, n/v/d/c. Pt repeatedly denies any drug use while inpt     OBJECTIVE      Vital signs in last 24 hours:  Temp:  [36.7 °C (98 °F)-36.8 °C (98.2 °F)] 36.8 °C (98.2 °F)  Heart Rate:  [79-92] 88  Resp:  [14-16] 16  BP: (111-140)/(61-83) 140/74    Intake/Output Summary (Last 24 hours) at 1/10/2021 2229  Last data filed at 1/10/2021 2027  Gross per 24 hour   Intake 50 ml   Output --   Net 50 ml       PHYSICAL EXAMINATION      GEN: well-developed and well-nourished; not in acute distress, drowsy but arousable  HEENT: normocephalic; atraumatic  NECK: no JVD; no bruits  CARDIO: regular rate and rhythm; no murmurs, rubs or gallops  RESP: clear to auscultation bilaterally; no rales, rhonchi, or wheezes  ABD: soft, non-distended, non-tender, normal bowel sounds  EXT: no cyanosis, clubbing, or edema  SKIN: clean, dry, warm, and intact  MUSCULOSKELETAL: no injury or deformity  NEURO: drowsy but arousable, alert and oriented x 3  BEHAVIOR/EMOTIONAL: appropriate; cooperative     LABS / IMAGING / TELE      Labs  Lab Results   Component Value Date    GLUCOSE 107 (H) 01/04/2021    CALCIUM 9.6 01/04/2021     01/04/2021    K 4.3 01/04/2021    CO2 25 01/04/2021     01/04/2021    BUN 14 01/04/2021    CREATININE 0.7 01/04/2021     Lab Results   Component Value Date    WBC 8.05 01/04/2021    HGB 12.0 01/04/2021    HCT 37.7 01/04/2021    MCV 87.3 01/04/2021     01/04/2021     Lab Results   Component Value Date    ALBUMIN 3.3 (L) 01/03/2021    BILITOT 0.4 01/03/2021    ALKPHOS 65 01/03/2021    BILIDIR <0.1 04/12/2017    AST 21 01/03/2021    ALT 10 (L) 01/03/2021    PROTEIN 6.6 01/03/2021        Imaging  X-ray Chest 1 View    Result Date: 12/18/2020  IMPRESSION: Interval placement of a right-sided PICC line catheter as described above.     X-ray Chest 1 View    Result Date: 12/18/2020  IMPRESSION: Interval placement of a right-sided PICC line catheter as described above.     Ultrasound Abdomen Limited    Result Date: 12/17/2020  IMPRESSION: Mild increase in echotexture of the liver may represent fibrofatty infiltration. COMMENT: Ultrasound evaluation of the right upper quadrant was performed and compared to a CT exam dated 8/18/2019.  The pancreas is of normal echotexture and unremarkable.  The aorta is obscured by bowel gas.  The liver is 17.6 cm in length and of increased echogenicity consistent with fibrofatty infiltration. There is no ductal dilatation or mass lesion.  Common bile duct measures 2.3 mm at the pito hepatis.  The right kidney is 10.5 cm in length.  There is no nephrolithiasis, hydronephrosis or mass lesion.  The renal cortex of normal thickness and echotexture.        ASSESSMENT AND PLAN      Septic arthritis of vertebra (CMS/HCC)  Assessment & Plan  Con with IV abx to finish 6 weeks of Abx as per ID  Neurosurgery f/u  Pain control.         * Acute bacterial endocarditis  Assessment & Plan  Slowly improving, cont with IV abx, day 34/42  ID input noted        Opioid type dependence, continuous use (CMS/HCC)  Assessment & Plan  Pt with inc lethargy today, with white powder substance found rolled in a piece of paper amongst her things and three pills (yesterday's methadone 50mg and flexeril)  Pt repeatedly denies any recent drug abuse, although concern for potential use while inpt.   Pt drowsy but arousable, VS remain stable.    Plan:  Will order UDS  Cont to monitor closely  Rehab options with psych and sW.       Tricuspid valve vegetation  Assessment & Plan  See plan for endocarditis.       Essential hypertension  Assessment & Plan  BP stable    Plan;  Cont w/metoprolol,  prazosin    Septic embolism (CMS/HCC)  Assessment & Plan  Complication of infectious endocarditis  Cont with IV abx  Repeat CT chest as outpt.       Migraine, intractable  Assessment & Plan  Cont to complain of ongoing headaches  Improved with current treatment    Plan:  Cont with po meds as per Neuro,         Insomnia  Assessment & Plan  Cont w/ambien      Depression  Assessment & Plan  Stable     Plan:  Cont with po meds  outpt psych f/u      Anxiety  Assessment & Plan  Stable     Plan:  Cont with po meds.            VTE Assessment: Padua VTE Score: 2  Estimated discharge date: 1/19/2021  Code Status: Full Code     Erinn Barajas, DO  1/10/2021

## 2021-01-11 LAB
ALBUMIN SERPL-MCNC: 3.5 G/DL (ref 3.4–5)
ALP SERPL-CCNC: 58 IU/L (ref 35–126)
ALT SERPL-CCNC: 9 IU/L (ref 11–54)
ANION GAP SERPL CALC-SCNC: 7 MEQ/L (ref 3–15)
ANION GAP SERPL CALC-SCNC: 7 MEQ/L (ref 3–15)
AST SERPL-CCNC: 19 IU/L (ref 15–41)
BILIRUB SERPL-MCNC: 0.4 MG/DL (ref 0.3–1.2)
BUN SERPL-MCNC: 17 MG/DL (ref 8–20)
BUN SERPL-MCNC: 17 MG/DL (ref 8–20)
CALCIUM SERPL-MCNC: 9 MG/DL (ref 8.9–10.3)
CALCIUM SERPL-MCNC: 9 MG/DL (ref 8.9–10.3)
CHLORIDE SERPL-SCNC: 98 MEQ/L (ref 98–109)
CHLORIDE SERPL-SCNC: 98 MEQ/L (ref 98–109)
CO2 SERPL-SCNC: 31 MEQ/L (ref 22–32)
CO2 SERPL-SCNC: 31 MEQ/L (ref 22–32)
CREAT SERPL-MCNC: 0.7 MG/DL (ref 0.6–1.1)
CREAT SERPL-MCNC: 0.7 MG/DL (ref 0.6–1.1)
ERYTHROCYTE [DISTWIDTH] IN BLOOD BY AUTOMATED COUNT: 16.3 % (ref 11.7–14.4)
GFR SERPL CREATININE-BSD FRML MDRD: >60 ML/MIN/1.73M*2
GFR SERPL CREATININE-BSD FRML MDRD: >60 ML/MIN/1.73M*2
GLUCOSE SERPL-MCNC: 92 MG/DL (ref 70–99)
GLUCOSE SERPL-MCNC: 92 MG/DL (ref 70–99)
HCT VFR BLDCO AUTO: 33.4 % (ref 35–45)
HGB BLD-MCNC: 10.5 G/DL (ref 11.8–15.7)
MCH RBC QN AUTO: 28.5 PG (ref 28–33.2)
MCHC RBC AUTO-ENTMCNC: 31.4 G/DL (ref 32.2–35.5)
MCV RBC AUTO: 90.5 FL (ref 83–98)
PDW BLD AUTO: 10.3 FL (ref 9.4–12.3)
PLATELET # BLD AUTO: 217 K/UL (ref 150–369)
POTASSIUM SERPL-SCNC: 4.6 MEQ/L (ref 3.6–5.1)
POTASSIUM SERPL-SCNC: 4.6 MEQ/L (ref 3.6–5.1)
PROT SERPL-MCNC: 6.5 G/DL (ref 6–8.2)
RBC # BLD AUTO: 3.69 M/UL (ref 3.93–5.22)
SODIUM SERPL-SCNC: 136 MEQ/L (ref 136–144)
SODIUM SERPL-SCNC: 136 MEQ/L (ref 136–144)
WBC # BLD AUTO: 6.81 K/UL (ref 3.8–10.5)

## 2021-01-11 PROCEDURE — 63700000 HC SELF-ADMINISTRABLE DRUG: Performed by: INTERNAL MEDICINE

## 2021-01-11 PROCEDURE — 85027 COMPLETE CBC AUTOMATED: CPT | Performed by: INTERNAL MEDICINE

## 2021-01-11 PROCEDURE — 63700000 HC SELF-ADMINISTRABLE DRUG: Performed by: NURSE PRACTITIONER

## 2021-01-11 PROCEDURE — 25800000 HC PHARMACY IV SOLUTIONS: Performed by: INTERNAL MEDICINE

## 2021-01-11 PROCEDURE — 63700000 HC SELF-ADMINISTRABLE DRUG: Performed by: HOSPITALIST

## 2021-01-11 PROCEDURE — 63600000 HC DRUGS/DETAIL CODE: Performed by: INTERNAL MEDICINE

## 2021-01-11 PROCEDURE — 99999 PR OFFICE/OUTPT VISIT,PROCEDURE ONLY: CPT | Performed by: INTERNAL MEDICINE

## 2021-01-11 PROCEDURE — 12000000 HC ROOM AND CARE MED/SURG

## 2021-01-11 PROCEDURE — 36415 COLL VENOUS BLD VENIPUNCTURE: CPT | Performed by: INTERNAL MEDICINE

## 2021-01-11 PROCEDURE — 25000000 HC PHARMACY GENERAL: Performed by: INTERNAL MEDICINE

## 2021-01-11 PROCEDURE — 80053 COMPREHEN METABOLIC PANEL: CPT | Performed by: INTERNAL MEDICINE

## 2021-01-11 PROCEDURE — 63700000 HC SELF-ADMINISTRABLE DRUG: Performed by: PSYCHIATRY & NEUROLOGY

## 2021-01-11 PROCEDURE — 80048 BASIC METABOLIC PNL TOTAL CA: CPT | Performed by: INTERNAL MEDICINE

## 2021-01-11 PROCEDURE — 99233 SBSQ HOSP IP/OBS HIGH 50: CPT | Performed by: INTERNAL MEDICINE

## 2021-01-11 RX ORDER — ACETAMINOPHEN 325 MG/1
650 TABLET ORAL EVERY 6 HOURS PRN
Status: DISCONTINUED | OUTPATIENT
Start: 2021-01-11 | End: 2021-01-11

## 2021-01-11 RX ORDER — ACETAMINOPHEN 325 MG/1
650 TABLET ORAL
Status: DISCONTINUED | OUTPATIENT
Start: 2021-01-11 | End: 2021-02-03 | Stop reason: HOSPADM

## 2021-01-11 RX ADMIN — CEFAZOLIN SODIUM 2 G: 10 POWDER, FOR SOLUTION INTRAVENOUS at 18:46

## 2021-01-11 RX ADMIN — BUPROPION HYDROCHLORIDE 75 MG: 75 TABLET, FILM COATED ORAL at 15:20

## 2021-01-11 RX ADMIN — ZOLPIDEM TARTRATE 10 MG: 5 TABLET, COATED ORAL at 21:39

## 2021-01-11 RX ADMIN — ACETAMINOPHEN 650 MG: 325 TABLET, FILM COATED ORAL at 13:47

## 2021-01-11 RX ADMIN — DIPHENHYDRAMINE HYDROCHLORIDE 25 MG: 50 INJECTION INTRAMUSCULAR; INTRAVENOUS at 21:39

## 2021-01-11 RX ADMIN — TRIMETHOBENZAMIDE HYDROCHLORIDE 300 MG: 300 CAPSULE ORAL at 18:12

## 2021-01-11 RX ADMIN — PAROXETINE 30 MG: 20 TABLET, FILM COATED ORAL at 09:11

## 2021-01-11 RX ADMIN — PROBIOTIC PRODUCT - TAB 1 TABLET: TAB at 19:46

## 2021-01-11 RX ADMIN — Medication 1 PATCH: at 09:09

## 2021-01-11 RX ADMIN — NICOTINE POLACRILEX 4 MG: 4 GUM, CHEWING ORAL at 09:15

## 2021-01-11 RX ADMIN — CYCLOBENZAPRINE HYDROCHLORIDE 10 MG: 5 TABLET, FILM COATED ORAL at 19:46

## 2021-01-11 RX ADMIN — ALUMINUM HYDROXIDE, MAGNESIUM HYDROXIDE, AND SIMETHICONE 30 ML: 200; 200; 20 SUSPENSION ORAL at 19:46

## 2021-01-11 RX ADMIN — PANTOPRAZOLE SODIUM 40 MG: 40 TABLET, DELAYED RELEASE ORAL at 09:10

## 2021-01-11 RX ADMIN — PREGABALIN 150 MG: 75 CAPSULE ORAL at 19:46

## 2021-01-11 RX ADMIN — QUETIAPINE FUMARATE 100 MG: 100 TABLET ORAL at 21:38

## 2021-01-11 RX ADMIN — PANTOPRAZOLE SODIUM 40 MG: 40 TABLET, DELAYED RELEASE ORAL at 19:46

## 2021-01-11 RX ADMIN — FAMOTIDINE 10 MG: 10 TABLET, FILM COATED ORAL at 15:20

## 2021-01-11 RX ADMIN — CEFAZOLIN SODIUM 2 G: 10 POWDER, FOR SOLUTION INTRAVENOUS at 11:41

## 2021-01-11 RX ADMIN — FAMOTIDINE 10 MG: 10 TABLET, FILM COATED ORAL at 06:55

## 2021-01-11 RX ADMIN — DIPHENHYDRAMINE HYDROCHLORIDE 25 MG: 50 INJECTION INTRAMUSCULAR; INTRAVENOUS at 09:12

## 2021-01-11 RX ADMIN — ACETAMINOPHEN 650 MG: 325 TABLET, FILM COATED ORAL at 06:55

## 2021-01-11 RX ADMIN — BUPROPION HYDROCHLORIDE 75 MG: 75 TABLET, FILM COATED ORAL at 09:10

## 2021-01-11 RX ADMIN — ACETAMINOPHEN 650 MG: 325 TABLET, FILM COATED ORAL at 19:46

## 2021-01-11 RX ADMIN — BUPROPION HYDROCHLORIDE 75 MG: 75 TABLET, FILM COATED ORAL at 11:43

## 2021-01-11 RX ADMIN — TRIMETHOBENZAMIDE HYDROCHLORIDE 300 MG: 300 CAPSULE ORAL at 09:10

## 2021-01-11 RX ADMIN — PREGABALIN 150 MG: 75 CAPSULE ORAL at 09:09

## 2021-01-11 RX ADMIN — METOPROLOL SUCCINATE 25 MG: 25 TABLET, EXTENDED RELEASE ORAL at 09:10

## 2021-01-11 RX ADMIN — METHADONE HYDROCHLORIDE 50 MG: 10 TABLET ORAL at 06:56

## 2021-01-11 RX ADMIN — CYCLOBENZAPRINE HYDROCHLORIDE 10 MG: 5 TABLET, FILM COATED ORAL at 09:10

## 2021-01-11 RX ADMIN — PROBIOTIC PRODUCT - TAB 1 TABLET: TAB at 09:10

## 2021-01-11 RX ADMIN — CEFAZOLIN SODIUM 2 G: 10 POWDER, FOR SOLUTION INTRAVENOUS at 04:41

## 2021-01-11 RX ADMIN — NICOTINE POLACRILEX 4 MG: 4 GUM, CHEWING ORAL at 21:39

## 2021-01-11 RX ADMIN — DIPHENHYDRAMINE HYDROCHLORIDE 25 MG: 50 INJECTION INTRAMUSCULAR; INTRAVENOUS at 15:19

## 2021-01-11 NOTE — PROGRESS NOTES
Hospital Medicine Service -  Daily Progress Note       SUBJECTIVE   Interval History: No acute events overnight. Per nursing staff no further episodes of lethargy noted. UDS (+) for barbituates although pt did receive phenobarb prior with last dose on 1/6. Patient seen and examined. Complains of persistent back pain, and requests that flexeril be scheduled to be taken in conjunction with methadone and tylenol.      OBJECTIVE      Vital signs in last 24 hours:  Temp:  [36.3 °C (97.4 °F)-36.9 °C (98.4 °F)] 36.9 °C (98.4 °F)  Heart Rate:  [69-88] 76  Resp:  [16] 16  BP: (114-140)/(70-76) 114/70    Intake/Output Summary (Last 24 hours) at 1/11/2021 1827  Last data filed at 1/11/2021 1221  Gross per 24 hour   Intake 640 ml   Output --   Net 640 ml       PHYSICAL EXAMINATION      GEN: well-developed and well-nourished; not in acute distress  HEENT: normocephalic; atraumatic  NECK: no JVD  CARDIO: regular rate and rhythm; no murmurs, rubs or gallops  RESP: clear to auscultation bilaterally; no rales, rhonchi, or wheezes  ABD: soft, non-distended, non-tender, normal bowel sounds  EXT: no cyanosis, clubbing, or edema  SKIN: clean, dry, warm, and intact  MUSCULOSKELETAL: no injury or deformity  NEURO: alert and oriented x 3; nonfocal  BEHAVIOR/EMOTIONAL: appropriate; cooperative     LABS / IMAGING / TELE      Labs  Lab Results   Component Value Date    GLUCOSE 92 01/11/2021    GLUCOSE 92 01/11/2021    CALCIUM 9.0 01/11/2021    CALCIUM 9.0 01/11/2021     01/11/2021     01/11/2021    K 4.6 01/11/2021    K 4.6 01/11/2021    CO2 31 01/11/2021    CO2 31 01/11/2021    CL 98 01/11/2021    CL 98 01/11/2021    BUN 17 01/11/2021    BUN 17 01/11/2021    CREATININE 0.7 01/11/2021    CREATININE 0.7 01/11/2021     Lab Results   Component Value Date    WBC 6.81 01/11/2021    HGB 10.5 (L) 01/11/2021    HCT 33.4 (L) 01/11/2021    MCV 90.5 01/11/2021     01/11/2021     Lab Results   Component Value Date    ALBUMIN 3.5  01/11/2021    BILITOT 0.4 01/11/2021    ALKPHOS 58 01/11/2021    BILIDIR <0.1 04/12/2017    AST 19 01/11/2021    ALT 9 (L) 01/11/2021    PROTEIN 6.5 01/11/2021       Imaging  X-ray Chest 1 View    Result Date: 12/18/2020  IMPRESSION: Interval placement of a right-sided PICC line catheter as described above.     X-ray Chest 1 View    Result Date: 12/18/2020  IMPRESSION: Interval placement of a right-sided PICC line catheter as described above.     Ultrasound Abdomen Limited    Result Date: 12/17/2020  IMPRESSION: Mild increase in echotexture of the liver may represent fibrofatty infiltration. COMMENT: Ultrasound evaluation of the right upper quadrant was performed and compared to a CT exam dated 8/18/2019.  The pancreas is of normal echotexture and unremarkable.  The aorta is obscured by bowel gas.  The liver is 17.6 cm in length and of increased echogenicity consistent with fibrofatty infiltration. There is no ductal dilatation or mass lesion.  Common bile duct measures 2.3 mm at the pito hepatis.  The right kidney is 10.5 cm in length.  There is no nephrolithiasis, hydronephrosis or mass lesion.  The renal cortex of normal thickness and echotexture.        ASSESSMENT AND PLAN      Septic arthritis of vertebra (CMS/HCC)  Assessment & Plan  Complains of inc back pain although lying in bed, talking of the phone, appears comfortable.    Plan:  Neurosurgery consulted - per evaluation No acute surgical intervention required. Cont w/management with IV antibiotics. Repeat imaging at completion of antibiotics and follow-up with neurosurgery at that time.   In light of yesterday's events and given her stable clinical status I am hestitant to place on standing flexeril as requested. Cont w/tylenol, methadone, and flexeril PRN for now. If pain cont to worsen t/c LSO brace   Pain management consulted - will f/u any additional recs  Con with IV abx to finish 6 weeks of Abx as per ID          * Acute bacterial  endocarditis  Assessment & Plan  Slowly improving,  Blood cultures from 12/2020 negative for growth  Currently denies any cardiac sxs and remains afebrile w/out further si/sxs of ongoing infection      Plan:   cont with IV abx, day 35/42  ID input noted  Cardiology consulted - Recommended she return in 1-2 weeks for cardiac evaluation after discharge.        Opioid type dependence, continuous use (CMS/HCC)  Assessment & Plan  Pt with inc lethargy on 1/10, with white powder substance found rolled in a piece of paper amongst her things and three pills (yesterday's methadone 50mg and flexeril)  Pt repeatedly denies any recent drug abuse, although concern for potential use while inpt.   UDS (+) only for barbituates, last of which she received on 1/6  Sxs have since resolved.    Plan:  Cont to monitor closely.   Psych consulted - will f/u recs  Rehab options with psych and sW.       Tricuspid valve vegetation  Assessment & Plan  See plan for endocarditis.       Essential hypertension  Assessment & Plan  BP stable    Plan;  Cont w/metoprolol, prazosin    Septic embolism (CMS/[facility])  Assessment & Plan  Complication of infectious endocarditis  Cont with IV abx  Repeat CT chest as outpt.       Migraine, intractable  Assessment & Plan  Cont to complain of ongoing headaches  Improved with current treatment    Plan:  Cont with po meds as per Neuro,         Insomnia  Assessment & Plan  Cont w/ambien      Depression  Assessment & Plan  Stable     Plan:  Cont with po meds  outpt psych f/u      Anxiety  Assessment & Plan  Stable     Plan:  Cont with po meds.            VTE Assessment: Padua VTE Score: 2  Estimated discharge date: 1/19/2021  Code Status: Full Code     Erinn Barajas, DO  1/11/2021

## 2021-01-11 NOTE — ASSESSMENT & PLAN NOTE
-Resolved with Fioricet.   -Neurology consulted. Rec Fioricet no more than 4 days/month, and over-the-counter medications no more than 15 days/month.    -Outpatient neurology follow up for further management/workup. Needs outpatient sleep study.

## 2021-01-11 NOTE — PATIENT CARE CONFERENCE
Care Progression Rounds Note  Date: 1/11/2021  Time: 10:19 AM     Patient Name: Carolyn Redmond     Medical Record Number: [id number]   YOB: 1985  Sex: Female      Room/Bed: 4211    Admitting Diagnosis: Septic embolism (CMS/[facility]) [I76]  Infection of lumbar spine (CMS/[facility]) [M46.26]  Acute left-sided low back pain without sciatica [M54.5]   Admit Date/Time: 12/9/2020 11:10 AM    Primary Diagnosis: Acute bacterial endocarditis  Principal Problem: Acute bacterial endocarditis    GMLOS: 4.8  Anticipated Discharge Date: 1/19/2021    AM-PAC  Mobility Score:      Discharge Planning:       Barriers to Discharge:  Barriers to Discharge: Medical issues not resolved  Comment: iv antibiotics    Participants:  social work/services, nursing

## 2021-01-11 NOTE — ASSESSMENT & PLAN NOTE
-Tricuspid valve endocarditis with severe TR. Also with septic pulmonary emboli and seeding of lumbar spine.   -[facility] grew MSSA. Blood cultures from 12/2020 negative for growth.Patient afebrile.   -Completed 42 days antibiotics on 1/18/21.   -Repeat MRI L-spine shows improved, but persistent enhancement in the lumbar joints.     Plan:  - continue IV antibiotics for total 8 weeks then follow up as outpatient for repeat lumbar imaging. Continue IV abx until 2/1/21.  -Cardiology consulted.  No need for surgical intervention on tricuspid valve. Recommended she follow up as outpatient for repeat TTE.  -F/U with PCP for repeat CT chest as outpatient.  - tentative plan to dc the evening of 2/1 vs 2/2 pending completion of abx and set-up of outpt resources/enrollment in methadone clinic

## 2021-01-11 NOTE — ASSESSMENT & PLAN NOTE
-No SI/HI.  -Continue Wellbutrin, Quetiapine, Ambien.  -Follow up with psychiatry as outpatient.

## 2021-01-11 NOTE — ASSESSMENT & PLAN NOTE
Complains of inc back pain although lying in bed, talking of the phone, appears comfortable.    Plan:  Neurosurgery consulted - per evaluation No acute surgical intervention required. Cont w/management with IV antibiotics. Repeat imaging at completion of antibiotics and follow-up with neurosurgery at that time.   In light of yesterday's events and given her stable clinical status I am hestitant to place on standing flexeril as requested. Cont w/tylenol, methadone, and flexeril PRN for now. If pain cont to worsen t/c LSO brace   Pain management consulted - will f/u any additional recs  Con with IV abx to finish 6 weeks of Abx as per ID

## 2021-01-11 NOTE — ASSESSMENT & PLAN NOTE
-Continue methadone per psych recs.   -Patient declining inpatient psych rehab. Agrees to IOP. States she has multiple resources to help maintain sobriety including a sponsor.  -Behavioral therapy and psychiatry following. Discussed with psych social work - will reinitiate process to enroll in methadone clinic for tenative start on Tuesday. Will need to f/u with SW regarding enrollment/set-up of outpt resources prior to d/c

## 2021-01-11 NOTE — NURSING NOTE
Pt was watched carefully while taking meds and pts mouth was checked after each controlled substance was administered this shift.

## 2021-01-11 NOTE — PROGRESS NOTES
MSW CC following to assist, as needed. Pt is for completion of IV antibiotics, then dc home. MSW CC continuing to follow for placement.    PLAN: completion of IV antiobiotics, either at the hospital or at placement, then dc to home.

## 2021-01-12 PROCEDURE — 63700000 HC SELF-ADMINISTRABLE DRUG: Performed by: PSYCHIATRY & NEUROLOGY

## 2021-01-12 PROCEDURE — 99231 SBSQ HOSP IP/OBS SF/LOW 25: CPT | Performed by: HOSPITALIST

## 2021-01-12 PROCEDURE — 25000000 HC PHARMACY GENERAL: Performed by: INTERNAL MEDICINE

## 2021-01-12 PROCEDURE — 63700000 HC SELF-ADMINISTRABLE DRUG: Performed by: INTERNAL MEDICINE

## 2021-01-12 PROCEDURE — 63700000 HC SELF-ADMINISTRABLE DRUG: Performed by: HOSPITALIST

## 2021-01-12 PROCEDURE — 99233 SBSQ HOSP IP/OBS HIGH 50: CPT | Performed by: PSYCHIATRY & NEUROLOGY

## 2021-01-12 PROCEDURE — 63700000 HC SELF-ADMINISTRABLE DRUG: Performed by: NURSE PRACTITIONER

## 2021-01-12 PROCEDURE — 63600000 HC DRUGS/DETAIL CODE: Performed by: INTERNAL MEDICINE

## 2021-01-12 PROCEDURE — 12000000 HC ROOM AND CARE MED/SURG

## 2021-01-12 PROCEDURE — 25800000 HC PHARMACY IV SOLUTIONS: Performed by: INTERNAL MEDICINE

## 2021-01-12 RX ADMIN — ACETAMINOPHEN 650 MG: 325 TABLET, FILM COATED ORAL at 12:41

## 2021-01-12 RX ADMIN — TRIMETHOBENZAMIDE HYDROCHLORIDE 300 MG: 300 CAPSULE ORAL at 11:27

## 2021-01-12 RX ADMIN — CEFAZOLIN SODIUM 2 G: 10 POWDER, FOR SOLUTION INTRAVENOUS at 20:14

## 2021-01-12 RX ADMIN — BUPROPION HYDROCHLORIDE 75 MG: 75 TABLET, FILM COATED ORAL at 11:27

## 2021-01-12 RX ADMIN — Medication 1 PATCH: at 08:38

## 2021-01-12 RX ADMIN — CEFAZOLIN SODIUM 2 G: 10 POWDER, FOR SOLUTION INTRAVENOUS at 12:08

## 2021-01-12 RX ADMIN — PANTOPRAZOLE SODIUM 40 MG: 40 TABLET, DELAYED RELEASE ORAL at 08:37

## 2021-01-12 RX ADMIN — QUETIAPINE FUMARATE 100 MG: 100 TABLET ORAL at 22:46

## 2021-01-12 RX ADMIN — METHADONE HYDROCHLORIDE 50 MG: 10 TABLET ORAL at 06:44

## 2021-01-12 RX ADMIN — PROBIOTIC PRODUCT - TAB 1 TABLET: TAB at 08:37

## 2021-01-12 RX ADMIN — ZOLPIDEM TARTRATE 10 MG: 5 TABLET, COATED ORAL at 22:46

## 2021-01-12 RX ADMIN — PANTOPRAZOLE SODIUM 40 MG: 40 TABLET, DELAYED RELEASE ORAL at 20:19

## 2021-01-12 RX ADMIN — ACETAMINOPHEN 650 MG: 325 TABLET, FILM COATED ORAL at 02:32

## 2021-01-12 RX ADMIN — CYCLOBENZAPRINE HYDROCHLORIDE 10 MG: 5 TABLET, FILM COATED ORAL at 08:37

## 2021-01-12 RX ADMIN — PROBIOTIC PRODUCT - TAB 1 TABLET: TAB at 20:18

## 2021-01-12 RX ADMIN — FAMOTIDINE 10 MG: 10 TABLET, FILM COATED ORAL at 06:44

## 2021-01-12 RX ADMIN — BUPROPION HYDROCHLORIDE 75 MG: 75 TABLET, FILM COATED ORAL at 16:45

## 2021-01-12 RX ADMIN — BUPROPION HYDROCHLORIDE 75 MG: 75 TABLET, FILM COATED ORAL at 08:37

## 2021-01-12 RX ADMIN — PREGABALIN 150 MG: 75 CAPSULE ORAL at 08:37

## 2021-01-12 RX ADMIN — DIPHENHYDRAMINE HYDROCHLORIDE 25 MG: 50 INJECTION INTRAMUSCULAR; INTRAVENOUS at 12:42

## 2021-01-12 RX ADMIN — ACETAMINOPHEN 650 MG: 325 TABLET, FILM COATED ORAL at 20:17

## 2021-01-12 RX ADMIN — DIPHENHYDRAMINE HYDROCHLORIDE 25 MG: 50 INJECTION INTRAMUSCULAR; INTRAVENOUS at 04:13

## 2021-01-12 RX ADMIN — ACETAMINOPHEN 650 MG: 325 TABLET, FILM COATED ORAL at 06:45

## 2021-01-12 RX ADMIN — CEFAZOLIN SODIUM 2 G: 10 POWDER, FOR SOLUTION INTRAVENOUS at 04:13

## 2021-01-12 RX ADMIN — TRIMETHOBENZAMIDE HYDROCHLORIDE 300 MG: 300 CAPSULE ORAL at 19:03

## 2021-01-12 RX ADMIN — PAROXETINE 30 MG: 20 TABLET, FILM COATED ORAL at 08:37

## 2021-01-12 RX ADMIN — METOPROLOL SUCCINATE 25 MG: 25 TABLET, EXTENDED RELEASE ORAL at 11:24

## 2021-01-12 RX ADMIN — PREGABALIN 150 MG: 75 CAPSULE ORAL at 20:19

## 2021-01-12 RX ADMIN — FAMOTIDINE 10 MG: 10 TABLET, FILM COATED ORAL at 16:45

## 2021-01-12 NOTE — PROGRESS NOTES
Hospital Medicine Service -  Daily Progress Note       SUBJECTIVE   Interval History: No acute events overnight.   Requesting to increase her pain medication l.      OBJECTIVE      Vital signs in last 24 hours:  Temp:  [36.4 °C (97.5 °F)-36.9 °C (98.4 °F)] 36.4 °C (97.6 °F)  Heart Rate:  [73-95] 95  Resp:  [16] 16  BP: (107-141)/(55-75) 141/75    Intake/Output Summary (Last 24 hours) at 1/12/2021 1350  Last data filed at 1/12/2021 0413  Gross per 24 hour   Intake 50 ml   Output --   Net 50 ml       PHYSICAL EXAMINATION      GEN: well-developed and well-nourished; not in acute distress  HEENT: normocephalic; atraumatic  NECK: no JVD  CARDIO: regular rate and rhythm; no murmurs, rubs or gallops  RESP: clear to auscultation bilaterally; no rales, rhonchi, or wheezes  ABD: soft, non-distended, non-tender, normal bowel sounds  EXT: no cyanosis, clubbing, or edema  SKIN: clean, dry, warm, and intact  MUSCULOSKELETAL: no injury or deformity  NEURO: alert and oriented x 3; nonfocal  BEHAVIOR/EMOTIONAL: appropriate; cooperative     LABS / IMAGING / TELE      Labs  Lab Results   Component Value Date    GLUCOSE 92 01/11/2021    GLUCOSE 92 01/11/2021    CALCIUM 9.0 01/11/2021    CALCIUM 9.0 01/11/2021     01/11/2021     01/11/2021    K 4.6 01/11/2021    K 4.6 01/11/2021    CO2 31 01/11/2021    CO2 31 01/11/2021    CL 98 01/11/2021    CL 98 01/11/2021    BUN 17 01/11/2021    BUN 17 01/11/2021    CREATININE 0.7 01/11/2021    CREATININE 0.7 01/11/2021     Lab Results   Component Value Date    WBC 6.81 01/11/2021    HGB 10.5 (L) 01/11/2021    HCT 33.4 (L) 01/11/2021    MCV 90.5 01/11/2021     01/11/2021     Lab Results   Component Value Date    ALBUMIN 3.5 01/11/2021    BILITOT 0.4 01/11/2021    ALKPHOS 58 01/11/2021    BILIDIR <0.1 04/12/2017    AST 19 01/11/2021    ALT 9 (L) 01/11/2021    PROTEIN 6.5 01/11/2021       Imaging  X-ray Chest 1 View    Result Date: 12/18/2020  IMPRESSION: Interval placement of a  right-sided PICC line catheter as described above.     X-ray Chest 1 View    Result Date: 12/18/2020  IMPRESSION: Interval placement of a right-sided PICC line catheter as described above.     Ultrasound Abdomen Limited    Result Date: 12/17/2020  IMPRESSION: Mild increase in echotexture of the liver may represent fibrofatty infiltration. COMMENT: Ultrasound evaluation of the right upper quadrant was performed and compared to a CT exam dated 8/18/2019.  The pancreas is of normal echotexture and unremarkable.  The aorta is obscured by bowel gas.  The liver is 17.6 cm in length and of increased echogenicity consistent with fibrofatty infiltration. There is no ductal dilatation or mass lesion.  Common bile duct measures 2.3 mm at the pito hepatis.  The right kidney is 10.5 cm in length.  There is no nephrolithiasis, hydronephrosis or mass lesion.  The renal cortex of normal thickness and echotexture.        ASSESSMENT AND PLAN      Tricuspid valve vegetation  Assessment & Plan  See plan for endocarditis.       Septic arthritis of vertebra (CMS/HCC)  Assessment & Plan  Complains of inc back pain although lying in bed, talking of the phone, appears comfortable.    Plan:  Neurosurgery consulted - per evaluation No acute surgical intervention required. Cont w/management with IV antibiotics. Repeat imaging at completion of antibiotics and follow-up with neurosurgery at that time.   In light of yesterday's events and given her stable clinical status I am hestitant to place on standing flexeril as requested. Cont w/tylenol, methadone, and flexeril PRN for now. If pain cont to worsen t/c LSO brace   Pain management consulted - will f/u any additional recs  Con with IV abx to finish 6 weeks of Abx as per ID          Essential hypertension  Assessment & Plan  BP stable    Plan;  Cont w/metoprolol, prazosin    Septic embolism (CMS/HCC)  Assessment & Plan  Complication of infectious endocarditis  Cont with IV abx  Repeat CT chest  as outpt.       Opioid type dependence, continuous use (CMS/[facility])  Assessment & Plan  Pt with inc lethargy on 1/10, with white powder substance found rolled in a piece of paper amongst her things and three pills (yesterday's methadone 50mg and flexeril)  Pt repeatedly denies any recent drug abuse, although concern for potential use while inpt.   UDS (+) only for barbituates, last of which she received on 1/6  Sxs have since resolved.    Plan:  Cont to monitor closely.   Psych consulted - will f/u recs  Rehab options with psych and sW.   May need 1:1 monitoring       Migraine, intractable  Assessment & Plan  Cont to complain of ongoing headaches  Improved with current treatment    Plan:  Cont with po meds as per Neuro,         Insomnia  Assessment & Plan  Cont w/ambien      Depression  Assessment & Plan  Stable     Plan:  Cont with po meds  outpt psych f/u      Anxiety  Assessment & Plan  Stable     Plan:  Cont with po meds.       * Acute bacterial endocarditis  Assessment & Plan  Slowly improving,  Blood cultures from 12/2020 negative for growth  Currently denies any cardiac sxs and remains afebrile w/out further si/sxs of ongoing infection      Plan:   cont with IV abx, day 36/42  ID input noted  Cardiology consulted - Recommended she return in 1-2 weeks for cardiac evaluation after discharge.             VTE Assessment: Padua VTE Score: 2  Estimated discharge date: 1/19/2021  Code Status: Full Code     Tisha Moise MD  1/12/2021

## 2021-01-12 NOTE — PATIENT CARE CONFERENCE
Care Progression Rounds Note  Date: 1/12/2021  Time: 10:09 AM     Patient Name: Carolyn Redmond     Medical Record Number: [id number]   YOB: 1985  Sex: Female      Room/Bed: 4211    Admitting Diagnosis: Septic embolism (CMS/[facility]) [I76]  Infection of lumbar spine (CMS/[facility]) [M46.26]  Acute left-sided low back pain without sciatica [M54.5]   Admit Date/Time: 12/9/2020 11:10 AM    Primary Diagnosis: Acute bacterial endocarditis  Principal Problem: Acute bacterial endocarditis    GMLOS: 4.8  Anticipated Discharge Date: 1/19/2021    AM-PAC  Mobility Score:      Discharge Planning:       Barriers to Discharge:  Barriers to Discharge: Medical issues not resolved  Comment: iv antibiotics for 5 more days    Participants:  social work/services, nursing

## 2021-01-12 NOTE — PROGRESS NOTES
"Psychiatry Progress Note    Chief Complaint/Reason for follow-up: opioid dependence and depression    Interval History: The patient is seen on follow-up along with social work.  The patient was reportedly found to be lethargic and appearing \"high\" over the weekend.  Security was called and 2 flexeril and what sounds like a 10mg tab of methadone were found as well as a \"powder substance rolled up in a pice of paper.\"  The patient acknowledges saving flexeril and methadone so that - as she explains it - use them as needed rather than going through nursing.  She denies having any powder/drugs in her possession and denies any intentional misuse or overuse over the weekend.  There was an additional concern that there was a smoke in the patient's room today though when I saw her I did not personally note that.  In any case patient is tearful during discussion and states she apologizes for holding onto meds and she does not know why she seemed somnolent over the weekend.  She is committed to staying on methadone and following up as an outpatient.  She understands we will do away with prn afternoon methadone for now and have 1:1 with her for now out of abundance of caution.  I spoke with patient's nurse and [facility] about this away and all agree with plan.  Patient states despite all of this she is not having any thoughts to harm/kill herself or anyone else and is forward thinking and looks forward to completing antibiotics.    Review of Systems:   Sleep:  Good and Appetite: Good    Vital Signs for the last 24 hours:  Temp:  [36.4 °C (97.5 °F)-36.9 °C (98.4 °F)] 36.4 °C (97.6 °F)  Heart Rate:  [73-95] 95  Resp:  [16] 16  BP: (107-141)/(55-75) 141/75      Current Facility-Administered Medications:   •  acetaminophen (TYLENOL) tablet 650 mg, 650 mg, oral, q6h INT, Erinn Barajas DO, 650 mg at 01/12/21 0645  •  alum-mag hydroxide-simeth (MAALOX) 200-200-20 mg/5 mL suspension 30 mL, 30 mL, oral, q6h PRN, Lili Crawford MD, " 30 mL at 01/11/21 1946  •  buPROPion (WELLBUTRIN) tablet 75 mg, 75 mg, oral, TID, Marlon Padron MD, 75 mg at 01/12/21 1127  •  ceFAZolin (ANCEF) NSS IVPB piggyback 2 g, 2 g, intravenous, q8h INT, Vikas Jones MD, Stopped at 01/12/21 0457  •  cyclobenzaprine (FLEXERIL) tablet 10 mg, 10 mg, oral, 3x daily PRN, Marizol Ortiz CRNP, 10 mg at 01/12/21 0837  •  glucose chewable tablet 16-32 g of dextrose, 16-32 g of dextrose, oral, PRN **OR** dextrose 40 % oral gel 15-30 g of dextrose, 15-30 g of dextrose, oral, PRN **OR** glucagon (GLUCAGEN) injection 1 mg, 1 mg, intramuscular, PRN **OR** dextrose in water injection 12.5 g, 25 mL, intravenous, PRN, Perfecto Angela MD  •  diphenhydrAMINE (BENADRYL) injection 25 mg, 25 mg, intravenous, q6h PRN, Lili Crawford MD, 25 mg at 01/12/21 0413  •  famotidine (PEPCID) tablet 10 mg, 10 mg, oral, BID AC, Lili Crawford MD, 10 mg at 01/12/21 0644  •  fluticasone propionate (FLONASE) 50 mcg/actuation nasal spray 2 spray, 2 spray, Each Nostril, Daily PRN, Perfecto Angela MD  •  hydrocortisone-pramoxine (ANALPRAM-HC) 2.5-1 % rectal cream, , rectal, 4x daily PRN, Dominique Powell DO, Given at 12/19/20 1643  •  hydrOXYzine (ATARAX) tablet 25 mg, 25 mg, oral, Nightly PRN, Dominique Powell DO, 25 mg at 12/21/20 2119  •  lactobacillus acidophilus complex (BACID) 1 billion cell- 250 mg tablet 1 tablet, 1 tablet, oral, BID, Heidi Toscano CRNP, 1 tablet at 01/12/21 0837  •  lidocaine (ASPERCREME) 4 % topical patch 1 patch, 1 patch, Topical, Daily, Jacki Boss CRNP  •  magnesium sulfate IVPB 2g in 50 mL NSS/D5W/SWFI, 2 g, intravenous, PRN, Perfecto Angela MD  •  methadone (DOLOPHINE) tablet 10 mg, 10 mg, oral, Daily PRN, Lili Crawford MD, 10 mg at 01/10/21 1902  •  methadone (DOLOPHINE) tablet 50 mg, 50 mg, oral, Daily (6a), Marlon Padron MD, 50 mg at 01/12/21 0644  •  metoprolol succinate XL (TOPROL-XL) 24 hr ER tablet 25 mg, 25  mg, oral, Daily, Lili Crawford MD, 25 mg at 01/12/21 1124  •  naloxone (NARCAN) injection 0.2 mg, 0.2 mg, intravenous, Once PRN, Jacki Boss CRNP  •  nicotine (NICODERM CQ) 14 mg/24 hr patch 1 patch, 1 patch, transdermal, Daily, Gen Hogue MD, 1 patch at 01/12/21 0838  •  nicotine polacrilex (NICORETTE) gum 4 mg, 4 mg, buccal, q4h PRN, Perfecto Angela MD, 4 mg at 01/12/21 1123  •  pantoprazole (PROTONIX) tablet,delayed release (DR/EC) 40 mg, 40 mg, oral, BID, José Manuel Morris MD, 40 mg at 01/12/21 0837  •  PARoxetine (PAXIL) tablet 30 mg, 30 mg, oral, Daily, Marlon Padron MD, 30 mg at 01/12/21 0837  •  potassium chloride (KLOR-CON) tablet extended release 20 mEq, 20 mEq, oral, Daily PRN **OR** potassium chloride (KLOR-CON) tablet extended release 40 mEq, 40 mEq, oral, Daily PRN **OR** potassium chloride 20 mEq in 100 mL IVPB  (premix), 20 mEq, intravenous, Daily PRN **OR** potassium chloride (KCL) 40 mEq/250 mL IVPB in NSS 40 mEq, 40 mEq, intravenous, Daily PRN, Perfecto Angela MD  •  prazosin (MINIPRESS) capsule 2 mg, 2 mg, oral, Nightly, Perfecto Angela MD, 2 mg at 01/10/21 2130  •  pregabalin (LYRICA) capsule 150 mg, 150 mg, oral, BID, Perfecto Angela MD, 150 mg at 01/12/21 0837  •  psyllium husk powder 1 packet, 1 packet, oral, Daily, Dominique Powell DO, 1 packet at 12/22/20 0822  •  QUEtiapine (SEROquel) tablet 100 mg, 100 mg, oral, Nightly, Marlon Padron MD, 100 mg at 01/11/21 2138  •  trimethobenzamide (TIGAN) capsule 300 mg, 300 mg, oral, q8h JAIDEN, Dominique Powell DO, 300 mg at 01/12/21 1127  •  zolpidem (AMBIEN) tablet 10 mg, 10 mg, oral, Nightly, Perfecto Angela MD, 10 mg at 01/11/21 2139    MSE:  Orientation: AAO x3  Behavior: Appropriate  Appearance: well groomed  Eye Contact: Fair throughout with some crying  Mood: “i'm been feeling fine”  Affect: congruent; mostly stable and appropriate  Speech: Coherent  Thought Process: Goal Directed and linear    Suicidal Ideation: Denies suicidal thoughts; intent or plan; denies passive death wish  Homicidal Ideation: Denies having homicidal thoughts, intent or plan  Hallucinations: Denies  Delusions: Denies  Cognition: No gross cognitive deficits  Memory: Remote; Immediate; all intact  Insight: Fair  Judgment: Marginal      Assessment/Plan  Unspecified mood (affective) disorder (CMS/[facility])  Assessment & Plan  Patient with history of depression and addiction.  1. Continue paroxetine 30mg qam  2. Continue bupropion 75mg TID  3. Continue quetiapine 100mg qhs  4.  to please speak with patient about options for inpatient/outpatient rehab and follow-up with a psychiatrist/therapist      Opioid type dependence, continuous use (CMS/[facility])  Assessment & Plan  1. Continue methadone 50mg qam (Hold for sedation; O2 < 95; SBP <100; DBP < 60; HR < 60; RR< 10; QTc > 470)  2. DC afternoon prn dose of methadone for now  3.  to please speak with patient about options for inpatient rehab at [facility] where she can do rehab while receiving iv antibiotics.  Patient is agreeable to this.  4. Maintain 1:1 sitter for safety for now  5. Can flexeril be discontinued?      Spent 35 minutes in total treatment including chart review; consultation with patient; and note writing    Marlon Padron MD  1/12/2021

## 2021-01-12 NOTE — PROGRESS NOTES
MSW CC following for dc planning needs. At this time pt only has 5 days left of her IV antibiotic. Call was made to [facility] to discuss case again. They said a bed isnt available for this pt at this time on their unit. MSW CC continuing to follow to assist with dc planning needs.    PLAN: following for IV antibiotic and outpt needs.

## 2021-01-13 PROCEDURE — 63700000 HC SELF-ADMINISTRABLE DRUG: Performed by: NURSE PRACTITIONER

## 2021-01-13 PROCEDURE — 25000000 HC PHARMACY GENERAL: Performed by: INTERNAL MEDICINE

## 2021-01-13 PROCEDURE — 63600000 HC DRUGS/DETAIL CODE: Performed by: INTERNAL MEDICINE

## 2021-01-13 PROCEDURE — 12000000 HC ROOM AND CARE MED/SURG

## 2021-01-13 PROCEDURE — 63700000 HC SELF-ADMINISTRABLE DRUG: Performed by: INTERNAL MEDICINE

## 2021-01-13 PROCEDURE — 25800000 HC PHARMACY IV SOLUTIONS: Performed by: INTERNAL MEDICINE

## 2021-01-13 PROCEDURE — 63700000 HC SELF-ADMINISTRABLE DRUG: Performed by: PSYCHIATRY & NEUROLOGY

## 2021-01-13 PROCEDURE — 99231 SBSQ HOSP IP/OBS SF/LOW 25: CPT | Performed by: HOSPITALIST

## 2021-01-13 PROCEDURE — 63700000 HC SELF-ADMINISTRABLE DRUG: Performed by: HOSPITALIST

## 2021-01-13 RX ADMIN — BUPROPION HYDROCHLORIDE 75 MG: 75 TABLET, FILM COATED ORAL at 09:05

## 2021-01-13 RX ADMIN — METHADONE HYDROCHLORIDE 50 MG: 10 TABLET ORAL at 06:17

## 2021-01-13 RX ADMIN — DIPHENHYDRAMINE HYDROCHLORIDE 25 MG: 50 INJECTION INTRAMUSCULAR; INTRAVENOUS at 17:26

## 2021-01-13 RX ADMIN — CEFAZOLIN SODIUM 2 G: 10 POWDER, FOR SOLUTION INTRAVENOUS at 20:23

## 2021-01-13 RX ADMIN — METOPROLOL SUCCINATE 25 MG: 25 TABLET, EXTENDED RELEASE ORAL at 09:05

## 2021-01-13 RX ADMIN — FAMOTIDINE 10 MG: 10 TABLET, FILM COATED ORAL at 09:05

## 2021-01-13 RX ADMIN — PANTOPRAZOLE SODIUM 40 MG: 40 TABLET, DELAYED RELEASE ORAL at 20:20

## 2021-01-13 RX ADMIN — CYCLOBENZAPRINE HYDROCHLORIDE 10 MG: 5 TABLET, FILM COATED ORAL at 10:39

## 2021-01-13 RX ADMIN — PROBIOTIC PRODUCT - TAB 1 TABLET: TAB at 20:19

## 2021-01-13 RX ADMIN — ACETAMINOPHEN 650 MG: 325 TABLET, FILM COATED ORAL at 21:29

## 2021-01-13 RX ADMIN — FAMOTIDINE 10 MG: 10 TABLET, FILM COATED ORAL at 16:39

## 2021-01-13 RX ADMIN — ACETAMINOPHEN 650 MG: 325 TABLET, FILM COATED ORAL at 06:25

## 2021-01-13 RX ADMIN — BUPROPION HYDROCHLORIDE 75 MG: 75 TABLET, FILM COATED ORAL at 16:40

## 2021-01-13 RX ADMIN — PAROXETINE 30 MG: 20 TABLET, FILM COATED ORAL at 09:06

## 2021-01-13 RX ADMIN — PREGABALIN 150 MG: 75 CAPSULE ORAL at 09:06

## 2021-01-13 RX ADMIN — DIPHENHYDRAMINE HYDROCHLORIDE 25 MG: 50 INJECTION INTRAMUSCULAR; INTRAVENOUS at 11:19

## 2021-01-13 RX ADMIN — PRAZOSIN HYDROCHLORIDE 2 MG: 1 CAPSULE ORAL at 21:28

## 2021-01-13 RX ADMIN — CEFAZOLIN SODIUM 2 G: 10 POWDER, FOR SOLUTION INTRAVENOUS at 03:54

## 2021-01-13 RX ADMIN — PREGABALIN 150 MG: 75 CAPSULE ORAL at 20:20

## 2021-01-13 RX ADMIN — PANTOPRAZOLE SODIUM 40 MG: 40 TABLET, DELAYED RELEASE ORAL at 09:06

## 2021-01-13 RX ADMIN — CEFAZOLIN SODIUM 2 G: 10 POWDER, FOR SOLUTION INTRAVENOUS at 11:19

## 2021-01-13 RX ADMIN — PROBIOTIC PRODUCT - TAB 1 TABLET: TAB at 09:05

## 2021-01-13 RX ADMIN — BUPROPION HYDROCHLORIDE 75 MG: 75 TABLET, FILM COATED ORAL at 11:53

## 2021-01-13 RX ADMIN — ZOLPIDEM TARTRATE 10 MG: 5 TABLET, COATED ORAL at 21:27

## 2021-01-13 RX ADMIN — TRIMETHOBENZAMIDE HYDROCHLORIDE 300 MG: 300 CAPSULE ORAL at 10:39

## 2021-01-13 RX ADMIN — QUETIAPINE FUMARATE 100 MG: 100 TABLET ORAL at 21:28

## 2021-01-13 RX ADMIN — TRIMETHOBENZAMIDE HYDROCHLORIDE 300 MG: 300 CAPSULE ORAL at 18:56

## 2021-01-13 RX ADMIN — ACETAMINOPHEN 650 MG: 325 TABLET, FILM COATED ORAL at 16:40

## 2021-01-13 NOTE — NURSING NOTE
Patient c/o headache and requesting benadryl, due to sleepy but easily aroused state RN offered tylenol and administered. Patient calm and awake in bed, 1:1 sitter continued, call bell in reach, will continue to monitor.

## 2021-01-13 NOTE — NURSING NOTE
"Patient noted straining when urinating and when asked if she always pushes so hard to urinate patient states \"yeah sometimes this happens and I have to push really hard to pee\". RN paced hat in toilet and explained to patient the need to monitor her urine output. Patient resting in bed, 1:1 sitter continued, will continue to monitor.   "

## 2021-01-13 NOTE — NURSING NOTE
"When cleaning up bedside table RN found 5 pieces of yellow nicotine gum floating in patient top drawer. When RN asked patient why these were here patient states \"I was going to use them later\". RN placed 5 pieces gum in sharps container. Patient resting in bed, 1:1 sitter continued, will continue to monitor.   "

## 2021-01-13 NOTE — BEHAVIORAL HEALTH CRISIS PROGRESS NOTE
"Behavioral Health Progress Note    Chief Complaint/Reason for follow-up: opioid dependence and depression.     Interval History: Follow up with pt along with psychiatry. Over the weekened the pt had been reportedly found to be lethargic and \"high\". Reports state that security found the pt to have 2 flexeril and a 10mg tab of methadone as well it is said a powder substance rolled up in a piece of paper in the pts folder. Spoke to the pt who acknowledges that she had saved the flexeril as well as the methadone stating at the time she didn't feel as though she need it and she could hold off a few hours. She however denies there to have been any powder substance in paper in her room. She reports her things were looked through and denies ever having or bringing in any drugs or other substances.     The pt appears tearful and upset following this incident and after discussion does understand as well as is apologetic for holding the substance. Discussion in regards to follow up plan and the pt is still on board with following up outpt, going to the methadone clinic as well as is continually interested in further outpt for substance use.   She denies any bad thoughts, SI/HI/AVH.       Vital Signs for the last 24 hours:  Temp:  [36.4 °C (97.6 °F)-36.8 °C (98.2 °F)] 36.8 °C (98.2 °F)  Heart Rate:  [73-95] 78  Resp:  [16] 16  BP: (111-145)/(53-75) 145/66    Assessment/Plan  At this time the pt will have 1:1 remain with her in the room. This clinician will continue to follow as the pt is within the last few days of her antibiotic tx. Will follow to work with the pt to set up with methadone clinic and outpt tx.   "

## 2021-01-13 NOTE — NURSING NOTE
RN administered tylenol and methadone, watched patient swallow each individual pill and checked mouth. Patient calm and cooperative, 1:1 sitter continued, will continue to monitor.

## 2021-01-13 NOTE — NURSING NOTE
RN watched patient take oral medications and checked mouth with each pill administered. Patient resting in bed, easy to arouse from sleep, 1:1 sitter in place, will continue to monitor.

## 2021-01-13 NOTE — PATIENT CARE CONFERENCE
SUBJECTIVE:   Meme Mccurdy is a 23 year old female who presents to clinic today for the following health issues:     Meme continues to use the sertraline 200 mg daily for depression. She feels it is working well. She has no other concerns today.     History of Present Illness     Depression & Anxiety Follow-up:     Depression/Anxiety:  Depression & Anxiety    Status since last visit::  Improved    Other associated symptoms of depression and anxiety::  YES    Significant life event::  No    Current substance use::  Alcohol       Today's PHQ-9         PHQ-9 Total Score:         PHQ-9 Q9 Suicidal ideation:       Thoughts of suicide or self harm:      Self-harm Plan:        Self-harm Action:          Safety concerns for self or others:            Problem list and histories reviewed & adjusted, as indicated.  Additional history: as documented      Patient Active Problem List   Diagnosis     Melanocytic nevus of face     Ovarian cyst     BMI (body mass index), pediatric, 85% to less than 95% for age     Depression with anxiety     Insomnia     Encounter for initial prescription of injectable contraceptive     Past Surgical History:   Procedure Laterality Date     HC TOOTH EXTRACTION W/FORCEP  9/08     TONSILLECTOMY Bilateral 12/19/2016    Procedure: TONSILLECTOMY;  Surgeon: Ilya Montilla MD;  Location:  OR       Social History   Substance Use Topics     Smoking status: Never Smoker     Smokeless tobacco: Never Used     Alcohol use No     Family History   Problem Relation Age of Onset     Hypertension Mother      Arthritis Mother      Lipids Father      High Cholesterol     Breast Cancer Maternal Grandmother      CANCER Maternal Grandmother      DIABETES Maternal Grandfather      Hypertension Maternal Grandfather      CEREBROVASCULAR DISEASE Other      Thyroid Disease No family hx of      Glaucoma No family hx of      Macular Degeneration No family hx of          Current Outpatient Prescriptions   Medication  Care Progression Rounds Note  Date: 1/13/2021  Time: 10:28 AM     Patient Name: Carolyn Redmond     Medical Record Number: [id number]   YOB: 1985  Sex: Female      Room/Bed: 4211    Admitting Diagnosis: Septic embolism (CMS/[facility]) [I76]  Infection of lumbar spine (CMS/[facility]) [M46.26]  Acute left-sided low back pain without sciatica [M54.5]   Admit Date/Time: 12/9/2020 11:10 AM    Primary Diagnosis: Acute bacterial endocarditis  Principal Problem: Acute bacterial endocarditis    GMLOS: 4.8  Anticipated Discharge Date: 1/19/2021    AM-PAC  Mobility Score:      Discharge Planning:       Barriers to Discharge:  Barriers to Discharge: Medical issues not resolved  Comment: completing last 4 days of iv antibiotics    Participants:  social work/services, nursing   "Sig Dispense Refill     sertraline (ZOLOFT) 100 MG tablet Take 2 tablets (200 mg) by mouth daily 180 tablet 1     medroxyPROGESTERone (DEPO-PROVERA) 150 MG/ML injection Inject 1 mL (150 mg) into the muscle every 3 months 1 mL 3     famotidine (PEPCID) 40 MG tablet Take 1 tablet (40 mg) by mouth At Bedtime 30 tablet 3     ibuprofen (ADVIL,MOTRIN) 800 MG tablet Take 1 tablet by mouth every 8 hours as needed for pain. 60 tablet 1     [DISCONTINUED] sertraline (ZOLOFT) 100 MG tablet Take 2 tablets (200 mg) by mouth daily 180 tablet 1     Allergies   Allergen Reactions     Nkda [No Known Drug Allergies]        ROS:  Constitutional, HEENT, cardiovascular, pulmonary, gi and gu systems are negative, except as otherwise noted.    OBJECTIVE:     /80  Pulse 98  Temp 97.9  F (36.6  C) (Oral)  Resp 18  Ht 5' 8\" (1.727 m)  Wt 258 lb (117 kg)  SpO2 96%  BMI 39.23 kg/m2  Body mass index is 39.23 kg/(m^2).  GENERAL: healthy, alert and no distress  PSYCH: mentation appears normal, affect normal/bright, judgement and insight intact and appearance well groomed    Diagnostic Test Results:  none     ASSESSMENT/PLAN:       1. Depression with anxiety  Continue with her current treatment. 200 mg of the sertraline.   Plan follow up for anxiety / depression in 6 months, sooner if needed.   - sertraline (ZOLOFT) 100 MG tablet; Take 2 tablets (200 mg) by mouth daily  Dispense: 180 tablet; Refill: 1    2. Special screening examination for chlamydial disease  She is due for routine exam and will make an appointment.   Chlamydia screening will be done today.   - Chlamydia trachomatis PCR    20 minutes spent with Meme today. Over 50% of time taken for discussion of above, counseling and coordination of care.       Kristen M. Kehr, PA-C  [facility]  "

## 2021-01-13 NOTE — PROGRESS NOTES
Hospital Medicine Service -  Daily Progress Note       SUBJECTIVE   Interval History: No acute events overnight.   Says she has to strain to pass urine  Bladder scan has shown < 50cc per RN   But no other complaints      OBJECTIVE      Vital signs in last 24 hours:  Temp:  [36.1 °C (97 °F)-36.8 °C (98.2 °F)] 36.1 °C (97 °F)  Heart Rate:  [73-90] 90  Resp:  [16-18] 18  BP: (111-145)/(53-66) 111/59  No intake or output data in the 24 hours ending 01/13/21 1606    PHYSICAL EXAMINATION      GEN: well-developed and well-nourished; not in acute distress  HEENT: normocephalic; atraumatic  NECK: no JVD  CARDIO: regular rate and rhythm; no murmurs, rubs or gallops  RESP: clear to auscultation bilaterally; no rales, rhonchi, or wheezes  ABD: soft, non-distended, non-tender, normal bowel sounds  EXT: no cyanosis, clubbing, or edema  SKIN: clean, dry, warm, and intact  MUSCULOSKELETAL: no injury or deformity  NEURO: alert and oriented x 3; nonfocal  BEHAVIOR/EMOTIONAL: appropriate; cooperative     LABS / IMAGING / TELE      Labs  Lab Results   Component Value Date    GLUCOSE 92 01/11/2021    GLUCOSE 92 01/11/2021    CALCIUM 9.0 01/11/2021    CALCIUM 9.0 01/11/2021     01/11/2021     01/11/2021    K 4.6 01/11/2021    K 4.6 01/11/2021    CO2 31 01/11/2021    CO2 31 01/11/2021    CL 98 01/11/2021    CL 98 01/11/2021    BUN 17 01/11/2021    BUN 17 01/11/2021    CREATININE 0.7 01/11/2021    CREATININE 0.7 01/11/2021     Lab Results   Component Value Date    WBC 6.81 01/11/2021    HGB 10.5 (L) 01/11/2021    HCT 33.4 (L) 01/11/2021    MCV 90.5 01/11/2021     01/11/2021     Lab Results   Component Value Date    ALBUMIN 3.5 01/11/2021    BILITOT 0.4 01/11/2021    ALKPHOS 58 01/11/2021    BILIDIR <0.1 04/12/2017    AST 19 01/11/2021    ALT 9 (L) 01/11/2021    PROTEIN 6.5 01/11/2021       Imaging  X-ray Chest 1 View    Result Date: 12/18/2020  IMPRESSION: Interval placement of a right-sided PICC line catheter as  described above.     X-ray Chest 1 View    Result Date: 12/18/2020  IMPRESSION: Interval placement of a right-sided PICC line catheter as described above.     Ultrasound Abdomen Limited    Result Date: 12/17/2020  IMPRESSION: Mild increase in echotexture of the liver may represent fibrofatty infiltration. COMMENT: Ultrasound evaluation of the right upper quadrant was performed and compared to a CT exam dated 8/18/2019.  The pancreas is of normal echotexture and unremarkable.  The aorta is obscured by bowel gas.  The liver is 17.6 cm in length and of increased echogenicity consistent with fibrofatty infiltration. There is no ductal dilatation or mass lesion.  Common bile duct measures 2.3 mm at the pito hepatis.  The right kidney is 10.5 cm in length.  There is no nephrolithiasis, hydronephrosis or mass lesion.  The renal cortex of normal thickness and echotexture.        ASSESSMENT AND PLAN      Tricuspid valve vegetation  Assessment & Plan  See plan for endocarditis.       Septic arthritis of vertebra (CMS/HCC)  Assessment & Plan  Complains of inc back pain although lying in bed, talking of the phone, appears comfortable.    Plan:  Neurosurgery consulted - per evaluation No acute surgical intervention required. Cont w/management with IV antibiotics. Repeat imaging at completion of antibiotics and follow-up with neurosurgery at that time.   In light of yesterday's events and given her stable clinical status I am hestitant to place on standing flexeril as requested. Cont w/tylenol, methadone, and flexeril PRN for now. If pain cont to worsen t/c LSO brace   Pain management consulted - will f/u any additional recs  Con with IV abx to finish 6 weeks of Abx as per ID          Essential hypertension  Assessment & Plan  BP stable    Plan;  Cont w/metoprolol, prazosin    Septic embolism (CMS/HCC)  Assessment & Plan  Complication of infectious endocarditis  Cont with IV abx  Repeat CT chest as outpt.       Opioid type  dependence, continuous use (CMS/HCC)  Assessment & Plan  Pt with inc lethargy on 1/10, with white powder substance found rolled in a piece of paper amongst her things and three pills (yesterday's methadone 50mg and flexeril)  Pt repeatedly denies any recent drug abuse, although concern for potential use while inpt.   UDS (+) only for barbituates, last of which she received on 1/6  Sxs have since resolved.    Plan:  Cont to monitor closely.   Psych consulted - will f/u recs  Rehab options with psych and sW.   May need 1:1 monitoring       Migraine, intractable  Assessment & Plan  Cont to complain of ongoing headaches  Improved with current treatment    Plan:  Cont with po meds as per Neuro,         Insomnia  Assessment & Plan  Cont w/ambien      Depression  Assessment & Plan  Stable     Plan:  Cont with po meds  outpt psych f/u      Anxiety  Assessment & Plan  Stable     Plan:  Cont with po meds.       * Acute bacterial endocarditis  Assessment & Plan  Slowly improving,  Blood cultures from 12/2020 negative for growth  Currently denies any cardiac sxs and remains afebrile w/out further si/sxs of ongoing infection      Plan:   cont with IV abx, day 37/42  ID input noted  Cardiology consulted - Recommended she return in 1-2 weeks for cardiac evaluation after discharge.             VTE Assessment: [place] VTE Score: 2  Estimated discharge date: 1/19/2021  Code Status: Full Code     Tisha Moise MD  1/13/2021

## 2021-01-13 NOTE — NURSING NOTE
Continuous 1:1 sitter in place, patient watched carefully while taking oral medications, RN checked patient mouth after each pill was consumed, patient cooperative and calm.

## 2021-01-14 PROCEDURE — 63600000 HC DRUGS/DETAIL CODE: Performed by: INTERNAL MEDICINE

## 2021-01-14 PROCEDURE — 63700000 HC SELF-ADMINISTRABLE DRUG: Performed by: PSYCHIATRY & NEUROLOGY

## 2021-01-14 PROCEDURE — 63700000 HC SELF-ADMINISTRABLE DRUG: Performed by: HOSPITALIST

## 2021-01-14 PROCEDURE — 63700000 HC SELF-ADMINISTRABLE DRUG: Performed by: NURSE PRACTITIONER

## 2021-01-14 PROCEDURE — 63700000 HC SELF-ADMINISTRABLE DRUG: Performed by: INTERNAL MEDICINE

## 2021-01-14 PROCEDURE — 25000000 HC PHARMACY GENERAL: Performed by: INTERNAL MEDICINE

## 2021-01-14 PROCEDURE — 99233 SBSQ HOSP IP/OBS HIGH 50: CPT | Performed by: PSYCHIATRY & NEUROLOGY

## 2021-01-14 PROCEDURE — 12000000 HC ROOM AND CARE MED/SURG

## 2021-01-14 PROCEDURE — 25800000 HC PHARMACY IV SOLUTIONS: Performed by: INTERNAL MEDICINE

## 2021-01-14 PROCEDURE — 99231 SBSQ HOSP IP/OBS SF/LOW 25: CPT | Performed by: HOSPITALIST

## 2021-01-14 RX ORDER — IBUPROFEN 600 MG/1
600 TABLET ORAL EVERY 8 HOURS PRN
Status: DISCONTINUED | OUTPATIENT
Start: 2021-01-14 | End: 2021-02-03 | Stop reason: HOSPADM

## 2021-01-14 RX ORDER — METHADONE HYDROCHLORIDE 10 MG/1
60 TABLET ORAL
Status: DISCONTINUED | OUTPATIENT
Start: 2021-01-15 | End: 2021-01-27

## 2021-01-14 RX ADMIN — PREGABALIN 150 MG: 75 CAPSULE ORAL at 09:11

## 2021-01-14 RX ADMIN — PROBIOTIC PRODUCT - TAB 1 TABLET: TAB at 20:04

## 2021-01-14 RX ADMIN — TRIMETHOBENZAMIDE HYDROCHLORIDE 300 MG: 300 CAPSULE ORAL at 03:59

## 2021-01-14 RX ADMIN — DIPHENHYDRAMINE HYDROCHLORIDE 25 MG: 50 INJECTION INTRAMUSCULAR; INTRAVENOUS at 10:45

## 2021-01-14 RX ADMIN — DIPHENHYDRAMINE HYDROCHLORIDE 25 MG: 50 INJECTION INTRAMUSCULAR; INTRAVENOUS at 04:28

## 2021-01-14 RX ADMIN — CEFAZOLIN SODIUM 2 G: 10 POWDER, FOR SOLUTION INTRAVENOUS at 12:36

## 2021-01-14 RX ADMIN — BUPROPION HYDROCHLORIDE 75 MG: 75 TABLET, FILM COATED ORAL at 17:19

## 2021-01-14 RX ADMIN — IBUPROFEN 600 MG: 600 TABLET ORAL at 17:27

## 2021-01-14 RX ADMIN — PAROXETINE 30 MG: 20 TABLET, FILM COATED ORAL at 09:11

## 2021-01-14 RX ADMIN — CEFAZOLIN SODIUM 2 G: 10 POWDER, FOR SOLUTION INTRAVENOUS at 20:39

## 2021-01-14 RX ADMIN — TRIMETHOBENZAMIDE HYDROCHLORIDE 300 MG: 300 CAPSULE ORAL at 11:06

## 2021-01-14 RX ADMIN — CEFAZOLIN SODIUM 2 G: 10 POWDER, FOR SOLUTION INTRAVENOUS at 04:24

## 2021-01-14 RX ADMIN — QUETIAPINE FUMARATE 100 MG: 100 TABLET ORAL at 21:27

## 2021-01-14 RX ADMIN — ACETAMINOPHEN 650 MG: 325 TABLET, FILM COATED ORAL at 03:59

## 2021-01-14 RX ADMIN — PANTOPRAZOLE SODIUM 40 MG: 40 TABLET, DELAYED RELEASE ORAL at 09:12

## 2021-01-14 RX ADMIN — ACETAMINOPHEN 650 MG: 325 TABLET, FILM COATED ORAL at 10:43

## 2021-01-14 RX ADMIN — Medication 1 PATCH: at 09:13

## 2021-01-14 RX ADMIN — BUPROPION HYDROCHLORIDE 75 MG: 75 TABLET, FILM COATED ORAL at 09:12

## 2021-01-14 RX ADMIN — PANTOPRAZOLE SODIUM 40 MG: 40 TABLET, DELAYED RELEASE ORAL at 20:04

## 2021-01-14 RX ADMIN — FAMOTIDINE 10 MG: 10 TABLET, FILM COATED ORAL at 17:19

## 2021-01-14 RX ADMIN — NICOTINE POLACRILEX 4 MG: 4 GUM, CHEWING ORAL at 10:46

## 2021-01-14 RX ADMIN — PROBIOTIC PRODUCT - TAB 1 TABLET: TAB at 09:11

## 2021-01-14 RX ADMIN — METHADONE HYDROCHLORIDE 50 MG: 10 TABLET ORAL at 07:49

## 2021-01-14 RX ADMIN — TRIMETHOBENZAMIDE HYDROCHLORIDE 300 MG: 300 CAPSULE ORAL at 20:00

## 2021-01-14 RX ADMIN — DIPHENHYDRAMINE HYDROCHLORIDE 25 MG: 50 INJECTION INTRAMUSCULAR; INTRAVENOUS at 17:27

## 2021-01-14 RX ADMIN — BUPROPION HYDROCHLORIDE 75 MG: 75 TABLET, FILM COATED ORAL at 12:58

## 2021-01-14 RX ADMIN — CYCLOBENZAPRINE HYDROCHLORIDE 10 MG: 5 TABLET, FILM COATED ORAL at 12:36

## 2021-01-14 RX ADMIN — PRAZOSIN HYDROCHLORIDE 2 MG: 1 CAPSULE ORAL at 21:27

## 2021-01-14 RX ADMIN — ZOLPIDEM TARTRATE 10 MG: 5 TABLET, COATED ORAL at 21:27

## 2021-01-14 RX ADMIN — PREGABALIN 150 MG: 75 CAPSULE ORAL at 20:04

## 2021-01-14 RX ADMIN — FAMOTIDINE 10 MG: 10 TABLET, FILM COATED ORAL at 09:11

## 2021-01-14 RX ADMIN — ACETAMINOPHEN 650 MG: 325 TABLET, FILM COATED ORAL at 17:19

## 2021-01-14 NOTE — PROGRESS NOTES
Psychiatry Progress Note    Chief Complaint/Reason for follow-up: opioid dependence and depression    Interval History: Patient seen again for follow-up on 1:1.  No further incidents of cheeking medication reported.  Patient again apologizes and denies having any other contraband and is requesting to increase to methadone 60mg qam.  EKG reviewed with QTc 430 last week.  She is pleasant and cooperative at this time and denies any thoughts to harm/kill herself or anyone else whatsoever.    Review of Systems:   Sleep:  Good and Appetite: Good    Vital Signs for the last 24 hours:  Temp:  [36.1 °C (97 °F)-36.9 °C (98.4 °F)] 36.7 °C (98.1 °F)  Heart Rate:  [69-90] 77  Resp:  [18] 18  BP: (111-132)/(59-74) 111/59      Current Facility-Administered Medications:   •  acetaminophen (TYLENOL) tablet 650 mg, 650 mg, oral, q6h INT, Erinn Barajas DO, 650 mg at 01/14/21 1043  •  alum-mag hydroxide-simeth (MAALOX) 200-200-20 mg/5 mL suspension 30 mL, 30 mL, oral, q6h PRN, Lili Crawford MD, 30 mL at 01/11/21 1946  •  buPROPion (WELLBUTRIN) tablet 75 mg, 75 mg, oral, TID, Marlon Padron MD, 75 mg at 01/14/21 0912  •  ceFAZolin (ANCEF) NSS IVPB piggyback 2 g, 2 g, intravenous, q8h INT, Vikas Jones MD, Stopped at 01/14/21 0500  •  cyclobenzaprine (FLEXERIL) tablet 10 mg, 10 mg, oral, 3x daily PRN, Marizol Ortiz CRNP, 10 mg at 01/13/21 1039  •  glucose chewable tablet 16-32 g of dextrose, 16-32 g of dextrose, oral, PRN **OR** dextrose 40 % oral gel 15-30 g of dextrose, 15-30 g of dextrose, oral, PRN **OR** glucagon (GLUCAGEN) injection 1 mg, 1 mg, intramuscular, PRN **OR** dextrose in water injection 12.5 g, 25 mL, intravenous, PRN, Perfecto Angela MD  •  diphenhydrAMINE (BENADRYL) injection 25 mg, 25 mg, intravenous, q6h PRN, Lili Crawford MD, 25 mg at 01/14/21 1045  •  famotidine (PEPCID) tablet 10 mg, 10 mg, oral, BID AC, Lili Crawford MD, 10 mg at 01/14/21 0911  •  fluticasone propionate  (FLONASE) 50 mcg/actuation nasal spray 2 spray, 2 spray, Each Nostril, Daily PRN, Perfecto Angela MD  •  hydrocortisone-pramoxine (ANALPRAM-HC) 2.5-1 % rectal cream, , rectal, 4x daily PRN, Dominique Powell DO, Given at 12/19/20 1643  •  hydrOXYzine (ATARAX) tablet 25 mg, 25 mg, oral, Nightly PRN, Dominique Powell DO, 25 mg at 12/21/20 2119  •  ibuprofen (MOTRIN) tablet 600 mg, 600 mg, oral, q8h PRN, Tisha Moise MD  •  lactobacillus acidophilus complex (BACID) 1 billion cell- 250 mg tablet 1 tablet, 1 tablet, oral, BID, Heidi Toscano CRNP, 1 tablet at 01/14/21 0911  •  lidocaine (ASPERCREME) 4 % topical patch 1 patch, 1 patch, Topical, Daily, Jacki Boss CRNP, Stopped at 01/13/21 0907  •  magnesium sulfate IVPB 2g in 50 mL NSS/D5W/SWFI, 2 g, intravenous, PRN, Perfecto Angela MD  •  methadone (DOLOPHINE) tablet 50 mg, 50 mg, oral, Daily (6a), Marlon Padron MD, 50 mg at 01/14/21 0749  •  metoprolol succinate XL (TOPROL-XL) 24 hr ER tablet 25 mg, 25 mg, oral, Daily, Lili Crawford MD, 25 mg at 01/13/21 0905  •  naloxone (NARCAN) injection 0.2 mg, 0.2 mg, intravenous, Once PRN, Jacki Boss CRNP  •  nicotine (NICODERM CQ) 14 mg/24 hr patch 1 patch, 1 patch, transdermal, Daily, Gen Hogue MD, 1 patch at 01/14/21 0913  •  nicotine polacrilex (NICORETTE) gum 4 mg, 4 mg, buccal, q4h PRN, Perfecto Angela MD, 4 mg at 01/14/21 1046  •  pantoprazole (PROTONIX) tablet,delayed release (DR/EC) 40 mg, 40 mg, oral, BID, José Manuel Morris MD, 40 mg at 01/14/21 0912  •  PARoxetine (PAXIL) tablet 30 mg, 30 mg, oral, Daily, Marlon Padron MD, 30 mg at 01/14/21 0911  •  potassium chloride (KLOR-CON) tablet extended release 20 mEq, 20 mEq, oral, Daily PRN **OR** potassium chloride (KLOR-CON) tablet extended release 40 mEq, 40 mEq, oral, Daily PRN **OR** potassium chloride 20 mEq in 100 mL IVPB  (premix), 20 mEq, intravenous, Daily PRN **OR** potassium chloride (KCL) 40 mEq/250 mL  IVPB in NSS 40 mEq, 40 mEq, intravenous, Daily PRN, Perfecto Angela MD  •  prazosin (MINIPRESS) capsule 2 mg, 2 mg, oral, Nightly, Perfecto Angela MD, 2 mg at 01/13/21 2128  •  pregabalin (LYRICA) capsule 150 mg, 150 mg, oral, BID, Perfecto Angela MD, 150 mg at 01/14/21 0911  •  psyllium husk powder 1 packet, 1 packet, oral, Daily, Dominique Powell DO, 1 packet at 12/22/20 0822  •  QUEtiapine (SEROquel) tablet 100 mg, 100 mg, oral, Nightly, Marlon Padron MD, 100 mg at 01/13/21 2128  •  trimethobenzamide (TIGAN) capsule 300 mg, 300 mg, oral, q8h JAIDEN, Dominique Powell DO, 300 mg at 01/14/21 1106  •  zolpidem (AMBIEN) tablet 10 mg, 10 mg, oral, Nightly, Perfecto Angela MD, 10 mg at 01/13/21 2127    MSE:  Orientation: AAO x3  Behavior: Appropriate  Appearance: well groomed  Eye Contact: Fair throughout  Mood: “I'm good”  Affect: congruent; bright, stable and appropriate  Speech: Coherent  Thought Process: Goal Directed and linear   Suicidal Ideation: Denies suicidal thoughts; intent or plan; denies passive death wish  Homicidal Ideation: Denies having homicidal thoughts, intent or plan  Hallucinations: Denies  Delusions: Denies  Cognition: No gross cognitive deficits  Memory: Remote; Immediate; all intact  Insight: Fair  Judgment: Fair      Assessment/Plan  Unspecified mood (affective) disorder (CMS/HCC)  Assessment & Plan  Patient with history of depression and addiction.  1. Continue paroxetine 30mg qam  2. Continue bupropion 75mg TID  3. Continue quetiapine 100mg qhs  4.  to please speak with patient about options for inpatient/outpatient rehab and follow-up with a psychiatrist/therapist      Opioid type dependence, continuous use (CMS/HCC)  Assessment & Plan  1. Increase methadone to 60mg qam (Hold for sedation; O2 < 95; SBP <100; DBP < 60; HR < 60; RR< 10; QTc > 470)  2. DC 1:1 sitter for now but please insure patient swallows all pills given  3.  to please speak  with patient about options for inpatient rehab at [place] or [facility] where she can do rehab while receiving iv antibiotics.  Patient is agreeable to this.      Spent 35 minutes in total treatment including chart review; consultation with patient; and note writing    Marlon Padron MD  1/14/2021

## 2021-01-14 NOTE — NURSING NOTE
Pt reported emesis today after lunch. Patient stated emesis was contents of food. After patient reported feeling better. Reminded patient to show emesis to staff next time. Patient reported I always have some abdominal pain but not increased at this time. Later patient had popsicle and was unable to keep that down as well. Gave the patient crackers to eat to see if she can keep them down. Reached out to Dr. Moise if any interventions for now.

## 2021-01-14 NOTE — PLAN OF CARE
Problem: Adult Inpatient Plan of Care  Goal: Plan of Care Review  Flowsheets (Taken 1/14/2021 1842)  Progress: no change  Plan of Care Reviewed With: patient  Outcome Summary: Adequate pain control with scheduled and PRN medications. Ambulating with no issues. Plan to discharge home the 1/19/2021.   Plan of Care Review  Plan of Care Reviewed With: patient  Progress: no change  Outcome Summary: Adequate pain control with scheduled and PRN medications. Ambulating with no issues. Plan to discharge home the 1/19/2021.

## 2021-01-14 NOTE — PROGRESS NOTES
Hospital Medicine Service -  Daily Progress Note       SUBJECTIVE   Interval History: No acute events overnight.   No new complaints  Feels well     OBJECTIVE      Vital signs in last 24 hours:  Temp:  [36.1 °C (97 °F)-36.9 °C (98.4 °F)] 36.7 °C (98.1 °F)  Heart Rate:  [69-90] 77  Resp:  [18] 18  BP: (111-132)/(59-74) 111/59    Intake/Output Summary (Last 24 hours) at 1/14/2021 1223  Last data filed at 1/14/2021 1043  Gross per 24 hour   Intake --   Output 2225 ml   Net -2225 ml       PHYSICAL EXAMINATION      GEN: well-developed and well-nourished; not in acute distress  HEENT: normocephalic; atraumatic  NECK: no JVD  CARDIO: regular rate and rhythm; no murmurs, rubs or gallops  RESP: clear to auscultation bilaterally; no rales, rhonchi, or wheezes  ABD: soft, non-distended, non-tender, normal bowel sounds  EXT: no cyanosis, clubbing, or edema  SKIN: clean, dry, warm, and intact  MUSCULOSKELETAL: no injury or deformity  NEURO: alert and oriented x 3; nonfocal  BEHAVIOR/EMOTIONAL: appropriate; cooperative     LABS / IMAGING / TELE      Labs  Lab Results   Component Value Date    GLUCOSE 92 01/11/2021    GLUCOSE 92 01/11/2021    CALCIUM 9.0 01/11/2021    CALCIUM 9.0 01/11/2021     01/11/2021     01/11/2021    K 4.6 01/11/2021    K 4.6 01/11/2021    CO2 31 01/11/2021    CO2 31 01/11/2021    CL 98 01/11/2021    CL 98 01/11/2021    BUN 17 01/11/2021    BUN 17 01/11/2021    CREATININE 0.7 01/11/2021    CREATININE 0.7 01/11/2021     Lab Results   Component Value Date    WBC 6.81 01/11/2021    HGB 10.5 (L) 01/11/2021    HCT 33.4 (L) 01/11/2021    MCV 90.5 01/11/2021     01/11/2021     Lab Results   Component Value Date    ALBUMIN 3.5 01/11/2021    BILITOT 0.4 01/11/2021    ALKPHOS 58 01/11/2021    BILIDIR <0.1 04/12/2017    AST 19 01/11/2021    ALT 9 (L) 01/11/2021    PROTEIN 6.5 01/11/2021       Imaging  X-ray Chest 1 View    Result Date: 12/18/2020  IMPRESSION: Interval placement of a right-sided  PICC line catheter as described above.     X-ray Chest 1 View    Result Date: 12/18/2020  IMPRESSION: Interval placement of a right-sided PICC line catheter as described above.     Ultrasound Abdomen Limited    Result Date: 12/17/2020  IMPRESSION: Mild increase in echotexture of the liver may represent fibrofatty infiltration. COMMENT: Ultrasound evaluation of the right upper quadrant was performed and compared to a CT exam dated 8/18/2019.  The pancreas is of normal echotexture and unremarkable.  The aorta is obscured by bowel gas.  The liver is 17.6 cm in length and of increased echogenicity consistent with fibrofatty infiltration. There is no ductal dilatation or mass lesion.  Common bile duct measures 2.3 mm at the pito hepatis.  The right kidney is 10.5 cm in length.  There is no nephrolithiasis, hydronephrosis or mass lesion.  The renal cortex of normal thickness and echotexture.        ASSESSMENT AND PLAN      Tricuspid valve vegetation  Assessment & Plan  See plan for endocarditis.       Septic arthritis of vertebra (CMS/HCC)  Assessment & Plan  Complains of inc back pain although lying in bed, talking of the phone, appears comfortable.    Plan:  Neurosurgery consulted - per evaluation No acute surgical intervention required. Cont w/management with IV antibiotics. Repeat imaging at completion of antibiotics and follow-up with neurosurgery at that time.   In light of yesterday's events and given her stable clinical status I am hestitant to place on standing flexeril as requested. Cont w/tylenol, methadone, and flexeril PRN for now. If pain cont to worsen t/c LSO brace   Pain management consulted - will f/u any additional recs  Con with IV abx to finish 6 weeks of Abx as per ID          Essential hypertension  Assessment & Plan  BP stable    Plan;  Cont w/metoprolol, prazosin    Septic embolism (CMS/HCC)  Assessment & Plan  Complication of infectious endocarditis  Cont with IV abx  Repeat CT chest as outpt.        Opioid type dependence, continuous use (CMS/HCC)  Assessment & Plan  Pt with inc lethargy on 1/10, with white powder substance found rolled in a piece of paper amongst her things and three pills (yesterday's methadone 50mg and flexeril)  Pt repeatedly denies any recent drug abuse, although concern for potential use while inpt.   UDS (+) only for barbituates, last of which she received on 1/6  Sxs have since resolved.    Plan:  Cont to monitor closely.   Psych consulted - will f/u recs  Rehab options with psych and sW.   May need 1:1 monitoring       Migraine, intractable  Assessment & Plan  Cont to complain of ongoing headaches  Improved with current treatment    Plan:  Cont with po meds as per Neuro,         Insomnia  Assessment & Plan  Cont w/ambien      Depression  Assessment & Plan  Stable     Plan:  Cont with po meds  outpt psych f/u      Anxiety  Assessment & Plan  Stable     Plan:  Cont with po meds.       * Acute bacterial endocarditis  Assessment & Plan  Slowly improving,  Blood cultures from 12/2020 negative for growth  Currently denies any cardiac sxs and remains afebrile w/out further si/sxs of ongoing infection      Plan:   cont with IV abx, day 38/42  ID input noted  Cardiology consulted - Recommended she return in 1-2 weeks for cardiac evaluation after discharge.             VTE Assessment: Padua VTE Score: 2  Estimated discharge date: 1/19/2021  Code Status: Full Code     Tisha Moise MD  1/14/2021

## 2021-01-15 PROCEDURE — 63700000 HC SELF-ADMINISTRABLE DRUG: Performed by: INTERNAL MEDICINE

## 2021-01-15 PROCEDURE — 25800000 HC PHARMACY IV SOLUTIONS: Performed by: INTERNAL MEDICINE

## 2021-01-15 PROCEDURE — 12000000 HC ROOM AND CARE MED/SURG

## 2021-01-15 PROCEDURE — 63700000 HC SELF-ADMINISTRABLE DRUG: Performed by: NURSE PRACTITIONER

## 2021-01-15 PROCEDURE — 63600000 HC DRUGS/DETAIL CODE: Performed by: INTERNAL MEDICINE

## 2021-01-15 PROCEDURE — 25000000 HC PHARMACY GENERAL: Performed by: INTERNAL MEDICINE

## 2021-01-15 PROCEDURE — 63700000 HC SELF-ADMINISTRABLE DRUG: Performed by: PSYCHIATRY & NEUROLOGY

## 2021-01-15 PROCEDURE — 63700000 HC SELF-ADMINISTRABLE DRUG: Performed by: HOSPITALIST

## 2021-01-15 PROCEDURE — 99232 SBSQ HOSP IP/OBS MODERATE 35: CPT | Performed by: HOSPITALIST

## 2021-01-15 PROCEDURE — 99233 SBSQ HOSP IP/OBS HIGH 50: CPT | Performed by: STUDENT IN AN ORGANIZED HEALTH CARE EDUCATION/TRAINING PROGRAM

## 2021-01-15 RX ORDER — BUTALBITAL, ACETAMINOPHEN AND CAFFEINE 50; 325; 40 MG/1; MG/1; MG/1
1 TABLET ORAL EVERY 4 HOURS PRN
Status: DISCONTINUED | OUTPATIENT
Start: 2021-01-15 | End: 2021-01-17

## 2021-01-15 RX ADMIN — QUETIAPINE FUMARATE 100 MG: 100 TABLET ORAL at 21:13

## 2021-01-15 RX ADMIN — DIPHENHYDRAMINE HYDROCHLORIDE 25 MG: 50 INJECTION INTRAMUSCULAR; INTRAVENOUS at 10:26

## 2021-01-15 RX ADMIN — PANTOPRAZOLE SODIUM 40 MG: 40 TABLET, DELAYED RELEASE ORAL at 09:10

## 2021-01-15 RX ADMIN — PAROXETINE 30 MG: 20 TABLET, FILM COATED ORAL at 09:10

## 2021-01-15 RX ADMIN — ACETAMINOPHEN 650 MG: 325 TABLET, FILM COATED ORAL at 16:39

## 2021-01-15 RX ADMIN — PROBIOTIC PRODUCT - TAB 1 TABLET: TAB at 09:10

## 2021-01-15 RX ADMIN — BUPROPION HYDROCHLORIDE 75 MG: 75 TABLET, FILM COATED ORAL at 16:40

## 2021-01-15 RX ADMIN — ACETAMINOPHEN 650 MG: 325 TABLET, FILM COATED ORAL at 04:10

## 2021-01-15 RX ADMIN — TRIMETHOBENZAMIDE HYDROCHLORIDE 300 MG: 300 CAPSULE ORAL at 19:54

## 2021-01-15 RX ADMIN — DIPHENHYDRAMINE HYDROCHLORIDE 25 MG: 50 INJECTION INTRAMUSCULAR; INTRAVENOUS at 16:40

## 2021-01-15 RX ADMIN — CEFAZOLIN SODIUM 2 G: 10 POWDER, FOR SOLUTION INTRAVENOUS at 11:45

## 2021-01-15 RX ADMIN — ZOLPIDEM TARTRATE 10 MG: 5 TABLET, COATED ORAL at 21:13

## 2021-01-15 RX ADMIN — FAMOTIDINE 10 MG: 10 TABLET, FILM COATED ORAL at 16:40

## 2021-01-15 RX ADMIN — CEFAZOLIN SODIUM 2 G: 10 POWDER, FOR SOLUTION INTRAVENOUS at 19:54

## 2021-01-15 RX ADMIN — CEFAZOLIN SODIUM 2 G: 10 POWDER, FOR SOLUTION INTRAVENOUS at 04:02

## 2021-01-15 RX ADMIN — CYCLOBENZAPRINE HYDROCHLORIDE 10 MG: 5 TABLET, FILM COATED ORAL at 11:45

## 2021-01-15 RX ADMIN — Medication 1 PATCH: at 09:10

## 2021-01-15 RX ADMIN — DIPHENHYDRAMINE HYDROCHLORIDE 25 MG: 50 INJECTION INTRAMUSCULAR; INTRAVENOUS at 23:04

## 2021-01-15 RX ADMIN — ACETAMINOPHEN 650 MG: 325 TABLET, FILM COATED ORAL at 10:26

## 2021-01-15 RX ADMIN — PREGABALIN 150 MG: 75 CAPSULE ORAL at 09:10

## 2021-01-15 RX ADMIN — PANTOPRAZOLE SODIUM 40 MG: 40 TABLET, DELAYED RELEASE ORAL at 19:54

## 2021-01-15 RX ADMIN — METHADONE HYDROCHLORIDE 60 MG: 10 TABLET ORAL at 06:44

## 2021-01-15 RX ADMIN — DIPHENHYDRAMINE HYDROCHLORIDE 25 MG: 50 INJECTION INTRAMUSCULAR; INTRAVENOUS at 04:10

## 2021-01-15 RX ADMIN — BUPROPION HYDROCHLORIDE 75 MG: 75 TABLET, FILM COATED ORAL at 09:09

## 2021-01-15 RX ADMIN — IBUPROFEN 600 MG: 600 TABLET ORAL at 06:49

## 2021-01-15 RX ADMIN — ACETAMINOPHEN 650 MG: 325 TABLET, FILM COATED ORAL at 21:12

## 2021-01-15 RX ADMIN — PROBIOTIC PRODUCT - TAB 1 TABLET: TAB at 19:54

## 2021-01-15 RX ADMIN — PREGABALIN 150 MG: 75 CAPSULE ORAL at 19:54

## 2021-01-15 RX ADMIN — FAMOTIDINE 10 MG: 10 TABLET, FILM COATED ORAL at 09:10

## 2021-01-15 RX ADMIN — TRIMETHOBENZAMIDE HYDROCHLORIDE 300 MG: 300 CAPSULE ORAL at 11:06

## 2021-01-15 RX ADMIN — BUPROPION HYDROCHLORIDE 75 MG: 75 TABLET, FILM COATED ORAL at 12:36

## 2021-01-15 NOTE — PROGRESS NOTES
"    Hospital Medicine Service -  Daily Progress Note       SUBJECTIVE   Interval History: No acute events overnight.   Says she vomited after lunch yesterday but dinner was OK  This am she did not vomit  Says she had prior episodes of food \"just coming out\" after eating sometimes - and also felt food getting stuck a few times   Currently she has no complaints and has been eating ok     No other complaints    OBJECTIVE      Vital signs in last 24 hours:  Temp:  [36.3 °C (97.4 °F)-36.9 °C (98.4 °F)] 36.8 °C (98.2 °F)  Heart Rate:  [73-90] 81  Resp:  [16-18] 16  BP: (107-136)/(53-84) 107/53  No intake or output data in the 24 hours ending 01/15/21 1333    PHYSICAL EXAMINATION      GEN: well-developed and well-nourished; not in acute distress  HEENT: normocephalic; atraumatic  NECK: no JVD  CARDIO: regular rate and rhythm; no murmurs, rubs or gallops  RESP: clear to auscultation bilaterally; no rales, rhonchi, or wheezes  ABD: soft, non-distended, non-tender, normal bowel sounds  EXT: no cyanosis, clubbing, or edema  SKIN: clean, dry, warm, and intact  MUSCULOSKELETAL: no injury or deformity  NEURO: alert and oriented x 3; nonfocal  BEHAVIOR/EMOTIONAL: appropriate; cooperative     LABS / IMAGING / TELE      Labs  Lab Results   Component Value Date    GLUCOSE 92 01/11/2021    GLUCOSE 92 01/11/2021    CALCIUM 9.0 01/11/2021    CALCIUM 9.0 01/11/2021     01/11/2021     01/11/2021    K 4.6 01/11/2021    K 4.6 01/11/2021    CO2 31 01/11/2021    CO2 31 01/11/2021    CL 98 01/11/2021    CL 98 01/11/2021    BUN 17 01/11/2021    BUN 17 01/11/2021    CREATININE 0.7 01/11/2021    CREATININE 0.7 01/11/2021     Lab Results   Component Value Date    WBC 6.81 01/11/2021    HGB 10.5 (L) 01/11/2021    HCT 33.4 (L) 01/11/2021    MCV 90.5 01/11/2021     01/11/2021     Lab Results   Component Value Date    ALBUMIN 3.5 01/11/2021    BILITOT 0.4 01/11/2021    ALKPHOS 58 01/11/2021    BILIDIR <0.1 04/12/2017    AST 19 " 01/11/2021    ALT 9 (L) 01/11/2021    PROTEIN 6.5 01/11/2021       Imaging  X-ray Chest 1 View    Result Date: 12/18/2020  IMPRESSION: Interval placement of a right-sided PICC line catheter as described above.     X-ray Chest 1 View    Result Date: 12/18/2020  IMPRESSION: Interval placement of a right-sided PICC line catheter as described above.     Ultrasound Abdomen Limited    Result Date: 12/17/2020  IMPRESSION: Mild increase in echotexture of the liver may represent fibrofatty infiltration. COMMENT: Ultrasound evaluation of the right upper quadrant was performed and compared to a CT exam dated 8/18/2019.  The pancreas is of normal echotexture and unremarkable.  The aorta is obscured by bowel gas.  The liver is 17.6 cm in length and of increased echogenicity consistent with fibrofatty infiltration. There is no ductal dilatation or mass lesion.  Common bile duct measures 2.3 mm at the pito hepatis.  The right kidney is 10.5 cm in length.  There is no nephrolithiasis, hydronephrosis or mass lesion.  The renal cortex of normal thickness and echotexture.        ASSESSMENT AND PLAN      Tricuspid valve vegetation  Assessment & Plan  See plan for endocarditis.     Septic arthritis of vertebra (CMS/HCC)  Assessment & Plan  Complains of inc back pain although lying in bed, talking of the phone, appears comfortable.    Plan:  Neurosurgery consulted - per evaluation No acute surgical intervention required. Cont w/management with IV antibiotics. Repeat imaging at completion of antibiotics and follow-up with neurosurgery at that time.  Cont w/tylenol, methadone, and flexeril PRN for now. If pain cont to worsen t/c LSO brace  Con with IV abx to finish 6 weeks of Abx as per ID    Essential hypertension  Assessment & Plan  BP stable    Plan;  Cont w/metoprolol, prazosin    Septic embolism (CMS/HCC)  Assessment & Plan  Complication of infectious endocarditis  Cont with IV abx  Repeat CT chest as outpt.       Opioid type  dependence, continuous use (CMS/HCC)  Assessment & Plan  Pt with inc lethargy on 1/10, with white powder substance found rolled in a piece of paper amongst her things and three pills (yesterday's methadone 50mg and flexeril)  Pt repeatedly denies any recent drug abuse, although concern for potential use while inpt.   UDS (+) only for barbituates, last of which she received on 1/6  Sxs have since resolved.    Plan:  Cont to monitor closely.   Psych following   Rehab options with psych and sW.   1:1 monitoring per psych for suspicious intake of substances while in the hospital       Migraine, intractable  Assessment & Plan  Cont to complain of ongoing headaches  Improved with current treatment    Plan:  Cont with po meds as per Neuro - Informed Dr Gonzalez today for further recommendations - pt requesting to see.     Insomnia  Assessment & Plan  Cont w/ambien      Depression  Assessment & Plan  Stable     Plan:  Cont with po meds  outpt psych f/u      Anxiety  Assessment & Plan  Stable   Plan:  Cont with po meds.       * Acute bacterial endocarditis  Assessment & Plan  Slowly improving,  Blood cultures from 12/2020 negative for growth  Currently denies any cardiac sxs and remains afebrile w/out further si/sxs of ongoing infection    Plan:  Cont with IV abx, day 39/42  ID following as needed   Cardiology consulted - Recommended she return in 1-2 weeks for cardiac evaluation after discharge.      Vomiting:   Consider GI eval if further episodes while in the hospital  Otherwise follow up with GI as outpt.   On PPIs  Patient insisting on taking Motrin - explained side effects        VTE Assessment: Padua VTE Score: 2  Estimated discharge date: 1/19/2021  Code Status: Full Code     Tisha Moise MD  1/15/2021

## 2021-01-15 NOTE — PATIENT CARE CONFERENCE
Care Progression Rounds Note  Date: 1/15/2021  Time: 10:20 AM     Patient Name: Carolyn Redmond     Medical Record Number: [id number]   YOB: 1985  Sex: Female      Room/Bed: 4211    Admitting Diagnosis: Septic embolism (CMS/[facility]) [I76]  Infection of lumbar spine (CMS/[facility]) [M46.26]  Acute left-sided low back pain without sciatica [M54.5]   Admit Date/Time: 12/9/2020 11:10 AM    Primary Diagnosis: Acute bacterial endocarditis  Principal Problem: Acute bacterial endocarditis    GMLOS: 4.8  Anticipated Discharge Date: 1/19/2021    AM-PAC  Mobility Score:      Discharge Planning:       Barriers to Discharge:  Barriers to Discharge: Medical issues not resolved  Comment: off 1:1    Participants:  social work/services, nursing

## 2021-01-15 NOTE — PROGRESS NOTES
Major Events:  none    Subjective:  HA    Temp:  [36.3 °C (97.4 °F)-36.8 °C (98.2 °F)] 36.6 °C (97.9 °F)  Heart Rate:  [73-90] 79  Resp:  [16] 16  BP: (107-136)/(53-84) 108/59     SpO2 Readings from Last 3 Encounters:   01/15/21 97%   08/05/20 94%   07/10/20 97%     Anti-infectives (From admission, onward)    Start     Dose/Rate Route Frequency Ordered Stop    12/11/20 1900  ceFAZolin (ANCEF) NSS IVPB piggyback 2 g      2 g  100 mL/hr over 30 Minutes intravenous Every 8 hours interval 12/11/20 1823          Physical Exam:  Skin: No rash  Sclera: Anicteric  Lungs: clr  CV: S1, S2 nl, soft m, no embolic changes  Abd: Soft, nt, no hsm  Extr: No jt eff, no edema  Neuro: Alert, lucid    Lab Results   Component Value Date    WBC 6.81 01/11/2021    HGB 10.5 (L) 01/11/2021    HCT 33.4 (L) 01/11/2021    MCV 90.5 01/11/2021     01/11/2021     Lab Results   Component Value Date    GLUCOSE 92 01/11/2021    GLUCOSE 92 01/11/2021    CALCIUM 9.0 01/11/2021    CALCIUM 9.0 01/11/2021     01/11/2021     01/11/2021    K 4.6 01/11/2021    K 4.6 01/11/2021    CO2 31 01/11/2021    CO2 31 01/11/2021    CL 98 01/11/2021    CL 98 01/11/2021    BUN 17 01/11/2021    BUN 17 01/11/2021    CREATININE 0.7 01/11/2021    CREATININE 0.7 01/11/2021     Lab Results   Component Value Date    ALBUMIN 3.5 01/11/2021    BILITOT 0.4 01/11/2021    ALKPHOS 58 01/11/2021    BILIDIR <0.1 04/12/2017    AST 19 01/11/2021    ALT 9 (L) 01/11/2021    PROTEIN 6.5 01/11/2021       Impression    MSSA TV IE c pul emboli  c lumbar infection    Tolerating cefazolin well  Now day 39/42    Complete rx as above     ASHLEY Jones MD  Pager [phone]

## 2021-01-15 NOTE — PLAN OF CARE
Plan of Care Review  Plan of Care Reviewed With: patient  Progress: no change  Outcome Summary: Patient independent in room. No medication pocketing. Conitnuing Q8 hr IV abx through PICC line, line WDL. Vomiting earlier in day yesterday (1/14), no repeat since start of shift. Will continue to monitor. VSS.

## 2021-01-15 NOTE — PROGRESS NOTES
"Neurology Daily Progress Note      Assessment/Plan   Migraine, intractable  Assessment & Plan  Headaches much better after cycle broken.  Counseled her again about medication overuse headache, okay to take Fioricet but no more than 4 days/month, and over-the-counter medications no more than 15 days/month.  Encouraged her to try again sumatriptan hand at lower doses, which may become effective again now that her headaches are not as severe.    For her wake-up headaches, I recommended a sleep study, as of sleep apnea is a cause for daily headaches that is not amenable to medication.    Also again discussed the new treatments that she will qualify her for excluding Emgality, Ubrelvy and Nurtec for which she will see me in the office, though may be difficult with [facility] first.    * Acute bacterial endocarditis  Assessment & Plan  IV antibiotics           Subjective/Objective:  Headaches have been much better controlled.  She was successfully detoxed off Fioricet with 3 days of phenobarbital.  She wakes up every day with a mild headache.  Only 1 or 2 days in the past 2 weeks with severe headache the could have required Fioricet.        Visit Vitals  BP (!) 108/59 (BP Location: Right upper arm, Patient Position: Lying)   Pulse 79   Temp 36.6 °C (97.9 °F) (Oral)   Resp 16   Ht 1.575 m (5' 2\")   Wt 102 kg (225 lb)   LMP 11/18/2020   SpO2 97%   Breastfeeding No   BMI 41.15 kg/m²        Current Facility-Administered Medications   Medication Dose Route Frequency   • acetaminophen  650 mg oral q6h INT   • alum-mag hydroxide-simeth  30 mL oral q6h PRN   • buPROPion  75 mg oral TID   • butalbital-acetaminophen-caff  1 tablet oral q4h PRN   • ceFAZolin  2 g intravenous q8h INT   • cyclobenzaprine  10 mg oral 3x daily PRN   • glucose  16-32 g of dextrose oral PRN    Or   • dextrose  15-30 g of dextrose oral PRN    Or   • glucagon  1 mg intramuscular PRN    Or   • dextrose in water  25 mL intravenous PRN   • diphenhydrAMINE  25 mg " intravenous q6h PRN   • famotidine  10 mg oral BID AC   • fluticasone propionate  2 spray Each Nostril Daily PRN   • hydrocortisone-pramoxine   rectal 4x daily PRN   • hydrOXYzine  25 mg oral Nightly PRN   • ibuprofen  600 mg oral q8h PRN   • lactobacillus acidophilus complex  1 tablet oral BID   • lidocaine  1 patch Topical Daily   • magnesium sulfate  2 g intravenous PRN   • methadone  60 mg oral Daily (6a)   • metoprolol succinate XL  25 mg oral Daily   • naloxone  0.2 mg intravenous Once PRN   • nicotine  1 patch transdermal Daily   • nicotine polacrilex  4 mg buccal q4h PRN   • pantoprazole  40 mg oral BID   • PARoxetine  30 mg oral Daily   • potassium chloride  20 mEq oral Daily PRN    Or   • potassium chloride  40 mEq oral Daily PRN    Or   • potassium chloride  20 mEq intravenous Daily PRN    Or   • potassium chloride  40 mEq intravenous Daily PRN   • prazosin  2 mg oral Nightly   • pregabalin  150 mg oral BID   • psyllium husk  1 packet oral Daily   • QUEtiapine  100 mg oral Nightly   • trimethobenzamide  300 mg oral q8h JAIDEN   • zolpidem  10 mg oral Nightly         Neuro Exam:  Gen: NAD,   HEENT: MMM  Eyes: no scleral icterus, PERRLA  CV:  no peripheral edema  P:  not labored  GI: nontender  PSY: AAOx3, no aphasia, no dysarthria, nl I/J  MSK: 5/5 b/l UE/LE, no drift, no atrophy, nl tone, no abnormal movements  CN: VFF, EOMI, no nystagmus, symmetric, facial sensation nl, hearing nl,  traps 5/5, tongue/uvula/palate midline  S:  LT/PP/T/V nl  DTR: 2+ and symmetric, Toes ??  Coord: FNF intact      Labs:  Reviewed, including sodium 136    Studies Reviewed:        Deni Gonzalez MD  1/15/2021 5:15 PM

## 2021-01-15 NOTE — PROGRESS NOTES
Pt expected to receive IV antiobiotics treatments, then dc home. Currently on day 39/42. MS[facility] following to assist with dc planning needs.    PLAN: following for dc planning needs from the hospital.

## 2021-01-16 PROBLEM — R11.2 NAUSEA & VOMITING: Status: ACTIVE | Noted: 2021-01-16

## 2021-01-16 LAB
ATRIAL RATE: 86
P AXIS: 38
PR INTERVAL: 150
QRS DURATION: 92
QT INTERVAL: 388
QTC CALCULATION(BAZETT): 464
R AXIS: 37
SARS-COV-2 RNA RESP QL NAA+PROBE: NEGATIVE
T WAVE AXIS: 45
VENTRICULAR RATE: 86

## 2021-01-16 PROCEDURE — 63700000 HC SELF-ADMINISTRABLE DRUG: Performed by: HOSPITALIST

## 2021-01-16 PROCEDURE — U0003 INFECTIOUS AGENT DETECTION BY NUCLEIC ACID (DNA OR RNA); SEVERE ACUTE RESPIRATORY SYNDROME CORONAVIRUS 2 (SARS-COV-2) (CORONAVIRUS DISEASE [COVID-19]), AMPLIFIED PROBE TECHNIQUE, MAKING USE OF HIGH THROUGHPUT TECHNOLOGIES AS DESCRIBED BY CMS-2020-01-R: HCPCS | Performed by: PHYSICIAN ASSISTANT

## 2021-01-16 PROCEDURE — 63700000 HC SELF-ADMINISTRABLE DRUG: Performed by: INTERNAL MEDICINE

## 2021-01-16 PROCEDURE — 25800000 HC PHARMACY IV SOLUTIONS: Performed by: INTERNAL MEDICINE

## 2021-01-16 PROCEDURE — 63600000 HC DRUGS/DETAIL CODE: Performed by: INTERNAL MEDICINE

## 2021-01-16 PROCEDURE — 63700000 HC SELF-ADMINISTRABLE DRUG: Performed by: NURSE PRACTITIONER

## 2021-01-16 PROCEDURE — 25000000 HC PHARMACY GENERAL: Performed by: INTERNAL MEDICINE

## 2021-01-16 PROCEDURE — 93005 ELECTROCARDIOGRAM TRACING: CPT | Performed by: PHYSICIAN ASSISTANT

## 2021-01-16 PROCEDURE — 12000000 HC ROOM AND CARE MED/SURG

## 2021-01-16 PROCEDURE — 63700000 HC SELF-ADMINISTRABLE DRUG: Performed by: PSYCHIATRY & NEUROLOGY

## 2021-01-16 PROCEDURE — 99232 SBSQ HOSP IP/OBS MODERATE 35: CPT | Performed by: INTERNAL MEDICINE

## 2021-01-16 RX ORDER — ONDANSETRON 4 MG/1
4 TABLET, ORALLY DISINTEGRATING ORAL EVERY 8 HOURS PRN
Status: DISCONTINUED | OUTPATIENT
Start: 2021-01-16 | End: 2021-01-23

## 2021-01-16 RX ORDER — ONDANSETRON HYDROCHLORIDE 2 MG/ML
4 INJECTION, SOLUTION INTRAVENOUS EVERY 8 HOURS PRN
Status: DISCONTINUED | OUTPATIENT
Start: 2021-01-16 | End: 2021-01-23

## 2021-01-16 RX ADMIN — BUPROPION HYDROCHLORIDE 75 MG: 75 TABLET, FILM COATED ORAL at 08:29

## 2021-01-16 RX ADMIN — CEFAZOLIN SODIUM 2 G: 10 POWDER, FOR SOLUTION INTRAVENOUS at 11:58

## 2021-01-16 RX ADMIN — DIPHENHYDRAMINE HYDROCHLORIDE 25 MG: 50 INJECTION INTRAMUSCULAR; INTRAVENOUS at 08:30

## 2021-01-16 RX ADMIN — CEFAZOLIN SODIUM 2 G: 10 POWDER, FOR SOLUTION INTRAVENOUS at 20:35

## 2021-01-16 RX ADMIN — FAMOTIDINE 10 MG: 10 TABLET, FILM COATED ORAL at 08:28

## 2021-01-16 RX ADMIN — PREGABALIN 150 MG: 75 CAPSULE ORAL at 20:26

## 2021-01-16 RX ADMIN — DIPHENHYDRAMINE HYDROCHLORIDE 25 MG: 50 INJECTION INTRAMUSCULAR; INTRAVENOUS at 15:41

## 2021-01-16 RX ADMIN — PANTOPRAZOLE SODIUM 40 MG: 40 TABLET, DELAYED RELEASE ORAL at 20:26

## 2021-01-16 RX ADMIN — LIDOCAINE 1 PATCH: 246 PATCH TOPICAL at 15:33

## 2021-01-16 RX ADMIN — CEFAZOLIN SODIUM 2 G: 10 POWDER, FOR SOLUTION INTRAVENOUS at 04:29

## 2021-01-16 RX ADMIN — METHADONE HYDROCHLORIDE 60 MG: 10 TABLET ORAL at 06:46

## 2021-01-16 RX ADMIN — PROBIOTIC PRODUCT - TAB 1 TABLET: TAB at 08:29

## 2021-01-16 RX ADMIN — FAMOTIDINE 10 MG: 10 TABLET, FILM COATED ORAL at 15:31

## 2021-01-16 RX ADMIN — METOPROLOL SUCCINATE 25 MG: 25 TABLET, EXTENDED RELEASE ORAL at 08:29

## 2021-01-16 RX ADMIN — BUPROPION HYDROCHLORIDE 75 MG: 75 TABLET, FILM COATED ORAL at 11:50

## 2021-01-16 RX ADMIN — CYCLOBENZAPRINE HYDROCHLORIDE 10 MG: 5 TABLET, FILM COATED ORAL at 10:17

## 2021-01-16 RX ADMIN — TRIMETHOBENZAMIDE HYDROCHLORIDE 300 MG: 300 CAPSULE ORAL at 11:50

## 2021-01-16 RX ADMIN — ACETAMINOPHEN 650 MG: 325 TABLET, FILM COATED ORAL at 15:32

## 2021-01-16 RX ADMIN — IBUPROFEN 600 MG: 600 TABLET ORAL at 08:28

## 2021-01-16 RX ADMIN — PREGABALIN 150 MG: 75 CAPSULE ORAL at 08:27

## 2021-01-16 RX ADMIN — BUPROPION HYDROCHLORIDE 75 MG: 75 TABLET, FILM COATED ORAL at 15:31

## 2021-01-16 RX ADMIN — PAROXETINE 30 MG: 20 TABLET, FILM COATED ORAL at 08:28

## 2021-01-16 RX ADMIN — QUETIAPINE FUMARATE 100 MG: 100 TABLET ORAL at 21:23

## 2021-01-16 RX ADMIN — PROBIOTIC PRODUCT - TAB 1 TABLET: TAB at 20:26

## 2021-01-16 RX ADMIN — ZOLPIDEM TARTRATE 10 MG: 5 TABLET, COATED ORAL at 21:23

## 2021-01-16 RX ADMIN — ONDANSETRON HYDROCHLORIDE 4 MG: 2 SOLUTION INTRAMUSCULAR; INTRAVENOUS at 15:29

## 2021-01-16 RX ADMIN — Medication 1 PATCH: at 08:33

## 2021-01-16 RX ADMIN — ACETAMINOPHEN 650 MG: 325 TABLET, FILM COATED ORAL at 08:27

## 2021-01-16 RX ADMIN — ACETAMINOPHEN 650 MG: 325 TABLET, FILM COATED ORAL at 21:23

## 2021-01-16 RX ADMIN — PRAZOSIN HYDROCHLORIDE 2 MG: 1 CAPSULE ORAL at 21:23

## 2021-01-16 RX ADMIN — PANTOPRAZOLE SODIUM 40 MG: 40 TABLET, DELAYED RELEASE ORAL at 08:27

## 2021-01-16 NOTE — PROGRESS NOTES
"   Hospital Medicine Service -  Daily Progress Note       SUBJECTIVE   Interval History: pt reports that she feels much better. NO acute events overnight. She reports that for some time now she has been having episodes of regurgitation. No fever. Mild pain in her back. She attributes it to the bed. Able to do pilates and stretches in the room.      OBJECTIVE      Vital signs in last 24 hours:  Temp:  [36.6 °C (97.9 °F)-37.2 °C (98.9 °F)] 36.8 °C (98.2 °F)  Heart Rate:  [79-84] 82  Resp:  [16] 16  BP: (108-149)/(59-87) 148/87    Intake/Output Summary (Last 24 hours) at 1/16/2021 1230  Last data filed at 1/16/2021 0502  Gross per 24 hour   Intake 580 ml   Output 900 ml   Net -320 ml       PHYSICAL EXAMINATION      Visit Vitals  BP (!) 148/87   Pulse 82   Temp 36.8 °C (98.2 °F) (Oral)   Resp 16   Ht 1.575 m (5' 2\")   Wt 102 kg (225 lb)   LMP 11/18/2020   SpO2 97%   Breastfeeding No   BMI 41.15 kg/m²       General Appearance:  Alert, cooperative, no distress, appears stated age   Head:  Normocephalic, without obvious abnormality, atraumatic   Eyes:  PERRL, conjunctiva/corneas clear, EOM's intact, fundi benign, both eyes   Ears:  Normal TM's and external ear canals, both ears   Nose: Nares normal, septum midline,mucosa normal, no drainage or sinus tenderness   Throat: Lips, mucosa, and tongue normal; teeth and gums normal   Neck: Supple, symmetrical, trachea midline, no adenopathy;  thyroid: not enlarged, symmetric, no tenderness/mass/nodules; no carotid bruit or JVD   Back:   Symmetric, no curvature, ROM normal, no CVA tenderness   Lungs:   Clear to auscultation bilaterally, respirations unlabored   Breasts:  No masses or tenderness   Heart:  Regular rate and rhythm, S1 and S2 normal, no murmur, rub, or gallop   Abdomen:   Soft, non-tender, bowel sounds active all four quadrants,  no masses, no organomegaly   Pelvic: Deferred   Extremities: Extremities normal, atraumatic, no cyanosis or edema   Pulses: 2+ and symmetric "   Skin: Skin color, texture, turgor normal, no rashes or lesions   Lymph nodes: Cervical, supraclavicular, and axillary nodes normal   Neurologic: Normal        LINES, CATHETERS, DRAINS, AIRWAYS, AND WOUNDS   Lines, Drains, Airways, Wounds:  PICC Single Lumen 12/18/20 Right (Active)   Number of days: 29       Comments:      LABS / IMAGING / TELE      Labs  CMP Results       01/11/21 01/04/21 01/03/21                    0445 0513 0435          138 139          136           K 4.6 4.3 4.5          4.6           Cl 98 103 101          98           CO2 31 25 28          31           Glucose 92 107 95          92           BUN 17 14 13          17           Creatinine 0.7 0.7 0.7          0.7           Calcium 9.0 9.6 9.2          9.0           Anion Gap 7 10 10          7           AST 19 -- 21         ALT 9 -- 10         Albumin 3.5 -- 3.3         EGFR >60.0 >60.0 >60.0          >60.0                         CBC Results       01/11/21 01/04/21 01/03/21                    0445 0513 0435         WBC 6.81 8.05 6.03         RBC 3.69 4.32 3.99         HGB 10.5 12.0 11.2         HCT 33.4 37.7 35.3         MCV 90.5 87.3 88.5         MCH 28.5 27.8 28.1         MCHC 31.4 31.8 31.7          197 173                       Imaging  X-ray Chest 1 View    Result Date: 12/18/2020  IMPRESSION: Interval placement of a right-sided PICC line catheter as described above.     X-ray Chest 1 View    Result Date: 12/18/2020  IMPRESSION: Interval placement of a right-sided PICC line catheter as described above.       ECG/Telemetry: I have reviewed all ECGs.     ASSESSMENT AND PLAN      Tricuspid valve vegetation  Assessment & Plan  See plan for endocarditis.       Septic arthritis of vertebra (CMS/HCC)  Assessment & Plan  Complains of inc back pain although lying in bed, talking of the phone, appears comfortable.    Plan:  Neurosurgery consulted - per evaluation No acute surgical intervention required. Cont w/management with IV  antibiotics. Repeat imaging at completion of antibiotics and follow-up with neurosurgery at that time.  Cont w/tylenol, methadone, and flexeril PRN for now. If pain cont to worsen t/c LSO brace   Pain management consulted - will f/u any additional recs  Con with IV abx to finish 6 weeks of Abx as per ID          Essential hypertension  Assessment & Plan  BP stable    Plan;  Cont w/metoprolol, prazosin    Septic embolism (CMS/HCC)  Assessment & Plan  Complication of infectious endocarditis  Cont with IV abx  Repeat CT chest as outpt.       Opioid type dependence, continuous use (CMS/HCC)  Assessment & Plan  Pt with inc lethargy on 1/10, with white powder substance found rolled in a piece of paper amongst her things and three pills (yesterday's methadone 50mg and flexeril)  Pt repeatedly denies any recent drug abuse, although concern for potential use while inpt.   UDS (+) only for barbituates, last of which she received on 1/6  Sxs have since resolved.    Plan:  Cont to monitor closely.   Psych consulted - will f/u recs  Rehab options with psych and sW.   Currently n methadone as per DR. Padron      Migraine, intractable  Assessment & Plan  Cont to complain of ongoing headaches  Improved with current treatment    Plan:  Cont with po meds as per Neuro,   discussed with patient the need for outpatient sleep study to find out if keyshawn is contributing to morning headaches.         Insomnia  Assessment & Plan  Cont w/ambien      Depression  Assessment & Plan  Stable     Plan:  Cont with po meds  outpt psych f/u      Anxiety  Assessment & Plan  Stable     Plan:  Cont with po meds.       * Acute bacterial endocarditis  Assessment & Plan  Slowly improving,  Blood cultures from 12/2020 negative for growth  Currently denies any cardiac sxs and remains afebrile w/out further si/sxs of ongoing infection      Plan:   cont with IV abx, day 39/42  ID input noted  Cardiology consulted - Recommended she return in 1-2 weeks for  cardiac evaluation after discharge.    Nausea/Vomiting.   Pt reports is more like regurgitation, she reports it has been going on for several weeks.   Requesting zofran.   Also requesting GI consult.            VTE Assessment: Padua VTE Score: 2  VTE Prophylaxis Plan: as above  Code Status: Full Code  Estimated Discharge Date: 1/19/2021  Disposition Planning: NC on tuesday     Zoila Toth MD  1/16/2021

## 2021-01-16 NOTE — PLAN OF CARE
Problem: Adult Inpatient Plan of Care  Goal: Plan of Care Review  Outcome: Progressing  Flowsheets (Taken 1/16/2021 0321)  Progress: improving  Plan of Care Reviewed With: patient  Outcome Summary: Patient remains free from falls this shift, medications given per order and patient is swallowing them without pocketing noted.  Denies nausea, however, having periods of dry heaving and watery emesis, zofran given with hopefully some relief.  VSS, ANGELA PICC in place.     Problem: Fall Injury Risk  Goal: Absence of Fall and Fall-Related Injury  Outcome: Progressing     Problem: Infection  Goal: Infection Symptom Resolution  Outcome: Progressing

## 2021-01-16 NOTE — PLAN OF CARE
Problem: Adult Inpatient Plan of Care  Goal: Plan of Care Review  Outcome: Progressing  Flowsheets (Taken 1/16/2021 6044)  Progress: improving  Plan of Care Reviewed With: patient  Outcome Summary: Pt AAOX4. Meds given per order, pt tolerated good. No  pocketting pills noted. No C/O nausea/vomiting this shift. IV ABT given per order, pt tolerated well. No acute distress. Call bell left in reach.

## 2021-01-16 NOTE — ASSESSMENT & PLAN NOTE
-Intermittent nausea/vomiting with large meals. She reports it has been going on for several weeks, but worse over the past week. She regurgitates whole food about 5 minutes after eating. Worse with large meals. No hematemesis, hematochezia, weight loss. Her anemia is at baseline. She is having daily BMs.   -Upper GI series shows esophageal dysmotility and GERD.  -sxs stable for now    Plan:   -Continue Pepcid and PPI.   -Continue Baclofen and PRN Reglan. QTc 438 on 1/28.  - caution with toradol use  -Continue small meals, low fat/low fiber diet.   -GI consulted. No plans for EGD or gastric emptying study.

## 2021-01-16 NOTE — NURSING NOTE
Patient experienced a coughing spell, and then some watery emesis, denies nausea.  Per patient request, I did reach out to Dr. Brooke for possible zofran.

## 2021-01-16 NOTE — PLAN OF CARE
Problem: Adult Inpatient Plan of Care  Goal: Plan of Care Review  Outcome: Progressing  Flowsheets (Taken 1/15/2021 2233)  Progress: improving  Plan of Care Reviewed With: patient  Outcome Summary: Patient tolerated all meals today with no nausea or vomiting. Patient cooperative with opening mouth and checking with medication administration. Vitals stable. Last day of antibitoic 1/19/21. Plan to discharge home.   Plan of Care Review  Plan of Care Reviewed With: patient  Progress: improving  Outcome Summary: Patient tolerated all meals today with no nausea or vomiting. Patient cooperative with opening mouth and checking with medication administration. Vitals stable. Last day of antibitoic 1/19/21. Plan to discharge home.

## 2021-01-17 LAB
ERYTHROCYTE [DISTWIDTH] IN BLOOD BY AUTOMATED COUNT: 16.2 % (ref 11.7–14.4)
HCT VFR BLDCO AUTO: 32.5 % (ref 35–45)
HGB BLD-MCNC: 10.1 G/DL (ref 11.8–15.7)
MCH RBC QN AUTO: 28.5 PG (ref 28–33.2)
MCHC RBC AUTO-ENTMCNC: 31.1 G/DL (ref 32.2–35.5)
MCV RBC AUTO: 91.8 FL (ref 83–98)
PDW BLD AUTO: 10.1 FL (ref 9.4–12.3)
PLATELET # BLD AUTO: 204 K/UL (ref 150–369)
RBC # BLD AUTO: 3.54 M/UL (ref 3.93–5.22)
WBC # BLD AUTO: 5.4 K/UL (ref 3.8–10.5)

## 2021-01-17 PROCEDURE — 25800000 HC PHARMACY IV SOLUTIONS: Performed by: INTERNAL MEDICINE

## 2021-01-17 PROCEDURE — 63700000 HC SELF-ADMINISTRABLE DRUG: Performed by: HOSPITALIST

## 2021-01-17 PROCEDURE — 36415 COLL VENOUS BLD VENIPUNCTURE: CPT | Performed by: INTERNAL MEDICINE

## 2021-01-17 PROCEDURE — 63700000 HC SELF-ADMINISTRABLE DRUG: Performed by: STUDENT IN AN ORGANIZED HEALTH CARE EDUCATION/TRAINING PROGRAM

## 2021-01-17 PROCEDURE — 25000000 HC PHARMACY GENERAL: Performed by: INTERNAL MEDICINE

## 2021-01-17 PROCEDURE — 63700000 HC SELF-ADMINISTRABLE DRUG: Performed by: PSYCHIATRY & NEUROLOGY

## 2021-01-17 PROCEDURE — 12000000 HC ROOM AND CARE MED/SURG

## 2021-01-17 PROCEDURE — 63600000 HC DRUGS/DETAIL CODE: Performed by: INTERNAL MEDICINE

## 2021-01-17 PROCEDURE — 99232 SBSQ HOSP IP/OBS MODERATE 35: CPT | Performed by: INTERNAL MEDICINE

## 2021-01-17 PROCEDURE — 63700000 HC SELF-ADMINISTRABLE DRUG: Performed by: NURSE PRACTITIONER

## 2021-01-17 PROCEDURE — 85027 COMPLETE CBC AUTOMATED: CPT | Performed by: INTERNAL MEDICINE

## 2021-01-17 PROCEDURE — 63700000 HC SELF-ADMINISTRABLE DRUG: Performed by: INTERNAL MEDICINE

## 2021-01-17 RX ORDER — BUTALBITAL, ACETAMINOPHEN AND CAFFEINE 50; 325; 40 MG/1; MG/1; MG/1
1 TABLET ORAL ONCE
Status: DISPENSED | OUTPATIENT
Start: 2021-01-17 | End: 2021-01-17

## 2021-01-17 RX ORDER — BUTALBITAL, ACETAMINOPHEN AND CAFFEINE 50; 325; 40 MG/1; MG/1; MG/1
2 TABLET ORAL EVERY 4 HOURS PRN
Status: COMPLETED | OUTPATIENT
Start: 2021-01-17 | End: 2021-01-18

## 2021-01-17 RX ADMIN — CEFAZOLIN SODIUM 2 G: 10 POWDER, FOR SOLUTION INTRAVENOUS at 12:09

## 2021-01-17 RX ADMIN — PREGABALIN 150 MG: 75 CAPSULE ORAL at 08:51

## 2021-01-17 RX ADMIN — BUPROPION HYDROCHLORIDE 75 MG: 75 TABLET, FILM COATED ORAL at 12:02

## 2021-01-17 RX ADMIN — BUPROPION HYDROCHLORIDE 75 MG: 75 TABLET, FILM COATED ORAL at 08:51

## 2021-01-17 RX ADMIN — PANTOPRAZOLE SODIUM 40 MG: 40 TABLET, DELAYED RELEASE ORAL at 08:51

## 2021-01-17 RX ADMIN — PROBIOTIC PRODUCT - TAB 1 TABLET: TAB at 08:50

## 2021-01-17 RX ADMIN — METHADONE HYDROCHLORIDE 60 MG: 10 TABLET ORAL at 06:58

## 2021-01-17 RX ADMIN — PAROXETINE 30 MG: 20 TABLET, FILM COATED ORAL at 08:52

## 2021-01-17 RX ADMIN — PRAZOSIN HYDROCHLORIDE 2 MG: 1 CAPSULE ORAL at 21:11

## 2021-01-17 RX ADMIN — CEFAZOLIN SODIUM 2 G: 10 POWDER, FOR SOLUTION INTRAVENOUS at 19:59

## 2021-01-17 RX ADMIN — Medication 1 PATCH: at 08:52

## 2021-01-17 RX ADMIN — FAMOTIDINE 10 MG: 10 TABLET, FILM COATED ORAL at 06:57

## 2021-01-17 RX ADMIN — ACETAMINOPHEN 650 MG: 325 TABLET, FILM COATED ORAL at 08:50

## 2021-01-17 RX ADMIN — ACETAMINOPHEN 650 MG: 325 TABLET, FILM COATED ORAL at 15:53

## 2021-01-17 RX ADMIN — ACETAMINOPHEN 650 MG: 325 TABLET, FILM COATED ORAL at 05:51

## 2021-01-17 RX ADMIN — BUPROPION HYDROCHLORIDE 75 MG: 75 TABLET, FILM COATED ORAL at 15:53

## 2021-01-17 RX ADMIN — ZOLPIDEM TARTRATE 10 MG: 5 TABLET, COATED ORAL at 21:10

## 2021-01-17 RX ADMIN — PANTOPRAZOLE SODIUM 40 MG: 40 TABLET, DELAYED RELEASE ORAL at 20:05

## 2021-01-17 RX ADMIN — NICOTINE POLACRILEX 4 MG: 4 GUM, CHEWING ORAL at 08:51

## 2021-01-17 RX ADMIN — QUETIAPINE FUMARATE 100 MG: 100 TABLET ORAL at 21:10

## 2021-01-17 RX ADMIN — PROBIOTIC PRODUCT - TAB 1 TABLET: TAB at 20:02

## 2021-01-17 RX ADMIN — ONDANSETRON HYDROCHLORIDE 4 MG: 2 SOLUTION INTRAMUSCULAR; INTRAVENOUS at 18:12

## 2021-01-17 RX ADMIN — DIPHENHYDRAMINE HYDROCHLORIDE 25 MG: 50 INJECTION INTRAMUSCULAR; INTRAVENOUS at 18:13

## 2021-01-17 RX ADMIN — BUTALBITAL, ACETAMINOPHEN AND CAFFEINE 1 TABLET: 50; 325; 40 TABLET ORAL at 08:51

## 2021-01-17 RX ADMIN — CEFAZOLIN SODIUM 2 G: 10 POWDER, FOR SOLUTION INTRAVENOUS at 04:38

## 2021-01-17 RX ADMIN — DIPHENHYDRAMINE HYDROCHLORIDE 25 MG: 50 INJECTION INTRAMUSCULAR; INTRAVENOUS at 12:02

## 2021-01-17 RX ADMIN — PREGABALIN 150 MG: 75 CAPSULE ORAL at 20:02

## 2021-01-17 RX ADMIN — DIPHENHYDRAMINE HYDROCHLORIDE 25 MG: 50 INJECTION INTRAMUSCULAR; INTRAVENOUS at 05:49

## 2021-01-17 RX ADMIN — ACETAMINOPHEN 650 MG: 325 TABLET, FILM COATED ORAL at 21:10

## 2021-01-17 RX ADMIN — FAMOTIDINE 10 MG: 10 TABLET, FILM COATED ORAL at 15:53

## 2021-01-17 NOTE — PROGRESS NOTES
"   Hospital Medicine Service -  Daily Progress Note       SUBJECTIVE   Interval History: Patient reports that she had episodes of vomiting yesterday all afternoon.  She also reports that she has numbness on the right upper extremity.  She reports this is related to swelling.  Complains of migraine and took a Fioricet this morning.  The patient is also complaining of back pain.  No fever.  No acute events overnight.      OBJECTIVE      Vital signs in last 24 hours:  Temp:  [36.3 °C (97.3 °F)-36.8 °C (98.2 °F)] 36.6 °C (97.9 °F)  Heart Rate:  [74-84] 82  Resp:  [17-20] 20  BP: (101-137)/(54-79) 137/79    Intake/Output Summary (Last 24 hours) at 1/17/2021 1516  Last data filed at 1/17/2021 1333  Gross per 24 hour   Intake 50 ml   Output --   Net 50 ml       PHYSICAL EXAMINATION      Visit Vitals  /79 (Patient Position: Lying)   Pulse 82   Temp 36.6 °C (97.9 °F) (Oral)   Resp 20   Ht 1.575 m (5' 2\")   Wt 102 kg (225 lb)   LMP 11/18/2020   SpO2 (!) 82%   Breastfeeding No   BMI 41.15 kg/m²       General Appearance:  Alert, cooperative, no distress, appears stated age   Head:  Normocephalic, without obvious abnormality, atraumatic   Eyes:  PERRL, conjunctiva/corneas clear, EOM's intact, fundi benign, both eyes   Ears:  Normal TM's and external ear canals, both ears   Nose: Nares normal, septum midline,mucosa normal, no drainage or sinus tenderness   Throat: Lips, mucosa, and tongue normal; teeth and gums normal   Neck: Supple, symmetrical, trachea midline, no adenopathy;  thyroid: not enlarged, symmetric, no tenderness/mass/nodules; no carotid bruit or JVD   Back:   Symmetric, no curvature, ROM normal, no CVA tenderness   Lungs:   Clear to auscultation bilaterally, respirations unlabored   Breasts:  No masses or tenderness   Heart:  Regular rate and rhythm, S1 and S2 normal, no murmur, rub, or gallop   Abdomen:   Soft, non-tender, bowel sounds active all four quadrants,  no masses, no organomegaly   Pelvic: Deferred "   Extremities: Extremities normal, atraumatic, no cyanosis or edema   Pulses: 2+ and symmetric   Skin: Skin color, texture, turgor normal, no rashes or lesions   Lymph nodes: Cervical, supraclavicular, and axillary nodes normal   Neurologic: Normal        LINES, CATHETERS, DRAINS, AIRWAYS, AND WOUNDS   Lines, Drains, Airways, Wounds:  PICC Single Lumen 12/18/20 Right (Active)   Number of days: 29       Comments:      LABS / IMAGING / TELE      Labs  CMP Results       01/11/21 01/04/21 01/03/21                    0445 0513 0435          138 139          136           K 4.6 4.3 4.5          4.6           Cl 98 103 101          98           CO2 31 25 28          31           Glucose 92 107 95          92           BUN 17 14 13          17           Creatinine 0.7 0.7 0.7          0.7           Calcium 9.0 9.6 9.2          9.0           Anion Gap 7 10 10          7           AST 19 -- 21         ALT 9 -- 10         Albumin 3.5 -- 3.3         EGFR >60.0 >60.0 >60.0          >60.0                         CBC Results       01/11/21 01/04/21 01/03/21                    0445 0513 0435         WBC 6.81 8.05 6.03         RBC 3.69 4.32 3.99         HGB 10.5 12.0 11.2         HCT 33.4 37.7 35.3         MCV 90.5 87.3 88.5         MCH 28.5 27.8 28.1         MCHC 31.4 31.8 31.7          197 173                       Imaging  No results found.    ECG/Telemetry: I have reviewed all ECGs.     ASSESSMENT AND PLAN      Tricuspid valve vegetation  Assessment & Plan  See plan for endocarditis.       Septic arthritis of vertebra (CMS/HCC)  Assessment & Plan  Complains of inc back pain although lying in bed, talking of the phone, appears comfortable.    Plan:  Neurosurgery consulted - per evaluation No acute surgical intervention required. Cont w/management with IV antibiotics. Repeat imaging at completion of antibiotics and follow-up with neurosurgery at that time.  Cont w/tylenol, methadone, and flexeril PRN for now. If pain  cont to worsen t/c LSO brace   Pain management consulted - will f/u any additional recs  Con with IV abx to finish 6 weeks of Abx as per ID    Patient is requesting to have her MRI done prior to leaving the hospital.  I reviewed neurosurgery recommendations and they recommend to repeat the MRI once antibiotic therapy has Been completed.  This can be ordered for Tuesday.  The patient will follow up with neurosurgery as an outpatient.          Essential hypertension  Assessment & Plan  BP stable    Plan;  Cont w/metoprolol, prazosin    Septic embolism (CMS/HCC)  Assessment & Plan  Complication of infectious endocarditis  Cont with IV abx  Repeat CT chest as outpt.       Opioid type dependence, continuous use (CMS/HCC)  Assessment & Plan  Pt with inc lethargy on 1/10, with white powder substance found rolled in a piece of paper amongst her things and three pills (yesterday's methadone 50mg and flexeril)  Pt repeatedly denies any recent drug abuse, although concern for potential use while inpt.   UDS (+) only for barbituates, last of which she received on 1/6  Sxs have since resolved.    Plan:  Cont to monitor closely.   Psych consulted - will f/u recs  Rehab options with psych and sW.   Currently n methadone as per DR. Padron      Migraine, intractable  Assessment & Plan  Cont to complain of ongoing headaches  Improved with current treatment    Plan:  Cont with po meds as per Neuro,   discussed with patient the need for outpatient sleep study to find out if keyshawn is contributing to morning headaches.   Took Fioricet this am as recommended by Dr. Gonzalez.   She will need to follow up with him as outpatient. Pt is aware she can't take more than 4 tabs a month.         Insomnia  Assessment & Plan  Cont w/ambien      Depression  Assessment & Plan  Stable     Plan:  Cont with po meds  outpt psych f/u      Anxiety  Assessment & Plan  Stable     Plan:  Cont with po meds.       * Acute bacterial endocarditis  Assessment &  Plan  Slowly improving,  Blood cultures from 12/2020 negative for growth  Currently denies any cardiac sxs and remains afebrile w/out further si/sxs of ongoing infection      Plan:   cont with IV abx, day 40/42  ID input noted  Cardiology consulted - Recommended she return in 1-2 weeks for cardiac evaluation after discharge.    Nausea/Vomiting.   Pt reports is more like regurgitation, she reports it has been going on for several weeks.   Requesting zofran. I have ordered.   Also requesting GI consult. I will order and they might be able to see her in am.            VTE Assessment: Padua VTE Score: 2  VTE Prophylaxis Plan: as above  Code Status: Full Code  Estimated Discharge Date: 1/19/2021  Disposition Planning: dc on Tuesday after she finishes antibiotics.      Zoila Toth MD  1/17/2021

## 2021-01-18 PROBLEM — K21.9 GASTROESOPHAGEAL REFLUX DISEASE WITHOUT ESOPHAGITIS: Status: ACTIVE | Noted: 2021-01-16

## 2021-01-18 LAB
ANION GAP SERPL CALC-SCNC: 7 MEQ/L (ref 3–15)
BUN SERPL-MCNC: 14 MG/DL (ref 8–20)
CALCIUM SERPL-MCNC: 8.8 MG/DL (ref 8.9–10.3)
CHLORIDE SERPL-SCNC: 99 MEQ/L (ref 98–109)
CO2 SERPL-SCNC: 32 MEQ/L (ref 22–32)
CREAT SERPL-MCNC: 0.8 MG/DL (ref 0.6–1.1)
CRP SERPL-MCNC: 11.4 MG/L
ERYTHROCYTE [DISTWIDTH] IN BLOOD BY AUTOMATED COUNT: 16.2 % (ref 11.7–14.4)
GFR SERPL CREATININE-BSD FRML MDRD: >60 ML/MIN/1.73M*2
GLUCOSE SERPL-MCNC: 98 MG/DL (ref 70–99)
HCT VFR BLDCO AUTO: 31.6 % (ref 35–45)
HGB BLD-MCNC: 9.7 G/DL (ref 11.8–15.7)
MCH RBC QN AUTO: 28 PG (ref 28–33.2)
MCHC RBC AUTO-ENTMCNC: 30.7 G/DL (ref 32.2–35.5)
MCV RBC AUTO: 91.3 FL (ref 83–98)
PDW BLD AUTO: 10.1 FL (ref 9.4–12.3)
PLATELET # BLD AUTO: 195 K/UL (ref 150–369)
POTASSIUM SERPL-SCNC: 4.4 MEQ/L (ref 3.6–5.1)
RBC # BLD AUTO: 3.46 M/UL (ref 3.93–5.22)
SODIUM SERPL-SCNC: 138 MEQ/L (ref 136–144)
WBC # BLD AUTO: 9.8 K/UL (ref 3.8–10.5)

## 2021-01-18 PROCEDURE — 99233 SBSQ HOSP IP/OBS HIGH 50: CPT | Performed by: HOSPITALIST

## 2021-01-18 PROCEDURE — 85027 COMPLETE CBC AUTOMATED: CPT | Performed by: INTERNAL MEDICINE

## 2021-01-18 PROCEDURE — 63700000 HC SELF-ADMINISTRABLE DRUG: Performed by: HOSPITALIST

## 2021-01-18 PROCEDURE — 63700000 HC SELF-ADMINISTRABLE DRUG: Performed by: INTERNAL MEDICINE

## 2021-01-18 PROCEDURE — 63600000 HC DRUGS/DETAIL CODE: Performed by: INTERNAL MEDICINE

## 2021-01-18 PROCEDURE — 63700000 HC SELF-ADMINISTRABLE DRUG: Performed by: PSYCHIATRY & NEUROLOGY

## 2021-01-18 PROCEDURE — 12000000 HC ROOM AND CARE MED/SURG

## 2021-01-18 PROCEDURE — 63700000 HC SELF-ADMINISTRABLE DRUG: Performed by: NURSE PRACTITIONER

## 2021-01-18 PROCEDURE — 25800000 HC PHARMACY IV SOLUTIONS: Performed by: INTERNAL MEDICINE

## 2021-01-18 PROCEDURE — 80048 BASIC METABOLIC PNL TOTAL CA: CPT | Performed by: INTERNAL MEDICINE

## 2021-01-18 PROCEDURE — 25000000 HC PHARMACY GENERAL: Performed by: INTERNAL MEDICINE

## 2021-01-18 PROCEDURE — 86140 C-REACTIVE PROTEIN: CPT | Performed by: INTERNAL MEDICINE

## 2021-01-18 PROCEDURE — 36415 COLL VENOUS BLD VENIPUNCTURE: CPT | Performed by: INTERNAL MEDICINE

## 2021-01-18 RX ORDER — BUTALBITAL, ACETAMINOPHEN AND CAFFEINE 50; 325; 40 MG/1; MG/1; MG/1
2 TABLET ORAL EVERY 4 HOURS PRN
Status: DISCONTINUED | OUTPATIENT
Start: 2021-01-18 | End: 2021-01-18

## 2021-01-18 RX ORDER — ENOXAPARIN SODIUM 100 MG/ML
40 INJECTION SUBCUTANEOUS
Status: DISCONTINUED | OUTPATIENT
Start: 2021-01-19 | End: 2021-02-03 | Stop reason: HOSPADM

## 2021-01-18 RX ORDER — BUTALBITAL, ACETAMINOPHEN AND CAFFEINE 50; 325; 40 MG/1; MG/1; MG/1
2 TABLET ORAL EVERY 4 HOURS PRN
Status: COMPLETED | OUTPATIENT
Start: 2021-01-18 | End: 2021-01-24

## 2021-01-18 RX ADMIN — PREGABALIN 150 MG: 75 CAPSULE ORAL at 20:46

## 2021-01-18 RX ADMIN — PROBIOTIC PRODUCT - TAB 1 TABLET: TAB at 09:37

## 2021-01-18 RX ADMIN — CYCLOBENZAPRINE HYDROCHLORIDE 10 MG: 5 TABLET, FILM COATED ORAL at 09:38

## 2021-01-18 RX ADMIN — CEFAZOLIN SODIUM 2 G: 10 POWDER, FOR SOLUTION INTRAVENOUS at 05:19

## 2021-01-18 RX ADMIN — BUPROPION HYDROCHLORIDE 75 MG: 75 TABLET, FILM COATED ORAL at 09:37

## 2021-01-18 RX ADMIN — BUPROPION HYDROCHLORIDE 75 MG: 75 TABLET, FILM COATED ORAL at 15:56

## 2021-01-18 RX ADMIN — CEFAZOLIN SODIUM 2 G: 10 POWDER, FOR SOLUTION INTRAVENOUS at 18:46

## 2021-01-18 RX ADMIN — ZOLPIDEM TARTRATE 10 MG: 5 TABLET, COATED ORAL at 21:20

## 2021-01-18 RX ADMIN — PRAZOSIN HYDROCHLORIDE 2 MG: 1 CAPSULE ORAL at 21:22

## 2021-01-18 RX ADMIN — FAMOTIDINE 10 MG: 10 TABLET, FILM COATED ORAL at 15:56

## 2021-01-18 RX ADMIN — DIPHENHYDRAMINE HYDROCHLORIDE 25 MG: 50 INJECTION INTRAMUSCULAR; INTRAVENOUS at 18:46

## 2021-01-18 RX ADMIN — DIPHENHYDRAMINE HYDROCHLORIDE 25 MG: 50 INJECTION INTRAMUSCULAR; INTRAVENOUS at 12:53

## 2021-01-18 RX ADMIN — CEFAZOLIN SODIUM 2 G: 10 POWDER, FOR SOLUTION INTRAVENOUS at 11:57

## 2021-01-18 RX ADMIN — ONDANSETRON HYDROCHLORIDE 4 MG: 2 SOLUTION INTRAMUSCULAR; INTRAVENOUS at 18:46

## 2021-01-18 RX ADMIN — PANTOPRAZOLE SODIUM 40 MG: 40 TABLET, DELAYED RELEASE ORAL at 20:46

## 2021-01-18 RX ADMIN — FAMOTIDINE 10 MG: 10 TABLET, FILM COATED ORAL at 06:56

## 2021-01-18 RX ADMIN — PANTOPRAZOLE SODIUM 40 MG: 40 TABLET, DELAYED RELEASE ORAL at 09:38

## 2021-01-18 RX ADMIN — BUPROPION HYDROCHLORIDE 75 MG: 75 TABLET, FILM COATED ORAL at 12:00

## 2021-01-18 RX ADMIN — DIPHENHYDRAMINE HYDROCHLORIDE 25 MG: 50 INJECTION INTRAMUSCULAR; INTRAVENOUS at 06:08

## 2021-01-18 RX ADMIN — Medication 1 PATCH: at 09:38

## 2021-01-18 RX ADMIN — PAROXETINE 30 MG: 20 TABLET, FILM COATED ORAL at 09:37

## 2021-01-18 RX ADMIN — METHADONE HYDROCHLORIDE 60 MG: 10 TABLET ORAL at 06:56

## 2021-01-18 RX ADMIN — BUTALBITAL, ACETAMINOPHEN AND CAFFEINE 2 TABLET: 50; 325; 40 TABLET ORAL at 09:38

## 2021-01-18 RX ADMIN — ACETAMINOPHEN 650 MG: 325 TABLET, FILM COATED ORAL at 20:46

## 2021-01-18 RX ADMIN — QUETIAPINE FUMARATE 100 MG: 100 TABLET ORAL at 21:19

## 2021-01-18 RX ADMIN — METOPROLOL SUCCINATE 25 MG: 25 TABLET, EXTENDED RELEASE ORAL at 09:38

## 2021-01-18 RX ADMIN — ACETAMINOPHEN 650 MG: 325 TABLET, FILM COATED ORAL at 06:56

## 2021-01-18 RX ADMIN — PREGABALIN 150 MG: 75 CAPSULE ORAL at 09:37

## 2021-01-18 RX ADMIN — PROBIOTIC PRODUCT - TAB 1 TABLET: TAB at 20:46

## 2021-01-18 NOTE — BEHAVIORAL HEALTH CRISIS PROGRESS NOTE
Behavioral Health Progress Note    Chief Complaint/Reason for follow-up: opioid dependence and depression    Vital Signs for the last 24 hours:  Temp:  [36.6 °C (97.9 °F)-36.7 °C (98.1 °F)] 36.6 °C (97.9 °F)  Heart Rate:  [82-83] 83  Resp:  [17-20] 18  BP: (137-167)/(63-79) 138/63    Assessment/Plan  Spoke to [facility] and pt will be for d/c tomorrow as long as cleared from MRI. Current pt is on 60mg of methadone this will be continued to be managed at a methadone clinic. Will work to set the pt up at [facility] as this is the pt's preference for first dosing on Wednesday.     Spoke to [facility] to begin the pt's intake. Completed intake and will fax over clinical. They will call back to schedule the pt.    1330 Spoke to Barbara at [facility] and appointment scheduled 8:15 on Wednesday 1/20.     Further the pt will need to complete an intake at 7AM tomorrow 1/19, with Carolyn  Barbara is @ [phone] and the pt will want to call and communicate she is calling for her intake with Carolyn.   Tomorrow will need to send the pt's last dosing record.    Spoke to the pt to updated on intake information, the pt is aware of the 7am appointment for intake with [facility]. Will continue to follow with the pt.

## 2021-01-18 NOTE — DISCHARGE INSTR - APPOINTMENTS
Intake appointment at [facility]   7:00AM with Carolyn 1/19     [facility]  Appointment scheduled for 1/20 @ 8:15am

## 2021-01-18 NOTE — PATIENT CARE CONFERENCE
Care Progression Rounds Note  Date: 1/18/2021  Time: 10:07 AM     Patient Name: Carolyn Redmond     Medical Record Number: [id number]   YOB: 1985  Sex: Female      Room/Bed: 4211    Admitting Diagnosis: Septic embolism (CMS/[facility]) [I76]  Infection of lumbar spine (CMS/[facility]) [M46.26]  Acute left-sided low back pain without sciatica [M54.5]   Admit Date/Time: 12/9/2020 11:10 AM    Primary Diagnosis: Acute bacterial endocarditis  Principal Problem: Acute bacterial endocarditis    GMLOS: 4.8  Anticipated Discharge Date: 1/19/2021    AM-PAC  Mobility Score:      Discharge Planning:       Barriers to Discharge:  Barriers to Discharge: Medical issues not resolved  Comment: iv antibiotics, methodone. last day is tomorrow    Participants:  social work/services, nursing

## 2021-01-19 ENCOUNTER — APPOINTMENT (INPATIENT)
Dept: RADIOLOGY | Facility: HOSPITAL | Age: 36
End: 2021-01-19
Attending: HOSPITALIST
Payer: COMMERCIAL

## 2021-01-19 PROCEDURE — 99233 SBSQ HOSP IP/OBS HIGH 50: CPT | Performed by: HOSPITALIST

## 2021-01-19 PROCEDURE — 72158 MRI LUMBAR SPINE W/O & W/DYE: CPT | Mod: MG

## 2021-01-19 PROCEDURE — 12000000 HC ROOM AND CARE MED/SURG

## 2021-01-19 PROCEDURE — 63700000 HC SELF-ADMINISTRABLE DRUG: Performed by: INTERNAL MEDICINE

## 2021-01-19 PROCEDURE — 63700000 HC SELF-ADMINISTRABLE DRUG: Performed by: PSYCHIATRY & NEUROLOGY

## 2021-01-19 PROCEDURE — 63700000 HC SELF-ADMINISTRABLE DRUG: Performed by: NURSE PRACTITIONER

## 2021-01-19 PROCEDURE — 63700000 HC SELF-ADMINISTRABLE DRUG: Performed by: HOSPITALIST

## 2021-01-19 PROCEDURE — 63600000 HC DRUGS/DETAIL CODE: Performed by: INTERNAL MEDICINE

## 2021-01-19 PROCEDURE — 25000000 HC PHARMACY GENERAL: Performed by: INTERNAL MEDICINE

## 2021-01-19 PROCEDURE — A9585 GADOBUTROL INJECTION: HCPCS | Performed by: HOSPITALIST

## 2021-01-19 PROCEDURE — A9579 GAD-BASE MR CONTRAST NOS,1ML: HCPCS | Performed by: HOSPITALIST

## 2021-01-19 PROCEDURE — 25800000 HC PHARMACY IV SOLUTIONS: Performed by: INTERNAL MEDICINE

## 2021-01-19 RX ORDER — GADOBUTROL 604.72 MG/ML
0.1 INJECTION INTRAVENOUS
Status: COMPLETED | OUTPATIENT
Start: 2021-01-19 | End: 2021-01-19

## 2021-01-19 RX ORDER — LORAZEPAM 0.5 MG/1
0.5 TABLET ORAL ONCE
Status: COMPLETED | OUTPATIENT
Start: 2021-01-19 | End: 2021-01-19

## 2021-01-19 RX ADMIN — BUPROPION HYDROCHLORIDE 75 MG: 75 TABLET, FILM COATED ORAL at 17:42

## 2021-01-19 RX ADMIN — ACETAMINOPHEN 650 MG: 325 TABLET, FILM COATED ORAL at 21:25

## 2021-01-19 RX ADMIN — ONDANSETRON 4 MG: 4 TABLET, ORALLY DISINTEGRATING ORAL at 21:26

## 2021-01-19 RX ADMIN — FAMOTIDINE 10 MG: 10 TABLET, FILM COATED ORAL at 08:36

## 2021-01-19 RX ADMIN — LORAZEPAM 0.5 MG: 0.5 TABLET ORAL at 10:06

## 2021-01-19 RX ADMIN — CEFAZOLIN SODIUM 2 G: 10 POWDER, FOR SOLUTION INTRAVENOUS at 11:58

## 2021-01-19 RX ADMIN — PANTOPRAZOLE SODIUM 40 MG: 40 TABLET, DELAYED RELEASE ORAL at 21:25

## 2021-01-19 RX ADMIN — PREGABALIN 150 MG: 75 CAPSULE ORAL at 21:26

## 2021-01-19 RX ADMIN — DIPHENHYDRAMINE HYDROCHLORIDE 25 MG: 50 INJECTION INTRAMUSCULAR; INTRAVENOUS at 11:55

## 2021-01-19 RX ADMIN — GADOBUTROL 10 ML: 604.72 INJECTION INTRAVENOUS at 11:10

## 2021-01-19 RX ADMIN — BUPROPION HYDROCHLORIDE 75 MG: 75 TABLET, FILM COATED ORAL at 08:01

## 2021-01-19 RX ADMIN — PROBIOTIC PRODUCT - TAB 1 TABLET: TAB at 21:25

## 2021-01-19 RX ADMIN — BUTALBITAL, ACETAMINOPHEN AND CAFFEINE 2 TABLET: 50; 325; 40 TABLET ORAL at 08:00

## 2021-01-19 RX ADMIN — DIPHENHYDRAMINE HYDROCHLORIDE 25 MG: 50 INJECTION INTRAMUSCULAR; INTRAVENOUS at 04:45

## 2021-01-19 RX ADMIN — DIPHENHYDRAMINE HYDROCHLORIDE 25 MG: 50 INJECTION INTRAMUSCULAR; INTRAVENOUS at 17:42

## 2021-01-19 RX ADMIN — METOPROLOL SUCCINATE 25 MG: 25 TABLET, EXTENDED RELEASE ORAL at 08:36

## 2021-01-19 RX ADMIN — BUPROPION HYDROCHLORIDE 75 MG: 75 TABLET, FILM COATED ORAL at 11:40

## 2021-01-19 RX ADMIN — PRAZOSIN HYDROCHLORIDE 2 MG: 1 CAPSULE ORAL at 21:26

## 2021-01-19 RX ADMIN — CEFAZOLIN SODIUM 2 G: 10 POWDER, FOR SOLUTION INTRAVENOUS at 18:52

## 2021-01-19 RX ADMIN — FAMOTIDINE 10 MG: 10 TABLET, FILM COATED ORAL at 17:42

## 2021-01-19 RX ADMIN — PAROXETINE 30 MG: 20 TABLET, FILM COATED ORAL at 08:35

## 2021-01-19 RX ADMIN — CEFAZOLIN SODIUM 2 G: 10 POWDER, FOR SOLUTION INTRAVENOUS at 04:39

## 2021-01-19 RX ADMIN — Medication 1 PATCH: at 11:38

## 2021-01-19 RX ADMIN — ACETAMINOPHEN 650 MG: 325 TABLET, FILM COATED ORAL at 17:44

## 2021-01-19 RX ADMIN — QUETIAPINE FUMARATE 100 MG: 100 TABLET ORAL at 21:26

## 2021-01-19 RX ADMIN — PANTOPRAZOLE SODIUM 40 MG: 40 TABLET, DELAYED RELEASE ORAL at 08:40

## 2021-01-19 RX ADMIN — Medication 1 PATCH: at 08:35

## 2021-01-19 RX ADMIN — METHADONE HYDROCHLORIDE 60 MG: 10 TABLET ORAL at 08:37

## 2021-01-19 RX ADMIN — PROBIOTIC PRODUCT - TAB 1 TABLET: TAB at 08:39

## 2021-01-19 RX ADMIN — ZOLPIDEM TARTRATE 10 MG: 5 TABLET, COATED ORAL at 21:25

## 2021-01-19 RX ADMIN — PREGABALIN 150 MG: 75 CAPSULE ORAL at 08:00

## 2021-01-19 NOTE — PROGRESS NOTES
Hospital Medicine Service -  Daily Progress Note       SUBJECTIVE   Interval History: No acute overnight events. No fevers. Patient reports back pain improved. Reports persistent nausea/vomiting which is of great concern to her.      OBJECTIVE      Vital signs in last 24 hours:  Temp:  [36.7 °C (98.1 °F)-36.9 °C (98.5 °F)] 36.9 °C (98.5 °F)  Heart Rate:  [70-94] 83  Resp:  [16-20] 20  BP: (109-122)/(50-79) 111/57  No intake or output data in the 24 hours ending 01/19/21 1646    PHYSICAL EXAMINATION      Physical Exam  Vitals signs and nursing note reviewed.   Constitutional:       General: She is not in acute distress.     Appearance: Normal appearance. She is well-developed.   HENT:      Head: Normocephalic and atraumatic.   Eyes:      Extraocular Movements: Extraocular movements intact.      Pupils: Pupils are equal, round, and reactive to light.   Neck:      Musculoskeletal: Normal range of motion and neck supple.   Cardiovascular:      Rate and Rhythm: Normal rate and regular rhythm.      Heart sounds: Normal heart sounds.   Pulmonary:      Effort: Pulmonary effort is normal. No respiratory distress.      Breath sounds: Normal breath sounds. No wheezing, rhonchi or rales.   Abdominal:      General: Bowel sounds are normal. There is no distension.      Palpations: Abdomen is soft.      Tenderness: There is no abdominal tenderness.   Musculoskeletal: Normal range of motion.         General: No swelling.   Skin:     General: Skin is warm and dry.      Capillary Refill: Capillary refill takes less than 2 seconds.   Neurological:      Mental Status: She is alert and oriented to person, place, and time.        LINES, CATHETERS, DRAINS, AIRWAYS, AND WOUNDS   Lines, Drains, Airways, Wounds:  PICC Single Lumen 12/18/20 Right (Active)   Number of days: 32       Comments:      LABS / IMAGING / TELE      Labs  e    SARS-CoV-2 (COVID-19) (no units)   Date/Time Value   01/16/2021 0629 Negative       Imaging  I have  independently reviewed the pertinent imaging from the last 24 hrs.    ECG/Telemetry  I have independently reviewed the telemetry. No events for the last 24 hours.    ASSESSMENT AND PLAN      * Acute bacterial endocarditis  Assessment & Plan  -Tricuspid valve endocarditis with severe TR. Also with septic pulmonary emboli and seeding of lumbar spine.   -Blood cx at [facility] 12/7 and 12/8 grew MSSA. Blood cultures from 12/2020 negative for growth.Patient afebrile.   -Completed 42 days antibiotics on 1/18/21.   -Repeat MRI L-spine shows improved, but persistent enhancement in the lumbar joints. I spoke to Dr. Ness. He recommends to continue IV antibiotics for total 8 weeks then repeat ESR/CRP and follow up as outpatient.   -Cardiology consulted. I discussed with Dr. Blas. No need for surgical intervention on tricuspid valve. Recommended she return in 1-2 weeks for repeat TTE.  -F/U with PCP for repeat CT chest as outpatient.      Nausea & vomiting  Assessment & Plan  -Intermittent nausea/vomiting with large meals. She reports it has been going on for several weeks, but worse over the past week. She regurgitates whole food about 5 minutes after eating. Worse with large meals. No hematemesis, hematochezia, weight loss. Her anemia is at baseline.   -Check Upper GI series.   -She is requesting GI consultation.    -Continue Pepcid and PPI.           Opioid type dependence, continuous use (CMS/[facility])  Assessment & Plan  -Continue methadone per psych recs.   -Patient declining inpatient psych rehab. Agrees to IOP. States she has multiple resources to help maintain sobriety including a sponsor.  -I discussed case with behavioral therapy and psychiatry today. They are working on setting patient up with methadone clinic for discharge.       Essential hypertension  Assessment & Plan  -BP stable  -Cont w/metoprolol, prazosin    Migraine, intractable  Assessment & Plan  -Resolved with Fioricet.   -Neurology consulted.  Rec Fioricet no more than 4 days/month, and over-the-counter medications no more than 15 days/month.    -Outpatient neurology follow up for further management/workup. Needs outpatient sleep study.        Insomnia  Assessment & Plan  -Cont w/ambien      Depression  Assessment & Plan  -No SI/HI.  -Continue Wellbutrin, Quetiapine, Ambien.  -Follow up with psychiatry as outpatient.      Anxiety  Assessment & Plan  -Stable   -Continue Wellbutrin, Quetiapine, Ambien.  -Follow up with psychiatry as outpatient.       VTE Assessment: Padua VTE Score: 2  VTE Prophylaxis Plan: Current anticoagulant orders:              enoxaparin (LOVENOX) syringe 40 mg  Daily (6a)                 Code Status: Full Code  Estimated Discharge Date: 2/2/21  Disposition Planning: Home     Frnacy Mock DO  1/19/2021

## 2021-01-19 NOTE — NURSING NOTE
"Patient pleasant, independent in room.  Per Dr. Mock patient to continue ancef until at least MRI spine results.  Patient requested benadryl with a flush after to \"help nausea\" and continues to request popsicles and water ice often.  "

## 2021-01-19 NOTE — BEHAVIORAL HEALTH CRISIS PROGRESS NOTE
Behavioral Health Progress Note    Chief Complaint/Reason for follow-up: opioid dependence and depression     Vital Signs for the last 24 hours:  Temp:  [36.7 °C (98.1 °F)-36.8 °C (98.3 °F)] 36.8 °C (98.2 °F)  Heart Rate:  [70-94] 94  Resp:  [16-18] 16  BP: (108-122)/(50-79) 118/79    Assessment/Plan  Spoke to the pt for follow up. She states that she forgot about the appointment this morning that was scheduled for 7am for the methadone clinic. She states that she had her phone on silent and they called her to reschedule therefore she is rescheduled for tomorrow 1/20. As well pt has reported she has been vomiting the last few days and she is unsure if she will be leaving. Will discuss with pt's team.

## 2021-01-19 NOTE — PROGRESS NOTES
MSW CC met with pt this AM. She expressed that she has no additional needs or concerns with regards to her discharge from the hospital. MSW CC following to assist, as needed. No additional needs noted.

## 2021-01-19 NOTE — PROGRESS NOTES
Hospital Medicine Service -  Daily Progress Note       SUBJECTIVE   Interval History: No acute overnight events. No fevers. No further vomiting. Headache resolved. No persistent numbness in right arm.      OBJECTIVE      Vital signs in last 24 hours:  Temp:  [36.6 °C (97.9 °F)-36.8 °C (98.3 °F)] 36.7 °C (98.1 °F)  Heart Rate:  [71-75] 75  Resp:  [18] 18  BP: (108-138)/(53-78) 122/78  No intake or output data in the 24 hours ending 01/18/21 2132    PHYSICAL EXAMINATION      Physical Exam  Vitals signs and nursing note reviewed.   Constitutional:       General: She is not in acute distress.     Appearance: Normal appearance. She is well-developed.   HENT:      Head: Normocephalic and atraumatic.   Eyes:      Extraocular Movements: Extraocular movements intact.      Pupils: Pupils are equal, round, and reactive to light.   Neck:      Musculoskeletal: Normal range of motion and neck supple.   Cardiovascular:      Rate and Rhythm: Normal rate and regular rhythm.      Heart sounds: Normal heart sounds.   Pulmonary:      Effort: Pulmonary effort is normal. No respiratory distress.      Breath sounds: Normal breath sounds. No wheezing, rhonchi or rales.   Abdominal:      General: Bowel sounds are normal. There is no distension.      Palpations: Abdomen is soft.      Tenderness: There is no abdominal tenderness.   Musculoskeletal: Normal range of motion.         General: No swelling.   Skin:     General: Skin is warm and dry.      Capillary Refill: Capillary refill takes less than 2 seconds.      Comments: RUE PICC, no signs of infection. Negative SLR bilaterally.    Neurological:      Mental Status: She is alert and oriented to person, place, and time.        LINES, CATHETERS, DRAINS, AIRWAYS, AND WOUNDS   Lines, Drains, Airways, Wounds:  PICC Single Lumen 12/18/20 Right (Active)   Number of days: 31       Comments:      LABS / IMAGING / TELE      Labs  Results from last 7 days   Lab Units 01/18/21  0610   WBC K/uL 9.80    HEMOGLOBIN g/dL 9.7*   HEMATOCRIT % 31.6*   PLATELETS K/uL 195     Results from last 7 days   Lab Units 01/18/21  0610   SODIUM mEQ/L 138   POTASSIUM mEQ/L 4.4   CHLORIDE mEQ/L 99   CO2 mEQ/L 32   BUN mg/dL 14   CREATININE mg/dL 0.8   GLUCOSE mg/dL 98   CALCIUM mg/dL 8.8*     SARS-CoV-2 (COVID-19) (no units)   Date/Time Value   01/16/2021 0629 Negative       Imaging  I have independently reviewed the pertinent imaging from the last 24 hrs.    ECG/Telemetry  I have independently reviewed the telemetry. No events for the last 24 hours.    ASSESSMENT AND PLAN      * Acute bacterial endocarditis  Assessment & Plan  -Tricuspid valve endocarditis with severe TR. Also with septic pulmonary emboli and seeding of lumbar spine.   -Blood cx at [facility] 12/7 and 12/8 grew MSSA. Blood cultures from 12/2020 negative for growth.Patient afebrile.   -Completed 42 days antibiotics on 1/18/21.   -Repeat MRI L-spine in AM. Will discuss with neurosurgery and ID. If infection resolved, plan to remove PICC and discharge home tomorrow.   -Cardiology consulted. I discussed with Dr. Blas today. No need for surgical intervention on tricuspid valve. Recommended she return in 1-2 weeks for repeat TTE.  -F/U with PCP for repeat CT chest as outpatient.      Opioid type dependence, continuous use (CMS/[facility])  Assessment & Plan  -Continue methadone per psych recs.   -Patient declining inpatient psych rehab. Agrees to IOP. States she has multiple resources to help maintain sobriety including a sponsor.  -I discussed case with behavioral therapy and psychiatry today. They are working on setting patient up with methadone clinic for discharge.       Gastroesophageal reflux disease without esophagitis  Assessment & Plan  -Intermittent nausea/vomiting with large meals. She reports it has been going on for several weeks.   -Continue Pepcid and PPI.   -F/U with GI as outpatient.        Essential hypertension  Assessment & Plan  -BP  stable  -Cont w/metoprolol, prazosin    Migraine, intractable  Assessment & Plan  -Resolved with Fioricet.   -Neurology consulted. Rec Fioricet no more than 4 days/month, and over-the-counter medications no more than 15 days/month.    -Outpatient neurology follow up for further management/workup. Needs outpatient sleep study.        Insomnia  Assessment & Plan  -Cont w/ambien      Depression  Assessment & Plan  -No SI/HI.  -Continue Wellbutrin, Quetiapine, Ambien.  -Follow up with psychiatry as outpatient.      Anxiety  Assessment & Plan  -Stable   -Continue Wellbutrin, Quetiapine, Ambien.  -Follow up with psychiatry as outpatient.       VTE Assessment: Padua VTE Score: 2  VTE Prophylaxis Plan: Enoxaparin  Code Status: Full Code  Estimated Discharge Date: 1/19/2021   Disposition Planning: Home     Francy Mock,   1/18/2021

## 2021-01-19 NOTE — PATIENT CARE CONFERENCE
Care Progression Rounds Note  Date: 1/19/2021  Time: 10:17 AM     Patient Name: Carolyn Redmond     Medical Record Number: [id number]   YOB: 1985  Sex: Female      Room/Bed: 4211    Admitting Diagnosis: Septic embolism (CMS/[facility]) [I76]  Infection of lumbar spine (CMS/[facility]) [M46.26]  Acute left-sided low back pain without sciatica [M54.5]   Admit Date/Time: 12/9/2020 11:10 AM    Primary Diagnosis: Acute bacterial endocarditis  Principal Problem: Acute bacterial endocarditis    GMLOS: 4.8  Anticipated Discharge Date: 1/19/2021    AM-PAC  Mobility Score:      Discharge Planning:       Barriers to Discharge:  Barriers to Discharge: Medical issues not resolved  Comment: iv antibiotics, methodone. last day is tomorrow    Participants:  social work/services, nursing

## 2021-01-20 ENCOUNTER — APPOINTMENT (INPATIENT)
Dept: RADIOLOGY | Facility: HOSPITAL | Age: 36
End: 2021-01-20
Payer: COMMERCIAL

## 2021-01-20 PROCEDURE — 63700000 HC SELF-ADMINISTRABLE DRUG: Performed by: INTERNAL MEDICINE

## 2021-01-20 PROCEDURE — 99232 SBSQ HOSP IP/OBS MODERATE 35: CPT | Performed by: NEUROLOGICAL SURGERY

## 2021-01-20 PROCEDURE — 63600000 HC DRUGS/DETAIL CODE: Performed by: INTERNAL MEDICINE

## 2021-01-20 PROCEDURE — 74246 X-RAY XM UPR GI TRC 2CNTRST: CPT

## 2021-01-20 PROCEDURE — 63700000 HC SELF-ADMINISTRABLE DRUG: Performed by: HOSPITALIST

## 2021-01-20 PROCEDURE — 25800000 HC PHARMACY IV SOLUTIONS: Performed by: INTERNAL MEDICINE

## 2021-01-20 PROCEDURE — 99233 SBSQ HOSP IP/OBS HIGH 50: CPT | Performed by: HOSPITALIST

## 2021-01-20 PROCEDURE — 12000000 HC ROOM AND CARE MED/SURG

## 2021-01-20 PROCEDURE — 99233 SBSQ HOSP IP/OBS HIGH 50: CPT | Performed by: PSYCHIATRY & NEUROLOGY

## 2021-01-20 PROCEDURE — 63700000 HC SELF-ADMINISTRABLE DRUG: Performed by: NURSE PRACTITIONER

## 2021-01-20 PROCEDURE — 25000000 HC PHARMACY GENERAL: Performed by: INTERNAL MEDICINE

## 2021-01-20 PROCEDURE — 63700000 HC SELF-ADMINISTRABLE DRUG: Performed by: PSYCHIATRY & NEUROLOGY

## 2021-01-20 RX ADMIN — BUPROPION HYDROCHLORIDE 75 MG: 75 TABLET, FILM COATED ORAL at 11:52

## 2021-01-20 RX ADMIN — ACETAMINOPHEN 650 MG: 325 TABLET, FILM COATED ORAL at 21:10

## 2021-01-20 RX ADMIN — DIPHENHYDRAMINE HYDROCHLORIDE 25 MG: 50 INJECTION INTRAMUSCULAR; INTRAVENOUS at 17:18

## 2021-01-20 RX ADMIN — PROBIOTIC PRODUCT - TAB 1 TABLET: TAB at 11:05

## 2021-01-20 RX ADMIN — DIPHENHYDRAMINE HYDROCHLORIDE 25 MG: 50 INJECTION INTRAMUSCULAR; INTRAVENOUS at 11:14

## 2021-01-20 RX ADMIN — CEFAZOLIN SODIUM 2 G: 10 POWDER, FOR SOLUTION INTRAVENOUS at 20:00

## 2021-01-20 RX ADMIN — METHADONE HYDROCHLORIDE 60 MG: 10 TABLET ORAL at 06:45

## 2021-01-20 RX ADMIN — PANTOPRAZOLE SODIUM 40 MG: 40 TABLET, DELAYED RELEASE ORAL at 20:00

## 2021-01-20 RX ADMIN — Medication 1 PATCH: at 11:10

## 2021-01-20 RX ADMIN — PROBIOTIC PRODUCT - TAB 1 TABLET: TAB at 20:00

## 2021-01-20 RX ADMIN — ZOLPIDEM TARTRATE 10 MG: 5 TABLET, COATED ORAL at 21:11

## 2021-01-20 RX ADMIN — QUETIAPINE FUMARATE 100 MG: 100 TABLET ORAL at 21:11

## 2021-01-20 RX ADMIN — PREGABALIN 150 MG: 75 CAPSULE ORAL at 20:01

## 2021-01-20 RX ADMIN — PRAZOSIN HYDROCHLORIDE 2 MG: 1 CAPSULE ORAL at 21:11

## 2021-01-20 RX ADMIN — METOPROLOL SUCCINATE 25 MG: 25 TABLET, EXTENDED RELEASE ORAL at 09:25

## 2021-01-20 RX ADMIN — FAMOTIDINE 10 MG: 10 TABLET, FILM COATED ORAL at 16:02

## 2021-01-20 RX ADMIN — DIPHENHYDRAMINE HYDROCHLORIDE 25 MG: 50 INJECTION INTRAMUSCULAR; INTRAVENOUS at 04:24

## 2021-01-20 RX ADMIN — CEFAZOLIN SODIUM 2 G: 10 POWDER, FOR SOLUTION INTRAVENOUS at 12:18

## 2021-01-20 RX ADMIN — ACETAMINOPHEN 650 MG: 325 TABLET, FILM COATED ORAL at 11:04

## 2021-01-20 RX ADMIN — PREGABALIN 150 MG: 75 CAPSULE ORAL at 11:08

## 2021-01-20 RX ADMIN — CYCLOBENZAPRINE HYDROCHLORIDE 10 MG: 5 TABLET, FILM COATED ORAL at 11:12

## 2021-01-20 RX ADMIN — PAROXETINE 30 MG: 20 TABLET, FILM COATED ORAL at 11:07

## 2021-01-20 RX ADMIN — CYCLOBENZAPRINE HYDROCHLORIDE 10 MG: 5 TABLET, FILM COATED ORAL at 21:11

## 2021-01-20 RX ADMIN — BUPROPION HYDROCHLORIDE 75 MG: 75 TABLET, FILM COATED ORAL at 16:02

## 2021-01-20 RX ADMIN — ACETAMINOPHEN 650 MG: 325 TABLET, FILM COATED ORAL at 04:19

## 2021-01-20 RX ADMIN — PANTOPRAZOLE SODIUM 40 MG: 40 TABLET, DELAYED RELEASE ORAL at 11:07

## 2021-01-20 RX ADMIN — CEFAZOLIN SODIUM 2 G: 10 POWDER, FOR SOLUTION INTRAVENOUS at 04:22

## 2021-01-20 RX ADMIN — BUPROPION HYDROCHLORIDE 75 MG: 75 TABLET, FILM COATED ORAL at 09:10

## 2021-01-20 RX ADMIN — FAMOTIDINE 10 MG: 10 TABLET, FILM COATED ORAL at 06:45

## 2021-01-20 RX ADMIN — ACETAMINOPHEN 650 MG: 325 TABLET, FILM COATED ORAL at 16:02

## 2021-01-20 NOTE — CONSULTS
Neurosurgery Consultation    CC:   Chief Complaint   Patient presents with   • Fever   • Back Pain       Reason for Consultation:  Epidural infection of lumbar spine    Requesting Provider:  Dr. Suárez  Patient seen and examined 1/21/21 at 3:00pm    History of Present Illness:   Carolyn Redmond is a 35 y.o. female, with significant past medical history of anxiety, depression, endocarditis, fibromyalgia, migraines, pancreatitis, IV drug use (denies any recent use) and vasculitis, who presented to [facility] on 12/9 after signing out AMA from [facility] where she presented with about 3 days of fever and progressively worsening low back pain over 2 weeks. Her MRI demonstrated epidural enhancement within the lumbar spine most pronounced at L4-L5. She was put on Cefazolin x 42 days. Patient had repeat MRI on 1/19 showing improvement in the enhancement in the lumbar spine, with some residual enhancement in the facet joints. Neurosurgery was subsequently reconsulted.    Patient notes improvement in her low back pain since admission. She continues to have bilateral radicular pain in an L4 distribution, that is most prominent when standing. Her low back pain is 7/10 in severity with movement. She denies paresthesias, weakness, saddle anesthesia, and bowel/bladder changes.     Medical History:   Past Medical History:   Diagnosis Date   • Anxiety    • Depressed    • Endocarditis    • Fibromyalgia    • Migraines    • Pancreatitis    • Tachycardia    • Vasculitis (CMS/HCC)        Surgical History:   Past Surgical History:   Procedure Laterality Date   • CHOLECYSTECTOMY     • ERCP     • GALLBLADDER SURGERY         Family History:   Family History   Problem Relation Age of Onset   • No Known Problems Biological Mother    • Lung cancer Biological Father        Social History:   Social History     Socioeconomic History   • Marital status: Single     Spouse name: None   • Number of children: None   • Years of education:  None   • Highest education level: None   Occupational History   • None   Social Needs   • Financial resource strain: None   • Food insecurity     Worry: None     Inability: None   • Transportation needs     Medical: None     Non-medical: None   Tobacco Use   • Smoking status: Former Smoker     Packs/day: 0.50     Types: Cigarettes   • Smokeless tobacco: Current User   • Tobacco comment: quit 1 year ago, vapes currently   Substance and Sexual Activity   • Alcohol use: Not Currently     Comment: social   • Drug use: Yes     Types: Marijuana   • Sexual activity: Defer   Lifestyle   • Physical activity     Days per week: None     Minutes per session: None   • Stress: None   Relationships   • Social connections     Talks on phone: None     Gets together: None     Attends [employer] service: None     Active member of club or organization: None     Attends meetings of clubs or organizations: None     Relationship status: None   • Intimate partner violence     Fear of current or ex partner: None     Emotionally abused: None     Physically abused: None     Forced sexual activity: None   Other Topics Concern   • None   Social History Narrative   • None       Allergies: Amoxicillin, Nsaids (non-steroidal anti-inflammatory drug), Trazodone, and Tramadol    Home Medications:   •  buprenorphine-naloxone, Place 1 tablet under the tongue 3 (three) times a day. 6a, 11a, 4p  •  buPROPion, Take 75 mg by mouth 3 (three) times a day.  •  butalbital-acetaminophen-caff, Take 2 capsules by mouth every 4 (four) hours as needed for headaches.  •  cyclobenzaprine, Take 10 mg by mouth 3 (three) times a day as needed for muscle spasms.  •  fluticasone propionate, Administer 2 sprays into each nostril daily as needed.    •  metoprolol succinate XL, Take 25 mg by mouth daily.  •  PARoxetine, Take 20 mg by mouth daily.    •  prazosin, Take 2 mg by mouth nightly.  •  pregabalin, Take 150 mg by mouth 2 (two) times a day. 8a & 8p    •  QUEtiapine,  Take 100 mg by mouth nightly.  •  zolpidem, Take 10 mg by mouth nightly.  •  chlorproMAZINE, Take 200 mg by mouth nightly.    Current Medications:   •  acetaminophen, 650 mg, oral, q6h INT  •  alum-mag hydroxide-simeth, 30 mL, oral, q6h PRN  •  barium sulfate, 176 g, oral, Once  •  buPROPion, 75 mg, oral, TID  •  butalbital-acetaminophen-caff, 2 tablet, oral, q4h PRN  •  ceFAZolin, 2 g, intravenous, q8h INT  •  cyclobenzaprine, 10 mg, oral, 3x daily PRN  •  glucose, 16-32 g of dextrose, oral, PRN **OR** dextrose, 15-30 g of dextrose, oral, PRN **OR** glucagon, 1 mg, intramuscular, PRN **OR** dextrose in water, 25 mL, intravenous, PRN  •  diphenhydrAMINE, 25 mg, intravenous, q6h PRN  •  enoxaparin, 40 mg, subcutaneous, Daily (6a)  •  famotidine, 10 mg, oral, BID AC  •  fluticasone propionate, 2 spray, Each Nostril, Daily PRN  •  hydrocortisone-pramoxine, , rectal, 4x daily PRN  •  ibuprofen, 600 mg, oral, q8h PRN  •  lactobacillus acidophilus complex, 1 tablet, oral, BID  •  lidocaine, 1 patch, Topical, Daily  •  magnesium sulfate, 2 g, intravenous, PRN  •  methadone, 60 mg, oral, Daily (6a)  •  metoprolol succinate XL, 25 mg, oral, Daily  •  naloxone, 0.2 mg, intravenous, Once PRN  •  nicotine, 1 patch, transdermal, Daily  •  nicotine polacrilex, 4 mg, buccal, q4h PRN  •  ondansetron ODT, 4 mg, oral, q8h PRN **OR** ondansetron, 4 mg, intravenous, q8h PRN  •  pantoprazole, 40 mg, oral, BID  •  PARoxetine, 30 mg, oral, Daily  •  potassium chloride, 20 mEq, oral, Daily PRN **OR** potassium chloride, 40 mEq, oral, Daily PRN **OR** potassium chloride, 20 mEq, intravenous, Daily PRN **OR** potassium chloride, 40 mEq, intravenous, Daily PRN  •  prazosin, 2 mg, oral, Nightly  •  pregabalin, 150 mg, oral, BID  •  psyllium husk, 1 packet, oral, Daily  •  QUEtiapine, 100 mg, oral, Nightly  •  zolpidem, 10 mg, oral, Nightly    Review of Systems: A 14 point review of systems was performed and aside from what is mentioned above  isotherwise negative.    General physical examination:  Temp:  [36.2 °C (97.2 °F)-37.1 °C (98.8 °F)] 36.2 °C (97.2 °F)  Heart Rate:  [72-91] 72  Resp:  [16-20] 16  BP: (105-137)/(57-86) 105/57     The patient is resting comfortably in her hospital bed in no acute distress, appears her stated age.   There is no peripheral edema and there are 2+ radial and dorsal pedis pulses.      General Appearance: Alert and awake   Head: Normocephalic, atraumatic.   Eyes:  ENMT: No conjunctival injection. Symmetrical lids  Atraumatic external nose and ears. Moist MM.   Neck: Supple, no nuchal rigidity.   Respiratory: Good respiratory effort.   Cardiovascular: Regular rate.   Abdomen: Soft without distension   Extremities: No edema.   Skin: Dry, no rashes.       Neck: non-tender to midline palpation, no pain on flexion, extension or rotation.    Neurologic examination:  Mental status:  The patient is alert, attentive, and oriented. Speech is clear and fluent with good repetition, comprehension, and naming.  Remote and recent memory are normal as well as fund of knowledge.    Cranial nerves:  CN II: Pupils are equal and briskly reactive to light.   CN III, IV, VI: Extra-occular muscles are intact  CN V: Facial sensation is intact and equal in V1-V3 distributions bilaterally.   CN VII: Face is symmetric with normal eye closure and smile.  CN VIII: Hearing is normal to rubbing fingers  CN IX, X: Palate elevates symmetrically. Phonation is normal.  CN XI: Head turning and shoulder shrug are intact  CN XII: Tongue is midline with normal movements and no atrophy.    Motor:  There is no pronator drift. Muscle bulk and tone are normal. Strength is full bilaterally.     Deltoid Biceps Triceps Wrist ext Finger ext Hand Intrinsics Hip flexion Knee ext Dorsi-  flexion EHL Plantar Flexion   R 5 5 5 5 5 5 5 5 5 5 5   L 5 5 5 5 5 5 5 5 5 5 5       Reflexes:  Reflexes are 2+ and symmetric at the biceps, brachialis, triceps, patellar, and  Achilli's. There is no Stephens's sign or ankle clonus.    Sensory:  Intact light touch throughout all 4 extremities.    Gait:  Deferred due to pain.      Data Review:    Labs  WBC   Date Value Ref Range Status   01/18/2021 9.80 3.80 - 10.50 K/uL Final     Hemoglobin   Date Value Ref Range Status   01/18/2021 9.7 (L) 11.8 - 15.7 g/dL Final     Hematocrit   Date Value Ref Range Status   01/18/2021 31.6 (L) 35.0 - 45.0 % Final     MCV   Date Value Ref Range Status   01/18/2021 91.3 83.0 - 98.0 fL Final     Platelets   Date Value Ref Range Status   01/18/2021 195 150 - 369 K/uL Final     Sodium   Date Value Ref Range Status   01/18/2021 138 136 - 144 mEQ/L Final     Potassium   Date Value Ref Range Status   01/18/2021 4.4 3.6 - 5.1 mEQ/L Final     BUN   Date Value Ref Range Status   01/18/2021 14 8 - 20 mg/dL Final     Creatinine   Date Value Ref Range Status   01/18/2021 0.8 0.6 - 1.1 mg/dL Final     PT   Date Value Ref Range Status   04/06/2019 12.2 12.2 - 14.5 sec Final     PTT   Date Value Ref Range Status   04/06/2019 28 23 - 35 sec Final     INR   Date Value Ref Range Status   04/06/2019 0.9   INR Final     Comment:       INR has no defined significance when PT is within Reference Range.       All labs were personally reviewed and interpreted at time of note.     Images  MRI LUMBAR SPINE 1/19/21  The lumbar vertebral bodies are maintained in height.  A trace anterolisthesis  of L4 on L5 is present, stable compared to the prior.  The alignment is  otherwise preserved.  There is redemonstration of desiccation and moderate to  severe loss of intervertebral disc space height at L4-L5 and at L5-S1.  The  central canal is narrowed on a congenital basis.  The conus medullaris  terminates at L1.        Facet hypertrophy is again noted at L4-L5 with fluid in the right facet joint.  The previously seen fluid in the left facet joint is no longer visualized.  There is persistent enhancement about the L4-L5 facet joints, left  greater than  right, which is improved compared to the prior.  There has also been improvement  in the previously seen anterior and posterior epidural enhancement with only  mild residual enhancement persisting from L4 to L5.  Previously, the enhancement  was thicker and extended from L2 through L5.  Edema and enhancement is again  noted in the deep subcutaneous soft tissues throughout the visualized lower  thoracic and lumbar spine.     At T10-T11, there is a disc osseous complex with a superimposed right  paracentral disc extrusion which extends superiorly.  There is resultant mild  spinal canal narrowing.  Moderate right subarticular and neural foraminal  narrowing is present and there is mild left neural foraminal narrowing.  This  appears stable.  At T11-T12, T12-L1, L1-L2 and at L2-L3, there is no focal disc herniation,  significant central canal or neural foraminal narrowing.  At L3-L4, there is facet hypertrophy.  The central canal is narrowed on a  congenital basis.  The central canal is mildly narrowed.  Mild bilateral neural  foraminal narrowing is present.  This appears stable.  At L4-L5, there is uncovering of the disc, a diffuse disc bulge and disc osseous  complex as well as marked facet hypertrophy.  A superimposed right foraminal  protrusion is present with associated enhancement.  Moderate to severe central  canal narrowing is present with a relative paucity of CSF in the thecal sac at  this level.  Severe right and moderate to severe left neural foraminal narrowing  is again seen, stable compared to the prior.  At L5-S1, there is a diffuse disc bulge with a superimposed central/right  paracentral disc extrusion which extends inferiorly.  The central canal is  mildly narrowed.  The extrusion exerts mass effect on the right L5 nerve root in  the lateral recess.  Right subarticular narrowing is present and there is  moderate right and mild left neural foraminal narrowing.  This appears stable.    All  imaging was personally reviewed and interpreted at time of note.     Assessment and Plan:  In summary, Carolyn Redmond is a 35 y.o. female with significant past medical history of anxiety, depression, endocarditis, fibromyalgia, migraines, pancreatitis, IV drug use (denies any recent use) and vasculitis, who presented to [facility] ED on 12/9 after signing out AMA from [facility] where she presented with about 3 days of fever and progressively worsening low back pain over 2 weeks. Her MRI demonstrated epidural enhancement within the lumbar spine most pronounced at L4-L5. She was put on Cefazolin x 42 days. Patient had repeat MRI on 1/19 showing improvement in the enhancement in the lumbar spine, with some residual enhancement in the facet joints. Neurosurgery was subsequently reconsulted. Her low back pain and L4 radicular pain has improved since admission. Her exam remains stable with full strength without any pathologic reflexes. She has no bowel/bladder incontinence, saddle anesthesia, or cauda equina syndrome symptomology. Due to the residual enhancement seen on MRI, we recommend an additional 2 weeks of IV antibiotics for a total of 8 weeks. She should follow up with Dr. Ness in 6 weeks with repeat lumbar MRI w/ and w/o contrast for continued surveillance.     Indications to proceed with surgery would include neurological loss attributed to instability, nerve compression, stenosis or an epidural abscess, severe mechanical pain due to the destructive process that limits ambulation and mobility, development of riki instability across the infected segment or persistent infection in the disc space or bones that does not clear with appropriate antibiotic therapy. She does not meet any of these criteria at this time.    Please call neurosurgery with any additional questions.     Imaging and plan reviewed with Dr. Ness.    RAVI Adrian

## 2021-01-20 NOTE — NURSING NOTE
Pt had complaints of nausea and vomiting. Pt asked to keep emesis in a container for measurement and assessment. Pt stated that she typically vomits into trash can. Pt given several ice pops per her request and ordered dinner.

## 2021-01-20 NOTE — PATIENT CARE CONFERENCE
Care Progression Rounds Note  Date: 1/20/2021  Time: 10:12 AM     Patient Name: Carolyn Redmond     Medical Record Number: [id number]   YOB: 1985  Sex: Female      Room/Bed: 4211    Admitting Diagnosis: Septic embolism (CMS/[facility]) [I76]  Infection of lumbar spine (CMS/[facility]) [M46.26]  Acute left-sided low back pain without sciatica [M54.5]   Admit Date/Time: 12/9/2020 11:10 AM    Primary Diagnosis: Acute bacterial endocarditis  Principal Problem: Acute bacterial endocarditis    GMLOS: 4.8  Anticipated Discharge Date: 2/2/2021    AM-PAC  Mobility Score:      Discharge Planning:       Barriers to Discharge:  Barriers to Discharge: Medical issues not resolved  Comment: 2 weeks of iv antibiotics    Participants:  social work/services, nursing

## 2021-01-20 NOTE — PROGRESS NOTES
Hospital Medicine Service -  Daily Progress Note       SUBJECTIVE   Interval History: No acute overnight events. Patient reports persistent nausea and vomiting today. Back pain improved. I discussed her MRI findings and need for further antibiotics.      OBJECTIVE      Vital signs in last 24 hours:  Temp:  [36.2 °C (97.2 °F)-37.1 °C (98.8 °F)] 36.2 °C (97.2 °F)  Heart Rate:  [72-91] 72  Resp:  [16-20] 16  BP: (105-137)/(57-86) 105/57    Intake/Output Summary (Last 24 hours) at 1/20/2021 1750  Last data filed at 1/20/2021 0422  Gross per 24 hour   Intake 100 ml   Output --   Net 100 ml       PHYSICAL EXAMINATION      Physical Exam  Vitals signs and nursing note reviewed.   Constitutional:       General: She is not in acute distress.     Appearance: Normal appearance. She is well-developed.   HENT:      Head: Normocephalic and atraumatic.   Eyes:      Extraocular Movements: Extraocular movements intact.      Pupils: Pupils are equal, round, and reactive to light.   Neck:      Musculoskeletal: Normal range of motion and neck supple.   Cardiovascular:      Rate and Rhythm: Normal rate and regular rhythm.      Heart sounds: Normal heart sounds.   Pulmonary:      Effort: Pulmonary effort is normal. No respiratory distress.      Breath sounds: Normal breath sounds. No wheezing, rhonchi or rales.   Abdominal:      General: Bowel sounds are normal. There is no distension.      Palpations: Abdomen is soft.      Tenderness: There is no abdominal tenderness.   Musculoskeletal: Normal range of motion.         General: No swelling.   Skin:     General: Skin is warm and dry.      Capillary Refill: Capillary refill takes less than 2 seconds.   Neurological:      Mental Status: She is alert and oriented to person, place, and time.        LINES, CATHETERS, DRAINS, AIRWAYS, AND WOUNDS   Lines, Drains, Airways, Wounds:  PICC Single Lumen 12/18/20 Right (Active)   Number of days: 33       Comments:      LABS / IMAGING / TELE       Labs  Results from last 7 days   Lab Units 01/18/21  0610   WBC K/uL 9.80   HEMOGLOBIN g/dL 9.7*   HEMATOCRIT % 31.6*   PLATELETS K/uL 195     Results from last 7 days   Lab Units 01/18/21  0610   SODIUM mEQ/L 138   POTASSIUM mEQ/L 4.4   CHLORIDE mEQ/L 99   CO2 mEQ/L 32   BUN mg/dL 14   CREATININE mg/dL 0.8   GLUCOSE mg/dL 98   CALCIUM mg/dL 8.8*     SARS-CoV-2 (COVID-19) (no units)   Date/Time Value   01/16/2021 0629 Negative       Imaging  I have independently reviewed the pertinent imaging from the last 24 hrs.    ECG/Telemetry  Patient is not on telemetry.    ASSESSMENT AND PLAN      * Acute bacterial endocarditis  Assessment & Plan  -Tricuspid valve endocarditis with severe TR. Also with septic pulmonary emboli and seeding of lumbar spine.   -Blood cx at [facility] 12/7 and 12/8 grew MSSA. Blood cultures from 12/2020 negative for growth.Patient afebrile.   -Completed 42 days antibiotics on 1/18/21.   -Repeat MRI L-spine shows improved, but persistent enhancement in the lumbar joints. I spoke to Dr. Ness. He recommends to continue IV antibiotics for total 8 weeks then follow up as outpatient for repeat lumbar imaging.   -Cardiology consulted. I discussed with Dr. Blas. No need for surgical intervention on tricuspid valve. Recommended she return in 1-2 weeks for repeat TTE.  -F/U with PCP for repeat CT chest as outpatient.      Nausea & vomiting  Assessment & Plan  -Intermittent nausea/vomiting with large meals. She reports it has been going on for several weeks, but worse over the past week. She regurgitates whole food about 5 minutes after eating. Worse with large meals. No hematemesis, hematochezia, weight loss. Her anemia is at baseline. She is having daily BMs.   -Upper GI series shows esophageal dysmotility and GERD..   -Continue Pepcid and PPI.   -GI consulted. Recommending gastric emptying study. I will discuss with patient.           Opioid type dependence, continuous use  "(CMS/[facility])  Assessment & Plan  -Continue methadone per psych recs.   -Patient declining inpatient psych rehab. Agrees to IOP. States she has multiple resources to help maintain sobriety including a sponsor.  -Behavioral therapy and psychiatry following. They are working on setting patient up with methadone clinic for discharge.       Essential hypertension  Assessment & Plan  -BP stable  -Cont w/metoprolol, prazosin    Migraine, intractable  Assessment & Plan  -Resolved with Fioricet.   -Neurology consulted. Rec Fioricet no more than 4 days/month, and over-the-counter medications no more than 15 days/month.    -Outpatient neurology follow up for further management/workup. Needs outpatient sleep study.        Insomnia  Assessment & Plan  -Cont w/ambien      Depression  Assessment & Plan  -No SI/HI.  -Continue Wellbutrin, Quetiapine, Ambien.  -Follow up with psychiatry as outpatient.      Anxiety  Assessment & Plan  -See \"depression\"       VTE Assessment: [place] VTE Score: 2  VTE Prophylaxis Plan: Current anticoagulant orders:              enoxaparin (LOVENOX) syringe 40 mg  Daily (6a)                 Code Status: Full Code  Estimated Discharge Date: 2/2/2021   Disposition Planning: Home     Francy Mock, DO  1/20/2021               "

## 2021-01-20 NOTE — PROGRESS NOTES
Psychiatry Progress Note    Chief Complaint/Reason for follow-up: opioid dependence and depression    Interval History: The patient is seen on follow-up after discovering she will require two more weeks of iv antibiotics.  She has been tolerating methadone and nursing denies any recent concerns for misusing meds in the hospital.  She has been having some reported vomiting and is seeing GI.  Recent MRI suggests she requires an additional two weeks of iv antibiotics and she denies feeling overly upset by this news.  She denies any thoughts to harm/kill herself or anyone else whatsoever.     Review of Systems:   Sleep:  Good and Appetite: Fair    Vital Signs for the last 24 hours:  Temp:  [36.7 °C (98 °F)-37.1 °C (98.8 °F)] 36.7 °C (98 °F)  Heart Rate:  [73-91] 91  Resp:  [18-20] 18  BP: (111-137)/(57-86) 120/86      Current Facility-Administered Medications:   •  acetaminophen (TYLENOL) tablet 650 mg, 650 mg, oral, q6h INT, Erinn Barajas DO, 650 mg at 01/20/21 1104  •  alum-mag hydroxide-simeth (MAALOX) 200-200-20 mg/5 mL suspension 30 mL, 30 mL, oral, q6h PRN, Lili Crawford MD, 30 mL at 01/11/21 1946  •  barium sulfate (E-Z-PAQUE) 96 % (w/w) oral suspension 176 g, 176 g, oral, Once, Francy Mock DO  •  buPROPion (WELLBUTRIN) tablet 75 mg, 75 mg, oral, TID, Marlon Padron MD, 75 mg at 01/20/21 1152  •  butalbital-acetaminophen-caff (FIORICET, ESGIC) per tablet 2 tablet, 2 tablet, oral, q4h PRN, Francy Mock DO, 2 tablet at 01/19/21 0800  •  ceFAZolin (ANCEF) NSS IVPB piggyback 2 g, 2 g, intravenous, q8h INT, Vikas Jones MD, Last Rate: 100 mL/hr at 01/20/21 1218, 2 g at 01/20/21 1218  •  cyclobenzaprine (FLEXERIL) tablet 10 mg, 10 mg, oral, 3x daily PRN, Marizol Ortiz CRNP, 10 mg at 01/20/21 1112  •  glucose chewable tablet 16-32 g of dextrose, 16-32 g of dextrose, oral, PRN **OR** dextrose 40 % oral gel 15-30 g of dextrose, 15-30 g of dextrose, oral, PRN **OR** glucagon  (GLUCAGEN) injection 1 mg, 1 mg, intramuscular, PRN **OR** dextrose in water injection 12.5 g, 25 mL, intravenous, PRN, Perfecto Angela MD  •  diphenhydrAMINE (BENADRYL) injection 25 mg, 25 mg, intravenous, q6h PRN, Lili Crawford MD, 25 mg at 01/20/21 1114  •  enoxaparin (LOVENOX) syringe 40 mg, 40 mg, subcutaneous, Daily (6a), Francy Mock DO  •  famotidine (PEPCID) tablet 10 mg, 10 mg, oral, BID AC, Lili Crawford MD, 10 mg at 01/20/21 0645  •  fluticasone propionate (FLONASE) 50 mcg/actuation nasal spray 2 spray, 2 spray, Each Nostril, Daily PRN, Perfecto Angela MD  •  hydrocortisone-pramoxine (ANALPRAM-HC) 2.5-1 % rectal cream, , rectal, 4x daily PRN, Dominique Powell DO, Given at 12/19/20 1643  •  ibuprofen (MOTRIN) tablet 600 mg, 600 mg, oral, q8h PRN, Tihsa Moise MD, 600 mg at 01/16/21 0828  •  lactobacillus acidophilus complex (BACID) 1 billion cell- 250 mg tablet 1 tablet, 1 tablet, oral, BID, Heidi Toscano CRNP, 1 tablet at 01/20/21 1105  •  lidocaine (ASPERCREME) 4 % topical patch 1 patch, 1 patch, Topical, Daily, Jacki Boss CRNP, Stopped at 01/17/21 0420  •  magnesium sulfate IVPB 2g in 50 mL NSS/D5W/SWFI, 2 g, intravenous, PRN, Perfecto Angela MD  •  methadone (DOLOPHINE) tablet 60 mg, 60 mg, oral, Daily (6a), Marlon Padron MD, 60 mg at 01/20/21 0645  •  metoprolol succinate XL (TOPROL-XL) 24 hr ER tablet 25 mg, 25 mg, oral, Daily, Lili Crawford MD, 25 mg at 01/20/21 0925  •  naloxone (NARCAN) injection 0.2 mg, 0.2 mg, intravenous, Once PRN, Jacki Boss CRNP  •  nicotine (NICODERM CQ) 14 mg/24 hr patch 1 patch, 1 patch, transdermal, Daily, Gen Hogue MD, 1 patch at 01/20/21 1110  •  nicotine polacrilex (NICORETTE) gum 4 mg, 4 mg, buccal, q4h PRN, Perfecto Angela MD, 4 mg at 01/17/21 0851  •  ondansetron ODT (ZOFRAN-ODT) disintegrating tablet 4 mg, 4 mg, oral, q8h PRN, 4 mg at 01/19/21 2126 **OR** ondansetron (ZOFRAN) injection 4 mg, 4 mg,  intravenous, q8h PRN, Zoila Gutierrez MD, 4 mg at 01/18/21 1846  •  pantoprazole (PROTONIX) tablet,delayed release (DR/EC) 40 mg, 40 mg, oral, BID, José Manuel Morris MD, 40 mg at 01/20/21 1107  •  PARoxetine (PAXIL) tablet 30 mg, 30 mg, oral, Daily, Marlon Padron MD, 30 mg at 01/20/21 1107  •  potassium chloride (KLOR-CON) tablet extended release 20 mEq, 20 mEq, oral, Daily PRN **OR** potassium chloride (KLOR-CON) tablet extended release 40 mEq, 40 mEq, oral, Daily PRN **OR** potassium chloride 20 mEq in 100 mL IVPB  (premix), 20 mEq, intravenous, Daily PRN **OR** potassium chloride (KCL) 40 mEq/250 mL IVPB in NSS 40 mEq, 40 mEq, intravenous, Daily PRN, Perfecto Angela MD  •  prazosin (MINIPRESS) capsule 2 mg, 2 mg, oral, Nightly, Perfecto Angela MD, 2 mg at 01/19/21 2126  •  pregabalin (LYRICA) capsule 150 mg, 150 mg, oral, BID, Perfecto Angela MD, 150 mg at 01/20/21 1108  •  psyllium husk powder 1 packet, 1 packet, oral, Daily, Dominique Powell DO, 1 packet at 12/22/20 0822  •  QUEtiapine (SEROquel) tablet 100 mg, 100 mg, oral, Nightly, Marlon Padron MD, 100 mg at 01/19/21 2126  •  zolpidem (AMBIEN) tablet 10 mg, 10 mg, oral, Nightly, Perfecto Angela MD, 10 mg at 01/19/21 2125    MSE:  Orientation: AAO x3  Behavior: Appropriate  Appearance: well groomed  Eye Contact: Fair throughout  Mood: “pretty good I just have a headache”  Affect: congruent; mostly stable and appropriate  Speech: Coherent  Thought Process: Goal Directed and linear   Suicidal Ideation: Denies suicidal thoughts; intent or plan; denies passive death wish  Homicidal Ideation: Denies having homicidal thoughts, intent or plan  Hallucinations: Denies  Delusions: Denies  Cognition: No gross cognitive deficits  Memory: Remote; Immediate; all intact  Insight: Fair  Judgment: Fair      Assessment/Plan  Unspecified mood (affective) disorder (CMS/HCC)  Assessment & Plan  Patient with history of depression and  addiction.  1. Continue paroxetine 30mg qam  2. Continue bupropion 75mg TID  3. Continue quetiapine 100mg qhs  4.  to please speak with patient about options for inpatient/outpatient rehab and follow-up with a psychiatrist/therapist      Opioid type dependence, continuous use (CMS/HCC)  Assessment & Plan  1. Continue methadone 60mg qam (Hold for sedation; O2 < 95; SBP <100; DBP < 60; HR < 60; RR< 10; QTc > 470)  2. DC 1:1 sitter for now but please insure patient swallows all pills given  3. Patient would benefit from inpatient rehab where she can receive iv antibioitcs  4. Please encourage her to do intake with methadone clinic so that she can continue treatment without a lapse upon discharge.      Spent 35 minutes in total treatment including chart review; consultation with patient; and note writing    Marlon Padron MD  1/20/2021

## 2021-01-20 NOTE — CONSULTS
GASTROENTEROLOGY CONSULTATION   [facility]     PATIENT NAME:  Carolyn Redmond        YOB: 1985   AGE:  35 y.o.         MRN:  [id number]              HISTORY   CC: Intermittent nausea/vomiting    34 yo female with Pmhx of anxiety, mood d/o, depression, IV drug use c/b IE, fibromyalgia, vasculitis (?) admitted on 12/9/20 due to fevers as high as 105 and back pains. Found to have acute tricuspid valve endocarditis, MSSA + and septic emboli and lumbar infection . Has been on IV abx for weeks now.   From reviewing the notes over the past 6 weeks she has been admitted, she has c/o intermittent n/v, per notes from the abx and prophylactic Tigan helped.  Has been on Methadone here and she takes Suboxone as OP.   She as been on PPI BID since 1/220 and Pepcid 20g daily for weeks.   Also had frequent migraines here and on Benadryl almost daily and Fioricet prn. Since 1/16, has received Zofran once a day.      She reports since her SOD with ERCP and post ERCP pancreatitis in 2012 (treated at [facility] with Dr Chung) she has had intermittent n/v, epigastric fullness.  She uses Zofran (SL is best) as OP to help or just vomits up her food. She can tolerate liquids and yogurt fine as well as low fiber foods, however other foods cause her to feel immediately sick with epigastric fullness and mild pains.  She can feel there is something in her esophagus coming up and something sitting there so she will lean over to try to bring it up. She gags and liquid can come up.  She can vomit up solid food pieces 6 hours later after the meal.  She had GES in the past which she states was normal when she was abusing narcotics. She relapsed last in summer 2020 but has been on suboxone since then.     We did EGD for epigastric pains 5/2017- normal esophagus, mild diffuse inflammation at antrum, atrophy had duodenal bulb, D1, D2, path: neg Hyplori, neg celiac and amyloid    11/2012 ERCP normal cholangiogram, sphincterotomy. By time  we saw pt here at [facility] 10/2012 she had emigdio.  Had recurrent episodes of RUQ pain, vomiting pre ERCP with LFTs elevated (bili 1.4, , ).     PAST MEDICAL HISTORY     Past Medical History:   Diagnosis Date   • Anxiety    • Depressed    • Endocarditis    • Fibromyalgia    • Migraines    • Pancreatitis    • Tachycardia    • Vasculitis (CMS/HCC)        PAST SURGICAL HISTORY     Past Surgical History:   Procedure Laterality Date   • CHOLECYSTECTOMY     • ERCP     • GALLBLADDER SURGERY          FAMILY HISTORY     Family History   Problem Relation Age of Onset   • No Known Problems Biological Mother    • Lung cancer Biological Father        SOCIAL HISTORY     Social History     Tobacco Use   • Smoking status: Former Smoker     Packs/day: 0.50     Types: Cigarettes   • Smokeless tobacco: Current User   • Tobacco comment: quit 1 year ago, vapes currently   Substance Use Topics   • Alcohol use: Not Currently     Comment: social     Single, stressful home situation with her mother and aunt's family at home  Not employed for years, was     MEDICATIONS   Home Medication:  •  buprenorphine-naloxone, Place 1 tablet under the tongue 3 (three) times a day. 6a, 11a, 4p  •  buPROPion, Take 75 mg by mouth 3 (three) times a day.  •  butalbital-acetaminophen-caff, Take 2 capsules by mouth every 4 (four) hours as needed for headaches.  •  cyclobenzaprine, Take 10 mg by mouth 3 (three) times a day as needed for muscle spasms.  •  fluticasone propionate, Administer 2 sprays into each nostril daily as needed.    •  metoprolol succinate XL, Take 25 mg by mouth daily.  •  PARoxetine, Take 20 mg by mouth daily.    •  prazosin, Take 2 mg by mouth nightly.  •  pregabalin, Take 150 mg by mouth 2 (two) times a day. 8a & 8p    •  QUEtiapine, Take 100 mg by mouth nightly.  •  zolpidem, Take 10 mg by mouth nightly.  •  chlorproMAZINE, Take 200 mg by mouth nightly.    MEDICATIONS:  Infusions:         Scheduled:   •  acetaminophen  650 mg oral q6h INT   • buPROPion  75 mg oral TID   • ceFAZolin  2 g intravenous q8h INT   • enoxaparin  40 mg subcutaneous Daily (6a)   • famotidine  10 mg oral BID AC   • lactobacillus acidophilus complex  1 tablet oral BID   • lidocaine  1 patch Topical Daily   • methadone  60 mg oral Daily (6a)   • metoprolol succinate XL  25 mg oral Daily   • nicotine  1 patch transdermal Daily   • pantoprazole  40 mg oral BID   • PARoxetine  30 mg oral Daily   • prazosin  2 mg oral Nightly   • pregabalin  150 mg oral BID   • psyllium husk  1 packet oral Daily   • QUEtiapine  100 mg oral Nightly   • zolpidem  10 mg oral Nightly       ALLERGIES     Allergies   Allergen Reactions   • Amoxicillin    • Nsaids (Non-Steroidal Anti-Inflammatory Drug) Nausea Only   • Trazodone      Other reaction(s): Headaches, Other (see comments)   • Tramadol Palpitations       REVIEW OF SYSTEMS   Systems reviewed and otherwise negative except as documented above.    PHYSICAL EXAMINATION   Temp:  [36.7 °C (98 °F)-37.1 °C (98.8 °F)] 36.7 °C (98 °F)  Heart Rate:  [73-83] 73  Resp:  [18-20] 18  BP: (111-137)/(57-78) 124/71      Intake/Output Summary (Last 24 hours) at 1/20/2021 0817  Last data filed at 1/20/2021 0422  Gross per 24 hour   Intake 100 ml   Output --   Net 100 ml       General appearance: well developed, well nourished, no acute distress, appears stated age, sitting up in bed and looks well   Head: normocephalic, atraumatic  Eyes: EOMI  ENT: oral mucosa moist  Lungs:non-labored respirations  Heart: regular rate and rhythm, S1/S2 present, no murmur  Abdomen: soft, non-tender; bowel sounds normal  Rectal: deferred  Extremities: no edema  Skin:  Warm, dry, no rashes, no lesions, no jaundice  Neurologic: awake, alert & oriented x 3  Psych: cooperative with exam, appropriate mood and affect    Diagnostic Data:     Labs reviewed  Results from last 7 days   Lab Units 01/18/21  0610 01/17/21  0546   WBC K/uL 9.80 5.40   HEMOGLOBIN  g/dL 9.7* 10.1*   HEMATOCRIT % 31.6* 32.5*   PLATELETS K/uL 195 204   ]  Results from last 7 days   Lab Units 01/18/21  0610   SODIUM mEQ/L 138   POTASSIUM mEQ/L 4.4   CHLORIDE mEQ/L 99   CO2 mEQ/L 32   BUN mg/dL 14   CREATININE mg/dL 0.8   CALCIUM mg/dL 8.8*   GLUCOSE mg/dL 98   ]    ]    ]    Imaging reviewed  Mri Lumbar Spine With And Without Contrast    Result Date: 1/19/2021  IMPRESSION: 1.  Improvement in the previously seen diffuse anterior and posterior epidural enhancement within the lumbar spine with only mildly prominent enhancement persisting from L4 to L5.  There is residual enhancement about the left greater than right facet joints and there is persistent fluid in the left facet joint (previously, there was fluid in both facet joints).  These findings could be related to active degeneration and prominence of the epidural plexus due to the significant central canal and neural foraminal narrowing at this level. Clinical correlation is recommended as an infectious process cannot be excluded by imaging. 2.  Edema and enhancement in the deep subcutaneous soft tissues throughout the visualized lower thoracic and lumbar spine. 3.  Degenerative and discogenic disease of the lumbar spine, as above.    7/2012 GES- At the conclusion of imaging 35% of the ingested meal has emptied the  stomach.  This represents delayed gastric emptying for solids.    IMPRESSION: 35% 2 hour gastric emptying for solids.  This represents  delayed emptying    (pt reports she was on opiates)    ASSESSMENT/PLAN     36 yo female with Pmhx of anxiety, mood d/o, depression, IV drug use c/b IE, fibromyalgia, vasculitis (?) admitted on 12/9/20 due to tricuspid valve IE, lumbar infection, and septic emboli. Has been on IV Abx for weeks and hx of migraines on prn meds.  Her intermittent n/v since 2012 treated with nothing or Zofran prn as OP.  Current worsening of vomiting could be from her meds (abx, Methadone which seems new), anxiety,  resolving infection, undocumented gastroparesis based on her description of symptoms with abd fullness and vomiting.  Has been on PPI and Pepcid daily for weeks.     Normocytic anemia. No GI bleeding although she reported today some red blood in her vomit over one week ago    - on regular diet  - upper gi was ordered by [facility]. No other recent abd imaging  - Cont Zofran prn  -Reglan prn was given in ER x 1. Would consider GES (if pt agreeable) and use of Reglan prn for symptoms  - Follow. Feel that EGD would be low yield in this young pt with essentially normal EGD in 2017 and chronic symptoms    AUTHOR:  RAVI Taveras  1/20/2021

## 2021-01-21 PROCEDURE — 63700000 HC SELF-ADMINISTRABLE DRUG: Performed by: NURSE PRACTITIONER

## 2021-01-21 PROCEDURE — 63700000 HC SELF-ADMINISTRABLE DRUG: Performed by: INTERNAL MEDICINE

## 2021-01-21 PROCEDURE — 99232 SBSQ HOSP IP/OBS MODERATE 35: CPT | Performed by: INTERNAL MEDICINE

## 2021-01-21 PROCEDURE — 99232 SBSQ HOSP IP/OBS MODERATE 35: CPT | Performed by: HOSPITALIST

## 2021-01-21 PROCEDURE — 63600000 HC DRUGS/DETAIL CODE: Performed by: INTERNAL MEDICINE

## 2021-01-21 PROCEDURE — 63700000 HC SELF-ADMINISTRABLE DRUG: Performed by: PSYCHIATRY & NEUROLOGY

## 2021-01-21 PROCEDURE — 12000000 HC ROOM AND CARE MED/SURG

## 2021-01-21 PROCEDURE — 25000000 HC PHARMACY GENERAL: Performed by: INTERNAL MEDICINE

## 2021-01-21 PROCEDURE — 25800000 HC PHARMACY IV SOLUTIONS: Performed by: INTERNAL MEDICINE

## 2021-01-21 PROCEDURE — 63700000 HC SELF-ADMINISTRABLE DRUG: Performed by: HOSPITALIST

## 2021-01-21 RX ORDER — METOCLOPRAMIDE HYDROCHLORIDE 5 MG/ML
5 INJECTION INTRAMUSCULAR; INTRAVENOUS EVERY 6 HOURS
Status: DISCONTINUED | OUTPATIENT
Start: 2021-01-21 | End: 2021-01-23

## 2021-01-21 RX ORDER — METOCLOPRAMIDE HYDROCHLORIDE 5 MG/ML
5 INJECTION INTRAMUSCULAR; INTRAVENOUS EVERY 6 HOURS
Status: DISCONTINUED | OUTPATIENT
Start: 2021-01-21 | End: 2021-01-21

## 2021-01-21 RX ADMIN — QUETIAPINE FUMARATE 100 MG: 100 TABLET ORAL at 21:20

## 2021-01-21 RX ADMIN — CEFAZOLIN SODIUM 2 G: 10 POWDER, FOR SOLUTION INTRAVENOUS at 04:28

## 2021-01-21 RX ADMIN — PRAZOSIN HYDROCHLORIDE 2 MG: 1 CAPSULE ORAL at 21:20

## 2021-01-21 RX ADMIN — PREGABALIN 150 MG: 75 CAPSULE ORAL at 08:46

## 2021-01-21 RX ADMIN — PAROXETINE 30 MG: 20 TABLET, FILM COATED ORAL at 08:46

## 2021-01-21 RX ADMIN — METHADONE HYDROCHLORIDE 60 MG: 10 TABLET ORAL at 06:52

## 2021-01-21 RX ADMIN — DIPHENHYDRAMINE HYDROCHLORIDE 25 MG: 50 INJECTION INTRAMUSCULAR; INTRAVENOUS at 13:22

## 2021-01-21 RX ADMIN — ACETAMINOPHEN 650 MG: 325 TABLET, FILM COATED ORAL at 08:45

## 2021-01-21 RX ADMIN — ACETAMINOPHEN 650 MG: 325 TABLET, FILM COATED ORAL at 15:39

## 2021-01-21 RX ADMIN — DIPHENHYDRAMINE HYDROCHLORIDE 25 MG: 50 INJECTION INTRAMUSCULAR; INTRAVENOUS at 07:04

## 2021-01-21 RX ADMIN — NICOTINE POLACRILEX 4 MG: 4 GUM, CHEWING ORAL at 08:51

## 2021-01-21 RX ADMIN — CYCLOBENZAPRINE HYDROCHLORIDE 10 MG: 5 TABLET, FILM COATED ORAL at 21:20

## 2021-01-21 RX ADMIN — PROBIOTIC PRODUCT - TAB 1 TABLET: TAB at 19:46

## 2021-01-21 RX ADMIN — FAMOTIDINE 10 MG: 10 TABLET, FILM COATED ORAL at 15:39

## 2021-01-21 RX ADMIN — METOCLOPRAMIDE 5 MG: 5 INJECTION, SOLUTION INTRAMUSCULAR; INTRAVENOUS at 17:49

## 2021-01-21 RX ADMIN — PREGABALIN 150 MG: 75 CAPSULE ORAL at 19:46

## 2021-01-21 RX ADMIN — BUPROPION HYDROCHLORIDE 75 MG: 75 TABLET, FILM COATED ORAL at 15:39

## 2021-01-21 RX ADMIN — METOCLOPRAMIDE 5 MG: 5 INJECTION, SOLUTION INTRAMUSCULAR; INTRAVENOUS at 08:52

## 2021-01-21 RX ADMIN — PROBIOTIC PRODUCT - TAB 1 TABLET: TAB at 08:46

## 2021-01-21 RX ADMIN — BUPROPION HYDROCHLORIDE 75 MG: 75 TABLET, FILM COATED ORAL at 12:14

## 2021-01-21 RX ADMIN — PANTOPRAZOLE SODIUM 40 MG: 40 TABLET, DELAYED RELEASE ORAL at 08:46

## 2021-01-21 RX ADMIN — BUPROPION HYDROCHLORIDE 75 MG: 75 TABLET, FILM COATED ORAL at 08:46

## 2021-01-21 RX ADMIN — CEFAZOLIN SODIUM 2 G: 10 POWDER, FOR SOLUTION INTRAVENOUS at 12:14

## 2021-01-21 RX ADMIN — FAMOTIDINE 10 MG: 10 TABLET, FILM COATED ORAL at 06:52

## 2021-01-21 RX ADMIN — LIDOCAINE 1 PATCH: 246 PATCH TOPICAL at 08:46

## 2021-01-21 RX ADMIN — ZOLPIDEM TARTRATE 10 MG: 5 TABLET, COATED ORAL at 21:20

## 2021-01-21 RX ADMIN — CEFAZOLIN SODIUM 2 G: 10 POWDER, FOR SOLUTION INTRAVENOUS at 19:45

## 2021-01-21 RX ADMIN — DIPHENHYDRAMINE HYDROCHLORIDE 25 MG: 50 INJECTION INTRAMUSCULAR; INTRAVENOUS at 19:54

## 2021-01-21 RX ADMIN — ACETAMINOPHEN 650 MG: 325 TABLET, FILM COATED ORAL at 21:19

## 2021-01-21 RX ADMIN — PANTOPRAZOLE SODIUM 40 MG: 40 TABLET, DELAYED RELEASE ORAL at 19:46

## 2021-01-21 RX ADMIN — METOPROLOL SUCCINATE 25 MG: 25 TABLET, EXTENDED RELEASE ORAL at 08:46

## 2021-01-21 RX ADMIN — CYCLOBENZAPRINE HYDROCHLORIDE 10 MG: 5 TABLET, FILM COATED ORAL at 08:51

## 2021-01-21 RX ADMIN — Medication 1 PATCH: at 08:46

## 2021-01-21 NOTE — PROGRESS NOTES
As per Dr Stallings note, pt is for another 2 weeks if IV antibiotics for a total of 8 weeks, as per her conversation with Dr Molina. [facility] following. [facility] followed up with [facility] to see if there we any beds available today for placement for this pt. They are saying there are no beds at this time. They still have the clinicals from when they were faxed and said they will see if a bed becomes available.    PLAN: completion of IV antibiotics course, for a total of 8 weeks.

## 2021-01-21 NOTE — PROGRESS NOTES
Hospital Medicine Service -  Daily Progress Note       SUBJECTIVE   Interval History: No acute overnight events. Patient reports her nausea is improving. Back pain about the same. She has been doing some daily stretching.      OBJECTIVE      Vital signs in last 24 hours:  Temp:  [36.4 °C (97.5 °F)-37.2 °C (98.9 °F)] 36.4 °C (97.5 °F)  Heart Rate:  [72-75] 74  Resp:  [18] 18  BP: (121-131)/(70-89) 121/70  No intake or output data in the 24 hours ending 01/21/21 1639    PHYSICAL EXAMINATION      Physical Exam  Vitals signs and nursing note reviewed.   Constitutional:       General: She is not in acute distress.     Appearance: Normal appearance. She is well-developed.   HENT:      Head: Normocephalic and atraumatic.   Eyes:      Extraocular Movements: Extraocular movements intact.      Pupils: Pupils are equal, round, and reactive to light.   Neck:      Musculoskeletal: Normal range of motion and neck supple.   Cardiovascular:      Rate and Rhythm: Normal rate and regular rhythm.      Heart sounds: Normal heart sounds.   Pulmonary:      Effort: Pulmonary effort is normal. No respiratory distress.      Breath sounds: Normal breath sounds. No wheezing, rhonchi or rales.   Abdominal:      General: Bowel sounds are normal. There is no distension.      Palpations: Abdomen is soft.      Tenderness: There is no abdominal tenderness.   Musculoskeletal: Normal range of motion.         General: No swelling.   Skin:     General: Skin is warm and dry.      Capillary Refill: Capillary refill takes less than 2 seconds.   Neurological:      Mental Status: She is alert and oriented to person, place, and time.        LINES, CATHETERS, DRAINS, AIRWAYS, AND WOUNDS   Lines, Drains, Airways, Wounds:  PICC Single Lumen 12/18/20 Right (Active)   Number of days: 34       Comments:      LABS / IMAGING / TELE      Labs  Results from last 7 days   Lab Units 01/18/21  0610   WBC K/uL 9.80   HEMOGLOBIN g/dL 9.7*   HEMATOCRIT % 31.6*    PLATELETS K/uL 195     Results from last 7 days   Lab Units 01/18/21  0610   SODIUM mEQ/L 138   POTASSIUM mEQ/L 4.4   CHLORIDE mEQ/L 99   CO2 mEQ/L 32   BUN mg/dL 14   CREATININE mg/dL 0.8   GLUCOSE mg/dL 98   CALCIUM mg/dL 8.8*     SARS-CoV-2 (COVID-19) (no units)   Date/Time Value   01/16/2021 0629 Negative       Imaging  I have independently reviewed the pertinent imaging from the last 24 hrs.    ECG/Telemetry  I have independently reviewed the telemetry. No events for the last 24 hours.    ASSESSMENT AND PLAN      * Acute bacterial endocarditis  Assessment & Plan  -Tricuspid valve endocarditis with severe TR. Also with septic pulmonary emboli and seeding of lumbar spine.   -Blood cx at [facility] 12/7 and 12/8 grew MSSA. Blood cultures from 12/2020 negative for growth.Patient afebrile.   -Completed 42 days antibiotics on 1/18/21.   -Repeat MRI L-spine shows improved, but persistent enhancement in the lumbar joints. I spoke to Dr. Ness. He recommends to continue IV antibiotics for total 8 weeks then follow up as outpatient for repeat lumbar imaging. Continue IV abx until 2/1/21.  -Cardiology consulted. I discussed with Dr. Blas today. No need for surgical intervention on tricuspid valve. Recommended she follow up as outpatient for repeat TTE.  -F/U with PCP for repeat CT chest as outpatient.      Nausea & vomiting  Assessment & Plan  -Intermittent nausea/vomiting with large meals. She reports it has been going on for several weeks, but worse over the past week. She regurgitates whole food about 5 minutes after eating. Worse with large meals. No hematemesis, hematochezia, weight loss. Her anemia is at baseline. She is having daily BMs.   -Upper GI series shows esophageal dysmotility and GERD..   -Continue Pepcid and PPI.   -GI consulted. No EGD or gastric emptying study. Can use Reglan PRN. Small meals, low fat/low fiber diet.            Opioid type dependence, continuous use (CMS/[facility])  Assessment &  "Plan  -Continue methadone per psych recs.   -Patient declining inpatient psych rehab. Agrees to IOP. States she has multiple resources to help maintain sobriety including a sponsor.  -Behavioral therapy and psychiatry following. They are working on setting patient up with methadone clinic for discharge.       Essential hypertension  Assessment & Plan  -BP stable  -Cont w/metoprolol, prazosin    Migraine, intractable  Assessment & Plan  -Resolved with Fioricet.   -Neurology consulted. Rec Fioricet no more than 4 days/month, and over-the-counter medications no more than 15 days/month.    -Outpatient neurology follow up for further management/workup. Needs outpatient sleep study.        Insomnia  Assessment & Plan  -Cont w/ambien      Depression  Assessment & Plan  -No SI/HI.  -Continue Wellbutrin, Quetiapine, Ambien.  -Follow up with psychiatry as outpatient.      Anxiety  Assessment & Plan  -See \"depression\"       VTE Assessment: Padua VTE Score: 2  VTE Prophylaxis Plan: Current anticoagulant orders:              enoxaparin (LOVENOX) syringe 40 mg  Daily (6a)                 Code Status: Full Code  Estimated Discharge Date: 2/2/2021   Disposition Planning: Home     Francy Mock, DO  1/21/2021                 "

## 2021-01-21 NOTE — PROGRESS NOTES
Cardiology Daily Progress Note       Subjective    Carolyn Redmond is a 35 y.o. female who was admitted for Septic embolism (CMS/[facility]) [I76]  Infection of lumbar spine (CMS/[facility]) [M46.26]  Acute left-sided low back pain without sciatica [M54.5].          @Summary@      Amoxicillin, Nsaids (non-steroidal anti-inflammatory drug), Trazodone, and Tramadol  Current Facility-Administered Medications   Medication Dose Route Frequency Provider Last Rate Last Admin   • acetaminophen (TYLENOL) tablet 650 mg  650 mg oral q6h INT Erinn Barajas DO   650 mg at 01/21/21 0845   • alum-mag hydroxide-simeth (MAALOX) 200-200-20 mg/5 mL suspension 30 mL  30 mL oral q6h PRN Lili Crawford MD   30 mL at 01/11/21 1946   • buPROPion (WELLBUTRIN) tablet 75 mg  75 mg oral TID Marlon Padron MD   75 mg at 01/21/21 1214   • butalbital-acetaminophen-caff (FIORICET, ESGIC) per tablet 2 tablet  2 tablet oral q4h PRN Francy Mock DO   2 tablet at 01/19/21 0800   • ceFAZolin (ANCEF) NSS IVPB piggyback 2 g  2 g intravenous q8h INT Vikas Jones  mL/hr at 01/21/21 1214 2 g at 01/21/21 1214   • cyclobenzaprine (FLEXERIL) tablet 10 mg  10 mg oral 3x daily PRN Marizol Ortiz CRNP   10 mg at 01/21/21 0851   • glucose chewable tablet 16-32 g of dextrose  16-32 g of dextrose oral PRN Perfecto Angela MD        Or   • dextrose 40 % oral gel 15-30 g of dextrose  15-30 g of dextrose oral PRN Perfecto Angela MD        Or   • glucagon (GLUCAGEN) injection 1 mg  1 mg intramuscular PRN Perfecto Angela MD        Or   • dextrose in water injection 12.5 g  25 mL intravenous PRN Perfecto Angela MD       • diphenhydrAMINE (BENADRYL) injection 25 mg  25 mg intravenous q6h PRN Lili Crawford MD   25 mg at 01/21/21 0704   • enoxaparin (LOVENOX) syringe 40 mg  40 mg subcutaneous Daily (6a) Francy Mock DO       • famotidine (PEPCID) tablet 10 mg  10 mg oral BID AC Lili Crawford MD   10 mg at 01/21/21 0606    • fluticasone propionate (FLONASE) 50 mcg/actuation nasal spray 2 spray  2 spray Each Nostril Daily PRN Perfecto Angela MD       • hydrocortisone-pramoxine (ANALPRAM-HC) 2.5-1 % rectal cream   rectal 4x daily PRN Dominique Powell DO   Given at 12/19/20 1643   • ibuprofen (MOTRIN) tablet 600 mg  600 mg oral q8h PRN Tisha Moise MD   600 mg at 01/16/21 0828   • lactobacillus acidophilus complex (BACID) 1 billion cell- 250 mg tablet 1 tablet  1 tablet oral BID Heidi Toscano CRNP   1 tablet at 01/21/21 0846   • lidocaine (ASPERCREME) 4 % topical patch 1 patch  1 patch Topical Daily Jacki Boss CRNP   1 patch at 01/21/21 0846   • magnesium sulfate IVPB 2g in 50 mL NSS/D5W/SWFI  2 g intravenous PRN Perfecto Angela MD       • methadone (DOLOPHINE) tablet 60 mg  60 mg oral Daily (6a) Marlon Padron MD   60 mg at 01/21/21 0652   • metoclopramide (REGLAN) injection 5 mg  5 mg intravenous q6h JAIDEN Frankel, Robert A, MD   5 mg at 01/21/21 0852   • metoprolol succinate XL (TOPROL-XL) 24 hr ER tablet 25 mg  25 mg oral Daily Lili Crawford MD   25 mg at 01/21/21 0846   • naloxone (NARCAN) injection 0.2 mg  0.2 mg intravenous Once PRN Jacki Boss CRNP       • nicotine (NICODERM CQ) 14 mg/24 hr patch 1 patch  1 patch transdermal Daily Gen Hogue MD   1 patch at 01/21/21 0846   • nicotine polacrilex (NICORETTE) gum 4 mg  4 mg buccal q4h PRN Perfecto Angela MD   4 mg at 01/21/21 0851   • ondansetron ODT (ZOFRAN-ODT) disintegrating tablet 4 mg  4 mg oral q8h PRN Zoila Gutierrez MD   4 mg at 01/19/21 2126    Or   • ondansetron (ZOFRAN) injection 4 mg  4 mg intravenous q8h PRN Zoila Gutierrez MD   4 mg at 01/18/21 1846   • pantoprazole (PROTONIX) tablet,delayed release (DR/EC) 40 mg  40 mg oral BID José Manuel Morris MD   40 mg at 01/21/21 0846   • PARoxetine (PAXIL) tablet 30 mg  30 mg oral Daily Marlon Padron MD   30 mg at 01/21/21 0846   • potassium chloride  (KLOR-CON) tablet extended release 20 mEq  20 mEq oral Daily PRN Perfecto Angela MD        Or   • potassium chloride (KLOR-CON) tablet extended release 40 mEq  40 mEq oral Daily PRN Perfecto Angela MD        Or   • potassium chloride 20 mEq in 100 mL IVPB  (premix)  20 mEq intravenous Daily PRN Perfecto Angela MD        Or   • potassium chloride (KCL) 40 mEq/250 mL IVPB in NSS 40 mEq  40 mEq intravenous Daily PRN Perfecto Angela MD       • prazosin (MINIPRESS) capsule 2 mg  2 mg oral Nightly Perfecto Angela MD   2 mg at 01/20/21 2111   • pregabalin (LYRICA) capsule 150 mg  150 mg oral BID Perfecto Angela MD   150 mg at 01/21/21 0846   • QUEtiapine (SEROquel) tablet 100 mg  100 mg oral Nightly Marlon Padron MD   100 mg at 01/20/21 2111   • zolpidem (AMBIEN) tablet 10 mg  10 mg oral Nightly Perfecto Angela MD   10 mg at 01/20/21 2111       Interval History: none.  The patient tells me she has to stay for another 2 weeks of antibiotics for a total of 8 weeks.  Denies fever, chills, sweats      ROS    Objective   Vital signs in last 24 hours:  Temp:  [36.2 °C (97.2 °F)-37.2 °C (98.9 °F)] 36.6 °C (97.9 °F)  Heart Rate:  [72-75] 75  Resp:  [16-18] 18  BP: (105-131)/(57-89) 131/89    No intake or output data in the 24 hours ending 01/21/21 1231  Intake/Output this shift:  No intake/output data recorded.    Physical Exam   Constitutional: She is oriented to person, place, and time. She appears well-developed. No distress.   Neck: No JVD present.   Cardiovascular: Normal rate.   Murmur (Grade 2 soft holosystolic murmur heard best at the right and left on the border fourth intercostal space.) heard.  Pulmonary/Chest: No respiratory distress. She has no wheezes. She has no rales.   Musculoskeletal:         General: No edema.   Neurological: She is alert and oriented to person, place, and time. No cranial nerve deficit.   Skin: No rash noted. She is not diaphoretic.   Psychiatric: She has a normal mood  and affect. Her behavior is normal. Judgment normal.           Labs   Lab Results   Component Value Date    WBC 9.80 01/18/2021    HGB 9.7 (L) 01/18/2021    HCT 31.6 (L) 01/18/2021     01/18/2021    ALT 9 (L) 01/11/2021    AST 19 01/11/2021     01/18/2021    K 4.4 01/18/2021    CL 99 01/18/2021    CREATININE 0.8 01/18/2021    BUN 14 01/18/2021    CO2 32 01/18/2021    INR 0.9 04/06/2019       Imaging  Significant findings include: Reviewed MRI report      Telemetry  n/a      Assessment & Plan    Essential hypertension  Assessment & Plan  Overall stable.    Opioid type dependence, continuous use (CMS/[facility])  Assessment & Plan  -hx of IV drug abuse  -admitted with IE  -palliative care team and psych on board for recommendations   -problems in addition to dependence include depression, anxiety and insomnia    1/21 update: patient in good spirits today even though she found out today she has to stay in hospital for 2 more weeks of antibiotics..  She tells me her pain is fairly well controlled     * Acute bacterial endocarditis  Assessment & Plan  -MSSA positive BC  -Septic emboli to lungs in addition to lumbar spine infection.  This may be the etiology of her back pain & pleuritic pain.    -Secondary to tricuspid endocarditis.  I discussed treatment, intravenous antibiotics, close clinical follow-up and periodic echocardiograms.  Recommended she return in 1-2 weeks for cardiac evaluation after discharge.  This was stressed.  -Continue tx per ID.  Stable on exam.    12/29: patient on IV Cefazolin day 21/42 through PICC line.  Patient in good spirits today.  Pain much better since methadone started.  Denies any chest pain or sob.  Off tele.  General lab work and VS have been stable.  Will check limited echo this Thursday.     1/21 update:  I personally reviewed the echo from 12/30.  There is mild thickening of the tricuspid septal leaflet and mild tricuspid regurgitation.   No definite vegetation  seen.          Will follow up PRN.  Call if needed.  I will check up on her again in 1 to 2 weeks would like to see the patient in the office about a month after discharge.  Repeat echo as an outpatient about 8 weeks.       Yanick Eldridge DO [facility], FACOI  1/21/2021  12:31 PM     This patient note has been dictated using speech recognition software. Inadvertent speech recognition errors should be disregarded. Please do not hesitate to call my office for clarifications.

## 2021-01-21 NOTE — PATIENT CARE CONFERENCE
Care Progression Rounds Note  Date: 1/21/2021  Time: 10:12 AM     Patient Name: Carolyn Redmond     Medical Record Number: [id number]   YOB: 1985  Sex: Female      Room/Bed: 4211    Admitting Diagnosis: Septic embolism (CMS/[facility]) [I76]  Infection of lumbar spine (CMS/[facility]) [M46.26]  Acute left-sided low back pain without sciatica [M54.5]   Admit Date/Time: 12/9/2020 11:10 AM    Primary Diagnosis: Acute bacterial endocarditis  Principal Problem: Acute bacterial endocarditis    GMLOS: 4.8  Anticipated Discharge Date: 2/2/2021    AM-PAC  Mobility Score:      Discharge Planning:       Barriers to Discharge:  Barriers to Discharge: Medical issues not resolved  Comment: iv antibiotics another 2 weeks    Participants:  social work/services, nursing

## 2021-01-22 LAB — SARS-COV-2 RNA RESP QL NAA+PROBE: NEGATIVE

## 2021-01-22 PROCEDURE — 63700000 HC SELF-ADMINISTRABLE DRUG: Performed by: INTERNAL MEDICINE

## 2021-01-22 PROCEDURE — 63600000 HC DRUGS/DETAIL CODE: Performed by: INTERNAL MEDICINE

## 2021-01-22 PROCEDURE — 63700000 HC SELF-ADMINISTRABLE DRUG: Performed by: NURSE PRACTITIONER

## 2021-01-22 PROCEDURE — 25000000 HC PHARMACY GENERAL: Performed by: INTERNAL MEDICINE

## 2021-01-22 PROCEDURE — U0003 INFECTIOUS AGENT DETECTION BY NUCLEIC ACID (DNA OR RNA); SEVERE ACUTE RESPIRATORY SYNDROME CORONAVIRUS 2 (SARS-COV-2) (CORONAVIRUS DISEASE [COVID-19]), AMPLIFIED PROBE TECHNIQUE, MAKING USE OF HIGH THROUGHPUT TECHNOLOGIES AS DESCRIBED BY CMS-2020-01-R: HCPCS | Performed by: PHYSICIAN ASSISTANT

## 2021-01-22 PROCEDURE — 25800000 HC PHARMACY IV SOLUTIONS: Performed by: INTERNAL MEDICINE

## 2021-01-22 PROCEDURE — 99232 SBSQ HOSP IP/OBS MODERATE 35: CPT | Performed by: HOSPITALIST

## 2021-01-22 PROCEDURE — 63700000 HC SELF-ADMINISTRABLE DRUG: Performed by: HOSPITALIST

## 2021-01-22 PROCEDURE — 63700000 HC SELF-ADMINISTRABLE DRUG: Performed by: PSYCHIATRY & NEUROLOGY

## 2021-01-22 PROCEDURE — 12000000 HC ROOM AND CARE MED/SURG

## 2021-01-22 RX ORDER — BACLOFEN 10 MG/1
5 TABLET ORAL 3 TIMES DAILY
Status: DISCONTINUED | OUTPATIENT
Start: 2021-01-22 | End: 2021-01-24

## 2021-01-22 RX ADMIN — PREGABALIN 150 MG: 75 CAPSULE ORAL at 08:55

## 2021-01-22 RX ADMIN — DIPHENHYDRAMINE HYDROCHLORIDE 25 MG: 50 INJECTION INTRAMUSCULAR; INTRAVENOUS at 19:58

## 2021-01-22 RX ADMIN — BUPROPION HYDROCHLORIDE 75 MG: 75 TABLET, FILM COATED ORAL at 11:32

## 2021-01-22 RX ADMIN — PANTOPRAZOLE SODIUM 40 MG: 40 TABLET, DELAYED RELEASE ORAL at 19:57

## 2021-01-22 RX ADMIN — METHADONE HYDROCHLORIDE 60 MG: 10 TABLET ORAL at 06:46

## 2021-01-22 RX ADMIN — FAMOTIDINE 10 MG: 10 TABLET, FILM COATED ORAL at 06:47

## 2021-01-22 RX ADMIN — METOPROLOL SUCCINATE 25 MG: 25 TABLET, EXTENDED RELEASE ORAL at 08:58

## 2021-01-22 RX ADMIN — CYCLOBENZAPRINE HYDROCHLORIDE 10 MG: 5 TABLET, FILM COATED ORAL at 11:33

## 2021-01-22 RX ADMIN — ACETAMINOPHEN 650 MG: 325 TABLET, FILM COATED ORAL at 22:00

## 2021-01-22 RX ADMIN — PAROXETINE 30 MG: 20 TABLET, FILM COATED ORAL at 08:58

## 2021-01-22 RX ADMIN — FAMOTIDINE 10 MG: 10 TABLET, FILM COATED ORAL at 16:47

## 2021-01-22 RX ADMIN — LIDOCAINE 1 PATCH: 246 PATCH TOPICAL at 13:55

## 2021-01-22 RX ADMIN — Medication 1 PATCH: at 09:06

## 2021-01-22 RX ADMIN — METOCLOPRAMIDE 5 MG: 5 INJECTION, SOLUTION INTRAMUSCULAR; INTRAVENOUS at 07:01

## 2021-01-22 RX ADMIN — QUETIAPINE FUMARATE 100 MG: 100 TABLET ORAL at 21:10

## 2021-01-22 RX ADMIN — CEFAZOLIN SODIUM 2 G: 10 POWDER, FOR SOLUTION INTRAVENOUS at 11:32

## 2021-01-22 RX ADMIN — BUPROPION HYDROCHLORIDE 75 MG: 75 TABLET, FILM COATED ORAL at 16:47

## 2021-01-22 RX ADMIN — DIPHENHYDRAMINE HYDROCHLORIDE 25 MG: 50 INJECTION INTRAMUSCULAR; INTRAVENOUS at 07:04

## 2021-01-22 RX ADMIN — CEFAZOLIN SODIUM 2 G: 10 POWDER, FOR SOLUTION INTRAVENOUS at 19:57

## 2021-01-22 RX ADMIN — PREGABALIN 150 MG: 75 CAPSULE ORAL at 19:57

## 2021-01-22 RX ADMIN — BACLOFEN 5 MG: 10 TABLET ORAL at 19:57

## 2021-01-22 RX ADMIN — PRAZOSIN HYDROCHLORIDE 2 MG: 1 CAPSULE ORAL at 21:10

## 2021-01-22 RX ADMIN — PROBIOTIC PRODUCT - TAB 1 TABLET: TAB at 19:58

## 2021-01-22 RX ADMIN — DIPHENHYDRAMINE HYDROCHLORIDE 25 MG: 50 INJECTION INTRAMUSCULAR; INTRAVENOUS at 13:21

## 2021-01-22 RX ADMIN — CEFAZOLIN SODIUM 2 G: 10 POWDER, FOR SOLUTION INTRAVENOUS at 04:29

## 2021-01-22 RX ADMIN — BUPROPION HYDROCHLORIDE 75 MG: 75 TABLET, FILM COATED ORAL at 08:58

## 2021-01-22 RX ADMIN — METOCLOPRAMIDE 5 MG: 5 INJECTION, SOLUTION INTRAMUSCULAR; INTRAVENOUS at 19:57

## 2021-01-22 RX ADMIN — PROBIOTIC PRODUCT - TAB 1 TABLET: TAB at 08:55

## 2021-01-22 RX ADMIN — BACLOFEN 5 MG: 10 TABLET ORAL at 16:47

## 2021-01-22 RX ADMIN — PANTOPRAZOLE SODIUM 40 MG: 40 TABLET, DELAYED RELEASE ORAL at 08:58

## 2021-01-22 RX ADMIN — ACETAMINOPHEN 650 MG: 325 TABLET, FILM COATED ORAL at 08:57

## 2021-01-22 RX ADMIN — METOCLOPRAMIDE 5 MG: 5 INJECTION, SOLUTION INTRAMUSCULAR; INTRAVENOUS at 13:19

## 2021-01-22 RX ADMIN — ZOLPIDEM TARTRATE 10 MG: 5 TABLET, COATED ORAL at 21:10

## 2021-01-22 RX ADMIN — ACETAMINOPHEN 650 MG: 325 TABLET, FILM COATED ORAL at 16:46

## 2021-01-22 NOTE — NURSING NOTE
Pt refusing bed alarms and chair alarms; pt educated on risks of falls while in the hospital; pt verbalized understanding and continues to refuse safety alarms. Will continue to monitor.

## 2021-01-22 NOTE — PROGRESS NOTES
Hospital Medicine Service -  Daily Progress Note       SUBJECTIVE   Interval History: No acute overnight events. Patient states she vomited yesterday, but overall improved. Main issue is back pain, worse after laying in her bed. Improves with ambulation and stretching.     OBJECTIVE      Vital signs in last 24 hours:  Temp:  [36.5 °C (97.7 °F)-36.8 °C (98.3 °F)] 36.7 °C (98 °F)  Heart Rate:  [69-75] 71  Resp:  [16-18] 16  BP: (111-137)/(59-90) 111/59  No intake or output data in the 24 hours ending 01/22/21 1701    PHYSICAL EXAMINATION      Physical Exam  Vitals signs and nursing note reviewed.   Constitutional:       General: She is not in acute distress.     Appearance: Normal appearance. She is well-developed.   HENT:      Head: Normocephalic and atraumatic.   Eyes:      Extraocular Movements: Extraocular movements intact.      Pupils: Pupils are equal, round, and reactive to light.   Neck:      Musculoskeletal: Normal range of motion and neck supple.   Cardiovascular:      Rate and Rhythm: Normal rate and regular rhythm.      Heart sounds: Murmur (1/6 systolic murmur left sternal heart border.) present.   Pulmonary:      Effort: Pulmonary effort is normal. No respiratory distress.      Breath sounds: Normal breath sounds. No wheezing, rhonchi or rales.   Abdominal:      General: Bowel sounds are normal. There is no distension.      Palpations: Abdomen is soft.      Tenderness: There is no abdominal tenderness.   Musculoskeletal: Normal range of motion.         General: No swelling.   Skin:     General: Skin is warm and dry.      Capillary Refill: Capillary refill takes less than 2 seconds.   Neurological:      Mental Status: She is alert and oriented to person, place, and time.        LINES, CATHETERS, DRAINS, AIRWAYS, AND WOUNDS   Lines, Drains, Airways, Wounds:  PICC Single Lumen 12/18/20 Right (Active)   Number of days: 35       Comments:      LABS / IMAGING / TELE      Labs  Results from last 7 days   Lab  Units 01/18/21  0610   WBC K/uL 9.80   HEMOGLOBIN g/dL 9.7*   HEMATOCRIT % 31.6*   PLATELETS K/uL 195     Results from last 7 days   Lab Units 01/18/21  0610   SODIUM mEQ/L 138   POTASSIUM mEQ/L 4.4   CHLORIDE mEQ/L 99   CO2 mEQ/L 32   BUN mg/dL 14   CREATININE mg/dL 0.8   GLUCOSE mg/dL 98   CALCIUM mg/dL 8.8*     SARS-CoV-2 (COVID-19) (no units)   Date/Time Value   01/16/2021 0629 Negative       Imaging  I have independently reviewed the pertinent imaging from the last 24 hrs.    ECG/Telemetry  Patient is not on telemetry.    ASSESSMENT AND PLAN      * Acute bacterial endocarditis  Assessment & Plan  -Tricuspid valve endocarditis with severe TR. Also with septic pulmonary emboli and seeding of lumbar spine.   -Blood cx at [facility] 12/7 and 12/8 grew MSSA. Blood cultures from 12/2020 negative for growth.Patient afebrile.   -Completed 42 days antibiotics on 1/18/21.   -Repeat MRI L-spine shows improved, but persistent enhancement in the lumbar joints. I spoke to Dr. Ness. He recommends to continue IV antibiotics for total 8 weeks then follow up as outpatient for repeat lumbar imaging. Continue IV abx until 2/1/21.  -Cardiology consulted. I discussed with Dr. Blas. No need for surgical intervention on tricuspid valve. Recommended she follow up as outpatient for repeat TTE.  -F/U with PCP for repeat CT chest as outpatient.      Nausea & vomiting  Assessment & Plan  -Intermittent nausea/vomiting with large meals. She reports it has been going on for several weeks, but worse over the past week. She regurgitates whole food about 5 minutes after eating. Worse with large meals. No hematemesis, hematochezia, weight loss. Her anemia is at baseline. She is having daily BMs.   -Upper GI series shows esophageal dysmotility and GERD..   -Continue Pepcid and PPI.   -GI switched Flexeril to Baclofen and started standing Reglan.   -Continue Small meals, low fat/low fiber diet.   -GI consulted. No plans for EGD or  "gastric emptying study.            Opioid type dependence, continuous use (CMS/[facility])  Assessment & Plan  -Continue methadone per psych recs.   -Patient declining inpatient psych rehab. Agrees to IOP. States she has multiple resources to help maintain sobriety including a sponsor.  -Behavioral therapy and psychiatry following. They are working on setting patient up with methadone clinic for discharge.       Essential hypertension  Assessment & Plan  -BP stable  -Cont w/metoprolol, prazosin    Migraine, intractable  Assessment & Plan  -Resolved with Fioricet.   -Neurology consulted. Rec Fioricet no more than 4 days/month, and over-the-counter medications no more than 15 days/month.    -Outpatient neurology follow up for further management/workup. Needs outpatient sleep study.        Insomnia  Assessment & Plan  -Cont w/ambien      Depression  Assessment & Plan  -No SI/HI.  -Continue Wellbutrin, Quetiapine, Ambien.  -Follow up with psychiatry as outpatient.      Anxiety  Assessment & Plan  -See \"depression\"       VTE Assessment: Padua VTE Score: 2  VTE Prophylaxis Plan: Current anticoagulant orders:              enoxaparin (LOVENOX) syringe 40 mg  Daily (6a)                 Code Status: Full Code  Estimated Discharge Date: 2/2/2021   Disposition Planning: Home     Francy Mock, DO  1/22/2021                 "

## 2021-01-22 NOTE — NURSING NOTE
Reassessed patient. No change in previous assessment. Pt OOB ambulating in room without difficulty. Pt continues to report intermittent nausea; however no episodes of vomiting noted this shift. Pt tolerating small amounts of diet today (popsicles, yogurt). RN discussed with patient if she vomits again then to please notify nursing staff to see emesis contents for assessment purposes. Pt verbalized understanding and agreeable. Will continue to monitor.

## 2021-01-22 NOTE — PROGRESS NOTES
GI Daily Progress Note          CC: ..  Chief Complaint   Patient presents with   • Fever   • Back Pain         Subjective   Interval History:   No overnight events. Vomited/regurgitated yesterday. None today. Feels better.    Objective     Vital signs in last 24 hours:  Temp:  [36.4 °C (97.5 °F)-36.8 °C (98.3 °F)] 36.5 °C (97.7 °F)  Heart Rate:  [69-75] 71  Resp:  [18] 18  BP: (121-137)/(70-90) 125/73    No intake or output data in the 24 hours ending 01/22/21 1342  Intake/Output this shift:  No intake/output data recorded.    Physical exam:  Gen: AAOx3  HEENT: NC/AT  Cv: RRR  Pulm: CTAB/L  Abd: s/nt/nd +BS  Ext: No c/c/e  Psych: appropriate    Current Medications:  •  acetaminophen, 650 mg, oral, q6h INT  •  alum-mag hydroxide-simeth, 30 mL, oral, q6h PRN  •  baclofen, 5 mg, oral, TID  •  buPROPion, 75 mg, oral, TID  •  butalbital-acetaminophen-caff, 2 tablet, oral, q4h PRN  •  ceFAZolin, 2 g, intravenous, q8h INT  •  glucose, 16-32 g of dextrose, oral, PRN **OR** dextrose, 15-30 g of dextrose, oral, PRN **OR** glucagon, 1 mg, intramuscular, PRN **OR** dextrose in water, 25 mL, intravenous, PRN  •  diphenhydrAMINE, 25 mg, intravenous, q6h PRN  •  enoxaparin, 40 mg, subcutaneous, Daily (6a)  •  famotidine, 10 mg, oral, BID AC  •  fluticasone propionate, 2 spray, Each Nostril, Daily PRN  •  hydrocortisone-pramoxine, , rectal, 4x daily PRN  •  ibuprofen, 600 mg, oral, q8h PRN  •  lactobacillus acidophilus complex, 1 tablet, oral, BID  •  lidocaine, 1 patch, Topical, Daily  •  magnesium sulfate, 2 g, intravenous, PRN  •  methadone, 60 mg, oral, Daily (6a)  •  metoclopramide, 5 mg, intravenous, q6h JAIDEN  •  metoprolol succinate XL, 25 mg, oral, Daily  •  naloxone, 0.2 mg, intravenous, Once PRN  •  nicotine, 1 patch, transdermal, Daily  •  nicotine polacrilex, 4 mg, buccal, q4h PRN  •  ondansetron ODT, 4 mg, oral, q8h PRN **OR** ondansetron, 4 mg, intravenous, q8h PRN  •  pantoprazole, 40 mg, oral, BID  •  PARoxetine,  30 mg, oral, Daily  •  potassium chloride, 20 mEq, oral, Daily PRN **OR** potassium chloride, 40 mEq, oral, Daily PRN **OR** potassium chloride, 20 mEq, intravenous, Daily PRN **OR** potassium chloride, 40 mEq, intravenous, Daily PRN  •  prazosin, 2 mg, oral, Nightly  •  pregabalin, 150 mg, oral, BID  •  QUEtiapine, 100 mg, oral, Nightly  •  zolpidem, 10 mg, oral, Nightly         Labs  CBC Results       01/18/21 01/17/21 01/11/21                    0610 0546 0445         WBC 9.80 5.40 6.81         RBC 3.46 3.54 3.69         HGB 9.7 10.1 10.5         HCT 31.6 32.5 33.4         MCV 91.3 91.8 90.5         MCH 28.0 28.5 28.5         MCHC 30.7 31.1 31.4          204 217                     CMP Results       01/18/21 01/11/21 01/04/21                    0610 0445 0513          136 138           136          K 4.4 4.6 4.3           4.6          Cl 99 98 103           98          CO2 32 31 25           31          Glucose 98 92 107           92          BUN 14 17 14           17          Creatinine 0.8 0.7 0.7           0.7          Calcium 8.8 9.0 9.6           9.0          Anion Gap 7 7 10           7          AST -- 19 --         ALT -- 9 --         Albumin -- 3.5 --         EGFR >60.0 >60.0 >60.0           >60.0                      PT PTT Results       04/06/19 04/19/17 04/16/17                    1551 0607 1512         PT 12.2 13.6 13.3         INR 0.9 -- 1.0         PTT 28 36 29         Comment for INR at 1551 on 04/06/19:   INR has no defined significance when PT is within Reference Range.    Comment for INR at 1512 on 04/16/17: INR HAS NO DEFINED SIGNIFICANCE WHEN PT IS WITHIN THE REFERENCE RANGE.    Comment for PTT at 0607 on 04/19/17: THE STANDARD THERAPEUTIC RANGE FOR HEPARIN IS 68  SECONDS.          Imaging  Reviewed last 24 hours      Assessment/Plan:  35F with opioid dependence, TV endocarditis, prolonged hospital stay for long term IV drug therapy, for whom we have been consulted for  regurgitation. This is likely due to delayed gastric emptying from opioid use and likely due to a lax lower esophageal sphincter.    -Continue reglan for now as this seems to have helped  -Will change her flexeril to baclofen, as this decreases frequency of lower esophageal relaxation events  - Continue PPI    Dr. Ghotra to see tomorrow      Expected Discharge Date:   2/2/2021 No discharge date for patient encounter.      Robert A. Frankel, MD  1/22/2021  1:42 PM

## 2021-01-22 NOTE — BEHAVIORAL HEALTH CRISIS PROGRESS NOTE
Behavioral Health Progress Note    Chief Complaint/Reason for follow-up: opioid dependence and depression    Interval History: Follow up with the pt who will remain for another 2 weeks IV antibiotics. The pt seems to be in good spirits despite remaining in the hospital as well with the pan she states she is enduring. The pt has questions about once she is ready to d/c and wanting to make sure this clinician is aware that there are no doctors or dosing on weekends if she would be d/c on a Friday to which I am. Stated to the pt that I will continue to follow along with treatment team and once we are looking at a d/c date again follow up with the methadone clinic. As well the pt discussed possibly having the 10mg of methadone PRN ordered for nighttime again as she is continually in a lot of pain. Stated will discuss with psych NP who is covering today and we will discuss again with her.     Vital Signs for the last 24 hours:  Temp:  [36.4 °C (97.5 °F)-36.8 °C (98.3 °F)] 36.5 °C (97.7 °F)  Heart Rate:  [69-75] 71  Resp:  [18] 18  BP: (121-137)/(70-90) 125/73    Assessment/Plan  Will discuss with psych to review additional 10mg methadone PRN.    8910 Secure chat from the pt's RN stating the pt is asking about her methadone order and if it will be ordered. Communicated with pt's RN that Dr. Padron will follow up with the pt and it was never communicated to the pt that a PRN order would be added.

## 2021-01-23 PROCEDURE — 63700000 HC SELF-ADMINISTRABLE DRUG: Performed by: INTERNAL MEDICINE

## 2021-01-23 PROCEDURE — 25800000 HC PHARMACY IV SOLUTIONS: Performed by: INTERNAL MEDICINE

## 2021-01-23 PROCEDURE — 63600000 HC DRUGS/DETAIL CODE: Performed by: HOSPITALIST

## 2021-01-23 PROCEDURE — 63600000 HC DRUGS/DETAIL CODE: Performed by: INTERNAL MEDICINE

## 2021-01-23 PROCEDURE — 99232 SBSQ HOSP IP/OBS MODERATE 35: CPT | Performed by: HOSPITALIST

## 2021-01-23 PROCEDURE — 12000000 HC ROOM AND CARE MED/SURG

## 2021-01-23 PROCEDURE — 99233 SBSQ HOSP IP/OBS HIGH 50: CPT | Performed by: PSYCHIATRY & NEUROLOGY

## 2021-01-23 PROCEDURE — 63700000 HC SELF-ADMINISTRABLE DRUG: Performed by: NURSE PRACTITIONER

## 2021-01-23 PROCEDURE — 93005 ELECTROCARDIOGRAM TRACING: CPT | Performed by: HOSPITALIST

## 2021-01-23 PROCEDURE — 63700000 HC SELF-ADMINISTRABLE DRUG: Performed by: HOSPITALIST

## 2021-01-23 PROCEDURE — 63700000 HC SELF-ADMINISTRABLE DRUG: Performed by: PSYCHIATRY & NEUROLOGY

## 2021-01-23 PROCEDURE — 25000000 HC PHARMACY GENERAL: Performed by: INTERNAL MEDICINE

## 2021-01-23 RX ORDER — METOCLOPRAMIDE HYDROCHLORIDE 5 MG/ML
5 INJECTION INTRAMUSCULAR; INTRAVENOUS EVERY 6 HOURS PRN
Status: DISCONTINUED | OUTPATIENT
Start: 2021-01-23 | End: 2021-02-03 | Stop reason: HOSPADM

## 2021-01-23 RX ADMIN — BUPROPION HYDROCHLORIDE 75 MG: 75 TABLET, FILM COATED ORAL at 09:10

## 2021-01-23 RX ADMIN — METOPROLOL SUCCINATE 25 MG: 25 TABLET, EXTENDED RELEASE ORAL at 09:10

## 2021-01-23 RX ADMIN — CEFAZOLIN SODIUM 2 G: 10 POWDER, FOR SOLUTION INTRAVENOUS at 20:16

## 2021-01-23 RX ADMIN — PREGABALIN 150 MG: 75 CAPSULE ORAL at 09:10

## 2021-01-23 RX ADMIN — DIPHENHYDRAMINE HYDROCHLORIDE 25 MG: 50 INJECTION INTRAMUSCULAR; INTRAVENOUS at 11:39

## 2021-01-23 RX ADMIN — CEFAZOLIN SODIUM 2 G: 10 POWDER, FOR SOLUTION INTRAVENOUS at 11:39

## 2021-01-23 RX ADMIN — Medication 1 PATCH: at 09:16

## 2021-01-23 RX ADMIN — FAMOTIDINE 10 MG: 10 TABLET, FILM COATED ORAL at 09:11

## 2021-01-23 RX ADMIN — BACLOFEN 5 MG: 10 TABLET ORAL at 09:10

## 2021-01-23 RX ADMIN — PREGABALIN 150 MG: 75 CAPSULE ORAL at 20:16

## 2021-01-23 RX ADMIN — DIPHENHYDRAMINE HYDROCHLORIDE 25 MG: 50 INJECTION INTRAMUSCULAR; INTRAVENOUS at 04:32

## 2021-01-23 RX ADMIN — METOCLOPRAMIDE 5 MG: 5 INJECTION, SOLUTION INTRAMUSCULAR; INTRAVENOUS at 17:58

## 2021-01-23 RX ADMIN — DIPHENHYDRAMINE HYDROCHLORIDE 25 MG: 50 INJECTION INTRAMUSCULAR; INTRAVENOUS at 17:58

## 2021-01-23 RX ADMIN — METOCLOPRAMIDE 5 MG: 5 INJECTION, SOLUTION INTRAMUSCULAR; INTRAVENOUS at 11:39

## 2021-01-23 RX ADMIN — BACLOFEN 5 MG: 10 TABLET ORAL at 20:18

## 2021-01-23 RX ADMIN — PANTOPRAZOLE SODIUM 40 MG: 40 TABLET, DELAYED RELEASE ORAL at 09:10

## 2021-01-23 RX ADMIN — BUPROPION HYDROCHLORIDE 75 MG: 75 TABLET, FILM COATED ORAL at 11:40

## 2021-01-23 RX ADMIN — METOCLOPRAMIDE 5 MG: 5 INJECTION, SOLUTION INTRAMUSCULAR; INTRAVENOUS at 06:43

## 2021-01-23 RX ADMIN — PANTOPRAZOLE SODIUM 40 MG: 40 TABLET, DELAYED RELEASE ORAL at 20:16

## 2021-01-23 RX ADMIN — ACETAMINOPHEN 650 MG: 325 TABLET, FILM COATED ORAL at 09:11

## 2021-01-23 RX ADMIN — FAMOTIDINE 10 MG: 10 TABLET, FILM COATED ORAL at 15:55

## 2021-01-23 RX ADMIN — BUPROPION HYDROCHLORIDE 75 MG: 75 TABLET, FILM COATED ORAL at 15:55

## 2021-01-23 RX ADMIN — PRAZOSIN HYDROCHLORIDE 2 MG: 1 CAPSULE ORAL at 21:34

## 2021-01-23 RX ADMIN — BACLOFEN 5 MG: 10 TABLET ORAL at 14:37

## 2021-01-23 RX ADMIN — ACETAMINOPHEN 650 MG: 325 TABLET, FILM COATED ORAL at 04:33

## 2021-01-23 RX ADMIN — PAROXETINE 30 MG: 20 TABLET, FILM COATED ORAL at 09:10

## 2021-01-23 RX ADMIN — ACETAMINOPHEN 650 MG: 325 TABLET, FILM COATED ORAL at 21:27

## 2021-01-23 RX ADMIN — ZOLPIDEM TARTRATE 10 MG: 5 TABLET, COATED ORAL at 21:26

## 2021-01-23 RX ADMIN — PROBIOTIC PRODUCT - TAB 1 TABLET: TAB at 20:16

## 2021-01-23 RX ADMIN — METHADONE HYDROCHLORIDE 60 MG: 10 TABLET ORAL at 06:42

## 2021-01-23 RX ADMIN — QUETIAPINE FUMARATE 100 MG: 100 TABLET ORAL at 21:26

## 2021-01-23 RX ADMIN — CEFAZOLIN SODIUM 2 G: 10 POWDER, FOR SOLUTION INTRAVENOUS at 04:32

## 2021-01-23 RX ADMIN — ACETAMINOPHEN 650 MG: 325 TABLET, FILM COATED ORAL at 15:55

## 2021-01-23 RX ADMIN — PROBIOTIC PRODUCT - TAB 1 TABLET: TAB at 09:10

## 2021-01-23 NOTE — ASSESSMENT & PLAN NOTE
-cont to c/o ongoing back pain despite radiographic improvement in lumbar osteomyelitis.   -pt c/o persistent pain but improved following increased in methadone and toradol dosaging. Pt requesting additional toradol dose    Plan:  -cont w/methadone 65mg qam per psychiatry   -Pain managment re-consulted. No changes made to regimen.  -Continue Lyrica, Baclofen, lidocaine patch, methadone. Will administer dose of toradol per patient request - caution however given GI sxs  -cont w/IV abx as stated above  -cont heating pad.   - cont w/LSO brace  - Neurosurgery consulted - per evaluation No acute surgical intervention required. Cont w/management with IV antibiotics. Will required follow-up and repeat MRI is recommended 4-6 weeks after discharge for reassement

## 2021-01-23 NOTE — PROGRESS NOTES
Hospital Medicine Service -  Daily Progress Note       SUBJECTIVE   Interval History: No acute overnight events. Patient reports 7/10 low back pain. Asking for higher dose methadone. No nausea/vomiting with Reglan.      OBJECTIVE      Vital signs in last 24 hours:  Temp:  [36.4 °C (97.6 °F)-36.7 °C (98 °F)] 36.4 °C (97.6 °F)  Heart Rate:  [69-74] 73  Resp:  [16-20] 20  BP: (115-140)/(59-83) 115/59  No intake or output data in the 24 hours ending 01/23/21 1614    PHYSICAL EXAMINATION      Physical Exam  Vitals signs and nursing note reviewed.   Constitutional:       General: She is not in acute distress.     Appearance: Normal appearance. She is well-developed.   HENT:      Head: Normocephalic and atraumatic.   Eyes:      Extraocular Movements: Extraocular movements intact.      Pupils: Pupils are equal, round, and reactive to light.   Neck:      Musculoskeletal: Normal range of motion and neck supple.   Cardiovascular:      Rate and Rhythm: Normal rate and regular rhythm.      Heart sounds: Normal heart sounds.   Pulmonary:      Effort: Pulmonary effort is normal. No respiratory distress.      Breath sounds: Normal breath sounds. No wheezing, rhonchi or rales.   Abdominal:      General: Bowel sounds are normal. There is no distension.      Palpations: Abdomen is soft.      Tenderness: There is no abdominal tenderness.   Musculoskeletal: Normal range of motion.         General: No swelling.      Comments: (+) lumbar/paralumbar tenderness to palpation.    Skin:     General: Skin is warm and dry.      Capillary Refill: Capillary refill takes less than 2 seconds.   Neurological:      Mental Status: She is alert and oriented to person, place, and time.        LINES, CATHETERS, DRAINS, AIRWAYS, AND WOUNDS   Lines, Drains, Airways, Wounds:  PICC Single Lumen 12/18/20 Right (Active)   Number of days: 36       Comments:      LABS / IMAGING / TELE      Labs  Results from last 7 days   Lab Units 01/18/21  0610   WBC K/uL  9.80   HEMOGLOBIN g/dL 9.7*   HEMATOCRIT % 31.6*   PLATELETS K/uL 195     Results from last 7 days   Lab Units 01/18/21  0610   SODIUM mEQ/L 138   POTASSIUM mEQ/L 4.4   CHLORIDE mEQ/L 99   CO2 mEQ/L 32   BUN mg/dL 14   CREATININE mg/dL 0.8   GLUCOSE mg/dL 98   CALCIUM mg/dL 8.8*     SARS-CoV-2 (COVID-19) (no units)   Date/Time Value   01/22/2021 1138 Negative       Imaging  I have independently reviewed the pertinent imaging from the last 24 hrs.    ECG/Telemetry  Not on telemetry.    ASSESSMENT AND PLAN      * Acute bacterial endocarditis  Assessment & Plan  -Tricuspid valve endocarditis with severe TR. Also with septic pulmonary emboli and seeding of lumbar spine.   -Blood cx at [facility] 12/7 and 12/8 grew MSSA. Blood cultures from 12/2020 negative for growth.Patient afebrile.   -Completed 42 days antibiotics on 1/18/21.   -Repeat MRI L-spine shows improved, but persistent enhancement in the lumbar joints. I spoke to Dr. Ness. He recommends to continue IV antibiotics for total 8 weeks then follow up as outpatient for repeat lumbar imaging. Continue IV abx until 2/1/21.  -Cardiology consulted. I discussed with Dr. Blas. No need for surgical intervention on tricuspid valve. Recommended she follow up as outpatient for repeat TTE.  -F/U with PCP for repeat CT chest as outpatient.      Nausea & vomiting  Assessment & Plan  -Intermittent nausea/vomiting with large meals. She reports it has been going on for several weeks, but worse over the past week. She regurgitates whole food about 5 minutes after eating. Worse with large meals. No hematemesis, hematochezia, weight loss. Her anemia is at baseline. She is having daily BMs.   -Upper GI series shows esophageal dysmotility and GERD..   -Continue Pepcid and PPI.   -Continue Baclofen and PRN Reglan. Check EKG for QTc.  -Continue small meals, low fat/low fiber diet.   -GI consulted. No plans for EGD or gastric emptying study.            Opioid type dependence,  "continuous use (CMS/[facility])  Assessment & Plan  -Continue methadone per psych recs.   -Patient declining inpatient psych rehab. Agrees to [facility]. States she has multiple resources to help maintain sobriety including a sponsor.  -Behavioral therapy and psychiatry following. They are working on setting patient up with methadone clinic for discharge.       Essential hypertension  Assessment & Plan  -BP stable  -Cont w/metoprolol, prazosin    Pain  Assessment & Plan  -Uncontrolled back pain despite radiographic improvement in lumbar osteomyelitis.   -Patient asking for higher doses of methadone. I discussed with Dr. Padron (psych). Recommends pain management re-consult.   -Continue Lyrica, Baclofen, lidocaine patch, methadone.   -Ordered heating pad.   -Pain managment re-consulted.     Migraine, intractable  Assessment & Plan  -Resolved with Fioricet.   -Neurology consulted. Rec Fioricet no more than 4 days/month, and over-the-counter medications no more than 15 days/month.    -Outpatient neurology follow up for further management/workup. Needs outpatient sleep study.        Insomnia  Assessment & Plan  -Cont w/ambien      Depression  Assessment & Plan  -No SI/HI.  -Continue Wellbutrin, Quetiapine, Ambien.  -Follow up with psychiatry as outpatient.      Anxiety  Assessment & Plan  -See \"depression\"       VTE Assessment: Padua VTE Score: 2  VTE Prophylaxis Plan: Current anticoagulant orders:              enoxaparin (LOVENOX) syringe 40 mg  Daily (6a)                 Code Status: Full Code  Estimated Discharge Date: 2/2/2021   Disposition Planning: Home     Francy Mock, DO  1/23/2021                 "

## 2021-01-23 NOTE — PROGRESS NOTES
"  GASTROENTEROLOGY DAILY PROGRESS NOTE  [facility]     PATIENT NAME:  Carolyn Redmond          YOB: 1985  AGE:  35 y.o.   MRN: [id number]      SUBJECTIVE   Patient does feel better using baclofen and Reglan-less nausea vomiting.    REVIEW OF SYSTEMS   13 point ROS unchanged    VITAL SIGNS   Height: 1.575 m (5' 2\") Weight: 102 kg (225 lb) Body mass index is 41.15 kg/m².  Vitals over the last 24 hours:   Temp:  [36.7 °C (98 °F)] 36.7 °C (98 °F)  Heart Rate:  [69-74] 74  Resp:  [16] 16  BP: (111-140)/(59-83) 133/73  PHYSICAL EXAM   Physical Exam  General appearance: alert, appears stated age and cooperative  Head: normocephalic  Lungs: clear to auscultation anteriorly   Heart: regular rate   Abdomen: soft, non-tender; bowel sounds normal; no masses, no organomegaly  Rectal:   Extremities: no edema  Skin: No jaundice  Neurologic: awake and alert      IMAGING AND LABS REVIEWED     Lab Results   Component Value Date    WBC 9.80 01/18/2021    HGB 9.7 (L) 01/18/2021    HCT 31.6 (L) 01/18/2021     01/18/2021    ALT 9 (L) 01/11/2021    AST 19 01/11/2021     01/18/2021    K 4.4 01/18/2021    CL 99 01/18/2021    CREATININE 0.8 01/18/2021    BUN 14 01/18/2021    CO2 32 01/18/2021    INR 0.9 04/06/2019     No results found.     ASSESSMENT/PLAN   35-year-old female with opioid dependence endocarditis prolonged hospitalization for IV drug therapy with ongoing nausea vomiting-responding well to baclofen and Reglan.  Patient made aware of potential side effects of Reglan for tardive dyskinesia.  Okay to increment baclofen and use Reglan sparingly.  Service: Gastroenterology    AUTHOR:  Roshni Ghotra DO  1/23/2021  "

## 2021-01-23 NOTE — PROGRESS NOTES
Psychiatry Progress Note    Chief Complaint/Reason for follow-up: opioid dependence and depression    Interval History: Patient seen again for follow-up.  She was inquiring about an additional 10mg pill of methadone to treat pain she feels in the evening.  Denies any cravings; strictly pain.  She denies feelings of depression or any thoughts to harm/kill herself or anyone else.      Review of Systems:   Sleep:  Good and Appetite: Good    Vital Signs for the last 24 hours:  Temp:  [36.7 °C (98 °F)] 36.7 °C (98 °F)  Heart Rate:  [69-74] 74  Resp:  [16] 16  BP: (111-140)/(59-83) 133/73      Current Facility-Administered Medications:   •  acetaminophen (TYLENOL) tablet 650 mg, 650 mg, oral, q6h INT, Erinn Barajas DO, 650 mg at 01/23/21 0911  •  alum-mag hydroxide-simeth (MAALOX) 200-200-20 mg/5 mL suspension 30 mL, 30 mL, oral, q6h PRN, Lili Crawford MD, 30 mL at 01/11/21 1946  •  baclofen (LIORESAL) tablet 5 mg, 5 mg, oral, TID, Frankel, Robert A, MD, 5 mg at 01/23/21 0910  •  buPROPion (WELLBUTRIN) tablet 75 mg, 75 mg, oral, TID, Marlon Padron MD, 75 mg at 01/23/21 1140  •  butalbital-acetaminophen-caff (FIORICET, ESGIC) per tablet 2 tablet, 2 tablet, oral, q4h PRN, Francy Mock DO, 2 tablet at 01/19/21 0800  •  ceFAZolin (ANCEF) NSS IVPB piggyback 2 g, 2 g, intravenous, q8h INT, Vikas Jones MD, Last Rate: 100 mL/hr at 01/23/21 1139, 2 g at 01/23/21 1139  •  glucose chewable tablet 16-32 g of dextrose, 16-32 g of dextrose, oral, PRN **OR** dextrose 40 % oral gel 15-30 g of dextrose, 15-30 g of dextrose, oral, PRN **OR** glucagon (GLUCAGEN) injection 1 mg, 1 mg, intramuscular, PRN **OR** dextrose in water injection 12.5 g, 25 mL, intravenous, PRN, Perfecto Angela MD  •  diphenhydrAMINE (BENADRYL) injection 25 mg, 25 mg, intravenous, q6h PRN, Lili Crawford MD, 25 mg at 01/23/21 1139  •  enoxaparin (LOVENOX) syringe 40 mg, 40 mg, subcutaneous, Daily (6a), Francy Mock,   •   famotidine (PEPCID) tablet 10 mg, 10 mg, oral, BID AC, Lili Crawford MD, 10 mg at 01/23/21 0911  •  fluticasone propionate (FLONASE) 50 mcg/actuation nasal spray 2 spray, 2 spray, Each Nostril, Daily PRN, Perfecto Angela MD  •  hydrocortisone-pramoxine (ANALPRAM-HC) 2.5-1 % rectal cream, , rectal, 4x daily PRN, Dominique Powell DO, Given at 12/19/20 1643  •  ibuprofen (MOTRIN) tablet 600 mg, 600 mg, oral, q8h PRN, Tisha Moise MD, 600 mg at 01/16/21 0828  •  lactobacillus acidophilus complex (BACID) 1 billion cell- 250 mg tablet 1 tablet, 1 tablet, oral, BID, Heidi Toscano CRNP, 1 tablet at 01/23/21 0910  •  lidocaine (ASPERCREME) 4 % topical patch 1 patch, 1 patch, Topical, Daily, Jacki Boss CRNP, Stopped at 01/23/21 0200  •  magnesium sulfate IVPB 2g in 50 mL NSS/D5W/SWFI, 2 g, intravenous, PRN, Perfecto Angela MD  •  methadone (DOLOPHINE) tablet 60 mg, 60 mg, oral, Daily (6a), Marlon Padron MD, 60 mg at 01/23/21 0642  •  metoclopramide (REGLAN) injection 5 mg, 5 mg, intravenous, q6h JAIDEN, Frankel, Robert A, MD, 5 mg at 01/23/21 1139  •  metoprolol succinate XL (TOPROL-XL) 24 hr ER tablet 25 mg, 25 mg, oral, Daily, Lili Crawford MD, 25 mg at 01/23/21 0910  •  naloxone (NARCAN) injection 0.2 mg, 0.2 mg, intravenous, Once PRN, Jacki Boss CRNP  •  nicotine (NICODERM CQ) 14 mg/24 hr patch 1 patch, 1 patch, transdermal, Daily, Gen Hogue MD, 1 patch at 01/23/21 0916  •  nicotine polacrilex (NICORETTE) gum 4 mg, 4 mg, buccal, q4h PRN, Perfecto Angela MD, 4 mg at 01/21/21 0851  •  ondansetron ODT (ZOFRAN-ODT) disintegrating tablet 4 mg, 4 mg, oral, q8h PRN, 4 mg at 01/19/21 2126 **OR** ondansetron (ZOFRAN) injection 4 mg, 4 mg, intravenous, q8h PRN, Zoila Gutierrez MD, 4 mg at 01/18/21 1846  •  pantoprazole (PROTONIX) tablet,delayed release (DR/EC) 40 mg, 40 mg, oral, BID, José Manuel Morris MD, 40 mg at 01/23/21 0910  •  PARoxetine (PAXIL) tablet 30 mg, 30 mg,  oral, Daily, Marlon Padron MD, 30 mg at 01/23/21 0910  •  potassium chloride (KLOR-CON) tablet extended release 20 mEq, 20 mEq, oral, Daily PRN **OR** potassium chloride (KLOR-CON) tablet extended release 40 mEq, 40 mEq, oral, Daily PRN **OR** potassium chloride 20 mEq in 100 mL IVPB  (premix), 20 mEq, intravenous, Daily PRN **OR** potassium chloride (KCL) 40 mEq/250 mL IVPB in NSS 40 mEq, 40 mEq, intravenous, Daily PRN, Perfecto Angela MD  •  prazosin (MINIPRESS) capsule 2 mg, 2 mg, oral, Nightly, Perfecto Angela MD, 2 mg at 01/22/21 2110  •  pregabalin (LYRICA) capsule 150 mg, 150 mg, oral, BID, Perfecto Angela MD, 150 mg at 01/23/21 0910  •  QUEtiapine (SEROquel) tablet 100 mg, 100 mg, oral, Nightly, Marlon Padron MD, 100 mg at 01/22/21 2110  •  zolpidem (AMBIEN) tablet 10 mg, 10 mg, oral, Nightly, Perfecto Angela MD, 10 mg at 01/22/21 2110    MSE:  Orientation: AAO x3  Behavior: Appropriate  Appearance: well groomed  Eye Contact: Fair throughout  Mood: “okay; i'm not throwing up anymore”  Affect: congruent; stable and appropriate  Speech: Coherent  Thought Process: Goal Directed and linear   Suicidal Ideation: Denies suicidal thoughts; intent or plan; denies passive death wish  Homicidal Ideation: Denies having homicidal thoughts, intent or plan  Hallucinations: Denies  Delusions: Denies  Cognition: No gross cognitive deficits  Memory: Remote; Immediate; all intact  Insight: Fair  Judgment: Fair      Assessment/Plan  Unspecified mood (affective) disorder (CMS/[facility])  Assessment & Plan  Patient with history of depression and addiction.  1. Continue paroxetine 30mg qam  2. Continue bupropion 75mg TID  3. Continue quetiapine 100mg qhs  4.  to please speak with patient about options for inpatient/outpatient rehab and follow-up with a psychiatrist/therapist      Opioid type dependence, continuous use (CMS/[facility])  Assessment & Plan  1. Continue methadone 60mg qam (Hold for sedation;  O2 < 95; SBP <100; DBP < 60; HR < 60; RR< 10; QTc > 470)  2. DC 1:1 sitter for now but please insure patient swallows all pills given  3. Patient would benefit from inpatient rehab where she can receive iv antibioitcs  4. Please encourage her to do intake with methadone clinic so that she can continue treatment without a lapse upon discharge.  5. She is continuing to show drug seeking behavior and asking for additional methadone for pain. Will ask hms to consider reconsulting pain management.  Patient will likely benefit from cbt/dbt as well      Spent 35 minutes in total treatment including chart review; consultation with patient; and note writing    Marlon Padron MD  1/23/2021

## 2021-01-24 PROBLEM — F11.10 OPIOID ABUSE (CMS/HCC): Status: RESOLVED | Noted: 2020-12-10 | Resolved: 2021-01-24

## 2021-01-24 LAB
ATRIAL RATE: 71
ATRIAL RATE: 72
P AXIS: 38
P AXIS: 43
PR INTERVAL: 158
PR INTERVAL: 162
QRS DURATION: 94
QRS DURATION: 94
QT INTERVAL: 402
QT INTERVAL: 404
QTC CALCULATION(BAZETT): 439
QTC CALCULATION(BAZETT): 440
R AXIS: 46
R AXIS: 49
T WAVE AXIS: 59
T WAVE AXIS: 62
VENTRICULAR RATE: 71
VENTRICULAR RATE: 72

## 2021-01-24 PROCEDURE — 63700000 HC SELF-ADMINISTRABLE DRUG: Performed by: INTERNAL MEDICINE

## 2021-01-24 PROCEDURE — 63700000 HC SELF-ADMINISTRABLE DRUG: Performed by: HOSPITALIST

## 2021-01-24 PROCEDURE — 25800000 HC PHARMACY IV SOLUTIONS: Performed by: INTERNAL MEDICINE

## 2021-01-24 PROCEDURE — 12000000 HC ROOM AND CARE MED/SURG

## 2021-01-24 PROCEDURE — 63600000 HC DRUGS/DETAIL CODE: Performed by: HOSPITALIST

## 2021-01-24 PROCEDURE — 25000000 HC PHARMACY GENERAL: Performed by: INTERNAL MEDICINE

## 2021-01-24 PROCEDURE — 63700000 HC SELF-ADMINISTRABLE DRUG: Performed by: NURSE PRACTITIONER

## 2021-01-24 PROCEDURE — 99231 SBSQ HOSP IP/OBS SF/LOW 25: CPT | Performed by: HOSPITALIST

## 2021-01-24 PROCEDURE — 63600000 HC DRUGS/DETAIL CODE: Performed by: INTERNAL MEDICINE

## 2021-01-24 PROCEDURE — 99233 SBSQ HOSP IP/OBS HIGH 50: CPT | Performed by: NURSE PRACTITIONER

## 2021-01-24 PROCEDURE — 63700000 HC SELF-ADMINISTRABLE DRUG: Performed by: PSYCHIATRY & NEUROLOGY

## 2021-01-24 RX ORDER — BACLOFEN 10 MG/1
10 TABLET ORAL 3 TIMES DAILY
Status: DISCONTINUED | OUTPATIENT
Start: 2021-01-24 | End: 2021-02-01

## 2021-01-24 RX ADMIN — PREGABALIN 150 MG: 75 CAPSULE ORAL at 20:11

## 2021-01-24 RX ADMIN — BACLOFEN 5 MG: 10 TABLET ORAL at 08:21

## 2021-01-24 RX ADMIN — PROBIOTIC PRODUCT - TAB 1 TABLET: TAB at 20:11

## 2021-01-24 RX ADMIN — BACLOFEN 10 MG: 10 TABLET ORAL at 20:11

## 2021-01-24 RX ADMIN — FAMOTIDINE 10 MG: 10 TABLET, FILM COATED ORAL at 15:17

## 2021-01-24 RX ADMIN — CEFAZOLIN SODIUM 2 G: 10 POWDER, FOR SOLUTION INTRAVENOUS at 20:12

## 2021-01-24 RX ADMIN — QUETIAPINE FUMARATE 100 MG: 100 TABLET ORAL at 21:15

## 2021-01-24 RX ADMIN — PANTOPRAZOLE SODIUM 40 MG: 40 TABLET, DELAYED RELEASE ORAL at 08:21

## 2021-01-24 RX ADMIN — DIPHENHYDRAMINE HYDROCHLORIDE 25 MG: 50 INJECTION INTRAMUSCULAR; INTRAVENOUS at 05:00

## 2021-01-24 RX ADMIN — PAROXETINE 30 MG: 20 TABLET, FILM COATED ORAL at 08:22

## 2021-01-24 RX ADMIN — ACETAMINOPHEN 650 MG: 325 TABLET, FILM COATED ORAL at 15:17

## 2021-01-24 RX ADMIN — PRAZOSIN HYDROCHLORIDE 2 MG: 1 CAPSULE ORAL at 21:15

## 2021-01-24 RX ADMIN — BUPROPION HYDROCHLORIDE 75 MG: 75 TABLET, FILM COATED ORAL at 08:21

## 2021-01-24 RX ADMIN — BACLOFEN 10 MG: 10 TABLET ORAL at 15:17

## 2021-01-24 RX ADMIN — METOCLOPRAMIDE 5 MG: 5 INJECTION, SOLUTION INTRAMUSCULAR; INTRAVENOUS at 15:21

## 2021-01-24 RX ADMIN — ZOLPIDEM TARTRATE 10 MG: 5 TABLET, COATED ORAL at 21:15

## 2021-01-24 RX ADMIN — ACETAMINOPHEN 650 MG: 325 TABLET, FILM COATED ORAL at 04:56

## 2021-01-24 RX ADMIN — FAMOTIDINE 10 MG: 10 TABLET, FILM COATED ORAL at 08:21

## 2021-01-24 RX ADMIN — PANTOPRAZOLE SODIUM 40 MG: 40 TABLET, DELAYED RELEASE ORAL at 20:12

## 2021-01-24 RX ADMIN — BUTALBITAL, ACETAMINOPHEN AND CAFFEINE 2 TABLET: 50; 325; 40 TABLET ORAL at 08:21

## 2021-01-24 RX ADMIN — DIPHENHYDRAMINE HYDROCHLORIDE 25 MG: 50 INJECTION INTRAMUSCULAR; INTRAVENOUS at 17:38

## 2021-01-24 RX ADMIN — METOPROLOL SUCCINATE 25 MG: 25 TABLET, EXTENDED RELEASE ORAL at 08:21

## 2021-01-24 RX ADMIN — ACETAMINOPHEN 650 MG: 325 TABLET, FILM COATED ORAL at 21:15

## 2021-01-24 RX ADMIN — CEFAZOLIN SODIUM 2 G: 10 POWDER, FOR SOLUTION INTRAVENOUS at 11:44

## 2021-01-24 RX ADMIN — Medication 1 PATCH: at 08:22

## 2021-01-24 RX ADMIN — DIPHENHYDRAMINE HYDROCHLORIDE 25 MG: 50 INJECTION INTRAMUSCULAR; INTRAVENOUS at 11:44

## 2021-01-24 RX ADMIN — BUPROPION HYDROCHLORIDE 75 MG: 75 TABLET, FILM COATED ORAL at 15:17

## 2021-01-24 RX ADMIN — BUPROPION HYDROCHLORIDE 75 MG: 75 TABLET, FILM COATED ORAL at 11:44

## 2021-01-24 RX ADMIN — CEFAZOLIN SODIUM 2 G: 10 POWDER, FOR SOLUTION INTRAVENOUS at 04:56

## 2021-01-24 RX ADMIN — METHADONE HYDROCHLORIDE 60 MG: 10 TABLET ORAL at 07:04

## 2021-01-24 RX ADMIN — PROBIOTIC PRODUCT - TAB 1 TABLET: TAB at 08:21

## 2021-01-24 RX ADMIN — ACETAMINOPHEN 650 MG: 325 TABLET, FILM COATED ORAL at 08:21

## 2021-01-24 RX ADMIN — PREGABALIN 150 MG: 75 CAPSULE ORAL at 08:21

## 2021-01-24 NOTE — ASSESSMENT & PLAN NOTE
35yoF admitted with tricuspid valve endocarditis with severe TR, septic pulmonary emboli and seeding of lumbar spine.   Pt completed 6w of antibiotics on 1/18/21.  MRI spine 1/19/21 improvement in the previously seen diffuse anterior and posterior epidural enhancement within the lumbar spine with only mildly prominent enhancement persisting from L4 to L5 and residual enhancement about the left greater than right facet joints.  Due to imaging, pt is being continued on IV antibiotics for two more weeks.      Patient with history of opioid misuse.  Urine drug screen on 12/9 positive for cannabinoids and barbituates.  [facility] has 21 prescribers and 110 scripts in past 12 months.  Per PDMP, pt previously was on suboxone, most recent script dating back to Aug 2020.      Plan:   - Currently on methadon 60mg PO daily ordered by psych  - Psych and SW consulted for addiction support  - Recommend facility for supervision during completion of IV antibiotic course.   - Pt is at HIGH risk for opioid abuse based on ORT tool.

## 2021-01-24 NOTE — PROGRESS NOTES
Hospital Medicine Service -  Daily Progress Note       SUBJECTIVE   Interval History: No acute overnight events. Patient reports continued back pain. I reviewed our current multimodal pain regimen. No chest pain. No fevers.      OBJECTIVE      Vital signs in last 24 hours:  Temp:  [36.4 °C (97.5 °F)-36.9 °C (98.4 °F)] 36.9 °C (98.4 °F)  Heart Rate:  [67-73] 73  Resp:  [20] 20  BP: (121-133)/(59-80) 133/59  No intake or output data in the 24 hours ending 01/24/21 1421    PHYSICAL EXAMINATION      Physical Exam  Vitals signs and nursing note reviewed.   Constitutional:       General: She is not in acute distress.     Appearance: Normal appearance. She is well-developed.   HENT:      Head: Normocephalic and atraumatic.   Eyes:      Extraocular Movements: Extraocular movements intact.      Pupils: Pupils are equal, round, and reactive to light.   Neck:      Musculoskeletal: Normal range of motion and neck supple.   Cardiovascular:      Rate and Rhythm: Normal rate and regular rhythm.      Heart sounds: Normal heart sounds.   Pulmonary:      Effort: Pulmonary effort is normal. No respiratory distress.      Breath sounds: Normal breath sounds. No wheezing, rhonchi or rales.   Abdominal:      General: Bowel sounds are normal. There is no distension.      Palpations: Abdomen is soft.      Tenderness: There is no abdominal tenderness.   Musculoskeletal: Normal range of motion.         General: No swelling.   Skin:     General: Skin is warm and dry.      Capillary Refill: Capillary refill takes less than 2 seconds.   Neurological:      Mental Status: She is alert and oriented to person, place, and time.        LINES, CATHETERS, DRAINS, AIRWAYS, AND WOUNDS   Lines, Drains, Airways, Wounds:  PICC Single Lumen 12/18/20 Right (Active)   Number of days: 37       Comments:      LABS / IMAGING / TELE      Labs  No new labs.    SARS-CoV-2 (COVID-19) (no units)   Date/Time Value   01/22/2021 1138 Negative       Imaging  I have  independently reviewed the pertinent imaging from the last 24 hrs.    ECG/Telemetry  Patient is not on telemetry.    ASSESSMENT AND PLAN      * Acute bacterial endocarditis  Assessment & Plan  -Tricuspid valve endocarditis with severe TR. Also with septic pulmonary emboli and seeding of lumbar spine.   -Blood cx at [facility] 12/7 and 12/8 grew MSSA. Blood cultures from 12/2020 negative for growth.Patient afebrile.   -Completed 42 days antibiotics on 1/18/21.   -Repeat MRI L-spine shows improved, but persistent enhancement in the lumbar joints. I spoke to Dr. Ness. He recommends to continue IV antibiotics for total 8 weeks then follow up as outpatient for repeat lumbar imaging. Continue IV abx until 2/1/21.  -Cardiology consulted. I discussed with Dr. Blas. No need for surgical intervention on tricuspid valve. Recommended she follow up as outpatient for repeat TTE.  -F/U with PCP for repeat CT chest as outpatient.      Nausea & vomiting  Assessment & Plan  -Intermittent nausea/vomiting with large meals. She reports it has been going on for several weeks, but worse over the past week. She regurgitates whole food about 5 minutes after eating. Worse with large meals. No hematemesis, hematochezia, weight loss. Her anemia is at baseline. She is having daily BMs.   -Upper GI series shows esophageal dysmotility and GERD..   -Continue Pepcid and PPI.   -Continue Baclofen and PRN Reglan. QTc 440 on 1/23.  -Continue small meals, low fat/low fiber diet.   -GI consulted. No plans for EGD or gastric emptying study.            Opioid type dependence, continuous use (CMS/[facility])  Assessment & Plan  -Continue methadone per psych recs.   -Patient declining inpatient psych rehab. Agrees to IOP. States she has multiple resources to help maintain sobriety including a sponsor.  -Behavioral therapy and psychiatry following. They are working on setting patient up with methadone clinic for discharge.       Essential  "hypertension  Assessment & Plan  -BP stable  -Cont w/metoprolol, prazosin    Pain  Assessment & Plan  -Uncontrolled back pain despite radiographic improvement in lumbar osteomyelitis.   -Patient asking for higher doses of methadone. I discussed with Dr. Padron (psych). Recommends no change to methadone dose.  -Continue Lyrica, Baclofen, lidocaine patch, methadone.   -Ordered heating pad.   -Pain managment re-consulted. No changes made to regimen.    Migraine, intractable  Assessment & Plan  -Resolved with Fioricet.   -Neurology consulted. Rec Fioricet no more than 4 days/month, and over-the-counter medications no more than 15 days/month.    -Outpatient neurology follow up for further management/workup. Needs outpatient sleep study.        Insomnia  Assessment & Plan  -Cont w/ambien      Depression  Assessment & Plan  -No SI/HI.  -Continue Wellbutrin, Quetiapine, Ambien.  -Follow up with psychiatry as outpatient.      Anxiety  Assessment & Plan  -See \"depression\"       VTE Assessment: Padua VTE Score: 2  VTE Prophylaxis Plan: Current anticoagulant orders:              enoxaparin (LOVENOX) syringe 40 mg  Daily (6a)                 Code Status: Full Code  Estimated Discharge Date: 2/2/2021   Disposition Planning: Home     Francy Mock, DO  1/24/2021                 "

## 2021-01-24 NOTE — PROGRESS NOTES
"  GASTROENTEROLOGY DAILY PROGRESS NOTE  [facility]     PATIENT NAME:  Carolyn Redmond          YOB: 1985  AGE:  35 y.o.   MRN: [id number]      SUBJECTIVE   Continues to feel well from nausea vomiting-reports baclofen seems to be helpful.  Has not required Reglan since Friday dosage    REVIEW OF SYSTEMS   13 point ROS unchanged    VITAL SIGNS   Height: 1.575 m (5' 2\") Weight: 102 kg (225 lb) Body mass index is 41.15 kg/m².  Vitals over the last 24 hours:   Temp:  [36.4 °C (97.5 °F)-36.9 °C (98.4 °F)] 36.9 °C (98.4 °F)  Heart Rate:  [67-73] 73  Resp:  [20] 20  BP: (115-133)/(59-80) 133/59  PHYSICAL EXAM   Physical Exam  General appearance: alert, appears stated age and cooperative  Head: normocephalic  Lungs: clear to auscultation anteriorly   Heart: regular rate   Abdomen: soft, non-tender; bowel sounds normal; no masses, no organomegaly  Rectal:   Extremities: no edema  Skin: No jaundice  Neurologic: awake and alert      IMAGING AND LABS REVIEWED     Lab Results   Component Value Date    WBC 9.80 01/18/2021    HGB 9.7 (L) 01/18/2021    HCT 31.6 (L) 01/18/2021     01/18/2021    ALT 9 (L) 01/11/2021    AST 19 01/11/2021     01/18/2021    K 4.4 01/18/2021    CL 99 01/18/2021    CREATININE 0.8 01/18/2021    BUN 14 01/18/2021    CO2 32 01/18/2021    INR 0.9 04/06/2019     No results found.     ASSESSMENT/PLAN   35-year-old female with opioid dependence endocarditis prolonged hospitalization for IV drug therapy with ongoing nausea vomiting-responding well to baclofen and Reglan.  Patient made aware of potential side effects of Reglan for tardive dyskinesia.  Okay to increment baclofen and use Reglan sparingly.    10/24/2021 GI update-increased baclofen to 10 mg 3 times daily.  Continue current treatment.  Patient made aware Reglan is available as needed but to use sparingly due to potential tardive dyskinesia side effects.  Call if needed from a GI standpoint  Service: " Gastroenterology    AUTHOR:  Roshni Ghotra, DO  1/24/2021

## 2021-01-24 NOTE — PLAN OF CARE
"  Problem: Adult Inpatient Plan of Care  Goal: Plan of Care Review  Outcome: Not progressing  Flowsheets (Taken 1/24/2021 1528)  Progress: no change  Plan of Care Reviewed With: patient  Outcome Summary: Patient very manipulative with care. Requesting IV Benadryl hours in advance to help with her anxiety even though it is ordered PRN migraines. Patient requesting reglan. When asked if she was nauseous she stated no. She described her symptoms as heartburn and was given her scheduled pepcid.  She stated that the pepcid does not work. Informed patient that reglan was ordered PRN for nausea and vomiting. Then patient stated that she has been \"vomiting all day\" even though she did not mention that to this RN, pain renea MAGALLON or Dr. Mock. Despite \"vomiting all day\" she has been eating all meals and asking frequently for orange and grape popsicles. Reglan given per request. Ambulating independently in room.    "

## 2021-01-24 NOTE — PROGRESS NOTES
Palliative Care Progress Note    Patient Name: Carolyn Redmond  Patient MRN: [id number]  Date / Time: 1/24/2021 12:23 PM   Attending Physician: Francy Mock, DO    This is Hospital Day: 47    Assessment and Plan  Drug-induced constipation  Assessment & Plan  (unstable)    Pt at risk for constipation secondary to methadone use.  LBM unknown.        Plan:  - Pt is not currently on bowel regimen.  Would recommend senna 1tab PO BID PRN and miralax daily PRN  - Monitor bowel movements and adjust regimen accordingly.         Opioid type dependence, continuous use (CMS/[facility])  Assessment & Plan  35yoF admitted with tricuspid valve endocarditis with severe TR, septic pulmonary emboli and seeding of lumbar spine.   Pt completed 6w of antibiotics on 1/18/21.  MRI spine 1/19/21 improvement in the previously seen diffuse anterior and posterior epidural enhancement within the lumbar spine with only mildly prominent enhancement persisting from L4 to L5 and residual enhancement about the left greater than right facet joints.  Due to imaging, pt is being continued on IV antibiotics for two more weeks.      Patient with history of opioid misuse.  Urine drug screen on 12/9 positive for cannabinoids and barbituates.  [facility] [place] has 21 prescribers and 110 scripts in past 12 months.  Per PDMP, pt previously was on suboxone, most recent script dating back to Aug 2020.      Plan:   - Currently on methadon 60mg PO daily ordered by psych  - Psych and SW consulted for addiction support  - Recommend facility for supervision during completion of IV antibiotic course.   - Pt is at HIGH risk for opioid abuse based on ORT tool.      Pain  Assessment & Plan  (unstable)   Pt has a history of vasculitis, chronic abdominal and back pain, fibromyalgia, mirgraines and intermittent pancreatitis. On admission MRI imaging showed enhancement of the posterior epidural space and bilateral L4-5 facet joints, concerning for early osteomyelitis.  On IV  antibiotics for  osteo/discitis and TV endocarditis.      1/24 Pt reports 7/10 low back pain, chronic in nature.  Back pain has increased since hospitalization due to limited activity.  She is currently on multi-modal regimen, including tylenol atc, lido patch, methadone, lyrica.  Pt is asking whether she could be given dose of methadone at night.  I spoke with psych (Dr. Padron) about split dosing and unfortunately, this cannot be continued as outpt at her methadone clinic so would not be a good long term plan for her.  I encouraged pt to continue multimodal regimen as well as using heating pad, stretching.  If pain is severe, I recommend utilizing motrin PRN.  She is agreeable to plan.      Multi-modal pain regimen recommend.   Cr- 0.8, CrCl-112.  Liver enzymes WNL on  1/3/2021    Would recommend the following:  - continue methadone 60mg daily per psych.   - continue acetaminophen 650mg PO q6hr.  Recheck liver enzymes  - C/w ibuprofen 600mg PO q8hr PRN    Note Epic states that pt has allergy to NSAIDs with nausea as only symptom.  Pt has previously taken on this admission.  .     - continue lyrica 150mg PO BID (home med per PDMP)  - continue lidocaine patch  - Reiki consult  - Heating pad   - Encourage mobility  - note pt is currently taking baclofen 10mg PO TID.  Pt is at risk for baclofen withdrawal if this is abruptly discontinued.     - do NOT recommend discharge with Opioids  - recommend pt follow-up with outpatient pain management specialist at dischrage.          Anxiety  Assessment & Plan    Pt with history of anxiety.  Pt e is currently taking wellbutrin 75mg, paxil 30mg, seroquel 100mg and ambien 10mg.         Plan:  - Psych following  - Avoid benzos with opioid therapy  - Recommend pulse ox to monitor for resp depression    Capacity for Medical Decision Making: intact      Conversation/Goals of Care:  Pain management       Discussed with Medical Staff: Dr. Mock.       Interval History  (Subjective)    Source: chart review and the patient    Pt awake, ambulating in room.  Reports low back pain, chronic in nature but worse during hospitalization.  Tells me its 7/10 at its worst and 4/10 at its best.      Current Medications:  •  acetaminophen, 650 mg, oral, q6h INT  •  alum-mag hydroxide-simeth, 30 mL, oral, q6h PRN  •  baclofen, 10 mg, oral, TID  •  buPROPion, 75 mg, oral, TID  •  ceFAZolin, 2 g, intravenous, q8h INT  •  glucose, 16-32 g of dextrose, oral, PRN **OR** dextrose, 15-30 g of dextrose, oral, PRN **OR** glucagon, 1 mg, intramuscular, PRN **OR** dextrose in water, 25 mL, intravenous, PRN  •  diphenhydrAMINE, 25 mg, intravenous, q6h PRN  •  enoxaparin, 40 mg, subcutaneous, Daily (6a)  •  famotidine, 10 mg, oral, BID AC  •  fluticasone propionate, 2 spray, Each Nostril, Daily PRN  •  hydrocortisone-pramoxine, , rectal, 4x daily PRN  •  ibuprofen, 600 mg, oral, q8h PRN  •  lactobacillus acidophilus complex, 1 tablet, oral, BID  •  lidocaine, 1 patch, Topical, Daily  •  magnesium sulfate, 2 g, intravenous, PRN  •  methadone, 60 mg, oral, Daily (6a)  •  metoclopramide, 5 mg, intravenous, q6h PRN  •  metoprolol succinate XL, 25 mg, oral, Daily  •  naloxone, 0.2 mg, intravenous, Once PRN  •  nicotine, 1 patch, transdermal, Daily  •  nicotine polacrilex, 4 mg, buccal, q4h PRN  •  pantoprazole, 40 mg, oral, BID  •  PARoxetine, 30 mg, oral, Daily  •  potassium chloride, 20 mEq, oral, Daily PRN **OR** potassium chloride, 40 mEq, oral, Daily PRN **OR** potassium chloride, 20 mEq, intravenous, Daily PRN **OR** potassium chloride, 40 mEq, intravenous, Daily PRN  •  prazosin, 2 mg, oral, Nightly  •  pregabalin, 150 mg, oral, BID  •  QUEtiapine, 100 mg, oral, Nightly  •  zolpidem, 10 mg, oral, Nightly    Objective    Physical Exam:  General:   No Acute Distress  Eyes:  Sclera Anicteric  ENMT:  Mucus Membranes Dry  CV:  Rate  normal  Lungs:  Respiratory Rate  Normal  Abdomen:  Bowel Sounds present  Psych:   Appropriate and Cooperative  Neuro:  Awake and Alert  Skin:  Rash absent  M/S: no weakness      Vitals:  Vitals:    01/24/21 0821   BP:    Pulse:    Resp:    Temp:    SpO2: 96%       Intake & Output:  No intake/output data recorded.    Laboratory Studies:    CBC Results       01/18/21 01/17/21 01/11/21                    0610 0546 0445         WBC 9.80 5.40 6.81         RBC 3.46 3.54 3.69         HGB 9.7 10.1 10.5         HCT 31.6 32.5 33.4         MCV 91.3 91.8 90.5         MCH 28.0 28.5 28.5         MCHC 30.7 31.1 31.4          204 217                     CMP Results       01/18/21 01/11/21 01/04/21                    0610 0445 0513          136 138           136          K 4.4 4.6 4.3           4.6          Cl 99 98 103           98          CO2 32 31 25           31          Glucose 98 92 107           92          BUN 14 17 14           17          Creatinine 0.8 0.7 0.7           0.7          Calcium 8.8 9.0 9.6           9.0          Anion Gap 7 7 10           7          AST -- 19 --         ALT -- 9 --         Albumin -- 3.5 --         EGFR >60.0 >60.0 >60.0           >60.0                         Troponin I Results       12/09/20 09/29/19 05/03/17                    1348 1639 1606         Troponin I <0.02 <0.02 <0.02  A rise or fall of troponin with at least one abnormal value is consistent with myocardial injury or necrosis in the presence of appropriate clinical, ECG, and/or imaging abnormalities.                          ABG Results       03/21/17                          2115           pH 7.48           pCO2 29.0           pO2 66           HCO3 21.6           Base Excess NEG 1.0           Source Of Oxygen ROOM AIR                           I have reviewed the patient's pertinent labs to the time of note. Pertinent labs are within normal limits.      Imaging and Other Studies:     Radiology Imaging   XR CHEST 1 VW    Narrative CLINICAL HISTORY:   PICC line  placement.    COMMENT:    Comparison:   Chest radiographs dating 12/9/2020.    Single frontal AP view of the chest was obtained.    There is interval placement of a right-sided PICC line catheter.  Initially the  tip of the catheter was in the right internal jugular vein, however on the later  radiograph obtained at 2:36 PM, the tip of the catheter is located within the  mid SVC.  The cardiac/mediastinal contour are unremarkable for technique.  The  lungs are clear for technique.  No pleural effusion or evidence of a  pneumothorax.      Impression IMPRESSION:    Interval placement of a right-sided PICC line catheter as described above.                                                                             Cardiac Imaging   TRANSTHORACIC ECHO (TTE) LIMITED 12/30/2020    Narrative · Normal-sized LV.  · Normal LV systolic function. Wall motion appears grossly normal.  · Normal-sized RV. Normal RV systolic function.  · Normal-sized LA.  · Normal-sized RA.  · Tricuspid valve primarily anterior leaflet appears mildly thickened with   increased echogenicity. Distinct vegetation could not be appreciated as it   was on transesophageal echo. Tricuspid regurgitation. Mild but was   difficult to evaluate as the jet appears to begin in the proximity of the   anterior leaflet and hugs tricuspid valve and the intra-atrial septum.   Previously, by JANUSZ, identified leaflet perforation.  · This was a limited echocardiogram to evaluate tricuspid valve   endocarditis after just over 3 weeks of antibiotic therapy. By   transthoracic echo no major change in the tricuspid regurgitation.           I have independently reviewed the pertinent imaging to the time of note and agree with reported results.      Natalie Nuno, SHERITA  Office [phone]  Pager [phone]

## 2021-01-25 LAB
ANION GAP SERPL CALC-SCNC: 9 MEQ/L (ref 3–15)
BUN SERPL-MCNC: 13 MG/DL (ref 8–20)
CALCIUM SERPL-MCNC: 8.8 MG/DL (ref 8.9–10.3)
CHLORIDE SERPL-SCNC: 101 MEQ/L (ref 98–109)
CO2 SERPL-SCNC: 27 MEQ/L (ref 22–32)
CREAT SERPL-MCNC: 0.8 MG/DL (ref 0.6–1.1)
ERYTHROCYTE [DISTWIDTH] IN BLOOD BY AUTOMATED COUNT: 15.8 % (ref 11.7–14.4)
GFR SERPL CREATININE-BSD FRML MDRD: >60 ML/MIN/1.73M*2
GLUCOSE SERPL-MCNC: 113 MG/DL (ref 70–99)
HCT VFR BLDCO AUTO: 34.6 % (ref 35–45)
HGB BLD-MCNC: 10.9 G/DL (ref 11.8–15.7)
MCH RBC QN AUTO: 27.7 PG (ref 28–33.2)
MCHC RBC AUTO-ENTMCNC: 31.5 G/DL (ref 32.2–35.5)
MCV RBC AUTO: 88 FL (ref 83–98)
PDW BLD AUTO: 10.1 FL (ref 9.4–12.3)
PLATELET # BLD AUTO: 228 K/UL (ref 150–369)
POTASSIUM SERPL-SCNC: 4.5 MEQ/L (ref 3.6–5.1)
RBC # BLD AUTO: 3.93 M/UL (ref 3.93–5.22)
SODIUM SERPL-SCNC: 137 MEQ/L (ref 136–144)
WBC # BLD AUTO: 5.1 K/UL (ref 3.8–10.5)

## 2021-01-25 PROCEDURE — 63700000 HC SELF-ADMINISTRABLE DRUG: Performed by: INTERNAL MEDICINE

## 2021-01-25 PROCEDURE — 80048 BASIC METABOLIC PNL TOTAL CA: CPT | Performed by: INTERNAL MEDICINE

## 2021-01-25 PROCEDURE — 36415 COLL VENOUS BLD VENIPUNCTURE: CPT | Performed by: INTERNAL MEDICINE

## 2021-01-25 PROCEDURE — 63600000 HC DRUGS/DETAIL CODE: Performed by: INTERNAL MEDICINE

## 2021-01-25 PROCEDURE — 12000000 HC ROOM AND CARE MED/SURG

## 2021-01-25 PROCEDURE — 25800000 HC PHARMACY IV SOLUTIONS: Performed by: INTERNAL MEDICINE

## 2021-01-25 PROCEDURE — 85027 COMPLETE CBC AUTOMATED: CPT | Performed by: INTERNAL MEDICINE

## 2021-01-25 PROCEDURE — 63700000 HC SELF-ADMINISTRABLE DRUG: Performed by: NURSE PRACTITIONER

## 2021-01-25 PROCEDURE — 25000000 HC PHARMACY GENERAL: Performed by: INTERNAL MEDICINE

## 2021-01-25 PROCEDURE — 63700000 HC SELF-ADMINISTRABLE DRUG: Performed by: HOSPITALIST

## 2021-01-25 PROCEDURE — 63700000 HC SELF-ADMINISTRABLE DRUG: Performed by: PSYCHIATRY & NEUROLOGY

## 2021-01-25 PROCEDURE — 99232 SBSQ HOSP IP/OBS MODERATE 35: CPT | Performed by: INTERNAL MEDICINE

## 2021-01-25 PROCEDURE — 63600000 HC DRUGS/DETAIL CODE: Performed by: HOSPITALIST

## 2021-01-25 RX ADMIN — BACLOFEN 10 MG: 10 TABLET ORAL at 08:35

## 2021-01-25 RX ADMIN — PANTOPRAZOLE SODIUM 40 MG: 40 TABLET, DELAYED RELEASE ORAL at 20:27

## 2021-01-25 RX ADMIN — DIPHENHYDRAMINE HYDROCHLORIDE 25 MG: 50 INJECTION INTRAMUSCULAR; INTRAVENOUS at 11:56

## 2021-01-25 RX ADMIN — METOCLOPRAMIDE 5 MG: 5 INJECTION, SOLUTION INTRAMUSCULAR; INTRAVENOUS at 11:52

## 2021-01-25 RX ADMIN — PANTOPRAZOLE SODIUM 40 MG: 40 TABLET, DELAYED RELEASE ORAL at 08:36

## 2021-01-25 RX ADMIN — BACLOFEN 10 MG: 10 TABLET ORAL at 20:26

## 2021-01-25 RX ADMIN — Medication 1 PATCH: at 08:38

## 2021-01-25 RX ADMIN — CEFAZOLIN SODIUM 2 G: 10 POWDER, FOR SOLUTION INTRAVENOUS at 04:47

## 2021-01-25 RX ADMIN — FAMOTIDINE 10 MG: 10 TABLET, FILM COATED ORAL at 08:35

## 2021-01-25 RX ADMIN — METOPROLOL SUCCINATE 25 MG: 25 TABLET, EXTENDED RELEASE ORAL at 08:42

## 2021-01-25 RX ADMIN — PREGABALIN 150 MG: 75 CAPSULE ORAL at 08:35

## 2021-01-25 RX ADMIN — QUETIAPINE FUMARATE 100 MG: 100 TABLET ORAL at 21:24

## 2021-01-25 RX ADMIN — PROBIOTIC PRODUCT - TAB 1 TABLET: TAB at 20:26

## 2021-01-25 RX ADMIN — ACETAMINOPHEN 650 MG: 325 TABLET, FILM COATED ORAL at 11:48

## 2021-01-25 RX ADMIN — PROBIOTIC PRODUCT - TAB 1 TABLET: TAB at 08:36

## 2021-01-25 RX ADMIN — METOCLOPRAMIDE 5 MG: 5 INJECTION, SOLUTION INTRAMUSCULAR; INTRAVENOUS at 17:53

## 2021-01-25 RX ADMIN — ACETAMINOPHEN 650 MG: 325 TABLET, FILM COATED ORAL at 04:47

## 2021-01-25 RX ADMIN — CEFAZOLIN SODIUM 2 G: 10 POWDER, FOR SOLUTION INTRAVENOUS at 20:24

## 2021-01-25 RX ADMIN — DIPHENHYDRAMINE HYDROCHLORIDE 25 MG: 50 INJECTION INTRAMUSCULAR; INTRAVENOUS at 17:49

## 2021-01-25 RX ADMIN — BACLOFEN 10 MG: 10 TABLET ORAL at 14:35

## 2021-01-25 RX ADMIN — PAROXETINE 30 MG: 20 TABLET, FILM COATED ORAL at 08:35

## 2021-01-25 RX ADMIN — BUPROPION HYDROCHLORIDE 75 MG: 75 TABLET, FILM COATED ORAL at 17:01

## 2021-01-25 RX ADMIN — PRAZOSIN HYDROCHLORIDE 2 MG: 1 CAPSULE ORAL at 21:24

## 2021-01-25 RX ADMIN — ZOLPIDEM TARTRATE 10 MG: 5 TABLET, COATED ORAL at 21:24

## 2021-01-25 RX ADMIN — LIDOCAINE 1 PATCH: 246 PATCH TOPICAL at 11:49

## 2021-01-25 RX ADMIN — BUPROPION HYDROCHLORIDE 75 MG: 75 TABLET, FILM COATED ORAL at 08:35

## 2021-01-25 RX ADMIN — CEFAZOLIN SODIUM 2 G: 10 POWDER, FOR SOLUTION INTRAVENOUS at 11:59

## 2021-01-25 RX ADMIN — ACETAMINOPHEN 650 MG: 325 TABLET, FILM COATED ORAL at 17:02

## 2021-01-25 RX ADMIN — FAMOTIDINE 10 MG: 10 TABLET, FILM COATED ORAL at 17:02

## 2021-01-25 RX ADMIN — METHADONE HYDROCHLORIDE 60 MG: 10 TABLET ORAL at 06:49

## 2021-01-25 RX ADMIN — DIPHENHYDRAMINE HYDROCHLORIDE 25 MG: 50 INJECTION INTRAMUSCULAR; INTRAVENOUS at 04:46

## 2021-01-25 RX ADMIN — PREGABALIN 150 MG: 75 CAPSULE ORAL at 20:26

## 2021-01-25 RX ADMIN — BUPROPION HYDROCHLORIDE 75 MG: 75 TABLET, FILM COATED ORAL at 11:48

## 2021-01-25 NOTE — PROGRESS NOTES
Hospital Medicine Service -  Daily Progress Note       SUBJECTIVE   Interval History: No acute events overnight. Patient seen and examined. C/o persistent back pain. Denies any f/c, cp, sob. Abd pain and nausea improved.      OBJECTIVE      Vital signs in last 24 hours:  Temp:  [36.7 °C (98 °F)-37.4 °C (99.3 °F)] 36.7 °C (98 °F)  Heart Rate:  [65-80] 80  Resp:  [16-18] 16  BP: (111-160)/(60-87) 135/71    Intake/Output Summary (Last 24 hours) at 1/25/2021 1508  Last data filed at 1/25/2021 1229  Gross per 24 hour   Intake 50 ml   Output --   Net 50 ml       PHYSICAL EXAMINATION      GEN: well-developed and well-nourished; not in acute distress  HEENT: normocephalic; atraumatic  NECK: no JVD  CARDIO: regular rate and rhythm; no murmurs, rubs or gallops  RESP: clear to auscultation bilaterally; no rales, rhonchi, or wheezes  ABD: soft, non-distended, non-tender, normal bowel sounds  EXT: no cyanosis, clubbing, or edema  SKIN: clean, dry, warm, and intact  MUSCULOSKELETAL: no injury or deformity  NEURO: alert and oriented x 3; nonfocal  BEHAVIOR/EMOTIONAL: appropriate; cooperative     LABS / IMAGING / TELE      Labs  Lab Results   Component Value Date    GLUCOSE 113 (H) 01/25/2021    CALCIUM 8.8 (L) 01/25/2021     01/25/2021    K 4.5 01/25/2021    CO2 27 01/25/2021     01/25/2021    BUN 13 01/25/2021    CREATININE 0.8 01/25/2021     Lab Results   Component Value Date    WBC 5.10 01/25/2021    HGB 10.9 (L) 01/25/2021    HCT 34.6 (L) 01/25/2021    MCV 88.0 01/25/2021     01/25/2021     Lab Results   Component Value Date    ALBUMIN 3.5 01/11/2021    BILITOT 0.4 01/11/2021    ALKPHOS 58 01/11/2021    BILIDIR <0.1 04/12/2017    AST 19 01/11/2021    ALT 9 (L) 01/11/2021    PROTEIN 6.5 01/11/2021       Imaging  Mri Lumbar Spine With And Without Contrast    Result Date: 1/19/2021  IMPRESSION: 1.  Improvement in the previously seen diffuse anterior and posterior epidural enhancement within the lumbar spine  with only mildly prominent enhancement persisting from L4 to L5.  There is residual enhancement about the left greater than right facet joints and there is persistent fluid in the left facet joint (previously, there was fluid in both facet joints).  These findings could be related to active degeneration and prominence of the epidural plexus due to the significant central canal and neural foraminal narrowing at this level. Clinical correlation is recommended as an infectious process cannot be excluded by imaging. 2.  Edema and enhancement in the deep subcutaneous soft tissues throughout the visualized lower thoracic and lumbar spine. 3.  Degenerative and discogenic disease of the lumbar spine, as above.    Fluoroscopy Upper Gi Series    Result Date: 1/20/2021  IMPRESSION: Disordered esophageal motility. Gastroesophageal reflux.        ASSESSMENT AND PLAN      * Acute bacterial endocarditis  Assessment & Plan  -Tricuspid valve endocarditis with severe TR. Also with septic pulmonary emboli and seeding of lumbar spine.   -Blood cx at [facility] 12/7 and 12/8 grew MSSA. Blood cultures from 12/2020 negative for growth.Patient afebrile.   -Completed 42 days antibiotics on 1/18/21.   -Repeat MRI L-spine shows improved, but persistent enhancement in the lumbar joints. He recommends to continue IV antibiotics for total 8 weeks then follow up as outpatient for repeat lumbar imaging. Continue IV abx until 2/1/21.  -Cardiology consulted.  No need for surgical intervention on tricuspid valve. Recommended she follow up as outpatient for repeat TTE.  -F/U with PCP for repeat CT chest as outpatient.      Opioid type dependence, continuous use (CMS/[facility])  Assessment & Plan  -Continue methadone per psych recs.   -Patient declining inpatient psych rehab. Agrees to [facility]. States she has multiple resources to help maintain sobriety including a sponsor.  -Behavioral therapy and psychiatry following. They are working on setting  patient up with methadone clinic for discharge.       Nausea & vomiting  Assessment & Plan  -Intermittent nausea/vomiting with large meals. She reports it has been going on for several weeks, but worse over the past week. She regurgitates whole food about 5 minutes after eating. Worse with large meals. No hematemesis, hematochezia, weight loss. Her anemia is at baseline. She is having daily BMs.   -Upper GI series shows esophageal dysmotility and GERD..   -Continue Pepcid and PPI.   -Continue Baclofen and PRN Reglan. QTc 440 on 1/23.  -Continue small meals, low fat/low fiber diet.   -GI consulted. No plans for EGD or gastric emptying study.            Essential hypertension  Assessment & Plan  -BP stable  -Cont w/metoprolol, prazosin    Pain  Assessment & Plan  -Uncontrolled back pain despite radiographic improvement in lumbar osteomyelitis.   -Patient asking for higher doses of methadone. I discussed with Dr. Padron (psych). Recommends no change to methadone dose.  -Continue Lyrica, Baclofen, lidocaine patch, methadone.   -Ordered heating pad.   - per neurosurgery recs will attempt LSO brace  -cont w/IV abx as stated above  -Pain managment re-consulted. No changes made to regimen.  - Neurosurgery consulted - per evaluation No acute surgical intervention required. Cont w/management with IV antibiotics. Repeat imaging at completion of antibiotics and follow-up with neurosurgery at that time.        Migraine, intractable  Assessment & Plan  -Resolved with Fioricet.   -Neurology consulted. Rec Fioricet no more than 4 days/month, and over-the-counter medications no more than 15 days/month.    -Outpatient neurology follow up for further management/workup. Needs outpatient sleep study.        Insomnia  Assessment & Plan  -Cont w/ambien      Depression  Assessment & Plan  -No SI/HI.  -Continue Wellbutrin, Quetiapine, Ambien.  -Follow up with psychiatry as outpatient.      Anxiety  Assessment & Plan  -See  "\"depression\"           VTE Assessment: Padua VTE Score: 2  DVT PPX: declining lovenox GI PPX: PPI  Diet: low fiber/low residue diet  Dispo: med/surg   Estimated discharge date: 2/2/2021  Code Status: Full Code     Erinn Barajas, DO  1/25/2021               "

## 2021-01-25 NOTE — PLAN OF CARE
Problem: Adult Inpatient Plan of Care  Goal: Plan of Care Review  Flowsheets  Taken 1/25/2021 1332 by Sheeba Bailey MSW  Plan of Care Reviewed With: (nursing) other (see comments)  Outcome Summary: MSW CC contacted [facility] to see if they would have a bed for pt as pt would need another week of IV medications. [facility] said [facility] dont have any beds at this time for this patient.  Taken 1/25/2021 8499 by Karina Hooper, RN  Progress: improving     MSW CC following to assist with dc planning needs.    PLAN: dc to [facility], if bed becomes available prior to the completion of the IV antibiotic course, otherwise dc home.

## 2021-01-25 NOTE — PLAN OF CARE
Problem: Adult Inpatient Plan of Care  Goal: Plan of Care Review  Outcome: Progressing  Flowsheets (Taken 1/25/2021 2670)  Progress: improving  Plan of Care Reviewed With: patient  Outcome Summary: IV abtx administered per order. Back pain controlled with Tylenol. IV Benadryl given for c/o migrane. No GI complaints this shift.  Refusing bed alarm, up ad oz in room. Call bell within pt reach.

## 2021-01-25 NOTE — NURSING NOTE
Spoke to Ty RODRIGUES with [facility] on phone per [facility] request who said he will contact [employer] rep and they will fit pt for LSO brace asap.

## 2021-01-25 NOTE — PATIENT CARE CONFERENCE
Care Progression Rounds Note  Date: 1/25/2021  Time: 10:40 AM     Patient Name: Carolyn Redmond     Medical Record Number: [id number]   YOB: 1985  Sex: Female      Room/Bed: 4211    Admitting Diagnosis: Septic embolism (CMS/[facility]) [I76]  Infection of lumbar spine (CMS/[facility]) [M46.26]  Acute left-sided low back pain without sciatica [M54.5]   Admit Date/Time: 12/9/2020 11:10 AM    Primary Diagnosis: Acute bacterial endocarditis  Principal Problem: Acute bacterial endocarditis    GMLOS: 4.8  Anticipated Discharge Date: 2/2/2021    AM-PAC  Mobility Score:      Discharge Planning:       Barriers to Discharge:  Barriers to Discharge: Medical issues not resolved  Comment: iv antibiotics    Participants:  social work/services, nursing

## 2021-01-26 PROBLEM — R58 ECCHYMOSIS: Status: ACTIVE | Noted: 2021-01-26

## 2021-01-26 LAB
APTT PPP: 31 SEC (ref 23–35)
INR PPP: 1
PROTHROMBIN TIME: 12.7 SEC (ref 12.2–14.5)

## 2021-01-26 PROCEDURE — 85610 PROTHROMBIN TIME: CPT | Performed by: INTERNAL MEDICINE

## 2021-01-26 PROCEDURE — 63700000 HC SELF-ADMINISTRABLE DRUG: Performed by: NURSE PRACTITIONER

## 2021-01-26 PROCEDURE — 12000000 HC ROOM AND CARE MED/SURG

## 2021-01-26 PROCEDURE — 63600000 HC DRUGS/DETAIL CODE: Performed by: INTERNAL MEDICINE

## 2021-01-26 PROCEDURE — 63700000 HC SELF-ADMINISTRABLE DRUG: Performed by: HOSPITALIST

## 2021-01-26 PROCEDURE — 63700000 HC SELF-ADMINISTRABLE DRUG: Performed by: INTERNAL MEDICINE

## 2021-01-26 PROCEDURE — 99232 SBSQ HOSP IP/OBS MODERATE 35: CPT | Performed by: INTERNAL MEDICINE

## 2021-01-26 PROCEDURE — 63700000 HC SELF-ADMINISTRABLE DRUG: Performed by: PSYCHIATRY & NEUROLOGY

## 2021-01-26 PROCEDURE — 25800000 HC PHARMACY IV SOLUTIONS: Performed by: INTERNAL MEDICINE

## 2021-01-26 PROCEDURE — 85730 THROMBOPLASTIN TIME PARTIAL: CPT | Performed by: INTERNAL MEDICINE

## 2021-01-26 PROCEDURE — 36415 COLL VENOUS BLD VENIPUNCTURE: CPT | Performed by: INTERNAL MEDICINE

## 2021-01-26 PROCEDURE — 25000000 HC PHARMACY GENERAL: Performed by: INTERNAL MEDICINE

## 2021-01-26 PROCEDURE — 63600000 HC DRUGS/DETAIL CODE: Performed by: HOSPITALIST

## 2021-01-26 RX ADMIN — QUETIAPINE FUMARATE 100 MG: 100 TABLET ORAL at 21:08

## 2021-01-26 RX ADMIN — FAMOTIDINE 10 MG: 10 TABLET, FILM COATED ORAL at 16:18

## 2021-01-26 RX ADMIN — ZOLPIDEM TARTRATE 10 MG: 5 TABLET, COATED ORAL at 21:07

## 2021-01-26 RX ADMIN — ACETAMINOPHEN 650 MG: 325 TABLET, FILM COATED ORAL at 00:37

## 2021-01-26 RX ADMIN — CEFAZOLIN SODIUM 2 G: 10 POWDER, FOR SOLUTION INTRAVENOUS at 11:23

## 2021-01-26 RX ADMIN — DIPHENHYDRAMINE HYDROCHLORIDE 25 MG: 50 INJECTION INTRAMUSCULAR; INTRAVENOUS at 04:57

## 2021-01-26 RX ADMIN — METOCLOPRAMIDE 5 MG: 5 INJECTION, SOLUTION INTRAMUSCULAR; INTRAVENOUS at 14:24

## 2021-01-26 RX ADMIN — PRAZOSIN HYDROCHLORIDE 2 MG: 1 CAPSULE ORAL at 21:07

## 2021-01-26 RX ADMIN — CEFAZOLIN SODIUM 2 G: 10 POWDER, FOR SOLUTION INTRAVENOUS at 20:29

## 2021-01-26 RX ADMIN — BUPROPION HYDROCHLORIDE 75 MG: 75 TABLET, FILM COATED ORAL at 12:53

## 2021-01-26 RX ADMIN — DIPHENHYDRAMINE HYDROCHLORIDE 25 MG: 50 INJECTION INTRAMUSCULAR; INTRAVENOUS at 11:27

## 2021-01-26 RX ADMIN — ACETAMINOPHEN 650 MG: 325 TABLET, FILM COATED ORAL at 11:23

## 2021-01-26 RX ADMIN — PANTOPRAZOLE SODIUM 40 MG: 40 TABLET, DELAYED RELEASE ORAL at 20:29

## 2021-01-26 RX ADMIN — ACETAMINOPHEN 650 MG: 325 TABLET, FILM COATED ORAL at 21:08

## 2021-01-26 RX ADMIN — METHADONE HYDROCHLORIDE 60 MG: 10 TABLET ORAL at 06:51

## 2021-01-26 RX ADMIN — BACLOFEN 10 MG: 10 TABLET ORAL at 20:29

## 2021-01-26 RX ADMIN — METOPROLOL SUCCINATE 25 MG: 25 TABLET, EXTENDED RELEASE ORAL at 08:55

## 2021-01-26 RX ADMIN — BACLOFEN 10 MG: 10 TABLET ORAL at 14:24

## 2021-01-26 RX ADMIN — IBUPROFEN 600 MG: 600 TABLET ORAL at 20:32

## 2021-01-26 RX ADMIN — PREGABALIN 150 MG: 75 CAPSULE ORAL at 20:29

## 2021-01-26 RX ADMIN — BUPROPION HYDROCHLORIDE 75 MG: 75 TABLET, FILM COATED ORAL at 08:55

## 2021-01-26 RX ADMIN — BUPROPION HYDROCHLORIDE 75 MG: 75 TABLET, FILM COATED ORAL at 16:18

## 2021-01-26 RX ADMIN — ACETAMINOPHEN 650 MG: 325 TABLET, FILM COATED ORAL at 18:14

## 2021-01-26 RX ADMIN — PROBIOTIC PRODUCT - TAB 1 TABLET: TAB at 08:55

## 2021-01-26 RX ADMIN — CEFAZOLIN SODIUM 2 G: 10 POWDER, FOR SOLUTION INTRAVENOUS at 04:03

## 2021-01-26 RX ADMIN — PAROXETINE 30 MG: 20 TABLET, FILM COATED ORAL at 08:54

## 2021-01-26 RX ADMIN — PANTOPRAZOLE SODIUM 40 MG: 40 TABLET, DELAYED RELEASE ORAL at 08:54

## 2021-01-26 RX ADMIN — FAMOTIDINE 10 MG: 10 TABLET, FILM COATED ORAL at 08:55

## 2021-01-26 RX ADMIN — Medication 1 PATCH: at 08:56

## 2021-01-26 RX ADMIN — PREGABALIN 150 MG: 75 CAPSULE ORAL at 08:56

## 2021-01-26 RX ADMIN — DIPHENHYDRAMINE HYDROCHLORIDE 25 MG: 50 INJECTION INTRAMUSCULAR; INTRAVENOUS at 18:15

## 2021-01-26 RX ADMIN — PROBIOTIC PRODUCT - TAB 1 TABLET: TAB at 20:29

## 2021-01-26 RX ADMIN — ACETAMINOPHEN 650 MG: 325 TABLET, FILM COATED ORAL at 04:57

## 2021-01-26 RX ADMIN — BACLOFEN 10 MG: 10 TABLET ORAL at 08:55

## 2021-01-26 NOTE — NURSING NOTE
Patient refused bed alarm. Pt is a fall risk due to meds. Education and risk given. Pt stated understood, pt still refused. Pt informed to dangle legs prior to getting up and assessing dizziness. Pt stated understood.

## 2021-01-26 NOTE — PROGRESS NOTES
Hospital Medicine Service -  Daily Progress Note       SUBJECTIVE   Interval History: No acute events overnight. Patient seen and examined. Cont to complain of back pain although has yet to use brace. Also notes bruising along her chest and right lower leg.      OBJECTIVE      Vital signs in last 24 hours:  Temp:  [36.4 °C (97.5 °F)-36.7 °C (98 °F)] 36.4 °C (97.5 °F)  Heart Rate:  [67-84] 84  Resp:  [16] 16  BP: (121-143)/(65-74) 121/69    Intake/Output Summary (Last 24 hours) at 1/26/2021 1120  Last data filed at 1/25/2021 2130  Gross per 24 hour   Intake 150 ml   Output --   Net 150 ml       PHYSICAL EXAMINATION      GEN: well-developed and well-nourished; not in acute distress  HEENT: normocephalic; atraumatic  NECK: no JVD; no bruits  CARDIO: regular rate and rhythm; no murmurs, rubs or gallops  RESP: clear to auscultation bilaterally; no rales, rhonchi, or wheezes  ABD: soft, non-distended, non-tender, normal bowel sounds  EXT: no cyanosis, clubbing, or edema  SKIN: clean, dry, warm, and intact. Small areas of ecchymosis noted over chest wall and right calf.  MUSCULOSKELETAL: no injury or deformity  NEURO: alert and oriented x 3; nonfocal  BEHAVIOR/EMOTIONAL: appropriate; cooperative     LABS / IMAGING / TELE      Labs  Lab Results   Component Value Date    GLUCOSE 113 (H) 01/25/2021    CALCIUM 8.8 (L) 01/25/2021     01/25/2021    K 4.5 01/25/2021    CO2 27 01/25/2021     01/25/2021    BUN 13 01/25/2021    CREATININE 0.8 01/25/2021     Lab Results   Component Value Date    WBC 5.10 01/25/2021    HGB 10.9 (L) 01/25/2021    HCT 34.6 (L) 01/25/2021    MCV 88.0 01/25/2021     01/25/2021     Lab Results   Component Value Date    ALBUMIN 3.5 01/11/2021    BILITOT 0.4 01/11/2021    ALKPHOS 58 01/11/2021    BILIDIR <0.1 04/12/2017    AST 19 01/11/2021    ALT 9 (L) 01/11/2021    PROTEIN 6.5 01/11/2021       Imaging  Mri Lumbar Spine With And Without Contrast    Result Date: 1/19/2021  IMPRESSION:  1.  Improvement in the previously seen diffuse anterior and posterior epidural enhancement within the lumbar spine with only mildly prominent enhancement persisting from L4 to L5.  There is residual enhancement about the left greater than right facet joints and there is persistent fluid in the left facet joint (previously, there was fluid in both facet joints).  These findings could be related to active degeneration and prominence of the epidural plexus due to the significant central canal and neural foraminal narrowing at this level. Clinical correlation is recommended as an infectious process cannot be excluded by imaging. 2.  Edema and enhancement in the deep subcutaneous soft tissues throughout the visualized lower thoracic and lumbar spine. 3.  Degenerative and discogenic disease of the lumbar spine, as above.    Fluoroscopy Upper Gi Series    Result Date: 1/20/2021  IMPRESSION: Disordered esophageal motility. Gastroesophageal reflux.      ASSESSMENT AND PLAN      * Acute bacterial endocarditis  Assessment & Plan  -Tricuspid valve endocarditis with severe TR. Also with septic pulmonary emboli and seeding of lumbar spine.   -[facility] cx [facility] 12/7 and 12/8 grew MSSA. Blood cultures from 12/2020 negative for growth.Patient afebrile.   -Completed 42 days antibiotics on 1/18/21.   -Repeat MRI L-spine shows improved, but persistent enhancement in the lumbar joints.     Plan:  - continue IV antibiotics for total 8 weeks then follow up as outpatient for repeat lumbar imaging. Continue IV abx until 2/1/21.  -Cardiology consulted.  No need for surgical intervention on tricuspid valve. Recommended she follow up as outpatient for repeat TTE.  -F/U with PCP for repeat CT chest as outpatient.      Opioid type dependence, continuous use (CMS/[facility])  Assessment & Plan  -Continue methadone per psych recs.   -Patient declining inpatient psych rehab. Agrees to IOP. States she has multiple resources to help maintain  sobriety including a sponsor.  -Behavioral therapy and psychiatry following. They are working on setting patient up with methadone clinic for discharge.       Ecchymosis  Assessment & Plan  Patient c/o areas of ecchymosis over chest wall and leg  Denies any injury. Currently refusing lovenox.  CBC from 1/25 with stable hemoglobin, platelets    Plan:  Will check coags  Cont to monitor for now    Nausea & vomiting  Assessment & Plan  -Intermittent nausea/vomiting with large meals. She reports it has been going on for several weeks, but worse over the past week. She regurgitates whole food about 5 minutes after eating. Worse with large meals. No hematemesis, hematochezia, weight loss. Her anemia is at baseline. She is having daily BMs.   -Upper GI series shows esophageal dysmotility and GERD.  -sxs stable for now    Plan:   -Continue Pepcid and PPI.   -Continue Baclofen and PRN Reglan. QTc 440 on 1/23.  -Continue small meals, low fat/low fiber diet.   -GI consulted. No plans for EGD or gastric emptying study.            Essential hypertension  Assessment & Plan  -BP stable  -Cont w/metoprolol, prazosin    Pain  Assessment & Plan  -cont to c/o ongoing back pain despite radiographic improvement in lumbar osteomyelitis.     Plan:  -Per Dr. Padron (psych) no change to methadone dose recommended  -Pain managment re-consulted. No changes made to regimen.  -Continue Lyrica, Baclofen, lidocaine patch, methadone.   -cont w/IV abx as stated above  -cont heating pad.   - per neurosurgery recs LSO - brace obtained, although pt still has yet to try bracing  - Neurosurgery consulted - per evaluation No acute surgical intervention required. Cont w/management with IV antibiotics. Repeat imaging at completion of antibiotics and follow-up with neurosurgery at that time.        Migraine, intractable  Assessment & Plan  -Resolved with Fioricet.   -Neurology consulted. Rec Fioricet no more than 4 days/month, and over-the-counter  "medications no more than 15 days/month.    -Outpatient neurology follow up for further management/workup. Needs outpatient sleep study.        Insomnia  Assessment & Plan  -Cont w/ambien      Depression  Assessment & Plan  -No SI/HI.  -Continue Wellbutrin, Quetiapine, Ambien.  -Follow up with psychiatry as outpatient.      Anxiety  Assessment & Plan  -See \"depression\"           VTE Assessment: Padua VTE Score: 2  Estimated discharge date: 2/2/2021  Code Status: Full Code     Erinn Barajas,   1/26/2021               "

## 2021-01-26 NOTE — NURSING NOTE
Patient received flowers and luis bear from [facility]. No HARP and myself searched. No medications found. Gave to patient.

## 2021-01-26 NOTE — PATIENT CARE CONFERENCE
Care Progression Rounds Note  Date: 1/26/2021  Time: 10:26 AM     Patient Name: Carolyn Redmond     Medical Record Number: [id number]   YOB: 1985  Sex: Female      Room/Bed: 4211    Admitting Diagnosis: Septic embolism (CMS/[facility]) [I76]  Infection of lumbar spine (CMS/[facility]) [M46.26]  Acute left-sided low back pain without sciatica [M54.5]   Admit Date/Time: 12/9/2020 11:10 AM    Primary Diagnosis: Acute bacterial endocarditis  Principal Problem: Acute bacterial endocarditis    GMLOS: 4.8  Anticipated Discharge Date: 2/2/2021    AM-PAC  Mobility Score:      Discharge Planning:       Barriers to Discharge:  Barriers to Discharge: Medical issues not resolved  Comment: IV antiobiotics, Feb 2 expected done.    Participants:  social work/services, nursing

## 2021-01-26 NOTE — ASSESSMENT & PLAN NOTE
Patient c/o areas of ecchymosis over chest wall and leg  Denies any injury. Currently refusing lovenox.  CBC from 1/25 with stable hemoglobin, platelets. Coags 1/26 wnl  No additional bruising noted today    Plan:  Cont to monitor for now

## 2021-01-27 PROCEDURE — 63600000 HC DRUGS/DETAIL CODE: Performed by: INTERNAL MEDICINE

## 2021-01-27 PROCEDURE — 63700000 HC SELF-ADMINISTRABLE DRUG: Performed by: INTERNAL MEDICINE

## 2021-01-27 PROCEDURE — 63700000 HC SELF-ADMINISTRABLE DRUG: Performed by: NURSE PRACTITIONER

## 2021-01-27 PROCEDURE — 99232 SBSQ HOSP IP/OBS MODERATE 35: CPT | Performed by: INTERNAL MEDICINE

## 2021-01-27 PROCEDURE — 63700000 HC SELF-ADMINISTRABLE DRUG: Performed by: PSYCHIATRY & NEUROLOGY

## 2021-01-27 PROCEDURE — 63700000 HC SELF-ADMINISTRABLE DRUG: Performed by: HOSPITALIST

## 2021-01-27 PROCEDURE — 25000000 HC PHARMACY GENERAL: Performed by: INTERNAL MEDICINE

## 2021-01-27 PROCEDURE — 12000000 HC ROOM AND CARE MED/SURG

## 2021-01-27 PROCEDURE — 93005 ELECTROCARDIOGRAM TRACING: CPT | Performed by: INTERNAL MEDICINE

## 2021-01-27 PROCEDURE — 25800000 HC PHARMACY IV SOLUTIONS: Performed by: INTERNAL MEDICINE

## 2021-01-27 PROCEDURE — 99233 SBSQ HOSP IP/OBS HIGH 50: CPT | Performed by: PSYCHIATRY & NEUROLOGY

## 2021-01-27 PROCEDURE — 63600000 HC DRUGS/DETAIL CODE: Performed by: HOSPITALIST

## 2021-01-27 RX ORDER — DIPHENHYDRAMINE HCL 25 MG
25 CAPSULE ORAL EVERY 6 HOURS PRN
Status: DISCONTINUED | OUTPATIENT
Start: 2021-01-27 | End: 2021-01-27

## 2021-01-27 RX ORDER — METHADONE HYDROCHLORIDE 10 MG/1
65 TABLET ORAL
Status: DISCONTINUED | OUTPATIENT
Start: 2021-01-28 | End: 2021-02-03 | Stop reason: HOSPADM

## 2021-01-27 RX ORDER — POLYETHYLENE GLYCOL 3350 17 G/17G
17 POWDER, FOR SOLUTION ORAL DAILY PRN
Status: DISCONTINUED | OUTPATIENT
Start: 2021-01-27 | End: 2021-02-03 | Stop reason: HOSPADM

## 2021-01-27 RX ORDER — DIPHENHYDRAMINE HCL 50 MG/ML
25 VIAL (ML) INJECTION EVERY 6 HOURS PRN
Status: DISCONTINUED | OUTPATIENT
Start: 2021-01-27 | End: 2021-02-03 | Stop reason: HOSPADM

## 2021-01-27 RX ORDER — KETOROLAC TROMETHAMINE 30 MG/ML
30 INJECTION, SOLUTION INTRAMUSCULAR; INTRAVENOUS ONCE
Status: COMPLETED | OUTPATIENT
Start: 2021-01-27 | End: 2021-01-27

## 2021-01-27 RX ADMIN — FAMOTIDINE 10 MG: 10 TABLET, FILM COATED ORAL at 08:24

## 2021-01-27 RX ADMIN — METOCLOPRAMIDE 5 MG: 5 INJECTION, SOLUTION INTRAMUSCULAR; INTRAVENOUS at 08:23

## 2021-01-27 RX ADMIN — PREGABALIN 150 MG: 75 CAPSULE ORAL at 08:25

## 2021-01-27 RX ADMIN — PANTOPRAZOLE SODIUM 40 MG: 40 TABLET, DELAYED RELEASE ORAL at 08:25

## 2021-01-27 RX ADMIN — BACLOFEN 10 MG: 10 TABLET ORAL at 08:24

## 2021-01-27 RX ADMIN — POLYETHYLENE GLYCOL 3350 17 G: 17 POWDER, FOR SOLUTION ORAL at 11:22

## 2021-01-27 RX ADMIN — CEFAZOLIN SODIUM 2 G: 10 POWDER, FOR SOLUTION INTRAVENOUS at 11:20

## 2021-01-27 RX ADMIN — QUETIAPINE FUMARATE 100 MG: 100 TABLET ORAL at 22:35

## 2021-01-27 RX ADMIN — IBUPROFEN 600 MG: 600 TABLET ORAL at 08:23

## 2021-01-27 RX ADMIN — METHADONE HYDROCHLORIDE 60 MG: 10 TABLET ORAL at 06:43

## 2021-01-27 RX ADMIN — DIPHENHYDRAMINE HYDROCHLORIDE 25 MG: 50 INJECTION INTRAMUSCULAR; INTRAVENOUS at 12:19

## 2021-01-27 RX ADMIN — PANTOPRAZOLE SODIUM 40 MG: 40 TABLET, DELAYED RELEASE ORAL at 20:57

## 2021-01-27 RX ADMIN — ACETAMINOPHEN 650 MG: 325 TABLET, FILM COATED ORAL at 11:21

## 2021-01-27 RX ADMIN — CEFAZOLIN SODIUM 2 G: 10 POWDER, FOR SOLUTION INTRAVENOUS at 04:33

## 2021-01-27 RX ADMIN — KETOROLAC TROMETHAMINE 30 MG: 30 INJECTION, SOLUTION INTRAMUSCULAR at 15:17

## 2021-01-27 RX ADMIN — BUPROPION HYDROCHLORIDE 75 MG: 75 TABLET, FILM COATED ORAL at 11:22

## 2021-01-27 RX ADMIN — FAMOTIDINE 10 MG: 10 TABLET, FILM COATED ORAL at 17:30

## 2021-01-27 RX ADMIN — ACETAMINOPHEN 650 MG: 325 TABLET, FILM COATED ORAL at 17:29

## 2021-01-27 RX ADMIN — LIDOCAINE 1 PATCH: 246 PATCH TOPICAL at 08:26

## 2021-01-27 RX ADMIN — PAROXETINE 30 MG: 20 TABLET, FILM COATED ORAL at 08:23

## 2021-01-27 RX ADMIN — PROBIOTIC PRODUCT - TAB 1 TABLET: TAB at 08:24

## 2021-01-27 RX ADMIN — BUPROPION HYDROCHLORIDE 75 MG: 75 TABLET, FILM COATED ORAL at 08:24

## 2021-01-27 RX ADMIN — CEFAZOLIN SODIUM 2 G: 10 POWDER, FOR SOLUTION INTRAVENOUS at 21:21

## 2021-01-27 RX ADMIN — METOCLOPRAMIDE 5 MG: 5 INJECTION, SOLUTION INTRAMUSCULAR; INTRAVENOUS at 17:33

## 2021-01-27 RX ADMIN — PROBIOTIC PRODUCT - TAB 1 TABLET: TAB at 20:57

## 2021-01-27 RX ADMIN — IBUPROFEN 600 MG: 600 TABLET ORAL at 20:56

## 2021-01-27 RX ADMIN — ACETAMINOPHEN 650 MG: 325 TABLET, FILM COATED ORAL at 06:43

## 2021-01-27 RX ADMIN — BACLOFEN 10 MG: 10 TABLET ORAL at 15:14

## 2021-01-27 RX ADMIN — DIPHENHYDRAMINE HYDROCHLORIDE 25 MG: 50 INJECTION INTRAMUSCULAR; INTRAVENOUS at 04:34

## 2021-01-27 RX ADMIN — DIPHENHYDRAMINE HYDROCHLORIDE 25 MG: 50 INJECTION INTRAMUSCULAR; INTRAVENOUS at 17:38

## 2021-01-27 RX ADMIN — BACLOFEN 10 MG: 10 TABLET ORAL at 20:56

## 2021-01-27 RX ADMIN — METOPROLOL SUCCINATE 25 MG: 25 TABLET, EXTENDED RELEASE ORAL at 08:24

## 2021-01-27 RX ADMIN — PRAZOSIN HYDROCHLORIDE 2 MG: 1 CAPSULE ORAL at 20:57

## 2021-01-27 RX ADMIN — ZOLPIDEM TARTRATE 10 MG: 5 TABLET, COATED ORAL at 21:03

## 2021-01-27 RX ADMIN — Medication 1 PATCH: at 08:25

## 2021-01-27 RX ADMIN — BUPROPION HYDROCHLORIDE 75 MG: 75 TABLET, FILM COATED ORAL at 15:17

## 2021-01-27 RX ADMIN — PREGABALIN 150 MG: 75 CAPSULE ORAL at 20:56

## 2021-01-27 NOTE — PROGRESS NOTES
Hospital Medicine Service -  Daily Progress Note       SUBJECTIVE   Interval History: No acute events overnight. Patient seen and examined. Pt tearful, complaining of increased back pain, requesting inc in methadone.      OBJECTIVE      Vital signs in last 24 hours:  Temp:  [36.3 °C (97.3 °F)-37.6 °C (99.7 °F)] 37.6 °C (99.7 °F)  Heart Rate:  [81-95] 84  Resp:  [16-18] 16  BP: ()/(49-75) 122/75  No intake or output data in the 24 hours ending 01/27/21 9016    PHYSICAL EXAMINATION      GEN: well-developed and well-nourished; tearful, anxious  HEENT: normocephalic; atraumatic  NECK: no JVD; no bruits  CARDIO: regular rate and rhythm; no murmurs, rubs or gallops  RESP: clear to auscultation bilaterally; no rales, rhonchi, or wheezes  ABD: soft, non-distended, non-tender, normal bowel sounds  EXT: no cyanosis, clubbing, or edema  SKIN: clean, dry, warm, and intact  MUSCULOSKELETAL: no injury or deformity, currently wearing LSO brace  NEURO: alert and oriented x 3; nonfocal  BEHAVIOR/EMOTIONAL: anxious, tearful but appropriate; cooperative     LABS / IMAGING / TELE      Labs  Lab Results   Component Value Date    GLUCOSE 113 (H) 01/25/2021    CALCIUM 8.8 (L) 01/25/2021     01/25/2021    K 4.5 01/25/2021    CO2 27 01/25/2021     01/25/2021    BUN 13 01/25/2021    CREATININE 0.8 01/25/2021     Lab Results   Component Value Date    WBC 5.10 01/25/2021    HGB 10.9 (L) 01/25/2021    HCT 34.6 (L) 01/25/2021    MCV 88.0 01/25/2021     01/25/2021     Lab Results   Component Value Date    ALBUMIN 3.5 01/11/2021    BILITOT 0.4 01/11/2021    ALKPHOS 58 01/11/2021    BILIDIR <0.1 04/12/2017    AST 19 01/11/2021    ALT 9 (L) 01/11/2021    PROTEIN 6.5 01/11/2021       Imaging  Mri Lumbar Spine With And Without Contrast    Result Date: 1/19/2021  IMPRESSION: 1.  Improvement in the previously seen diffuse anterior and posterior epidural enhancement within the lumbar spine with only mildly prominent  enhancement persisting from L4 to L5.  There is residual enhancement about the left greater than right facet joints and there is persistent fluid in the left facet joint (previously, there was fluid in both facet joints).  These findings could be related to active degeneration and prominence of the epidural plexus due to the significant central canal and neural foraminal narrowing at this level. Clinical correlation is recommended as an infectious process cannot be excluded by imaging. 2.  Edema and enhancement in the deep subcutaneous soft tissues throughout the visualized lower thoracic and lumbar spine. 3.  Degenerative and discogenic disease of the lumbar spine, as above.    Fluoroscopy Upper Gi Series    Result Date: 1/20/2021  IMPRESSION: Disordered esophageal motility. Gastroesophageal reflux.        ASSESSMENT AND PLAN      * Acute bacterial endocarditis  Assessment & Plan  -Tricuspid valve endocarditis with severe TR. Also with septic pulmonary emboli and seeding of lumbar spine.   -Blood cx at [facility] 12/7 and 12/8 grew MSSA. Blood cultures from 12/2020 negative for growth.Patient afebrile.   -Completed 42 days antibiotics on 1/18/21.   -Repeat MRI L-spine shows improved, but persistent enhancement in the lumbar joints.     Plan:  - continue IV antibiotics for total 8 weeks then follow up as outpatient for repeat lumbar imaging. Continue IV abx until 2/1/21.  -Cardiology consulted.  No need for surgical intervention on tricuspid valve. Recommended she follow up as outpatient for repeat TTE.  -F/U with PCP for repeat CT chest as outpatient.      Opioid type dependence, continuous use (CMS/[facility])  Assessment & Plan  -Continue methadone per psych recs.   -Patient declining inpatient psych rehab. Agrees to [facility]. States she has multiple resources to help maintain sobriety including a sponsor.  -Behavioral therapy and psychiatry following. They are working on setting patient up with methadone clinic for  discharge.       Ecchymosis  Assessment & Plan  Patient c/o areas of ecchymosis over chest wall and leg  Denies any injury. Currently refusing lovenox.  CBC from 1/25 with stable hemoglobin, platelets. Coags 1/26 wnl  No additional bruising noted today    Plan:  Cont to monitor for now    Nausea & vomiting  Assessment & Plan  -Intermittent nausea/vomiting with large meals. She reports it has been going on for several weeks, but worse over the past week. She regurgitates whole food about 5 minutes after eating. Worse with large meals. No hematemesis, hematochezia, weight loss. Her anemia is at baseline. She is having daily BMs.   -Upper GI series shows esophageal dysmotility and GERD.  -sxs stable for now    Plan:   -Continue Pepcid and PPI.   -Continue Baclofen and PRN Reglan. QTc 440 on 1/23.  -Continue small meals, low fat/low fiber diet.   -GI consulted. No plans for EGD or gastric emptying study.            Essential hypertension  Assessment & Plan  -BP stable  -Cont w/metoprolol, prazosin    Pain  Assessment & Plan  -cont to c/o ongoing back pain despite radiographic improvement in lumbar osteomyelitis.   -pt c/o inc pain today despite current treatment modalities    Plan:  - Discussed with Dr. Padron (psych) - will inc dose of methadone to 65mg qam   -Pain managment re-consulted. No changes made to regimen.  -Continue Lyrica, Baclofen, lidocaine patch, methadone. Will administer dose of toradol per patient request  -cont w/IV abx as stated above  -cont heating pad.   - cont w/LSO brace  - Neurosurgery consulted - per evaluation No acute surgical intervention required. Cont w/management with IV antibiotics. Will discuss with neurosurgery re: repeat imaging prior to d/c.        Migraine, intractable  Assessment & Plan  -Resolved with Fioricet.   -Neurology consulted. Rec Fioricet no more than 4 days/month, and over-the-counter medications no more than 15 days/month.    -Outpatient neurology follow up for  "further management/workup. Needs outpatient sleep study.        Insomnia  Assessment & Plan  -Cont w/ambien      Depression  Assessment & Plan  -No SI/HI.  -Continue Wellbutrin, Quetiapine, Ambien.  -Follow up with psychiatry as outpatient.      Anxiety  Assessment & Plan  -See \"depression\"           VTE Assessment: Padua VTE Score: 2  Estimated discharge date: 2/2/2021  Code Status: Full Code     Erinn Barajas, DO  1/27/2021               "

## 2021-01-27 NOTE — PROGRESS NOTES
Psychiatry Progress Note    Chief Complaint/Reason for follow-up: opioid use disorder and depression    Interval History: patient's depression continues to be reportedly well managed.  She denies any thoughts to harm/kill herself or anyone else whatsoever.  At this time the patient is requesting an increase in methadone.  She states she continues to have cravings, especially in the evenings.  She denies excessive day time sleepiness and nursing concur.  We do discuss the possibility of split dosing and she is encouraged to call outpatient methadone clinic and discuss with them whether that is a possibility as would not want to set her up to fail on discharge if she got used to split dosing and then it was not possible as an outpatient.    Review of Systems:   Sleep:  Fair and Appetite: Fair    Vital Signs for the last 24 hours:  Temp:  [36.3 °C (97.3 °F)-36.9 °C (98.5 °F)] 36.9 °C (98.5 °F)  Heart Rate:  [75-95] 95  Resp:  [18-20] 18  BP: ()/(49-74) 117/65      Current Facility-Administered Medications:   •  acetaminophen (TYLENOL) tablet 650 mg, 650 mg, oral, q6h INT, Erinn Barajas DO, 650 mg at 01/27/21 1121  •  alum-mag hydroxide-simeth (MAALOX) 200-200-20 mg/5 mL suspension 30 mL, 30 mL, oral, q6h PRN, Lili Crawford MD, 30 mL at 01/11/21 1946  •  baclofen (LIORESAL) tablet 10 mg, 10 mg, oral, TID, Roshni Ghotra DO, 10 mg at 01/27/21 0824  •  buPROPion (WELLBUTRIN) tablet 75 mg, 75 mg, oral, TID, Marlon Padron MD, 75 mg at 01/27/21 1122  •  ceFAZolin (ANCEF) NSS IVPB piggyback 2 g, 2 g, intravenous, q8h INT, Vikas Jones MD, Stopped at 01/27/21 1215  •  glucose chewable tablet 16-32 g of dextrose, 16-32 g of dextrose, oral, PRN **OR** dextrose 40 % oral gel 15-30 g of dextrose, 15-30 g of dextrose, oral, PRN **OR** glucagon (GLUCAGEN) injection 1 mg, 1 mg, intramuscular, PRN **OR** dextrose in water injection 12.5 g, 25 mL, intravenous, PRN, Perfecto Angela MD  •  diphenhydrAMINE  (BENADRYL) injection 25 mg, 25 mg, intravenous, q6h PRN, Erinn Barajas DO, 25 mg at 01/27/21 1219  •  enoxaparin (LOVENOX) syringe 40 mg, 40 mg, subcutaneous, Daily (6a), Francy Mock DO  •  famotidine (PEPCID) tablet 10 mg, 10 mg, oral, BID AC, Lili Crawford MD, 10 mg at 01/27/21 0824  •  fluticasone propionate (FLONASE) 50 mcg/actuation nasal spray 2 spray, 2 spray, Each Nostril, Daily PRN, Perfecto Angela MD  •  hydrocortisone-pramoxine (ANALPRAM-HC) 2.5-1 % rectal cream, , rectal, 4x daily PRN, Domiinque Powell DO, Given at 12/19/20 1643  •  ibuprofen (MOTRIN) tablet 600 mg, 600 mg, oral, q8h PRN, Tisha Moise MD, 600 mg at 01/27/21 0823  •  lactobacillus acidophilus complex (BACID) 1 billion cell- 250 mg tablet 1 tablet, 1 tablet, oral, BID, Heidi Toscano CRNP, 1 tablet at 01/27/21 0824  •  lidocaine (ASPERCREME) 4 % topical patch 1 patch, 1 patch, Topical, Daily, Jacki Boss CRNP, 1 patch at 01/27/21 0826  •  magnesium sulfate IVPB 2g in 50 mL NSS/D5W/SWFI, 2 g, intravenous, PRN, Perfecto Angela MD  •  methadone (DOLOPHINE) tablet 60 mg, 60 mg, oral, Daily (6a), Marlon Padron MD, 60 mg at 01/27/21 0643  •  metoclopramide (REGLAN) injection 5 mg, 5 mg, intravenous, q6h PRN, Francy Mock DO, 5 mg at 01/27/21 0823  •  metoprolol succinate XL (TOPROL-XL) 24 hr ER tablet 25 mg, 25 mg, oral, Daily, Lili Crawford MD, 25 mg at 01/27/21 0824  •  naloxone (NARCAN) injection 0.2 mg, 0.2 mg, intravenous, Once PRN, Jacki Boss CRNP  •  nicotine (NICODERM CQ) 14 mg/24 hr patch 1 patch, 1 patch, transdermal, Daily, Gen Hogue MD, 1 patch at 01/27/21 0825  •  nicotine polacrilex (NICORETTE) gum 4 mg, 4 mg, buccal, q4h PRN, Perfecto Angela MD, 4 mg at 01/21/21 0851  •  pantoprazole (PROTONIX) tablet,delayed release (DR/EC) 40 mg, 40 mg, oral, BID, José Manuel Morris MD, 40 mg at 01/27/21 0825  •  PARoxetine (PAXIL) tablet 30 mg, 30 mg, oral, Daily,  Marlon Padron MD, 30 mg at 01/27/21 0823  •  polyethylene glycol (MIRALAX) 17 gram packet 17 g, 17 g, oral, Daily PRN, Erinn Barajas DO, 17 g at 01/27/21 1122  •  potassium chloride (KLOR-CON) tablet extended release 20 mEq, 20 mEq, oral, Daily PRN **OR** potassium chloride (KLOR-CON) tablet extended release 40 mEq, 40 mEq, oral, Daily PRN **OR** potassium chloride 20 mEq in 100 mL IVPB  (premix), 20 mEq, intravenous, Daily PRN **OR** potassium chloride (KCL) 40 mEq/250 mL IVPB in NSS 40 mEq, 40 mEq, intravenous, Daily PRN, Perfecto Angela MD  •  prazosin (MINIPRESS) capsule 2 mg, 2 mg, oral, Nightly, Perfecto Angela MD, 2 mg at 01/26/21 2107  •  pregabalin (LYRICA) capsule 150 mg, 150 mg, oral, BID, Perfecto Angela MD, 150 mg at 01/27/21 0825  •  QUEtiapine (SEROquel) tablet 100 mg, 100 mg, oral, Nightly, Marlon Padron MD, 100 mg at 01/26/21 2108  •  zolpidem (AMBIEN) tablet 10 mg, 10 mg, oral, Nightly, Perfecto Angela MD, 10 mg at 01/26/21 2107    MSE:  Orientation: AAO x3  Behavior: Appropriate  Appearance: well groomed  Eye Contact: Fair throughout  Mood: “pretty good”  Affect: mostly stable and appropriate; some lability/crying appropriate to contex when discussing her ongoing illness but overall stable  Speech: Coherent  Thought Process: Goal Directed and linear   Suicidal Ideation: Denies suicidal thoughts; intent or plan; denies passive daeth wish  Homicidal Ideation: Denies having homicidal thoughts, intent or plan  Hallucinations: Denies  Delusions: Denies  Cognition: No gross cognitive deficits  Memory: Remote; Immediate; all intact  Insight: Fair  Judgment: Fair      Assessment/Plan  Unspecified mood (affective) disorder (CMS/HCC)  Assessment & Plan  Patient with history of depression and addiction.  1. Continue paroxetine 30mg qam  2. Continue bupropion 75mg TID  3. Continue quetiapine 100mg qhs  4.  to please speak with patient about options for  inpatient/outpatient rehab and follow-up with a psychiatrist/therapist      Opioid type dependence, continuous use (CMS/HCC)  Assessment & Plan  1. methadone 65mg qam (add 5mg) (Hold for sedation; O2 < 95; SBP <100; DBP < 60; HR < 60; RR< 10; QTc > 470).  Most recent qtc 440 - please recheck.  2. DC 1:1 sitter for now but please insure patient swallows all pills given  3. Patient would benefit from inpatient rehab where she can receive iv antibioitcs  4. Please encourage her to do intake with methadone clinic so that she can continue treatment without a lapse upon discharge.  5. Pain management consult appreciated.        Spent 35 minutes in total treatment including chart review; consultation with patient; and note writing    Marlon Padron MD  1/27/2021

## 2021-01-27 NOTE — NURSING NOTE
Patient frequent c/o multiple discomforts, back pain, headache, nausea, leg pain, medicated with meds as requested, stayed in bed most of the time, patient was encouraged to ambulate in hallway. ANGELA single lumen picc dressing was reinforced, site intact.

## 2021-01-28 PROBLEM — R58 ECCHYMOSIS: Status: RESOLVED | Noted: 2021-01-26 | Resolved: 2021-01-28

## 2021-01-28 LAB
ATRIAL RATE: 77
P AXIS: 23
PR INTERVAL: 130
QRS DURATION: 92
QT INTERVAL: 384
QTC CALCULATION(BAZETT): 434
R AXIS: 40
SARS-COV-2 RNA RESP QL NAA+PROBE: NEGATIVE
T WAVE AXIS: 61
VENTRICULAR RATE: 77

## 2021-01-28 PROCEDURE — 25800000 HC PHARMACY IV SOLUTIONS: Performed by: INTERNAL MEDICINE

## 2021-01-28 PROCEDURE — 63700000 HC SELF-ADMINISTRABLE DRUG: Performed by: INTERNAL MEDICINE

## 2021-01-28 PROCEDURE — 63600000 HC DRUGS/DETAIL CODE: Performed by: HOSPITALIST

## 2021-01-28 PROCEDURE — U0002 COVID-19 LAB TEST NON-CDC: HCPCS | Performed by: PHYSICIAN ASSISTANT

## 2021-01-28 PROCEDURE — 25000000 HC PHARMACY GENERAL: Performed by: INTERNAL MEDICINE

## 2021-01-28 PROCEDURE — 12000000 HC ROOM AND CARE MED/SURG

## 2021-01-28 PROCEDURE — 63600000 HC DRUGS/DETAIL CODE: Performed by: INTERNAL MEDICINE

## 2021-01-28 PROCEDURE — 63700000 HC SELF-ADMINISTRABLE DRUG: Performed by: HOSPITALIST

## 2021-01-28 PROCEDURE — 93005 ELECTROCARDIOGRAM TRACING: CPT | Performed by: INTERNAL MEDICINE

## 2021-01-28 PROCEDURE — 63700000 HC SELF-ADMINISTRABLE DRUG: Performed by: NURSE PRACTITIONER

## 2021-01-28 PROCEDURE — 63700000 HC SELF-ADMINISTRABLE DRUG: Performed by: PSYCHIATRY & NEUROLOGY

## 2021-01-28 PROCEDURE — 99232 SBSQ HOSP IP/OBS MODERATE 35: CPT | Performed by: INTERNAL MEDICINE

## 2021-01-28 RX ORDER — KETOROLAC TROMETHAMINE 30 MG/ML
30 INJECTION, SOLUTION INTRAMUSCULAR; INTRAVENOUS ONCE AS NEEDED
Status: COMPLETED | OUTPATIENT
Start: 2021-01-28 | End: 2021-01-28

## 2021-01-28 RX ORDER — KETOROLAC TROMETHAMINE 30 MG/ML
30 INJECTION, SOLUTION INTRAMUSCULAR; INTRAVENOUS ONCE
Status: COMPLETED | OUTPATIENT
Start: 2021-01-28 | End: 2021-01-28

## 2021-01-28 RX ADMIN — METOCLOPRAMIDE 5 MG: 5 INJECTION, SOLUTION INTRAMUSCULAR; INTRAVENOUS at 11:46

## 2021-01-28 RX ADMIN — PAROXETINE 30 MG: 20 TABLET, FILM COATED ORAL at 07:59

## 2021-01-28 RX ADMIN — CEFAZOLIN SODIUM 2 G: 10 POWDER, FOR SOLUTION INTRAVENOUS at 20:10

## 2021-01-28 RX ADMIN — FAMOTIDINE 10 MG: 10 TABLET, FILM COATED ORAL at 05:46

## 2021-01-28 RX ADMIN — PREGABALIN 150 MG: 75 CAPSULE ORAL at 07:59

## 2021-01-28 RX ADMIN — ACETAMINOPHEN 650 MG: 325 TABLET, FILM COATED ORAL at 04:27

## 2021-01-28 RX ADMIN — BACLOFEN 10 MG: 10 TABLET ORAL at 21:14

## 2021-01-28 RX ADMIN — BUPROPION HYDROCHLORIDE 75 MG: 75 TABLET, FILM COATED ORAL at 07:59

## 2021-01-28 RX ADMIN — ACETAMINOPHEN 650 MG: 325 TABLET, FILM COATED ORAL at 16:21

## 2021-01-28 RX ADMIN — KETOROLAC TROMETHAMINE 30 MG: 30 INJECTION, SOLUTION INTRAMUSCULAR at 05:46

## 2021-01-28 RX ADMIN — PREGABALIN 150 MG: 75 CAPSULE ORAL at 21:13

## 2021-01-28 RX ADMIN — BACLOFEN 10 MG: 10 TABLET ORAL at 07:59

## 2021-01-28 RX ADMIN — ZOLPIDEM TARTRATE 10 MG: 5 TABLET, COATED ORAL at 21:24

## 2021-01-28 RX ADMIN — FAMOTIDINE 10 MG: 10 TABLET, FILM COATED ORAL at 16:21

## 2021-01-28 RX ADMIN — ACETAMINOPHEN 650 MG: 325 TABLET, FILM COATED ORAL at 11:45

## 2021-01-28 RX ADMIN — PANTOPRAZOLE SODIUM 40 MG: 40 TABLET, DELAYED RELEASE ORAL at 21:14

## 2021-01-28 RX ADMIN — BUPROPION HYDROCHLORIDE 75 MG: 75 TABLET, FILM COATED ORAL at 11:45

## 2021-01-28 RX ADMIN — CEFAZOLIN SODIUM 2 G: 10 POWDER, FOR SOLUTION INTRAVENOUS at 04:23

## 2021-01-28 RX ADMIN — DIPHENHYDRAMINE HYDROCHLORIDE 25 MG: 50 INJECTION INTRAMUSCULAR; INTRAVENOUS at 11:47

## 2021-01-28 RX ADMIN — DIPHENHYDRAMINE HYDROCHLORIDE 25 MG: 50 INJECTION INTRAMUSCULAR; INTRAVENOUS at 17:34

## 2021-01-28 RX ADMIN — PRAZOSIN HYDROCHLORIDE 2 MG: 1 CAPSULE ORAL at 21:24

## 2021-01-28 RX ADMIN — Medication 1 PATCH: at 08:01

## 2021-01-28 RX ADMIN — DIPHENHYDRAMINE HYDROCHLORIDE 25 MG: 50 INJECTION INTRAMUSCULAR; INTRAVENOUS at 04:31

## 2021-01-28 RX ADMIN — METOPROLOL SUCCINATE 25 MG: 25 TABLET, EXTENDED RELEASE ORAL at 08:00

## 2021-01-28 RX ADMIN — METHADONE HYDROCHLORIDE 65 MG: 10 TABLET ORAL at 06:54

## 2021-01-28 RX ADMIN — METOCLOPRAMIDE 5 MG: 5 INJECTION, SOLUTION INTRAMUSCULAR; INTRAVENOUS at 17:34

## 2021-01-28 RX ADMIN — PANTOPRAZOLE SODIUM 40 MG: 40 TABLET, DELAYED RELEASE ORAL at 07:58

## 2021-01-28 RX ADMIN — BUPROPION HYDROCHLORIDE 75 MG: 75 TABLET, FILM COATED ORAL at 16:20

## 2021-01-28 RX ADMIN — POLYETHYLENE GLYCOL 3350 17 G: 17 POWDER, FOR SOLUTION ORAL at 08:29

## 2021-01-28 RX ADMIN — KETOROLAC TROMETHAMINE 30 MG: 30 INJECTION, SOLUTION INTRAMUSCULAR at 18:53

## 2021-01-28 RX ADMIN — PROBIOTIC PRODUCT - TAB 1 TABLET: TAB at 21:13

## 2021-01-28 RX ADMIN — PROBIOTIC PRODUCT - TAB 1 TABLET: TAB at 07:58

## 2021-01-28 RX ADMIN — CEFAZOLIN SODIUM 2 G: 10 POWDER, FOR SOLUTION INTRAVENOUS at 11:48

## 2021-01-28 RX ADMIN — METOCLOPRAMIDE 5 MG: 5 INJECTION, SOLUTION INTRAMUSCULAR; INTRAVENOUS at 04:31

## 2021-01-28 RX ADMIN — QUETIAPINE FUMARATE 100 MG: 100 TABLET ORAL at 21:24

## 2021-01-28 RX ADMIN — BACLOFEN 10 MG: 10 TABLET ORAL at 14:17

## 2021-01-28 NOTE — PATIENT CARE CONFERENCE
Care Progression Rounds Note  Date: 1/28/2021  Time: 10:24 AM     Patient Name: Carolyn Redmond     Medical Record Number: [id number]   YOB: 1985  Sex: Female      Room/Bed: 4211    Admitting Diagnosis: Septic embolism (CMS/[facility]) [I76]  Infection of lumbar spine (CMS/[facility]) [M46.26]  Acute left-sided low back pain without sciatica [M54.5]   Admit Date/Time: 12/9/2020 11:10 AM    Primary Diagnosis: Acute bacterial endocarditis  Principal Problem: Acute bacterial endocarditis    GMLOS: 4.8  Anticipated Discharge Date: 2/2/2021    AM-PAC  Mobility Score:      Discharge Planning:       Barriers to Discharge:  Barriers to Discharge: Medical issues not resolved  Comment: iv antibiotics    Participants:  social work/services, nursing

## 2021-01-28 NOTE — PLAN OF CARE
Problem: Adult Inpatient Plan of Care  Goal: Plan of Care Review  Outcome: Progressing  Flowsheets (Taken 1/28/2021 8717)  Progress: no change  Plan of Care Reviewed With: patient  Outcome Summary: Patient remains free from falls this shift, ambulating in room ad oz without difficulty.  Reports pain as controlled with current regiement, however, frequently complains of headache and nausea, but PRN medications are helping as well.  Voiding without difficulty, did move her bowels today.  Taking all PO medications without difficulty, no pocketing noted.  VSS.     Problem: Fall Injury Risk  Goal: Absence of Fall and Fall-Related Injury  Outcome: Progressing     Problem: Infection  Goal: Infection Symptom Resolution  Outcome: Progressing

## 2021-01-28 NOTE — PLAN OF CARE
Problem: Adult Inpatient Plan of Care  Goal: Plan of Care Review  Flowsheets  Taken 1/28/2021 1502 by Sheeba Bailey MSW  Outcome Summary: pt is for dc home on Feb 2nd, when the IV medications are completed. MSW CC following to assist, as needed.  Taken 1/25/2021 0422 by Karina Hooper RN  Progress: improving     PLAN: dc home.

## 2021-01-28 NOTE — PROGRESS NOTES
Hospital Medicine Service -  Daily Progress Note       SUBJECTIVE   Interval History: No acute events overnight. Patient seen and examined. Per patient back pain is improved compared to yesterday with inc in methadone and one time toradol dose, although patient now notes mild nausea and dry heaving today.     OBJECTIVE      Vital signs in last 24 hours:  Temp:  [36.3 °C (97.4 °F)-36.4 °C (97.5 °F)] 36.4 °C (97.5 °F)  Heart Rate:  [71-75] 75  Resp:  [16-20] 20  BP: (127-132)/(79-92) 127/90    Intake/Output Summary (Last 24 hours) at 1/28/2021 1602  Last data filed at 1/28/2021 1243  Gross per 24 hour   Intake 50 ml   Output --   Net 50 ml       PHYSICAL EXAMINATION      GEN: well-developed and well-nourished; not in acute distress  HEENT: normocephalic; atraumatic  NECK: no JVD; no bruits  CARDIO: regular rate and rhythm; no murmurs, rubs or gallops  RESP: clear to auscultation bilaterally; no rales, rhonchi, or wheezes  ABD: soft, non-distended, non-tender, normal bowel sounds  EXT: no cyanosis, clubbing, or edema  SKIN: clean, dry, warm, and intact  MUSCULOSKELETAL: no injury or deformity  NEURO: alert and oriented x 3; nonfocal  BEHAVIOR/EMOTIONAL: appropriate; cooperative     LABS / IMAGING / TELE      Labs  Lab Results   Component Value Date    GLUCOSE 113 (H) 01/25/2021    CALCIUM 8.8 (L) 01/25/2021     01/25/2021    K 4.5 01/25/2021    CO2 27 01/25/2021     01/25/2021    BUN 13 01/25/2021    CREATININE 0.8 01/25/2021     Lab Results   Component Value Date    WBC 5.10 01/25/2021    HGB 10.9 (L) 01/25/2021    HCT 34.6 (L) 01/25/2021    MCV 88.0 01/25/2021     01/25/2021     Lab Results   Component Value Date    ALBUMIN 3.5 01/11/2021    BILITOT 0.4 01/11/2021    ALKPHOS 58 01/11/2021    BILIDIR <0.1 04/12/2017    AST 19 01/11/2021    ALT 9 (L) 01/11/2021    PROTEIN 6.5 01/11/2021       Imaging  Mri Lumbar Spine With And Without Contrast    Result Date: 1/19/2021  IMPRESSION: 1.   Improvement in the previously seen diffuse anterior and posterior epidural enhancement within the lumbar spine with only mildly prominent enhancement persisting from L4 to L5.  There is residual enhancement about the left greater than right facet joints and there is persistent fluid in the left facet joint (previously, there was fluid in both facet joints).  These findings could be related to active degeneration and prominence of the epidural plexus due to the significant central canal and neural foraminal narrowing at this level. Clinical correlation is recommended as an infectious process cannot be excluded by imaging. 2.  Edema and enhancement in the deep subcutaneous soft tissues throughout the visualized lower thoracic and lumbar spine. 3.  Degenerative and discogenic disease of the lumbar spine, as above.    Fluoroscopy Upper Gi Series    Result Date: 1/20/2021  IMPRESSION: Disordered esophageal motility. Gastroesophageal reflux.        ASSESSMENT AND PLAN      * Acute bacterial endocarditis  Assessment & Plan  -Tricuspid valve endocarditis with severe TR. Also with septic pulmonary emboli and seeding of lumbar spine.   -Blood cx at [facility] 12/7 and 12/8 grew MSSA. Blood cultures from 12/2020 negative for growth.Patient afebrile.   -Completed 42 days antibiotics on 1/18/21.   -Repeat MRI L-spine shows improved, but persistent enhancement in the lumbar joints.     Plan:  - continue IV antibiotics for total 8 weeks then follow up as outpatient for repeat lumbar imaging. Continue IV abx until 2/1/21.  -Cardiology consulted.  No need for surgical intervention on tricuspid valve. Recommended she follow up as outpatient for repeat TTE.  -F/U with PCP for repeat CT chest as outpatient.      Opioid type dependence, continuous use (CMS/formerly [facility])  Assessment & Plan  -Continue methadone per psych recs.   -Patient declining inpatient psych rehab. Agrees to IOP. States she has multiple resources to help maintain  sobriety including a sponsor.  -Behavioral therapy and psychiatry following. They are working on setting patient up with methadone clinic for discharge.       Nausea & vomiting  Assessment & Plan  -Intermittent nausea/vomiting with large meals. She reports it has been going on for several weeks, but worse over the past week. She regurgitates whole food about 5 minutes after eating. Worse with large meals. No hematemesis, hematochezia, weight loss. Her anemia is at baseline. She is having daily BMs.   -Upper GI series shows esophageal dysmotility and GERD.  -sxs stable for now    Plan:   -Continue Pepcid and PPI.   -Continue Baclofen and PRN Reglan. QTc 440 on 1/23. Will repeat EKG  - caution with toradol use  -Continue small meals, low fat/low fiber diet.   -GI consulted. No plans for EGD or gastric emptying study.            Essential hypertension  Assessment & Plan  -BP stable  -Cont w/metoprolol, prazosin    Pain  Assessment & Plan  -cont to c/o ongoing back pain despite radiographic improvement in lumbar osteomyelitis.   -pt c/o persistent pain but improved following increased in methadone and toradol dosaging. Pt requesting additional toradol dose    Plan:  -cont w/methadone 65mg qam per psychiatry   -Pain managment re-consulted. No changes made to regimen.  -Continue Lyrica, Baclofen, lidocaine patch, methadone. Will administer dose of toradol per patient request - caution however given GI sxs  -cont w/IV abx as stated above  -cont heating pad.   - cont w/LSO brace  - Neurosurgery consulted - per evaluation No acute surgical intervention required. Cont w/management with IV antibiotics. Will required follow-up and repeat MRI is recommended 4-6 weeks after discharge for reassement      Migraine, intractable  Assessment & Plan  -Resolved with Fioricet.   -Neurology consulted. Rec Fioricet no more than 4 days/month, and over-the-counter medications no more than 15 days/month.    -Outpatient neurology follow up for  "further management/workup. Needs outpatient sleep study.        Insomnia  Assessment & Plan  -Cont w/ambien      Depression  Assessment & Plan  -No SI/HI.  -Continue Wellbutrin, Quetiapine, Ambien.  -Follow up with psychiatry as outpatient.      Anxiety  Assessment & Plan  -See \"depression\"           VTE Assessment: Padua VTE Score: 2  DVT PPX: declines lovenox, GI PPX: pepcid, ppi  Diet: low fiber/low residue  Dispo: Med/surg  Estimated discharge date: 2/2/2021  Code Status: Full Code     Erinn Barajas, DO  1/28/2021               "

## 2021-01-28 NOTE — NURSING NOTE
Patient sent home 4 belongings bags of clothing, toiletries and snacks she accumulated here, taking to lobby to her mom by PCT.  Patients mom dropped off 16 q-tips in a clear ziploc bag, verified with PCT Charmaine, and given to patient.

## 2021-01-29 LAB
ATRIAL RATE: 76
P AXIS: 35
PR INTERVAL: 158
QRS DURATION: 98
QT INTERVAL: 390
QTC CALCULATION(BAZETT): 438
R AXIS: 33
T WAVE AXIS: 53
VENTRICULAR RATE: 76

## 2021-01-29 PROCEDURE — 63700000 HC SELF-ADMINISTRABLE DRUG: Performed by: INTERNAL MEDICINE

## 2021-01-29 PROCEDURE — 63600000 HC DRUGS/DETAIL CODE: Performed by: INTERNAL MEDICINE

## 2021-01-29 PROCEDURE — 99232 SBSQ HOSP IP/OBS MODERATE 35: CPT | Performed by: INTERNAL MEDICINE

## 2021-01-29 PROCEDURE — 25800000 HC PHARMACY IV SOLUTIONS: Performed by: INTERNAL MEDICINE

## 2021-01-29 PROCEDURE — 63700000 HC SELF-ADMINISTRABLE DRUG: Performed by: NURSE PRACTITIONER

## 2021-01-29 PROCEDURE — 63600000 HC DRUGS/DETAIL CODE: Performed by: HOSPITALIST

## 2021-01-29 PROCEDURE — 12000000 HC ROOM AND CARE MED/SURG

## 2021-01-29 PROCEDURE — 63700000 HC SELF-ADMINISTRABLE DRUG: Performed by: PSYCHIATRY & NEUROLOGY

## 2021-01-29 PROCEDURE — 63700000 HC SELF-ADMINISTRABLE DRUG: Performed by: HOSPITALIST

## 2021-01-29 PROCEDURE — 25000000 HC PHARMACY GENERAL: Performed by: INTERNAL MEDICINE

## 2021-01-29 RX ORDER — KETOROLAC TROMETHAMINE 30 MG/ML
30 INJECTION, SOLUTION INTRAMUSCULAR; INTRAVENOUS DAILY PRN
Status: DISCONTINUED | OUTPATIENT
Start: 2021-01-29 | End: 2021-02-03 | Stop reason: HOSPADM

## 2021-01-29 RX ADMIN — BACLOFEN 10 MG: 10 TABLET ORAL at 21:00

## 2021-01-29 RX ADMIN — ACETAMINOPHEN 650 MG: 325 TABLET, FILM COATED ORAL at 17:07

## 2021-01-29 RX ADMIN — IBUPROFEN 600 MG: 600 TABLET ORAL at 09:10

## 2021-01-29 RX ADMIN — CEFAZOLIN SODIUM 2 G: 10 POWDER, FOR SOLUTION INTRAVENOUS at 18:55

## 2021-01-29 RX ADMIN — BACLOFEN 10 MG: 10 TABLET ORAL at 09:04

## 2021-01-29 RX ADMIN — QUETIAPINE FUMARATE 100 MG: 100 TABLET ORAL at 20:58

## 2021-01-29 RX ADMIN — METHADONE HYDROCHLORIDE 65 MG: 10 TABLET ORAL at 06:51

## 2021-01-29 RX ADMIN — PREGABALIN 150 MG: 75 CAPSULE ORAL at 20:58

## 2021-01-29 RX ADMIN — PRAZOSIN HYDROCHLORIDE 2 MG: 1 CAPSULE ORAL at 20:58

## 2021-01-29 RX ADMIN — POLYETHYLENE GLYCOL 3350 17 G: 17 POWDER, FOR SOLUTION ORAL at 11:50

## 2021-01-29 RX ADMIN — BUPROPION HYDROCHLORIDE 75 MG: 75 TABLET, FILM COATED ORAL at 11:46

## 2021-01-29 RX ADMIN — ACETAMINOPHEN 650 MG: 325 TABLET, FILM COATED ORAL at 04:27

## 2021-01-29 RX ADMIN — BACLOFEN 10 MG: 10 TABLET ORAL at 14:30

## 2021-01-29 RX ADMIN — METOCLOPRAMIDE 5 MG: 5 INJECTION, SOLUTION INTRAMUSCULAR; INTRAVENOUS at 11:52

## 2021-01-29 RX ADMIN — CEFAZOLIN SODIUM 2 G: 10 POWDER, FOR SOLUTION INTRAVENOUS at 04:23

## 2021-01-29 RX ADMIN — PANTOPRAZOLE SODIUM 40 MG: 40 TABLET, DELAYED RELEASE ORAL at 09:04

## 2021-01-29 RX ADMIN — METOCLOPRAMIDE 5 MG: 5 INJECTION, SOLUTION INTRAMUSCULAR; INTRAVENOUS at 17:47

## 2021-01-29 RX ADMIN — Medication 1 PATCH: at 09:04

## 2021-01-29 RX ADMIN — CEFAZOLIN SODIUM 2 G: 10 POWDER, FOR SOLUTION INTRAVENOUS at 11:45

## 2021-01-29 RX ADMIN — PAROXETINE 30 MG: 20 TABLET, FILM COATED ORAL at 09:04

## 2021-01-29 RX ADMIN — PROBIOTIC PRODUCT - TAB 1 TABLET: TAB at 20:58

## 2021-01-29 RX ADMIN — DIPHENHYDRAMINE HYDROCHLORIDE 25 MG: 50 INJECTION INTRAMUSCULAR; INTRAVENOUS at 11:46

## 2021-01-29 RX ADMIN — FAMOTIDINE 10 MG: 10 TABLET, FILM COATED ORAL at 17:07

## 2021-01-29 RX ADMIN — ACETAMINOPHEN 650 MG: 325 TABLET, FILM COATED ORAL at 11:46

## 2021-01-29 RX ADMIN — METOCLOPRAMIDE 5 MG: 5 INJECTION, SOLUTION INTRAMUSCULAR; INTRAVENOUS at 04:30

## 2021-01-29 RX ADMIN — DIPHENHYDRAMINE HYDROCHLORIDE 25 MG: 50 INJECTION INTRAMUSCULAR; INTRAVENOUS at 17:46

## 2021-01-29 RX ADMIN — DIPHENHYDRAMINE HYDROCHLORIDE 25 MG: 50 INJECTION INTRAMUSCULAR; INTRAVENOUS at 04:28

## 2021-01-29 RX ADMIN — PREGABALIN 150 MG: 75 CAPSULE ORAL at 07:49

## 2021-01-29 RX ADMIN — METOPROLOL SUCCINATE 25 MG: 25 TABLET, EXTENDED RELEASE ORAL at 11:45

## 2021-01-29 RX ADMIN — BUPROPION HYDROCHLORIDE 75 MG: 75 TABLET, FILM COATED ORAL at 07:49

## 2021-01-29 RX ADMIN — KETOROLAC TROMETHAMINE 30 MG: 30 INJECTION, SOLUTION INTRAMUSCULAR at 20:59

## 2021-01-29 RX ADMIN — PANTOPRAZOLE SODIUM 40 MG: 40 TABLET, DELAYED RELEASE ORAL at 20:58

## 2021-01-29 RX ADMIN — ZOLPIDEM TARTRATE 10 MG: 5 TABLET, COATED ORAL at 20:58

## 2021-01-29 RX ADMIN — PROBIOTIC PRODUCT - TAB 1 TABLET: TAB at 09:04

## 2021-01-29 RX ADMIN — BUPROPION HYDROCHLORIDE 75 MG: 75 TABLET, FILM COATED ORAL at 15:39

## 2021-01-29 RX ADMIN — FAMOTIDINE 10 MG: 10 TABLET, FILM COATED ORAL at 07:48

## 2021-01-29 NOTE — PLAN OF CARE
Problem: Adult Inpatient Plan of Care  Goal: Plan of Care Review  Outcome: Progressing  Flowsheets (Taken 1/29/2021 4359)  Progress: improving  Plan of Care Reviewed With: patient  Outcome Summary: Pt AAOX4. Oral meds and IV ABT  given per order, pt tolerated good. No  pocketting of pills noted. No worsening pain this shift.  No acute distress. Call bell left in reach.

## 2021-01-29 NOTE — PROGRESS NOTES
Hospital Medicine Service -  Daily Progress Note       SUBJECTIVE   Interval History: No acute events overnight. Patient seen and examined. Cont to c/o persistent back pain. Denies any f/c.      OBJECTIVE      Vital signs in last 24 hours:  Temp:  [36.6 °C (97.9 °F)-36.7 °C (98 °F)] 36.7 °C (98 °F)  Heart Rate:  [71-84] 80  Resp:  [8-18] 8  BP: (106-139)/(52-83) 113/77    Intake/Output Summary (Last 24 hours) at 1/29/2021 1715  Last data filed at 1/29/2021 1244  Gross per 24 hour   Intake 390 ml   Output --   Net 390 ml       PHYSICAL EXAMINATION      GEN: well-developed and well-nourished; not in acute distress  HEENT: normocephalic; atraumatic  NECK: no JVD; no bruits  CARDIO: regular rate and rhythm; no murmurs, rubs or gallops  RESP: clear to auscultation bilaterally; no rales, rhonchi, or wheezes  ABD: soft, non-distended, non-tender, normal bowel sounds  EXT: no cyanosis, clubbing, or edema  SKIN: clean, dry, warm, and intact  MUSCULOSKELETAL: no injury or deformity. Currently wearing LSO brace  NEURO: alert and oriented x 3; nonfocal  BEHAVIOR/EMOTIONAL: appropriate; cooperative     LABS / IMAGING / TELE      Labs  Lab Results   Component Value Date    GLUCOSE 113 (H) 01/25/2021    CALCIUM 8.8 (L) 01/25/2021     01/25/2021    K 4.5 01/25/2021    CO2 27 01/25/2021     01/25/2021    BUN 13 01/25/2021    CREATININE 0.8 01/25/2021     Lab Results   Component Value Date    WBC 5.10 01/25/2021    HGB 10.9 (L) 01/25/2021    HCT 34.6 (L) 01/25/2021    MCV 88.0 01/25/2021     01/25/2021     Lab Results   Component Value Date    ALBUMIN 3.5 01/11/2021    BILITOT 0.4 01/11/2021    ALKPHOS 58 01/11/2021    BILIDIR <0.1 04/12/2017    AST 19 01/11/2021    ALT 9 (L) 01/11/2021    PROTEIN 6.5 01/11/2021       Imaging  Mri Lumbar Spine With And Without Contrast    Result Date: 1/19/2021  IMPRESSION: 1.  Improvement in the previously seen diffuse anterior and posterior epidural enhancement within the  lumbar spine with only mildly prominent enhancement persisting from L4 to L5.  There is residual enhancement about the left greater than right facet joints and there is persistent fluid in the left facet joint (previously, there was fluid in both facet joints).  These findings could be related to active degeneration and prominence of the epidural plexus due to the significant central canal and neural foraminal narrowing at this level. Clinical correlation is recommended as an infectious process cannot be excluded by imaging. 2.  Edema and enhancement in the deep subcutaneous soft tissues throughout the visualized lower thoracic and lumbar spine. 3.  Degenerative and discogenic disease of the lumbar spine, as above.    Fluoroscopy Upper Gi Series    Result Date: 1/20/2021  IMPRESSION: Disordered esophageal motility. Gastroesophageal reflux.        ASSESSMENT AND PLAN      * Acute bacterial endocarditis  Assessment & Plan  -Tricuspid valve endocarditis with severe TR. Also with septic pulmonary emboli and seeding of lumbar spine.   -Blood cx at [facility] 12/7 and 12/8 grew MSSA. Blood cultures from 12/2020 negative for growth.Patient afebrile.   -Completed 42 days antibiotics on 1/18/21.   -Repeat MRI L-spine shows improved, but persistent enhancement in the lumbar joints.     Plan:  - continue IV antibiotics for total 8 weeks then follow up as outpatient for repeat lumbar imaging. Continue IV abx until 2/1/21.  -Cardiology consulted.  No need for surgical intervention on tricuspid valve. Recommended she follow up as outpatient for repeat TTE.  -F/U with PCP for repeat CT chest as outpatient.  - tentative plan to dc the evening of 2/1 vs 2/2 pending completion of abx and set-up of outpt resources/enrollment in methadone clinic      Opioid type dependence, continuous use (CMS/[facility], located [facility])  Assessment & Plan  -Continue methadone per psych recs.   -Patient declining inpatient psych rehab. Agrees to IOP. States she has  multiple resources to help maintain sobriety including a sponsor.  -Behavioral therapy and psychiatry following. Discussed with psych social work - will reinitiate process to enroll in methadone clinic for tenative start on Tuesday. Will need to f/u with SW regarding enrollment/set-up of outpt resources prior to d/c    Nausea & vomiting  Assessment & Plan  -Intermittent nausea/vomiting with large meals. She reports it has been going on for several weeks, but worse over the past week. She regurgitates whole food about 5 minutes after eating. Worse with large meals. No hematemesis, hematochezia, weight loss. Her anemia is at baseline. She is having daily BMs.   -Upper GI series shows esophageal dysmotility and GERD.  -sxs stable for now    Plan:   -Continue Pepcid and PPI.   -Continue Baclofen and PRN Reglan. QTc 438 on 1/28.  - caution with toradol use  -Continue small meals, low fat/low fiber diet.   -GI consulted. No plans for EGD or gastric emptying study.            Essential hypertension  Assessment & Plan  -BP stable  -Cont w/metoprolol, prazosin    Pain  Assessment & Plan  -cont to c/o ongoing back pain despite radiographic improvement in lumbar osteomyelitis.   -pt c/o persistent pain but improved following increased in methadone and toradol dosaging. Pt requesting additional toradol dose    Plan:  -cont w/methadone 65mg qam per psychiatry   -Pain managment re-consulted. No changes made to regimen.  -Continue Lyrica, Baclofen, lidocaine patch, methadone. Will administer dose of toradol per patient request - caution however given GI sxs  -cont w/IV abx as stated above  -cont heating pad.   - cont w/LSO brace  - Neurosurgery consulted - per evaluation No acute surgical intervention required. Cont w/management with IV antibiotics. Will required follow-up and repeat MRI is recommended 4-6 weeks after discharge for reassement      Migraine, intractable  Assessment & Plan  -Resolved with Fioricet.   -Neurology  "consulted. Rec Fioricet no more than 4 days/month, and over-the-counter medications no more than 15 days/month.    -Outpatient neurology follow up for further management/workup. Needs outpatient sleep study.        Insomnia  Assessment & Plan  -Cont w/ambien      Depression  Assessment & Plan  -No SI/HI.  -Continue Wellbutrin, Quetiapine, Ambien.  -Follow up with psychiatry as outpatient.      Anxiety  Assessment & Plan  -See \"depression\"           VTE Assessment: Padua VTE Score: 2  DVT PPX: refuse lovenox GI PPX: pepcid  Diet: low fiber, low residue diet  Dispo: Med/Surg   Estimated discharge date: 2/2/2021  Code Status: Full Code     Erinn Barajas, DO  1/29/2021               "

## 2021-01-29 NOTE — BEHAVIORAL HEALTH CRISIS PROGRESS NOTE
Behavioral Health Progress Note    Chief Complaint/Reason for follow-up: opioid dependence and depression    Vital Signs for the last 24 hours:  Temp:  [36.6 °C (97.9 °F)-36.7 °C (98 °F)] 36.7 °C (98 °F)  Heart Rate:  [71-84] 80  Resp:  [8-18] 8  BP: (106-139)/(52-83) 113/77    Assessment/Plan    Spoke to [facility] and the pt should be ready for d/c Tuesday 2/2. Call to [facility] to update them on pt's d/c date. They have the pt in the system to begin tx.     At this time the pt is rescheduled for Wednesday 2/3 at 10am. Will follow up with [place] when the pt is ready for d/c to confirm.

## 2021-01-29 NOTE — PLAN OF CARE
Problem: Adult Inpatient Plan of Care  Goal: Plan of Care Review  Flowsheets  Taken 1/29/2021 1342 by Sheeba Bailey MSW  Plan of Care Reviewed With: (nurse) other (see comments)  Outcome Summary: pt is for dc from the hospital on Feb 2nd. no additional needs noted.  Taken 1/29/2021 0514 by Kalani Fernandez, RN  Progress: improving     PLAN: dc home after IV antibiotics on Feb 2nd.

## 2021-01-29 NOTE — PATIENT CARE CONFERENCE
Care Progression Rounds Note  Date: 1/29/2021  Time: 10:23 AM     Patient Name: Carolyn Redmond     Medical Record Number: [id number]   YOB: 1985  Sex: Female      Room/Bed: 4211    Admitting Diagnosis: Septic embolism (CMS/formerly [facility]) [I76]  Infection of lumbar spine (CMS/formerly [facility]) [M46.26]  Acute left-sided low back pain without sciatica [M54.5]   Admit Date/Time: 12/9/2020 11:10 AM    Primary Diagnosis: Acute bacterial endocarditis  Principal Problem: Acute bacterial endocarditis    GMLOS: 4.8  Anticipated Discharge Date: 2/2/2021    AM-PAC  Mobility Score:      Discharge Planning:       Barriers to Discharge:  Barriers to Discharge: Medical issues not resolved  Comment: iv antibiotics    Participants:  social work/services, nursing

## 2021-01-30 PROCEDURE — 63700000 HC SELF-ADMINISTRABLE DRUG: Performed by: INTERNAL MEDICINE

## 2021-01-30 PROCEDURE — 63600000 HC DRUGS/DETAIL CODE: Performed by: INTERNAL MEDICINE

## 2021-01-30 PROCEDURE — 63700000 HC SELF-ADMINISTRABLE DRUG: Performed by: NURSE PRACTITIONER

## 2021-01-30 PROCEDURE — 63600000 HC DRUGS/DETAIL CODE: Performed by: HOSPITALIST

## 2021-01-30 PROCEDURE — 63700000 HC SELF-ADMINISTRABLE DRUG: Performed by: HOSPITALIST

## 2021-01-30 PROCEDURE — 12000000 HC ROOM AND CARE MED/SURG

## 2021-01-30 PROCEDURE — 99231 SBSQ HOSP IP/OBS SF/LOW 25: CPT | Performed by: HOSPITALIST

## 2021-01-30 PROCEDURE — 63700000 HC SELF-ADMINISTRABLE DRUG: Performed by: PSYCHIATRY & NEUROLOGY

## 2021-01-30 PROCEDURE — 25800000 HC PHARMACY IV SOLUTIONS: Performed by: INTERNAL MEDICINE

## 2021-01-30 PROCEDURE — 25000000 HC PHARMACY GENERAL: Performed by: INTERNAL MEDICINE

## 2021-01-30 RX ADMIN — DIPHENHYDRAMINE HYDROCHLORIDE 25 MG: 50 INJECTION INTRAMUSCULAR; INTRAVENOUS at 00:06

## 2021-01-30 RX ADMIN — BACLOFEN 10 MG: 10 TABLET ORAL at 08:39

## 2021-01-30 RX ADMIN — ACETAMINOPHEN 650 MG: 325 TABLET, FILM COATED ORAL at 17:49

## 2021-01-30 RX ADMIN — PROBIOTIC PRODUCT - TAB 1 TABLET: TAB at 08:39

## 2021-01-30 RX ADMIN — METOCLOPRAMIDE 5 MG: 5 INJECTION, SOLUTION INTRAMUSCULAR; INTRAVENOUS at 11:49

## 2021-01-30 RX ADMIN — QUETIAPINE FUMARATE 100 MG: 100 TABLET ORAL at 21:06

## 2021-01-30 RX ADMIN — BUPROPION HYDROCHLORIDE 75 MG: 75 TABLET, FILM COATED ORAL at 08:39

## 2021-01-30 RX ADMIN — METOPROLOL SUCCINATE 25 MG: 25 TABLET, EXTENDED RELEASE ORAL at 08:40

## 2021-01-30 RX ADMIN — BUPROPION HYDROCHLORIDE 75 MG: 75 TABLET, FILM COATED ORAL at 14:56

## 2021-01-30 RX ADMIN — PREGABALIN 150 MG: 75 CAPSULE ORAL at 19:47

## 2021-01-30 RX ADMIN — DIPHENHYDRAMINE HYDROCHLORIDE 25 MG: 50 INJECTION INTRAMUSCULAR; INTRAVENOUS at 05:56

## 2021-01-30 RX ADMIN — METOCLOPRAMIDE 5 MG: 5 INJECTION, SOLUTION INTRAMUSCULAR; INTRAVENOUS at 17:49

## 2021-01-30 RX ADMIN — PROBIOTIC PRODUCT - TAB 1 TABLET: TAB at 19:48

## 2021-01-30 RX ADMIN — BUPROPION HYDROCHLORIDE 75 MG: 75 TABLET, FILM COATED ORAL at 11:48

## 2021-01-30 RX ADMIN — ACETAMINOPHEN 650 MG: 325 TABLET, FILM COATED ORAL at 11:48

## 2021-01-30 RX ADMIN — FAMOTIDINE 10 MG: 10 TABLET, FILM COATED ORAL at 08:39

## 2021-01-30 RX ADMIN — PRAZOSIN HYDROCHLORIDE 2 MG: 1 CAPSULE ORAL at 21:05

## 2021-01-30 RX ADMIN — FAMOTIDINE 10 MG: 10 TABLET, FILM COATED ORAL at 14:56

## 2021-01-30 RX ADMIN — DIPHENHYDRAMINE HYDROCHLORIDE 25 MG: 50 INJECTION INTRAMUSCULAR; INTRAVENOUS at 11:49

## 2021-01-30 RX ADMIN — CEFAZOLIN SODIUM 2 G: 10 POWDER, FOR SOLUTION INTRAVENOUS at 19:47

## 2021-01-30 RX ADMIN — METOCLOPRAMIDE 5 MG: 5 INJECTION, SOLUTION INTRAMUSCULAR; INTRAVENOUS at 00:05

## 2021-01-30 RX ADMIN — METHADONE HYDROCHLORIDE 65 MG: 10 TABLET ORAL at 06:17

## 2021-01-30 RX ADMIN — PAROXETINE 30 MG: 20 TABLET, FILM COATED ORAL at 08:40

## 2021-01-30 RX ADMIN — Medication 1 PATCH: at 08:38

## 2021-01-30 RX ADMIN — CEFAZOLIN SODIUM 2 G: 10 POWDER, FOR SOLUTION INTRAVENOUS at 11:50

## 2021-01-30 RX ADMIN — BACLOFEN 10 MG: 10 TABLET ORAL at 14:53

## 2021-01-30 RX ADMIN — PREGABALIN 150 MG: 75 CAPSULE ORAL at 08:39

## 2021-01-30 RX ADMIN — KETOROLAC TROMETHAMINE 30 MG: 30 INJECTION, SOLUTION INTRAMUSCULAR at 19:46

## 2021-01-30 RX ADMIN — PANTOPRAZOLE SODIUM 40 MG: 40 TABLET, DELAYED RELEASE ORAL at 08:40

## 2021-01-30 RX ADMIN — ZOLPIDEM TARTRATE 10 MG: 5 TABLET, COATED ORAL at 21:05

## 2021-01-30 RX ADMIN — DIPHENHYDRAMINE HYDROCHLORIDE 25 MG: 50 INJECTION INTRAMUSCULAR; INTRAVENOUS at 17:48

## 2021-01-30 RX ADMIN — POLYETHYLENE GLYCOL 3350 17 G: 17 POWDER, FOR SOLUTION ORAL at 11:47

## 2021-01-30 RX ADMIN — METOCLOPRAMIDE 5 MG: 5 INJECTION, SOLUTION INTRAMUSCULAR; INTRAVENOUS at 05:57

## 2021-01-30 RX ADMIN — CEFAZOLIN SODIUM 2 G: 10 POWDER, FOR SOLUTION INTRAVENOUS at 04:56

## 2021-01-30 RX ADMIN — PANTOPRAZOLE SODIUM 40 MG: 40 TABLET, DELAYED RELEASE ORAL at 19:48

## 2021-01-30 RX ADMIN — BACLOFEN 10 MG: 10 TABLET ORAL at 19:48

## 2021-01-30 NOTE — NURSING NOTE
IV Team: Noted external visible length of PICC line catheter at 2cm. Statlock applied to assist in stabilization of PICC line. Noted red excoriated skin at top of tegaderm dressing. Cavilon skin prep and mepilex dressing applied to excoriated skin to protect from coming in contact with tegaderm

## 2021-01-30 NOTE — PLAN OF CARE
Problem: Adult Inpatient Plan of Care  Goal: Plan of Care Review  Outcome: Progressing  Flowsheets (Taken 1/30/2021 9032)  Progress: improving  Plan of Care Reviewed With: patient  Outcome Summary: Patient ambulating in room and hallway this shift, reports minimal pain and nausea, controlled by current regient.  ANGELA PICC line dressing changed today, antibiotics infusing without any difficulty.  Patient taking all PO medications without difficulty and no pocketing noted.  VSS.     Problem: Fall Injury Risk  Goal: Absence of Fall and Fall-Related Injury  Outcome: Progressing     Problem: Infection  Goal: Infection Symptom Resolution  Outcome: Progressing

## 2021-01-30 NOTE — PROGRESS NOTES
Hospital Medicine Service -  Daily Progress Note       SUBJECTIVE   Interval History: No acute events overnight. Patient seen and examined. Cont to c/o persistent back pain. Denies any f/c.      OBJECTIVE      Vital signs in last 24 hours:  Temp:  [36.5 °C (97.7 °F)-36.7 °C (98 °F)] 36.5 °C (97.7 °F)  Heart Rate:  [73-80] 73  Resp:  [8-16] 16  BP: (113-115)/(56-77) 115/56  No intake or output data in the 24 hours ending 01/30/21 1352    PHYSICAL EXAMINATION      GEN: well-developed and well-nourished; not in acute distress  HEENT: normocephalic; atraumatic  NECK: no JVD; no bruits  CARDIO: regular rate and rhythm; no murmurs, rubs or gallops  RESP: clear to auscultation bilaterally; no rales, rhonchi, or wheezes  ABD: soft, non-distended, non-tender, normal bowel sounds  EXT: no cyanosis, clubbing, or edema  SKIN: clean, dry, warm, and intact  MUSCULOSKELETAL: no injury or deformity. Currently wearing LSO brace  NEURO: alert and oriented x 3; nonfocal  BEHAVIOR/EMOTIONAL: appropriate; cooperative     LABS / IMAGING / TELE      Labs  Lab Results   Component Value Date    GLUCOSE 113 (H) 01/25/2021    CALCIUM 8.8 (L) 01/25/2021     01/25/2021    K 4.5 01/25/2021    CO2 27 01/25/2021     01/25/2021    BUN 13 01/25/2021    CREATININE 0.8 01/25/2021     Lab Results   Component Value Date    WBC 5.10 01/25/2021    HGB 10.9 (L) 01/25/2021    HCT 34.6 (L) 01/25/2021    MCV 88.0 01/25/2021     01/25/2021     Lab Results   Component Value Date    ALBUMIN 3.5 01/11/2021    BILITOT 0.4 01/11/2021    ALKPHOS 58 01/11/2021    BILIDIR <0.1 04/12/2017    AST 19 01/11/2021    ALT 9 (L) 01/11/2021    PROTEIN 6.5 01/11/2021       Imaging  Mri Lumbar Spine With And Without Contrast    Result Date: 1/19/2021  IMPRESSION: 1.  Improvement in the previously seen diffuse anterior and posterior epidural enhancement within the lumbar spine with only mildly prominent enhancement persisting from L4 to L5.  There is  residual enhancement about the left greater than right facet joints and there is persistent fluid in the left facet joint (previously, there was fluid in both facet joints).  These findings could be related to active degeneration and prominence of the epidural plexus due to the significant central canal and neural foraminal narrowing at this level. Clinical correlation is recommended as an infectious process cannot be excluded by imaging. 2.  Edema and enhancement in the deep subcutaneous soft tissues throughout the visualized lower thoracic and lumbar spine. 3.  Degenerative and discogenic disease of the lumbar spine, as above.    Fluoroscopy Upper Gi Series    Result Date: 1/20/2021  IMPRESSION: Disordered esophageal motility. Gastroesophageal reflux.        ASSESSMENT AND PLAN      Nausea & vomiting  Assessment & Plan  -Intermittent nausea/vomiting with large meals. She reports it has been going on for several weeks, but worse over the past week. She regurgitates whole food about 5 minutes after eating. Worse with large meals. No hematemesis, hematochezia, weight loss. Her anemia is at baseline. She is having daily BMs.   -Upper GI series shows esophageal dysmotility and GERD.  -sxs stable for now    Plan:   -Continue Pepcid and PPI.   -Continue Baclofen and PRN Reglan. QTc 438 on 1/28.  - caution with toradol use  -Continue small meals, low fat/low fiber diet.   -GI consulted. No plans for EGD or gastric emptying study.            Essential hypertension  Assessment & Plan  -BP stable  -Cont w/metoprolol, prazosin    Opioid type dependence, continuous use (CMS/HCC)  Assessment & Plan  -Continue methadone per psych recs.   -Patient declining inpatient psych rehab. Agrees to [facility]. States she has multiple resources to help maintain sobriety including a sponsor.  -Behavioral therapy and psychiatry following. Discussed with psych social work - will reinitiate process to enroll in methadone clinic for tenative start on  "Tuesday. Will need to f/u with SW regarding enrollment/set-up of outpt resources prior to d/c    Pain  Assessment & Plan  -cont to c/o ongoing back pain despite radiographic improvement in lumbar osteomyelitis.   -pt c/o persistent pain but improved following increased in methadone and toradol dosaging. Pt requesting additional toradol dose    Plan:  -cont w/methadone 65mg qam per psychiatry   -Pain managment re-consulted. No changes made to regimen.  -Continue Lyrica, Baclofen, lidocaine patch, methadone. Will administer dose of toradol per patient request - caution however given GI sxs  -cont w/IV abx as stated above  -cont heating pad.   - cont w/LSO brace  - Neurosurgery consulted - per evaluation No acute surgical intervention required. Cont w/management with IV antibiotics. Will required follow-up and repeat MRI is recommended 4-6 weeks after discharge for reassement      Migraine, intractable  Assessment & Plan  -Resolved with Fioricet.   -Neurology consulted. Rec Fioricet no more than 4 days/month, and over-the-counter medications no more than 15 days/month.    -Outpatient neurology follow up for further management/workup. Needs outpatient sleep study.        Insomnia  Assessment & Plan  -Cont w/ambien      Depression  Assessment & Plan  -No SI/HI.  -Continue Wellbutrin, Quetiapine, Ambien.  -Follow up with psychiatry as outpatient.      Anxiety  Assessment & Plan  -See \"depression\"      * Acute bacterial endocarditis  Assessment & Plan  -Tricuspid valve endocarditis with severe TR. Also with septic pulmonary emboli and seeding of lumbar spine.   -Blood cx at [facility] 12/7 and 12/8 grew MSSA. Blood cultures from 12/2020 negative for growth.Patient afebrile.   -Completed 42 days antibiotics on 1/18/21.   -Repeat MRI L-spine shows improved, but persistent enhancement in the lumbar joints.     Plan:  - continue IV antibiotics for total 8 weeks then follow up as outpatient for repeat lumbar imaging. " Continue IV abx until 2/1/21.  -Cardiology consulted.  No need for surgical intervention on tricuspid valve. Recommended she follow up as outpatient for repeat TTE.  -F/U with PCP for repeat CT chest as outpatient.  - tentative plan to dc the evening of 2/1 vs 2/2 pending completion of abx and set-up of outpt resources/enrollment in methadone clinic           VTE Assessment: Padua VTE Score: 2  DVT PPX: refuse lovenox GI PPX: pepcid  Diet: low fiber, low residue diet  Dispo: Med/Surg   Estimated discharge date: 2/2/2021  Code Status: Full Code     Tisha Moise MD  1/30/2021

## 2021-01-30 NOTE — NURSING NOTE
After unhooking patient from her IV antibiotic at 1230, patient requesting to be undisturbed until 3pm so she can take a nap.

## 2021-01-31 PROCEDURE — 63700000 HC SELF-ADMINISTRABLE DRUG: Performed by: HOSPITALIST

## 2021-01-31 PROCEDURE — 25000000 HC PHARMACY GENERAL: Performed by: INTERNAL MEDICINE

## 2021-01-31 PROCEDURE — 63700000 HC SELF-ADMINISTRABLE DRUG: Performed by: PSYCHIATRY & NEUROLOGY

## 2021-01-31 PROCEDURE — 25800000 HC PHARMACY IV SOLUTIONS: Performed by: INTERNAL MEDICINE

## 2021-01-31 PROCEDURE — 63700000 HC SELF-ADMINISTRABLE DRUG: Performed by: INTERNAL MEDICINE

## 2021-01-31 PROCEDURE — 63700000 HC SELF-ADMINISTRABLE DRUG: Performed by: NURSE PRACTITIONER

## 2021-01-31 PROCEDURE — 99231 SBSQ HOSP IP/OBS SF/LOW 25: CPT | Performed by: HOSPITALIST

## 2021-01-31 PROCEDURE — 12000000 HC ROOM AND CARE MED/SURG

## 2021-01-31 PROCEDURE — 63600000 HC DRUGS/DETAIL CODE: Performed by: INTERNAL MEDICINE

## 2021-01-31 PROCEDURE — 63600000 HC DRUGS/DETAIL CODE: Performed by: HOSPITALIST

## 2021-01-31 RX ADMIN — METOCLOPRAMIDE 5 MG: 5 INJECTION, SOLUTION INTRAMUSCULAR; INTRAVENOUS at 04:22

## 2021-01-31 RX ADMIN — METOCLOPRAMIDE 5 MG: 5 INJECTION, SOLUTION INTRAMUSCULAR; INTRAVENOUS at 12:14

## 2021-01-31 RX ADMIN — BACLOFEN 10 MG: 10 TABLET ORAL at 20:09

## 2021-01-31 RX ADMIN — FAMOTIDINE 10 MG: 10 TABLET, FILM COATED ORAL at 06:35

## 2021-01-31 RX ADMIN — DIPHENHYDRAMINE HYDROCHLORIDE 25 MG: 50 INJECTION INTRAMUSCULAR; INTRAVENOUS at 04:22

## 2021-01-31 RX ADMIN — CEFAZOLIN SODIUM 2 G: 10 POWDER, FOR SOLUTION INTRAVENOUS at 12:14

## 2021-01-31 RX ADMIN — ACETAMINOPHEN 650 MG: 325 TABLET, FILM COATED ORAL at 04:22

## 2021-01-31 RX ADMIN — ACETAMINOPHEN 650 MG: 325 TABLET, FILM COATED ORAL at 12:14

## 2021-01-31 RX ADMIN — PRAMOXINE HYDROCHLORIDE AND HYDROCORTISONE ACETATE: 10; 25 CREAM TOPICAL at 08:34

## 2021-01-31 RX ADMIN — Medication 1 PATCH: at 08:43

## 2021-01-31 RX ADMIN — ACETAMINOPHEN 650 MG: 325 TABLET, FILM COATED ORAL at 16:04

## 2021-01-31 RX ADMIN — ZOLPIDEM TARTRATE 10 MG: 5 TABLET, COATED ORAL at 21:06

## 2021-01-31 RX ADMIN — BACLOFEN 10 MG: 10 TABLET ORAL at 12:14

## 2021-01-31 RX ADMIN — BACLOFEN 10 MG: 10 TABLET ORAL at 08:36

## 2021-01-31 RX ADMIN — BUPROPION HYDROCHLORIDE 75 MG: 75 TABLET, FILM COATED ORAL at 16:05

## 2021-01-31 RX ADMIN — PROBIOTIC PRODUCT - TAB 1 TABLET: TAB at 20:09

## 2021-01-31 RX ADMIN — DIPHENHYDRAMINE HYDROCHLORIDE 25 MG: 50 INJECTION INTRAMUSCULAR; INTRAVENOUS at 18:27

## 2021-01-31 RX ADMIN — PREGABALIN 150 MG: 75 CAPSULE ORAL at 08:35

## 2021-01-31 RX ADMIN — FAMOTIDINE 10 MG: 10 TABLET, FILM COATED ORAL at 16:05

## 2021-01-31 RX ADMIN — METOCLOPRAMIDE 5 MG: 5 INJECTION, SOLUTION INTRAMUSCULAR; INTRAVENOUS at 18:27

## 2021-01-31 RX ADMIN — BUPROPION HYDROCHLORIDE 75 MG: 75 TABLET, FILM COATED ORAL at 08:35

## 2021-01-31 RX ADMIN — PANTOPRAZOLE SODIUM 40 MG: 40 TABLET, DELAYED RELEASE ORAL at 08:35

## 2021-01-31 RX ADMIN — METOPROLOL SUCCINATE 25 MG: 25 TABLET, EXTENDED RELEASE ORAL at 08:35

## 2021-01-31 RX ADMIN — IBUPROFEN 600 MG: 600 TABLET ORAL at 21:07

## 2021-01-31 RX ADMIN — KETOROLAC TROMETHAMINE 30 MG: 30 INJECTION, SOLUTION INTRAMUSCULAR at 18:28

## 2021-01-31 RX ADMIN — PREGABALIN 150 MG: 75 CAPSULE ORAL at 20:09

## 2021-01-31 RX ADMIN — CEFAZOLIN SODIUM 2 G: 10 POWDER, FOR SOLUTION INTRAVENOUS at 20:09

## 2021-01-31 RX ADMIN — BUPROPION HYDROCHLORIDE 75 MG: 75 TABLET, FILM COATED ORAL at 12:14

## 2021-01-31 RX ADMIN — DIPHENHYDRAMINE HYDROCHLORIDE 25 MG: 50 INJECTION INTRAMUSCULAR; INTRAVENOUS at 12:14

## 2021-01-31 RX ADMIN — IBUPROFEN 600 MG: 600 TABLET ORAL at 08:35

## 2021-01-31 RX ADMIN — PANTOPRAZOLE SODIUM 40 MG: 40 TABLET, DELAYED RELEASE ORAL at 20:09

## 2021-01-31 RX ADMIN — QUETIAPINE FUMARATE 100 MG: 100 TABLET ORAL at 21:08

## 2021-01-31 RX ADMIN — PROBIOTIC PRODUCT - TAB 1 TABLET: TAB at 08:36

## 2021-01-31 RX ADMIN — PAROXETINE 30 MG: 20 TABLET, FILM COATED ORAL at 08:35

## 2021-01-31 RX ADMIN — CEFAZOLIN SODIUM 2 G: 10 POWDER, FOR SOLUTION INTRAVENOUS at 04:21

## 2021-01-31 RX ADMIN — PRAZOSIN HYDROCHLORIDE 2 MG: 1 CAPSULE ORAL at 21:07

## 2021-01-31 RX ADMIN — METHADONE HYDROCHLORIDE 65 MG: 10 TABLET ORAL at 06:34

## 2021-01-31 RX ADMIN — NICOTINE POLACRILEX 4 MG: 4 GUM, CHEWING ORAL at 18:27

## 2021-01-31 NOTE — PROGRESS NOTES
Hospital Medicine Service -  Daily Progress Note       SUBJECTIVE   Interval History: No acute events overnight. Patient seen and examined. Cont to c/o persistent back pain. Denies any f/c.      OBJECTIVE      Vital signs in last 24 hours:  Temp:  [36.3 °C (97.3 °F)-36.6 °C (97.9 °F)] 36.6 °C (97.9 °F)  Heart Rate:  [71-79] 73  Resp:  [18-19] 18  BP: ()/(51-74) 134/61  No intake or output data in the 24 hours ending 01/31/21 1403    PHYSICAL EXAMINATION      GEN: well-developed and well-nourished; not in acute distress  HEENT: normocephalic; atraumatic  NECK: no JVD; no bruits  CARDIO: regular rate and rhythm; no murmurs, rubs or gallops  RESP: clear to auscultation bilaterally; no rales, rhonchi, or wheezes  ABD: soft, non-distended, non-tender, normal bowel sounds  EXT: no cyanosis, clubbing, or edema  SKIN: clean, dry, warm, and intact  MUSCULOSKELETAL: no injury or deformity. Currently wearing LSO brace  NEURO: alert and oriented x 3; nonfocal  BEHAVIOR/EMOTIONAL: appropriate; cooperative     LABS / IMAGING / TELE      Labs  Lab Results   Component Value Date    GLUCOSE 113 (H) 01/25/2021    CALCIUM 8.8 (L) 01/25/2021     01/25/2021    K 4.5 01/25/2021    CO2 27 01/25/2021     01/25/2021    BUN 13 01/25/2021    CREATININE 0.8 01/25/2021     Lab Results   Component Value Date    WBC 5.10 01/25/2021    HGB 10.9 (L) 01/25/2021    HCT 34.6 (L) 01/25/2021    MCV 88.0 01/25/2021     01/25/2021     Lab Results   Component Value Date    ALBUMIN 3.5 01/11/2021    BILITOT 0.4 01/11/2021    ALKPHOS 58 01/11/2021    BILIDIR <0.1 04/12/2017    AST 19 01/11/2021    ALT 9 (L) 01/11/2021    PROTEIN 6.5 01/11/2021       Imaging  Mri Lumbar Spine With And Without Contrast    Result Date: 1/19/2021  IMPRESSION: 1.  Improvement in the previously seen diffuse anterior and posterior epidural enhancement within the lumbar spine with only mildly prominent enhancement persisting from L4 to L5.  There is  residual enhancement about the left greater than right facet joints and there is persistent fluid in the left facet joint (previously, there was fluid in both facet joints).  These findings could be related to active degeneration and prominence of the epidural plexus due to the significant central canal and neural foraminal narrowing at this level. Clinical correlation is recommended as an infectious process cannot be excluded by imaging. 2.  Edema and enhancement in the deep subcutaneous soft tissues throughout the visualized lower thoracic and lumbar spine. 3.  Degenerative and discogenic disease of the lumbar spine, as above.    Fluoroscopy Upper Gi Series    Result Date: 1/20/2021  IMPRESSION: Disordered esophageal motility. Gastroesophageal reflux.        ASSESSMENT AND PLAN      Nausea & vomiting  Assessment & Plan  -Intermittent nausea/vomiting with large meals. She reports it has been going on for several weeks, but worse over the past week. She regurgitates whole food about 5 minutes after eating. Worse with large meals. No hematemesis, hematochezia, weight loss. Her anemia is at baseline. She is having daily BMs.   -Upper GI series shows esophageal dysmotility and GERD.  -sxs stable for now    Plan:   -Continue Pepcid and PPI.   -Continue Baclofen and PRN Reglan. QTc 438 on 1/28.  - caution with toradol use  -Continue small meals, low fat/low fiber diet.   -GI consulted. No plans for EGD or gastric emptying study.            Essential hypertension  Assessment & Plan  -BP stable  -Cont w/metoprolol, prazosin    Opioid type dependence, continuous use (CMS/HCC)  Assessment & Plan  -Continue methadone per psych recs.   -Patient declining inpatient psych rehab. Agrees to [facility]. States she has multiple resources to help maintain sobriety including a sponsor.  -Behavioral therapy and psychiatry following. Discussed with psych social work - will reinitiate process to enroll in methadone clinic for tenative start on  "Tuesday. Will need to f/u with SW regarding enrollment/set-up of outpt resources prior to d/c    Pain  Assessment & Plan  -cont to c/o ongoing back pain despite radiographic improvement in lumbar osteomyelitis.   -pt c/o persistent pain but improved following increased in methadone and toradol dosaging. Pt requesting additional toradol dose    Plan:  -cont w/methadone 65mg qam per psychiatry   -Pain managment re-consulted. No changes made to regimen.  -Continue Lyrica, Baclofen, lidocaine patch, methadone. Will administer dose of toradol per patient request - caution however given GI sxs  -cont w/IV abx as stated above  -cont heating pad.   - cont w/LSO brace  - Neurosurgery consulted - per evaluation No acute surgical intervention required. Cont w/management with IV antibiotics. Will required follow-up and repeat MRI is recommended 4-6 weeks after discharge for reassement      Migraine, intractable  Assessment & Plan  -Resolved with Fioricet.   -Neurology consulted. Rec Fioricet no more than 4 days/month, and over-the-counter medications no more than 15 days/month.    -Outpatient neurology follow up for further management/workup. Needs outpatient sleep study.        Insomnia  Assessment & Plan  -Cont w/ambien      Depression  Assessment & Plan  -No SI/HI.  -Continue Wellbutrin, Quetiapine, Ambien.  -Follow up with psychiatry as outpatient.      Anxiety  Assessment & Plan  -See \"depression\"      * Acute bacterial endocarditis  Assessment & Plan  -Tricuspid valve endocarditis with severe TR. Also with septic pulmonary emboli and seeding of lumbar spine.   -Blood cx at [facility] 12/7 and 12/8 grew MSSA. Blood cultures from 12/2020 negative for growth.Patient afebrile.   -Completed 42 days antibiotics on 1/18/21.   -Repeat MRI L-spine shows improved, but persistent enhancement in the lumbar joints.     Plan:  - continue IV antibiotics for total 8 weeks then follow up as outpatient for repeat lumbar imaging. " Continue IV abx until 2/1/21.  -Cardiology consulted.  No need for surgical intervention on tricuspid valve. Recommended she follow up as outpatient for repeat TTE.  -F/U with PCP for repeat CT chest as outpatient.  - tentative plan to dc the evening of 2/1 vs 2/2 pending completion of abx and set-up of outpt resources/enrollment in methadone clinic           VTE Assessment: Padua VTE Score: 2  DVT PPX: refuse lovenox GI PPX: pepcid  Diet: low fiber, low residue diet  Dispo: Med/Surg   Estimated discharge date: 2/2/2021  Code Status: Full Code     Tisha Moise MD  1/31/2021

## 2021-01-31 NOTE — NURSING NOTE
"IV Team: Excoriated area previously noted has decreased in redness and per patient is less \"itchy\". Mepilex dressing peeling off. Cavilon skin prep and mepilex dressing reapplied to excoriated area.  "

## 2021-01-31 NOTE — PLAN OF CARE
Plan of Care Review  Plan of Care Reviewed With: patient  Progress: improving  Outcome Summary: Patient ambulating in room indepdently. Tolerating meals and popsicles without difficulty. Assessment unchanged.

## 2021-02-01 LAB
ANION GAP SERPL CALC-SCNC: 9 MEQ/L (ref 3–15)
BUN SERPL-MCNC: 21 MG/DL (ref 8–20)
CALCIUM SERPL-MCNC: 8.9 MG/DL (ref 8.9–10.3)
CHLORIDE SERPL-SCNC: 101 MEQ/L (ref 98–109)
CO2 SERPL-SCNC: 28 MEQ/L (ref 22–32)
CREAT SERPL-MCNC: 0.9 MG/DL (ref 0.6–1.1)
ERYTHROCYTE [DISTWIDTH] IN BLOOD BY AUTOMATED COUNT: 15.6 % (ref 11.7–14.4)
GFR SERPL CREATININE-BSD FRML MDRD: >60 ML/MIN/1.73M*2
GLUCOSE SERPL-MCNC: 120 MG/DL (ref 70–99)
HCT VFR BLDCO AUTO: 31.9 % (ref 35–45)
HGB BLD-MCNC: 9.9 G/DL (ref 11.8–15.7)
MCH RBC QN AUTO: 28.1 PG (ref 28–33.2)
MCHC RBC AUTO-ENTMCNC: 31 G/DL (ref 32.2–35.5)
MCV RBC AUTO: 90.6 FL (ref 83–98)
PDW BLD AUTO: 10.4 FL (ref 9.4–12.3)
PLATELET # BLD AUTO: 217 K/UL (ref 150–369)
POTASSIUM SERPL-SCNC: 5.2 MEQ/L (ref 3.6–5.1)
RBC # BLD AUTO: 3.52 M/UL (ref 3.93–5.22)
SODIUM SERPL-SCNC: 138 MEQ/L (ref 136–144)
WBC # BLD AUTO: 5.78 K/UL (ref 3.8–10.5)

## 2021-02-01 PROCEDURE — 25000000 HC PHARMACY GENERAL: Performed by: INTERNAL MEDICINE

## 2021-02-01 PROCEDURE — 80048 BASIC METABOLIC PNL TOTAL CA: CPT | Performed by: INTERNAL MEDICINE

## 2021-02-01 PROCEDURE — 63700000 HC SELF-ADMINISTRABLE DRUG: Performed by: INTERNAL MEDICINE

## 2021-02-01 PROCEDURE — 99233 SBSQ HOSP IP/OBS HIGH 50: CPT | Performed by: PSYCHIATRY & NEUROLOGY

## 2021-02-01 PROCEDURE — 36415 COLL VENOUS BLD VENIPUNCTURE: CPT | Performed by: INTERNAL MEDICINE

## 2021-02-01 PROCEDURE — 63600000 HC DRUGS/DETAIL CODE: Performed by: INTERNAL MEDICINE

## 2021-02-01 PROCEDURE — 12000000 HC ROOM AND CARE MED/SURG

## 2021-02-01 PROCEDURE — 99232 SBSQ HOSP IP/OBS MODERATE 35: CPT | Performed by: INTERNAL MEDICINE

## 2021-02-01 PROCEDURE — 63700000 HC SELF-ADMINISTRABLE DRUG: Performed by: NURSE PRACTITIONER

## 2021-02-01 PROCEDURE — 63600000 HC DRUGS/DETAIL CODE: Performed by: HOSPITALIST

## 2021-02-01 PROCEDURE — 63700000 HC SELF-ADMINISTRABLE DRUG: Performed by: HOSPITALIST

## 2021-02-01 PROCEDURE — 25800000 HC PHARMACY IV SOLUTIONS: Performed by: INTERNAL MEDICINE

## 2021-02-01 PROCEDURE — 85027 COMPLETE CBC AUTOMATED: CPT | Performed by: INTERNAL MEDICINE

## 2021-02-01 PROCEDURE — 63700000 HC SELF-ADMINISTRABLE DRUG: Performed by: PSYCHIATRY & NEUROLOGY

## 2021-02-01 RX ORDER — BACLOFEN 10 MG/1
15 TABLET ORAL 3 TIMES DAILY
Status: DISCONTINUED | OUTPATIENT
Start: 2021-02-01 | End: 2021-02-03 | Stop reason: HOSPADM

## 2021-02-01 RX ADMIN — FAMOTIDINE 10 MG: 10 TABLET, FILM COATED ORAL at 07:44

## 2021-02-01 RX ADMIN — DIPHENHYDRAMINE HYDROCHLORIDE 25 MG: 50 INJECTION INTRAMUSCULAR; INTRAVENOUS at 04:29

## 2021-02-01 RX ADMIN — CEFAZOLIN SODIUM 2 G: 10 POWDER, FOR SOLUTION INTRAVENOUS at 11:09

## 2021-02-01 RX ADMIN — METOCLOPRAMIDE 5 MG: 5 INJECTION, SOLUTION INTRAMUSCULAR; INTRAVENOUS at 04:29

## 2021-02-01 RX ADMIN — PROBIOTIC PRODUCT - TAB 1 TABLET: TAB at 08:19

## 2021-02-01 RX ADMIN — CEFAZOLIN SODIUM 2 G: 10 POWDER, FOR SOLUTION INTRAVENOUS at 18:45

## 2021-02-01 RX ADMIN — METOCLOPRAMIDE 5 MG: 5 INJECTION, SOLUTION INTRAMUSCULAR; INTRAVENOUS at 16:48

## 2021-02-01 RX ADMIN — Medication 1 PATCH: at 08:19

## 2021-02-01 RX ADMIN — METOCLOPRAMIDE 5 MG: 5 INJECTION, SOLUTION INTRAMUSCULAR; INTRAVENOUS at 11:09

## 2021-02-01 RX ADMIN — BACLOFEN 10 MG: 10 TABLET ORAL at 08:18

## 2021-02-01 RX ADMIN — PROBIOTIC PRODUCT - TAB 1 TABLET: TAB at 20:41

## 2021-02-01 RX ADMIN — DIPHENHYDRAMINE HYDROCHLORIDE 25 MG: 50 INJECTION INTRAMUSCULAR; INTRAVENOUS at 16:48

## 2021-02-01 RX ADMIN — FAMOTIDINE 10 MG: 10 TABLET, FILM COATED ORAL at 16:47

## 2021-02-01 RX ADMIN — ZOLPIDEM TARTRATE 10 MG: 5 TABLET, COATED ORAL at 21:07

## 2021-02-01 RX ADMIN — ACETAMINOPHEN 650 MG: 325 TABLET, FILM COATED ORAL at 11:09

## 2021-02-01 RX ADMIN — CEFAZOLIN SODIUM 2 G: 10 POWDER, FOR SOLUTION INTRAVENOUS at 04:29

## 2021-02-01 RX ADMIN — PANTOPRAZOLE SODIUM 40 MG: 40 TABLET, DELAYED RELEASE ORAL at 20:42

## 2021-02-01 RX ADMIN — METHADONE HYDROCHLORIDE 65 MG: 10 TABLET ORAL at 05:50

## 2021-02-01 RX ADMIN — METOPROLOL SUCCINATE 25 MG: 25 TABLET, EXTENDED RELEASE ORAL at 08:18

## 2021-02-01 RX ADMIN — PANTOPRAZOLE SODIUM 40 MG: 40 TABLET, DELAYED RELEASE ORAL at 08:18

## 2021-02-01 RX ADMIN — BUPROPION HYDROCHLORIDE 75 MG: 75 TABLET, FILM COATED ORAL at 11:09

## 2021-02-01 RX ADMIN — PAROXETINE 30 MG: 20 TABLET, FILM COATED ORAL at 08:18

## 2021-02-01 RX ADMIN — DIPHENHYDRAMINE HYDROCHLORIDE 25 MG: 50 INJECTION INTRAMUSCULAR; INTRAVENOUS at 11:09

## 2021-02-01 RX ADMIN — BACLOFEN 15 MG: 10 TABLET ORAL at 20:40

## 2021-02-01 RX ADMIN — IBUPROFEN 600 MG: 600 TABLET ORAL at 07:44

## 2021-02-01 RX ADMIN — BUPROPION HYDROCHLORIDE 75 MG: 75 TABLET, FILM COATED ORAL at 16:47

## 2021-02-01 RX ADMIN — PRAZOSIN HYDROCHLORIDE 2 MG: 1 CAPSULE ORAL at 21:06

## 2021-02-01 RX ADMIN — NICOTINE POLACRILEX 4 MG: 4 GUM, CHEWING ORAL at 07:44

## 2021-02-01 RX ADMIN — ACETAMINOPHEN 650 MG: 325 TABLET, FILM COATED ORAL at 16:47

## 2021-02-01 RX ADMIN — QUETIAPINE FUMARATE 100 MG: 100 TABLET ORAL at 21:07

## 2021-02-01 RX ADMIN — PREGABALIN 150 MG: 75 CAPSULE ORAL at 07:44

## 2021-02-01 RX ADMIN — BUPROPION HYDROCHLORIDE 75 MG: 75 TABLET, FILM COATED ORAL at 07:44

## 2021-02-01 RX ADMIN — PREGABALIN 150 MG: 75 CAPSULE ORAL at 20:42

## 2021-02-01 RX ADMIN — ACETAMINOPHEN 650 MG: 325 TABLET, FILM COATED ORAL at 04:28

## 2021-02-01 RX ADMIN — KETOROLAC TROMETHAMINE 30 MG: 30 INJECTION, SOLUTION INTRAMUSCULAR at 16:47

## 2021-02-01 RX ADMIN — BACLOFEN 15 MG: 10 TABLET ORAL at 13:48
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
## 2021-02-01 NOTE — PROGRESS NOTES
Hospital Medicine Service -  Daily Progress Note       SUBJECTIVE   Interval History: No acute events overnight. Patient seen and examined. Cont to c/o persistent back pain. Denies any f/c.      OBJECTIVE      Vital signs in last 24 hours:  Temp:  [36.4 °C (97.5 °F)-36.7 °C (98.1 °F)] 36.4 °C (97.5 °F)  Heart Rate:  [69-88] 76  Resp:  [16-18] 16  BP: (126-134)/(60-81) 126/74  No intake or output data in the 24 hours ending 02/01/21 1840    PHYSICAL EXAMINATION      GEN: well-developed and well-nourished; not in acute distress  HEENT: normocephalic; atraumatic  NECK: no JVD; no bruits  CARDIO: regular rate and rhythm; no murmurs, rubs or gallops  RESP: clear to auscultation bilaterally; no rales, rhonchi, or wheezes  ABD: soft, non-distended, non-tender, normal bowel sounds  EXT: no cyanosis, clubbing, or edema  SKIN: clean, dry, warm, and intact  MUSCULOSKELETAL: no injury or deformity. Currently wearing LSO brace  NEURO: alert and oriented x 3; nonfocal  BEHAVIOR/EMOTIONAL: appropriate; cooperative     LABS / IMAGING / TELE      Labs  Lab Results   Component Value Date    GLUCOSE 120 (H) 02/01/2021    CALCIUM 8.9 02/01/2021     02/01/2021    K 5.2 (H) 02/01/2021    CO2 28 02/01/2021     02/01/2021    BUN 21 (H) 02/01/2021    CREATININE 0.9 02/01/2021     Lab Results   Component Value Date    WBC 5.78 02/01/2021    HGB 9.9 (L) 02/01/2021    HCT 31.9 (L) 02/01/2021    MCV 90.6 02/01/2021     02/01/2021     Lab Results   Component Value Date    ALBUMIN 3.5 01/11/2021    BILITOT 0.4 01/11/2021    ALKPHOS 58 01/11/2021    BILIDIR <0.1 04/12/2017    AST 19 01/11/2021    ALT 9 (L) 01/11/2021    PROTEIN 6.5 01/11/2021       Imaging  Mri Lumbar Spine With And Without Contrast    Result Date: 1/19/2021  IMPRESSION: 1.  Improvement in the previously seen diffuse anterior and posterior epidural enhancement within the lumbar spine with only mildly prominent enhancement persisting from L4 to L5.  There is  residual enhancement about the left greater than right facet joints and there is persistent fluid in the left facet joint (previously, there was fluid in both facet joints).  These findings could be related to active degeneration and prominence of the epidural plexus due to the significant central canal and neural foraminal narrowing at this level. Clinical correlation is recommended as an infectious process cannot be excluded by imaging. 2.  Edema and enhancement in the deep subcutaneous soft tissues throughout the visualized lower thoracic and lumbar spine. 3.  Degenerative and discogenic disease of the lumbar spine, as above.    Fluoroscopy Upper Gi Series    Result Date: 1/20/2021  IMPRESSION: Disordered esophageal motility. Gastroesophageal reflux.        ASSESSMENT AND PLAN      Nausea & vomiting  Assessment & Plan  -Intermittent nausea/vomiting with large meals. She reports it has been going on for several weeks, but worse over the past week. She regurgitates whole food about 5 minutes after eating. Worse with large meals. No hematemesis, hematochezia, weight loss. Her anemia is at baseline. She is having daily BMs.   -Upper GI series shows esophageal dysmotility and GERD.  -sxs stable for now    Plan:   -Continue Pepcid and PPI.   -Continue Baclofen and PRN Reglan. QTc 438 on 1/28.  - caution with toradol use  -Continue small meals, low fat/low fiber diet.   -GI consulted. No plans for EGD or gastric emptying study.            Essential hypertension  Assessment & Plan  -BP stable  -Cont w/metoprolol, prazosin    Opioid type dependence, continuous use (CMS/HCC)  Assessment & Plan  -Continue methadone per psych recs.   -Patient declining inpatient psych rehab. Agrees to [facility]. States she has multiple resources to help maintain sobriety including a sponsor.  -Behavioral therapy and psychiatry following. Discussed with psych social work - will reinitiate process to enroll in methadone clinic for tenative start on  "Tuesday. Will need to f/u with SW regarding enrollment/set-up of outpt resources prior to d/c    Pain  Assessment & Plan  -cont to c/o ongoing back pain despite radiographic improvement in lumbar osteomyelitis.   -pt c/o persistent pain but improved following increased in methadone and toradol dosaging. Pt requesting additional toradol dose    Plan:  -cont w/methadone 65mg qam per psychiatry   -Pain managment re-consulted. No changes made to regimen.  -Continue Lyrica, Baclofen, lidocaine patch, methadone. Will administer dose of toradol per patient request - caution however given GI sxs  -cont w/IV abx as stated above  -cont heating pad.   - cont w/LSO brace  - Neurosurgery consulted - per evaluation No acute surgical intervention required. Cont w/management with IV antibiotics. Will required follow-up and repeat MRI is recommended 4-6 weeks after discharge for reassement      Migraine, intractable  Assessment & Plan  -Resolved with Fioricet.   -Neurology consulted. Rec Fioricet no more than 4 days/month, and over-the-counter medications no more than 15 days/month.    -Outpatient neurology follow up for further management/workup. Needs outpatient sleep study.        Insomnia  Assessment & Plan  -Cont w/ambien      Depression  Assessment & Plan  -No SI/HI.  -Continue Wellbutrin, Quetiapine, Ambien.  -Follow up with psychiatry as outpatient.      Anxiety  Assessment & Plan  -See \"depression\"      * Acute bacterial endocarditis  Assessment & Plan  -Tricuspid valve endocarditis with severe TR. Also with septic pulmonary emboli and seeding of lumbar spine.   -Blood cx at [facility] 12/7 and 12/8 grew MSSA. Blood cultures from 12/2020 negative for growth.Patient afebrile.   -Completed 42 days antibiotics on 1/18/21.   -Repeat MRI L-spine shows improved, but persistent enhancement in the lumbar joints.     Plan:  - continue IV antibiotics for total 8 weeks then follow up as outpatient for repeat lumbar imaging. " Continue IV abx until 2/1/21.  -Cardiology consulted.  No need for surgical intervention on tricuspid valve. Recommended she follow up as outpatient for repeat TTE.  -F/U with PCP for repeat CT chest as outpatient.  - tentative plan to dc the evening of 2/1 vs 2/2 pending completion of abx and set-up of outpt resources/enrollment in methadone clinic    02/01/21  Patient requesting follow-up visit from neurosurgery.  I have contacted their team for follow-up.  Is also requesting a disc with all her imaging studies I have requested them.  Patient is requested to be seen by PT and OT prior to discharge.  Patient's methadone clinic will not be open on Wednesday the patient will remain in the hospital until such day.  She also requested her dose of baclofen to be increased to have increased it from 10 mg 3 times daily to 15 mg daily.         VTE Assessment: Padua VTE Score: 2  DVT PPX: refuse lovenox GI PPX: pepcid  Diet: low fiber, low residue diet  Dispo: Med/Surg   Estimated discharge date: 2/2/2021  Code Status: Full Code     Zoila Toth MD  2/1/2021
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
## 2021-02-02 PROCEDURE — 63700000 HC SELF-ADMINISTRABLE DRUG: Performed by: HOSPITALIST

## 2021-02-02 PROCEDURE — 63700000 HC SELF-ADMINISTRABLE DRUG: Performed by: INTERNAL MEDICINE

## 2021-02-02 PROCEDURE — 63700000 HC SELF-ADMINISTRABLE DRUG: Performed by: PSYCHIATRY & NEUROLOGY

## 2021-02-02 PROCEDURE — 63600000 HC DRUGS/DETAIL CODE: Performed by: HOSPITALIST

## 2021-02-02 PROCEDURE — 63600000 HC DRUGS/DETAIL CODE: Performed by: INTERNAL MEDICINE

## 2021-02-02 PROCEDURE — 12000000 HC ROOM AND CARE MED/SURG

## 2021-02-02 PROCEDURE — 25800000 HC PHARMACY IV SOLUTIONS: Performed by: INTERNAL MEDICINE

## 2021-02-02 PROCEDURE — 63700000 HC SELF-ADMINISTRABLE DRUG: Performed by: NURSE PRACTITIONER

## 2021-02-02 PROCEDURE — 99232 SBSQ HOSP IP/OBS MODERATE 35: CPT | Performed by: INTERNAL MEDICINE

## 2021-02-02 PROCEDURE — 25000000 HC PHARMACY GENERAL: Performed by: INTERNAL MEDICINE

## 2021-02-02 PROCEDURE — 200200 PR NO CHARGE: Performed by: INTERNAL MEDICINE

## 2021-02-02 RX ORDER — METOPROLOL SUCCINATE 25 MG/1
25 TABLET, EXTENDED RELEASE ORAL DAILY
Qty: 30 TABLET | Refills: 0 | Status: SHIPPED | OUTPATIENT
Start: 2021-02-02 | End: 2021-05-24 | Stop reason: ENTERED-IN-ERROR

## 2021-02-02 RX ORDER — CELECOXIB 200 MG/1
200 CAPSULE ORAL DAILY
Qty: 30 CAPSULE | Refills: 0 | Status: SHIPPED | OUTPATIENT
Start: 2021-02-02 | End: 2021-05-24 | Stop reason: ENTERED-IN-ERROR

## 2021-02-02 RX ORDER — PRAZOSIN HYDROCHLORIDE 2 MG/1
2 CAPSULE ORAL NIGHTLY
Qty: 30 CAPSULE | Refills: 0 | Status: SHIPPED | OUTPATIENT
Start: 2021-02-02 | End: 2021-05-24 | Stop reason: ENTERED-IN-ERROR

## 2021-02-02 RX ORDER — HYDROCORTISONE ACETATE PRAMOXINE HCL 2.5; 1 G/100G; G/100G
CREAM TOPICAL 4 TIMES DAILY PRN
Qty: 30 G | Refills: 0 | Status: SHIPPED | OUTPATIENT
Start: 2021-02-02 | End: 2021-05-24 | Stop reason: ENTERED-IN-ERROR

## 2021-02-02 RX ORDER — METOCLOPRAMIDE 10 MG/1
5 TABLET ORAL DAILY PRN
Qty: 15 TABLET | Refills: 0 | Status: SHIPPED | OUTPATIENT
Start: 2021-02-02 | End: 2021-05-24 | Stop reason: ENTERED-IN-ERROR

## 2021-02-02 RX ORDER — CHLORPROMAZINE HYDROCHLORIDE 100 MG/1
200 TABLET, FILM COATED ORAL NIGHTLY
Qty: 60 TABLET | Refills: 0 | Status: CANCELLED | OUTPATIENT
Start: 2021-02-02 | End: 2021-03-04

## 2021-02-02 RX ORDER — ZOLPIDEM TARTRATE 10 MG/1
10 TABLET ORAL NIGHTLY
Qty: 5 TABLET | Refills: 0 | Status: SHIPPED | OUTPATIENT
Start: 2021-02-02 | End: 2021-05-24 | Stop reason: ENTERED-IN-ERROR

## 2021-02-02 RX ORDER — PANTOPRAZOLE SODIUM 40 MG/1
40 TABLET, DELAYED RELEASE ORAL 2 TIMES DAILY
Qty: 60 TABLET | Refills: 0 | Status: SHIPPED | OUTPATIENT
Start: 2021-02-02 | End: 2021-05-24 | Stop reason: ENTERED-IN-ERROR

## 2021-02-02 RX ORDER — DIPHENHYDRAMINE HCL 25 MG
4 CAPSULE ORAL EVERY 4 HOURS PRN
Qty: 100 EACH | Refills: 0 | Status: SHIPPED | OUTPATIENT
Start: 2021-02-02 | End: 2021-05-24 | Stop reason: ENTERED-IN-ERROR

## 2021-02-02 RX ORDER — BUPROPION HYDROCHLORIDE 75 MG/1
75 TABLET ORAL 3 TIMES DAILY
Qty: 90 TABLET | Refills: 0 | Status: SHIPPED | OUTPATIENT
Start: 2021-02-02 | End: 2021-05-24 | Stop reason: ENTERED-IN-ERROR

## 2021-02-02 RX ORDER — FAMOTIDINE 10 MG/1
10 TABLET ORAL
Qty: 60 TABLET | Refills: 0 | Status: SHIPPED | OUTPATIENT
Start: 2021-02-02 | End: 2021-05-24 | Stop reason: ENTERED-IN-ERROR

## 2021-02-02 RX ORDER — QUETIAPINE FUMARATE 100 MG/1
100 TABLET, FILM COATED ORAL NIGHTLY
Qty: 30 TABLET | Refills: 0 | Status: SHIPPED | OUTPATIENT
Start: 2021-02-02 | End: 2021-05-24 | Stop reason: ENTERED-IN-ERROR

## 2021-02-02 RX ORDER — CYCLOBENZAPRINE HCL 10 MG
10 TABLET ORAL 3 TIMES DAILY PRN
Qty: 50 TABLET | Refills: 0 | Status: CANCELLED | OUTPATIENT
Start: 2021-02-02 | End: 2021-02-16

## 2021-02-02 RX ORDER — FLUTICASONE PROPIONATE 50 MCG
2 SPRAY, SUSPENSION (ML) NASAL DAILY PRN
Qty: 16 G | Refills: 5 | Status: SHIPPED | OUTPATIENT
Start: 2021-02-02 | End: 2021-05-24 | Stop reason: ENTERED-IN-ERROR

## 2021-02-02 RX ORDER — LIDOCAINE 560 MG/1
1 PATCH PERCUTANEOUS; TOPICAL; TRANSDERMAL DAILY
Qty: 30 PATCH | Refills: 0 | Status: SHIPPED | OUTPATIENT
Start: 2021-02-03 | End: 2021-05-24 | Stop reason: ENTERED-IN-ERROR

## 2021-02-02 RX ORDER — PAROXETINE 30 MG/1
30 TABLET, FILM COATED ORAL DAILY
Qty: 30 TABLET | Refills: 0 | Status: SHIPPED | OUTPATIENT
Start: 2021-02-03 | End: 2021-05-24 | Stop reason: ENTERED-IN-ERROR

## 2021-02-02 RX ORDER — BACLOFEN 5 MG/1
15 TABLET ORAL 3 TIMES DAILY
Qty: 270 TABLET | Refills: 0 | Status: SHIPPED | OUTPATIENT
Start: 2021-02-02 | End: 2021-05-24 | Stop reason: ENTERED-IN-ERROR

## 2021-02-02 RX ORDER — BUTALBITAL, ACETAMINOPHEN AND CAFFEINE 300; 40; 50 MG/1; MG/1; MG/1
2 CAPSULE ORAL EVERY 4 HOURS PRN
Qty: 20 CAPSULE | Refills: 0 | Status: SHIPPED | OUTPATIENT
Start: 2021-02-02 | End: 2021-03-04

## 2021-02-02 RX ORDER — ALUMINUM HYDROXIDE, MAGNESIUM HYDROXIDE, AND SIMETHICONE 1200; 120; 1200 MG/30ML; MG/30ML; MG/30ML
30 SUSPENSION ORAL EVERY 6 HOURS PRN
Qty: 120 ML | Refills: 0 | Status: SHIPPED | OUTPATIENT
Start: 2021-02-02 | End: 2021-05-24 | Stop reason: ENTERED-IN-ERROR

## 2021-02-02 RX ORDER — IBUPROFEN 200 MG
1 TABLET ORAL
Qty: 30 PATCH | Refills: 0 | Status: SHIPPED | OUTPATIENT
Start: 2021-02-02 | End: 2021-05-24 | Stop reason: ENTERED-IN-ERROR

## 2021-02-02 RX ORDER — PREGABALIN 150 MG/1
150 CAPSULE ORAL 2 TIMES DAILY
Qty: 10 CAPSULE | Refills: 0 | Status: SHIPPED | OUTPATIENT
Start: 2021-02-02 | End: 2021-05-24 | Stop reason: ENTERED-IN-ERROR

## 2021-02-02 RX ADMIN — BUPROPION HYDROCHLORIDE 75 MG: 75 TABLET, FILM COATED ORAL at 06:44

## 2021-02-02 RX ADMIN — QUETIAPINE FUMARATE 100 MG: 100 TABLET ORAL at 21:19

## 2021-02-02 RX ADMIN — PANTOPRAZOLE SODIUM 40 MG: 40 TABLET, DELAYED RELEASE ORAL at 08:52

## 2021-02-02 RX ADMIN — BUPROPION HYDROCHLORIDE 75 MG: 75 TABLET, FILM COATED ORAL at 15:12

## 2021-02-02 RX ADMIN — BACLOFEN 15 MG: 10 TABLET ORAL at 21:20

## 2021-02-02 RX ADMIN — LIDOCAINE 1 PATCH: 246 PATCH TOPICAL at 08:49

## 2021-02-02 RX ADMIN — ACETAMINOPHEN 650 MG: 325 TABLET, FILM COATED ORAL at 11:10

## 2021-02-02 RX ADMIN — CEFAZOLIN SODIUM 2 G: 10 POWDER, FOR SOLUTION INTRAVENOUS at 11:11

## 2021-02-02 RX ADMIN — PROBIOTIC PRODUCT - TAB 1 TABLET: TAB at 08:52

## 2021-02-02 RX ADMIN — DIPHENHYDRAMINE HYDROCHLORIDE 25 MG: 50 INJECTION INTRAMUSCULAR; INTRAVENOUS at 11:11

## 2021-02-02 RX ADMIN — PANTOPRAZOLE SODIUM 40 MG: 40 TABLET, DELAYED RELEASE ORAL at 21:19

## 2021-02-02 RX ADMIN — METOCLOPRAMIDE 5 MG: 5 INJECTION, SOLUTION INTRAMUSCULAR; INTRAVENOUS at 05:03

## 2021-02-02 RX ADMIN — BACLOFEN 15 MG: 10 TABLET ORAL at 15:12

## 2021-02-02 RX ADMIN — NICOTINE POLACRILEX 4 MG: 4 GUM, CHEWING ORAL at 05:04

## 2021-02-02 RX ADMIN — ACETAMINOPHEN 650 MG: 325 TABLET, FILM COATED ORAL at 17:34

## 2021-02-02 RX ADMIN — PREGABALIN 150 MG: 75 CAPSULE ORAL at 08:52

## 2021-02-02 RX ADMIN — PREGABALIN 150 MG: 75 CAPSULE ORAL at 21:19

## 2021-02-02 RX ADMIN — BACLOFEN 15 MG: 10 TABLET ORAL at 08:51

## 2021-02-02 RX ADMIN — ACETAMINOPHEN 650 MG: 325 TABLET, FILM COATED ORAL at 04:55

## 2021-02-02 RX ADMIN — DIPHENHYDRAMINE HYDROCHLORIDE 25 MG: 50 INJECTION INTRAMUSCULAR; INTRAVENOUS at 05:04

## 2021-02-02 RX ADMIN — Medication 1 PATCH: at 08:50

## 2021-02-02 RX ADMIN — METOCLOPRAMIDE 5 MG: 5 INJECTION, SOLUTION INTRAMUSCULAR; INTRAVENOUS at 18:38

## 2021-02-02 RX ADMIN — ZOLPIDEM TARTRATE 10 MG: 5 TABLET, COATED ORAL at 21:26

## 2021-02-02 RX ADMIN — PAROXETINE 30 MG: 20 TABLET, FILM COATED ORAL at 08:52

## 2021-02-02 RX ADMIN — NICOTINE POLACRILEX 4 MG: 4 GUM, CHEWING ORAL at 09:17

## 2021-02-02 RX ADMIN — FAMOTIDINE 10 MG: 10 TABLET, FILM COATED ORAL at 06:03

## 2021-02-02 RX ADMIN — KETOROLAC TROMETHAMINE 30 MG: 30 INJECTION, SOLUTION INTRAMUSCULAR at 18:38

## 2021-02-02 RX ADMIN — FAMOTIDINE 10 MG: 10 TABLET, FILM COATED ORAL at 17:34

## 2021-02-02 RX ADMIN — METOCLOPRAMIDE 5 MG: 5 INJECTION, SOLUTION INTRAMUSCULAR; INTRAVENOUS at 12:17

## 2021-02-02 RX ADMIN — CEFAZOLIN SODIUM 2 G: 10 POWDER, FOR SOLUTION INTRAVENOUS at 04:30

## 2021-02-02 RX ADMIN — PRAMOXINE HYDROCHLORIDE AND HYDROCORTISONE ACETATE: 10; 25 CREAM TOPICAL at 09:13

## 2021-02-02 RX ADMIN — METHADONE HYDROCHLORIDE 65 MG: 10 TABLET ORAL at 06:02

## 2021-02-02 RX ADMIN — DIPHENHYDRAMINE HYDROCHLORIDE 25 MG: 50 INJECTION INTRAMUSCULAR; INTRAVENOUS at 17:34

## 2021-02-02 RX ADMIN — BUPROPION HYDROCHLORIDE 75 MG: 75 TABLET, FILM COATED ORAL at 11:10

## 2021-02-02 RX ADMIN — IBUPROFEN 600 MG: 600 TABLET ORAL at 09:11

## 2021-02-02 RX ADMIN — PROBIOTIC PRODUCT - TAB 1 TABLET: TAB at 21:20

## 2021-02-02 RX ADMIN — PRAZOSIN HYDROCHLORIDE 2 MG: 1 CAPSULE ORAL at 21:20

## 2021-02-02 RX ADMIN — METOPROLOL SUCCINATE 25 MG: 25 TABLET, EXTENDED RELEASE ORAL at 08:52

## 2021-02-02 RX ADMIN — CEFAZOLIN SODIUM 2 G: 10 POWDER, FOR SOLUTION INTRAVENOUS at 18:36

## 2021-02-02 NOTE — PROGRESS NOTES
I stop by to see Carolyn today.  She is being discharged soon.  I reviewed necessity for follow-up.  Asked her to see me in a month with an echo on the same day if possible.  She can see me in the [place] office and have the echo in the hospital if we can arrange it on a Thursday or Friday.

## 2021-02-02 NOTE — BEHAVIORAL HEALTH CRISIS PROGRESS NOTE
Behavioral Health Progress Note    Chief Complaint/Reason for follow-up: opioid dependence and depression    Interval History: This clinician had discussion with Dr. Pendleton as well as [facility]. At this time most comfortable keeping the pt until tomorrow 2/3, therefore pt will be d/c with last dose here in the hospital and follow up with clinic Thursday for dosing.     Vital Signs for the last 24 hours:  Temp:  [36.4 °C (97.5 °F)-36.9 °C (98.4 °F)] 36.5 °C (97.7 °F)  Heart Rate:  [70-80] 80  Resp:  [16] 16  BP: (120-159)/(71-76) 125/71    Assessment/Plan  Spoke to Ed who is a clinical supervisor at [facility] as he was contacted by the pt as well. Discussed with ED that the pt has been set up with appointment with [facility]/  x110 and is scheduled for continued intake on Wednesday the 3rd at 9am via telehealth over the phone and then scheduled for Thursday  9am to see doctor in person and receive dosing.    As previously stated the pt's paperwork will be faxed to the clinic at [phone] where they will need the pt's last dosing and d/c paperwork.     Spoke to the pt to confirm her appointment tomorrow for an intake with the clinic at 9am, reminded the pt to keep her phone on for when they call her. As well confirmed with the pt her appointment with the doctor on Thursday at 9am at the clinic for appointment and dosing.

## 2021-02-02 NOTE — PROGRESS NOTES
Hospital Medicine Service -  Daily Progress Note       SUBJECTIVE   Interval History: No acute events overnight. Patient seen and examined.  Planes of back pain especially with movement but the patient reports that that is controlled with the current medical management.  The patient has discussed all and gotten answers to all her questions from neurosurgery.  The patient is very comfortable sitting in bed.  She reports that she is currently pain-free and ready to be discharged tomorrow.    OBJECTIVE      Vital signs in last 24 hours:  Temp:  [36.5 °C (97.7 °F)-36.9 °C (98.4 °F)] 36.6 °C (97.9 °F)  Heart Rate:  [70-80] 74  Resp:  [16] 16  BP: (120-159)/(71-79) 129/79    Intake/Output Summary (Last 24 hours) at 2/2/2021 1426  Last data filed at 2/2/2021 1202  Gross per 24 hour   Intake 250 ml   Output --   Net 250 ml       PHYSICAL EXAMINATION      GEN: well-developed and well-nourished; not in acute distress  HEENT: normocephalic; atraumatic  NECK: no JVD; no bruits  CARDIO: regular rate and rhythm; no murmurs, rubs or gallops  RESP: clear to auscultation bilaterally; no rales, rhonchi, or wheezes  ABD: soft, non-distended, non-tender, normal bowel sounds  EXT: no cyanosis, clubbing, or edema  SKIN: clean, dry, warm, and intact  MUSCULOSKELETAL: no injury or deformity. Currently wearing LSO brace  NEURO: alert and oriented x 3; nonfocal  BEHAVIOR/EMOTIONAL: appropriate; cooperative     LABS / IMAGING / TELE      Labs  Lab Results   Component Value Date    GLUCOSE 120 (H) 02/01/2021    CALCIUM 8.9 02/01/2021     02/01/2021    K 5.2 (H) 02/01/2021    CO2 28 02/01/2021     02/01/2021    BUN 21 (H) 02/01/2021    CREATININE 0.9 02/01/2021     Lab Results   Component Value Date    WBC 5.78 02/01/2021    HGB 9.9 (L) 02/01/2021    HCT 31.9 (L) 02/01/2021    MCV 90.6 02/01/2021     02/01/2021     Lab Results   Component Value Date    ALBUMIN 3.5 01/11/2021    BILITOT 0.4 01/11/2021    ALKPHOS 58  01/11/2021    BILIDIR <0.1 04/12/2017    AST 19 01/11/2021    ALT 9 (L) 01/11/2021    PROTEIN 6.5 01/11/2021       Imaging  Mri Lumbar Spine With And Without Contrast    Result Date: 1/19/2021  IMPRESSION: 1.  Improvement in the previously seen diffuse anterior and posterior epidural enhancement within the lumbar spine with only mildly prominent enhancement persisting from L4 to L5.  There is residual enhancement about the left greater than right facet joints and there is persistent fluid in the left facet joint (previously, there was fluid in both facet joints).  These findings could be related to active degeneration and prominence of the epidural plexus due to the significant central canal and neural foraminal narrowing at this level. Clinical correlation is recommended as an infectious process cannot be excluded by imaging. 2.  Edema and enhancement in the deep subcutaneous soft tissues throughout the visualized lower thoracic and lumbar spine. 3.  Degenerative and discogenic disease of the lumbar spine, as above.    Fluoroscopy Upper Gi Series    Result Date: 1/20/2021  IMPRESSION: Disordered esophageal motility. Gastroesophageal reflux.        ASSESSMENT AND PLAN      Nausea & vomiting  Assessment & Plan  -Intermittent nausea/vomiting with large meals. She reports it has been going on for several weeks, but worse over the past week. She regurgitates whole food about 5 minutes after eating. Worse with large meals. No hematemesis, hematochezia, weight loss. Her anemia is at baseline. She is having daily BMs.   -Upper GI series shows esophageal dysmotility and GERD.  -sxs stable for now    Plan:   -Continue Pepcid and PPI.   -Continue Baclofen and PRN Reglan. QTc 438 on 1/28.  - caution with toradol use. Last day today. She will be discharged on celebrex.   -Continue small meals, low fat/low fiber diet.   -GI consulted. No plans for EGD or gastric emptying study.            Essential hypertension  Assessment &  "Plan  -BP stable  -Cont w/metoprolol, prazosin    Opioid type dependence, continuous use (CMS/HCC)  Assessment & Plan  -Continue methadone per psych recs.   -Patient declining inpatient psych rehab. Agrees to IOP. States she has multiple resources to help maintain sobriety including a sponsor.  -Behavioral therapy and psychiatry following. Discussed with psych social work - will reinitiate process to enroll in methadone clinic for tenative start on Tuesday. Will need to f/u with SW regarding enrollment/set-up of outpt resources prior to d/c    Pain  Assessment & Plan  -cont to c/o ongoing back pain despite radiographic improvement in lumbar osteomyelitis.   -pt c/o persistent pain but improved following increased in methadone and toradol dosaging. Pt requesting additional toradol dose    Plan:  -cont w/methadone 65mg qam per psychiatry   -Pain managment re-consulted. No changes made to regimen.  -Continue Lyrica, Baclofen, lidocaine patch, methadone. Will administer dose of toradol per patient request - caution however given GI sxs  -cont w/IV abx as stated above  -cont heating pad.   - cont w/LSO brace  - Neurosurgery consulted - per evaluation No acute surgical intervention required. Cont w/management with IV antibiotics. Will required follow-up and repeat MRI is recommended 4-6 weeks after discharge for reassement      Migraine, intractable  Assessment & Plan  -Resolved with Fioricet.   -Neurology consulted. Rec Fioricet no more than 4 days/month, and over-the-counter medications no more than 15 days/month.    -Outpatient neurology follow up for further management/workup. Needs outpatient sleep study.        Insomnia  Assessment & Plan  -Cont w/ambien      Depression  Assessment & Plan  -No SI/HI.  -Continue Wellbutrin, Quetiapine, Ambien.  -Follow up with psychiatry as outpatient.      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 Also with septic pulmonary emboli and seeding of lumbar spine.   -Blood cx at [facility] 12/7 and 12/8 grew MSSA. Blood cultures from 12/2020 negative for growth.Patient afebrile.   -Completed 42 days antibiotics on 1/18/21.   -Repeat MRI L-spine shows improved, but persistent enhancement in the lumbar joints.     Plan:  - continue IV antibiotics for total 8 weeks then follow up as outpatient for repeat lumbar imaging. Continue IV abx until 2/1/21.  -Cardiology consulted.  No need for surgical intervention on tricuspid valve. Recommended she follow up as outpatient for repeat TTE.  -F/U with PCP for repeat CT chest as outpatient.  - tentative plan to dc the evening of 2/1 vs 2/2 pending completion of abx and set-up of outpt resources/enrollment in methadone clinic    02/02/21  Patient was seen by neurosurgery yesterday all her questions were answered.  The patient is requesting all her medications be sent to the pharmacy today I have finished that today.  I have given her the medications that we are using in the hospital to control her pain and her other symptoms.  We are in the process of requesting a disc with her imaging studies from radiology.  She will attend physical therapy after discharge.  Patient is set to get her methadone dose in the morning and then she will be discharged tomorrow at around 11:00 in the morning.  She is said to have an intake with a methadone clinic today.  Will need to follow-up with Dr. Blas in 6 weeks.  All her concerns have been addressed all her questions have been answered.  Dishcarge home tomorrow.         VTE Assessment: Padua VTE Score: 2  DVT PPX: refuse lovenox GI PPX: pepcid  Diet: low fiber, low residue diet  Dispo: Med/Surg   Estimated discharge date: 2/2/2021  Code Status: Full Code     Zoila Toth MD  2/2/2021

## 2021-02-02 NOTE — PATIENT CARE CONFERENCE
Care Progression Rounds Note  Date: 2/2/2021  Time: 10:33 AM     Patient Name: Carolyn Redmond     Medical Record Number: [id number]   YOB: 1985  Sex: Female      Room/Bed: 4211    Admitting Diagnosis: Septic embolism (CMS/[facility]) [I76]  Infection of lumbar spine (CMS/[facility]) [M46.26]  Acute left-sided low back pain without sciatica [M54.5]   Admit Date/Time: 12/9/2020 11:10 AM    Primary Diagnosis: Acute bacterial endocarditis  Principal Problem: Acute bacterial endocarditis    GMLOS: 4.8  Anticipated Discharge Date: 2/2/2021    AM-PAC  Mobility Score:      Discharge Planning:       Barriers to Discharge:  Barriers to Discharge: Medical issues not resolved  Comment: likely dc tomorrow    Participants:  social work/services, nursing

## 2021-02-03 VITALS
SYSTOLIC BLOOD PRESSURE: 144 MMHG | HEART RATE: 99 BPM | BODY MASS INDEX: 41.41 KG/M2 | RESPIRATION RATE: 18 BRPM | WEIGHT: 225 LBS | DIASTOLIC BLOOD PRESSURE: 75 MMHG | HEIGHT: 62 IN | OXYGEN SATURATION: 98 % | TEMPERATURE: 98.2 F

## 2021-02-03 LAB
ANION GAP SERPL CALC-SCNC: 7 MEQ/L (ref 3–15)
BUN SERPL-MCNC: 22 MG/DL (ref 8–20)
CALCIUM SERPL-MCNC: 9.1 MG/DL (ref 8.9–10.3)
CHLORIDE SERPL-SCNC: 102 MEQ/L (ref 98–109)
CO2 SERPL-SCNC: 28 MEQ/L (ref 22–32)
CREAT SERPL-MCNC: 0.8 MG/DL (ref 0.6–1.1)
ERYTHROCYTE [DISTWIDTH] IN BLOOD BY AUTOMATED COUNT: 15.2 % (ref 11.7–14.4)
GFR SERPL CREATININE-BSD FRML MDRD: >60 ML/MIN/1.73M*2
GLUCOSE SERPL-MCNC: 86 MG/DL (ref 70–99)
HCT VFR BLDCO AUTO: 32.4 % (ref 35–45)
HGB BLD-MCNC: 10.1 G/DL (ref 11.8–15.7)
MCH RBC QN AUTO: 27.9 PG (ref 28–33.2)
MCHC RBC AUTO-ENTMCNC: 31.2 G/DL (ref 32.2–35.5)
MCV RBC AUTO: 89.5 FL (ref 83–98)
PDW BLD AUTO: 10.3 FL (ref 9.4–12.3)
PLATELET # BLD AUTO: 222 K/UL (ref 150–369)
POTASSIUM SERPL-SCNC: 5 MEQ/L (ref 3.6–5.1)
RBC # BLD AUTO: 3.62 M/UL (ref 3.93–5.22)
SODIUM SERPL-SCNC: 137 MEQ/L (ref 136–144)
WBC # BLD AUTO: 5.22 K/UL (ref 3.8–10.5)

## 2021-02-03 PROCEDURE — 63700000 HC SELF-ADMINISTRABLE DRUG: Performed by: PSYCHIATRY & NEUROLOGY

## 2021-02-03 PROCEDURE — 85027 COMPLETE CBC AUTOMATED: CPT | Performed by: INTERNAL MEDICINE

## 2021-02-03 PROCEDURE — 63700000 HC SELF-ADMINISTRABLE DRUG: Performed by: INTERNAL MEDICINE

## 2021-02-03 PROCEDURE — 99239 HOSP IP/OBS DSCHRG MGMT >30: CPT | Performed by: INTERNAL MEDICINE

## 2021-02-03 PROCEDURE — 63600000 HC DRUGS/DETAIL CODE: Performed by: INTERNAL MEDICINE

## 2021-02-03 PROCEDURE — 25000000 HC PHARMACY GENERAL: Performed by: INTERNAL MEDICINE

## 2021-02-03 PROCEDURE — 80048 BASIC METABOLIC PNL TOTAL CA: CPT | Performed by: INTERNAL MEDICINE

## 2021-02-03 PROCEDURE — 63700000 HC SELF-ADMINISTRABLE DRUG: Performed by: NURSE PRACTITIONER

## 2021-02-03 PROCEDURE — 63700000 HC SELF-ADMINISTRABLE DRUG: Performed by: HOSPITALIST

## 2021-02-03 PROCEDURE — 36415 COLL VENOUS BLD VENIPUNCTURE: CPT | Performed by: INTERNAL MEDICINE

## 2021-02-03 PROCEDURE — 63600000 HC DRUGS/DETAIL CODE: Performed by: HOSPITALIST

## 2021-02-03 PROCEDURE — 25800000 HC PHARMACY IV SOLUTIONS: Performed by: INTERNAL MEDICINE

## 2021-02-03 RX ADMIN — METOCLOPRAMIDE 5 MG: 5 INJECTION, SOLUTION INTRAMUSCULAR; INTRAVENOUS at 11:47

## 2021-02-03 RX ADMIN — PANTOPRAZOLE SODIUM 40 MG: 40 TABLET, DELAYED RELEASE ORAL at 08:57

## 2021-02-03 RX ADMIN — DIPHENHYDRAMINE HYDROCHLORIDE 25 MG: 50 INJECTION INTRAMUSCULAR; INTRAVENOUS at 04:44

## 2021-02-03 RX ADMIN — METHADONE HYDROCHLORIDE 65 MG: 10 TABLET ORAL at 05:38

## 2021-02-03 RX ADMIN — FAMOTIDINE 10 MG: 10 TABLET, FILM COATED ORAL at 06:34

## 2021-02-03 RX ADMIN — CEFAZOLIN SODIUM 2 G: 10 POWDER, FOR SOLUTION INTRAVENOUS at 04:33

## 2021-02-03 RX ADMIN — PREGABALIN 150 MG: 75 CAPSULE ORAL at 08:57

## 2021-02-03 RX ADMIN — METOPROLOL SUCCINATE 25 MG: 25 TABLET, EXTENDED RELEASE ORAL at 08:57

## 2021-02-03 RX ADMIN — PAROXETINE 30 MG: 20 TABLET, FILM COATED ORAL at 08:57

## 2021-02-03 RX ADMIN — PROBIOTIC PRODUCT - TAB 1 TABLET: TAB at 08:58

## 2021-02-03 RX ADMIN — BACLOFEN 15 MG: 10 TABLET ORAL at 08:58

## 2021-02-03 RX ADMIN — DIPHENHYDRAMINE HYDROCHLORIDE 25 MG: 50 INJECTION INTRAMUSCULAR; INTRAVENOUS at 11:35

## 2021-02-03 RX ADMIN — BUPROPION HYDROCHLORIDE 75 MG: 75 TABLET, FILM COATED ORAL at 06:34

## 2021-02-03 RX ADMIN — Medication 1 PATCH: at 08:56

## 2021-02-03 RX ADMIN — LIDOCAINE 1 PATCH: 246 PATCH TOPICAL at 08:57

## 2021-02-03 RX ADMIN — CEFAZOLIN SODIUM 2 G: 10 POWDER, FOR SOLUTION INTRAVENOUS at 11:33

## 2021-02-03 RX ADMIN — ACETAMINOPHEN 650 MG: 325 TABLET, FILM COATED ORAL at 11:34

## 2021-02-03 RX ADMIN — KETOROLAC TROMETHAMINE 30 MG: 30 INJECTION, SOLUTION INTRAMUSCULAR at 04:44

## 2021-02-03 RX ADMIN — METOCLOPRAMIDE 5 MG: 5 INJECTION, SOLUTION INTRAMUSCULAR; INTRAVENOUS at 04:44

## 2021-02-03 RX ADMIN — BUPROPION HYDROCHLORIDE 75 MG: 75 TABLET, FILM COATED ORAL at 11:34

## 2021-02-03 NOTE — PROGRESS NOTES
2/3/2021 5:45 PM SW faxed pt's paperwork to the clinic at [phone], including pt's last dosing and discharge paperwork. PREET confirmed fax completed and went through. SHAUNNA Matos

## 2021-02-03 NOTE — PATIENT CARE CONFERENCE
Care Progression Rounds Note  Date: 2/3/2021  Time: 10:39 AM     Patient Name: Carolyn Redmond     Medical Record Number: [id number]   YOB: 1985  Sex: Female      Room/Bed: 4211    Admitting Diagnosis: Septic embolism (CMS/[facility]) [I76]  Infection of lumbar spine (CMS/[facility]) [M46.26]  Acute left-sided low back pain without sciatica [M54.5]   Admit Date/Time: 12/9/2020 11:10 AM    Primary Diagnosis: Acute bacterial endocarditis  Principal Problem: Acute bacterial endocarditis    GMLOS: 4.8  Anticipated Discharge Date: 2/3/2021    AM-PAC  Mobility Score:      Discharge Planning:       Barriers to Discharge:  Barriers to Discharge: Other (see comments)  Comment: discharge after 1130 dose ancep    Participants:  advanced practice provider, social work/services, nursing

## 2021-02-03 NOTE — NURSING NOTE
Patient's uncle on his way to drive her home. Reviewed discharge instructions with patient and answered questions. Medications sent electronically. Reviewed new medications with patient and side effects. Patient confirmed they were sent to her pharmacy and are ready to be picked up. PICC line removed by IV team. Patient ambulating independently in room and tolerating diet. Patient has been cooperative. Reviewed follow up appointments. Provided patient with MRI disc that was in her chart. Patient received her 1130am dose of ancef per neurosurgery PA's request. Vape pen returned to patient from security.

## 2021-02-03 NOTE — PLAN OF CARE
Problem: Adult Inpatient Plan of Care  Goal: Plan of Care Review  Flowsheets (Taken 2/3/2021 1051)  Outcome Summary: no additional needs noted by MSW CC. MSW CC following to assist further as needed.    PLAN: pt is for dc from the hospital with arrangements coordinated by behavioral health clinician.

## 2021-02-03 NOTE — BEHAVIORAL HEALTH CRISIS PROGRESS NOTE
Behavioral Health Progress Note    Chief Complaint/Reason for follow-up: opioid dependence and depression     Vital Signs for the last 24 hours:  Temp:  [36.6 °C (97.9 °F)-36.8 °C (98.3 °F)] 36.8 °C (98.2 °F)  Heart Rate:  [66-99] 99  Resp:  [16-18] 18  BP: (129-144)/(75-90) 144/75    Assessment/Plan  Spoke to [facility], confirmed pt's d/c for today. The pt completed her continued intake with [facility] this am, Wednesday the 3rd via telehealth over the phone and then scheduled as well tomorrow Thursday  9am to see doctor in person and receive dosing.     As previously stated the pt's paperwork will be faxed to the clinic at [phone] where they will need the pt's last dosing and d/c paperwork, will send once documented in the pt's chart.     1130 Confirmed with the pt she has completed her intake this AM. The pt is receiving her last dose of antibiotics and will be scheduled to leave.

## 2021-02-05 ENCOUNTER — TELEPHONE (OUTPATIENT)
Dept: NEUROSURGERY | Facility: CLINIC | Age: 36
End: 2021-02-05

## 2021-02-05 NOTE — TELEPHONE ENCOUNTER
Patient called stating that she woke up yesterday with numbness in her hands in the thumb,middle and pointer finger. She waited a day to give us a call. Please advise the patient.

## 2021-02-09 NOTE — PROGRESS NOTES
Patient notes she had paresthesias in her left middle, index and thumb upon waking a few days ago. The symptoms have significantly improved. She also had an episodes of double vision and tinnitus a few days ago. These symptoms have also resolved. She denies any fevers, chills, nausea, vomiting or worsening neck or back pain. She has completed her ABX course and is overall doing well.     I reviewed these symptoms with Dr. Ness and we will continue with out current plan of keeping her MRI imaging set for 4 weeks after hospital discharge which will be the first week of March. There is an order for her lumbar MRI order in.     She will call back with any return of her symptoms or new neurologic symptoms, fever, chills or nausea. She agrees with the plan as stated.

## 2021-02-18 NOTE — DISCHARGE SUMMARY
"[facility] DISCHARGE SUMMARY    OVERVIEW    Admitting Provider: Perfecto Angela MD  Attending Provider: Zoila Toth MD     PCP: Katie Brewer CRNP    Admission Date: 12/9/2020 11:10 AM  Discharge Date: 2/3/2021    DISCHARGE DIAGNOSES:  1. Disciitis  2. Bacteremia  3. Opioid Dependendence  4. Endocarditis  5. Septic embolism  6. Migraine  7. HTN  8. GERD    Primary Discharge Diagnosis  Acute bacterial endocarditis    Secondary Discharge Diagnoses  1. Tricuspid valve vegetation    2. Acute left-sided low back pain without sciatica    3. Infection of lumbar spine (CMS/[facility])    4. Septic embolism (CMS/[facility])    5. Opioid type dependence, continuous use (CMS/[facility])    6. Opioid abuse (CMS/[facility])    7. Acute bacterial endocarditis          Problem List on Day of Discharge  Patient Active Problem List   Diagnosis   • Anxiety   • Cervical disc disease   • Chronic pancreatitis (CMS/[facility])   • Depression   • Insomnia   • Migraine, intractable   • Concussion   • Intractable migraine without aura   • Palpitations   • REM behavioral disorder   • Transient alteration of awareness   • Vasculitis determined by biopsy of skin (CMS/[facility])   • Generalized abdominal pain   • Pain   • Pain of upper abdomen   • Slow transit constipation   • Opioid type dependence, continuous use (CMS/[facility])   • Anxiety   • Essential hypertension   • Acute bacterial endocarditis   • Unspecified mood (affective) disorder (CMS/[facility])   • Drug-induced constipation   • Dyspepsia   • Nausea & vomiting       Presenting Problem/History of Present Illness:  From pt's H/P: \"35 y.o. female with a past medical history of anxiety Disorder with suicide attempt on June 2017, migraines, Chronic abdominal pain, vasculitis, depression, pancreatitis, endocarditis 2017, IV drug abuse. Patient denies any use of IV drug use over the past year as patient lives with at home with mother. Patient presents to the emergency department for evaluation of back pain.  Patient " "states she had onset of lower back pain lumbar region 2 weeks ago.  The patient's pain has been constant, progressively worsening.  The patient was seen in urgent care and had x-rays done that by the patient report showed scoliosis and DJD.  The patient was hospitalized at [facility] 3 days ago where they were concerned enough to order MRI testing.  She was given last dose vancomycin last night.  CT brain WNL, CT abd/pelvis - no kidney stones, echo WNL except questionable tricuspid vegetation, US right upper extremity - superficial thrombus distal basilic vein, no DVT. COVID/flu/RSV negative 12/7/20The patient left the hospital AMA prior to imaging.  Patient does report having intermittent fevers at home.  The patient complains of continued pain in the lumbar back more toward the left.  The patient complains the pain is worse with movement .Here in the ED patient was requesting for Dilaudid which relieve her pain.  Patient did present a SIRS presentation as patient's CRP was 214 troponins were 0.02 lactic acid was 0.7 WBC was 8.8 hemoglobin 9.1.  MRI of the thoracic and lumbar sacral area revealed a diffuse anterior and posterior epidural enhancement within the lumbar spine at the L4-L5 for an infectious process.  CT of the chest showed multi nodule lesions on the lung fields especially on the right upper lobe suspicious for septic emboli.  Patient is willing to stay and will be admitted for further evaluation and treatment\"    SUMMARY OF HOSPITALIZATION  The pt is a 36 yo female with the above past medical hx. She was admitted to the hospitalist service for back pain. She was found to have bacteremia and an epidural infection of the spine. She was evaluated by NSX and ID. She received IV antibiotics for 8 weeks through a PICC while in the hospital. She has finished ab therapy. She has been cleared by nsx for dc with outpatient f/up in their clinic with a repeat mri of the spine.     She was also found to " have tricuspid vegetations. Cards will follow her in the outpatient setting. They have also cleared her for dc    She was seen by neuro for migraines and Dr. Gonzalez has made recommendations and will follow hr up in the outpatient setting.     She has been seen by psych for her opiate dependence issues and will continue managing this in methadone  Clinic. She has appt for follow up the day after dc.     During her time in the hospital she complained about regurgitation and nausea/vomiting. She has been seen by GI. upper gi series showing esophageal dysmotility and gastroesophageal reflux. Likely has some component of medication induced slower gastric emptying. They have made recommendations.     Pt is table for dc. All questions answered. Pt reports that she as all int info that she needs.     F/up with methadone clinic the day after dc. Intake has already been done.         Hospital Course:  Nausea & vomiting  Assessment & Plan  -Intermittent nausea/vomiting with large meals. She reports it has been going on for several weeks, but worse over the past week. She regurgitates whole food about 5 minutes after eating. Worse with large meals. No hematemesis, hematochezia, weight loss. Her anemia is at baseline. She is having daily BMs.   -Upper GI series shows esophageal dysmotility and GERD.  -sxs stable for now     Plan:   -Continue Pepcid and PPI.   -Continue Baclofen and PRN Reglan. QTc 438 on 1/28.   She will be discharged on celebrex.   -Continue small meals, low fat/low fiber diet.   -GI consulted. No plans for EGD or gastric emptying study.       Essential hypertension  Assessment & Plan  -BP stable  -Cont w/metoprolol, prazosin     Opioid type dependence, continuous use (CMS/[facility])  Assessment & Plan  -Continue methadone per psych recs.   -Patient declining inpatient psych rehab. Agrees to IOP. States she has multiple resources to help maintain sobriety including a sponsor.  -Behavioral therapy and psychiatry  "following. Discussed with psych social work      Pain  Assessment & Plan  -cont to c/o ongoing back pain despite radiographic improvement in lumbar osteomyelitis.   -pt c/o persistent pain but improved following increased in methadone and toradol dosaging. Pt requesting additional toradol dose     Plan:  -cont w/methadone 65mg qam per psychiatry   -Pain managment re-consulted. No changes made to regimen.  -Continue Lyrica, Baclofen, lidocaine patch, methadone. Will administer dose of toradol per patient request - caution however given GI sxs  -cont heating pad.   - cont w/LSO brace  - Neurosurgery consulted - per evaluation No acute surgical intervention required. Cont w/management with IV antibiotics. Will required follow-up and repeat MRI is recommended 4-6 weeks after discharge for reassement        Migraine, intractable  Assessment & Plan  -Resolved with Fioricet.   -Neurology consulted. Rec Fioricet no more than 4 days/month, and over-the-counter medications no more than 15 days/month.    -Outpatient neurology follow up for further management/workup. Needs outpatient sleep study.           Insomnia  Assessment & Plan  -Cont w/ambien        Depression  Assessment & Plan  -No SI/HI.  -Continue Wellbutrin, Quetiapine, Ambien.  -Follow up with psychiatry as outpatient.        Anxiety  Assessment & Plan  -See \"depression\"        * Acute bacterial endocarditis  Assessment & Plan  -Tricuspid valve endocarditis with severe TR. Also with septic pulmonary emboli and seeding of lumbar spine.   -Blood cx at [facility] 12/7 and 12/8 grew MSSA. Blood cultures from 12/2020 negative for growth.Patient afebrile.   -Completed 42 days antibiotics on 1/18/21.   -Repeat MRI L-spine shows improved, but persistent enhancement in the lumbar joints.      Plan:  - continue IV antibiotics for total 8 weeks then follow up as outpatient for repeat lumbar imaging. Continue IV abx until 2/1/21.  -Cardiology consulted.  No need for " "surgical intervention on tricuspid valve. Recommended she follow up as outpatient for repeat TTE.  -F/U with PCP for repeat CT chest as outpatient.  - tentative plan to dc the evening of 2/1 vs 2/2 pending completion of abx and set-up of outpt resources/enrollment in methadone clinic       Exam on Day of Discharge    Visit Vitals  BP (!) 144/75   Pulse 99   Temp 36.8 °C (98.2 °F) (Oral)   Resp 18   Ht 1.575 m (5' 2\")   Wt 102 kg (225 lb)   LMP 11/18/2020   SpO2 98%   Breastfeeding No   BMI 41.15 kg/m²       General Appearance:  Alert, cooperative, no distress, appears stated age   Head:  Normocephalic, without obvious abnormality, atraumatic   Eyes:  PERRL, conjunctiva/corneas clear, EOM's intact, fundi benign, both eyes   Ears:  Normal TM's and external ear canals, both ears   Nose: Nares normal, septum midline, mucosa normal, no drainage or sinus tenderness   Throat: Lips, mucosa, and tongue normal; teeth and gums normal   Neck: Supple, symmetrical, trachea midline, no adenopathy, thyroid: not enlarged, symmetric, no tenderness/mass/nodules, no carotid bruit or JVD   Back:   Symmetric, no curvature, ROM normal, no CVA tenderness   Lungs:   Clear to auscultation bilaterally, respirations unlabored   Chest Wall:  No tenderness or deformity   Heart:  Regular rate and rhythm, S1, S2 normal, no murmur, rub or gallop   Abdomen:   Soft, non-tender, bowel sounds active all four quadrants,  no masses, no organomegaly   Genitalia:  Normal male   Rectal:  Normal tone, normal prostate, no masses or tenderness;  guaiac negative stool   Extremities: Extremities normal, atraumatic, no cyanosis or edema   Pulses: 2+ and symmetric   Skin: Skin color, texture, turgor normal, no rashes or lesions   Lymph nodes: Cervical, supraclavicular, and axillary nodes normal   Neurologic: Normal       Consults During Admission:   IP CONSULT TO CASE MANAGEMENT  IP CONSULT TO INFECTIOUS DISEASE  IP CONSULT TO NEUROSURGERY  IP CONSULT TO " PAIN/PALLIATIVE CARE  IP CONSULT TO PSYCHIATRY  IP CONSULT TO CARDIOLOGY  IP CONSULT TO SPIRITUAL HEALTH AND EDUCATION  IP CONSULT TO SOCIAL WORK  IP CONSULT TO IV TEAM  IP CONSULT TO TELEPSYCHIATRY  IP CONSULT TO TELEPSYCHIATRY  IP CONSULT TO PSYCHIATRY  IP CONSULT TO NEUROLOGY  IP CONSULT TO PSYCHIATRY  IP CONSULT TO GASTROENTEROLOGY    DISCHARGE MEDICATIONS     Medication List      START taking these medications    alum-mag hydroxide-simeth 200-200-20 mg/5 mL suspension  Commonly known as: MAALOX  Take 30 mL by mouth every 6 (six) hours as needed for indigestion or heartburn.  Dose: 30 mL     baclofen 5 mg tablet tablet  Commonly known as: LIORESAL  Take 3 tablets (15 mg total) by mouth 3 (three) times a day.  Dose: 15 mg     celecoxib 200 mg capsule  Commonly known as: celeBREX  Take 1 capsule (200 mg total) by mouth daily.  Dose: 200 mg     famotidine 10 mg tablet  Commonly known as: PEPCID  Take 1 tablet (10 mg total) by mouth 2 (two) times a day before breakfast and dinner Indications: gastroesophageal reflux disease.  Dose: 10 mg     hydrocortisone-pramoxine 2.5-1 % rectal cream  Commonly known as: ANALPRAM-HC  Insert into the rectum 4 (four) times a day as needed for hemorrhoids for up to 10 days.     lactobacillus acidophilus complex 1 billion cell- 250 mg tablet  Commonly known as: BACID  Take 1 tablet by mouth 2 (two) times a day.  Dose: 1 tablet     lidocaine 4 % adhesive patch,medicated topical patch  Commonly known as: ASPERCREME  Apply 1 patch topically daily.  Dose: 1 patch     metoclopramide 10 mg tablet  Commonly known as: REGLAN  Take 0.5 tablets (5 mg total) by mouth daily as needed (nausea/vomiting).  Dose: 5 mg     nicotine 21 mg/24 hr  Commonly known as: NICODERM CQ  Place 1 patch on the skin daily.  Dose: 1 patch     nicotine polacrilex 4 mg gum  Commonly known as: NICORETTE  Apply 1 each (4 mg total) to cheek every 4 (four) hours as needed for smoking cessation.  Dose: 4 mg     pantoprazole  40 mg EC tablet  Commonly known as: PROTONIX  Take 1 tablet (40 mg total) by mouth 2 (two) times a day Indications: gastroesophageal reflux disease.  Dose: 40 mg        CHANGE how you take these medications    fluticasone propionate 50 mcg/actuation nasal spray  Commonly known as: FLONASE  Administer 2 sprays into each nostril daily as needed for rhinitis.  Dose: 2 spray  What changed: reasons to take this     PARoxetine 30 mg tablet  Commonly known as: PAXIL  Take 1 tablet (30 mg total) by mouth daily.  Dose: 30 mg  What changed:   · medication strength  · how much to take     prazosin 2 mg capsule  Commonly known as: MINIPRESS  Take 1 capsule (2 mg total) by mouth nightly.  Dose: 2 mg  What changed: medication strength     pregabalin 150 mg capsule  Commonly known as: LYRICA  Take 1 capsule (150 mg total) by mouth 2 (two) times a day for 5 days.  Dose: 150 mg  What changed:   · medication strength  · additional instructions        CONTINUE taking these medications    buPROPion 75 mg tablet  Commonly known as: WELLBUTRIN  Take 1 tablet (75 mg total) by mouth 3 (three) times a day.  Dose: 75 mg     butalbital-acetaminophen-caff -40 mg per capsule  Take 2 capsules by mouth every 4 (four) hours as needed for headaches.  Dose: 2 capsule     metoprolol succinate XL 25 mg 24 hr tablet  Commonly known as: TOPROL-XL  Take 1 tablet (25 mg total) by mouth daily.  Dose: 25 mg     QUEtiapine 100 mg tablet  Commonly known as: SEROquel  Take 1 tablet (100 mg total) by mouth nightly.  Dose: 100 mg     zolpidem 10 mg tablet  Commonly known as: AMBIEN  Take 1 tablet (10 mg total) by mouth nightly for 5 days.  Dose: 10 mg        STOP taking these medications    buprenorphine-naloxone 8-2 mg per SL tablet  Commonly known as: SUBOXONE     chlorproMAZINE 100 mg tablet  Commonly known as: THORAZINE     cyclobenzaprine 10 mg tablet  Commonly known as: FLEXERIL            PROCEDURES/LABS/IMAGING    Operative Procedures (if any): N/A      Other Procedures: PICC line placement and removal R arm.     Pertinent Labs  CMP Results       02/03/21 02/01/21 01/25/21                    0549 [id number]          138 137         K 5.0 5.2 4.5         Cl 102 101 101         CO2 28 28 27         Glucose 86 120 113         BUN 22 21 13         Creatinine 0.8 0.9 0.8         Calcium 9.1 8.9 8.8         Anion Gap 7 9 9         EGFR >60.0 >60.0 >60.0                       CBC Results       02/03/21 02/01/21 01/25/21                    0549 0640 0445         WBC 5.22 5.78 5.10         RBC 3.62 3.52 3.93         HGB 10.1 9.9 10.9         HCT 32.4 31.9 34.6         MCV 89.5 90.6 88.0         MCH 27.9 28.1 27.7         MCHC 31.2 31.0 31.5          217 228                       Pertinent Imaging  Fluoroscopy Upper Gi Series    Result Date: 1/20/2021  IMPRESSION: Disordered esophageal motility. Gastroesophageal reflux.      OUTPATIENT FOLLOW UP:            In 1 week Adriano Ness MD [facility]          Referrals  No orders of the defined types were placed in this encounter.      Test Results Pending at Discharge:  N/A     Important Issues to Address in Follow-Up        DISCHARGE DISPOSITION    Disposition: Home   Code Status At Discharge: Prior    Zoila Toth MD  02/18/21

## 2021-02-23 ENCOUNTER — TELEPHONE (OUTPATIENT)
Dept: CARDIOLOGY | Facility: CLINIC | Age: 36
End: 2021-02-23

## 2021-02-25 DIAGNOSIS — I33.0 ACUTE BACTERIAL ENDOCARDITIS: Primary | ICD-10-CM

## 2021-03-08 ENCOUNTER — HOSPITAL ENCOUNTER (OUTPATIENT)
Dept: RADIOLOGY | Facility: HOSPITAL | Age: 36
Discharge: HOME | End: 2021-03-08
Attending: PHYSICIAN ASSISTANT
Payer: COMMERCIAL

## 2021-03-08 DIAGNOSIS — M46.26 INFECTION OF LUMBAR SPINE (CMS/HCC): ICD-10-CM

## 2021-03-08 RX ORDER — GADOBUTROL 604.72 MG/ML
11 INJECTION INTRAVENOUS ONCE
Status: COMPLETED | OUTPATIENT
Start: 2021-03-08 | End: 2021-03-08

## 2021-03-08 RX ADMIN — GADOBUTROL 11 ML: 604.72 INJECTION INTRAVENOUS at 13:35

## 2021-03-12 ENCOUNTER — OFFICE VISIT (OUTPATIENT)
Dept: NEUROSURGERY | Facility: CLINIC | Age: 36
End: 2021-03-12
Payer: COMMERCIAL

## 2021-03-12 VITALS
RESPIRATION RATE: 18 BRPM | SYSTOLIC BLOOD PRESSURE: 110 MMHG | HEART RATE: 86 BPM | BODY MASS INDEX: 44.16 KG/M2 | TEMPERATURE: 97.5 F | HEIGHT: 62 IN | OXYGEN SATURATION: 98 % | WEIGHT: 240 LBS | DIASTOLIC BLOOD PRESSURE: 73 MMHG

## 2021-03-12 DIAGNOSIS — M47.816 LUMBAR SPONDYLOSIS: Primary | ICD-10-CM

## 2021-03-12 PROCEDURE — 99212 OFFICE O/P EST SF 10 MIN: CPT | Performed by: NEUROLOGICAL SURGERY

## 2021-03-12 NOTE — LETTER
2021     SHERITA Armstrong  [address] [facility] [address]    Patient: Carolyn Redmond  YOB: 1985  Date of Visit: 3/12/2021      Dear Dr. Brewer:    Thank you for referring Carolyn Redmond to me for evaluation. Below are my notes for this consultation.    If you have questions, please do not hesitate to call me. I look forward to following your patient along with you.         Sincerely,        Adriano Ness MD        CC: Adriano Baldwin MD  3/22/2021 11:15 AM  Signed      [facility]  Medical Office Building [address], [address]  [place] [address]  Phone: [phone]  Fax: [phone]      Patient ID: Carolyn Redmond                              : 1985    Visit Date: 3/11/2021    Referring Provider: Hospital follow-up    History of Present Illness:   Carolyn Redmond is a pleasant 35 y.o. female with a significant past medical history of anxiety, depression, endocarditis, fibromyalgia, migraines, pancreatitis, IV drug use and vasculitis seen today by the neurosurgery service for hospital follow-up. Patient was initially seen 12/10/2020 for lumbar radiculopathy and an MRI lumbar spine showing osteomyelitis without central compression. She was managed with IV ABX until 2021 for a total of 8 weeks. She had an MRI lumbar spine completed 3/8/2021.    Since last being seen, she has been feeling very well.  She reports mild low back pain but denies any radicular pain.  She received her methadone dose increased and she feels that that is working well.  Denies any weakness in her legs, saddle anesthesia, bowel bladder incontinence, fever, chills.  While in the hospital she was having abdominal discomfort with frequent episodes of vomiting however this has resolved since being discharged.  She will be following up with her cardiologist next week for follow-up echocardiogram given her history of bacterial endocarditis  with septic emboli.      Past Medical History:   Past Medical History:   Diagnosis Date   • Anxiety    • Depressed    • Endocarditis    • Fibromyalgia    • Migraines    • Pancreatitis    • Tachycardia    • Vasculitis (CMS/HCC)        Medications:     Current Outpatient Medications:   •  alum-mag hydroxide-simeth (MAALOX) 200-200-20 mg/5 mL suspension, Take 30 mL by mouth every 6 (six) hours as needed for indigestion or heartburn., Disp: 120 mL, Rfl: 0  •  baclofen (LIORESAL) 5 mg tablet tablet, Take 3 tablets (15 mg total) by mouth 3 (three) times a day., Disp: 270 tablet, Rfl: 0  •  buPROPion (WELLBUTRIN) 75 mg tablet, Take 1 tablet (75 mg total) by mouth 3 (three) times a day., Disp: 90 tablet, Rfl: 0  •  celecoxib (celeBREX) 200 mg capsule, Take 1 capsule (200 mg total) by mouth daily., Disp: 30 capsule, Rfl: 0  •  famotidine (PEPCID) 10 mg tablet, Take 1 tablet (10 mg total) by mouth 2 (two) times a day before breakfast and dinner Indications: gastroesophageal reflux disease., Disp: 60 tablet, Rfl: 0  •  fluticasone propionate (FLONASE) 50 mcg/actuation nasal spray, Administer 2 sprays into each nostril daily as needed for rhinitis., Disp: 16 g, Rfl: 5  •  hydrocortisone-pramoxine (ANALPRAM-HC) 2.5-1 % rectal cream, Insert into the rectum 4 (four) times a day as needed for hemorrhoids for up to 10 days., Disp: 30 g, Rfl: 0  •  lactobacillus acidophilus complex (BACID) 1 billion cell- 250 mg tablet, Take 1 tablet by mouth 2 (two) times a day., Disp: 60 tablet, Rfl: 0  •  lidocaine (ASPERCREME) 4 % adhesive patch,medicated topical patch, Apply 1 patch topically daily., Disp: 30 patch, Rfl: 0  •  metoclopramide (REGLAN) 10 mg tablet, Take 0.5 tablets (5 mg total) by mouth daily as needed (nausea/vomiting)., Disp: 15 tablet, Rfl: 0  •  metoprolol succinate XL (TOPROL-XL) 25 mg 24 hr tablet, Take 1 tablet (25 mg total) by mouth daily., Disp: 30 tablet, Rfl: 0  •  nicotine (NICODERM CQ) 21 mg/24 hr, Place 1 patch on  the skin daily., Disp: 30 patch, Rfl: 0  •  nicotine polacrilex (NICORETTE) 4 mg gum, Apply 1 each (4 mg total) to cheek every 4 (four) hours as needed for smoking cessation., Disp: 100 each, Rfl: 0  •  pantoprazole (PROTONIX) 40 mg EC tablet, Take 1 tablet (40 mg total) by mouth 2 (two) times a day Indications: gastroesophageal reflux disease., Disp: 60 tablet, Rfl: 0  •  PARoxetine (PAXIL) 30 mg tablet, Take 1 tablet (30 mg total) by mouth daily., Disp: 30 tablet, Rfl: 0  •  prazosin (MINIPRESS) 2 mg capsule, Take 1 capsule (2 mg total) by mouth nightly., Disp: 30 capsule, Rfl: 0  •  pregabalin (LYRICA) 150 mg capsule, Take 1 capsule (150 mg total) by mouth 2 (two) times a day for 5 days., Disp: 10 capsule, Rfl: 0  •  QUEtiapine (SEROquel) 100 mg tablet, Take 1 tablet (100 mg total) by mouth nightly., Disp: 30 tablet, Rfl: 0  •  zolpidem (AMBIEN) 10 mg tablet, Take 1 tablet (10 mg total) by mouth nightly for 5 days., Disp: 5 tablet, Rfl: 0     Past Surgical History:   Past Surgical History:   Procedure Laterality Date   • CHOLECYSTECTOMY     • ERCP     • GALLBLADDER SURGERY         Allergies:  Allergies   Allergen Reactions   • Amoxicillin    • Nsaids (Non-Steroidal Anti-Inflammatory Drug) Nausea Only   • Trazodone      Other reaction(s): Headaches, Other (see comments)   • Tramadol Palpitations       Family History:  Family History   Problem Relation Age of Onset   • No Known Problems Biological Mother    • Lung cancer Biological Father        Social History:  Social History     Socioeconomic History   • Marital status: Single     Spouse name: Not on file   • Number of children: Not on file   • Years of education: Not on file   • Highest education level: Not on file   Occupational History   • Not on file   Social Needs   • Financial resource strain: Not on file   • Food insecurity     Worry: Not on file     Inability: Not on file   • Transportation needs     Medical: Not on file     Non-medical: Not on file    Tobacco Use   • Smoking status: Former Smoker     Packs/day: 0.50     Types: Cigarettes   • Smokeless tobacco: Current User   • Tobacco comment: quit 1 year ago, vapes currently   Substance and Sexual Activity   • Alcohol use: Not Currently     Comment: social   • Drug use: Yes     Types: Marijuana   • Sexual activity: Defer   Lifestyle   • Physical activity     Days per week: Not on file     Minutes per session: Not on file   • Stress: Not on file   Relationships   • Social connections     Talks on phone: Not on file     Gets together: Not on file     Attends Orthodoxy service: Not on file     Active member of club or organization: Not on file     Attends meetings of clubs or organizations: Not on file     Relationship status: Not on file   • Intimate partner violence     Fear of current or ex partner: Not on file     Emotionally abused: Not on file     Physically abused: Not on file     Forced sexual activity: Not on file   Other Topics Concern   • Not on file   Social History Narrative   • Not on file       Vitals: There were no vitals filed for this visit.    Review of Systems:  A complete 14 point review of systems was conducted and was deemed negative except what was documented in the HPI.    Physical Examination:    Constitutional: Appears well-developed and well-nourished.   Head: Normocephalic and atraumatic.   Right Ear: External ear normal.   Left Ear: External ear normal.   Nose: Nose normal.   Mouth/Throat: Oropharynx is clear and moist.   Eyes: Conjunctivae and EOM are normal. Pupils are equal, round, and reactive to light. No scleral icterus.   Neck: Normal range of motion. Neck supple.  Cardiovascular: Normal rate and regular rhythm.   Pulmonary/Chest: Effort normal   Musculoskeletal: Normal range of motion. No edema or tenderness.  Skin: Skin is warm and dry. No rash noted. No erythema.   Psychiatric: Normal mood and affect. Speech is normal and behavior is normal. Thought content normal.   Vitals  reviewed.      Neurological Examination:  Neurologic Exam     Mental Status   Oriented to person, place, and time.   Attention: normal.   Speech: speech is normal   Level of consciousness: alert    Cranial Nerves     CN II   Visual acuity: normal    CN III, IV, VI   Pupils are equal, round, and reactive to light.  Extraocular motions are normal.   Right pupil: Size: 3 mm. Shape: regular. Reactivity: brisk.   Left pupil: Size: 3 mm. Shape: regular. Reactivity: brisk.   CN III: no CN III palsy  CN VI: no CN VI palsy  Nystagmus: none     CN V   Facial sensation intact.     CN VII  Symmetric Facial movements    CN VIII  Hearing: intact    CN IX, X   CN IX normal.   Palate: symmetric    CN XI   Right sternocleidomastoid strength: normal  Left sternocleidomastoid strength: normal    CN XII   Tongue: not atrophic    Sensation ( /2)    Right Left  Right Left   C5 2 2 L2 2 2   C6 2 2 L3 2 2   C7 2 2 L4 2 2   C8 2 2 L5 2 2   T1 2 2 S1 2 2     Motor:     Deltoid Biceps Triceps Wrist ext Finger ext Hand Intrinsics Hip flexion Knee ext Dorsi-  flexion EHL Plantar Flexion   R 5 5 5 5 5 5 5 5 5 5 5   L 5 5 5 5 5 5 5 5 5 5 5     There is no pronator drift. Muscle bulk and tone are normal.     Gait, Coordination, and Reflexes     Reflexes   Reflexes 2+ except as noted.   Right plantar: normal  Left plantar: normal  Right Stephens: absent  Left Stephens: absent  Right ankle clonus: absent  Left ankle clonus: absent    Steady gait      Data Review:    Lab Results:   Lab Results   Component Value Date    WBC 5.22 02/03/2021    HGB 10.1 (L) 02/03/2021    HCT 32.4 (L) 02/03/2021    MCV 89.5 02/03/2021     02/03/2021    RDW 15.2 (H) 02/03/2021      Lab Results   Component Value Date    GLUCOSE 86 02/03/2021    BUN 22 (H) 02/03/2021     02/03/2021    K 5.0 02/03/2021     02/03/2021    CO2 28 02/03/2021    ANIONGAP 7 02/03/2021    CA 9.9 05/30/2017        Imaging:   Independent review of all imaging was done by myself as  well as review of the radiologists readings and comparison to prior films.     Lumbar MRI 3/8/2021  1.  Minimal residual pachymeningeal enhancement from L4-L5 level down to L5-S1  level, improving.  2.  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    Assessment / Plan: In summary, Carolyn Redmond is a pleasant 35 y.o. female with a significant past medical history of anxiety, depression, endocarditis, fibromyalgia, migraines, pancreatitis, IV drug use and vasculitis seen today by the neurosurgery service for hospital follow-up. Patient was initially seen 12/10/2020 for lumbar radiculopathy and an MRI lumbar spine showing osteomyelitis without central compression. She was managed with IV ABX until 2/1/2021 for a total of 8 weeks. She had an MRI lumbar spine completed 3/8/2021.    She is been doing very well in the interval without any significant low back pain or lumbar radiculopathy.  On exam she is full strength throughout and pathologic reflexes.  Her lumbar MRI imaging shows improving pachymeningeal enhancement from L4-S1 with no significant abscess formation or fluid collection.  There is no spinal cord compression or nerve root impingement.      At this time, no further neurosurgical follow-up is indicated unless there is a change in her clinical condition.  She will call our office with any new neurologic symptoms.  She will be following with a cardiologist next week in regards to history of bacterial endocarditis with septic emboli.  She agrees with plan stated.        Adriano Ness MD    I, Terence Selby PA-C, am scribing for, and in the presence of, Dr. Adriano Ness.         15 minutes were spent with the patient on examination, review of past medical history, and prior imaging, and coordinating care.  Patient seen earlier today. Signature timestamp does not reflect patient encounter time.   More than 50% of the time is  spent counseling the patient and family and coordinating care.

## 2021-03-12 NOTE — PROGRESS NOTES
[facility]  Medical Office Building [address], [address]  [place] [address]  Phone: [phone]  Fax: [phone]      Patient ID: Carolyn Redmond                              : 1985    Visit Date: 3/11/2021    Referring Provider: Hospital follow-up    History of Present Illness:   Carolyn Redmond is a pleasant 35 y.o. female with a significant past medical history of anxiety, depression, endocarditis, fibromyalgia, migraines, pancreatitis, IV drug use and vasculitis seen today by the neurosurgery service for hospital follow-up. Patient was initially seen 12/10/2020 for lumbar radiculopathy and an MRI lumbar spine showing osteomyelitis without central compression. She was managed with IV ABX until 2021 for a total of 8 weeks. She had an MRI lumbar spine completed 3/8/2021.    Since last being seen, she has been feeling very well.  She reports mild low back pain but denies any radicular pain.  She received her methadone dose increased and she feels that that is working well.  Denies any weakness in her legs, saddle anesthesia, bowel bladder incontinence, fever, chills.  While in the hospital she was having abdominal discomfort with frequent episodes of vomiting however this has resolved since being discharged.  She will be following up with her cardiologist next week for follow-up echocardiogram given her history of bacterial endocarditis with septic emboli.      Past Medical History:   Past Medical History:   Diagnosis Date   • Anxiety    • Depressed    • Endocarditis    • Fibromyalgia    • Migraines    • Pancreatitis    • Tachycardia    • Vasculitis (CMS/[facility])        Medications:     Current Outpatient Medications:   •  alum-mag hydroxide-simeth (MAALOX) 200-200-20 mg/5 mL suspension, Take 30 mL by mouth every 6 (six) hours as needed for indigestion or heartburn., Disp: 120 mL, Rfl: 0  •  baclofen (LIORESAL) 5 mg tablet tablet, Take 3 tablets (15 mg total) by  mouth 3 (three) times a day., Disp: 270 tablet, Rfl: 0  •  buPROPion (WELLBUTRIN) 75 mg tablet, Take 1 tablet (75 mg total) by mouth 3 (three) times a day., Disp: 90 tablet, Rfl: 0  •  celecoxib (celeBREX) 200 mg capsule, Take 1 capsule (200 mg total) by mouth daily., Disp: 30 capsule, Rfl: 0  •  famotidine (PEPCID) 10 mg tablet, Take 1 tablet (10 mg total) by mouth 2 (two) times a day before breakfast and dinner Indications: gastroesophageal reflux disease., Disp: 60 tablet, Rfl: 0  •  fluticasone propionate (FLONASE) 50 mcg/actuation nasal spray, Administer 2 sprays into each nostril daily as needed for rhinitis., Disp: 16 g, Rfl: 5  •  hydrocortisone-pramoxine (ANALPRAM-HC) 2.5-1 % rectal cream, Insert into the rectum 4 (four) times a day as needed for hemorrhoids for up to 10 days., Disp: 30 g, Rfl: 0  •  lactobacillus acidophilus complex (BACID) 1 billion cell- 250 mg tablet, Take 1 tablet by mouth 2 (two) times a day., Disp: 60 tablet, Rfl: 0  •  lidocaine (ASPERCREME) 4 % adhesive patch,medicated topical patch, Apply 1 patch topically daily., Disp: 30 patch, Rfl: 0  •  metoclopramide (REGLAN) 10 mg tablet, Take 0.5 tablets (5 mg total) by mouth daily as needed (nausea/vomiting)., Disp: 15 tablet, Rfl: 0  •  metoprolol succinate XL (TOPROL-XL) 25 mg 24 hr tablet, Take 1 tablet (25 mg total) by mouth daily., Disp: 30 tablet, Rfl: 0  •  nicotine (NICODERM CQ) 21 mg/24 hr, Place 1 patch on the skin daily., Disp: 30 patch, Rfl: 0  •  nicotine polacrilex (NICORETTE) 4 mg gum, Apply 1 each (4 mg total) to cheek every 4 (four) hours as needed for smoking cessation., Disp: 100 each, Rfl: 0  •  pantoprazole (PROTONIX) 40 mg EC tablet, Take 1 tablet (40 mg total) by mouth 2 (two) times a day Indications: gastroesophageal reflux disease., Disp: 60 tablet, Rfl: 0  •  PARoxetine (PAXIL) 30 mg tablet, Take 1 tablet (30 mg total) by mouth daily., Disp: 30 tablet, Rfl: 0  •  prazosin (MINIPRESS) 2 mg capsule, Take 1 capsule  (2 mg total) by mouth nightly., Disp: 30 capsule, Rfl: 0  •  pregabalin (LYRICA) 150 mg capsule, Take 1 capsule (150 mg total) by mouth 2 (two) times a day for 5 days., Disp: 10 capsule, Rfl: 0  •  QUEtiapine (SEROquel) 100 mg tablet, Take 1 tablet (100 mg total) by mouth nightly., Disp: 30 tablet, Rfl: 0  •  zolpidem (AMBIEN) 10 mg tablet, Take 1 tablet (10 mg total) by mouth nightly for 5 days., Disp: 5 tablet, Rfl: 0     Past Surgical History:   Past Surgical History:   Procedure Laterality Date   • CHOLECYSTECTOMY     • ERCP     • GALLBLADDER SURGERY         Allergies:  Allergies   Allergen Reactions   • Amoxicillin    • Nsaids (Non-Steroidal Anti-Inflammatory Drug) Nausea Only   • Trazodone      Other reaction(s): Headaches, Other (see comments)   • Tramadol Palpitations       Family History:  Family History   Problem Relation Age of Onset   • No Known Problems Biological Mother    • Lung cancer Biological Father        Social History:  Social History     Socioeconomic History   • Marital status: Single     Spouse name: Not on file   • Number of children: Not on file   • Years of education: Not on file   • Highest education level: Not on file   Occupational History   • Not on file   Social Needs   • Financial resource strain: Not on file   • Food insecurity     Worry: Not on file     Inability: Not on file   • Transportation needs     Medical: Not on file     Non-medical: Not on file   Tobacco Use   • Smoking status: Former Smoker     Packs/day: 0.50     Types: Cigarettes   • Smokeless tobacco: Current User   • Tobacco comment: quit 1 year ago, vapes currently   Substance and Sexual Activity   • Alcohol use: Not Currently     Comment: social   • Drug use: Yes     Types: Marijuana   • Sexual activity: Defer   Lifestyle   • Physical activity     Days per week: Not on file     Minutes per session: Not on file   • Stress: Not on file   Relationships   • Social connections     Talks on phone: Not on file     Gets  together: Not on file     Attends [employer] service: Not on file     Active member of club or organization: Not on file     Attends meetings of clubs or organizations: Not on file     Relationship status: Not on file   • Intimate partner violence     Fear of current or ex partner: Not on file     Emotionally abused: Not on file     Physically abused: Not on file     Forced sexual activity: Not on file   Other Topics Concern   • Not on file   Social History Narrative   • Not on file       Vitals: There were no vitals filed for this visit.    Review of Systems:  A complete 14 point review of systems was conducted and was deemed negative except what was documented in the HPI.    Physical Examination:    Constitutional: Appears well-developed and well-nourished.   Head: Normocephalic and atraumatic.   Right Ear: External ear normal.   Left Ear: External ear normal.   Nose: Nose normal.   Mouth/Throat: Oropharynx is clear and moist.   Eyes: Conjunctivae and EOM are normal. Pupils are equal, round, and reactive to light. No scleral icterus.   Neck: Normal range of motion. Neck supple.  Cardiovascular: Normal rate and regular rhythm.   Pulmonary/Chest: Effort normal   Musculoskeletal: Normal range of motion. No edema or tenderness.  Skin: Skin is warm and dry. No rash noted. No erythema.   Psychiatric: Normal mood and affect. Speech is normal and behavior is normal. Thought content normal.   Vitals reviewed.      Neurological Examination:  Neurologic Exam     Mental Status   Oriented to person, place, and time.   Attention: normal.   Speech: speech is normal   Level of consciousness: alert    Cranial Nerves     CN II   Visual acuity: normal    CN III, IV, VI   Pupils are equal, round, and reactive to light.  Extraocular motions are normal.   Right pupil: Size: 3 mm. Shape: regular. Reactivity: brisk.   Left pupil: Size: 3 mm. Shape: regular. Reactivity: brisk.   CN III: no CN III palsy  CN VI: no CN VI palsy  Nystagmus:  none     CN V   Facial sensation intact.     CN VII  Symmetric Facial movements    CN VIII  Hearing: intact    CN IX, X   CN IX normal.   Palate: symmetric    CN XI   Right sternocleidomastoid strength: normal  Left sternocleidomastoid strength: normal    CN XII   Tongue: not atrophic    Sensation ( /2)    Right Left  Right Left   C5 2 2 L2 2 2   C6 2 2 L3 2 2   C7 2 2 L4 2 2   C8 2 2 L5 2 2   T1 2 2 S1 2 2     Motor:     Deltoid Biceps Triceps Wrist ext Finger ext Hand Intrinsics Hip flexion Knee ext Dorsi-  flexion EHL Plantar Flexion   R 5 5 5 5 5 5 5 5 5 5 5   L 5 5 5 5 5 5 5 5 5 5 5     There is no pronator drift. Muscle bulk and tone are normal.     Gait, Coordination, and Reflexes     Reflexes   Reflexes 2+ except as noted.   Right plantar: normal  Left plantar: normal  Right Stephens: absent  Left Stephens: absent  Right ankle clonus: absent  Left ankle clonus: absent    Steady gait      Data Review:    Lab Results:   Lab Results   Component Value Date    WBC 5.22 02/03/2021    HGB 10.1 (L) 02/03/2021    HCT 32.4 (L) 02/03/2021    MCV 89.5 02/03/2021     02/03/2021    RDW 15.2 (H) 02/03/2021      Lab Results   Component Value Date    GLUCOSE 86 02/03/2021    BUN 22 (H) 02/03/2021     02/03/2021    K 5.0 02/03/2021     02/03/2021    CO2 28 02/03/2021    ANIONGAP 7 02/03/2021    CA 9.9 05/30/2017        Imaging:   Independent review of all imaging was done by myself as well as review of the radiologists readings and comparison to prior films.     Lumbar MRI 3/8/2021  1.  Minimal residual pachymeningeal enhancement from L4-L5 level down to L5-S1  level, improving.  2.  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    Assessment / Plan: In summary, Carolyn Redmond is a pleasant 35 y.o. female with a significant past medical history of anxiety, depression, endocarditis, fibromyalgia, migraines, pancreatitis, IV  drug use and vasculitis seen today by the neurosurgery service for hospital follow-up. Patient was initially seen 12/10/2020 for lumbar radiculopathy and an MRI lumbar spine showing osteomyelitis without central compression. She was managed with IV ABX until 2/1/2021 for a total of 8 weeks. She had an MRI lumbar spine completed 3/8/2021.    She is been doing very well in the interval without any significant low back pain or lumbar radiculopathy.  On exam she is full strength throughout and pathologic reflexes.  Her lumbar MRI imaging shows improving pachymeningeal enhancement from L4-S1 with no significant abscess formation or fluid collection.  There is no spinal cord compression or nerve root impingement.      At this time, no further neurosurgical follow-up is indicated unless there is a change in her clinical condition.  She will call our office with any new neurologic symptoms.  She will be following with a cardiologist next week in regards to history of bacterial endocarditis with septic emboli.  She agrees with plan stated.        MD SULY Marroquin, Terence Selby PA-C, am scribing for, and in the presence of, Dr. Adriano Ness.         15 minutes were spent with the patient on examination, review of past medical history, and prior imaging, and coordinating care.  Patient seen earlier today. Signature timestamp does not reflect patient encounter time.   More than 50% of the time is spent counseling the patient and family and coordinating care.

## 2021-03-17 ENCOUNTER — HOSPITAL ENCOUNTER (OUTPATIENT)
Dept: CARDIOLOGY | Facility: HOSPITAL | Age: 36
Discharge: HOME | End: 2021-03-17
Attending: INTERNAL MEDICINE
Payer: COMMERCIAL

## 2021-03-17 VITALS
WEIGHT: 240 LBS | BODY MASS INDEX: 44.16 KG/M2 | HEIGHT: 62 IN | SYSTOLIC BLOOD PRESSURE: 110 MMHG | DIASTOLIC BLOOD PRESSURE: 73 MMHG

## 2021-03-17 DIAGNOSIS — I33.0 ACUTE BACTERIAL ENDOCARDITIS: ICD-10-CM

## 2021-03-17 LAB
AORTIC ROOT ANNULUS: 2.3 CM
ASCENDING AORTA: 2.3 CM
BSA FOR ECHO PROCEDURE: 2.18 M2
E WAVE DECELERATION TIME: 227 MS
E/A RATIO: 1.2
E/E' RATIO: 12.9
E/LAT E' RATIO: 7.9
EST RIGHT VENT SYSTOLIC PRESSURE BY TRICUSPID REGURGITATION JET: 51 MMHG
FRACTIONAL SHORTENING: 31.6 %
INTERVENTRICULAR SEPTUM: 0.87 CM
LA ESV (BP): 43.7 CM3
LA ESV INDEX (A2C): 18.9 CM3/M2
LA ESV INDEX (BP): 20.05 CM3/M2
LA/AORTA RATIO: 1.74
LAAS-AP2: 15 CM2
LAAS-AP4: 16.3 CM2
LAD 2D: 4 CM
LALD A4C: 4.36 CM
LALD A4C: 5.01 CM
LAV-S: 41.2 CM3
LEFT ATRIUM VOLUME INDEX: 18.58 CM3/M2
LEFT ATRIUM VOLUME: 40.5 CM3
LEFT INTERNAL DIMENSION IN SYSTOLE: 3.33 CM (ref 3.11–4.72)
LEFT VENTRICULAR INTERNAL DIMENSION IN DIASTOLE: 4.87 CM (ref 5.31–7.38)
LEFT VENTRICULAR POSTERIOR WALL IN END DIASTOLE: 0.94 CM (ref 0.68–1.27)
LVOT 2D: 2 CM
LVOT A: 3.14 CM2
MV E'TISSUE VEL-LAT: 0.14 M/S
MV E'TISSUE VEL-MED: 0.08 M/S
MV PEAK A VEL: 0.87 M/S
MV PEAK E VEL: 1.08 M/S
MV VALVE AREA P 1/2 METHOD: 3.33 CM2
POSTERIOR WALL: 0.94 CM
TAPSE: 2.16 CM
TR MAX PG: 34 MMHG
TRICUSPID VALVE PEAK REGURGITATION VELOCITY: 2.91 M/S
Z-SCORE OF LEFT VENTRICULAR DIMENSION IN END DIASTOLE: -2.51
Z-SCORE OF LEFT VENTRICULAR DIMENSION IN END SYSTOLE: -1.11
Z-SCORE OF LEFT VENTRICULAR POSTERIOR WALL IN END DIASTOLE: 0.08

## 2021-03-17 PROCEDURE — 93306 TTE W/DOPPLER COMPLETE: CPT | Mod: 26 | Performed by: INTERNAL MEDICINE

## 2021-03-17 PROCEDURE — 93306 TTE W/DOPPLER COMPLETE: CPT

## 2021-03-18 ENCOUNTER — OFFICE VISIT (OUTPATIENT)
Dept: CARDIOLOGY | Facility: CLINIC | Age: 36
End: 2021-03-18
Payer: COMMERCIAL

## 2021-03-22 ENCOUNTER — TELEPHONE (OUTPATIENT)
Dept: CARDIOLOGY | Facility: CLINIC | Age: 36
End: 2021-03-22

## 2021-03-25 ENCOUNTER — OFFICE VISIT (OUTPATIENT)
Dept: CARDIOLOGY | Facility: CLINIC | Age: 36
End: 2021-03-25

## 2021-03-25 VITALS
HEART RATE: 94 BPM | OXYGEN SATURATION: 98 % | RESPIRATION RATE: 16 BRPM | DIASTOLIC BLOOD PRESSURE: 68 MMHG | HEIGHT: 62 IN | WEIGHT: 235 LBS | TEMPERATURE: 97.2 F | SYSTOLIC BLOOD PRESSURE: 114 MMHG | BODY MASS INDEX: 43.24 KG/M2

## 2021-03-25 DIAGNOSIS — I27.20 PULMONARY HYPERTENSION (CMS/HCC): ICD-10-CM

## 2021-03-25 DIAGNOSIS — I10 ESSENTIAL HYPERTENSION: ICD-10-CM

## 2021-03-25 DIAGNOSIS — I33.0 ACUTE BACTERIAL ENDOCARDITIS: ICD-10-CM

## 2021-03-25 DIAGNOSIS — Z00.00 NORMAL CARDIAC EXAM: Primary | ICD-10-CM

## 2021-03-25 PROBLEM — R53.82 CHRONIC FATIGUE: Status: ACTIVE | Noted: 2021-03-25

## 2021-03-25 PROCEDURE — 3008F BODY MASS INDEX DOCD: CPT | Performed by: INTERNAL MEDICINE

## 2021-03-25 PROCEDURE — 99214 OFFICE O/P EST MOD 30 MIN: CPT | Performed by: INTERNAL MEDICINE

## 2021-03-25 PROCEDURE — 93000 ELECTROCARDIOGRAM COMPLETE: CPT | Performed by: INTERNAL MEDICINE

## 2021-03-25 PROCEDURE — 200200 ECG 12-LEAD: Performed by: INTERNAL MEDICINE

## 2021-03-25 ASSESSMENT — ENCOUNTER SYMPTOMS
DYSPNEA ON EXERTION: 0
WEAKNESS: 0
FALLS: 0
BLURRED VISION: 0
LOSS OF BALANCE: 0
BRUISES/BLEEDS EASILY: 0
MYALGIAS: 0
LIGHT-HEADEDNESS: 0
PALPITATIONS: 0
ODYNOPHAGIA: 0
ALTERED MENTAL STATUS: 0
HEADACHES: 0
NEAR-SYNCOPE: 0
IRREGULAR HEARTBEAT: 0
MUSCLE CRAMPS: 0
HEMOPTYSIS: 0
CLAUDICATION: 0
DOUBLE VISION: 0
ABDOMINAL PAIN: 0
VOMITING: 0
DIAPHORESIS: 0
PND: 0
FEVER: 0
DIZZINESS: 0
WEIGHT GAIN: 0
HEMATURIA: 0
SPUTUM PRODUCTION: 0
BRIEF PARALYSIS: 0
WHEEZING: 0
WEIGHT LOSS: 0
HEMATOCHEZIA: 0
NIGHT SWEATS: 0
HEMATEMESIS: 0
SYNCOPE: 0
DIARRHEA: 0
FOCAL WEAKNESS: 0
FREQUENCY: 0
SHORTNESS OF BREATH: 0
SLEEP DISTURBANCES DUE TO BREATHING: 0
NAUSEA: 0
ORTHOPNEA: 0
JOINT SWELLING: 0
SNORING: 0
COUGH: 0
DEPRESSION: 0
NERVOUS/ANXIOUS: 0

## 2021-03-25 NOTE — ASSESSMENT & PLAN NOTE
Mild pulmonary hypertension with Doppler estimate approximate 51 mmHg.  This is higher than I would have expected from the few septic pulmonary emboli she experienced in December but certainly not out of the question.  Another etiology that I was concerned about his chronic PE.  The December CT scan when diagnosed with endocarditis did not demonstrate PE, but she may need another.  The ideal test would be a VQ lung scan but these are not being performed as they were before coronavirus.  There are no obvious left heart disease that would cause group 2 pulmonary hypertension.  She is obese but claims not to snore.   Before ordering a CT angiogram, I want her to see a pulmonologist.    Repeat echo 6 months to determine the trajectory of the PA pressure.  It may improve.

## 2021-03-25 NOTE — ASSESSMENT & PLAN NOTE
May consider changing up her antihypertensives to a more modern regimen.  She is on Minipress and metoprolol at present.  We will discuss at next visit.

## 2021-03-25 NOTE — LETTER
March 25, 2021     SHERITA Armstrong  [address] [facility] [address]    Patient: Carolyn Redmond  YOB: 1985  Date of Visit: 3/25/2021      Dear Dr. Brewer:    Carolyn Redmond was seen for a follow-up evaluation. Below are my notes for this consultation.    If you have questions, please do not hesitate to call me. I look forward to following your patient along with you.         Sincerely,        Yanick Blas DO        CC: No Recipients  Yanick Blas DO  3/25/2021  5:26 PM  Sign when Signing Visit     Cardiology Note         Reason for visit:   Chief Complaint   Patient presents with   • hospital follow up      HPI    Carolyn Redmond is a 35 y.o. female is doing quite well except for back pain related to the spine infection she suffered from when she had endocarditis in December.  She was in the hospital for 2 months receiving IV antibiotics.  She has fatigue and sleeps 16 hours a day but denies dyspnea, orthopnea, edema.     Past Medical History:   Diagnosis Date   • Anxiety    • Depressed    • Endocarditis    • Fibromyalgia    • Migraines    • Pancreatitis    • Substance abuse (CMS/HCC) 2015   • Tachycardia    • Vasculitis (CMS/HCC)        Past Surgical History:   Procedure Laterality Date   • CHOLECYSTECTOMY     • ERCP     • GALLBLADDER SURGERY     • TONSILLECTOMY  1991       Amoxicillin, Nsaids (non-steroidal anti-inflammatory drug), Trazodone, and Tramadol    Current Outpatient Medications   Medication Sig Dispense Refill   • aspirin/acetaminophen/caffeine (EXCEDRIN MIGRAINE ORAL) Take 2 tablets by mouth as needed.     • buPROPion (WELLBUTRIN) 75 mg tablet Take 1 tablet (75 mg total) by mouth 3 (three) times a day. 90 tablet 0   • metoprolol succinate XL (TOPROL-XL) 25 mg 24 hr tablet Take 1 tablet (25 mg total) by mouth daily. 30 tablet 0   • PARoxetine (PAXIL) 30 mg tablet Take 1 tablet (30 mg total) by mouth daily. 30 tablet 0   • prazosin (MINIPRESS) 2 mg capsule  Take 1 capsule (2 mg total) by mouth nightly. 30 capsule 0   • pregabalin (LYRICA) 150 mg capsule Take 1 capsule (150 mg total) by mouth 2 (two) times a day for 5 days. 10 capsule 0   • QUEtiapine (SEROquel) 100 mg tablet Take 1 tablet (100 mg total) by mouth nightly. 30 tablet 0   • zolpidem (AMBIEN) 10 mg tablet Take 1 tablet (10 mg total) by mouth nightly for 5 days. 5 tablet 0   • alum-mag hydroxide-simeth (MAALOX) 200-200-20 mg/5 mL suspension Take 30 mL by mouth every 6 (six) hours as needed for indigestion or heartburn. 120 mL 0   • baclofen (LIORESAL) 5 mg tablet tablet Take 3 tablets (15 mg total) by mouth 3 (three) times a day. 270 tablet 0   • celecoxib (celeBREX) 200 mg capsule Take 1 capsule (200 mg total) by mouth daily. 30 capsule 0   • famotidine (PEPCID) 10 mg tablet Take 1 tablet (10 mg total) by mouth 2 (two) times a day before breakfast and dinner Indications: gastroesophageal reflux disease. 60 tablet 0   • fluticasone propionate (FLONASE) 50 mcg/actuation nasal spray Administer 2 sprays into each nostril daily as needed for rhinitis. 16 g 5   • hydrocortisone-pramoxine (ANALPRAM-HC) 2.5-1 % rectal cream Insert into the rectum 4 (four) times a day as needed for hemorrhoids for up to 10 days. 30 g 0   • lactobacillus acidophilus complex (BACID) 1 billion cell- 250 mg tablet Take 1 tablet by mouth 2 (two) times a day. 60 tablet 0   • lidocaine (ASPERCREME) 4 % adhesive patch,medicated topical patch Apply 1 patch topically daily. 30 patch 0   • metoclopramide (REGLAN) 10 mg tablet Take 0.5 tablets (5 mg total) by mouth daily as needed (nausea/vomiting). 15 tablet 0   • nicotine (NICODERM CQ) 21 mg/24 hr Place 1 patch on the skin daily. 30 patch 0   • nicotine polacrilex (NICORETTE) 4 mg gum Apply 1 each (4 mg total) to cheek every 4 (four) hours as needed for smoking cessation. 100 each 0   • pantoprazole (PROTONIX) 40 mg EC tablet Take 1 tablet (40 mg total) by mouth 2 (two) times a day  Indications: gastroesophageal reflux disease. 60 tablet 0     No current facility-administered medications for this visit.        Social History     Socioeconomic History   • Marital status: Single     Spouse name: None   • Number of children: None   • Years of education: None   • Highest education level: None   Occupational History   • Occupation:    Social Needs   • Financial resource strain: None   • Food insecurity     Worry: None     Inability: None   • Transportation needs     Medical: None     Non-medical: None   Tobacco Use   • Smoking status: Former Smoker     Packs/day: 0.50     Types: Cigarettes   • Smokeless tobacco: Current User   • Tobacco comment: quit 1 year ago, vapes currently   Substance and Sexual Activity   • Alcohol use: Not Currently     Comment: social   • Drug use: Yes     Types: Marijuana   • Sexual activity: Defer   Lifestyle   • Physical activity     Days per week: None     Minutes per session: None   • Stress: None   Relationships   • Social connections     Talks on phone: None     Gets together: None     Attends Baptism service: None     Active member of club or organization: None     Attends meetings of clubs or organizations: None     Relationship status: None   • Intimate partner violence     Fear of current or ex partner: None     Emotionally abused: None     Physically abused: None     Forced sexual activity: None   Other Topics Concern   • None   Social History Narrative   • None       Family History   Problem Relation Age of Onset   • Hypertension Biological Mother    • Lung cancer Biological Father    • Heart disease Maternal Grandfather          Review of Systems   Constitution: Positive for malaise/fatigue. Negative for diaphoresis, fever, night sweats, weight gain and weight loss.   HENT: Negative for odynophagia.    Eyes: Negative for blurred vision, double vision and visual disturbance.   Cardiovascular: Negative for chest pain, claudication, dyspnea on  exertion, irregular heartbeat, leg swelling, near-syncope, orthopnea, palpitations, paroxysmal nocturnal dyspnea and syncope.   Respiratory: Negative for cough, hemoptysis, shortness of breath, sleep disturbances due to breathing, snoring, sputum production and wheezing.    Endocrine: Negative for polyuria.   Hematologic/Lymphatic: Negative for bleeding problem. Does not bruise/bleed easily.   Skin: Negative for rash.   Musculoskeletal: Negative for falls, joint pain, joint swelling, muscle cramps, muscle weakness and myalgias.   Gastrointestinal: Negative for abdominal pain, diarrhea, hematemesis, hematochezia, melena, nausea and vomiting.   Genitourinary: Negative for frequency, hematuria and nocturia.   Neurological: Negative for brief paralysis, dizziness, focal weakness, headaches, light-headedness, loss of balance and weakness.   Psychiatric/Behavioral: Negative for altered mental status and depression. The patient is not nervous/anxious.       Objective    Vitals:    03/25/21 1513   BP: 114/68   Pulse: 94   Resp: 16   Temp: 36.2 °C (97.2 °F)   SpO2: 98%      Physical Exam   Constitutional: She is oriented to person, place, and time. No distress.   obese   HENT:   Head: Normocephalic.   Eyes: Pupils are equal, round, and reactive to light. Conjunctivae are normal.   Neck: Neck supple. No JVD present. No tracheal deviation present. No thyromegaly present.   Cardiovascular: Normal rate, regular rhythm and normal heart sounds. Exam reveals no gallop and no friction rub.   No murmur heard.  Pulmonary/Chest: Breath sounds normal. No stridor. No respiratory distress. She has no wheezes. She has no rales. She exhibits no tenderness.   Abdominal: Bowel sounds are normal. She exhibits no distension and no mass. There is no abdominal tenderness. There is no rebound and no guarding.   Musculoskeletal:         General: No tenderness, deformity or edema.   Neurological: She is alert and oriented to person, place, and time.  No cranial nerve deficit.   Skin: Skin is warm and dry. No rash noted. She is not diaphoretic. No erythema. No pallor.   Psychiatric: She has a normal mood and affect. Her behavior is normal. Judgment normal.         Lab Results   Component Value Date    WBC 5.22 02/03/2021    HGB 10.1 (L) 02/03/2021     02/03/2021    ALT 9 (L) 01/11/2021    AST 19 01/11/2021     02/03/2021    K 5.0 02/03/2021    CREATININE 0.8 02/03/2021    INR 1.0 01/26/2021         Imaging  Echo 3/17/2021: Follow-up for endocarditis of the tricuspid valve:  · Normal-sized LV. Normal LV wall thickness.  · Preserved LV systolic function. Estimated EF 70%. Normal LV septal wall motion. No regional wall motion abnormalities. Normal diastolic filling pattern for age of the LV.  · No intracardiac mass, thrombus, vegetation.  · Normal-sized RV. Normal RV systolic function.  · Normal-sized LA.  · Normal-sized RA.  · Aortic root normal. Sinuses of Valsalva normal-sized. Ascending aorta normal-sized. Aortic arch normal-sized.  · Aortic valve structure is normal. No aortic valve regurgitation. No aortic valve stenosis.  · Normal leaflet structure of the mitral valve.  · No significant mitral valve regurgitation.  · Tricuspid valve structure appears grossly normal. No vegetation. Mild tricuspid valve regurgitation.  · Estimated RV systolic pressure 51 mmHg assuming a right and pressure of 3 mmHg.  · Normal pulmonic valve structure.  · IVC is a small caliber vessel (<1.7cm). IVC collapses >50% during inspiration.  · Normal pericardial structure. No evidence of pericardial effusion    ECG   Sinus rhythm     Assessment/Plan    Problem List Items Addressed This Visit        Circulatory    Essential hypertension    Current Assessment & Plan     May consider changing up her antihypertensives to a more modern regimen.  She is on Minipress and metoprolol at present.  We will discuss at next visit.         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  The valve on recent echo 1 week ago was markedly improved with minimal tricuspid regurgitation no valve lesions but an estimated pulmonary artery pressure that was elevated to 50 mmHg.         Relevant Orders    CBC and Differential    Comprehensive metabolic panel    B-type natriuretic peptide    TSH w reflex FT4    D-DIMER    Pulmonary hypertension (CMS/HCC)    Current Assessment & Plan     Mild pulmonary hypertension with Doppler estimate approximate 51 mmHg.  This is higher than I would have expected from the few septic pulmonary emboli she experienced in December but certainly not out of the question.  Another etiology that I was concerned about his chronic PE.  The December CT scan when diagnosed with endocarditis did not demonstrate PE, but she may need another.  The ideal test would be a VQ lung scan but these are not being performed as they were before coronavirus.  There are no obvious left heart disease that would cause group 2 pulmonary hypertension.  She is obese but claims not to snore.   Before ordering a CT angiogram, I want her to see a pulmonologist.    Repeat echo 6 months to determine the trajectory of the PA pressure.  It may improve.           Relevant Orders    CBC and Differential    Comprehensive metabolic panel    B-type natriuretic peptide    TSH w reflex FT4    D-DIMER      Other Visit Diagnoses     Normal cardiac exam    -  Primary    Relevant Orders    ECG 12 LEAD-OFFICE PERFORMED (Completed)                               Yanick Eldridge DO  FACC, FACOI  3/25/2021  5:26 PM

## 2021-03-25 NOTE — PROGRESS NOTES
Cardiology Note         Reason for visit:   Chief Complaint   Patient presents with   • hospital follow up      HPI    Carolyn Redmond is a 35 y.o. female is doing quite well except for back pain related to the spine infection she suffered from when she had endocarditis in December.  She was in the hospital for 2 months receiving IV antibiotics.  She has fatigue and sleeps 16 hours a day but denies dyspnea, orthopnea, edema.     Past Medical History:   Diagnosis Date   • Anxiety    • Depressed    • Endocarditis    • Fibromyalgia    • Migraines    • Pancreatitis    • Substance abuse (CMS/[facility]) 2015   • Tachycardia    • Vasculitis (CMS/[facility])        Past Surgical History:   Procedure Laterality Date   • CHOLECYSTECTOMY     • ERCP     • GALLBLADDER SURGERY     • TONSILLECTOMY  1991       Amoxicillin, Nsaids (non-steroidal anti-inflammatory drug), Trazodone, and Tramadol    Current Outpatient Medications   Medication Sig Dispense Refill   • aspirin/acetaminophen/caffeine (EXCEDRIN MIGRAINE ORAL) Take 2 tablets by mouth as needed.     • buPROPion (WELLBUTRIN) 75 mg tablet Take 1 tablet (75 mg total) by mouth 3 (three) times a day. 90 tablet 0   • metoprolol succinate XL (TOPROL-XL) 25 mg 24 hr tablet Take 1 tablet (25 mg total) by mouth daily. 30 tablet 0   • PARoxetine (PAXIL) 30 mg tablet Take 1 tablet (30 mg total) by mouth daily. 30 tablet 0   • prazosin (MINIPRESS) 2 mg capsule Take 1 capsule (2 mg total) by mouth nightly. 30 capsule 0   • pregabalin (LYRICA) 150 mg capsule Take 1 capsule (150 mg total) by mouth 2 (two) times a day for 5 days. 10 capsule 0   • QUEtiapine (SEROquel) 100 mg tablet Take 1 tablet (100 mg total) by mouth nightly. 30 tablet 0   • zolpidem (AMBIEN) 10 mg tablet Take 1 tablet (10 mg total) by mouth nightly for 5 days. 5 tablet 0   • alum-mag hydroxide-simeth (MAALOX) 200-200-20 mg/5 mL suspension Take 30 mL by mouth every 6 (six) hours as needed for indigestion or heartburn. 120 mL 0   •  baclofen (LIORESAL) 5 mg tablet tablet Take 3 tablets (15 mg total) by mouth 3 (three) times a day. 270 tablet 0   • celecoxib (celeBREX) 200 mg capsule Take 1 capsule (200 mg total) by mouth daily. 30 capsule 0   • famotidine (PEPCID) 10 mg tablet Take 1 tablet (10 mg total) by mouth 2 (two) times a day before breakfast and dinner Indications: gastroesophageal reflux disease. 60 tablet 0   • fluticasone propionate (FLONASE) 50 mcg/actuation nasal spray Administer 2 sprays into each nostril daily as needed for rhinitis. 16 g 5   • hydrocortisone-pramoxine (ANALPRAM-HC) 2.5-1 % rectal cream Insert into the rectum 4 (four) times a day as needed for hemorrhoids for up to 10 days. 30 g 0   • lactobacillus acidophilus complex (BACID) 1 billion cell- 250 mg tablet Take 1 tablet by mouth 2 (two) times a day. 60 tablet 0   • lidocaine (ASPERCREME) 4 % adhesive patch,medicated topical patch Apply 1 patch topically daily. 30 patch 0   • metoclopramide (REGLAN) 10 mg tablet Take 0.5 tablets (5 mg total) by mouth daily as needed (nausea/vomiting). 15 tablet 0   • nicotine (NICODERM CQ) 21 mg/24 hr Place 1 patch on the skin daily. 30 patch 0   • nicotine polacrilex (NICORETTE) 4 mg gum Apply 1 each (4 mg total) to cheek every 4 (four) hours as needed for smoking cessation. 100 each 0   • pantoprazole (PROTONIX) 40 mg EC tablet Take 1 tablet (40 mg total) by mouth 2 (two) times a day Indications: gastroesophageal reflux disease. 60 tablet 0     No current facility-administered medications for this visit.        Social History     Socioeconomic History   • Marital status: Single     Spouse name: None   • Number of children: None   • Years of education: None   • Highest education level: None   Occupational History   • Occupation:    Social Needs   • Financial resource strain: None   • Food insecurity     Worry: None     Inability: None   • Transportation needs     Medical: None     Non-medical: None   Tobacco Use    • Smoking status: Former Smoker     Packs/day: 0.50     Types: Cigarettes   • Smokeless tobacco: Current User   • Tobacco comment: quit 1 year ago, vapes currently   Substance and Sexual Activity   • Alcohol use: Not Currently     Comment: social   • Drug use: Yes     Types: Marijuana   • Sexual activity: Defer   Lifestyle   • Physical activity     Days per week: None     Minutes per session: None   • Stress: None   Relationships   • Social connections     Talks on phone: None     Gets together: None     Attends Yarsani service: None     Active member of club or organization: None     Attends meetings of clubs or organizations: None     Relationship status: None   • Intimate partner violence     Fear of current or ex partner: None     Emotionally abused: None     Physically abused: None     Forced sexual activity: None   Other Topics Concern   • None   Social History Narrative   • None       Family History   Problem Relation Age of Onset   • Hypertension Biological Mother    • Lung cancer Biological Father    • Heart disease Maternal Grandfather          Review of Systems   Constitution: Positive for malaise/fatigue. Negative for diaphoresis, fever, night sweats, weight gain and weight loss.   HENT: Negative for odynophagia.    Eyes: Negative for blurred vision, double vision and visual disturbance.   Cardiovascular: Negative for chest pain, claudication, dyspnea on exertion, irregular heartbeat, leg swelling, near-syncope, orthopnea, palpitations, paroxysmal nocturnal dyspnea and syncope.   Respiratory: Negative for cough, hemoptysis, shortness of breath, sleep disturbances due to breathing, snoring, sputum production and wheezing.    Endocrine: Negative for polyuria.   Hematologic/Lymphatic: Negative for bleeding problem. Does not bruise/bleed easily.   Skin: Negative for rash.   Musculoskeletal: Negative for falls, joint pain, joint swelling, muscle cramps, muscle weakness and myalgias.   Gastrointestinal:  Negative for abdominal pain, diarrhea, hematemesis, hematochezia, melena, nausea and vomiting.   Genitourinary: Negative for frequency, hematuria and nocturia.   Neurological: Negative for brief paralysis, dizziness, focal weakness, headaches, light-headedness, loss of balance and weakness.   Psychiatric/Behavioral: Negative for altered mental status and depression. The patient is not nervous/anxious.       Objective    Vitals:    03/25/21 1513   BP: 114/68   Pulse: 94   Resp: 16   Temp: 36.2 °C (97.2 °F)   SpO2: 98%      Physical Exam   Constitutional: She is oriented to person, place, and time. No distress.   obese   HENT:   Head: Normocephalic.   Eyes: Pupils are equal, round, and reactive to light. Conjunctivae are normal.   Neck: Neck supple. No JVD present. No tracheal deviation present. No thyromegaly present.   Cardiovascular: Normal rate, regular rhythm and normal heart sounds. Exam reveals no gallop and no friction rub.   No murmur heard.  Pulmonary/Chest: Breath sounds normal. No stridor. No respiratory distress. She has no wheezes. She has no rales. She exhibits no tenderness.   Abdominal: Bowel sounds are normal. She exhibits no distension and no mass. There is no abdominal tenderness. There is no rebound and no guarding.   Musculoskeletal:         General: No tenderness, deformity or edema.   Neurological: She is alert and oriented to person, place, and time. No cranial nerve deficit.   Skin: Skin is warm and dry. No rash noted. She is not diaphoretic. No erythema. No pallor.   Psychiatric: She has a normal mood and affect. Her behavior is normal. Judgment normal.         Lab Results   Component Value Date    WBC 5.22 02/03/2021    HGB 10.1 (L) 02/03/2021     02/03/2021    ALT 9 (L) 01/11/2021    AST 19 01/11/2021     02/03/2021    K 5.0 02/03/2021    CREATININE 0.8 02/03/2021    INR 1.0 01/26/2021         Imaging  Echo 3/17/2021: Follow-up for endocarditis of the tricuspid  valve:  · Normal-sized LV. Normal LV wall thickness.  · Preserved LV systolic function. Estimated EF 70%. Normal LV septal wall motion. No regional wall motion abnormalities. Normal diastolic filling pattern for age of the LV.  · No intracardiac mass, thrombus, vegetation.  · Normal-sized RV. Normal RV systolic function.  · Normal-sized LA.  · Normal-sized RA.  · Aortic root normal. Sinuses of Valsalva normal-sized. Ascending aorta normal-sized. Aortic arch normal-sized.  · Aortic valve structure is normal. No aortic valve regurgitation. No aortic valve stenosis.  · Normal leaflet structure of the mitral valve.  · No significant mitral valve regurgitation.  · Tricuspid valve structure appears grossly normal. No vegetation. Mild tricuspid valve regurgitation.  · Estimated RV systolic pressure 51 mmHg assuming a right and pressure of 3 mmHg.  · Normal pulmonic valve structure.  · IVC is a small caliber vessel (<1.7cm). IVC collapses >50% during inspiration.  · Normal pericardial structure. No evidence of pericardial effusion    ECG   Sinus rhythm     Assessment/Plan    Problem List Items Addressed This Visit        Circulatory    Essential hypertension    Current Assessment & Plan     May consider changing up her antihypertensives to a more modern regimen.  She is on Minipress and metoprolol at present.  We will discuss at next visit.         Relevant Orders    CBC and Differential    Comprehensive metabolic panel    B-type natriuretic peptide    TSH w reflex FT4    D-DIMER    Acute bacterial endocarditis    Current Assessment & Plan     Bacterial endocarditis December 2020 tricuspid valve with multiple septic pulmonary emboli.  The valve on recent echo 1 week ago was markedly improved with minimal tricuspid regurgitation no valve lesions but an estimated pulmonary artery pressure that was elevated to 50 mmHg.         Relevant Orders    CBC and Differential    Comprehensive metabolic panel    B-type natriuretic  peptide    TSH w reflex FT4    D-DIMER    Pulmonary hypertension (CMS/HCC)    Current Assessment & Plan     Mild pulmonary hypertension with Doppler estimate approximate 51 mmHg.  This is higher than I would have expected from the few septic pulmonary emboli she experienced in December but certainly not out of the question.  Another etiology that I was concerned about his chronic PE.  The December CT scan when diagnosed with endocarditis did not demonstrate PE, but she may need another.  The ideal test would be a VQ lung scan but these are not being performed as they were before coronavirus.  There are no obvious left heart disease that would cause group 2 pulmonary hypertension.  She is obese but claims not to snore.   Before ordering a CT angiogram, I want her to see a pulmonologist.    Repeat echo 6 months to determine the trajectory of the PA pressure.  It may improve.           Relevant Orders    CBC and Differential    Comprehensive metabolic panel    B-type natriuretic peptide    TSH w reflex FT4    D-DIMER      Other Visit Diagnoses     Normal cardiac exam    -  Primary    Relevant Orders    ECG 12 LEAD-OFFICE PERFORMED (Completed)                               Yanick Eldridge DO  FACC, FACOI  3/25/2021  5:26 PM

## 2021-03-25 NOTE — ASSESSMENT & PLAN NOTE
Bacterial endocarditis December 2020 tricuspid valve with multiple septic pulmonary emboli.  The valve on recent echo 1 week ago was markedly improved with minimal tricuspid regurgitation no valve lesions but an estimated pulmonary artery pressure that was elevated to 50 mmHg.

## 2021-03-25 NOTE — PATIENT INSTRUCTIONS
See Pulmonary r.e. lung abscesses and pulmonary hypertension.  Dr Solis, Dr Lockhart, Dr Hand et al  [phone]

## 2021-05-23 ENCOUNTER — HOSPITAL ENCOUNTER (EMERGENCY)
Facility: HOSPITAL | Age: 36
Discharge: HOME | End: 2021-05-23
Attending: EMERGENCY MEDICINE | Admitting: EMERGENCY MEDICINE
Payer: COMMERCIAL

## 2021-05-23 VITALS
HEART RATE: 92 BPM | BODY MASS INDEX: 46.01 KG/M2 | TEMPERATURE: 98.2 F | DIASTOLIC BLOOD PRESSURE: 65 MMHG | HEIGHT: 62 IN | RESPIRATION RATE: 16 BRPM | WEIGHT: 250 LBS | OXYGEN SATURATION: 96 % | SYSTOLIC BLOOD PRESSURE: 110 MMHG

## 2021-05-23 DIAGNOSIS — B02.9 HERPES ZOSTER WITHOUT COMPLICATION: Primary | ICD-10-CM

## 2021-05-23 PROCEDURE — 99281 EMR DPT VST MAYX REQ PHY/QHP: CPT

## 2021-05-23 RX ORDER — VALACYCLOVIR HYDROCHLORIDE 1 G/1
1000 TABLET, FILM COATED ORAL 3 TIMES DAILY
Qty: 21 TABLET | Refills: 0 | Status: SHIPPED | OUTPATIENT
Start: 2021-05-23 | End: 2021-08-30

## 2021-05-23 ASSESSMENT — ENCOUNTER SYMPTOMS
FEVER: 0
BACK PAIN: 1
ARTHRALGIAS: 0
NECK PAIN: 0
COUGH: 0
VOMITING: 0
SHORTNESS OF BREATH: 0
DIZZINESS: 0
NAUSEA: 0
MYALGIAS: 0
DIARRHEA: 0
DYSURIA: 0
WEAKNESS: 0
DIFFICULTY URINATING: 0
ABDOMINAL PAIN: 0
COLOR CHANGE: 0
BLOOD IN STOOL: 0
NUMBNESS: 1
WOUND: 0
CONSTIPATION: 0
FATIGUE: 0

## 2021-05-24 ENCOUNTER — APPOINTMENT (EMERGENCY)
Dept: RADIOLOGY | Facility: HOSPITAL | Age: 36
End: 2021-05-24
Payer: COMMERCIAL

## 2021-05-24 ENCOUNTER — HOSPITAL ENCOUNTER (INPATIENT)
Facility: HOSPITAL | Age: 36
LOS: 1 days | Discharge: HOME | End: 2021-05-25
Attending: EMERGENCY MEDICINE | Admitting: STUDENT IN AN ORGANIZED HEALTH CARE EDUCATION/TRAINING PROGRAM
Payer: COMMERCIAL

## 2021-05-24 ENCOUNTER — TELEPHONE (OUTPATIENT)
Dept: NEUROSURGERY | Facility: CLINIC | Age: 36
End: 2021-05-24

## 2021-05-24 DIAGNOSIS — M62.81 MUSCLE WEAKNESS (GENERALIZED): ICD-10-CM
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
## 2021-05-24 DIAGNOSIS — R74.8 ELEVATED CK: Primary | ICD-10-CM

## 2021-05-24 PROBLEM — R26.2 AMBULATORY DYSFUNCTION: Status: ACTIVE | Noted: 2021-05-24

## 2021-05-24 PROBLEM — B02.9 HERPES ZOSTER: Status: ACTIVE | Noted: 2021-05-24

## 2021-05-24 LAB
ALBUMIN SERPL-MCNC: 3.5 G/DL (ref 3.4–5)
ALP SERPL-CCNC: 56 IU/L (ref 35–126)
ALT SERPL-CCNC: 27 IU/L (ref 11–54)
AMPHET UR QL SCN: NOT DETECTED
ANION GAP SERPL CALC-SCNC: 10 MEQ/L (ref 3–15)
AST SERPL-CCNC: 46 IU/L (ref 15–41)
BARBITURATES UR QL SCN: NOT DETECTED
BASOPHILS # BLD: 0.04 K/UL (ref 0.01–0.1)
BASOPHILS NFR BLD: 0.4 %
BENZODIAZ UR QL SCN: NOT DETECTED
BILIRUB SERPL-MCNC: 1 MG/DL (ref 0.3–1.2)
BUN SERPL-MCNC: 10 MG/DL (ref 8–20)
CALCIUM SERPL-MCNC: 8.6 MG/DL (ref 8.9–10.3)
CANNABINOIDS UR QL SCN: POSITIVE
CHLORIDE SERPL-SCNC: 101 MEQ/L (ref 98–109)
CK SERPL-CCNC: 1840 U/L (ref 15–200)
CO2 SERPL-SCNC: 27 MEQ/L (ref 22–32)
COCAINE UR QL SCN: NOT DETECTED
CREAT SERPL-MCNC: 0.8 MG/DL (ref 0.6–1.1)
DIFFERENTIAL METHOD BLD: ABNORMAL
EOSINOPHIL # BLD: 0.16 K/UL (ref 0.04–0.36)
EOSINOPHIL NFR BLD: 1.5 %
ERYTHROCYTE [DISTWIDTH] IN BLOOD BY AUTOMATED COUNT: 16.1 % (ref 11.7–14.4)
GFR SERPL CREATININE-BSD FRML MDRD: >60 ML/MIN/1.73M*2
GLUCOSE SERPL-MCNC: 102 MG/DL (ref 70–99)
HCG UR QL: NEGATIVE
HCT VFR BLDCO AUTO: 34 % (ref 35–45)
HGB BLD-MCNC: 10.6 G/DL (ref 11.8–15.7)
IMM GRANULOCYTES # BLD AUTO: 0.04 K/UL (ref 0–0.08)
IMM GRANULOCYTES NFR BLD AUTO: 0.4 %
LACTATE SERPL-SCNC: 0.9 MMOL/L (ref 0.4–2)
LYMPHOCYTES # BLD: 2.46 K/UL (ref 1.2–3.5)
LYMPHOCYTES NFR BLD: 23.1 %
MAGNESIUM SERPL-MCNC: 1.7 MG/DL (ref 1.8–2.5)
MCH RBC QN AUTO: 27.1 PG (ref 28–33.2)
MCHC RBC AUTO-ENTMCNC: 31.2 G/DL (ref 32.2–35.5)
MCV RBC AUTO: 87 FL (ref 83–98)
MONOCYTES # BLD: 0.87 K/UL (ref 0.28–0.8)
MONOCYTES NFR BLD: 8.2 %
NEUTROPHILS # BLD: 7.09 K/UL (ref 1.7–7)
NEUTS SEG NFR BLD: 66.4 %
NRBC BLD-RTO: 0 %
OPIATES UR QL SCN: NOT DETECTED
PCP UR QL SCN: NOT DETECTED
PDW BLD AUTO: 10.5 FL (ref 9.4–12.3)
PLATELET # BLD AUTO: 200 K/UL (ref 150–369)
POTASSIUM SERPL-SCNC: 3.6 MEQ/L (ref 3.6–5.1)
PROT SERPL-MCNC: 6.4 G/DL (ref 6–8.2)
RBC # BLD AUTO: 3.91 M/UL (ref 3.93–5.22)
SARS-COV-2 RNA RESP QL NAA+PROBE: NEGATIVE
SODIUM SERPL-SCNC: 138 MEQ/L (ref 136–144)
WBC # BLD AUTO: 10.66 K/UL (ref 3.8–10.5)

## 2021-05-24 PROCEDURE — 12000000 HC ROOM AND CARE MED/SURG

## 2021-05-24 PROCEDURE — 25800000 HC PHARMACY IV SOLUTIONS: Performed by: PHYSICIAN ASSISTANT

## 2021-05-24 PROCEDURE — U0003 INFECTIOUS AGENT DETECTION BY NUCLEIC ACID (DNA OR RNA); SEVERE ACUTE RESPIRATORY SYNDROME CORONAVIRUS 2 (SARS-COV-2) (CORONAVIRUS DISEASE [COVID-19]), AMPLIFIED PROBE TECHNIQUE, MAKING USE OF HIGH THROUGHPUT TECHNOLOGIES AS DESCRIBED BY CMS-2020-01-R: HCPCS | Performed by: PHYSICIAN ASSISTANT

## 2021-05-24 PROCEDURE — 82550 ASSAY OF CK (CPK): CPT | Performed by: PHYSICIAN ASSISTANT

## 2021-05-24 PROCEDURE — 63600000 HC DRUGS/DETAIL CODE: Performed by: PHYSICIAN ASSISTANT

## 2021-05-24 PROCEDURE — 80307 DRUG TEST PRSMV CHEM ANLYZR: CPT | Performed by: PHYSICIAN ASSISTANT

## 2021-05-24 PROCEDURE — 63600000 HC DRUGS/DETAIL CODE: Performed by: HOSPITALIST

## 2021-05-24 PROCEDURE — 83605 ASSAY OF LACTIC ACID: CPT | Performed by: PHYSICIAN ASSISTANT

## 2021-05-24 PROCEDURE — 70450 CT HEAD/BRAIN W/O DYE: CPT | Mod: MG

## 2021-05-24 PROCEDURE — 85025 COMPLETE CBC W/AUTO DIFF WBC: CPT | Performed by: EMERGENCY MEDICINE

## 2021-05-24 PROCEDURE — 63700000 HC SELF-ADMINISTRABLE DRUG: Performed by: HOSPITALIST

## 2021-05-24 PROCEDURE — 99223 1ST HOSP IP/OBS HIGH 75: CPT | Performed by: STUDENT IN AN ORGANIZED HEALTH CARE EDUCATION/TRAINING PROGRAM

## 2021-05-24 PROCEDURE — 84703 CHORIONIC GONADOTROPIN ASSAY: CPT | Performed by: PHYSICIAN ASSISTANT

## 2021-05-24 PROCEDURE — 96374 THER/PROPH/DIAG INJ IV PUSH: CPT

## 2021-05-24 PROCEDURE — 80053 COMPREHEN METABOLIC PANEL: CPT | Performed by: EMERGENCY MEDICINE

## 2021-05-24 PROCEDURE — G1004 CDSM NDSC: HCPCS

## 2021-05-24 PROCEDURE — 96361 HYDRATE IV INFUSION ADD-ON: CPT

## 2021-05-24 PROCEDURE — 63700000 HC SELF-ADMINISTRABLE DRUG: Performed by: STUDENT IN AN ORGANIZED HEALTH CARE EDUCATION/TRAINING PROGRAM

## 2021-05-24 PROCEDURE — 99232 SBSQ HOSP IP/OBS MODERATE 35: CPT | Performed by: NEUROLOGICAL SURGERY

## 2021-05-24 PROCEDURE — 99285 EMERGENCY DEPT VISIT HI MDM: CPT | Mod: 25

## 2021-05-24 PROCEDURE — 87040 BLOOD CULTURE FOR BACTERIA: CPT | Performed by: PHYSICIAN ASSISTANT

## 2021-05-24 PROCEDURE — 36415 COLL VENOUS BLD VENIPUNCTURE: CPT | Performed by: PHYSICIAN ASSISTANT

## 2021-05-24 PROCEDURE — 8E0ZXY6 ISOLATION: ICD-10-PCS | Performed by: HOSPITALIST

## 2021-05-24 PROCEDURE — 83735 ASSAY OF MAGNESIUM: CPT | Performed by: PHYSICIAN ASSISTANT

## 2021-05-24 RX ORDER — CELECOXIB 200 MG/1
200 CAPSULE ORAL EVERY MORNING
COMMUNITY
End: 2021-08-30

## 2021-05-24 RX ORDER — PANTOPRAZOLE SODIUM 40 MG/1
40 TABLET, DELAYED RELEASE ORAL 2 TIMES DAILY
COMMUNITY
End: 2021-08-30

## 2021-05-24 RX ORDER — KETOROLAC TROMETHAMINE 30 MG/ML
30 INJECTION, SOLUTION INTRAMUSCULAR; INTRAVENOUS EVERY 6 HOURS PRN
Status: DISCONTINUED | OUTPATIENT
Start: 2021-05-24 | End: 2021-05-25 | Stop reason: HOSPADM

## 2021-05-24 RX ORDER — CELECOXIB 200 MG/1
200 CAPSULE ORAL 2 TIMES DAILY
Status: DISCONTINUED | OUTPATIENT
Start: 2021-05-24 | End: 2021-05-24

## 2021-05-24 RX ORDER — POTASSIUM CHLORIDE 750 MG/1
40 TABLET, FILM COATED, EXTENDED RELEASE ORAL AS NEEDED
Status: DISCONTINUED | OUTPATIENT
Start: 2021-05-24 | End: 2021-05-25 | Stop reason: HOSPADM

## 2021-05-24 RX ORDER — BUPROPION HYDROCHLORIDE 75 MG/1
75 TABLET ORAL 3 TIMES DAILY
COMMUNITY
End: 2022-01-13

## 2021-05-24 RX ORDER — BUPRENORPHINE AND NALOXONE 8; 2 MG/1; MG/1
1 FILM, SOLUBLE BUCCAL; SUBLINGUAL 2 TIMES DAILY
Status: ON HOLD | COMMUNITY
End: 2021-05-25 | Stop reason: ENTERED-IN-ERROR

## 2021-05-24 RX ORDER — POTASSIUM CHLORIDE 750 MG/1
20 TABLET, FILM COATED, EXTENDED RELEASE ORAL AS NEEDED
Status: DISCONTINUED | OUTPATIENT
Start: 2021-05-24 | End: 2021-05-25 | Stop reason: HOSPADM

## 2021-05-24 RX ORDER — PAROXETINE HYDROCHLORIDE 20 MG/1
20 TABLET, FILM COATED ORAL EVERY MORNING
COMMUNITY
End: 2022-01-13

## 2021-05-24 RX ORDER — ZOLPIDEM TARTRATE 10 MG/1
10 TABLET ORAL NIGHTLY
COMMUNITY
End: 2023-07-03 | Stop reason: SDUPTHER

## 2021-05-24 RX ORDER — BACLOFEN 10 MG/1
15 TABLET ORAL 3 TIMES DAILY PRN
Status: DISCONTINUED | OUTPATIENT
Start: 2021-05-24 | End: 2021-05-25 | Stop reason: HOSPADM

## 2021-05-24 RX ORDER — IBUPROFEN 200 MG
1 TABLET ORAL EVERY MORNING
COMMUNITY
End: 2021-08-30

## 2021-05-24 RX ORDER — DIPHENHYDRAMINE HCL 25 MG
4 CAPSULE ORAL AS NEEDED
COMMUNITY
End: 2021-08-30

## 2021-05-24 RX ORDER — PANTOPRAZOLE SODIUM 40 MG/1
40 TABLET, DELAYED RELEASE ORAL 2 TIMES DAILY
Status: DISCONTINUED | OUTPATIENT
Start: 2021-05-24 | End: 2021-05-25 | Stop reason: HOSPADM

## 2021-05-24 RX ORDER — ZOLPIDEM TARTRATE 5 MG/1
10 TABLET ORAL NIGHTLY PRN
Status: DISCONTINUED | OUTPATIENT
Start: 2021-05-24 | End: 2021-05-25 | Stop reason: HOSPADM

## 2021-05-24 RX ORDER — BUTALBITAL, ACETAMINOPHEN AND CAFFEINE 50; 325; 40 MG/1; MG/1; MG/1
1 TABLET ORAL EVERY 4 HOURS PRN
COMMUNITY
End: 2022-09-20

## 2021-05-24 RX ORDER — ACETAMINOPHEN 325 MG/1
650 TABLET ORAL EVERY 4 HOURS PRN
Status: DISCONTINUED | OUTPATIENT
Start: 2021-05-24 | End: 2021-05-25 | Stop reason: HOSPADM

## 2021-05-24 RX ORDER — HYDROCORTISONE ACETATE PRAMOXINE HCL 2.5; 1 G/100G; G/100G
1 CREAM TOPICAL 4 TIMES DAILY PRN
COMMUNITY
End: 2022-06-22

## 2021-05-24 RX ORDER — DEXTROSE 50 % IN WATER (D50W) INTRAVENOUS SYRINGE
25 AS NEEDED
Status: DISCONTINUED | OUTPATIENT
Start: 2021-05-24 | End: 2021-05-25 | Stop reason: HOSPADM

## 2021-05-24 RX ORDER — QUETIAPINE FUMARATE 100 MG/1
200 TABLET, FILM COATED ORAL NIGHTLY
COMMUNITY
End: 2022-09-20

## 2021-05-24 RX ORDER — BUPROPION HYDROCHLORIDE 75 MG/1
75 TABLET ORAL 3 TIMES DAILY
Status: DISCONTINUED | OUTPATIENT
Start: 2021-05-24 | End: 2021-05-25 | Stop reason: HOSPADM

## 2021-05-24 RX ORDER — FLUTICASONE PROPIONATE 50 MCG
1 SPRAY, SUSPENSION (ML) NASAL DAILY PRN
COMMUNITY
End: 2024-01-16 | Stop reason: SDUPTHER

## 2021-05-24 RX ORDER — METOPROLOL SUCCINATE 25 MG/1
25 TABLET, EXTENDED RELEASE ORAL EVERY MORNING
Status: DISCONTINUED | OUTPATIENT
Start: 2021-05-25 | End: 2021-05-25 | Stop reason: HOSPADM

## 2021-05-24 RX ORDER — METOPROLOL SUCCINATE 25 MG/1
25 TABLET, EXTENDED RELEASE ORAL NIGHTLY
COMMUNITY
End: 2023-08-28 | Stop reason: SDUPTHER

## 2021-05-24 RX ORDER — PAROXETINE HYDROCHLORIDE 20 MG/1
20 TABLET, FILM COATED ORAL EVERY MORNING
Status: DISCONTINUED | OUTPATIENT
Start: 2021-05-25 | End: 2021-05-25 | Stop reason: HOSPADM

## 2021-05-24 RX ORDER — VALACYCLOVIR HYDROCHLORIDE 500 MG/1
1000 TABLET, FILM COATED ORAL 3 TIMES DAILY
Status: DISCONTINUED | OUTPATIENT
Start: 2021-05-24 | End: 2021-05-25 | Stop reason: HOSPADM

## 2021-05-24 RX ORDER — DEXTROSE 40 %
15-30 GEL (GRAM) ORAL AS NEEDED
Status: DISCONTINUED | OUTPATIENT
Start: 2021-05-24 | End: 2021-05-25 | Stop reason: HOSPADM

## 2021-05-24 RX ORDER — ENOXAPARIN SODIUM 100 MG/ML
40 INJECTION SUBCUTANEOUS
Status: DISCONTINUED | OUTPATIENT
Start: 2021-05-25 | End: 2021-05-25 | Stop reason: HOSPADM

## 2021-05-24 RX ORDER — PREGABALIN 75 MG/1
150 CAPSULE ORAL 3 TIMES DAILY
Status: DISCONTINUED | OUTPATIENT
Start: 2021-05-24 | End: 2021-05-25 | Stop reason: HOSPADM

## 2021-05-24 RX ORDER — PRAZOSIN HYDROCHLORIDE 1 MG/1
2 CAPSULE ORAL NIGHTLY
Status: DISCONTINUED | OUTPATIENT
Start: 2021-05-24 | End: 2021-05-25 | Stop reason: HOSPADM

## 2021-05-24 RX ORDER — FAMOTIDINE 10 MG/1
10 TABLET ORAL 2 TIMES DAILY
COMMUNITY
End: 2022-01-13

## 2021-05-24 RX ORDER — FLUTICASONE PROPIONATE 50 MCG
1 SPRAY, SUSPENSION (ML) NASAL DAILY PRN
Status: DISCONTINUED | OUTPATIENT
Start: 2021-05-24 | End: 2021-05-25 | Stop reason: HOSPADM

## 2021-05-24 RX ORDER — FAMOTIDINE 20 MG/1
10 TABLET, FILM COATED ORAL 2 TIMES DAILY
Status: DISCONTINUED | OUTPATIENT
Start: 2021-05-24 | End: 2021-05-25 | Stop reason: HOSPADM

## 2021-05-24 RX ORDER — KETOROLAC TROMETHAMINE 30 MG/ML
INJECTION, SOLUTION INTRAMUSCULAR; INTRAVENOUS
Status: DISPENSED
Start: 2021-05-24 | End: 2021-05-25

## 2021-05-24 RX ORDER — BACLOFEN 5 MG/1
15 TABLET ORAL 3 TIMES DAILY
COMMUNITY
End: 2021-08-30

## 2021-05-24 RX ORDER — PRAZOSIN HYDROCHLORIDE 2 MG/1
2 CAPSULE ORAL NIGHTLY
COMMUNITY
End: 2022-06-22

## 2021-05-24 RX ORDER — QUETIAPINE FUMARATE 100 MG/1
200 TABLET, FILM COATED ORAL NIGHTLY
Status: DISCONTINUED | OUTPATIENT
Start: 2021-05-24 | End: 2021-05-25 | Stop reason: HOSPADM

## 2021-05-24 RX ORDER — PREGABALIN 200 MG/1
200 CAPSULE ORAL 3 TIMES DAILY
COMMUNITY
End: 2023-07-10

## 2021-05-24 RX ORDER — GABAPENTIN 300 MG/1
300 CAPSULE ORAL 3 TIMES DAILY
Status: DISCONTINUED | OUTPATIENT
Start: 2021-05-24 | End: 2021-05-24

## 2021-05-24 RX ORDER — IBUPROFEN 200 MG
1 TABLET ORAL DAILY PRN
Status: DISCONTINUED | OUTPATIENT
Start: 2021-05-24 | End: 2021-05-25 | Stop reason: HOSPADM

## 2021-05-24 RX ORDER — POTASSIUM CHLORIDE 14.9 MG/ML
20 INJECTION INTRAVENOUS AS NEEDED
Status: DISCONTINUED | OUTPATIENT
Start: 2021-05-24 | End: 2021-05-25 | Stop reason: HOSPADM

## 2021-05-24 RX ORDER — IBUPROFEN 200 MG
16-32 TABLET ORAL AS NEEDED
Status: DISCONTINUED | OUTPATIENT
Start: 2021-05-24 | End: 2021-05-25 | Stop reason: HOSPADM

## 2021-05-24 RX ORDER — LORAZEPAM 2 MG/ML
1 INJECTION INTRAMUSCULAR ONCE
Status: COMPLETED | OUTPATIENT
Start: 2021-05-24 | End: 2021-05-24

## 2021-05-24 RX ORDER — VALACYCLOVIR HYDROCHLORIDE 500 MG/1
1000 TABLET, FILM COATED ORAL 3 TIMES DAILY
Status: DISCONTINUED | OUTPATIENT
Start: 2021-05-24 | End: 2021-05-24

## 2021-05-24 RX ADMIN — LORAZEPAM 1 MG: 2 INJECTION INTRAMUSCULAR; INTRAVENOUS at 15:23

## 2021-05-24 RX ADMIN — FAMOTIDINE 10 MG: 20 TABLET ORAL at 23:07

## 2021-05-24 RX ADMIN — QUETIAPINE FUMARATE 200 MG: 200 TABLET ORAL at 23:08

## 2021-05-24 RX ADMIN — SODIUM CHLORIDE 2000 ML: 9 INJECTION, SOLUTION INTRAVENOUS at 18:00

## 2021-05-24 RX ADMIN — PANTOPRAZOLE SODIUM 40 MG: 40 TABLET, DELAYED RELEASE ORAL at 23:07

## 2021-05-24 RX ADMIN — ZOLPIDEM TARTRATE 10 MG: 5 TABLET, COATED ORAL at 23:57

## 2021-05-24 RX ADMIN — VALACYCLOVIR HYDROCHLORIDE 1000 MG: 500 TABLET, FILM COATED ORAL at 23:57

## 2021-05-24 RX ADMIN — PRAZOSIN HYDROCHLORIDE 2 MG: 1 CAPSULE ORAL at 23:57

## 2021-05-24 RX ADMIN — BUPROPION HYDROCHLORIDE 75 MG: 75 TABLET, FILM COATED ORAL at 23:07

## 2021-05-24 RX ADMIN — KETOROLAC TROMETHAMINE 30 MG: 30 INJECTION, SOLUTION INTRAMUSCULAR; INTRAVENOUS at 21:40

## 2021-05-24 RX ADMIN — PREGABALIN 150 MG: 75 CAPSULE ORAL at 23:08

## 2021-05-24 ASSESSMENT — ENCOUNTER SYMPTOMS
CHILLS: 0
DYSURIA: 0
FEVER: 0
BACK PAIN: 1
MYALGIAS: 1
AGITATION: 0
CONFUSION: 0
VOMITING: 0
SHORTNESS OF BREATH: 0
HEMATURIA: 0
EYE PAIN: 0
SORE THROAT: 0
SEIZURES: 0
NECK PAIN: 1
PALPITATIONS: 0
COUGH: 0
COLOR CHANGE: 0
ABDOMINAL PAIN: 0
ARTHRALGIAS: 0

## 2021-05-24 NOTE — ASSESSMENT & PLAN NOTE
Unexplained ambulatory dysfunction  Reports tremors? Then sudden falls  Denies loc with events but report she has episodes where she has lost consciousness without warning?  Unsure of etiology  MRI head and c-spine findings noted  Will have PT/OT and neurology evaluate  Neurosurgery consult noted  Appreciate neurology input  Possibly polypharmacy?  Holding sedative agents

## 2021-05-24 NOTE — TELEPHONE ENCOUNTER
Called patient and Ching (emergency contact) x 1 with no answer.      ----- Message from Malu Parsons sent at 5/24/2021  9:41 AM EDT -----  Regarding: Patients mom called in  Patient's mom called in at [facility] stating that patient went to ED last night for seizures. Was discharged from ED last night and came home and passed out. Patient has shingles and the mom would like to know if the shingles are causing the seizures. They would like a call because the mom might bring the patient back to the ED. Phone # [phone]

## 2021-05-24 NOTE — ED ATTESTATION NOTE
5/23/20219:46 PM  I have personally seen and examined the patient.  I reviewed and agree with the PA/NP/Resident's assessment and plan of care.    My examination, assessment, and plan of care of Carolyn Redmond is as follows:  The patient presents with rash.  Paresthesias.  35-year-old female who recently got out of a brief incarceration.  She has some blisters and discomfort on her left lower back.  Additionally she has been having wandering paresthesias in her upper and lower extremities.  She was recently hospitalized from December to February with a lumbar paraspinal abscess.  Also had endocarditis.  Apparently was treated in successfully cured with 8 weeks of IV antibiotics.  Exam: Well-developed female no significant distress.  HEENT is unremarkable.  Neck is supple full range of motion.  There are some vesicular blistering lesions on the left lower back.  They start at the midline and move radially to the left.  They are on a erythematous base.  No purulence.  Awake alert and oriented.  Impression/Plan: I think the rash could be a shingles presentation.  The patient otherwise looks well.  No fever.    Vital Signs Review: Vital signs have been reviewed. The oxygen saturation is  SpO2: 96 % which is normal.    I was physically present for the key/critical portions of the following procedures: None    This document was created using dragon dictation software.  There might be some typographical errors due to this technology.     Cali Fay MD  05/23/21 8318

## 2021-05-24 NOTE — H&P
Hospital Medicine Service -  History & Physical        CHIEF COMPLAINT   Patient presents with a chief complaint of weakness     HISTORY OF PRESENT ILLNESS      Carolyn Redmond is a 35 y.o. female with a significant past medical history of fibromyalgia, anxiety, depression, bacterial endocarditis, IDVU presenting with a cc of weakness. Reports that she had episodes of lower extremity weakness where she would become tremulous and collapse.  Denies loss of consciousness during these episodes, also denies any numbness, focal weakness.  Denies having prior episodes.  No associated symptoms but does report intermittent history of loss of consciousness, once when she was in the car and reportedly at that time only had prescribed substances in her system.     In the ED MRI head and c spine was performed and she was evaluated by neurosurgery.  No acute infarct was identified.  PAST MEDICAL AND SURGICAL HISTORY      Past Medical History:   Diagnosis Date   • Anxiety    • Depressed    • Endocarditis    • Fibromyalgia    • Migraines    • Pancreatitis    • Substance abuse (CMS/[facility]) 2015   • Tachycardia    • Vasculitis (CMS/[facility])        Past Surgical History:   Procedure Laterality Date   • CHOLECYSTECTOMY     • ERCP     • GALLBLADDER SURGERY     • TONSILLECTOMY  1991       PCP: Kwame Mcleod, DO    MEDICATIONS      Prior to Admission medications    Medication Sig Start Date End Date Taking? Authorizing Provider   baclofen (LIORESAL) 5 mg tablet tablet Take 5 mg by mouth 3 (three) times a day.   Yes ProviderBari MD   buprenorphine-naloxone (SUBOXONE) 8-2 mg per SL tablet Place 1 tablet under the tongue 2 (two) times a day.   Yes Bari Ji MD   buPROPion (WELLBUTRIN) 75 mg tablet Take 75 mg by mouth 3 (three) times a day.   Yes Bari Ji MD   celecoxib (celeBREX) 200 mg capsule Take 200 mg by mouth 2 (two) times a day.   Yes Bari Ji MD   famotidine (PEPCID) 10 mg tablet Take  10 mg by mouth 2 (two) times a day.   Yes Bari Ji MD   fluticasone propionate (FLONASE) 50 mcg/actuation nasal spray Administer 1 spray into each nostril daily as needed for rhinitis or allergies.   Yes Bari Ji MD   hydrocortisone-pramoxine (ANALPRAM-HC) 2.5-1 % rectal cream Insert 1 application into the rectum 4 (four) times a day as needed for hemorrhoids.   Yes Bari Ji MD   lactobacillus acidophilus complex (BACID) 1 billion cell- 250 mg tablet Take 1 tablet by mouth 2 (two) times a day.   Yes Bari Ji MD   metoprolol succinate XL (TOPROL-XL) 25 mg 24 hr tablet Take 25 mg by mouth every morning.   Yes Bari Ji MD   nicotine (NICODERM CQ) 21 mg/24 hr Place 1 patch on the skin every morning.   Yes Bari Ji MD   nicotine polacrilex (NICORETTE) 4 mg gum Apply 4 mg to cheek as needed for smoking cessation.   Yes Bari Ji MD   pantoprazole (PROTONIX) 40 mg EC tablet Take 40 mg by mouth 2 (two) times a day.   Yes Bari Ji MD   PARoxetine (PaxiL) 20 mg tablet Take 20 mg by mouth every morning.   Yes Bari Ji MD   prazosin (MINIPRESS) 2 mg capsule Take 2 mg by mouth nightly.   Yes Bari Ji MD   pregabalin (LYRICA) 150 mg capsule Take 150 mg by mouth 2 (two) times a day.   Yes Bari Ji MD   QUEtiapine (SEROquel) 100 mg tablet Take 200 mg by mouth nightly.   Yes Bari Ji MD   zolpidem (AMBIEN) 10 mg tablet Take 10 mg by mouth nightly.   Yes Bari Ji MD   aspirin/acetaminophen/caffeine (EXCEDRIN MIGRAINE ORAL) Take 2 tablets by mouth as needed.    ProviderBari MD   valACYclovir (VALTREX) 1 gram tablet Take 1 tablet (1,000 mg total) by mouth 3 (three) times a day for 7 days. 5/23/21 5/30/21  Lili Bray CRNP   alum-mag hydroxide-simeth (MAALOX) 200-200-20 mg/5 mL suspension Take 30 mL by mouth every 6 (six) hours as needed for indigestion or  heartburn. 2/2/21 5/24/21  Zoila Gutierrez MD   baclofen (LIORESAL) 5 mg tablet tablet Take 3 tablets (15 mg total) by mouth 3 (three) times a day. 2/2/21 5/24/21  Zoila Gutierrez MD   buPROPion (WELLBUTRIN) 75 mg tablet Take 1 tablet (75 mg total) by mouth 3 (three) times a day. 2/2/21 5/24/21  Zoila Gutierrez MD   celecoxib (celeBREX) 200 mg capsule Take 1 capsule (200 mg total) by mouth daily. 2/2/21 5/24/21  Zoila Gutierrez MD   famotidine (PEPCID) 10 mg tablet Take 1 tablet (10 mg total) by mouth 2 (two) times a day before breakfast and dinner Indications: gastroesophageal reflux disease. 2/2/21 5/24/21  Zoila Gutierrez MD   fluticasone propionate (FLONASE) 50 mcg/actuation nasal spray Administer 2 sprays into each nostril daily as needed for rhinitis. 2/2/21 5/24/21  Zoila Gutierrez MD   hydrocortisone-pramoxine (ANALPRAM-HC) 2.5-1 % rectal cream Insert into the rectum 4 (four) times a day as needed for hemorrhoids for up to 10 days. 2/2/21 5/24/21  Zoila Gutierrez MD   lactobacillus acidophilus complex (BACID) 1 billion cell- 250 mg tablet Take 1 tablet by mouth 2 (two) times a day. 2/2/21 5/24/21  Zoila Gutierrez MD   lidocaine (ASPERCREME) 4 % adhesive patch,medicated topical patch Apply 1 patch topically daily. 2/3/21 5/24/21  Zoila Gutierrez MD   metoclopramide (REGLAN) 10 mg tablet Take 0.5 tablets (5 mg total) by mouth daily as needed (nausea/vomiting). 2/2/21 5/24/21  Zoila Gutierrez MD   metoprolol succinate XL (TOPROL-XL) 25 mg 24 hr tablet Take 1 tablet (25 mg total) by mouth daily. 2/2/21 5/24/21  Zoila Gutierrez MD   nicotine (NICODERM CQ) 21 mg/24 hr Place 1 patch on the skin daily. 2/2/21 5/24/21  Zoila Gutierrez MD   nicotine polacrilex (NICORETTE) 4 mg gum Apply 1 each (4 mg total) to cheek every 4 (four) hours as needed for smoking cessation. 2/2/21 5/24/21  Edwardo  Zoila Toth MD   pantoprazole (PROTONIX) 40 mg EC tablet Take 1 tablet (40 mg total) by mouth 2 (two) times a day Indications: gastroesophageal reflux disease. 2/2/21 5/24/21  Zoila Gutierrez MD   PARoxetine (PAXIL) 30 mg tablet Take 1 tablet (30 mg total) by mouth daily. 2/3/21 5/24/21  Zoila Gutierrez MD   prazosin (MINIPRESS) 2 mg capsule Take 1 capsule (2 mg total) by mouth nightly. 2/2/21 5/24/21  Zoila Gutierrez MD   pregabalin (LYRICA) 150 mg capsule Take 1 capsule (150 mg total) by mouth 2 (two) times a day for 5 days. 2/2/21 5/24/21  Zoila Gutierrez MD   QUEtiapine (SEROquel) 100 mg tablet Take 1 tablet (100 mg total) by mouth nightly. 2/2/21 5/24/21  Zoila Gutierrez MD   zolpidem (AMBIEN) 10 mg tablet Take 1 tablet (10 mg total) by mouth nightly for 5 days. 2/2/21 5/24/21  Zoila Gutierrez MD       ALLERGIES      Amoxicillin, Nsaids (non-steroidal anti-inflammatory drug), Trazodone, and Tramadol    FAMILY HISTORY      Family History   Problem Relation Age of Onset   • Hypertension Biological Mother    • Lung cancer Biological Father    • Heart disease Maternal Grandfather        SOCIAL HISTORY      Social History     Socioeconomic History   • Marital status: Single     Spouse name: None   • Number of children: None   • Years of education: None   • Highest education level: None   Occupational History   • Occupation:    Tobacco Use   • Smoking status: Current Every Day Smoker     Packs/day: 0.50     Types: Electronic Cigarette   • Smokeless tobacco: Current User   • Tobacco comment: quit 1 year ago, vapes currently   Vaping Use   • Vaping Use: Every day   • Substances: Flavoring   • Devices: Refillable tank   Substance and Sexual Activity   • Alcohol use: Not Currently     Comment: social   • Drug use: Yes     Types: Marijuana   • Sexual activity: Defer   Other Topics Concern   • None   Social History Narrative   • None     Social  Determinants of Health     Financial Resource Strain:    • Difficulty of Paying Living Expenses:    Food Insecurity: Unknown   • Worried About Running Out of Food in the Last Year: Never true   • Ran Out of Food in the Last Year: Not on file   Transportation Needs:    • Lack of Transportation (Medical):    • Lack of Transportation (Non-Medical):    Physical Activity:    • Days of Exercise per Week:    • Minutes of Exercise per Session:    Stress:    • Feeling of Stress :    Social Connections:    • Frequency of Communication with Friends and Family:    • Frequency of Social Gatherings with Friends and Family:    • Attends [employer] Services:    • Active Member of Clubs or Organizations:    • Attends Club or Organization Meetings:    • Marital Status:    Intimate Partner Violence:    • Fear of Current or Ex-Partner:    • Emotionally Abused:    • Physically Abused:    • Sexually Abused:        REVIEW OF SYSTEMS      Const: (-) fevers, chills   Eyes: (-)blurry vision, double vision  ENMT: (-) sore throat, runny nose  Cardio: (-) chest pain, palpitations,   Pulm: (-) cough, sob  GI: (-) melena, nausea, vomiting, diarrhea,   : (-) dysuria, flank pain,   Endo: (-) heat intolerance, cold intolerance  Skin: (-) rashes, itching  Neuro:(-) weakness, numbness    PHYSICAL EXAMINATION      Temp:  [36.7 °C (98 °F)] 36.7 °C (98 °F)  Heart Rate:  [92] 92  Resp:  [16-18] 18  BP: (100-135)/(55-64) 100/55  Body mass index is 45.73 kg/m².    Constitutional:  no acute distress, well developed  Eyes:  EOMI, non-icteric   ENMT:  moist mucous membranes, no JVD  CV:  RRR, no murmurs detected  Pulm:  normal air entry, CTAB,   GI:  Bowel sounds are normal, soft  MSK:  no cyanosis, no clubbing, no weakness in upper or lower extremities  Skin:  no rashes, no blisters  Neuro:  awake, alert    LABS / IMAGING / EKG        Labs  CBC:  Results from last 7 days   Lab Units 05/24/21  1413   WBC K/uL 10.66*   HEMOGLOBIN g/dL 10.6*   HEMATOCRIT %  34.0*   PLATELETS K/uL 200   DIFF TYPE  Auto   NRBC % 0.0   IMM GRANULOCYTES % 0.4   NEUTROPHILS % 66.4   LYMPHOCYTES % 23.1   MONOCYTES % 8.2   EOSINOPHILS % 1.5   BASOPHILS % 0.4   IMM GRANUCOCYTES ABS K/uL 0.04   MONO ABS AUTO K/uL 0.87*   EOS ABS AUTO K/uL 0.16   BASO ABS AUTO K/uL 0.04        BMP:  Results from last 7 days   Lab Units 05/24/21  1416   SODIUM mEQ/L 138   POTASSIUM mEQ/L 3.6   CHLORIDE mEQ/L 101   CO2 mEQ/L 27   BUN mg/dL 10   CREATININE mg/dL 0.8   CALCIUM mg/dL 8.6*   ALBUMIN g/dL 3.5   BILIRUBIN TOTAL mg/dL 1.0   ALK PHOS IU/L 56   ALT IU/L 27   AST IU/L 46*   GLUCOSE mg/dL 102*       TROPONIN  No results found for: TROPONINT    COAG:        Imaging  CT HEAD WITHOUT IV CONTRAST    Result Date: 5/24/2021  Narrative: CLINICAL HISTORY: Altered mental status TECHNIQUE:   CT of the head without contrast. COMPARISON:   None available. CT DOSE:  One or more dose reduction techniques (e.g. automated exposure control, adjustment of the mA and/or kV according to patient size, use of iterative reconstruction technique) utilized for this examination. COMMENT: The ventricles and sulci are normal in size and configuration. The gray-white differentiation is preserved.  No extra axial collection. No intracranial hemorrhage. No mass effect, midline shift, or edema. No acute, large vascular territory infarct.  No apparent acute osseous findings in the calvarium.  Mild to moderate bilateral ethmoid mucosal sinus thickening.  Mastoid air cells are clear.     Impression: IMPRESSION: No acute intracranial process.     MRI BRAIN WITHOUT CONTRAST    Result Date: 5/24/2021  Narrative: CLINICAL HISTORY:  Headache, neuro deficit COMPARISON: 3/23/2017 COMMENT: Technique: MRI of the brain was performed without contrast. There is no intraparenchymal hemorrhage, midline shift, mass or mass-effect. There is no extra-axial collection. Ventricles and sulci are normal in size and configuration.  There is no restricted diffusion  to suggest an acute infarct. The cerebellar tonsils are normal in position. Vascular flow-voids are unremarkable. Bone marrow signal is unremarkable. There is slight image degradation due to patient motion.  There is been no significant change since prior study intracranially. There is mild to moderate bilateral ethmoid mucosal sinus thickening.  Mild right maxillary sinus mucosal thickening.  Slightly right sided low cerebellar tonsil, without evidence of Chiari I malformation     Impression: IMPRESSION: No acute intracranial abnormality.  Paranasal sinus inflammatory changes. Slight image degradation due to patient motion.     MRI CERVICAL SPINE WITHOUT CONTRAST    Result Date: 5/24/2021  Narrative: CLINICAL HISTORY:   Cervical radiculopathy Technique: Noncontrast multiplanar, multi-sequence MRI of the cervical spine. Comparison:  None COMMENT: The cervical lordosis is maintained.  Vertebral body heights are preserved. The atlantodental interval is within normal limits.  The craniocervical junction is anatomically aligned. The marrow signal is within normal limits. The cervical spinal cord is normal in signal, course, and caliber.  The visualized contents of the posterior fossa are within normal limits. C2-C3:Within normal limits. C3-C4:Within normal limits. C4-C5: Mild disc bulge.  No central or neural foraminal stenosis. C5-C6:There is disc desiccation.  There is a moderate central disc protrusion superimposed on diffuse disc bulge.  There is flattening of the ventral surface of the cord.  There is mass effect on the cervical spinal cord at this level. There is mild central canal stenosis.  There is no focal cord edema or myelomalacia.  The neural foramen as unremarkable.  Mild bilateral facet arthritic changes. C6-C7:Within normal limits. C7-T1:Within normal limits. Highly prominent lymph nodes in the neck soft tissues likely reactive inflammatory node patient of this age.    Impression: IMPRESSION: There is a  moderate central disc protrusion superimposed on diffuse disc bulge with flattening of the ventral spinal cord and impression on the cord at C5-C6. No focal cord edema or myelomalacia.  Slight image degradation due to patient motion.      ASSESSMENT AND PLAN           Herpes zoster  Assessment & Plan  Lumbar dermatomes  Active blisters  Contact and airborne precautions  Continue acyclovir      Ambulatory dysfunction  Assessment & Plan  Unexplained ambulatory dysfunction  Reports tremors? Then sudden falls  Denies loc with events but report she has episodes where she has lost consciousness without warning?  Unsure of etiology  MRI head and c-spine findings noted  Will have PT/OT and neurology evaluate  Neurosurgery consult noted  Appreciate neurology input  Possibly polypharmacy?  Holding sedative agents       * Elevated CK  Assessment & Plan  Trend tomorrow         VTE Assessment: Padua    VTE Prophylaxis Plan: lovenox  Estimated Discharge Date: 5/26/2021  Disposition Planning: admit [facility]     Michael Taylor MD  5/24/2021

## 2021-05-24 NOTE — DISCHARGE INSTRUCTIONS
As discussed please call your primary care doctor today to inform them of your ER visit and arrange a follow-up appointment within the next 2-3 days. Please call Dr Ness's office and inform him of your ER visit and arrange the earliest available appointment. Does not have to be with Dr Ness in particular. If you do not have one we have provided you with one. You do not have to see this provider in particular, just call the office and ask for the earliest available appointment with any provider at any location.  Please return immediately for any worsening, new or concerning symptoms such as new/worsening pain, fevers, numbness, weakness, changes in urine/stool, lethargy, confusion, etc

## 2021-05-24 NOTE — ED PROVIDER NOTES
Emergency Medicine Note  HPI   HISTORY OF PRESENT ILLNESS     The patient is a 35-year-old female with past medical history of anxiety, depression, migraine headache, pancreatitis, fibromyalgia, IV drug abuse, infective endocarditis, and lumbar spine osteomyelitis who presents today for evaluation of generalized muscle weakness and falls.  Patient reports she is having twitching in her upper and lower extremities and is not able to support her body weight.  Patient reports that she was just released from [facility] days ago and is staying with her mother.  She denies any current substance abuse.  She denies any fevers.  Patient was seen in this emergency department last evening and diagnosed with shingles.  She reports her muscle weakness has gotten progressively worse and and she is unable to walk at home today.  Mother reports that she fell to the floor today and may have struck her head.  Patient does not recall this incident. She was previously followed by Dr. Ness and cardiology.       History provided by:  Patient   used: No          Patient History   PAST HISTORY     Reviewed from Nursing Triage:      Past Medical History:   Diagnosis Date   • Anxiety    • Depressed    • Endocarditis    • Fibromyalgia    • Migraines    • Pancreatitis    • Substance abuse (CMS/[facility]) 2015   • Tachycardia    • Vasculitis (CMS/[facility])        Past Surgical History:   Procedure Laterality Date   • CHOLECYSTECTOMY     • ERCP     • GALLBLADDER SURGERY     • TONSILLECTOMY  1991       Family History   Problem Relation Age of Onset   • Hypertension Biological Mother    • Lung cancer Biological Father    • Heart disease Maternal Grandfather        Social History     Tobacco Use   • Smoking status: Current Every Day Smoker     Packs/day: 0.50     Types: Electronic Cigarette   • Smokeless tobacco: Current User   • Tobacco comment: quit 1 year ago, vapes currently   Vaping Use   • Vaping Use: Every day   • Substances: Flavoring    • Devices: Refillable tank   Substance Use Topics   • Alcohol use: Not Currently     Comment: social   • Drug use: Yes     Types: Marijuana         Review of Systems   REVIEW OF SYSTEMS     Review of Systems   Constitutional: Negative for chills and fever.   HENT: Negative for ear pain and sore throat.    Eyes: Negative for pain and visual disturbance.   Respiratory: Negative for cough and shortness of breath.    Cardiovascular: Negative for chest pain and palpitations.   Gastrointestinal: Negative for abdominal pain and vomiting.   Endocrine: Negative for polyuria.   Genitourinary: Negative for dysuria and hematuria.   Musculoskeletal: Positive for back pain, gait problem, myalgias and neck pain. Negative for arthralgias.   Skin: Positive for rash. Negative for color change.   Neurological: Negative for seizures and syncope.   Psychiatric/Behavioral: Negative for agitation and confusion.         VITALS     ED Vitals    Date/Time Temp Pulse Resp BP SpO2 [facility]   05/24/21 1738 -- -- 18 100/55 96 %    05/24/21 1527 -- -- 16 135/63 95 %    05/24/21 1221 36.7 °C (98 °F) 92 18 135/64 96 %         Pulse Ox %: 96 % (05/24/21 1833)  Pulse Ox Interpretation: Normal (05/24/21 1833)           Physical Exam   PHYSICAL EXAM     Physical Exam  Vitals and nursing note reviewed.   Constitutional:       General: She is not in acute distress.     Appearance: She is well-developed. She is not diaphoretic.   HENT:      Head: Normocephalic and atraumatic.      Nose: Nose normal.   Eyes:      Conjunctiva/sclera: Conjunctivae normal.      Pupils: Pupils are equal, round, and reactive to light.   Neck:      Trachea: Trachea normal.   Cardiovascular:      Rate and Rhythm: Normal rate and regular rhythm.      Heart sounds: Normal heart sounds, S1 normal and S2 normal. No murmur heard.     Pulmonary:      Effort: Pulmonary effort is normal. No respiratory distress.      Breath sounds: Normal breath sounds. No wheezing or rales.   Chest:       Chest wall: No tenderness.   Abdominal:      General: Bowel sounds are normal. There is no distension.      Palpations: Abdomen is soft. There is no mass.      Tenderness: There is no abdominal tenderness. There is no guarding or rebound.   Musculoskeletal:      Cervical back: Neck supple.   Skin:     General: Skin is warm and dry.      Coloration: Skin is not pale.      Findings: Rash (vesicular rash left lumbar back consistent with herpes zoster) present.   Neurological:      General: No focal deficit present.      Mental Status: She is alert and oriented to person, place, and time.      Cranial Nerves: No cranial nerve deficit.      Sensory: No sensory deficit.      Motor: No weakness.      Gait: Gait normal.   Psychiatric:         Speech: Speech normal.         Behavior: Behavior normal. Behavior is cooperative.         Thought Content: Thought content normal.         Judgment: Judgment normal.           PROCEDURES     Procedures     DATA     Results     Procedure Component Value Units Date/Time    SARS-CoV-2 (COVID-19), PCR Nasopharynx [[id number]] Collected: 05/24/21 1801    Specimen: Nasopharyngeal Swab from Nasopharynx Updated: 05/24/21 1807    Narrative:      The following orders were created for panel order SARS-CoV-2 (COVID-19), PCR Nasopharynx.  Procedure                               Abnormality         Status                     ---------                               -----------         ------                     SARS-CoV-2 (COVID-19), P...[[id number]]                      In process                   Please view results for these tests on the individual orders.    SARS-CoV-2 (COVID-19), PCR Nasopharynx [[id number]] Collected: 05/24/21 1801    Specimen: Nasopharyngeal Swab from Nasopharynx Updated: 05/24/21 1807    CK, Total [[id number]]  (Abnormal) Collected: 05/24/21 1411    Specimen: Blood, Venous Updated: 05/24/21 1453     Total CK 1,840 U/L     Urine drug screen (UDS) [[id number]]  (Abnormal)  Collected: 05/24/21 1351    Specimen: Urine, Clean Catch Updated: 05/24/21 1445     PCP Scrn, Ur Not Detected     Comment: Assay Detects: phencyclidine in urine. Lowest detectable concentration is 25 ng/mL of phencyclidine.        Benzodiazepine Ur Qual Not Detected     Comment: Assay Detects: benzodiazepines and metabolites at varying concentrations. Lowest detectable concentration is 200 ng/mL of oxazepam.        Cocaine Screen, Urine Not Detected     Comment: Assay Detects: benzoylecgonine and cocaine in urine. Lowest detectable concentration is 300 ng/mL of benzoylecgonine.        Amphetamine+Methamphetamine Screen, Ur Not Detected     Comment: Assay Detects: d-methamphetamine, d-amphetamine, methlyenedioxyamphetamine (MDA), and methlyenendioxymethamphetamine (MDMA) in urine. Lowest detectable concentration is 1000 ng/mL of d-methamphetamine.  Assay is less sensitive to MDA and MDMA (lowest detectable concentration, 2500 ng/mL) and could produce a false negative result. If MDMA overdose is suspected and the result is negative, a more specific test should be requested.        Cannabinoid Screen, Urine Positive     Comment: Confirmation will be ordered upon request. If clinically warranted, please call [phone] to request drug confirmatory test. This specimen may be used for confirmation and will be saved for 10 days. Unconfirmed results are to be used for medical purposes (not legal).  Assay Detects: cannabinoid metabolites in urine. Lowest detectable concentration is 50 ng/mL        Opiate Scrn, Ur Not Detected     Comment: Assay Detects: codeine, dihydrocodeine, hydrocodone, hydromorphone, levorphanol, morphine, morphine-3-glucuronide, norcodeine, oxycodone in urine. Lowest detectable concentration is 300 ng/mL of morphine.        Barbiturate Screen, Ur Not Detected     Comment: Assay Detects: alphenal, amobarbital, aprobarbital, barbital, butabarbital, butalbital, butethal, diallybarbital, pentobarbital,  secobarbital,talbutal, and thiopental. Lowest detectable concentration is 200 ng/mL of secobarbital.       Comprehensive metabolic panel [[id number]]  (Abnormal) Collected: 05/24/21 1416    Specimen: Blood, Venous Updated: 05/24/21 1444     Sodium 138 mEQ/L      Potassium 3.6 mEQ/L      Comment: Results obtained on plasma. Plasma Potassium values may be up to 0.4 mEQ/L less than serum values. The differences may be greater for patients with high platelet or white cell counts.        Chloride 101 mEQ/L      CO2 27 mEQ/L      BUN 10 mg/dL      Creatinine 0.8 mg/dL      Glucose 102 mg/dL      Calcium 8.6 mg/dL      AST (SGOT) 46 IU/L      ALT (SGPT) 27 IU/L      Alkaline Phosphatase 56 IU/L      Total Protein 6.4 g/dL      Comment: Test performed on plasma which typically contains approximately 0.4 g/dL more protein than serum.        Albumin 3.5 g/dL      Bilirubin, Total 1.0 mg/dL      eGFR >60.0 mL/min/1.73m*2      Anion Gap 10 mEQ/L     Magnesium [[id number]]  (Abnormal) Collected: 05/24/21 1416    Specimen: Blood, Venous Updated: 05/24/21 1444     Magnesium 1.7 mg/dL     BhCG, Serum, Qual [[id number]]  (Normal) Collected: 05/24/21 1411    Specimen: Blood, Venous Updated: 05/24/21 1440     Preg Test, Serum Negative    CBC and differential [[id number]]  (Abnormal) Collected: 05/24/21 1413    Specimen: Blood, Venous Updated: 05/24/21 1429     WBC 10.66 K/uL      RBC 3.91 M/uL      Hemoglobin 10.6 g/dL      Hematocrit 34.0 %      MCV 87.0 fL      MCH 27.1 pg      MCHC 31.2 g/dL      RDW 16.1 %      Platelets 200 K/uL      MPV 10.5 fL      Differential Type Auto     nRBC 0.0 %      Immature Granulocytes 0.4 %      Neutrophils 66.4 %      Lymphocytes 23.1 %      Monocytes 8.2 %      Eosinophils 1.5 %      Basophils 0.4 %      Immature Granulocytes, Absolute 0.04 K/uL      Neutrophils, Absolute 7.09 K/uL      Lymphocytes, Absolute 2.46 K/uL      Monocytes, Absolute 0.87 K/uL      Eosinophils, Absolute 0.16 K/uL       Basophils, Absolute 0.04 K/uL     Lactate, w/ reflex repeat if > 2.0 [[id number]]  (Normal) Collected: 05/24/21 1410    Specimen: Blood, Venous Updated: 05/24/21 1428     Lactate 0.9 mmol/L     Blood Culture Blood, Venous [[id number]] Collected: 05/24/21 1412    Specimen: Blood, Venous Updated: 05/24/21 1427    RAINBOW LT GREEN [[id number]] Collected: 05/24/21 1411    Specimen: Blood, Venous Updated: 05/24/21 1426    RAINBOW RED [[id number]] Collected: 05/24/21 1411    Specimen: Blood, Venous Updated: 05/24/21 1426    [place] Draw Panel [[id number]] Collected: 05/24/21 1411    Specimen: Blood, Venous Updated: 05/24/21 1426    Narrative:      The following orders were created for panel order [place] Draw Panel.  Procedure                               Abnormality         Status                     ---------                               -----------         ------                     RAINBOW RED[[id number]]                                      In process                 RAINBOW LT BLUE[[id number]]                                  In process                 RAINBOW LT GREEN[[id number]]                                 In process                   Please view results for these tests on the individual orders.    RAINBOW LT BLUE [[id number]] Collected: 05/24/21 1411    Specimen: Blood, Venous Updated: 05/24/21 1426          Imaging Results          MRI CERVICAL SPINE WITHOUT CONTRAST (Final result)  Result time 05/24/21 17:06:15    Final result                 Impression:    IMPRESSION:  There is a moderate central disc protrusion superimposed on diffuse disc bulge  with flattening of the ventral spinal cord and impression on the cord at C5-C6.  No focal cord edema or myelomalacia.  Slight image degradation due to patient  motion.             Narrative:    CLINICAL HISTORY:   Cervical radiculopathy    Technique: Noncontrast multiplanar, multi-sequence MRI of the cervical spine.    Comparison:  None    COMMENT:  The cervical  lordosis is maintained.  Vertebral body heights are preserved. The  atlantodental interval is within normal limits.  The craniocervical junction is  anatomically aligned. The marrow signal is within normal limits.    The cervical spinal cord is normal in signal, course, and caliber.  The  visualized contents of the posterior fossa are within normal limits.    C2-C3:Within normal limits.    C3-C4:Within normal limits.    C4-C5: Mild disc bulge.  No central or neural foraminal stenosis.    C5-C6:There is disc desiccation.  There is a moderate central disc protrusion  superimposed on diffuse disc bulge.  There is flattening of the ventral surface  of the cord.  There is mass effect on the cervical spinal cord at this level.  There is mild central canal stenosis.  There is no focal cord edema or  myelomalacia.  The neural foramen as unremarkable.  Mild bilateral facet  arthritic changes.    C6-C7:Within normal limits.    C7-T1:Within normal limits.  Highly prominent lymph nodes in the neck soft tissues likely reactive  inflammatory node patient of this age.                             MRI BRAIN WITHOUT CONTRAST (Final result)  Result time 05/24/21 16:40:34    Final result                 Impression:    IMPRESSION:    No acute intracranial abnormality.  Paranasal sinus inflammatory changes.  Slight image degradation due to patient motion.               Narrative:      CLINICAL HISTORY:  Headache, neuro deficit    COMPARISON: 3/23/2017    COMMENT:    Technique: MRI of the brain was performed without contrast.    There is no intraparenchymal hemorrhage, midline shift, mass or mass-effect.  There is no extra-axial collection. Ventricles and sulci are normal in size and  configuration.  There is no restricted diffusion to suggest an acute infarct.  The cerebellar tonsils are normal in position. Vascular flow-voids are  unremarkable. Bone marrow signal is unremarkable. There is slight image  degradation due to patient motion.   There is been no significant change since  prior study intracranially.  There is mild to moderate bilateral ethmoid mucosal sinus thickening.  Mild  right maxillary sinus mucosal thickening.  Slightly right sided low cerebellar  tonsil, without evidence of Chiari I malformation                               CT HEAD WITHOUT IV CONTRAST (Final result)  Result time 05/24/21 15:56:32    Final result                 Impression:    IMPRESSION:  No acute intracranial process.               Narrative:    CLINICAL HISTORY: Altered mental status    TECHNIQUE:   CT of the head without contrast.    COMPARISON:   None available.    CT DOSE:  One or more dose reduction techniques (e.g. automated exposure  control, adjustment of the mA and/or kV according to patient size, use of  iterative reconstruction technique) utilized for this examination.    COMMENT:  The ventricles and sulci are normal in size and configuration. The gray-white  differentiation is preserved.  No extra axial collection. No intracranial  hemorrhage. No mass effect, midline shift, or edema. No acute, large vascular  territory infarct.  No apparent acute osseous findings in the calvarium.  Mild  to moderate bilateral ethmoid mucosal sinus thickening.  Mastoid air cells are  clear.                                No orders to display       Scoring tools                                 ED Course & MDM   MDM / ED COURSE and CLINICAL IMPRESSIONS     [facility]    ED Course as of May 24 1834   Mon May 24, 2021   1401 Dimitri Ness. He will consult.     [SL]   1430 Neuro surgery recommending MRI brain and CS.     [SL]   1831 Previously spoke with Dr. Ness to review MRI. He reports mild disc bulge but not requiring acute intervention. No other specific neurosurgical recommendation at this time. Patient and mother report that she cannot walk safely. CK is notable elevated as well. Will admit for further evaluation. I have dw Dr. Taylor. Med/surg bed requested.     [SL]      ED  Course User Index  [SL] Sari Crawford PA C         Clinical Impressions as of May 24 1834   Elevated CK   Muscle weakness (generalized)   Fall, initial encounter            Sari Crawford PA C  05/24/21 1834

## 2021-05-24 NOTE — ED PROVIDER NOTES
" History provided by patient and mother :    Patient with history of IV drug abuse, anxiety, depression, endocarditis, fibromyalgia.  Patient hospitalized for 8 weeks in December secondary to epidural infection of the spine treated with antibiotics through PICC line.  Also with tricuspid vegetations.  States she has followed with Dr. Blas outpatient and cleared.  Patient states she was incarcerated from March until Thursday.  States while incarcerated she began experiencing numbness to her left arm and right arm.  Patient attributed this to the poor mattresses in [facility] and sleeping \"funny\".  Patient states intermittently she would get numbness sensations to her legs.  Patient states she was released on Thursday and thought when she would sleep in her own bed her symptoms would improve however states the did not.  Patient also states she began feeling a numbness sensation to her back and noticed a blister rash yesterday.  States the rash is red and painful.  No recent trauma.  Has had chickenpox in the past.  Has not spoken with neurosurgery.  Denies fevers, confusion, dizziness, weakness, chest pain, shortness of breath or any other symptoms she can report    HPI    Past Medical History:   Diagnosis Date   • Anxiety    • Depressed    • Endocarditis    • Fibromyalgia    • Migraines    • Pancreatitis    • Substance abuse (CMS/[facility]) 2015   • Tachycardia    • Vasculitis (CMS/[facility])        Past Surgical History:   Procedure Laterality Date   • CHOLECYSTECTOMY     • ERCP     • GALLBLADDER SURGERY     • TONSILLECTOMY  1991       Allergies   Allergen Reactions   • Amoxicillin    • Nsaids (Non-Steroidal Anti-Inflammatory Drug) Nausea Only   • Trazodone      Other reaction(s): Headaches, Other (see comments)   • Tramadol Palpitations       Social History     Tobacco Use   Smoking Status Former Smoker   • Packs/day: 0.50   • Types: Cigarettes   Smokeless Tobacco Current User   Tobacco Comment    quit 1 year ago, vapes " "currently       Social History     Substance and Sexual Activity   Alcohol Use Not Currently    Comment: social       Social History     Substance and Sexual Activity   Drug Use Yes   • Types: Marijuana       No LMP recorded.    Review of Systems   Constitutional: Negative for fatigue and fever.   Respiratory: Negative for cough and shortness of breath.    Cardiovascular: Negative for chest pain.   Gastrointestinal: Negative for abdominal pain, blood in stool, constipation, diarrhea, nausea and vomiting.   Genitourinary: Negative for difficulty urinating and dysuria.   Musculoskeletal: Positive for back pain. Negative for arthralgias, gait problem, myalgias and neck pain.   Skin: Positive for rash. Negative for color change and wound.   Neurological: Positive for numbness. Negative for dizziness and weakness.       Vitals:    05/23/21 2034   BP: 110/65   Pulse: 92   Resp: 16   Temp: 36.8 °C (98.2 °F)   TempSrc: Oral   SpO2: 96%   Weight: 113 kg (250 lb)   Height: 1.575 m (5' 2\")       Physical Exam  Vitals and nursing note reviewed.   Constitutional:       General: She is not in acute distress.     Appearance: Normal appearance. She is obese. She is not ill-appearing or toxic-appearing.   HENT:      Head: Normocephalic and atraumatic.      Mouth/Throat:      Comments: Airway intact  Eyes:      Conjunctiva/sclera: Conjunctivae normal.   Cardiovascular:      Rate and Rhythm: Normal rate and regular rhythm.   Pulmonary:      Effort: Pulmonary effort is normal. No respiratory distress.      Breath sounds: Normal breath sounds.   Abdominal:      Palpations: Abdomen is soft. Abdomen is not rigid. There is no mass.      Tenderness: There is no abdominal tenderness. There is no guarding.   Musculoskeletal:         General: Normal range of motion.      Comments: 5 out of 5 strength on upper and lower extremities.  Ambulates with safe and steady gait   Skin:     General: Skin is warm and dry.      Comments: Patient with a " vesicular rash noted to the left low back.  Does not cross the midline.  Tender however not out of proportion. Does not appear cellulitic or lymphangitic.  Seems similar to that of shingles. No other rash seen.   Neurological:      General: No focal deficit present.      Mental Status: She is alert and oriented to person, place, and time.      Sensory: No sensory deficit.   Psychiatric:         Behavior: Behavior normal.      Comments: Speech normal         Procedures    ED Course as of May 23 2250   Sun May 23, 2021   2150 Impression    Patient presents with painful rash to the back with blisters along with numbness to the back along the area of the rash.  Rash does not cross the midline vesicular in origin.  Consistent with shingles.  Has had chickenpox in the past.  Well-appearing and nontoxic with stable vitalsPatient also states since March having numbness to her arms and intermittently to bilateral legs.  Ambulates with safe and steady gait.  No fevers.  5 out of 5 strength on upper and lower extremities.  Sensory intact to light touch.  History of spinal abscess in the past however involve the lumbar spine.  Known to [facility], states she is due to follow-up with him.  Recently released to group home.  Can follow up with him outpatientPlan-discussed with MDPatient discussed with Dr pineda, agreeable to workup/plan. Has seen patient.  Will discharge with Valtrex and follow-up with PCP and NSG    To patient's bedside. Patient exam unchanged. Patient sitting quietly and comfortably in the stretcher.  Discussed results and plan with patient.  Patient verbalized understanding and agreeable without any questions or concerns.      Conversation had and education provided on signs and symptoms that would warrant a return visit to the emergency department along need for follow-up.  Patient verbalized understanding and agreeable.  All questions answered      [VIJAYA]      ED Course User Index  [VIJAYA] Lili Bray CRNP          Clinical Impressions as of May 23 2250   Herpes zoster without complication       Final diagnoses:   [B02.9] Herpes zoster without complication       Labs Reviewed - No data to display    No orders to display            Lili Bray CRNP  05/23/21 2250

## 2021-05-24 NOTE — CONSULTS
Neurosurgery Consultation    Requesting Provider:  Rylee Bowie PA-C  Patient seen and examined 5/24/21 at 2:30pm    CC: weakness    Reason for Consultation:  Weakness    History of Present Illness:   Carolyn Redmond is a 35 y.o.  female, with significant past medical history of anxiety, depression, endocarditis, fibromyalgia, migraines, pancreatitis, IV drug use and vasculitis, who presented to [facility] ED for evaluation of weakness. She was seen last by neurosurgery 3/12/21 as follow up of lumbar osteomyelitis, first diagnosed 12/10/20. She was managed with IV antibiotics until 2/1/2021 for a total of 8 weeks. Her lumbar MRI imaging showed improving pachymeningeal enhancement from L4-S1 with no significant abscess formation or fluid collection.  There was no spinal cord compression or nerve root impingement.  She was instructed to follow up as needed.    Patient presented to the ED yesterday for evaluation of shingles following a brief incarceration, with blisters and discomfort on her left lower back. Additionally she had been having wandering paresthesias in her upper and lower extremities. She was discharged with Valtrex.    Today, patient reports weakness of her lower extremities, unable to independently ambulate prompting her return visit to the ED. Her mother called our office this morning stating she passed out last night and was going to bring her back to the ED.     She denies any bowel/bladder incontinence, saddle anesthesia, fever, confusion, nausea or vomiting.     Medical History:   Past Medical History:   Diagnosis Date   • Anxiety    • Depressed    • Endocarditis    • Fibromyalgia    • Migraines    • Pancreatitis    • Substance abuse (CMS/[facility]) 2015   • Tachycardia    • Vasculitis (CMS/[facility])        Surgical History:   Past Surgical History:   Procedure Laterality Date   • CHOLECYSTECTOMY     • ERCP     • GALLBLADDER SURGERY     • TONSILLECTOMY  1991       Family History: Reviewed and deemed not  pertinent to current admission.    Social History:   Social History     Socioeconomic History   • Marital status: Single     Spouse name: None   • Number of children: None   • Years of education: None   • Highest education level: None   Occupational History   • Occupation:    Tobacco Use   • Smoking status: Current Every Day Smoker     Packs/day: 0.50     Types: Electronic Cigarette   • Smokeless tobacco: Current User   • Tobacco comment: quit 1 year ago, vapes currently   Vaping Use   • Vaping Use: Every day   • Substances: Flavoring   • Devices: Refillable tank   Substance and Sexual Activity   • Alcohol use: Not Currently     Comment: social   • Drug use: Yes     Types: Marijuana   • Sexual activity: Defer   Other Topics Concern   • None   Social History Narrative   • None     Social Determinants of Health     Financial Resource Strain:    • Difficulty of Paying Living Expenses:    Food Insecurity: Unknown   • Worried About Running Out of Food in the Last Year: Never true   • Ran Out of Food in the Last Year: Not on file   Transportation Needs:    • Lack of Transportation (Medical):    • Lack of Transportation (Non-Medical):    Physical Activity:    • Days of Exercise per Week:    • Minutes of Exercise per Session:    Stress:    • Feeling of Stress :    Social Connections:    • Frequency of Communication with Friends and Family:    • Frequency of Social Gatherings with Friends and Family:    • Attends [employer] Services:    • Active Member of Clubs or Organizations:    • Attends Club or Organization Meetings:    • Marital Status:    Intimate Partner Violence:    • Fear of Current or Ex-Partner:    • Emotionally Abused:    • Physically Abused:    • Sexually Abused:        Allergies: Amoxicillin, Nsaids (non-steroidal anti-inflammatory drug), Trazodone, and Tramadol    Home Medications:   Not in a hospital admission.    Current Medications:   •  LORazepam, 1 mg, intravenous, Once  •  alum-mag  hydroxide-simeth  •  aspirin/acetaminophen/caffeine (EXCEDRIN MIGRAINE ORAL)  •  baclofen  •  buPROPion  •  celecoxib  •  famotidine  •  fluticasone propionate  •  hydrocortisone-pramoxine  •  lactobacillus acidophilus complex  •  lidocaine  •  metoclopramide  •  metoprolol succinate XL  •  nicotine  •  nicotine polacrilex  •  pantoprazole  •  PARoxetine  •  prazosin  •  pregabalin  •  QUEtiapine  •  valACYclovir  •  zolpidem    Review of Systems: A 14 point review of systems was performed and aside from what is mentioned above is otherwise negative.    General physical examination:  Physical Examination:  Physical Exam   Constitutional: Oriented to person, place, and time. Appears stated age  HENT:   Head: Normocephalic and atraumatic.   Right Ear: External ear normal.   Left Ear: External ear normal.   Nose: Nose normal.   Mouth/Throat: Oropharynx is clear and moist.   Eyes: Conjunctivae and EOM are normal. Pupils are equal, round, and reactive to light. No scleral icterus.   Neck: Normal range of motion. Neck supple.   Cardiovascular: Normal rate and regular rhythm.    Pulmonary/Chest: Effort normal and breath sounds normal. No stridor. No respiratory distress. No respiratory distress. Normal effort.   Musculoskeletal: No deformity   Skin: vesicular rash noted to the left low back.   Psychiatric: Normal mood and affect. Speech is normal and behavior is normal. Thought content normal.   Vitals reviewed.    Neurologic examination:    Mental status:  The patient is alert, attentive, and oriented. Speech is clear and fluent     Cranial nerves:  CN II: Visual fields are full to confrontation.  Pupils are equal and briskly reactive to light.   CN III, IV, VI: Extra-occular muscles are intact  CN V: Facial sensation is intact and equal in V1-V3 distributions bilaterally.   CN VII: Face is symmetric with normal eye closure and smile.  CN VII: Hearing is normal to rubbing fingers  CN IX, X: Palate elevates symmetrically.  Phonation is normal.  CN XI: Head turning and shoulder shrug are intact  CN XII: Tongue is midline with normal movements and no atrophy.    Motor:     Deltoid Biceps Triceps Wrist ext Finger ext Hand Intrinsics Hip flexion Knee ext Dorsi-  flexion EHL Plantar Flexion   R 5 5 5 5 5 5 5 5 5 5 5   L 5 5 5 5 5 5 5 5 5 5 5     There is no pronator drift. Muscle bulk and tone are normal.     Reflexes:  Reflexes are 3+ and symmetric at the biceps, brachialis, patellar. Left > Right hoffmans. No clonus     Sensory:  Intact light touch throughout all 4 extremities.    Coordination:  There is no dysmetria on finger-to-nose testing.      Gait:  Deferred    Data Review:    Labs  WBC   Date Value Ref Range Status   05/24/2021 10.66 (H) 3.80 - 10.50 K/uL Final     Hemoglobin   Date Value Ref Range Status   05/24/2021 10.6 (L) 11.8 - 15.7 g/dL Final     Hematocrit   Date Value Ref Range Status   05/24/2021 34.0 (L) 35.0 - 45.0 % Final     MCV   Date Value Ref Range Status   05/24/2021 87.0 83.0 - 98.0 fL Final     Platelets   Date Value Ref Range Status   05/24/2021 200 150 - 369 K/uL Final     Sodium   Date Value Ref Range Status   05/24/2021 138 136 - 144 mEQ/L Final     Potassium   Date Value Ref Range Status   05/24/2021 3.6 3.6 - 5.1 mEQ/L Final     Comment:     Results obtained on plasma. Plasma Potassium values may be up to 0.4 mEQ/L less than serum values. The differences may be greater for patients with high platelet or white cell counts.     BUN   Date Value Ref Range Status   05/24/2021 10 8 - 20 mg/dL Final     Creatinine   Date Value Ref Range Status   05/24/2021 0.8 0.6 - 1.1 mg/dL Final     PT   Date Value Ref Range Status   01/26/2021 12.7 12.2 - 14.5 sec Final     PTT   Date Value Ref Range Status   01/26/2021 31 23 - 35 sec Final     INR   Date Value Ref Range Status   01/26/2021 1.0   Final     Comment:     INR has no defined significance when PT is within Reference Range.       Imaging:  Independent review of  all imaging was done by myself as well as review of the radiologists readings and comparison to prior films.     MRI LUMBAR SPINE 3/8/21  Conus medullaris ends at the level of T12-L1 disc space  Visualized portion of  the spinal cord and the nerve roots are without mass or signal abnormality.  Congenital spinal stenosis is noted.     The vertebral bodies are normal in stature.  Minimal mixed type I and type II  endplate degenerative signal is seen at L4-L5 and L5-S1 levels.  Disc desiccation is noted at L4-L5 level.     No degenerative changes are identified at L1-L2 and L2-L3 levels.     At L3-L4 level, minimal broad-based posterior disc bulge is seen with minimal  bilateral facet joint hypertrophy causing minimal mass effect on the thecal sac  and minimal bilateral neural foramina stenosis.     At L4-L5 level, minimal spondylolisthesis is seen where L5 vertebral body is  posterior to L4 vertebral body.  Moderate broad-based posterior disc bulge is  seen with bilateral facet joint and ligamentum flavum hypertrophy.  Small amount  of fluid is seen in the left facet joints.  Far right lateral disc protrusion  with superior extrusion is noted measuring 0.4 cm in AP by 1.0 cm in transverse  by 0.7 cm in CC.  Combination of this findings causing mild compression of the  anterior aspect of the thecal sac and moderate to severe bilateral neural  foramina stenosis.  Right foraminal lateral disc protrusion may be causing mass  effect on the right lateral L4 nerve root.  Degenerative changes remain stable.     At L5-S1 level, minimal spondylolisthesis is seen where S1 vertebral body is  anterior to L5 vertebral body.  Moderate broad-based posterior disc bulge is  seen with focal central disc protrusion.  This protrusion measures 0.5 cm in AP  by 1.1 cm in transverse by 0.9 cm in CC.  Mild bilateral facet joint hypertrophy  is noted.  This is causing mild mass effect on the anterior aspect of the thecal  sac and probable mass  effect on the right S1 nerve root in the spinal canal and  moderate left and moderate to severe right neural foramina stenosis.     Post contrast-enhanced images demonstrate minimal pachymeningeal enhancement at  from L4-L5 level down to L5-S1 levels.  There is no enhancing fluid collection  to suggest an abscess.     Assessment and Plan:  In summary, Carolyn Redmond is a 35 y.o. female with a significant past medical history of anxiety, depression, endocarditis, fibromyalgia, migraines, pancreatitis, IV drug use, vasculitis, and osteomyelitis in the lumbar spine diagnosed 12/10/20 and on IV antibiotics until 2/1/21 for a total of 8 weeks. Patient presents to the ED today with complaint of weakness and paresthesias.     On exam, patient is full strength throughout with hyper-reflexia and right > left hoffmans sign. No ankle clonus. Given the generalized weakness and exam findings of myelopathy, MRI brain and cervical spine has been ordered for evaluation.     - Further recommendations pending MRI studies  - No acute neurosurgical intervention at this time  - multimodal pain management per primary team    Terence Selby PA-C    D/w Dr. Ness    Patient seen earlier today. Signature timestamp does not reflect patient encounter time.   More than 50% of the time is spent counseling the patient and family and coordinating care.

## 2021-05-25 VITALS
BODY MASS INDEX: 49.1 KG/M2 | OXYGEN SATURATION: 96 % | WEIGHT: 266.8 LBS | HEIGHT: 62 IN | RESPIRATION RATE: 18 BRPM | TEMPERATURE: 98.8 F | DIASTOLIC BLOOD PRESSURE: 76 MMHG | HEART RATE: 89 BPM | SYSTOLIC BLOOD PRESSURE: 124 MMHG

## 2021-05-25 PROCEDURE — 63700000 HC SELF-ADMINISTRABLE DRUG: Performed by: HOSPITALIST

## 2021-05-25 PROCEDURE — 99238 HOSP IP/OBS DSCHRG MGMT 30/<: CPT | Performed by: HOSPITALIST

## 2021-05-25 PROCEDURE — 200200 PR NO CHARGE: Performed by: REGISTERED NURSE

## 2021-05-25 PROCEDURE — 99254 IP/OBS CNSLTJ NEW/EST MOD 60: CPT | Performed by: PSYCHIATRY & NEUROLOGY

## 2021-05-25 PROCEDURE — 99232 SBSQ HOSP IP/OBS MODERATE 35: CPT | Performed by: NEUROLOGICAL SURGERY

## 2021-05-25 PROCEDURE — 63700000 HC SELF-ADMINISTRABLE DRUG: Performed by: STUDENT IN AN ORGANIZED HEALTH CARE EDUCATION/TRAINING PROGRAM

## 2021-05-25 RX ORDER — BUPRENORPHINE AND NALOXONE 8; 2 MG/1; MG/1
1 FILM, SOLUBLE BUCCAL; SUBLINGUAL 2 TIMES DAILY
Status: DISCONTINUED | OUTPATIENT
Start: 2021-05-25 | End: 2021-05-25 | Stop reason: HOSPADM

## 2021-05-25 RX ORDER — BUPRENORPHINE HYDROCHLORIDE 8 MG/1
16 TABLET SUBLINGUAL DAILY
COMMUNITY
End: 2023-05-25

## 2021-05-25 RX ADMIN — VALACYCLOVIR HYDROCHLORIDE 1000 MG: 500 TABLET, FILM COATED ORAL at 09:23

## 2021-05-25 RX ADMIN — FAMOTIDINE 10 MG: 20 TABLET ORAL at 09:23

## 2021-05-25 RX ADMIN — PREGABALIN 150 MG: 75 CAPSULE ORAL at 14:30

## 2021-05-25 RX ADMIN — PANTOPRAZOLE SODIUM 40 MG: 40 TABLET, DELAYED RELEASE ORAL at 09:24

## 2021-05-25 RX ADMIN — BACLOFEN 15 MG: 10 TABLET ORAL at 09:43

## 2021-05-25 RX ADMIN — PREGABALIN 150 MG: 75 CAPSULE ORAL at 09:23

## 2021-05-25 RX ADMIN — BUPRENORPHINE AND NALOXONE 1 FILM: 8; 2 FILM, SOLUBLE BUCCAL; SUBLINGUAL at 14:30

## 2021-05-25 RX ADMIN — VALACYCLOVIR HYDROCHLORIDE 1000 MG: 500 TABLET, FILM COATED ORAL at 14:30

## 2021-05-25 RX ADMIN — PAROXETINE 20 MG: 20 TABLET, FILM COATED ORAL at 09:24

## 2021-05-25 RX ADMIN — METOPROLOL SUCCINATE 25 MG: 25 TABLET, EXTENDED RELEASE ORAL at 09:23

## 2021-05-25 RX ADMIN — BUPROPION HYDROCHLORIDE 75 MG: 75 TABLET, FILM COATED ORAL at 14:30

## 2021-05-25 RX ADMIN — BUPROPION HYDROCHLORIDE 75 MG: 75 TABLET, FILM COATED ORAL at 09:24

## 2021-05-25 ASSESSMENT — COGNITIVE AND FUNCTIONAL STATUS - GENERAL
CLIMB 3 TO 5 STEPS WITH RAILING: 3 - A LITTLE
HELP NEEDED FOR BATHING: 3 - A LITTLE
WALKING IN HOSPITAL ROOM: 3 - A LITTLE
HELP NEEDED FOR PERSONAL GROOMING: 4 - NONE
EATING MEALS: 4 - NONE
MOVING TO AND FROM BED TO CHAIR: 3 - A LITTLE
STANDING UP FROM CHAIR USING ARMS: 3 - A LITTLE
DRESSING REGULAR UPPER BODY CLOTHING: 4 - NONE
DRESSING REGULAR LOWER BODY CLOTHING: 3 - A LITTLE
TOILETING: 3 - A LITTLE

## 2021-05-25 ASSESSMENT — PATIENT HEALTH QUESTIONNAIRE - PHQ9: SUM OF ALL RESPONSES TO PHQ9 QUESTIONS 1 & 2: 0

## 2021-05-25 NOTE — HOSPITAL COURSE
Carolyn is a 35 y.o. female admitted on 5/24/2021 with Muscle weakness (generalized) [M62.81]  Elevated CK [R74.8]  Fall, initial encounter [W19.XXXA]. Principal problem is Elevated CK.    Past Medical History  Carolyn has a past medical history of Anxiety, Depressed, Endocarditis, Fibromyalgia, Migraines, Pancreatitis, Substance abuse (CMS/[facility]) (2015), Tachycardia, and Vasculitis (CMS/[facility]).    History of Present Illness   patient presents complaining of generalized muscle weakness and multiple falls.  This is a 35-year-old female with history of anxiety, depression, fibromyalgia, previous IV drug abuse with infective endocarditis and lumbar spine osteomyelitis who presented to the hospital for evaluation of weakness and falls.  She states that she has been having twitching in her upper and lower extremities and at times her legs just give out.  She unfortunately was just released from long-term 4 days ago after being incarcerated for the DUI.  The patient's mom states she fell to the floor today and might have struck her head.  Exam: The patient is alert in no acute distress.  At times she is jerking her left upper extremity.  She is moving her extremities equally on the litter.  She is mentating appropriately.  X Ray C spine: moderate central disc protrusion superimposed on diffuse disc bulge with flattening of the ventral spinal cord and impression on the cord at C5-C6. MRI(-)

## 2021-05-25 NOTE — NURSING NOTE
Per [facility], pain management needs to be consulted for suboxone order to be placed. Will place order for pain management now.

## 2021-05-25 NOTE — ASSESSMENT & PLAN NOTE
Pt with a long standing history of depression and anxiety. She denies any current depressive symptoms or increased anxiety. Can continue home psych meds - Wellbutrin 75mg PO daily, Paxil 20mg PO daily, Prazosin 2mg PO HS, Ambien 10mg PO HS prn insomnia and Seroquel 200mg PO HS. Pt currently has outpatient therapist and has a her next appt 6/2. She is currently seeking a psychiatrist at [facility]  · Continue Wellbutrin 75mg PO TID, Paxil 20mg PO daily, Prazosin 2mg PO HS, Ambien 10mg PO HS prn insomnia and Seroquel 200mg PO HS.  · Pt to follow up with her outpatient therapist as scheduled  · Follow up with [facility] for outpatient psychiatrist

## 2021-05-25 NOTE — CONSULTS
Wound Ostomy Continence Note    Subjective    HPI Patient is a 35 y.o. female who was admitted on 5/24/2021 with a diagnosis of Muscle weakness (generalized) [M62.81]  Elevated CK [R74.8]  Fall, initial encounter [W19.XXXA].    Problem list Problem List:   Patient Active Problem List   Diagnosis   • Anxiety   • Cervical disc disease   • Chronic pancreatitis (CMS/[facility])   • Depression   • Insomnia   • Migraine, intractable   • Concussion   • Intractable migraine without aura   • Palpitations   • REM behavioral disorder   • Transient alteration of awareness   • Vasculitis determined by biopsy of skin (CMS/[facility])   • Generalized abdominal pain   • Pain   • Pain of upper abdomen   • Slow transit constipation   • Opioid type dependence, continuous use (CMS/[facility])   • Anxiety   • Essential hypertension   • Acute bacterial endocarditis   • Unspecified mood (affective) disorder (CMS/[facility])   • Drug-induced constipation   • Dyspepsia   • Nausea & vomiting   • Chronic fatigue   • Pulmonary hypertension (CMS/[facility])   • Ambulatory dysfunction   • Herpes zoster   • Elevated CK      PMH/PSH Medical History:   Past Medical History:   Diagnosis Date   • Anxiety    • Depressed    • Endocarditis    • Fibromyalgia    • Migraines    • Pancreatitis    • Substance abuse (CMS/[facility]) 2015   • Tachycardia    • Vasculitis (CMS/[facility])      Surgical History:   Past Surgical History:   Procedure Laterality Date   • CHOLECYSTECTOMY     • ERCP     • GALLBLADDER SURGERY     • TONSILLECTOMY  1991      Assessment and Recommendation      Consult by [facility] physician for skin assessment. Patient currently in consult with another provider, suggestions given based off of EMR/media review. Patient has herpes zoster rash on dermatome along mid back. Patient currently ordered valcyclovir per primary team. No surrounding erythema, intact fluid filled blisters, few ruptured blisters with partial crusting. Wound care recs: For blisters that are crusted over-can leave open to  air. Cool compresses may be used to help with discomfort/pruritis. If blisters open-may gently cleanse wounds with sterile NSS, then cover with non adherent adaptic dressing and dry gauze/ABD-change daily and PRN. If open areas have large amount of drainage, could utilize aquacel Ag dressing over wounds and cover with dry gauze instead of adaptic/gauze, for intact fluid filled blisters-can cover loosely with dry gauze/ABD for comfort. Change dressings daily and PRN. Wound care to sign off, please re consult if needed.        Date: 05/25/21  Signature: Charmaine Lyons RN

## 2021-05-25 NOTE — CONSULTS
Neurology Consult Note    Subjective     Carolyn Redmond is a 35 y.o. female who was admitted for tingling sensation in her upper extremities, and also twitching in her upper extremities which was resulting in her throwing up with objects from her hands.  She also complained of pain in her neck and shoulders and stiffness in her upper back.  She reported pain in her lower back, generalized weakness and difficulty maintaining balance.  However, all of her symptoms have resolved at the time of this assessment and she denies any other neurological complaints.    Review of Systems  All other systems reviewed and negative except as noted in the HPI.    Allergies: Amoxicillin, Nsaids (non-steroidal anti-inflammatory drug), Trazodone, and Tramadol    Current Inpatient Medications   Medication Dose Route Frequency Provider Last Rate Last Admin   • acetaminophen (TYLENOL) tablet 650 mg  650 mg oral q4h PRN Perfecto Angela MD       • baclofen (LIORESAL) split tablet 15 mg  15 mg oral 3x daily PRN Perfecto Angela MD   15 mg at 05/25/21 0943   • buprenorphine-naloxone (SUBOXONE) 8-2 mg sublingual film film 1 Film  1 Film sublingual BID Dulce Jalloh MD       • buPROPion (WELLBUTRIN) tablet 75 mg  75 mg oral TID Michael Taylor MD   75 mg at 05/25/21 0924   • glucose chewable tablet 16-32 g of dextrose  16-32 g of dextrose oral PRN Michael Taylor MD        Or   • dextrose 40 % oral gel 15-30 g of dextrose  15-30 g of dextrose oral PRN Michael Taylor MD        Or   • glucagon (GLUCAGEN) injection 1 mg  1 mg intramuscular PRN Michael Taylor MD        Or   • dextrose in water injection 12.5 g  25 mL intravenous PRN Michael Taylor MD       • enoxaparin (LOVENOX) syringe 40 mg  40 mg subcutaneous Daily (6a) Michael Taylor MD       • famotidine (PEPCID) tablet 10 mg  10 mg oral BID Michael Taylor MD   10 mg at 05/25/21 0923   • fluticasone propionate (FLONASE) 50 mcg/actuation nasal spray 1 spray  1 spray Each  Nostril Daily PRN Michael Taylor MD       • ketorolac (TORADOL) injection 30 mg  30 mg intravenous q6h PRN Perfecto Angela MD   30 mg at 05/24/21 2140   • magnesium sulfate IVPB 2g in 50 mL NSS/D5W/SWFI  2 g intravenous PRN Perfecto Angela MD       • metoprolol succinate XL (TOPROL-XL) 24 hr ER tablet 25 mg  25 mg oral q AM Michael Taylor MD   25 mg at 05/25/21 0923   • nicotine (NICODERM CQ) 21 mg/24 hr patch 1 patch  1 patch transdermal Daily PRN Perfecto Angela MD       • pantoprazole (PROTONIX) tablet,delayed release (DR/EC) 40 mg  40 mg oral BID Perfecto Angela MD   40 mg at 05/25/21 0924   • PARoxetine (PAXIL) tablet 20 mg  20 mg oral q AM Michael Taylor MD   20 mg at 05/25/21 0924   • potassium chloride (KLOR-CON) tablet extended release 20 mEq  20 mEq oral PRN Perfecto Angela MD        Or   • potassium chloride (KLOR-CON) tablet extended release 40 mEq  40 mEq oral PRN Perfecto Angela MD        Or   • potassium chloride 20 mEq in 100 mL IVPB  (premix)  20 mEq intravenous PRN Perfecto Angela MD        Or   • potassium chloride (KCL) 40 mEq/250 mL IVPB in NSS 40 mEq  40 mEq intravenous PRN Perfecto Angela MD        Or   • potassium bicarbonate (EFFER-K) disintegrating tablet 20 mEq  20 mEq oral PRN Perfecto Angela MD        Or   • potassium bicarbonate (EFFER-K) disintegrating tablet 40 mEq  40 mEq oral PRN Perfecto Angela MD       • prazosin (MINIPRESS) capsule 2 mg  2 mg oral Nightly Michael Taylor MD   2 mg at 05/24/21 2357   • pregabalin (LYRICA) capsule 150 mg  150 mg oral TID Perfecto Angela MD   150 mg at 05/25/21 0923   • QUEtiapine (SEROquel) tablet 200 mg  200 mg oral Nightly Perfecot Angela MD   200 mg at 05/24/21 2308   • valACYclovir (VALTREX) tablet 1,000 mg  1,000 mg oral TID Perfecto Angela MD   1,000 mg at 05/25/21 0912   • zolpidem (AMBIEN) tablet 10 mg  10 mg oral Nightly PRN Faith Haq MD   10 mg at 05/24/21 0522       Medical History:   Past  Medical History:   Diagnosis Date   • Anxiety    • Depressed    • Endocarditis    • Fibromyalgia    • Migraines    • Pancreatitis    • Substance abuse (CMS/HCC) 2015   • Tachycardia    • Vasculitis (CMS/HCC)        Surgical History:   Past Surgical History:   Procedure Laterality Date   • CHOLECYSTECTOMY     • ERCP     • GALLBLADDER SURGERY     • TONSILLECTOMY  1991       Social History:   Social History     Socioeconomic History   • Marital status: Single     Spouse name: None   • Number of children: None   • Years of education: None   • Highest education level: None   Occupational History   • Occupation:    Tobacco Use   • Smoking status: Current Every Day Smoker     Packs/day: 0.50     Types: Electronic Cigarette   • Smokeless tobacco: Current User   • Tobacco comment: quit 1 year ago, vapes currently   Vaping Use   • Vaping Use: Every day   • Substances: Flavoring   • Devices: Refillable tank   Substance and Sexual Activity   • Alcohol use: Not Currently     Comment: social   • Drug use: Yes     Types: Marijuana   • Sexual activity: Defer   Other Topics Concern   • None   Social History Narrative   • None     Social Determinants of Health     Financial Resource Strain:    • Difficulty of Paying Living Expenses:    Food Insecurity: Unknown   • Worried About Running Out of Food in the Last Year: Never true   • Ran Out of Food in the Last Year: Not on file   Transportation Needs:    • Lack of Transportation (Medical):    • Lack of Transportation (Non-Medical):    Physical Activity:    • Days of Exercise per Week:    • Minutes of Exercise per Session:    Stress:    • Feeling of Stress :    Social Connections:    • Frequency of Communication with Friends and Family:    • Frequency of Social Gatherings with Friends and Family:    • Attends Holiness Services:    • Active Member of Clubs or Organizations:    • Attends Club or Organization Meetings:    • Marital Status:    Intimate Partner Violence:    •  Fear of Current or Ex-Partner:    • Emotionally Abused:    • Physically Abused:    • Sexually Abused:        Family History:   Family History   Problem Relation Age of Onset   • Hypertension Biological Mother    • Lung cancer Biological Father    • Heart disease Maternal Grandfather        Objective     Neurologic examination:  The patient is awake, alert and oriented to time, place and person.  She is able to follow commands and answer questions appropriately.  There is no aphasia or dysarthria.  On cranial nerve examination, pupils are 3 mm bilateral round and reactive to light and accommodation, visual fields are full to confrontation bilaterally.  Extraocular movements are intact, there is no facial asymmetry, hearing is intact bilaterally to normal conversation volume, tongue palate and uvula are midline and shoulder shrugs are full bilaterally.  Motor strengths are 5/5 in bilateral upper and lower extremities on medical research [place] scale.  There is no drift or involuntary movement noted.  Deep tendon reflexes are 2+ bilateral upper and lower extremities. Sensations of pain, temperature and vibration are intact and bilaterally symmetrical.  Coordination is intact by finger-to-nose testing bilaterally.    Labs  I have reviewed the patient's pertinent labs.  Significant abnormals are elevated CK levels and UDS positive for cannabinoids.    Imaging  I have independently reviewed the following studies: CT head and MRI brain. Studies are within normal limits.  I have also reviewed images from an MRI of the cervical spine which was remarkable for a diffuse disc bulge at C5-6 level with mild central canal stenosis and bilateral facet arthritic changes.    Medical records were reviewed.    Impression:  Ms. Redmond is a 35 years old female who has been admitted to the hospital with tingling sensation in her extremities, muscle twitches and neurolysed weakness.  Her symptoms have since resolved.  Her neurological  examination is nonfocal, and neuroimaging is remarkable as mentioned above.    In my opinion, her neurological presentation yesterday was most likely secondary to cervical radiculopathy.  She does not have any significant spinal cord pathology, or any other significant neurological pathology that could be causing her symptoms.    Recommendations:  1.  Please continue with Celebrex 200 mg once a day for cervical radiculopathy.  2.  Please also continue with her home medication of baclofen for muscle spasms and twitches.  3.  Please continue with her home medication of Lyrica 150 mg p.o. twice a day for neuropathic pain.  4.  She is already on adequate and appropriate medications.  With her symptoms having resolved, I do not see the need for adding any more medications to her current medication list.  5.  No further inpatient neurologic work-up is indicated or recommended at this time.  6.  Rest of the management can continue per the primary team.    Discussed with the patient.    We shall follow her as needed.

## 2021-05-25 NOTE — PROGRESS NOTES
Neurosurgery Progress Note    Hospital Day #2    CC:   Weakness and falls    Subjective     Interval History:   Feeling better today   MRI completed     Objective     Vital signs in last 24 hours:  Temp:  [36.3 °C (97.3 °F)-37.2 °C (98.9 °F)] 36.3 °C (97.3 °F)  Heart Rate:  [80-92] 86  Resp:  [15-20] 20  BP: (100-135)/(53-69) 114/55    Intake/Output this shift:  No intake or output data in the 24 hours ending 05/25/21 1431  No intake/output data recorded.      Current Facility-Administered Medications:   •  acetaminophen (TYLENOL) tablet 650 mg, 650 mg, oral, q4h PRN, Perfecto Angela MD  •  baclofen (LIORESAL) split tablet 15 mg, 15 mg, oral, 3x daily PRN, Perfecto Angela MD, 15 mg at 05/25/21 0943  •  buprenorphine-naloxone (SUBOXONE) 8-2 mg sublingual film film 1 Film, 1 Film, sublingual, BID, Dulce Jalloh MD, 1 Film at 05/25/21 1430  •  buPROPion (WELLBUTRIN) tablet 75 mg, 75 mg, oral, TID, Michael Taylor MD, 75 mg at 05/25/21 1430  •  glucose chewable tablet 16-32 g of dextrose, 16-32 g of dextrose, oral, PRN **OR** dextrose 40 % oral gel 15-30 g of dextrose, 15-30 g of dextrose, oral, PRN **OR** glucagon (GLUCAGEN) injection 1 mg, 1 mg, intramuscular, PRN **OR** dextrose in water injection 12.5 g, 25 mL, intravenous, PRN, Michael Taylor MD  •  enoxaparin (LOVENOX) syringe 40 mg, 40 mg, subcutaneous, Daily (6a), Michael Taylor MD  •  famotidine (PEPCID) tablet 10 mg, 10 mg, oral, BID, Michael Taylor MD, 10 mg at 05/25/21 0923  •  fluticasone propionate (FLONASE) 50 mcg/actuation nasal spray 1 spray, 1 spray, Each Nostril, Daily PRN, Michael Taylor MD  •  ketorolac (TORADOL) injection 30 mg, 30 mg, intravenous, q6h PRN, Perfecto Angela MD, 30 mg at 05/24/21 2140  •  magnesium sulfate IVPB 2g in 50 mL NSS/D5W/SWFI, 2 g, intravenous, PRN, Perfecto Angela MD  •  metoprolol succinate XL (TOPROL-XL) 24 hr ER tablet 25 mg, 25 mg, oral, q AM, Michael Taylor MD, 25 mg at 05/25/21 0926  •  nicotine  (NICODERM CQ) 21 mg/24 hr patch 1 patch, 1 patch, transdermal, Daily PRN, Perfecto Angela MD  •  pantoprazole (PROTONIX) tablet,delayed release (DR/EC) 40 mg, 40 mg, oral, BID, Perfecto Angela MD, 40 mg at 05/25/21 0924  •  PARoxetine (PAXIL) tablet 20 mg, 20 mg, oral, q AM, Michael Taylor MD, 20 mg at 05/25/21 0924  •  potassium chloride (KLOR-CON) tablet extended release 20 mEq, 20 mEq, oral, PRN **OR** potassium chloride (KLOR-CON) tablet extended release 40 mEq, 40 mEq, oral, PRN **OR** potassium chloride 20 mEq in 100 mL IVPB  (premix), 20 mEq, intravenous, PRN **OR** potassium chloride (KCL) 40 mEq/250 mL IVPB in NSS 40 mEq, 40 mEq, intravenous, PRN **OR** potassium bicarbonate (EFFER-K) disintegrating tablet 20 mEq, 20 mEq, oral, PRN **OR** potassium bicarbonate (EFFER-K) disintegrating tablet 40 mEq, 40 mEq, oral, PRN, Perfecto Angela MD  •  prazosin (MINIPRESS) capsule 2 mg, 2 mg, oral, Nightly, Michael Taylor MD, 2 mg at 05/24/21 2357  •  pregabalin (LYRICA) capsule 150 mg, 150 mg, oral, TID, Perfecto Angela MD, 150 mg at 05/25/21 1430  •  QUEtiapine (SEROquel) tablet 200 mg, 200 mg, oral, Nightly, Perfecto Angela MD, 200 mg at 05/24/21 2308  •  valACYclovir (VALTREX) tablet 1,000 mg, 1,000 mg, oral, TID, Perfecto Angela MD, 1,000 mg at 05/25/21 1430  •  zolpidem (AMBIEN) tablet 10 mg, 10 mg, oral, Nightly PRN, Faith Haq MD, 10 mg at 05/24/21 2357    Increased risk from baseline; VTE Prophylaxis as ordered  Both Mechanical and Pharmacological Prophylaxis    Labs  Lab Results   Component Value Date    GLUCOSE 102 (H) 05/24/2021    CALCIUM 8.6 (L) 05/24/2021     05/24/2021    K 3.6 05/24/2021    CO2 27 05/24/2021     05/24/2021    BUN 10 05/24/2021    CREATININE 0.8 05/24/2021     Lab Results   Component Value Date    WBC 10.66 (H) 05/24/2021    HGB 10.6 (L) 05/24/2021    HCT 34.0 (L) 05/24/2021    MCV 87.0 05/24/2021     05/24/2021     Lab Results   Component  Value Date    ALBUMIN 3.5 2021    BILITOT 1.0 2021    ALKPHOS 56 2021    BILIDIR <0.1 2017    AST 46 (H) 2021    ALT 27 2021    PROTEIN 6.4 2021     Lab Results   Component Value Date    PT 12.7 2021    PTT 31 2021    INR 1.0 2021       Imaging:  Independent review of most recent imaging and all relevant previous imaging was compared with the reading of the attending radiologist. Significant findings are as below:    CT HEAD WITHOUT IV CONTRAST    Result Date: 2021  IMPRESSION: No acute intracranial process.     MRI BRAIN WITHOUT CONTRAST    Result Date: 2021  IMPRESSION: No acute intracranial abnormality.  Paranasal sinus inflammatory changes. Slight image degradation due to patient motion.     MRI CERVICAL SPINE WITHOUT CONTRAST    Result Date: 2021  IMPRESSION: There is a moderate central disc protrusion superimposed on diffuse disc bulge with flattening of the ventral spinal cord and impression on the cord at C5-C6. No focal cord edema or myelomalacia.  Slight image degradation due to patient motion.       Physical Exam  Neurologic Examination:     Mental status: awake, alert, and oriented to person, place, and time. Speech and fund of knowledge are normal.     PERRL, EOMI, face symmetric, tongue protrudes to the midline    Motor:   RUE: D: 5/5, T: 5/5, B: 5/5, WE: 5/5, H/5, IH: 5/5  LUE: D: 5/5, T: 5/5, B: 5/5, WE: 5/5, H/5, IH: 5/5  RLE: IP  5/5, Q  5/5, DF 5/5, EHL 5/5, PF 5/5  LLE: IP  5/5, Q  5/5, DF 5/5, EHL 5/5, PF 5/5    Reflexes: Normoreflexive throughout.   Negative santana and clonus bilaterally. Normal babinski.     Sensation: intact to light touch      No drift or dymetria.        Assessment/Plan     36yo female with generalized weakness and frequent falls. On exam, she is full strength with hyper-reflexia and L>R santana's. MRI cervical spine with moderate disc herniation at C5-C6. No cord edema or myelomalacia. No  concerning fluid collections.     - No acute neurosurgical intervention  - Follow-up with neurosurgery in 6 weeks  - PT/OT  - Pain management per primary team  - Okay for discharge from NS standpoint.       RAVI Renteria      d/w Dulce Jalloh MD , Dr. Ness

## 2021-05-25 NOTE — ED ATTESTATION NOTE
.[email or web address]  Physical Exam  Review of Systems    5/24/20219:44 PM  I have personally seen and examined the patient.  I reviewed and agree with the PA/NP/Resident's assessment and plan of care.    My examination, assessment, and plan of care of Carolyn Redmond is as follows:  The patient presents complaining of generalized muscle weakness and multiple falls.  This is a 35-year-old female with history of anxiety, depression, fibromyalgia, previous IV drug abuse with infective endocarditis and lumbar spine osteomyelitis who presented to the hospital for evaluation of weakness and falls.  She states that she has been having twitching in her upper and lower extremities and at times her legs just give out.  She unfortunately was just released from intermediate 4 days ago after being incarcerated for the DUI.  The patient's mom states she fell to the floor today and might have struck her head.  Exam: The patient is alert in no acute distress.  At times she is jerking her left upper extremity.  She is moving her extremities equally on the litter.  She is mentating appropriately.  Impression/Plan: The patient was seen by Dr. Ness who wanted the patient to be evaluated with MRI of the brain and cervical spine.    After the MRIs were performed, Dr Ness felt she could go home and follow-up in the office.  However the patient was reluctant and was concerned about repeated falls.  Lab work revealed a CK of 1840 and we recommended admission for IV fluids for rhabdomyolysis.    Vital Sign Review: Vital signs have been ordered and reviewed. The oxygen saturation is 96% on room air, normal.     I was physically present for the key/critical portions of the following procedures: None    This document was created using dragon dictation software.  There might be some typographical errors due to this technology.     Quang Guadalupe, DO  05/24/21 6280

## 2021-05-25 NOTE — CONSULTS
Psychiatry Consult    CC: This is a 35 year old seen on consultation for suboxone use    Subjective     Carolyn Redmond is a 35 y.o. female with a PMH of fibromyalgia, anxiety, depression, bacterial endocarditis, and IDVU who presented to the ER with lower extremity weakness and admitted for medical workup. Pt also noted to have active shingles rash on her lumbar region. Pt states she was recently arrested for an outstanding warrant and while in [facility] she was switched from Methadone to Suboxone. Upon release, she has followed up with her outpatient methadone clinic ([facility]) as they also prescribe Suboxone. She states she is currently taking Suboxone 8mg/2mg BID and reports no cravings or current withdrawal symptoms.. She denies any depressive symptoms or anxiety. She denies symptoms of sarah. She denies any suicidal ideation intent or plan. She denies auditory or visual hallucinations.       Psychiatric History: Pt states she has a history of depression, anxiety, insomnia and PTSD. She has seen a psychiatrist in the past but not currently. She sees a therapist Sammi and has her next appt 6/2. She currently gets her psych meds through her PCP. She denies a history of psychosis or sarah.     Suicide Attempts: yes - June 2017. Intentional OD while intoxicated     Risk Factors: Previous suicide attempt(s), Presence of a Mood Disorder and Alcohol and/or substance abuse     Protective Factors: Effective and accessible mental health care  and Connectedness to individuals, family, community, and social institutions   Current Psychiatrist: no  Past psychiatric Hospitalization: yes - voluntary in 2016 for depression, 302 in 2017 after OD  Medication Trials:  yes - Currently on Suboxone 8mg/2mg SL BID, Bupropion 75mg PO TID, Paxil 20mg PO DAILY, Prazosin 2mg PO HS, Seroquel 200mg PO HS and Ambien 10mg PO HS prn insomnia  ECT trials: no    Substance Use History:  Substance use: Pt reports a history  of opioid use disorder. She is currently on MAT and reports not using since for the last  Consequences of use: Yes:  Health Problems and Legal Problems  Past D&A Treatment: Yes: Detox: pt states multiple inpatient rehabs, Rehab:  , Methadone: was on prior to recent alf stay and Medication Assisted Treatment: Currently on Suboxone    Family History:   Family History   Problem Relation Age of Onset   • Hypertension Biological Mother    • Lung cancer Biological Father    • Heart disease Maternal Grandfather        Social History: She currently lives in [place], [place] with her mother. She attended college and has her BA in [employer]. She is not currently working r/t her recent alf stay, but is hoping to obtain work in the field soon.      Medical History:   Past Medical History:   Diagnosis Date   • Anxiety    • Depressed    • Endocarditis    • Fibromyalgia    • Migraines    • Pancreatitis    • Substance abuse (CMS/[facility]) 2015   • Tachycardia    • Vasculitis (CMS/[facility])        Surgical History:   Past Surgical History:   Procedure Laterality Date   • CHOLECYSTECTOMY     • ERCP     • GALLBLADDER SURGERY     • TONSILLECTOMY  1991       Allergies:   Allergies   Allergen Reactions   • Amoxicillin    • Nsaids (Non-Steroidal Anti-Inflammatory Drug) Nausea Only   • Trazodone      Other reaction(s): Headaches, Other (see comments)   • Tramadol Palpitations       Current Medications:  •  acetaminophen, 650 mg, oral, q4h PRN  •  baclofen, 15 mg, oral, 3x daily PRN  •  buPROPion, 75 mg, oral, TID  •  glucose, 16-32 g of dextrose, oral, PRN **OR** dextrose, 15-30 g of dextrose, oral, PRN **OR** glucagon, 1 mg, intramuscular, PRN **OR** dextrose in water, 25 mL, intravenous, PRN  •  enoxaparin, 40 mg, subcutaneous, Daily (6a)  •  famotidine, 10 mg, oral, BID  •  fluticasone propionate, 1 spray, Each Nostril, Daily PRN  •  ketorolac, 30 mg, intravenous, q6h PRN  •  magnesium sulfate, 2 g, intravenous, PRN  •  metoprolol succinate  XL, 25 mg, oral, q AM  •  nicotine, 1 patch, transdermal, Daily PRN  •  NON FORMULARY MEDICATION REQUEST, 1 Film, sublingual, BID  •  pantoprazole, 40 mg, oral, BID  •  PARoxetine, 20 mg, oral, q AM  •  potassium chloride, 20 mEq, oral, PRN **OR** potassium chloride, 40 mEq, oral, PRN **OR** potassium chloride, 20 mEq, intravenous, PRN **OR** potassium chloride, 40 mEq, intravenous, PRN **OR** potassium bicarbonate, 20 mEq, oral, PRN **OR** potassium bicarbonate, 40 mEq, oral, PRN  •  prazosin, 2 mg, oral, Nightly  •  pregabalin, 150 mg, oral, TID  •  QUEtiapine, 200 mg, oral, Nightly  •  valACYclovir, 1,000 mg, oral, TID  •  zolpidem, 10 mg, oral, Nightly PRN    Home Medications:  •  baclofen, Take 15 mg by mouth 3 (three) times a day.    •  buprenorphine-naloxone, Place 1 tablet under the tongue 2 (two) times a day.    •  buPROPion, Take 75 mg by mouth 3 (three) times a day.  •  butalbital-acetaminophen-caff, Take 1 tablet by mouth every 4 (four) hours as needed for headaches.  •  celecoxib, Take 200 mg by mouth every morning.    •  famotidine, Take 10 mg by mouth 2 (two) times a day.  •  fluticasone propionate, Administer 1 spray into each nostril daily as needed for rhinitis or allergies.  •  hydrocortisone-pramoxine, Insert 1 application into the rectum 4 (four) times a day as needed for hemorrhoids.  •  lactobacillus acidophilus complex, Take 1 tablet by mouth 2 (two) times a day.  •  metoprolol succinate XL, Take 25 mg by mouth every morning.  •  nicotine, Place 1 patch on the skin every morning.  •  nicotine polacrilex, Apply 4 mg to cheek as needed for smoking cessation.  •  pantoprazole, Take 40 mg by mouth 2 (two) times a day.  •  PARoxetine, Take 20 mg by mouth every morning.  •  prazosin, Take 2 mg by mouth nightly.  •  pregabalin, Take 150 mg by mouth 2 (two) times a day.  •  QUEtiapine, Take 200 mg by mouth nightly.  •  zolpidem, Take 10 mg by mouth nightly.  •  valACYclovir, Take 1 tablet (1,000 mg  total) by mouth 3 (three) times a day for 7 days.    Review of Systems  Constitutional: negative  Behavioral/Psych: negative    Objective     Vital Signs for the last 24 hours:  Temp:  [36.3 °C (97.3 °F)-37.2 °C (98.9 °F)] 36.3 °C (97.3 °F)  Heart Rate:  [80-92] 86  Resp:  [15-20] 20  BP: (100-135)/(53-69) 114/55    Labs  CK 1,840    Imaging  Brain MRI - no    ECG   Last 3/25/21    MENTAL STATUS EXAM  Appearance: clean and dressed in hospital gown  Gait and Motor: no abnormal movements and dressed in hospital gown  Speech: normal rate/rhythm/volume  Mood: 'okay'  Affect: normal  Associations: coherent  Thought Process: goal-directed  Thought Content: no auditory or visual hallucinations.  Suicidality/Homicidality: denies  Judgement/Insight: good, acknowledges illness and help accepting  Orientation: day, month, year, type of place, name of place and situation  Memory: no impairment  Attention: alert  Knowledge: normal  Language: normal    Opioid type dependence, continuous use (CMS/[facility])  Assessment & Plan  Pt has a history of opioid use disorder. Upon discharge from her last stay, pt was on Methadone but due to her recent [facility] stay was switched to Suboxone. She states she was discharged from [facility] on Suboxone 8mg/2mg SL BID and received a 14 days rx. This was verified by Laury GilD. Can continue Suboxone 8mg/2mg SL BID, pt will not need rx at discharge  · Continue Suboxone 8mg/2mg SL BID  · Pt WILL NOT need a rx at discharge    Depression  Assessment & Plan  Pt with a long standing history of depression and anxiety. She denies any current depressive symptoms or increased anxiety. Can continue home psych meds - Wellbutrin 75mg PO daily, Paxil 20mg PO daily, Prazosin 2mg PO HS, Ambien 10mg PO HS prn insomnia and Seroquel 200mg PO HS. Pt currently has outpatient therapist and has a her next appt 6/2. She is currently seeking a psychiatrist at [facility]  · Continue Wellbutrin 75mg PO TID, Paxil  20mg PO daily, Prazosin 2mg PO HS, Ambien 10mg PO HS prn insomnia and Seroquel 200mg PO HS.  · Pt to follow up with her outpatient therapist as scheduled  · Follow up with [facility] outpatient psychiatrist

## 2021-05-25 NOTE — PLAN OF CARE
Plan of Care Review  Plan of Care Reviewed With: patient  Progress: no change  Outcome Summary: Patient's assessment is fairly benign, she reports some back pain for which toradol was administered. Patient still needs suboxone ordered to prevent withdrawal symptoms. Falls precautions in place, bed alarm is on and call bell is within reach.

## 2021-05-25 NOTE — PATIENT CARE CONFERENCE
Care Progression Rounds Note  Date: 5/25/2021  Time: 10:12 AM     Patient Name: Carolyn Redmond     Medical Record Number: [id number]   YOB: 1985  Sex: Female      Room/Bed: 4201    Admitting Diagnosis: Muscle weakness (generalized) [M62.81]  Elevated CK [R74.8]  Fall, initial encounter [W19.XXXA]   Admit Date/Time: 5/24/2021 12:18 PM    Primary Diagnosis: Elevated CK  Principal Problem: Elevated CK    GMLOS: pending  Anticipated Discharge Date: 5/26/2021    AM-PAC:  Mobility Score: 20    Discharge Planning:       Barriers to Discharge:  Barriers to Discharge: Medical issues not resolved    Participants:  occupational therapy, social work/services, advanced practice provider

## 2021-05-25 NOTE — NURSING NOTE
Patient's assessment determined to be mostly benign. She states that she was having some acute onset numbness and tingling in her LUE but this has since resolved. She states that there are no warning signs prior to her falls, and that she does not experience dizziness or light-headedness. She describes it as her joints simply giving out without warning. Additionally, she states that her hand dexterity had decreased and it was more difficult to  things.    She states that this all started last night and the above symptoms have appeared to resolve. Will keep a close eye on patient and use commode for toileting for safety. Neuro and neurovascular checks are intact at this time.

## 2021-05-25 NOTE — PLAN OF CARE
Problem: Adult Inpatient Plan of Care  Goal: Readiness for Transition of Care  Intervention: Mutually Develop Transition Plan  Flowsheets (Taken 5/25/2021 1235)  Assistive Device/Animal Currently Used at Home: none  Readmission Within the Last 30 Days: no previous admission in last 30 days  Patient/Family Anticipated Services at Transition: none  Patient/Family Anticipates Transition to: home with family  Concerns to be Addressed:  • no discharge needs identified  • denies needs/concerns at this time    Met with patient, reviewed CCMSW role. Patient lives with her mother, in her aunt and uncle's home. Completely indep, no use of an assisted device, no previous services. Patient stated she is not driving but her mother takes her anywhere she needs to go and gets her food and medication.  Patient denied any other barriers to obtaining her medication. Patient stated her family is very supportive and denied needs.

## 2021-05-25 NOTE — NURSING NOTE
Patient states that she takes suboxone and ambien at home, but neither of these are ordered. [facility] paged for new orders. She also states that her mother brought in the patient's suboxone and birth control, but I was no informed about this and there are no order for either so it is unlikely that pharmacy has them. Patient tried to call her mom to ask for more information but her mom did not answer. Will try to locate meds.

## 2021-05-25 NOTE — NURSING NOTE
Discharge instructions and medications reviewed.   Questions and concerns answered.  Pt verbalized understanding of all instructions.  Info for Rhabdo and Shingles also provided.  Aramis removed with catheter intact and pt discharged to home by Mother via w/c.  Home med Suboxone also returned.

## 2021-05-25 NOTE — ASSESSMENT & PLAN NOTE
Pt has a history of opioid use disorder. Upon discharge from her last stay, pt was on Methadone but due to her recent [facility] stay was switched to Suboxone. She states she was discharged from [facility] on Suboxone 8mg/2mg SL BID and received a 14 days rx. This was verified by Jeffery, LauryD. Can continue Suboxone 8mg/2mg SL BID, pt will not need rx at discharge  · Continue Suboxone 8mg/2mg SL BID  · Pt WILL NOT need a rx at discharge

## 2021-05-29 LAB — BACTERIA BLD CULT: NORMAL

## 2021-07-14 ENCOUNTER — TELEPHONE (OUTPATIENT)
Dept: SURGERY | Facility: CLINIC | Age: 36
End: 2021-07-14

## 2021-07-23 ENCOUNTER — TELEPHONE (OUTPATIENT)
Dept: SCHEDULING | Facility: CLINIC | Age: 36
End: 2021-07-23

## 2021-07-23 NOTE — TELEPHONE ENCOUNTER
Pt is requesting that current lab scripts be faxed to [facility] of her choice.    Pt will call back w/ lab info.     Pt can be reached at #[phone].    Ty.

## 2021-07-23 NOTE — TELEPHONE ENCOUNTER
Carolyn called back with the lab info  Please fax the lab orders to [facility]     [facility]   [address] [address]  P [phone]  F [phone]

## 2021-07-27 ENCOUNTER — TELEPHONE (OUTPATIENT)
Dept: CARDIOLOGY | Facility: CLINIC | Age: 36
End: 2021-07-27

## 2021-07-27 LAB
ALBUMIN SERPL-MCNC: 3.7 G/DL (ref 3.8–4.8)
ALBUMIN/GLOB SERPL: 1.3 {RATIO} (ref 1.2–2.2)
ALP SERPL-CCNC: 70 IU/L (ref 48–121)
ALT SERPL-CCNC: 9 IU/L (ref 0–32)
AST SERPL-CCNC: 14 IU/L (ref 0–40)
BASOPHILS # BLD AUTO: 0.1 X10E3/UL (ref 0–0.2)
BASOPHILS NFR BLD AUTO: 1 %
BILIRUB SERPL-MCNC: 0.2 MG/DL (ref 0–1.2)
BNP SERPL-MCNC: 3.1 PG/ML (ref 0–100)
BUN SERPL-MCNC: 15 MG/DL (ref 6–20)
BUN/CREAT SERPL: 20 (ref 9–23)
CALCIUM SERPL-MCNC: 8.7 MG/DL (ref 8.7–10.2)
CHLORIDE SERPL-SCNC: 101 MMOL/L (ref 96–106)
CO2 SERPL-SCNC: 23 MMOL/L (ref 20–29)
CREAT SERPL-MCNC: 0.75 MG/DL (ref 0.57–1)
D DIMER PPP FEU-MCNC: 1.18 MG/L FEU (ref 0–0.49)
EOSINOPHIL # BLD AUTO: 0.4 X10E3/UL (ref 0–0.4)
EOSINOPHIL NFR BLD AUTO: 5 %
ERYTHROCYTE [DISTWIDTH] IN BLOOD BY AUTOMATED COUNT: 14.6 % (ref 11.7–15.4)
GLOBULIN SER CALC-MCNC: 2.8 G/DL (ref 1.5–4.5)
GLUCOSE SERPL-MCNC: 88 MG/DL (ref 65–99)
HCT VFR BLD AUTO: 30 % (ref 34–46.6)
HGB BLD-MCNC: 9.9 G/DL (ref 11.1–15.9)
IMM GRANULOCYTES # BLD AUTO: 0 X10E3/UL (ref 0–0.1)
IMM GRANULOCYTES NFR BLD AUTO: 0 %
LAB CORP EGFR IF AFRICN AM: 121 ML/MIN/1.73
LAB CORP EGFR IF NONAFRICN AM: 105 ML/MIN/1.73
LYMPHOCYTES # BLD AUTO: 2.5 X10E3/UL (ref 0.7–3.1)
LYMPHOCYTES NFR BLD AUTO: 34 %
MCH RBC QN AUTO: 27.3 PG (ref 26.6–33)
MCHC RBC AUTO-ENTMCNC: 33 G/DL (ref 31.5–35.7)
MCV RBC AUTO: 83 FL (ref 79–97)
MONOCYTES # BLD AUTO: 0.5 X10E3/UL (ref 0.1–0.9)
MONOCYTES NFR BLD AUTO: 7 %
NEUTROPHILS # BLD AUTO: 3.8 X10E3/UL (ref 1.4–7)
NEUTROPHILS NFR BLD AUTO: 53 %
PLATELET # BLD AUTO: 255 X10E3/UL (ref 150–450)
POTASSIUM SERPL-SCNC: 4.5 MMOL/L (ref 3.5–5.2)
PROT SERPL-MCNC: 6.5 G/DL (ref 6–8.5)
RBC # BLD AUTO: 3.63 X10E6/UL (ref 3.77–5.28)
SODIUM SERPL-SCNC: 138 MMOL/L (ref 134–144)
T4 FREE SERPL-MCNC: 0.96 NG/DL (ref 0.82–1.77)
TSH SERPL DL<=0.005 MIU/L-ACNC: 3.3 UIU/ML (ref 0.45–4.5)
WBC # BLD AUTO: 7.2 X10E3/UL (ref 3.4–10.8)

## 2021-08-06 ENCOUNTER — TELEPHONE (OUTPATIENT)
Dept: SCHEDULING | Facility: CLINIC | Age: 36
End: 2021-08-06

## 2021-08-06 NOTE — TELEPHONE ENCOUNTER
Pt called stating she forgot about her appt today and is asking for the next available at . Pt now scheduled for 9/2 OV.    If needed, pt can be reached at [phone]

## 2021-08-09 ENCOUNTER — TELEPHONE (OUTPATIENT)
Dept: NEUROSURGERY | Facility: CLINIC | Age: 36
End: 2021-08-09

## 2021-08-09 DIAGNOSIS — M54.9 MID BACK PAIN: Primary | ICD-10-CM

## 2021-08-09 DIAGNOSIS — M46.20 SPINAL ABSCESS (CMS/HCC): Primary | ICD-10-CM

## 2021-08-09 NOTE — TELEPHONE ENCOUNTER
Patient with low back after standing for about 30seconds that will radiate across the low back shooting up the spine feeling like the middle of her back will break? She denies any pain into her legs.  No saddle anesthesia or bowel bladder incontinence.  She has been having a difficult time going up steps secondary to the pain in her back.  She denies any fever but does report chills at night for the last several days.    Given her history of previous lumbar spine epidural abscess.  Get MRI of the thoracic and lumbar spine with and without contrast.  Will call patient for scheduling and see her in the office after the MRIs have been completed.

## 2021-08-13 ENCOUNTER — TELEPHONE (OUTPATIENT)
Dept: NEUROSURGERY | Facility: CLINIC | Age: 36
End: 2021-08-13

## 2021-08-13 DIAGNOSIS — M47.816 LUMBAR SPONDYLOSIS: Primary | ICD-10-CM

## 2021-08-13 DIAGNOSIS — M86.39 CHRONIC MULTIFOCAL OSTEOMYELITIS OF MULTIPLE SITES (CMS/HCC): ICD-10-CM

## 2021-08-13 DIAGNOSIS — M54.9 MID BACK PAIN: ICD-10-CM

## 2021-08-13 NOTE — TELEPHONE ENCOUNTER
Patient contacted us with severe and debilitating mid to lower back pain with radiation across her spine and inability to stand without severe pain and fatigue.     MRI T/L spine were denied.     Will order labwork (CBC, ESR, CRP) to follow-up given recent osteomyelitis. I see that an MRI of the iman and cervical spine were ordered in May but there has been no other thoracic or lumbar imaging since her last follow-up with us in the office in March.     Will need standing thoracolumbar xrays as well.     We will resubmit after imaging/labwork done.

## 2021-08-16 ENCOUNTER — HOSPITAL ENCOUNTER (OUTPATIENT)
Dept: RADIOLOGY | Facility: CLINIC | Age: 36
Discharge: HOME | End: 2021-08-16
Attending: PHYSICIAN ASSISTANT
Payer: COMMERCIAL

## 2021-08-16 DIAGNOSIS — M86.39 CHRONIC MULTIFOCAL OSTEOMYELITIS OF MULTIPLE SITES (CMS/HCC): ICD-10-CM

## 2021-08-16 DIAGNOSIS — M47.816 LUMBAR SPONDYLOSIS: ICD-10-CM

## 2021-08-16 DIAGNOSIS — M54.9 MID BACK PAIN: ICD-10-CM

## 2021-08-16 PROCEDURE — 72080 X-RAY EXAM THORACOLMB 2/> VW: CPT | Mod: 59

## 2021-08-16 PROCEDURE — 72114 X-RAY EXAM L-S SPINE BENDING: CPT

## 2021-08-17 ENCOUNTER — TELEPHONE (OUTPATIENT)
Dept: NEUROSURGERY | Facility: CLINIC | Age: 36
End: 2021-08-17

## 2021-08-17 NOTE — TELEPHONE ENCOUNTER
Patient had xrays of thoracic and lumbar spine completed. Although there is not substantial change in her degenerative disc disease, there are chronic focal areas of osteomyelitis seen.     I am more concerned that she has further collapse of the L4-5 disc space with stable anterolisthesis of L4 on L5 with a new straightening of her alignment as compared to her prior MRI completed in March. (Standing xray Aug - compared to flat MRI in March)     This could certainly be causing her to have worsening back pain and radiculopathy       We will again attempt to get an MRI approved.

## 2021-08-19 LAB
CRP SERPL-MCNC: 11 MG/L (ref 0–10)
ERYTHROCYTE [DISTWIDTH] IN BLOOD BY AUTOMATED COUNT: 13.8 % (ref 11.7–15.4)
ERYTHROCYTE [SEDIMENTATION RATE] IN BLOOD BY WESTERGREN METHOD: 39 MM/HR (ref 0–32)
HCT VFR BLD AUTO: 33.1 % (ref 34–46.6)
HGB BLD-MCNC: 10.9 G/DL (ref 11.1–15.9)
MCH RBC QN AUTO: 27.1 PG (ref 26.6–33)
MCHC RBC AUTO-ENTMCNC: 32.9 G/DL (ref 31.5–35.7)
MCV RBC AUTO: 82 FL (ref 79–97)
PLATELET # BLD AUTO: 203 X10E3/UL (ref 150–450)
RBC # BLD AUTO: 4.02 X10E6/UL (ref 3.77–5.28)
WBC # BLD AUTO: 6.6 X10E3/UL (ref 3.4–10.8)

## 2021-08-20 ENCOUNTER — DOCUMENTATION (OUTPATIENT)
Dept: NEUROSURGERY | Facility: CLINIC | Age: 36
End: 2021-08-20

## 2021-08-31 ENCOUNTER — TELEPHONE (OUTPATIENT)
Dept: CARDIOLOGY | Facility: CLINIC | Age: 36
End: 2021-08-31

## 2021-08-31 NOTE — TELEPHONE ENCOUNTER
----- Message from Yanick Blas DO sent at 7/27/2021  9:46 AM EDT -----  Please call the patient about her labs.  I am not certain I ordered these but I noted she is slightly anemic.  This can be managed by her primary care doctor.  Please send a copy of these labs to Dr. Mcleod

## 2021-08-31 NOTE — TELEPHONE ENCOUNTER
Left a message for patient to return my call regarding lab results.  I have been leaving numerous voice mail messages for patient to return my call regarding lab work, but have not heard from patient.  She has a follow-up appointment with Dr. Blas in the [place] office on 9/2/21.

## 2021-09-02 ENCOUNTER — OFFICE VISIT (OUTPATIENT)
Dept: CARDIOLOGY | Facility: CLINIC | Age: 36
End: 2021-09-02
Payer: COMMERCIAL

## 2021-09-02 VITALS
DIASTOLIC BLOOD PRESSURE: 82 MMHG | WEIGHT: 271 LBS | RESPIRATION RATE: 17 BRPM | BODY MASS INDEX: 49.87 KG/M2 | HEIGHT: 62 IN | OXYGEN SATURATION: 98 % | HEART RATE: 82 BPM | SYSTOLIC BLOOD PRESSURE: 120 MMHG

## 2021-09-02 DIAGNOSIS — I10 ESSENTIAL HYPERTENSION: ICD-10-CM

## 2021-09-02 DIAGNOSIS — I33.0 ACUTE BACTERIAL ENDOCARDITIS: ICD-10-CM

## 2021-09-02 DIAGNOSIS — I27.20 PULMONARY HYPERTENSION (CMS/HCC): Primary | ICD-10-CM

## 2021-09-02 PROCEDURE — 3008F BODY MASS INDEX DOCD: CPT | Performed by: INTERNAL MEDICINE

## 2021-09-02 PROCEDURE — 93000 ELECTROCARDIOGRAM COMPLETE: CPT | Performed by: INTERNAL MEDICINE

## 2021-09-02 PROCEDURE — 99214 OFFICE O/P EST MOD 30 MIN: CPT | Performed by: INTERNAL MEDICINE

## 2021-09-02 RX ORDER — AMOXICILLIN 500 MG/1
2000 CAPSULE ORAL ONCE
Qty: 4 CAPSULE | Refills: 3 | Status: SHIPPED | OUTPATIENT
Start: 2021-09-02 | End: 2021-09-02

## 2021-09-02 ASSESSMENT — ENCOUNTER SYMPTOMS
HEMOPTYSIS: 0
SHORTNESS OF BREATH: 0
MYALGIAS: 0
DIAPHORESIS: 0
HEMATURIA: 0
SLEEP DISTURBANCES DUE TO BREATHING: 0
HEADACHES: 0
PALPITATIONS: 0
CLAUDICATION: 0
FALLS: 0
ODYNOPHAGIA: 0
HEMATEMESIS: 0
DIARRHEA: 0
FEVER: 0
WEIGHT LOSS: 0
WEIGHT GAIN: 1
VOMITING: 0
ORTHOPNEA: 0
BRUISES/BLEEDS EASILY: 0
COUGH: 0
SPUTUM PRODUCTION: 0
NIGHT SWEATS: 1
DEPRESSION: 0
WEAKNESS: 0
ABDOMINAL PAIN: 0
BRIEF PARALYSIS: 0
WHEEZING: 0
BLURRED VISION: 0
ALTERED MENTAL STATUS: 0
FREQUENCY: 0
NAUSEA: 0
NEAR-SYNCOPE: 0
IRREGULAR HEARTBEAT: 0
DOUBLE VISION: 0
PND: 0
DYSPNEA ON EXERTION: 0
LIGHT-HEADEDNESS: 0
DIZZINESS: 0
FOCAL WEAKNESS: 0
SNORING: 0
MUSCLE CRAMPS: 0
JOINT SWELLING: 0
HEMATOCHEZIA: 0
SYNCOPE: 0
LOSS OF BALANCE: 0
NERVOUS/ANXIOUS: 0

## 2021-09-02 NOTE — ASSESSMENT & PLAN NOTE
Mild to moderate pulmonary hypertension probably due to multiple factors including septic pulmonary emboli, obesity related restrictive lung disease and sleep apnea.  We will check limited echo soon in addition to evaluating valves.

## 2021-09-02 NOTE — LETTER
September 2, 2021     Kwame Mcleod, DO  [address] [facility]  [address]  [facility] [address]    Patient: Carolyn Redmond  YOB: 1985  Date of Visit: 9/2/2021      Dear Dr. Mcleod:    Thank you for referring Carolyn Redmond to me for evaluation. Below are my notes for this consultation.    If you have questions, please do not hesitate to call me. I look forward to following your patient along with you.         Sincerely,        Yanick Blas DO        CC: No Recipients  Yanick Blas DO  9/2/2021  4:57 PM  Sign when Signing Visit     Cardiology Note         Reason for visit:   Chief Complaint   Patient presents with   • Follow-up      HPI    Carolyn Redmond is a 36 y.o. female has been experiencing night sweats recently.  She had dental work about a month ago-2 extractions but did not take antibiotics.  She confided in me that she did not tell the dentist she had endocarditis twice in the past.  She did not call me for antibiotics.  She did not want to delay the dental work with more clearances.  She is been having severe back pain that is worse.  She can exercise she lays down most the time she is gained 50 pounds.  She denies fever but as stated above has had night sweats.     Past Medical History:   Diagnosis Date   • Anxiety    • Depressed    • Endocarditis    • Fibromyalgia    • Migraines    • Pancreatitis    • Substance abuse (CMS/[facility]) 2015   • Tachycardia    • Vasculitis (CMS/[facility])        Past Surgical History:   Procedure Laterality Date   • CHOLECYSTECTOMY     • ERCP     • GALLBLADDER SURGERY     • TONSILLECTOMY  1991       Amoxicillin, Nsaids (non-steroidal anti-inflammatory drug), Tramadol, and Trazodone    Current Outpatient Medications   Medication Sig Dispense Refill   • albuterol HFA (VENTOLIN HFA) 90 mcg/actuation inhaler Inhale 2 puffs every 4 (four) hours as needed.     • buprenorphine-naloxone (SUBOXONE) 8-2 mg film Place 1 Film under the tongue 2 (two) times a day.        • buPROPion (WELLBUTRIN) 75 mg tablet Take 75 mg by mouth 3 (three) times a day.     • butalbital-acetaminophen-caff (FIORICET, ESGIC) -40 mg per tablet Take 1 tablet by mouth every 4 (four) hours as needed for headaches.     • cyclobenzaprine (FLEXERIL) 10 mg tablet Take 1 tablet by mouth 3 (three) times a day.     • famotidine (PEPCID) 10 mg tablet Take 10 mg by mouth 2 (two) times a day.     • fluticasone propionate (FLONASE) 50 mcg/actuation nasal spray Administer 1 spray into each nostril daily as needed for rhinitis or allergies.     • hydrocortisone-pramoxine (ANALPRAM-HC) 2.5-1 % rectal cream Insert 1 application into the rectum 4 (four) times a day as needed for hemorrhoids.     • lactobacillus acidophilus complex (BACID) 1 billion cell- 250 mg tablet Take 1 tablet by mouth 2 (two) times a day.     • metoprolol succinate XL (TOPROL-XL) 25 mg 24 hr tablet Take 25 mg by mouth every morning.     • PARoxetine (PaxiL) 20 mg tablet Take 20 mg by mouth every morning.     • prazosin (MINIPRESS) 2 mg capsule Take 2 mg by mouth nightly.     • pregabalin (LYRICA) 150 mg capsule Take 150 mg by mouth 2 (two) times a day.     • QUEtiapine (SEROquel) 100 mg tablet Take 200 mg by mouth nightly.     • zolpidem (AMBIEN) 10 mg tablet Take 10 mg by mouth nightly.     • amoxicillin (AMOXIL) 500 mg capsule Take 4 capsules (2,000 mg total) by mouth once for 1 dose. Prior to dental work 4 capsule 3   • buprenorphine HCL (SUBUTEX) 8 mg tablet, sublingual Place 16 mg under the tongue daily.       No current facility-administered medications for this visit.       Social History     Socioeconomic History   • Marital status: Single     Spouse name: None   • Number of children: None   • Years of education: None   • Highest education level: None   Occupational History   • Occupation:    Tobacco Use   • Smoking status: Current Every Day Smoker     Packs/day: 0.50     Types: Electronic Cigarette   • Smokeless  tobacco: Current User   • Tobacco comment: quit 1 year ago, vapes currently   Vaping Use   • Vaping Use: Every day   • Substances: Flavoring   • Devices: Refillable tank   Substance and Sexual Activity   • Alcohol use: Not Currently     Comment: social   • Drug use: Yes     Types: Marijuana, Heroin   • Sexual activity: Defer   Other Topics Concern   • None   Social History Narrative   • None     Social Determinants of Health     Financial Resource Strain:    • Difficulty of Paying Living Expenses:    Food Insecurity: Unknown   • Worried About Running Out of Food in the Last Year: Never true   • Ran Out of Food in the Last Year: Not on file   Transportation Needs:    • Lack of Transportation (Medical):    • Lack of Transportation (Non-Medical):    Physical Activity:    • Days of Exercise per Week:    • Minutes of Exercise per Session:    Stress:    • Feeling of Stress :    Social Connections:    • Frequency of Communication with Friends and Family:    • Frequency of Social Gatherings with Friends and Family:    • Attends [employer] Services:    • Active Member of Clubs or Organizations:    • Attends Club or Organization Meetings:    • Marital Status:    Intimate Partner Violence:    • Fear of Current or Ex-Partner:    • Emotionally Abused:    • Physically Abused:    • Sexually Abused:        Family History   Problem Relation Age of Onset   • Hypertension Biological Mother    • Lung cancer Biological Father    • Heart disease Maternal Grandfather          Review of Systems   Constitutional: Positive for night sweats and weight gain. Negative for diaphoresis, fever, malaise/fatigue and weight loss.   HENT: Negative for odynophagia.    Eyes: Negative for blurred vision, double vision and visual disturbance.   Cardiovascular: Negative for chest pain, claudication, dyspnea on exertion, irregular heartbeat, leg swelling, near-syncope, orthopnea, palpitations, paroxysmal nocturnal dyspnea and syncope.   Respiratory: Negative  for cough, hemoptysis, shortness of breath, sleep disturbances due to breathing, snoring, sputum production and wheezing.    Endocrine: Negative for polyuria.   Hematologic/Lymphatic: Negative for bleeding problem. Does not bruise/bleed easily.   Skin: Negative for rash.   Musculoskeletal: Negative for falls, joint pain, joint swelling, muscle cramps, muscle weakness and myalgias.   Gastrointestinal: Negative for abdominal pain, diarrhea, hematemesis, hematochezia, melena, nausea and vomiting.   Genitourinary: Negative for frequency, hematuria and nocturia.   Neurological: Negative for brief paralysis, dizziness, focal weakness, headaches, light-headedness, loss of balance and weakness.   Psychiatric/Behavioral: Negative for altered mental status and depression. The patient is not nervous/anxious.       Objective    Vitals:    09/02/21 1527   BP: 120/82   Pulse: 82   Resp: 17   SpO2: 98%      Physical Exam  Constitutional:       General: She is not in acute distress.     Appearance: She is well-developed. She is obese. She is not diaphoretic.   HENT:      Head: Normocephalic.   Eyes:      Conjunctiva/sclera: Conjunctivae normal.      Pupils: Pupils are equal, round, and reactive to light.   Neck:      Thyroid: No thyromegaly.      Vascular: No JVD.      Trachea: No tracheal deviation.   Cardiovascular:      Rate and Rhythm: Normal rate and regular rhythm.      Heart sounds: Normal heart sounds. No murmur heard.   No friction rub. No gallop.    Pulmonary:      Effort: No respiratory distress.      Breath sounds: Normal breath sounds. No stridor. No wheezing or rales.   Chest:      Chest wall: No tenderness.   Abdominal:      General: Bowel sounds are normal. There is no distension.      Palpations: There is no mass.      Tenderness: There is no abdominal tenderness. There is no guarding or rebound.   Musculoskeletal:         General: No tenderness or deformity.      Cervical back: Neck supple.   Skin:     General:  Skin is warm and dry.      Coloration: Skin is not pale.      Findings: Rash (Linear rash 2 cm x 5 cm along skin fold L3 area left posterior) present. No erythema.   Neurological:      Mental Status: She is alert and oriented to person, place, and time.      Cranial Nerves: No cranial nerve deficit.   Psychiatric:         Behavior: Behavior normal.         Judgment: Judgment normal.           Lab Results   Component Value Date    WBC 6.6 08/18/2021    HGB 10.9 (L) 08/18/2021     08/18/2021    ALT 9 07/26/2021    AST 14 07/26/2021     07/26/2021    K 4.5 07/26/2021    CREATININE 0.75 07/26/2021    TSH 3.300 07/26/2021    INR 1.0 01/26/2021         Imaging  n/a    ECG   .  Normal sinus rhythm     Assessment/Plan    Problem List Items Addressed This Visit        Circulatory    Essential hypertension    Relevant Orders    ECG 12 LEAD-OFFICE PERFORMED (Completed)    Acute bacterial endocarditis    Current Assessment & Plan     History of recurrent bacterial endocarditis with multiple complications.  Recent onset night sweats.  We will check blood cultures now.  Check limited echo for valves         Relevant Medications    amoxicillin (AMOXIL) 500 mg capsule    Other Relevant Orders    Transthoracic echo (TTE) limited    Pulmonary hypertension (CMS/HCC) - Primary    Current Assessment & Plan     Mild to moderate pulmonary hypertension probably due to multiple factors including septic pulmonary emboli, obesity related restrictive lung disease and sleep apnea.  We will check limited echo soon in addition to evaluating valves.         Relevant Orders    Transthoracic echo (TTE) limited        Doubt recurrent acute endocarditis however this is a complicated case and since the patient has had night sweats and increased back pain, I ordered blood cultures.    Repeat limited echo for pulmonary artery pressure.  We should also be able to see that valves at that time.    Advised diet and weight loss.    Advised  evaluation for sleep apnea.    Advise she see a dermatologist for the rash on her back.    Advise antibiotic prophylaxis for selected dental and surgical procedures.  She voiced understanding.  She did not ask for antibiotic prophylaxis before recent dental extractions.                 Yanick Eldridge,   FAC, FACOI  9/2/2021  4:07 PM

## 2021-09-02 NOTE — PROGRESS NOTES
Cardiology Note         Reason for visit:   Chief Complaint   Patient presents with   • Follow-up      HPI    Carolyn Redmond is a 36 y.o. female has been experiencing night sweats recently.  She had dental work about a month ago-2 extractions but did not take antibiotics.  She confided in me that she did not tell the dentist she had endocarditis twice in the past.  She did not call me for antibiotics.  She did not want to delay the dental work with more clearances.  She is been having severe back pain that is worse.  She can exercise she lays down most the time she is gained 50 pounds.  She denies fever but as stated above has had night sweats.     Past Medical History:   Diagnosis Date   • Anxiety    • Depressed    • Endocarditis    • Fibromyalgia    • Migraines    • Pancreatitis    • Substance abuse (CMS/[facility]) 2015   • Tachycardia    • Vasculitis (CMS/[facility])        Past Surgical History:   Procedure Laterality Date   • CHOLECYSTECTOMY     • ERCP     • GALLBLADDER SURGERY     • TONSILLECTOMY  1991       Amoxicillin, Nsaids (non-steroidal anti-inflammatory drug), Tramadol, and Trazodone    Current Outpatient Medications   Medication Sig Dispense Refill   • albuterol HFA (VENTOLIN HFA) 90 mcg/actuation inhaler Inhale 2 puffs every 4 (four) hours as needed.     • buprenorphine-naloxone (SUBOXONE) 8-2 mg film Place 1 Film under the tongue 2 (two) times a day.       • buPROPion (WELLBUTRIN) 75 mg tablet Take 75 mg by mouth 3 (three) times a day.     • butalbital-acetaminophen-caff (FIORICET, ESGIC) -40 mg per tablet Take 1 tablet by mouth every 4 (four) hours as needed for headaches.     • cyclobenzaprine (FLEXERIL) 10 mg tablet Take 1 tablet by mouth 3 (three) times a day.     • famotidine (PEPCID) 10 mg tablet Take 10 mg by mouth 2 (two) times a day.     • fluticasone propionate (FLONASE) 50 mcg/actuation nasal spray Administer 1 spray into each nostril daily as needed for rhinitis or allergies.     •  hydrocortisone-pramoxine (ANALPRAM-HC) 2.5-1 % rectal cream Insert 1 application into the rectum 4 (four) times a day as needed for hemorrhoids.     • lactobacillus acidophilus complex (BACID) 1 billion cell- 250 mg tablet Take 1 tablet by mouth 2 (two) times a day.     • metoprolol succinate XL (TOPROL-XL) 25 mg 24 hr tablet Take 25 mg by mouth every morning.     • PARoxetine (PaxiL) 20 mg tablet Take 20 mg by mouth every morning.     • prazosin (MINIPRESS) 2 mg capsule Take 2 mg by mouth nightly.     • pregabalin (LYRICA) 150 mg capsule Take 150 mg by mouth 2 (two) times a day.     • QUEtiapine (SEROquel) 100 mg tablet Take 200 mg by mouth nightly.     • zolpidem (AMBIEN) 10 mg tablet Take 10 mg by mouth nightly.     • amoxicillin (AMOXIL) 500 mg capsule Take 4 capsules (2,000 mg total) by mouth once for 1 dose. Prior to dental work 4 capsule 3   • buprenorphine HCL (SUBUTEX) 8 mg tablet, sublingual Place 16 mg under the tongue daily.       No current facility-administered medications for this visit.       Social History     Socioeconomic History   • Marital status: Single     Spouse name: None   • Number of children: None   • Years of education: None   • Highest education level: None   Occupational History   • Occupation:    Tobacco Use   • Smoking status: Current Every Day Smoker     Packs/day: 0.50     Types: Electronic Cigarette   • Smokeless tobacco: Current User   • Tobacco comment: quit 1 year ago, vapes currently   Vaping Use   • Vaping Use: Every day   • Substances: Flavoring   • Devices: RefDigiumble tank   Substance and Sexual Activity   • Alcohol use: Not Currently     Comment: social   • Drug use: Yes     Types: Marijuana, Heroin   • Sexual activity: Defer   Other Topics Concern   • None   Social History Narrative   • None     Social Determinants of Health     Financial Resource Strain:    • Difficulty of Paying Living Expenses:    Food Insecurity: Unknown   • Worried About Running Out  of Food in the Last Year: Never true   • Ran Out of Food in the Last Year: Not on file   Transportation Needs:    • Lack of Transportation (Medical):    • Lack of Transportation (Non-Medical):    Physical Activity:    • Days of Exercise per Week:    • Minutes of Exercise per Session:    Stress:    • Feeling of Stress :    Social Connections:    • Frequency of Communication with Friends and Family:    • Frequency of Social Gatherings with Friends and Family:    • Attends [employer] Services:    • Active Member of Clubs or Organizations:    • Attends Club or Organization Meetings:    • Marital Status:    Intimate Partner Violence:    • Fear of Current or Ex-Partner:    • Emotionally Abused:    • Physically Abused:    • Sexually Abused:        Family History   Problem Relation Age of Onset   • Hypertension Biological Mother    • Lung cancer Biological Father    • Heart disease Maternal Grandfather          Review of Systems   Constitutional: Positive for night sweats and weight gain. Negative for diaphoresis, fever, malaise/fatigue and weight loss.   HENT: Negative for odynophagia.    Eyes: Negative for blurred vision, double vision and visual disturbance.   Cardiovascular: Negative for chest pain, claudication, dyspnea on exertion, irregular heartbeat, leg swelling, near-syncope, orthopnea, palpitations, paroxysmal nocturnal dyspnea and syncope.   Respiratory: Negative for cough, hemoptysis, shortness of breath, sleep disturbances due to breathing, snoring, sputum production and wheezing.    Endocrine: Negative for polyuria.   Hematologic/Lymphatic: Negative for bleeding problem. Does not bruise/bleed easily.   Skin: Negative for rash.   Musculoskeletal: Negative for falls, joint pain, joint swelling, muscle cramps, muscle weakness and myalgias.   Gastrointestinal: Negative for abdominal pain, diarrhea, hematemesis, hematochezia, melena, nausea and vomiting.   Genitourinary: Negative for frequency, hematuria and  nocturia.   Neurological: Negative for brief paralysis, dizziness, focal weakness, headaches, light-headedness, loss of balance and weakness.   Psychiatric/Behavioral: Negative for altered mental status and depression. The patient is not nervous/anxious.       Objective    Vitals:    09/02/21 1527   BP: 120/82   Pulse: 82   Resp: 17   SpO2: 98%      Physical Exam  Constitutional:       General: She is not in acute distress.     Appearance: She is well-developed. She is obese. She is not diaphoretic.   HENT:      Head: Normocephalic.   Eyes:      Conjunctiva/sclera: Conjunctivae normal.      Pupils: Pupils are equal, round, and reactive to light.   Neck:      Thyroid: No thyromegaly.      Vascular: No JVD.      Trachea: No tracheal deviation.   Cardiovascular:      Rate and Rhythm: Normal rate and regular rhythm.      Heart sounds: Normal heart sounds. No murmur heard.   No friction rub. No gallop.    Pulmonary:      Effort: No respiratory distress.      Breath sounds: Normal breath sounds. No stridor. No wheezing or rales.   Chest:      Chest wall: No tenderness.   Abdominal:      General: Bowel sounds are normal. There is no distension.      Palpations: There is no mass.      Tenderness: There is no abdominal tenderness. There is no guarding or rebound.   Musculoskeletal:         General: No tenderness or deformity.      Cervical back: Neck supple.   Skin:     General: Skin is warm and dry.      Coloration: Skin is not pale.      Findings: Rash (Linear rash 2 cm x 5 cm along skin fold L3 area left posterior) present. No erythema.   Neurological:      Mental Status: She is alert and oriented to person, place, and time.      Cranial Nerves: No cranial nerve deficit.   Psychiatric:         Behavior: Behavior normal.         Judgment: Judgment normal.           Lab Results   Component Value Date    WBC 6.6 08/18/2021    HGB 10.9 (L) 08/18/2021     08/18/2021    ALT 9 07/26/2021    AST 14 07/26/2021      07/26/2021    K 4.5 07/26/2021    CREATININE 0.75 07/26/2021    TSH 3.300 07/26/2021    INR 1.0 01/26/2021         Imaging  n/a    ECG   .  Normal sinus rhythm     Assessment/Plan    Problem List Items Addressed This Visit        Circulatory    Essential hypertension    Relevant Orders    ECG 12 LEAD-OFFICE PERFORMED (Completed)    Acute bacterial endocarditis    Current Assessment & Plan     History of recurrent bacterial endocarditis with multiple complications.  Recent onset night sweats.  We will check blood cultures now.  Check limited echo for valves         Relevant Medications    amoxicillin (AMOXIL) 500 mg capsule    Other Relevant Orders    Transthoracic echo (TTE) limited    Pulmonary hypertension (CMS/HCC) - Primary    Current Assessment & Plan     Mild to moderate pulmonary hypertension probably due to multiple factors including septic pulmonary emboli, obesity related restrictive lung disease and sleep apnea.  We will check limited echo soon in addition to evaluating valves.         Relevant Orders    Transthoracic echo (TTE) limited        Doubt recurrent acute endocarditis however this is a complicated case and since the patient has had night sweats and increased back pain, I ordered blood cultures.    Repeat limited echo for pulmonary artery pressure.  We should also be able to see that valves at that time.    Advised diet and weight loss.    Advised evaluation for sleep apnea.    Advise she see a dermatologist for the rash on her back.    Advise antibiotic prophylaxis for selected dental and surgical procedures.  She voiced understanding.  She did not ask for antibiotic prophylaxis before recent dental extractions.                 Yanick Eldridge, DO  FAC, FACOI  9/2/2021  4:07 PM

## 2021-09-02 NOTE — ASSESSMENT & PLAN NOTE
History of recurrent bacterial endocarditis with multiple complications.  Recent onset night sweats.  We will check blood cultures now.  Check limited echo for valves

## 2021-09-03 ENCOUNTER — HOSPITAL ENCOUNTER (OUTPATIENT)
Dept: RADIOLOGY | Facility: HOSPITAL | Age: 36
Discharge: HOME | End: 2021-09-03
Attending: PHYSICIAN ASSISTANT
Payer: COMMERCIAL

## 2021-09-03 DIAGNOSIS — M46.20 SPINAL ABSCESS (CMS/HCC): ICD-10-CM

## 2021-09-03 DIAGNOSIS — M54.9 MID BACK PAIN: ICD-10-CM

## 2021-09-03 RX ORDER — GADOBUTROL 604.72 MG/ML
12.2 INJECTION INTRAVENOUS ONCE
Status: COMPLETED | OUTPATIENT
Start: 2021-09-03 | End: 2021-09-03

## 2021-09-03 RX ADMIN — GADOBUTROL 12 ML: 604.72 INJECTION INTRAVENOUS at 15:29

## 2021-09-14 ENCOUNTER — OFFICE VISIT (OUTPATIENT)
Dept: NEUROSURGERY | Facility: CLINIC | Age: 36
End: 2021-09-14
Payer: COMMERCIAL

## 2021-09-14 VITALS
BODY MASS INDEX: 49.69 KG/M2 | HEART RATE: 98 BPM | OXYGEN SATURATION: 98 % | TEMPERATURE: 97.7 F | HEIGHT: 62 IN | DIASTOLIC BLOOD PRESSURE: 120 MMHG | RESPIRATION RATE: 20 BRPM | SYSTOLIC BLOOD PRESSURE: 173 MMHG | WEIGHT: 270 LBS

## 2021-09-14 DIAGNOSIS — G89.29 CHRONIC BILATERAL LOW BACK PAIN WITHOUT SCIATICA: Primary | ICD-10-CM

## 2021-09-14 DIAGNOSIS — M54.50 CHRONIC BILATERAL LOW BACK PAIN WITHOUT SCIATICA: Primary | ICD-10-CM

## 2021-09-14 PROCEDURE — 3008F BODY MASS INDEX DOCD: CPT | Performed by: NEUROLOGICAL SURGERY

## 2021-09-14 PROCEDURE — 99214 OFFICE O/P EST MOD 30 MIN: CPT | Performed by: NEUROLOGICAL SURGERY

## 2021-09-14 NOTE — LETTER
September 15, 2021     Kwame Mcleod, DO  [address] [facility]  [address]  [facility] [address]    Patient: Carolyn Redmond  YOB: 1985  Date of Visit: 2021      Dear Dr. Mcleod:    Thank you for referring Carolyn Redmond to me for evaluation. Below are my notes for this consultation.    If you have questions, please do not hesitate to call me. I look forward to following your patient along with you.         Sincerely,        Adriano Ness MD        CC: No Recipients  Terence Selby PA C  9/15/2021  2:10 PM  Sign when Signing Visit      [facility]  Medical Office [address], [address]  [place] [address]  Phone: [phone]  Fax: [phone]      Patient ID: Carolyn Redmond                              : 1985    Visit Date: 2021    Referring Provider: Hospital follow-up    History of Present Illness:   Carolyn Redmond is a pleasant 36 y.o. female with a significant past medical history of anxiety, depression, endocarditis with septic emboli, fibromyalgia, migraines, pancreatitis, IV drug use and vasculitis seen today by the neurosurgery service for follow-up. To recall, patient was initially seen 12/10/2020 for lumbar radiculopathy and an MRI lumbar spine showing osteomyelitis without central compression. She was managed with IV ABX until 2021 for a total of 8 weeks. She had an MRI lumbar spine completed 3/8/2021 which showed improved pachymeningeal enhancement from L4-S1 with no significant abscess formation or fluid collection.  There is no spinal cord compression or nerve root impingement. She was to follow-up as needed.    Over the last 3 months she has been having increased low back with radiation across the low back and at times up into the thoracic spine. She denies any radicular pain but at times she will have some pain into her bottom. She notes the pain is unbearable and affecting her quality of life. She has  had some weight gain secondary to decreased physical activity related to her low back pain. Tylenol and motrin have not helped. She is not prescribed narcotics secondary to her previous IV drug abuse history. She is on subutex.     She denies any bowel or bladder incontinence, fevers, saddle anesthesia, CP or SOB.       Past Medical History:   Past Medical History:   Diagnosis Date   • Anxiety    • Depressed    • Endocarditis    • Fibromyalgia    • Migraines    • Pancreatitis    • Substance abuse (CMS/Formerly [facility]) 2015   • Tachycardia    • Vasculitis (CMS/Formerly [facility])        Medications:     Current Outpatient Medications:   •  albuterol HFA (VENTOLIN HFA) 90 mcg/actuation inhaler, Inhale 2 puffs every 4 (four) hours as needed., Disp: , Rfl:   •  buprenorphine HCL (SUBUTEX) 8 mg tablet, sublingual, Place 16 mg under the tongue daily., Disp: , Rfl:   •  buprenorphine-naloxone (SUBOXONE) 8-2 mg film, Place 1 Film under the tongue 2 (two) times a day.  , Disp: , Rfl:   •  buPROPion (WELLBUTRIN) 75 mg tablet, Take 75 mg by mouth 3 (three) times a day., Disp: , Rfl:   •  butalbital-acetaminophen-caff (FIORICET, ESGIC) -40 mg per tablet, Take 1 tablet by mouth every 4 (four) hours as needed for headaches., Disp: , Rfl:   •  cyclobenzaprine (FLEXERIL) 10 mg tablet, Take 1 tablet by mouth 3 (three) times a day., Disp: , Rfl:   •  famotidine (PEPCID) 10 mg tablet, Take 10 mg by mouth 2 (two) times a day., Disp: , Rfl:   •  fluticasone propionate (FLONASE) 50 mcg/actuation nasal spray, Administer 1 spray into each nostril daily as needed for rhinitis or allergies., Disp: , Rfl:   •  hydrocortisone-pramoxine (ANALPRAM-HC) 2.5-1 % rectal cream, Insert 1 application into the rectum 4 (four) times a day as needed for hemorrhoids., Disp: , Rfl:   •  lactobacillus acidophilus complex (BACID) 1 billion cell- 250 mg tablet, Take 1 tablet by mouth 2 (two) times a day., Disp: , Rfl:   •  metoprolol succinate XL (TOPROL-XL) 25 mg 24 hr tablet,  Take 25 mg by mouth every morning., Disp: , Rfl:   •  PARoxetine (PaxiL) 20 mg tablet, Take 20 mg by mouth every morning., Disp: , Rfl:   •  prazosin (MINIPRESS) 2 mg capsule, Take 2 mg by mouth nightly., Disp: , Rfl:   •  pregabalin (LYRICA) 150 mg capsule, Take 150 mg by mouth 2 (two) times a day., Disp: , Rfl:   •  QUEtiapine (SEROquel) 100 mg tablet, Take 200 mg by mouth nightly., Disp: , Rfl:   •  zolpidem (AMBIEN) 10 mg tablet, Take 10 mg by mouth nightly., Disp: , Rfl:      Past Surgical History:   Past Surgical History:   Procedure Laterality Date   • CHOLECYSTECTOMY     • ERCP     • GALLBLADDER SURGERY     • TONSILLECTOMY  1991       Allergies:  Allergies   Allergen Reactions   • Amoxicillin GI intolerance   • Nsaids (Non-Steroidal Anti-Inflammatory Drug) Nausea Only   • Tramadol Palpitations   • Trazodone      Other reaction(s): Headaches, Other (see comments)       Family History:  Family History   Problem Relation Age of Onset   • Hypertension Biological Mother    • Lung cancer Biological Father    • Heart disease Maternal Grandfather        Social History:  Social History     Socioeconomic History   • Marital status: Single     Spouse name: Not on file   • Number of children: Not on file   • Years of education: Not on file   • Highest education level: Not on file   Occupational History   • Occupation:    Tobacco Use   • Smoking status: Current Every Day Smoker     Packs/day: 0.50     Types: Electronic Cigarette   • Smokeless tobacco: Current User   • Tobacco comment: quit 1 year ago, vapes currently   Vaping Use   • Vaping Use: Every day   • Substances: Flavoring   • Devices: Refillable tank   Substance and Sexual Activity   • Alcohol use: Not Currently     Comment: social   • Drug use: Yes     Types: Marijuana, Heroin   • Sexual activity: Defer   Other Topics Concern   • Not on file   Social History Narrative   • Not on file     Social Determinants of Health     Financial Resource  Strain:    • Difficulty of Paying Living Expenses:    Food Insecurity: Unknown   • Worried About Running Out of Food in the Last Year: Never true   • Ran Out of Food in the Last Year: Not on file   Transportation Needs:    • Lack of Transportation (Medical):    • Lack of Transportation (Non-Medical):    Physical Activity:    • Days of Exercise per Week:    • Minutes of Exercise per Session:    Stress:    • Feeling of Stress :    Social Connections:    • Frequency of Communication with Friends and Family:    • Frequency of Social Gatherings with Friends and Family:    • Attends [employer] Services:    • Active Member of Clubs or Organizations:    • Attends Club or Organization Meetings:    • Marital Status:    Intimate Partner Violence:    • Fear of Current or Ex-Partner:    • Emotionally Abused:    • Physically Abused:    • Sexually Abused:        Vitals:   Vitals:    09/14/21 1121   BP: (!) 173/120   Pulse: 98   Resp: 20   Temp: 36.5 °C (97.7 °F)   SpO2: 98%       Review of Systems:  A complete 14 point review of systems was conducted and was deemed negative except what was documented in the HPI.    Physical Examination:    Constitutional: Appears well-developed and well-nourished. She is tearful.   Head: Normocephalic and atraumatic.   Right Ear: External ear normal.   Left Ear: External ear normal.   Nose: Nose normal.   Mouth/Throat: Oropharynx is clear and moist.   Eyes: Conjunctivae and EOM are normal. Pupils are equal, round, and reactive to light. No scleral icterus.   Neck: Normal range of motion. Neck supple.  Cardiovascular: Normal rate and regular rhythm.   Pulmonary/Chest: Effort normal   Musculoskeletal: No significant pain with flexion or extension of the lumbar spine but noted increasing low back pain upon standing. There is some tenderness to palpation in the low lumbar spine as well is in the paravertebral region at L4-L5 and L5-S1.  Skin: Skin is warm and dry. No rash noted. No erythema.   .    Vitals reviewed.      Neurological Examination:  Neurologic Exam     Mental Status   Oriented to person, place, and time.   Attention: normal.   Speech: speech is normal   Level of consciousness: alert    Cranial Nerves     CN II   Visual acuity: normal    CN III, IV, VI   Pupils are equal, round, and reactive to light.  Extraocular motions are normal.   Right pupil: Size: 3 mm. Shape: regular. Reactivity: brisk.   Left pupil: Size: 3 mm. Shape: regular. Reactivity: brisk.   CN III: no CN III palsy  CN VI: no CN VI palsy  Nystagmus: none     CN V   Facial sensation intact.     CN VII  Symmetric Facial movements    CN VIII  Hearing: intact    CN IX, X   CN IX normal.   Palate: symmetric    CN XI   Right sternocleidomastoid strength: normal  Left sternocleidomastoid strength: normal    CN XII   Tongue: not atrophic    Sensation:  Light touch is intact in the UE and LE bilaterally.     Motor:     Deltoid Biceps Triceps Wrist ext Finger ext Hand Intrinsics Hip flexion Knee ext Dorsi-  flexion EHL Plantar Flexion   R 5 5 5 5 5 5 5 5 5 5 5   L 5 5 5 5 5 5 5 5 5 5 5     There is no pronator drift. Muscle bulk and tone are normal.     Gait, Coordination, and Reflexes     Reflexes   Reflexes 2+ except as noted.   Right plantar: normal  Left plantar: normal  Right Stephens: absent  Left Stephens: absent  Right ankle clonus: absent  Left ankle clonus: absent    Steady gait      Data Review:    Lab Results:   Lab Results   Component Value Date    WBC 6.6 08/18/2021    WBC 10.66 (H) 05/24/2021    HGB 10.9 (L) 08/18/2021    HGB 10.6 (L) 05/24/2021    HCT 33.1 (L) 08/18/2021    HCT 34.0 (L) 05/24/2021    MCV 82 08/18/2021    MCV 87.0 05/24/2021     08/18/2021     05/24/2021    RDW 13.8 08/18/2021    RDW 16.1 (H) 05/24/2021      Lab Results   Component Value Date    GLUCOSE 88 07/26/2021    GLUCOSE 102 (H) 05/24/2021    BUN 15 07/26/2021    BUN 10 05/24/2021     07/26/2021     05/24/2021    K 4.5  07/26/2021    K 3.6 05/24/2021     07/26/2021     05/24/2021    CO2 23 07/26/2021    CO2 27 05/24/2021    ANIONGAP 10 05/24/2021    CA 8.7 07/26/2021        Imaging:   Independent review of all imaging was done by myself as well as review of the radiologists readings and comparison to prior films.     Lumbar MRI 9/3/2021  Conus medullaris ends at the level of T12-L1 disc space  Visualized portion of  the spinal cord and the nerve roots are without mass or signal abnormality.  Congenital spinal stenosis is noted.     The vertebral bodies are normal in stature.  Minimal mixed type I and type II  endplate degenerative signal is seen at L4-L5 and L5-S1 levels.  Disc desiccation is noted at L4-L5 level.     No degenerative changes are identified at L1-L2 and L2-L3 levels.     At L3-L4 level, minimal broad-based posterior disc bulge is seen with minimal  bilateral facet joint hypertrophy causing minimal mass effect on the thecal sac  and minimal bilateral neural foraminal stenosis.     At L4-L5 level, minimal spondylolisthesis is seen where L5 vertebral body is  posterior to L4 vertebral body.  Moderate broad-based posterior disc bulge is  seen with focal bilateral foraminal disc protrusions.  Bilateral facet joint and  ligamentum flavum hypertrophy are noted.  Small amount of fluid is seen in the  left facet joints.  Combination of this findings causing mild compression of the  anterior aspect of the thecal sac and moderate to severe bilateral neural  foraminal stenosis.  Degenerative changes remain stable.     At L5-S1 level, minimal spondylolisthesis is seen where S1 vertebral body is  anterior to L5 vertebral body.  Moderate broad-based posterior disc bulge is  seen with right paracentral disc protrusion.  This protrusion measures 0.5 cm in  AP by 1.0 cm in transverse by 0.8 cm in CC.  Mild bilateral facet joint  hypertrophy is noted.  This is causing mild mass effect on the anterior aspect  of the thecal  sac and probable mass effect on the right S1 nerve root in the  spinal canal and moderate left and moderate to severe right neural foraminal  stenosis.  This finding is without significant change.     No definite abnormal enhancement.  There is no enhancing fluid collection to  suggest an abscess.     --  IMPRESSION:  1. No findings to suggest residual infection.  2. 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    L4-L5      L5-S1        Thoracic MRI 9/3/2021  Visualized portion of the spinal cord is without mass or signal abnormality.  Conus medullaris ends at the level of L1 vertebral body.  Visualized portion of  the spinal cord is without mass or signal abnormality.  Congenital spinal  stenosis is noted.     Vertebral bodies are normal is stature, signal intensity and in alignment.  No  disc space or endplate edema is to is noted to suggest discitis osteomyelitis.     At T6-T7 level, small right paracentral disc protrusion is seen causing mild  mass effect on the thecal sac.     At T7-T8 level, minimal broad-based posterior disc osteophyte bulge is seen with  minimal bilateral facet joint hypertrophy causing minimal mass effect on the  thecal sac.     At the T8-T9 level, minimal broad-based posterior disc osteophyte bulge is seen  with mild bilateral facet joint hypertrophy causing mild compression of the  anterior and posterior left aspect of the thecal sac.     At the T9-T10 level, focal small right paracentral disc protrusion is noted with  mild bilateral facet joint hypertrophy causing mass effect on the thecal sac.     At the 10-11 level, focal central disc protrusion is noted with bilateral facet  joint hypertrophy.  This is causing mild mass effect on the anterior portion of  the spinal cord.  Post contrast-enhanced images demonstrate no abnormal  enhancement.     No soft tissue edema is noted.     --  IMPRESSION:     1.   No MRI findings to suggest discitis osteomyelitis.  2.  Congenital spinal stenosis and degenerative disc disease as described.    Assessment / Plan: In summary, Carolyn Redmond is a 36 y.o. female with a significant past medical history of anxiety, depression, endocarditis with septic emboli, fibromyalgia, migraines, pancreatitis, IV drug use and vasculitis seen today by the neurosurgery service for follow-up. o recall, patient was initially seen 12/10/2020 for lumbar radiculopathy and an MRI lumbar spine showing osteomyelitis without central compression. She was managed with IV ABX until 2/1/2021 for a total of 8 weeks. She had an MRI lumbar spine completed 3/8/2021 which showed improved pachymeningeal enhancement from L4-S1 with no significant abscess formation or fluid collection.  There is no spinal cord compression or nerve root impingement. She was to follow-up as needed.    Unfortunately, she developed severe axial low back pain which has progressed over the last 3 months. Her pain tolerance is altered secondary to her drug abuse history and makes treating her pain difficult. Her back pain has led to being more sedentary and unfortunately some weight gain.     On exam, she is full strength in the upper and lower extremities without pathologic reflexes. She has low back pain to palpation in the lumbar spine around L4-L5, L5-S1 as well as in the paraspinal region at these same levels.    Her thoracic and lumbar MRI's completed 9/3/2021 does not show any evidence concerning for osteomyelitis or ISAEL. She does have a congenitally narrow spinal canal and small disc hernations at L4-L5 and L5-S1 without any significant central or foraminal stenosis. She has some early DJD as demonstrated by fluid in her facet joint at L4-L5 on the left.     We would not recommend any surgical intervention for Ms. Redmond at this time. She does have radicular or mechanical back pain. She does have small disc herniations at L4-L5,  L5-S1 and may benefit from CHRISTOPHER at the L5-S1 level. We have referred her to Dr. Bansal. She is upset about her pain but understanding in our recommendation. We have welcomed her to receive a second opinion if she would like.     At this time, no further neurosurgical follow-up is indicated unless there is a change in her clinical condition.  She will call our office with any new neurologic symptoms.  She agrees with the plan as stated. All questions have been answered.         MD SULY Marroquin, Terence Selby PA-C, am scribing for, and in the presence of, Dr. Adriano Ness.         25 minutes were spent with the patient on examination, review of past medical history, and prior imaging, and coordinating care.  Patient seen earlier today. Signature timestamp does not reflect patient encounter time.   More than 50% of the time is spent counseling the patient and family and coordinating care.

## 2021-09-14 NOTE — PROGRESS NOTES
[facility]  Medical Office [address], [address]  [place] [address]  Phone: [phone]  Fax: [phone]      Patient ID: Carolyn Redmond                              : 1985    Visit Date: 2021    Referring Provider: Hospital follow-up    History of Present Illness:   Carolyn Redmond is a pleasant 36 y.o. female with a significant past medical history of anxiety, depression, endocarditis with septic emboli, fibromyalgia, migraines, pancreatitis, IV drug use and vasculitis seen today by the neurosurgery service for follow-up. To recall, patient was initially seen 12/10/2020 for lumbar radiculopathy and an MRI lumbar spine showing osteomyelitis without central compression. She was managed with IV ABX until 2021 for a total of 8 weeks. She had an MRI lumbar spine completed 3/8/2021 which showed improved pachymeningeal enhancement from L4-S1 with no significant abscess formation or fluid collection.  There is no spinal cord compression or nerve root impingement. She was to follow-up as needed.    Over the last 3 months she has been having increased low back with radiation across the low back and at times up into the thoracic spine. She denies any radicular pain but at times she will have some pain into her bottom. She notes the pain is unbearable and affecting her quality of life. She has had some weight gain secondary to decreased physical activity related to her low back pain. Tylenol and motrin have not helped. She is not prescribed narcotics secondary to her previous IV drug abuse history. She is on subutex.     She denies any bowel or bladder incontinence, fevers, saddle anesthesia, CP or SOB.       Past Medical History:   Past Medical History:   Diagnosis Date   • Anxiety    • Depressed    • Endocarditis    • Fibromyalgia    • Migraines    • Pancreatitis    • Substance abuse (CMS/[facility]) 2015   • Tachycardia    • Vasculitis (CMS/[facility])         Medications:     Current Outpatient Medications:   •  albuterol HFA (VENTOLIN HFA) 90 mcg/actuation inhaler, Inhale 2 puffs every 4 (four) hours as needed., Disp: , Rfl:   •  buprenorphine HCL (SUBUTEX) 8 mg tablet, sublingual, Place 16 mg under the tongue daily., Disp: , Rfl:   •  buprenorphine-naloxone (SUBOXONE) 8-2 mg film, Place 1 Film under the tongue 2 (two) times a day.  , Disp: , Rfl:   •  buPROPion (WELLBUTRIN) 75 mg tablet, Take 75 mg by mouth 3 (three) times a day., Disp: , Rfl:   •  butalbital-acetaminophen-caff (FIORICET, ESGIC) -40 mg per tablet, Take 1 tablet by mouth every 4 (four) hours as needed for headaches., Disp: , Rfl:   •  cyclobenzaprine (FLEXERIL) 10 mg tablet, Take 1 tablet by mouth 3 (three) times a day., Disp: , Rfl:   •  famotidine (PEPCID) 10 mg tablet, Take 10 mg by mouth 2 (two) times a day., Disp: , Rfl:   •  fluticasone propionate (FLONASE) 50 mcg/actuation nasal spray, Administer 1 spray into each nostril daily as needed for rhinitis or allergies., Disp: , Rfl:   •  hydrocortisone-pramoxine (ANALPRAM-HC) 2.5-1 % rectal cream, Insert 1 application into the rectum 4 (four) times a day as needed for hemorrhoids., Disp: , Rfl:   •  lactobacillus acidophilus complex (BACID) 1 billion cell- 250 mg tablet, Take 1 tablet by mouth 2 (two) times a day., Disp: , Rfl:   •  metoprolol succinate XL (TOPROL-XL) 25 mg 24 hr tablet, Take 25 mg by mouth every morning., Disp: , Rfl:   •  PARoxetine (PaxiL) 20 mg tablet, Take 20 mg by mouth every morning., Disp: , Rfl:   •  prazosin (MINIPRESS) 2 mg capsule, Take 2 mg by mouth nightly., Disp: , Rfl:   •  pregabalin (LYRICA) 150 mg capsule, Take 150 mg by mouth 2 (two) times a day., Disp: , Rfl:   •  QUEtiapine (SEROquel) 100 mg tablet, Take 200 mg by mouth nightly., Disp: , Rfl:   •  zolpidem (AMBIEN) 10 mg tablet, Take 10 mg by mouth nightly., Disp: , Rfl:      Past Surgical History:   Past Surgical History:   Procedure Laterality Date    • CHOLECYSTECTOMY     • ERCP     • GALLBLADDER SURGERY     • TONSILLECTOMY  1991       Allergies:  Allergies   Allergen Reactions   • Amoxicillin GI intolerance   • Nsaids (Non-Steroidal Anti-Inflammatory Drug) Nausea Only   • Tramadol Palpitations   • Trazodone      Other reaction(s): Headaches, Other (see comments)       Family History:  Family History   Problem Relation Age of Onset   • Hypertension Biological Mother    • Lung cancer Biological Father    • Heart disease Maternal Grandfather        Social History:  Social History     Socioeconomic History   • Marital status: Single     Spouse name: Not on file   • Number of children: Not on file   • Years of education: Not on file   • Highest education level: Not on file   Occupational History   • Occupation:    Tobacco Use   • Smoking status: Current Every Day Smoker     Packs/day: 0.50     Types: Electronic Cigarette   • Smokeless tobacco: Current User   • Tobacco comment: quit 1 year ago, vapes currently   Vaping Use   • Vaping Use: Every day   • Substances: Flavoring   • Devices: Refillable tank   Substance and Sexual Activity   • Alcohol use: Not Currently     Comment: social   • Drug use: Yes     Types: Marijuana, Heroin   • Sexual activity: Defer   Other Topics Concern   • Not on file   Social History Narrative   • Not on file     Social Determinants of Health     Financial Resource Strain:    • Difficulty of Paying Living Expenses:    Food Insecurity: Unknown   • Worried About Running Out of Food in the Last Year: Never true   • Ran Out of Food in the Last Year: Not on file   Transportation Needs:    • Lack of Transportation (Medical):    • Lack of Transportation (Non-Medical):    Physical Activity:    • Days of Exercise per Week:    • Minutes of Exercise per Session:    Stress:    • Feeling of Stress :    Social Connections:    • Frequency of Communication with Friends and Family:    • Frequency of Social Gatherings with Friends and  Family:    • Attends Jainism Services:    • Active Member of Clubs or Organizations:    • Attends Club or Organization Meetings:    • Marital Status:    Intimate Partner Violence:    • Fear of Current or Ex-Partner:    • Emotionally Abused:    • Physically Abused:    • Sexually Abused:        Vitals:   Vitals:    09/14/21 1121   BP: (!) 173/120   Pulse: 98   Resp: 20   Temp: 36.5 °C (97.7 °F)   SpO2: 98%       Review of Systems:  A complete 14 point review of systems was conducted and was deemed negative except what was documented in the HPI.    Physical Examination:    Constitutional: Appears well-developed and well-nourished. She is tearful.   Head: Normocephalic and atraumatic.   Right Ear: External ear normal.   Left Ear: External ear normal.   Nose: Nose normal.   Mouth/Throat: Oropharynx is clear and moist.   Eyes: Conjunctivae and EOM are normal. Pupils are equal, round, and reactive to light. No scleral icterus.   Neck: Normal range of motion. Neck supple.  Cardiovascular: Normal rate and regular rhythm.   Pulmonary/Chest: Effort normal   Musculoskeletal: No significant pain with flexion or extension of the lumbar spine but noted increasing low back pain upon standing. There is some tenderness to palpation in the low lumbar spine as well is in the paravertebral region at L4-L5 and L5-S1.  Skin: Skin is warm and dry. No rash noted. No erythema.   .   Vitals reviewed.      Neurological Examination:  Neurologic Exam     Mental Status   Oriented to person, place, and time.   Attention: normal.   Speech: speech is normal   Level of consciousness: alert    Cranial Nerves     CN II   Visual acuity: normal    CN III, IV, VI   Pupils are equal, round, and reactive to light.  Extraocular motions are normal.   Right pupil: Size: 3 mm. Shape: regular. Reactivity: brisk.   Left pupil: Size: 3 mm. Shape: regular. Reactivity: brisk.   CN III: no CN III palsy  CN VI: no CN VI palsy  Nystagmus: none     CN V   Facial  sensation intact.     CN VII  Symmetric Facial movements    CN VIII  Hearing: intact    CN IX, X   CN IX normal.   Palate: symmetric    CN XI   Right sternocleidomastoid strength: normal  Left sternocleidomastoid strength: normal    CN XII   Tongue: not atrophic    Sensation:  Light touch is intact in the UE and LE bilaterally.     Motor:     Deltoid Biceps Triceps Wrist ext Finger ext Hand Intrinsics Hip flexion Knee ext Dorsi-  flexion EHL Plantar Flexion   R 5 5 5 5 5 5 5 5 5 5 5   L 5 5 5 5 5 5 5 5 5 5 5     There is no pronator drift. Muscle bulk and tone are normal.     Gait, Coordination, and Reflexes     Reflexes   Reflexes 2+ except as noted.   Right plantar: normal  Left plantar: normal  Right Stephens: absent  Left Stephens: absent  Right ankle clonus: absent  Left ankle clonus: absent    Steady gait      Data Review:    Lab Results:   Lab Results   Component Value Date    WBC 6.6 08/18/2021    WBC 10.66 (H) 05/24/2021    HGB 10.9 (L) 08/18/2021    HGB 10.6 (L) 05/24/2021    HCT 33.1 (L) 08/18/2021    HCT 34.0 (L) 05/24/2021    MCV 82 08/18/2021    MCV 87.0 05/24/2021     08/18/2021     05/24/2021    RDW 13.8 08/18/2021    RDW 16.1 (H) 05/24/2021      Lab Results   Component Value Date    GLUCOSE 88 07/26/2021    GLUCOSE 102 (H) 05/24/2021    BUN 15 07/26/2021    BUN 10 05/24/2021     07/26/2021     05/24/2021    K 4.5 07/26/2021    K 3.6 05/24/2021     07/26/2021     05/24/2021    CO2 23 07/26/2021    CO2 27 05/24/2021    ANIONGAP 10 05/24/2021    CA 8.7 07/26/2021        Imaging:   Independent review of all imaging was done by myself as well as review of the radiologists readings and comparison to prior films.     Lumbar MRI 9/3/2021  Conus medullaris ends at the level of T12-L1 disc space  Visualized portion of  the spinal cord and the nerve roots are without mass or signal abnormality.  Congenital spinal stenosis is noted.     The vertebral bodies are normal in  stature.  Minimal mixed type I and type II  endplate degenerative signal is seen at L4-L5 and L5-S1 levels.  Disc desiccation is noted at L4-L5 level.     No degenerative changes are identified at L1-L2 and L2-L3 levels.     At L3-L4 level, minimal broad-based posterior disc bulge is seen with minimal  bilateral facet joint hypertrophy causing minimal mass effect on the thecal sac  and minimal bilateral neural foraminal stenosis.     At L4-L5 level, minimal spondylolisthesis is seen where L5 vertebral body is  posterior to L4 vertebral body.  Moderate broad-based posterior disc bulge is  seen with focal bilateral foraminal disc protrusions.  Bilateral facet joint and  ligamentum flavum hypertrophy are noted.  Small amount of fluid is seen in the  left facet joints.  Combination of this findings causing mild compression of the  anterior aspect of the thecal sac and moderate to severe bilateral neural  foraminal stenosis.  Degenerative changes remain stable.     At L5-S1 level, minimal spondylolisthesis is seen where S1 vertebral body is  anterior to L5 vertebral body.  Moderate broad-based posterior disc bulge is  seen with right paracentral disc protrusion.  This protrusion measures 0.5 cm in  AP by 1.0 cm in transverse by 0.8 cm in CC.  Mild bilateral facet joint  hypertrophy is noted.  This is causing mild mass effect on the anterior aspect  of the thecal sac and probable mass effect on the right S1 nerve root in the  spinal canal and moderate left and moderate to severe right neural foraminal  stenosis.  This finding is without significant change.     No definite abnormal enhancement.  There is no enhancing fluid collection to  suggest an abscess.     --  IMPRESSION:  1. No findings to suggest residual infection.  2. 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    L4-L5      L5-S1        Thoracic MRI 9/3/2021  Visualized portion of  the spinal cord is without mass or signal abnormality.  Conus medullaris ends at the level of L1 vertebral body.  Visualized portion of  the spinal cord is without mass or signal abnormality.  Congenital spinal  stenosis is noted.     Vertebral bodies are normal is stature, signal intensity and in alignment.  No  disc space or endplate edema is to is noted to suggest discitis osteomyelitis.     At T6-T7 level, small right paracentral disc protrusion is seen causing mild  mass effect on the thecal sac.     At T7-T8 level, minimal broad-based posterior disc osteophyte bulge is seen with  minimal bilateral facet joint hypertrophy causing minimal mass effect on the  thecal sac.     At the T8-T9 level, minimal broad-based posterior disc osteophyte bulge is seen  with mild bilateral facet joint hypertrophy causing mild compression of the  anterior and posterior left aspect of the thecal sac.     At the T9-T10 level, focal small right paracentral disc protrusion is noted with  mild bilateral facet joint hypertrophy causing mass effect on the thecal sac.     At the 10-11 level, focal central disc protrusion is noted with bilateral facet  joint hypertrophy.  This is causing mild mass effect on the anterior portion of  the spinal cord.  Post contrast-enhanced images demonstrate no abnormal  enhancement.     No soft tissue edema is noted.     --  IMPRESSION:     1.  No MRI findings to suggest discitis osteomyelitis.  2.  Congenital spinal stenosis and degenerative disc disease as described.    Assessment / Plan:     In summary, Carolyn Redmond is a 36 y.o. female with a significant past medical history of anxiety, depression, endocarditis with septic emboli, fibromyalgia, migraines, pancreatitis, IV drug use and vasculitis seen today by the neurosurgery service for follow-up. o recall, patient was initially seen 12/10/2020 for lumbar radiculopathy and an MRI lumbar spine showing osteomyelitis without central compression.  She was managed with IV ABX until 2/1/2021 for a total of 8 weeks. She had an MRI lumbar spine completed 3/8/2021 which showed improved pachymeningeal enhancement from L4-S1 with no abscess formation or fluid collection.  There is no spinal cord compression or nerve root impingement. She was to follow-up as needed.    Unfortunately, she developed severe axial low back pain which has progressed over the last 3 months. Her pain tolerance is likely altered secondary to her drug abuse history and makes treating her pain difficult. Her back pain has led to being more sedentary and unfortunately some weight gain.     On exam, she is full strength in the upper and lower extremities without pathologic reflexes. She has low back pain to palpation in the lumbar spine around L4-L5, L5-S1 as well as in the paraspinal region at these same levels.    Her thoracic and lumbar MRI's completed 9/3/2021 do not show any evidence concerning for osteomyelitis, spinal epidural abscess or discitis/osteomyelitis. She does have a congenitally narrow spinal canal and small disc hernations at L4-L5 and L5-S1 without any significant central or foraminal stenosis. She has some early DJD as demonstrated by fluid in her facet joint at L4-L5 on the left.     We would not recommend any surgical intervention for Ms. Redmond at this time. She does have radicular or mechanical back pain. She does have small disc herniations at L4-L5, L5-S1 and may benefit from CHRISTOPHER at the L5-S1 level. We have referred her to Dr. Bansal. We have welcomed her to receive a second opinion if she would like. Her blood pressure at today's visit was elevated but she is without headache, double vision, SOB, HA, nausea or vomiting. She will follow-up with her PCP in regards to her blood pressure.    At this time, no further neurosurgical follow-up is indicated unless there is a change in her clinical condition.  She will call our office with any new neurologic symptoms.  She  agrees with the plan as stated. All questions have been answered.         MD SULY Marroquin, Terence Selby PA-C, am scribing for, and in the presence of, Dr. Adriano Ness.         30 minutes were spent with the patient on examination, review of past medical history, and prior imaging, and coordinating care.  Patient seen earlier today. Signature timestamp does not reflect patient encounter time.   More than 50% of the time is spent counseling the patient and family and coordinating care.

## 2021-09-14 NOTE — LETTER
2021     Kwame Mcleod, DO  [address] [facility]  [address]  [facility] [address]    Patient: Carolyn Redmond  YOB: 1985  Date of Visit: 2021      Dear Dr. Mcleod:    Thank you for referring Carolyn Redmond to me for evaluation. Below are my notes for this consultation.    If you have questions, please do not hesitate to call me. I look forward to following your patient along with you.         Sincerely,        Adriano Ness MD        CC: No Recipients  Adriano Ness MD  2021 10:42 AM  Signed      [facility]  Medical Office [address], [address]  [place] [address]  Phone: [phone]  Fax: [phone]      Patient ID: Carolyn Redmond                              : 1985    Visit Date: 2021    Referring Provider: Hospital follow-up    History of Present Illness:   Carolyn Redmond is a pleasant 36 y.o. female with a significant past medical history of anxiety, depression, endocarditis with septic emboli, fibromyalgia, migraines, pancreatitis, IV drug use and vasculitis seen today by the neurosurgery service for follow-up. To recall, patient was initially seen 12/10/2020 for lumbar radiculopathy and an MRI lumbar spine showing osteomyelitis without central compression. She was managed with IV ABX until 2021 for a total of 8 weeks. She had an MRI lumbar spine completed 3/8/2021 which showed improved pachymeningeal enhancement from L4-S1 with no significant abscess formation or fluid collection.  There is no spinal cord compression or nerve root impingement. She was to follow-up as needed.    Over the last 3 months she has been having increased low back with radiation across the low back and at times up into the thoracic spine. She denies any radicular pain but at times she will have some pain into her bottom. She notes the pain is unbearable and affecting her quality of life. She has had some weight gain  secondary to decreased physical activity related to her low back pain. Tylenol and motrin have not helped. She is not prescribed narcotics secondary to her previous IV drug abuse history. She is on subutex.     She denies any bowel or bladder incontinence, fevers, saddle anesthesia, CP or SOB.       Past Medical History:   Past Medical History:   Diagnosis Date   • Anxiety    • Depressed    • Endocarditis    • Fibromyalgia    • Migraines    • Pancreatitis    • Substance abuse (CMS/[facility]) 2015   • Tachycardia    • Vasculitis (CMS/[facility])        Medications:     Current Outpatient Medications:   •  albuterol HFA (VENTOLIN HFA) 90 mcg/actuation inhaler, Inhale 2 puffs every 4 (four) hours as needed., Disp: , Rfl:   •  buprenorphine HCL (SUBUTEX) 8 mg tablet, sublingual, Place 16 mg under the tongue daily., Disp: , Rfl:   •  buprenorphine-naloxone (SUBOXONE) 8-2 mg film, Place 1 Film under the tongue 2 (two) times a day.  , Disp: , Rfl:   •  buPROPion (WELLBUTRIN) 75 mg tablet, Take 75 mg by mouth 3 (three) times a day., Disp: , Rfl:   •  butalbital-acetaminophen-caff (FIORICET, ESGIC) -40 mg per tablet, Take 1 tablet by mouth every 4 (four) hours as needed for headaches., Disp: , Rfl:   •  cyclobenzaprine (FLEXERIL) 10 mg tablet, Take 1 tablet by mouth 3 (three) times a day., Disp: , Rfl:   •  famotidine (PEPCID) 10 mg tablet, Take 10 mg by mouth 2 (two) times a day., Disp: , Rfl:   •  fluticasone propionate (FLONASE) 50 mcg/actuation nasal spray, Administer 1 spray into each nostril daily as needed for rhinitis or allergies., Disp: , Rfl:   •  hydrocortisone-pramoxine (ANALPRAM-HC) 2.5-1 % rectal cream, Insert 1 application into the rectum 4 (four) times a day as needed for hemorrhoids., Disp: , Rfl:   •  lactobacillus acidophilus complex (BACID) 1 billion cell- 250 mg tablet, Take 1 tablet by mouth 2 (two) times a day., Disp: , Rfl:   •  metoprolol succinate XL (TOPROL-XL) 25 mg 24 hr tablet, Take 25 mg by mouth  every morning., Disp: , Rfl:   •  PARoxetine (PaxiL) 20 mg tablet, Take 20 mg by mouth every morning., Disp: , Rfl:   •  prazosin (MINIPRESS) 2 mg capsule, Take 2 mg by mouth nightly., Disp: , Rfl:   •  pregabalin (LYRICA) 150 mg capsule, Take 150 mg by mouth 2 (two) times a day., Disp: , Rfl:   •  QUEtiapine (SEROquel) 100 mg tablet, Take 200 mg by mouth nightly., Disp: , Rfl:   •  zolpidem (AMBIEN) 10 mg tablet, Take 10 mg by mouth nightly., Disp: , Rfl:      Past Surgical History:   Past Surgical History:   Procedure Laterality Date   • CHOLECYSTECTOMY     • ERCP     • GALLBLADDER SURGERY     • TONSILLECTOMY  1991       Allergies:  Allergies   Allergen Reactions   • Amoxicillin GI intolerance   • Nsaids (Non-Steroidal Anti-Inflammatory Drug) Nausea Only   • Tramadol Palpitations   • Trazodone      Other reaction(s): Headaches, Other (see comments)       Family History:  Family History   Problem Relation Age of Onset   • Hypertension Biological Mother    • Lung cancer Biological Father    • Heart disease Maternal Grandfather        Social History:  Social History     Socioeconomic History   • Marital status: Single     Spouse name: Not on file   • Number of children: Not on file   • Years of education: Not on file   • Highest education level: Not on file   Occupational History   • Occupation:    Tobacco Use   • Smoking status: Current Every Day Smoker     Packs/day: 0.50     Types: Electronic Cigarette   • Smokeless tobacco: Current User   • Tobacco comment: quit 1 year ago, vapes currently   Vaping Use   • Vaping Use: Every day   • Substances: Flavoring   • Devices: Refillable tank   Substance and Sexual Activity   • Alcohol use: Not Currently     Comment: social   • Drug use: Yes     Types: Marijuana, Heroin   • Sexual activity: Defer   Other Topics Concern   • Not on file   Social History Narrative   • Not on file     Social Determinants of Health     Financial Resource Strain:    • Difficulty  of Paying Living Expenses:    Food Insecurity: Unknown   • Worried About Running Out of Food in the Last Year: Never true   • Ran Out of Food in the Last Year: Not on file   Transportation Needs:    • Lack of Transportation (Medical):    • Lack of Transportation (Non-Medical):    Physical Activity:    • Days of Exercise per Week:    • Minutes of Exercise per Session:    Stress:    • Feeling of Stress :    Social Connections:    • Frequency of Communication with Friends and Family:    • Frequency of Social Gatherings with Friends and Family:    • Attends Bahai Services:    • Active Member of Clubs or Organizations:    • Attends Club or Organization Meetings:    • Marital Status:    Intimate Partner Violence:    • Fear of Current or Ex-Partner:    • Emotionally Abused:    • Physically Abused:    • Sexually Abused:        Vitals:   Vitals:    09/14/21 1121   BP: (!) 173/120   Pulse: 98   Resp: 20   Temp: 36.5 °C (97.7 °F)   SpO2: 98%       Review of Systems:  A complete 14 point review of systems was conducted and was deemed negative except what was documented in the HPI.    Physical Examination:    Constitutional: Appears well-developed and well-nourished. She is tearful.   Head: Normocephalic and atraumatic.   Right Ear: External ear normal.   Left Ear: External ear normal.   Nose: Nose normal.   Mouth/Throat: Oropharynx is clear and moist.   Eyes: Conjunctivae and EOM are normal. Pupils are equal, round, and reactive to light. No scleral icterus.   Neck: Normal range of motion. Neck supple.  Cardiovascular: Normal rate and regular rhythm.   Pulmonary/Chest: Effort normal   Musculoskeletal: No significant pain with flexion or extension of the lumbar spine but noted increasing low back pain upon standing. There is some tenderness to palpation in the low lumbar spine as well is in the paravertebral region at L4-L5 and L5-S1.  Skin: Skin is warm and dry. No rash noted. No erythema.   .   Vitals  reviewed.      Neurological Examination:  Neurologic Exam     Mental Status   Oriented to person, place, and time.   Attention: normal.   Speech: speech is normal   Level of consciousness: alert    Cranial Nerves     CN II   Visual acuity: normal    CN III, IV, VI   Pupils are equal, round, and reactive to light.  Extraocular motions are normal.   Right pupil: Size: 3 mm. Shape: regular. Reactivity: brisk.   Left pupil: Size: 3 mm. Shape: regular. Reactivity: brisk.   CN III: no CN III palsy  CN VI: no CN VI palsy  Nystagmus: none     CN V   Facial sensation intact.     CN VII  Symmetric Facial movements    CN VIII  Hearing: intact    CN IX, X   CN IX normal.   Palate: symmetric    CN XI   Right sternocleidomastoid strength: normal  Left sternocleidomastoid strength: normal    CN XII   Tongue: not atrophic    Sensation:  Light touch is intact in the UE and LE bilaterally.     Motor:     Deltoid Biceps Triceps Wrist ext Finger ext Hand Intrinsics Hip flexion Knee ext Dorsi-  flexion EHL Plantar Flexion   R 5 5 5 5 5 5 5 5 5 5 5   L 5 5 5 5 5 5 5 5 5 5 5     There is no pronator drift. Muscle bulk and tone are normal.     Gait, Coordination, and Reflexes     Reflexes   Reflexes 2+ except as noted.   Right plantar: normal  Left plantar: normal  Right Stephens: absent  Left Stephens: absent  Right ankle clonus: absent  Left ankle clonus: absent    Steady gait      Data Review:    Lab Results:   Lab Results   Component Value Date    WBC 6.6 08/18/2021    WBC 10.66 (H) 05/24/2021    HGB 10.9 (L) 08/18/2021    HGB 10.6 (L) 05/24/2021    HCT 33.1 (L) 08/18/2021    HCT 34.0 (L) 05/24/2021    MCV 82 08/18/2021    MCV 87.0 05/24/2021     08/18/2021     05/24/2021    RDW 13.8 08/18/2021    RDW 16.1 (H) 05/24/2021      Lab Results   Component Value Date    GLUCOSE 88 07/26/2021    GLUCOSE 102 (H) 05/24/2021    BUN 15 07/26/2021    BUN 10 05/24/2021     07/26/2021     05/24/2021    K 4.5 07/26/2021    K  3.6 05/24/2021     07/26/2021     05/24/2021    CO2 23 07/26/2021    CO2 27 05/24/2021    ANIONGAP 10 05/24/2021    CA 8.7 07/26/2021        Imaging:   Independent review of all imaging was done by myself as well as review of the radiologists readings and comparison to prior films.     Lumbar MRI 9/3/2021  Conus medullaris ends at the level of T12-L1 disc space  Visualized portion of  the spinal cord and the nerve roots are without mass or signal abnormality.  Congenital spinal stenosis is noted.     The vertebral bodies are normal in stature.  Minimal mixed type I and type II  endplate degenerative signal is seen at L4-L5 and L5-S1 levels.  Disc desiccation is noted at L4-L5 level.     No degenerative changes are identified at L1-L2 and L2-L3 levels.     At L3-L4 level, minimal broad-based posterior disc bulge is seen with minimal  bilateral facet joint hypertrophy causing minimal mass effect on the thecal sac  and minimal bilateral neural foraminal stenosis.     At L4-L5 level, minimal spondylolisthesis is seen where L5 vertebral body is  posterior to L4 vertebral body.  Moderate broad-based posterior disc bulge is  seen with focal bilateral foraminal disc protrusions.  Bilateral facet joint and  ligamentum flavum hypertrophy are noted.  Small amount of fluid is seen in the  left facet joints.  Combination of this findings causing mild compression of the  anterior aspect of the thecal sac and moderate to severe bilateral neural  foraminal stenosis.  Degenerative changes remain stable.     At L5-S1 level, minimal spondylolisthesis is seen where S1 vertebral body is  anterior to L5 vertebral body.  Moderate broad-based posterior disc bulge is  seen with right paracentral disc protrusion.  This protrusion measures 0.5 cm in  AP by 1.0 cm in transverse by 0.8 cm in CC.  Mild bilateral facet joint  hypertrophy is noted.  This is causing mild mass effect on the anterior aspect  of the thecal sac and probable  mass effect on the right S1 nerve root in the  spinal canal and moderate left and moderate to severe right neural foraminal  stenosis.  This finding is without significant change.     No definite abnormal enhancement.  There is no enhancing fluid collection to  suggest an abscess.     --  IMPRESSION:  1. No findings to suggest residual infection.  2. 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    L4-L5      L5-S1        Thoracic MRI 9/3/2021  Visualized portion of the spinal cord is without mass or signal abnormality.  Conus medullaris ends at the level of L1 vertebral body.  Visualized portion of  the spinal cord is without mass or signal abnormality.  Congenital spinal  stenosis is noted.     Vertebral bodies are normal is stature, signal intensity and in alignment.  No  disc space or endplate edema is to is noted to suggest discitis osteomyelitis.     At T6-T7 level, small right paracentral disc protrusion is seen causing mild  mass effect on the thecal sac.     At T7-T8 level, minimal broad-based posterior disc osteophyte bulge is seen with  minimal bilateral facet joint hypertrophy causing minimal mass effect on the  thecal sac.     At the T8-T9 level, minimal broad-based posterior disc osteophyte bulge is seen  with mild bilateral facet joint hypertrophy causing mild compression of the  anterior and posterior left aspect of the thecal sac.     At the T9-T10 level, focal small right paracentral disc protrusion is noted with  mild bilateral facet joint hypertrophy causing mass effect on the thecal sac.     At the 10-11 level, focal central disc protrusion is noted with bilateral facet  joint hypertrophy.  This is causing mild mass effect on the anterior portion of  the spinal cord.  Post contrast-enhanced images demonstrate no abnormal  enhancement.     No soft tissue edema is noted.     --  IMPRESSION:     1.  No MRI findings to  suggest discitis osteomyelitis.  2.  Congenital spinal stenosis and degenerative disc disease as described.    Assessment / Plan:     In summary, Carolyn Redmond is a 36 y.o. female with a significant past medical history of anxiety, depression, endocarditis with septic emboli, fibromyalgia, migraines, pancreatitis, IV drug use and vasculitis seen today by the neurosurgery service for follow-up. o recall, patient was initially seen 12/10/2020 for lumbar radiculopathy and an MRI lumbar spine showing osteomyelitis without central compression. She was managed with IV ABX until 2/1/2021 for a total of 8 weeks. She had an MRI lumbar spine completed 3/8/2021 which showed improved pachymeningeal enhancement from L4-S1 with no abscess formation or fluid collection.  There is no spinal cord compression or nerve root impingement. She was to follow-up as needed.    Unfortunately, she developed severe axial low back pain which has progressed over the last 3 months. Her pain tolerance is likely altered secondary to her drug abuse history and makes treating her pain difficult. Her back pain has led to being more sedentary and unfortunately some weight gain.     On exam, she is full strength in the upper and lower extremities without pathologic reflexes. She has low back pain to palpation in the lumbar spine around L4-L5, L5-S1 as well as in the paraspinal region at these same levels.    Her thoracic and lumbar MRI's completed 9/3/2021 do not show any evidence concerning for osteomyelitis, spinal epidural abscess or discitis/osteomyelitis. She does have a congenitally narrow spinal canal and small disc hernations at L4-L5 and L5-S1 without any significant central or foraminal stenosis. She has some early DJD as demonstrated by fluid in her facet joint at L4-L5 on the left.     We would not recommend any surgical intervention for Ms. Redmond at this time. She does have radicular or mechanical back pain. She does have small disc  herniations at L4-L5, L5-S1 and may benefit from CHRISTOPHER at the L5-S1 level. We have referred her to Dr. Bansal. We have welcomed her to receive a second opinion if she would like. Her blood pressure at today's visit was elevated but she is without headache, double vision, SOB, HA, nausea or vomiting. She will follow-up with her PCP in regards to her blood pressure.    At this time, no further neurosurgical follow-up is indicated unless there is a change in her clinical condition.  She will call our office with any new neurologic symptoms.  She agrees with the plan as stated. All questions have been answered.         MD SULY Marroquin, Terence Selby PA-C, am scribing for, and in the presence of, Dr. Adriano Ness.         30 minutes were spent with the patient on examination, review of past medical history, and prior imaging, and coordinating care.  Patient seen earlier today. Signature timestamp does not reflect patient encounter time.   More than 50% of the time is spent counseling the patient and family and coordinating care.

## 2021-10-14 ENCOUNTER — TELEPHONE (OUTPATIENT)
Dept: SCHEDULING | Facility: CLINIC | Age: 36
End: 2021-10-14

## 2021-10-14 NOTE — TELEPHONE ENCOUNTER
Dr Blas patient states echo is scheduled to next week. She cx'd today, will get echo and call back to reschedule.

## 2022-01-13 ENCOUNTER — APPOINTMENT (EMERGENCY)
Dept: RADIOLOGY | Facility: HOSPITAL | Age: 37
End: 2022-01-13
Attending: EMERGENCY MEDICINE
Payer: COMMERCIAL

## 2022-01-13 ENCOUNTER — HOSPITAL ENCOUNTER (EMERGENCY)
Facility: HOSPITAL | Age: 37
Discharge: HOME | End: 2022-01-13
Attending: EMERGENCY MEDICINE
Payer: COMMERCIAL

## 2022-01-13 VITALS
SYSTOLIC BLOOD PRESSURE: 132 MMHG | OXYGEN SATURATION: 98 % | RESPIRATION RATE: 16 BRPM | HEART RATE: 97 BPM | DIASTOLIC BLOOD PRESSURE: 86 MMHG | TEMPERATURE: 97.8 F

## 2022-01-13 DIAGNOSIS — M51.46 SCHMORL'S NODES OF LUMBAR REGION: ICD-10-CM

## 2022-01-13 DIAGNOSIS — M54.41 ACUTE RIGHT-SIDED LOW BACK PAIN WITH RIGHT-SIDED SCIATICA: ICD-10-CM

## 2022-01-13 DIAGNOSIS — M47.819 MULTILEVEL SPONDYLOSIS: Primary | ICD-10-CM

## 2022-01-13 DIAGNOSIS — R93.5 ABNORMAL CT OF THE ABDOMEN: ICD-10-CM

## 2022-01-13 LAB
ALBUMIN SERPL-MCNC: 3.8 G/DL (ref 3.4–5)
ALP SERPL-CCNC: 72 IU/L (ref 35–126)
ALT SERPL-CCNC: 36 IU/L (ref 11–54)
ANION GAP SERPL CALC-SCNC: 10 MEQ/L (ref 3–15)
AST SERPL-CCNC: 39 IU/L (ref 15–41)
BASOPHILS # BLD: 0.03 K/UL (ref 0.01–0.1)
BASOPHILS NFR BLD: 0.5 %
BILIRUB SERPL-MCNC: 1 MG/DL (ref 0.3–1.2)
BILIRUB UR QL STRIP.AUTO: NEGATIVE MG/DL
BUN SERPL-MCNC: 6 MG/DL (ref 8–20)
CALCIUM SERPL-MCNC: 9.4 MG/DL (ref 8.9–10.3)
CHLORIDE SERPL-SCNC: 105 MEQ/L (ref 98–109)
CLARITY UR REFRACT.AUTO: CLEAR
CO2 SERPL-SCNC: 22 MEQ/L (ref 22–32)
COLOR UR AUTO: YELLOW
CREAT SERPL-MCNC: 0.9 MG/DL (ref 0.6–1.1)
CRP SERPL-MCNC: 11.7 MG/L
DIFFERENTIAL METHOD BLD: ABNORMAL
EOSINOPHIL # BLD: 0.12 K/UL (ref 0.04–0.36)
EOSINOPHIL NFR BLD: 2 %
ERYTHROCYTE [DISTWIDTH] IN BLOOD BY AUTOMATED COUNT: 15 % (ref 11.7–14.4)
GFR SERPL CREATININE-BSD FRML MDRD: >60 ML/MIN/1.73M*2
GLUCOSE SERPL-MCNC: 126 MG/DL (ref 70–99)
GLUCOSE UR STRIP.AUTO-MCNC: NEGATIVE MG/DL
HCG UR QL: NEGATIVE
HCT VFR BLDCO AUTO: 43.5 % (ref 35–45)
HGB BLD-MCNC: 13.8 G/DL (ref 11.8–15.7)
HGB UR QL STRIP.AUTO: NEGATIVE
IMM GRANULOCYTES # BLD AUTO: 0.02 K/UL (ref 0–0.08)
IMM GRANULOCYTES NFR BLD AUTO: 0.3 %
KETONES UR STRIP.AUTO-MCNC: NEGATIVE MG/DL
LACTATE SERPL-SCNC: 1.9 MMOL/L (ref 0.4–2)
LACTATE SERPL-SCNC: 2.1 MMOL/L (ref 0.4–2)
LEUKOCYTE ESTERASE UR QL STRIP.AUTO: NEGATIVE
LYMPHOCYTES # BLD: 2.55 K/UL (ref 1.2–3.5)
LYMPHOCYTES NFR BLD: 42.9 %
MCH RBC QN AUTO: 27.1 PG (ref 28–33.2)
MCHC RBC AUTO-ENTMCNC: 31.7 G/DL (ref 32.2–35.5)
MCV RBC AUTO: 85.3 FL (ref 83–98)
MONOCYTES # BLD: 0.45 K/UL (ref 0.28–0.8)
MONOCYTES NFR BLD: 7.6 %
NEUTROPHILS # BLD: 2.77 K/UL (ref 1.7–7)
NEUTS SEG NFR BLD: 46.7 %
NITRITE UR QL STRIP.AUTO: NEGATIVE
NRBC BLD-RTO: 0 %
PDW BLD AUTO: 11.4 FL (ref 9.4–12.3)
PH UR STRIP.AUTO: 7 [PH]
PLATELET # BLD AUTO: 224 K/UL (ref 150–369)
POTASSIUM SERPL-SCNC: 4.4 MEQ/L (ref 3.6–5.1)
PROT SERPL-MCNC: 8.3 G/DL (ref 6–8.2)
PROT UR QL STRIP.AUTO: NEGATIVE
RBC # BLD AUTO: 5.1 M/UL (ref 3.93–5.22)
SODIUM SERPL-SCNC: 137 MEQ/L (ref 136–144)
SP GR UR REFRACT.AUTO: 1.01
TROPONIN I SERPL HS-MCNC: 3.9 PG/ML
UROBILINOGEN UR STRIP-ACNC: 0.2 EU/DL
WBC # BLD AUTO: 5.94 K/UL (ref 3.8–10.5)

## 2022-01-13 PROCEDURE — 99284 EMERGENCY DEPT VISIT MOD MDM: CPT | Mod: 25

## 2022-01-13 PROCEDURE — 25800000 HC PHARMACY IV SOLUTIONS

## 2022-01-13 PROCEDURE — 81003 URINALYSIS AUTO W/O SCOPE: CPT

## 2022-01-13 PROCEDURE — 83605 ASSAY OF LACTIC ACID: CPT

## 2022-01-13 PROCEDURE — 84703 CHORIONIC GONADOTROPIN ASSAY: CPT | Performed by: EMERGENCY MEDICINE

## 2022-01-13 PROCEDURE — 63600105 HC IODINE BASED CONTRAST

## 2022-01-13 PROCEDURE — 84484 ASSAY OF TROPONIN QUANT: CPT

## 2022-01-13 PROCEDURE — 80053 COMPREHEN METABOLIC PANEL: CPT | Performed by: EMERGENCY MEDICINE

## 2022-01-13 PROCEDURE — 83605 ASSAY OF LACTIC ACID: CPT | Performed by: EMERGENCY MEDICINE

## 2022-01-13 PROCEDURE — 87040 BLOOD CULTURE FOR BACTERIA: CPT | Mod: 91 | Performed by: EMERGENCY MEDICINE

## 2022-01-13 PROCEDURE — 85025 COMPLETE CBC W/AUTO DIFF WBC: CPT

## 2022-01-13 PROCEDURE — 85652 RBC SED RATE AUTOMATED: CPT

## 2022-01-13 PROCEDURE — G1004 CDSM NDSC: HCPCS

## 2022-01-13 PROCEDURE — 85025 COMPLETE CBC W/AUTO DIFF WBC: CPT | Performed by: EMERGENCY MEDICINE

## 2022-01-13 PROCEDURE — 36415 COLL VENOUS BLD VENIPUNCTURE: CPT

## 2022-01-13 PROCEDURE — 86140 C-REACTIVE PROTEIN: CPT

## 2022-01-13 PROCEDURE — 81003 URINALYSIS AUTO W/O SCOPE: CPT | Performed by: EMERGENCY MEDICINE

## 2022-01-13 PROCEDURE — 80053 COMPREHEN METABOLIC PANEL: CPT

## 2022-01-13 PROCEDURE — 93005 ELECTROCARDIOGRAM TRACING: CPT

## 2022-01-13 PROCEDURE — 93005 ELECTROCARDIOGRAM TRACING: CPT | Performed by: EMERGENCY MEDICINE

## 2022-01-13 RX ORDER — DICYCLOMINE HYDROCHLORIDE 10 MG/1
10 CAPSULE ORAL AS NEEDED
COMMUNITY
Start: 2022-01-13 | End: 2022-09-20

## 2022-01-13 RX ORDER — TIZANIDINE HYDROCHLORIDE 6 MG/1
CAPSULE, GELATIN COATED ORAL
COMMUNITY
Start: 2022-01-07 | End: 2023-07-10

## 2022-01-13 RX ORDER — UBROGEPANT 100 MG/1
100 TABLET ORAL AS NEEDED
COMMUNITY
Start: 2021-11-24 | End: 2022-09-20

## 2022-01-13 RX ADMIN — IOHEXOL 100 ML: 350 INJECTION, SOLUTION INTRAVENOUS at 19:16

## 2022-01-13 RX ADMIN — SODIUM CHLORIDE 1000 ML: 9 INJECTION, SOLUTION INTRAVENOUS at 19:28

## 2022-01-13 ASSESSMENT — ENCOUNTER SYMPTOMS
SORE THROAT: 0
FATIGUE: 1
NAUSEA: 1
HEADACHES: 0
FEVER: 1
COUGH: 0
NECK PAIN: 0
CHEST TIGHTNESS: 0
FLANK PAIN: 1
DIARRHEA: 1
SHORTNESS OF BREATH: 1
LIGHT-HEADEDNESS: 1
MYALGIAS: 1
CONSTIPATION: 1
DIZZINESS: 1
DIFFICULTY URINATING: 1
BACK PAIN: 1
CHILLS: 1
DECREASED CONCENTRATION: 1
ABDOMINAL PAIN: 0
VOMITING: 0

## 2022-01-13 NOTE — ED PROVIDER NOTES
Emergency Medicine Note  HPI   HISTORY OF PRESENT ILLNESS     Patient is a 36-year-old female with a past medical history of endocarditis, spinal epidural abscess, substance abuse in remission, anxiety, depression, pancreatitis who presents to the emergency department complaining of right-sided lumbar back pain.  Patient states that symptoms began about 3 weeks ago just after she had a dental extraction done.  Patient took her amoxicillin as prescribed before the procedure.  She also sees [facility] pain [facility] for chronic back pain that is usually on the left side only.  She has had 2 epidurals in the past and 2 diagnostic injections for radiofrequency ablation, lumbar medial branch block.  Her last injection was also 3 weeks ago.  Around the same time, patient was weaning off of Paxil which she thought was responsible for her fatigue.  1 week ago she went to urgent care and tested positive for COVID.  She has had fatigue, nausea, fevers, chills for the past 3 weeks.  She also reports dizziness, lightheadedness, trouble urinating and moving her bowels even when she has diarrhea.  She denies any chest pain, numbness, abdominal pain.  Patient sees Dr. Ness for neurosurgery and Dr. Blas for cardiology.  Patient reports that she had a very mild murmur when she had endocarditis. She is due for a follow-up echo.  She also reports that she did not have an elevated white count when she had endocarditis.  In May 2021 patient had a varicella zoster rash at the same level in which she had her epidural abscess and it was located on the left side.  Patient is concerned because this pain on the right side is new and is unsure if the fevers are due to COVID or another infection.  Patient reports she has not used any drugs in the past year.    Patient states she is on oral contraceptives but denies any personal or family history of blood clots, cancer, recent long travel, recent immobilization or injuries leading to  sedentary lifestyle.            Patient History   PAST HISTORY     Reviewed from Nursing Triage:       Past Medical History:   Diagnosis Date   • Anxiety    • Depressed    • Endocarditis    • Fibromyalgia    • Migraines    • Pancreatitis    • Substance abuse (CMS/HCC) 2015   • Tachycardia    • Vasculitis (CMS/HCC)        Past Surgical History:   Procedure Laterality Date   • CHOLECYSTECTOMY     • ERCP     • GALLBLADDER SURGERY     • TONSILLECTOMY  1991       Family History   Problem Relation Age of Onset   • Hypertension Biological Mother    • Lung cancer Biological Father    • Heart disease Maternal Grandfather        Social History     Tobacco Use   • Smoking status: Current Every Day Smoker     Packs/day: 0.50   • Smokeless tobacco: Current User   • Tobacco comment: quit 1 year ago, vapes currently   Vaping Use   • Vaping Use: Every day   • Substances: Flavoring   • Devices: Refillable tank   Substance Use Topics   • Alcohol use: Not Currently     Comment: social   • Drug use: Yes     Types: Marijuana         Review of Systems   REVIEW OF SYSTEMS     Review of Systems   Constitutional: Positive for chills, fatigue and fever.   HENT: Negative for congestion and sore throat.    Respiratory: Positive for shortness of breath. Negative for cough and chest tightness.    Cardiovascular: Negative for chest pain and leg swelling.   Gastrointestinal: Positive for constipation, diarrhea and nausea. Negative for abdominal pain and vomiting.        + difficulty moving bowels   Genitourinary: Positive for difficulty urinating and flank pain.   Musculoskeletal: Positive for back pain and myalgias. Negative for neck pain.   Neurological: Positive for dizziness and light-headedness. Negative for headaches.   Psychiatric/Behavioral: Positive for decreased concentration.         VITALS     ED Vitals    Date/Time Temp Pulse Resp BP SpO2 Who   01/13/22 1927 36.6 °C (97.8 °F) 97 16 132/86 98 % EF   01/13/22 1628 36.9 °C (98.4 °F) 122  18 139/93 98 % CEI        Pulse Ox %: 98 % (01/13/22 1745)  Pulse Ox Interpretation: Normal (01/13/22 1745)  Heart Rate: 122 (01/13/22 1745)  Rhythm Strip Interpretation: Sinus Tachycardia (01/13/22 1745)     Physical Exam   PHYSICAL EXAM     Physical Exam  Vitals and nursing note reviewed.   Constitutional:       General: She is not in acute distress.     Appearance: She is obese. She is not toxic-appearing.   HENT:      Head: Normocephalic and atraumatic.      Nose: Nose normal.      Mouth/Throat:      Mouth: Mucous membranes are moist.      Pharynx: Oropharynx is clear. No oropharyngeal exudate or posterior oropharyngeal erythema.   Eyes:      General: No scleral icterus.     Extraocular Movements: Extraocular movements intact.      Conjunctiva/sclera: Conjunctivae normal.      Pupils: Pupils are equal, round, and reactive to light.   Cardiovascular:      Rate and Rhythm: Regular rhythm. Tachycardia present.      Pulses: Normal pulses.      Heart sounds: Normal heart sounds.   Pulmonary:      Effort: Pulmonary effort is normal.   Abdominal:      General: Abdomen is protuberant. Bowel sounds are normal.      Palpations: Abdomen is soft.      Tenderness: There is abdominal tenderness in the right upper quadrant. There is right CVA tenderness and guarding. There is no left CVA tenderness or rebound.   Musculoskeletal:      Cervical back: Normal range of motion and neck supple. No tenderness.      Lumbar back: Tenderness present. Negative right straight leg raise test and negative left straight leg raise test.        Back:       Right lower leg: No edema.      Left lower leg: No edema.      Comments: Tenderness to palpation of right lateral back extending inferiorly along R buttocks and lateral thigh   Skin:     General: Skin is warm and dry.      Capillary Refill: Capillary refill takes less than 2 seconds.   Neurological:      General: No focal deficit present.      Mental Status: She is alert and oriented to  person, place, and time.      GCS: GCS eye subscore is 4. GCS verbal subscore is 5. GCS motor subscore is 6.      Cranial Nerves: Cranial nerves are intact.      Sensory: Sensation is intact.      Motor: Motor function is intact.      Coordination: Coordination is intact.      Gait: Gait is intact.           PROCEDURES     Procedures     DATA     Results     Procedure Component Value Units Date/Time    [place] Draw Panel [[id number]] Collected: 01/13/22 1635    Specimen: Blood, Venous Updated: 01/14/22 0100    Narrative:      The following orders were created for panel order [place] Draw Panel.  Procedure                               Abnormality         Status                     ---------                               -----------         ------                     RAINBOW RED[[id number]]                                      Final result               RAINBOW LT BLUE[[id number]]                                  Final result               RAINBOW GOLD[[id number]]                                     Final result                 Please view results for these tests on the individual orders.    RAINBOW RED [[id number]] Collected: 01/13/22 1638    Specimen: Blood, Venous Updated: 01/14/22 0100    RAINBOW LT BLUE [[id number]] Collected: 01/13/22 1635    Specimen: Blood, Venous Updated: 01/14/22 0100    RAINBOW GOLD [[id number]] Collected: 01/13/22 1638    Specimen: Blood, Venous Updated: 01/14/22 0100    [place] [facility] [place][id number]]  (Normal) Collected: 01/13/22 1930    Specimen: Blood, Venous Updated: 01/14/22 0004     Sed Rate 5 mm/hr     HS Troponin I (with 2 hour reflex) [[id number]]  (Normal) Collected: 01/13/22 1930    Specimen: Blood, Venous Updated: 01/13/22 2015     High Sens Troponin I 3.9 pg/mL     BhCG, Serum, Qual [[id number]]  (Normal) Collected: 01/13/22 1638    Specimen: Blood, Venous Updated: 01/13/22 1845     Preg Test, Serum Negative    C-reactive protein [[id number]]  (Abnormal) Collected: 01/13/22 1638     Specimen: Blood, Venous Updated: 01/13/22 1821     CRP 11.70 mg/L     Comprehensive metabolic panel [[id number]]  (Abnormal) Collected: 01/13/22 1635    Specimen: Blood, Venous Updated: 01/13/22 1704     Sodium 137 mEQ/L      Potassium 4.4 mEQ/L      Comment: Results obtained on plasma. Plasma Potassium values may be up to 0.4 mEQ/L less than serum values. The differences may be greater for patients with high platelet or white cell counts.  MODERATE HEMOLYSIS, RESULT MAY BE INCREASED.        Chloride 105 mEQ/L      CO2 22 mEQ/L      BUN 6 mg/dL      Creatinine 0.9 mg/dL      Glucose 126 mg/dL      Calcium 9.4 mg/dL      AST (SGOT) 39 IU/L      Comment: MODERATE HEMOLYSIS, RESULT MAY BE INCREASED.        ALT (SGPT) 36 IU/L      Comment: MODERATE HEMOLYSIS, RESULT MAY BE INCREASED.        Alkaline Phosphatase 72 IU/L      Total Protein 8.3 g/dL      Comment: Test performed on plasma which typically contains approximately 0.4 g/dL more protein than serum.        Albumin 3.8 g/dL      Bilirubin, Total 1.0 mg/dL      Comment: MODERATE HEMOLYSIS, RESULT MAY BE INCREASED.        eGFR >60.0 mL/min/1.73m*2      Anion Gap 10 mEQ/L     UA with Reflex Culture [[id number]]  (Normal) Collected: 01/13/22 1645    Specimen: Urine, Clean Catch Updated: 01/13/22 1657    Narrative:      The following orders were created for panel order UA with Reflex Culture.  Procedure                               Abnormality         Status                     ---------                               -----------         ------                     UA Reflex to Culture (Ma...[[id number]]  Normal              Final result                 Please view results for these tests on the individual orders.    UA Reflex to Culture (Macroscopic) [[id number]]  (Normal) Collected: 01/13/22 1645    Specimen: Urine, Clean Catch Updated: 01/13/22 1657     Color, Urine Yellow     Clarity, Urine Clear     Specific Gravity, Urine 1.010     pH, Urine 7.0     Leukocyte Esterase  Negative     Comment: Results can be falsely negative due to high specific gravity, some antibiotics, glucose >3 g/dl, or WBC other than neutrophils.        Nitrite, Urine Negative     Protein, Urine Negative     Glucose, Urine Negative mg/dL      Ketones, Urine Negative mg/dL      Urobilinogen, Urine 0.2 EU/dL      Bilirubin, Urine Negative mg/dL      Blood, Urine Negative     Comment: The sensitivity of the occult blood test is equivalent to approximately 4 intact RBC/HPF.       Lactate, w/ reflex repeat if > 2.0 [[id number]]  (Abnormal) Collected: 01/13/22 1635    Specimen: Blood, Venous Updated: 01/13/22 1652     Lactate 2.1 mmol/L     Blood Culture Blood, Venous [[id number]] Collected: 01/13/22 1635    Specimen: Blood, Venous Updated: 01/13/22 1648    CBC and differential [[id number]]  (Abnormal) Collected: 01/13/22 1635    Specimen: Blood, Venous Updated: 01/13/22 1648     WBC 5.94 K/uL      RBC 5.10 M/uL      Hemoglobin 13.8 g/dL      Hematocrit 43.5 %      MCV 85.3 fL      MCH 27.1 pg      MCHC 31.7 g/dL      RDW 15.0 %      Platelets 224 K/uL      MPV 11.4 fL      Differential Type Auto     nRBC 0.0 %      Immature Granulocytes 0.3 %      Neutrophils 46.7 %      Lymphocytes 42.9 %      Monocytes 7.6 %      Eosinophils 2.0 %      Basophils 0.5 %      Immature Granulocytes, Absolute 0.02 K/uL      Neutrophils, Absolute 2.77 K/uL      Lymphocytes, Absolute 2.55 K/uL      Monocytes, Absolute 0.45 K/uL      Eosinophils, Absolute 0.12 K/uL      Basophils, Absolute 0.03 K/uL     Blood Culture Blood, Venous [[id number]] Collected: 01/13/22 1639    Specimen: Blood, Venous Updated: 01/13/22 1646          Imaging Results          CT ANGIOGRAPHY CHEST PULMONARY EMBOLISM WITH IV CONTRAST (Final result)  Result time 01/13/22 19:30:39    Final result                 Impression:    IMPRESSION:    1.  No evidence of pulmonary emboli as clinically questioned.  2.  Faint patchy groundglass alveolar opacities in both upper lobes  and the  lingula, nonspecific.  An infectious or inflammatory pneumonitis might have this  appearance.  No pulmonary consolidation.  3.  Mild mediastinal lymphadenopathy persists though has improved since  12/9/2020.  This is nonspecific, possibly reactive.  4.  No acute abnormality in the abdomen or pelvis.  No specific evidence of an  inflammatory or obstructive process.  No evidence of pyelonephritis as  clinically questioned.               Narrative:    CLINICAL HISTORY: Right-sided back pain, right upper quadrant abdominal pain,  flank pain, clinical concern for pyelonephritis.  COVID-19 positive.  High  probability of pulmonary emboli.    COMMENT:    Comparison: CT of the chest dated 12/9/2020.  Right upper quadrant ultrasound  dated 12/17/2020.    TECHNIQUE: CT angiography of the chest was performed as per departmental  pulmonary embolism protocol, with axial images acquired following the  intravenous administration of 100 cc Omnipaque-350.  Portal venous phase imaging  of the abdomen and pelvis was then performed.  Delayed phase imaging of the  abdomen was performed through the level of the kidneys.  Oral contrast was not  administered.  Sagittal and coronal reconstructed images were rendered.  3-D MIP  and/or volume-rendered image reconstruction of the chest was performed and  reviewed.    CT DOSE:  One or more dose reduction techniques (e.g. automated exposure  control, adjustment of the mA and/or kV according to patient size, use of  iterative reconstruction technique) utilized for this examination.    CHEST:  LUNGS and AIRWAYS: There are faint patchy groundglass alveolar opacities in both  upper lobes and the lingula which lack a specific distribution.  These are  nonspecific though most suggestive of an inflammatory or infectious pneumonitis.  There is no consolidation or mass.  The trachea and central main airways are  patent and normal in caliber.  PLEURA: Unremarkable. No pleural effusions.  VESSELS:  No central, lobar, or proximal segmental pulmonary arterial filling  defects to suggest pulmonary emboli.  The thoracic aorta and main pulmonary  artery are normal in caliber.  HEART: Heart size is normal.  No pericardial effusion.  MEDIASTINUM and PAULINA: Mildly enlarged prevascular lymph node to the left of the  aortic arch which measures 1.1 cm in short axis (previously 1.7 cm).  Mildly  enlarged subcarinal lymph node measuring 1 cm (previously 1.2 cm).  These lymph  nodes are nonspecific, possibly reactive.  No pathologically enlarged hilar  lymph nodes.  CHEST WALL and LOWER NECK: Unremarkable.  No pathologically enlarged axillary  lymph nodes.    ABDOMEN:  LIVER: Top normal in size.  Otherwise unremarkable.  GALLBLADDER: Cholecystectomy.  BILE DUCTS: No intrahepatic or extrahepatic biliary ductal dilatation.  PANCREAS: Unremarkable.  SPLEEN: Unremarkable.  ADRENALS: Unremarkable.  KIDNEYS: There is normal and symmetric renal enhancement and excretion.  There  are no findings typical of pyelonephritis.  There is no hydronephrosis.  There  is no perinephric fat stranding or perinephric collection.  URETERS: Nondilated.  BOWEL: The stomach is nondilated.  The small and large bowel are normal in  caliber.  A collapsed appendix is identified.  PERITONEUM: No ascites or pneumoperitoneum. No fluid collection.  RETROPERITONEUM: Unremarkable.  LYMPH NODES: None pathologically enlarged.  VESSELS: Unremarkable. Normal caliber abdominal aorta.  UPPER ABDOMINAL WALL SOFT TISSUES: Unremarkable.    PELVIS:  BLADDER: Essentially completely collapsed and therefore cannot be adequately  assessed.  REPRODUCTIVE ORGANS: No pelvic masses.  PERITONEUM: No free fluid. No fluid collection.  RETROPERITONEUM: Unremarkable.  VESSELS: Unremarkable.  LYMPH NODES: None pathologically enlarged.  LOWER ABDOMINAL WALL SOFT TISSUES: Unremarkable.    BONES: Multilevel spondylosis and tiny multilevel Schmorl's nodes in the  thoracic and lumbar  spine.  Vacuum disc phenomenon is noted at several lower  thoracic and lower lumbar levels.  Tiny sclerotic foci in the bony pelvis are  nonspecific, most likely incidental benign bone islands.  No destructive osseous  lesions.                               CT ABDOMEN PELVIS WITH IV CONTRAST (Final result)  Result time 01/13/22 19:30:39    Final result                 Impression:    IMPRESSION:    1.  No evidence of pulmonary emboli as clinically questioned.  2.  Faint patchy groundglass alveolar opacities in both upper lobes and the  lingula, nonspecific.  An infectious or inflammatory pneumonitis might have this  appearance.  No pulmonary consolidation.  3.  Mild mediastinal lymphadenopathy persists though has improved since  12/9/2020.  This is nonspecific, possibly reactive.  4.  No acute abnormality in the abdomen or pelvis.  No specific evidence of an  inflammatory or obstructive process.  No evidence of pyelonephritis as  clinically questioned.               Narrative:    CLINICAL HISTORY: Right-sided back pain, right upper quadrant abdominal pain,  flank pain, clinical concern for pyelonephritis.  COVID-19 positive.  High  probability of pulmonary emboli.    COMMENT:    Comparison: CT of the chest dated 12/9/2020.  Right upper quadrant ultrasound  dated 12/17/2020.    TECHNIQUE: CT angiography of the chest was performed as per departmental  pulmonary embolism protocol, with axial images acquired following the  intravenous administration of 100 cc Omnipaque-350.  Portal venous phase imaging  of the abdomen and pelvis was then performed.  Delayed phase imaging of the  abdomen was performed through the level of the kidneys.  Oral contrast was not  administered.  Sagittal and coronal reconstructed images were rendered.  3-D MIP  and/or volume-rendered image reconstruction of the chest was performed and  reviewed.    CT DOSE:  One or more dose reduction techniques (e.g. automated exposure  control, adjustment of  the mA and/or kV according to patient size, use of  iterative reconstruction technique) utilized for this examination.    CHEST:  LUNGS and AIRWAYS: There are faint patchy groundglass alveolar opacities in both  upper lobes and the lingula which lack a specific distribution.  These are  nonspecific though most suggestive of an inflammatory or infectious pneumonitis.  There is no consolidation or mass.  The trachea and central main airways are  patent and normal in caliber.  PLEURA: Unremarkable. No pleural effusions.  VESSELS: No central, lobar, or proximal segmental pulmonary arterial filling  defects to suggest pulmonary emboli.  The thoracic aorta and main pulmonary  artery are normal in caliber.  HEART: Heart size is normal.  No pericardial effusion.  MEDIASTINUM and PAULINA: Mildly enlarged prevascular lymph node to the left of the  aortic arch which measures 1.1 cm in short axis (previously 1.7 cm).  Mildly  enlarged subcarinal lymph node measuring 1 cm (previously 1.2 cm).  These lymph  nodes are nonspecific, possibly reactive.  No pathologically enlarged hilar  lymph nodes.  CHEST WALL and LOWER NECK: Unremarkable.  No pathologically enlarged axillary  lymph nodes.    ABDOMEN:  LIVER: Top normal in size.  Otherwise unremarkable.  GALLBLADDER: Cholecystectomy.  BILE DUCTS: No intrahepatic or extrahepatic biliary ductal dilatation.  PANCREAS: Unremarkable.  SPLEEN: Unremarkable.  ADRENALS: Unremarkable.  KIDNEYS: There is normal and symmetric renal enhancement and excretion.  There  are no findings typical of pyelonephritis.  There is no hydronephrosis.  There  is no perinephric fat stranding or perinephric collection.  URETERS: Nondilated.  BOWEL: The stomach is nondilated.  The small and large bowel are normal in  caliber.  A collapsed appendix is identified.  PERITONEUM: No ascites or pneumoperitoneum. No fluid collection.  RETROPERITONEUM: Unremarkable.  LYMPH NODES: None pathologically enlarged.  VESSELS:  Unremarkable. Normal caliber abdominal aorta.  UPPER ABDOMINAL WALL SOFT TISSUES: Unremarkable.    PELVIS:  BLADDER: Essentially completely collapsed and therefore cannot be adequately  assessed.  REPRODUCTIVE ORGANS: No pelvic masses.  PERITONEUM: No free fluid. No fluid collection.  RETROPERITONEUM: Unremarkable.  VESSELS: Unremarkable.  LYMPH NODES: None pathologically enlarged.  LOWER ABDOMINAL WALL SOFT TISSUES: Unremarkable.    BONES: Multilevel spondylosis and tiny multilevel Schmorl's nodes in the  thoracic and lumbar spine.  Vacuum disc phenomenon is noted at several lower  thoracic and lower lumbar levels.  Tiny sclerotic foci in the bony pelvis are  nonspecific, most likely incidental benign bone islands.  No destructive osseous  lesions.                                ECG 12 lead   ED Interpretation   Rate 109 bpmRhythm regularQRS narrowQTc 436 ms  Normal axisNo STEMISinus tachycardia      Final Result          Scoring tools                                 ED Course & MDM   MDM / ED COURSE and CLINICAL IMPRESSIONS     [facility]    ED Course as of 01/14/22 1028   Thu Jan 13, 2022   1736 Impression: right lumbar back pain  Plan: labs, ekg, dimer, trop, lactate, BCx [GS]   2026 CT ANGIOGRAPHY CHEST PULMONARY EMBOLISM WITH IV CONTRAST  IMPRESSION:     1.  No evidence of pulmonary emboli as clinically questioned.  2.  Faint patchy groundglass alveolar opacities in both upper lobes and the  lingula, nonspecific.  An infectious or inflammatory pneumonitis might have this  appearance.  No pulmonary consolidation.  3.  Mild mediastinal lymphadenopathy persists though has improved since  12/9/2020.  This is nonspecific, possibly reactive.  4.  No acute abnormality in the abdomen or pelvis.  No specific evidence of an  inflammatory or obstructive process.  No evidence of pyelonephritis as  clinically questioned. [GS]   2027 CT ABDOMEN PELVIS WITH IV CONTRAST  BONES: Multilevel spondylosis and tiny multilevel Schmorl's  nodes in the  thoracic and lumbar spine.  Vacuum disc phenomenon is noted at several lower  thoracic and lower lumbar levels.  Tiny sclerotic foci in the bony pelvis are  nonspecific, most likely incidental benign bone islands.  No destructive osseous  lesions. [GS]   2028 Heart Rate: 97  Tachycardia resolved [GS]   2100 Pt updated on all results, re-evaluated.  [GS]      ED Course User Index  [GS] Anum Baron PA C         Clinical Impressions as of 01/14/22 1028   Multilevel spondylosis   Acute right-sided low back pain with right-sided sciatica   Schmorl's nodes of lumbar region   Abnormal CT of the abdomen            Anum Baron PA C  01/13/22 2159       Anum Baron PA C  01/14/22 1028

## 2022-01-14 LAB
ATRIAL RATE: 109
ERYTHROCYTE [SEDIMENTATION RATE] IN BLOOD BY WESTERGREN METHOD: 5 MM/HR
P AXIS: 54
PR INTERVAL: 148
QRS DURATION: 88
QT INTERVAL: 324
QTC CALCULATION(BAZETT): 436
R AXIS: 25
T WAVE AXIS: 46
VENTRICULAR RATE: 109

## 2022-01-14 NOTE — DISCHARGE INSTRUCTIONS
You were seen and evaluated in the emergency department for back pain.  The CT scan of your back showed age-related related changes at multiple levels of your spine and possible herniated discs, but no clear sign of any abscesses or infection.  There was also an incidental finding of small bone many lesions on your pelvis, these are likely benign and not harmful blood please be sure to mention to your family doctor for follow-up.  Your lab work also showed no sign of acute infection.     Please follow-up with your family doctor and your back pain specialist in the next couple of days.    Please take acetaminophen (Tylenol) as per package instructions.  Please do not exceed 3 g in 24 hours    Please take ibuprofen (i.e. Motrin or Advil) as per package instructions with food    Please use ice.  Please do not apply directly to the skin.  Please do not leave in place while asleep.  Please place for 10-15 minutes and then remove it for 10-15 minutes    He may purchase lidocaine patches over the counter and place them directly on the affected area.  Remove after 12 hours, you may use once daily.    Please return to the emergency department with any worsening symptoms, fevers, going to the bathroom on yourself, numbness around your buttocks.

## 2022-01-14 NOTE — ED ATTESTATION NOTE
Procedures  Physical Exam  Review of Systems    1/14/202211:45 AM  I have personally seen and examined the patient.  I reviewed and agree with the PA/NP/Resident's assessment and plan of care.    My examination, assessment, and plan of care of Carolyn Redmond is as follows:    The patient presents with severe pain in the right flank area paraspinally going all the way to the buttocks and the flank area. Denies any trauma. Has had problems with the black back and is currently seeing pain management was injected the back several times. Supposed to get radiofrequency ablation, has had the test injection a few weeks ago. 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 She has not used any IV drugs for the last year. Also reports some shortness of breath and some pleuritic component to this. Has had some fevers or chills at home. She states she did not take her temperature she just felt hot.    Exam: Currently appears to be in moderate distress. Palpation of the area in the left paraspinal, flank, buttock area does elicit significant discomfort. Straight leg raise test is negative. Distally touch sensation cap refill movement and pulses are all intact. Lungs are clear. Heart S1-S2 without any murmur. Abdomen soft and nontender.    Impression/Plan: Given this past history of IV drug abuse and now fevers or chills at home. We felt that we should get a CT scan of the abdomen pelvis looking for possible abscesses. CT scan of the chest abdomen pelvis was done. No PE, no abscesses were seen. There was some other chronic findings on the CT scan such as lymphadenopathy that seem to get in better. Inflammatory markers were also negative as well as the white count. Blood cultures were drawn in case that she says that she has in the past gotten endocarditis without an elevated white count. This unlikely given the complete normality of the CRP and the sed rate. Agree with pain management and  discharge.    Vital Signs Review: Vital signs have been reviewed. The oxygen saturation is  SpO2: 98 % which is normal.    This document was created using dragon dictation software.  There might be some typographical errors due to this technology.    We are in a pandemic with increased volumes and decreased capacity.      Michael Neely MD  01/14/22 1146

## 2022-01-18 LAB
BACTERIA BLD CULT: NORMAL
BACTERIA BLD CULT: NORMAL

## 2022-01-26 ENCOUNTER — TRANSCRIBE ORDERS (OUTPATIENT)
Dept: SCHEDULING | Age: 37
End: 2022-01-26

## 2022-01-26 DIAGNOSIS — M50.21 OTHER CERVICAL DISC DISPLACEMENT, HIGH CERVICAL REGION: Primary | ICD-10-CM

## 2022-01-26 DIAGNOSIS — M54.16 RADICULOPATHY, LUMBAR REGION: ICD-10-CM

## 2022-02-05 ENCOUNTER — HOSPITAL ENCOUNTER (OUTPATIENT)
Dept: RADIOLOGY | Facility: HOSPITAL | Age: 37
Discharge: HOME | End: 2022-02-05
Attending: ANESTHESIOLOGY
Payer: COMMERCIAL

## 2022-02-05 DIAGNOSIS — M54.16 RADICULOPATHY, LUMBAR REGION: ICD-10-CM

## 2022-02-05 DIAGNOSIS — M50.21 OTHER CERVICAL DISC DISPLACEMENT, HIGH CERVICAL REGION: ICD-10-CM

## 2022-03-15 ENCOUNTER — TELEPHONE (OUTPATIENT)
Dept: SCHEDULING | Facility: CLINIC | Age: 37
End: 2022-03-15
Payer: COMMERCIAL

## 2022-03-15 NOTE — TELEPHONE ENCOUNTER
Discussed with Dr. Eldridge. Pt needs 2,000 mg  of Amoxicillin 1 hour prior to any dental work including routine cleaning.

## 2022-03-15 NOTE — TELEPHONE ENCOUNTER
Dentist is asking what pre medication name, dosing and directions are for pre medication for treatment.  It is being faxed to [phone].     Please watch for fomr, fill out and fax back   [phone]. PLEASE NOTE, patient has a broken tooth that needs attention asap.      Any questions call her at  [phone]

## 2022-03-16 RX ORDER — AMOXICILLIN 500 MG/1
CAPSULE ORAL
Qty: 4 CAPSULE | Refills: 0 | Status: CANCELLED | OUTPATIENT
Start: 2022-03-16

## 2022-03-16 RX ORDER — AMOXICILLIN 500 MG/1
CAPSULE ORAL
Qty: 4 CAPSULE | Refills: 0 | Status: SHIPPED | OUTPATIENT
Start: 2022-03-16 | End: 2022-09-20

## 2022-03-16 NOTE — TELEPHONE ENCOUNTER
Patient was made aware that she should take abx  1 hour prior to any dental work. Script sent to her pharmacy.     Denies allergy to amoxicillin

## 2022-05-11 ENCOUNTER — TELEPHONE (OUTPATIENT)
Dept: SCHEDULING | Facility: CLINIC | Age: 37
End: 2022-05-11
Payer: COMMERCIAL

## 2022-06-17 ENCOUNTER — HOSPITAL ENCOUNTER (OUTPATIENT)
Dept: CARDIOLOGY | Facility: HOSPITAL | Age: 37
Discharge: HOME | End: 2022-06-17
Attending: INTERNAL MEDICINE
Payer: COMMERCIAL

## 2022-06-17 VITALS
BODY MASS INDEX: 42.33 KG/M2 | DIASTOLIC BLOOD PRESSURE: 59 MMHG | SYSTOLIC BLOOD PRESSURE: 116 MMHG | HEIGHT: 62 IN | WEIGHT: 230 LBS

## 2022-06-17 DIAGNOSIS — I27.20 PULMONARY HYPERTENSION (CMS/HCC): ICD-10-CM

## 2022-06-17 DIAGNOSIS — I33.0 ACUTE BACTERIAL ENDOCARDITIS: ICD-10-CM

## 2022-06-17 LAB
BSA FOR ECHO PROCEDURE: 2.14 M2
E WAVE DECELERATION TIME: 253 MS
E/A RATIO: 1.2
EST RIGHT VENT SYSTOLIC PRESSURE BY TRICUSPID REGURGITATION JET: 48 MMHG
INTERVENTRICULAR SEPTUM: 0.8 CM
MV PEAK A VEL: 0.88 M/S
MV PEAK E VEL: 1.1 M/S
POSTERIOR WALL: 0.86 CM
TR MAX PG: 30 MMHG
TRICUSPID VALVE PEAK REGURGITATION VELOCITY: 2.73 M/S

## 2022-06-17 PROCEDURE — 93325 DOPPLER ECHO COLOR FLOW MAPG: CPT | Mod: 26 | Performed by: STUDENT IN AN ORGANIZED HEALTH CARE EDUCATION/TRAINING PROGRAM

## 2022-06-17 PROCEDURE — 93308 TTE F-UP OR LMTD: CPT

## 2022-06-17 PROCEDURE — 93308 TTE F-UP OR LMTD: CPT | Mod: 26 | Performed by: STUDENT IN AN ORGANIZED HEALTH CARE EDUCATION/TRAINING PROGRAM

## 2022-06-17 PROCEDURE — 93321 DOPPLER ECHO F-UP/LMTD STD: CPT | Mod: 26 | Performed by: STUDENT IN AN ORGANIZED HEALTH CARE EDUCATION/TRAINING PROGRAM

## 2022-06-22 ENCOUNTER — OFFICE VISIT (OUTPATIENT)
Dept: CARDIOLOGY | Facility: CLINIC | Age: 37
End: 2022-06-22
Payer: COMMERCIAL

## 2022-06-22 ENCOUNTER — TELEPHONE (OUTPATIENT)
Dept: CARDIOLOGY | Facility: CLINIC | Age: 37
End: 2022-06-22
Payer: COMMERCIAL

## 2022-06-22 VITALS
HEIGHT: 62 IN | DIASTOLIC BLOOD PRESSURE: 70 MMHG | HEART RATE: 72 BPM | BODY MASS INDEX: 46.74 KG/M2 | SYSTOLIC BLOOD PRESSURE: 124 MMHG | RESPIRATION RATE: 16 BRPM | OXYGEN SATURATION: 95 % | WEIGHT: 254 LBS

## 2022-06-22 DIAGNOSIS — I10 ESSENTIAL HYPERTENSION: Primary | ICD-10-CM

## 2022-06-22 DIAGNOSIS — R60.0 EDEMA OF BOTH LOWER LEGS: ICD-10-CM

## 2022-06-22 DIAGNOSIS — I27.20 PULMONARY HYPERTENSION (CMS/HCC): ICD-10-CM

## 2022-06-22 PROCEDURE — 93000 ELECTROCARDIOGRAM COMPLETE: CPT | Performed by: INTERNAL MEDICINE

## 2022-06-22 PROCEDURE — 3008F BODY MASS INDEX DOCD: CPT | Performed by: INTERNAL MEDICINE

## 2022-06-22 PROCEDURE — 99214 OFFICE O/P EST MOD 30 MIN: CPT | Performed by: INTERNAL MEDICINE

## 2022-06-22 RX ORDER — TAMSULOSIN HYDROCHLORIDE 0.4 MG/1
.4-.8 CAPSULE ORAL SEE ADMIN INSTRUCTIONS
COMMUNITY
End: 2023-07-10

## 2022-06-22 NOTE — ASSESSMENT & PLAN NOTE
Probably multiple contributing factors.  History of endocarditis and multiple pulmonary abscesses, obesity, snoring, history of vasculitis.  It is not likely that the patient has group II pulmonary hypertension but certainly not out of the realm of possibility.  I would suspect that obesity and sleep apnea are more likely the cause.  I have asked for pulmonary/sleep specialist eval.  To give the patient to physician groups in [place].  Will check labs and a BNP today.  BNP a few years ago was normal.

## 2022-06-22 NOTE — LETTER
June 22, 2022     Kwame Mcleod DO  [address] [facility]  [address]  [place] [address]    Patient: Carolyn Redmond  YOB: 1985  Date of Visit: 6/22/2022      Dear Dr. Mcleod:    Thank you for referring Carolyn Redmond to me for evaluation. Below are my notes for this consultation.    If you have questions, please do not hesitate to call me. I look forward to following your patient along with you.         Sincerely,        Yanick Blas DO        CC: No Recipients  Yanick Blas DO  6/22/2022  4:39 PM  Sign when Signing Visit     Cardiology Note         Reason for visit:   Chief Complaint   Patient presents with   • Hypertension      HPI    Carolyn Redmond is a 36 y.o. female presents with complaint of worsening edema.  I originally asked her to come back to reevaluate pulmonary pressure this is a new problem for the last several weeks.  She does not eat a high salt diet.  She is not on any new medications that cause edema.  She has no chest pain pressure squeezing tightness orthopnea.  Her mother hears her stop breathing at night.  She is lost 30 or 40 pounds     Past Medical History:   Diagnosis Date   • Anxiety    • Depressed    • Endocarditis    • Fibromyalgia    • Migraines    • Pancreatitis    • Substance abuse (CMS/[facility]) 2015   • Tachycardia    • Vasculitis (CMS/[facility])        Past Surgical History:   Procedure Laterality Date   • CHOLECYSTECTOMY     • ERCP     • GALLBLADDER SURGERY     • TONSILLECTOMY  1991       Amoxicillin, Nsaids (non-steroidal anti-inflammatory drug), Tramadol, and Trazodone    Current Outpatient Medications   Medication Sig Dispense Refill   • albuterol HFA (VENTOLIN HFA) 90 mcg/actuation inhaler Inhale 2 puffs every 4 (four) hours as needed.     • amoxicillin (AMOXIL) 500 mg capsule TAKE 4 CAPSULES (2,000 MG TOTAL) BY MOUTH ONCE FOR 1 DOSE. 1 hour PRIOR TO DENTAL WORK 4 capsule 0   • betamethasone dipropionate 0.05 % cream Apply topically as needed.     •  buprenorphine HCL (SUBUTEX) 8 mg tablet, sublingual Place 16 mg under the tongue daily.     • buprenorphine-naloxone (SUBOXONE) 8-2 mg film Place 1 Film under the tongue 2 (two) times a day.       • butalbital-acetaminophen-caff (FIORICET, ESGIC) -40 mg per tablet Take 1 tablet by mouth every 4 (four) hours as needed for headaches.     • dicyclomine 10 mg capsule Take 10 mg by mouth as needed.     • fluticasone propionate (FLONASE) 50 mcg/actuation nasal spray Administer 1 spray into each nostril daily as needed for rhinitis or allergies.     • furosemide (LASIX) 40 mg tablet Take 1 tablet by mouth as needed.     • metoprolol succinate XL (TOPROL-XL) 25 mg 24 hr tablet Take 25 mg by mouth every morning.     • ondansetron (ZOFRAN) 4 mg tablet Take 1 tablet by mouth every 12 hours.     • phentermine 37.5 mg tablet Take 1 tablet by mouth daily.     • pregabalin (LYRICA) 200 mg capsule Take 200 mg by mouth 3 (three) times a day.     • QUEtiapine (SEROquel) 100 mg tablet Take 200 mg by mouth nightly.     • tamsulosin (FLOMAX) 0.4 mg capsule Take 0.4 mg by mouth 2 (two) times a day.     • TiZANidine 6 mg capsule take 1 capsule by mouth every 6 to 8 hours if needed **MAX 3 CAPS IN 24 HOURS**     • UBRELVY 100 mg tablet tablet Take 100 mg by mouth as needed.     • zolpidem (AMBIEN) 10 mg tablet Take 10 mg by mouth nightly.       No current facility-administered medications for this visit.       Social History     Socioeconomic History   • Marital status: Single     Spouse name: None   • Number of children: None   • Years of education: None   • Highest education level: None   Occupational History   • Occupation:    Tobacco Use   • Smoking status: Current Every Day Smoker     Packs/day: 0.50   • Smokeless tobacco: Current User   • Tobacco comment: quit 1 year ago, vapes currently   Vaping Use   • Vaping Use: Every day   • Substances: Flavoring   • Devices: Refillable tank   Substance and Sexual Activity    • Alcohol use: Not Currently     Comment: social   • Drug use: Yes     Types: Marijuana   • Sexual activity: Defer       Family History   Problem Relation Age of Onset   • Hypertension Biological Mother    • Lung cancer Biological Father    • Heart disease Maternal Grandfather             Objective    Vitals:    06/22/22 1414   BP: 124/70   Pulse: 72   Resp: 16   SpO2: 95%      Physical Exam  Constitutional:       General: She is not in acute distress.     Appearance: She is well-developed. She is not diaphoretic.   HENT:      Head: Normocephalic.   Eyes:      Conjunctiva/sclera: Conjunctivae normal.      Pupils: Pupils are equal, round, and reactive to light.   Neck:      Thyroid: No thyromegaly.      Vascular: No JVD.      Trachea: No tracheal deviation.   Cardiovascular:      Rate and Rhythm: Normal rate and regular rhythm.      Heart sounds: Normal heart sounds. No murmur heard.    No friction rub. No gallop.   Pulmonary:      Effort: No respiratory distress.      Breath sounds: Normal breath sounds. No stridor. No wheezing or rales.   Chest:      Chest wall: No tenderness.   Musculoskeletal:         General: No tenderness or deformity.      Cervical back: Neck supple.      Right lower leg: Edema (1+ bilateral lower extremity edema and pedal edema.) present.      Left lower leg: Edema present.   Skin:     General: Skin is warm and dry.      Coloration: Skin is not pale.      Findings: No erythema or rash.   Neurological:      Mental Status: She is alert and oriented to person, place, and time.      Cranial Nerves: No cranial nerve deficit.   Psychiatric:         Behavior: Behavior normal.         Judgment: Judgment normal.           Lab Results   Component Value Date    WBC 5.94 01/13/2022    HGB 13.8 01/13/2022     01/13/2022    ALT 36 01/13/2022    AST 39 01/13/2022     01/13/2022    K 4.4 01/13/2022    CREATININE 0.9 01/13/2022    TSH 3.300 07/26/2021    INR 1.0 01/26/2021          Imaging  Recent echo-see below under pulmonary hypertension    ECG   Normal except for slight T wave flattening anteriorly     Assessment/Plan    Problem List Items Addressed This Visit        Circulatory    Essential hypertension - Primary    Relevant Orders    ECG 12 LEAD-OFFICE PERFORMED (Completed)    B-type natriuretic peptide    Comprehensive metabolic panel    CBC and Differential    Pulmonary hypertension (CMS/HCC)    Overview     6/17/2022 echo  · Limited study for pulmonary hypertension.  · Normal-sized LV. Preserved LV systolic function. Estimated EF 65%. No regional wall motion abnormalities. Normal LV wall thickness.  · Normal-sized RV. Normal RV systolic function.  · Normal-sized LA.  · Normal-sized RA.  · Aortic valve not well visualized. No aortic valve regurgitation. No aortic valve stenosis.  · Grossly normal leaflet structure of the mitral valve. No significant mitral valve regurgitation.  · Moderate tricuspid valve regurgitation. The regurgitation jet is eccentric. Estimated RVSP = 48 mmHg assuming a right atrial pressure of 15 mmHg.  · Pulmonic valve not well visualized.  · No evidence of pericardial effusion.  · IVC is dilated (>2.1cm). IVC collapses <50% during inspiration.  · Compared to prior study on 3/17/2021, no significant change.               Current Assessment & Plan     Probably multiple contributing factors.  History of endocarditis and multiple pulmonary abscesses, obesity, snoring, history of vasculitis.  It is not likely that the patient has group II pulmonary hypertension but certainly not out of the realm of possibility.  I would suspect that obesity and sleep apnea are more likely the cause.  I have asked for pulmonary/sleep specialist eval.  To give the patient to physician groups in [place].  Will check labs and a BNP today.  BNP a few years ago was normal.             Other Visit Diagnoses     Edema of both lower legs        Relevant Orders    B-type natriuretic peptide     Comprehensive metabolic panel    CBC and Differential        Summary:  Edema at this point etiology not known.  Continue Lasix.  Work-up for mild pulmonary hypertension as detailed above.  I discussed the echo results with the patient.  Made appropriate referrals as above.    Labs today.              I personally spent  35 minutes on this date of service performing the following activities : obtaining history, performing examination, entering orders, documenting, providing counseling and education, independently reviewing study/studies and communicating results.       Yanick Eldridge,   FAC, FACOI  6/22/2022  4:36 PM

## 2022-06-22 NOTE — PROGRESS NOTES
Cardiology Note         Reason for visit:   Chief Complaint   Patient presents with   • Hypertension      HPI    Carolyn Redmond is a 36 y.o. female presents with complaint of worsening edema.  I originally asked her to come back to reevaluate pulmonary pressure this is a new problem for the last several weeks.  She does not eat a high salt diet.  She is not on any new medications that cause edema.  She has no chest pain pressure squeezing tightness orthopnea.  Her mother hears her stop breathing at night.  She is lost 30 or 40 pounds     Past Medical History:   Diagnosis Date   • Anxiety    • Depressed    • Endocarditis    • Fibromyalgia    • Migraines    • Pancreatitis    • Substance abuse (CMS/[facility]) 2015   • Tachycardia    • Vasculitis (CMS/[facility])        Past Surgical History:   Procedure Laterality Date   • CHOLECYSTECTOMY     • ERCP     • GALLBLADDER SURGERY     • TONSILLECTOMY  1991       Amoxicillin, Nsaids (non-steroidal anti-inflammatory drug), Tramadol, and Trazodone    Current Outpatient Medications   Medication Sig Dispense Refill   • albuterol HFA (VENTOLIN HFA) 90 mcg/actuation inhaler Inhale 2 puffs every 4 (four) hours as needed.     • amoxicillin (AMOXIL) 500 mg capsule TAKE 4 CAPSULES (2,000 MG TOTAL) BY MOUTH ONCE FOR 1 DOSE. 1 hour PRIOR TO DENTAL WORK 4 capsule 0   • betamethasone dipropionate 0.05 % cream Apply topically as needed.     • buprenorphine HCL (SUBUTEX) 8 mg tablet, sublingual Place 16 mg under the tongue daily.     • buprenorphine-naloxone (SUBOXONE) 8-2 mg film Place 1 Film under the tongue 2 (two) times a day.       • butalbital-acetaminophen-caff (FIORICET, ESGIC) -40 mg per tablet Take 1 tablet by mouth every 4 (four) hours as needed for headaches.     • dicyclomine 10 mg capsule Take 10 mg by mouth as needed.     • fluticasone propionate (FLONASE) 50 mcg/actuation nasal spray Administer 1 spray into each nostril daily as needed for rhinitis or allergies.     •  furosemide (LASIX) 40 mg tablet Take 1 tablet by mouth as needed.     • metoprolol succinate XL (TOPROL-XL) 25 mg 24 hr tablet Take 25 mg by mouth every morning.     • ondansetron (ZOFRAN) 4 mg tablet Take 1 tablet by mouth every 12 hours.     • phentermine 37.5 mg tablet Take 1 tablet by mouth daily.     • pregabalin (LYRICA) 200 mg capsule Take 200 mg by mouth 3 (three) times a day.     • QUEtiapine (SEROquel) 100 mg tablet Take 200 mg by mouth nightly.     • tamsulosin (FLOMAX) 0.4 mg capsule Take 0.4 mg by mouth 2 (two) times a day.     • TiZANidine 6 mg capsule take 1 capsule by mouth every 6 to 8 hours if needed **MAX 3 CAPS IN 24 HOURS**     • UBRELVY 100 mg tablet tablet Take 100 mg by mouth as needed.     • zolpidem (AMBIEN) 10 mg tablet Take 10 mg by mouth nightly.       No current facility-administered medications for this visit.       Social History     Socioeconomic History   • Marital status: Single     Spouse name: None   • Number of children: None   • Years of education: None   • Highest education level: None   Occupational History   • Occupation:    Tobacco Use   • Smoking status: Current Every Day Smoker     Packs/day: 0.50   • Smokeless tobacco: Current User   • Tobacco comment: quit 1 year ago, vapes currently   Vaping Use   • Vaping Use: Every day   • Substances: Flavoring   • Devices: Refillable tank   Substance and Sexual Activity   • Alcohol use: Not Currently     Comment: social   • Drug use: Yes     Types: Marijuana   • Sexual activity: Defer       Family History   Problem Relation Age of Onset   • Hypertension Biological Mother    • Lung cancer Biological Father    • Heart disease Maternal Grandfather             Objective    Vitals:    06/22/22 1414   BP: 124/70   Pulse: 72   Resp: 16   SpO2: 95%      Physical Exam  Constitutional:       General: She is not in acute distress.     Appearance: She is well-developed. She is not diaphoretic.   HENT:      Head: Normocephalic.    Eyes:      Conjunctiva/sclera: Conjunctivae normal.      Pupils: Pupils are equal, round, and reactive to light.   Neck:      Thyroid: No thyromegaly.      Vascular: No JVD.      Trachea: No tracheal deviation.   Cardiovascular:      Rate and Rhythm: Normal rate and regular rhythm.      Heart sounds: Normal heart sounds. No murmur heard.    No friction rub. No gallop.   Pulmonary:      Effort: No respiratory distress.      Breath sounds: Normal breath sounds. No stridor. No wheezing or rales.   Chest:      Chest wall: No tenderness.   Musculoskeletal:         General: No tenderness or deformity.      Cervical back: Neck supple.      Right lower leg: Edema (1+ bilateral lower extremity edema and pedal edema.) present.      Left lower leg: Edema present.   Skin:     General: Skin is warm and dry.      Coloration: Skin is not pale.      Findings: No erythema or rash.   Neurological:      Mental Status: She is alert and oriented to person, place, and time.      Cranial Nerves: No cranial nerve deficit.   Psychiatric:         Behavior: Behavior normal.         Judgment: Judgment normal.           Lab Results   Component Value Date    WBC 5.94 01/13/2022    HGB 13.8 01/13/2022     01/13/2022    ALT 36 01/13/2022    AST 39 01/13/2022     01/13/2022    K 4.4 01/13/2022    CREATININE 0.9 01/13/2022    TSH 3.300 07/26/2021    INR 1.0 01/26/2021         Imaging  Recent echo-see below under pulmonary hypertension    ECG   Normal except for slight T wave flattening anteriorly     Assessment/Plan    Problem List Items Addressed This Visit        Circulatory    Essential hypertension - Primary    Relevant Orders    ECG 12 LEAD-OFFICE PERFORMED (Completed)    B-type natriuretic peptide    Comprehensive metabolic panel    CBC and Differential    Pulmonary hypertension (CMS/HCC)    Overview     6/17/2022 echo  · Limited study for pulmonary hypertension.  · Normal-sized LV. Preserved LV systolic function. Estimated EF  65%. No regional wall motion abnormalities. Normal LV wall thickness.  · Normal-sized RV. Normal RV systolic function.  · Normal-sized LA.  · Normal-sized RA.  · Aortic valve not well visualized. No aortic valve regurgitation. No aortic valve stenosis.  · Grossly normal leaflet structure of the mitral valve. No significant mitral valve regurgitation.  · Moderate tricuspid valve regurgitation. The regurgitation jet is eccentric. Estimated RVSP = 48 mmHg assuming a right atrial pressure of 15 mmHg.  · Pulmonic valve not well visualized.  · No evidence of pericardial effusion.  · IVC is dilated (>2.1cm). IVC collapses <50% during inspiration.  · Compared to prior study on 3/17/2021, no significant change.               Current Assessment & Plan     Probably multiple contributing factors.  History of endocarditis and multiple pulmonary abscesses, obesity, snoring, history of vasculitis.  It is not likely that the patient has group II pulmonary hypertension but certainly not out of the realm of possibility.  I would suspect that obesity and sleep apnea are more likely the cause.  I have asked for pulmonary/sleep specialist eval.  To give the patient to physician groups in [place].  Will check labs and a BNP today.  BNP a few years ago was normal.             Other Visit Diagnoses     Edema of both lower legs        Relevant Orders    B-type natriuretic peptide    Comprehensive metabolic panel    CBC and Differential        Summary:  Edema at this point etiology not known.  Continue Lasix.  Work-up for mild pulmonary hypertension as detailed above.  I discussed the echo results with the patient.  Made appropriate referrals as above.    Labs today.              I personally spent  35 minutes on this date of service performing the following activities : obtaining history, performing examination, entering orders, documenting, providing counseling and education, independently reviewing study/studies and communicating results.        Yanick Eldridge DO  [facility], FACOI  6/22/2022  4:36 PM

## 2022-06-22 NOTE — ASSESSMENT & PLAN NOTE
The patient is bilateral leg edema.  No dyspnea, orthopnea.  Not certain of the etiology.  She is on no medications that cause edema that I can tell.  Checking labs including a BNP.  Recent echo shows normal LV function.  She has moderate TR from a history of endocarditis which usually does not cause right heart failure but would be considered.  Continue diuretic.

## 2022-07-18 ENCOUNTER — TELEPHONE (OUTPATIENT)
Dept: NEUROLOGY | Facility: CLINIC | Age: 37
End: 2022-07-18
Payer: COMMERCIAL

## 2022-07-18 NOTE — TELEPHONE ENCOUNTER
Pt will need a botox injection with [facility] or Dr Genao in early August and wants to discuss a script needed for the Botox injections    Can someone reach out to her?

## 2022-07-20 LAB
ALBUMIN SERPL-MCNC: 4.3 G/DL (ref 3.8–4.8)
ALBUMIN/GLOB SERPL: 1.9 {RATIO} (ref 1.2–2.2)
ALP SERPL-CCNC: 72 IU/L (ref 44–121)
ALT SERPL-CCNC: 25 IU/L (ref 0–32)
AST SERPL-CCNC: 33 IU/L (ref 0–40)
BASOPHILS # BLD AUTO: 0.1 X10E3/UL (ref 0–0.2)
BASOPHILS NFR BLD AUTO: 1 %
BILIRUB SERPL-MCNC: 0.3 MG/DL (ref 0–1.2)
BNP SERPL-MCNC: <2.5 PG/ML (ref 0–100)
BUN SERPL-MCNC: 9 MG/DL (ref 6–20)
BUN/CREAT SERPL: 12 (ref 9–23)
CALCIUM SERPL-MCNC: 9.4 MG/DL (ref 8.7–10.2)
CHLORIDE SERPL-SCNC: 103 MMOL/L (ref 96–106)
CO2 SERPL-SCNC: 22 MMOL/L (ref 20–29)
CREAT SERPL-MCNC: 0.77 MG/DL (ref 0.57–1)
EGFRCR SERPLBLD CKD-EPI 2021: 104 ML/MIN/1.73
EOSINOPHIL # BLD AUTO: 0.5 X10E3/UL (ref 0–0.4)
EOSINOPHIL NFR BLD AUTO: 6 %
ERYTHROCYTE [DISTWIDTH] IN BLOOD BY AUTOMATED COUNT: 15.3 % (ref 11.7–15.4)
GLOBULIN SER CALC-MCNC: 2.3 G/DL (ref 1.5–4.5)
GLUCOSE SERPL-MCNC: 88 MG/DL (ref 65–99)
HCT VFR BLD AUTO: 35.5 % (ref 34–46.6)
HGB BLD-MCNC: 11.7 G/DL (ref 11.1–15.9)
IMM GRANULOCYTES # BLD AUTO: 0 X10E3/UL (ref 0–0.1)
IMM GRANULOCYTES NFR BLD AUTO: 0 %
LYMPHOCYTES # BLD AUTO: 3.1 X10E3/UL (ref 0.7–3.1)
LYMPHOCYTES NFR BLD AUTO: 39 %
MCH RBC QN AUTO: 27.5 PG (ref 26.6–33)
MCHC RBC AUTO-ENTMCNC: 33 G/DL (ref 31.5–35.7)
MCV RBC AUTO: 84 FL (ref 79–97)
MONOCYTES # BLD AUTO: 0.7 X10E3/UL (ref 0.1–0.9)
MONOCYTES NFR BLD AUTO: 8 %
NEUTROPHILS # BLD AUTO: 3.6 X10E3/UL (ref 1.4–7)
NEUTROPHILS NFR BLD AUTO: 46 %
PLATELET # BLD AUTO: 264 X10E3/UL (ref 150–450)
POTASSIUM SERPL-SCNC: 3.9 MMOL/L (ref 3.5–5.2)
PROT SERPL-MCNC: 6.6 G/DL (ref 6–8.5)
RBC # BLD AUTO: 4.25 X10E6/UL (ref 3.77–5.28)
SODIUM SERPL-SCNC: 140 MMOL/L (ref 134–144)
WBC # BLD AUTO: 7.9 X10E3/UL (ref 3.4–10.8)

## 2022-07-21 ENCOUNTER — TELEPHONE (OUTPATIENT)
Dept: CARDIOLOGY | Facility: CLINIC | Age: 37
End: 2022-07-21
Payer: COMMERCIAL

## 2022-07-21 NOTE — TELEPHONE ENCOUNTER
----- Message from Yanick Blas DO sent at 7/20/2022  5:55 PM EDT -----  Please call the patient about her labs.  They are normal.  The heart failure BNP test is practically undetectable meaning very very normal.  The swelling is not due to her heart so far as we can tell right now.

## 2022-09-19 NOTE — PATIENT INSTRUCTIONS
Please message us via My Chart with any questions or concerns.    Cervicalgia:  Right arm numbness:-We will get EMG of the right upper extremity  - We will refer to physical therapy for evaluation and treatment  Basic neck exercises for daily use:    - Neck pathology and poor posture, with straightening of the normal cervical lordosis, can cause headaches.  Tightening of the neck muscles can irritate the nerves in the occipital region of the head and cause or worsen head pain. Thus neck strengthening and relaxation exercises, can help improve this particular pain. It is importance to have good posture for improving shoulder, neck, and head pain.    - Here are some exercises which should take 5 minutes:     1. Standing, drop your head to one side while continuing to look ahead. Hold for 10 seconds then swap sides. Repeat twice more each side. To increase the stretch, drop the opposite shoulder.    2. Standing again, lower your chin to your chest, hold for 10 and then look up to the ceiling and hold for 10. Repeat twice more.     3. Next, standing straight again, look over your right shoulder and hold firm for 10 seconds, then over your left shoulder for 10. Repeat this 3 times.     4. Finally, while sitting upright, bring your head forward and hold for 10, then all the way back and hold for 10.    If this simple exercise does not help improve the posture, we will consider formal physical therapy in the future.     Insomnia with REM sleep behavior disorder:  - We will send patient for sleep evaluation  Importance of Healthy Sleep:  Behavioral sleep changes can promote restful, regular sleep and reduce headache. Simple changes like establishing consistent sleep and wake-up times, as well as getting between 7 and 8 hours of sleep a day, can make a world of difference. Experts also recommend avoiding substances that impair sleep, like caffeine, nicotine and alcohol, and also suggest winding down before bed to prevent  "sleep problems. To read more go to [email or web address].org/resource-library/sleep/    Medication overuse headaches: Encourage patient to stop using Excedrin 5 times a week.  Limit this to 2 times a week.  - Medication overuse headache (MOH) and analgesic overuse can negate the effectiveness of headache preventive measures.  Avoid medications with narcotics, barbiturates, or caffeine in them as these can cause rebound headaches after very few doses and can interfere with other headache medicine efficacy. Taking  any acute/abortive over the counter medication or prescription drugs for more than 2-3 days a week can cause medication overuse headache.   Chronic migraine headache with and without aura  Chronic tension type headache  Preventive therapy for headaches:   Recommendation of nutraceuticals, to use for migraine headaches:   - Magnesium Oxide 400mg one at bedtime.  If any diarrhea or upset stomach, decrease dose  as tolerated  - Vitamin B2 200 mg twice a day. 2 minor adverse reactions reported: diarrhea and increased urine frequency.  May cause the urine to turn yellow which is normal for B 2 to do and is not a sign that you are dehydrated. May order on line, as it comes in 400 mg capsules.  - Botox 200units  - Nurtec 75mg one in am every other day  Abortive therapy for headaches:   - At the onset of headache: Ubrevly 100 mg at the onset of her migraine headache    Headache management instructions  - When patient has a moderate to severe headache, they should seek rest, initiate relaxation and apply cold compresses to the head.   - Maintain regular sleep schedule. Adults need at least 7-8 hours of uninterrupted sleep, per night.   - Limit over the counter medications such as Tylenol, Ibuprofen, Aleve, Excedrin. (No more than 2- 3 times a week or max 10 a month).  - Maintain headache diary.  Free TIMBO for a smart phone, which can be used is \"Migraine martin\"  - Limit caffeine to 1-2 cups, 8 to 16 oz a " day or less.  - Avoid dietary trigger. (aged cheese, peanuts, MSG, aspartame and nitrates).  - Patient is to have regular frequent meals to prevent headache onset.    - Please drink at least 64 ounces of water a day, to help remain hydrated.    Cognitive behavioral therapy (CBT):  - Is a common type of talk therapy (psychotherapy) were you work with a psychotherapist or therapist . CBT helps you become aware of inaccurate or negative thinking so you can view challenging situations more clearly and respond to them in a more effective way. CBT can be a very helpful tool ? either alone or in combination with other therapies ? in treating mental health disorders and chronic pain. CBT can be an effective tool to help anyone learn how to better manage stressful life situations and pain. In some cases, CBT is most effective when it's combined with other treatments, such as antidepressants or other medications.  You can start yourself by down loading the dante: [employer]      Mindfulness/Meditation:  -Many people believe that stress is a major trigger for their pain. This is where mindfulness and meditation can come into play, as they have been known to help reduce migraine severity, duration and acute pain medication use. It may also help to relieve stress and anxiety while improving feelings of well being.    Biofeedback:   - Involves becoming more aware of the changes that occur in the body and learning how to exert control over generally involuntary functions. Biofeedback allows you to see your vitals in real-time and learn how to stabilize them on your own. There is great evidence that biofeedback can reduce the frequency, intensity, and duration of pain.   When you're stressed, you may notice elevated heart rates, tightened muscles, and sweating.  During biofeedback, you can see these changes on a monitor, then a therapist teaches you exercises to help manage these changes.    Yoga/Juwan Chi:  - The kind of mind/body  therapy that yoga can provide may help create relief from pain. Keeping up with yoga consistently can reduce headache frequency, intensity and duration, so it's important to practice regularly if you plan to use it as a complementary migraine treatment. However, certain types of yoga such as  hot yoga   may be uncomfortable for people with migraine. Others, such as  restorative yoga, may be tolerable even for a patient with chronic migraine.  Juwan Chi can also have a similar benefit for patients with migraine. Specifically, it can help improve balance, which can be very useful for those with vestibular symptoms or vestibular migraine.    Acupuncture:  - A traditional Chinese medicine, acupuncture is reported to increase the release of serotonin, dopamine and other chemicals that may help to treat chronic pain, and can be helpful in preventing episodic migraine. There are, however, conflicting results on studies in acupuncture as a treatment for migraine.    Exercising:    - Regular exercise can reduce the frequency and intensity pain. When one exercises, the body releases endorphins, which are the body's natural painkillers. Exercise reduces stress and helps individuals to sleep at night. Exercising at least 30 to 40 minutes 3 times a week is sufficient for most patients.   When exercising, follow this plan:  - First, stay hydrated before, during, and after exercise.    - Second part of the exercise plan is to eat sufficient food about an hour and a half before you exercise. Exercise causes one's blood sugar level to decrease, and it is important to have a source of energy.   - Final part of the exercise plan is to warm-up. Do not jump into sudden, vigorous exercise if that triggers a headache or migraine.       To read more go to [email or web address]/resource-library/effects-of-exercise-on-headaches-and-migraines

## 2022-09-19 NOTE — PROGRESS NOTES
[facility]   Headache Center  Margaret Genao MD  [address] [place] [address] ([address])  [place] [id number]       Patient ID: Carolyn Redmond    : 1985  MRN: [id number]                                            Visit Date: 2022  Encounter Provider: Margaret Genao  Referring Provider: No ref. provider found           Assessment/Plan   Problem List Items Addressed This Visit        Nervous    Cervicalgia    Relevant Orders    Ambulatory referral to Physical Therapy    Chronic tension-type headache, not intractable    Relevant Medications    buPROPion XL (WELLBUTRIN XL) 300 mg 24 hr tablet    PARoxetine (PAXIL) 20 mg tablet    ubrogepant (UBRELVY) 100 mg tablet tablet    rimegepant (NURTEC ODT) 75 mg tablet,disintegrating    Other Relevant Orders    Ambulatory referral to Physical Therapy    Insomnia    Relevant Orders    Ambulatory referral to Sleep Medicine    REM behavioral disorder    Relevant Orders    Ambulatory referral to Sleep Medicine    Right arm numbness    Relevant Orders    EMG    MRI BRAIN WITH AND WITHOUT CONTRAST    Right facial numbness    Relevant Orders    MRI BRAIN WITH AND WITHOUT CONTRAST       Other    Chronic migraine without aura without status migrainosus, not intractable - Primary    Relevant Medications    buPROPion XL (WELLBUTRIN XL) 300 mg 24 hr tablet    PARoxetine (PAXIL) 20 mg tablet    riboflavin, vitamin B2, (VITAMIN B-2) 100 mg tablet    magnesium oxide (MAG-OX) 400 mg (241.3 mg magnesium) tablet    ubrogepant (UBRELVY) 100 mg tablet tablet    rimegepant (NURTEC ODT) 75 mg tablet,disintegrating    Other Relevant Orders    MRI BRAIN WITH AND WITHOUT CONTRAST    Migraine with aura and without status migrainosus, not intractable    Relevant Medications    buPROPion XL (WELLBUTRIN XL) 300 mg 24 hr tablet    PARoxetine (PAXIL) 20 mg tablet    ubrogepant (UBRELVY) 100 mg tablet tablet    rimegepant (NURTEC ODT) 75 mg tablet,disintegrating       Other Visit Diagnoses     Vitamin D deficiency        Relevant Orders    Vitamin D 25 hydroxy    Vitamin B12 deficiency        Relevant Orders    Vitamin B12          Please message us via My Chart with any questions or concerns.    Cervicalgia:  - Tizanidine 6 mg nightly  - Lyrica 200 mg 3 times a day for back pain and fibromyalgia  Right arm numbness:-We will get EMG of the right upper extremity  - We will refer to physical therapy for evaluation and treatment  Basic neck exercises for daily use:    - Neck pathology and poor posture, with straightening of the normal cervical lordosis, can cause headaches.  Tightening of the neck muscles can irritate the nerves in the occipital region of the head and cause or worsen head pain. Thus neck strengthening and relaxation exercises, can help improve this particular pain. It is importance to have good posture for improving shoulder, neck, and head pain.    - Here are some exercises which should take 5 minutes:     1. Standing, drop your head to one side while continuing to look ahead. Hold for 10 seconds then swap sides. Repeat twice more each side. To increase the stretch, drop the opposite shoulder.    2. Standing again, lower your chin to your chest, hold for 10 and then look up to the ceiling and hold for 10. Repeat twice more.     3. Next, standing straight again, look over your right shoulder and hold firm for 10 seconds, then over your left shoulder for 10. Repeat this 3 times.     4. Finally, while sitting upright, bring your head forward and hold for 10, then all the way back and hold for 10.    If this simple exercise does not help improve the posture, we will consider formal physical therapy in the future.     Depression anxiety:  - Not currently seeing a psychiatrist but does see her therapist weekly.  - Seroquel 200 mg nightly  - Paxil 20 mg daily  - Wellbutrin 300 mg daily  - Suboxone 16 mg sublingual daily for history of opioid use in the past    Insomnia  with REM sleep behavior disorder:  - Ambien 10 mg nightly  - We will send patient for sleep evaluation  Importance of Healthy Sleep:  Behavioral sleep changes can promote restful, regular sleep and reduce headache. Simple changes like establishing consistent sleep and wake-up times, as well as getting between 7 and 8 hours of sleep a day, can make a world of difference. Experts also recommend avoiding substances that impair sleep, like caffeine, nicotine and alcohol, and also suggest winding down before bed to prevent sleep problems. To read more go to https://americanmigrainefoundation.org/resource-library/sleep/    Medication overuse headaches: Encourage patient to stop using Excedrin 5 times a week.  Limit this to 2 times a week.  - Medication overuse headache (MOH) and analgesic overuse can negate the effectiveness of headache preventive measures.  Avoid medications with narcotics, barbiturates, or caffeine in them as these can cause rebound headaches after very few doses and can interfere with other headache medicine efficacy. Taking  any acute/abortive over the counter medication or prescription drugs for more than 2-3 days a week can cause medication overuse headache.   Chronic migraine headache with and without aura  Chronic tension type headache  Preventive therapy for headaches:   Recommendation of nutraceuticals, to use for migraine headaches:   - Magnesium Oxide 400mg one at bedtime.  If any diarrhea or upset stomach, decrease dose  as tolerated  - Vitamin B2 200 mg twice a day. 2 minor adverse reactions reported: diarrhea and increased urine frequency.  May cause the urine to turn yellow which is normal for B 2 to do and is not a sign that you are dehydrated. May order on line, as it comes in 400 mg capsules.  - Metoprolol 25 mg daily for her hypertension-managed by her cardiologist  - Botox 200 units  - Nurtec 75 mg one in am every other day  Abortive therapy for headaches:   - At the onset of  "headache: Ubrelvy 100 mg at the onset of her migraine headache    Headache management instructions  - When patient has a moderate to severe headache, they should seek rest, initiate relaxation and apply cold compresses to the head.   - Maintain regular sleep schedule. Adults need at least 7-8 hours of uninterrupted sleep, per night.   - Limit over the counter medications such as Tylenol, Ibuprofen, Aleve, Excedrin. (No more than 2- 3 times a week or max 10 a month).  - Maintain headache diary.  Free TIMBO for a smart phone, which can be used is \"Migraine martin\"  - Limit caffeine to 1-2 cups, 8 to 16 oz a day or less.  - Avoid dietary trigger. (aged cheese, peanuts, MSG, aspartame and nitrates).  - Patient is to have regular frequent meals to prevent headache onset.    - Please drink at least 64 ounces of water a day, to help remain hydrated.        Return in about 3 months (around 12/20/2022).         _________________________________________________________________      Chief Complaint: Establish Care      Subjective     We had the pleasure of evaluating Carolyn Redmond in neurological consultation today. As you know she  is a 37 y.o.  years old  right handed female.  she is here today for evaluation of chronic migraine headache.  Work history: Currently disabled-was a  and a   Personal history: Single with no children - here today with her mother    Medical history review:   QTC: 1/13/2022- 436  Tobacco use: Yes-smoking and stopped in 2019. Smoked for 4 years  Vaping:does smoke nicotine  Alcohol use: rare to once a month  Marijuana use: Medical marijuana has medical card  Other Illicit drug use: yes in the past - all different type of opioids in the past  Daily Caffeine intake? Coffee - one cup     , Tea -  none   , Soda -none  Daily water intake? 20  Oz x 5 a day  Syncope-with head injury  - 2014 slipped on water at work and had lower back pain.  History of shingles   April " 2017-endocarditis  Pancreatitis  Temporomandibular joint pain  Fibromyalgia  Insomnia with REM sleep pattern  Vasculitis determined by biopsy of skin 2017      Psychiatry history review:  PTSD  Admission to psych unit in 2017 - Attempted suicide in June-2017, psych hospitalization. Psychiatrist stopped the Imuran and tapered prednisone to 10 mg daily  Depression: Yes-which at times requires inpatient management.  Anxiety: Yes  Seeing a psychiatrist:yes but not now  Seeing at therapist: yes, once a week    Headache/pain history:  Any family history of migraines? Mother, maternal aunt, sister  Any family history of aneurysms? none  Family history of any other neurological disease? none    Have you seen someone else for headaches or pain?  Yes Dr. Burrows at [place] then addressed to Alberto Quinn    Headaches started at what age?  -  At the age of 22 they became much more intense    What is your current pain level?   Headache:2-3/10  Neck pain: 5- 6/10    How often do the headaches/pain occur? Now with Botox  Mild headaches: 3 times a week  Moderate to severe headaches: 2 times a week with Botox  Neck pain: daily    How often do you take abortive medication for headache in a week?  Over-the-counter medication:  5 times a week and takes Excedrin  Migraine specific medication: not often    Are you ever headache free? Yes at least 2-3 time a week now with Botox    Aura/Warning/Prodrome?  - starts with blurry vision followed by right eye flashing lights (seconds)  - Difficulty finding words, slurred speech - it is all the time per pt    What time of the day do headaches/pain start?  Mild headaches: afternoon  Moderate to severe headaches: wakes up with it or by late morning it will started  Neck pain: there all the time    How long do the headaches/pain last?   Mild headaches: with Excedrin 2-3 hours  Moderate to severe headaches: one day to one month - with Excedrin one hour  Neck pain: baseline pain but when intense it  will last most of the day    Describe your usual headache/pain?   Mild headaches: nagging, aching, pressure,   Moderate to severe headaches: throbbing, pounding  Neck pain: sharp, aching    Where is your headache/pain located?   Mild headaches: apex of her head  Moderate to severe headaches: frontal and radiates to the back of her head  Neck pain: right more then left side of her neck    What is the intensity of pain?   Mild headaches: 1-4/10  Moderate to severe headaches: 5 to 10/10, 10/10 once a week  Neck pain: 5 to 6/10    Associated symptoms with headache or neck pain:   - Decrease appetite, Nausea, Vomiting, Diarrhea   - Photophobia, Phonophobia, Osmophobia   - Insomnia   - Flushing of face  - Stiff or sore neck  - Light headed  - Off balance   - Problems with concentration   - Blurred vision   - Better with lying down   - Prefer to be in a cool, quiet, dark room     Triggers: Lack of sleep, stress tension, menstruation, missing meals, weather change, alcohol, oversleeping, dehydration, bright lights, certain food    Sleep Habit:   How many hours do you actually sleep? 7-8/10  Do you snore and or have been told that you stop breathing during sleeping? no  Do you grind/clench your teeth at night? yes  Do you have jaw pain? yes    Are you currently pregnant or planning on getting pregnant? no  Are you on a birth control pill?  No    What treatment have you had in the past or currently using for headaches/pain/mood?   Trigger point injection/Nerve block:YES trigger point injection and nerve block  Epidural injections or trans foraminal injections: YES for her  back  Alternative therapies: massage, physical therapy, acupuncture and chiropractor, tens units  CBD or THC: YES   Other headache devices: NO   Botulinum toxin: YES Botox-started 5/9/2022 last Botox 8/11/2022  Headache infusions:NO   Preventive medication therapy:   - Folic acid, magnesium (not sure if that helped)  - Protriptyline Wellbutrin, Cymbalta 60  mg, Latuda,  - Terazosin 2 mg, Klonopin, trazodone (allergies), zolpidem (Ambien) 10 mg,  - Lyrica, Topamax (effective but stopped due to hair loss)  - Inderal, metoprolol  - Benadryl IV infusion  - Baclofen, tizanidine  - Seroquel 200 mg,   Abortive medication Therapy:  - Tramadol (allergies), Percocet (2017)  - Fioricet,  - Sumatriptan 100 mg tab, 20 mg nasal spray,, rizatriptan, Relpax, Zomig (ineffective), Frova  - Ubrelvy 100 mg, Nurtec 75 mg  - Excedrin, Tylenol,  - Ibuprofen, Celebrex, Toradol,  - Reglan, Zofran 4 mg tablet, Compazine, Phenergan,  - Prednisone 10 mg,    - I personally reviewed old notes over the last few months     Imagin2022- MRI cervical spine without contrast:  IMPRESSION:  Stable appearance of the cervical spine, again noting C5-C6 disc herniation  resulting in mild central canal stenosis.     COMMENT:  Cervicothoracic alignment: Straightening of the normal lordosis without  listhesis.     Vertebral bodies: Maintained in height and signal.     Vertebral Discs: Mild disc desiccation throughout without significant loss of  height.     Visualized spinal cord and contents of the posterior fossa: Within normal  limits.     C2-3: No disc herniation, central canal stenosis, or neural foraminal narrowing.     C3-4: No disc herniation, central canal stenosis, or neural foraminal narrowing.     C4-5: Minimal broad-based disc bulge without central canal stenosis or neural  foraminal narrowing.     C5-6: Broad-based left paracentral disc herniation indenting the ventral thecal  sac and abutting the ventral cord, resulting in mild central canal stenosis,  unchanged. No significant neural foraminal narrowing.     C6-7: No disc herniation, central canal stenosis, or neural foraminal narrowing.     C7-T1: No disc herniation, central canal stenosis, or neural foraminal  narrowing.     Mildly prominent retropharyngeal and upper cervical chain lymph nodes as well as  lingual and palatine tonsillar  hypertrophy, all likely reactive.      2022-MRI lumbar spine:  IMPRESSION:  Persistent multilevel spondylosis of the predominantly lower lumbar spine as  discussed below, noting slight progression of now moderate to severe L4-L5  central canal stenosis and persistent high-grade L4-L5 neural foraminal  narrowing with abutment of the exiting bilateral L4 nerve roots.    7/30/2019-MRI of the brain without contrast  FINDINGS:  There is a soft tissue hematoma overlying the right frontal bone.   Diffusion-weighted imaging demonstrates no area of restricted diffusion to suggest acute infarction.  There is no intracranial hemorrhage, midline shift, mass effect, or extra-axial fluid collection.     Brain parenchyma demonstrates normal morphology and signal characteristics.  Midline structures are within normal limits.  Major intracranial vessels demonstrate preserved flow voids.  Brain volume and ventricular system are within normal limits for age.     Visualized paranasal sinuses are unremarkable.  Orbits, globes, and mastoid air cells are unremarkable.   IMPRESSION:   Soft tissue hematoma overlying the right frontal bone.   Otherwise unremarkable unenhanced MRI of the brain with diffusion-weighted imaging.        12/8/2020-CT brain  CT ACCUMULATED DOSE: 944.74 mGy-cm.   FINDINGS: The brain parenchyma is normal in attenuation. No acute intracranial hemorrhage, mass or mass effect identified. There is normal gray to white matter differentiation.   Ventricles and sulci are normal in size and configuration. No extra axial collection seen.   The imaged orbits and orbital contents are unremarkable.   Visualized paranasal sinuses and mastoids are well aerated.   No bony abnormality of the calvarium. There is no displaced skull fracture. Extracalvarial soft tissues are unremarkable   IMPRESSION:   No evidence of acute hemorrhage or transcortical infarction     12/8/2020-echocardiogram  Findings:   Study Quality:   Technically  Difficult. Poor patient body habitus.   Left Ventricle:   Normal left ventricular size. Normal left ventricular wall thickness. Normal left   ventricular systolic function. LV Ejection Fraction was 65 %. No regional wall motion   abnormalities were appreciated on today's study.   Diastolic Function:   Mitral valve inflow pattern and Tissue Doppler imaging within normal limits for age.   Right Ventricle:   Normal right ventricular systolic function. Normal right ventricular size. TAPSE 2.4 cm.   Left Atrium:   The left atrium is normal in size. Left atrial volume index 18 ml/m2.   Right Atrium:   The right atrium is normal in size.   Atrial Septum:   Normal atrial septum.   Mitral Valve:   Normal appearance and function of the mitral valve. No mitral regurgitation is seen.   Aortic Valve:   Normal appearance and function of the aortic valve. Trileaflet aortic valve. The peak   aortic valve gradient was 9 mmHg. Peak aortic valve velocity 148 cm/sec.   Tricuspid Valve:   Tricuspid valve appears mildly thickened. There is mild to moderate tricuspid   regurgitation. Tricuspid regurgitation peak pressure gradient was 44 mmHg. Right   ventricular systolic pressure is consistent with moderate pulmonary hypertension.   Pulmonic Valve:   Normal appearance and function of the pulmonic valve.   Pericardium:   Normal pericardium with no significant pericardial effusion.   Aorta:   All visualized segments of Aorta normal.   IVC:   Normal size IVC with no respiratory collapse consistent with elevated right atrial   pressure of 8 mmHg. IVC diameter 1.9 cm.      Medications:   Current Outpatient Medications:     albuterol HFA (VENTOLIN HFA) 90 mcg/actuation inhaler, Inhale 2 puffs every 4 (four) hours as needed., Disp: , Rfl:     betamethasone dipropionate 0.05 % cream, Apply topically as needed., Disp: , Rfl:     buprenorphine HCL (SUBUTEX) 8 mg tablet, sublingual, Place 16 mg under the tongue daily., Disp: , Rfl:     buPROPion  XL (WELLBUTRIN XL) 300 mg 24 hr tablet, Take 300 mg by mouth once daily., Disp: , Rfl:     fluticasone propionate (FLONASE) 50 mcg/actuation nasal spray, Administer 1 spray into each nostril daily as needed for rhinitis or allergies., Disp: , Rfl:     furosemide (LASIX) 40 mg tablet, Take 1 tablet by mouth as needed., Disp: , Rfl:     inhaler,assist device,accesory (INHALER,ASSIST DEVICES,ACCESS MISC), by Not Applicable route., Disp: , Rfl:     magnesium oxide (MAG-OX) 400 mg (241.3 mg magnesium) tablet, Take 1 tablet (400 mg total) by mouth daily., Disp: 90 tablet, Rfl: 1    metoprolol succinate XL (TOPROL-XL) 25 mg 24 hr tablet, Take 25 mg by mouth every morning., Disp: , Rfl:     ondansetron (ZOFRAN) 4 mg tablet, Take 1 tablet by mouth every 12 hours., Disp: , Rfl:     pantoprazole (PROTONIX) 40 mg EC tablet, Take 40 mg by mouth once daily., Disp: , Rfl:     PARoxetine (PAXIL) 20 mg tablet, Take 20 mg by mouth once daily., Disp: , Rfl:     pregabalin (LYRICA) 200 mg capsule, Take 200 mg by mouth 3 (three) times a day., Disp: , Rfl:     QUEtiapine (SEROquel) 200 mg tablet, Take 200 mg by mouth., Disp: , Rfl:     riboflavin, vitamin B2, (VITAMIN B-2) 100 mg tablet, Two in am and two in pm, Disp: 120 tablet, Rfl: 3    rimegepant (NURTEC ODT) 75 mg tablet,disintegrating, Take 1 tablet (75 mg total) by mouth once for 1 dose. Every other day, Disp: 16 tablet, Rfl: 3    tamsulosin (FLOMAX) 0.4 mg capsule, Take 0.4 mg by mouth 2 (two) times a day., Disp: , Rfl:     TiZANidine 6 mg capsule, take 1 capsule by mouth every 6 to 8 hours if needed **MAX 3 CAPS IN 24 HOURS**, Disp: , Rfl:     ubrogepant (UBRELVY) 100 mg tablet tablet, Take 1 tablet (100 mg total) by mouth as needed for migraine. Second dose may be taken at least 2 hours after initial dose, Disp: 8 tablet, Rfl: 1    zolpidem (AMBIEN) 10 mg tablet, Take 10 mg by mouth nightly., Disp: , Rfl:     Past Medical History:  has a past medical history of  Anxiety, Depressed, Endocarditis, Fibromyalgia, Migraines, Pancreatitis, Substance abuse (CMS/Formerly [facility]) (2015), Tachycardia, and Vasculitis (CMS/Formerly [facility]).    Past Surgical History:  has a past surgical history that includes Gallbladder surgery; Cholecystectomy; ERCP; and Tonsillectomy (1991).    Social History:   Social History     Tobacco Use    Smoking status: Current Every Day Smoker     Packs/day: 0.50    Smokeless tobacco: Current User    Tobacco comment: quit 1 year ago, vapes currently   Vaping Use    Vaping Use: Every day    Substances: Flavoring    Devices: Refillable tank   Substance Use Topics    Alcohol use: Not Currently     Comment: social    Drug use: Yes     Types: Marijuana       Family History: family history includes Heart disease in her maternal grandfather; Hypertension in her biological mother; Lung cancer in her biological father.    Allergies: is allergic to amoxicillin, nsaids (non-steroidal anti-inflammatory drug), tramadol, and trazodone.     Review of Systems  All other systems reviewed and negative except as noted in the HPI.      The following have been reviewed and updated as appropriate in this visit:   Allergies  Meds  Problems           Objective   Physical Exam:    Visit Vitals  /74   Pulse 86   Temp 36.6 °C (97.9 °F)   Wt 106 kg (234 lb 9.6 oz)   SpO2 97%   BMI 42.91 kg/m²         Musculoskeletal: - Range of motion slightly limited to bending, especially when bending towards the left.     Behavior/Emotional: Appropriate, cooperative     Neurologic Exam:  Alert and oriented.       CN: intact-except for decrease sensation to light touch in V1 V2 distribution    Motor: moving all extremities.  5 out of 5 in upper and lower extremity    Sensory examination: Decreased to light touch in the right upper and right lower extremity    Coordination: No tremor noted, finger-to-nose intact    Reflexes: 3/4 throughout.       Gait: Was narrow based.  Positive Romberg           Margaret I.  MD [place]    I spent 70 minutes on this date of service performing the following activities: obtaining history, performing examination, entering orders, documenting, preparing for visit, obtaining / reviewing records, providing counseling and education and independently reviewing study/studies.

## 2022-09-20 ENCOUNTER — OFFICE VISIT (OUTPATIENT)
Dept: NEUROLOGY | Facility: CLINIC | Age: 37
End: 2022-09-20
Payer: COMMERCIAL

## 2022-09-20 VITALS
OXYGEN SATURATION: 97 % | WEIGHT: 234.6 LBS | SYSTOLIC BLOOD PRESSURE: 128 MMHG | BODY MASS INDEX: 42.91 KG/M2 | TEMPERATURE: 97.9 F | HEART RATE: 86 BPM | DIASTOLIC BLOOD PRESSURE: 74 MMHG

## 2022-09-20 DIAGNOSIS — G43.709 CHRONIC MIGRAINE WITHOUT AURA WITHOUT STATUS MIGRAINOSUS, NOT INTRACTABLE: Primary | ICD-10-CM

## 2022-09-20 DIAGNOSIS — G44.229 CHRONIC TENSION-TYPE HEADACHE, NOT INTRACTABLE: ICD-10-CM

## 2022-09-20 DIAGNOSIS — M54.2 CERVICALGIA: ICD-10-CM

## 2022-09-20 DIAGNOSIS — R20.0 RIGHT ARM NUMBNESS: ICD-10-CM

## 2022-09-20 DIAGNOSIS — G47.52 REM BEHAVIORAL DISORDER: ICD-10-CM

## 2022-09-20 DIAGNOSIS — E55.9 VITAMIN D DEFICIENCY: ICD-10-CM

## 2022-09-20 DIAGNOSIS — G43.109 MIGRAINE WITH AURA AND WITHOUT STATUS MIGRAINOSUS, NOT INTRACTABLE: ICD-10-CM

## 2022-09-20 DIAGNOSIS — R20.0 RIGHT FACIAL NUMBNESS: ICD-10-CM

## 2022-09-20 DIAGNOSIS — F51.01 PRIMARY INSOMNIA: ICD-10-CM

## 2022-09-20 DIAGNOSIS — E53.8 VITAMIN B12 DEFICIENCY: ICD-10-CM

## 2022-09-20 PROBLEM — F11.20 OPIOID TYPE DEPENDENCE, CONTINUOUS USE (CMS/HCC): Status: RESOLVED | Noted: 2019-04-08 | Resolved: 2022-09-20

## 2022-09-20 PROBLEM — M50.90 CERVICAL DISC DISEASE: Status: RESOLVED | Noted: 2018-06-06 | Resolved: 2022-09-20

## 2022-09-20 PROBLEM — R74.8 ELEVATED CK: Status: RESOLVED | Noted: 2021-05-24 | Resolved: 2022-09-20

## 2022-09-20 PROBLEM — R52 PAIN: Status: RESOLVED | Noted: 2019-04-07 | Resolved: 2022-09-20

## 2022-09-20 PROBLEM — F41.9 ANXIETY: Status: RESOLVED | Noted: 2019-04-08 | Resolved: 2022-09-20

## 2022-09-20 PROBLEM — R40.4 TRANSIENT ALTERATION OF AWARENESS: Status: RESOLVED | Noted: 2017-11-22 | Resolved: 2022-09-20

## 2022-09-20 PROBLEM — S06.0XAA CONCUSSION: Status: RESOLVED | Noted: 2017-11-10 | Resolved: 2022-09-20

## 2022-09-20 PROBLEM — G43.019 INTRACTABLE MIGRAINE WITHOUT AURA: Status: RESOLVED | Noted: 2017-09-26 | Resolved: 2022-09-20

## 2022-09-20 PROBLEM — G43.919 MIGRAINE, INTRACTABLE: Status: RESOLVED | Noted: 2018-06-12 | Resolved: 2022-09-20

## 2022-09-20 PROCEDURE — 99205 OFFICE O/P NEW HI 60 MIN: CPT | Performed by: PSYCHIATRY & NEUROLOGY

## 2022-09-20 PROCEDURE — 3008F BODY MASS INDEX DOCD: CPT | Performed by: PSYCHIATRY & NEUROLOGY

## 2022-09-20 RX ORDER — BUPROPION HYDROCHLORIDE 300 MG/1
300 TABLET ORAL
COMMUNITY
Start: 2022-08-25 | End: 2023-06-13

## 2022-09-20 RX ORDER — PANTOPRAZOLE SODIUM 40 MG/1
40 TABLET, DELAYED RELEASE ORAL NIGHTLY
COMMUNITY
Start: 2022-08-25 | End: 2023-12-15 | Stop reason: SDUPTHER

## 2022-09-20 RX ORDER — QUETIAPINE FUMARATE 200 MG/1
100 TABLET, FILM COATED ORAL
COMMUNITY
Start: 2022-07-29 | End: 2023-05-25 | Stop reason: DRUGHIGH

## 2022-09-20 RX ORDER — LANOLIN ALCOHOL/MO/W.PET/CERES
400 CREAM (GRAM) TOPICAL DAILY
Qty: 90 TABLET | Refills: 1 | Status: SHIPPED | OUTPATIENT
Start: 2022-09-20 | End: 2023-08-28 | Stop reason: SDUPTHER

## 2022-09-20 RX ORDER — RIMEGEPANT SULFATE 75 MG/75MG
75 TABLET, ORALLY DISINTEGRATING ORAL ONCE
Qty: 16 TABLET | Refills: 3 | Status: SHIPPED | OUTPATIENT
Start: 2022-09-20 | End: 2022-09-20

## 2022-09-20 RX ORDER — PAROXETINE HYDROCHLORIDE 40 MG/1
20 TABLET, FILM COATED ORAL NIGHTLY
COMMUNITY
Start: 2022-09-11 | End: 2023-07-05

## 2022-09-20 RX ORDER — RIBOFLAVIN (VITAMIN B2) 100 MG
TABLET ORAL
Qty: 120 TABLET | Refills: 3 | Status: SHIPPED | OUTPATIENT
Start: 2022-09-20 | End: 2022-12-15 | Stop reason: SDUPTHER

## 2022-09-20 RX ORDER — RIMEGEPANT SULFATE 75 MG/75MG
75 TABLET, ORALLY DISINTEGRATING ORAL ONCE
Qty: 8 TABLET | Refills: 3 | Status: SHIPPED | OUTPATIENT
Start: 2022-09-20 | End: 2022-09-20 | Stop reason: SDUPTHER

## 2022-09-20 RX ORDER — PREGABALIN 150 MG/1
CAPSULE ORAL
COMMUNITY
Start: 2022-08-08 | End: 2022-09-20 | Stop reason: SDUPTHER

## 2022-09-21 NOTE — TELEPHONE ENCOUNTER
Please call patient and let her know.    ----- Message from Saskia Chaparro sent at 9/21/2022  2:00 PM EDT -----  Regarding: Nurtec Denial  Hi- patient's Nurtec was denied. They want her to try a CGRP or monoclonal antibody first. They are recommending Aimovig or Emgality.  Please advise. Thank you!

## 2022-09-26 ENCOUNTER — TELEPHONE (OUTPATIENT)
Dept: NEUROLOGY | Facility: CLINIC | Age: 37
End: 2022-09-26
Payer: COMMERCIAL

## 2022-09-26 NOTE — TELEPHONE ENCOUNTER
Sent patient message via [employer] letting us know that her MRI has been approved provide auth number and also number to get scheduled for her test also explained no auth is required for EMG

## 2022-09-26 NOTE — TELEPHONE ENCOUNTER
Ms Redmond is asking for advice about the pre auth for her nurtex tablets & emgality injection. She states the office told her everything went through but her insurance company states prior auths were not submitted by the office & they are asking to please submit them so the scripts can be filled.  Pt also needs EMG & brain MRI authorization requests to be submitted so she can schedule them asap. Ms Redmond is experiencing horrible migraines and is asking that the authorizations for the medications prescribed her at her appt last week please be sent in asap. Thank you!

## 2022-10-06 ENCOUNTER — HOSPITAL ENCOUNTER (OUTPATIENT)
Dept: RADIOLOGY | Facility: HOSPITAL | Age: 37
Discharge: HOME | End: 2022-10-06
Attending: PSYCHIATRY & NEUROLOGY
Payer: COMMERCIAL

## 2022-10-06 DIAGNOSIS — R20.0 RIGHT FACIAL NUMBNESS: ICD-10-CM

## 2022-10-06 DIAGNOSIS — R20.0 RIGHT ARM NUMBNESS: ICD-10-CM

## 2022-10-06 DIAGNOSIS — G43.709 CHRONIC MIGRAINE WITHOUT AURA WITHOUT STATUS MIGRAINOSUS, NOT INTRACTABLE: ICD-10-CM

## 2022-10-06 DIAGNOSIS — G43.709 CHRONIC MIGRAINE WITHOUT AURA WITHOUT STATUS MIGRAINOSUS, NOT INTRACTABLE: Primary | ICD-10-CM

## 2022-10-06 RX ORDER — GADOBUTROL 604.72 MG/ML
0.1 INJECTION INTRAVENOUS ONCE
Status: COMPLETED | OUTPATIENT
Start: 2022-10-06 | End: 2022-10-06

## 2022-10-06 RX ORDER — ONABOTULINUMTOXINA 200 [USP'U]/1
INJECTION, POWDER, LYOPHILIZED, FOR SOLUTION INTRADERMAL; INTRAMUSCULAR
Qty: 1 EACH | Refills: 4 | Status: SHIPPED | OUTPATIENT
Start: 2022-10-06 | End: 2024-01-24 | Stop reason: SDUPTHER

## 2022-10-06 RX ADMIN — GADOBUTROL 10.5 ML: 604.72 INJECTION INTRAVENOUS at 11:44

## 2022-10-12 ENCOUNTER — TELEPHONE (OUTPATIENT)
Dept: NEUROLOGY | Facility: CLINIC | Age: 37
End: 2022-10-12
Payer: COMMERCIAL

## 2022-10-12 NOTE — TELEPHONE ENCOUNTER
Patient's Botox 200 units will be delivered tomorrow Thurs 10/13/2022 from [facility]    Last Botox was 8/11 at [facility]  Not due until 11/3 if she gets it every 12 weeks

## 2022-10-13 ENCOUNTER — HOSPITAL ENCOUNTER (OUTPATIENT)
Dept: CARDIOLOGY | Facility: HOSPITAL | Age: 37
Discharge: HOME | End: 2022-10-13
Attending: PSYCHIATRY & NEUROLOGY
Payer: COMMERCIAL

## 2022-10-13 DIAGNOSIS — R20.0 RIGHT ARM NUMBNESS: ICD-10-CM

## 2022-10-13 PROCEDURE — 95885 MUSC TST DONE W/NERV TST LIM: CPT

## 2022-10-13 PROCEDURE — 95909 NRV CNDJ TST 5-6 STUDIES: CPT | Mod: 26 | Performed by: PSYCHIATRY & NEUROLOGY

## 2022-10-13 PROCEDURE — 95885 MUSC TST DONE W/NERV TST LIM: CPT | Mod: 26 | Performed by: PSYCHIATRY & NEUROLOGY

## 2022-10-13 PROCEDURE — 95909 NRV CNDJ TST 5-6 STUDIES: CPT

## 2022-10-14 ENCOUNTER — TELEPHONE (OUTPATIENT)
Dept: NEUROLOGY | Facility: CLINIC | Age: 37
End: 2022-10-14
Payer: COMMERCIAL

## 2022-10-14 NOTE — TELEPHONE ENCOUNTER
Called patient and schedule her for her Botox appointment she is set to come in on 11.3 at 2:00 pm

## 2022-10-14 NOTE — TELEPHONE ENCOUNTER
Carolyn called to schedule her next Botox appt for the 1st week of November (her last treatment was the w/o 8.8.22) & her med arrived yesterday.  Please reach out to her to schedule that visit when you have a moment.  Thank you

## 2022-11-03 ENCOUNTER — OFFICE VISIT (OUTPATIENT)
Dept: NEUROLOGY | Facility: CLINIC | Age: 37
End: 2022-11-03
Payer: COMMERCIAL

## 2022-11-03 VITALS
HEART RATE: 80 BPM | SYSTOLIC BLOOD PRESSURE: 134 MMHG | OXYGEN SATURATION: 98 % | DIASTOLIC BLOOD PRESSURE: 82 MMHG | TEMPERATURE: 98 F

## 2022-11-03 DIAGNOSIS — G43.709 CHRONIC MIGRAINE WITHOUT AURA WITHOUT STATUS MIGRAINOSUS, NOT INTRACTABLE: Primary | ICD-10-CM

## 2022-11-03 DIAGNOSIS — G43.109 MIGRAINE WITH AURA AND WITHOUT STATUS MIGRAINOSUS, NOT INTRACTABLE: ICD-10-CM

## 2022-11-03 PROCEDURE — 96372 THER/PROPH/DIAG INJ SC/IM: CPT | Mod: XU | Performed by: NURSE PRACTITIONER

## 2022-11-03 PROCEDURE — 99999 PR OFFICE/OUTPT VISIT,PROCEDURE ONLY: CPT | Performed by: NURSE PRACTITIONER

## 2022-11-03 PROCEDURE — 64615 CHEMODENERV MUSC MIGRAINE: CPT | Performed by: NURSE PRACTITIONER

## 2022-11-03 RX ORDER — PREDNISONE 5 MG/1
5 TABLET ORAL DAILY
COMMUNITY
Start: 2022-10-20 | End: 2023-07-05

## 2022-11-03 RX ORDER — KETOROLAC TROMETHAMINE 30 MG/ML
30 INJECTION, SOLUTION INTRAMUSCULAR; INTRAVENOUS ONCE
Status: COMPLETED | OUTPATIENT
Start: 2022-11-03 | End: 2022-11-03

## 2022-11-03 RX ORDER — BUPRENORPHINE AND NALOXONE 8; 2 MG/1; MG/1
2 FILM, SOLUBLE BUCCAL; SUBLINGUAL EVERY MORNING
COMMUNITY
Start: 2022-08-20 | End: 2024-03-12

## 2022-11-03 RX ADMIN — KETOROLAC TROMETHAMINE 30 MG: 30 INJECTION, SOLUTION INTRAMUSCULAR; INTRAVENOUS at 14:46

## 2022-11-03 NOTE — PROGRESS NOTES
Patient returns for injections .   She reports having a severe migraine today .       Botox injections  Botox lot number:[id number]  Expiration Date: 03/2025  Patient informed and consent obtained.        General Consent including notice of privacy practices/patient bill of rights was reviewed and consent/authorization given.       4cc of Normal saline were added to one vial of Botox Type A resulting in 200 Units of Botox.  After the patient consented to the procedure the following muscles were injected after cleaning the overlying skin with alcohol. New FDA mandated warnings provided to the patient with instructions to seek medical attention for difficulty swallowing or breathing.    Frontalis 10 units right ( 2 sites each 5 units) and 10 units left ( 2 sites each 5 units)  /Glabellar 5 units right and 5 units left (medially)  Procerus 5 units  Temporalis 20 units right( 4 sites each 5 units) and 20 units left ( 4 sites each 5 units)  Trapezius 15 units ( 3 sites each 5 units) right and 15 units left ( 3 sites each 5 units)  Cervical Paraspinals 10 units right ( 2 sites each 5 units) and 10 units left (2 sites each 5 units)  Occipital 15 units right ( 3 sites each 5 units) , 15 units left ( 3 sites each 5 units)      A total of 155 units was used 45 units wasted. The patient tolerated the procedure well.    Diagnoses and all orders for this visit:    Chronic migraine without aura without status migrainosus, not intractable  -     onabotulinumtoxinA (BOTOX) injection 200 Units    Migraine with aura and without status migrainosus, not intractable  -     ketorolac (TORADOL) injection 30 mg

## 2022-11-17 ENCOUNTER — TELEPHONE (OUTPATIENT)
Dept: SCHEDULING | Facility: CLINIC | Age: 37
End: 2022-11-17
Payer: COMMERCIAL

## 2022-11-17 NOTE — TELEPHONE ENCOUNTER
New Patient Appointment Request    Name of caller: Carolyn    Reason for Visit: Dr. Blas pt establishing care with new provider    Insurance: [employer]    Recent Procedures: No    Referred by: Dr. Yanick Blas    Previous Cardiologist name and phone number: Dr. Yanick Blas    Best contact number: [phone]    Additional notes: Pt was scheduled 12/14

## 2022-11-28 ENCOUNTER — TELEPHONE (OUTPATIENT)
Dept: PSYCHIATRY | Facility: CLINIC | Age: 37
End: 2022-11-28

## 2022-11-28 NOTE — TELEPHONE ENCOUNTER
Behavorial Health Outpatient Intake Questions    Referred by: [facility] [employer] in [address]    Please advised interviewee that they need to answer all questions truthfully to allow for best care and any misrepresentations of information may affect their ability to be seen at this clinic   => Was this discussed? Yes     Behavorial Health Outpatient Intake History -     Presenting Problem (in patient's words):   Depression, anxiety, being physically sick, taking toll on mental health, steroids that she is on makes her feel she needs someone to talk to    Are there any developmental disabilities? If yes, can they speak to you on the phone? If they are too limited to speak to you on phone, refer out No    Are you taking any psychiatric medications? Yes    => If yes, who prescribes? If yes, are they injectable medications? Paxal 20mg   Seroquel to sleep - PCP Dr Alexandro Jamil    Does the patient have a language barrier or hearing impairment? No    Have you been treated at [facility] by a therapist or a doctor in the past? If yes, who? Yes    Has the patient been hospitalized for mental health? Yes  If yes, how long ago was last hospitalization and where was it? [facility] 2017 (2 weeks)  [facility] 1 week    Do you actively use alcohol or marijuana or illegal substances? If yes, what and how much - refer out to Drug and alcohol treatment if use is excessive or daily use of illegal substances No  Prescription for medical marijuana    Do you have a community treatment team or ? No    Legal History-     Does the patient have any history of arrests, penitentiary/custodial time, or DUIs? Yes  If Yes- DUI in March 2021  1) What types of charges? DUI  2) When were they last incarcerated? 3 days at [facility]  3) Are they currently on parole or probation? Currently on probation, end in March    Minor Child-    Who has custody of the child? Is there a custody agreement?      If there is a custody agreement remind parent that they must bring a copy to the first appt or they will not be seen  Intake Team, please check with provider before scheduling if flags come up such as:  - complex case  - legal history (other than DUI)  - communication barrier concerns are present  - if, in your judgment, this needs further review    ACCEPTED as a patient Yes  => Appointment Date: 12/5 at [facility]? No    Name of Insurance Co: [address] [facility] PV#[id number]  Insurance Phone #  If ins is primary or secondary  If patient is a minor, parents information such as Name, D  O B of guarantor

## 2022-12-02 NOTE — PROGRESS NOTES
{This telemedicine template is for use when conducting a telephone call about content that would NOT typically be discussed during an in-person office visit.  The charge is driven by time spent talking to patient.    Requirements to bill for a timed telephone call  1) Patient must be an established patient  2) This encounter cannot be related to a medical visit within previous 7 days, or lead to a medical visit within next 24 hours (or soonest appointment available)  3) Patient must verbally consent to this service, and consent must be documented in medical record (see below)    Location of Patient  Confirm patient is currently located in a state approved by [facility] for telemedicine.  If not, indicate that using the SmartList option, delete remainder of note template, use 90789 charge and close encounter.  If further consultation is provided to patient, document in a Telephone encounter.    Consent  Read consent to patient, then answer Yes/No SmartList.    Time Spent  Document time spent talking to patient:60725}      Verification of Patient Location:  The patient affirms they are currently located in the following state:{[facility] Telemed Pt State:[id number]}     Audio Only Telemedicine Visit    Request for Consent:  You and I are about to have a telemedicine check-in or visit. This is allowed because you are already my patient, and you have requested it.  This telemedicine visit will be billed to your health insurance or you, if you are self-insured.  You understand you will be responsible for any copayments or coinsurances that apply to your telemedicine visit.  Before starting our telemedicine visit, I am required to get your consent for this virtual check-in or visit by telemedicine. Do you consent?      Patient Response to Request for Consent: {Yes/No:[id number]}    The following have been reviewed and updated as appropriate in this visit:   Allergies  Meds  Problems         Visit Documentation:  ***  {Telehealth  CPT codes and required encounter duration  30777 = 5-10 minutes  49061 = 11-20 minutes  44390 = 21-30 minutes :20526}      Time Spent:  I spent {[facility] Telemedicine Visit Time:} on this date of service performing the following activities: {Time based coding activities:46382}.    {PRESS F3 TO ENLARGE - information will disappear when note is signed  :58150}        Verification of Patient Location:  The patient affirms they are currently located in the following state: {[facility] Telemed Pt State:[id number]}    Request for Consent:    {[facility] Virtual Tele Aud/Video Temp:08183}    Patient Response to Request for Consent:  {[facility] Telemedicine Consent:[id number]}      Visit Documentation:  Subjective     Patient ID: Carolyn Redmond is a 37 y.o. female.  1985      HPI    The following have been reviewed and updated as appropriate in this visit:   Allergies  Meds  Problems       Review of Systems      Assessment/Plan   Diagnoses and all orders for this visit:    Chronic migraine without aura without status migrainosus, not intractable (Primary)  -     onabotulinumtoxinA (BOTOX) injection 200 Units    Migraine with aura and without status migrainosus, not intractable  -     ketorolac (TORADOL) injection 30 mg        Time Spent:  I spent {[facility] Telemedicine Visit Time:} on this date of service performing the following activities: {Time based coding activities:17316}.Patient ID: Carolyn Redmond                              : 1985  MRN: [id number]                                            Visit Date: 2022  Encounter Provider: Jesus Dupree  Referring Provider: Jesus Dupree, *    Chief Complaint: Botulinum Toxin Injection    Subjective   Carolyn Redmond is a 37 y.o. old female presenting today for ***.      Medications:   Current Outpatient Medications:     albuterol HFA (VENTOLIN HFA) 90 mcg/actuation inhaler, Inhale 2 puffs every 4 (four) hours as needed., Disp: , Rfl:      betamethasone dipropionate 0.05 % cream, Apply topically as needed., Disp: , Rfl:     buprenorphine HCL (SUBUTEX) 8 mg tablet, sublingual, Place 16 mg under the tongue daily., Disp: , Rfl:     buprenorphine-naloxone (SUBOXONE) 8-2 mg film, dissolve 1 FILM under the tongue twice a day and 1/2 FILM nightly, Disp: , Rfl:     buPROPion XL (WELLBUTRIN XL) 300 mg 24 hr tablet, Take 300 mg by mouth once daily., Disp: , Rfl:     fluticasone propionate (FLONASE) 50 mcg/actuation nasal spray, Administer 1 spray into each nostril daily as needed for rhinitis or allergies., Disp: , Rfl:     furosemide (LASIX) 40 mg tablet, Take 1 tablet by mouth as needed., Disp: , Rfl:     galcanezumab-gnlm (EMGALITY) 120 mg/mL pen injector subcutaneous pen, Inject 1 mL (120 mg total) under the skin every 28 (twentyeight) days. First dose- administer 2 mL under the skin., Disp: 1 mL, Rfl: 5    inhaler,assist device,accesory (INHALER,ASSIST DEVICES,ACCESS MISC), by Not Applicable route., Disp: , Rfl:     magnesium oxide (MAG-OX) 400 mg (241.3 mg magnesium) tablet, Take 1 tablet (400 mg total) by mouth daily., Disp: 90 tablet, Rfl: 1    metoprolol succinate XL (TOPROL-XL) 25 mg 24 hr tablet, Take 25 mg by mouth every morning., Disp: , Rfl:     onabotulinumtoxinA (BOTOX) 200 unit recon soln injection, Botox for chronic migraine headache every 3 months, Disp: 1 each, Rfl: 4    ondansetron (ZOFRAN) 4 mg tablet, Take 1 tablet by mouth every 12 hours., Disp: , Rfl:     pantoprazole (PROTONIX) 40 mg EC tablet, Take 40 mg by mouth once daily., Disp: , Rfl:     PARoxetine (PAXIL) 20 mg tablet, Take 20 mg by mouth once daily., Disp: , Rfl:     predniSONE (DELTASONE) 5 mg tablet, Take 15 mg by mouth daily., Disp: , Rfl:     pregabalin (LYRICA) 200 mg capsule, Take 200 mg by mouth 3 (three) times a day., Disp: , Rfl:     QUEtiapine (SEROquel) 200 mg tablet, Take 200 mg by mouth., Disp: , Rfl:     riboflavin, vitamin B2, (VITAMIN B-2)  100 mg tablet, Two in am and two in pm, Disp: 120 tablet, Rfl: 3    tamsulosin (FLOMAX) 0.4 mg capsule, Take 0.4 mg by mouth 2 (two) times a day., Disp: , Rfl:     TiZANidine 6 mg capsule, take 1 capsule by mouth every 6 to 8 hours if needed **MAX 3 CAPS IN 24 HOURS**, Disp: , Rfl:     ubrogepant (UBRELVY) 100 mg tablet tablet, Take 1 tablet (100 mg total) by mouth as needed for migraine. Second dose may be taken at least 2 hours after initial dose, Disp: 8 tablet, Rfl: 1    zolpidem (AMBIEN) 10 mg tablet, Take 10 mg by mouth nightly., Disp: , Rfl:   Past Medical History:  has a past medical history of Anxiety, Depressed, Endocarditis, Fibromyalgia, Migraines, Pancreatitis, Substance abuse (CMS/[facility]) (2015), Tachycardia, and Vasculitis (CMS/[facility]).  Past Surgical History:  has a past surgical history that includes Gallbladder surgery; Cholecystectomy; ERCP; and Tonsillectomy (1991).  Social History:   Social History     Tobacco Use    Smoking status: Every Day     Packs/day: 0.50     Types: Cigarettes    Smokeless tobacco: Current    Tobacco comments:     quit 1 year ago, vapes currently   Vaping Use    Vaping Use: Every day    Substances: Flavoring    Devices: TORIA tank   Substance Use Topics    Alcohol use: Not Currently     Comment: social    Drug use: Yes     Types: Marijuana     Family History: family history includes Heart disease in her maternal grandfather; Hypertension in her biological mother; Lung cancer in her biological father.  Allergies: is allergic to amoxicillin, nsaids (non-steroidal anti-inflammatory drug), tramadol, and trazodone.     Review of Systems  {Ros - Complete:19521}  The following have been reviewed and updated as appropriate in this visit:   Allergies  Meds  Problems         Objective   Physical Exam:  {Exam; System Select:80415}  Data Review: ***       Assessment/Plan   Problem List Items Addressed This Visit        Other    Chronic migraine without aura without  status migrainosus, not intractable - Primary    Relevant Medications    buprenorphine-naloxone (SUBOXONE) 8-2 mg film    Migraine with aura and without status migrainosus, not intractable    Relevant Medications    buprenorphine-naloxone (SUBOXONE) 8-2 mg film     ***     Return in about 3 months (around 2/3/2023) for follow up.     Darya Blankenship MA

## 2022-12-13 PROBLEM — D72.829 LEUKOCYTOSIS: Status: ACTIVE | Noted: 2019-06-11

## 2022-12-13 PROBLEM — F11.93 OPIATE WITHDRAWAL (CMS/HCC): Status: ACTIVE | Noted: 2019-06-11

## 2022-12-13 PROBLEM — E66.09 OBESITY DUE TO EXCESS CALORIES WITHOUT SERIOUS COMORBIDITY: Status: ACTIVE | Noted: 2019-04-15

## 2022-12-13 PROBLEM — E86.0 DEHYDRATION: Status: ACTIVE | Noted: 2019-06-11

## 2022-12-13 PROBLEM — Z62.819 HISTORY OF ABUSE IN CHILDHOOD: Status: ACTIVE | Noted: 2019-06-11

## 2022-12-13 PROBLEM — E66.9 OBESITY: Status: ACTIVE | Noted: 2022-12-13

## 2022-12-13 PROBLEM — R78.81 STAPHYLOCOCCUS AUREUS BACTEREMIA: Status: ACTIVE | Noted: 2020-12-09

## 2022-12-13 PROBLEM — I95.9 ARTERIAL HYPOTENSION: Status: ACTIVE | Noted: 2022-12-13

## 2022-12-13 PROBLEM — R93.5 ABNORMAL CT OF THE ABDOMEN: Status: ACTIVE | Noted: 2022-12-13

## 2022-12-13 PROBLEM — B95.61 STAPHYLOCOCCUS AUREUS BACTEREMIA: Status: ACTIVE | Noted: 2020-12-09

## 2022-12-13 PROBLEM — E27.40 ADRENAL INSUFFICIENCY (CMS/HCC): Status: ACTIVE | Noted: 2019-04-15

## 2022-12-14 PROBLEM — S92.332D: Status: ACTIVE | Noted: 2020-10-02

## 2022-12-15 ENCOUNTER — OFFICE VISIT (OUTPATIENT)
Dept: NEUROLOGY | Facility: CLINIC | Age: 37
End: 2022-12-15
Payer: COMMERCIAL

## 2022-12-15 VITALS
BODY MASS INDEX: 45.32 KG/M2 | WEIGHT: 247.8 LBS | SYSTOLIC BLOOD PRESSURE: 132 MMHG | DIASTOLIC BLOOD PRESSURE: 74 MMHG | OXYGEN SATURATION: 97 % | TEMPERATURE: 98.1 F | HEART RATE: 122 BPM

## 2022-12-15 DIAGNOSIS — G43.109 MIGRAINE WITH AURA AND WITHOUT STATUS MIGRAINOSUS, NOT INTRACTABLE: ICD-10-CM

## 2022-12-15 DIAGNOSIS — R20.0 RIGHT ARM NUMBNESS: ICD-10-CM

## 2022-12-15 DIAGNOSIS — G43.709 CHRONIC MIGRAINE WITHOUT AURA WITHOUT STATUS MIGRAINOSUS, NOT INTRACTABLE: Primary | ICD-10-CM

## 2022-12-15 DIAGNOSIS — M54.2 CERVICALGIA: ICD-10-CM

## 2022-12-15 DIAGNOSIS — G44.229 CHRONIC TENSION-TYPE HEADACHE, NOT INTRACTABLE: ICD-10-CM

## 2022-12-15 DIAGNOSIS — R20.0 RIGHT FACIAL NUMBNESS: ICD-10-CM

## 2022-12-15 DIAGNOSIS — F51.01 PRIMARY INSOMNIA: ICD-10-CM

## 2022-12-15 DIAGNOSIS — H53.2 DOUBLE VISION: ICD-10-CM

## 2022-12-15 PROCEDURE — 3075F SYST BP GE 130 - 139MM HG: CPT | Performed by: PSYCHIATRY & NEUROLOGY

## 2022-12-15 PROCEDURE — 3078F DIAST BP <80 MM HG: CPT | Performed by: PSYCHIATRY & NEUROLOGY

## 2022-12-15 PROCEDURE — 99214 OFFICE O/P EST MOD 30 MIN: CPT | Performed by: PSYCHIATRY & NEUROLOGY

## 2022-12-15 PROCEDURE — 3008F BODY MASS INDEX DOCD: CPT | Performed by: PSYCHIATRY & NEUROLOGY

## 2022-12-15 RX ORDER — RIMEGEPANT SULFATE 75 MG/75MG
75 TABLET, ORALLY DISINTEGRATING ORAL EVERY OTHER DAY
Qty: 16 TABLET | Refills: 3 | Status: SHIPPED | OUTPATIENT
Start: 2022-12-15 | End: 2022-12-30 | Stop reason: SDUPTHER

## 2022-12-15 RX ORDER — RIBOFLAVIN (VITAMIN B2) 100 MG
TABLET ORAL
Qty: 120 TABLET | Refills: 3 | Status: SHIPPED | OUTPATIENT
Start: 2022-12-15 | End: 2023-06-13

## 2022-12-15 RX ORDER — ASPIRIN 325 MG
1 TABLET, DELAYED RELEASE (ENTERIC COATED) ORAL WEEKLY
COMMUNITY
Start: 2022-11-16 | End: 2023-06-13 | Stop reason: SDUPTHER

## 2022-12-15 NOTE — PATIENT INSTRUCTIONS
Please message us via My Chart with any questions or concerns.    Cervicalgia:  - she does see pain management Dr Wells.   - Tizanidine 6 mg nightly  - Lyrica 200 mg 3 times a day for back pain and fibromyalgia    Right arm/face numbness: EMG completed essentially normal  - Will refer to Wendy Qureshi    Depression anxiety:  - Not currently seeing a psychiatrist but does see her therapist weekly.  - Seroquel 200 mg nightly  - Paxil 20 mg daily  - Wellbutrin 300 mg daily  - Suboxone 16 mg sublingual daily for history of opioid use in the past    Insomnia with REM sleep behavior disorder:  - Ambien 10 mg nightly  - sleep medicine evaluation pending    Medication overuse headaches: Encourage patient to stop using Excedrin 5 times a week.  Limit this to 2 times a week.  - Medication overuse headache (MOH) and analgesic overuse can negate the effectiveness of headache preventive measures.  Avoid medications with narcotics, barbiturates, or caffeine in them as these can cause rebound headaches after very few doses and can interfere with other headache medicine efficacy. Taking  any acute/abortive over the counter medication or prescription drugs for more than 2-3 days a week can cause medication overuse headache.   Chronic migraine headache with and without aura  Chronic tension type headache  Preventive therapy for headaches:   - Magnesium Oxide 400mg one at bedtime.   - Vitamin B2 200 mg twice a day.   - Metoprolol 25 mg daily for her hypertension-managed by her cardiologist  - Emgality October of 2022  - Botox 200 units 11/3/2022  - Nurtec 75 mg one in am every other day  Abortive therapy for headaches:   - At the onset of headache: Compazine 10mg at the onset of mod to severe migraine but less then 3 times a day    Headache management instructions  - When patient has a moderate to severe headache, they should seek rest, initiate relaxation and apply cold compresses to the head.   - Maintain regular sleep schedule.  "Adults need at least 7-8 hours of uninterrupted sleep, per night.   - Limit over the counter medications such as Tylenol, Ibuprofen, Aleve, Excedrin. (No more than 2- 3 times a week or max 10 a month).  - Maintain headache diary.  Free TIMBO for a smart phone, which can be used is \"Migraine martin\"  - Limit caffeine to 1-2 cups, 8 to 16 oz a day or less.  - Avoid dietary trigger. (aged cheese, peanuts, MSG, aspartame and nitrates).  - Patient is to have regular frequent meals to prevent headache onset.    - Please drink at least 64 ounces of water a day, to help remain hydrated.  "

## 2022-12-15 NOTE — PROGRESS NOTES
[facility]   Headache [facility]  Margaret Genao MD  [address] ([address])  [place] [id number]       Patient ID: Carolyn Redmond    : 1985  MRN: [id number]                                            Visit Date: 12/15/2022  Encounter Provider: Margaret Genao  Referring Provider: No ref. provider found           Assessment/Plan   Problem List Items Addressed This Visit        Nervous    Cervicalgia    Chronic tension-type headache, not intractable    Relevant Medications    NURTEC ODT 75 mg tablet,disintegrating    Insomnia    Right arm numbness    Relevant Orders    Ambulatory referral to Neurology    Right facial numbness    Relevant Orders    Ambulatory referral to Neurology       Other    Chronic migraine without aura without status migrainosus, not intractable - Primary    Relevant Medications    riboflavin, vitamin B2, (VITAMIN B-2) 100 mg tablet    NURTEC ODT 75 mg tablet,disintegrating    Migraine with aura and without status migrainosus, not intractable    Relevant Medications    NURTEC ODT 75 mg tablet,disintegrating   Other Visit Diagnoses     Double vision        Relevant Orders    Ambulatory referral to Ophthalmology          Please message us via My Chart with any questions or concerns.    Cervicalgia:  - she does see pain management Dr Wells.   - Tizanidine 6 mg nightly  - Lyrica 200 mg 3 times a day for back pain and fibromyalgia    Right arm/face numbness: EMG completed essentially normal  - Will refer to Jose Alejandro Qureshi    Depression anxiety:  - Not currently seeing a psychiatrist but does see her therapist weekly.  - Seroquel 200 mg nightly  - Paxil 20 mg daily  - Wellbutrin 300 mg daily  - Suboxone 16 mg sublingual daily for history of opioid use in the past    Insomnia with REM sleep behavior disorder:  - Ambien 10 mg nightly  - sleep medicine evaluation pending    Medication overuse headaches: Encourage patient to stop using Excedrin 5 times a week.   "Limit this to 2 times a week.  - Medication overuse headache (MOH) and analgesic overuse can negate the effectiveness of headache preventive measures.  Avoid medications with narcotics, barbiturates, or caffeine in them as these can cause rebound headaches after very few doses and can interfere with other headache medicine efficacy. Taking  any acute/abortive over the counter medication or prescription drugs for more than 2-3 days a week can cause medication overuse headache.   Chronic migraine headache with and without aura  Chronic tension type headache  Preventive therapy for headaches:   - Magnesium Oxide 400mg one at bedtime.   - Vitamin B2 200 mg twice a day.   - Metoprolol 25 mg daily for her hypertension-managed by her cardiologist  - Emgality October of 2022  - Botox 200 units 11/3/2022  - Nurtec 75 mg one in am every other day  Abortive therapy for headaches:   - At the onset of headache: Compazine 10mg at the onset of mod to severe migraine but less then 3 times a day    Headache management instructions  - When patient has a moderate to severe headache, they should seek rest, initiate relaxation and apply cold compresses to the head.   - Maintain regular sleep schedule. Adults need at least 7-8 hours of uninterrupted sleep, per night.   - Limit over the counter medications such as Tylenol, Ibuprofen, Aleve, Excedrin. (No more than 2- 3 times a week or max 10 a month).  - Maintain headache diary.  Free TIMBO for a smart phone, which can be used is \"Migraine martin\"  - Limit caffeine to 1-2 cups, 8 to 16 oz a day or less.  - Avoid dietary trigger. (aged cheese, peanuts, MSG, aspartame and nitrates).  - Patient is to have regular frequent meals to prevent headache onset.    - Please drink at least 64 ounces of water a day, to help remain hydrated.        No follow-ups on file.         _________________________________________________________________      Chief Complaint: Follow-up      Subjective     We had the " pleasure of evaluating Carolyn Redmond in neurological consultation today. As you know she  is a 37 y.o.  years old  right handed female.  she is here today for evaluation of chronic migraine headache.  Work history: Currently disabled-was a  and a   Personal history: Single with no children - here today with her mother    Medical history review:   QTC: 1/13/2022- 436  Tobacco use: Yes-smoking and stopped in 2019. Smoked for 4 years  Vaping:does smoke nicotine  Alcohol use: rare to once a month  Marijuana use: Medical marijuana has medical card  Other Illicit drug use: yes in the past - all different type of opioids in the past  Syncope-with head injury  - 2014 slipped on water at work and had lower back pain.  History of shingles   April 2017-endocarditis  Pancreatitis  Temporomandibular joint pain  Fibromyalgia  Insomnia with REM sleep pattern  Vasculitis determined by biopsy of skin 2017      Psychiatry history review:  PTSD  Admission to psych unit in 2017 - Attempted suicide in June-2017, psych hospitalization. Psychiatrist stopped the Imuran and tapered prednisone to 10 mg daily  Depression: Yes-which at times requires inpatient management.  Anxiety: Yes  Seeing a psychiatrist:yes but not now  Seeing at therapist: yes, once a week        Headache/pain history:  Headaches started at what age?  -  At the age of 22 they became much more intense    What treatment have you had in the past or currently using for headaches/pain/mood?   Trigger point injection/Nerve block:YES trigger point injection and nerve block  Epidural injections or trans foraminal injections: YES for her  back  Alternative therapies: massage, physical therapy, acupuncture and chiropractor, tens units  CBD or THC: YES   Other headache devices: NO   Botulinum toxin: YES Botox-started 5/9/2022 last Botox 8/11/2022  Headache infusions:NO   Preventive medication therapy:   - Folic acid, magnesium (not sure if that  "helped)  - Protriptyline Wellbutrin, Cymbalta 60 mg, Latuda,  - Terazosin 2 mg, Klonopin, trazodone (allergies), zolpidem (Ambien) 10 mg,  - Lyrica, Topamax (effective but stopped due to hair loss)  - Inderal, metoprolol  - Benadryl IV infusion  - Baclofen, tizanidine  - Seroquel 200 mg,   Abortive medication Therapy:  - Tramadol (allergies), Percocet (2017)  - Fioricet,  - Sumatriptan 100 mg tab, 20 mg nasal spray, rizatriptan, Relpax, Zomig (ineffective), Frova  - Ubrelvy 100 mg, Nurtec 75 mg  - Excedrin, Tylenol,  - Ibuprofen, Celebrex, Toradol,  - Reglan, Zofran 4 mg tablet, Compazine, Phenergan,  - Prednisone 10 mg,    FOLLOW-UP CLINIC NOTE:  First date seen: 9/20/2022  Last procedure date: EMG completed 10/13/2022: Essentially normal    12/15/2022: she is here today with her mother and has been walking with a cane now. Still doing better with Botox in regards to her headaches. She does think Nurtec does help better then Ubrelvy.    States her \"vasculitis\" is flared up and thus is on steroids now. Did not take it this am as she was coming for this office visit.       States she has ptosis but it is minimal.  Did like to see ophthalmology.  States she also had blurred vision even with the new glasses.  Also gets double vision when she is reading.      Numbness on the right side of her face, arm and leg. States he feels \"right side of my body is dying\".  Would like to see a neuromuscular neurology for evaluation.  Patient is EMG of the upper extremities normal also MRI of the brain.  MRI of her cervical spine is benign as well.  All of this reviewed with the patient.    What is your current pain level?   Headache: 7-8/10  Neck pain: 8-9/10 - does see pain management for this.     How often do the headaches/pain occur? Now with Botox  Mild headaches: 3 times a week  Moderate to severe headaches: 2 times a week with Botox  Neck pain: daily    How often do you take abortive medication for headache in a " week?  Over-the-counter medication:  5 times a week and takes Excedrin - since last seen she is now down to 3 times a week and once a day.   Migraine specific medication: not often    Are you ever headache free? Yes at least 2-3 time a week now with Botox    Aura/Warning/Prodrome?  - starts with blurry vision followed by right eye flashing lights (seconds)  - Difficulty finding words, slurred speech - it is all the time per pt    What time of the day do headaches/pain start?  Mild headaches: afternoon  Moderate to severe headaches: wakes up with it or by late morning it will started  Neck pain: there all the time    How long do the headaches/pain last?   Mild headaches: with Excedrin 2-3 hours  Moderate to severe headaches: one day to one month - with Excedrin one hour  Neck pain: baseline pain but when intense it will last most of the day    Describe your usual headache/pain?   Mild headaches: nagging, aching, pressure,   Moderate to severe headaches: throbbing, pounding  Neck pain: sharp, aching    Where is your headache/pain located?   Mild headaches: apex of her head  Moderate to severe headaches: frontal and radiates to the back of her head  Neck pain: right more then left side of her neck    What is the intensity of pain?   Mild headaches: 1-4/10  Moderate to severe headaches: 5 to 10/10, 10/10 once a week  Neck pain: 5 to 6/10    Associated symptoms with headache or neck pain:   - Decrease appetite, Nausea, Vomiting, Diarrhea   - Photophobia, Phonophobia, Osmophobia   - Insomnia   - Flushing of face  - Stiff or sore neck  - Light headed  - Off balance   - Problems with concentration   - Blurred vision   - Better with lying down   - Prefer to be in a cool, quiet, dark room     Triggers: Lack of sleep, stress tension, menstruation, missing meals, weather change, alcohol, oversleeping, dehydration, bright lights, certain food    - I personally reviewed old notes over the last few months     Imaging:    EMG:  10/13/2022:Clinical History:  Evaluate right upper extremity paresthesias.   Nerve Conduction Studies:  Right median motor and sensory nerve conductions appeared within normal limits.  Right ulnar motor, sensory, and F wave studies appeared normal.  Right radial sensory nerve conduction was normal.   Needle EMG:  EMG in selected right upper extremity muscles appeared silent at rest.  Voluntary activity showed normal motor units and recruitment patterns throughout.   Impression:  Normal study of the right upper extremity.    10/6/2022: MRI brain with and without contrast:  Comparison: MRI brain 5/24/2021.   Brain parenchyma: Normal MRI appearance without restricted diffusion to  suggest acute infarction. There is no abnormal enhancement.  Ventricles, cisterns, and sulci: Normal in size and configuration.  Sella and cerebellar tonsils: Right cerebellar tonsil remains slightly  low-lying, not meeting criteria for a Chiari malformation.  Vascular flow voids: Intact at the skull base.   Calvarium and extra cranial soft tissues: Normal.  Paranasal sinuses and mastoid air cells: Ethmoid and maxillary mucosal  thickening.  Trace fluid signal abnormality at the mastoid tips.  IMPRESSION: Stable normal MRI appearance of the brain.  No abnormal enhancement.    7/30/2019-MRI of the brain without contrast  FINDINGS:  There is a soft tissue hematoma overlying the right frontal bone.   Diffusion-weighted imaging demonstrates no area of restricted diffusion to suggest acute infarction.  There is no intracranial hemorrhage, midline shift, mass effect, or extra-axial fluid collection.     Brain parenchyma demonstrates normal morphology and signal characteristics.  Midline structures are within normal limits.  Major intracranial vessels demonstrate preserved flow voids.  Brain volume and ventricular system are within normal limits for age.     Visualized paranasal sinuses are unremarkable.  Orbits, globes, and mastoid air cells are  unremarkable.   IMPRESSION:   Soft tissue hematoma overlying the right frontal bone.   Otherwise unremarkable unenhanced MRI of the brain with diffusion-weighted imaging.      2/5/2022- MRI cervical spine without contrast:  IMPRESSION:  Stable appearance of the cervical spine, again noting C5-C6 disc herniation  resulting in mild central canal stenosis.   COMMENT:  Cervicothoracic alignment: Straightening of the normal lordosis without  listhesis.   Vertebral bodies: Maintained in height and signal.   Vertebral Discs: Mild disc desiccation throughout without significant loss of  height.   Visualized spinal cord and contents of the posterior fossa: Within normal  limits.   C2-3: No disc herniation, central canal stenosis, or neural foraminal narrowing.   C3-4: No disc herniation, central canal stenosis, or neural foraminal narrowing.   C4-5: Minimal broad-based disc bulge without central canal stenosis or neural  foraminal narrowing.   C5-6: Broad-based left paracentral disc herniation indenting the ventral thecal  sac and abutting the ventral cord, resulting in mild central canal stenosis,  unchanged. No significant neural foraminal narrowing.   C6-7: No disc herniation, central canal stenosis, or neural foraminal narrowing.   C7-T1: No disc herniation, central canal stenosis, or neural foraminal  narrowing.   Mildly prominent retropharyngeal and upper cervical chain lymph nodes as well as  lingual and palatine tonsillar hypertrophy, all likely reactive.    2022-MRI lumbar spine:  IMPRESSION:  Persistent multilevel spondylosis of the predominantly lower lumbar spine as  discussed below, noting slight progression of now moderate to severe L4-L5  central canal stenosis and persistent high-grade L4-L5 neural foraminal  narrowing with abutment of the exiting bilateral L4 nerve roots.    12/8/2020-CT brain  CT ACCUMULATED DOSE: 944.74 mGy-cm.   FINDINGS: The brain parenchyma is normal in attenuation. No acute  intracranial hemorrhage, mass or mass effect identified. There is normal gray to white matter differentiation.   Ventricles and sulci are normal in size and configuration. No extra axial collection seen.   The imaged orbits and orbital contents are unremarkable.   Visualized paranasal sinuses and mastoids are well aerated.   No bony abnormality of the calvarium. There is no displaced skull fracture. Extracalvarial soft tissues are unremarkable   IMPRESSION:   No evidence of acute hemorrhage or transcortical infarction     12/8/2020-echocardiogram  Findings:   Study Quality:   Technically Difficult. Poor patient body habitus.   Left Ventricle:   Normal left ventricular size. Normal left ventricular wall thickness. Normal left   ventricular systolic function. LV Ejection Fraction was 65 %. No regional wall motion   abnormalities were appreciated on today's study.   Diastolic Function:   Mitral valve inflow pattern and Tissue Doppler imaging within normal limits for age.   Right Ventricle:   Normal right ventricular systolic function. Normal right ventricular size. TAPSE 2.4 cm.   Left Atrium:   The left atrium is normal in size. Left atrial volume index 18 ml/m2.   Right Atrium:   The right atrium is normal in size.   Atrial Septum:   Normal atrial septum.   Mitral Valve:   Normal appearance and function of the mitral valve. No mitral regurgitation is seen.   Aortic Valve:   Normal appearance and function of the aortic valve. Trileaflet aortic valve. The peak   aortic valve gradient was 9 mmHg. Peak aortic valve velocity 148 cm/sec.   Tricuspid Valve:   Tricuspid valve appears mildly thickened. There is mild to moderate tricuspid   regurgitation. Tricuspid regurgitation peak pressure gradient was 44 mmHg. Right   ventricular systolic pressure is consistent with moderate pulmonary hypertension.   Pulmonic Valve:   Normal appearance and function of the pulmonic valve.   Pericardium:   Normal pericardium with no  significant pericardial effusion.   Aorta:   All visualized segments of Aorta normal.   IVC:   Normal size IVC with no respiratory collapse consistent with elevated right atrial   pressure of 8 mmHg. IVC diameter 1.9 cm.      Medications:   Current Outpatient Medications:   •  albuterol HFA (VENTOLIN HFA) 90 mcg/actuation inhaler, Inhale 2 puffs every 4 (four) hours as needed., Disp: , Rfl:   •  betamethasone dipropionate 0.05 % cream, Apply topically as needed., Disp: , Rfl:   •  buprenorphine HCL (SUBUTEX) 8 mg tablet, sublingual, Place 16 mg under the tongue daily., Disp: , Rfl:   •  buprenorphine-naloxone (SUBOXONE) 8-2 mg film, dissolve 1 FILM under the tongue twice a day and 1/2 FILM nightly, Disp: , Rfl:   •  buPROPion XL (WELLBUTRIN XL) 300 mg 24 hr tablet, Take 300 mg by mouth once daily., Disp: , Rfl:   •  cholecalciferol (VITAMIN D3) 1,250 mcg (50,000 unit) capsule, Take 1 capsule by mouth once a week., Disp: , Rfl:   •  doxycycline hyclate (VIBRA-TABS) 100 mg tablet, TAKE 1 TABLET BY MOUTH TWICE A DAY FOR 7 DAYS, Disp: , Rfl:   •  fluticasone propionate (FLONASE) 50 mcg/actuation nasal spray, Administer 1 spray into each nostril daily as needed for rhinitis or allergies., Disp: , Rfl:   •  furosemide (LASIX) 40 mg tablet, Take 1 tablet by mouth as needed., Disp: , Rfl:   •  galcanezumab-gnlm (EMGALITY) 120 mg/mL pen injector subcutaneous pen, Inject 1 mL (120 mg total) under the skin every 28 (twentyeight) days. First dose- administer 2 mL under the skin., Disp: 1 mL, Rfl: 5  •  inhaler,assist device,accesory (INHALER,ASSIST DEVICES,ACCESS MISC), by Not Applicable route., Disp: , Rfl:   •  magnesium oxide (MAG-OX) 400 mg (241.3 mg magnesium) tablet, Take 1 tablet (400 mg total) by mouth daily., Disp: 90 tablet, Rfl: 1  •  metoprolol succinate XL (TOPROL-XL) 25 mg 24 hr tablet, Take 25 mg by mouth every morning., Disp: , Rfl:   •  NURTEC ODT 75 mg tablet,disintegrating, Take 1 tablet (75 mg total) by  mouth every other day., Disp: 16 tablet, Rfl: 3  •  onabotulinumtoxinA (BOTOX) 200 unit recon soln injection, Botox for chronic migraine headache every 3 months, Disp: 1 each, Rfl: 4  •  ondansetron (ZOFRAN) 4 mg tablet, Take 1 tablet by mouth every 12 hours., Disp: , Rfl:   •  pantoprazole (PROTONIX) 40 mg EC tablet, Take 40 mg by mouth once daily., Disp: , Rfl:   •  PARoxetine (PAXIL) 20 mg tablet, Take 20 mg by mouth once daily., Disp: , Rfl:   •  predniSONE (DELTASONE) 5 mg tablet, Take 10 mg by mouth daily., Disp: , Rfl:   •  pregabalin (LYRICA) 200 mg capsule, Take 200 mg by mouth 3 (three) times a day., Disp: , Rfl:   •  promethazine-DM (PROMETHAZINE-DM) 6.25-15 mg/5 mL syrup, TAKE 5 ML'S BY MOUTH EVERY 6 HOURS AS NEEDED FOR COUGH., Disp: , Rfl:   •  QUEtiapine (SEROquel) 200 mg tablet, Take 200 mg by mouth., Disp: , Rfl:   •  riboflavin, vitamin B2, (VITAMIN B-2) 100 mg tablet, Two in am and two in pm, Disp: 120 tablet, Rfl: 3  •  tamsulosin (FLOMAX) 0.4 mg capsule, Take 0.4 mg by mouth 2 (two) times a day., Disp: , Rfl:   •  TiZANidine 6 mg capsule, take 1 capsule by mouth every 6 to 8 hours if needed **MAX 3 CAPS IN 24 HOURS**, Disp: , Rfl:   •  ubrogepant (UBRELVY) 100 mg tablet tablet, Take 1 tablet (100 mg total) by mouth as needed for migraine. Second dose may be taken at least 2 hours after initial dose, Disp: 8 tablet, Rfl: 1  •  zolpidem (AMBIEN) 10 mg tablet, Take 10 mg by mouth nightly., Disp: , Rfl:     Past Medical History:  has a past medical history of Anxiety, Depressed, Endocarditis, Fibromyalgia, Migraines, Pancreatitis, Substance abuse (CMS/HCC) (2015), Tachycardia, and Vasculitis (CMS/Formerly [facility]).    Past Surgical History:  has a past surgical history that includes Gallbladder surgery; Cholecystectomy; ERCP; and Tonsillectomy (1991).    Social History:   Social History     Tobacco Use   • Smoking status: Every Day     Packs/day: 0.50     Types: Cigarettes   • Smokeless tobacco: Current   •  Tobacco comments:     quit 1 year ago, vapes currently   Vaping Use   • Vaping Use: Every day   • Substances: Flavoring   • Devices: Refillable tank   Substance Use Topics   • Alcohol use: Not Currently     Comment: social   • Drug use: Yes     Types: Marijuana       Family History: family history includes Heart disease in her maternal grandfather; Hypertension in her biological mother; Lung cancer in her biological father.    Allergies: is allergic to amoxicillin, nsaids (non-steroidal anti-inflammatory drug), tramadol, and trazodone.     Review of Systems  All other systems reviewed and negative except as noted in the HPI.      The following have been reviewed and updated as appropriate in this visit:          Objective   Physical Exam:    Visit Vitals  /74   Pulse (!) 122   Temp 36.7 °C (98.1 °F)   Wt 112 kg (247 lb 12.8 oz)   SpO2 97%   BMI 45.32 kg/m²         Musculoskeletal: - Range of motion slightly limited to bending, especially when bending towards the left.     Behavior/Emotional: Appropriate, cooperative     Neurologic Exam:  Alert and oriented.       CN: intact-except for decrease sensation to light touch in V1 V2 distribution    Motor: moving all extremities.  5 out of 5 in upper and lower extremity    Sensory examination: Decreased to light touch in the right upper and right lower extremity    Coordination: No tremor noted, finger-to-nose intact    Reflexes: 3/4 throughout.       Gait: Walks with a cane today.           Margaret Genao MD    I spent 30 minutes on this date of service performing the following activities: obtaining history, performing examination, entering orders, documenting, preparing for visit, obtaining / reviewing records, providing counseling and education and independently reviewing study/studies.

## 2022-12-19 ENCOUNTER — TELEPHONE (OUTPATIENT)
Dept: BEHAVIORAL/MENTAL HEALTH CLINIC | Facility: CLINIC | Age: 37
End: 2022-12-19

## 2022-12-19 NOTE — TELEPHONE ENCOUNTER
Client did not arrive to scheduled session  Therapist called and left a message with phone number to reschedule if desired

## 2022-12-21 ENCOUNTER — TELEPHONE (OUTPATIENT)
Dept: PSYCHIATRY | Facility: CLINIC | Age: 37
End: 2022-12-21

## 2022-12-21 NOTE — TELEPHONE ENCOUNTER
Patient LVM to reschedule her appt for this Friday with her therapist  Patient is already on the pending transfer wait list for psych services

## 2022-12-30 ENCOUNTER — TELEPHONE (OUTPATIENT)
Dept: NEUROLOGY | Facility: CLINIC | Age: 37
End: 2022-12-30
Payer: COMMERCIAL

## 2022-12-30 DIAGNOSIS — G43.109 MIGRAINE WITH AURA AND WITHOUT STATUS MIGRAINOSUS, NOT INTRACTABLE: ICD-10-CM

## 2022-12-30 RX ORDER — RIMEGEPANT SULFATE 75 MG/75MG
75 TABLET, ORALLY DISINTEGRATING ORAL EVERY OTHER DAY
Qty: 15 TABLET | Refills: 3 | Status: SHIPPED | OUTPATIENT
Start: 2022-12-30 | End: 2023-04-26 | Stop reason: SDUPTHER

## 2022-12-30 NOTE — TELEPHONE ENCOUNTER
Sw pt, states fall was 12/16, she was going downstairs after getting a snack and slipped down the stairs, she normally wears socks with rubber  on them but was not wearing them on this occasion. She did fall down stairs and had +head strike with + LOC, she is unsure how long she was blacked out but thinks she was there for a while. This was at her friend's house where she lives part time, reports being very tired afterwards and just wanted to go to bed. States she did go to [facility] and had CT of the head which just showed hematoma but is unsure what else it says.     States she did have N/V which has gotten better over time, has lump on head which is smaller now, but does c/o forgetfulness, cloudiness, mild headache.     I did go over concerning sx in which she should seek emergency care even though fall was 2 weeks ago as she states the providers there did not go over anything with her.

## 2022-12-30 NOTE — TELEPHONE ENCOUNTER
Pt called to let you know she had a fall 2 weeks ago and hit her head and she has a concussion from it. She is wondering if she can be seen before her next scheduled appt which is in 6 months.

## 2023-01-05 ENCOUNTER — TELEPHONE (OUTPATIENT)
Dept: PSYCHIATRY | Facility: CLINIC | Age: 38
End: 2023-01-05

## 2023-01-06 ENCOUNTER — TELEMEDICINE (OUTPATIENT)
Dept: BEHAVIORAL/MENTAL HEALTH CLINIC | Facility: CLINIC | Age: 38
End: 2023-01-06

## 2023-01-06 DIAGNOSIS — F41.1 GENERALIZED ANXIETY DISORDER: Primary | ICD-10-CM

## 2023-01-06 DIAGNOSIS — F33.0 MILD EPISODE OF RECURRENT MAJOR DEPRESSIVE DISORDER (HCC): ICD-10-CM

## 2023-01-06 PROBLEM — F33.9 RECURRENT MAJOR DEPRESSIVE DISORDER (HCC): Status: ACTIVE | Noted: 2023-01-06

## 2023-01-06 NOTE — TELEPHONE ENCOUNTER
Spoke with pt and she is completing the e-check in and filling out the rest of the paperwork   Please do not cancel appt
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
## 2023-01-09 ENCOUNTER — TRANSCRIBE ORDERS (OUTPATIENT)
Dept: SLEEP MEDICINE | Facility: HOSPITAL | Age: 38
End: 2023-01-09

## 2023-01-09 DIAGNOSIS — G47.33 OBSTRUCTIVE SLEEP APNEA (ADULT) (PEDIATRIC): Primary | ICD-10-CM

## 2023-01-10 ENCOUNTER — OFFICE VISIT (OUTPATIENT)
Dept: CARDIOLOGY | Facility: CLINIC | Age: 38
End: 2023-01-10
Payer: COMMERCIAL

## 2023-01-10 VITALS
DIASTOLIC BLOOD PRESSURE: 68 MMHG | HEIGHT: 63 IN | BODY MASS INDEX: 43.94 KG/M2 | OXYGEN SATURATION: 95 % | HEART RATE: 97 BPM | RESPIRATION RATE: 16 BRPM | WEIGHT: 248 LBS | SYSTOLIC BLOOD PRESSURE: 102 MMHG

## 2023-01-10 DIAGNOSIS — I95.9 HYPOTENSION, UNSPECIFIED HYPOTENSION TYPE: ICD-10-CM

## 2023-01-10 DIAGNOSIS — R00.2 PALPITATIONS: Primary | ICD-10-CM

## 2023-01-10 DIAGNOSIS — I27.20 PULMONARY HYPERTENSION (CMS/HCC): ICD-10-CM

## 2023-01-10 DIAGNOSIS — I10 ESSENTIAL HYPERTENSION: ICD-10-CM

## 2023-01-10 DIAGNOSIS — G47.33 OBSTRUCTIVE SLEEP APNEA SYNDROME: ICD-10-CM

## 2023-01-10 PROBLEM — I77.6 VASCULITIS (CMS/HCC): Status: ACTIVE | Noted: 2017-09-26

## 2023-01-10 PROBLEM — M48.061 LUMBAR SPINAL STENOSIS: Status: ACTIVE | Noted: 2018-06-06

## 2023-01-10 PROBLEM — M86.9 OSTEOMYELITIS (CMS/HCC): Status: ACTIVE | Noted: 2023-01-10

## 2023-01-10 PROBLEM — F33.9 RECURRENT MAJOR DEPRESSIVE DISORDER (CMS/HCC): Status: ACTIVE | Noted: 2023-01-06

## 2023-01-10 PROBLEM — F41.1 GENERALIZED ANXIETY DISORDER: Status: ACTIVE | Noted: 2023-01-06

## 2023-01-10 PROBLEM — M51.369 DDD (DEGENERATIVE DISC DISEASE), LUMBAR: Status: ACTIVE | Noted: 2023-01-10

## 2023-01-10 PROCEDURE — 99215 OFFICE O/P EST HI 40 MIN: CPT | Performed by: INTERNAL MEDICINE

## 2023-01-10 PROCEDURE — 3078F DIAST BP <80 MM HG: CPT | Performed by: INTERNAL MEDICINE

## 2023-01-10 PROCEDURE — 3074F SYST BP LT 130 MM HG: CPT | Performed by: INTERNAL MEDICINE

## 2023-01-10 PROCEDURE — 93000 ELECTROCARDIOGRAM COMPLETE: CPT | Performed by: INTERNAL MEDICINE

## 2023-01-10 PROCEDURE — 3008F BODY MASS INDEX DOCD: CPT | Performed by: INTERNAL MEDICINE

## 2023-01-10 RX ORDER — FUROSEMIDE 40 MG/1
40 TABLET ORAL DAILY
Qty: 90 TABLET | Refills: 1 | Status: SHIPPED | OUTPATIENT
Start: 2023-01-10 | End: 2023-05-25 | Stop reason: ALTCHOICE

## 2023-01-10 ASSESSMENT — ENCOUNTER SYMPTOMS
HOARSE VOICE: 0
WEIGHT GAIN: 1
WHEEZING: 0
SHORTNESS OF BREATH: 0
NIGHT SWEATS: 0
NEAR-SYNCOPE: 0
BRUISES/BLEEDS EASILY: 1
ORTHOPNEA: 1
LEFT EYE: 0
RIGHT EYE: 0
SLEEP DISTURBANCES DUE TO BREATHING: 0
SYNCOPE: 0
PALPITATIONS: 1
DECREASED APPETITE: 0
FLANK PAIN: 0
DIZZINESS: 0
IRREGULAR HEARTBEAT: 0
TREMORS: 0
COUGH: 0
DYSPNEA ON EXERTION: 1
SUSPICIOUS LESIONS: 0
LOSS OF BALANCE: 0
POOR WOUND HEALING: 0
WEAKNESS: 1
BLURRED VISION: 0
NUMBNESS: 0
PND: 0
SENSORY CHANGE: 0
JOINT SWELLING: 0
MYALGIAS: 1
PERSISTENT INFECTIONS: 0
CLAUDICATION: 0
BACK PAIN: 0

## 2023-01-10 NOTE — ASSESSMENT & PLAN NOTE
She was previously doing better on Lasix.  Would reassess BNP after use of Lasix to get down to dry weight.  If BNP is low and she remains symptoms, would discuss a RHC with the vasculitis specialist.  She has appt next month.  We can support doing invasive assessment of RH pressures in the future but would remove the influence of hypervolemia first.      If Lasix 40mg is too high, she can dose reduce to 20mg/day.

## 2023-01-10 NOTE — PATIENT INSTRUCTIONS
If BP too low, would stop the Toprol 25mg    Resume Lasix 40mg/day.  If too much can cut in half.      Once at 200lb or feel fluid is resolved, get labs.      Can set up for right heart catheterization once fluid is gone.    Discuss pulmonary hypertension with vasculitis specialist.

## 2023-01-10 NOTE — PROGRESS NOTES
.  Carolyn Remdond is a 37 y.o. female is present in the office today for established appt.    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  She has a pulmonary sleep study scheduled for tomorrow.      She reports retaining fluid.  With a dry weight about 200lb.  Prior endocarditis x2.      Has been gaining weigth and retaining fluid since on Prednisone for vasculitis.  Will see vasculitis specialist at [place] in 2023 FEB.      Reviewed her history of prior endocarditis admissions at length.         Past Medical History:   Diagnosis Date   • Anxiety    • Depressed    • Endocarditis    • Fibromyalgia    • Migraines    • Pancreatitis    • Substance abuse (CMS/[facility]) 2015   • Tachycardia    • Vasculitis (CMS/[facility])        Past Surgical History:   Procedure Laterality Date   • CHOLECYSTECTOMY     • ERCP     • GALLBLADDER SURGERY     • TONSILLECTOMY  1991        Social History     Socioeconomic History   • Marital status: Single     Spouse name: None   • Number of children: None   • Years of education: None   • Highest education level: None   Occupational History   • Occupation:    Tobacco Use   • Smoking status: Former     Packs/day: 0.50     Types: Cigarettes     Quit date: 2020     Years since quitting: 3.0   • Smokeless tobacco: Never   • Tobacco comments:     quit 1 year ago, vapes currently   Vaping Use   • Vaping Use: Every day   • Substances: Nicotine, Flavoring   • Devices: Refillable tank   Substance and Sexual Activity   • Alcohol use: Not Currently   • Drug use: Not Currently     Types: Marijuana     Comment: medical tariq card   • Sexual activity: Defer       Family History   Problem Relation Age of Onset   • Hypertension Biological Mother    • Lung cancer Biological Father    • Heart disease Maternal Grandfather        Amoxicillin, Nsaids (non-steroidal anti-inflammatory drug), Tramadol, and Trazodone    Current Outpatient Medications   Medication  Sig Dispense Refill   • albuterol HFA (VENTOLIN HFA) 90 mcg/actuation inhaler Inhale 2 puffs every 4 (four) hours as needed.     • betamethasone dipropionate 0.05 % cream Apply topically as needed.     • buprenorphine HCL (SUBUTEX) 8 mg tablet, sublingual Place 16 mg under the tongue daily.     • buprenorphine-naloxone (SUBOXONE) 8-2 mg film dissolve 1 FILM under the tongue twice a day and 1/2 FILM nightly     • buPROPion XL (WELLBUTRIN XL) 300 mg 24 hr tablet Take 300 mg by mouth once daily.     • cholecalciferol (VITAMIN D3) 1,250 mcg (50,000 unit) capsule Take 1 capsule by mouth once a week.     • fluticasone propionate (FLONASE) 50 mcg/actuation nasal spray Administer 1 spray into each nostril daily as needed for rhinitis or allergies.     • furosemide (LASIX) 40 mg tablet Take 1 tablet (40 mg total) by mouth daily. 90 tablet 1   • galcanezumab-gnlm (EMGALITY) 120 mg/mL pen injector subcutaneous pen Inject 1 mL (120 mg total) under the skin every 28 (twentyeight) days. First dose- administer 2 mL under the skin. 1 mL 5   • magnesium oxide (MAG-OX) 400 mg (241.3 mg magnesium) tablet Take 1 tablet (400 mg total) by mouth daily. 90 tablet 1   • medical marijuana 1 each See admin instr. Please be advised that an [facility] hospital staff member has listed medical marijuana as one of your home medications for documentation purposes. Please follow-up with your certified issuing provider for ongoing use     • metoprolol succinate XL (TOPROL-XL) 25 mg 24 hr tablet Take 25 mg by mouth every morning.     • NURTEC ODT 75 mg tablet,disintegrating Take 1 tablet (75 mg total) by mouth every other day. 15 tablet 3   • onabotulinumtoxinA (BOTOX) 200 unit recon soln injection Botox for chronic migraine headache every 3 months 1 each 4   • ondansetron (ZOFRAN) 4 mg tablet Take 1 tablet by mouth every 12 hours.     • pantoprazole (PROTONIX) 40 mg EC tablet Take 40 mg by mouth once daily.     • PARoxetine (PAXIL) 40 mg tablet Take 40 mg  by mouth once daily.     • predniSONE (DELTASONE) 5 mg tablet Take 10 mg by mouth daily. 20 mg daily     • pregabalin (LYRICA) 200 mg capsule Take 200 mg by mouth 3 (three) times a day.     • QUEtiapine (SEROquel) 200 mg tablet Take 100 mg by mouth. Taking 150 mg daily total hs     • tamsulosin (FLOMAX) 0.4 mg capsule Take 0.4 mg by mouth 2 (two) times a day.     • TiZANidine 6 mg capsule take 1 capsule by mouth every 6 to 8 hours if needed **MAX 3 CAPS IN 24 HOURS**     • zolpidem (AMBIEN) 10 mg tablet Take 10 mg by mouth nightly.     • riboflavin, vitamin B2, (VITAMIN B-2) 100 mg tablet Two in am and two in pm (Patient not taking: Reported on 1/10/2023) 120 tablet 3     No current facility-administered medications for this visit.       Review of Systems   Constitutional: Positive for malaise/fatigue and weight gain. Negative for decreased appetite and night sweats.   HENT: Negative for congestion and hoarse voice.    Eyes: Negative for blurred vision, vision loss in left eye and vision loss in right eye.   Cardiovascular: Positive for dyspnea on exertion, leg swelling, orthopnea and palpitations. Negative for chest pain, claudication, cyanosis, irregular heartbeat, near-syncope, paroxysmal nocturnal dyspnea and syncope.   Respiratory: Negative for cough, shortness of breath, sleep disturbances due to breathing and wheezing.    Hematologic/Lymphatic: Negative for bleeding problem. Bruises/bleeds easily.   Skin: Negative for poor wound healing, skin cancer and suspicious lesions.   Musculoskeletal: Positive for myalgias. Negative for back pain and joint swelling.   Genitourinary: Negative for flank pain and nocturia.   Neurological: Positive for weakness. Negative for dizziness, loss of balance, numbness, sensory change and tremors.   Allergic/Immunologic: Negative for HIV exposure, hives and persistent infections.          Vitals:    01/10/23 1432   BP: 102/68   Pulse: 97   Resp: 16   SpO2: 95%       Body mass  index is 43.93 kg/m².      Physical Exam  Constitutional:       General: She is not in acute distress.     Appearance: Normal appearance. She is obese. She is not toxic-appearing.   HENT:      Head: Normocephalic.      Mouth/Throat:      Mouth: Mucous membranes are moist.      Pharynx: No posterior oropharyngeal erythema.   Eyes:      Extraocular Movements: Extraocular movements intact.   Neck:      Vascular: JVD present.   Cardiovascular:      Rate and Rhythm: Normal rate and regular rhythm.      Chest Wall: PMI is not displaced.      Pulses: Normal pulses.      Heart sounds: S1 normal and S2 normal. No murmur heard.    No gallop. No S3 or S4 sounds.   Pulmonary:      Effort: Pulmonary effort is normal.      Breath sounds: Normal breath sounds.   Abdominal:      General: Abdomen is flat. Bowel sounds are normal. There is no distension.      Tenderness: There is no abdominal tenderness.   Musculoskeletal:      Cervical back: Neck supple.      Right lower leg: Edema present.      Left lower leg: Edema present.      Comments: Functional status is:  fair   Skin:     General: Skin is warm.   Neurological:      Mental Status: She is alert and oriented to person, place, and time. Mental status is at baseline.   Psychiatric:         Attention and Perception: Attention normal.         Mood and Affect: Mood and affect normal.         Speech: Speech normal.          Lab Results   Component Value Date    WBC 7.9 07/19/2022    RBC 4.25 07/19/2022    HGB 11.7 07/19/2022    HCT 35.5 07/19/2022    MCV 84 07/19/2022    MCH 27.5 07/19/2022    MCHC 33.0 07/19/2022    RDW 15.3 07/19/2022     07/19/2022        Lab Results   Component Value Date    GLUCOSE 88 07/19/2022    BUN 9 07/19/2022    CREATININE 0.77 07/19/2022    EGFR 104 07/19/2022    BUNCREAT 12 07/19/2022     07/19/2022    K 3.9 07/19/2022     07/19/2022    CO2 22 07/19/2022    CALCIUM 9.4 01/13/2022    PROTEIN 6.6 07/19/2022    ALBUMIN 4.3 07/19/2022     GLOB 2.3 07/19/2022    AGRATIO 1.9 07/19/2022    BILITOT 0.3 07/19/2022    ALKPHOS 72 07/19/2022    AST 33 07/19/2022    ALT 25 07/19/2022        No results found for: HGBA1C, ESTAVGGLUC     Lab Results   Component Value Date    ALBUMIN 4.3 07/19/2022        No results found for: CHOL, TRIG, HDL, VLDL, LDLCALC     Cardiac Imaging    TRANSTHORACIC ECHO (TTE) LIMITED 06/17/2022    Interpretation Summary  · Limited study for pulmonary hypertension.  · Normal-sized LV. Preserved LV systolic function. Estimated EF 65%. No regional wall motion abnormalities. Normal LV wall thickness.  · Normal-sized RV. Normal RV systolic function.  · Normal-sized LA.  · Normal-sized RA.  · Aortic valve not well visualized. No aortic valve regurgitation. No aortic valve stenosis.  · Grossly normal leaflet structure of the mitral valve. No significant mitral valve regurgitation.  · Moderate tricuspid valve regurgitation. The regurgitation jet is eccentric. Estimated RVSP = 48 mmHg assuming a right atrial pressure of 15 mmHg.  · Pulmonic valve not well visualized.  · No evidence of pericardial effusion.  · IVC is dilated (>2.1cm). IVC collapses <50% during inspiration.  · Compared to prior study on 3/17/2021, no significant change.      Results for orders placed during the hospital encounter of 03/17/21    Transthoracic echo (TTE) complete    Interpretation Summary  · Normal-sized LV. Normal LV wall thickness.  · Preserved LV systolic function. Estimated EF 70%. Normal LV septal wall motion. No regional wall motion abnormalities. Normal diastolic filling pattern for age of the LV.  · No intracardiac mass, thrombus, vegetation.  · Normal-sized RV. Normal RV systolic function.  · Normal-sized LA.  · Normal-sized RA.  · Aortic root normal. Sinuses of Valsalva normal-sized. Ascending aorta normal-sized. Aortic arch normal-sized.  · Aortic valve structure is normal. No aortic valve regurgitation. No aortic valve stenosis.  · Normal leaflet  structure of the mitral valve.  · No significant mitral valve regurgitation.  · Tricuspid valve structure appears grossly normal. No vegetation. Mild tricuspid valve regurgitation.  · Estimated RV systolic pressure 51 mmHg assuming a right and pressure of 3 mmHg.  · Normal pulmonic valve structure.  · IVC is a small caliber vessel (<1.7cm). IVC collapses >50% during inspiration.  · Normal pericardial structure. No evidence of pericardial effusion.               Assessment/Plan        EKG: sinus rhythm, low risk EKG.    Obstructive sleep apnea syndrome  Sleep study tomorrow.    Palpitations  Controlled with beta blocker, continue Toprol as tolerated.    Essential hypertension  Improved with weight loss.  Can continue home monitoring.  If low, would discontinue the Toprol.    Pulmonary hypertension (CMS/HCC)  She was previously doing better on Lasix.  Would reassess BNP after use of Lasix to get down to dry weight.  If BNP is low and she remains symptoms, would discuss a RHC with the vasculitis specialist.  She has appt next month.  We can support doing invasive assessment of RH pressures in the future but would remove the influence of hypervolemia first.      If Lasix 40mg is too high, she can dose reduce to 20mg/day.     I personally reviewed the prior echo images and counseled the patient on the results in detail.  Spent a total of 50min on exam, interview record review and documentation.  New Medications Ordered This Visit   Medications   • medical marijuana     Si each See admin instr. Please be advised that an [facility] hospital staff member has listed medical marijuana as one of your home medications for documentation purposes. Please follow-up with your certified issuing provider for ongoing use   • furosemide (LASIX) 40 mg tablet     Sig: Take 1 tablet (40 mg total) by mouth daily.     Dispense:  90 tablet     Refill:  1       Medications Discontinued During This Encounter   Medication Reason   • doxycycline  hyclate (VIBRA-TABS) 100 mg tablet    • inhaler,assist device,accesory (INHALER,ASSIST DEVICES,ACCESS MISC)    • promethazine-DM (PROMETHAZINE-DM) 6.25-15 mg/5 mL syrup    • ubrogepant (UBRELVY) 100 mg tablet tablet    • furosemide (LASIX) 40 mg tablet Reorder        Orders Placed This Encounter   Procedures   • B-type natriuretic peptide     Standing Status:   Future     Number of Occurrences:   1     Standing Expiration Date:   1/10/2024     Order Specific Question:   Release to patient     Answer:   Immediate   • Basic metabolic panel     Standing Status:   Future     Number of Occurrences:   1     Standing Expiration Date:   1/10/2024     Order Specific Question:   Release to patient     Answer:   Immediate   • ECG 12 LEAD-OFFICE PERFORMED     Scheduling Instructions:      PLEASE USE THIS ORDER FOR ECG'S PERFORMED IN PHYSICIAN OFFICES     Order Specific Question:   Release to patient     Answer:   Immediate        Fabricio Middleton MD  1/10/2023

## 2023-01-11 ENCOUNTER — HOSPITAL ENCOUNTER (OUTPATIENT)
Dept: SLEEP MEDICINE | Facility: HOSPITAL | Age: 38
Discharge: HOME | End: 2023-01-11
Attending: INTERNAL MEDICINE
Payer: COMMERCIAL

## 2023-01-11 DIAGNOSIS — G47.33 OBSTRUCTIVE SLEEP APNEA (ADULT) (PEDIATRIC): ICD-10-CM

## 2023-01-11 PROCEDURE — 95810 POLYSOM 6/> YRS 4/> PARAM: CPT

## 2023-01-12 VITALS — HEIGHT: 62 IN | BODY MASS INDEX: 44.16 KG/M2 | OXYGEN SATURATION: 96 % | WEIGHT: 240 LBS

## 2023-01-26 ENCOUNTER — OFFICE VISIT (OUTPATIENT)
Dept: NEUROLOGY | Facility: CLINIC | Age: 38
End: 2023-01-26
Payer: COMMERCIAL

## 2023-01-26 VITALS
DIASTOLIC BLOOD PRESSURE: 72 MMHG | TEMPERATURE: 98 F | BODY MASS INDEX: 45.54 KG/M2 | SYSTOLIC BLOOD PRESSURE: 128 MMHG | WEIGHT: 249 LBS

## 2023-01-26 DIAGNOSIS — G43.709 CHRONIC MIGRAINE WITHOUT AURA WITHOUT STATUS MIGRAINOSUS, NOT INTRACTABLE: Primary | ICD-10-CM

## 2023-01-26 PROCEDURE — 64615 CHEMODENERV MUSC MIGRAINE: CPT | Performed by: NURSE PRACTITIONER

## 2023-01-26 PROCEDURE — 99999 PR OFFICE/OUTPT VISIT,PROCEDURE ONLY: CPT | Performed by: NURSE PRACTITIONER

## 2023-01-26 NOTE — PROGRESS NOTES
Botox injections  Botox lot number: [id number]  Expiration Date: 05/2025      Patient informed and consent obtained.        General Consent including notice of privacy practices/patient bill of rights was reviewed and consent/authorization given.       4cc of Normal saline were added to one vial of Botox Type A resulting in 200 Units of Botox.  After the patient consented to the procedure the following muscles were injected after cleaning the overlying skin with alcohol. New FDA mandated warnings provided to the patient with instructions to seek medical attention for difficulty swallowing or breathing.    Frontalis 10 units right ( 2 sites each 5 units) and 10 units left ( 2 sites each 5 units)  /Glabellar 5 units right and 5 units left (medially)  Procerus 5 units  Temporalis 20 units right( 4 sites each 5 units) and 20 units left ( 4 sites each 5 units)  Trapezius 15 units ( 3 sites each 5 units) right and 15 units left ( 3 sites each 5 units)  Cervical Paraspinals 10 units right ( 2 sites each 5 units) and 10 units left (2 sites each 5 units)  Occipital 15 units right ( 3 sites each 5 units) , 15 units left ( 3 sites each 5 units)      A total of 155 units was used 45 units wasted. The patient tolerated the procedure well.    Diagnoses and all orders for this visit:    Chronic migraine without aura without status migrainosus, not intractable    -     onabotulinumtoxinA (BOTOX) injection 200 Units

## 2023-02-01 ENCOUNTER — TELEPHONE (OUTPATIENT)
Dept: PSYCHIATRY | Facility: CLINIC | Age: 38
End: 2023-02-01

## 2023-02-01 ENCOUNTER — DOCUMENTATION (OUTPATIENT)
Dept: BEHAVIORAL/MENTAL HEALTH CLINIC | Facility: CLINIC | Age: 38
End: 2023-02-01

## 2023-02-01 DIAGNOSIS — F41.1 GENERALIZED ANXIETY DISORDER: ICD-10-CM

## 2023-02-01 DIAGNOSIS — F33.0 MILD EPISODE OF RECURRENT MAJOR DEPRESSIVE DISORDER (HCC): Primary | ICD-10-CM

## 2023-02-01 NOTE — TELEPHONE ENCOUNTER
Pt would like to transfer to a different provider who is a bit older  Xavi Rose a message regarding transfer of care and to complete transfer summary with recommendation of transfer clinician  Writer to reach back out to pt with appt once completed

## 2023-02-01 NOTE — PROGRESS NOTES
[facility]    Patient Name Fatou Kamara     Date of Birth: 30 y o  1985      MRN: [id number]    Admission Date: 2023    Date of Transfer: 2023    Admission Diagnosis:     1  Major Depressive Disorder, recurrent, mild  2  Generalized Anxiety Disorder    Current Diagnosis:     1  Mild episode of recurrent major depressive disorder ([address] )        2  Generalized anxiety disorder            Reason for Admission: Ethyl Payment presented for treatment due to depressive symptoms and anxiety symptoms  Primary complaints included DEPRESSIVE SYMPTOMS: depressed mood, mood swings, ANXIETY SYMPTOMS: daily anxiety symptoms, worrying about everyday issues and triggers of trauma  Progress in Treatment: Ethyl Payment was seen for intake counseling session  Episodes of Higher Level of Care: Client received various inpatient treatment for substance use in the past    Transfer request Initiated by: Psychiatrist: None Therapist: Misael Gaming    Reason for Transfer Request: patient requested transfer    Does this individual need a clinician with specialized training/expertise?: No    Is this client working with any other [facility] Providers/Therapists?  Psychiatrist: None Therapist: None    Other pertinent issues: physical health challenges    Are there any specific individuals who would be a “best fit” or who have already agreed to accept this transfer request?      Psychiatrist: None   Therapist: None  Rationale: Not Applicable    Attempts to maintain the current therapeutic relationship: client requested transfer after initial intake session and requeted "someone older"    Transfer request routed to intake coordinators for input and/or approval      Comments from other involved providers and/or clinical coordinator: Not Applicable    Idalia Ramirez [id number]

## 2023-02-01 NOTE — ASSESSMENT & PLAN NOTE
Continue IV vancomycin  Blood cultures performed on admission  Evidence of discitis on MRI, neurosurgery consulted as well as ID   [Follow-Up] : a follow-up evaluation of

## 2023-02-06 NOTE — TELEPHONE ENCOUNTER
Pt has been scheduled for TAYL[facility] from [facility] with Jefferson Holliday on 2/13 at  2pm VIRTUAL  Link to be sent to cell

## 2023-02-28 ENCOUNTER — TRANSCRIBE ORDERS (OUTPATIENT)
Dept: SLEEP MEDICINE | Facility: HOSPITAL | Age: 38
End: 2023-02-28

## 2023-02-28 DIAGNOSIS — G47.33 OBSTRUCTIVE SLEEP APNEA (ADULT) (PEDIATRIC): Primary | ICD-10-CM

## 2023-03-08 PROBLEM — E66.09 OBESITY DUE TO EXCESS CALORIES WITHOUT SERIOUS COMORBIDITY: Status: RESOLVED | Noted: 2019-04-15 | Resolved: 2023-03-08

## 2023-03-08 PROBLEM — K59.03 DRUG-INDUCED CONSTIPATION: Status: RESOLVED | Noted: 2020-12-10 | Resolved: 2023-03-08

## 2023-03-08 PROBLEM — F11.93 OPIATE WITHDRAWAL (CMS/HCC): Status: RESOLVED | Noted: 2019-06-11 | Resolved: 2023-03-08

## 2023-03-09 ENCOUNTER — TELEPHONE (OUTPATIENT)
Dept: PSYCHIATRY | Facility: CLINIC | Age: 38
End: 2023-03-09

## 2023-03-09 ENCOUNTER — OFFICE VISIT (OUTPATIENT)
Dept: NEUROLOGY | Facility: CLINIC | Age: 38
End: 2023-03-09
Payer: COMMERCIAL

## 2023-03-09 VITALS
DIASTOLIC BLOOD PRESSURE: 80 MMHG | SYSTOLIC BLOOD PRESSURE: 132 MMHG | HEART RATE: 76 BPM | HEIGHT: 62 IN | WEIGHT: 249 LBS | BODY MASS INDEX: 45.82 KG/M2 | OXYGEN SATURATION: 96 %

## 2023-03-09 DIAGNOSIS — R41.841 COGNITIVE COMMUNICATION DEFICIT: Primary | ICD-10-CM

## 2023-03-09 DIAGNOSIS — R20.2 PARESTHESIA: ICD-10-CM

## 2023-03-09 PROCEDURE — 3008F BODY MASS INDEX DOCD: CPT | Performed by: PSYCHIATRY & NEUROLOGY

## 2023-03-09 PROCEDURE — 99999 PR OFFICE/OUTPT VISIT,PROCEDURE ONLY: CPT | Performed by: PSYCHIATRY & NEUROLOGY

## 2023-03-09 PROCEDURE — 99215 OFFICE O/P EST HI 40 MIN: CPT | Performed by: PSYCHIATRY & NEUROLOGY

## 2023-03-09 PROCEDURE — 3075F SYST BP GE 130 - 139MM HG: CPT | Performed by: PSYCHIATRY & NEUROLOGY

## 2023-03-09 PROCEDURE — 3079F DIAST BP 80-89 MM HG: CPT | Performed by: PSYCHIATRY & NEUROLOGY

## 2023-03-09 NOTE — ASSESSMENT & PLAN NOTE
The patient has complained of abnormal sensations in different locations.  I see nothing objective on examination.  Certainly her benign MRI brain and right upper extremity EMG studies are comforting in this regard.  Having right cheek sensory disturbance but not impacting the oral cavity is anatomically inconsistent.  She has symptoms without clear objective neurologic signs in the lower extremities.
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
## 2023-03-29 PROBLEM — G47.61 PERIODIC LIMB MOVEMENT DISORDER (PLMD): Status: ACTIVE | Noted: 2023-03-29

## 2023-04-10 ENCOUNTER — HOSPITAL ENCOUNTER (OUTPATIENT)
Dept: SLEEP MEDICINE | Facility: HOSPITAL | Age: 38
Discharge: HOME | End: 2023-04-10
Attending: INTERNAL MEDICINE
Payer: COMMERCIAL

## 2023-04-10 DIAGNOSIS — G47.33 OBSTRUCTIVE SLEEP APNEA (ADULT) (PEDIATRIC): ICD-10-CM

## 2023-04-10 PROCEDURE — 95811 POLYSOM 6/>YRS CPAP 4/> PARM: CPT

## 2023-04-11 VITALS — HEIGHT: 62 IN | HEART RATE: 53 BPM | WEIGHT: 250 LBS | OXYGEN SATURATION: 93 % | BODY MASS INDEX: 46.01 KG/M2

## 2023-04-21 ENCOUNTER — TELEPHONE (OUTPATIENT)
Dept: NEUROLOGY | Facility: CLINIC | Age: 38
End: 2023-04-21
Payer: COMMERCIAL

## 2023-04-21 NOTE — TELEPHONE ENCOUNTER
Submitted prior auth for [facility] for [facility]   Keith [id number]  Once approved will reschedule    H/O:  section  1994  History of removal of ovarian cyst  left-  History of tubal ligation    S/P arthroscopy of shoulder  right-2014  S/P carpal tunnel release  left wrist  S/P carpal tunnel release  Right ( 2016 )

## 2023-04-24 DIAGNOSIS — G43.709 CHRONIC MIGRAINE WITHOUT AURA WITHOUT STATUS MIGRAINOSUS, NOT INTRACTABLE: Primary | ICD-10-CM

## 2023-04-25 ENCOUNTER — TRANSCRIBE ORDERS (OUTPATIENT)
Dept: SCHEDULING | Age: 38
End: 2023-04-25

## 2023-04-25 DIAGNOSIS — I27.20 PULMONARY HYPERTENSION, UNSPECIFIED (CMS/HCC): Primary | ICD-10-CM

## 2023-04-26 ENCOUNTER — OFFICE VISIT (OUTPATIENT)
Dept: NEUROLOGY | Facility: CLINIC | Age: 38
End: 2023-04-26
Attending: NURSE PRACTITIONER
Payer: COMMERCIAL

## 2023-04-26 VITALS
HEART RATE: 86 BPM | SYSTOLIC BLOOD PRESSURE: 124 MMHG | RESPIRATION RATE: 16 BRPM | TEMPERATURE: 98 F | DIASTOLIC BLOOD PRESSURE: 78 MMHG | OXYGEN SATURATION: 99 %

## 2023-04-26 DIAGNOSIS — G43.709 CHRONIC MIGRAINE WITHOUT AURA WITHOUT STATUS MIGRAINOSUS, NOT INTRACTABLE: ICD-10-CM

## 2023-04-26 DIAGNOSIS — G43.109 MIGRAINE WITH AURA AND WITHOUT STATUS MIGRAINOSUS, NOT INTRACTABLE: ICD-10-CM

## 2023-04-26 PROCEDURE — 99999 PR OFFICE/OUTPT VISIT,PROCEDURE ONLY: CPT | Performed by: NURSE PRACTITIONER

## 2023-04-26 PROCEDURE — 64615 CHEMODENERV MUSC MIGRAINE: CPT | Performed by: NURSE PRACTITIONER

## 2023-04-26 RX ORDER — NALOXONE HYDROCHLORIDE 4 MG/.1ML
SPRAY NASAL
COMMUNITY
Start: 2023-04-26 | End: 2023-05-25

## 2023-04-26 RX ORDER — RIMEGEPANT SULFATE 75 MG/75MG
75 TABLET, ORALLY DISINTEGRATING ORAL EVERY OTHER DAY
Qty: 16 TABLET | Refills: 3 | Status: SHIPPED | OUTPATIENT
Start: 2023-04-26 | End: 2023-10-11 | Stop reason: SDUPTHER

## 2023-04-26 NOTE — PROGRESS NOTES
Patient returns for injections.       Botox injections  Botox lot number: [id number]  Expiration Date: 10/2025     NDC#2319-8876-77    Patient informed and consent obtained.        General Consent including notice of privacy practices/patient bill of rights was reviewed and consent/authorization given.       4cc of Normal saline were added to one vial of Botox Type A resulting in 200 Units of Botox.  After the patient consented to the procedure the following muscles were injected after cleaning the overlying skin with alcohol. New FDA mandated warnings provided to the patient with instructions to seek medical attention for difficulty swallowing or breathing.    Frontalis 10 units right ( 2 sites each 5 units) and 10 units left ( 2 sites each 5 units)  /Glabellar 5 units right and 5 units left (medially)  Procerus 5 units  Temporalis 30 units right( 6 sites each 5 units) and 30 units left ( 6 sites each 5 units)  Trapezius 25 units ( 3 sites each 5 units) right and 25 units left ( 3 sites each 5 units)  Cervical Paraspinals 10 units right ( 2 sites each 5 units) and 10 units left (2 sites each 5 units)  Occipital 15 units right ( 3 sites each 5 units) , 15 units left ( 3 sites each 5 units)      A total of 195 units was used 5 units wasted. The patient tolerated the procedure well.    Diagnoses and all orders for this visit:    Chronic migraine without aura without status migrainosus, not intractable  -     [facility] Botulinum Toxin Injection Appointment Request  -     [facility] Botulinum Toxin Injection Appointment Request  -     onabotulinumtoxinA (BOTOX) 200 unit injection 200 Units

## 2023-05-01 ENCOUNTER — TELEMEDICINE (OUTPATIENT)
Dept: BEHAVIORAL/MENTAL HEALTH CLINIC | Facility: CLINIC | Age: 38
End: 2023-05-01

## 2023-05-01 DIAGNOSIS — F41.1 GENERALIZED ANXIETY DISORDER: Primary | ICD-10-CM

## 2023-05-01 DIAGNOSIS — F33.9 EPISODE OF RECURRENT MAJOR DEPRESSIVE DISORDER, UNSPECIFIED DEPRESSION EPISODE SEVERITY (HCC): ICD-10-CM

## 2023-05-04 NOTE — PSYCH
"Behavioral Health Psychotherapy Progress Note    Psychotherapy Provided: Individual Psychotherapy     1  Generalized anxiety disorder        2  Episode of recurrent major depressive disorder, unspecified depression episode severity ([facility] [address] )            Goals addressed in session: Goal 1     DATA:     -First session with this TH and CT  CT requested new therapist and was transferred form waiting list to this [address] [place] [address]  -CT described numerous health/physical health issues and current treatment plans (complex sleep apnea with REM sleep disorder) CT is chronically sleep deprived  -CT has nueropsych evaluation scheduled to assess cognitive concerns  (ADHD will be assessed)  -CT struggles with anxiety and depression  CT has outside psych [place], [place]) and prescribed 40mg Paxil, 100mg Seroquel (currently weaning off of seroquel)  -CT has hx of and current struggles with chronic pain  (previous opiate dependency-reported no longer prescribed  -CT is in process of filing for disability  -CT reported suicide attempt 2017 (took pills)  -Discussed CT's goals for therapy and will finalize tx plan at next session  -CT motivated for therapy and \"wants to go deeper\" to address core causes of anxiety and depression  -PHQ9= 17 (chronic sleep and exhaustion raised score-CT believes her depression level is lower than score)    During this session, this clinician used the following therapeutic modalities: Supportive Psychotherapy    Substance Abuse was not addressed during this session  If the client is diagnosed with a co-occurring substance use disorder, please indicate any changes in the frequency or amount of use:   Stage of change for addressing substance use diagnoses: No substance use/Not applicable    ASSESSMENT:  Trang Faye presents with a Euthymic/ normal mood  her affect is Normal range and intensity, which is congruent, with her mood and the content of the session  The client has made progress on their goals       " "Sarika Clay presents with a none risk of suicide, none risk of self-harm, and none risk of harm to others  For any risk assessment that surpasses a \"low\" rating, a safety plan must be developed  A safety plan was indicated: no  If yes, describe in detail     PLAN: Between sessions, Sarika Clay will complete medical and neuropsych testing noted (will share eval with )  At the next session, the therapist will use Supportive Psychotherapy to clarify goals (rule out ADHD) and build therapuetic relationship  Behavioral Health Treatment Plan and Discharge Planning: Sarika Clay is aware of and agrees to continue to work on their treatment plan  They have identified and are working toward their discharge goals  yes    Visit start and stop times:    05/04/23  Start Time: 1406  Stop Time: 1503  Total Visit Time: 57 minutes    Virtual Regular Visit    Verification of patient location:    Patient is located at Home in the following state in which I hold an active license [place]      Assessment/Plan:    Problem List Items Addressed This Visit        Other    Recurrent major depressive disorder ([address] )    Generalized anxiety disorder - Primary       Goals addressed in session: Goal 1          Reason for visit is   Chief Complaint   Patient presents with    Virtual Regular Visit    Virtual Regular Visit        Encounter provider Dhruv Franklin [facility]\""    Provider located at [address] [place] [id number] [phone]      Recent Visits  Date Type Provider Dept   05/01/23 Misael Shannon 1160, [facility] Therapist [place]   Showing recent visits within past 7 days and meeting all other requirements  Future Appointments  No visits were found meeting these conditions    Showing future appointments within next 150 days and meeting all other requirements       The patient was identified by name and " date of birth  Lainey Rizzo was informed that this is a telemedicine visit and that the visit is being conducted throughthe [employer] platform  She agrees to proceed     My office door was closed  No one else was in the room  She acknowledged consent and understanding of privacy and security of the video platform  The patient has agreed to participate and understands they can discontinue the visit at any time  Patient is aware this is a billable service  Jeovany Araujo is a 30 y o  female    HPI     No past medical history on file  No past surgical history on file  No current outpatient medications on file  No current facility-administered medications for this visit  Not on File    Review of Systems    Video Exam    There were no vitals filed for this visit      Physical Exam

## 2023-05-09 ENCOUNTER — HOSPITAL ENCOUNTER (OUTPATIENT)
Dept: PULMONOLOGY | Facility: HOSPITAL | Age: 38
Discharge: HOME | End: 2023-05-09
Attending: INTERNAL MEDICINE
Payer: COMMERCIAL

## 2023-05-09 PROCEDURE — 94726 PLETHYSMOGRAPHY LUNG VOLUMES: CPT

## 2023-05-09 PROCEDURE — 63700000 HC SELF-ADMINISTRABLE DRUG: Performed by: INTERNAL MEDICINE

## 2023-05-09 PROCEDURE — 94729 DIFFUSING CAPACITY: CPT

## 2023-05-09 PROCEDURE — 94060 EVALUATION OF WHEEZING: CPT

## 2023-05-09 RX ORDER — ALBUTEROL SULFATE 90 UG/1
4 INHALANT RESPIRATORY (INHALATION) ONCE
Status: COMPLETED | OUTPATIENT
Start: 2023-05-09 | End: 2023-05-09

## 2023-05-09 RX ADMIN — ALBUTEROL SULFATE 4 PUFF: 90 AEROSOL, METERED RESPIRATORY (INHALATION) at 13:30

## 2023-05-15 ENCOUNTER — TELEMEDICINE (OUTPATIENT)
Dept: BEHAVIORAL/MENTAL HEALTH CLINIC | Facility: CLINIC | Age: 38
End: 2023-05-15

## 2023-05-15 DIAGNOSIS — F33.0 MILD EPISODE OF RECURRENT MAJOR DEPRESSIVE DISORDER (HCC): ICD-10-CM

## 2023-05-15 DIAGNOSIS — F41.1 GENERALIZED ANXIETY DISORDER: Primary | ICD-10-CM

## 2023-05-15 NOTE — BH CRISIS PLAN
"Client Name: Monik Pope       Client YOB: 1985  : 1985    Treatment Team (include name and contact information):     Psychotherapist: Savage Chung MS, LPC    Psychiatrist: Ree Fish MD   Release of information completed: no        Healthcare Provider  Daniel Sanchez  [address] [facility] [address]  [facility] [address]    Type of Plan   * Child plans (children 15 yo and younger) must be completed and signed by the child's legal guardian   * Plans for all individuals 15 yo and above must be signed by the client  Plan Type: adolescent/adult (15 and over) Initial      My Personal Strengths are (in the client's own words):  I don't give up with very passionate about something, empathetic, compassionate, care about others, self-aware, loyal    The stressors and triggers that may put me at risk are:  being physically tired, being hungry, loneliness, feeling a lack of control, people (describe - names, etc) dealing wiht passive aggressive or avoidant people and events (include important dates that might be a trigger) [place] time (miss connection wiht others)    Coping skills I can use to keep myself calm and safe:  Listen to music, Journal and Other (describe) painting    Coping skills/supports I can use to maintain abstinence from substance use:   NA    The people that provide me with help and support: (Include name, contact, and how they can help)   Support person #1: (mom) Kary    * Phone number:     * How can they help me?  Can tell her anything and count on her to help me (\"a rock\")       In the past, the following has helped me in times of crisis:    Being in a quiet space, Being with other people, Taking medications, Talking to a professional on the telephone, Going into the hospital, Calling a friend, Calling a family member, Taking a walk or exercising, Breathing exercises (or other mindfulness-based activities), Praying or meditating, Using de-escalation tools that I have " learned, Listening to music and Watching television or a movie      If it is an emergency and you need immediate help, call 9-1-1    If there is a possibility of danger to yourself or others, call the following crisis hotline resources:     Adult Crisis Numbers  Suicide Prevention Hotline - Dial 9-8-8  [facility]: [address]: [address]: [phone]  [facility] [address] [facility] ([facility]): [phone]  [facility]: [id number] [place] [address] Avenue: [address] [place] [address] St: [phone] (daytime)  [phone] (after hours, weekends, holidays)     Child/Adolescent Crisis Numbers   [facility]OMEN[facility]S AND [facility]S [facility]: [address]: [phone]   [address]: [phone]   [address] [facility] ([facility]): [phone]    Please note: Some [facility] do not have a separate number for Child/Adolescent specific crisis  If your county is not listed under Child/Adolescent, please call the adult number for your county     National Talk to Text Line   All Ages - [phone]    In the event your feelings become unmanageable, and you cannot reach your support system, you will call 911 immediately or go to the nearest hospital emergency room
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
## 2023-05-18 NOTE — BH TREATMENT PLAN
Outpatient Behavioral Health Psychotherapy Treatment Plan    Wendy Wallis  1985     Date of Initial Psychotherapy Assessment: 11/28/22  Date of Current Treatment Plan: 05/15/23  Treatment Plan Target Date: 09/15/23  Treatment Plan Expiration Date: 11/15/23    Diagnosis:   1  Generalized anxiety disorder        2  Mild episode of recurrent major depressive disorder (HCC)            Area(s) of Need: Reduce/manage anxiety and improve self-image    Long Term Goal 1 (in the client's own words): I want to reduce my anxiety and learn additional tools to manage anxiety and I want to go deep to figure it out  Stage of Change: Preparation    Target Date for completion: TBD     Anticipated therapeutic modalities: supportive therapy, mindfulness-based therapy, psychoeducation, EMDR     People identified to complete this goal: client and therapist      Objective 1: (identify the means of measuring success in meeting the objective): Learn about anxiety dynamics and patterning including neurobiology basis  Objective 2: (identify the means of measuring success in meeting the objective): Learn 2-3 anti-anxiety coping and grounding tools     Objective 3: (identify the means of measuring success in meeting the objective):  Consider EMDR to address core causes of anxiety  Long Term Goal 2 (in the client's own words): I want to have a healthier self-image to equip myself to have healthy connection with others  Stage of Change: Preparation    Target Date for completion: TBD     Anticipated therapeutic modalities: Supportive therapy, mindfulness-based therapy, CBT, DBT     People identified to complete this goal: client and therapist      Objective 1: (identify the means of measuring success in meeting the objective):  Identify personal strengths and values (create reference list)      Objective 2: (identify the means of measuring success in meeting the objective): Recognize inner critic, challenge validity, and use reframing and thought replacement tools  Objective 3: (identify the means of measuring success in meeting the objective): Identify and use healthy boundaries in relationships  I am currently under the care of a [facility] psychiatric provider: no    My [facility] psychiatric provider is: NA    I am currently taking psychiatric medications: Yes, as prescribed    I feel that I will be ready for discharge from mental health care when I reach the following (measurable goal/objective): When I have met long-term goals and maintain MH stability independent of [address] provider  For children and adults who have a legal guardian:   Has there been any change to custody orders and/or guardianship status? NA  If yes, attach updated documentation  I have created my Crisis Plan and have been offered a copy of this plan    [address]: Diagnosis and Treatment Plan explained to [address] acknowledges an understanding of their diagnosis  Marita Jeans agrees to this treatment plan  I have been offered a copy of this Treatment Plan  Yes    Marita Jeans, 1985, actively participated in the creation of this treatment plan during a virtual session, using the [employer] platform  Marita Jeans  provided verbal consent on 05/15/23 at 2:50 PM  The treatment plan was transcribed into the op5 Record at a later time

## 2023-05-23 RX ORDER — NORETHINDRONE 0.35 MG/1
TABLET ORAL
COMMUNITY
Start: 2023-04-30 | End: 2023-05-25

## 2023-05-25 ENCOUNTER — OFFICE VISIT (OUTPATIENT)
Dept: CARDIOLOGY | Facility: CLINIC | Age: 38
End: 2023-05-25
Payer: COMMERCIAL

## 2023-05-25 VITALS
BODY MASS INDEX: 47.66 KG/M2 | OXYGEN SATURATION: 96 % | WEIGHT: 259 LBS | SYSTOLIC BLOOD PRESSURE: 104 MMHG | DIASTOLIC BLOOD PRESSURE: 76 MMHG | RESPIRATION RATE: 18 BRPM | HEART RATE: 90 BPM | HEIGHT: 62 IN

## 2023-05-25 DIAGNOSIS — G47.33 OBSTRUCTIVE SLEEP APNEA SYNDROME: ICD-10-CM

## 2023-05-25 DIAGNOSIS — J45.30 MILD PERSISTENT ASTHMA, UNCOMPLICATED: ICD-10-CM

## 2023-05-25 DIAGNOSIS — R00.2 PALPITATIONS: Primary | ICD-10-CM

## 2023-05-25 DIAGNOSIS — I95.9 HYPOTENSION, UNSPECIFIED HYPOTENSION TYPE: ICD-10-CM

## 2023-05-25 DIAGNOSIS — R10.13 DYSPEPSIA: ICD-10-CM

## 2023-05-25 DIAGNOSIS — I27.20 PULMONARY HYPERTENSION (CMS/HCC): ICD-10-CM

## 2023-05-25 DIAGNOSIS — I10 ESSENTIAL HYPERTENSION: ICD-10-CM

## 2023-05-25 DIAGNOSIS — I50.32 CHRONIC HEART FAILURE WITH PRESERVED EJECTION FRACTION (CMS/HCC): ICD-10-CM

## 2023-05-25 PROBLEM — I50.30 (HFPEF) HEART FAILURE WITH PRESERVED EJECTION FRACTION (CMS/HCC): Status: ACTIVE | Noted: 2023-05-25

## 2023-05-25 PROBLEM — B35.9 RINGWORM: Status: ACTIVE | Noted: 2023-05-25

## 2023-05-25 LAB
ATRIAL RATE: 75
P AXIS: 51
PR INTERVAL: 142
QRS DURATION: 92
QT INTERVAL: 366
QTC CALCULATION(BAZETT): 408
R AXIS: 30
T WAVE AXIS: 52
VENTRICULAR RATE: 75

## 2023-05-25 PROCEDURE — 3078F DIAST BP <80 MM HG: CPT | Performed by: INTERNAL MEDICINE

## 2023-05-25 PROCEDURE — 93000 ELECTROCARDIOGRAM COMPLETE: CPT | Performed by: INTERNAL MEDICINE

## 2023-05-25 PROCEDURE — 3074F SYST BP LT 130 MM HG: CPT | Performed by: INTERNAL MEDICINE

## 2023-05-25 PROCEDURE — 3008F BODY MASS INDEX DOCD: CPT | Performed by: INTERNAL MEDICINE

## 2023-05-25 PROCEDURE — 99214 OFFICE O/P EST MOD 30 MIN: CPT | Performed by: INTERNAL MEDICINE

## 2023-05-25 RX ORDER — FLUTICASONE PROPIONATE AND SALMETEROL 250; 50 UG/1; UG/1
1 POWDER RESPIRATORY (INHALATION) 2 TIMES DAILY
COMMUNITY
Start: 2023-05-17 | End: 2023-07-10

## 2023-05-25 RX ORDER — QUETIAPINE FUMARATE 100 MG/1
100 TABLET, FILM COATED ORAL
COMMUNITY
Start: 2023-05-08 | End: 2023-06-13

## 2023-05-25 RX ORDER — KETOCONAZOLE 20 MG/G
CREAM TOPICAL
COMMUNITY
Start: 2023-05-17 | End: 2023-06-26 | Stop reason: ALTCHOICE

## 2023-05-25 RX ORDER — TORSEMIDE 10 MG/1
10 TABLET ORAL DAILY
Qty: 90 TABLET | Refills: 1 | Status: SHIPPED | OUTPATIENT
Start: 2023-05-25 | End: 2023-08-28 | Stop reason: SDUPTHER

## 2023-05-25 RX ORDER — POTASSIUM CHLORIDE 20 MEQ/1
20 TABLET, EXTENDED RELEASE ORAL 2 TIMES DAILY
Qty: 180 TABLET | Refills: 1 | Status: SHIPPED | OUTPATIENT
Start: 2023-05-25 | End: 2023-07-10

## 2023-05-25 ASSESSMENT — ENCOUNTER SYMPTOMS
LEFT EYE: 0
SUSPICIOUS LESIONS: 0
NIGHT SWEATS: 0
HOARSE VOICE: 0
NUMBNESS: 0
DECREASED APPETITE: 0
WEIGHT GAIN: 1
BRUISES/BLEEDS EASILY: 0
PALPITATIONS: 0
JOINT SWELLING: 0
BLURRED VISION: 0
SHORTNESS OF BREATH: 0
BACK PAIN: 0
SENSORY CHANGE: 0
WHEEZING: 0
PND: 0
POOR WOUND HEALING: 0
TREMORS: 0
DIZZINESS: 0
IRREGULAR HEARTBEAT: 1
SLEEP DISTURBANCES DUE TO BREATHING: 0
COUGH: 0
LOSS OF BALANCE: 0
FLANK PAIN: 0
WEAKNESS: 1
RIGHT EYE: 0
ORTHOPNEA: 0
MYALGIAS: 1
SNORING: 1
ADENOPATHY: 0
SYNCOPE: 0
DYSPNEA ON EXERTION: 1
NEAR-SYNCOPE: 0
CLAUDICATION: 0

## 2023-05-25 NOTE — PATIENT INSTRUCTIONS
If dehydrated can reduce dose to 4 times per week from daily  (Torsemide 10mg)    If gaining 3lb of fluid and worsening shortness of breath, take an extra Torsemide 10mg    Low sodium diet, stay hydrated    Discuss Wegovy injections with PCM.  Recommend against stimulants.      If HR is doing well, can reduce dose of Metoprolol 50%.  Can stop if palpitations are resolved.

## 2023-05-25 NOTE — ASSESSMENT & PLAN NOTE
Continue current meds with addition of diuretic as previously prescribed.    If BP goes low can stop the Toprol.

## 2023-05-25 NOTE — ASSESSMENT & PLAN NOTE
Continue Diuretic for preload reduction.  Reduce Na in diet.  Stay hydrated.  Avoid smoking and inactivity.

## 2023-05-25 NOTE — PROGRESS NOTES
Carolyn Redmond is a 37 y.o. female is present in the office today for established appt.    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  She has a pulmonary sleep study scheduled for tomorrow.      She reports retaining fluid.  With a dry weight about 200lb.  Prior endocarditis x2.      Has been gaining weigth and retaining fluid since on Prednisone for vasculitis.  Will see vasculitis specialist at [place] in 2023 FEB.      Reviewed her history of prior endocarditis admissions at length.      Carolyn wants to resume meds for weight loss.  Her BiPap just started for ARIANNA and she's feeling much more rested (Central and obstructive sleep apnea).      Breathing is improved    Palpitations   Associated symptoms include an irregular heartbeat, malaise/fatigue and weakness. Pertinent negatives include no chest pain, coughing, dizziness, near-syncope, numbness, shortness of breath or syncope.        Past Medical History:   Diagnosis Date   • Anxiety    • Depressed    • Endocarditis    • Fibromyalgia    • Migraines    • Pancreatitis    • Substance abuse (CMS/Formerly [facility]) 2015   • Tachycardia    • Vasculitis (CMS/Formerly [facility])        Past Surgical History:   Procedure Laterality Date   • CHOLECYSTECTOMY     • ERCP     • GALLBLADDER SURGERY     • TONSILLECTOMY  1991        Social History     Socioeconomic History   • Marital status: Single     Spouse name: None   • Number of children: None   • Years of education: None   • Highest education level: None   Occupational History   • Occupation:    Tobacco Use   • Smoking status: Former     Packs/day: 0.50     Types: Cigarettes     Quit date: 2020     Years since quitting: 3.3   • Smokeless tobacco: Never   • Tobacco comments:     quit 1 year ago, vapes currently   Vaping Use   • Vaping status: Every Day     Substances: Nicotine, Flavoring     Devices: Refillable tank   Substance and Sexual Activity   • Alcohol use: Not Currently   • Drug  use: Not Currently     Types: Marijuana     Comment: medical abundiomanuel card   • Sexual activity: Defer     Social Determinants of Health     Food Insecurity: Unknown (5/23/2021)    Hunger Vital Sign    • Worried About Running Out of Food in the Last Year: Never true       Family History   Problem Relation Age of Onset   • Hypertension Biological Mother    • Lung cancer Biological Father    • Heart disease Maternal Grandfather        Amoxicillin, Nsaids (non-steroidal anti-inflammatory drug), Tramadol, and Trazodone    Current Outpatient Medications   Medication Sig Dispense Refill   • albuterol HFA (VENTOLIN HFA) 90 mcg/actuation inhaler Inhale 2 puffs every 4 (four) hours as needed.     • buprenorphine-naloxone (SUBOXONE) 8-2 mg film dissolve 1 FILM under the tongue twice a day and 1/2 FILM nightly     • buPROPion XL (WELLBUTRIN XL) 300 mg 24 hr tablet Take 300 mg by mouth once daily.     • ferrous sulfate 325 mg (65 mg iron) tablet daily.     • fluticasone propion-salmeteroL (ADVAIR DISKUS) 250-50 mcg/dose diskus inhaler inhale 1 puff by mouth and INTO THE LUNGS twice a day Rinse mouth after use     • fluticasone propionate (FLONASE) 50 mcg/actuation nasal spray Administer 1 spray into each nostril daily as needed for rhinitis or allergies.     • ketoconazole (NIZORAL) 2 % cream APPLY DAILY TO SKIN TO AFFECTED AREA TWICE A DAY FOR 2 WEEKS     • magnesium oxide (MAG-OX) 400 mg (241.3 mg magnesium) tablet Take 1 tablet (400 mg total) by mouth daily. 90 tablet 1   • medical marijuana 1 each See admin instr. Please be advised that an [facility] hospital staff member has listed medical marijuana as one of your home medications for documentation purposes. Please follow-up with your certified issuing provider for ongoing use     • metoprolol succinate XL (TOPROL-XL) 25 mg 24 hr tablet Take 25 mg by mouth every morning.     • NURTEC ODT 75 mg tablet,disintegrating Take 1 tablet (75 mg total) by mouth every other day. 16 tablet  3   • onabotulinumtoxinA (BOTOX) 200 unit recon soln injection Botox for chronic migraine headache every 3 months 1 each 4   • ondansetron (ZOFRAN) 4 mg tablet Take 1 tablet by mouth every 12 hours.     • pantoprazole (PROTONIX) 40 mg EC tablet Take 40 mg by mouth once daily.     • PARoxetine (PAXIL) 40 mg tablet Take 40 mg by mouth once daily.     • potassium chloride (KLOR-CON M) 20 mEq CR tablet Take 1 tablet (20 mEq total) by mouth 2 (two) times a day. 180 tablet 1   • predniSONE (DELTASONE) 5 mg tablet Take 10 mg by mouth daily. 20 mg daily     • pregabalin (LYRICA) 200 mg capsule Take 200 mg by mouth 3 (three) times a day.     • QUEtiapine (SEROquel) 100 mg tablet Take 100 mg by mouth.     • tamsulosin (FLOMAX) 0.4 mg capsule Take 0.4 mg by mouth 2 (two) times a day.     • TiZANidine 6 mg capsule take 1 capsule by mouth every 6 to 8 hours if needed **MAX 3 CAPS IN 24 HOURS**     • torsemide (DEMADEX) 10 mg tablet Take 1 tablet (10 mg total) by mouth daily. 90 tablet 1   • zolpidem (AMBIEN) 10 mg tablet Take 10 mg by mouth nightly.     • cholecalciferol (VITAMIN D3) 1,250 mcg (50,000 unit) capsule Take 1 capsule by mouth once a week.     • riboflavin, vitamin B2, (VITAMIN B-2) 100 mg tablet Two in am and two in pm (Patient not taking: Reported on 5/25/2023) 120 tablet 3     No current facility-administered medications for this visit.       Review of Systems   Constitutional: Positive for malaise/fatigue and weight gain. Negative for decreased appetite and night sweats.   HENT: Negative for congestion and hoarse voice.    Eyes: Negative for blurred vision, vision loss in left eye and vision loss in right eye.   Cardiovascular: Positive for dyspnea on exertion, irregular heartbeat and leg swelling. Negative for chest pain, claudication, cyanosis, near-syncope, orthopnea, palpitations, paroxysmal nocturnal dyspnea and syncope.   Respiratory: Positive for snoring. Negative for cough, shortness of breath, sleep  disturbances due to breathing and wheezing.    Endocrine: Negative for cold intolerance and heat intolerance.   Hematologic/Lymphatic: Negative for adenopathy and bleeding problem. Does not bruise/bleed easily.   Skin: Negative for poor wound healing, skin cancer and suspicious lesions.   Musculoskeletal: Positive for myalgias. Negative for back pain and joint swelling.   Genitourinary: Negative for flank pain and nocturia.   Neurological: Positive for weakness. Negative for dizziness, loss of balance, numbness, sensory change and tremors.          Vitals:    05/25/23 1335   BP: 104/76   Pulse: 90   Resp: 18   SpO2: 96%       Body mass index is 47.37 kg/m².      Physical Exam  Constitutional:       General: She is not in acute distress.     Appearance: Normal appearance. She is obese. She is not toxic-appearing.   HENT:      Head: Normocephalic.      Mouth/Throat:      Mouth: Mucous membranes are moist.      Pharynx: No posterior oropharyngeal erythema.   Eyes:      Extraocular Movements: Extraocular movements intact.   Neck:      Vascular: JVD present.      Comments: To mid neck when lying at a 30 degrees.  Cardiovascular:      Rate and Rhythm: Normal rate and regular rhythm.      Chest Wall: PMI is not displaced.      Pulses: Normal pulses.      Heart sounds: S1 normal and S2 normal. No murmur heard.     No gallop. No S3 or S4 sounds.   Pulmonary:      Effort: Pulmonary effort is normal.      Breath sounds: Normal breath sounds.   Abdominal:      General: Abdomen is flat. Bowel sounds are normal. There is no distension.      Tenderness: There is no abdominal tenderness.   Musculoskeletal:      Cervical back: Neck supple.      Right lower leg: Edema present.      Left lower leg: Edema present.      Comments: Functional status is:  fair   Skin:     General: Skin is warm.   Neurological:      Mental Status: She is alert and oriented to person, place, and time. Mental status is at baseline.   Psychiatric:          Attention and Perception: Attention normal.         Mood and Affect: Mood and affect normal.         Speech: Speech normal.          Lab Results   Component Value Date    WBC 7.9 07/19/2022    RBC 4.25 07/19/2022    HGB 11.7 07/19/2022    HCT 35.5 07/19/2022    MCV 84 07/19/2022    MCH 27.5 07/19/2022    MCHC 33.0 07/19/2022    RDW 15.3 07/19/2022     07/19/2022        Lab Results   Component Value Date    GLUCOSE 88 07/19/2022    BUN 9 07/19/2022    CREATININE 0.77 07/19/2022    EGFR 104 07/19/2022    BUNCREAT 12 07/19/2022     07/19/2022    K 3.9 07/19/2022     07/19/2022    CO2 22 07/19/2022    CALCIUM 9.4 01/13/2022    PROTEIN 6.6 07/19/2022    ALBUMIN 4.3 07/19/2022    GLOB 2.3 07/19/2022    AGRATIO 1.9 07/19/2022    BILITOT 0.3 07/19/2022    ALKPHOS 72 07/19/2022    AST 33 07/19/2022    ALT 25 07/19/2022        No results found for: HGBA1C, ESTAVGGLUC     Lab Results   Component Value Date    ALBUMIN 4.3 07/19/2022        No results found for: CHOL, TRIG, HDL, VLDL, LDLCALC     Cardiac Imaging    TRANSTHORACIC ECHO (TTE) LIMITED 06/17/2022    Interpretation Summary  · Limited study for pulmonary hypertension.  · Normal-sized LV. Preserved LV systolic function. Estimated EF 65%. No regional wall motion abnormalities. Normal LV wall thickness.  · Normal-sized RV. Normal RV systolic function.  · Normal-sized LA.  · Normal-sized RA.  · Aortic valve not well visualized. No aortic valve regurgitation. No aortic valve stenosis.  · Grossly normal leaflet structure of the mitral valve. No significant mitral valve regurgitation.  · Moderate tricuspid valve regurgitation. The regurgitation jet is eccentric. Estimated RVSP = 48 mmHg assuming a right atrial pressure of 15 mmHg.  · Pulmonic valve not well visualized.  · No evidence of pericardial effusion.  · IVC is dilated (>2.1cm). IVC collapses <50% during inspiration.  · Compared to prior study on 3/17/2021, no significant change.      Results for  orders placed during the hospital encounter of 03/17/21    Transthoracic echo (TTE) complete    Interpretation Summary  · Normal-sized LV. Normal LV wall thickness.  · Preserved LV systolic function. Estimated EF 70%. Normal LV septal wall motion. No regional wall motion abnormalities. Normal diastolic filling pattern for age of the LV.  · No intracardiac mass, thrombus, vegetation.  · Normal-sized RV. Normal RV systolic function.  · Normal-sized LA.  · Normal-sized RA.  · Aortic root normal. Sinuses of Valsalva normal-sized. Ascending aorta normal-sized. Aortic arch normal-sized.  · Aortic valve structure is normal. No aortic valve regurgitation. No aortic valve stenosis.  · Normal leaflet structure of the mitral valve.  · No significant mitral valve regurgitation.  · Tricuspid valve structure appears grossly normal. No vegetation. Mild tricuspid valve regurgitation.  · Estimated RV systolic pressure 51 mmHg assuming a right and pressure of 3 mmHg.  · Normal pulmonic valve structure.  · IVC is a small caliber vessel (<1.7cm). IVC collapses >50% during inspiration.  · Normal pericardial structure. No evidence of pericardial effusion.               Assessment/Plan        EKG: Normal sinus rhythm, low risk EKG.  Unchanged from prior.    Obstructive sleep apnea syndrome  Doing better on bipap.    Mild persistent asthma, uncomplicated  Improved. Continue pulm followup.    Essential hypertension  Continue current meds with addition of diuretic as previously prescribed.    If BP goes low can stop the Toprol.      Palpitations  Improved with control of anxiety.  Low risk symptoms.    Dyspepsia  Continue PPI, dietary changes.  No angina.    (HFpEF) heart failure with preserved ejection fraction (CMS/HCC)  Continue Diuretic for preload reduction.  Reduce Na in diet.  Stay hydrated.  Avoid smoking and inactivity.     I personally reviewed the prior echo images and counseled the patient on the results in detail.  Spent a total  of 30min on exam, interview record review and documentation.  New Medications Ordered This Visit   Medications   • fluticasone propion-salmeteroL (ADVAIR DISKUS) 250-50 mcg/dose diskus inhaler     Sig: inhale 1 puff by mouth and INTO THE LUNGS twice a day Rinse mouth after use   • ketoconazole (NIZORAL) 2 % cream     Sig: APPLY DAILY TO SKIN TO AFFECTED AREA TWICE A DAY FOR 2 WEEKS   • QUEtiapine (SEROquel) 100 mg tablet     Sig: Take 100 mg by mouth.   • torsemide (DEMADEX) 10 mg tablet     Sig: Take 1 tablet (10 mg total) by mouth daily.     Dispense:  90 tablet     Refill:  1   • potassium chloride (KLOR-CON M) 20 mEq CR tablet     Sig: Take 1 tablet (20 mEq total) by mouth 2 (two) times a day.     Dispense:  180 tablet     Refill:  1       Medications Discontinued During This Encounter   Medication Reason   • buprenorphine HCL (SUBUTEX) 8 mg tablet, sublingual    • betamethasone dipropionate 0.05 % cream    • naloxone (NARCAN) 4 mg/actuation spray,non-aerosol nasal spray    • norethindrone (MICRONOR) 0.35 mg tablet    • QUEtiapine (SEROquel) 200 mg tablet Dose adjustment   • furosemide (LASIX) 40 mg tablet Alternate therapy        Orders Placed This Encounter   Procedures   • Comprehensive metabolic panel     Standing Status:   Future     Number of Occurrences:   1     Standing Expiration Date:   5/25/2024     Order Specific Question:   Release to patient     Answer:   Immediate   • B-type natriuretic peptide     Standing Status:   Future     Number of Occurrences:   1     Standing Expiration Date:   5/25/2024     Order Specific Question:   Release to patient     Answer:   Immediate   • [facility] MUSE ECG 12 lead (clinic performed)     Scheduling Instructions:      PLEASE USE THIS ORDER FOR ECG'S PERFORMED IN PHYSICIAN OFFICES     Order Specific Question:   Release to patient     Answer:   Immediate        Fabricio Middleton MD  5/25/2023

## 2023-06-05 ENCOUNTER — TELEMEDICINE (OUTPATIENT)
Dept: BEHAVIORAL/MENTAL HEALTH CLINIC | Facility: CLINIC | Age: 38
End: 2023-06-05
Payer: COMMERCIAL

## 2023-06-05 DIAGNOSIS — F33.9 EPISODE OF RECURRENT MAJOR DEPRESSIVE DISORDER, UNSPECIFIED DEPRESSION EPISODE SEVERITY (HCC): ICD-10-CM

## 2023-06-05 DIAGNOSIS — F41.1 GENERALIZED ANXIETY DISORDER: Primary | ICD-10-CM

## 2023-06-05 PROCEDURE — 90837 PSYTX W PT 60 MINUTES: CPT | Performed by: COUNSELOR

## 2023-06-05 NOTE — PSYCH
Behavioral Health Psychotherapy Progress Note    Psychotherapy Provided: Individual Psychotherapy     1  Generalized anxiety disorder        2  Episode of recurrent major depressive disorder, unspecified depression episode severity ([address] )            Goals addressed in session: Goal 1     DATA:     -CT reported that she started using a bi-pap machine to address her severe sleep apnea  CT is using with success but still needs to fine tune  CT is getting good results, gets feedback from system  CT noted that she does not yet feel rested; TH confirmed expectation that it will require weeks of use to arrive at a more rested baseline  CT noted experiencing dreams  And a sense of deep sleep that takes some time to wake from  -CT is  -TH invited CT to reflect on her current pain management plan  CT reflected on her comprehensive approach to managing her pain outside of opiate use and abuse  CT noted that it requires a combination of suboxone, movement, meditation, and yoga/stretching to bring herself most relief  -CT described the challenges of living with mother, aunt, and uncle  CT moved in 3 weeks ago  Aunt is mean, narcissistic, and not on proper meds for her bipolar  CT's mother seeks support from CT often, has dementia, and it is very upsetting to see her mother treated in the way her aunt does  CT is finding it exhausting and challenging to be in her current negative and argumentative, and unpredictable environment   -Discussed the value of being a role model for mother regarding self care  CT agreed  -TH normalized the newness of the environment and the transition period  TH invited CT to identify elements of ideal routine to care for CT wellness and then identified stepwise movement towards this  CT will begin with the bookends of the day (beginning and end)  -Reviewed the value of sleep/rest, habitual nature of sleep and encouraged proactive evening,sleep routine  Then follow with start of the day routine  "  -CT reported that she has been journaling with a focus on manifesting her goals and purpose  During this session, this clinician used the following therapeutic modalities: Supportive Psychotherapy    Substance Abuse was addressed during this session  If the client is diagnosed with a co-occurring substance use disorder, please indicate any changes in the frequency or amount of use: no use   Stage of change for addressing substance use diagnoses: Maintenance    ASSESSMENT:  Nicholas Guevara presents with a Euthymic/ normal mood  her affect is Normal range and intensity, which is congruent, with her mood and the content of the session  The client has made progress on their goals  Nicholas Guevara presents with a none risk of suicide, none risk of self-harm, and none risk of harm to others  For any risk assessment that surpasses a \"low\" rating, a safety plan must be developed  A safety plan was indicated: no  If yes, describe in detail     PLAN: Between sessions, Nicholas Guevara will establish morning and evening/sleep routines with priority focus on night time sleep routine to establish new habits, use bipap machine regularly    At the next session, the therapist will use Supportive Psychotherapy to support CT transition to new living setting and establish [facility] supportive routines       Behavioral Health Treatment Plan and Discharge Planning: Nicholas Guevara is aware of and agrees to continue to work on their treatment plan  They have identified and are working toward their discharge goals   yes    Visit start and stop times:    06/05/23  Start Time: 2961  Stop Time: 1503  Total Visit Time: 58 minutes    Virtual Regular Visit    Verification of patient location:    Patient is located at Home in the following state in which I hold an active license [place]      Assessment/Plan:    Problem List Items Addressed This Visit        Other    Recurrent major depressive disorder ([facility] [address] )    Generalized anxiety " disorder - Primary       Goals addressed in session: Goal 1          Reason for visit is   Chief Complaint   Patient presents with   • Virtual Regular Visit        Encounter provider Yulia Blanc [facility]    Provider located at [address] [facility] [address]  [address]  [phone]      Recent Visits  No visits were found meeting these conditions  Showing recent visits within past 7 days and meeting all other requirements  Today's Visits  Date Type Provider Dept   06/05/23 Telemedicine Yulia Blanc, [address] [facility] Therapist [facility]   Showing today's visits and meeting all other requirements  Future Appointments  No visits were found meeting these conditions  Showing future appointments within next 150 days and meeting all other requirements       The patient was identified by name and date of birth  Glennraffaeleewelinayang Saleh was informed that this is a telemedicine visit and that the visit is being conducted throughthe [employer] platform  She agrees to proceed     My office door was closed  No one else was in the room  She acknowledged consent and understanding of privacy and security of the video platform  The patient has agreed to participate and understands they can discontinue the visit at any time  Patient is aware this is a billable service  Felicity Hiram is a 30 y o  female    HPI     No past medical history on file  No past surgical history on file  No current outpatient medications on file  No current facility-administered medications for this visit  Not on File    Review of Systems    Video Exam    There were no vitals filed for this visit      Physical Exam

## 2023-06-12 ENCOUNTER — OFFICE VISIT (OUTPATIENT)
Dept: NEUROLOGY | Facility: CLINIC | Age: 38
End: 2023-06-12
Payer: COMMERCIAL

## 2023-06-12 VITALS
WEIGHT: 250 LBS | HEART RATE: 77 BPM | OXYGEN SATURATION: 95 % | BODY MASS INDEX: 46.01 KG/M2 | HEIGHT: 62 IN | TEMPERATURE: 97.9 F | SYSTOLIC BLOOD PRESSURE: 118 MMHG | RESPIRATION RATE: 16 BRPM | DIASTOLIC BLOOD PRESSURE: 84 MMHG

## 2023-06-12 DIAGNOSIS — G31.84 MILD COGNITIVE IMPAIRMENT: Primary | ICD-10-CM

## 2023-06-12 DIAGNOSIS — E55.9 VITAMIN D DEFICIENCY: ICD-10-CM

## 2023-06-12 PROCEDURE — 3074F SYST BP LT 130 MM HG: CPT | Performed by: NURSE PRACTITIONER

## 2023-06-12 PROCEDURE — 3008F BODY MASS INDEX DOCD: CPT | Performed by: NURSE PRACTITIONER

## 2023-06-12 PROCEDURE — 99214 OFFICE O/P EST MOD 30 MIN: CPT | Performed by: NURSE PRACTITIONER

## 2023-06-12 PROCEDURE — 3079F DIAST BP 80-89 MM HG: CPT | Performed by: NURSE PRACTITIONER

## 2023-06-12 RX ORDER — FERROUS SULFATE 325(65) MG
TABLET ORAL
COMMUNITY
End: 2023-06-13

## 2023-06-12 NOTE — PROGRESS NOTES
Carolyn Redmond is a 37 y.o. old female presenting today for follow up.     She reports getting radiofrequency ablation in her neck with Dr Wells in [place].  She reports Botox works well for control of frequency and severity of migraines .   [facility].   She reports she is weaning off the steroids - for the autoimmune vasculitis , was having trouble going off 15 mg , now she is under 5 mg prednisone .   She reports continue BIPAP machine , feels tight , it does help the headaches , but the dreams are vivid.  She stopped Emgality 120 mg monthly , continues taking Nurtec 75 mg every other day , noticed the difference after stopping Emgality from the side effects standpoint .   She reports being [facility]ology evaluation for memory change.      Associated symptoms:     Decrease appetite, Nausea, Vomiting, Diarrhea   - Photophobia, Phonophobia, Osmophobia   - Insomnia   - Flushing of face  - Stiff or sore neck  - Light headed  - Off balance   - Problems with concentration   - Blurred vision   - Better with lying down   - Prefer to be in a cool, quiet, dark room       Previsouly tried medications and procedures / devices:     Trigger point injection/Nerve block:YES trigger point injection and nerve block  Epidural injections or trans foraminal injections: YES for her  back  Alternative therapies: massage, physical therapy, acupuncture and chiropractor, tens units  CBD or THC: YES   Other headache devices: NO   Botulinum toxin: YES Botox-started 5/9/2022 last Botox 8/11/2022  Headache infusions:NO   Preventive medication therapy:   - Folic acid, magnesium (not sure if that helped)  - Protriptyline Wellbutrin, Cymbalta 60 mg, Latuda,  - Terazosin 2 mg, Klonopin, trazodone (allergies), zolpidem (Ambien) 10 mg,  - Lyrica, Topamax (effective but stopped due to hair loss)  - Inderal, metoprolol  - Benadryl IV infusion  - Baclofen,  tizanidine  - Seroquel 200 mg,   Abortive medication Therapy:  - Tramadol (allergies), Percocet (2017)  - Fioricet,  - Sumatriptan 100 mg tab, 20 mg nasal spray,, rizatriptan, Relpax, Zomig (ineffective), Frova  - Ubrelvy 100 mg, Nurtec 75 mg  - Excedrin, Tylenol,  - Ibuprofen, Celebrex, Toradol,  - Reglan, Zofran 4 mg tablet, Compazine, Phenergan,  - Prednisone 10 mg    Past Medical History:  has a past medical history of Anxiety, Depressed, Endocarditis, Fibromyalgia, Migraines, Pancreatitis, Substance abuse (CMS/[facility]) (2015), Tachycardia, and Vasculitis (CMS/[facility]).  Past Surgical History:  has a past surgical history that includes Gallbladder surgery; Cholecystectomy; ERCP; and Tonsillectomy (1991).  Social History:   Social History     Tobacco Use   • Smoking status: Former     Packs/day: 0.50     Types: Cigarettes     Quit date: 2020     Years since quitting: 3.4   • Smokeless tobacco: Never   • Tobacco comments:     quit 1 year ago, vapes currently   Vaping Use   • Vaping Use: Every day   • Substances: Nicotine, Flavoring   • Devices: Refillable tank   Substance Use Topics   • Alcohol use: Not Currently   • Drug use: Not Currently     Types: Marijuana     Comment: jaylene castelan     Diagnoses and all orders for this visit:    Mild cognitive impairment  -     Vitamin B12; Future  -     Vitamin D 25 hydroxy; Future  -     TSH; Future  -     T4, free; Future  -     Folate; Future  -     Iron and TIBC; Future    Vitamin D deficiency    Continue Vit D 50,000 IU weekly for now will recheck the labs

## 2023-06-13 ENCOUNTER — OFFICE VISIT (OUTPATIENT)
Dept: PRIMARY CARE | Facility: CLINIC | Age: 38
End: 2023-06-13
Payer: COMMERCIAL

## 2023-06-13 VITALS
WEIGHT: 267 LBS | BODY MASS INDEX: 49.13 KG/M2 | OXYGEN SATURATION: 98 % | SYSTOLIC BLOOD PRESSURE: 138 MMHG | DIASTOLIC BLOOD PRESSURE: 82 MMHG | HEIGHT: 62 IN | HEART RATE: 58 BPM

## 2023-06-13 DIAGNOSIS — N91.2 AMENORRHEA: ICD-10-CM

## 2023-06-13 DIAGNOSIS — E27.40 ADRENAL INSUFFICIENCY (CMS/HCC): ICD-10-CM

## 2023-06-13 DIAGNOSIS — D50.0 IRON DEFICIENCY ANEMIA DUE TO CHRONIC BLOOD LOSS: ICD-10-CM

## 2023-06-13 DIAGNOSIS — Z13.220 LIPID SCREENING: ICD-10-CM

## 2023-06-13 DIAGNOSIS — E66.01 MORBID OBESITY (CMS/HCC): ICD-10-CM

## 2023-06-13 DIAGNOSIS — Z11.4 ENCOUNTER FOR SCREENING FOR HUMAN IMMUNODEFICIENCY VIRUS (HIV): ICD-10-CM

## 2023-06-13 DIAGNOSIS — E61.1 IRON DEFICIENCY: ICD-10-CM

## 2023-06-13 DIAGNOSIS — E55.9 VITAMIN D DEFICIENCY: Primary | ICD-10-CM

## 2023-06-13 DIAGNOSIS — K29.60 OTHER GASTRITIS WITHOUT HEMORRHAGE, UNSPECIFIED CHRONICITY: ICD-10-CM

## 2023-06-13 DIAGNOSIS — Z11.59 ENCOUNTER FOR HEPATITIS C SCREENING TEST FOR LOW RISK PATIENT: ICD-10-CM

## 2023-06-13 DIAGNOSIS — E55.9 VITAMIN D DEFICIENCY: ICD-10-CM

## 2023-06-13 DIAGNOSIS — Z13.21 ENCOUNTER FOR VITAMIN DEFICIENCY SCREENING: ICD-10-CM

## 2023-06-13 DIAGNOSIS — F39 UNSPECIFIED MOOD (AFFECTIVE) DISORDER (CMS/HCC): ICD-10-CM

## 2023-06-13 DIAGNOSIS — I77.6 VASCULITIS (CMS/HCC): ICD-10-CM

## 2023-06-13 DIAGNOSIS — R53.83 OTHER FATIGUE: ICD-10-CM

## 2023-06-13 DIAGNOSIS — K86.1 OTHER CHRONIC PANCREATITIS: Primary | ICD-10-CM

## 2023-06-13 LAB
25(OH)D3+25(OH)D2 SERPL-MCNC: 20.7 NG/ML (ref 30–100)
FOLATE SERPL-MCNC: >20 NG/ML
IRON SATN MFR SERPL: 12 % (ref 15–55)
IRON SERPL-MCNC: 50 UG/DL (ref 27–159)
T4 FREE SERPL-MCNC: 1.43 NG/DL (ref 0.82–1.77)
TIBC SERPL-MCNC: 416 UG/DL (ref 250–450)
TSH SERPL DL<=0.005 MIU/L-ACNC: 2.49 UIU/ML (ref 0.45–4.5)
UIBC SERPL-MCNC: 366 UG/DL (ref 131–425)
VIT B12 SERPL-MCNC: 412 PG/ML (ref 232–1245)

## 2023-06-13 PROCEDURE — 3008F BODY MASS INDEX DOCD: CPT | Performed by: PHYSICIAN ASSISTANT

## 2023-06-13 PROCEDURE — 99205 OFFICE O/P NEW HI 60 MIN: CPT | Performed by: PHYSICIAN ASSISTANT

## 2023-06-13 PROCEDURE — 3079F DIAST BP 80-89 MM HG: CPT | Performed by: PHYSICIAN ASSISTANT

## 2023-06-13 PROCEDURE — 3075F SYST BP GE 130 - 139MM HG: CPT | Performed by: PHYSICIAN ASSISTANT

## 2023-06-13 RX ORDER — ASPIRIN 325 MG
50000 TABLET, DELAYED RELEASE (ENTERIC COATED) ORAL WEEKLY
Qty: 6 CAPSULE | Refills: 0 | Status: SHIPPED | OUTPATIENT
Start: 2023-06-13 | End: 2023-06-26 | Stop reason: ENTERED-IN-ERROR

## 2023-06-13 RX ORDER — FERROUS SULFATE 325(65) MG
325 TABLET ORAL
Qty: 30 TABLET | Refills: 0 | Status: SHIPPED | OUTPATIENT
Start: 2023-06-13 | End: 2023-06-26 | Stop reason: ENTERED-IN-ERROR

## 2023-06-13 RX ORDER — QUETIAPINE FUMARATE 50 MG/1
50 TABLET, FILM COATED ORAL NIGHTLY
Qty: 30 TABLET | Refills: 0
Start: 2023-06-13 | End: 2023-06-26 | Stop reason: ENTERED-IN-ERROR

## 2023-06-13 ASSESSMENT — PATIENT HEALTH QUESTIONNAIRE - PHQ9
SUM OF ALL RESPONSES TO PHQ QUESTIONS 1-9: 7
SUM OF ALL RESPONSES TO PHQ9 QUESTIONS 1 & 2: 0

## 2023-06-13 NOTE — ASSESSMENT & PLAN NOTE
Never saw endocrinology, likely due to chronic prednisone use  Wean prednisone, continue to wean by 1mg weekly

## 2023-06-13 NOTE — ASSESSMENT & PLAN NOTE
Needs to start walking  Not candidate for glp 1 with history of pancreatitis  Get labs, can consider nutrition counseling

## 2023-06-13 NOTE — PATIENT INSTRUCTIONS
Please go see your endocrinologist to discuss the adrenal insufficiency likely from prednisone    Go back to gi and have endoscopy and colonoscopy    Get the blood work I ordered today and follow up for physical    Please wean off the MM- you have a propensity for abuse   Please wean off the vaping    You should be weaned off the prednisone, can go down by 1mg per month since you have been on for so long      You need to start walking every day, 10 minutes for two weeks, then increase by 5minutes each week until up to 30 minutes a day

## 2023-06-13 NOTE — ASSESSMENT & PLAN NOTE
She has neuropsych testing scheduled ordered by Dr. Nguyen  On lyrica 200mg, paxil 40mg and seroquel 50mg

## 2023-06-13 NOTE — ASSESSMENT & PLAN NOTE
Found not to be autoimmune by Dr. Shahid ricardo  Thought to be due to her endocarditis- iv heroin use history  Should not be on prednisone-discussed continued wean with patient

## 2023-06-13 NOTE — PROGRESS NOTES
"   [facility]      Reason for visit:   Chief Complaint   Patient presents with   • Establish Care      Carolyn Redmond is a 37 y.o. female who presents for     Patient here to establish care, she had quite a few issues     Patient has a history of decreased memory since 2021, since having endocarditis in 2019(from previous heroin use), seeing Dr. Nguyen for neurology who is sending her for neuropsych eval      Patient reports has been on prednisone for years,diagnosed with vasculitis in 2019 with her endocarditis, found not to be autoimmune per rheumatology Dr. Vinson, thought to be due to the endocarditis.  She then saw another rheumatologist Dr. Goddard who has been prescribing the prednisone for a \"rash\" for over a year, and she is finally weaning down off it, down to 5mg of prednisone daily.  Reports she has been amenorrheic for over one year, stopped bc pills one year ago and still amenorrheic- not sexually active in over one year.    She has also had labs done recently and iron sat was low and was told to take iron, but again not menstruating.... no colonoscopy, but did have endoscopy in 2019 gastritis found, on ppi since      Patient also with anxiety and depression , on paxil 40mg, seroquel 50mg, lyrica 200 and again seeing neuropsych per Dr. Nguyen to see if memory loss is due to anxiety and depression      Also has ARIANNA  And central sleep apnea, now on bipap, follows with Dr. Ferrari who also manages her asthma with advair bid, rare need for albuterol- continues to vape nicotine and smoke marijuana      Patient also with some difficult initiating urine and sees Dr. Loja, urology who prescribes flomax bid, he has \"Concern\" for MS- again Dr. Nguyen is addressing and evaluating          Patient has been gaining weight and hoping I will prescribe ozempic for weight loss but has a history of pancreatitis- reports it was post cholecystectomy and happened many years ago.  Has not tried any diet plan- no weight " watchers, does not exercise at all      Lastly, discussed with patient that she is on suboxone for opiod addiction, but is using MM daily and the entire room smells of marijuana, I have encouraged her to wean off the MM- it is prescribed by a telemed doctor          Past Medical History:   Diagnosis Date   • Anxiety    • Depressed    • Endocarditis    • Fibromyalgia    • Migraines    • Pancreatitis    • Substance abuse (CMS/[facility]) 2015   • Tachycardia    • Vasculitis (CMS/[facility])        Past Surgical History:   Procedure Laterality Date   • CHOLECYSTECTOMY     • ERCP     • GALLBLADDER SURGERY     • TONSILLECTOMY  1991       Social History     Tobacco Use   • Smoking status: Former     Packs/day: 0.50     Years: 5.00     Pack years: 2.50     Types: Cigarettes     Quit date: 2020     Years since quitting: 3.4   • Smokeless tobacco: Current   • Tobacco comments:     quit 1 year ago, vapes currently   Vaping Use   • Vaping Use: Every day   • Substances: Nicotine, Flavoring   • Devices: Refillable tank   Substance Use Topics   • Alcohol use: Not Currently   • Drug use: Yes     Types: Marijuana, Heroin     Comment: MM now 2023, previous heroin and opiod       Family History   Problem Relation Age of Onset   • Hypertension Biological Mother    • Lung cancer Biological Father 76        smoker   • Lung cancer Maternal Grandmother 69        smoker   • Heart disease Maternal Grandfather    • Heart attack Maternal Grandfather         unsure of age   • Colon cancer Maternal Grandfather        Amoxicillin, Nsaids (non-steroidal anti-inflammatory drug), Tramadol, and Trazodone      Current Outpatient Medications:   •  albuterol HFA (VENTOLIN HFA) 90 mcg/actuation inhaler, Inhale 2 puffs every 4 (four) hours as needed., Disp: , Rfl:   •  buprenorphine-naloxone (SUBOXONE) 8-2 mg film, dissolve 1 FILM under the tongue twice a day and 1/2 FILM nightly, Disp: , Rfl:   •  cholecalciferol (VITAMIN D3) 1,250 mcg (50,000 unit) capsule, Take 1  capsule (50,000 Units total) by mouth once a week for 6 doses., Disp: 6 capsule, Rfl: 0  •  ferrous sulfate 325 mg (65 mg iron) tablet, Take 1 tablet (325 mg total) by mouth daily with breakfast. Take with orange juice, Disp: 30 tablet, Rfl: 0  •  fluticasone propion-salmeteroL (ADVAIR DISKUS) 250-50 mcg/dose diskus inhaler, inhale 1 puff by mouth and INTO THE LUNGS twice a day Rinse mouth after use, Disp: , Rfl:   •  fluticasone propionate (FLONASE) 50 mcg/actuation nasal spray, Administer 1 spray into each nostril daily as needed for rhinitis or allergies., Disp: , Rfl:   •  ketoconazole (NIZORAL) 2 % cream, APPLY DAILY TO SKIN TO AFFECTED AREA TWICE A DAY FOR 2 WEEKS, Disp: , Rfl:   •  magnesium oxide (MAG-OX) 400 mg (241.3 mg magnesium) tablet, Take 1 tablet (400 mg total) by mouth daily., Disp: 90 tablet, Rfl: 1  •  medical marijuana, 1 each See admin instr. Please be advised that an [facility] hospital staff member has listed medical marijuana as one of your home medications for documentation purposes. Please follow-up with your certified issuing provider for ongoing use, Disp: , Rfl:   •  metoprolol succinate XL (TOPROL-XL) 25 mg 24 hr tablet, Take 25 mg by mouth every morning., Disp: , Rfl:   •  NURTEC ODT 75 mg tablet,disintegrating, Take 1 tablet (75 mg total) by mouth every other day., Disp: 16 tablet, Rfl: 3  •  onabotulinumtoxinA (BOTOX) 200 unit recon soln injection, Botox for chronic migraine headache every 3 months, Disp: 1 each, Rfl: 4  •  ondansetron (ZOFRAN) 4 mg tablet, Take 1 tablet by mouth every 12 hours., Disp: , Rfl:   •  pantoprazole (PROTONIX) 40 mg EC tablet, Take 40 mg by mouth once daily., Disp: , Rfl:   •  PARoxetine (PAXIL) 40 mg tablet, Take 40 mg by mouth once daily., Disp: , Rfl:   •  potassium chloride (KLOR-CON M) 20 mEq CR tablet, Take 1 tablet (20 mEq total) by mouth 2 (two) times a day., Disp: 180 tablet, Rfl: 1  •  predniSONE (DELTASONE) 5 mg tablet, Take 10 mg by mouth daily. 20 mg  "daily, Disp: , Rfl:   •  pregabalin (LYRICA) 200 mg capsule, Take 200 mg by mouth 3 (three) times a day., Disp: , Rfl:   •  QUEtiapine (SEROQUEL) 50 mg tablet, Take 1 tablet (50 mg total) by mouth nightly., Disp: 30 tablet, Rfl: 0  •  tamsulosin (FLOMAX) 0.4 mg capsule, Take 0.4 mg by mouth 2 (two) times a day., Disp: , Rfl:   •  TiZANidine 6 mg capsule, take 1 capsule by mouth every 6 to 8 hours if needed **MAX 3 CAPS IN 24 HOURS**, Disp: , Rfl:   •  torsemide (DEMADEX) 10 mg tablet, Take 1 tablet (10 mg total) by mouth daily., Disp: 90 tablet, Rfl: 1  •  zolpidem (AMBIEN) 10 mg tablet, Take 10 mg by mouth nightly., Disp: , Rfl:     Review of Systems   All other systems reviewed and are negative.      Objective     Wt Readings from Last 3 Encounters:   06/13/23 121 kg (267 lb)   06/12/23 113 kg (250 lb)   05/25/23 117 kg (259 lb)       Temp Readings from Last 3 Encounters:   06/12/23 36.6 °C (97.9 °F)   04/26/23 36.7 °C (98 °F)   01/26/23 36.7 °C (98 °F)       BP Readings from Last 3 Encounters:   06/13/23 138/82   06/12/23 118/84   05/25/23 104/76       Pulse Readings from Last 3 Encounters:   06/13/23 (!) 58   06/12/23 77   05/25/23 90     Vitals:    06/13/23 1505   BP: 138/82   BP Location: Left upper arm   Patient Position: Sitting   Pulse: (!) 58   SpO2: 98%   Weight: 121 kg (267 lb)   Height: 1.575 m (5' 2\")     Body mass index is 48.83 kg/m².    Physical Exam  Vitals and nursing note reviewed.   Constitutional:       Appearance: She is obese.      Comments: Patient and entire room smell of marijuana   HENT:      Head: Normocephalic.      Nose: Nose normal.   Eyes:      Conjunctiva/sclera: Conjunctivae normal.      Pupils: Pupils are equal, round, and reactive to light.   Cardiovascular:      Rate and Rhythm: Normal rate and regular rhythm.      Pulses: Normal pulses.      Heart sounds: Normal heart sounds.   Pulmonary:      Effort: Pulmonary effort is normal.      Breath sounds: Normal breath sounds. "   Abdominal:      General: Abdomen is flat. Bowel sounds are normal.      Palpations: Abdomen is soft.   Musculoskeletal:         General: Normal range of motion.      Cervical back: Normal range of motion.   Skin:     General: Skin is warm and dry.      Capillary Refill: Capillary refill takes less than 2 seconds.   Neurological:      General: No focal deficit present.      Mental Status: She is alert and oriented to person, place, and time.   Psychiatric:      Comments: She struggles with her history  Able to recall after taking a few seconds  Does not appear to be responding to voices         Lab Results   Component Value Date    WBC 7.9 07/19/2022    HGB 11.7 07/19/2022    HCT 35.5 07/19/2022     07/19/2022    ALT 25 07/19/2022    AST 33 07/19/2022     07/19/2022    K 3.9 07/19/2022     07/19/2022    CREATININE 0.77 07/19/2022    BUN 9 07/19/2022    CO2 22 07/19/2022    TSH 2.490 06/12/2023    INR 1.0 01/26/2021                 Assessment   Problem List Items Addressed This Visit        Digestive    Chronic pancreatitis (CMS/HCC) - Primary     2018 post cholecystectomy           Adrenal insufficiency (CMS/HCC)     Never saw endocrinology, likely due to chronic prednisone use  Wean prednisone, continue to wean by 1mg weekly         Relevant Orders    Ambulatory referral to Endocrinology       Endocrine/Metabolic    Vitamin D deficiency     20.6  On vitamin d weekly         Morbid obesity (CMS/HCC)     Needs to start walking  Not candidate for glp 1 with history of pancreatitis  Get labs, can consider nutrition counseling            Hematologic    Iron deficiency anemia due to chronic blood loss     Iron sat low, hemoglobin normal  Needs repeat egd and colonscopy         Relevant Orders    Ferritin    CBC and differential       Infectious/Inflammatory    Vasculitis (CMS/HCC)     Found not to be autoimmune by Dr. Shahid ricardo  Thought to be due to her endocarditis- iv heroin use  history  Should not be on prednisone-discussed continued wean with patient            Mental Health    Unspecified mood (affective) disorder (CMS/HCC)     She has neuropsych testing scheduled ordered by Dr. Nguyen  On lyrica 200mg, paxil 40mg and seroquel 50mg         Relevant Medications    QUEtiapine (SEROQUEL) 50 mg tablet   Other Visit Diagnoses     Lipid screening        Relevant Orders    Lipid panel    Comprehensive metabolic panel    Other fatigue        Encounter for vitamin deficiency screening        Encounter for screening for human immunodeficiency virus (HIV)        Relevant Orders    HIV 1,2 AB P24 AG    Encounter for hepatitis C screening test for low risk patient        Relevant Orders    Hepatitis C antibody    Other gastritis without hemorrhage, unspecified chronicity        Amenorrhea        Relevant Orders    TSH w reflex FT4    PTH, intact    CORTISOL, AM    HCG, quantitative, pregnancy    Ambulatory referral to Endocrinology            I spent over 60 minutes with patient, and or looking at previous documentation, labs or images    This note was written with the assistance of voice recognition software, please excuse errors associated with voice recognition software.  RAVI Rodgers  6/13/2023

## 2023-06-14 NOTE — PROGRESS NOTES
I was immediately available to provide supervision and direction for the care of the patient.    Yumiko Lopez DO

## 2023-06-19 ENCOUNTER — TELEMEDICINE (OUTPATIENT)
Dept: BEHAVIORAL/MENTAL HEALTH CLINIC | Facility: CLINIC | Age: 38
End: 2023-06-19
Payer: COMMERCIAL

## 2023-06-19 DIAGNOSIS — F41.1 GENERALIZED ANXIETY DISORDER: ICD-10-CM

## 2023-06-19 DIAGNOSIS — F33.0 MILD EPISODE OF RECURRENT MAJOR DEPRESSIVE DISORDER (HCC): Primary | ICD-10-CM

## 2023-06-19 PROCEDURE — 90837 PSYTX W PT 60 MINUTES: CPT | Performed by: COUNSELOR

## 2023-06-19 NOTE — PSYCH
"Behavioral Health Psychotherapy Progress Note    Psychotherapy Provided: Individual Psychotherapy     1  Mild episode of recurrent major depressive disorder ([address] )        2  Generalized anxiety disorder            Goals addressed in session: Goal 1     DATA:     -CT reported that she is feeling better since last session, \"I feel lighter and more hopeful\"  -CT is having some success with CPAP device  (Needs a smaller nose mask which is on order) CT noted improved sleep and rest and looking forward to getting the full effect  -CT has been listening to [facility]  \"good neuroscience information\"  CT reported that it resonates strongly for her  CT hopeful and excited  CT reflected on concept of brain re-wiring  CT is noticing several unwanted thought patterns and triggers=reactions that she is focusing on changing  -CT reported that she has been journaling and meditation and has created routines in her day  CT reported new insights and ability to fully understand many of the AA concepts in a meaningful way  CT is recommitted to the validity of AA  -CT reported that she is noticing positive changes in ability to respond differently to triggers and stimulus  -CT reported that she saw a new PCP  CT noted PCP was thorough and a bit abrasive questioning her  CT is aware that she is looking for a PCP to dig and question her health challenges and willing to put up with the personal challenges of this  -CT talked about what her \"old self\" from 2019 would have been reactive, insulted, and left  However, CT has wider perspective   During this session, this clinician used the following therapeutic modalities: Mindfulness-based Strategies and Supportive Psychotherapy    Substance Abuse was not addressed during this session  If the client is diagnosed with a co-occurring substance use disorder, please indicate any changes in the frequency or amount of use:    Stage of change for addressing substance use diagnoses: No " "substance use/Not applicable    ASSESSMENT:  More Braun presents with a Euthymic/ normal mood  her affect is Normal range and intensity, which is congruent, with her mood and the content of the session  The client has made progress on their goals  CT presented with more hope and with new insight in comparison with previous sessions  More Braun presents with a none risk of suicide, none risk of self-harm, and none risk of harm to others  For any risk assessment that surpasses a \"low\" rating, a safety plan must be developed  A safety plan was indicated: no  If yes, describe in detail     PLAN: Between sessions, More rBaun will continue daily routine and practices (meditation, exercise) and use cPAP device (pursue correct mask)  At the next session, the therapist will use Supportive Psychotherapy to encourage ad support behavioral action supporting improved mood  Behavioral Health Treatment Plan and Discharge Planning: More Braun is aware of and agrees to continue to work on their treatment plan  They have identified and are working toward their discharge goals   yes    Visit start and stop times:    06/19/23  Start Time: 1205  Stop Time: 1302  Total Visit Time: 57 minutes  "

## 2023-06-21 ENCOUNTER — TELEPHONE (OUTPATIENT)
Dept: NEUROLOGY | Facility: CLINIC | Age: 38
End: 2023-06-21
Payer: COMMERCIAL

## 2023-06-21 NOTE — TELEPHONE ENCOUNTER
Patient requesting to change time on her scheduled appointment for 07/19 for her injections.  Best contact number for patient [phone].. Could we please reach out to patient to reset time of appointment patient is requesting

## 2023-06-21 NOTE — TELEPHONE ENCOUNTER
Returned patient phone call and explained as of right now peri does not have a later time in day I can add her to the cancellation list to try and bring her in at a later time

## 2023-06-21 NOTE — TELEPHONE ENCOUNTER
[facility] Appointment Request   Provider: Cheyenne Dupree  Appointment Type: Botox Injection  Reason for Visit: Botox  Available Day and Time:  Reschedule until later that day or another day if possible  Best Contact Number: [phone]    The practice will reach out to schedule your appointment within the next 2 business days.

## 2023-06-24 LAB
ALBUMIN SERPL-MCNC: 3.7 G/DL (ref 3.8–4.8)
ALBUMIN/GLOB SERPL: 1.5 {RATIO} (ref 1.2–2.2)
ALP SERPL-CCNC: 79 IU/L (ref 44–121)
ALT SERPL-CCNC: 17 IU/L (ref 0–32)
AST SERPL-CCNC: 16 IU/L (ref 0–40)
BASOPHILS # BLD AUTO: 0.1 X10E3/UL (ref 0–0.2)
BASOPHILS NFR BLD AUTO: 1 %
BILIRUB SERPL-MCNC: 0.3 MG/DL (ref 0–1.2)
BUN SERPL-MCNC: 13 MG/DL (ref 6–20)
BUN/CREAT SERPL: 18 (ref 9–23)
CALCIUM SERPL-MCNC: 9.3 MG/DL (ref 8.7–10.2)
CHLORIDE SERPL-SCNC: 99 MMOL/L (ref 96–106)
CHOLEST SERPL-MCNC: 180 MG/DL (ref 100–199)
CO2 SERPL-SCNC: 28 MMOL/L (ref 20–29)
CORTIS AM PEAK SERPL-MCNC: 0.8 UG/DL (ref 6.2–19.4)
CREAT SERPL-MCNC: 0.74 MG/DL (ref 0.57–1)
EGFRCR SERPLBLD CKD-EPI 2021: 108 ML/MIN/1.73
EOSINOPHIL # BLD AUTO: 0.3 X10E3/UL (ref 0–0.4)
EOSINOPHIL NFR BLD AUTO: 4 %
ERYTHROCYTE [DISTWIDTH] IN BLOOD BY AUTOMATED COUNT: 15.8 % (ref 11.7–15.4)
FERRITIN SERPL-MCNC: 49 NG/ML (ref 15–150)
GLOBULIN SER CALC-MCNC: 2.4 G/DL (ref 1.5–4.5)
GLUCOSE SERPL-MCNC: 79 MG/DL (ref 70–99)
HCG INTACT+B SERPL-ACNC: <1 MIU/ML
HCT VFR BLD AUTO: 34.1 % (ref 34–46.6)
HCV IGG SERPL QL IA: NON REACTIVE
HDLC SERPL-MCNC: 50 MG/DL
HGB BLD-MCNC: 11.4 G/DL (ref 11.1–15.9)
HIV 1+2 AB+HIV1 P24 AG SERPL QL IA: NON REACTIVE
IMM GRANULOCYTES # BLD AUTO: 0.1 X10E3/UL (ref 0–0.1)
IMM GRANULOCYTES NFR BLD AUTO: 1 %
LDLC SERPL CALC-MCNC: 85 MG/DL (ref 0–99)
LYMPHOCYTES # BLD AUTO: 2.8 X10E3/UL (ref 0.7–3.1)
LYMPHOCYTES NFR BLD AUTO: 31 %
MCH RBC QN AUTO: 29 PG (ref 26.6–33)
MCHC RBC AUTO-ENTMCNC: 33.4 G/DL (ref 31.5–35.7)
MCV RBC AUTO: 87 FL (ref 79–97)
MONOCYTES # BLD AUTO: 0.7 X10E3/UL (ref 0.1–0.9)
MONOCYTES NFR BLD AUTO: 8 %
NEUTROPHILS # BLD AUTO: 5 X10E3/UL (ref 1.4–7)
NEUTROPHILS NFR BLD AUTO: 55 %
PLATELET # BLD AUTO: 213 X10E3/UL (ref 150–450)
POTASSIUM SERPL-SCNC: 4.7 MMOL/L (ref 3.5–5.2)
PROT SERPL-MCNC: 6.1 G/DL (ref 6–8.5)
PTH-INTACT SERPL-MCNC: 31 PG/ML (ref 15–65)
RBC # BLD AUTO: 3.93 X10E6/UL (ref 3.77–5.28)
SODIUM SERPL-SCNC: 141 MMOL/L (ref 134–144)
T4 FREE SERPL-MCNC: 1.27 NG/DL (ref 0.82–1.77)
TRIGL SERPL-MCNC: 273 MG/DL (ref 0–149)
TSH SERPL DL<=0.005 MIU/L-ACNC: 2.5 UIU/ML (ref 0.45–4.5)
VLDLC SERPL CALC-MCNC: 45 MG/DL (ref 5–40)
WBC # BLD AUTO: 9 X10E3/UL (ref 3.4–10.8)

## 2023-06-26 ENCOUNTER — APPOINTMENT (EMERGENCY)
Dept: RADIOLOGY | Facility: HOSPITAL | Age: 38
End: 2023-06-26
Payer: COMMERCIAL

## 2023-06-26 ENCOUNTER — HOSPITAL ENCOUNTER (OUTPATIENT)
Dept: PSYCHOLOGY | Facility: CLINIC | Age: 38
Discharge: HOME | End: 2023-06-26
Payer: COMMERCIAL

## 2023-06-26 ENCOUNTER — TELEPHONE (OUTPATIENT)
Dept: PRIMARY CARE | Facility: CLINIC | Age: 38
End: 2023-06-26
Payer: COMMERCIAL

## 2023-06-26 ENCOUNTER — HOSPITAL ENCOUNTER (INPATIENT)
Facility: HOSPITAL | Age: 38
LOS: 3 days | Discharge: HOME | End: 2023-06-30
Attending: EMERGENCY MEDICINE | Admitting: INTERNAL MEDICINE
Payer: COMMERCIAL

## 2023-06-26 DIAGNOSIS — R00.2 PALPITATIONS: ICD-10-CM

## 2023-06-26 DIAGNOSIS — R60.0 BILATERAL LEG EDEMA: ICD-10-CM

## 2023-06-26 DIAGNOSIS — E27.40 ADRENAL INSUFFICIENCY (CMS/HCC): Primary | ICD-10-CM

## 2023-06-26 DIAGNOSIS — G31.84 MILD COGNITIVE IMPAIRMENT: ICD-10-CM

## 2023-06-26 PROBLEM — R63.5 WEIGHT GAIN: Status: ACTIVE | Noted: 2023-06-26

## 2023-06-26 PROBLEM — R20.0 SADDLE ANESTHESIA: Status: ACTIVE | Noted: 2023-06-26

## 2023-06-26 LAB
ALBUMIN SERPL-MCNC: 3.4 G/DL (ref 3.4–5)
ALP SERPL-CCNC: 66 IU/L (ref 35–126)
ALT SERPL-CCNC: 19 IU/L (ref 11–54)
ANION GAP SERPL CALC-SCNC: 10 MEQ/L (ref 3–15)
AST SERPL-CCNC: 21 IU/L (ref 15–41)
BASOPHILS # BLD: 0.08 K/UL (ref 0.01–0.1)
BASOPHILS NFR BLD: 0.7 %
BILIRUB SERPL-MCNC: 0.7 MG/DL (ref 0.3–1.2)
BILIRUB UR QL STRIP.AUTO: NEGATIVE MG/DL
BNP SERPL-MCNC: 28 PG/ML
BUN SERPL-MCNC: 14 MG/DL (ref 8–20)
CALCIUM SERPL-MCNC: 8.9 MG/DL (ref 8.9–10.3)
CHLORIDE SERPL-SCNC: 100 MEQ/L (ref 98–109)
CLARITY UR REFRACT.AUTO: CLEAR
CO2 SERPL-SCNC: 29 MEQ/L (ref 22–32)
COLOR UR AUTO: COLORLESS
CREAT SERPL-MCNC: 0.9 MG/DL (ref 0.6–1.1)
DIFFERENTIAL METHOD BLD: ABNORMAL
EOSINOPHIL # BLD: 0.19 K/UL (ref 0.04–0.36)
EOSINOPHIL NFR BLD: 1.6 %
ERYTHROCYTE [DISTWIDTH] IN BLOOD BY AUTOMATED COUNT: 16.6 % (ref 11.7–14.4)
FLUAV RNA SPEC QL NAA+PROBE: NEGATIVE
FLUBV RNA SPEC QL NAA+PROBE: NEGATIVE
GFR SERPL CREATININE-BSD FRML MDRD: >60 ML/MIN/1.73M*2
GLUCOSE SERPL-MCNC: 119 MG/DL (ref 70–99)
GLUCOSE UR STRIP.AUTO-MCNC: NEGATIVE MG/DL
HCG UR QL: NEGATIVE
HCT VFR BLDCO AUTO: 39.7 % (ref 35–45)
HGB BLD-MCNC: 12.4 G/DL (ref 11.8–15.7)
HGB UR QL STRIP.AUTO: NEGATIVE
IMM GRANULOCYTES # BLD AUTO: 0.18 K/UL (ref 0–0.08)
IMM GRANULOCYTES NFR BLD AUTO: 1.6 %
KETONES UR STRIP.AUTO-MCNC: NEGATIVE MG/DL
LEUKOCYTE ESTERASE UR QL STRIP.AUTO: NEGATIVE
LYMPHOCYTES # BLD: 1.75 K/UL (ref 1.2–3.5)
LYMPHOCYTES NFR BLD: 15.1 %
MCH RBC QN AUTO: 28.8 PG (ref 28–33.2)
MCHC RBC AUTO-ENTMCNC: 31.2 G/DL (ref 32.2–35.5)
MCV RBC AUTO: 92.1 FL (ref 83–98)
MONOCYTES # BLD: 0.46 K/UL (ref 0.28–0.8)
MONOCYTES NFR BLD: 4 %
NEUTROPHILS # BLD: 8.93 K/UL (ref 1.7–7)
NEUTS SEG NFR BLD: 77 %
NITRITE UR QL STRIP.AUTO: NEGATIVE
NRBC BLD-RTO: 0 %
PDW BLD AUTO: 11.5 FL (ref 9.4–12.3)
PH UR STRIP.AUTO: 6.5 [PH]
PLATELET # BLD AUTO: 270 K/UL (ref 150–369)
POTASSIUM SERPL-SCNC: 4 MEQ/L (ref 3.6–5.1)
PROT SERPL-MCNC: 6.6 G/DL (ref 6–8.2)
PROT UR QL STRIP.AUTO: NEGATIVE
RBC # BLD AUTO: 4.31 M/UL (ref 3.93–5.22)
RSV RNA SPEC QL NAA+PROBE: NEGATIVE
SARS-COV-2 RNA RESP QL NAA+PROBE: NEGATIVE
SODIUM SERPL-SCNC: 139 MEQ/L (ref 136–144)
SP GR UR REFRACT.AUTO: 1.01
TROPONIN I SERPL HS-MCNC: <2 PG/ML
UROBILINOGEN UR STRIP-ACNC: 0.2 EU/DL
WBC # BLD AUTO: 11.59 K/UL (ref 3.8–10.5)

## 2023-06-26 PROCEDURE — 96132 NRPSYC TST EVAL PHYS/QHP 1ST: CPT | Performed by: CLINICAL NEUROPSYCHOLOGIST

## 2023-06-26 PROCEDURE — 71046 X-RAY EXAM CHEST 2 VIEWS: CPT

## 2023-06-26 PROCEDURE — G0378 HOSPITAL OBSERVATION PER HR: HCPCS

## 2023-06-26 PROCEDURE — 93005 ELECTROCARDIOGRAM TRACING: CPT | Performed by: EMERGENCY MEDICINE

## 2023-06-26 PROCEDURE — 83036 HEMOGLOBIN GLYCOSYLATED A1C: CPT | Performed by: SPEECH-LANGUAGE PATHOLOGIST

## 2023-06-26 PROCEDURE — 96374 THER/PROPH/DIAG INJ IV PUSH: CPT

## 2023-06-26 PROCEDURE — 99223 1ST HOSP IP/OBS HIGH 75: CPT | Performed by: STUDENT IN AN ORGANIZED HEALTH CARE EDUCATION/TRAINING PROGRAM

## 2023-06-26 PROCEDURE — 93005 ELECTROCARDIOGRAM TRACING: CPT

## 2023-06-26 PROCEDURE — 96136 PSYCL/NRPSYC TST PHY/QHP 1ST: CPT | Performed by: CLINICAL NEUROPSYCHOLOGIST

## 2023-06-26 PROCEDURE — 85025 COMPLETE CBC W/AUTO DIFF WBC: CPT | Performed by: EMERGENCY MEDICINE

## 2023-06-26 PROCEDURE — 63600000 HC DRUGS/DETAIL CODE: Mod: JZ

## 2023-06-26 PROCEDURE — 99285 EMERGENCY DEPT VISIT HI MDM: CPT | Mod: 25

## 2023-06-26 PROCEDURE — 82024 ASSAY OF ACTH: CPT

## 2023-06-26 PROCEDURE — 87637 SARSCOV2&INF A&B&RSV AMP PRB: CPT

## 2023-06-26 PROCEDURE — 82533 TOTAL CORTISOL: CPT

## 2023-06-26 PROCEDURE — 96137 PSYCL/NRPSYC TST PHY/QHP EA: CPT | Performed by: CLINICAL NEUROPSYCHOLOGIST

## 2023-06-26 PROCEDURE — 84484 ASSAY OF TROPONIN QUANT: CPT | Performed by: EMERGENCY MEDICINE

## 2023-06-26 PROCEDURE — 84703 CHORIONIC GONADOTROPIN ASSAY: CPT

## 2023-06-26 PROCEDURE — 85025 COMPLETE CBC W/AUTO DIFF WBC: CPT

## 2023-06-26 PROCEDURE — 81003 URINALYSIS AUTO W/O SCOPE: CPT

## 2023-06-26 PROCEDURE — 80053 COMPREHEN METABOLIC PANEL: CPT | Performed by: EMERGENCY MEDICINE

## 2023-06-26 PROCEDURE — 84703 CHORIONIC GONADOTROPIN ASSAY: CPT | Performed by: EMERGENCY MEDICINE

## 2023-06-26 PROCEDURE — 83880 ASSAY OF NATRIURETIC PEPTIDE: CPT | Performed by: EMERGENCY MEDICINE

## 2023-06-26 PROCEDURE — 96133 NRPSYC TST EVAL PHYS/QHP EA: CPT | Performed by: CLINICAL NEUROPSYCHOLOGIST

## 2023-06-26 PROCEDURE — 36415 COLL VENOUS BLD VENIPUNCTURE: CPT

## 2023-06-26 RX ORDER — ASPIRIN 325 MG
50000 TABLET, DELAYED RELEASE (ENTERIC COATED) ORAL WEEKLY
COMMUNITY
End: 2023-12-15

## 2023-06-26 RX ORDER — FERROUS SULFATE 325(65) MG
65 TABLET ORAL NIGHTLY
COMMUNITY

## 2023-06-26 RX ORDER — BUPRENORPHINE AND NALOXONE 8; 2 MG/1; MG/1
0.5 FILM, SOLUBLE BUCCAL; SUBLINGUAL
COMMUNITY
End: 2023-07-10

## 2023-06-26 RX ORDER — QUETIAPINE FUMARATE 100 MG/1
50-100 TABLET, FILM COATED ORAL NIGHTLY
COMMUNITY
End: 2023-07-05

## 2023-06-26 RX ADMIN — HYDROCORTISONE SODIUM SUCCINATE 100 MG: 100 INJECTION, POWDER, FOR SOLUTION INTRAMUSCULAR; INTRAVENOUS at 19:53

## 2023-06-26 ASSESSMENT — ENCOUNTER SYMPTOMS
SHORTNESS OF BREATH: 1
NAUSEA: 0
NUMBNESS: 0
DIARRHEA: 0
HEMATURIA: 0
CHILLS: 0
FATIGUE: 1
FLANK PAIN: 0
CONSTIPATION: 1
ABDOMINAL PAIN: 0
DYSURIA: 0
WEAKNESS: 0
COUGH: 0
ABDOMINAL DISTENTION: 1
FEVER: 0
FREQUENCY: 1
VOMITING: 0

## 2023-06-26 NOTE — ED ATTESTATION NOTE
ED ATTENDING ATTESTATION NOTE  6/26/2023, 7:21 PM    I supervised the care provided by the PA (Megan). We have discussed the case. I have personally seen and examined Carolyn Redmond.  I personally performed the key components of the encounter and have been involved in the care and medical decision making for this patient.    I reviewed and agree with the PA's assessment and plan of care, with any exceptions as documented below.      My focused history, examination, assessment, and plan of care of Carolyn Redmond is as follows:    Brief History:  The patient presents with BLE swelling, chest pressure, SOB and an 11 pound weight gain since yesterday.  She is taking torsemide, with increased urinary frequency, but without improvement of these symptoms.  She has been attempting to taper off of prednisone since October 2022.  She is currently down to 5 mg.  No F/C, AP, vomiting.    Vital Signs Review: Vital signs have been reviewed. The initial oxygen saturation is SpO2: 92 % on room air. Interpretation: normal  ED Vitals    Date/Time Temp Pulse Resp BP SpO2 [facility]   06/26/23 1908 -- -- -- -- 92 % MJB   06/26/23 1807 36.3 °C (97.3 °F) 90 18 150/110 -- CNP        Focused Physical Exam:   Constitutional:    Comments: Appears in no acute distress  HENT:   Head: Normocephalic and atraumatic.   Cardiovascular:   Rate and Rhythm: Normal rate and regular rhythm.   Heart sounds: Normal heart sounds.   Pulmonary:   Effort: Pulmonary effort is normal. No respiratory distress.   Breath sounds: Normal breath sounds.   Abdominal:   Palpation: Abdomen is soft.   Tenderness: There is no abdominal tenderness, guarding or rebound.  There is a 3 cm area of ecchymosis to the abdominal wall in the epigastric region.  Extremities:  There is nonpitting edema to the BLEs.  Neurological:   Mental Status: Alert and oriented to person, place, and time. Mental status is at baseline. Grossly nonfocal.    Assessment / Plan / MDM:  LE swelling,  SOB, sudden weight gain in a patient who is tapering off of chronic steroid use since October 2022.  CBC, CMP, BNP, beta hCG Qual, troponin, EKG, CXR, COVID/flu/RSV test, cortisol level, ACTH level, UA, stress dose steroids.  Reevaluate.  Medical Decision Making  Adrenal insufficiency (CMS/HCC): acute illness or injury  Amount and/or Complexity of Data Reviewed  Labs: ordered. Decision-making details documented in ED Course.  Radiology: ordered. Decision-making details documented in ED Course.  ECG/medicine tests: ordered and independent interpretation performed.      Risk  Prescription drug management.  Decision regarding hospitalization.      *We are currently experiencing increased patient volumes surpassing departmental capacity.    *This document was created using dragon dictation software.  There might be some typographical errors due to this technology. 26         Марина Barrow MD  07/03/23 6714

## 2023-06-26 NOTE — ED PROVIDER NOTES
Emergency Medicine Note  HPI   HISTORY OF PRESENT ILLNESS       History provided by:  Patient and medical records   used: No      37-year-old female with PMH of anxiety, depression, endocarditis, fibromyalgia, migraines, pancreatitis, substance abuse, and vasculitis presents to the ED via private vehicle for evaluation of weight gain.  Patient reports 11 pound wieght gain in one day. She has noticed bilateral lower extremity swelling with increasing chest pressure and shortness of breath.  Patient is taking torsemide without relief. She does endorse urinary frequency. She also feels that her abdomen is more distended. She also reports constipation. Patient endorses generalized fatigue and dizziness. She does endorse intermittent room spinning. Patient has been tapering down her steroid dose from 50 mg daily since last October. For the past two weeks, she has been taking 5 mg daily. Patient recently had outpatient cortisol level of 0.8. Denies fever, cough, abdominal pain, vomiting, dysuria, hematuria, flank pain, vaginal bleeding. Patient reports no menses in 1 year.      Patient History   PAST HISTORY     Reviewed from Nursing Triage:  Tobacco  Allergies  Meds  Problems  Med Hx  Surg Hx  Fam Hx  Soc   Hx      Past Medical History:   Diagnosis Date   • Anxiety    • Depressed    • Endocarditis    • Fibromyalgia    • Migraines    • Pancreatitis    • Substance abuse (CMS/HCC) 2015   • Tachycardia    • Vasculitis (CMS/HCC)        Past Surgical History:   Procedure Laterality Date   • CHOLECYSTECTOMY     • ERCP     • GALLBLADDER SURGERY     • TONSILLECTOMY  1991       Family History   Problem Relation Age of Onset   • Hypertension Biological Mother    • Lung cancer Biological Father 76        smoker   • Lung cancer Maternal Grandmother 69        smoker   • Heart disease Maternal Grandfather    • Heart attack Maternal Grandfather         unsure of age   • Colon cancer Maternal Grandfather         Social History     Tobacco Use   • Smoking status: Former     Packs/day: 0.50     Years: 5.00     Pack years: 2.50     Types: Cigarettes     Quit date: 2020     Years since quitting: 3.4   • Smokeless tobacco: Current   • Tobacco comments:     quit 1 year ago, vapes currently   Vaping Use   • Vaping Use: Every day   • Substances: Nicotine, Flavoring   • Devices: Refillable tank   Substance Use Topics   • Alcohol use: Not Currently   • Drug use: Yes     Types: Marijuana, Heroin     Comment: MM now 2023, previous heroin and opiod         Review of Systems   REVIEW OF SYSTEMS     Review of Systems   Constitutional: Positive for fatigue. Negative for chills and fever.   Respiratory: Positive for shortness of breath. Negative for cough.    Cardiovascular: Positive for chest pain and leg swelling.   Gastrointestinal: Positive for abdominal distention and constipation. Negative for abdominal pain, diarrhea, nausea and vomiting.   Genitourinary: Positive for frequency. Negative for dysuria, flank pain, hematuria and vaginal bleeding.   Neurological: Positive for dizziness. Negative for weakness and numbness.         VITALS     ED Vitals    Date/Time Temp Pulse Resp BP SpO2 Who   06/26/23 2220 -- -- -- 159/77 -- MP   06/26/23 2217 -- 70 16 159/77 96 % AMT   06/26/23 2024 -- 78 17 180/71 97 % FT   06/26/23 1930 -- 86 16 136/66 94 % AMT   06/26/23 1908 -- -- -- -- 92 % MJB   06/26/23 1807 36.3 °C (97.3 °F) 90 18 150/110 -- CNP        Pulse Ox %: 92 % (06/26/23 1908)  Pulse Ox Interpretation: Normal (low end of normal) (06/26/23 1908)  Heart Rate: 90 (06/26/23 1908)  Rhythm Strip Interpretation: Normal Sinus Rhythm (06/26/23 2141)     Physical Exam   PHYSICAL EXAM     Physical Exam  Vitals and nursing note reviewed.   Constitutional:       General: She is not in acute distress.     Appearance: She is well-developed. She is not toxic-appearing.   HENT:      Head: Normocephalic and atraumatic.   Eyes:       Conjunctiva/sclera: Conjunctivae normal.   Cardiovascular:      Rate and Rhythm: Normal rate and regular rhythm.   Pulmonary:      Effort: Pulmonary effort is normal. No respiratory distress.      Breath sounds: No wheezing, rhonchi or rales.   Abdominal:      General: Bowel sounds are normal. There is distension.      Palpations: Abdomen is soft.      Tenderness: There is no abdominal tenderness.   Musculoskeletal:         General: No deformity.      Right lower leg: Edema present.      Left lower leg: Edema present.   Skin:     General: Skin is warm and dry.   Neurological:      General: No focal deficit present.      Mental Status: She is alert and oriented to person, place, and time.   Psychiatric:         Mood and Affect: Mood normal.         Behavior: Behavior normal.           PROCEDURES     Procedures     DATA     Results     Procedure Component Value Units Date/Time    UA with reflex culture [[id number]]  (Normal) Collected: 06/26/23 1951    Specimen: Urine, Clean Catch Updated: 06/26/23 2035    Narrative:      The following orders were created for panel order UA with reflex culture.  Procedure                               Abnormality         Status                     ---------                               -----------         ------                     UA Reflex to Culture (Ma...[[id number]]  Normal              Final result                 Please view results for these tests on the individual orders.    UA Reflex to Culture (Macroscopic) [[id number]]  (Normal) Collected: 06/26/23 1951    Specimen: Urine, Clean Catch Updated: 06/26/23 2035     Color, Urine Colorless     Clarity, Urine Clear     Specific Gravity, Urine 1.008     pH, Urine 6.5     Leukocyte Esterase Negative     Comment: Results can be falsely negative due to high specific gravity, some antibiotics, glucose >3 g/dl, or WBC other than neutrophils.        Nitrite, Urine Negative     Protein, Urine Negative     Glucose, Urine Negative mg/dL       Ketones, Urine Negative mg/dL      Urobilinogen, Urine 0.2 EU/dL      Bilirubin, Urine Negative mg/dL      Blood, Urine Negative     Comment: The sensitivity of the occult blood test is equivalent to approximately 4 intact RBC/HPF.       B-type natriuretic peptide [[id number]]  (Normal) Collected: 06/26/23 1932    Specimen: Blood, Venous Updated: 06/26/23 2031     BNP 28 pg/mL     ACTH, Plasma [[id number]] Collected: 06/26/23 1953    Specimen: Blood, Venous Updated: 06/26/23 2011    HS Troponin I (with 2 hour reflex) [[id number]]  (Normal) Collected: 06/26/23 1906    Specimen: Blood, Venous Updated: 06/26/23 1953     High Sens Troponin I <2.0 pg/mL     Comprehensive metabolic panel [[id number]]  (Abnormal) Collected: 06/26/23 1906    Specimen: Blood, Venous Updated: 06/26/23 1952     Sodium 139 mEQ/L      Potassium 4.0 mEQ/L      Comment: Results obtained on plasma. Plasma Potassium values may be up to 0.4 mEQ/L less than serum values. The differences may be greater for patients with high platelet or white cell counts.        Chloride 100 mEQ/L      CO2 29 mEQ/L      BUN 14 mg/dL      Creatinine 0.9 mg/dL      Glucose 119 mg/dL      Calcium 8.9 mg/dL      AST (SGOT) 21 IU/L      ALT (SGPT) 19 IU/L      Alkaline Phosphatase 66 IU/L      Total Protein 6.6 g/dL      Comment: Test performed on plasma which typically contains approximately 0.4 g/dL more protein than serum.        Albumin 3.4 g/dL      Bilirubin, Total 0.7 mg/dL      eGFR >60.0 mL/min/1.73m*2      Anion Gap 10 mEQ/L     Cortisol [[id number]] Collected: 06/26/23 1820    Specimen: Blood, Venous Updated: 06/26/23 1933    SARS-CoV-2 (COVID-19), PCR Nasopharynx [[id number]]  (Normal) Collected: 06/26/23 1824    Specimen: Nasopharyngeal Swab from Nasopharynx Updated: 06/26/23 1918    Narrative:      The following orders were created for panel order SARS-CoV-2 (COVID-19), PCR Nasopharynx.  Procedure                               Abnormality         Status                      ---------                               -----------         ------                     SARS-COV-2 (COVID-19)/ F...[[id number]]  Normal              Final result                 Please view results for these tests on the individual orders.    SARS-COV-2 (COVID-19)/ FLU A/B, AND RSV, PCR Nasopharynx [[id number]]  (Normal) Collected: 06/26/23 1824    Specimen: Nasopharyngeal Swab from Nasopharynx Updated: 06/26/23 1918     SARS-CoV-2 (COVID-19) Negative     Influenza A Negative     Influenza B Negative     Respiratory Syncytial Virus Negative    Narrative:      Testing performed using real-time PCR for detection of COVID-19. EUA approved validation studies performed on site.     hCG, serum, qualitative [[id number]]  (Normal) Collected: 06/26/23 1820    Specimen: Blood, Venous Updated: 06/26/23 1901     Preg Test, Serum Negative    CBC and differential [[id number]]  (Abnormal) Collected: 06/26/23 1820    Specimen: Blood, Venous Updated: 06/26/23 1841     WBC 11.59 K/uL      RBC 4.31 M/uL      Hemoglobin 12.4 g/dL      Hematocrit 39.7 %      MCV 92.1 fL      MCH 28.8 pg      MCHC 31.2 g/dL      RDW 16.6 %      Platelets 270 K/uL      Comment: PLT CLUMPING SUSPECTED. PLT COUNT MAY BE SLIGHTLY HIGHER THAN REPORTED. IF CLINICALLY WARRANTED, SUGGEST COLLECTING SODIUM CITRATE TUBE (BLUE TOP) IN ADDITION TO EDTA TUBE FOR FOLLOW UP CBC TESTING.        MPV 11.5 fL      Differential Type Auto     nRBC 0.0 %      Immature Granulocytes 1.6 %      Neutrophils 77.0 %      Lymphocytes 15.1 %      Monocytes 4.0 %      Eosinophils 1.6 %      Basophils 0.7 %      Immature Granulocytes, Absolute 0.18 K/uL      Neutrophils, Absolute 8.93 K/uL      Lymphocytes, Absolute 1.75 K/uL      Monocytes, Absolute 0.46 K/uL      Eosinophils, Absolute 0.19 K/uL      Basophils, Absolute 0.08 K/uL     RAINBOW LT BLUE [[id number]] Collected: 06/26/23 1820    Specimen: Blood, Venous Updated: 06/26/23 1834    [place] Draw Panel [[id number]] Collected:  06/26/23 1820    Specimen: Blood, Venous Updated: 06/26/23 1833    Narrative:      The following orders were created for panel order [place] Draw Panel.  Procedure                               Abnormality         Status                     ---------                               -----------         ------                     RAINBOW RED[[id number]]                                      In process                 RAINBOW LT BLUE[[id number]]                                  In process                   Please view results for these tests on the individual orders.    RAINBOW RED [[id number]] Collected: 06/26/23 1820    Specimen: Blood, Venous Updated: 06/26/23 1833          Imaging Results          X-RAY CHEST 2 VIEWS (Final result)  Result time 06/26/23 18:47:54    Final result                 Impression:    IMPRESSION: See comment.               Narrative:    CLINICAL HISTORY: Dyspnea on exertion (CHAVES)    COMPARISON:Chest x-ray 12/18/2020    COMMENT: 2 views the chest are obtained.    There is no pneumothorax, pleural effusion or focal consolidation. The  cardiopericardial silhouette is normal in size. There are degenerative changes  in the spine.                                ECG 12 lead   Independent Interpretation by ED Provider   Interpreted by myself and ED attending.  Normal sinus rhythm.  Rate 82 bpm.  No STEMI.          Scoring tools                                  ED Course & MDM   MDM / ED COURSE / CLINICAL IMPRESSION / DISPO     Medical Decision Making  37-year-old female here for evaluation of multiple complaints.  Patient with outpatient cortisol level of 0.8.  Patient is likely experiencing adrenal insufficiency from steroid taper.  We will check labs and imaging.    Amount and/or Complexity of Data Reviewed  Labs: ordered. Decision-making details documented in ED Course.  Radiology: ordered. Decision-making details documented in ED Course.  ECG/medicine tests: ordered and independent interpretation  performed.      Risk  Prescription drug management.  Decision regarding hospitalization.          ED Course as of 06/27/23 0045 Mon Jun 26, 2023   1845 WBC(!): 11.59 [SG]   1900 X-RAY CHEST 2 VIEWS  There is no pneumothorax, pleural effusion or focal consolidation. The cardiopericardial silhouette is normal in size. There are degenerative changes  in the spine [SG]   1918 Impression: Swelling, chest pressure, SOB, fatigue, weight gain    Plan: labs, trop, bnp, preg, ua, ekg, imaging, monitor [SG]   1926 D/w Dr Barrow - will give stress dose solu-cortef [SG]   2131 Case was discussed with hospitalist.  Reviewed patient's presentation, ED course, and relevant data.  Hospitalist accepts patient on their service and will see / admit pt. [SG]      ED Course User Index  [SG] Sari Guzman PA C     Clinical Impression      Adrenal insufficiency (CMS/HCC)     _________________     ED Disposition   Admit / Observation                   Sari Guzman PA C  06/27/23 0046

## 2023-06-26 NOTE — TELEPHONE ENCOUNTER
CBC normal  AM cortisol level low  FLP with elevated triglycerides and VLDL  CMP normal  Ferritin normal  PTH normal  Thyroid function test normal  Normal hCG, less than 1  HIV negative  HCV negative    Working on getting endocrinology appointment scheduled  Has been stressed lately and unable to schedule up until now    Over the weekend has worsening fatigue and swelling  Taking diuretics but no improvement  Feels unwell  Advised of low cortisol level  Has been taking prednisone since Oct and weaned down to 5 mg daily  Encouraged follow up at emergency department for further testing and evaluation   She will go to [place] emergency department today    Called and spoke to emergency department staff, Bertha who is a PA    May need imaging, ACTH, 21 hydroxylase antibodies, evaluation by endocrinology.     Yumiko Lopez, DO

## 2023-06-27 ENCOUNTER — APPOINTMENT (OUTPATIENT)
Dept: CARDIOLOGY | Facility: HOSPITAL | Age: 38
Setting detail: OBSERVATION
End: 2023-06-27
Attending: SPEECH-LANGUAGE PATHOLOGIST
Payer: COMMERCIAL

## 2023-06-27 PROBLEM — N91.2 AMENORRHEA: Status: ACTIVE | Noted: 2023-06-27

## 2023-06-27 LAB
ANION GAP SERPL CALC-SCNC: 8 MEQ/L (ref 3–15)
AORTIC ROOT ANNULUS: 2.6 CM
AORTIC VALVE MEAN VELOCITY: 1.08 M/S
AORTIC VALVE VELOCITY TIME INTEGRAL: 36.8 CM
ASCENDING AORTA: 2.7 CM
ATRIAL RATE: 82
AV MEAN GRADIENT: 6 MMHG
AV PEAK GRADIENT: 12 MMHG
AV PEAK VELOCITY-S: 1.71 M/S
AV VALVE AREA INDEX: 0.85
AV VALVE AREA: 1.64 CM2
AV VELOCITY RATIO: 0.64
AVA (VTI): 2 CM2
BSA FOR ECHO PROCEDURE: 2.35 M2
BUN SERPL-MCNC: 16 MG/DL (ref 8–20)
CALCIUM SERPL-MCNC: 9.1 MG/DL (ref 8.9–10.3)
CHLORIDE SERPL-SCNC: 102 MEQ/L (ref 98–109)
CO2 SERPL-SCNC: 30 MEQ/L (ref 22–32)
CORTIS SERPL-MCNC: 1.7 UG/DL
CORTIS SERPL-MCNC: 8.1 UG/DL
CREAT SERPL-MCNC: 0.7 MG/DL (ref 0.6–1.1)
DOP CALC LVOT STROKE VOLUME: 73.48 CM3
EDV (BP): 109 CM3
EF (A4C): 69.9 %
EF A2C: 69.4 %
EJECTION FRACTION: 67.9 %
ERYTHROCYTE [DISTWIDTH] IN BLOOD BY AUTOMATED COUNT: 16.5 % (ref 11.7–14.4)
EST RIGHT VENT SYSTOLIC PRESSURE BY TRICUSPID REGURGITATION JET: 39 MMHG
EST. AVERAGE GLUCOSE BLD GHB EST-MCNC: 108 MG/DL
ESV (BP): 35 CM3
FRACTIONAL SHORTENING: 38.32 %
FSH SERPL-ACNC: 13.3 IU/L
GFR SERPL CREATININE-BSD FRML MDRD: >60 ML/MIN/1.73M*2
GLUCOSE SERPL-MCNC: 136 MG/DL (ref 70–99)
HBA1C MFR BLD: 5.4 %
HCT VFR BLDCO AUTO: 35.5 % (ref 35–45)
HGB BLD-MCNC: 11 G/DL (ref 11.8–15.7)
INTERVENTRICULAR SEPTUM: 0.92 CM
LA ESV (BP): 51.3 CM3
LA ESV INDEX (A2C): 15.57 CM3/M2
LA ESV INDEX (BP): 21.83 CM3/M2
LA/AORTA RATIO: 1.54
LAAS-AP2: 15.8 CM2
LAAS-AP4: 22.4 CM2
LAD 2D: 4 CM
LALD A4C: 5.51 CM
LALD A4C: 6.64 CM
LAV-S: 36.6 CM3
LEFT ATRIUM VOLUME INDEX: 25.66 CM3/M2
LEFT ATRIUM VOLUME: 60.3 CM3
LEFT INTERNAL DIMENSION IN SYSTOLE: 3.01 CM (ref 3.71–5.62)
LEFT VENTRICLE DIASTOLIC VOLUME INDEX: 45.53 CM3/M2
LEFT VENTRICLE DIASTOLIC VOLUME: 107 CM3
LEFT VENTRICLE SYSTOLIC VOLUME INDEX: 13.7 CM3/M2
LEFT VENTRICLE SYSTOLIC VOLUME: 32.2 CM3
LEFT VENTRICULAR INTERNAL DIMENSION IN DIASTOLE: 4.88 CM (ref 6.37–8.85)
LEFT VENTRICULAR POSTERIOR WALL IN END DIASTOLE: 1.1 CM (ref 0.78–1.46)
LH SERPL-ACNC: 6.8 IU/L
LV DIASTOLIC VOLUME: 108 CM3
LV ESV (APICAL 2 CHAMBER): 33.1 CM3
LVAD-AP2: 33 CM2
LVAD-AP4: 33.5 CM2
LVAS-AP2: 16.7 CM2
LVAS-AP4: 15.3 CM2
LVEDVI(A2C): 45.96 CM3/M2
LVEDVI(BP): 46.38 CM3/M2
LVESVI(A2C): 14.09 CM3/M2
LVESVI(BP): 14.89 CM3/M2
LVLD-AP2: 8.3 CM
LVLD-AP4: 8.49 CM
LVLS-AP2: 6.95 CM
LVLS-AP4: 6.03 CM
LVOT 2D: 2 CM
LVOT A: 3.14 CM2
LVOT MG: 2 MMHG
LVOT MV: 0.68 M/S
LVOT PEAK VELOCITY: 1.14 M/S
LVOT PG: 5 MMHG
LVOT STROKE VOLUME INDEX: 31.27 ML/M2
LVOT VTI: 23.4 CM
MCH RBC QN AUTO: 28.3 PG (ref 28–33.2)
MCHC RBC AUTO-ENTMCNC: 31 G/DL (ref 32.2–35.5)
MCV RBC AUTO: 91.3 FL (ref 83–98)
MLH CV ECHO AVA INDEX VELOCITY RATIO: 0.7
MV E'TISSUE VEL-LAT: 0.13 M/S
MV E'TISSUE VEL-MED: 0.14 M/S
P AXIS: 57
PDW BLD AUTO: 10.6 FL (ref 9.4–12.3)
PLATELET # BLD AUTO: 225 K/UL (ref 150–369)
POSTERIOR WALL: 1.1 CM
POTASSIUM SERPL-SCNC: 3.7 MEQ/L (ref 3.6–5.1)
PR INTERVAL: 150
QRS DURATION: 94
QT INTERVAL: 362
QTC CALCULATION(BAZETT): 422
R AXIS: 34
RAP: 15 MMHG
RBC # BLD AUTO: 3.89 M/UL (ref 3.93–5.22)
SEPTAL TISSUE DOPPLER FREE WALL LATE DIA VELOCITY (APICAL 4 CHAMBER VIEW): 0.19 M/S
SODIUM SERPL-SCNC: 140 MEQ/L (ref 136–144)
T WAVE AXIS: 46
TAPSE: 2.67 CM
TR MAX PG: 23.62 MMHG
TRICUSPID VALVE PEAK REGURGITATION VELOCITY: 2.43 M/S
VENTRICULAR RATE: 82
WBC # BLD AUTO: 9.76 K/UL (ref 3.8–10.5)
Z-SCORE OF LEFT VENTRICULAR DIMENSION IN END DIASTOLE: -4.31
Z-SCORE OF LEFT VENTRICULAR DIMENSION IN END SYSTOLE: -3.29
Z-SCORE OF LEFT VENTRICULAR POSTERIOR WALL IN END DIASTOLE: 0.16

## 2023-06-27 PROCEDURE — 25500000 HC DRUGS/INCIDENT RAD: Mod: JZ | Performed by: INTERNAL MEDICINE

## 2023-06-27 PROCEDURE — 85027 COMPLETE CBC AUTOMATED: CPT | Performed by: SPEECH-LANGUAGE PATHOLOGIST

## 2023-06-27 PROCEDURE — 83001 ASSAY OF GONADOTROPIN (FSH): CPT | Performed by: SPEECH-LANGUAGE PATHOLOGIST

## 2023-06-27 PROCEDURE — C8929 TTE W OR WO FOL WCON,DOPPLER: HCPCS

## 2023-06-27 PROCEDURE — 63700000 HC SELF-ADMINISTRABLE DRUG: Performed by: STUDENT IN AN ORGANIZED HEALTH CARE EDUCATION/TRAINING PROGRAM

## 2023-06-27 PROCEDURE — 93306 TTE W/DOPPLER COMPLETE: CPT | Mod: 26 | Performed by: INTERNAL MEDICINE

## 2023-06-27 PROCEDURE — 25000000 HC PHARMACY GENERAL: Performed by: INTERNAL MEDICINE

## 2023-06-27 PROCEDURE — 36415 COLL VENOUS BLD VENIPUNCTURE: CPT | Performed by: SPEECH-LANGUAGE PATHOLOGIST

## 2023-06-27 PROCEDURE — 63700000 HC SELF-ADMINISTRABLE DRUG: Performed by: INTERNAL MEDICINE

## 2023-06-27 PROCEDURE — 82310 ASSAY OF CALCIUM: CPT | Performed by: SPEECH-LANGUAGE PATHOLOGIST

## 2023-06-27 PROCEDURE — 20600000 HC ROOM AND CARE INTERMEDIATE/TELEMETRY

## 2023-06-27 PROCEDURE — 94640 AIRWAY INHALATION TREATMENT: CPT

## 2023-06-27 PROCEDURE — 99233 SBSQ HOSP IP/OBS HIGH 50: CPT | Performed by: INTERNAL MEDICINE

## 2023-06-27 PROCEDURE — 63700000 HC SELF-ADMINISTRABLE DRUG: Performed by: SPEECH-LANGUAGE PATHOLOGIST

## 2023-06-27 PROCEDURE — G0378 HOSPITAL OBSERVATION PER HR: HCPCS

## 2023-06-27 PROCEDURE — 25000000 HC PHARMACY GENERAL: Performed by: SPEECH-LANGUAGE PATHOLOGIST

## 2023-06-27 PROCEDURE — 82533 TOTAL CORTISOL: CPT | Performed by: SPEECH-LANGUAGE PATHOLOGIST

## 2023-06-27 RX ORDER — TAMSULOSIN HYDROCHLORIDE 0.4 MG/1
.4-.8 CAPSULE ORAL DAILY
Status: DISCONTINUED | OUTPATIENT
Start: 2023-06-27 | End: 2023-06-27 | Stop reason: DRUGHIGH

## 2023-06-27 RX ORDER — PREDNISONE 5 MG/1
5 TABLET ORAL DAILY
Status: DISCONTINUED | OUTPATIENT
Start: 2023-06-28 | End: 2023-06-28

## 2023-06-27 RX ORDER — PREGABALIN 100 MG/1
200 CAPSULE ORAL 3 TIMES DAILY
Status: DISCONTINUED | OUTPATIENT
Start: 2023-06-27 | End: 2023-06-30 | Stop reason: HOSPADM

## 2023-06-27 RX ORDER — PAROXETINE HYDROCHLORIDE 20 MG/1
20 TABLET, FILM COATED ORAL NIGHTLY
Status: DISCONTINUED | OUTPATIENT
Start: 2023-06-27 | End: 2023-06-30 | Stop reason: HOSPADM

## 2023-06-27 RX ORDER — PANTOPRAZOLE SODIUM 40 MG/1
40 TABLET, DELAYED RELEASE ORAL NIGHTLY
Status: DISCONTINUED | OUTPATIENT
Start: 2023-06-27 | End: 2023-06-30 | Stop reason: HOSPADM

## 2023-06-27 RX ORDER — METOPROLOL SUCCINATE 25 MG/1
25 TABLET, EXTENDED RELEASE ORAL NIGHTLY
Status: DISCONTINUED | OUTPATIENT
Start: 2023-06-27 | End: 2023-06-30 | Stop reason: HOSPADM

## 2023-06-27 RX ORDER — BUPRENORPHINE HYDROCHLORIDE 8 MG/1
16 TABLET SUBLINGUAL EVERY MORNING
Status: DISCONTINUED | OUTPATIENT
Start: 2023-06-27 | End: 2023-06-30 | Stop reason: HOSPADM

## 2023-06-27 RX ORDER — PREDNISONE 10 MG/1
10 TABLET ORAL DAILY
Status: DISCONTINUED | OUTPATIENT
Start: 2023-06-27 | End: 2023-06-27

## 2023-06-27 RX ORDER — QUETIAPINE FUMARATE 50 MG/1
50-100 TABLET, FILM COATED ORAL NIGHTLY
Status: DISCONTINUED | OUTPATIENT
Start: 2023-06-27 | End: 2023-06-27 | Stop reason: DRUGHIGH

## 2023-06-27 RX ORDER — ALBUTEROL SULFATE 0.83 MG/ML
2.5 SOLUTION RESPIRATORY (INHALATION) EVERY 4 HOURS PRN
Status: DISCONTINUED | OUTPATIENT
Start: 2023-06-27 | End: 2023-06-30 | Stop reason: HOSPADM

## 2023-06-27 RX ORDER — ZOLPIDEM TARTRATE 5 MG/1
10 TABLET ORAL NIGHTLY PRN
Status: DISCONTINUED | OUTPATIENT
Start: 2023-06-27 | End: 2023-06-30 | Stop reason: HOSPADM

## 2023-06-27 RX ORDER — FERROUS SULFATE 325(65) MG
325 TABLET ORAL NIGHTLY
Status: DISCONTINUED | OUTPATIENT
Start: 2023-06-27 | End: 2023-06-30 | Stop reason: HOSPADM

## 2023-06-27 RX ORDER — DEXTROSE 40 %
15-30 GEL (GRAM) ORAL AS NEEDED
Status: DISCONTINUED | OUTPATIENT
Start: 2023-06-27 | End: 2023-06-30 | Stop reason: HOSPADM

## 2023-06-27 RX ORDER — DEXTROSE 50 % IN WATER (D50W) INTRAVENOUS SYRINGE
25 AS NEEDED
Status: DISCONTINUED | OUTPATIENT
Start: 2023-06-27 | End: 2023-06-30 | Stop reason: HOSPADM

## 2023-06-27 RX ORDER — TIZANIDINE 2 MG/1
6 TABLET ORAL 3 TIMES DAILY PRN
Status: DISCONTINUED | OUTPATIENT
Start: 2023-06-27 | End: 2023-06-30 | Stop reason: HOSPADM

## 2023-06-27 RX ORDER — POLYETHYLENE GLYCOL 3350 17 G/17G
17 POWDER, FOR SOLUTION ORAL DAILY
Status: DISCONTINUED | OUTPATIENT
Start: 2023-06-27 | End: 2023-06-30 | Stop reason: HOSPADM

## 2023-06-27 RX ORDER — QUETIAPINE FUMARATE 25 MG/1
50 TABLET, FILM COATED ORAL NIGHTLY
Status: DISCONTINUED | OUTPATIENT
Start: 2023-06-27 | End: 2023-06-30 | Stop reason: HOSPADM

## 2023-06-27 RX ORDER — QUETIAPINE FUMARATE 25 MG/1
50 TABLET, FILM COATED ORAL NIGHTLY PRN
Status: DISCONTINUED | OUTPATIENT
Start: 2023-06-27 | End: 2023-06-30 | Stop reason: HOSPADM

## 2023-06-27 RX ORDER — TAMSULOSIN HYDROCHLORIDE 0.4 MG/1
0.4 CAPSULE ORAL DAILY
Status: DISCONTINUED | OUTPATIENT
Start: 2023-06-27 | End: 2023-06-30 | Stop reason: HOSPADM

## 2023-06-27 RX ORDER — TORSEMIDE 20 MG/1
10 TABLET ORAL DAILY
Status: DISCONTINUED | OUTPATIENT
Start: 2023-06-27 | End: 2023-06-30 | Stop reason: HOSPADM

## 2023-06-27 RX ORDER — TAMSULOSIN HYDROCHLORIDE 0.4 MG/1
0.4 CAPSULE ORAL DAILY PRN
Status: DISCONTINUED | OUTPATIENT
Start: 2023-06-27 | End: 2023-06-30 | Stop reason: HOSPADM

## 2023-06-27 RX ORDER — ALBUTEROL SULFATE 90 UG/1
2 INHALANT RESPIRATORY (INHALATION) EVERY 4 HOURS PRN
Status: DISCONTINUED | OUTPATIENT
Start: 2023-06-27 | End: 2023-06-27

## 2023-06-27 RX ORDER — IBUPROFEN 200 MG
16-32 TABLET ORAL AS NEEDED
Status: DISCONTINUED | OUTPATIENT
Start: 2023-06-27 | End: 2023-06-30 | Stop reason: HOSPADM

## 2023-06-27 RX ORDER — BUPRENORPHINE 2 MG/1
4 TABLET SUBLINGUAL
Status: DISCONTINUED | OUTPATIENT
Start: 2023-06-27 | End: 2023-06-30 | Stop reason: HOSPADM

## 2023-06-27 RX ORDER — POTASSIUM CHLORIDE 750 MG/1
20 TABLET, EXTENDED RELEASE ORAL 2 TIMES DAILY
Status: DISCONTINUED | OUTPATIENT
Start: 2023-06-27 | End: 2023-06-30 | Stop reason: HOSPADM

## 2023-06-27 RX ORDER — ACETAMINOPHEN 325 MG/1
650 TABLET ORAL EVERY 4 HOURS PRN
Status: DISCONTINUED | OUTPATIENT
Start: 2023-06-27 | End: 2023-06-30 | Stop reason: HOSPADM

## 2023-06-27 RX ORDER — ZOLPIDEM TARTRATE 5 MG/1
5 TABLET ORAL NIGHTLY PRN
Status: DISCONTINUED | OUTPATIENT
Start: 2023-06-27 | End: 2023-06-27

## 2023-06-27 RX ADMIN — POTASSIUM CHLORIDE 20 MEQ: 750 TABLET, EXTENDED RELEASE ORAL at 09:49

## 2023-06-27 RX ADMIN — POTASSIUM CHLORIDE 20 MEQ: 750 TABLET, EXTENDED RELEASE ORAL at 21:04

## 2023-06-27 RX ADMIN — TORSEMIDE 10 MG: 20 TABLET ORAL at 09:48

## 2023-06-27 RX ADMIN — QUETIAPINE FUMARATE 50 MG: 25 TABLET ORAL at 01:25

## 2023-06-27 RX ADMIN — MOMETASONE FUROATE AND FORMOTEROL FUMARATE DIHYDRATE 2 PUFF: 100; 5 AEROSOL RESPIRATORY (INHALATION) at 01:25

## 2023-06-27 RX ADMIN — MOMETASONE FUROATE AND FORMOTEROL FUMARATE DIHYDRATE 2 PUFF: 100; 5 AEROSOL RESPIRATORY (INHALATION) at 09:50

## 2023-06-27 RX ADMIN — ZOLPIDEM TARTRATE 10 MG: 5 TABLET ORAL at 21:14

## 2023-06-27 RX ADMIN — PREGABALIN 200 MG: 100 CAPSULE ORAL at 21:04

## 2023-06-27 RX ADMIN — SODIUM CHLORIDE: 9 INJECTION INTRAMUSCULAR; INTRAVENOUS; SUBCUTANEOUS at 15:55

## 2023-06-27 RX ADMIN — POLYETHYLENE GLYCOL 3350 17 G: 17 POWDER, FOR SOLUTION ORAL at 21:06

## 2023-06-27 RX ADMIN — FERROUS SULFATE TAB 325 MG (65 MG ELEMENTAL FE) 325 MG: 325 (65 FE) TAB at 21:06

## 2023-06-27 RX ADMIN — PREGABALIN 200 MG: 100 CAPSULE ORAL at 09:47

## 2023-06-27 RX ADMIN — PANTOPRAZOLE SODIUM 40 MG: 40 TABLET, DELAYED RELEASE ORAL at 21:05

## 2023-06-27 RX ADMIN — PREDNISONE 10 MG: 10 TABLET ORAL at 09:49

## 2023-06-27 RX ADMIN — ZOLPIDEM TARTRATE 5 MG: 5 TABLET ORAL at 04:56

## 2023-06-27 RX ADMIN — PAROXETINE 20 MG: 20 TABLET, FILM COATED ORAL at 21:05

## 2023-06-27 RX ADMIN — PREGABALIN 200 MG: 100 CAPSULE ORAL at 15:50

## 2023-06-27 RX ADMIN — MOMETASONE FUROATE AND FORMOTEROL FUMARATE DIHYDRATE 2 PUFF: 100; 5 AEROSOL RESPIRATORY (INHALATION) at 21:05

## 2023-06-27 RX ADMIN — TAMSULOSIN HYDROCHLORIDE 0.4 MG: 0.4 CAPSULE ORAL at 09:49

## 2023-06-27 RX ADMIN — QUETIAPINE FUMARATE 50 MG: 25 TABLET ORAL at 21:05

## 2023-06-27 RX ADMIN — BUPRENORPHINE 16 MG: 8 TABLET SUBLINGUAL at 09:49

## 2023-06-27 RX ADMIN — BUPRENORPHINE HYDROCHLORIDE 4 MG: 2 TABLET SUBLINGUAL at 15:50

## 2023-06-27 ASSESSMENT — COGNITIVE AND FUNCTIONAL STATUS - GENERAL
STANDING UP FROM CHAIR USING ARMS: 4 - NONE
MOVING TO AND FROM BED TO CHAIR: 4 - NONE
MOVING TO AND FROM BED TO CHAIR: 4 - NONE
WALKING IN HOSPITAL ROOM: 4 - NONE
MOVING TO AND FROM BED TO CHAIR: 4 - NONE
CLIMB 3 TO 5 STEPS WITH RAILING: 4 - NONE
STANDING UP FROM CHAIR USING ARMS: 4 - NONE
CLIMB 3 TO 5 STEPS WITH RAILING: 4 - NONE
WALKING IN HOSPITAL ROOM: 4 - NONE
WALKING IN HOSPITAL ROOM: 4 - NONE
CLIMB 3 TO 5 STEPS WITH RAILING: 4 - NONE
STANDING UP FROM CHAIR USING ARMS: 4 - NONE

## 2023-06-27 ASSESSMENT — ENCOUNTER SYMPTOMS: DIZZINESS: 1

## 2023-06-27 NOTE — PLAN OF CARE
Problem: Adult Inpatient Plan of Care  Goal: Plan of Care Review  Flowsheets (Taken 6/27/2023 5579)  Progress: no change  Outcome Evaluation: Pt resting in bed, independent in room. Ambulates to BR. Pt denies pain, SOB and nausea. echo done today. Tolerating diet. VSS. Call bell within reach.  Plan of Care Reviewed With: patient

## 2023-06-27 NOTE — ASSESSMENT & PLAN NOTE
Pt with hx of vasculitis, on chronic prednisone, had started to taper but developed recurrent symptoms after starting taper.    Plan:  - Will resume prednisone as stated   -outpt f/u with rheum recommended

## 2023-06-27 NOTE — PLAN OF CARE
Plan of Care Review  Plan of Care Reviewed With: patient  Progress: no change  Outcome Evaluation: Patient admitted to 4025 for b/l LE edema and weight gain, IV steroids given in ER and patient reports big improvement in swelling. Awaiting MRI and echo this am, plan of care reviewed with patient.

## 2023-06-27 NOTE — ASSESSMENT & PLAN NOTE
Ongoing symptoms of saddle anesthesia and difficulty initiating urine stream x1 year, without reported incontinence. Known hx of lumbar stenosis.    Plan:  - MRI lumbar spine ordered. F/u results

## 2023-06-27 NOTE — ASSESSMENT & PLAN NOTE
Pt presenting with weight gain, b/l LE edema, fatigue, which developed when she began to taper her prednisone. She had been on prednisone consistently since October 2022 for vasculitis, started on 50mg, and had tapered to 15mg daily which she was on for quite awhile. However, about 3-4 weeks ago she began to taper from 15mg down to her current dose of 5mg. Symptoms began soon after tapering below 10mg.    Outpatient cortisol performed by her family medicine physician was decreased at 0.8. Given symptoms and lab work patient ultimately directed to the ER for further evaluation  Remains hemodynamically stable  Repeat cortisol levels remain 0.5 again this morning. Discussed with Endocrinology - low suspicion for acute adrenal insufficiency at this time suspect ongoing suppression from prednisone regimen. Per Endocrinology, can reduce to 5 to 7.5mg daily in effort to treat vasculitis and avoid further suppression. Will likely take 3-4 weeks prior to seeing improvement in levels    Plan:  Will cont prednisone to 7.5mg daily for now. Patient to f/u with outpatient rheumatologist to for further recommendations re: prolonged tapering  Cont to monitor vitals, monitor electrolytes   Will need outpt f/u with Rheum and Endocrinology for continued monitoring

## 2023-06-27 NOTE — PLAN OF CARE
Care Coordination Admission Assessment Note    General Information:  Readmission Within the last 30 days: no previous admission in last 30 days  Does patient have a : No  Patient-Specific Goals (include timeframe): to be discharged back home    Living Arrangements:  Arrived From: home  Current Living Arrangements: home  People in Home: parent(s), other relative(s)  Home Accessibility: stairs to enter home (Group), stairs within home (Group)  Living Arrangement Comments: Pt lives with mom, aunt and uncle in [facility]    Housing Stability and Financial Resources (SDOH):  In the last 12 months, was there a time when you were not able to pay the mortgage or rent on time?: No  In the last 12 months, how many places have you lived?: 1  In the last 12 months, was there a time when you did not have a steady place to sleep or slept in a shelter (including now)?: No  How hard is it for you to pay for the very basics like food, housing, medical care, and heating?: Not hard at all    Functional Status Prior to Admission:   Assistive Device/Animal Currently Used at Home: CPAP  Functional Status Comments: independent with ADL's, uses CPAP at night. [facility] is the provider  IADL Comments:       Supports and Services:  Current Outpatient/Agency/Support Group: none  Type of Current Home Care Services:    History of home care episode or rehab stay: Had homecare back in 2017 when she had IV abx but unable to remember the name    Discharge Needs Assessment:   Concerns to be Addressed: no discharge needs identified, denies needs/concerns at this time  Current Discharge Risk:    Anticipated Changes Related to Illness: none    Patient/Family Anticipated Discharge Plan:  Patient/Family Anticipates Transition To: home with family  Patient/Family Anticipated Services at Transition: none    Connection to Community  Patient declined offered resources.      Patient Choice:   Offered/Gave Vendor List: yes  Patient's Choice of Community  Agency(s): Pt denies needs at this time  Patient and/or patient guardian/advocate was made aware of their right to choose a provider. A list of eligible providers was presented and reviewed with the patient and/or patient guardian/advocate in written and/or verbal form. The list delineates providers in the patient’s desired geographic area who are participating in the Medicare program and/or providers contracted with the patient’s primary insurance. The Medicare list and quality ratings were obtained from the Medicare.gov [medicare.gov] website.    Anticipated Discharge Plan:  Met with patient. Provided education and contact information for Care Coordination services.: yes  Anticipated Discharge Disposition: family member's home without services     Transportation Needs (SDOH):  Transportation Concerns: none  Transportation Anticipated: family or friend will provide  Is Out of Hospital DNR needed at discharge?: no    In the past 12 months, has lack of transportation kept you from medical appointments or from getting medications?: No  In the past 12 months, has lack of transportation kept you from meetings, work, or from getting things needed for daily living?: No    Concerns - comments:  CC MSW met with patient at bedside. Confirmed pcp and pharmacy in chart. Lives with mom, aunt and uncle in [place]. Pt lives on [address] w/ mom. Ambulates independently but uses CPAP machine provided by [facility]. Independent with ADL's, had hc in 2017 when she had iv abx but cant remember the name. No POA/living will. Mom will transport home at discharge.

## 2023-06-27 NOTE — ASSESSMENT & PLAN NOTE
Pt presents with chronic thoracic and lumbar stenosis however reporting saddle numbness (which she states is primarily located around her genital area) and difficulty initiating urine stream both started about one year ago and have worsened.?  PVRs wnl although pt noted to have difficulty with urination  Lumbar MRI  New right subarticular disc extrusion extending inferiorly from the L5-S1 level which impinges/compresses the traversing right S1 nerve roots.  Multilevel lumbar spondylosis noting tricompartmental spinal stenosis at L4-L5 and L5-S1, similar to prior.    Plan:  -uncertain if MRI findings explain etiology of sxs however will consult neurosurgery for further input.  -cont w/outpt regimen of suboxone   -if neurosurgery evaluation is unremarkable than outpt f/u with gyn and urology recommended for further evaluation of chronic symptoms

## 2023-06-27 NOTE — H&P
"   Hospital Medicine Service -  History & Physical        CHIEF COMPLAINT   Weight gain, b/l LE edema, fatigue, outpatient cortisol level 0.8, was instructed to come to ED      HISTORY OF PRESENT ILLNESS      Carolyn Redmond is a 37 y.o. female with a past medical history of anxiety, depression, endocarditis, fibromyalgia, vasculitis, migraines, pancreatitis, substance abuse who presents with weight gain, bl LE edema, fatigue for a few weeks. She reports she had been taking prednisone since October 2022 prescribed for vasculitis. She originally started at 50mg daily, and has slowly been tapering.  She had been on 15mg for quite some time and about 3-4 weeks ago, per direction of her rheumatologist she began to taper from 15mg-->10mg daily-->7.5mg-->5mg. She states after she began this taper, she developed her present symptoms which have worsened. She has been taking 5mg daily x2 weeks. She follows with a cardiologist Dr Middleton who prescribed torsemide about one month ago for the weight gain but has continued to gain weight. She notes 11lb weight gain over a couple of days last week and another 10lb weight gain soon after that. She reports feeling bloated, has occasional blurry vision, tunnel vision, \"fogginess\", dizziness, constipation, chest pressure/palpitations, SOB/CHAVES, nausea. Reports her appetite varies where she sometimes has no appetite and other times eats quite a bit. Reports she hasn't had a menses in one year. Reports she also has known thoracic and lumbar stenosis. She reports for almost one year has developed saddle numbness, which has worsened. She notes difficulty urinating as she is unable to initiate her urine stream and has seen a urologist.  Denies incontinence.     PAST MEDICAL AND SURGICAL HISTORY      Past Medical History:   Diagnosis Date   • Anxiety    • Depressed    • Endocarditis    • Fibromyalgia    • Migraines    • Pancreatitis    • Substance abuse (CMS/[facility]) 2015   • Tachycardia    • " Vasculitis (CMS/HCC)        Past Surgical History:   Procedure Laterality Date   • CHOLECYSTECTOMY     • ERCP     • GALLBLADDER SURGERY     • TONSILLECTOMY  1991       PCP: Priscilla Leyva PA C    MEDICATIONS      Prior to Admission medications    Medication Sig Start Date End Date Taking? Authorizing Provider   albuterol HFA (VENTOLIN HFA) 90 mcg/actuation inhaler Inhale 2 puffs every 4 (four) hours as needed. 3/1/21   Bari Ji MD   buprenorphine-naloxone (SUBOXONE) 8-2 mg film dissolve 1 FILM under the tongue twice a day and 1/2 FILM nightly 8/20/22   Garrison theodore Candelaria MD   cholecalciferol (VITAMIN D3) 1,250 mcg (50,000 unit) capsule Take 1 capsule (50,000 Units total) by mouth once a week for 6 doses. 6/13/23 7/19/23  Jesus Dupree CRNP   ferrous sulfate 325 mg (65 mg iron) tablet Take 1 tablet (325 mg total) by mouth daily with breakfast. Take with orange juice 6/13/23   Jesus Dupree CRNP   fluticasone propion-salmeteroL (ADVAIR DISKUS) 250-50 mcg/dose diskus inhaler inhale 1 puff by mouth and INTO THE LUNGS twice a day Rinse mouth after use 5/17/23   Garrison theodore Candelaria MD   fluticasone propionate (FLONASE) 50 mcg/actuation nasal spray Administer 1 spray into each nostril daily as needed for rhinitis or allergies.    Bari Ji MD   ketoconazole (NIZORAL) 2 % cream APPLY DAILY TO SKIN TO AFFECTED AREA TWICE A DAY FOR 2 WEEKS 5/17/23   Provider theodore Candelaria MD   magnesium oxide (MAG-OX) 400 mg (241.3 mg magnesium) tablet Take 1 tablet (400 mg total) by mouth daily. 9/20/22   Margaret Genao MD   medical marijuana 1 each See admin instr. Please be advised that an [facility] hospital staff member has listed medical marijuana as one of your home medications for documentation purposes. Please follow-up with your certified issuing provider for ongoing use    Garrison theodore Candelaria MD   metoprolol succinate XL (TOPROL-XL) 25 mg 24 hr tablet Take 25 mg  by mouth every morning.    Bari Ji MD NURTEC ODT 75 mg tablet,disintegrating Take 1 tablet (75 mg total) by mouth every other day. 4/26/23   Jesus Dupree CRNP   onabotulinumtoxinA (BOTOX) 200 unit recon soln injection Botox for chronic migraine headache every 3 months 10/6/22   Margaret Genao MD   ondansetron (ZOFRAN) 4 mg tablet Take 1 tablet by mouth every 12 hours. 6/8/21   Bari Ji MD   pantoprazole (PROTONIX) 40 mg EC tablet Take 40 mg by mouth once daily. 8/25/22   Bari Ji MD   PARoxetine (PAXIL) 40 mg tablet Take 40 mg by mouth once daily. 9/11/22   Bari Ji MD   potassium chloride (KLOR-CON M) 20 mEq CR tablet Take 1 tablet (20 mEq total) by mouth 2 (two) times a day. 5/25/23 11/21/23  Fabricio Middleton MD   predniSONE (DELTASONE) 5 mg tablet Take 10 mg by mouth daily. 20 mg daily 10/20/22   Garrison theodore Candelaria MD   pregabalin (LYRICA) 200 mg capsule Take 200 mg by mouth 3 (three) times a day.    Bari Ji MD   QUEtiapine (SEROQUEL) 50 mg tablet Take 1 tablet (50 mg total) by mouth nightly. 6/13/23 7/13/23  Priscilla Leyva PA C   tamsulosin (FLOMAX) 0.4 mg capsule Take 0.4 mg by mouth 2 (two) times a day.    Bari Ji MD   TiZANidine 6 mg capsule take 1 capsule by mouth every 6 to 8 hours if needed **MAX 3 CAPS IN 24 HOURS** 1/7/22   Bari Ji MD   torsemide (DEMADEX) 10 mg tablet Take 1 tablet (10 mg total) by mouth daily. 5/25/23 11/21/23  Fabricio Middleton MD   zolpidem (AMBIEN) 10 mg tablet Take 10 mg by mouth nightly.    Bari Ji MD       ALLERGIES      Amoxicillin, Nsaids (non-steroidal anti-inflammatory drug), Tramadol, and Trazodone    FAMILY HISTORY      Family History   Problem Relation Age of Onset   • Hypertension Biological Mother    • Lung cancer Biological Father 76        smoker   • Lung cancer Maternal Grandmother 69        smoker   • Heart disease Maternal  Grandfather    • Heart attack Maternal Grandfather         unsure of age   • Colon cancer Maternal Grandfather        SOCIAL HISTORY      Social History     Socioeconomic History   • Marital status: Single     Spouse name: None   • Number of children: None   • Years of education: None   • Highest education level: None   Occupational History   • Occupation:    Tobacco Use   • Smoking status: Former     Packs/day: 0.50     Years: 5.00     Pack years: 2.50     Types: Cigarettes     Quit date: 2020     Years since quitting: 3.4   • Smokeless tobacco: Current   • Tobacco comments:     quit 1 year ago, vapes currently   Vaping Use   • Vaping Use: Every day   • Substances: Nicotine, Flavoring   • Devices: Refillable tank   Substance and Sexual Activity   • Alcohol use: Not Currently   • Drug use: Yes     Types: Marijuana, Heroin     Comment: MM now 2023, previous heroin and opiod   • Sexual activity: Not Currently     Partners: Male, Female   Social History Narrative    Lives with mom     Social Determinants of Health     Food Insecurity: No Food Insecurity (6/26/2023)    Hunger Vital Sign    • Worried About Running Out of Food in the Last Year: Never true    • Ran Out of Food in the Last Year: Never true       REVIEW OF SYSTEMS      All other systems reviewed and negative except as noted in HPI    PHYSICAL EXAMINATION      Temp:  [36.3 °C (97.3 °F)] 36.3 °C (97.3 °F)  Heart Rate:  [70-90] 70  Resp:  [16-18] 16  BP: (136-180)/() 159/77  There is no height or weight on file to calculate BMI.    Physical Exam  Vitals reviewed.   Cardiovascular:      Rate and Rhythm: Normal rate and regular rhythm.      Pulses: Normal pulses.   Pulmonary:      Effort: Pulmonary effort is normal. No respiratory distress.      Breath sounds: Normal breath sounds. No wheezing or rhonchi.   Abdominal:      General: Bowel sounds are normal. There is distension.      Tenderness: There is no guarding.   Musculoskeletal:          General: Normal range of motion.      Comments: +1 to +2 b/l LE edema   Skin:     General: Skin is warm and dry.      Capillary Refill: Capillary refill takes less than 2 seconds.   Neurological:      Mental Status: She is alert and oriented to person, place, and time.   Psychiatric:         Thought Content: Thought content normal.         LABS / IMAGING / EKG        Labs  .  Results from last 7 days   Lab Units 06/26/23  1820   WBC K/uL 11.59*   HEMOGLOBIN g/dL 12.4   HEMATOCRIT % 39.7   PLATELETS K/uL 270   .  Results from last 7 days   Lab Units 06/26/23  1906   SODIUM mEQ/L 139   POTASSIUM mEQ/L 4.0   CHLORIDE mEQ/L 100   CO2 mEQ/L 29   BUN mg/dL 14   CREATININE mg/dL 0.9   CALCIUM mg/dL 8.9   ALBUMIN g/dL 3.4   BILIRUBIN TOTAL mg/dL 0.7   ALK PHOS IU/L 66   ALT IU/L 19   AST IU/L 21   GLUCOSE mg/dL 119*       Imaging  X-RAY CHEST 2 VIEWS   Final Result   IMPRESSION: See comment.         ECG 12 lead   ED Interpretation   Interpreted by myself and ED attending.  Normal sinus rhythm.  Rate 82 bpm.  No STEMI.      MRI LUMBAR SPINE WITHOUT CONTRAST    (Results Pending)   Transthoracic echo (TTE) complete    (Results Pending)       SARS-CoV-2 (COVID-19) (no units)   Date/Time Value   06/26/2023 1824 Negative       ECG/Telemetry  I have independently reviewed the ECG. Significant findings include sinus rhythm, HR 82, q waves anterior and inferior leads, no STEMI.    ASSESSMENT AND PLAN           * Adrenal insufficiency (CMS/HCC)  Assessment & Plan  Pt presenting with weight gain, b/l LE edema, fatigue, which developed when she began to taper her prednisone. She had been on prednisone consistently since October 2022 for vasculitis, started on 50mg, and had tapered to 15mg daily which she was on for quite awhile. However, about 3-4 weeks ago she began to taper from 15mg down to her current dose of 5mg. Symptoms began soon after tapering below 10mg.      - Will reinstate prednisone 10mg daily  - Endocrine consult  -  "Cortisol level ordered for morning  - Monitor electrolytes  - FSH/LH ordered      Weight gain  Assessment & Plan  Significant weight gain noted over the last several weeks, reportedly at least ~20lb. BNP 28. CXR negative. +1/+2 b/l LE edema. Likely r/t rapid steroid taper?    - Echo ordered to further assess heart structure/function.    Saddle anesthesia  Assessment & Plan  Ongoing symptoms of saddle anesthesia and difficulty initiating urine stream x1 year, without reported incontinence. Known hx of lumbar stenosis.    - MRI lumbar spine ordered.    Vasculitis (CMS/[facility])  Assessment & Plan  Pt with hx of vasculitis, on chronic prednisone, had started to taper but developed current symptoms after starting taper.    - Will resume prednisone 10mg  - Endocrine consult placed    Lumbar spinal stenosis  Assessment & Plan  Pt presents with chronic thoracic and lumbar stenosis however reporting saddle numbness and difficulty initiating urine stream both started about one year ago and have worsened. MRI lumbar spine 2/2022 prior to saddle numbness/urinary issues:\" Persistent multilevel spondylosis of the predominantly lower lumbar spine as noting slight progression of now moderate to severe L4-L5 central canal stenosis and persistent high-grade L4-L5 neural foraminal narrowing with abutment of the exiting bilateral L4 nerve roots.\"    - Will order MRI lumbar spine to further assess.           Iron deficiency anemia due to chronic blood loss  Assessment & Plan  Continue iron supplement    Mild persistent asthma, uncomplicated  Assessment & Plan  Stable.    - Continue home inhaler regimen    Depression  Assessment & Plan  Stable.  -Continue home medications    (HFpEF) heart failure with preserved ejection fraction (CMS/[facility])  Assessment & Plan  Recently started on torsemide by cardiologist Dr Middleton. Significant weight gain noted. B/l LE edema present. BNP 28.    - Continue torsemide  - Check echo       VTE Assessment: Pad[place] VTE " Score: 1  VTE Prophylaxis: Current anticoagulants:    •None      Code Status: Full Code  Palliative Care Screening Score: 0     Estimated Discharge Date: 6/28/2023  Disposition Planning: Pending hospital course     SHERITA Rose  6/27/2023

## 2023-06-27 NOTE — ASSESSMENT & PLAN NOTE
Significant weight gain noted over the last several weeks, reportedly at least ~20lb. Per patient she often gains more weigh/fluid when steroids are decreased? Patient also notes increased fluid retention.   BNP 28. CXR negative. +1/+2 b/l LE edema.   Patient with multiple potential contributing factors to include weight/body habitus, +/- venous stasis, and possible component of heart failure. It is unclear to me how her weight would increase with steroid taper.     Plan  Cont w/management of CHF and diuresis as stated  Will attempt to inc dose of prednisone to 7.5mg daily as stated, would recommend prolonged taper going forward - patient to f/u with her rheumatologist for further management.

## 2023-06-27 NOTE — ASSESSMENT & PLAN NOTE
Hgb remains stable in the 10-11s  No si/sxs of bleeding noted    Plan:  Continue iron supplement  Monitor cbc

## 2023-06-27 NOTE — CONSULTS
REPORT TYPE: Consult Note    DATE OF CONSULTATION: 06/26/2023    HISTORY OF PRESENT ILLNESS:  The patient is a 37-year-old female with a history of a vasculitis type syndrome since 2017.  The patient has been on intermittent steroids for relief.  The patient presented to [facility] with constitutional symptoms and   was admitted for possible adrenal insufficiency.  A 4:00 p.m. cortisol was 1.7.  Her a.m. cortisol this morning is 8.1.  The patient has been on a steroid taper from 50 mg.  She comes to [place] on 5 mg of prednisone daily.  The patient describes that   when she tapers her steroids, she develops swelling.  In addition, she has been amenorrheic.  The patient's comorbidities include both central and obstructive sleep apnea, depression,  morbid obesity, spinal stenosis, history of endocarditis.  She   presently states that she does not feel particularly bad.  She also feels that her edema is not incapacitating.    LABORATORY DATA:  Include sodium 140, potassium 3.7, chloride 102, CO2 of 30, creatinine 0.7, BUN 16, glucose 136, calcium 9.1, Free T4 1.27, TSH 2.5.  PTH 31, LH,6.8, FSH 13.3, hemoglobin 11.0, hematocrit 35.5, white count 9.78.    PAST MEDICAL HISTORY, SOCIAL HISTORY, FAMILY HISTORY, REVIEW OF SYSTEMS, MEDICATIONS AND ALLERGIES:  See H and P.    PHYSICAL EXAMINATION:  VITAL SIGNS:  BMI is 50.85.  Temperature is 97.7, pulse 92, blood pressure 118/50.  GENERAL:  Patient is morbidly obese.  She has a rounded face but I do not see stigmata of iatrogenic Cushing's.  HEENT:  Without inflammation.  Anicteric.  Teeth appear to be in adequate repair.  NECK:  Short and thick.  Thyroid is not visible.  CARDIOVASCULAR:  Regular by monitor.  LUNGS:  She is not tachypneic at rest.  ABDOMEN:  Centripetally obese.  EXTREMITIES:  Appear to be slightly edematous.  NEUROLOGIC:  Awake, alert, oriented.  No focal neurologic deficits.    ASSESSMENT AND PLAN:  1.  Morbid obesity in the setting of an ill-defined  vasculitis treated with steroids.  2.  Depression, treated with psychotropic medication.  3.  History of insomnia.  4.  Sleep apnea.  5.  Degenerative back arthritis.    RECOMMENDATIONS:  I will check another a.m. cortisol to make sure that the patient's adrenals not suppressed.  I am comfortable with the patient on 5 mg of prednisone daily.  I would also check a prolactin.  It would not surprise me that her prolactin is   elevated, which may be the cause of her amenorrhea.  The patient realizes that she needs to continue to make lifestyle and medications changes to try to improve her symptoms and she needs to further define her inflammatory syndrome.      Jose Luis Flores MD    DD: 06/27/2023 14:53  DT: 06/27/2023 15:08  Voice ID: [id number]/Report ID: [id number]

## 2023-06-27 NOTE — ASSESSMENT & PLAN NOTE
Patient notes history of amenorrhea with last period being over a year ago? Was initially attributed to birth control however per patient she has not been on medication at least the past year. Has not followed up with any one to include ob/gyn as of yet  TSH wnl, FSH, LH, prolactin wnl  Etiology remains uncertain, ?related to current opioid and psychiatric meds    Plan:  outpt f/u with ob/gyn and endo recommended for further evaluation

## 2023-06-27 NOTE — PROGRESS NOTES
Hospital Medicine Service -  Daily Progress Note       SUBJECTIVE   Interval History: No acute events overnight. Patient seen and examined. Cont to c/o ongoing swelling and abdominal distention and vaginal numbness/parasthesias.    OBJECTIVE      Vital signs in last 24 hours:  Temp:  [36.2 °C (97.2 °F)-36.7 °C (98 °F)] 36.7 °C (98 °F)  Heart Rate:  [65-92] 75  Resp:  [16-19] 19  BP: (106-180)/() 121/65    Intake/Output Summary (Last 24 hours) at 6/27/2023 1653  Last data filed at 6/27/2023 0830  Gross per 24 hour   Intake 590 ml   Output --   Net 590 ml       PHYSICAL EXAMINATION      GEN: well-developed and well-nourished; not in acute distress  HEENT: normocephalic; atraumatic  NECK: no JVD; no bruits  CARDIO: regular rate and rhythm; no murmurs, rubs or gallops  RESP: clear to auscultation bilaterally; no rales, rhonchi, or wheezes  ABD: soft, non-distended, non-tender, normal bowel sounds  EXT: no cyanosis, clubbing, or edema  SKIN: clean, dry, warm, and intact  MUSCULOSKELETAL: no injury or deformity  NEURO: alert and oriented x 3; muscle strength: 5/5 throughout LEs b/l, DTRs -2/4 throughout LEs b/l. Sensation intact to light touch throughout   BEHAVIOR/EMOTIONAL: appropriate; cooperative     LABS / IMAGING / TELE      Labs  Lab Results   Component Value Date    GLUCOSE 136 (H) 06/27/2023    CALCIUM 9.1 06/27/2023     06/27/2023    K 3.7 06/27/2023    CO2 30 06/27/2023     06/27/2023    BUN 16 06/27/2023    CREATININE 0.7 06/27/2023     Lab Results   Component Value Date    WBC 9.76 06/27/2023    HGB 11.0 (L) 06/27/2023    HCT 35.5 06/27/2023    MCV 91.3 06/27/2023     06/27/2023     Lab Results   Component Value Date    ALBUMIN 3.4 06/26/2023    BILITOT 0.7 06/26/2023    ALKPHOS 66 06/26/2023    BILIDIR <0.1 04/12/2017    AST 21 06/26/2023    ALT 19 06/26/2023    PROTEIN 6.6 06/26/2023       Imaging  X-RAY CHEST 2 VIEWS    Result Date: 6/26/2023  IMPRESSION: See comment.        ASSESSMENT AND PLAN      * Adrenal insufficiency (CMS/[facility])  Assessment & Plan  Pt presenting with weight gain, b/l LE edema, fatigue, which developed when she began to taper her prednisone. She had been on prednisone consistently since October 2022 for vasculitis, started on 50mg, and had tapered to 15mg daily which she was on for quite awhile. However, about 3-4 weeks ago she began to taper from 15mg down to her current dose of 5mg. Symptoms began soon after tapering below 10mg.    Outpatient cortisol performed by her family medicine physician was decreased at 0.8. Given symptoms and lab work patient ultimately directed to the ER for further evaluation  Repeat cortisol level this morning was back up to within normal limits at 8    Plan:  Discussed with Endocrinology - low suspicion for acute adrenal insufficiency at this time  Per endo patient can be resumed on outpatient regimen of prednisone 5mg daily  Repeat cortisol in the am to confirm stability  Monitor vitals, monitor electrolytes         Amenorrhea  Assessment & Plan  Patient notes history of amenorrhea with last period being  TSH wnl    Plan:  - FSH/LH, prolactin ordered - f/u results  Endo consulted - will f/u recs    Weight gain  Assessment & Plan  Significant weight gain noted over the last several weeks, reportedly at least ~20lb. BNP 28. CXR negative. +1/+2 b/l LE edema. Likely r/t rapid steroid taper?    - Echo ordered to further assess heart structure/function.    Saddle anesthesia  Assessment & Plan  Ongoing symptoms of saddle anesthesia and difficulty initiating urine stream x1 year, without reported incontinence. Known hx of lumbar stenosis.    - MRI lumbar spine ordered.    Iron deficiency anemia due to chronic blood loss  Assessment & Plan  Continue iron supplement    (HFpEF) heart failure with preserved ejection fraction (CMS/HCC)  Assessment & Plan  Recently started on torsemide by cardiologist Dr Middleton. Significant weight gain noted.  "B/l LE edema present. BNP 28.    - Continue torsemide  - Check echo    Mild persistent asthma, uncomplicated  Assessment & Plan  Stable.    - Continue home inhaler regimen    Vasculitis (CMS/[facility])  Assessment & Plan  Pt with hx of vasculitis, on chronic prednisone, had started to taper but developed current symptoms after starting taper.    - Will resume prednisone 10mg  - Endocrine consult placed    Depression  Assessment & Plan  Stable.  -Continue home medications    Lumbar spinal stenosis  Assessment & Plan  Pt presents with chronic thoracic and lumbar stenosis however reporting saddle numbness and difficulty initiating urine stream both started about one year ago and have worsened. MRI lumbar spine 2/2022 prior to saddle numbness/urinary issues:\" Persistent multilevel spondylosis of the predominantly lower lumbar spine as noting slight progression of now moderate to severe L4-L5 central canal stenosis and persistent high-grade L4-L5 neural foraminal narrowing with abutment of the exiting bilateral L4 nerve roots.\"    - Will order MRI lumbar spine to further assess.                VTE Assessment: Pad[place] VTE Score: 1  DVT PPX: scds, GI PPX: PPI  Diet: regular diet  Dispo: med/surg  Estimated discharge date: 6/29/2023  Code Status: Full Code     Erinn Barajas, DO  6/27/2023               "

## 2023-06-28 LAB
ALBUMIN SERPL-MCNC: 3.5 G/DL (ref 3.5–5.7)
ALP SERPL-CCNC: 63 IU/L (ref 34–104)
ALT SERPL-CCNC: 14 IU/L (ref 7–52)
ANION GAP SERPL CALC-SCNC: 4 MEQ/L (ref 3–15)
AST SERPL-CCNC: 16 IU/L (ref 13–39)
BASOPHILS # BLD: 0.05 K/UL (ref 0.01–0.1)
BASOPHILS NFR BLD: 0.5 %
BILIRUB SERPL-MCNC: 0.3 MG/DL (ref 0.3–1)
BUN SERPL-MCNC: 15 MG/DL (ref 7–25)
C PEPTIDE SERPL-MCNC: 4.6 NG/ML (ref 0.7–4.1)
CALCIUM SERPL-MCNC: 8.8 MG/DL (ref 8.6–10.3)
CHLORIDE SERPL-SCNC: 105 MEQ/L (ref 98–107)
CO2 SERPL-SCNC: 31 MEQ/L (ref 21–31)
CORTIS SERPL-MCNC: 0.5 UG/DL
CREAT SERPL-MCNC: 0.7 MG/DL (ref 0.6–1.2)
DIFFERENTIAL METHOD BLD: ABNORMAL
EOSINOPHIL # BLD: 0.18 K/UL (ref 0.04–0.36)
EOSINOPHIL NFR BLD: 1.8 %
ERYTHROCYTE [DISTWIDTH] IN BLOOD BY AUTOMATED COUNT: 17 % (ref 11.7–14.4)
GFR SERPL CREATININE-BSD FRML MDRD: >60 ML/MIN/1.73M*2
GLUCOSE SERPL-MCNC: 103 MG/DL (ref 70–99)
HCT VFR BLDCO AUTO: 34.9 % (ref 35–45)
HGB BLD-MCNC: 10.9 G/DL (ref 11.8–15.7)
IMM GRANULOCYTES # BLD AUTO: 0.1 K/UL (ref 0–0.08)
IMM GRANULOCYTES NFR BLD AUTO: 1 %
LYMPHOCYTES # BLD: 3.05 K/UL (ref 1.2–3.5)
LYMPHOCYTES NFR BLD: 29.7 %
MCH RBC QN AUTO: 29.2 PG (ref 28–33.2)
MCHC RBC AUTO-ENTMCNC: 31.2 G/DL (ref 32.2–35.5)
MCV RBC AUTO: 93.6 FL (ref 83–98)
MONOCYTES # BLD: 0.84 K/UL (ref 0.28–0.8)
MONOCYTES NFR BLD: 8.2 %
NEUTROPHILS # BLD: 6.05 K/UL (ref 1.7–7)
NEUTS SEG NFR BLD: 58.8 %
NRBC BLD-RTO: 0 %
PDW BLD AUTO: 10.9 FL (ref 9.4–12.3)
PLATELET # BLD AUTO: 218 K/UL (ref 150–369)
POTASSIUM SERPL-SCNC: 4 MEQ/L (ref 3.5–5.1)
PROLACTIN SERPL-MCNC: 22.1 NG/ML (ref 3.3–26.7)
PROT SERPL-MCNC: 6.1 G/DL (ref 6.4–8.9)
RBC # BLD AUTO: 3.73 M/UL (ref 3.93–5.22)
SODIUM SERPL-SCNC: 140 MEQ/L (ref 136–145)
VIT B12 SERPL-MCNC: 348 PG/ML (ref 180–914)
WBC # BLD AUTO: 10.27 K/UL (ref 3.8–10.5)

## 2023-06-28 PROCEDURE — 99233 SBSQ HOSP IP/OBS HIGH 50: CPT | Performed by: INTERNAL MEDICINE

## 2023-06-28 PROCEDURE — 85025 COMPLETE CBC W/AUTO DIFF WBC: CPT | Performed by: INTERNAL MEDICINE

## 2023-06-28 PROCEDURE — 84681 ASSAY OF C-PEPTIDE: CPT | Performed by: INTERNAL MEDICINE

## 2023-06-28 PROCEDURE — 94640 AIRWAY INHALATION TREATMENT: CPT

## 2023-06-28 PROCEDURE — 63700000 HC SELF-ADMINISTRABLE DRUG: Performed by: SPEECH-LANGUAGE PATHOLOGIST

## 2023-06-28 PROCEDURE — 63700000 HC SELF-ADMINISTRABLE DRUG: Performed by: INTERNAL MEDICINE

## 2023-06-28 PROCEDURE — 84146 ASSAY OF PROLACTIN: CPT | Performed by: INTERNAL MEDICINE

## 2023-06-28 PROCEDURE — 80053 COMPREHEN METABOLIC PANEL: CPT | Performed by: INTERNAL MEDICINE

## 2023-06-28 PROCEDURE — 36415 COLL VENOUS BLD VENIPUNCTURE: CPT | Performed by: INTERNAL MEDICINE

## 2023-06-28 PROCEDURE — 20600000 HC ROOM AND CARE INTERMEDIATE/TELEMETRY

## 2023-06-28 PROCEDURE — 82607 VITAMIN B-12: CPT | Performed by: INTERNAL MEDICINE

## 2023-06-28 PROCEDURE — 82533 TOTAL CORTISOL: CPT | Performed by: INTERNAL MEDICINE

## 2023-06-28 RX ORDER — PREDNISONE 2.5 MG/1
2.5 TABLET ORAL ONCE
Status: COMPLETED | OUTPATIENT
Start: 2023-06-28 | End: 2023-06-28

## 2023-06-28 RX ADMIN — ZOLPIDEM TARTRATE 10 MG: 5 TABLET ORAL at 20:28

## 2023-06-28 RX ADMIN — POLYETHYLENE GLYCOL 3350 17 G: 17 POWDER, FOR SOLUTION ORAL at 09:31

## 2023-06-28 RX ADMIN — BUPRENORPHINE 16 MG: 8 TABLET SUBLINGUAL at 09:31

## 2023-06-28 RX ADMIN — MOMETASONE FUROATE AND FORMOTEROL FUMARATE DIHYDRATE 2 PUFF: 100; 5 AEROSOL RESPIRATORY (INHALATION) at 20:28

## 2023-06-28 RX ADMIN — BUPRENORPHINE HYDROCHLORIDE 4 MG: 2 TABLET SUBLINGUAL at 14:34

## 2023-06-28 RX ADMIN — FERROUS SULFATE TAB 325 MG (65 MG ELEMENTAL FE) 325 MG: 325 (65 FE) TAB at 20:26

## 2023-06-28 RX ADMIN — QUETIAPINE FUMARATE 50 MG: 25 TABLET ORAL at 20:25

## 2023-06-28 RX ADMIN — ACETAMINOPHEN 650 MG: 325 TABLET, FILM COATED ORAL at 20:36

## 2023-06-28 RX ADMIN — POTASSIUM CHLORIDE 20 MEQ: 750 TABLET, EXTENDED RELEASE ORAL at 20:26

## 2023-06-28 RX ADMIN — MOMETASONE FUROATE AND FORMOTEROL FUMARATE DIHYDRATE 2 PUFF: 100; 5 AEROSOL RESPIRATORY (INHALATION) at 09:38

## 2023-06-28 RX ADMIN — PAROXETINE 20 MG: 20 TABLET, FILM COATED ORAL at 20:26

## 2023-06-28 RX ADMIN — PREDNISONE 2.5 MG: 2.5 TABLET ORAL at 20:25

## 2023-06-28 RX ADMIN — TORSEMIDE 10 MG: 20 TABLET ORAL at 09:32

## 2023-06-28 RX ADMIN — PREGABALIN 200 MG: 100 CAPSULE ORAL at 14:21

## 2023-06-28 RX ADMIN — PREGABALIN 200 MG: 100 CAPSULE ORAL at 09:31

## 2023-06-28 RX ADMIN — METOPROLOL SUCCINATE 25 MG: 25 TABLET, EXTENDED RELEASE ORAL at 20:26

## 2023-06-28 RX ADMIN — PANTOPRAZOLE SODIUM 40 MG: 40 TABLET, DELAYED RELEASE ORAL at 20:26

## 2023-06-28 RX ADMIN — TAMSULOSIN HYDROCHLORIDE 0.4 MG: 0.4 CAPSULE ORAL at 09:33

## 2023-06-28 RX ADMIN — POTASSIUM CHLORIDE 20 MEQ: 750 TABLET, EXTENDED RELEASE ORAL at 09:31

## 2023-06-28 RX ADMIN — PREGABALIN 200 MG: 100 CAPSULE ORAL at 20:25

## 2023-06-28 RX ADMIN — PREDNISONE 5 MG: 5 TABLET ORAL at 09:31

## 2023-06-28 ASSESSMENT — COGNITIVE AND FUNCTIONAL STATUS - GENERAL
STANDING UP FROM CHAIR USING ARMS: 4 - NONE
WALKING IN HOSPITAL ROOM: 4 - NONE
CLIMB 3 TO 5 STEPS WITH RAILING: 4 - NONE
MOVING TO AND FROM BED TO CHAIR: 4 - NONE

## 2023-06-28 NOTE — PROGRESS NOTES
Subjective   The patient is tolerating her diet.  Interval History: Patient about the same. No new issues.     Objective           DIET:       Dietary Orders   (From admission, onward)             Start     Ordered    06/27/23 0008  Adult Diet Regular; RD/LDN may adjust order  Diet effective now        References:    IDDSI Diet reference   Question Answer Comment   Diet Texture Regular    Delegation of Authority. Diet orders written by PA/Jose may not be adjusted by RD/LDNs. RD/LDN may adjust order        06/27/23 0007                Vital signs in last 24 hours:  Temp:  [36.3 °C (97.4 °F)-36.5 °C (97.7 °F)] 36.5 °C (97.7 °F)  Heart Rate:  [62-85] 85  Resp:  [14-18] 14  BP: (107-143)/(54-68) 131/68    Labs  Glucose Results       06/26/23     1820    HBG A1C 5.4    EST AVG GLUCOSE 108         Comment for EST AVG GLUCOSE at 1820 on 06/26/23: Estimate of average glucose concentration continuously over 24 hours for previous 2 to 3 months(Per ADA Recommendation).            Physical Exam:  General :      Unchanged   Skin:             Unchanged  Extremities:  Unchanged  Neurologic:   Unchanged    Impression:  Amenorrhea  Assessment & Plan  The patient's prolactin level was 22.1.  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  Further work-up can be pursued as an outpatient.  Presently no treatment is necessary    * Adrenal insufficiency (CMS/[facility])  Assessment & Plan  The patient's a.m. cortisol level was 0.5 she received prednisone 10 mg yesterday morning.  This likely this was a suppressive dose.  The patient received 5 mg of prednisone this morning.  Is not clear that any of the patient's symptoms are related to adrenal insufficiency.  It is possible that on a lower dose of prednisone the patient does have an unusual inflammatory disease.  We discussed that up to 7.5 mg of prednisone daily should not suppress the adrenal glands and I would try as an outpatient to maintain prednisone below this  level.  From my standpoint the patient is stable for discharge          Jose Luis Flores MD  6/28/2023 4:20 PM

## 2023-06-28 NOTE — ASSESSMENT & PLAN NOTE
The patient's prolactin level was 22.1.  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  Further work-up can be pursued as an outpatient.  Presently no treatment is necessary

## 2023-06-28 NOTE — ASSESSMENT & PLAN NOTE
The patient's a.m. cortisol level was 0.5 she received prednisone 10 mg yesterday morning.  This likely this was a suppressive dose.  The patient received 5 mg of prednisone this morning.  Is not clear that any of the patient's symptoms are related to adrenal insufficiency.  It is possible that on a lower dose of prednisone the patient does have an unusual inflammatory disease.  We discussed that up to 7.5 mg of prednisone daily should not suppress the adrenal glands and I would try as an outpatient to maintain prednisone below this level.  From my standpoint the patient is stable for discharge

## 2023-06-28 NOTE — PLAN OF CARE
Plan of Care Review  Plan of Care Reviewed With: patient  Progress: no change  Outcome Evaluation: Patient resting between care, VSS on room air and patient independent. Awaiting MRI this am, reports improvement in swelling overall. Plan of care reviewed.

## 2023-06-29 ENCOUNTER — APPOINTMENT (INPATIENT)
Dept: RADIOLOGY | Facility: HOSPITAL | Age: 38
End: 2023-06-29
Attending: SPEECH-LANGUAGE PATHOLOGIST
Payer: COMMERCIAL

## 2023-06-29 PROBLEM — R20.0 SADDLE ANESTHESIA: Status: RESOLVED | Noted: 2023-06-26 | Resolved: 2023-06-29

## 2023-06-29 LAB
ANION GAP SERPL CALC-SCNC: 7 MEQ/L (ref 3–15)
BUN SERPL-MCNC: 15 MG/DL (ref 7–25)
CALCIUM SERPL-MCNC: 9.5 MG/DL (ref 8.6–10.3)
CHLORIDE SERPL-SCNC: 100 MEQ/L (ref 98–107)
CO2 SERPL-SCNC: 31 MEQ/L (ref 21–31)
CORTIS SERPL-MCNC: 0.5 UG/DL
CREAT SERPL-MCNC: 0.8 MG/DL (ref 0.6–1.2)
GFR SERPL CREATININE-BSD FRML MDRD: >60 ML/MIN/1.73M*2
GLUCOSE SERPL-MCNC: 107 MG/DL (ref 70–99)
MAGNESIUM SERPL-MCNC: 2.3 MG/DL (ref 1.9–2.7)
POTASSIUM SERPL-SCNC: 4.9 MEQ/L (ref 3.5–5.1)
SODIUM SERPL-SCNC: 138 MEQ/L (ref 136–145)

## 2023-06-29 PROCEDURE — 80048 BASIC METABOLIC PNL TOTAL CA: CPT | Performed by: INTERNAL MEDICINE

## 2023-06-29 PROCEDURE — 20600000 HC ROOM AND CARE INTERMEDIATE/TELEMETRY

## 2023-06-29 PROCEDURE — 83735 ASSAY OF MAGNESIUM: CPT | Performed by: INTERNAL MEDICINE

## 2023-06-29 PROCEDURE — 63700000 HC SELF-ADMINISTRABLE DRUG: Performed by: INTERNAL MEDICINE

## 2023-06-29 PROCEDURE — 99232 SBSQ HOSP IP/OBS MODERATE 35: CPT | Performed by: INTERNAL MEDICINE

## 2023-06-29 PROCEDURE — 36415 COLL VENOUS BLD VENIPUNCTURE: CPT | Performed by: INTERNAL MEDICINE

## 2023-06-29 PROCEDURE — 82533 TOTAL CORTISOL: CPT | Performed by: INTERNAL MEDICINE

## 2023-06-29 PROCEDURE — 72148 MRI LUMBAR SPINE W/O DYE: CPT | Mod: ME

## 2023-06-29 PROCEDURE — 63700000 HC SELF-ADMINISTRABLE DRUG: Performed by: SPEECH-LANGUAGE PATHOLOGIST

## 2023-06-29 PROCEDURE — 99233 SBSQ HOSP IP/OBS HIGH 50: CPT | Performed by: INTERNAL MEDICINE

## 2023-06-29 RX ADMIN — MOMETASONE FUROATE AND FORMOTEROL FUMARATE DIHYDRATE 2 PUFF: 100; 5 AEROSOL RESPIRATORY (INHALATION) at 08:45

## 2023-06-29 RX ADMIN — PREGABALIN 200 MG: 100 CAPSULE ORAL at 21:16

## 2023-06-29 RX ADMIN — QUETIAPINE FUMARATE 50 MG: 25 TABLET ORAL at 21:16

## 2023-06-29 RX ADMIN — ZOLPIDEM TARTRATE 10 MG: 5 TABLET ORAL at 21:20

## 2023-06-29 RX ADMIN — FERROUS SULFATE TAB 325 MG (65 MG ELEMENTAL FE) 325 MG: 325 (65 FE) TAB at 21:16

## 2023-06-29 RX ADMIN — TORSEMIDE 10 MG: 20 TABLET ORAL at 08:40

## 2023-06-29 RX ADMIN — POLYETHYLENE GLYCOL 3350 17 G: 17 POWDER, FOR SOLUTION ORAL at 08:45

## 2023-06-29 RX ADMIN — POTASSIUM CHLORIDE 20 MEQ: 750 TABLET, EXTENDED RELEASE ORAL at 21:16

## 2023-06-29 RX ADMIN — BUPRENORPHINE HYDROCHLORIDE 4 MG: 2 TABLET SUBLINGUAL at 15:45

## 2023-06-29 RX ADMIN — PREGABALIN 200 MG: 100 CAPSULE ORAL at 08:44

## 2023-06-29 RX ADMIN — BUPRENORPHINE 16 MG: 8 TABLET SUBLINGUAL at 08:41

## 2023-06-29 RX ADMIN — MOMETASONE FUROATE AND FORMOTEROL FUMARATE DIHYDRATE 2 PUFF: 100; 5 AEROSOL RESPIRATORY (INHALATION) at 21:19

## 2023-06-29 RX ADMIN — PREDNISONE 7.5 MG: 2.5 TABLET ORAL at 08:44

## 2023-06-29 RX ADMIN — METOPROLOL SUCCINATE 25 MG: 25 TABLET, EXTENDED RELEASE ORAL at 21:16

## 2023-06-29 RX ADMIN — PANTOPRAZOLE SODIUM 40 MG: 40 TABLET, DELAYED RELEASE ORAL at 21:16

## 2023-06-29 RX ADMIN — PREGABALIN 200 MG: 100 CAPSULE ORAL at 13:23

## 2023-06-29 RX ADMIN — PAROXETINE 20 MG: 20 TABLET, FILM COATED ORAL at 21:16

## 2023-06-29 RX ADMIN — TAMSULOSIN HYDROCHLORIDE 0.4 MG: 0.4 CAPSULE ORAL at 08:45

## 2023-06-29 RX ADMIN — POTASSIUM CHLORIDE 20 MEQ: 750 TABLET, EXTENDED RELEASE ORAL at 08:40

## 2023-06-29 ASSESSMENT — ENCOUNTER SYMPTOMS
INSOMNIA: 0
ABDOMINAL PAIN: 0
HEMATOCHEZIA: 0
ALTERED MENTAL STATUS: 0
NERVOUS/ANXIOUS: 0
DYSPNEA ON EXERTION: 0
BLURRED VISION: 0
HOARSE VOICE: 0
HEMOPTYSIS: 0
WEAKNESS: 0
VERTIGO: 0
DIAPHORESIS: 0
JAUNDICE: 0
LIGHT-HEADEDNESS: 0
HEMATURIA: 0
NAIL CHANGES: 0
PND: 0
SPUTUM PRODUCTION: 0
ORTHOPNEA: 0
HEMATEMESIS: 0
DYSURIA: 0
SHORTNESS OF BREATH: 0
NEAR-SYNCOPE: 0
WHEEZING: 0
PALPITATIONS: 0
IRREGULAR HEARTBEAT: 0
SYNCOPE: 0
COLOR CHANGE: 0
FEVER: 0
CLAUDICATION: 0
MYALGIAS: 0

## 2023-06-29 ASSESSMENT — COGNITIVE AND FUNCTIONAL STATUS - GENERAL
CLIMB 3 TO 5 STEPS WITH RAILING: 4 - NONE
STANDING UP FROM CHAIR USING ARMS: 4 - NONE
MOVING TO AND FROM BED TO CHAIR: 4 - NONE
WALKING IN HOSPITAL ROOM: 4 - NONE

## 2023-06-29 NOTE — PLAN OF CARE
Problem: Adult Inpatient Plan of Care  Goal: Plan of Care Review  Outcome: Progressing  Flowsheets (Taken 6/29/2023 1716)  Progress: no change  Outcome Evaluation: pt independent in the room. MRI completed of Lumbar area. pt states bloating improved but continues to have hesitancy with voiding. labs sent. poss dc tomorrow  Plan of Care Reviewed With: patient  Goal: Readiness for Transition of Care  Outcome: Progressing   Plan of Care Review  Plan of Care Reviewed With: patient  Progress: no change  Outcome Evaluation: pt independent in the room. MRI completed of Lumbar area. pt states bloating improved but continues to have hesitancy with voiding. labs sent. poss dc tomorrow

## 2023-06-29 NOTE — PROGRESS NOTES
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 grossly intact. Performance on measures of learning and memory suggested variable but grossly intact encoding, retention, retrieval, and recognition. Additionally, she reported mild symptoms of depression, moderate symptoms of anxiety, and elevated daytime sleepiness.     Overall, Ms. Grays test results suggest inefficient executive functioning with otherwise grossly intact cognitive abilities. The pattern and severity of current test results in the context of variable cognitive functioning in daily life and multiple neuroimaging studies (at least 5 brain MRIs since 2017) without evidence of intracranial disease is most compatible with recently identified medical issues (e.g., iatrogenic Cushing's syndrome/adrenal insufficiency) likely exacerbated by medication side effects, elevated daytime sleepiness/sleep disruption, migraines/headaches, and symptoms of emotional distress. An underlying neurodegenerative condition is unlikely; however, if the patient, her family, or her medical providers perceive persistent or worsening cognitive dysfunction despite continued management of medical issues, improved daytime energy, and reduced symptoms of emotional distress, neuropsychological reevaluation may be beneficial during a time of  physical and affective stability to update and clarify diagnostic impressions and treatment recommendations.     She would likely continue to benefit from working with her medical providers to address medical issues (e.g., iatrogenic Cushing's syndrome/adrenal insufficiency and migraines/headaches). She may also benefit from reviewing potential cognitive side effects of medications with her medical providers.     She would likely continue to benefit from working with her medical providers to address history of sleep apnea. She may also benefit from sleep hygiene techniques, including: establishing and maintaining a routine bedtime and wakeup-time, avoiding nicotine at least 4-6 hours before bed, establishing sleep rituals, avoiding doing work in bed, and creating a quiet and comfortable sleep environment.    She would likely continue to benefit from medical and psychotherapeutic interventions to address symptoms of emotional distress. Additionally, she may benefit from identifying, planning/scheduling, and engaging in enjoyable hobbies/activities and from utilizing anxiety reducing techniques (e.g., deep breathing, mindfulness, guided imagery, grounding).     She may benefit from reducing distractions and interruptions, using repeat back and verify to ensure adequate perception/encoding, using alerts and reminders, using a daily planner or calendar, using a checklist or to-do list, creating an organized environment, sticking to a routine, doing one thing at a time, placing items in a consistent place, staying mentally and socially active, and managing stress. If cognitive concerns persist or worsen despite improvement with her medical issues, she may benefit from speech/cognitive therapy services to assist with executive functions in daily life.     The patient may benefit from utilizing energy conservation techniques including: listing activities she wishes to accomplish each day and planning on completing  activities that require more energy during times of the day in which she typically has more energy, prioritizing activities, identifying which activities may require/allow for assistance from others, pacing herself and not rushing or over-extending, and listening to her body and resting when needed.     Techniques to assist with symptoms of decreased motivation/energy include:  · Finding new hobbies if old hobbies are no longer enjoyable  · Avoiding open ended questions  · Breaking down complex tasks into small, easily achievable tasks  · Setting a routine or scheduling activities in advance    Thank you for the opportunity to participate in Ms. Redmond's care. If there are any questions on this report, I can be reached at [phone].      A total of 8 hours was spent in chart review, test selection, clinical interview, test administration and scoring, clinical interpretation, report writing, and feedback due to medical complexity.    Isael Darden PsyD  Licensed Neuropsychologist

## 2023-06-29 NOTE — CONSULTS
Cardiology Consult Note        Subjective     Carolyn Redmond is a 37 y.o. female with a past medical history of tricuspid valve endocarditis, pulmonary HTN, central ARIANNA (started bipap 5/2023), hx of substance abuse, fibromyalgia, chronic migraines, anxiety, depression, and vasculitis (rash biopsy, diagnosed in 2017) The patient was started on prednisone for vasculitis in October 2022. She reports that she was initially on 50 mg prednisone, but it is unclear from her history when she began to taper. She was evaluated by her rheumatologist on 6/8, she was taking 7.5 mg prednisone at that time and it was recommended that she reduce to 5mg daily for 1 month and follow up. The patient describes that when she tapers her steroids, she develops swelling all over, which is what prompted her ED evaluation on 6/26/23. She has been evaluated by endocrinology, Dr. Flores. Repeat cortisol levels are not indicative of adrenal insufficiency and it was recommended to continue with prednisone 5 mg daily.     Cardiology consult for history of tricuspid valve endocarditis and pulmonary hypertension secondary to ARIANNA. The patient is known to Dr. Fabricio Middleton TRIXIE. She was last seen in our office on 5/25/23 and was feeling well on her diuretic regimen, torsemide 10 mg daily. Today, she has +2 pitting lower extremity edema. Her lungs are clear and JVD negative. CXR is negative. Her echo demonstrates mild/moderate TR with mildly elevated but stable right filling pressures. Systolic function is normal, EF 65%. Diastolic function is normal. Her blood pressure is normal, with the exception of 2 outlying -150s. She denies chest pain, palpitations, dyspnea, orthopnea, PND.     Medical History:   Past Medical History:   Diagnosis Date   • Anxiety    • Depressed    • Endocarditis    • Fibromyalgia    • Migraines    • Pancreatitis    • Substance abuse (CMS/[facility]) 2015   • Tachycardia    • Vasculitis (CMS/[facility])        Surgical History:    Past Surgical History:   Procedure Laterality Date   • CHOLECYSTECTOMY     • ERCP     • GALLBLADDER SURGERY     • TONSILLECTOMY  1991       Social History:   Social History     Social History Narrative    Lives with mom      Social History     Tobacco Use   Smoking Status Former   • Packs/day: 0.50   • Years: 5.00   • Pack years: 2.50   • Types: Cigarettes   • Quit date: 2020   • Years since quitting: 3.4   Smokeless Tobacco Current   Tobacco Comments    quit 1 year ago, vapes currently       Family History: @FAMHX:@     Allergies: @Allergies: ALG@    Current Facility-Administered Medications   Medication Dose Route Frequency Provider Last Rate Last Admin   • acetaminophen (TYLENOL) tablet 650 mg  650 mg oral q4h PRN Christi Leong CRNP   650 mg at 06/28/23 2036   • albuterol nebulizer solution 2.5 mg  2.5 mg nebulization q4h PRN Christi Leong CRNP       • buprenorphine (SUBUTEX) SL tablet 4 mg  4 mg sublingual Daily before dinner Christi Leong CRNP   4 mg at 06/29/23 1545   • buprenorphine HCL (SUBUTEX) SL tablet 16 mg  16 mg sublingual q AM Christi Leong CRNP   16 mg at 06/29/23 0841   • glucose chewable tablet 16-32 g of dextrose  16-32 g of dextrose oral PRN Christi Leong CRNP        Or   • dextrose 40 % oral gel 15-30 g of dextrose  15-30 g of dextrose oral PRN Christi Leong CRNP        Or   • glucagon (GLUCAGEN) injection 1 mg  1 mg intramuscular PRN Christi Leong CRNP        Or   • dextrose 50 % in water (D50) injection 12.5 g  25 mL intravenous PRN Christi Leong CRNP       • ferrous sulfate tablet 325 mg  325 mg oral Nightly Christi Leong CRNP   325 mg at 06/28/23 2026   • metoprolol succinate XL (TOPROL-XL) 24 hr ER tablet 25 mg  25 mg oral Nightly Christi Leong CRNP   25 mg at 06/28/23 2026   • mometasone-formoterol (DULERA 100) 100-5 mcg/actuation inhaler 2 puff  2 puff inhalation BID Christi Leong CRNP   2 puff at 06/29/23 0845   • pantoprazole  (PROTONIX) tablet,delayed release (DR/EC) 40 mg  40 mg oral Nightly Christi Leong CRNP   40 mg at 06/28/23 2026   • PARoxetine (PAXIL) tablet 20 mg  20 mg oral Nightly Christi Leong CRNP   20 mg at 06/28/23 2026   • polyethylene glycol (MIRALAX) 17 gram packet 17 g  17 g oral Daily Erinn Barajas DO   17 g at 06/29/23 0845   • potassium chloride (KLOR-CON M) ER tablet (particles/crystals) 20 mEq  20 mEq oral BID Christi Leong CRNP   20 mEq at 06/29/23 0840   • predniSONE (DELTASONE) tablet 7.5 mg  7.5 mg oral Daily Erinn Barajas DO   7.5 mg at 06/29/23 0844   • pregabalin (LYRICA) capsule 200 mg  200 mg oral TID Christi Leong CRNP   200 mg at 06/29/23 1323   • QUEtiapine (SEROquel) tablet 50 mg  50 mg oral Nightly Christi Leong CRNP   50 mg at 06/28/23 2025   • QUEtiapine (SEROquel) tablet 50 mg  50 mg oral Nightly PRN Christi Leong CRNP       • tamsulosin (FLOMAX) 24 hr ER capsule 0.4 mg  0.4 mg oral Daily Christi Leong CRNP   0.4 mg at 06/29/23 0845   • tamsulosin (FLOMAX) 24 hr ER capsule 0.4 mg  0.4 mg oral Daily PRN Christi Leong CRNP       • tiZANidine (ZANAFLEX) tablet 6 mg  6 mg oral 3x daily PRN Christi Lenog CRNP       • torsemide (DEMADEX) tablet 10 mg  10 mg oral Daily Christi Leong CRNP   10 mg at 06/29/23 0840   • zolpidem (AMBIEN) tablet 10 mg  10 mg oral Nightly PRN Erinn Barajas DO   10 mg at 06/28/23 2028         Review of Systems   Constitutional: Negative for diaphoresis, fever and malaise/fatigue.   HENT: Negative for hoarse voice and nosebleeds.    Eyes: Negative for blurred vision and visual disturbance.   Cardiovascular: Positive for leg swelling. Negative for chest pain, claudication, cyanosis, dyspnea on exertion, irregular heartbeat, near-syncope, orthopnea, palpitations, paroxysmal nocturnal dyspnea and syncope.   Respiratory: Negative for hemoptysis, shortness of breath, sputum production and wheezing.   "  Endocrine: Negative for cold intolerance and heat intolerance.   Hematologic/Lymphatic: Negative for bleeding problem.   Skin: Negative for color change and nail changes.   Musculoskeletal: Negative for muscle weakness and myalgias.   Gastrointestinal: Negative for abdominal pain, hematemesis, hematochezia and jaundice.   Genitourinary: Negative for dysuria and hematuria.   Neurological: Negative for light-headedness, vertigo and weakness.   Psychiatric/Behavioral: Negative for altered mental status. The patient does not have insomnia and is not nervous/anxious.          Objective       Visit Vitals  /69 (BP Location: Right forearm, Patient Position: Sitting)   Pulse 86   Temp 36.7 °C (98.1 °F) (Oral)   Resp 18   Ht 1.575 m (5' 2\")   Wt 124 kg (273 lb 6.4 oz)   LMP 05/31/2022 (Approximate)   SpO2 93%   BMI 50.01 kg/m²       Physical Exam  Constitutional:       General: She is not in acute distress.     Appearance: She is well-developed. She is not diaphoretic.   HENT:      Head: Normocephalic.   Cardiovascular:      Rate and Rhythm: Normal rate and regular rhythm.   Pulmonary:      Effort: No respiratory distress.      Breath sounds: Normal breath sounds. No wheezing or rales.   Chest:      Chest wall: No tenderness.   Abdominal:      General: There is no distension.      Palpations: Abdomen is soft.      Tenderness: There is no abdominal tenderness.   Musculoskeletal:         General: Normal range of motion.      Cervical back: Normal range of motion.      Right lower leg: Edema present.      Left lower leg: Edema present.   Skin:     General: Skin is warm and dry.   Neurological:      Mental Status: She is alert and oriented to person, place, and time.          Labs   Lab Results   Component Value Date    WBC 10.27 06/28/2023    HGB 10.9 (L) 06/28/2023    HCT 34.9 (L) 06/28/2023     06/28/2023    CHOL 180 06/23/2023    TRIG 273 (H) 06/23/2023    HDL 50 06/23/2023    LDLCALC 85 06/23/2023    ALT 14 " 06/28/2023    AST 16 06/28/2023     06/29/2023    K 4.9 06/29/2023     06/29/2023    CREATININE 0.8 06/29/2023    BUN 15 06/29/2023    CO2 31 06/29/2023    TSH 2.500 06/23/2023    INR 1.0 01/26/2021    GLUCOSE 107 (H) 06/29/2023    HGBA1C 5.4 06/26/2023       Imaging  CXR 6/28/23   There is no pneumothorax, pleural effusion or focal consolidation. The cardiopericardial silhouette is normal in size. There are degenerative changes in the spine.    Cardiac Imaging    TRANSTHORACIC ECHO (TTE) COMPLETE 06/27/2023    Interpretation Summary  •  Left Ventricle: Normal ventricle size. Estimated EF 65-70% (68% by biplane). No regional wall motion abnormalities. Normal diastolic filling pattern for age.  •  Tricuspid Valve: Normal structure. Mild to moderate regurgitation. The regurgitation jet is eccentric. Estimated RVSP = 39 mmHg. No significant stenosis.  •  Pulmonic Valve: Normal structure. Trace regurgitation. No stenosis.  •  Mitral Valve: Normal leaflet structure. Trace regurgitation. No stenosis.  •  Aortic Valve: Normal structure. No regurgitation. No stenosis. Calculated dimensionless index = 0.64.  •  Right Ventricle: Normal ventricle size. Normal systolic function.  •  Left Atrium: Normal sized atrium.  •  Right Atrium: Normal sized atrium.  •  Aorta: Aortic root normal. Ascending aorta normal-sized. No evidence of coarctation by doppler.  •  IVC/SVC: Inferior vena cava is dilated (>2.1cm). Inferior vena cava collapses <50% during inspiration.  •  Pericardium: Normal structure.    High RAP and mild secondary pulmonary hypertension.      ECG       Telemetry  NSR 90s    Assessment & Plan    Vasculitis (CMS/HCC)  Assessment & Plan  -Admitted for adrenal work-up. Cortisol levels not indicative of adrenal insufficiency.  -Continue prednisone 5 mg daily x 1 month per rheumatologist, Dr. Goddard.   -No current vasculitis rash.     Pulmonary hypertension (CMS/HCC)  Assessment & Plan  -High RAP with mild  secondary pulmonary HTN.   -Normal systolic and diastolic function on echo 6/29/23.  -Continue torsemide 10 mg daily for symptom management.  -Routine follow up with Dr. Middleton/ARA.     Obstructive sleep apnea syndrome  Assessment & Plan  -Continue bipap.    Essential hypertension  Assessment & Plan  -Well-controlled. Continue metoprolol XL 25 mg daily.         This patient note has been dictated using speech recognition software. Inadvertent speech recognition errors should be disregarded. Please do not hesitate to call my office for clarifications.    SHERITA Chow  6/29/2023  5:59 PM

## 2023-06-29 NOTE — ASSESSMENT & PLAN NOTE
-High RAP with mild secondary pulmonary HTN.   -Normal systolic and diastolic function on echo 6/29/23.  -Continue torsemide 10 mg daily for symptom management.  -Routine follow up with Dr. Middleton/ARA.

## 2023-06-29 NOTE — PROGRESS NOTES
Hospital Medicine Service -  Daily Progress Note       SUBJECTIVE   Interval History: No acute events overnight. Patient seen and examined. Cont to c/o weight gain and feeling bloated/edematous although does notes some weight loss today.      OBJECTIVE      Vital signs in last 24 hours:  Temp:  [36.3 °C (97.4 °F)-36.5 °C (97.7 °F)] 36.4 °C (97.6 °F)  Heart Rate:  [62-85] 85  Resp:  [14-18] 16  BP: (116-143)/(65-72) 128/72  No intake or output data in the 24 hours ending 06/28/23 2146    PHYSICAL EXAMINATION      GEN: well-developed and well-nourished; not in acute distress  HEENT: normocephalic; atraumatic  NECK: no JVD; no bruits  CARDIO: regular rate and rhythm; no murmurs, rubs or gallops  RESP: clear to auscultation bilaterally; no rales, rhonchi, or wheezes  ABD: soft, non-distended, non-tender, normal bowel sounds  EXT: no cyanosis, clubbing, (+) trace to 1+ LE edema b/l  SKIN: clean, dry, warm, and intact  MUSCULOSKELETAL: no injury or deformity  NEURO: alert and oriented x 3; nonfocal  BEHAVIOR/EMOTIONAL: appropriate; cooperative     LABS / IMAGING / TELE      Labs  Lab Results   Component Value Date    GLUCOSE 103 (H) 06/28/2023    CALCIUM 8.8 06/28/2023     06/28/2023    K 4.0 06/28/2023    CO2 31 06/28/2023     06/28/2023    BUN 15 06/28/2023    CREATININE 0.7 06/28/2023     Lab Results   Component Value Date    WBC 10.27 06/28/2023    HGB 10.9 (L) 06/28/2023    HCT 34.9 (L) 06/28/2023    MCV 93.6 06/28/2023     06/28/2023     Lab Results   Component Value Date    ALBUMIN 3.5 06/28/2023    BILITOT 0.3 06/28/2023    ALKPHOS 63 06/28/2023    BILIDIR <0.1 04/12/2017    AST 16 06/28/2023    ALT 14 06/28/2023    PROTEIN 6.1 (L) 06/28/2023       Imaging  X-RAY CHEST 2 VIEWS    Result Date: 6/26/2023  IMPRESSION: See comment.         ASSESSMENT AND PLAN      * Adrenal insufficiency (CMS/HCC)  Assessment & Plan  Pt presenting with weight gain, b/l LE edema, fatigue, which developed when she  began to taper her prednisone. She had been on prednisone consistently since October 2022 for vasculitis, started on 50mg, and had tapered to 15mg daily which she was on for quite awhile. However, about 3-4 weeks ago she began to taper from 15mg down to her current dose of 5mg. Symptoms began soon after tapering below 10mg.    Outpatient cortisol performed by her family medicine physician was decreased at 0.8. Given symptoms and lab work patient ultimately directed to the ER for further evaluation  Repeat cortisol levels continue to fluctuate, 8 yesterday, now 0.5 again this morning    Plan:  Discussed with Endocrinology - low suspicion for acute adrenal insufficiency at this time  Per endo will attempt to inc prednisone to 7.5mg daily. Per endo this should not suppress the adrenal glands. would try as an outpatient to maintain prednisone below this level if at all  possible  Repeat cortisol in the am to confirm stability  Monitor vitals, monitor electrolytes         Amenorrhea  Assessment & Plan  Patient notes history of amenorrhea with last period being over a year ago? Has not followed up with any one to include ob/gyn as of yet  TSH wnl    Plan:  - FSH/LH, prolactin ordered - f/u results  Endo consulted - will f/u recs    Weight gain  Assessment & Plan  Significant weight gain noted over the last several weeks, reportedly at least ~20lb. BNP 28. CXR negative. +1/+2 b/l LE edema. Likely r/t rapid steroid taper?    - Echo ordered to further assess heart structure/function.    Saddle anesthesia  Assessment & Plan  Ongoing symptoms of saddle anesthesia and difficulty initiating urine stream x1 year, without reported incontinence. Known hx of lumbar stenosis.    - MRI lumbar spine ordered.    Iron deficiency anemia due to chronic blood loss  Assessment & Plan  Continue iron supplement    (HFpEF) heart failure with preserved ejection fraction (CMS/HCC)  Assessment & Plan  Recently started on torsemide by cardiologist  "Dr Middleton. Cont to c/o significant weight gain noted. B/l LE edema present. BNP 28.  TTE with Normal ventricle size. Estimated EF 65-70% (68% by biplane). No regional wall motion abnormalities. Normal diastolic filling pattern for age.  •  Tricuspid Valve: Normal structure. Mild to moderate regurgitation. The regurgitation jet is eccentric. Estimated RVSP = 39 mmHg. No significant stenosis.  Patient with multiple potential contributing factors to include weight/body habitus, +/- venous stasis, although question component of right sided heart failure given pulmonary htn. Pt also on steroids but states she loses weight? With inc steroid dosaging.?    Plan:  Continue torsemide  Encourage leg elevation at rest, compression stocking  Encourage cpap use for ARIANNA  Following discussions with patient will consult cardiology for further evaluation and whether torsemide dosaging is required.     Mild persistent asthma, uncomplicated  Assessment & Plan  Stable.    - Continue home inhaler regimen    Vasculitis (CMS/[facility])  Assessment & Plan  Pt with hx of vasculitis, on chronic prednisone, had started to taper but developed current symptoms after starting taper.    Plan:  - Will resume prednisone as stated below  -outpt f/u with rheum recommended    Depression  Assessment & Plan  Stable.  -Continue home medications    Lumbar spinal stenosis  Assessment & Plan  Pt presents with chronic thoracic and lumbar stenosis however reporting saddle numbness and difficulty initiating urine stream both started about one year ago and have worsened. MRI lumbar spine 2/2022 prior to saddle numbness/urinary issues:\" Persistent multilevel spondylosis of the predominantly lower lumbar spine as noting slight progression of now moderate to severe L4-L5 central canal stenosis and persistent high-grade L4-L5 neural foraminal narrowing with abutment of the exiting bilateral L4 nerve roots.\"    - Will order MRI lumbar spine to further assess.            "     VTE Assessment: [place] VTE Score: 1  DVT PPX: scds, GI PPX: PPI  Diet: regular diet  Dispo: med/surg. Possible d/c tomorrow pending clinical status and cardiology input  Estimated discharge date: 6/29/2023  Code Status: Full Code     Erinn Barajas,   6/28/2023

## 2023-06-29 NOTE — PROGRESS NOTES
Hospital Medicine Service -  Daily Progress Note       SUBJECTIVE   Interval History: No acute events overnight. Patient seen and examined. Patient with several concerns, although primarily complains of ongoing fluid retention.      OBJECTIVE      Vital signs in last 24 hours:  Temp:  [36.3 °C (97.3 °F)-36.7 °C (98.1 °F)] 36.7 °C (98.1 °F)  Heart Rate:  [57-86] 86  Resp:  [15-18] 18  BP: (118-150)/(67-93) 122/69    Intake/Output Summary (Last 24 hours) at 6/29/2023 1830  Last data filed at 6/29/2023 0944  Gross per 24 hour   Intake 358 ml   Output --   Net 358 ml       PHYSICAL EXAMINATION      GEN: well-developed and well-nourished; not in acute distress  HEENT: normocephalic; atraumatic  NECK: no JVD  CARDIO: regular rate and rhythm; no murmurs, rubs or gallops  RESP: clear to auscultation bilaterally; no rales, rhonchi, or wheezes  ABD: soft, non-distended, non-tender, normal bowel sounds  EXT: no cyanosis, clubbing, or edema  SKIN: clean, dry, warm, and intact  MUSCULOSKELETAL: no injury or deformity  NEURO: alert and oriented x 3; nonfocal  BEHAVIOR/EMOTIONAL: appropriate; cooperative     LABS / IMAGING / TELE      Labs  Lab Results   Component Value Date    GLUCOSE 107 (H) 06/29/2023    CALCIUM 9.5 06/29/2023     06/29/2023    K 4.9 06/29/2023    CO2 31 06/29/2023     06/29/2023    BUN 15 06/29/2023    CREATININE 0.8 06/29/2023     Lab Results   Component Value Date    WBC 10.27 06/28/2023    HGB 10.9 (L) 06/28/2023    HCT 34.9 (L) 06/28/2023    MCV 93.6 06/28/2023     06/28/2023     Lab Results   Component Value Date    ALBUMIN 3.5 06/28/2023    BILITOT 0.3 06/28/2023    ALKPHOS 63 06/28/2023    BILIDIR <0.1 04/12/2017    AST 16 06/28/2023    ALT 14 06/28/2023    PROTEIN 6.1 (L) 06/28/2023       Imaging  MRI LUMBAR SPINE WITHOUT CONTRAST    Result Date: 6/29/2023  IMPRESSION: 1. New right subarticular disc extrusion extending inferiorly from the L5-S1 level which impinges/compresses the  traversing right S1 nerve roots. 2. Multilevel lumbar spondylosis noting tricompartmental spinal stenosis at L4-L5 and L5-S1, similar to prior.    X-RAY CHEST 2 VIEWS    Result Date: 6/26/2023  IMPRESSION: See comment.         ASSESSMENT AND PLAN      * Adrenal insufficiency (CMS/HCC)  Assessment & Plan  Pt presenting with weight gain, b/l LE edema, fatigue, which developed when she began to taper her prednisone. She had been on prednisone consistently since October 2022 for vasculitis, started on 50mg, and had tapered to 15mg daily which she was on for quite awhile. However, about 3-4 weeks ago she began to taper from 15mg down to her current dose of 5mg. Symptoms began soon after tapering below 10mg.    Outpatient cortisol performed by her family medicine physician was decreased at 0.8. Given symptoms and lab work patient ultimately directed to the ER for further evaluation  Remains hemodynamically stable  Repeat cortisol levels remain 0.5 again this morning. Discussed with Endocrinology - low suspicion for acute adrenal insufficiency at this time suspect ongoing suppression from prednisone regimen. Per Endocrinology, can reduce to 5 to 7.5mg daily in effort to treat vasculitis and avoid further suppression. Will likely take 3-4 weeks prior to seeing improvement in levels    Plan:  Will cont prednisone to 7.5mg daily for now. Patient to f/u with outpatient rheumatologist to for further recommendations re: prolonged tapering  Cont to monitor vitals, monitor electrolytes   Will need outpt f/u with Rheum and Endocrinology for continued monitoring        Amenorrhea  Assessment & Plan  Patient notes history of amenorrhea with last period being over a year ago? Was initially attributed to birth control however per patient she has not been on medication at least the past year. Has not followed up with any one to include ob/gyn as of yet  TSH wnl, FSH, LH, prolactin wnl  Etiology remains uncertain, ?related to current  opioid and psychiatric meds    Plan:  outpt f/u with ob/gyn and endo recommended for further evaluation      Weight gain  Assessment & Plan  Significant weight gain noted over the last several weeks, reportedly at least ~20lb. Per patient she often gains more weigh/fluid when steroids are decreased? Patient also notes increased fluid retention.   BNP 28. CXR negative. +1/+2 b/l LE edema.   Patient with multiple potential contributing factors to include weight/body habitus, +/- venous stasis, and possible component of heart failure. It is unclear to me how her weight would increase with steroid taper.     Plan  Cont w/management of CHF and diuresis as stated  Will attempt to inc dose of prednisone to 7.5mg daily as stated, would recommend prolonged taper going forward - patient to f/u with her rheumatologist for further management.     Iron deficiency anemia due to chronic blood loss  Assessment & Plan  Hgb remains stable in the 10-11s  No si/sxs of bleeding noted    Plan:  Continue iron supplement  Monitor cbc    (HFpEF) heart failure with preserved ejection fraction (CMS/HCC)  Assessment & Plan  Recently started on torsemide by cardiologist Dr Middleton. Cont to c/o significant weight gain noted. B/l LE edema present. BNP 28.  TTE with Normal ventricle size. Estimated EF 65-70% (68% by biplane). No regional wall motion abnormalities. Normal diastolic filling pattern for age.  •  Tricuspid Valve: Normal structure. Mild to moderate regurgitation. The regurgitation jet is eccentric. Estimated RVSP = 39 mmHg. No significant stenosis.  Patient with multiple potential contributing factors to include weight/body habitus, +/- venous stasis, although question component of right sided heart failure given pulmonary htn. Pt also on steroids but states she loses weight? With inc steroid dosaging.?    Plan:  Continue torsemide  Encourage leg elevation at rest, compression stocking  Encourage cpap use for ARIANNA  Following discussions  "with patient will consult cardiology for further evaluation and whether torsemide dosaging is required. F/u recs    Mild persistent asthma, uncomplicated  Assessment & Plan  Stable.    - Continue home inhaler regimen    Essential hypertension  Assessment & Plan  Remains stable    Plan:  Cont w/toresmide, metoprolol     Vasculitis (CMS/HCC)  Assessment & Plan  Pt with hx of vasculitis, on chronic prednisone, had started to taper but developed recurrent symptoms after starting taper.    Plan:  - Will resume prednisone as stated   -outpt f/u with rheum recommended    Depression  Assessment & Plan  Stable.  -Continue home medications    Lumbar spinal stenosis  Assessment & Plan  Pt presents with chronic thoracic and lumbar stenosis however reporting saddle numbness (which she states is primarily located around her genital area) and difficulty initiating urine stream both started about one year ago and have worsened. MRI lumbar spine 2/2022 prior to saddle numbness/urinary issues:\" Persistent multilevel spondylosis of the predominantly lower lumbar spine as noting slight progression of now moderate to severe L4-L5 central canal stenosis and persistent high-grade L4-L5 neural foraminal narrowing with abutment of the exiting bilateral L4 nerve roots.\"    Plan:  - f/u lumbar MRI, pending results to consider ortho consult  -check pvr  -cont w/outpt regimen of suboxone                VTE Assessment: Padua VTE Score: 1  DVT PPX: scds, GI PPX: PPI  Diet: regular diet  Dispo: med/surg. Possible d/c tomorrow pending clinical status and cardiology input and MRI results   Estimated discharge date: 6/29/2023  Code Status: Full Code     Erinn Barajas, DO  6/29/2023               "

## 2023-06-29 NOTE — PLAN OF CARE
Plan of Care Review  Plan of Care Reviewed With: patient  Progress: no change  Outcome Evaluation: Pt resting during the night. Independent in the room. Trace edema. Pt reports similar symptoms that brought pt to ED. New lab draw for coritsol this AM. Fall and safety precautions in place.

## 2023-06-29 NOTE — ASSESSMENT & PLAN NOTE
-Admitted for adrenal work-up. Cortisol levels not indicative of adrenal insufficiency.  -Continue prednisone 5 mg daily x 1 month per rheumatologist, Dr. Goddard.   -No current vasculitis rash.

## 2023-06-30 VITALS
TEMPERATURE: 97.7 F | OXYGEN SATURATION: 98 % | BODY MASS INDEX: 50.31 KG/M2 | SYSTOLIC BLOOD PRESSURE: 131 MMHG | RESPIRATION RATE: 18 BRPM | HEIGHT: 62 IN | WEIGHT: 273.4 LBS | DIASTOLIC BLOOD PRESSURE: 90 MMHG | HEART RATE: 86 BPM

## 2023-06-30 PROBLEM — R60.9 EDEMA: Status: ACTIVE | Noted: 2023-05-25

## 2023-06-30 LAB — ACTH PLAS-MCNC: <5 PG/ML (ref 6–50)

## 2023-06-30 PROCEDURE — 200200 PR NO CHARGE: Performed by: INTERNAL MEDICINE

## 2023-06-30 PROCEDURE — 63700000 HC SELF-ADMINISTRABLE DRUG: Performed by: INTERNAL MEDICINE

## 2023-06-30 PROCEDURE — 63700000 HC SELF-ADMINISTRABLE DRUG: Performed by: SPEECH-LANGUAGE PATHOLOGIST

## 2023-06-30 PROCEDURE — 99239 HOSP IP/OBS DSCHRG MGMT >30: CPT | Performed by: INTERNAL MEDICINE

## 2023-06-30 PROCEDURE — 99254 IP/OBS CNSLTJ NEW/EST MOD 60: CPT | Performed by: NEUROLOGICAL SURGERY

## 2023-06-30 RX ADMIN — MOMETASONE FUROATE AND FORMOTEROL FUMARATE DIHYDRATE 2 PUFF: 100; 5 AEROSOL RESPIRATORY (INHALATION) at 09:56

## 2023-06-30 RX ADMIN — POLYETHYLENE GLYCOL 3350 17 G: 17 POWDER, FOR SOLUTION ORAL at 09:53

## 2023-06-30 RX ADMIN — BUPRENORPHINE 16 MG: 8 TABLET SUBLINGUAL at 09:54

## 2023-06-30 RX ADMIN — POTASSIUM CHLORIDE 20 MEQ: 750 TABLET, EXTENDED RELEASE ORAL at 09:54

## 2023-06-30 RX ADMIN — PREGABALIN 200 MG: 100 CAPSULE ORAL at 13:49

## 2023-06-30 RX ADMIN — TAMSULOSIN HYDROCHLORIDE 0.4 MG: 0.4 CAPSULE ORAL at 09:54

## 2023-06-30 RX ADMIN — TORSEMIDE 10 MG: 20 TABLET ORAL at 09:54

## 2023-06-30 RX ADMIN — PREGABALIN 200 MG: 100 CAPSULE ORAL at 09:53

## 2023-06-30 RX ADMIN — BUPRENORPHINE HYDROCHLORIDE 4 MG: 2 TABLET SUBLINGUAL at 15:29

## 2023-06-30 RX ADMIN — PREDNISONE 7.5 MG: 2.5 TABLET ORAL at 09:54

## 2023-06-30 ASSESSMENT — COGNITIVE AND FUNCTIONAL STATUS - GENERAL
MOVING TO AND FROM BED TO CHAIR: 4 - NONE
WALKING IN HOSPITAL ROOM: 4 - NONE
CLIMB 3 TO 5 STEPS WITH RAILING: 4 - NONE
STANDING UP FROM CHAIR USING ARMS: 4 - NONE

## 2023-06-30 NOTE — PROGRESS NOTES
Hospital Medicine Service -  Daily Progress Note       SUBJECTIVE   Interval History: No acute events overnight. Patient seen and examined. No new concerns or complaints currently.      OBJECTIVE      Vital signs in last 24 hours:  Temp:  [36.5 °C (97.7 °F)-36.7 °C (98.1 °F)] 36.7 °C (98.1 °F)  Heart Rate:  [56-91] 56  Resp:  [16-18] 18  BP: (113-156)/(69-93) 113/69    Intake/Output Summary (Last 24 hours) at 6/30/2023 0858  Last data filed at 6/29/2023 0944  Gross per 24 hour   Intake 118 ml   Output --   Net 118 ml       PHYSICAL EXAMINATION      GEN: well-developed and well-nourished; not in acute distress  HEENT: normocephalic; atraumatic  NECK: no JVD; no bruits  CARDIO: regular rate and rhythm; no murmurs, rubs or gallops  RESP: clear to auscultation bilaterally; no rales, rhonchi, or wheezes  ABD: soft, non-distended, non-tender, normal bowel sounds  EXT: no cyanosis, clubbing, or edema  SKIN: clean, dry, warm, and intact  MUSCULOSKELETAL: no injury or deformity  NEURO: alert and oriented x 3  BEHAVIOR/EMOTIONAL: appropriate; cooperative     LABS / IMAGING / TELE      Labs  Lab Results   Component Value Date    GLUCOSE 107 (H) 06/29/2023    CALCIUM 9.5 06/29/2023     06/29/2023    K 4.9 06/29/2023    CO2 31 06/29/2023     06/29/2023    BUN 15 06/29/2023    CREATININE 0.8 06/29/2023     Lab Results   Component Value Date    WBC 10.27 06/28/2023    HGB 10.9 (L) 06/28/2023    HCT 34.9 (L) 06/28/2023    MCV 93.6 06/28/2023     06/28/2023     Lab Results   Component Value Date    ALBUMIN 3.5 06/28/2023    BILITOT 0.3 06/28/2023    ALKPHOS 63 06/28/2023    BILIDIR <0.1 04/12/2017    AST 16 06/28/2023    ALT 14 06/28/2023    PROTEIN 6.1 (L) 06/28/2023       Imaging  MRI LUMBAR SPINE WITHOUT CONTRAST    Result Date: 6/29/2023  IMPRESSION: 1. New right subarticular disc extrusion extending inferiorly from the L5-S1 level which impinges/compresses the traversing right S1 nerve roots. 2.  Multilevel lumbar spondylosis noting tricompartmental spinal stenosis at L4-L5 and L5-S1, similar to prior.    X-RAY CHEST 2 VIEWS    Result Date: 6/26/2023  IMPRESSION: See comment.       ECG/Telemetry  I have independently reviewed the telemetry. Significant findings include ?intermittent sinus tach vs artifact.    ASSESSMENT AND PLAN      * Adrenal insufficiency (CMS/HCC)  Assessment & Plan  Pt presenting with weight gain, b/l LE edema, fatigue, which developed when she began to taper her prednisone. She had been on prednisone consistently since October 2022 for vasculitis, started on 50mg, and had tapered to 15mg daily which she was on for quite awhile. However, about 3-4 weeks ago she began to taper from 15mg down to her current dose of 5mg. Symptoms began soon after tapering below 10mg.    Outpatient cortisol performed by her family medicine physician was decreased at 0.8. Given symptoms and lab work patient ultimately directed to the ER for further evaluation  Remains hemodynamically stable  Repeat cortisol levels remain 0.5 again this morning. Discussed with Endocrinology - low suspicion for acute adrenal insufficiency at this time suspect ongoing suppression from prednisone regimen. Per Endocrinology, can reduce to 5 to 7.5mg daily in effort to treat vasculitis and avoid further suppression. Will likely take 3-4 weeks prior to seeing improvement in levels    Plan:  Will cont prednisone to 7.5mg daily for now. Patient to f/u with outpatient rheumatologist to for further recommendations re: prolonged tapering  Cont to monitor vitals, monitor electrolytes   Will need outpt f/u with Rheum and Endocrinology for continued monitoring        Amenorrhea  Assessment & Plan  Patient notes history of amenorrhea with last period being over a year ago? Was initially attributed to birth control however per patient she has not been on medication at least the past year. Has not followed up with any one to include ob/gyn as  of yet  TSH wnl, FSH, LH, prolactin wnl  Etiology remains uncertain, ?related to current opioid and psychiatric meds    Plan:  outpt f/u with ob/gyn and endo recommended for further evaluation      Weight gain  Assessment & Plan  Significant weight gain noted over the last several weeks, reportedly at least ~20lb. Per patient she often gains more weigh/fluid when steroids are decreased? Patient also notes increased fluid retention.   BNP 28. CXR negative. +1/+2 b/l LE edema.   Patient with multiple potential contributing factors to include weight/body habitus, +/- venous stasis, and possible component of heart failure. It is unclear to me how her weight would increase with steroid taper.     Plan  Cont w/management of CHF and diuresis as stated  Will attempt to inc dose of prednisone to 7.5mg daily as stated, would recommend prolonged taper going forward - patient to f/u with her rheumatologist for further management.     Iron deficiency anemia due to chronic blood loss  Assessment & Plan  Hgb remains stable in the 10-11s  No si/sxs of bleeding noted    Plan:  Continue iron supplement  Monitor cbc    Edema  Assessment & Plan  Recently started on torsemide by cardiologist Dr Middleton. Cont to c/o significant weight gain noted. B/l LE edema present. BNP 28.  TTE with Normal ventricle size. Estimated EF 65-70% (68% by biplane). No regional wall motion abnormalities. Normal diastolic filling pattern for age.  •  Tricuspid Valve: Normal structure. Mild to moderate regurgitation. The regurgitation jet is eccentric. Estimated RVSP = 39 mmHg. No significant stenosis.  Patient with multiple potential contributing factors to include weight/body habitus, +/- venous stasis, although question component of right sided heart failure given pulmonary htn. Pt also on steroids but states she loses weight? With inc steroid dosaging.?    Plan:  Discussed with cardiology. Recommend continuing torsemide 10mg daily for now  Encourage leg  elevation at rest, compression stocking  Encourage cpap use for ARIANNA  Will need outpt f/u with Cardiology/Dr. Middleton for continued monitoring and management    Mild persistent asthma, uncomplicated  Assessment & Plan  Stable.    - Continue home inhaler regimen    Essential hypertension  Assessment & Plan  Remains stable    Plan:  Cont w/toresmide, metoprolol     Vasculitis (CMS/HCC)  Assessment & Plan  Pt with hx of vasculitis, on chronic prednisone, had started to taper but developed recurrent symptoms after starting taper.    Plan:  - Will resume prednisone as stated   -outpt f/u with rheum recommended    Depression  Assessment & Plan  Stable.  -Continue home medications    Lumbar spinal stenosis  Assessment & Plan  Pt presents with chronic thoracic and lumbar stenosis however reporting saddle numbness (which she states is primarily located around her genital area) and difficulty initiating urine stream both started about one year ago and have worsened.?  PVRs wnl although pt noted to have difficulty with urination  Lumbar MRI  New right subarticular disc extrusion extending inferiorly from the L5-S1 level which impinges/compresses the traversing right S1 nerve roots.  Multilevel lumbar spondylosis noting tricompartmental spinal stenosis at L4-L5 and L5-S1, similar to prior.    Plan:  -uncertain if MRI findings explain etiology of sxs however will consult neurosurgery for further input.  -cont w/outpt regimen of suboxone   -if neurosurgery evaluation is unremarkable than outpt f/u with gyn and urology recommended for further evaluation of chronic symptoms               VTE Assessment: Padua VTE Score: 1  DVT PPX: scds, GI PPX: PPI  Diet: regular diet  Dispo: med/surg. Possible d/c later today pending clinical status and neurosurgery input. Will need close oupt f/u with Endocrinology, Rheumatology, Cardiology, Ob/Gyn, Urology and PCP for multiple medical issues/concerns. 55mins spent on d/c planning/paperwork.    Estimated discharge date: 6/29/2023  Code Status: Full Code     Erinn Barajas,   6/30/2023

## 2023-06-30 NOTE — PLAN OF CARE
Problem: Adult Inpatient Plan of Care  Goal: Plan of Care Review  6/30/2023 1610 by Mirela Davis RN  Outcome: Adequate for Care Transition  6/30/2023 1606 by Mirela Davis RN  Outcome: Progressing  Flowsheets (Taken 6/30/2023 1606)  Progress: improving  Outcome Evaluation: pt stable for d/c. iv removed, tele returned. all belongings returned. d/c instructions completed all questions answered.pt left in stable condition  Plan of Care Reviewed With: patient  Goal: Patient-Specific Goal (Individualized)  Outcome: Adequate for Care Transition  Goal: Absence of Hospital-Acquired Illness or Injury  Outcome: Adequate for Care Transition  Goal: Optimal Comfort and Wellbeing  6/30/2023 1610 by Mirela Davis RN  Outcome: Adequate for Care Transition  6/30/2023 1606 by Mirela Davis RN  Outcome: Progressing  Goal: Readiness for Transition of Care  6/30/2023 1610 by Mirela Davis RN  Outcome: Adequate for Care Transition  6/30/2023 1606 by Mirela Davis RN  Outcome: Progressing   Plan of Care Review  Plan of Care Reviewed With: patient  Progress: improving  Outcome Evaluation: pt stable for d/c. iv removed, tele returned. all belongings returned. d/c instructions completed all questions answered.pt left in stable condition

## 2023-06-30 NOTE — CONSULTS
Neurosurgery Consultation    CC:   Chief Complaint   Patient presents with   • Weight Gain   • Abnormal Lab       Reason for Consultation:  Low back pain    Requesting Provider:  Dr. Barajas  Patient seen and examined 6/30/23 0800    History of Present Illness:   Carolyn Redmond is a 37 y.o.  female, with significant past medical history of anxiety, depression, endocarditis, substance abuse, vasculitis for which she takes steroids chronically, and previous osteomyelitis of lumbar spine, who presented to [place] ED for evaluation and management of acute weight gait with cortisol of 0.8.  She is currently being followed and managed by endocrinology with improvements. She mentioned saddle anesthesia to [facility], prompting lumbar MRI and consult to neurosurgery.    Upon interview, patient states she was doing very well from a low back pain perspective, with improvements in her pain. Unfortunately for the past month with the weight gain, her symptoms worsened. She has pain into her left hip and anterior thigh, stopping at the knee. She denies pain in her posterior leg or buttocks. Her low back pain is constant, but when she feels the left leg pain, it is more severe than the low back pain. She has chronic paresthesias in bilateral feet which are unchanged. She denies weakness. In regards to her saddle anesthesia, she reports being able to feel the exterior or her saddle region, but has decreased sensation during vaginal intercourse. She denies urinary and bowel incontinence.     Medical History:   Past Medical History:   Diagnosis Date   • Anxiety    • Depressed    • Endocarditis    • Fibromyalgia    • Migraines    • Pancreatitis    • Substance abuse (CMS/[facility]) 2015   • Tachycardia    • Vasculitis (CMS/[facility])        Surgical History:   Past Surgical History:   Procedure Laterality Date   • CHOLECYSTECTOMY     • ERCP     • GALLBLADDER SURGERY     • TONSILLECTOMY  1991       Family History:   Family History   Problem Relation  Age of Onset   • Hypertension Biological Mother    • Lung cancer Biological Father 76        smoker   • Lung cancer Maternal Grandmother 69        smoker   • Heart disease Maternal Grandfather    • Heart attack Maternal Grandfather         unsure of age   • Colon cancer Maternal Grandfather        Social History:   Social History     Socioeconomic History   • Marital status: Single     Spouse name: None   • Number of children: None   • Years of education: None   • Highest education level: None   Occupational History   • Occupation:    Tobacco Use   • Smoking status: Former     Packs/day: 0.50     Years: 5.00     Pack years: 2.50     Types: Cigarettes     Quit date: 2020     Years since quitting: 3.4   • Smokeless tobacco: Current   • Tobacco comments:     quit 1 year ago, vapes currently   Vaping Use   • Vaping Use: Every day   • Substances: Nicotine, Flavoring   • Devices: Refillable tank   Substance and Sexual Activity   • Alcohol use: Not Currently   • Drug use: Yes     Types: Marijuana, Heroin     Comment: MM now 2023, previous heroin and opiod   • Sexual activity: Not Currently     Partners: Male, Female   Social History Narrative    Lives with mom     Social Determinants of Health     Financial Resource Strain: Low Risk  (6/27/2023)    Overall Financial Resource Strain (CARDIA)    • Difficulty of Paying Living Expenses: Not hard at all   Food Insecurity: No Food Insecurity (6/26/2023)    Hunger Vital Sign    • Worried About Running Out of Food in the Last Year: Never true    • Ran Out of Food in the Last Year: Never true   Transportation Needs: No Transportation Needs (6/27/2023)    PRAPARE - Transportation    • Lack of Transportation (Medical): No    • Lack of Transportation (Non-Medical): No   Housing Stability: Low Risk  (6/27/2023)    Housing Stability Vital Sign    • Unable to Pay for Housing in the Last Year: No    • Number of Places Lived in the Last Year: 1    • Unstable Housing in the  Last Year: No       Allergies: Amoxicillin, Nsaids (non-steroidal anti-inflammatory drug), Tramadol, and Trazodone    Home Medications:   •  albuterol HFA, Inhale 2 puffs every 4 (four) hours as needed.  •  buprenorphine-naloxone, Place 2 Film under the tongue every morning. In addition to 1/2 film QPM  •  buprenorphine-naloxone, Place 0.5 Film under the tongue daily before dinner. In addition to 2 films in the AM.  •  cholecalciferol, Take 50,000 Units by mouth once a week on Monday.  •  ferrous sulfate, Take 65 mg by mouth nightly.  •  fluticasone propion-salmeteroL, Inhale 1 puff 2 (two) times a day.  •  magnesium oxide, Take 1 tablet (400 mg total) by mouth daily.  •  medical marijuana, 1 each See admin instr. Please be advised that an [facility] hospital staff member has listed medical marijuana as one of your home medications for documentation purposes. Please follow-up with your certified issuing provider for ongoing use  •  metoprolol succinate XL, Take 25 mg by mouth nightly.  •  NURTEC ODT, Take 1 tablet (75 mg total) by mouth every other day.  •  pantoprazole, Take 40 mg by mouth nightly.  •  PARoxetine, Take 20 mg by mouth nightly. 1/2 tablet  •  potassium chloride, Take 1 tablet (20 mEq total) by mouth 2 (two) times a day.  •  predniSONE, Take 5 mg by mouth daily.  •  pregabalin, Take 200 mg by mouth 3 (three) times a day.  •  QUEtiapine, Take  mg by mouth nightly. 1/2 - 1 tablet  •  tamsulosin, Take 0.4-0.8 mg by mouth See admin instr. Patient takes one or two doses per day.  •  TiZANidine, take 1 capsule by mouth every 6 to 8 hours if needed **MAX 3 CAPS IN 24 HOURS**  •  torsemide, Take 1 tablet (10 mg total) by mouth daily.  •  zolpidem, Take 10 mg by mouth nightly.  •  fluticasone propionate, Administer 1 spray into each nostril daily as needed for rhinitis or allergies.  •  BOTOX, Botox for chronic migraine headache every 3 months    Current Medications:   •  acetaminophen, 650 mg, oral, q4h PRN  •   albuterol, 2.5 mg, nebulization, q4h PRN  •  buprenorphine, 4 mg, sublingual, Daily before dinner  •  buprenorphine HCL, 16 mg, sublingual, q AM  •  glucose, 16-32 g of dextrose, oral, PRN **OR** dextrose, 15-30 g of dextrose, oral, PRN **OR** glucagon, 1 mg, intramuscular, PRN **OR** dextrose 50 % in water (D50), 25 mL, intravenous, PRN  •  ferrous sulfate, 325 mg, oral, Nightly  •  metoprolol succinate XL, 25 mg, oral, Nightly  •  mometasone-formoterol, 2 puff, inhalation, BID  •  pantoprazole, 40 mg, oral, Nightly  •  PARoxetine, 20 mg, oral, Nightly  •  polyethylene glycol, 17 g, oral, Daily  •  potassium chloride, 20 mEq, oral, BID  •  predniSONE, 7.5 mg, oral, Daily  •  pregabalin, 200 mg, oral, TID  •  QUEtiapine, 50 mg, oral, Nightly  •  QUEtiapine, 50 mg, oral, Nightly PRN  •  tamsulosin, 0.4 mg, oral, Daily  •  tamsulosin, 0.4 mg, oral, Daily PRN  •  tiZANidine, 6 mg, oral, 3x daily PRN  •  torsemide, 10 mg, oral, Daily  •  zolpidem, 10 mg, oral, Nightly PRN    Review of Systems: A 14 point review of systems was performed and aside from what is mentioned above is otherwise negative.    General physical examination:  Temp:  [36.2 °C (97.2 °F)-36.7 °C (98.1 °F)] 36.2 °C (97.2 °F)  Heart Rate:  [56-91] 83  Resp:  [16-18] 18  BP: (100-156)/(57-93) 100/57     The patient is supine in her bed in no acute distress, appears her stated age.   There is no peripheral edema and there are 2+ radial and dorsal pedis pulses.      General Appearance: Alert and awake   Head: Normocephalic, atraumatic.   Eyes:  ENMT: No conjunctival injection. Symmetrical lids  Atraumatic external nose and ears. Moist MM.   Neck: Supple, no nuchal rigidity.   Respiratory: Good respiratory effort.   Cardiovascular: Regular rate.   Abdomen: Soft without distension   Extremities: No edema.   Skin: Dry, no rashes.       Neurologic examination:  Mental status:  The patient is alert, attentive, and oriented. Speech is clear and fluent with good  repetition, comprehension, and naming.  Remote and recent memory are normal as well as fund of knowledge.    Cranial nerves:  CN II: Pupils are equal and briskly reactive to light.   CN III, IV, VI: Extra-occular muscles are intact  CN V: Facial sensation is intact and equal in V1-V3 distributions bilaterally.   CN VII: Face is symmetric with normal eye closure and smile.  CN VIII: Hearing is normal to rubbing fingers  CN IX, X: Palate elevates symmetrically. Phonation is normal.  CN XI: Head turning and shoulder shrug are intact  CN XII: Tongue is midline with normal movements and no atrophy.    Motor:  There is no pronator drift. Muscle bulk and tone are normal.      Deltoid Biceps Triceps Wrist ext Finger ext Hand Intrinsics Hip flexion Knee ext Dorsi-  flexion EHL Plantar Flexion   R 5 5 5 5 5 5 5 5 5 5 5   L 5 5 5 5 5 5 5 5 5 5 5       Reflexes:  There is no Stephens's sign or ankle clonus.    Sensory:  Intact light touch throughout all 4 extremities.    Coordination:  There is no dysmetria on finger-to-nose testing.      Gait:  Deferred.    Data Review:    Labs  WBC   Date Value Ref Range Status   06/28/2023 10.27 3.80 - 10.50 K/uL Final     Hemoglobin   Date Value Ref Range Status   06/28/2023 10.9 (L) 11.8 - 15.7 g/dL Final     Hematocrit   Date Value Ref Range Status   06/28/2023 34.9 (L) 35.0 - 45.0 % Final     MCV   Date Value Ref Range Status   06/28/2023 93.6 83.0 - 98.0 fL Final     Platelets   Date Value Ref Range Status   06/28/2023 218 150 - 369 K/uL Final     Sodium   Date Value Ref Range Status   06/29/2023 138 136 - 145 mEQ/L Final     Potassium   Date Value Ref Range Status   06/29/2023 4.9 3.5 - 5.1 mEQ/L Final     BUN   Date Value Ref Range Status   06/29/2023 15 7 - 25 mg/dL Final     Creatinine   Date Value Ref Range Status   06/29/2023 0.8 0.6 - 1.2 mg/dL Final     PT   Date Value Ref Range Status   01/26/2021 12.7 12.2 - 14.5 sec Final     PTT   Date Value Ref Range Status   01/26/2021 31  23 - 35 sec Final     INR   Date Value Ref Range Status   01/26/2021 1.0   Final     Comment:     INR has no defined significance when PT is within Reference Range.       All labs were personally reviewed and interpreted at time of note.     Images  MRI LUMBAR SPINE WITHOUT CONTRAST    Result Date: 6/29/2023  IMPRESSION: 1. New right subarticular disc extrusion extending inferiorly from the L5-S1 level which impinges/compresses the traversing right S1 nerve roots. 2. Multilevel lumbar spondylosis noting tricompartmental spinal stenosis at L4-L5 and L5-S1, similar to prior.    X-RAY CHEST 2 VIEWS    Result Date: 6/26/2023  IMPRESSION: See comment.     All imaging was personally reviewed and interpreted at time of note.     Assessment and Plan:  In summary, Carolyn Redmond is a 37 y.o. female who presents for management of endocrine abnormalities. She did report decreased sensation during vaginal intercourse, which has been going on for a year. MRI of the lumbar spine was completed which shows mild compression  On the right S1 nerve root which does not correlate with her symptoms. Furthermore, she does not appear to have cauda equina compression. On exam, she is full strength without pathologic reflexes. No neurosurgical intervention. She should continue maximum medical management of her endocrine issues. She was encouraged to continue follow up with Dr. Bansal for management of her pain. She is scheduled for an ablation next month. She may follow up with Dr. Ness as an outpatient should her symptoms persist or increase in severity.     RAVI Adrian    D/w Dr. Ness.

## 2023-06-30 NOTE — PLAN OF CARE
Care Coordination Discharge Plan Summary    Admission Assessment Summary    General Information  Readmission Within the last 30 days: no previous admission in last 30 days  Does patient have a : No  Patient-Specific Goals (include timeframe): to be discharged back home    Living Arrangements  Arrived From: home  Current Living Arrangements: home  People in Home: parent(s), other relative(s)  Home Accessibility: stairs to enter home (Group), stairs within home (Group)  Living Arrangement Comments: Pt lives with mom, aunt and uncle in [facility]    Social Determinants of Health - Screenings  Housing Stability  In the last 12 months, was there a time when you were not able to pay the mortgage or rent on time?: No  In the last 12 months, how many places have you lived?: 1  In the last 12 months, was there a time when you did not have a steady place to sleep or slept in a shelter (including now)?: No  Financial Resource Strain  How hard is it for you to pay for the very basics like food, housing, medical care, and heating?: Not hard at all  Transportation Needs  In the past 12 months, has lack of transportation kept you from medical appointments or from getting medications?: No  In the past 12 months, has lack of transportation kept you from meetings, work, or from getting things needed for daily living?: No    Functional Status Prior to Admission  Assistive Device/Animal Currently Used at Home: CPAP  Functional Status Comments: independent with ADL's, uses CPAP at night. [facility] is the provider  IADL Comments:      Discharge Needs Assessment    Concerns to be Addressed: no discharge needs identified, denies needs/concerns at this time  Current Discharge Risk:    Anticipated Changes Related to Illness: none    Discharge Plan Summary    Patient Choice  Offered/Gave Vendor List: yes  Patient's Choice of Community Agency(s): Pt denies needs at this time  Patient and/or patient guardian/advocate was made aware of  their right to choose a provider. A list of eligible providers was presented and reviewed with the patient and/or patient guardian/advocate in written and/or verbal form. The list delineates providers in the patient’s desired geographic area who are participating in the Medicare program and/or providers contracted with the patient’s primary insurance. The Medicare list and quality ratings were obtained from the Medicare.gov [medicare.gov] website.    Discharge Plan:  Disposition/Destination:   /         Connection to Community  Patient declined offered resources.    Community Resources:      Discharge Transportation:  Is Out of Hospital DNR needed at Discharge: no  Does patient need discharge transport?        spoke with patient at bedside.  States she believes she will be discharged today.  States has no needs at discharge and family will be transporting her home.

## 2023-06-30 NOTE — CONSULTS
Carolyn Redmond requested a visit and the nurse placed the call.    Carolyn shared her recent spiritual awakening and we discussed how to find resources to support her on her path.    Aarti Thurston  Spiritual Care Specialist  Pager [phone]  Office [phone]

## 2023-06-30 NOTE — DISCHARGE INSTRUCTIONS
ADDITIONAL INSTRUCTIONS:  If desired, may repeat cortisol one month following reduction in prednisone dosaging for reassessment of adrenal suppression.     ACTIVITY:  Activity as tolerated.    MEDICATIONS:  Take all medications as prescribed.    Call your doctor or return to the Emergency Room of you develop any of the following:  Return of your original symptoms  Persistent nausea and vomiting   Chest Pain  Shortness of Breath  Fever above 100.4 degrees or chills  Difficulty or inability to urinate  Change in mental status from baseline

## 2023-06-30 NOTE — PLAN OF CARE
Problem: Adult Inpatient Plan of Care  Goal: Plan of Care Review  Outcome: Progressing  Flowsheets (Taken 6/30/2023 1602)  Progress: improving  Outcome Evaluation: pt stable for d/c. iv removed, tele returned. all belongings returned. d/c instructions completed all questions answered.pt left in stable condition  Plan of Care Reviewed With: patient  Goal: Optimal Comfort and Wellbeing  Outcome: Progressing  Goal: Readiness for Transition of Care  Outcome: Progressing   Plan of Care Review  Plan of Care Reviewed With: patient  Progress: improving  Outcome Evaluation: pt stable for d/c. iv removed, tele returned. all belongings returned. d/c instructions completed all questions answered.pt left in stable condition

## 2023-06-30 NOTE — PLAN OF CARE
Problem: Adult Inpatient Plan of Care  Goal: Plan of Care Review  Flowsheets (Taken 6/30/2023 7002)  Progress: no change  Outcome Evaluation: No complaints overnight. Independent in room. Hesitancy with voiding, PVR of 74ml. Resting comfortably in bed. Call bell within reach.  Plan of Care Reviewed With: patient   Plan of Care Review  Plan of Care Reviewed With: patient  Progress: no change  Outcome Evaluation: No complaints overnight. Independent in room. Hesitancy with voiding, PVR of 74ml. Resting comfortably in bed. Call bell within reach.

## 2023-06-30 NOTE — DISCHARGE SUMMARY
Hospital Medicine Service -  Inpatient Discharge Summary        BRIEF OVERVIEW   Admission Date: 6/26/2023  Discharge Date: 6/30/2023    Admitting Provider: Erinn Barajas DO  Attending Provider: Erinn Barajas DO Attending phys phone: [phone]    PCP: Priscilla Leyva PA C [phone]    Consults During Admission:  IP CONSULT TO ENDOCRINOLOGY  IP CONSULT TO CARDIOLOGY  IP CONSULT TO NEUROSURGERY     DISCHARGE DIAGNOSES      Primary Discharge Diagnosis  Adrenal insufficiency (CMS/[facility])    Secondary Discharge Diagnoses  Active Hospital Problems    Diagnosis Date Noted   • Adrenal insufficiency (CMS/HCC) 04/15/2019     Priority: High   • Amenorrhea 06/27/2023   • Weight gain 06/26/2023   • Iron deficiency anemia due to chronic blood loss 06/13/2023   • Mild persistent asthma, uncomplicated 05/25/2023   • Edema 05/25/2023   • Obstructive sleep apnea syndrome 01/10/2023   • Pulmonary hypertension (CMS/[facility]) 03/25/2021   • Essential hypertension 12/10/2020   • Lumbar spinal stenosis 06/06/2018   • Vasculitis (CMS/[facility]) 09/26/2017   • Depression 06/01/2016      Resolved Hospital Problems    Diagnosis Date Noted Date Resolved   • Saddle anesthesia 06/26/2023 06/29/2023       SUMMARY OF HOSPITALIZATION        37 y.o. year-old female with multiple medical issue to include morbid obesity, tricuspid valve endocarditis, pulmonary HTN, central ARIANNA (started bipap 5/2023), history of substance abuse, fibromyalgia, chronic migraines, anxiety, depression, osteomyelitis of lumbar spine and vasculitis (rash biopsy, diagnosed in 2017) on chronic Prednisone who presented to [facility] ER on 6/26/2023 for further evaluation of several complaints to include fatigue, diffuse swelling, weight gain, and shortness of breath/dyspnea on exertion that developed shortly after tapering her oral prednisone to 5mg daily. Outpatient cortisol level performed by her primary care physician was noted to be low at 0.8.  Given symptoms and cortisol levels patient was ultimately directed to the ER for further evaluation of possible adrenal insufficiency.     Patient was ultimately admitted to the medicine service for further evaluation and management. The remainder of hospitalization is addressed via problem:    # Adrenal insufficiency   As stated above patient presented with several symptoms to include fatigue, diffuse swelling, and weight gain after being tapered from 15mg of oral prednisone daily to 5mg oral prednisone daily over the past 4 weeks. Hemodynamics however remained relatively stable. Outpatient cortisol level performed by patient's primary care physician was decreased at 0.8 and repeat cortisol levels performed upon admission remained low at 0.5. Endocrinology was consulted and per evaluation suspicion for acute  drenal insufficiency was low at this time and decreased levels were likely related to ongoing suppression from prednisone regimen. It was also uncertain if symptoms were truly related to decreased in prednisone however given that symptoms were associated with decrease it was recommended that patient be increased back to 7.5mg daily as this level was unlikely to cause significant suppression of her adrenal glands and hopefully reduce some of the symptoms she had been experiencing. It was recommended that a significantly prolonged taper be considered when attempting to taper steroids in the future.     # Weight gain, Edema  As stated above patient noted significant weight gain (reportedly ~20lbs) and fluid retention/edema over the last several weeks following reduction in steroids. Per patient she often gains more weight/fluid when steroids are decreased? However, patient also with other potential contributing factors to include weight/body habitus and potential venous stasis. Congestive heart failure was also considered as potential contributor however BNP on presentation was within normal limits and chest  x-ray was unremarkable for any acute cardiopulmonary disease. TTE showed normal ventricular size with an estimated EF of 65-70%. No regional wall motion abnormalities and normal diastolic filling pattern were noted. Mild to moderate tricuspid regurgitation was noted. RVSP = 39 mmHg although normal right ventricular systolic function was noted. Cardiology was consulted and per evaluation suspicion for acute congestive heart failure exacerbation was low and no changes to outpatient cardiac regimen, including torsemide dosaging was recommended at this time. As a result, given history noted by patient prednisone was ultimately increased as stated above in hopes this would help reduce some of her symptoms. Patient was also continued on her outpatient regimen of torsemide 10mg daily to assist with edema as well. Leg elevation and continuation of MONICA stockings were also encouraged.    # Amenorrhea  During hospitalization patient also noted a history of amenorrhea with her last period being over a year ago. Symptoms were initially attributed to the use of birth control, but she has since discontinued her birth control almost a year ago and has yet to develop  a period. She has not followed up with anyone as of yet, to include ob/gyn. Labs to include TSH, FSH, LH and prolactin were performed results of which were all within normal limits. Over etiology of amenorrhea remained uncertain. Per endocrinology, despite unremarkable testing there was still concern opioids and psychiatric medications may be contributing to symptoms. Outpatient follow-up with Ob/Gyn and Endocrinology was recommended for continued evaluation.     # Lumbar spinal stenosis, Vaginal numbness   During hospitalization patient also complained of ongoing low back pain secondary to known lumbar stenosis however patient also reported the development of saddle anesthesia (which she stated was primarily located around her vaginal area) and difficulty initiating  urine stream that began approximately one year ago. Again, patient had not followed up with any of her physicians to include ob/gyn and/or neurosurgery for further evaluation. Despite urinary complaints post void residual remained within normal limits. Lumbar MRI was performed results of which revealed a new right subarticular disc extrusion extending inferiorly from the L5-S1 level which impinges/compresses the traversing right S1 nerve roots. Multilevel lumbar spondylosis noting tricompartmental spinal stenosis at L4-L5 and L5-S1 were also noted although similar to prior. Neurosurgery was consulted for further evaluation and per Neurosurgery there was low suspicion that MRI findings explained ongoing symptoms and suspicion for cauda equina syndrome was low at this time. No further inpatient evaluation or intervention was felt to be required. Outpatient follow-up with her other specialists to include ob/gyn further evaluation. Follow-up with Neurosurgery ws recommended only as needed if symptoms increase in severity.      The remainder of patient's hospitalization was uneventful and no further intervention was required.     Patient was deemed stable for discharge to home on 6/30/23. Patient is to be continued on Prednisone 7.5mg daily as stated above otherwise no major changes were made to her outpatient medication regimen including her torsemide dosaging. Extremely slow taper of Prednisone is recommended given multiple symptoms associated with any dosage reduction. Patient is to follow-up with Endocrinology, Cardiology, Rheumatology, Ob/Gyn, Neurosurgery and Pain Management as listed below for continued management of multiple medical issues. Lastly patient is to call and schedule an appointment with primary care physician within 7-10 days for hospital follow-up. If desired, cortisol may be repeated in one month following reduction in prednisone dosaging for reassessment of adrenal suppression.       DISCHARGE  MEDICATIONS      Medication List      CONTINUE taking these medications    albuterol HFA 90 mcg/actuation inhaler  Inhale 2 puffs every 4 (four) hours as needed.  Dose: 2 puff     BOTOX injection  Botox for chronic migraine headache every 3 months  Generic drug: onabotulinumtoxinA     * buprenorphine-naloxone 8-2 mg film  Commonly known as: SUBOXONE  Place 0.5 Film under the tongue daily before dinner. In addition to 2 films in the AM.  Dose: 0.5 Film     * buprenorphine-naloxone 8-2 mg film  Commonly known as: SUBOXONE  Place 2 Film under the tongue every morning. In addition to 1/2 film QPM  Dose: 2 Film     cholecalciferol 1,250 mcg (50,000 unit) capsule  Commonly known as: VITAMIN D3  Take 50,000 Units by mouth once a week on Monday.  Dose: 50,000 Units     ferrous sulfate 325 mg (65 mg iron) tablet  Take 65 mg by mouth nightly.  Dose: 65 mg     fluticasone propion-salmeteroL 250-50 mcg/dose diskus inhaler  Commonly known as: ADVAIR DISKUS  Inhale 1 puff 2 (two) times a day.  Dose: 1 puff     fluticasone propionate 50 mcg/actuation nasal spray  Commonly known as: FLONASE  Administer 1 spray into each nostril daily as needed for rhinitis or allergies.  Dose: 1 spray     magnesium oxide 400 mg (241.3 mg magnesium) tablet  Commonly known as: MAG-OX  Take 1 tablet (400 mg total) by mouth daily.  Dose: 400 mg     medical marijuana  1 each See admin instr. Please be advised that an [facility] hospital staff member has listed medical marijuana as one of your home medications for documentation purposes. Please follow-up with your certified issuing provider for ongoing use  Dose: 1 each     metoprolol succinate XL 25 mg 24 hr tablet  Commonly known as: TOPROL-XL  Take 25 mg by mouth nightly.  Dose: 25 mg     NURTEC ODT 75 mg tablet,disintegrating  Take 1 tablet (75 mg total) by mouth every other day.  Dose: 75 mg  Generic drug: rimegepant     pantoprazole 40 mg EC tablet  Commonly known as: PROTONIX  Take 40 mg by mouth  nightly.  Dose: 40 mg     potassium chloride 20 mEq CR tablet  Commonly known as: KLOR-CON M  Take 1 tablet (20 mEq total) by mouth 2 (two) times a day.  Dose: 20 mEq     pregabalin 200 mg capsule  Commonly known as: LYRICA  Take 200 mg by mouth 3 (three) times a day.  Dose: 200 mg     tamsulosin 0.4 mg capsule  Commonly known as: FLOMAX  Take 0.4-0.8 mg by mouth See admin instr. Patient takes one or two doses per day.  Dose: 0.4-0.8 mg     TiZANidine 6 mg capsule  Commonly known as: ZANAFLEX  take 1 capsule by mouth every 6 to 8 hours if needed **MAX 3 CAPS IN 24 HOURS**     torsemide 10 mg tablet  Commonly known as: DEMADEX  Take 1 tablet (10 mg total) by mouth daily.  Dose: 10 mg         * This list has 2 medication(s) that are the same as other medications prescribed for you. Read the directions carefully, and ask your doctor or other care provider to review them with you.                 Medication List      CONTINUE taking these medications    albuterol HFA 90 mcg/actuation inhaler  Inhale 2 puffs every 4 (four) hours as needed.  Dose: 2 puff     BOTOX injection  Botox for chronic migraine headache every 3 months  Generic drug: onabotulinumtoxinA     * buprenorphine-naloxone 8-2 mg film  Commonly known as: SUBOXONE  Place 0.5 Film under the tongue daily before dinner. In addition to 2 films in the AM.  Dose: 0.5 Film     * buprenorphine-naloxone 8-2 mg film  Commonly known as: SUBOXONE  Place 2 Film under the tongue every morning. In addition to 1/2 film QPM  Dose: 2 Film     cholecalciferol 1,250 mcg (50,000 unit) capsule  Commonly known as: VITAMIN D3  Take 50,000 Units by mouth once a week on Monday.  Dose: 50,000 Units     ferrous sulfate 325 mg (65 mg iron) tablet  Take 65 mg by mouth nightly.  Dose: 65 mg     fluticasone propion-salmeteroL 250-50 mcg/dose diskus inhaler  Commonly known as: ADVAIR DISKUS  Inhale 1 puff 2 (two) times a day.  Dose: 1 puff     fluticasone propionate 50 mcg/actuation nasal  spray  Commonly known as: FLONASE  Administer 1 spray into each nostril daily as needed for rhinitis or allergies.  Dose: 1 spray     magnesium oxide 400 mg (241.3 mg magnesium) tablet  Commonly known as: MAG-OX  Take 1 tablet (400 mg total) by mouth daily.  Dose: 400 mg     medical marijuana  1 each See admin instr. Please be advised that an [facility] hospital staff member has listed medical marijuana as one of your home medications for documentation purposes. Please follow-up with your certified issuing provider for ongoing use  Dose: 1 each     metoprolol succinate XL 25 mg 24 hr tablet  Commonly known as: TOPROL-XL  Take 25 mg by mouth nightly.  Dose: 25 mg     NURTEC ODT 75 mg tablet,disintegrating  Take 1 tablet (75 mg total) by mouth every other day.  Dose: 75 mg  Generic drug: rimegepant     pantoprazole 40 mg EC tablet  Commonly known as: PROTONIX  Take 40 mg by mouth nightly.  Dose: 40 mg     potassium chloride 20 mEq CR tablet  Commonly known as: KLOR-CON M  Take 1 tablet (20 mEq total) by mouth 2 (two) times a day.  Dose: 20 mEq     pregabalin 200 mg capsule  Commonly known as: LYRICA  Take 200 mg by mouth 3 (three) times a day.  Dose: 200 mg     tamsulosin 0.4 mg capsule  Commonly known as: FLOMAX  Take 0.4-0.8 mg by mouth See admin instr. Patient takes one or two doses per day.  Dose: 0.4-0.8 mg     TiZANidine 6 mg capsule  Commonly known as: ZANAFLEX  take 1 capsule by mouth every 6 to 8 hours if needed **MAX 3 CAPS IN 24 HOURS**     torsemide 10 mg tablet  Commonly known as: DEMADEX  Take 1 tablet (10 mg total) by mouth daily.  Dose: 10 mg         * This list has 2 medication(s) that are the same as other medications prescribed for you. Read the directions carefully, and ask your doctor or other care provider to review them with you.                   PROCEDURES / LABS / IMAGING      Operative Procedures  None    Other Procedures  None    Pertinent Labs  As above.    Pertinent Imaging  MRI LUMBAR SPINE  WITHOUT CONTRAST    Result Date: 6/29/2023  IMPRESSION: 1. New right subarticular disc extrusion extending inferiorly from the L5-S1 level which impinges/compresses the traversing right S1 nerve roots. 2. Multilevel lumbar spondylosis noting tricompartmental spinal stenosis at L4-L5 and L5-S1, similar to prior.    X-RAY CHEST 2 VIEWS    Result Date: 6/26/2023  IMPRESSION: See comment.       OUTPATIENT  FOLLOW-UP / REFERRALS / PENDING TESTS      Outpatient Follow-Up Appointments            In 5 days Talib Richmond MD [facility] in [place]    In 2 weeks SHERITA Carlson [facility] in [place]    In 1 month Marguerite Watters DO [facility] in [place]    In 1 month Fabricio Middleton MD [facility] in [facility]    In 2 months SHERITA Carlson [facility] in [place]    In 2 months RAVI Rodgers [facility] in [facility]    In 4 months Martha De Leon DO [facility] in [place]        Please call and schedule follow-up with your outpatient Endocrinologist or Dr. Jose Luis Flores within one month for continued monitoring of adrenal function and further evaluation of amenorrhea.    Please call and schedule an appointment with Cardiology, Dr. Fabricio Middleton within two weeks for continued management of pulmonary hypertension.    Please call and schedule follow-up with your Rheumatologist for further management of vasculitis and further recommendations on Prednisone dosaging/taper. Prolonged taper is recommended given symptoms associated with quick reduction in dosaging.     Please follow-up with your Ob/Gyn within 2-4 weeks for further evaluation of amenorrhea (missed periods) and vaginal numbness.    Follow-up with Neurosurgery, Dr. Adriano Ness as needed if back pain or numbness/tingling worsen.    Please  resume follow-up with Pain Management, Dr. Akhil Bansal as previously instructed for continued management of chronic pain.    Please call and schedule an appointment with primary care physician, Dr. Priscilla Leyva within 7-10 days for hospital follow-up.    Test Results Pending at Discharge  Unresulted Labs (From admission, onward)    None          Important Issues to Address in Follow-Up  If desired, may repeat cortisol one month following reduction in prednisone dosaging for reassessment of adrenal suppression.     DISCHARGE DISPOSITION      Disposition: Home     Code Status At Discharge: Full Code    Physician Order for Life-Sustaining Treatment Document Status      No documents found

## 2023-07-03 ENCOUNTER — TELEPHONE (OUTPATIENT)
Dept: PRIMARY CARE | Facility: CLINIC | Age: 38
End: 2023-07-03

## 2023-07-03 ENCOUNTER — TELEPHONE (OUTPATIENT)
Dept: SCHEDULING | Facility: CLINIC | Age: 38
End: 2023-07-03
Payer: COMMERCIAL

## 2023-07-03 ENCOUNTER — PATIENT OUTREACH (OUTPATIENT)
Dept: CASE MANAGEMENT | Facility: CLINIC | Age: 38
End: 2023-07-03
Payer: COMMERCIAL

## 2023-07-03 ASSESSMENT — ENCOUNTER SYMPTOMS
ACTIVITY CHANGE: 1
WEAKNESS: 1

## 2023-07-03 NOTE — TELEPHONE ENCOUNTER
Transfer Care from Provider to Provider     Name of caller: Carolyn Redmond    Relationship to patient: self     Current patient of doctor: Fabricio Middleton MD     Requesting to transfer care to doctor: Dr Booth     Please call patient to advise: [phone]

## 2023-07-03 NOTE — TELEPHONE ENCOUNTER
Test Order Request   Patient PCP: Priscilla Leyva PA C  Next Office Visit: 7/5/2023  Preferred Lab: [id number]  [address] [address]  Tests Requested: Labs for appointment 7/5/2023 for adrenal insufficiency  , MyChart, or US Mail?: My Chart    The practice will reach out to discuss your request within 2 business days.

## 2023-07-03 NOTE — PROGRESS NOTES
NAME: Carolyn Redmond    MRN: [id number]    YOB: 1985    Event Review:    Initial TCM Patient Outreach Date: 07/03/23    Assessment completed with: Patient  Patient stated reason for hospitalization: Weight gain chest pressure constipation  Discharge Diagnosis: Palapatations Adrenal Insufficiency    Patient readmitted in the last 30 days: No  Discharging Facility: [facility]  Date of Last Admission: 06/26/23  Date of Last Discharge: 06/30/23       HPI: Spoke in great detail with patient she presented to [place] ED with symptoms of an 11 lb weight gain in one day, chest pressure, edema and constipation.  PMH includes  anxiety, depression, endocarditis, fibromyalgia, vasculitis, migraines, pancreatitis, substance abuse.  MRI of the lumbar spine was completed which shows mild compression    BNP 30  Chest X-Ray negative.  TTE with Normal ventricle size. Estimated EF 65-70% (68% by biplane). No regional wall motion abnormalities. Normal diastolic filling pattern for age.  Patient had multiple factors to weight gain, encouraged to F/U with Cardiology, wear compression stockings, and take medication as indicated.  Denies SOB, states he is exercising, and has mil edema, feel slike she has lost weight but doers not own a scale.    Patioejt is in need of refill for Ambien and Vit D; TCM scheduled 07/10.      Review of Systems   Constitutional: Positive for activity change.   Cardiovascular: Positive for leg swelling.   Neurological: Positive for weakness.       Medication Review:    Medication Review: Yes     Reported by: Patient  Any new medications prescribed at discharge?: Yes  Is the patient having any side effects they believe may be caused by any medication additions or changes?: No  New prescriptions filled?: Yes     Do you have enough of your regularly prescribed medications?: Yes       Medication adherence problem?: No  Was a medication discrepancy indentified?: No       Nursing Interventions:  Nurse provided patient education  Reconciled the current and discharge medications: Yes  Reviewed AVS (Discharge Instructions)?: Yes       Acute Pain:    Acute pain: No       Chronic Pain:    Chronic pain: No    Diet/Nutrition:    Type of Diet: Regular        Home Care Services:     Home Care Interventions: No intervention required     Post-Discharge Durable Medical Equipment::    Durable Medical Equipment: CPAP/BiPAP  Oxygen Use: No    Home Management:    Living Arrangement: Parent  Support System:: Family, Parent  Type of Residence: [address] [place] house  Home Monitoring: None  Any patient reported falls in the last 3 months?: No  Yarsanism or spiritual beliefs that impact treatment?: No    Appointment Scheduling:    PCP appointment scheduled: Yes  TCM Appointment Type: TEZ 14 Day  Appointment Date: 07/10/23  Appointment Time: 1430  Appointment Provider: Autumn CASE        Follow-Ups:    Endocrinology to be scheduled    Cardiology to be scheudled    Interventions/ Care Coordination:    Interventions/ Care Coordination: Encouraged patient to call PCP/Specialist          Reviewed signs/symptoms of worsening condition or complication that necessitate a call to the Physician's office.  Educated patient on access to care.  RN phone number given for future care management.    Federica Adams RN BSN A [facility]  [phone]

## 2023-07-05 DIAGNOSIS — E27.40: Primary | ICD-10-CM

## 2023-07-05 RX ORDER — ZOLPIDEM TARTRATE 10 MG/1
10 TABLET ORAL NIGHTLY
Qty: 30 TABLET | Refills: 0 | Status: SHIPPED | OUTPATIENT
Start: 2023-07-05 | End: 2023-08-01 | Stop reason: SDUPTHER

## 2023-07-05 NOTE — TELEPHONE ENCOUNTER
Medicine Refill Request    Last Office: 6/13/2023   Last Consult Visit: Visit date not found  Last Telemedicine Visit: Visit date not found    Next Appointment: 7/10/2023      Current Outpatient Medications:   •  albuterol HFA (VENTOLIN HFA) 90 mcg/actuation inhaler, Inhale 2 puffs every 4 (four) hours as needed., Disp: , Rfl:   •  buprenorphine-naloxone (SUBOXONE) 8-2 mg film, Place 2 Film under the tongue every morning. In addition to 1/2 film QPM, Disp: , Rfl:   •  buprenorphine-naloxone (SUBOXONE) 8-2 mg film, Place 0.5 Film under the tongue daily before dinner. In addition to 2 films in the AM., Disp: , Rfl:   •  cholecalciferol (VITAMIN D3) 1,250 mcg (50,000 unit) capsule, Take 50,000 Units by mouth once a week on Monday., Disp: , Rfl:   •  ferrous sulfate 325 mg (65 mg iron) tablet, Take 65 mg by mouth nightly., Disp: , Rfl:   •  fluticasone propion-salmeteroL (ADVAIR DISKUS) 250-50 mcg/dose diskus inhaler, Inhale 1 puff 2 (two) times a day., Disp: , Rfl:   •  fluticasone propionate (FLONASE) 50 mcg/actuation nasal spray, Administer 1 spray into each nostril daily as needed for rhinitis or allergies., Disp: , Rfl:   •  magnesium oxide (MAG-OX) 400 mg (241.3 mg magnesium) tablet, Take 1 tablet (400 mg total) by mouth daily., Disp: 90 tablet, Rfl: 1  •  medical marijuana, 1 each See admin instr. Please be advised that an [facility] hospital staff member has listed medical marijuana as one of your home medications for documentation purposes. Please follow-up with your certified issuing provider for ongoing use, Disp: , Rfl:   •  metoprolol succinate XL (TOPROL-XL) 25 mg 24 hr tablet, Take 25 mg by mouth nightly., Disp: , Rfl:   •  NURTEC ODT 75 mg tablet,disintegrating, Take 1 tablet (75 mg total) by mouth every other day., Disp: 16 tablet, Rfl: 3  •  onabotulinumtoxinA (BOTOX) 200 unit recon soln injection, Botox for chronic migraine headache every 3 months, Disp: 1 each, Rfl: 4  •  pantoprazole (PROTONIX) 40 mg EC  tablet, Take 40 mg by mouth nightly., Disp: , Rfl:   •  PARoxetine (PAXIL) 40 mg tablet, Take 20 mg by mouth nightly. 1/2 tablet, Disp: , Rfl:   •  potassium chloride (KLOR-CON M) 20 mEq CR tablet, Take 1 tablet (20 mEq total) by mouth 2 (two) times a day., Disp: 180 tablet, Rfl: 1  •  predniSONE (DELTASONE) 5 mg tablet, Take 5 mg by mouth daily. Patient states taking 7.5 mg daily, Disp: , Rfl:   •  pregabalin (LYRICA) 200 mg capsule, Take 200 mg by mouth 3 (three) times a day., Disp: , Rfl:   •  QUEtiapine (SEROquel) 100 mg tablet, Take  mg by mouth nightly. 1/2 - 1 tablet, Disp: , Rfl:   •  tamsulosin (FLOMAX) 0.4 mg capsule, Take 0.4-0.8 mg by mouth See admin instr. Patient takes one or two doses per day., Disp: , Rfl:   •  TiZANidine 6 mg capsule, take 1 capsule by mouth every 6 to 8 hours if needed **MAX 3 CAPS IN 24 HOURS**, Disp: , Rfl:   •  torsemide (DEMADEX) 10 mg tablet, Take 1 tablet (10 mg total) by mouth daily., Disp: 90 tablet, Rfl: 1  •  zolpidem (AMBIEN) 10 mg tablet, Take 10 mg by mouth nightly., Disp: , Rfl:       BP Readings from Last 3 Encounters:   06/30/23 131/90   06/13/23 138/82   06/12/23 118/84       Recent Lab results:  Lab Results   Component Value Date    CHOL 180 06/23/2023   ,   Lab Results   Component Value Date    HDL 50 06/23/2023   ,   Lab Results   Component Value Date    LDLCALC 85 06/23/2023   ,   Lab Results   Component Value Date    TRIG 273 (H) 06/23/2023        Lab Results   Component Value Date    GLUCOSE 107 (H) 06/29/2023   ,   Lab Results   Component Value Date    HGBA1C 5.4 06/26/2023         Lab Results   Component Value Date    CREATININE 0.8 06/29/2023       Lab Results   Component Value Date    TSH 2.500 06/23/2023

## 2023-07-07 LAB — CORTIS AM PEAK SERPL-MCNC: 0.5 UG/DL (ref 6.2–19.4)

## 2023-07-10 ENCOUNTER — OFFICE VISIT (OUTPATIENT)
Dept: ENDOCRINOLOGY | Facility: CLINIC | Age: 38
End: 2023-07-10
Payer: COMMERCIAL

## 2023-07-10 ENCOUNTER — TELEPHONE (OUTPATIENT)
Dept: PRIMARY CARE | Facility: CLINIC | Age: 38
End: 2023-07-10

## 2023-07-10 ENCOUNTER — OFFICE VISIT (OUTPATIENT)
Dept: PRIMARY CARE | Facility: CLINIC | Age: 38
End: 2023-07-10
Payer: COMMERCIAL

## 2023-07-10 VITALS
DIASTOLIC BLOOD PRESSURE: 98 MMHG | HEART RATE: 90 BPM | WEIGHT: 266.8 LBS | SYSTOLIC BLOOD PRESSURE: 128 MMHG | OXYGEN SATURATION: 96 % | HEIGHT: 62 IN | BODY MASS INDEX: 49.1 KG/M2

## 2023-07-10 VITALS
BODY MASS INDEX: 48.84 KG/M2 | HEART RATE: 95 BPM | OXYGEN SATURATION: 96 % | SYSTOLIC BLOOD PRESSURE: 128 MMHG | HEIGHT: 62 IN | DIASTOLIC BLOOD PRESSURE: 78 MMHG | WEIGHT: 265.4 LBS

## 2023-07-10 DIAGNOSIS — M48.061 SPINAL STENOSIS OF LUMBAR REGION WITHOUT NEUROGENIC CLAUDICATION: ICD-10-CM

## 2023-07-10 DIAGNOSIS — Z79.52 CURRENT CHRONIC USE OF SYSTEMIC STEROIDS: Primary | ICD-10-CM

## 2023-07-10 DIAGNOSIS — G43.109 MIGRAINE WITH AURA AND WITHOUT STATUS MIGRAINOSUS, NOT INTRACTABLE: ICD-10-CM

## 2023-07-10 DIAGNOSIS — F32.A DEPRESSION, UNSPECIFIED DEPRESSION TYPE: ICD-10-CM

## 2023-07-10 DIAGNOSIS — I27.20 PULMONARY HYPERTENSION (CMS/HCC): ICD-10-CM

## 2023-07-10 DIAGNOSIS — M79.605 LEFT LEG PAIN: ICD-10-CM

## 2023-07-10 DIAGNOSIS — E78.1 PURE HYPERTRIGLYCERIDEMIA: ICD-10-CM

## 2023-07-10 DIAGNOSIS — E24.2: ICD-10-CM

## 2023-07-10 PROBLEM — E27.40 ADRENAL INSUFFICIENCY (CMS/HCC): Status: RESOLVED | Noted: 2019-04-15 | Resolved: 2023-07-10

## 2023-07-10 PROBLEM — M86.9 OSTEOMYELITIS (CMS/HCC): Status: RESOLVED | Noted: 2023-01-10 | Resolved: 2023-07-10

## 2023-07-10 PROBLEM — I33.0 ACUTE BACTERIAL ENDOCARDITIS: Status: RESOLVED | Noted: 2020-12-10 | Resolved: 2023-07-10

## 2023-07-10 PROBLEM — R20.0 RIGHT FACIAL NUMBNESS: Status: RESOLVED | Noted: 2022-09-20 | Resolved: 2023-07-10

## 2023-07-10 PROBLEM — M54.2 CERVICALGIA: Status: RESOLVED | Noted: 2022-09-20 | Resolved: 2023-07-10

## 2023-07-10 PROBLEM — I77.6 VASCULITIS (CMS/HCC): Status: RESOLVED | Noted: 2017-09-26 | Resolved: 2023-07-10

## 2023-07-10 PROBLEM — R20.0 RIGHT ARM NUMBNESS: Status: RESOLVED | Noted: 2022-09-20 | Resolved: 2023-07-10

## 2023-07-10 PROBLEM — F33.9 RECURRENT MAJOR DEPRESSIVE DISORDER (CMS/HCC): Status: RESOLVED | Noted: 2023-01-06 | Resolved: 2023-07-10

## 2023-07-10 PROBLEM — G44.229 CHRONIC TENSION-TYPE HEADACHE, NOT INTRACTABLE: Status: RESOLVED | Noted: 2022-09-20 | Resolved: 2023-07-10

## 2023-07-10 PROBLEM — R93.5 ABNORMAL CT OF THE ABDOMEN: Status: RESOLVED | Noted: 2022-12-13 | Resolved: 2023-07-10

## 2023-07-10 PROBLEM — I95.9 ARTERIAL HYPOTENSION: Status: RESOLVED | Noted: 2022-12-13 | Resolved: 2023-07-10

## 2023-07-10 PROBLEM — E86.0 DEHYDRATION: Status: RESOLVED | Noted: 2019-06-11 | Resolved: 2023-07-10

## 2023-07-10 PROCEDURE — 3008F BODY MASS INDEX DOCD: CPT | Performed by: INTERNAL MEDICINE

## 2023-07-10 PROCEDURE — 3080F DIAST BP >= 90 MM HG: CPT | Performed by: PHYSICIAN ASSISTANT

## 2023-07-10 PROCEDURE — 3008F BODY MASS INDEX DOCD: CPT | Performed by: PHYSICIAN ASSISTANT

## 2023-07-10 PROCEDURE — 99214 OFFICE O/P EST MOD 30 MIN: CPT | Performed by: PHYSICIAN ASSISTANT

## 2023-07-10 PROCEDURE — 3074F SYST BP LT 130 MM HG: CPT | Performed by: PHYSICIAN ASSISTANT

## 2023-07-10 PROCEDURE — 3074F SYST BP LT 130 MM HG: CPT | Performed by: INTERNAL MEDICINE

## 2023-07-10 PROCEDURE — 3078F DIAST BP <80 MM HG: CPT | Performed by: INTERNAL MEDICINE

## 2023-07-10 PROCEDURE — 99205 OFFICE O/P NEW HI 60 MIN: CPT | Performed by: INTERNAL MEDICINE

## 2023-07-10 PROCEDURE — 1111F DSCHRG MED/CURRENT MED MERGE: CPT | Performed by: PHYSICIAN ASSISTANT

## 2023-07-10 RX ORDER — LISINOPRIL 5 MG/1
5 TABLET ORAL DAILY
Qty: 90 TABLET | Refills: 1 | Status: SHIPPED | OUTPATIENT
Start: 2023-07-10 | End: 2023-07-11 | Stop reason: SDUPTHER

## 2023-07-10 RX ORDER — PREGABALIN 200 MG/1
200 CAPSULE ORAL
COMMUNITY
End: 2023-07-10 | Stop reason: SDUPTHER

## 2023-07-10 RX ORDER — PREDNISONE 1 MG/1
TABLET ORAL
Qty: 450 TABLET | Refills: 0 | Status: SHIPPED | OUTPATIENT
Start: 2023-07-26 | End: 2023-11-27 | Stop reason: SDUPTHER

## 2023-07-10 RX ORDER — PREGABALIN 200 MG/1
200 CAPSULE ORAL
Qty: 270 CAPSULE | Refills: 1 | Status: SHIPPED | OUTPATIENT
Start: 2023-07-10 | End: 2023-07-11 | Stop reason: SDUPTHER

## 2023-07-10 NOTE — ASSESSMENT & PLAN NOTE
Following with cardiology   echo in hospital with normal ef, mild phtn  Adding lisinopril with htn today  Continue torsemide  Continue cpap

## 2023-07-10 NOTE — ASSESSMENT & PLAN NOTE
From long term steroids due to vasculitis(now resolved vasculitis)  Weaning slowly on taper from endocrinology

## 2023-07-10 NOTE — PROGRESS NOTES
37 y.o. yo female with PMH of IVDA, cholecystitis, pancreatitis 2/2 ERCP, fibromyalgia, endocarditis, vasculitis, ARIANNA, class 3 obesity, spinal stenosis, depression, pHTN presenting for evaluation and management of adrenal insufficiency    Prior history summarized as per review of records:  - Former pt of Dr. Cerna at [place], seen 4/2019 in context of chronic steroid use with goal of discontinuation. Noted to have developed endocarditis in 2017 2/2 urosepsis with subsequent development of ? Vasculitis 4/2017 at [facility], at which time she was started on high dose steroids. Had been following with rheumatology Dr. Harris who started tapering the prednisone in winter of 2017. At that time she was on prednisone 10 mg daily. Recommended AM cortisol followed by ACTH stim test if equivocal. Recommended tapering steroids by 1 mg per week. Unable to find if pt did labs for Dr. Cerna at that time.  - Saw PCP Priscilla Leyva PA C 6/13/23 to establish care. Noted to be on prednisone 5 mg daily at that time. Noted to have no menstrual cycle for > 1 year even after stopping OCP. Referred to endocrinology for AI. Post visit labs found low midday cortisol of 0.8 and pt was sent to ED  - Pt admitted to [place] 6/26-6/30/23 for possible adrenal insufficiency, seen by Dr. Flores. Had a 4 pm cortisol of 1.7, then AM cortisol of 8.1. Noted to be on a steroids taper from prednisone 50 mg daily and was on 5 mg daily at the time of admission. Per consult note pt experiences swelling when tapering steroids. Dr. Flores felt she did not have AI. Per discharge summary had been tapered from 15 mg prednisone to 5 mg over 4 weeks. She was discharged on 7.5 mg daily    Pt's mother was present for the entire visit and helped provide elements of the HPI     Pt states was on prednisone 5 mg daily at the time of most recent labs draw.     Steroid history: Pt states she had vasculitis in 2017 in context of drug use. Has been  "sober for few years now. At that time had active vasculitis which came out as big bumps on arm, they thought she had MRSA. Did biopsy which confirmed vasculitis. At the same time had endocarditis. Started prednisone at that time for about a year, started in hospital with IV steroids, then discharged on prednisone 60 mg daily. Was seeing rheumatologist at that time who was trying to taper the steroids and start an immunotherapy. Started tapering gradually but then insurance changed so stopped seeing her and so she tapered the steroids on her own. With rheumatologist she tapered down to prednisone 40 mg daily and then started an immunosuppressant (can't remember the name). In spring of 2019 stopped the prednisone altogether on her own. Then 10/2022 started getting a rash on L foot. Was living in [place] at that time and stopped caring for herself. Went to a [facility] minute clinic and the NP suspected it was vasculitis and restarted prednisone 50 mg daily x5 days, then taper by 10 mg q5 days. When she restarted the steroids her mind was clear, vision was clear, hearing was better, pain and inflammation was better. When she got down to 30 mg she started seeing rheumatology Dr. Bradley at [place] who tried to taper the steroids but \"kept hitting walls\". Felt like she couldn't go below 30 mg, then got down to 25 mg, then down to 20 mg. Thinks she got stuck on 30 mg daily for 6 mo. When she got admitted to the hospital 2 weeks ago she had been on 5 mg for ~1.5 weeks, down from 7.5 mg for ~1-2 weeks. Felt like this taper was too quick. States when she got down to 5 mg daily she started to \"blow up\". Currently on prednisone 7.5 mg daily since hospital discharge on 6/30/23 (10 days ago). Feels ok on the current dose, feels the same as she did on 5 mg dose    In the 3 years when she was off steroids she felt fine.     Notes difficulty sleeping. Also going off seroquel and paxil currently. Working with psychiatrist    Flushing: not " "currently but had on higher doses of steroids (> 10 mg/day)  Diaphoresis: only on higher doses of steroids (> 10 mg/day)  History of diabetes: denies  Easy bruising: yes, ongoing for ~1 year prior to restarting steroids 10/2022  Fatigue: yes, can't identify if worsening with steroid taper  Purple striae: yes, occur when on higher doses of steroids  Weight gain:   Wt Readings from Last 3 Encounters:   07/10/23 120 kg (265 lb 6.4 oz)   06/29/23 124 kg (273 lb 6.4 oz)   06/13/23 121 kg (267 lb)   Skin thinning: yes  Hirsutism: denies  Hair loss: yes, notes this happens when any dose adjustment of steroids (higher or lower doses)  Acne: denies  Menstrual history: started OCP so long ago that can't remember if cycles were regular prior. When on OCP would skip cycles occasionally. Stopped OCP summer 2022 and has not had cycle since    Abd pain/nausea/vomiting: yes, worse when tapering steroids. In 2012 had cholecystectomy and had a lot of issues with nausea. In 2019 nausea started to improve but then had some \"ups and downs\".   Postural dizziness: yes, worse on lower dose of steroids    Lives with mother    ROS: Complete ROS is otherwise negative except as mentioned in the HPI above  -------------------------------------------------------------------------  Past Medical History:   Diagnosis Date   • Anxiety    • Depressed    • Endocarditis    • Fibromyalgia    • Migraines    • Pancreatitis    • Substance abuse (CMS/HCC) 2015   • Tachycardia    • Vasculitis (CMS/HCC)        Past Surgical History:   Procedure Laterality Date   • CHOLECYSTECTOMY     • ERCP     • GALLBLADDER SURGERY     • TONSILLECTOMY  1991       Family History   Problem Relation Age of Onset   • Hypertension Biological Mother    • Lung cancer Biological Father 76        smoker   • Lung cancer Maternal Grandmother 69        smoker   • Heart disease Maternal Grandfather    • Heart attack Maternal Grandfather         unsure of age   • Colon cancer Maternal " Grandfather        Social History     Socioeconomic History   • Marital status: Single     Spouse name: None   • Number of children: None   • Years of education: None   • Highest education level: None   Occupational History   • Occupation:    Tobacco Use   • Smoking status: Former     Packs/day: 0.50     Years: 5.00     Pack years: 2.50     Types: Cigarettes     Quit date: 2020     Years since quitting: 3.5   • Smokeless tobacco: Current   • Tobacco comments:     quit 1 year ago, vapes currently   Vaping Use   • Vaping Use: Every day   • Substances: Nicotine, Flavoring   • Devices: Refillable tank   Substance and Sexual Activity   • Alcohol use: Not Currently   • Drug use: Yes     Types: Marijuana, Heroin     Comment: MM now 2023, previous heroin and opiod   • Sexual activity: Not Currently     Partners: Male, Female   Social History Narrative    Lives with mom     Social Determinants of Health     Financial Resource Strain: Low Risk  (6/27/2023)    Overall Financial Resource Strain (CARDIA)    • Difficulty of Paying Living Expenses: Not hard at all   Food Insecurity: No Food Insecurity (6/26/2023)    Hunger Vital Sign    • Worried About Running Out of Food in the Last Year: Never true    • Ran Out of Food in the Last Year: Never true   Transportation Needs: No Transportation Needs (6/27/2023)    PRAPARE - Transportation    • Lack of Transportation (Medical): No    • Lack of Transportation (Non-Medical): No   Housing Stability: Low Risk  (6/27/2023)    Housing Stability Vital Sign    • Unable to Pay for Housing in the Last Year: No    • Number of Places Lived in the Last Year: 1    • Unstable Housing in the Last Year: No         Current Outpatient Medications:   •  albuterol HFA (VENTOLIN HFA) 90 mcg/actuation inhaler, Inhale 2 puffs every 4 (four) hours as needed., Disp: , Rfl:   •  buprenorphine-naloxone (SUBOXONE) 8-2 mg film, Place 2 Film under the tongue every morning. In addition to 1/2 film  QPM, Disp: , Rfl:   •  cholecalciferol (VITAMIN D3) 1,250 mcg (50,000 unit) capsule, Take 50,000 Units by mouth once a week on Monday., Disp: , Rfl:   •  ferrous sulfate 325 mg (65 mg iron) tablet, Take 65 mg by mouth nightly., Disp: , Rfl:   •  fluticasone propionate (FLONASE) 50 mcg/actuation nasal spray, Administer 1 spray into each nostril daily as needed for rhinitis or allergies., Disp: , Rfl:   •  magnesium oxide (MAG-OX) 400 mg (241.3 mg magnesium) tablet, Take 1 tablet (400 mg total) by mouth daily., Disp: 90 tablet, Rfl: 1  •  medical marijuana, 1 each See admin instr. Please be advised that an [facility] hospital staff member has listed medical marijuana as one of your home medications for documentation purposes. Please follow-up with your certified issuing provider for ongoing use, Disp: , Rfl:   •  metoprolol succinate XL (TOPROL-XL) 25 mg 24 hr tablet, Take 25 mg by mouth nightly., Disp: , Rfl:   •  NURTEC ODT 75 mg tablet,disintegrating, Take 1 tablet (75 mg total) by mouth every other day., Disp: 16 tablet, Rfl: 3  •  onabotulinumtoxinA (BOTOX) 200 unit recon soln injection, Botox for chronic migraine headache every 3 months, Disp: 1 each, Rfl: 4  •  pantoprazole (PROTONIX) 40 mg EC tablet, Take 40 mg by mouth nightly., Disp: , Rfl:   •  PARoxetine (PAXIL) 10 mg tablet, Take 10 mg by mouth daily., Disp: , Rfl:   •  predniSONE (DELTASONE) 2.5 mg tablet, Take 2.5 mg by mouth daily. 7.5 mg daily, Disp: , Rfl:   •  predniSONE (DELTASONE) 5 mg tablet, Take 1 tablet (5 mg total) by mouth daily. Patient states taking 7.5 mg daily, Disp: , Rfl:   •  QUEtiapine (SEROquel) 50 mg tablet, Take 50 mg by mouth nightly., Disp: , Rfl:   •  torsemide (DEMADEX) 10 mg tablet, Take 1 tablet (10 mg total) by mouth daily., Disp: 90 tablet, Rfl: 1  •  zolpidem (AMBIEN) 10 mg tablet, Take 1 tablet (10 mg total) by mouth nightly., Disp: 30 tablet, Rfl: 0    Allergies   Allergen Reactions   • Amoxicillin GI intolerance   • Nsaids  "(Non-Steroidal Anti-Inflammatory Drug) Nausea Only     Other reaction(s): Nausea Only  Other reaction(s): Nausea Only   • Tramadol Palpitations   • Trazodone      Other reaction(s): Headaches, Other (see comments)     -------------------------------------------------------------------------  PHYSICAL EXAM  Visit Vitals  /78 (BP Location: Right upper arm, Patient Position: Sitting)   Pulse 95   Ht 1.575 m (5' 2\")   Wt 120 kg (265 lb 6.4 oz)   LMP 05/31/2022 (Approximate)   SpO2 96%   BMI 48.54 kg/m²       Gen: well nourished, no acute distress, + round facies  Eyes: no proptosis, normal conjunctiva  Neck: no thyromegaly, no nodules palpated, + supraclavicular adiposity, no dorsocervical adiposity  CV: regular rate   Pulm: no use of accessory muscles, on room air  Abd: obese disproportionate to limbs, non distended  Neuro: AAOx3  Skin: few small violaceous striae on L lateral abdomen  MSK: steady gait, no tremor of outstretched hands  Psych: normal mood, affect    LABS REVIEWED  Cortisol   Date/Time Value Ref Range Status   06/29/2023 0738 0.5 ug/dL Final     Comment:     REFERENCE RANGE  AM: (08:00 +/- 1 hour) 6.7-22.6 ug/dL  PM: (16:00 +/- 1 hour)  <10.0 ug/dL   06/28/2023 0633 0.5 ug/dL Final     Comment:     REFERENCE RANGE  AM: (08:00 +/- 1 hour) 6.7-22.6 ug/dL  PM: (16:00 +/- 1 hour)  <10.0 ug/dL   06/27/2023 0353 8.1 ug/dL Final     Comment:     REFERENCE RANGE  AM: (08:00 +/- 1 hour) 6.7-22.6 ug/dL  PM: (16:00 +/- 1 hour)  <10.0 ug/dL   06/26/2023 1820 1.7 ug/dL Final     Comment:     REFERENCE RANGE  AM: (08:00 +/- 1 hour) 6.7-22.6 ug/dL  PM: (16:00 +/- 1 hour)  <10.0 ug/dL     Cortisol - AM   Date/Time Value Ref Range Status   07/06/2023 1127 0.5 (L) 6.2 - 19.4 ug/dL Final   06/23/2023 1141 0.8 (L) 6.2 - 19.4 ug/dL Final     ACTH, Plasma   Date/Time Value Ref Range Status   06/26/2023 1953 <5 (L) 6 - 50 pg/mL Final     Comment:     Reference range applies only to specimens collected  between " 7am-10am.             Hemoglobin A1C   Date Value Ref Range Status   06/26/2023 5.4 <5.7 % Final      Component      Latest Ref Rng 6/28/2023 06:33     Glucose      70 - 99 mg/dL 103 (H)    C-Peptide      0.70 - 4.10 ng/mL 4.60 (H)       TSH   Date Value Ref Range Status   06/23/2023 2.500 0.450 - 4.500 uIU/mL Final   06/12/2023 2.490 0.450 - 4.500 uIU/mL Final   07/26/2021 3.300 0.450 - 4.500 uIU/mL Final     T4,Free(Direct)   Date Value Ref Range Status   06/23/2023 1.27 0.82 - 1.77 ng/dL Final   06/12/2023 1.43 0.82 - 1.77 ng/dL Final   07/26/2021 0.96 0.82 - 1.77 ng/dL Final          IMAGING REVIEWED  CT ABDOMEN PELVIS WITH IV CONTRAST 1/13/22    Narrative  CLINICAL HISTORY: Right-sided back pain, right upper quadrant abdominal pain, flank pain, clinical concern for pyelonephritis.  COVID-19 positive.  High probability of pulmonary emboli.    COMMENT:    Comparison: CT of the chest dated 12/9/2020.  Right upper quadrant ultrasound dated 12/17/2020.    TECHNIQUE: CT angiography of the chest was performed as per departmental pulmonary embolism protocol, with axial images acquired following the intravenous administration of 100 cc Omnipaque-350.  Portal venous phase imaging of the abdomen and pelvis was then performed.  Delayed phase imaging of the abdomen was performed through the level of the kidneys.  Oral contrast was not administered.  Sagittal and coronal reconstructed images were rendered.  3-D MIP and/or volume-rendered image reconstruction of the chest was performed and reviewed.    CT DOSE:  One or more dose reduction techniques (e.g. automated exposure control, adjustment of the mA and/or kV according to patient size, use of iterative reconstruction technique) utilized for this examination.    CHEST:  LUNGS and AIRWAYS: There are faint patchy groundglass alveolar opacities in both upper lobes and the lingula which lack a specific distribution.  These are nonspecific though most suggestive of an  inflammatory or infectious pneumonitis. There is no consolidation or mass.  The trachea and central main airways are patent and normal in caliber.  PLEURA: Unremarkable. No pleural effusions.  VESSELS: No central, lobar, or proximal segmental pulmonary arterial filling defects to suggest pulmonary emboli.  The thoracic aorta and main pulmonary artery are normal in caliber.  HEART: Heart size is normal.  No pericardial effusion.  MEDIASTINUM and PAULINA: Mildly enlarged prevascular lymph node to the left of the aortic arch which measures 1.1 cm in short axis (previously 1.7 cm).  Mildly enlarged subcarinal lymph node measuring 1 cm (previously 1.2 cm).  These lymph nodes are nonspecific, possibly reactive.  No pathologically enlarged hilar lymph nodes.  CHEST WALL and LOWER NECK: Unremarkable.  No pathologically enlarged axillary  lymph nodes.    ABDOMEN:  LIVER: Top normal in size.  Otherwise unremarkable.  GALLBLADDER: Cholecystectomy.  BILE DUCTS: No intrahepatic or extrahepatic biliary ductal dilatation.  PANCREAS: Unremarkable.  SPLEEN: Unremarkable.  ADRENALS: Unremarkable.  KIDNEYS: There is normal and symmetric renal enhancement and excretion.  There are no findings typical of pyelonephritis.  There is no hydronephrosis.  There is no perinephric fat stranding or perinephric collection.  URETERS: Nondilated.  BOWEL: The stomach is nondilated.  The small and large bowel are normal in caliber.  A collapsed appendix is identified.  PERITONEUM: No ascites or pneumoperitoneum. No fluid collection.  RETROPERITONEUM: Unremarkable.  LYMPH NODES: None pathologically enlarged.  VESSELS: Unremarkable. Normal caliber abdominal aorta.  UPPER ABDOMINAL WALL SOFT TISSUES: Unremarkable.    PELVIS:  BLADDER: Essentially completely collapsed and therefore cannot be adequately assessed.  REPRODUCTIVE ORGANS: No pelvic masses.  PERITONEUM: No free fluid. No fluid collection.  RETROPERITONEUM: Unremarkable.  VESSELS:  Unremarkable.  LYMPH NODES: None pathologically enlarged.  LOWER ABDOMINAL WALL SOFT TISSUES: Unremarkable.    BONES: Multilevel spondylosis and tiny multilevel Schmorl's nodes in the thoracic and lumbar spine.  Vacuum disc phenomenon is noted at several lower thoracic and lower lumbar levels.  Tiny sclerotic foci in the bony pelvis are nonspecific, most likely incidental benign bone islands.  No destructive osseous lesions.    --    Impression  1.  No evidence of pulmonary emboli as clinically questioned.  2.  Faint patchy groundglass alveolar opacities in both upper lobes and the lingula, nonspecific.  An infectious or inflammatory pneumonitis might have this appearance.  No pulmonary consolidation.  3.  Mild mediastinal lymphadenopathy persists though has improved since 12/9/2020.  This is nonspecific, possibly reactive.  4.  No acute abnormality in the abdomen or pelvis.  No specific evidence of an inflammatory or obstructive process.  No evidence of pyelonephritis as clinically questioned.      ASSESSMENT AND PLAN:     1. Chronic steroid use  - Has been on and off high dose steroids due to vasculitis since 2017. Was off altogether from 2019 until 10/2022 at which time prednisone was restarted by urgent care due to rash on L foot concerning for recurrent vasculitis. Was unable to taper down past prednisone 30 mg daily for ~6 mo, then started tapering few months ago  - Had tapered down to 5 mg daily for ~1.5 weeks then got admitted to [place] with concern for AI per PCP due to cortisol level of 0.8 around noon as well as worsening swelling on lower dose of prednisone. Was discharged from hospital on prednisone 7.5 mg daily (now for 12 days)  - Pt states she feels overall ok without changes on 5 mg dose vs 7.5 mg dose  - Pt appears to have iatrogenic Cushing's syndrome on exam  - Discussed that a random cortisol is uninterpretable while on prolonged supraphysiologic doses of prednisone and does not reflect AI. Pt  cannot have AI while on supraphysiologic doses of prednisone as this would be the treatment for AI  - Given pts prolonged steroid use and worsening symptoms with 1.5 week taper, recommend much longer taper. Per pt she no longer requires steroids for vasculitis treatment  - Recommend monthly taper. Continue prednisone 7.5 mg weekly x1 mo (until 7/26/23), then decrease to 5 mg daily x4 weeks (until 8/23/23), then decrease to 4 mg daily x4 weeks (until 9/20/23), then decrease to 3 mg daily x4 weeks (until 10/18/23), then decrease to 2 mg daily x4 weeks (until 11/15/23), then decrease to 1 mg daily x4 weeks (until 12/13/23), then STOP. Pt to call with any symptoms/concerns during the steroid taper  - Plan to evaluate HPA axis ~2 weeks after stopping prednisone altogether    2. Iatrogenic Cushing's  - As above    Letter sent to referring provider  RTC 6 mo  Total time spent on the day of the visit, including record review, face to face time, and documentation: 64 min

## 2023-07-10 NOTE — PATIENT INSTRUCTIONS
Please wear cpap nightly  Please start the lisinopril for your blood pressure in addition to your current medications  Continue to work on diet and exercise  Repeat labs prior to next visit in 3 months  Keep appt with cardiology and follow up with endocrinology    Make sure to get 1200mg of calcium in diet for bone health    Get the xray of your leg- with your long standing use of prednisone, want to make sure you do not have a fracture  Follow up with orthopedics if pain is not resolving  Please make an appointment with the Orthopedic Surgeons at [place] [phone], [place] [phone], [place] [phone] or [place] [phone]  Dr. Jorge Walsh(non surgical)  Dr. Ken Aksu Dr. Ashley Anderson Dr. Andrew Frankel Dr. Charles Odgers

## 2023-07-10 NOTE — PATIENT INSTRUCTIONS
Continue prednisone 7.5 mg daily until 7/26/23, then go down to 5 mg daily.   Continue 5 mg daily until 8/23/23, then go down to 4 mg daily  Continue 4 mg daily until 9/20/23, then go down to 3 mg daily  Continue 3 mg daily until 10/18/23, then go down to 2 mg daily  Continue 2 mg daily until 11/15/23, then go down to 1 mg daily  Continue 1 mg daily until 12/13/23, then STOP    Please have your blood work done between 7-9 am about 2 weeks after you stop the prednisone altogether
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
## 2023-07-10 NOTE — ASSESSMENT & PLAN NOTE
Fall over one year ago  Still with pain in right hip and tib/fib  With longstanding prednisone use, xray  Ortho for follow up

## 2023-07-10 NOTE — TELEPHONE ENCOUNTER
Medication Request   Patient PCP: Priscilla Leyva PA C  Next Office Visit: 9/14/2023  Has this provider prescribed this medication before?: Yes   Medication Name: lisinopriL (PRINIVIL)   Medication Dose: 5 mg tablet  Medication Frequency: Take 1 tablet (5 mg total) by mouth daily.  -------------------------------------------  Medication Request   Patient PCP: Priscilla Leyva PA C  Next Office Visit: 9/14/2023  Has this provider prescribed this medication before?: Yes   Medication Name: pregabalin (LYRICA)   Medication Dose: 200 mg capsule  Medication Frequency: Take 1 capsule (200 mg total) by mouth 3 (three) times a day with meals.    Preferred Pharmacy: [facility] #[id number] - [place] - [address] AT [place] [address]  [place] [address]  Phone: [phone] Fax: [phone]      Pt saw Priscilla Leyva today and was prescribed the 2 medications. Saw it was sent to her preferred pharmacy, but they advised they have not recvd it. Would like the pharmacy to be contacted to ensure they recv the prescription.    If it does not work, would like the prescriptions to be sent to the [facility] on [address], [facility] [place] [address]      Please allow 2 business days for your provider to send your medication request or to reach out to discuss.

## 2023-07-11 ENCOUNTER — HOSPITAL ENCOUNTER (OUTPATIENT)
Dept: PSYCHOLOGY | Facility: CLINIC | Age: 38
Discharge: HOME | End: 2023-07-11
Payer: COMMERCIAL

## 2023-07-11 DIAGNOSIS — G31.84 MILD COGNITIVE IMPAIRMENT: ICD-10-CM

## 2023-07-11 PROCEDURE — 99999 PR OFFICE/OUTPT VISIT,PROCEDURE ONLY: CPT | Performed by: CLINICAL NEUROPSYCHOLOGIST

## 2023-07-11 RX ORDER — LISINOPRIL 5 MG/1
5 TABLET ORAL DAILY
Qty: 90 TABLET | Refills: 1 | Status: SHIPPED | OUTPATIENT
Start: 2023-07-11 | End: 2023-08-28 | Stop reason: SDUPTHER

## 2023-07-11 RX ORDER — PREGABALIN 200 MG/1
200 CAPSULE ORAL
Qty: 270 CAPSULE | Refills: 1 | Status: SHIPPED | OUTPATIENT
Start: 2023-07-11 | End: 2023-12-15 | Stop reason: SDUPTHER

## 2023-07-11 NOTE — TELEPHONE ENCOUNTER
Medicine Refill Request    Last Office: 7/10/2023   Last Consult Visit: Visit date not found  Last Telemedicine Visit: Visit date not found    Next Appointment: 9/14/2023      Current Outpatient Medications:   •  albuterol HFA (VENTOLIN HFA) 90 mcg/actuation inhaler, Inhale 2 puffs every 4 (four) hours as needed., Disp: , Rfl:   •  buprenorphine-naloxone (SUBOXONE) 8-2 mg film, Place 2 Film under the tongue every morning. In addition to 1/2 film QPM, Disp: , Rfl:   •  cholecalciferol (VITAMIN D3) 1,250 mcg (50,000 unit) capsule, Take 50,000 Units by mouth once a week on Monday., Disp: , Rfl:   •  ferrous sulfate 325 mg (65 mg iron) tablet, Take 65 mg by mouth nightly., Disp: , Rfl:   •  fluticasone propionate (FLONASE) 50 mcg/actuation nasal spray, Administer 1 spray into each nostril daily as needed for rhinitis or allergies., Disp: , Rfl:   •  lisinopriL (PRINIVIL) 5 mg tablet, Take 1 tablet (5 mg total) by mouth daily., Disp: 90 tablet, Rfl: 1  •  magnesium oxide (MAG-OX) 400 mg (241.3 mg magnesium) tablet, Take 1 tablet (400 mg total) by mouth daily., Disp: 90 tablet, Rfl: 1  •  medical marijuana, 1 each See admin instr. Please be advised that an [facility] hospital staff member has listed medical marijuana as one of your home medications for documentation purposes. Please follow-up with your certified issuing provider for ongoing use, Disp: , Rfl:   •  metoprolol succinate XL (TOPROL-XL) 25 mg 24 hr tablet, Take 25 mg by mouth nightly., Disp: , Rfl:   •  NURTEC ODT 75 mg tablet,disintegrating, Take 1 tablet (75 mg total) by mouth every other day., Disp: 16 tablet, Rfl: 3  •  onabotulinumtoxinA (BOTOX) 200 unit recon soln injection, Botox for chronic migraine headache every 3 months, Disp: 1 each, Rfl: 4  •  pantoprazole (PROTONIX) 40 mg EC tablet, Take 40 mg by mouth nightly., Disp: , Rfl:   •  PARoxetine (PAXIL) 10 mg tablet, Take 10 mg by mouth daily., Disp: , Rfl:   •  [START ON 7/26/2023] predniSONE (DELTASONE) 1  mg tablet, Take 5 tablets (5 mg total) by mouth daily for 30 days, THEN 4 tablets (4 mg total) daily for 30 days, THEN 3 tablets (3 mg total) daily for 30 days, THEN 2 tablets (2 mg total) daily for 30 days, THEN 1 tablet (1 mg total) daily. Patient states taking 7.5 mg daily., Disp: 450 tablet, Rfl: 0  •  pregabalin (LYRICA) 200 mg capsule, Take 1 capsule (200 mg total) by mouth 3 (three) times a day with meals., Disp: 270 capsule, Rfl: 1  •  QUEtiapine (SEROquel) 50 mg tablet, Take 50 mg by mouth nightly., Disp: , Rfl:   •  torsemide (DEMADEX) 10 mg tablet, Take 1 tablet (10 mg total) by mouth daily., Disp: 90 tablet, Rfl: 1  •  zolpidem (AMBIEN) 10 mg tablet, Take 1 tablet (10 mg total) by mouth nightly., Disp: 30 tablet, Rfl: 0      BP Readings from Last 3 Encounters:   07/10/23 (!) 128/98   07/10/23 128/78   06/30/23 131/90       Recent Lab results:  Lab Results   Component Value Date    CHOL 180 06/23/2023   ,   Lab Results   Component Value Date    HDL 50 06/23/2023   ,   Lab Results   Component Value Date    LDLCALC 85 06/23/2023   ,   Lab Results   Component Value Date    TRIG 273 (H) 06/23/2023        Lab Results   Component Value Date    GLUCOSE 107 (H) 06/29/2023   ,   Lab Results   Component Value Date    HGBA1C 5.4 06/26/2023         Lab Results   Component Value Date    CREATININE 0.8 06/29/2023       Lab Results   Component Value Date    TSH 2.500 06/23/2023

## 2023-07-12 ENCOUNTER — TELEPHONE (OUTPATIENT)
Dept: ENDOCRINOLOGY | Facility: CLINIC | Age: 38
End: 2023-07-12
Payer: COMMERCIAL

## 2023-07-12 NOTE — TELEPHONE ENCOUNTER
Pat. Called in about needing the prednisone taper      7.5mg for two more weeks.     Needs it sent to [facility] [address]

## 2023-07-13 RX ORDER — PREDNISONE 5 MG/1
7.5 TABLET ORAL DAILY
Qty: 20 TABLET | Refills: 0 | Status: SHIPPED | OUTPATIENT
Start: 2023-07-13 | End: 2023-08-29 | Stop reason: ALTCHOICE

## 2023-07-14 ENCOUNTER — HOSPITAL ENCOUNTER (OUTPATIENT)
Dept: RADIOLOGY | Facility: CLINIC | Age: 38
Discharge: HOME | End: 2023-07-14
Attending: PHYSICIAN ASSISTANT
Payer: COMMERCIAL

## 2023-07-14 ENCOUNTER — TELEPHONE (OUTPATIENT)
Dept: ENDOCRINOLOGY | Facility: CLINIC | Age: 38
End: 2023-07-14
Payer: COMMERCIAL

## 2023-07-14 DIAGNOSIS — M79.605 LEFT LEG PAIN: ICD-10-CM

## 2023-07-14 PROCEDURE — 73590 X-RAY EXAM OF LOWER LEG: CPT | Mod: LT

## 2023-07-14 PROCEDURE — 73503 X-RAY EXAM HIP UNI 4/> VIEWS: CPT | Mod: LT

## 2023-07-14 NOTE — RESULT ENCOUNTER NOTE
See x-ray of hip as well  Done due to ongoing left hip and leg pain with idiopathic Cushing's disease on steroids for a long period of time    Negative x-ray

## 2023-07-14 NOTE — TELEPHONE ENCOUNTER
Confirmed with pt that she did receive her Prednisone doses from pharmacy. Pt verbalized understanding.

## 2023-07-14 NOTE — TELEPHONE ENCOUNTER
----- Message from Carolyn Redmond sent at 7/12/2023  5:56 PM EDT -----  Regarding: Prednisone RX  Contact: [phone]  Hi Dr. Kaur,    I just realized last night that I didn’t have refills on my current 5mg script so I need 7.5mg per day sent over to [place] [address] for 2 weeks from today (I did get my dose in today). The pharmacy has the 1mg to start my taper in two weeks. Thank you!

## 2023-07-17 ENCOUNTER — TELEMEDICINE (OUTPATIENT)
Dept: BEHAVIORAL/MENTAL HEALTH CLINIC | Facility: CLINIC | Age: 38
End: 2023-07-17
Payer: COMMERCIAL

## 2023-07-17 DIAGNOSIS — F41.1 GENERALIZED ANXIETY DISORDER: ICD-10-CM

## 2023-07-17 DIAGNOSIS — F33.0 MILD EPISODE OF RECURRENT MAJOR DEPRESSIVE DISORDER (HCC): Primary | ICD-10-CM

## 2023-07-17 PROCEDURE — 90837 PSYTX W PT 60 MINUTES: CPT | Performed by: COUNSELOR

## 2023-07-17 NOTE — PSYCH
Behavioral Health Psychotherapy Progress Note    Psychotherapy Provided: Individual Psychotherapy     1. Mild episode of recurrent major depressive disorder ([address])        2. Generalized anxiety disorder            Goals addressed in session: Goal 1     DATA:     -CT reported that she has learned so much about her physical condition. CT learned that she has severe adrenal function issues. CT described her experience being diagnosed and treated by new PCP very positive. -CT was seen and treated by endocrinologist 1 week post discharge. CT adrenal insufficiency being addressed and she is following taper of steroid direction (slow taper now in place)  -CT reported that she completed neuropsych evaluationand described this experience and results. (CT learned that she is a high visual processor/comprehender, ruled out MS after evaluating multiple MRI). CT will have results and then share with TH. CT encouraged to know more about the way her brain, thinking, and comprehension/learning. CT feels hopeful . -CT reflected on her life-long quest to find out "what is wrong with me". CT stated that she feels as if she is   -CT has been searching for answers to her issues since 2011 (listening to doctors) and 2013 (searching for my own answers). TH celebrated and acknowledged CT's determination, perseverance, and listening to her inner voice/knowing,   -CT reported about reconnecting with an old friend who she had originally met in rehab. This friend is doing well and interested in providing less biased (against drug use hx) care for others in recovery. Similar to CT's goals. TH encouraged CT to review information by Tyler Agustin. -CT reported that she is at a reduced dose of Paxil (10 mg) and Seroquel (25mg). CT following psych instructions and hoping to have further reductions.  "I feel more clear and alert"  -CT is returning to cpap use, got a new mattress, rested well previous night and looking forward to more good rest.  -CT reported practicing Lorella Gilford regularly with benefits. Will continue. During this session, this clinician used the following therapeutic modalities: Supportive Psychotherapy    Substance Abuse was not addressed during this session. If the client is diagnosed with a co-occurring substance use disorder, please indicate any changes in the frequency or amount of use: . Stage of change for addressing substance use diagnoses: No substance use/Not applicable    ASSESSMENT:  Flor Pitts presents with a Euthymic/ normal mood. her affect is Normal range and intensity, which is congruent, with her mood and the content of the session. The client has made progress on their goals. Flor Pitts presents with a none risk of suicide, none risk of self-harm, and none risk of harm to others. For any risk assessment that surpasses a "low" rating, a safety plan must be developed. A safety plan was indicated: no  If yes, describe in detail     PLAN: Between sessions, Flor Pitts will continue [employer] practice, follow up with medical Dr's tx, prioritize quality sleep, share neuropysh eval, continue connection with friend and gathering info as noted. At the next session, the therapist will use Supportive Psychotherapy to support navigation of medical tx and monitor MH through process. .    Behavioral Health Treatment Plan and Discharge Planning: Flor Pitts is aware of and agrees to continue to work on their treatment plan. They have identified and are working toward their discharge goals.  yes    Visit start and stop times:    07/17/23  Start Time: 5600  Stop Time: 1303  Total Visit Time: 59 minutes    Virtual Regular Visit    Verification of patient location:    Patient is located at Home in the following state in which I hold an active license [place]      Assessment/Plan:    Problem List Items Addressed This Visit        Other    Recurrent major depressive disorder ([address] [facility]) - Primary    Generalized anxiety disorder       Goals addressed in session: Goal 1          Reason for visit is   Chief Complaint   Patient presents with   • Virtual Regular Visit        Encounter provider Yuli Bustamante [facility]    Provider located at [address] [place] [address]  [phone]      Recent Visits  No visits were found meeting these conditions. Showing recent visits within past 7 days and meeting all other requirements  Today's Visits  Date Type Provider Dept   07/17/23 Telemedicine Yuli Bustamante [address] [place] [address]   Showing today's visits and meeting all other requirements  Future Appointments  No visits were found meeting these conditions. Showing future appointments within next 150 days and meeting all other requirements       The patient was identified by name and date of birth. Felicity Gallegos was informed that this is a telemedicine visit and that the visit is being conducted throughthe [employer] platform. She agrees to proceed. .  My office door was closed. No one else was in the room. She acknowledged consent and understanding of privacy and security of the video platform. The patient has agreed to participate and understands they can discontinue the visit at any time. Patient is aware this is a billable service. Ej Win is a 30 y.o. female  . HPI     No past medical history on file. No past surgical history on file. No current outpatient medications on file. No current facility-administered medications for this visit. Not on File    Review of Systems    Video Exam    There were no vitals filed for this visit.     Physical Exam

## 2023-07-17 NOTE — PROGRESS NOTES
Request for Consent  Patient provided consent to treat via telehealth technology.Patient understands the telehealth visit will be billed to their insurance or patient directly.  Patient was informed only the patient and the clinician are permitted on the telehealth visit, visits are not recorded by the clinician, and the patient is not permitted to record the visit. Patient was provided information on how to access HIPAA compliant platform. Clinician confirmed identification of patient by name and birthdate, provider name, location of patient in [place], and callback number in case disconnected. Patient informed of the right to choose the form of care delivery, including the right to refuse a telehealth visit.     Patient Response to Request for Consent: Yes  Telehealth Platform utilized: Epic Video Client  Visit Type performed: Audio and Video  The patient is at home: Yes  The patient is in [place]:   yes  Those who participated in the encounter: Patient and Provider  Patient Call back number: [phone]    NEUROPSYCHOLOGY FEEDBACK SESSION    Patient Name: Carolyn Redmond     YOB: 1985  Medical Record #: [id number]      Carolyn Redmond was seen for neuropsychological testing feedback on 07/11/2023. Results of neuropsychological evaluation were reviewed with the patient. Patient expressed understanding and agreement with treatment recommendations.  Please refer to the full report for a comprehensive summary of diagnostic impressions and recommendations. Report submitted to referring physician.     Thank you for the opportunity to participate in Carolyn Redmond's care. Please feel free to contact me at [phone] with any further questions regarding this evaluation.    Respectfully Submitted,     Isael Darden Psy.D.  Licensed Clinical Neuropsychologist     A total of 70-minutes was spent in telemedicine feedback.

## 2023-07-19 ENCOUNTER — OFFICE VISIT (OUTPATIENT)
Dept: NEUROLOGY | Facility: CLINIC | Age: 38
End: 2023-07-19
Attending: NURSE PRACTITIONER
Payer: COMMERCIAL

## 2023-07-19 VITALS
WEIGHT: 270 LBS | DIASTOLIC BLOOD PRESSURE: 66 MMHG | HEART RATE: 72 BPM | RESPIRATION RATE: 16 BRPM | SYSTOLIC BLOOD PRESSURE: 106 MMHG | TEMPERATURE: 98 F | BODY MASS INDEX: 49.69 KG/M2 | HEIGHT: 62 IN | OXYGEN SATURATION: 98 %

## 2023-07-19 DIAGNOSIS — G43.709 CHRONIC MIGRAINE WITHOUT AURA WITHOUT STATUS MIGRAINOSUS, NOT INTRACTABLE: Primary | ICD-10-CM

## 2023-07-19 PROCEDURE — 99999 PR OFFICE/OUTPT VISIT,PROCEDURE ONLY: CPT | Performed by: NURSE PRACTITIONER

## 2023-07-19 PROCEDURE — 64615 CHEMODENERV MUSC MIGRAINE: CPT | Performed by: NURSE PRACTITIONER

## 2023-07-19 NOTE — PROGRESS NOTES
Patient returns for injections.    She reports having lack of efficacy after Botox for the past 2-3 treatments, it wears off too quickly, and migraines have escalated in frequency and severity.   Having more headache /migraine days per month than headache free days .   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        Botox injections  Botox lot number: [id number]  Expiration Date: 02/2026      NDC#2770-2934-09    Patient informed and consent obtained.        General Consent including notice of privacy practices/patient bill of rights was reviewed and consent/authorization given.       4cc of Normal saline were added to one vial of Botox Type A resulting in 200 Units of Botox.  After the patient consented to the procedure the following muscles were injected after cleaning the overlying skin with alcohol. New FDA mandated warnings provided to the patient with instructions to seek medical attention for difficulty swallowing or breathing.    Frontalis 10 units right ( 2 sites each 5 units) and 10 units left ( 2 sites each 5 units)  /Glabellar 5 units right and 5 units left (medially)  Procerus 5 units  Temporalis 30 units right( 6 sites each 5 units) and 30 units left ( 6 sites each 5 units)  Trapezius 25 units ( 3 sites each 5 units) right and 25 units left ( 3 sites each 5 units)  Cervical Paraspinals 10 units right ( 2 sites each 5 units) and 10 units left (2 sites each 5 units)  Occipital 15 units right ( 3 sites each 5 units) , 15 units left ( 3 sites each 5 units)      A total of 195 units was used 5 units wasted. The patient tolerated the procedure well.    Diagnoses and all orders for this visit:    Chronic migraine without aura without status migrainosus, not intractable  -     [facility] Botulinum Toxin Injection Appointment Request  -     onabotulinumtoxinA (BOTOX) 200 unit injection 200  Units  -     abobotulinumtoxinA (DYSPORT) 500 unit injection 500 Units    Other orders  -     abobotulinumtoxinA (DYSPORT) 500 unit injection 500 Units    since lack of efficacy with Botox injections, getting treatments for over two years now,  and increased frequency of migraines , we will switch to Dysport 500 units for the next treatment

## 2023-08-01 ENCOUNTER — TELEMEDICINE (OUTPATIENT)
Dept: BEHAVIORAL/MENTAL HEALTH CLINIC | Facility: CLINIC | Age: 38
End: 2023-08-01
Payer: COMMERCIAL

## 2023-08-01 DIAGNOSIS — F51.01 PRIMARY INSOMNIA: Primary | ICD-10-CM

## 2023-08-01 DIAGNOSIS — F33.0 MILD EPISODE OF RECURRENT MAJOR DEPRESSIVE DISORDER (HCC): Primary | ICD-10-CM

## 2023-08-01 DIAGNOSIS — F41.1 GENERALIZED ANXIETY DISORDER: ICD-10-CM

## 2023-08-01 PROCEDURE — 90837 PSYTX W PT 60 MINUTES: CPT | Performed by: COUNSELOR

## 2023-08-01 RX ORDER — ZOLPIDEM TARTRATE 10 MG/1
10 TABLET ORAL NIGHTLY
Qty: 30 TABLET | Refills: 0 | Status: SHIPPED | OUTPATIENT
Start: 2023-08-01 | End: 2023-08-25 | Stop reason: SDUPTHER
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
## 2023-08-10 ENCOUNTER — HOSPITAL ENCOUNTER (EMERGENCY)
Facility: HOSPITAL | Age: 38
Discharge: HOME | End: 2023-08-10
Attending: EMERGENCY MEDICINE
Payer: COMMERCIAL

## 2023-08-10 ENCOUNTER — APPOINTMENT (EMERGENCY)
Dept: RADIOLOGY | Facility: HOSPITAL | Age: 38
End: 2023-08-10
Payer: COMMERCIAL

## 2023-08-10 VITALS
RESPIRATION RATE: 16 BRPM | HEART RATE: 82 BPM | WEIGHT: 260 LBS | HEIGHT: 62 IN | BODY MASS INDEX: 47.84 KG/M2 | TEMPERATURE: 97.3 F | SYSTOLIC BLOOD PRESSURE: 133 MMHG | DIASTOLIC BLOOD PRESSURE: 66 MMHG | OXYGEN SATURATION: 97 %

## 2023-08-10 DIAGNOSIS — R11.2 NAUSEA AND VOMITING, UNSPECIFIED VOMITING TYPE: ICD-10-CM

## 2023-08-10 DIAGNOSIS — R10.13 EPIGASTRIC PAIN: Primary | ICD-10-CM

## 2023-08-10 DIAGNOSIS — R00.2 PALPITATIONS: ICD-10-CM

## 2023-08-10 LAB
ALBUMIN SERPL-MCNC: 4.2 G/DL (ref 3.5–5.7)
ALP SERPL-CCNC: 65 IU/L (ref 34–125)
ALT SERPL-CCNC: 29 IU/L (ref 7–52)
ANION GAP SERPL CALC-SCNC: 5 MEQ/L (ref 3–15)
AST SERPL-CCNC: 17 IU/L (ref 13–39)
BASOPHILS # BLD: 0.04 K/UL (ref 0.01–0.1)
BASOPHILS NFR BLD: 0.4 %
BILIRUB SERPL-MCNC: 0.4 MG/DL (ref 0.3–1.2)
BUN SERPL-MCNC: 11 MG/DL (ref 7–25)
CALCIUM SERPL-MCNC: 9.8 MG/DL (ref 8.6–10.3)
CHLORIDE SERPL-SCNC: 100 MEQ/L (ref 98–107)
CO2 SERPL-SCNC: 34 MEQ/L (ref 21–31)
CREAT SERPL-MCNC: 0.8 MG/DL (ref 0.6–1.2)
DIFFERENTIAL METHOD BLD: ABNORMAL
EOSINOPHIL # BLD: 0.12 K/UL (ref 0.04–0.36)
EOSINOPHIL NFR BLD: 1.2 %
ERYTHROCYTE [DISTWIDTH] IN BLOOD BY AUTOMATED COUNT: 15.3 % (ref 11.7–14.4)
GFR SERPL CREATININE-BSD FRML MDRD: >60 ML/MIN/1.73M*2
GLUCOSE SERPL-MCNC: 99 MG/DL (ref 70–99)
HCT VFR BLDCO AUTO: 40.7 % (ref 35–45)
HGB BLD-MCNC: 12.5 G/DL (ref 11.8–15.7)
IMM GRANULOCYTES # BLD AUTO: 0.07 K/UL (ref 0–0.08)
IMM GRANULOCYTES NFR BLD AUTO: 0.7 %
LYMPHOCYTES # BLD: 1.66 K/UL (ref 1.2–3.5)
LYMPHOCYTES NFR BLD: 16.6 %
MCH RBC QN AUTO: 28.6 PG (ref 28–33.2)
MCHC RBC AUTO-ENTMCNC: 30.7 G/DL (ref 32.2–35.5)
MCV RBC AUTO: 93.1 FL (ref 83–98)
MONOCYTES # BLD: 0.72 K/UL (ref 0.28–0.8)
MONOCYTES NFR BLD: 7.2 %
NEUTROPHILS # BLD: 7.41 K/UL (ref 1.7–7)
NEUTS SEG NFR BLD: 73.9 %
NRBC BLD-RTO: 0 %
PDW BLD AUTO: 10.3 FL (ref 9.4–12.3)
PLATELET # BLD AUTO: 232 K/UL (ref 150–369)
POTASSIUM SERPL-SCNC: 4.4 MEQ/L (ref 3.5–5.1)
PROT SERPL-MCNC: 7.5 G/DL (ref 6–8.2)
RBC # BLD AUTO: 4.37 M/UL (ref 3.93–5.22)
SODIUM SERPL-SCNC: 139 MEQ/L (ref 136–145)
TROPONIN I SERPL HS-MCNC: <2 PG/ML
WBC # BLD AUTO: 10.02 K/UL (ref 3.8–10.5)

## 2023-08-10 PROCEDURE — 36415 COLL VENOUS BLD VENIPUNCTURE: CPT

## 2023-08-10 PROCEDURE — 99283 EMERGENCY DEPT VISIT LOW MDM: CPT | Mod: 25

## 2023-08-10 PROCEDURE — 85025 COMPLETE CBC W/AUTO DIFF WBC: CPT

## 2023-08-10 PROCEDURE — 93005 ELECTROCARDIOGRAM TRACING: CPT

## 2023-08-10 PROCEDURE — 84484 ASSAY OF TROPONIN QUANT: CPT | Performed by: EMERGENCY MEDICINE

## 2023-08-10 PROCEDURE — 80053 COMPREHEN METABOLIC PANEL: CPT | Performed by: EMERGENCY MEDICINE

## 2023-08-10 PROCEDURE — 84484 ASSAY OF TROPONIN QUANT: CPT

## 2023-08-10 PROCEDURE — 85025 COMPLETE CBC W/AUTO DIFF WBC: CPT | Performed by: EMERGENCY MEDICINE

## 2023-08-10 PROCEDURE — 93005 ELECTROCARDIOGRAM TRACING: CPT | Performed by: EMERGENCY MEDICINE

## 2023-08-10 PROCEDURE — 80053 COMPREHEN METABOLIC PANEL: CPT

## 2023-08-10 PROCEDURE — 71046 X-RAY EXAM CHEST 2 VIEWS: CPT

## 2023-08-10 ASSESSMENT — ENCOUNTER SYMPTOMS
WEAKNESS: 0
SORE THROAT: 0
HEADACHES: 0
PALPITATIONS: 1
CHILLS: 0
VOMITING: 1
LIGHT-HEADEDNESS: 1
FEVER: 0
DIARRHEA: 0
ABDOMINAL PAIN: 1
SHORTNESS OF BREATH: 0
COUGH: 0
NAUSEA: 1
DIZZINESS: 0

## 2023-08-10 NOTE — ED PROVIDER NOTES
Emergency Medicine Note  HPI   HISTORY OF PRESENT ILLNESS     38 y.o. female with past medical history of anxiety, depression, migraines, pancreatitis, vasculitis, endocarditis, fibromyalgia, osteomyelitis, ARIANNA, and lumbar spinal stenosis followed by pain management presents to ED for evaluation of chest pain onset today approx 3 hours prior to arrival, now resolved completely.  Patient reports she had a dental procedure yesterday.  She has a history of bacterial endocarditis and was prescribed amoxicillin to take prior to her procedure however she forgot to pick the medication up so she did not.  Prior to arrival she was in [place] before a scheduled appointment with pain management for back injection when she developed sudden onset nausea with 1 episode of vomiting that was nonbloody, palpitations, and epigastric/lower chest pain.  Patient reports the nausea lasted 20 minutes and then resolved.  She has had no additional episodes of vomiting.  Her pain lasted about 30 minutes and then resolved completely.  She is currently asymptomatic.  States she just wanted to make sure that everything was okay with her heart.  She is followed by cardiology who she last saw 3 months ago and is scheduled for a routine follow-up appointment in 2 weeks.  Denies fevers, chills, shortness of breath, cough, congestion, sore throat, dental pain, diarrhea, dizziness, headaches, syncope, weakness.    Cardio: [place]    History provided by:  Patient   used: No          Patient History   PAST HISTORY     Reviewed from Nursing Triage:  Tobacco  Allergies  Meds  Problems  Med Hx  Surg Hx  Fam Hx  Soc   Hx      Past Medical History:   Diagnosis Date   • Acute bacterial endocarditis 12/10/2020   • Anxiety    • Depressed    • Endocarditis    • Fibromyalgia    • Migraines    • Osteomyelitis (CMS/[facility]) 1/10/2023   • Pancreatitis    • Recurrent major depressive disorder (CMS/[facility]) 1/6/2023   • Substance abuse (CMS/[facility]) 2015   •  Tachycardia    • Vasculitis (CMS/HCC)    • Vasculitis (CMS/HCC) 9/26/2017       Past Surgical History:   Procedure Laterality Date   • CHOLECYSTECTOMY     • ERCP     • GALLBLADDER SURGERY     • TONSILLECTOMY  1991       Family History   Problem Relation Age of Onset   • Hypertension Biological Mother    • Lung cancer Biological Father 76        smoker   • Lung cancer Maternal Grandmother 69        smoker   • Heart disease Maternal Grandfather    • Heart attack Maternal Grandfather         unsure of age   • Colon cancer Maternal Grandfather        Social History     Tobacco Use   • Smoking status: Former     Packs/day: 0.50     Years: 5.00     Pack years: 2.50     Types: Cigarettes     Quit date: 2020     Years since quitting: 3.6   • Smokeless tobacco: Current   • Tobacco comments:     quit 1 year ago, vapes currently   Vaping Use   • Vaping Use: Every day   • Substances: Nicotine, Flavoring   • Devices: Refillable tank   Substance Use Topics   • Alcohol use: Not Currently   • Drug use: Yes     Types: Marijuana, Heroin     Comment: MM now 2023, previous heroin and opiod         Review of Systems   REVIEW OF SYSTEMS     Review of Systems   Constitutional: Negative for chills and fever.   HENT: Negative for congestion, dental problem and sore throat.    Respiratory: Negative for cough and shortness of breath.    Cardiovascular: Positive for chest pain and palpitations.   Gastrointestinal: Positive for abdominal pain, nausea and vomiting. Negative for diarrhea.   Musculoskeletal: Negative for gait problem.   Skin: Negative for rash.   Neurological: Positive for light-headedness. Negative for dizziness, syncope, weakness and headaches.         VITALS     ED Vitals    Date/Time Temp Pulse Resp BP SpO2 [facility]   08/10/23 1315 36.3 °C (97.3 °F) 82 16 133/66 97 % CBF        Pulse Ox %: 97 % (08/10/23 1530)  Pulse Ox Interpretation: Normal (08/10/23 1530)  Heart Rate: 82 (08/10/23 1530)  Rhythm Strip Interpretation: Normal Sinus  Rhythm (08/10/23 1530)    Vital Signs Review: Vital signs have been reviewed. The oxygen saturation is SpO2: 97 % which is normal     Physical Exam   PHYSICAL EXAM     Physical Exam  Vitals and nursing note reviewed.   Constitutional:       General: She is awake. She is not in acute distress.     Appearance: Normal appearance. She is well-developed and well-groomed. She is morbidly obese. She is not ill-appearing, toxic-appearing or diaphoretic.   HENT:      Head: Normocephalic and atraumatic.   Cardiovascular:      Rate and Rhythm: Normal rate and regular rhythm.      Heart sounds: Normal heart sounds.   Pulmonary:      Effort: Pulmonary effort is normal. No respiratory distress.      Breath sounds: Normal breath sounds and air entry.   Chest:      Chest wall: No tenderness.   Abdominal:      Palpations: Abdomen is soft.      Tenderness: There is no abdominal tenderness. There is no guarding or rebound.   Skin:     General: Skin is warm and dry.      Capillary Refill: Capillary refill takes less than 2 seconds.   Neurological:      Mental Status: She is alert and oriented to person, place, and time.      GCS: GCS eye subscore is 4. GCS verbal subscore is 5. GCS motor subscore is 6.   Psychiatric:         Behavior: Behavior is cooperative.           PROCEDURES     Procedures     DATA     Results     Procedure Component Value Units Date/Time    Kenna Draw Panel [[id number]] Collected: 08/10/23 1319    Specimen: Blood, Venous Updated: 08/10/23 2201    Narrative:      The following orders were created for panel order Kenna Draw Panel.  Procedure                               Abnormality         Status                     ---------                               -----------         ------                     RAINBOW RED[[id number]]                                      Final result               RAINBOW LT BLUE[[id number]]                                  Final result               RAINBOW GOLD[[id number]]                                      Final result                 Please view results for these tests on the individual orders.    RAINBOW RED [[id number]] Collected: 08/10/23 1319    Specimen: Blood, Venous Updated: 08/10/23 2201    RAINBOW LT BLUE [[id number]] Collected: 08/10/23 1319    Specimen: Blood, Venous Updated: 08/10/23 2201    RAINBOW GOLD [[id number]] Collected: 08/10/23 1319    Specimen: Blood, Venous Updated: 08/10/23 2201    HS Troponin I (with 2 hour reflex) [[id number]]  (Normal) Collected: 08/10/23 1319    Specimen: Blood, Venous Updated: 08/10/23 1412     High Sens Troponin I <2.0 pg/mL     Comprehensive metabolic panel [[id number]]  (Abnormal) Collected: 08/10/23 1319    Specimen: Blood, Venous Updated: 08/10/23 1346     Sodium 139 mEQ/L      Potassium 4.4 mEQ/L      Comment: Results obtained on plasma. Plasma Potassium values may be up to 0.4 mEQ/L less than serum values. The differences may be greater for patients with high platelet or white cell counts.        Chloride 100 mEQ/L      CO2 34 mEQ/L      BUN 11 mg/dL      Creatinine 0.8 mg/dL      Glucose 99 mg/dL      Calcium 9.8 mg/dL      AST (SGOT) 17 IU/L      ALT (SGPT) 29 IU/L      Alkaline Phosphatase 65 IU/L      Total Protein 7.5 g/dL      Comment: Test performed on plasma which typically contains approximately 0.4 g/dL more protein than serum.        Albumin 4.2 g/dL      Bilirubin, Total 0.4 mg/dL      eGFR >60.0 mL/min/1.73m*2      Anion Gap 5 mEQ/L     CBC and differential [[id number]]  (Abnormal) Collected: 08/10/23 1319    Specimen: Blood, Venous Updated: 08/10/23 1332     WBC 10.02 K/uL      RBC 4.37 M/uL      Hemoglobin 12.5 g/dL      Hematocrit 40.7 %      MCV 93.1 fL      MCH 28.6 pg      MCHC 30.7 g/dL      RDW 15.3 %      Platelets 232 K/uL      MPV 10.3 fL      Differential Type Auto     nRBC 0.0 %      Immature Granulocytes 0.7 %      Neutrophils 73.9 %      Lymphocytes 16.6 %      Monocytes 7.2 %      Eosinophils 1.2 %      Basophils 0.4 %       Immature Granulocytes, Absolute 0.07 K/uL      Neutrophils, Absolute 7.41 K/uL      Lymphocytes, Absolute 1.66 K/uL      Monocytes, Absolute 0.72 K/uL      Eosinophils, Absolute 0.12 K/uL      Basophils, Absolute 0.04 K/uL           Imaging Results          X-RAY CHEST 2 VIEWS (Final result)  Result time 08/10/23 13:59:46   Procedure changed from X-RAY CHEST 1 VIEW     Final result                 Impression:    IMPRESSION: No acute disease.    COMMENT: PA and lateral views of the chest demonstrate a normal heart, pulmonary  vasculature, mediastinum and shaun. The lungs are clear bilaterally.  Compared  with 6/20 6/20/2023.             Narrative:    CLINICAL HISTORY: Chest pain, arrhythmia.                                ECG 12 lead    (Results Pending)       Scoring tools                                  ED Course & MDM   MDM / ED COURSE / CLINICAL IMPRESSION / DISPO     Medical Decision Making  38 y.o. female with past medical history of anxiety, depression, migraines, pancreatitis, vasculitis, endocarditis, fibromyalgia, osteomyelitis, ARIANNA, and lumbar spinal stenosis followed by pain management presents to ED for evaluation of chest pain onset today approx 3 hours prior to arrival, now resolved completely.  EKG shows normal sinus rhythm.  Chest x-ray is within normal limits.  Troponin negative. HEART score =1. Patient is totally asymptomatic.  She will be discharged home    Epigastric pain: acute illness or injury  Nausea and vomiting, unspecified vomiting type: acute illness or injury  Palpitations: acute illness or injury  Amount and/or Complexity of Data Reviewed  Labs: ordered.  Radiology: ordered and independent interpretation performed. Decision-making details documented in ED Course.  ECG/medicine tests: ordered and independent interpretation performed. Decision-making details documented in ED Course.             Clinical Impression      Epigastric pain   Palpitations   Nausea and vomiting, unspecified  vomiting type     _________________     ED Disposition   Discharge                   Fiona Manrique PA C  08/12/23 3549

## 2023-08-10 NOTE — DISCHARGE INSTRUCTIONS
Your workup in the emergency department is negative for any acute condition requiring emergent intervention. Continue all home medications as prescribed. Drink plenty of fluids. Please follow up with your primary care physician for further evaluation. Return to the ED for new or worsening symptoms.

## 2023-08-10 NOTE — ED ATTESTATION NOTE
I have personally seen and examined Carolyn Redmond.  I was involved in the care and medical decision making for this patient.    I reviewed and agree with physician assistant / nurse practitioner’s assessment and plan of care; any exceptions are documented below.      My focused history, examination, assessment and plan of care of Carolyn Redmond is as follows:    Brief History:  HPI  38-year-old female with history of anxiety, hypertriglyceridemia, obesity, pulmonary hypertension, hypertension, depression, substance abuse, bacterial endocarditis, osteomyelitis, pancreatitis, cholecystectomy presents with epigastric/chest discomfort this morning.  Patient feels well at this time.      Focused Physical Exam:  Physical Exam  Patient is awake alert in no acute distress.  She has no respiratory distress.  She has normal room air oxygen saturation.  Her skin is dry normal in color.      Assessment / Plan / MDM:  MDM  EKG shows normal sinus rhythm with no acute ischemic changes.  Her troponin test is normal.  Chest x-ray shows no acute disease.  Patient's symptoms have resolved.  Patient was discharged to follow-up as an outpatient.               Concepcion Monzon MD  08/10/23 3511

## 2023-08-23 ENCOUNTER — OFFICE VISIT (OUTPATIENT)
Dept: VASCULAR SURGERY | Facility: CLINIC | Age: 38
End: 2023-08-23
Payer: COMMERCIAL

## 2023-08-23 VITALS
SYSTOLIC BLOOD PRESSURE: 110 MMHG | RESPIRATION RATE: 16 BRPM | HEART RATE: 85 BPM | DIASTOLIC BLOOD PRESSURE: 68 MMHG | OXYGEN SATURATION: 90 %

## 2023-08-23 DIAGNOSIS — R60.0 BILATERAL LEG EDEMA: ICD-10-CM

## 2023-08-23 DIAGNOSIS — I87.2 VENOUS INSUFFICIENCY: Primary | ICD-10-CM

## 2023-08-23 PROCEDURE — 3074F SYST BP LT 130 MM HG: CPT | Performed by: SURGERY

## 2023-08-23 PROCEDURE — 3078F DIAST BP <80 MM HG: CPT | Performed by: SURGERY

## 2023-08-23 PROCEDURE — 99203 OFFICE O/P NEW LOW 30 MIN: CPT | Performed by: SURGERY

## 2023-08-23 NOTE — PROGRESS NOTES
Vascular Specialists  [facility]  With office locations at [facility] and [place]  P: [phone]     F: [phone]       Patient: Carolyn Redmond  : 1985  MRN: [id number]       Vascular Surgery Initial Consultation     SUBJECTIVE     HPI     Patient is a 38 y.o. female who presents with bilateral lower extremity edema.  Carolyn is a 38-year-old female with a number of medical issues that include sleep apnea with pulmonary hypertension, hypertension, obesity, depression, and cutaneous vasculitis requiring prolonged steroid therapy with few interruptions since 2017.  She comes in today to discuss bilateral lower extremity edema which has been bothering her for several weeks.  She has tried diuretics without significant improvement.  She has some associated varicose veins.  She has no stasis dermatitis or lipodermatosclerosis.  She is a non-smoker.  She has a strong family history of venous insufficiency both in her mother and sister who have both required vein ablation procedures.    Venous history:   Prior DVT: no  Prior phlebitis:  None  Bleeding/clotting disorder: None  Prior Pulmonary Embolus: None  Venous Stasis dermatitis or ulcer:  No  Prior Spider/Reticular Treatment:  No  Analgesics: OTC NSAID pain medication use PRN.  Previous conservative measures tried:   Patient has continued to wear compression stockings 20-30 mmHg on a daily basis. Patient has also for the past 6 months tried leg elevation, increase in activity, and pedal pump exercises.   Family Venous history: Venous insufficiency in her mother and sister who both undergone vein ablative procedures.  CEAP Classification: C 3    VCSS Total RLE: 4  VCSS Total LLE: 4    Clinical Class LLE: C3-->Edema w/o skin changes, C2-->Varicose Veins  Clinical Class RLE: C3-->Edema w/o skin changes, C2-->Varicose Veins  .     Review of Systems  Systemic: No fever, no chills, and no recent weight change. No headache. No  fatigue.  Neck: No neck pain, no neck stiffness, and no lump or swelling in the neck.  Cardiovascular: No chest pain or discomfort, no palpitations.  Respiratory: No wheezing.  No dyspnea.  Gastrointestinal: Appetite without significant change. No dysphagia, no active abdominal pain, and no melena. No bright red blood per rectum.  Endocrine: No polydipsia and no excessive sweating.  Hematologic: No easy bleeding and no tendency for easy bruising.   Musculoskeletal: No new muscle tenderness.  Neurological: No new motor disturbances, or sensory disturbances.   Skin: No pruritus. No skin lesions and no rash    Objective       Medical History:   Past Medical History:   Diagnosis Date   • Acute bacterial endocarditis 12/10/2020   • Anxiety    • Depressed    • Endocarditis    • Fibromyalgia    • Migraines    • Osteomyelitis (CMS/[facility]) 1/10/2023   • Pancreatitis    • Recurrent major depressive disorder (CMS/Formerly [facility]) 1/6/2023   • Substance abuse (CMS/Formerly [facility]) 2015   • Tachycardia    • Vasculitis (CMS/[facility])    • Vasculitis (CMS/Formerly [facility]) 9/26/2017       Surgical History:   Past Surgical History:   Procedure Laterality Date   • CHOLECYSTECTOMY     • ERCP     • GALLBLADDER SURGERY     • TONSILLECTOMY  1991       Social History:   Social History     Social History Narrative    Lives with mom       Family History:   Family History   Problem Relation Age of Onset   • Hypertension Biological Mother    • Lung cancer Biological Father 76        smoker   • Lung cancer Maternal Grandmother 69        smoker   • Heart disease Maternal Grandfather    • Heart attack Maternal Grandfather         unsure of age   • Colon cancer Maternal Grandfather        Allergies: Amoxicillin, Nsaids (non-steroidal anti-inflammatory drug), Tramadol, and Trazodone    Current Medications:  Current Outpatient Medications on File Prior to Visit   Medication Sig Dispense Refill   • albuterol HFA (VENTOLIN HFA) 90 mcg/actuation inhaler Inhale 2 puffs every 4 (four) hours as  needed.     • buprenorphine-naloxone (SUBOXONE) 8-2 mg film Place 2 Film under the tongue every morning. In addition to 1/2 film QPM     • cholecalciferol (VITAMIN D3) 1,250 mcg (50,000 unit) capsule Take 50,000 Units by mouth once a week on Monday.     • ferrous sulfate 325 mg (65 mg iron) tablet Take 65 mg by mouth nightly.     • fluticasone propionate (FLONASE) 50 mcg/actuation nasal spray Administer 1 spray into each nostril daily as needed for rhinitis or allergies.     • lisinopriL (PRINIVIL) 5 mg tablet Take 1 tablet (5 mg total) by mouth daily. 90 tablet 1   • magnesium oxide (MAG-OX) 400 mg (241.3 mg magnesium) tablet Take 1 tablet (400 mg total) by mouth daily. 90 tablet 1   • medical marijuana 1 each See admin instr. Please be advised that an [facility] hospital staff member has listed medical marijuana as one of your home medications for documentation purposes. Please follow-up with your certified issuing provider for ongoing use     • metoprolol succinate XL (TOPROL-XL) 25 mg 24 hr tablet Take 25 mg by mouth nightly.     • NURTEC ODT 75 mg tablet,disintegrating Take 1 tablet (75 mg total) by mouth every other day. 16 tablet 3   • onabotulinumtoxinA (BOTOX) 200 unit recon soln injection Botox for chronic migraine headache every 3 months 1 each 4   • pantoprazole (PROTONIX) 40 mg EC tablet Take 40 mg by mouth nightly.     • PARoxetine (PAXIL) 10 mg tablet Take 10 mg by mouth daily.     • predniSONE (DELTASONE) 1 mg tablet Take 5 tablets (5 mg total) by mouth daily for 30 days, THEN 4 tablets (4 mg total) daily for 30 days, THEN 3 tablets (3 mg total) daily for 30 days, THEN 2 tablets (2 mg total) daily for 30 days, THEN 1 tablet (1 mg total) daily. Patient states taking 7.5 mg daily. 450 tablet 0   • predniSONE (DELTASONE) 5 mg tablet Take 1.5 tablets (7.5 mg total) by mouth daily for 13 days. 20 tablet 0   • pregabalin (LYRICA) 200 mg capsule Take 1 capsule (200 mg total) by mouth 3 (three) times a day with  meals. 270 capsule 1   • QUEtiapine (SEROquel) 50 mg tablet Take 50 mg by mouth nightly.     • torsemide (DEMADEX) 10 mg tablet Take 1 tablet (10 mg total) by mouth daily. 90 tablet 1   • zolpidem (AMBIEN) 10 mg tablet Take 1 tablet (10 mg total) by mouth nightly. 30 tablet 0     No current facility-administered medications on file prior to visit.       Vital Signs:  Visit Vitals  /68   Pulse 85   Resp 16   SpO2 (!) 90%       PHYSICAL EXAM:    Constitutional: Well developed; no acute distress  Cardiovascular: Heart with regular rate and rhythm, S1/S2, no murmur. No cervical bruits.   Pulses: Palpable dorsal pedal pulses bilaterally.  Respiratory: clear to auscultation bilaterally, without crackles, rales, or rhonchi.  GI: abdomen soft and non tender.   Musculoskeletal: Extremities demonstrate 2+ nonpitting edema bilaterally.  There is no stasis dermatitis or lipodermatosclerosis.  There are a few varicosities on the lower leg bilaterally.  No wounds.  Skin: normal turgor, no rashes or lesions.      Labs/Imaging     Labs  Labs from August are reviewed.  Creatinine was 0.8.  Total protein is 7.5 with albumin of 4.2.  Hemoglobin was 12.5 with hematocrit of 40.7 and platelets of 232.  Thyroid function was last checked in June 2023 when the TSH was 2.5.    Imaging  I reviewed the report of a transthoracic echo performed June 2023 which demonstrates normal ventricular ejection fraction of 65 to 70%.  There was no regional wall motion abnormality and normal diastolic filling pattern.  There was no significant valvular disease.  With mild tricuspid regurgitation.    Assessment and Plan     Problem List Items Addressed This Visit        Musculoskeletal    Bilateral leg edema    Current Assessment & Plan     Carolyn is a 38-year-old female with quite a number of medical problems including possible cutaneous vasculitis with prolonged steroid use.  She comes in today for severe bilateral lower extremity edema.  Her  family history includes venous insufficiency in her mother and sister who both required venous interventions.  I asked her to trial compression therapy with fitted compression stockings and I will send her for a venous reflux ultrasound.  I will see her back to discuss the findings.        Other Visit Diagnoses     Venous insufficiency    -  Primary    Relevant Orders    Compression stockings    Ultrasound venous reflux leg bilateral            Talib Richmond MD  Vascular and Endovascular Surgery  [facility]      Thank you very much for allowing us to participate in the care of your patient.  I will keep you informed of Carolyn Redmond's care.  Please not hesitate to call or email if there are any questions.  I can be reached at [phone] (office) or [email or web address].  Sincerely.    Kody Richmond

## 2023-08-23 NOTE — LETTER
2023     Yumiko Lopez DO  [address]  [place] [address]    Patient: Carolyn Redmond  YOB: 1985  Date of Visit: 2023      Dear Dr. Lopez:    Thank you for referring Carolyn Redmond to me for evaluation. Below are my notes for this consultation.    If you have questions, please do not hesitate to call me. I look forward to following your patient along with you.         Sincerely,        Talib Richmond MD        CC: RAVI Rodgers Henry, MD  2023  4:42 PM  Signed     Vascular Specialists  [facility]  With office locations at [facility], and [place]  P: [phone]     F: [phone]       Patient: Carolyn Redmond  : 1985  MRN: [id number]       Vascular Surgery Initial Consultation     SUBJECTIVE     HPI     Patient is a 38 y.o. female who presents with bilateral lower extremity edema.  Carolyn is a 38-year-old female with a number of medical issues that include sleep apnea with pulmonary hypertension, hypertension, obesity, depression, and cutaneous vasculitis requiring prolonged steroid therapy with few interruptions since 2017.  She comes in today to discuss bilateral lower extremity edema which has been bothering her for several weeks.  She has tried diuretics without significant improvement.  She has some associated varicose veins.  She has no stasis dermatitis or lipodermatosclerosis.  She is a non-smoker.  She has a strong family history of venous insufficiency both in her mother and sister who have both required vein ablation procedures.    Venous history:   Prior DVT: no  Prior phlebitis:  None  Bleeding/clotting disorder: None  Prior Pulmonary Embolus: None  Venous Stasis dermatitis or ulcer:  No  Prior Spider/Reticular Treatment:  No  Analgesics: OTC NSAID pain medication use PRN.  Previous conservative measures tried:   Patient has continued to wear compression stockings 20-30 mmHg on a daily  basis. Patient has also for the past 6 months tried leg elevation, increase in activity, and pedal pump exercises.   Family Venous history: Venous insufficiency in her mother and sister who both undergone vein ablative procedures.  CEAP Classification: C 3    VCSS Total RLE: 4  VCSS Total LLE: 4    Clinical Class LLE: C3-->Edema w/o skin changes, C2-->Varicose Veins  Clinical Class RLE: C3-->Edema w/o skin changes, C2-->Varicose Veins  .     Review of Systems  Systemic: No fever, no chills, and no recent weight change. No headache. No fatigue.  Neck: No neck pain, no neck stiffness, and no lump or swelling in the neck.  Cardiovascular: No chest pain or discomfort, no palpitations.  Respiratory: No wheezing.  No dyspnea.  Gastrointestinal: Appetite without significant change. No dysphagia, no active abdominal pain, and no melena. No bright red blood per rectum.  Endocrine: No polydipsia and no excessive sweating.  Hematologic: No easy bleeding and no tendency for easy bruising.   Musculoskeletal: No new muscle tenderness.  Neurological: No new motor disturbances, or sensory disturbances.   Skin: No pruritus. No skin lesions and no rash    Objective       Medical History:   Past Medical History:   Diagnosis Date   • Acute bacterial endocarditis 12/10/2020   • Anxiety    • Depressed    • Endocarditis    • Fibromyalgia    • Migraines    • Osteomyelitis (CMS/HCC) 1/10/2023   • Pancreatitis    • Recurrent major depressive disorder (CMS/HCC) 1/6/2023   • Substance abuse (CMS/HCC) 2015   • Tachycardia    • Vasculitis (CMS/HCC)    • Vasculitis (CMS/HCC) 9/26/2017       Surgical History:   Past Surgical History:   Procedure Laterality Date   • CHOLECYSTECTOMY     • ERCP     • GALLBLADDER SURGERY     • TONSILLECTOMY  1991       Social History:   Social History     Social History Narrative    Lives with mom       Family History:   Family History   Problem Relation Age of Onset   • Hypertension Biological Mother    • Lung cancer  Biological Father 76        smoker   • Lung cancer Maternal Grandmother 69        smoker   • Heart disease Maternal Grandfather    • Heart attack Maternal Grandfather         unsure of age   • Colon cancer Maternal Grandfather        Allergies: Amoxicillin, Nsaids (non-steroidal anti-inflammatory drug), Tramadol, and Trazodone    Current Medications:  Current Outpatient Medications on File Prior to Visit   Medication Sig Dispense Refill   • albuterol HFA (VENTOLIN HFA) 90 mcg/actuation inhaler Inhale 2 puffs every 4 (four) hours as needed.     • buprenorphine-naloxone (SUBOXONE) 8-2 mg film Place 2 Film under the tongue every morning. In addition to 1/2 film QPM     • cholecalciferol (VITAMIN D3) 1,250 mcg (50,000 unit) capsule Take 50,000 Units by mouth once a week on Monday.     • ferrous sulfate 325 mg (65 mg iron) tablet Take 65 mg by mouth nightly.     • fluticasone propionate (FLONASE) 50 mcg/actuation nasal spray Administer 1 spray into each nostril daily as needed for rhinitis or allergies.     • lisinopriL (PRINIVIL) 5 mg tablet Take 1 tablet (5 mg total) by mouth daily. 90 tablet 1   • magnesium oxide (MAG-OX) 400 mg (241.3 mg magnesium) tablet Take 1 tablet (400 mg total) by mouth daily. 90 tablet 1   • medical marijuana 1 each See admin instr. Please be advised that an [facility] hospital staff member has listed medical marijuana as one of your home medications for documentation purposes. Please follow-up with your certified issuing provider for ongoing use     • metoprolol succinate XL (TOPROL-XL) 25 mg 24 hr tablet Take 25 mg by mouth nightly.     • NURTEC ODT 75 mg tablet,disintegrating Take 1 tablet (75 mg total) by mouth every other day. 16 tablet 3   • onabotulinumtoxinA (BOTOX) 200 unit recon soln injection Botox for chronic migraine headache every 3 months 1 each 4   • pantoprazole (PROTONIX) 40 mg EC tablet Take 40 mg by mouth nightly.     • PARoxetine (PAXIL) 10 mg tablet Take 10 mg by mouth daily.      • predniSONE (DELTASONE) 1 mg tablet Take 5 tablets (5 mg total) by mouth daily for 30 days, THEN 4 tablets (4 mg total) daily for 30 days, THEN 3 tablets (3 mg total) daily for 30 days, THEN 2 tablets (2 mg total) daily for 30 days, THEN 1 tablet (1 mg total) daily. Patient states taking 7.5 mg daily. 450 tablet 0   • predniSONE (DELTASONE) 5 mg tablet Take 1.5 tablets (7.5 mg total) by mouth daily for 13 days. 20 tablet 0   • pregabalin (LYRICA) 200 mg capsule Take 1 capsule (200 mg total) by mouth 3 (three) times a day with meals. 270 capsule 1   • QUEtiapine (SEROquel) 50 mg tablet Take 50 mg by mouth nightly.     • torsemide (DEMADEX) 10 mg tablet Take 1 tablet (10 mg total) by mouth daily. 90 tablet 1   • zolpidem (AMBIEN) 10 mg tablet Take 1 tablet (10 mg total) by mouth nightly. 30 tablet 0     No current facility-administered medications on file prior to visit.       Vital Signs:  Visit Vitals  /68   Pulse 85   Resp 16   SpO2 (!) 90%       PHYSICAL EXAM:    Constitutional: Well developed; no acute distress  Cardiovascular: Heart with regular rate and rhythm, S1/S2, no murmur. No cervical bruits.   Pulses: Palpable dorsal pedal pulses bilaterally.  Respiratory: clear to auscultation bilaterally, without crackles, rales, or rhonchi.  GI: abdomen soft and non tender.   Musculoskeletal: Extremities demonstrate 2+ nonpitting edema bilaterally.  There is no stasis dermatitis or lipodermatosclerosis.  There are a few varicosities on the lower leg bilaterally.  No wounds.  Skin: normal turgor, no rashes or lesions.      Labs/Imaging     Labs  Labs from August are reviewed.  Creatinine was 0.8.  Total protein is 7.5 with albumin of 4.2.  Hemoglobin was 12.5 with hematocrit of 40.7 and platelets of 232.  Thyroid function was last checked in June 2023 when the TSH was 2.5.    Imaging  I reviewed the report of a transthoracic echo performed June 2023 which demonstrates normal ventricular ejection fraction of 65  to 70%.  There was no regional wall motion abnormality and normal diastolic filling pattern.  There was no significant valvular disease.  With mild tricuspid regurgitation.    Assessment and Plan     Problem List Items Addressed This Visit        Musculoskeletal    Bilateral leg edema    Current Assessment & Plan     Carolyn is a 38-year-old female with quite a number of medical problems including possible cutaneous vasculitis with prolonged steroid use.  She comes in today for severe bilateral lower extremity edema.  Her family history includes venous insufficiency in her mother and sister who both required venous interventions.  I asked her to trial compression therapy with fitted compression stockings and I will send her for a venous reflux ultrasound.  I will see her back to discuss the findings.        Other Visit Diagnoses     Venous insufficiency    -  Primary    Relevant Orders    Compression stockings    Ultrasound venous reflux leg bilateral            Talib Richmond MD  Vascular and Endovascular Surgery  [facility]      Thank you very much for allowing us to participate in the care of your patient.  I will keep you informed of Carolyn Redmond's care.  Please not hesitate to call or email if there are any questions.  I can be reached at [phone] (office) or [email or web address].  Sincerely.    Kody Richmond

## 2023-08-23 NOTE — ASSESSMENT & PLAN NOTE
Carolyn is a 38-year-old female with quite a number of medical problems including possible cutaneous vasculitis with prolonged steroid use.  She comes in today for severe bilateral lower extremity edema.  Her family history includes venous insufficiency in her mother and sister who both required venous interventions.  I asked her to trial compression therapy with fitted compression stockings and I will send her for a venous reflux ultrasound.  I will see her back to discuss the findings.

## 2023-08-25 DIAGNOSIS — F51.01 PRIMARY INSOMNIA: ICD-10-CM

## 2023-08-25 RX ORDER — TIZANIDINE HYDROCHLORIDE 6 MG/1
CAPSULE, GELATIN COATED ORAL
COMMUNITY
Start: 2023-08-04 | End: 2023-12-02 | Stop reason: SDUPTHER

## 2023-08-25 RX ORDER — BUPROPION HYDROCHLORIDE 300 MG/1
TABLET ORAL
COMMUNITY
Start: 2023-07-28 | End: 2023-10-17 | Stop reason: ALTCHOICE

## 2023-08-25 RX ORDER — TIZANIDINE HYDROCHLORIDE 6 MG/1
CAPSULE, GELATIN COATED ORAL
COMMUNITY
Start: 2023-08-04 | End: 2023-09-14

## 2023-08-25 RX ORDER — ZOLPIDEM TARTRATE 10 MG/1
10 TABLET ORAL NIGHTLY
Qty: 30 TABLET | Refills: 0 | Status: SHIPPED | OUTPATIENT
Start: 2023-08-25 | End: 2023-09-22 | Stop reason: SDUPTHER

## 2023-08-25 RX ORDER — AMOXICILLIN 500 MG/1
CAPSULE ORAL SEE ADMIN INSTRUCTIONS
COMMUNITY
Start: 2023-08-10 | End: 2024-05-30

## 2023-08-25 RX ORDER — FLUTICASONE PROPIONATE AND SALMETEROL 500; 50 UG/1; UG/1
1 POWDER RESPIRATORY (INHALATION) 2 TIMES DAILY
COMMUNITY
Start: 2023-08-08

## 2023-08-25 NOTE — TELEPHONE ENCOUNTER
Medicine Refill Request    Last Office Visit: 7/10/2023   Last Consult Visit: Visit date not found  Last Telemedicine Visit: Visit date not found    Next Appointment: 9/14/2023      Current Outpatient Medications:   •  albuterol HFA (VENTOLIN HFA) 90 mcg/actuation inhaler, Inhale 2 puffs every 4 (four) hours as needed., Disp: , Rfl:   •  buprenorphine-naloxone (SUBOXONE) 8-2 mg film, Place 2 Film under the tongue every morning. In addition to 1/2 film QPM, Disp: , Rfl:   •  cholecalciferol (VITAMIN D3) 1,250 mcg (50,000 unit) capsule, Take 50,000 Units by mouth once a week on Monday., Disp: , Rfl:   •  ferrous sulfate 325 mg (65 mg iron) tablet, Take 65 mg by mouth nightly., Disp: , Rfl:   •  fluticasone propionate (FLONASE) 50 mcg/actuation nasal spray, Administer 1 spray into each nostril daily as needed for rhinitis or allergies., Disp: , Rfl:   •  lisinopriL (PRINIVIL) 5 mg tablet, Take 1 tablet (5 mg total) by mouth daily., Disp: 90 tablet, Rfl: 1  •  magnesium oxide (MAG-OX) 400 mg (241.3 mg magnesium) tablet, Take 1 tablet (400 mg total) by mouth daily., Disp: 90 tablet, Rfl: 1  •  medical marijuana, 1 each See admin instr. Please be advised that an [facility] hospital staff member has listed medical marijuana as one of your home medications for documentation purposes. Please follow-up with your certified issuing provider for ongoing use, Disp: , Rfl:   •  metoprolol succinate XL (TOPROL-XL) 25 mg 24 hr tablet, Take 25 mg by mouth nightly., Disp: , Rfl:   •  NURTEC ODT 75 mg tablet,disintegrating, Take 1 tablet (75 mg total) by mouth every other day., Disp: 16 tablet, Rfl: 3  •  onabotulinumtoxinA (BOTOX) 200 unit recon soln injection, Botox for chronic migraine headache every 3 months, Disp: 1 each, Rfl: 4  •  pantoprazole (PROTONIX) 40 mg EC tablet, Take 40 mg by mouth nightly., Disp: , Rfl:   •  PARoxetine (PAXIL) 10 mg tablet, Take 10 mg by mouth daily., Disp: , Rfl:   •  predniSONE (DELTASONE) 1 mg tablet, Take  5 tablets (5 mg total) by mouth daily for 30 days, THEN 4 tablets (4 mg total) daily for 30 days, THEN 3 tablets (3 mg total) daily for 30 days, THEN 2 tablets (2 mg total) daily for 30 days, THEN 1 tablet (1 mg total) daily. Patient states taking 7.5 mg daily., Disp: 450 tablet, Rfl: 0  •  predniSONE (DELTASONE) 5 mg tablet, Take 1.5 tablets (7.5 mg total) by mouth daily for 13 days., Disp: 20 tablet, Rfl: 0  •  pregabalin (LYRICA) 200 mg capsule, Take 1 capsule (200 mg total) by mouth 3 (three) times a day with meals., Disp: 270 capsule, Rfl: 1  •  QUEtiapine (SEROquel) 50 mg tablet, Take 50 mg by mouth nightly., Disp: , Rfl:   •  torsemide (DEMADEX) 10 mg tablet, Take 1 tablet (10 mg total) by mouth daily., Disp: 90 tablet, Rfl: 1  •  zolpidem (AMBIEN) 10 mg tablet, Take 1 tablet (10 mg total) by mouth nightly., Disp: 30 tablet, Rfl: 0      BP Readings from Last 3 Encounters:   08/23/23 110/68   08/10/23 133/66   07/19/23 106/66       Recent Lab results:  Lab Results   Component Value Date    CHOL 180 06/23/2023   ,   Lab Results   Component Value Date    HDL 50 06/23/2023   ,   Lab Results   Component Value Date    LDLCALC 85 06/23/2023   ,   Lab Results   Component Value Date    TRIG 273 (H) 06/23/2023        Lab Results   Component Value Date    GLUCOSE 99 08/10/2023   ,   Lab Results   Component Value Date    HGBA1C 5.4 06/26/2023         Lab Results   Component Value Date    CREATININE 0.8 08/10/2023       Lab Results   Component Value Date    TSH 2.500 06/23/2023           Lab Results   Component Value Date    HGBA1C 5.4 06/26/2023

## 2023-08-25 NOTE — PROGRESS NOTES
Carolyn Redmond is a 38 y.o. female is present in the office today for f/u palpitations and leg swelling.  She just saw vascular and Dr. Seay Rx stockings with some improvement in edema for lymphedema.  She is accompanied by her mother.  She is doing knee his stockings.      At home BP at home has been better- it runs like 110-120mmHg/60-80.      Was fluid overloaded in the hospital.  She had weight up to 300lb now 266lb.  Is feeling much better.      Doing sleep apnea CPAP but having trouble sleeping.      Last ED visit for anxiety and heart palpitations and nausea-vomiting projectile.       Torsemide is working better for edema  Past Medical History:   Diagnosis Date   • Acute bacterial endocarditis 12/10/2020   • Anxiety    • Depressed    • Endocarditis    • Fibromyalgia    • Migraines    • Osteomyelitis (CMS/[facility]) 1/10/2023   • Pancreatitis    • Recurrent major depressive disorder (CMS/[facility]) 1/6/2023   • Substance abuse (CMS/[facility]) 2015   • Tachycardia    • Vasculitis (CMS/[facility])    • Vasculitis (CMS/[facility]) 9/26/2017       Past Surgical History:   Procedure Laterality Date   • CHOLECYSTECTOMY     • ERCP     • GALLBLADDER SURGERY     • TONSILLECTOMY  1991        Social History     Socioeconomic History   • Marital status: Single     Spouse name: None   • Number of children: None   • Years of education: None   • Highest education level: None   Occupational History   • Occupation:    Tobacco Use   • Smoking status: Former     Packs/day: 0.50     Years: 5.00     Pack years: 2.50     Types: Cigarettes     Quit date: 2020     Years since quitting: 3.6   • Smokeless tobacco: Current   • Tobacco comments:     quit 1 year ago, vapes currently   Vaping Use   • Vaping Use: Every day   • Substances: Nicotine, Flavoring   • Devices: Refillable tank   Substance and Sexual Activity   • Alcohol use: Not Currently   • Drug use: Yes     Types: Marijuana, Heroin     Comment: MM now 2023, previous heroin and opiod   •  Sexual activity: Not Currently     Partners: Male, Female   Social History Narrative    Lives with mom     Social Determinants of Health     Financial Resource Strain: Low Risk  (6/27/2023)    Overall Financial Resource Strain (CARDIA)    • Difficulty of Paying Living Expenses: Not hard at all   Food Insecurity: No Food Insecurity (6/26/2023)    Hunger Vital Sign    • Worried About Running Out of Food in the Last Year: Never true    • Ran Out of Food in the Last Year: Never true   Transportation Needs: No Transportation Needs (6/27/2023)    PRAPARE - Transportation    • Lack of Transportation (Medical): No    • Lack of Transportation (Non-Medical): No   Housing Stability: Low Risk  (6/27/2023)    Housing Stability Vital Sign    • Unable to Pay for Housing in the Last Year: No    • Number of Places Lived in the Last Year: 1    • Unstable Housing in the Last Year: No       Family History   Problem Relation Age of Onset   • Hypertension Biological Mother    • Lung cancer Biological Father 76        smoker   • Lung cancer Maternal Grandmother 69        smoker   • Heart disease Maternal Grandfather    • Heart attack Maternal Grandfather         unsure of age   • Colon cancer Maternal Grandfather        Amoxicillin, Nsaids (non-steroidal anti-inflammatory drug), Tramadol, and Trazodone    Current Outpatient Medications   Medication Sig Dispense Refill   • albuterol HFA (VENTOLIN HFA) 90 mcg/actuation inhaler Inhale 2 puffs every 4 (four) hours as needed.     • amoxicillin (AMOXIL) 500 mg capsule      • buprenorphine-naloxone (SUBOXONE) 8-2 mg film Place 2 Film under the tongue every morning. In addition to 1/2 film QPM     • cholecalciferol (VITAMIN D3) 1,250 mcg (50,000 unit) capsule Take 50,000 Units by mouth once a week on Monday.     • ferrous sulfate 325 mg (65 mg iron) tablet Take 65 mg by mouth nightly.     • fluticasone propion-salmeteroL (ADVAIR DISKUS) 250-50 mcg/dose diskus inhaler inhale 1 puff by mouth and  INTO THE LUNGS twice a day Rinse mouth after use     • fluticasone propionate (FLONASE) 50 mcg/actuation nasal spray Administer 1 spray into each nostril daily as needed for rhinitis or allergies.     • lisinopriL (PRINIVIL) 5 mg tablet Take 1 tablet (5 mg total) by mouth daily. 90 tablet 1   • magnesium oxide (MAG-OX) 400 mg (241.3 mg magnesium) tablet Take 1 tablet (400 mg total) by mouth daily. 90 tablet 1   • medical marijuana 1 each See admin instr. Please be advised that an [facility] hospital staff member has listed medical marijuana as one of your home medications for documentation purposes. Please follow-up with your certified issuing provider for ongoing use     • metoprolol succinate XL (TOPROL-XL) 25 mg 24 hr tablet Take 1 tablet (25 mg total) by mouth nightly. 90 tablet 3   • NURTEC ODT 75 mg tablet,disintegrating Take 1 tablet (75 mg total) by mouth every other day. 16 tablet 3   • onabotulinumtoxinA (BOTOX) 200 unit recon soln injection Botox for chronic migraine headache every 3 months 1 each 4   • pantoprazole (PROTONIX) 40 mg EC tablet Take 40 mg by mouth nightly.     • PARoxetine (PAXIL) 10 mg tablet Take 10 mg by mouth daily.     • predniSONE (DELTASONE) 1 mg tablet Take 5 tablets (5 mg total) by mouth daily for 30 days, THEN 4 tablets (4 mg total) daily for 30 days, THEN 3 tablets (3 mg total) daily for 30 days, THEN 2 tablets (2 mg total) daily for 30 days, THEN 1 tablet (1 mg total) daily. Patient states taking 7.5 mg daily. 450 tablet 0   • pregabalin (LYRICA) 200 mg capsule Take 1 capsule (200 mg total) by mouth 3 (three) times a day with meals. 270 capsule 1   • QUEtiapine (SEROquel) 50 mg tablet Take 50 mg by mouth nightly.     • TiZANidine (ZANAFLEX) 6 mg capsule TAKE 1 CAPSULE BY MOUTH EVERY 6 - 8 HOURS AS NEEDED NOT TO EXCEED 3 DOSES IN 24 HOURS     • torsemide (DEMADEX) 10 mg tablet Take 1 tablet (10 mg total) by mouth daily. 90 tablet 1   • zolpidem (AMBIEN) 10 mg tablet Take 1 tablet (10 mg  total) by mouth nightly. 30 tablet 0   • buPROPion XL (WELLBUTRIN XL) 300 mg 24 hr tablet      • predniSONE (DELTASONE) 5 mg tablet Take 1.5 tablets (7.5 mg total) by mouth daily for 13 days. 20 tablet 0   • TiZANidine (ZANAFLEX) 6 mg capsule TAKE 1 CAPSULE BY MOUTH EVERY 6 - 8 HOURS AS NEEDED NOT TO EXCEED 3 DOSES IN 24 HOURS       No current facility-administered medications for this visit.       Review of Systems   Constitutional: Positive for weight loss.   HENT: Negative for congestion.    Eyes: Negative for blurred vision.   Cardiovascular: Positive for irregular heartbeat and palpitations. Negative for chest pain, claudication, cyanosis, dyspnea on exertion, leg swelling, near-syncope, orthopnea, paroxysmal nocturnal dyspnea and syncope.   Respiratory: Positive for shortness of breath and sleep disturbances due to breathing.    Gastrointestinal: Positive for bloating, heartburn and vomiting.          Vitals:    08/28/23 1334   BP: 110/60   Pulse: 81   Resp: 18   SpO2: 97%       Body mass index is 48.65 kg/m².      Physical Exam  Constitutional:       Appearance: Normal appearance. She is obese.   HENT:      Head: Normocephalic.      Mouth/Throat:      Mouth: Mucous membranes are moist.      Pharynx: No posterior oropharyngeal erythema.   Eyes:      Extraocular Movements: Extraocular movements intact.   Neck:      Vascular: No JVD.   Cardiovascular:      Rate and Rhythm: Normal rate and regular rhythm.      Chest Wall: PMI is not displaced.      Pulses: Normal pulses.      Heart sounds: S1 normal and S2 normal. No murmur heard.     No gallop. No S3 or S4 sounds.   Pulmonary:      Effort: Pulmonary effort is normal.      Breath sounds: Normal breath sounds.   Abdominal:      General: Abdomen is flat. Bowel sounds are normal. There is no distension.      Tenderness: There is no abdominal tenderness.   Musculoskeletal:      Cervical back: Neck supple.      Comments: Functional status is:  good   Skin:      General: Skin is warm.   Neurological:      Mental Status: She is alert and oriented to person, place, and time. Mental status is at baseline.   Psychiatric:         Attention and Perception: Attention normal.         Mood and Affect: Mood and affect normal.         Speech: Speech normal.          Lab Results   Component Value Date    WBC 10.02 08/10/2023    RBC 4.37 08/10/2023    HGB 12.5 08/10/2023    HCT 40.7 08/10/2023    MCV 93.1 08/10/2023    MCH 28.6 08/10/2023    MCHC 30.7 (L) 08/10/2023    RDW 15.3 (H) 08/10/2023     08/10/2023        Lab Results   Component Value Date    GLUCOSE 99 08/10/2023    BUN 11 08/10/2023    CREATININE 0.8 08/10/2023    EGFR >60.0 08/10/2023    BUNCREAT 18 06/23/2023     08/10/2023    K 4.4 08/10/2023     08/10/2023    CO2 34 (H) 08/10/2023    CALCIUM 9.8 08/10/2023    PROTEIN 7.5 08/10/2023    ALBUMIN 4.2 08/10/2023    GLOB 2.4 06/23/2023    AGRATIO 1.5 06/23/2023    BILITOT 0.4 08/10/2023    ALKPHOS 65 08/10/2023    AST 17 08/10/2023    ALT 29 08/10/2023        Lab Results   Component Value Date    HGBA1C 5.4 06/26/2023    ESTAVGGLUC 108 06/26/2023        Lab Results   Component Value Date    ALBUMIN 4.2 08/10/2023        Lab Results   Component Value Date    CHOL 180 06/23/2023    TRIG 273 (H) 06/23/2023    HDL 50 06/23/2023    VLDL 45 (H) 06/23/2023    LDLCALC 85 06/23/2023        Cardiac Imaging    TRANSTHORACIC ECHO (TTE) COMPLETE 06/27/2023    Interpretation Summary  •  Left Ventricle: Normal ventricle size. Estimated EF 65-70% (68% by biplane). No regional wall motion abnormalities. Normal diastolic filling pattern for age.  •  Tricuspid Valve: Normal structure. Mild to moderate regurgitation. The regurgitation jet is eccentric. Estimated RVSP = 39 mmHg. No significant stenosis.  •  Pulmonic Valve: Normal structure. Trace regurgitation. No stenosis.  •  Mitral Valve: Normal leaflet structure. Trace regurgitation. No stenosis.  •  Aortic Valve: Normal  structure. No regurgitation. No stenosis. Calculated dimensionless index = 0.64.  •  Right Ventricle: Normal ventricle size. Normal systolic function.  •  Left Atrium: Normal sized atrium.  •  Right Atrium: Normal sized atrium.  •  Aorta: Aortic root normal. Ascending aorta normal-sized. No evidence of coarctation by doppler.  •  IVC/SVC: Inferior vena cava is dilated (>2.1cm). Inferior vena cava collapses <50% during inspiration.  •  Pericardium: Normal structure.    High RAP and mild secondary pulmonary hypertension.      Results for orders placed during the hospital encounter of 06/26/23    Transthoracic echo (TTE) complete    Interpretation Summary  •  Left Ventricle: Normal ventricle size. Estimated EF 65-70% (68% by biplane). No regional wall motion abnormalities. Normal diastolic filling pattern for age.  •  Tricuspid Valve: Normal structure. Mild to moderate regurgitation. The regurgitation jet is eccentric. Estimated RVSP = 39 mmHg. No significant stenosis.  •  Pulmonic Valve: Normal structure. Trace regurgitation. No stenosis.  •  Mitral Valve: Normal leaflet structure. Trace regurgitation. No stenosis.  •  Aortic Valve: Normal structure. No regurgitation. No stenosis. Calculated dimensionless index = 0.64.  •  Right Ventricle: Normal ventricle size. Normal systolic function.  •  Left Atrium: Normal sized atrium.  •  Right Atrium: Normal sized atrium.  •  Aorta: Aortic root normal. Ascending aorta normal-sized. No evidence of coarctation by doppler.  •  IVC/SVC: Inferior vena cava is dilated (>2.1cm). Inferior vena cava collapses <50% during inspiration.  •  Pericardium: Normal structure.    High RAP and mild secondary pulmonary hypertension.               Assessment/Plan        EKG: no new EKG    Staphylococcus aureus bacteremia  Doing well without recurrent endocarditis.    Edema  If edema worsens can do extra Torsemide.  Heart failure education provided and she has a updated prescription for torsemide  and updated labs pending.    Bilateral leg edema  Continue healthy diet and exercise, torsemide to keep fluid off.    Pulmonary hypertension (CMS/HCC)  Doing great on Torsemide will continue as prescribed.    Obstructive sleep apnea syndrome  This is contributing to hypertension follow-up with sleep medicine she may be a candidate for implant.    Insomnia  Education on diet exercise and lifestyle to help with sleep quality.    Weight gain  Discussed reduction in weight and improvements in edema goals long-term for weight getting down towards 200.    Morbid obesity (CMS/HCC)  Diet and exercise counseling.  Increase walking by 5 minutes/day/week.    Generalized anxiety disorder  Improving symptoms on the current lower dose of Paxil.    Ambulatory dysfunction  Improving I have encouraged reconditioning.    Chronic diastolic congestive heart failure (CMS/HCC)  Improving symptoms and functional status on torsemide.  Would continue torsemide as prescribed as well as blood pressure currently at goal less than 130/80 mmHg.  Can try Aldactone to help balance potassium.     Spent a total of 45 minutes on exam interview documentation counseling record review and care coordination activities.  New Medications Ordered This Visit   Medications   • amoxicillin (AMOXIL) 500 mg capsule   • buPROPion XL (WELLBUTRIN XL) 300 mg 24 hr tablet   • fluticasone propion-salmeteroL (ADVAIR DISKUS) 250-50 mcg/dose diskus inhaler     Sig: inhale 1 puff by mouth and INTO THE LUNGS twice a day Rinse mouth after use   • TiZANidine (ZANAFLEX) 6 mg capsule     Sig: TAKE 1 CAPSULE BY MOUTH EVERY 6 - 8 HOURS AS NEEDED NOT TO EXCEED 3 DOSES IN 24 HOURS   • TiZANidine (ZANAFLEX) 6 mg capsule     Sig: TAKE 1 CAPSULE BY MOUTH EVERY 6 - 8 HOURS AS NEEDED NOT TO EXCEED 3 DOSES IN 24 HOURS   • torsemide (DEMADEX) 10 mg tablet     Sig: Take 1 tablet (10 mg total) by mouth daily.     Dispense:  90 tablet     Refill:  1   • metoprolol succinate XL (TOPROL-XL)  25 mg 24 hr tablet     Sig: Take 1 tablet (25 mg total) by mouth nightly.     Dispense:  90 tablet     Refill:  3   • lisinopriL (PRINIVIL) 5 mg tablet     Sig: Take 1 tablet (5 mg total) by mouth daily.     Dispense:  90 tablet     Refill:  1   • magnesium oxide (MAG-OX) 400 mg (241.3 mg magnesium) tablet     Sig: Take 1 tablet (400 mg total) by mouth daily.     Dispense:  90 tablet     Refill:  1       Medications Discontinued During This Encounter   Medication Reason   • torsemide (DEMADEX) 10 mg tablet Reorder   • metoprolol succinate XL (TOPROL-XL) 25 mg 24 hr tablet Reorder   • magnesium oxide (MAG-OX) 400 mg (241.3 mg magnesium) tablet Reorder   • lisinopriL (PRINIVIL) 5 mg tablet Reorder   • magnesium oxide (MAG-OX) 400 mg (241.3 mg magnesium) tablet Reorder        Orders Placed This Encounter   Procedures   • B-type natriuretic peptide     Standing Status:   Future     Number of Occurrences:   1     Standing Expiration Date:   8/28/2024     Order Specific Question:   Release to patient     Answer:   Immediate   • Comprehensive metabolic panel     Standing Status:   Future     Number of Occurrences:   1     Standing Expiration Date:   8/28/2024     Order Specific Question:   Release to patient     Answer:   Immediate   • CBC and differential     Standing Status:   Future     Number of Occurrences:   1     Standing Expiration Date:   8/28/2024     Order Specific Question:   Release to patient     Answer:   Immediate   • Magnesium     Standing Status:   Future     Number of Occurrences:   1     Standing Expiration Date:   8/28/2024     Order Specific Question:   Release to patient     Answer:   Immediate

## 2023-08-28 ENCOUNTER — OFFICE VISIT (OUTPATIENT)
Dept: CARDIOLOGY | Facility: CLINIC | Age: 38
End: 2023-08-28
Payer: COMMERCIAL

## 2023-08-28 VITALS
WEIGHT: 266 LBS | DIASTOLIC BLOOD PRESSURE: 60 MMHG | HEART RATE: 81 BPM | SYSTOLIC BLOOD PRESSURE: 110 MMHG | BODY MASS INDEX: 48.95 KG/M2 | HEIGHT: 62 IN | RESPIRATION RATE: 18 BRPM | OXYGEN SATURATION: 97 %

## 2023-08-28 DIAGNOSIS — R78.81 STAPHYLOCOCCUS AUREUS BACTEREMIA: ICD-10-CM

## 2023-08-28 DIAGNOSIS — E78.1 PURE HYPERTRIGLYCERIDEMIA: Primary | ICD-10-CM

## 2023-08-28 DIAGNOSIS — G47.33 OBSTRUCTIVE SLEEP APNEA SYNDROME: ICD-10-CM

## 2023-08-28 DIAGNOSIS — I10 ESSENTIAL HYPERTENSION: ICD-10-CM

## 2023-08-28 DIAGNOSIS — E66.01 CLASS 3 SEVERE OBESITY DUE TO EXCESS CALORIES WITH SERIOUS COMORBIDITY AND BODY MASS INDEX (BMI) OF 45.0 TO 49.9 IN ADULT (CMS/HCC): ICD-10-CM

## 2023-08-28 DIAGNOSIS — R63.5 WEIGHT GAIN: ICD-10-CM

## 2023-08-28 DIAGNOSIS — R10.84 GENERALIZED ABDOMINAL PAIN: ICD-10-CM

## 2023-08-28 DIAGNOSIS — F41.1 GENERALIZED ANXIETY DISORDER: ICD-10-CM

## 2023-08-28 DIAGNOSIS — E66.01 MORBID OBESITY (CMS/HCC): ICD-10-CM

## 2023-08-28 DIAGNOSIS — R00.2 PALPITATIONS: ICD-10-CM

## 2023-08-28 DIAGNOSIS — R60.0 LOCALIZED EDEMA: ICD-10-CM

## 2023-08-28 DIAGNOSIS — R26.2 AMBULATORY DYSFUNCTION: ICD-10-CM

## 2023-08-28 DIAGNOSIS — I27.20 PULMONARY HYPERTENSION (CMS/HCC): ICD-10-CM

## 2023-08-28 DIAGNOSIS — I50.32 CHRONIC DIASTOLIC CONGESTIVE HEART FAILURE (CMS/HCC): ICD-10-CM

## 2023-08-28 DIAGNOSIS — R60.0 BILATERAL LEG EDEMA: ICD-10-CM

## 2023-08-28 DIAGNOSIS — B95.61 STAPHYLOCOCCUS AUREUS BACTEREMIA: ICD-10-CM

## 2023-08-28 DIAGNOSIS — F51.01 PRIMARY INSOMNIA: ICD-10-CM

## 2023-08-28 DIAGNOSIS — G43.709 CHRONIC MIGRAINE WITHOUT AURA WITHOUT STATUS MIGRAINOSUS, NOT INTRACTABLE: ICD-10-CM

## 2023-08-28 DIAGNOSIS — E66.813 CLASS 3 SEVERE OBESITY DUE TO EXCESS CALORIES WITH SERIOUS COMORBIDITY AND BODY MASS INDEX (BMI) OF 45.0 TO 49.9 IN ADULT (CMS/HCC): ICD-10-CM

## 2023-08-28 PROCEDURE — 3078F DIAST BP <80 MM HG: CPT | Performed by: INTERNAL MEDICINE

## 2023-08-28 PROCEDURE — 99215 OFFICE O/P EST HI 40 MIN: CPT | Performed by: INTERNAL MEDICINE

## 2023-08-28 PROCEDURE — 3074F SYST BP LT 130 MM HG: CPT | Performed by: INTERNAL MEDICINE

## 2023-08-28 PROCEDURE — 3008F BODY MASS INDEX DOCD: CPT | Performed by: INTERNAL MEDICINE

## 2023-08-28 RX ORDER — LANOLIN ALCOHOL/MO/W.PET/CERES
400 CREAM (GRAM) TOPICAL DAILY
Qty: 90 TABLET | Refills: 1 | Status: SHIPPED
Start: 2023-08-28 | End: 2024-05-17 | Stop reason: SDUPTHER

## 2023-08-28 RX ORDER — TORSEMIDE 10 MG/1
10 TABLET ORAL DAILY
Qty: 90 TABLET | Refills: 1 | Status: SHIPPED | OUTPATIENT
Start: 2023-08-28 | End: 2024-04-25

## 2023-08-28 RX ORDER — LANOLIN ALCOHOL/MO/W.PET/CERES
400 CREAM (GRAM) TOPICAL DAILY
Qty: 90 TABLET | Refills: 1 | Status: SHIPPED | OUTPATIENT
Start: 2023-08-28 | End: 2023-08-28 | Stop reason: SDUPTHER

## 2023-08-28 RX ORDER — LISINOPRIL 5 MG/1
5 TABLET ORAL DAILY
Qty: 90 TABLET | Refills: 1 | Status: SHIPPED | OUTPATIENT
Start: 2023-08-28 | End: 2024-05-30

## 2023-08-28 RX ORDER — METOPROLOL SUCCINATE 25 MG/1
25 TABLET, EXTENDED RELEASE ORAL NIGHTLY
Qty: 90 TABLET | Refills: 3 | Status: SHIPPED | OUTPATIENT
Start: 2023-08-28 | End: 2024-10-28

## 2023-08-28 ASSESSMENT — ENCOUNTER SYMPTOMS
PND: 0
PALPITATIONS: 1
SLEEP DISTURBANCES DUE TO BREATHING: 1
NEAR-SYNCOPE: 0
WEIGHT LOSS: 1
SYNCOPE: 0
VOMITING: 1
DYSPNEA ON EXERTION: 0
SHORTNESS OF BREATH: 1
BLOATING: 1
HEARTBURN: 1
CLAUDICATION: 0
ORTHOPNEA: 0
IRREGULAR HEARTBEAT: 1
BLURRED VISION: 0

## 2023-08-28 NOTE — LETTER
August 29, 2023     Yumiko Lopez DO  [address]  [facility] [address]    Patient: Carolyn Redmond  YOB: 1985  Date of Visit: 8/28/2023      Dear Dr. Lopez:    Thank you for referring Carolyn Redmond to me for evaluation. Below are my notes for this consultation.    If you have questions, please do not hesitate to call me. I look forward to following your patient along with you.    Discussed torsemide dosing heart failure action plan and long-term prognosis as well as weight loss and exercise counseling as adjunct.    Sincerely,        Fabricio Middleton MD        CC: No Recipients    Fabricio Middleton MD  8/29/2023  6:27 PM  Sign when Signing Visit  Carolyn Redmond is a 38 y.o. female is present in the office today for f/u palpitations and leg swelling.  She just saw vascular and Dr. Seay Rx stockings with some improvement in edema for lymphedema.  She is accompanied by her mother.  She is doing knee his stockings.      At home BP at home has been better- it runs like 110-120mmHg/60-80.      Was fluid overloaded in the hospital.  She had weight up to 300lb now 266lb.  Is feeling much better.      Doing sleep apnea CPAP but having trouble sleeping.      Last ED visit for anxiety and heart palpitations and nausea-vomiting projectile.       Torsemide is working better for edema  Past Medical History:   Diagnosis Date   • Acute bacterial endocarditis 12/10/2020   • Anxiety    • Depressed    • Endocarditis    • Fibromyalgia    • Migraines    • Osteomyelitis (CMS/HCC) 1/10/2023   • Pancreatitis    • Recurrent major depressive disorder (CMS/HCC) 1/6/2023   • Substance abuse (CMS/HCC) 2015   • Tachycardia    • Vasculitis (CMS/HCC)    • Vasculitis (CMS/HCC) 9/26/2017       Past Surgical History:   Procedure Laterality Date   • CHOLECYSTECTOMY     • ERCP     • GALLBLADDER SURGERY     • TONSILLECTOMY  1991        Social History     Socioeconomic History   • Marital status: Single     Spouse name:  None   • Number of children: None   • Years of education: None   • Highest education level: None   Occupational History   • Occupation:    Tobacco Use   • Smoking status: Former     Packs/day: 0.50     Years: 5.00     Pack years: 2.50     Types: Cigarettes     Quit date: 2020     Years since quitting: 3.6   • Smokeless tobacco: Current   • Tobacco comments:     quit 1 year ago, vapes currently   Vaping Use   • Vaping Use: Every day   • Substances: Nicotine, Flavoring   • Devices: Refillable tank   Substance and Sexual Activity   • Alcohol use: Not Currently   • Drug use: Yes     Types: Marijuana, Heroin     Comment: MM now 2023, previous heroin and opiod   • Sexual activity: Not Currently     Partners: Male, Female   Social History Narrative    Lives with mom     Social Determinants of Health     Financial Resource Strain: Low Risk  (6/27/2023)    Overall Financial Resource Strain (CARDIA)    • Difficulty of Paying Living Expenses: Not hard at all   Food Insecurity: No Food Insecurity (6/26/2023)    Hunger Vital Sign    • Worried About Running Out of Food in the Last Year: Never true    • Ran Out of Food in the Last Year: Never true   Transportation Needs: No Transportation Needs (6/27/2023)    PRAPARE - Transportation    • Lack of Transportation (Medical): No    • Lack of Transportation (Non-Medical): No   Housing Stability: Low Risk  (6/27/2023)    Housing Stability Vital Sign    • Unable to Pay for Housing in the Last Year: No    • Number of Places Lived in the Last Year: 1    • Unstable Housing in the Last Year: No       Family History   Problem Relation Age of Onset   • Hypertension Biological Mother    • Lung cancer Biological Father 76        smoker   • Lung cancer Maternal Grandmother 69        smoker   • Heart disease Maternal Grandfather    • Heart attack Maternal Grandfather         unsure of age   • Colon cancer Maternal Grandfather        Amoxicillin, Nsaids (non-steroidal  anti-inflammatory drug), Tramadol, and Trazodone    Current Outpatient Medications   Medication Sig Dispense Refill   • albuterol HFA (VENTOLIN HFA) 90 mcg/actuation inhaler Inhale 2 puffs every 4 (four) hours as needed.     • amoxicillin (AMOXIL) 500 mg capsule      • buprenorphine-naloxone (SUBOXONE) 8-2 mg film Place 2 Film under the tongue every morning. In addition to 1/2 film QPM     • cholecalciferol (VITAMIN D3) 1,250 mcg (50,000 unit) capsule Take 50,000 Units by mouth once a week on Monday.     • ferrous sulfate 325 mg (65 mg iron) tablet Take 65 mg by mouth nightly.     • fluticasone propion-salmeteroL (ADVAIR DISKUS) 250-50 mcg/dose diskus inhaler inhale 1 puff by mouth and INTO THE LUNGS twice a day Rinse mouth after use     • fluticasone propionate (FLONASE) 50 mcg/actuation nasal spray Administer 1 spray into each nostril daily as needed for rhinitis or allergies.     • lisinopriL (PRINIVIL) 5 mg tablet Take 1 tablet (5 mg total) by mouth daily. 90 tablet 1   • magnesium oxide (MAG-OX) 400 mg (241.3 mg magnesium) tablet Take 1 tablet (400 mg total) by mouth daily. 90 tablet 1   • medical marijuana 1 each See admin instr. Please be advised that an [facility] hospital staff member has listed medical marijuana as one of your home medications for documentation purposes. Please follow-up with your certified issuing provider for ongoing use     • metoprolol succinate XL (TOPROL-XL) 25 mg 24 hr tablet Take 1 tablet (25 mg total) by mouth nightly. 90 tablet 3   • NURTEC ODT 75 mg tablet,disintegrating Take 1 tablet (75 mg total) by mouth every other day. 16 tablet 3   • onabotulinumtoxinA (BOTOX) 200 unit recon soln injection Botox for chronic migraine headache every 3 months 1 each 4   • pantoprazole (PROTONIX) 40 mg EC tablet Take 40 mg by mouth nightly.     • PARoxetine (PAXIL) 10 mg tablet Take 10 mg by mouth daily.     • predniSONE (DELTASONE) 1 mg tablet Take 5 tablets (5 mg total) by mouth daily for 30 days,  THEN 4 tablets (4 mg total) daily for 30 days, THEN 3 tablets (3 mg total) daily for 30 days, THEN 2 tablets (2 mg total) daily for 30 days, THEN 1 tablet (1 mg total) daily. Patient states taking 7.5 mg daily. 450 tablet 0   • pregabalin (LYRICA) 200 mg capsule Take 1 capsule (200 mg total) by mouth 3 (three) times a day with meals. 270 capsule 1   • QUEtiapine (SEROquel) 50 mg tablet Take 50 mg by mouth nightly.     • TiZANidine (ZANAFLEX) 6 mg capsule TAKE 1 CAPSULE BY MOUTH EVERY 6 - 8 HOURS AS NEEDED NOT TO EXCEED 3 DOSES IN 24 HOURS     • torsemide (DEMADEX) 10 mg tablet Take 1 tablet (10 mg total) by mouth daily. 90 tablet 1   • zolpidem (AMBIEN) 10 mg tablet Take 1 tablet (10 mg total) by mouth nightly. 30 tablet 0   • buPROPion XL (WELLBUTRIN XL) 300 mg 24 hr tablet      • predniSONE (DELTASONE) 5 mg tablet Take 1.5 tablets (7.5 mg total) by mouth daily for 13 days. 20 tablet 0   • TiZANidine (ZANAFLEX) 6 mg capsule TAKE 1 CAPSULE BY MOUTH EVERY 6 - 8 HOURS AS NEEDED NOT TO EXCEED 3 DOSES IN 24 HOURS       No current facility-administered medications for this visit.       Review of Systems   Constitutional: Positive for weight loss.   HENT: Negative for congestion.    Eyes: Negative for blurred vision.   Cardiovascular: Positive for irregular heartbeat and palpitations. Negative for chest pain, claudication, cyanosis, dyspnea on exertion, leg swelling, near-syncope, orthopnea, paroxysmal nocturnal dyspnea and syncope.   Respiratory: Positive for shortness of breath and sleep disturbances due to breathing.    Gastrointestinal: Positive for bloating, heartburn and vomiting.          Vitals:    08/28/23 1334   BP: 110/60   Pulse: 81   Resp: 18   SpO2: 97%       Body mass index is 48.65 kg/m².      Physical Exam  Constitutional:       Appearance: Normal appearance. She is obese.   HENT:      Head: Normocephalic.      Mouth/Throat:      Mouth: Mucous membranes are moist.      Pharynx: No posterior oropharyngeal  erythema.   Eyes:      Extraocular Movements: Extraocular movements intact.   Neck:      Vascular: No JVD.   Cardiovascular:      Rate and Rhythm: Normal rate and regular rhythm.      Chest Wall: PMI is not displaced.      Pulses: Normal pulses.      Heart sounds: S1 normal and S2 normal. No murmur heard.     No gallop. No S3 or S4 sounds.   Pulmonary:      Effort: Pulmonary effort is normal.      Breath sounds: Normal breath sounds.   Abdominal:      General: Abdomen is flat. Bowel sounds are normal. There is no distension.      Tenderness: There is no abdominal tenderness.   Musculoskeletal:      Cervical back: Neck supple.      Comments: Functional status is:  good   Skin:     General: Skin is warm.   Neurological:      Mental Status: She is alert and oriented to person, place, and time. Mental status is at baseline.   Psychiatric:         Attention and Perception: Attention normal.         Mood and Affect: Mood and affect normal.         Speech: Speech normal.          Lab Results   Component Value Date    WBC 10.02 08/10/2023    RBC 4.37 08/10/2023    HGB 12.5 08/10/2023    HCT 40.7 08/10/2023    MCV 93.1 08/10/2023    MCH 28.6 08/10/2023    MCHC 30.7 (L) 08/10/2023    RDW 15.3 (H) 08/10/2023     08/10/2023        Lab Results   Component Value Date    GLUCOSE 99 08/10/2023    BUN 11 08/10/2023    CREATININE 0.8 08/10/2023    EGFR >60.0 08/10/2023    BUNCREAT 18 06/23/2023     08/10/2023    K 4.4 08/10/2023     08/10/2023    CO2 34 (H) 08/10/2023    CALCIUM 9.8 08/10/2023    PROTEIN 7.5 08/10/2023    ALBUMIN 4.2 08/10/2023    GLOB 2.4 06/23/2023    AGRATIO 1.5 06/23/2023    BILITOT 0.4 08/10/2023    ALKPHOS 65 08/10/2023    AST 17 08/10/2023    ALT 29 08/10/2023        Lab Results   Component Value Date    HGBA1C 5.4 06/26/2023    ESTAVGGLUC 108 06/26/2023        Lab Results   Component Value Date    ALBUMIN 4.2 08/10/2023        Lab Results   Component Value Date    CHOL 180 06/23/2023     TRIG 273 (H) 06/23/2023    HDL 50 06/23/2023    VLDL 45 (H) 06/23/2023    LDLCALC 85 06/23/2023        Cardiac Imaging    TRANSTHORACIC ECHO (TTE) COMPLETE 06/27/2023    Interpretation Summary  •  Left Ventricle: Normal ventricle size. Estimated EF 65-70% (68% by biplane). No regional wall motion abnormalities. Normal diastolic filling pattern for age.  •  Tricuspid Valve: Normal structure. Mild to moderate regurgitation. The regurgitation jet is eccentric. Estimated RVSP = 39 mmHg. No significant stenosis.  •  Pulmonic Valve: Normal structure. Trace regurgitation. No stenosis.  •  Mitral Valve: Normal leaflet structure. Trace regurgitation. No stenosis.  •  Aortic Valve: Normal structure. No regurgitation. No stenosis. Calculated dimensionless index = 0.64.  •  Right Ventricle: Normal ventricle size. Normal systolic function.  •  Left Atrium: Normal sized atrium.  •  Right Atrium: Normal sized atrium.  •  Aorta: Aortic root normal. Ascending aorta normal-sized. No evidence of coarctation by doppler.  •  IVC/SVC: Inferior vena cava is dilated (>2.1cm). Inferior vena cava collapses <50% during inspiration.  •  Pericardium: Normal structure.    High RAP and mild secondary pulmonary hypertension.      Results for orders placed during the hospital encounter of 06/26/23    Transthoracic echo (TTE) complete    Interpretation Summary  •  Left Ventricle: Normal ventricle size. Estimated EF 65-70% (68% by biplane). No regional wall motion abnormalities. Normal diastolic filling pattern for age.  •  Tricuspid Valve: Normal structure. Mild to moderate regurgitation. The regurgitation jet is eccentric. Estimated RVSP = 39 mmHg. No significant stenosis.  •  Pulmonic Valve: Normal structure. Trace regurgitation. No stenosis.  •  Mitral Valve: Normal leaflet structure. Trace regurgitation. No stenosis.  •  Aortic Valve: Normal structure. No regurgitation. No stenosis. Calculated dimensionless index = 0.64.  •  Right Ventricle:  Normal ventricle size. Normal systolic function.  •  Left Atrium: Normal sized atrium.  •  Right Atrium: Normal sized atrium.  •  Aorta: Aortic root normal. Ascending aorta normal-sized. No evidence of coarctation by doppler.  •  IVC/SVC: Inferior vena cava is dilated (>2.1cm). Inferior vena cava collapses <50% during inspiration.  •  Pericardium: Normal structure.    High RAP and mild secondary pulmonary hypertension.               Assessment/Plan        EKG: no new EKG    Staphylococcus aureus bacteremia  Doing well without recurrent endocarditis.    Edema  If edema worsens can do extra Torsemide.  Heart failure education provided and she has a updated prescription for torsemide and updated labs pending.    Bilateral leg edema  Continue healthy diet and exercise, torsemide to keep fluid off.    Pulmonary hypertension (CMS/HCC)  Doing great on Torsemide will continue as prescribed.    Obstructive sleep apnea syndrome  This is contributing to hypertension follow-up with sleep medicine she may be a candidate for implant.    Insomnia  Education on diet exercise and lifestyle to help with sleep quality.    Weight gain  Discussed reduction in weight and improvements in edema goals long-term for weight getting down towards 200.    Morbid obesity (CMS/HCC)  Diet and exercise counseling.  Increase walking by 5 minutes/day/week.    Generalized anxiety disorder  Improving symptoms on the current lower dose of Paxil.    Ambulatory dysfunction  Improving I have encouraged reconditioning.    Chronic diastolic congestive heart failure (CMS/HCC)  Improving symptoms and functional status on torsemide.  Would continue torsemide as prescribed as well as blood pressure currently at goal less than 130/80 mmHg.  Can try Aldactone to help balance potassium.     Spent a total of 45 minutes on exam interview documentation counseling record review and care coordination activities.  New Medications Ordered This Visit   Medications   •  amoxicillin (AMOXIL) 500 mg capsule   • buPROPion XL (WELLBUTRIN XL) 300 mg 24 hr tablet   • fluticasone propion-salmeteroL (ADVAIR DISKUS) 250-50 mcg/dose diskus inhaler     Sig: inhale 1 puff by mouth and INTO THE LUNGS twice a day Rinse mouth after use   • TiZANidine (ZANAFLEX) 6 mg capsule     Sig: TAKE 1 CAPSULE BY MOUTH EVERY 6 - 8 HOURS AS NEEDED NOT TO EXCEED 3 DOSES IN 24 HOURS   • TiZANidine (ZANAFLEX) 6 mg capsule     Sig: TAKE 1 CAPSULE BY MOUTH EVERY 6 - 8 HOURS AS NEEDED NOT TO EXCEED 3 DOSES IN 24 HOURS   • torsemide (DEMADEX) 10 mg tablet     Sig: Take 1 tablet (10 mg total) by mouth daily.     Dispense:  90 tablet     Refill:  1   • metoprolol succinate XL (TOPROL-XL) 25 mg 24 hr tablet     Sig: Take 1 tablet (25 mg total) by mouth nightly.     Dispense:  90 tablet     Refill:  3   • lisinopriL (PRINIVIL) 5 mg tablet     Sig: Take 1 tablet (5 mg total) by mouth daily.     Dispense:  90 tablet     Refill:  1   • magnesium oxide (MAG-OX) 400 mg (241.3 mg magnesium) tablet     Sig: Take 1 tablet (400 mg total) by mouth daily.     Dispense:  90 tablet     Refill:  1       Medications Discontinued During This Encounter   Medication Reason   • torsemide (DEMADEX) 10 mg tablet Reorder   • metoprolol succinate XL (TOPROL-XL) 25 mg 24 hr tablet Reorder   • magnesium oxide (MAG-OX) 400 mg (241.3 mg magnesium) tablet Reorder   • lisinopriL (PRINIVIL) 5 mg tablet Reorder   • magnesium oxide (MAG-OX) 400 mg (241.3 mg magnesium) tablet Reorder        Orders Placed This Encounter   Procedures   • B-type natriuretic peptide     Standing Status:   Future     Number of Occurrences:   1     Standing Expiration Date:   8/28/2024     Order Specific Question:   Release to patient     Answer:   Immediate   • Comprehensive metabolic panel     Standing Status:   Future     Number of Occurrences:   1     Standing Expiration Date:   8/28/2024     Order Specific Question:   Release to patient     Answer:   Immediate   • CBC  and differential     Standing Status:   Future     Number of Occurrences:   1     Standing Expiration Date:   8/28/2024     Order Specific Question:   Release to patient     Answer:   Immediate   • Magnesium     Standing Status:   Future     Number of Occurrences:   1     Standing Expiration Date:   8/28/2024     Order Specific Question:   Release to patient     Answer:   Immediate

## 2023-08-28 NOTE — ASSESSMENT & PLAN NOTE
If edema worsens can do extra Torsemide.  Heart failure education provided and she has a updated prescription for torsemide and updated labs pending.

## 2023-08-28 NOTE — PATIENT INSTRUCTIONS
Continue to walk more and do more violet hose.      Continuing to do Toprol 25mg or can increase to 50mg if needed    Palpitations would do electrolytes/electrolyte enhanced water/spring water.    Biosteel or Liquid IV are good for hydration/electrolytes.  Crystal Lite with K.      Continue Potassium and Magnesium    Flax ground or Bennie seeds as smoothie addins    Continue Vaping less. Goal to quit sometime soon.

## 2023-08-29 ENCOUNTER — TELEMEDICINE (OUTPATIENT)
Dept: BEHAVIORAL/MENTAL HEALTH CLINIC | Facility: CLINIC | Age: 38
End: 2023-08-29
Payer: COMMERCIAL

## 2023-08-29 DIAGNOSIS — F41.1 GENERALIZED ANXIETY DISORDER: ICD-10-CM

## 2023-08-29 DIAGNOSIS — F33.0 MILD EPISODE OF RECURRENT MAJOR DEPRESSIVE DISORDER (HCC): Primary | ICD-10-CM

## 2023-08-29 PROBLEM — I50.32 CHRONIC DIASTOLIC CONGESTIVE HEART FAILURE (CMS/HCC): Status: ACTIVE | Noted: 2023-08-29

## 2023-08-29 PROCEDURE — 90837 PSYTX W PT 60 MINUTES: CPT | Performed by: COUNSELOR

## 2023-08-29 NOTE — ASSESSMENT & PLAN NOTE
This is contributing to hypertension follow-up with sleep medicine she may be a candidate for implant.

## 2023-08-29 NOTE — ASSESSMENT & PLAN NOTE
Improving symptoms and functional status on torsemide.  Would continue torsemide as prescribed as well as blood pressure currently at goal less than 130/80 mmHg.  Can try Aldactone to help balance potassium.

## 2023-08-29 NOTE — PSYCH
Behavioral Health Psychotherapy Progress Note    Psychotherapy Provided: Individual Psychotherapy     1. Mild episode of recurrent major depressive disorder ([address])        2. Generalized anxiety disorder            Goals addressed in session: Goal 1     DATA:     -CT reported that she saw a new psychiatrist who she is seeing. CT is having genetic testing to determine best potential psych meds. CT described the current plan for med changes. CT is down to 25 mg of seroquel and 10 of pristic. -CT reflected on the positive difference current team of providers is having. "finally getting down to real causes and reduction of symptoms=improved quality of life. -CT reported and reflected on various medical updates. CT is still appealing med tx approval for migraines, approval for mask adjustments. -CT described the challenges of getting through testing and trying new treatments. CT reflected on feeling stuck previously vs depressed. Clarified difference and impact of labels. -CT described her awareness of the connection between her mood and physical/medical symptoms. CT described potentially tracking the various factors/symptoms to add clarity about mood. -CT reflected on her desire to "help" people who struggle with addiction. CT and a friend who was actively addicted. -CT reflected on her concerns about her friend who she used to live with. CT aware of the boundaries of her influence and trying to accept. CT feels particularly concerned about children. During this session, this clinician used the following therapeutic modalities: Supportive Psychotherapy    Substance Abuse was not addressed during this session. If the client is diagnosed with a co-occurring substance use disorder, please indicate any changes in the frequency or amount of use: . Stage of change for addressing substance use diagnoses: No substance use/Not applicable    ASSESSMENT:  Paul Washington presents with a Euthymic/ normal mood.      her affect is Normal range and intensity, which is congruent, with her mood and the content of the session. The client has made progress on their goals. Dannielle Bolivar presents with a none risk of suicide, none risk of self-harm, and none risk of harm to others. For any risk assessment that surpasses a "low" rating, a safety plan must be developed. A safety plan was indicated: no  If yes, describe in detail     PLAN: Between sessions, Dannielle Bolivar will attend to physical/medical needs and tx. Track her symptoms (mood, sleep, physical ailments, changing meds). At the next session, the therapist will use Supportive Psychotherapy to support med transitions, improved wellbeing and self-care. .    Behavioral Health Treatment Plan and Discharge Planning: Dannielle Bolivar is aware of and agrees to continue to work on their treatment plan. They have identified and are working toward their discharge goals. yes    Visit start and stop times:    08/29/23  Start Time: 3027  Stop Time: 1304  Total Visit Time: 60 minutes    Virtual Regular Visit    Verification of patient location:    Patient is located at Home in the following state in which I hold an active license [place]      Assessment/Plan:    Problem List Items Addressed This Visit        Other    Recurrent major depressive disorder ([address]) - Primary    Generalized anxiety disorder       Goals addressed in session: Goal 1          Reason for visit is   Chief Complaint   Patient presents with   • Virtual Regular Visit        Encounter provider Mauro Vega, [facility]    Provider located at [address] [place], [place] [id number]      Recent Visits  No visits were found meeting these conditions.   Showing recent visits within past 7 days and meeting all other requirements  Today's Visits  Date Type Provider Dept   08/29/23 Telemedicine [facility]   Showing today's visits and meeting all other requirements  Future Appointments  No visits were found meeting these conditions. Showing future appointments within next 150 days and meeting all other requirements       The patient was identified by name and date of birth. Ricardo Sharp was informed that this is a telemedicine visit and that the visit is being conducted throughthe [employer] platform. She agrees to proceed. .  My office door was closed. No one else was in the room. She acknowledged consent and understanding of privacy and security of the video platform. The patient has agreed to participate and understands they can discontinue the visit at any time. Patient is aware this is a billable service. Adriana Aldana is a 41 y.o. female  . HPI     No past medical history on file. No past surgical history on file. No current outpatient medications on file. No current facility-administered medications for this visit. Not on File    Review of Systems    Video Exam    There were no vitals filed for this visit.     Physical Exam

## 2023-08-29 NOTE — ASSESSMENT & PLAN NOTE
Discussed reduction in weight and improvements in edema goals long-term for weight getting down towards 200.

## 2023-09-14 ENCOUNTER — OFFICE VISIT (OUTPATIENT)
Dept: PRIMARY CARE | Facility: CLINIC | Age: 38
End: 2023-09-14
Payer: COMMERCIAL

## 2023-09-14 VITALS
WEIGHT: 271.2 LBS | HEIGHT: 62 IN | HEART RATE: 65 BPM | DIASTOLIC BLOOD PRESSURE: 70 MMHG | SYSTOLIC BLOOD PRESSURE: 122 MMHG | OXYGEN SATURATION: 98 % | BODY MASS INDEX: 49.9 KG/M2

## 2023-09-14 DIAGNOSIS — I10 ESSENTIAL HYPERTENSION: ICD-10-CM

## 2023-09-14 DIAGNOSIS — Z00.00 PHYSICAL EXAM, ANNUAL: Primary | ICD-10-CM

## 2023-09-14 DIAGNOSIS — F51.01 PRIMARY INSOMNIA: ICD-10-CM

## 2023-09-14 DIAGNOSIS — Z62.819 HISTORY OF ABUSE IN CHILDHOOD: ICD-10-CM

## 2023-09-14 DIAGNOSIS — D50.0 IRON DEFICIENCY ANEMIA DUE TO CHRONIC BLOOD LOSS: ICD-10-CM

## 2023-09-14 DIAGNOSIS — G47.33 OBSTRUCTIVE SLEEP APNEA SYNDROME: ICD-10-CM

## 2023-09-14 DIAGNOSIS — E55.9 VITAMIN D DEFICIENCY: ICD-10-CM

## 2023-09-14 DIAGNOSIS — I50.32 CHRONIC DIASTOLIC CONGESTIVE HEART FAILURE (CMS/HCC): ICD-10-CM

## 2023-09-14 DIAGNOSIS — R60.0 BILATERAL LEG EDEMA: ICD-10-CM

## 2023-09-14 DIAGNOSIS — J45.30 MILD PERSISTENT ASTHMA, UNCOMPLICATED: ICD-10-CM

## 2023-09-14 DIAGNOSIS — E66.01 MORBID OBESITY (CMS/HCC): ICD-10-CM

## 2023-09-14 DIAGNOSIS — I27.20 PULMONARY HYPERTENSION (CMS/HCC): ICD-10-CM

## 2023-09-14 DIAGNOSIS — G43.709 CHRONIC MIGRAINE WITHOUT AURA WITHOUT STATUS MIGRAINOSUS, NOT INTRACTABLE: ICD-10-CM

## 2023-09-14 DIAGNOSIS — F39 UNSPECIFIED MOOD (AFFECTIVE) DISORDER (CMS/HCC): ICD-10-CM

## 2023-09-14 PROBLEM — R41.841 COGNITIVE COMMUNICATION DEFICIT: Status: RESOLVED | Noted: 2023-03-09 | Resolved: 2023-09-14

## 2023-09-14 PROBLEM — R00.2 PALPITATIONS: Status: RESOLVED | Noted: 2018-06-15 | Resolved: 2023-09-14

## 2023-09-14 PROBLEM — G43.109 MIGRAINE WITH AURA AND WITHOUT STATUS MIGRAINOSUS, NOT INTRACTABLE: Status: RESOLVED | Noted: 2022-09-20 | Resolved: 2023-09-14

## 2023-09-14 PROBLEM — M79.605 LEFT LEG PAIN: Status: RESOLVED | Noted: 2023-07-10 | Resolved: 2023-09-14

## 2023-09-14 PROBLEM — S92.332D: Status: RESOLVED | Noted: 2020-10-02 | Resolved: 2023-09-14

## 2023-09-14 PROBLEM — R20.2 PARESTHESIA: Status: RESOLVED | Noted: 2023-03-09 | Resolved: 2023-09-14

## 2023-09-14 PROBLEM — R10.13 DYSPEPSIA: Status: RESOLVED | Noted: 2020-12-28 | Resolved: 2023-09-14

## 2023-09-14 PROBLEM — R60.9 EDEMA: Status: RESOLVED | Noted: 2023-05-25 | Resolved: 2023-09-14

## 2023-09-14 PROBLEM — F41.1 GENERALIZED ANXIETY DISORDER: Status: RESOLVED | Noted: 2023-01-06 | Resolved: 2023-09-14

## 2023-09-14 PROBLEM — R10.84 GENERALIZED ABDOMINAL PAIN: Status: RESOLVED | Noted: 2019-04-07 | Resolved: 2023-09-14

## 2023-09-14 PROBLEM — D72.829 LEUKOCYTOSIS: Status: RESOLVED | Noted: 2019-06-11 | Resolved: 2023-09-14

## 2023-09-14 PROBLEM — R10.10 PAIN OF UPPER ABDOMEN: Status: RESOLVED | Noted: 2019-04-08 | Resolved: 2023-09-14

## 2023-09-14 PROBLEM — B02.9 HERPES ZOSTER: Status: RESOLVED | Noted: 2021-05-24 | Resolved: 2023-09-14

## 2023-09-14 PROBLEM — M51.369 DDD (DEGENERATIVE DISC DISEASE), LUMBAR: Status: RESOLVED | Noted: 2023-01-10 | Resolved: 2023-09-14

## 2023-09-14 PROCEDURE — 3008F BODY MASS INDEX DOCD: CPT | Performed by: PHYSICIAN ASSISTANT

## 2023-09-14 PROCEDURE — 3078F DIAST BP <80 MM HG: CPT | Performed by: PHYSICIAN ASSISTANT

## 2023-09-14 PROCEDURE — 99395 PREV VISIT EST AGE 18-39: CPT | Performed by: PHYSICIAN ASSISTANT

## 2023-09-14 PROCEDURE — 3074F SYST BP LT 130 MM HG: CPT | Performed by: PHYSICIAN ASSISTANT

## 2023-09-14 RX ORDER — QUETIAPINE FUMARATE 25 MG/1
25 TABLET, FILM COATED ORAL NIGHTLY
Qty: 30 TABLET | Refills: 0
Start: 2023-09-14 | End: 2024-03-12

## 2023-09-14 NOTE — ASSESSMENT & PLAN NOTE
Patient has trauma which is causing a lot of of her anxiety and depression, seeing psychiatry  At the time of her visit she actually has to leave to do her psychiatric visit, she was able to do genetics for medications that may work for her  Currently on Paxil 10 mg, Wellbutrin 300 mg Seroquel 25 mg

## 2023-09-14 NOTE — ASSESSMENT & PLAN NOTE
Completed today  Patient is overdue for labs encouraged her to get done  Follow-up yearly  Up-to-date on immunizations other than COVID which I encouraged her to get and the influenza vaccine in October

## 2023-09-14 NOTE — ASSESSMENT & PLAN NOTE
Needs to wear compression stockings, has been on long dose prednisone weaning off  Also has some sleep apnea that she just recently has been able to treat with new machine, suspect her edema will resolve with all above  1+ pitting bilaterally

## 2023-09-14 NOTE — ASSESSMENT & PLAN NOTE
Patient has trauma, difficult to assess if there is an underlying mood disorder or just trauma-based with anxiety and depression  She currently has a psychiatrist who was able to get the genetic screening for what medications would work for her, currently has an appointment today with her to follow-up on that  On Paxil 10 mg, Wellbutrin 300 mg and Seroquel 25 mg nightly  Still using medical marijuana which I encouraged her to wean off

## 2023-09-14 NOTE — ASSESSMENT & PLAN NOTE
Last echo with preserved ejection fraction, on Demadex 10 milligrams, lisinopril 5 mg, metoprolol succinate 25 mg

## 2023-09-14 NOTE — ASSESSMENT & PLAN NOTE
We discussed diet and exercise, I have encouraged patient to walk every day  Her diet is restricted because where they live there in an apartment where she cannot get access to cooking daily

## 2023-09-14 NOTE — ASSESSMENT & PLAN NOTE
Now has a new CPAP machine  Reports feels much improved, still fatigued but improved from previously
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
## 2023-09-14 NOTE — ASSESSMENT & PLAN NOTE
Sees pulmonary both for her sleep apnea and her asthma  Maintained on albuterol as needed, with daily use of Advair 250/50

## 2023-09-14 NOTE — ASSESSMENT & PLAN NOTE
Follows with Dr. Kane Griffin [facility] in June 2023 with preserved ejection fraction  She is euvolemic on exam today  Currently on lisinopril 5 mg, metoprolol succinate 25 mg, and Demadex 10 mg daily

## 2023-09-14 NOTE — ASSESSMENT & PLAN NOTE
Botox, neurology  Dr. Dominguez and Dr. Koch manage  [facility], Botox is currently not working well, neurology trying to get new medications approved

## 2023-09-18 ENCOUNTER — TELEMEDICINE (OUTPATIENT)
Dept: BEHAVIORAL/MENTAL HEALTH CLINIC | Facility: CLINIC | Age: 38
End: 2023-09-18
Payer: COMMERCIAL

## 2023-09-18 DIAGNOSIS — F41.1 GENERALIZED ANXIETY DISORDER: ICD-10-CM

## 2023-09-18 DIAGNOSIS — F33.0 MILD EPISODE OF RECURRENT MAJOR DEPRESSIVE DISORDER (HCC): Primary | ICD-10-CM

## 2023-09-18 PROCEDURE — 90837 PSYTX W PT 60 MINUTES: CPT | Performed by: COUNSELOR

## 2023-09-18 NOTE — PSYCH
Behavioral Health Psychotherapy Progress Note    Psychotherapy Provided: Individual Psychotherapy     1. Mild episode of recurrent major depressive disorder ([address])        2. Generalized anxiety disorder            Goals addressed in session: Goal 1     DATA:     -CT reported and reflected on recent physical and medical developments. CT is managing vascular, migraine, and steroid taper challenges. -CT has been working through trying to secure a care waiver for herself. Required signatures and exams are complete and waiting for final decision. CT is also in the   -CT got results from genetic testing: folate metabolism mutation; new insight on anti-depressant compatibility (changed anti-depressant to Pratique) and feeling lighter, lift in mood and lowering Seroquel via taper. -CT reviewed her awareness of challenges as well as the things she is grateful for (PCP, psych)  -CT reflected on her journey through her medical and psychological challenges and searches for care. -CT reflected on relationship with her sister. CT originally had a good relationship with her sister but her sister no longer has contact with CT or mother. Sister has resources but does not share or care for CT and/or mother. -CT has been trying to find out how her friend and her children are doing. CT remains concerned about the children.  -Reviewed her self-care plans. CT has been journaling nightly. CT is still seeking a stronger sense of her "purpose" for her life. CT is aware that she wants to help children. -TH encouraged CT to reflect on her own childhood experiences and the role of the "doubter"; consider how it has attempted to protect her in the past.   During this session, this clinician used the following therapeutic modalities: Supportive Psychotherapy    Substance Abuse was not addressed during this session.  If the client is diagnosed with a co-occurring substance use disorder, please indicate any changes in the frequency or amount of use: . Stage of change for addressing substance use diagnoses: No substance use/Not applicable    ASSESSMENT:  Sushil Bryan presents with a Euthymic/ normal mood. her affect is Normal range and intensity, which is congruent, with her mood and the content of the session. The client has made progress on their goals. Sushil Bryan presents with a none risk of suicide, none risk of self-harm, and none risk of harm to others. For any risk assessment that surpasses a "low" rating, a safety plan must be developed. A safety plan was indicated: no  If yes, describe in detail     PLAN: Between sessions, Sushil Bryan will continue journaling/meditation practice, complete and follow medical treatment/follow through, reflect on the past and present "role" of her "doubter". At the next session, the therapist will use Supportive Psychotherapy to support medical treatments, MH shifts secondary to med change, and use IFS to explore doubting self. .    Behavioral Health Treatment Plan and Discharge Planning: Sushil Bryan is aware of and agrees to continue to work on their treatment plan. They have identified and are working toward their discharge goals.  yes    Visit start and stop times:    09/18/23  Start Time: 1302  Stop Time: 1401  Total Visit Time: 59 minutes    Virtual Regular Visit    Verification of patient location:    Patient is located at Home in the following state in which I hold an active license [place]      Assessment/Plan:    Problem List Items Addressed This Visit        Other    Recurrent major depressive disorder ([address]) - Primary    Generalized anxiety disorder       Goals addressed in session: Goal 1          Reason for visit is   Chief Complaint   Patient presents with   • Virtual Regular Visit        Encounter provider Isaias Frank, [facility]    Provider located at [address] [place] [id number] [phone]      Recent Visits  No visits were found meeting these conditions. Showing recent visits within past 7 days and meeting all other requirements  Today's Visits  Date Type Provider Dept   09/18/23 Telemedicine Lin Calderon, [address]   Showing today's visits and meeting all other requirements  Future Appointments  No visits were found meeting these conditions. Showing future appointments within next 150 days and meeting all other requirements       The patient was identified by name and date of birth. Stephen Church was informed that this is a telemedicine visit and that the visit is being conducted throughthe Thingies platform. She agrees to proceed. .  My office door was closed. No one else was in the room. She acknowledged consent and understanding of privacy and security of the video platform. The patient has agreed to participate and understands they can discontinue the visit at any time. Patient is aware this is a billable service. Kaye Escobar is a 41 y.o. female  . HPI     No past medical history on file. No past surgical history on file. No current outpatient medications on file. No current facility-administered medications for this visit. Not on File    Review of Systems    Video Exam    There were no vitals filed for this visit.     Physical Exam

## 2023-09-22 DIAGNOSIS — F51.01 PRIMARY INSOMNIA: ICD-10-CM

## 2023-09-22 RX ORDER — ZOLPIDEM TARTRATE 10 MG/1
10 TABLET ORAL NIGHTLY
Qty: 30 TABLET | Refills: 0 | Status: SHIPPED | OUTPATIENT
Start: 2023-09-27 | End: 2023-10-23 | Stop reason: SDUPTHER

## 2023-09-27 ENCOUNTER — OFFICE VISIT (OUTPATIENT)
Dept: NEUROLOGY | Facility: CLINIC | Age: 38
End: 2023-09-27
Payer: COMMERCIAL

## 2023-09-27 VITALS
BODY MASS INDEX: 49.02 KG/M2 | OXYGEN SATURATION: 99 % | TEMPERATURE: 97.9 F | SYSTOLIC BLOOD PRESSURE: 116 MMHG | HEART RATE: 65 BPM | RESPIRATION RATE: 18 BRPM | WEIGHT: 268 LBS | DIASTOLIC BLOOD PRESSURE: 74 MMHG

## 2023-09-27 DIAGNOSIS — G43.709 CHRONIC MIGRAINE WITHOUT AURA WITHOUT STATUS MIGRAINOSUS, NOT INTRACTABLE: ICD-10-CM

## 2023-09-27 DIAGNOSIS — M54.2 CERVICALGIA: ICD-10-CM

## 2023-09-27 DIAGNOSIS — G24.3 SPASMODIC TORTICOLLIS: ICD-10-CM

## 2023-09-27 DIAGNOSIS — G43.901 STATUS MIGRAINOSUS: Primary | ICD-10-CM

## 2023-09-27 PROCEDURE — 200200 PR NO CHARGE: Performed by: NURSE PRACTITIONER

## 2023-09-27 PROCEDURE — 99214 OFFICE O/P EST MOD 30 MIN: CPT | Mod: 25 | Performed by: NURSE PRACTITIONER

## 2023-09-27 PROCEDURE — 20553 NJX 1/MLT TRIGGER POINTS 3/>: CPT | Performed by: NURSE PRACTITIONER

## 2023-09-27 PROCEDURE — 3008F BODY MASS INDEX DOCD: CPT | Performed by: NURSE PRACTITIONER

## 2023-09-27 PROCEDURE — 3078F DIAST BP <80 MM HG: CPT | Performed by: NURSE PRACTITIONER

## 2023-09-27 PROCEDURE — 96372 THER/PROPH/DIAG INJ SC/IM: CPT | Mod: XU | Performed by: NURSE PRACTITIONER

## 2023-09-27 PROCEDURE — 3074F SYST BP LT 130 MM HG: CPT | Performed by: NURSE PRACTITIONER

## 2023-09-27 RX ORDER — LIDOCAINE HYDROCHLORIDE 10 MG/ML
3 INJECTION, SOLUTION INFILTRATION; PERINEURAL ONCE
Status: SHIPPED | OUTPATIENT
Start: 2023-09-27 | End: 2023-09-28

## 2023-09-27 RX ORDER — KETOROLAC TROMETHAMINE 30 MG/ML
30 INJECTION, SOLUTION INTRAMUSCULAR; INTRAVENOUS ONCE
Status: COMPLETED | OUTPATIENT
Start: 2023-09-27 | End: 2023-09-27

## 2023-09-27 RX ADMIN — KETOROLAC TROMETHAMINE 30 MG: 30 INJECTION, SOLUTION INTRAMUSCULAR; INTRAVENOUS at 16:00

## 2023-09-27 NOTE — Clinical Note
Ang, I would like to resubmit for Dysport for her under spasmodic torticollis dx .   However she has an dante with me coming up on 10/11 - for botox - should we wait and submit for dysport again AFTER her botox in October ?   Thank you!

## 2023-09-27 NOTE — PROGRESS NOTES
Carolyn Redmond is a 38 y.o. old female presenting today for follow up and reports she is down to 3 mg prednisone daily, she reports by January 1 st she will be off steroids, she reports having genetic testing done and was told impaired folate metabolism - unable to metabolize vit B and stopped Paxil and switched to Pristique.   RFA treatments for her cervical spine , relieves, but not long lasting , she says . Neck tension and spasm has been ongoing .    Frequency :    after Botox good for one month she says   Then 3-4 days per week- headache level above 5/10 or migraine level ,   Associated with neck tension and spasm     Associated symptoms:     - Nausea Vomiting Diarrhea   - Photophobia Phonophobia Osmophobia      - Stiff or sore neck     - Dizziness light headed   - Problems with concentration       -better with lying down   - Prefer to be in a cool, quiet, dark room         Previsouly tried medications and procedures / devices:     Trigger point injection/Nerve block:YES trigger point injection and nerve block  Epidural injections or trans foraminal injections: YES for her  back  Alternative therapies: massage, physical therapy, acupuncture and chiropractor, tens units  CBD or THC: YES   Other headache devices: NO   Botulinum toxin: YES Botox-started 5/9/2022 last Botox 8/11/2022  Headache infusions:NO   Preventive medication therapy:   - Folic acid, magnesium (not sure if that helped)  - Protriptyline Wellbutrin, Cymbalta 60 mg, Latuda,  - Terazosin 2 mg, Klonopin, trazodone (allergies), zolpidem (Ambien) 10 mg,  - Lyrica, Topamax (effective but stopped due to hair loss)  - Inderal, metoprolol  - Benadryl IV infusion  - Baclofen, tizanidine  - Seroquel 200 mg,   Abortive medication Therapy:  - Tramadol (allergies), Percocet (2017)  - Fioricet,  - Sumatriptan 100 mg tab, 20 mg nasal spray,, rizatriptan, Relpax, Zomig (ineffective), Frova  - Ubrelvy 100 mg, Nurtec 75 mg  - Excedrin, Tylenol,  -  Ibuprofen, Celebrex, Toradol,  - Reglan, Zofran 4 mg tablet, Compazine, Phenergan,  - Prednisone 10 mg      Aura:  None     Previous testing :     Brain MRI 09/2022 :       Narrative & Impression   CLINICAL HISTORY: Right arm numbness [R20.0 (ICD-10-CM)]; Right facial numbness  [R20.0 (ICD-10-CM)]; Chronic migraine without aura without status migrainosus,  not intractable [G43.709 (ICD-10-CM)]     COMMENT: MRI examination of the brain was performed with and without intravenous  contrast following the [facility] standard protocol. 10.5 cc Gadavist  administered without adverse reaction.     Comparison: MRI brain 5/24/2021.     Brain parenchyma: Normal MRI appearance without restricted diffusion to  suggest acute infarction. There is no abnormal enhancement.  Ventricles, cisterns, and sulci: Normal in size and configuration.  Sella and cerebellar tonsils: Right cerebellar tonsil remains slightly  low-lying, not meeting criteria for a Chiari malformation.  Vascular flow voids: Intact at the skull base.     Calvarium and extra cranial soft tissues: Normal.  Paranasal sinuses and mastoid air cells: Ethmoid and maxillary mucosal  thickening.  Trace fluid signal abnormality at the mastoid tips.     --  IMPRESSION: Stable normal MRI appearance of the brain.  No abnormal enhancement.       Cervical spine MRI 01/2022:     --  IMPRESSION:  Stable appearance of the cervical spine, again noting C5-C6 disc herniation  resulting in mild central canal stenosis.     COMMENT:  Cervicothoracic alignment: Straightening of the normal lordosis without  listhesis.     Vertebral bodies: Maintained in height and signal.     Vertebral Discs: Mild disc desiccation throughout without significant loss of  height.     Visualized spinal cord and contents of the posterior fossa: Within normal  limits.     C2-3: No disc herniation, central canal stenosis, or neural foraminal narrowing.     C3-4: No disc herniation, central canal stenosis, or  neural foraminal narrowing.     C4-5: Minimal broad-based disc bulge without central canal stenosis or neural  foraminal narrowing.     C5-6: Broad-based left paracentral disc herniation indenting the ventral thecal  sac and abutting the ventral cord, resulting in mild central canal stenosis,  unchanged. No significant neural foraminal narrowing.     C6-7: No disc herniation, central canal stenosis, or neural foraminal narrowing.     C7-T1: No disc herniation, central canal stenosis, or neural foraminal  narrowing.     Mildly prominent retropharyngeal and upper cervical chain lymph nodes as well as  lingual and palatine tonsillar hypertrophy, all likely reactive.      Past Medical History:  has a past medical history of Acute bacterial endocarditis (12/10/2020), Anxiety, Depressed, Endocarditis, Fibromyalgia, Migraines, Osteomyelitis (CMS/[facility]) (1/10/2023), Pancreatitis, Recurrent major depressive disorder (CMS/[facility]) (1/6/2023), Substance abuse (CMS/[facility]) (2015), Tachycardia, Vasculitis (CMS/[facility]), and Vasculitis (CMS/[facility]) (9/26/2017).  Past Surgical History:  has a past surgical history that includes Gallbladder surgery; Cholecystectomy; ERCP; and Tonsillectomy (1991).  Social History:   Social History     Tobacco Use    Smoking status: Former     Packs/day: 0.50     Years: 5.00     Additional pack years: 0.00     Total pack years: 2.50     Types: Cigarettes     Quit date: 2020     Years since quitting: 3.7    Smokeless tobacco: Current    Tobacco comments:     quit 1 year ago, vapes currently   Vaping Use    Vaping Use: Every day    Substances: Nicotine, Flavoring    Devices: Refillable tank   Substance Use Topics    Alcohol use: Not Currently    Drug use: Yes     Types: Marijuana, Heroin     Comment: MM now 2023, previous heroin and opiod     Procedure Note  Trigger Point Injections       Bupivicaine 0.25 %  Lot # [id number]  Exp: 08/01/2024     Lidocaine   Lot# [id number]  Exp date: 07/01/2024    The risks, benefits  and anticipated outcomes of the procedure, the risks and benefits of the alternatives to the procedure, and the roles and tasks of the personnel to be involved, were discussed with the patient, and the patient consents to the procedure and agrees to proceed.        A 50/50 combination of 1 % Lidocaine 1.5 cc  (NDC 8657-9728-38) and 0.25% bupivacaine 1.5 cc (NDC 36591077-34) were prepared in 2 syringes ( 3 cc) .           After aspiration to ensure no obstruction or presence of blood , the area was injected . The needle was repositioned in a fan-like manner and the entire area was injected . The needle was then repositioned at the occipital groove , and the greater occpital nerve was injected after ensuring there was no obstruction or backflow of blood. Pressure was held and no hematoma was noted.  The patient noted relief of pain after 5 minutes. The patient was told to use heat if there was discomfort later in the day.      Procedure : Trigger Point Injections      The risks , benefits and anticipated outcomes of the procedure , the risks and benefits of the alternatives to the procedure , and the roles and tasks of the personnel to be involved , were dicsussed with the patient , and the patient consents to the procedure and agrees to proceed.   .  Trigger point injections were identified by muscle spasms, band , and radiating pain. Each trigger point listed below was injected after aspiration to ensure no air, arterial , or venous blood was present. The patient had immediate relief of pain and was instructed to use heat (20 min at a time ) and do stretching exercises for 24 hrs.         Trigger Points injected :         Right Greater Occipital  2 cc     Left Greater Occipital 2 cc     Right Lesser Occipital  1 cc     Left Lesser Occipital 1 cc       Diagnoses and all orders for this visit:    Status migrainosus  -     ketorolac (TORADOL) injection 30 mg  -     lidocaine (XYLOCAINE) 10 mg/mL (1 %) injection 3  mL    Chronic migraine without aura without status migrainosus, not intractable  -     lidocaine (XYLOCAINE) 10 mg/mL (1 %) injection 3 mL    Spasmodic torticollis    Cervicalgia

## 2023-09-28 PROBLEM — G24.3 SPASMODIC TORTICOLLIS: Status: ACTIVE | Noted: 2023-09-28

## 2023-09-28 NOTE — ADDENDUM NOTE
Addended by: REX FLYNN on: 9/28/2023 11:08 AM     Modules accepted: Orders     Left message informing patient of Dr Rafat Solorio message. Once a follow up is scheduled refill request can be forwarded back to Dr Kinga Waite.

## 2023-10-02 ENCOUNTER — TELEMEDICINE (OUTPATIENT)
Dept: BEHAVIORAL/MENTAL HEALTH CLINIC | Facility: CLINIC | Age: 38
End: 2023-10-02
Payer: COMMERCIAL

## 2023-10-02 DIAGNOSIS — F33.0 MILD EPISODE OF RECURRENT MAJOR DEPRESSIVE DISORDER (HCC): Primary | ICD-10-CM

## 2023-10-02 DIAGNOSIS — F41.1 GENERALIZED ANXIETY DISORDER: ICD-10-CM

## 2023-10-02 PROCEDURE — 90837 PSYTX W PT 60 MINUTES: CPT | Performed by: COUNSELOR

## 2023-10-02 NOTE — PSYCH
Virtual Regular Visit    Verification of patient location:    Patient is located at Home in the following state in which I hold an active license [place]      Assessment/Plan:    Problem List Items Addressed This Visit        Other    Recurrent major depressive disorder ([address]) - Primary    Generalized anxiety disorder       Goals addressed in session: Goal 1          Reason for visit is   Chief Complaint   Patient presents with   • Virtual Regular Visit        Encounter provider Pablo Berry, [facility]    Provider located at [address] [facility]  [address] [facility] [address] [place] [address]  [phone]      Recent Visits  Date Type Provider Dept   10/02/23 [address], [address]  Therapist [place]   Showing recent visits within past 7 days and meeting all other requirements  Future Appointments  No visits were found meeting these conditions. Showing future appointments within next 150 days and meeting all other requirements       The patient was identified by name and date of birth. Jerry Islas was informed that this is a telemedicine visit and that the visit is being conducted throughthe Pet[employer] platform. She agrees to proceed. .  My office door was closed. No one else was in the room. She acknowledged consent and understanding of privacy and security of the video platform. The patient has agreed to participate and understands they can discontinue the visit at any time. Patient is aware this is a billable service. Shelly Kennedy is a 41 y.o. female  . HPI     No past medical history on file. No past surgical history on file. No current outpatient medications on file. No current facility-administered medications for this visit. Not on File    Review of Systems    Video Exam    There were no vitals filed for this visit.     Physical Exam     Behavioral Health Psychotherapy Progress Note    Psychotherapy Provided: Individual Psychotherapy     1. Mild episode of recurrent major depressive disorder ([address])        2. Generalized anxiety disorder            Goals addressed in session: Goal 1     DATA:     -CT reported and reflected on her process of filing/having hearing for her disability. CT is hopeful and views this as opportunity to gain independence and plan for future. TH acknowledged receiving the paperwork and is in process of completing. Agreed to review more at next session.  -Ct reflected on her hesitancy to engage with friends and potential dates. Explored fears holding her back (CT not in a place in her life that she feels is representative to her worth, CT has hard time leaving the house-discussed house dynamics and dependence of mother/aunt)  -Reviewed movement towards healthier boundaries with mother that encourage mother's independence. Reflected on the value of this related to CT's values and long-term plans. -TH invited CT to reflect on the value of developing her own independence as opposed to transferring her dependence to a potential date. CT reflected on her past patterns and desire to establish new healthier relationship patterns. -CT is in process of another steroid dose reduction. CT noted unwanted symptoms (lower mood, more pain, less motivation, crying). TH validated the difficulty of this and explored underlying fears CT has about the dynamic (what is mood does not bounce up? What if pain does not improve? During this session, this clinician used the following therapeutic modalities: Supportive Psychotherapy    Substance Abuse was not addressed during this session. If the client is diagnosed with a co-occurring substance use disorder, please indicate any changes in the frequency or amount of use: . Stage of change for addressing substance use diagnoses: No substance use/Not applicable    ASSESSMENT:  Alejandra Gonzalez presents with a Euthymic/ normal mood.      her affect is Normal range and intensity, Flat and Tearful, which is congruent, with her mood and the content of the session. The client has made progress on their goals. (mood changes expected with steroid dose decrease-will monitor)     Bacilio Ca presents with a none risk of suicide, none risk of self-harm, and none risk of harm to others. For any risk assessment that surpasses a "low" rating, a safety plan must be developed. A safety plan was indicated: no  If yes, describe in detail     PLAN: Between sessions, Bacilio Ca will spend time outside of her house (front porch, park) and notice when others (mother/aunt) try to enlist her "help/opinion", maintain contact with male friends and challenge herself to show more parts of herself (vulnerability). At the next session, the therapist will use Supportive Psychotherapy to support medication changes and establish and use healthy boundaries in realtionships . Behavioral Health Treatment Plan and Discharge Planning: Bacilio Ca is aware of and agrees to continue to work on their treatment plan. They have identified and are working toward their discharge goals.  yes    Visit start and stop times:    10/02/23  Start Time: 3495  Stop Time: 8689  Total Visit Time: 61 minutes

## 2023-10-10 ENCOUNTER — OFFICE VISIT (OUTPATIENT)
Dept: PRIMARY CARE | Facility: CLINIC | Age: 38
End: 2023-10-10
Payer: COMMERCIAL

## 2023-10-10 VITALS
HEIGHT: 62 IN | DIASTOLIC BLOOD PRESSURE: 68 MMHG | HEART RATE: 86 BPM | WEIGHT: 264.6 LBS | BODY MASS INDEX: 48.69 KG/M2 | SYSTOLIC BLOOD PRESSURE: 122 MMHG | OXYGEN SATURATION: 98 %

## 2023-10-10 DIAGNOSIS — Z23 NEEDS FLU SHOT: ICD-10-CM

## 2023-10-10 DIAGNOSIS — E78.1 PURE HYPERTRIGLYCERIDEMIA: Primary | ICD-10-CM

## 2023-10-10 DIAGNOSIS — G47.33 OBSTRUCTIVE SLEEP APNEA SYNDROME: ICD-10-CM

## 2023-10-10 DIAGNOSIS — I10 ESSENTIAL HYPERTENSION: Primary | ICD-10-CM

## 2023-10-10 DIAGNOSIS — I50.32 CHRONIC DIASTOLIC CONGESTIVE HEART FAILURE (CMS/HCC): ICD-10-CM

## 2023-10-10 LAB
ALBUMIN SERPL-MCNC: 4.1 G/DL (ref 3.9–4.9)
ALBUMIN SERPL-MCNC: 4.1 G/DL (ref 3.9–4.9)
ALBUMIN/GLOB SERPL: 1.5 {RATIO} (ref 1.2–2.2)
ALBUMIN/GLOB SERPL: 1.7 {RATIO} (ref 1.2–2.2)
ALP SERPL-CCNC: 82 IU/L (ref 44–121)
ALP SERPL-CCNC: 83 IU/L (ref 44–121)
ALT SERPL-CCNC: 12 IU/L (ref 0–32)
ALT SERPL-CCNC: 9 IU/L (ref 0–32)
AST SERPL-CCNC: 16 IU/L (ref 0–40)
AST SERPL-CCNC: 20 IU/L (ref 0–40)
BASOPHILS # BLD AUTO: 0.1 X10E3/UL (ref 0–0.2)
BASOPHILS NFR BLD AUTO: 1 %
BILIRUB SERPL-MCNC: 0.2 MG/DL (ref 0–1.2)
BILIRUB SERPL-MCNC: 0.3 MG/DL (ref 0–1.2)
BNP SERPL-MCNC: 5 PG/ML (ref 0–100)
BUN SERPL-MCNC: 7 MG/DL (ref 6–20)
BUN SERPL-MCNC: 8 MG/DL (ref 6–20)
BUN/CREAT SERPL: 11 (ref 9–23)
BUN/CREAT SERPL: 12 (ref 9–23)
CALCIUM SERPL-MCNC: 9.5 MG/DL (ref 8.7–10.2)
CALCIUM SERPL-MCNC: 9.5 MG/DL (ref 8.7–10.2)
CHLORIDE SERPL-SCNC: 101 MMOL/L (ref 96–106)
CHLORIDE SERPL-SCNC: 103 MMOL/L (ref 96–106)
CHOLEST SERPL-MCNC: 187 MG/DL (ref 100–199)
CO2 SERPL-SCNC: 22 MMOL/L (ref 20–29)
CO2 SERPL-SCNC: 24 MMOL/L (ref 20–29)
CREAT SERPL-MCNC: 0.62 MG/DL (ref 0.57–1)
CREAT SERPL-MCNC: 0.68 MG/DL (ref 0.57–1)
EGFRCR SERPLBLD CKD-EPI 2021: 116 ML/MIN/1.73
EGFRCR SERPLBLD CKD-EPI 2021: 118 ML/MIN/1.73
EOSINOPHIL # BLD AUTO: 0.1 X10E3/UL (ref 0–0.4)
EOSINOPHIL NFR BLD AUTO: 2 %
ERYTHROCYTE [DISTWIDTH] IN BLOOD BY AUTOMATED COUNT: 14.1 % (ref 11.7–15.4)
GLOBULIN SER CALC-MCNC: 2.4 G/DL (ref 1.5–4.5)
GLOBULIN SER CALC-MCNC: 2.7 G/DL (ref 1.5–4.5)
GLUCOSE SERPL-MCNC: 89 MG/DL (ref 70–99)
GLUCOSE SERPL-MCNC: 92 MG/DL (ref 70–99)
HCT VFR BLD AUTO: 35.6 % (ref 34–46.6)
HDLC SERPL-MCNC: 47 MG/DL
HGB BLD-MCNC: 11.8 G/DL (ref 11.1–15.9)
IMM GRANULOCYTES # BLD AUTO: 0 X10E3/UL (ref 0–0.1)
IMM GRANULOCYTES NFR BLD AUTO: 0 %
LDLC SERPL CALC-MCNC: 112 MG/DL (ref 0–99)
LYMPHOCYTES # BLD AUTO: 1.8 X10E3/UL (ref 0.7–3.1)
LYMPHOCYTES NFR BLD AUTO: 22 %
MAGNESIUM SERPL-MCNC: 2.1 MG/DL (ref 1.6–2.3)
MCH RBC QN AUTO: 28.5 PG (ref 26.6–33)
MCHC RBC AUTO-ENTMCNC: 33.1 G/DL (ref 31.5–35.7)
MCV RBC AUTO: 86 FL (ref 79–97)
MONOCYTES # BLD AUTO: 0.6 X10E3/UL (ref 0.1–0.9)
MONOCYTES NFR BLD AUTO: 7 %
NEUTROPHILS # BLD AUTO: 5.6 X10E3/UL (ref 1.4–7)
NEUTROPHILS NFR BLD AUTO: 68 %
PLATELET # BLD AUTO: 201 X10E3/UL (ref 150–450)
POTASSIUM SERPL-SCNC: 4.9 MMOL/L (ref 3.5–5.2)
POTASSIUM SERPL-SCNC: 5.1 MMOL/L (ref 3.5–5.2)
PROT SERPL-MCNC: 6.5 G/DL (ref 6–8.5)
PROT SERPL-MCNC: 6.8 G/DL (ref 6–8.5)
RBC # BLD AUTO: 4.14 X10E6/UL (ref 3.77–5.28)
SODIUM SERPL-SCNC: 141 MMOL/L (ref 134–144)
SODIUM SERPL-SCNC: 141 MMOL/L (ref 134–144)
TRIGL SERPL-MCNC: 159 MG/DL (ref 0–149)
VLDLC SERPL CALC-MCNC: 28 MG/DL (ref 5–40)
WBC # BLD AUTO: 8.2 X10E3/UL (ref 3.4–10.8)

## 2023-10-10 PROCEDURE — 3008F BODY MASS INDEX DOCD: CPT | Performed by: PHYSICIAN ASSISTANT

## 2023-10-10 PROCEDURE — 3078F DIAST BP <80 MM HG: CPT | Performed by: PHYSICIAN ASSISTANT

## 2023-10-10 PROCEDURE — 3074F SYST BP LT 130 MM HG: CPT | Performed by: PHYSICIAN ASSISTANT

## 2023-10-10 PROCEDURE — 99213 OFFICE O/P EST LOW 20 MIN: CPT | Mod: 25 | Performed by: PHYSICIAN ASSISTANT

## 2023-10-10 PROCEDURE — 90686 IIV4 VACC NO PRSV 0.5 ML IM: CPT | Performed by: PHYSICIAN ASSISTANT

## 2023-10-10 PROCEDURE — 90471 IMMUNIZATION ADMIN: CPT | Performed by: PHYSICIAN ASSISTANT

## 2023-10-10 RX ORDER — POTASSIUM CHLORIDE 20 MEQ/1
20 TABLET, EXTENDED RELEASE ORAL DAILY
Qty: 90 TABLET | Refills: 3 | Status: SHIPPED | OUTPATIENT
Start: 2023-10-10 | End: 2024-05-30

## 2023-10-10 RX ORDER — DESVENLAFAXINE SUCCINATE 25 MG/1
50 TABLET, EXTENDED RELEASE ORAL DAILY
COMMUNITY
Start: 2023-09-14 | End: 2023-12-15

## 2023-10-10 RX ORDER — TAMSULOSIN HYDROCHLORIDE 0.4 MG/1
0.4 CAPSULE ORAL DAILY
COMMUNITY
Start: 2023-10-09 | End: 2025-03-07

## 2023-10-10 NOTE — RESULT ENCOUNTER NOTE
Patient has appointment today, has arrived in the office as I am reviewing labs  LDL increased to 112 and CMP is normal

## 2023-10-10 NOTE — ASSESSMENT & PLAN NOTE
Improvement in triglycerides down to 159, LDL did rise to 112 from 85  Difficult for patient to work on diet since she is using microwave foods, hoping to get out of her current situation soon, using her [place]  for assistance  Will not start statin at this time, will allow patient to work on her diet and exercise and repeat labs in 6 months time

## 2023-10-10 NOTE — TELEPHONE ENCOUNTER
Pt's PCP asking if pt should still be taking potassium. Unclear why med was discontinued.     Per Dr Middleton's last ov note he instructs to continue potassium and magnesium.     Per PCP pt currently taking 20mEq daily and last K= 4.9. She is out of supplement starting tomorrow.     Therefore resending in script. PCP made aware.  Called pt and lvm stating this. Instructed she call back for questions or concerns.
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
## 2023-10-11 ENCOUNTER — OFFICE VISIT (OUTPATIENT)
Dept: NEUROLOGY | Facility: CLINIC | Age: 38
End: 2023-10-11
Payer: COMMERCIAL

## 2023-10-11 VITALS
SYSTOLIC BLOOD PRESSURE: 116 MMHG | RESPIRATION RATE: 18 BRPM | BODY MASS INDEX: 48.47 KG/M2 | HEART RATE: 96 BPM | DIASTOLIC BLOOD PRESSURE: 70 MMHG | TEMPERATURE: 97.7 F | WEIGHT: 265 LBS | OXYGEN SATURATION: 98 %

## 2023-10-11 DIAGNOSIS — M54.2 CERVICALGIA: ICD-10-CM

## 2023-10-11 DIAGNOSIS — G43.109 MIGRAINE WITH AURA AND WITHOUT STATUS MIGRAINOSUS, NOT INTRACTABLE: ICD-10-CM

## 2023-10-11 DIAGNOSIS — G24.3 SPASMODIC TORTICOLLIS: Primary | ICD-10-CM

## 2023-10-11 PROCEDURE — 3008F BODY MASS INDEX DOCD: CPT | Performed by: NURSE PRACTITIONER

## 2023-10-11 PROCEDURE — 3078F DIAST BP <80 MM HG: CPT | Performed by: NURSE PRACTITIONER

## 2023-10-11 PROCEDURE — 3074F SYST BP LT 130 MM HG: CPT | Performed by: NURSE PRACTITIONER

## 2023-10-11 PROCEDURE — 20553 NJX 1/MLT TRIGGER POINTS 3/>: CPT | Performed by: NURSE PRACTITIONER

## 2023-10-11 PROCEDURE — 99214 OFFICE O/P EST MOD 30 MIN: CPT | Mod: 25 | Performed by: NURSE PRACTITIONER

## 2023-10-11 RX ORDER — RIMEGEPANT SULFATE 75 MG/75MG
75 TABLET, ORALLY DISINTEGRATING ORAL EVERY OTHER DAY
Qty: 16 TABLET | Refills: 3 | Status: SHIPPED | OUTPATIENT
Start: 2023-10-11 | End: 2024-03-01

## 2023-10-11 RX ORDER — LIDOCAINE HYDROCHLORIDE 10 MG/ML
4.5 INJECTION, SOLUTION INFILTRATION; PERINEURAL ONCE
Status: SHIPPED | OUTPATIENT
Start: 2023-10-11 | End: 2023-10-12

## 2023-10-11 NOTE — PROGRESS NOTES
Carolyn Redmond is a 38 y.o. old female presenting today for follow up .     Frequency :     after Botox treatment - migraines are under control  for one month     Then 3-4 days per week- headache level above 5/10 or migraine level ,   Associated with neck tension and spasm (daily )      Associated symptoms:      - Nausea Vomiting Diarrhea   - Photophobia Phonophobia Osmophobia       - Stiff or sore neck      - Dizziness light headed   - Problems with concentration         -better with lying down   - Prefer to be in a cool, quiet, dark room            Previsouly tried medications and procedures / devices:      Trigger point injection/Nerve block:YES trigger point injection and nerve block  Epidural injections or trans foraminal injections: YES for her  back  Alternative therapies: massage, physical therapy, acupuncture and chiropractor, tens units  CBD or THC: YES   Other headache devices: NO   Botulinum toxin: YES Botox-started 5/9/2022 last Botox 8/11/2022  Headache infusions:NO   Preventive medication therapy:   - Folic acid, magnesium (not sure if that helped)  - Protriptyline Wellbutrin, Cymbalta 60 mg, Latuda,  - Terazosin 2 mg, Klonopin, trazodone (allergies), zolpidem (Ambien) 10 mg,  - Lyrica, Topamax (effective but stopped due to hair loss)  - Inderal, metoprolol  - Benadryl IV infusion  - Baclofen, tizanidine  - Seroquel 200 mg,   Abortive medication Therapy:  - Tramadol (allergies), Percocet (2017)  - Fioricet,  - Sumatriptan 100 mg tab, 20 mg nasal spray,, rizatriptan, Relpax, Zomig (ineffective), Frova  - Ubrelvy 100 mg, Nurtec 75 mg  - Excedrin, Tylenol,  - Ibuprofen, Celebrex, Toradol,  - Reglan, Zofran 4 mg tablet, Compazine, Phenergan,  - Prednisone 10 mg        Aura:  None      Previous testing :      Brain MRI 09/2022 :         Narrative & Impression   CLINICAL HISTORY: Right arm numbness [R20.0 (ICD-10-CM)]; Right facial numbness  [R20.0 (ICD-10-CM)]; Chronic migraine without aura without  status migrainosus,  not intractable [G43.709 (ICD-10-CM)]     COMMENT: MRI examination of the brain was performed with and without intravenous  contrast following the Main [facility] standard protocol. 10.5 cc Gadavist  administered without adverse reaction.     Comparison: MRI brain 5/24/2021.     Brain parenchyma: Normal MRI appearance without restricted diffusion to  suggest acute infarction. There is no abnormal enhancement.  Ventricles, cisterns, and sulci: Normal in size and configuration.  Sella and cerebellar tonsils: Right cerebellar tonsil remains slightly  low-lying, not meeting criteria for a Chiari malformation.  Vascular flow voids: Intact at the skull base.     Calvarium and extra cranial soft tissues: Normal.  Paranasal sinuses and mastoid air cells: Ethmoid and maxillary mucosal  thickening.  Trace fluid signal abnormality at the mastoid tips.     --  IMPRESSION: Stable normal MRI appearance of the brain.  No abnormal enhancement.         Cervical spine MRI 01/2022:      --  IMPRESSION:  Stable appearance of the cervical spine, again noting C5-C6 disc herniation  resulting in mild central canal stenosis.     COMMENT:  Cervicothoracic alignment: Straightening of the normal lordosis without  listhesis.     Vertebral bodies: Maintained in height and signal.     Vertebral Discs: Mild disc desiccation throughout without significant loss of  height.     Visualized spinal cord and contents of the posterior fossa: Within normal  limits.     C2-3: No disc herniation, central canal stenosis, or neural foraminal narrowing.     C3-4: No disc herniation, central canal stenosis, or neural foraminal narrowing.     C4-5: Minimal broad-based disc bulge without central canal stenosis or neural  foraminal narrowing.     C5-6: Broad-based left paracentral disc herniation indenting the ventral thecal  sac and abutting the ventral cord, resulting in mild central canal stenosis,  unchanged. No significant neural  foraminal narrowing.     C6-7: No disc herniation, central canal stenosis, or neural foraminal narrowing.     C7-T1: No disc herniation, central canal stenosis, or neural foraminal  narrowing.     Mildly prominent retropharyngeal and upper cervical chain lymph nodes as well as  lingual and palatine tonsillar hypertrophy, all likely reactive.                 Extremities: Extremities normal, atraumatic, no cyanosis or edema    Musculoskeletal: No injury or deformity    Muscle strength in upper and lower extremities: No joint swelling or inflammation .  Limited range of motion with left head turn (causing discomfort) and slight head tilt (lateral )      Equal 5/5 strength in BL upper and lower extremities.                    Past Medical History:  has a past medical history of Acute bacterial endocarditis (12/10/2020), Anxiety, Depressed, Endocarditis, Fibromyalgia, Migraines, Osteomyelitis (CMS/[facility]) (1/10/2023), Pancreatitis, Recurrent major depressive disorder (CMS/[facility]) (1/6/2023), Substance abuse (CMS/[facility]) (2015), Tachycardia, Vasculitis (CMS/[facility]), and Vasculitis (CMS/[facility]) (9/26/2017).  Past Surgical History:  has a past surgical history that includes Gallbladder surgery; Cholecystectomy; ERCP; and Tonsillectomy (1991).  Social History:   Social History     Tobacco Use    Smoking status: Former     Packs/day: 0.50     Years: 5.00     Additional pack years: 0.00     Total pack years: 2.50     Types: Cigarettes     Quit date: 2020     Years since quitting: 3.7    Smokeless tobacco: Current    Tobacco comments:     quit 1 year ago, vapes currently   Vaping Use    Vaping Use: Every day    Substances: Nicotine, Flavoring    Devices: Refillable tank   Substance Use Topics    Alcohol use: Not Currently    Drug use: Yes     Types: Marijuana, Heroin     Comment: MM now 2023, previous heroin and opiod     Procedure Note  Trigger Point Injections       Bupivicaine 0.25 %  Lot # [id number]  Exp: 08/01/2024     Lidocaine    Lot# [id number]  Exp date: 07/01/2024    The risks, benefits and anticipated outcomes of the procedure, the risks and benefits of the alternatives to the procedure, and the roles and tasks of the personnel to be involved, were discussed with the patient, and the patient consents to the procedure and agrees to proceed.        A 50/50 combination of 1 % Lidocaine 1.5 cc  (NDC 1925-4107-92) and 0.25% bupivacaine 1.5 cc (NDC 96361498-50) were prepared in 3 syringes ( 3 cc) .           After aspiration to ensure no obstruction or presence of blood , the area was injected . The needle was repositioned in a fan-like manner and the entire area was injected . The needle was then repositioned at the occipital groove , and the greater occpital nerve was injected after ensuring there was no obstruction or backflow of blood. Pressure was held and no hematoma was noted.  The patient noted relief of pain after 5 minutes. The patient was told to use heat if there was discomfort later in the day.      Procedure : Trigger Point Injections      The risks , benefits and anticipated outcomes of the procedure , the risks and benefits of the alternatives to the procedure , and the roles and tasks of the personnel to be involved , were dicsussed with the patient , and the patient consents to the procedure and agrees to proceed.   .  Trigger point injections were identified by muscle spasms, band , and radiating pain. Each trigger point listed below was injected after aspiration to ensure no air, arterial , or venous blood was present. The patient had immediate relief of pain and was instructed to use heat (20 min at a time ) and do stretching exercises for 24 hrs.         Trigger Points injected :         Right Greater Occipital  2 cc     Left Greater Occipital 2 cc     Right Lesser Occipital  1 cc     Left Lesser Occipital 1 cc    Right and left trapezia 2 cc (divided in 3 areas each side)           Diagnoses and all orders for this  visit:    Spasmodic torticollis  -     abobotulinumtoxinA (DYSPORT) 500 unit injection 500 Units  -     lidocaine (XYLOCAINE) 10 mg/mL (1 %) injection 4.5 mL    Migraine with aura and without status migrainosus, not intractable  -     NURTEC ODT 75 mg tablet,disintegrating; Take 1 tablet (75 mg total) by mouth every other day.    Cervicalgia  -     lidocaine (XYLOCAINE) 10 mg/mL (1 %) injection 4.5 mL

## 2023-10-16 ENCOUNTER — TELEMEDICINE (OUTPATIENT)
Dept: BEHAVIORAL/MENTAL HEALTH CLINIC | Facility: CLINIC | Age: 38
End: 2023-10-16
Payer: COMMERCIAL

## 2023-10-16 DIAGNOSIS — F41.1 GENERALIZED ANXIETY DISORDER: ICD-10-CM

## 2023-10-16 DIAGNOSIS — F33.0 MILD EPISODE OF RECURRENT MAJOR DEPRESSIVE DISORDER (HCC): Primary | ICD-10-CM

## 2023-10-16 PROCEDURE — 90837 PSYTX W PT 60 MINUTES: CPT | Performed by: COUNSELOR

## 2023-10-16 NOTE — PSYCH
Virtual Regular Visit    Verification of patient location:    Patient is located at Home in the following state in which I hold an active license [place]      Assessment/Plan:    Problem List Items Addressed This Visit        Other    Recurrent major depressive disorder ([address]) - Primary    Generalized anxiety disorder       Goals addressed in session: Goal 1          Reason for visit is   Chief Complaint   Patient presents with   • Virtual Regular Visit          Encounter provider Julieth Mcclain, [facility]    Provider located at [address] [facility]  [address] [facility] [address] [place] [address]  [phone]      Recent Visits  No visits were found meeting these conditions. Showing recent visits within past 7 days and meeting all other requirements  Today's Visits  Date Type Provider Dept   10/16/23 Telemedicine Julieth Mcclain, [address]  Therapist [facility]   Showing today's visits and meeting all other requirements  Future Appointments  No visits were found meeting these conditions. Showing future appointments within next 150 days and meeting all other requirements       The patient was identified by name and date of birth. Nelly Adrian was informed that this is a telemedicine visit and that the visit is being conducted throughthe Glass platform. She agrees to proceed. .  My office door was closed. No one else was in the room. She acknowledged consent and understanding of privacy and security of the video platform. The patient has agreed to participate and understands they can discontinue the visit at any time. Patient is aware this is a billable service. Aleisha Lr is a 41 y.o. female  . HPI     No past medical history on file. No past surgical history on file. No current outpatient medications on file. No current facility-administered medications for this visit.         Not on File    Review of Systems    Video Exam    There were no vitals filed for this visit. Physical Exam     Behavioral Health Psychotherapy Progress Note    Psychotherapy Provided: Individual Psychotherapy     1. Mild episode of recurrent major depressive disorder ([address])        2. Generalized anxiety disorder            Goals addressed in session: Goal 1     DATA:     -CT reflected on her sleep difficulties and the benefit of using her Cpap. CT is noticing some improvement in concentration. -CT described a recent visit with her PCP. Was seen by the PA who spoke negatively about CT filing for disability. CT was offended and spoke up and advocated for herself. CT noted her own triggering. Years of being misdiagnosed and her substance use has created distrust with providers. TH validated and normalized given her experiences. -CT reported that she participated in 7 Dr. Froylan Hawk the previous week. CT stated that she feel as if she is finally making progress with understanding her conditions and correct treatment. -TH reviewed several points on the mental health review that Christopher N BONIFACIO Gongora is completing. TH will complete and office will process through next steps. -CT reviewed her recent cycle of steroid dose decrease. CT acknowledged the mood swing and challenges as well as her observance that it is getting easier to move through the process. CT is on track to be mary off of steroid in Jan. 2024. CT is also continuing her reduction of seroquel. -CT reflected on the house dynamics noting that they continue to cycle and currently things are calm. CT reflected on the benefits of going out of the house. During this session, this clinician used the following therapeutic modalities: Supportive Psychotherapy    Substance Abuse was not addressed during this session. If the client is diagnosed with a co-occurring substance use disorder, please indicate any changes in the frequency or amount of use: .  Stage of change for addressing substance use diagnoses: No substance use/Not applicable    ASSESSMENT:  Lesly Carbajal presents with a Euthymic/ normal mood. her affect is Normal range and intensity, which is congruent, with her mood and the content of the session. The client has made progress on their goals. Lesly Carbajal presents with a none risk of suicide, none risk of self-harm, and none risk of harm to others. For any risk assessment that surpasses a "low" rating, a safety plan must be developed. A safety plan was indicated: no  If yes, describe in detail     PLAN: Between sessions, Lesly Carbajal will spend time outside of the house regularly, maintain her routines including reading, meditation and journaling. . At the next session, the therapist will use Supportive Psychotherapy to support self-care routines and mood shifts through med changes. .    Behavioral Health Treatment Plan and Discharge Planning: Lesly Carbajal is aware of and agrees to continue to work on their treatment plan. They have identified and are working toward their discharge goals.  yes    Visit start and stop times:    10/16/23  Start Time: 1300  Stop Time: 1358  Total Visit Time: 58 minutes
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
## 2023-10-16 NOTE — TELEPHONE ENCOUNTER
LM for pt to confirm if RX being requested from pharmacy was sent erroneously. Awaiting a call back.

## 2023-10-17 ENCOUNTER — OFFICE VISIT (OUTPATIENT)
Dept: NEUROLOGY | Facility: CLINIC | Age: 38
End: 2023-10-17
Payer: COMMERCIAL

## 2023-10-17 VITALS
WEIGHT: 263 LBS | BODY MASS INDEX: 48.4 KG/M2 | SYSTOLIC BLOOD PRESSURE: 102 MMHG | OXYGEN SATURATION: 98 % | DIASTOLIC BLOOD PRESSURE: 76 MMHG | HEIGHT: 62 IN | HEART RATE: 90 BPM

## 2023-10-17 DIAGNOSIS — R41.841 COGNITIVE COMMUNICATION DISORDER: Primary | ICD-10-CM

## 2023-10-17 PROCEDURE — 3078F DIAST BP <80 MM HG: CPT | Performed by: PSYCHIATRY & NEUROLOGY

## 2023-10-17 PROCEDURE — 99215 OFFICE O/P EST HI 40 MIN: CPT | Performed by: PSYCHIATRY & NEUROLOGY

## 2023-10-17 PROCEDURE — 3008F BODY MASS INDEX DOCD: CPT | Performed by: PSYCHIATRY & NEUROLOGY

## 2023-10-17 PROCEDURE — 3074F SYST BP LT 130 MM HG: CPT | Performed by: PSYCHIATRY & NEUROLOGY

## 2023-10-17 NOTE — ASSESSMENT & PLAN NOTE
Patient had cognitive complaints with originally evaluated 3/9/2023.  She had neuropsychological testing 6/26/2023 which I reviewed with her in detail.  This documented her relative strengths and weaknesses but found no concerning or profound abnormalities.  She was pleased to learn that this is the case.  We discussed how her various medical issues, sleep disorder, medications, and psychiatric state could all contribute to her cognitive functioning.  All questions were answered.  Depending on her progress I would be pleased to see her in follow-up accordingly.

## 2023-10-18 ENCOUNTER — OFFICE VISIT (OUTPATIENT)
Dept: VASCULAR SURGERY | Facility: CLINIC | Age: 38
End: 2023-10-18
Payer: COMMERCIAL

## 2023-10-18 ENCOUNTER — HOSPITAL ENCOUNTER (OUTPATIENT)
Dept: CARDIOLOGY | Facility: CLINIC | Age: 38
Discharge: HOME | End: 2023-10-18
Attending: SURGERY
Payer: COMMERCIAL

## 2023-10-18 VITALS
RESPIRATION RATE: 16 BRPM | DIASTOLIC BLOOD PRESSURE: 77 MMHG | HEIGHT: 62 IN | BODY MASS INDEX: 47.84 KG/M2 | WEIGHT: 260 LBS | SYSTOLIC BLOOD PRESSURE: 124 MMHG

## 2023-10-18 VITALS
HEIGHT: 62 IN | DIASTOLIC BLOOD PRESSURE: 77 MMHG | WEIGHT: 260 LBS | BODY MASS INDEX: 47.84 KG/M2 | SYSTOLIC BLOOD PRESSURE: 124 MMHG

## 2023-10-18 DIAGNOSIS — I87.2 VENOUS INSUFFICIENCY: ICD-10-CM

## 2023-10-18 DIAGNOSIS — I87.2 VENOUS INSUFFICIENCY: Primary | ICD-10-CM

## 2023-10-18 LAB
BSA FOR ECHO PROCEDURE: 2.27 M2
LT CFV REFLUX: 0.7 SEC
LT GSV 2CM DISTAL TO SFJ AP: 0.59 CM
LT GSV 2CM DISTAL TO SFJ REFLUX: 1.5 SEC
LT GSV AT SFJ AP: 0.74 CM
LT GSV AT SFJ REFLUX: 1.1 SEC
LT GSV KNEE AP: 0.29 CM
LT GSV KNEE REFLUX: 0 SEC
LT GSV MID CALF AP: 0.19 CM
LT GSV MID CALF REFLUX: 1.5 SEC
LT GSV PROX CALF AP: 0.13 CM
LT GSV PROX CALF REFLUX: 2.6 SEC
LT GSV PROX THIGH AP: 0.41 CM
LT GSV PROX THIGH REFLUX: 3.2 SEC
LT POPLITEAL REFLUX: 1.6 SEC
LT SFV MID REFLUX: 1.4 SEC
LT SSV MID AP: 0.17 CM
LT SSV MID REFLUX: 0 SEC
LT SSV PROX AP: 0.16 CM
LT SSV PROX REFLUX: 1.4 SEC
RT AASV AP: 0.21 CM
RT AASV REFLUX: 9.9 SEC
RT CALF 1 PERFORATOR AP: 0.15 CM
RT CALF 1 PERFORATOR REFLUX: 0 SEC
RT CFV REFLUX: 0 SEC
RT GSV 2CM DISTAL TO SFJ AP: 0.46 CM
RT GSV 2CM DISTAL TO SFJ REFLUX: 6.3 SEC
RT GSV AT SFJ AP: 0.79 CM
RT GSV AT SFJ REFLUX: 0.93 SEC
RT GSV KNEE AP: 0.32 CM
RT GSV KNEE REFLUX: 0 SEC
RT GSV MID CALF AP: 0.14 CM
RT GSV MID CALF REFLUX: 1.4 SEC
RT GSV PROX CALF AP: 0.22 CM
RT GSV PROX CALF REFLUX: 2.3 SEC
RT GSV PROX THIGH AP: 0.33 CM
RT GSV PROX THIGH REFLUX: 2.8 SEC
RT POPLITEAL REFLUX: 0 SEC
RT SFV MID REFLUX: 0 SEC
RT SSV MID AP: 0.14 CM
RT SSV MID REFLUX: 0 SEC
RT SSV PROX AP: 0.26 CM
RT SSV PROX REFLUX: 0.16 SEC

## 2023-10-18 PROCEDURE — 99213 OFFICE O/P EST LOW 20 MIN: CPT | Performed by: SURGERY

## 2023-10-18 PROCEDURE — 3074F SYST BP LT 130 MM HG: CPT | Performed by: SURGERY

## 2023-10-18 PROCEDURE — 3008F BODY MASS INDEX DOCD: CPT | Performed by: SURGERY

## 2023-10-18 PROCEDURE — 3078F DIAST BP <80 MM HG: CPT | Performed by: SURGERY

## 2023-10-18 PROCEDURE — 93970 EXTREMITY STUDY: CPT | Performed by: SURGERY

## 2023-10-18 NOTE — PROGRESS NOTES
[facility]  With office locations at [facility], and [place]  P: [phone]     F: [phone]       FOLLOW UP VISIT   10/18/2023  Carolyn Redmond               YOB: 1985  [id number]    PCP:  Priscilla Leyva PA C    Diagnosis: Venous insufficiency     HISTORY OF PRESENT ILLNESS        Carolyn Redmond is a 38 y.o. female returning to the office for review of her recent reflux study.  I originally met Carolyn for evaluation of bilateral lower extremity edema.  She had fairly significant pulmonary hypertension and diastolic heart dysfunction which is likely contributing to her edema however, she has had difficulty controlling bilateral lower extremity edema with conservative measures.  She underwent venous reflux study and returns today to discuss the findings.      PAST MEDICAL AND SURGICAL HISTORY        Past Medical History:   Diagnosis Date    Acute bacterial endocarditis 12/10/2020    Anxiety     Depressed     Endocarditis     Fibromyalgia     Migraines     Osteomyelitis (CMS/[facility]) 1/10/2023    Pancreatitis     Recurrent major depressive disorder (CMS/[facility]) 1/6/2023    Substance abuse (CMS/[facility]) 2015    Tachycardia     Vasculitis (CMS/[facility])     Vasculitis (CMS/[facility]) 9/26/2017       Past Surgical History:   Procedure Laterality Date    CHOLECYSTECTOMY      ERCP      GALLBLADDER SURGERY      TONSILLECTOMY  1991       MEDICATIONS        Current Outpatient Medications on File Prior to Visit   Medication Sig Dispense Refill    albuterol HFA (VENTOLIN HFA) 90 mcg/actuation inhaler Inhale 2 puffs every 4 (four) hours as needed.      amoxicillin (AMOXIL) 500 mg capsule       buprenorphine-naloxone (SUBOXONE) 8-2 mg film Place 2 Film under the tongue every morning. In addition to 1/2 film QPM      cholecalciferol (VITAMIN D3) 1,250 mcg (50,000 unit) capsule Take 50,000 Units by mouth once a week on Monday.      desvenlafaxine succinate  (PRISTIQ) 25 mg tablet extended release 24 hr Take 25 mg by mouth daily.      ferrous sulfate 325 mg (65 mg iron) tablet Take 65 mg by mouth nightly.      fluticasone propion-salmeteroL (ADVAIR DISKUS) 250-50 mcg/dose diskus inhaler inhale 1 puff by mouth and INTO THE LUNGS twice a day Rinse mouth after use      fluticasone propionate (FLONASE) 50 mcg/actuation nasal spray Administer 1 spray into each nostril daily as needed for rhinitis or allergies.      lisinopriL (PRINIVIL) 5 mg tablet Take 1 tablet (5 mg total) by mouth daily. 90 tablet 1    magnesium oxide (MAG-OX) 400 mg (241.3 mg magnesium) tablet Take 1 tablet (400 mg total) by mouth daily. 90 tablet 1    medical marijuana 1 each See admin instr. Please be advised that an [facility] hospital staff member has listed medical marijuana as one of your home medications for documentation purposes. Please follow-up with your certified issuing provider for ongoing use      metoprolol succinate XL (TOPROL-XL) 25 mg 24 hr tablet Take 1 tablet (25 mg total) by mouth nightly. 90 tablet 3    NURTEC ODT 75 mg tablet,disintegrating Take 1 tablet (75 mg total) by mouth every other day. 16 tablet 3    onabotulinumtoxinA (BOTOX) 200 unit recon soln injection Botox for chronic migraine headache every 3 months 1 each 4    onabotulinumtoxinA (BOTOX) injection Botox for chronic migraine headache every 3 months 1 each 4    pantoprazole (PROTONIX) 40 mg EC tablet Take 40 mg by mouth nightly.      potassium chloride (KLOR-CON M) 20 mEq CR tablet Take 1 tablet (20 mEq total) by mouth daily. 90 tablet 3    predniSONE (DELTASONE) 1 mg tablet Take 5 tablets (5 mg total) by mouth daily for 30 days, THEN 4 tablets (4 mg total) daily for 30 days, THEN 3 tablets (3 mg total) daily for 30 days, THEN 2 tablets (2 mg total) daily for 30 days, THEN 1 tablet (1 mg total) daily. Patient states taking 7.5 mg daily. 450 tablet 0    pregabalin (LYRICA) 200 mg capsule Take 1 capsule (200 mg  "total) by mouth 3 (three) times a day with meals. 270 capsule 1    tamsulosin (FLOMAX) 0.4 mg capsule       TiZANidine (ZANAFLEX) 6 mg capsule TAKE 1 CAPSULE BY MOUTH EVERY 6 - 8 HOURS AS NEEDED NOT TO EXCEED 3 DOSES IN 24 HOURS      torsemide (DEMADEX) 10 mg tablet Take 1 tablet (10 mg total) by mouth daily. 90 tablet 1    zolpidem (AMBIEN) 10 mg tablet Take 1 tablet (10 mg total) by mouth nightly. 30 tablet 0    QUEtiapine (SEROQUEL) 25 mg tablet Take 1 tablet (25 mg total) by mouth nightly. 30 tablet 0     No current facility-administered medications on file prior to visit.       ALLERGIES        Amoxicillin, Nsaids (non-steroidal anti-inflammatory drug), Tramadol, and Trazodone     PHYSICAL EXAMINATION      Visit Vitals  /77   Resp 16   Ht 1.575 m (5' 2\")   Wt 118 kg (260 lb)   BMI 47.55 kg/m²     Body mass index is 47.55 kg/m².      PHYSICAL EXAM:    2+ nonpitting edema of the bilateral lower legs which is symmetric.  There is no stasis dermatitis, lipodermatosclerosis, or wounds.    LABS / IMAGING / EKG     Labs: No new labs    Imaging: I personally reviewed and probably interpreted the venous reflux study.  There is no DVT.  There is extensive abnormal superficial reflux affecting the bilateral greater saphenous and right anterior sensory saphenous veins.     ASSESSMENT AND PLAN         Problem List Items Addressed This Visit        Circulatory    Venous insufficiency - Primary    Current Assessment & Plan     Carolyn is a 38-year-old fem male who returns today to review her venous reflux study.  She has fairly significant bilateral lower extremity edema which is likely related at least in part to her diastolic congestive heart failure.  She does have fairly extensive superficial venous incompetence I have recommended bilateral greater saphenous vein ablation as well as right anterior accessory saphenous vein ablation which is likely to help her improve edema control.  We discussed the details of " the procedure including specific risks of deep vein thrombosis and thermal injury.  She is interested in proceeding in the near future.         Relevant Orders    [facility] OFFICE PROCEDURES REQUEST          Return in about 4 weeks (around 11/15/2023).       Talib Richmond MD  Vascular and Endovascular Surgery  [facility]      Thank you very much for allowing us to participate in the care of your patient.  I will keep you informed of Carolyn Redmond's care.  Please not hesitate to call or email if there are any questions.  I can be reached at [phone] (office) or [email or web address].  Sincerely.    Kody Richmond    This document was generated utilizing voice recognition technology. An attempt at proofreading has been made to minimize errors but typographical errors may be present.

## 2023-10-18 NOTE — ASSESSMENT & PLAN NOTE
Carolyn is a 38-year-old fem male who returns today to review her venous reflux study.  She has fairly significant bilateral lower extremity edema which is likely related at least in part to her diastolic congestive heart failure.  She does have fairly extensive superficial venous incompetence I have recommended bilateral greater saphenous vein ablation as well as right anterior accessory saphenous vein ablation which is likely to help her improve edema control.  We discussed the details of the procedure including specific risks of deep vein thrombosis and thermal injury.  She is interested in proceeding in the near future.

## 2023-10-18 NOTE — LETTER
October 18, 2023     RAVI Rodgers  [address] [place]  [place] [facility] [address]    Patient: Carolyn Redmond  YOB: 1985  Date of Visit: 10/18/2023      Dear Dr. Leyva:    Thank you for referring Carolyn Redmond to me for evaluation. Below are my notes for this consultation.    If you have questions, please do not hesitate to call me. I look forward to following your patient along with you.         Sincerely,        Talib Richmond MD        CC: No Recipients  Talib Richmond MD  10/18/2023  3:35 PM  Signed       [facility]  With office locations at [facility] and [place]  P: [phone]     F: [phone]       FOLLOW UP VISIT   10/18/2023  Carolyn Redmond               YOB: 1985  [id number]    PCP:  Priscilla Leyva PA C    Diagnosis: Venous insufficiency     HISTORY OF PRESENT ILLNESS        Carolyn Redmond is a 38 y.o. female returning to the office for review of her recent reflux study.  I originally met Carolyn for evaluation of bilateral lower extremity edema.  She had fairly significant pulmonary hypertension and diastolic heart dysfunction which is likely contributing to her edema however, she has had difficulty controlling bilateral lower extremity edema with conservative measures.  She underwent venous reflux study and returns today to discuss the findings.      PAST MEDICAL AND SURGICAL HISTORY        Past Medical History:   Diagnosis Date    Acute bacterial endocarditis 12/10/2020    Anxiety     Depressed     Endocarditis     Fibromyalgia     Migraines     Osteomyelitis (CMS/HCC) 1/10/2023    Pancreatitis     Recurrent major depressive disorder (CMS/[facility]) 1/6/2023    Substance abuse (CMS/[facility]) 2015    Tachycardia     Vasculitis (CMS/[facility])     Vasculitis (CMS/HCC) 9/26/2017       Past Surgical History:   Procedure Laterality Date    CHOLECYSTECTOMY      ERCP      GALLBLADDER SURGERY       TONSILLECTOMY  1991       MEDICATIONS        Current Outpatient Medications on File Prior to Visit   Medication Sig Dispense Refill    albuterol HFA (VENTOLIN HFA) 90 mcg/actuation inhaler Inhale 2 puffs every 4 (four) hours as needed.      amoxicillin (AMOXIL) 500 mg capsule       buprenorphine-naloxone (SUBOXONE) 8-2 mg film Place 2 Film under the tongue every morning. In addition to 1/2 film QPM      cholecalciferol (VITAMIN D3) 1,250 mcg (50,000 unit) capsule Take 50,000 Units by mouth once a week on Monday.      desvenlafaxine succinate (PRISTIQ) 25 mg tablet extended release 24 hr Take 25 mg by mouth daily.      ferrous sulfate 325 mg (65 mg iron) tablet Take 65 mg by mouth nightly.      fluticasone propion-salmeteroL (ADVAIR DISKUS) 250-50 mcg/dose diskus inhaler inhale 1 puff by mouth and INTO THE LUNGS twice a day Rinse mouth after use      fluticasone propionate (FLONASE) 50 mcg/actuation nasal spray Administer 1 spray into each nostril daily as needed for rhinitis or allergies.      lisinopriL (PRINIVIL) 5 mg tablet Take 1 tablet (5 mg total) by mouth daily. 90 tablet 1    magnesium oxide (MAG-OX) 400 mg (241.3 mg magnesium) tablet Take 1 tablet (400 mg total) by mouth daily. 90 tablet 1    medical marijuana 1 each See admin instr. Please be advised that an [facility] hospital staff member has listed medical marijuana as one of your home medications for documentation purposes. Please follow-up with your certified issuing provider for ongoing use      metoprolol succinate XL (TOPROL-XL) 25 mg 24 hr tablet Take 1 tablet (25 mg total) by mouth nightly. 90 tablet 3    NURTEC ODT 75 mg tablet,disintegrating Take 1 tablet (75 mg total) by mouth every other day. 16 tablet 3    onabotulinumtoxinA (BOTOX) 200 unit recon soln injection Botox for chronic migraine headache every 3 months 1 each 4    onabotulinumtoxinA (BOTOX) injection Botox for chronic migraine headache every 3 months 1 each 4     "pantoprazole (PROTONIX) 40 mg EC tablet Take 40 mg by mouth nightly.      potassium chloride (KLOR-CON M) 20 mEq CR tablet Take 1 tablet (20 mEq total) by mouth daily. 90 tablet 3    predniSONE (DELTASONE) 1 mg tablet Take 5 tablets (5 mg total) by mouth daily for 30 days, THEN 4 tablets (4 mg total) daily for 30 days, THEN 3 tablets (3 mg total) daily for 30 days, THEN 2 tablets (2 mg total) daily for 30 days, THEN 1 tablet (1 mg total) daily. Patient states taking 7.5 mg daily. 450 tablet 0    pregabalin (LYRICA) 200 mg capsule Take 1 capsule (200 mg total) by mouth 3 (three) times a day with meals. 270 capsule 1    tamsulosin (FLOMAX) 0.4 mg capsule       TiZANidine (ZANAFLEX) 6 mg capsule TAKE 1 CAPSULE BY MOUTH EVERY 6 - 8 HOURS AS NEEDED NOT TO EXCEED 3 DOSES IN 24 HOURS      torsemide (DEMADEX) 10 mg tablet Take 1 tablet (10 mg total) by mouth daily. 90 tablet 1    zolpidem (AMBIEN) 10 mg tablet Take 1 tablet (10 mg total) by mouth nightly. 30 tablet 0    QUEtiapine (SEROQUEL) 25 mg tablet Take 1 tablet (25 mg total) by mouth nightly. 30 tablet 0     No current facility-administered medications on file prior to visit.       ALLERGIES        Amoxicillin, Nsaids (non-steroidal anti-inflammatory drug), Tramadol, and Trazodone     PHYSICAL EXAMINATION      Visit Vitals  /77   Resp 16   Ht 1.575 m (5' 2\")   Wt 118 kg (260 lb)   BMI 47.55 kg/m²     Body mass index is 47.55 kg/m².      PHYSICAL EXAM:    2+ nonpitting edema of the bilateral lower legs which is symmetric.  There is no stasis dermatitis, lipodermatosclerosis, or wounds.    LABS / IMAGING / EKG     Labs: No new labs    Imaging: I personally reviewed and probably interpreted the venous reflux study.  There is no DVT.  There is extensive abnormal superficial reflux affecting the bilateral greater saphenous and right anterior sensory saphenous veins.     ASSESSMENT AND PLAN         Problem List Items Addressed This Visit        Circulatory    " Venous insufficiency - Primary    Current Assessment & Plan     Carolyn is a 38-year-old fem male who returns today to review her venous reflux study.  She has fairly significant bilateral lower extremity edema which is likely related at least in part to her diastolic congestive heart failure.  She does have fairly extensive superficial venous incompetence I have recommended bilateral greater saphenous vein ablation as well as right anterior accessory saphenous vein ablation which is likely to help her improve edema control.  We discussed the details of the procedure including specific risks of deep vein thrombosis and thermal injury.  She is interested in proceeding in the near future.         Relevant Orders    [facility] OFFICE PROCEDURES REQUEST          Return in about 4 weeks (around 11/15/2023).       Talib Richmond MD  Vascular and Endovascular Surgery  [facility]      Thank you very much for allowing us to participate in the care of your patient.  I will keep you informed of Carolyn Redmond's care.  Please not hesitate to call or email if there are any questions.  I can be reached at [phone] (office) or [email or web address].  Sincerely.    Kody Richmond    This document was generated utilizing voice recognition technology. An attempt at proofreading has been made to minimize errors but typographical errors may be present.

## 2023-10-19 RX ORDER — PREDNISONE 1 MG/1
TABLET ORAL
Qty: 150 TABLET | Refills: 2 | OUTPATIENT
Start: 2023-10-19 | End: 2024-03-16

## 2023-10-19 NOTE — TELEPHONE ENCOUNTER
Pt sent this message in. Does not require the refill being requested.     I received a call from your office regarding a request that [facility] sent you for a prednisone refill. However, I am on schedule to be off Prednisone by Jan 1st and have enough until then. [facility] tends to send out requests if there are no refills remaining but they dont realize I wont be taking this medication for much longer. Thank you for reaching out to clarify, I appreciate it!

## 2023-10-23 DIAGNOSIS — F51.01 PRIMARY INSOMNIA: ICD-10-CM

## 2023-10-23 RX ORDER — ZOLPIDEM TARTRATE 10 MG/1
10 TABLET ORAL NIGHTLY
Qty: 30 TABLET | Refills: 0 | Status: SHIPPED | OUTPATIENT
Start: 2023-10-23 | End: 2023-11-20 | Stop reason: SDUPTHER

## 2023-10-30 ENCOUNTER — TELEMEDICINE (OUTPATIENT)
Dept: BEHAVIORAL/MENTAL HEALTH CLINIC | Facility: CLINIC | Age: 38
End: 2023-10-30
Payer: COMMERCIAL

## 2023-10-30 DIAGNOSIS — F33.0 MILD EPISODE OF RECURRENT MAJOR DEPRESSIVE DISORDER (HCC): ICD-10-CM

## 2023-10-30 DIAGNOSIS — F41.1 GENERALIZED ANXIETY DISORDER: Primary | ICD-10-CM

## 2023-10-30 PROCEDURE — 90837 PSYTX W PT 60 MINUTES: CPT | Performed by: COUNSELOR

## 2023-10-30 NOTE — PSYCH
Virtual Regular Visit    Verification of patient location:    Patient is located at Home in the following state in which I hold an active license [place]      Assessment/Plan:    Problem List Items Addressed This Visit        Other    Recurrent major depressive disorder ([address])    Generalized anxiety disorder - Primary       Goals addressed in session: Goal 1          Reason for visit is   Chief Complaint   Patient presents with   • Virtual Regular Visit          Encounter provider Shari Husain, [facility]    Provider located at [address] [facility]  [address]  [phone]      Recent Visits  No visits were found meeting these conditions. Showing recent visits within past 7 days and meeting all other requirements  Today's Visits  Date Type Provider Dept   10/30/23 Telemedicine Shari Husain, [address]  Therapist [facility]   Showing today's visits and meeting all other requirements  Future Appointments  No visits were found meeting these conditions. Showing future appointments within next 150 days and meeting all other requirements       The patient was identified by name and date of birth. Patsy Bryant was informed that this is a telemedicine visit and that the visit is being conducted throughthe Archive platform. She agrees to proceed. .  My office door was closed. No one else was in the room. She acknowledged consent and understanding of privacy and security of the video platform. The patient has agreed to participate and understands they can discontinue the visit at any time. Patient is aware this is a billable service. Maira Dimas is a 41 y.o. female  . HPI     No past medical history on file. No past surgical history on file. No current outpatient medications on file. No current facility-administered medications for this visit.         Not on File    Review of Systems    Video Exam    There were no vitals filed for this visit. Physical Exam   Behavioral Health Psychotherapy Progress Note    Psychotherapy Provided: Individual Psychotherapy     1. Generalized anxiety disorder        2. Mild episode of recurrent major depressive disorder ([address])            Goals addressed in session: Goal 1     DATA:     -Reviewed timeline of disability hearing and TH contribution to reports. TH reviewed neuropsych evaluation as related to her mental function limitations. -CT described several books she has been reading and reviewing regarding her health, ailments, and complications. CT is understanding the interconnectedness of her health and MH challenges. -CT reviewed recent developments with services she has qualified for from insurance and home care evaluation. -CT  is scheduled for disability hearing on 11/8. CT has been preparing and working closely with her . CT is hopeful and aware that decision is not guaranteed. -TH inquired about CT's plans for if/when she receives a positive settlement. CT reflected on her hx of poor money management. CT noted that she now realizes the value of having savings and being careful about her tendency to be overly generous. During this session, this clinician used the following therapeutic modalities: Supportive Psychotherapy    Substance Abuse was not addressed during this session. If the client is diagnosed with a co-occurring substance use disorder, please indicate any changes in the frequency or amount of use: . Stage of change for addressing substance use diagnoses: No substance use/Not applicable    ASSESSMENT:  Bacilio Ca presents with a Euthymic/ normal mood. her affect is Normal range and intensity, which is congruent, with her mood and the content of the session. The client has made progress on their goals.      Bacilio Ca presents with a none risk of suicide, none risk of self-harm, and none risk of harm to others. For any risk assessment that surpasses a "low" rating, a safety plan must be developed. A safety plan was indicated: no  If yes, describe in detail     PLAN: Between sessions, Trinity Carnes will continue to complete Dr appointments and follow tx plans, prioritize adequate sleep/rest and time outside of house as physical condition allows. . At the next session, the therapist will use Supportive Psychotherapy complete medical and mental health evaluation, and support transition post disability hearing. .    Behavioral Health Treatment Plan and Discharge Planning: Trinity Carnes is aware of and agrees to continue to work on their treatment plan. They have identified and are working toward their discharge goals.  yes    Visit start and stop times:    10/30/23  Start Time: 9716  Stop Time: 1303  Total Visit Time: 58 minutes

## 2023-10-31 ENCOUNTER — TELEPHONE (OUTPATIENT)
Dept: PSYCHIATRY | Facility: CLINIC | Age: 38
End: 2023-10-31

## 2023-10-31 ENCOUNTER — OFFICE VISIT (OUTPATIENT)
Dept: NEUROLOGY | Facility: CLINIC | Age: 38
End: 2023-10-31
Attending: NURSE PRACTITIONER
Payer: COMMERCIAL

## 2023-10-31 VITALS
TEMPERATURE: 98 F | HEART RATE: 90 BPM | WEIGHT: 262 LBS | BODY MASS INDEX: 47.92 KG/M2 | SYSTOLIC BLOOD PRESSURE: 118 MMHG | OXYGEN SATURATION: 99 % | RESPIRATION RATE: 18 BRPM | DIASTOLIC BLOOD PRESSURE: 70 MMHG

## 2023-10-31 DIAGNOSIS — G43.901 STATUS MIGRAINOSUS: ICD-10-CM

## 2023-10-31 DIAGNOSIS — G24.3 SPASMODIC TORTICOLLIS: ICD-10-CM

## 2023-10-31 DIAGNOSIS — R41.841 COGNITIVE COMMUNICATION DISORDER: ICD-10-CM

## 2023-10-31 DIAGNOSIS — G43.709 CHRONIC MIGRAINE WITHOUT AURA WITHOUT STATUS MIGRAINOSUS, NOT INTRACTABLE: ICD-10-CM

## 2023-10-31 PROCEDURE — 96372 THER/PROPH/DIAG INJ SC/IM: CPT | Mod: XU | Performed by: NURSE PRACTITIONER

## 2023-10-31 PROCEDURE — 99999 PR OFFICE/OUTPT VISIT,PROCEDURE ONLY: CPT | Performed by: NURSE PRACTITIONER

## 2023-10-31 PROCEDURE — 64615 CHEMODENERV MUSC MIGRAINE: CPT | Performed by: NURSE PRACTITIONER

## 2023-10-31 RX ORDER — KETOROLAC TROMETHAMINE 30 MG/ML
30 INJECTION, SOLUTION INTRAMUSCULAR; INTRAVENOUS ONCE
Status: COMPLETED | OUTPATIENT
Start: 2023-10-31 | End: 2023-10-31

## 2023-10-31 RX ADMIN — KETOROLAC TROMETHAMINE 30 MG: 30 INJECTION, SOLUTION INTRAMUSCULAR; INTRAVENOUS at 15:54

## 2023-10-31 NOTE — PROGRESS NOTES
Patient returns for injections .        Botox injections  Botox lot number: [id number]  Expiration Date: 02/2026      NDC#[id number]    Patient informed and consent obtained.        General Consent including notice of privacy practices/patient bill of rights was reviewed and consent/authorization given.       4cc of Normal saline were added to one vial of Botox Type A resulting in 200 Units of Botox.  After the patient consented to the procedure the following muscles were injected after cleaning the overlying skin with alcohol. New FDA mandated warnings provided to the patient with instructions to seek medical attention for difficulty swallowing or breathing.    Frontalis 10 units right ( 2 sites each 5 units) and 10 units left ( 2 sites each 5 units)  /Glabellar 5 units right and 5 units left (medially)  Procerus 5 units    Temporalis 30 units right( 6 sites each 5 units) and 30 units left ( 6 sites each 5 units)  Trapezius 25 units ( 3 sites each 5 units) right and 25 units left ( 3 sites each 5 units)  Cervical Paraspinals 10 units right ( 2 sites each 5 units) and 10 units left (2 sites each 5 units)  Occipital 15 units right ( 3 sites each 5 units) , 15 units left ( 3 sites each 5 units)      A total of 195 units was used 5 units wasted. The patient tolerated the procedure well.    Diagnoses and all orders for this visit:        Spasmodic torticollis  -     [facility] Botulinum Toxin Injection Appointment Request    Chronic migraine without aura without status migrainosus, not intractable  -     onabotulinumtoxinA (BOTOX) 200 unit injection 200 Units    Status migrainosus  -     ketorolac (TORADOL) injection 30 mg

## 2023-11-13 ENCOUNTER — TELEMEDICINE (OUTPATIENT)
Dept: BEHAVIORAL/MENTAL HEALTH CLINIC | Facility: CLINIC | Age: 38
End: 2023-11-13
Payer: COMMERCIAL

## 2023-11-13 DIAGNOSIS — F41.1 GENERALIZED ANXIETY DISORDER: Primary | ICD-10-CM

## 2023-11-13 DIAGNOSIS — F33.0 MILD EPISODE OF RECURRENT MAJOR DEPRESSIVE DISORDER (HCC): ICD-10-CM

## 2023-11-13 PROCEDURE — 90837 PSYTX W PT 60 MINUTES: CPT | Performed by: COUNSELOR

## 2023-11-13 NOTE — PSYCH
Virtual Regular Visit    Verification of patient location:    Patient is located at Home in the following state in which I hold an active license [place]      Assessment/Plan:    Problem List Items Addressed This Visit        Other    Recurrent major depressive disorder ([address])    Generalized anxiety disorder - Primary       Goals addressed in session: Goal 1          Reason for visit is   Chief Complaint   Patient presents with   • Virtual Regular Visit          Encounter provider Rae Amin [facility]    Provider located at [address] [facility]  [address] [facility] [address] [place], [place] [id number]      Recent Visits  Date Type Provider Dept   11/13/23 [address], [address] Therapist [facility]   Showing recent visits within past 7 days and meeting all other requirements  Future Appointments  No visits were found meeting these conditions. Showing future appointments within next 150 days and meeting all other requirements       The patient was identified by name and date of birth. Ricardo Sharp was informed that this is a telemedicine visit and that the visit is being conducted throughthe [employer] platform. She agrees to proceed. .  My office door was closed. No one else was in the room. She acknowledged consent and understanding of privacy and security of the video platform. The patient has agreed to participate and understands they can discontinue the visit at any time. Patient is aware this is a billable service. Adriana Aldana is a 41 y.o. female  . HPI     No past medical history on file. No past surgical history on file. No current outpatient medications on file. No current facility-administered medications for this visit. Not on File    Review of Systems    Video Exam    There were no vitals filed for this visit.     Physical Exam     Behavioral Health Psychotherapy Progress Note    Psychotherapy Provided: Individual Psychotherapy     1. Generalized anxiety disorder        2. Mild episode of recurrent major depressive disorder ([address])            Goals addressed in session: Goal 1     DATA:     -CT reported and reflected on her experience with her disability hearing. CT described her experience and struggles to communicate and manage the stressors of the hearing process. -CT and  feel positive about hearing, questions asked, and job prospects projection. Overall, CT stated that she felt as if  did understand her situation and limitations.   -Discussed managing the "in limbo and waiting" required now. Reviewed ways to support self: mindfulness tools, recognizing when slipping into downward spiral of what if thinking. -CT reported that she is in the early stages of another dose decrease of steroids. Reviewed past experience and helpful tools in previous step downs of dose. -CT reviewed some new learning she is doing with regard to muscle testing.   -Reviewed and reflected on CT's desire to be her own best manager of life direction and fine tune her intuition vs seeking outside direction for another "seer". During this session, this clinician used the following therapeutic modalities: Motivational Interviewing and Supportive Psychotherapy    Substance Abuse was not addressed during this session. If the client is diagnosed with a co-occurring substance use disorder, please indicate any changes in the frequency or amount of use: . Stage of change for addressing substance use diagnoses: No substance use/Not applicable    ASSESSMENT:  Jovanna Faith presents with a Euthymic/ normal and Anxious mood. her affect is Normal range and intensity and Tearful, which is congruent, with her mood and the content of the session. The client has made progress on their goals.      Jovanna Faith presents with a none risk of suicide, none risk of self-harm, and none risk of harm to others. For any risk assessment that surpasses a "low" rating, a safety plan must be developed. A safety plan was indicated: no  If yes, describe in detail     PLAN: Between sessions, Evan Turcios will monitor mood and thought processes, use mindfulness tools and routine to stay grounded and present. . At the next session, the therapist will use Supportive Psychotherapy to support and process next disability decision. Behavioral Health Treatment Plan and Discharge Planning: Evan Turcios is aware of and agrees to continue to work on their treatment plan. They have identified and are working toward their discharge goals.  yes    Visit start and stop times:    11/13/23  Start Time: 1205  Stop Time: 1303  Total Visit Time: 58 minutes

## 2023-11-20 DIAGNOSIS — F51.01 PRIMARY INSOMNIA: ICD-10-CM

## 2023-11-20 RX ORDER — ZOLPIDEM TARTRATE 10 MG/1
10 TABLET ORAL NIGHTLY
Qty: 30 TABLET | Refills: 0 | Status: SHIPPED | OUTPATIENT
Start: 2023-11-20 | End: 2023-12-15 | Stop reason: SDUPTHER

## 2023-11-20 NOTE — TELEPHONE ENCOUNTER
Medicine Refill Request    Last Office Visit: 10/10/2023   Last Consult Visit: Visit date not found  Last Telemedicine Visit: Visit date not found    Next Appointment: Visit date not found      Current Outpatient Medications:     albuterol HFA (VENTOLIN HFA) 90 mcg/actuation inhaler, Inhale 2 puffs every 4 (four) hours as needed., Disp: , Rfl:     amoxicillin (AMOXIL) 500 mg capsule, , Disp: , Rfl:     buprenorphine-naloxone (SUBOXONE) 8-2 mg film, Place 2 Film under the tongue every morning. In addition to 1/2 film QPM, Disp: , Rfl:     cholecalciferol (VITAMIN D3) 1,250 mcg (50,000 unit) capsule, Take 50,000 Units by mouth once a week on Monday., Disp: , Rfl:     desvenlafaxine succinate (PRISTIQ) 25 mg tablet extended release 24 hr, Take 25 mg by mouth daily., Disp: , Rfl:     ferrous sulfate 325 mg (65 mg iron) tablet, Take 65 mg by mouth nightly., Disp: , Rfl:     fluticasone propion-salmeteroL (ADVAIR DISKUS) 250-50 mcg/dose diskus inhaler, inhale 1 puff by mouth and INTO THE LUNGS twice a day Rinse mouth after use, Disp: , Rfl:     fluticasone propionate (FLONASE) 50 mcg/actuation nasal spray, Administer 1 spray into each nostril daily as needed for rhinitis or allergies., Disp: , Rfl:     lisinopriL (PRINIVIL) 5 mg tablet, Take 1 tablet (5 mg total) by mouth daily., Disp: 90 tablet, Rfl: 1    magnesium oxide (MAG-OX) 400 mg (241.3 mg magnesium) tablet, Take 1 tablet (400 mg total) by mouth daily., Disp: 90 tablet, Rfl: 1    medical marijuana, 1 each See admin instr. Please be advised that an [facility] hospital staff member has listed medical marijuana as one of your home medications for documentation purposes. Please follow-up with your certified issuing provider for ongoing use, Disp: , Rfl:     metoprolol succinate XL (TOPROL-XL) 25 mg 24 hr tablet, Take 1 tablet (25 mg total) by mouth nightly., Disp: 90 tablet, Rfl: 3    NURTEC ODT 75 mg tablet,disintegrating, Take 1 tablet (75 mg total) by mouth  every other day., Disp: 16 tablet, Rfl: 3    onabotulinumtoxinA (BOTOX) 200 unit recon soln injection, Botox for chronic migraine headache every 3 months, Disp: 1 each, Rfl: 4    onabotulinumtoxinA (BOTOX) injection, Botox for chronic migraine headache every 3 months, Disp: 1 each, Rfl: 4    pantoprazole (PROTONIX) 40 mg EC tablet, Take 40 mg by mouth nightly., Disp: , Rfl:     potassium chloride (KLOR-CON M) 20 mEq CR tablet, Take 1 tablet (20 mEq total) by mouth daily., Disp: 90 tablet, Rfl: 3    predniSONE (DELTASONE) 1 mg tablet, Take 5 tablets (5 mg total) by mouth daily for 30 days, THEN 4 tablets (4 mg total) daily for 30 days, THEN 3 tablets (3 mg total) daily for 30 days, THEN 2 tablets (2 mg total) daily for 30 days, THEN 1 tablet (1 mg total) daily. Patient states taking 7.5 mg daily., Disp: 450 tablet, Rfl: 0    pregabalin (LYRICA) 200 mg capsule, Take 1 capsule (200 mg total) by mouth 3 (three) times a day with meals., Disp: 270 capsule, Rfl: 1    QUEtiapine (SEROQUEL) 25 mg tablet, Take 1 tablet (25 mg total) by mouth nightly., Disp: 30 tablet, Rfl: 0    tamsulosin (FLOMAX) 0.4 mg capsule, , Disp: , Rfl:     TiZANidine (ZANAFLEX) 6 mg capsule, TAKE 1 CAPSULE BY MOUTH EVERY 6 - 8 HOURS AS NEEDED NOT TO EXCEED 3 DOSES IN 24 HOURS, Disp: , Rfl:     torsemide (DEMADEX) 10 mg tablet, Take 1 tablet (10 mg total) by mouth daily., Disp: 90 tablet, Rfl: 1    zolpidem (AMBIEN) 10 mg tablet, Take 1 tablet (10 mg total) by mouth nightly., Disp: 30 tablet, Rfl: 0      BP Readings from Last 3 Encounters:   10/31/23 118/70   10/18/23 124/77   10/18/23 124/77       Recent Lab results:  Lab Results   Component Value Date    CHOL 187 10/09/2023   ,   Lab Results   Component Value Date    HDL 47 10/09/2023   ,   Lab Results   Component Value Date    LDLCALC 112 (H) 10/09/2023   ,   Lab Results   Component Value Date    TRIG 159 (H) 10/09/2023        Lab Results   Component Value Date    GLUCOSE 92 10/09/2023    ,   Lab Results   Component Value Date    HGBA1C 5.4 06/26/2023         Lab Results   Component Value Date    CREATININE 0.68 10/09/2023       Lab Results   Component Value Date    TSH 2.500 06/23/2023           Lab Results   Component Value Date    HGBA1C 5.4 06/26/2023

## 2023-11-22 NOTE — PLAN OF CARE
COLONOSCOPY REPORT           Vane Renae      1946 Age 72year old   PCP Qiana Thomson MD Endoscopist Dolly Jang MD      Date of procedure: 19     Procedure: Colonoscopy w/cold snare polypectomy     Pre-operative diagnosis: Screening     Post-operative diagnosis: Polyp(s) of colon, diverticulosis of the colon, internal hemorrhoids     Medications: Versed 4 mg IV push. Fentanyl 25 mcg IV push [CONSCIOUS SEDATION provided by Dolly Jang MD for 20 minutes. Pre/post-sedation assessment was performed by myself and the RN]     Withdrawal time: 14 minutes     Procedure:  Informed consent was obtained from the patient after the risks of the procedure were discussed, including but not limited to bleeding, perforation, aspiration, infection, or possibility of a missed lesion. After discussions of the risks/benefits and alternatives to this procedure, as well as the planned sedation, the patient was placed in the left lateral decubitus position and begun on continuous blood pressure pulse oximetry and EKG monitoring and this was maintained throughout the procedure. Once an adequate level of sedation was obtained a digital rectal exam was completed. Then the lubricated tip of the [id number] diagnostic video colonoscope was inserted and advanced without difficulty to the cecum using the CO2 insufflation technique. The cecum was identified by localizing the trifold, the appendix and the ileocecal valve. Withdrawal was begun with thorough washing and careful examination of the colonic walls and folds. A routine second examination of the cecum/ascending colon was performed. Photodocumentation was obtained. The bowel prep was good. Views of the colon were good with washing. I then carefully withdrew the instrument from the patient who tolerated the procedure well. Complications: none. Findings:   1.  One polyp(s) noted as follows:      A. 7 mm polyp in the ascending colon; sessile Plan of Care Review  Plan of Care Reviewed With: patient  Progress: improving  Outcome Summary: Pt continued to have chills and sweats at night. Pain was controlled with oxycodone with Tylenol every 4hours. SR on the monitor.   morphology; cold snare polypectomy and retrieved. Persistent oozing from polypectomy site controlled with a single endoclip placement. 2. Diverticulosis: mild in the sigmoid. 3. Terminal ileum: the visualized mucosa appeared normal.     4. A retroflexed view of the rectum revealed small internal hemorrhoids. 5. The colonic mucosa throughout the colon showed normal vascular pattern, without evidence of angioectasias or inflammation. 6. BARB: normal rectal tone, no masses palpated. Impression:   One small colon polyp removed. Mild sigmoid diverticulosis. Internal hemorrhoids, small. Recommend:  Await pathology. The interval for the next colonoscopy will be determined after reviewing pathology. If new signs or symptoms develop, colonoscopy may need to be repeated sooner. High fiber diet. Monitor for blood in the stool. If having more than just tinge of blood, call office or go to the ER. After cardiac and pulmonary evaluations, please call back to schedule upper endoscopy. Continue pantoprazole 40mg/day.      >>>If tissue was obtained and you have not received your pathology results either by phone or letter within 2 weeks, please call our office at [phone]. Specimens: colon         Final Diagnosis:      Ascending colon polyp:   Tubular adenoma.

## 2023-11-24 ENCOUNTER — TELEPHONE (OUTPATIENT)
Dept: VASCULAR SURGERY | Facility: CLINIC | Age: 38
End: 2023-11-24
Payer: COMMERCIAL

## 2023-11-24 NOTE — TELEPHONE ENCOUNTER
Patient is running out of the prednisone 1mg she runs out by Monday she neds enough for this month and December.       Please assist

## 2023-11-24 NOTE — TELEPHONE ENCOUNTER
Left message for patient letting her know her vein ablation has been authorized and to give me a call back to schedule.

## 2023-11-25 DIAGNOSIS — Z79.52 CURRENT CHRONIC USE OF SYSTEMIC STEROIDS: Primary | ICD-10-CM

## 2023-11-27 RX ORDER — PREDNISONE 1 MG/1
TABLET ORAL
Qty: 150 TABLET | Refills: 2 | OUTPATIENT
Start: 2023-11-27 | End: 2024-04-24

## 2023-11-27 RX ORDER — PREDNISONE 1 MG/1
1 TABLET ORAL DAILY
Qty: 16 TABLET | Refills: 0 | Status: SHIPPED | OUTPATIENT
Start: 2023-11-27 | End: 2023-11-27 | Stop reason: SDUPTHER

## 2023-11-27 RX ORDER — PREDNISONE 1 MG/1
1 TABLET ORAL DAILY
Qty: 16 TABLET | Refills: 0 | Status: SHIPPED | OUTPATIENT
Start: 2023-11-27 | End: 2023-12-15 | Stop reason: ALTCHOICE

## 2023-11-27 NOTE — TELEPHONE ENCOUNTER
Pt called back had a question about dosages and days. Questions answered and verbalized understanding. Pt also asked if we could resend the rx she has [facility] her [facility] and they are stating that they have not received the rx yet, which it clearly states it [facility].     Resent rx to pharmacy.

## 2023-11-27 NOTE — TELEPHONE ENCOUNTER
Nov 1/10/24  LOV 7/10/23    Dr Kaur this message was sent to me Friday after I had left at 11am, so I am getting it to you now you previously sent qty of 450 the insurance would not cover it. So they have been dispensing it as qty of 60's and now needs a refill. Pt stated she is to take it until late December. Pending medication to you.

## 2023-11-30 DIAGNOSIS — I87.2 VENOUS INSUFFICIENCY: Primary | ICD-10-CM

## 2023-12-04 ENCOUNTER — TELEMEDICINE (OUTPATIENT)
Dept: BEHAVIORAL/MENTAL HEALTH CLINIC | Facility: CLINIC | Age: 38
End: 2023-12-04
Payer: COMMERCIAL

## 2023-12-04 DIAGNOSIS — F41.1 GENERALIZED ANXIETY DISORDER: Primary | ICD-10-CM

## 2023-12-04 DIAGNOSIS — F33.0 MILD EPISODE OF RECURRENT MAJOR DEPRESSIVE DISORDER (HCC): ICD-10-CM

## 2023-12-04 PROCEDURE — 90837 PSYTX W PT 60 MINUTES: CPT | Performed by: COUNSELOR

## 2023-12-04 RX ORDER — TIZANIDINE HYDROCHLORIDE 6 MG/1
6 CAPSULE, GELATIN COATED ORAL 3 TIMES DAILY PRN
Qty: 90 CAPSULE | Refills: 0 | Status: SHIPPED | OUTPATIENT
Start: 2023-12-04 | End: 2024-06-27

## 2023-12-04 NOTE — PSYCH
Virtual Regular Visit    Verification of patient location:    Patient is located at Home in the following state in which I hold an active license [place]      Assessment/Plan:    Problem List Items Addressed This Visit        Other    Recurrent major depressive disorder ([address])    Generalized anxiety disorder - Primary       Goals addressed in session: Goal 1          Reason for visit is   Chief Complaint   Patient presents with   • Virtual Regular Visit          Encounter provider Darlyn Rehman, [facility] AND [facility] \[facility]\""    Provider located at [address] [facility]  [address] [facility] [address] [place] [address]  [phone]      Recent Visits  No visits were found meeting these conditions. Showing recent visits within past 7 days and meeting all other requirements  Today's Visits  Date Type Provider Dept   12/04/23 Telemedicine Darlyn Rehman, [address]  Therapist [facility]   Showing today's visits and meeting all other requirements  Future Appointments  No visits were found meeting these conditions. Showing future appointments within next 150 days and meeting all other requirements       The patient was identified by name and date of birth. Paul Washington was informed that this is a telemedicine visit and that the visit is being conducted throughthe "[employer] platform. She agrees to proceed. .  My office door was closed. No one else was in the room. She acknowledged consent and understanding of privacy and security of the video platform. The patient has agreed to participate and understands they can discontinue the visit at any time. Patient is aware this is a billable service. Ivette Ramsey is a 41 y.o. female  . HPI     No past medical history on file. No past surgical history on file. No current outpatient medications on file. No current facility-administered medications for this visit.         Not on File    Review of Systems    Video Exam    There were no vitals filed for this visit. Physical Exam     Behavioral Health Psychotherapy Progress Note    Psychotherapy Provided: Individual Psychotherapy     1. Generalized anxiety disorder        2. Mild episode of recurrent major depressive disorder ([address])            Goals addressed in session: Goal 1     DATA:     -CT reported that she had to accelerate her reduction in steroid dose schedule because she had calculated the schedule incorrectly. CT is noticing all the adverse effects of this (increased body pain, sleep difficulties, lower mood-crying, difficulty with motivation, nausea). Current plan is for CT to be off of steroids by mid-Dec. TH suggested that CT make requet to extend this last taper for a few more weeks to allow for new baseline to be reached. -Explored the mood impact of lowering the steroid dose. CT has worked with psych and antidepressant dose has been increased to support. -CT reflected on her realization of the impact her steroid use and cold turkey pause hd on her relationship several years ago. CT now realizes that she was in a very bad place related to mood and pain that contributed to her instability and poor handling of the situation. CT expressed regrets and desire to "explain" what was going on with her. CT accepts the break-up and does not want to work to re-establish relationship. TH recommended composing this letter as important exercise. -CT described concerns about new med prescribed by psych to address sleep. CT tried new med with adverse reactions. Considered CT options and the importance/fundamental right to advocate for herself. CT hesitates because of previous experience with providers when abusing meds and drugs. Considering new "normal". -CT reflected on challenges at her home with personalities and disfunction. TH recommended CT review info from [facility] regarding dealing with emotionally immature parents and people.    -CT continues her daily practice of journaling, reflection, and acknowledging gratitude items. During this session, this clinician used the following therapeutic modalities: Supportive Psychotherapy    Substance Abuse was not addressed during this session. If the client is diagnosed with a co-occurring substance use disorder, please indicate any changes in the frequency or amount of use: . Stage of change for addressing substance use diagnoses: No substance use/Not applicable    ASSESSMENT:  Chidi Ca presents with a Euthymic/ normal and Dysthymic mood. her affect is Constricted and Tearful, which is congruent, with her mood and the content of the session. The client has made progress on their goals. Chidi Ca presents with a none risk of suicide, none risk of self-harm, and none risk of harm to others. For any risk assessment that surpasses a "low" rating, a safety plan must be developed. A safety plan was indicated: no  If yes, describe in detail     PLAN: Between sessions, Chidi Ca will continue daily practices supporting mood and wellbeing, visit with friend as planned, review  materials recommended, address med concerns with providers to potentially slow steroid taper and enhance sleep. . At the next session, the therapist will use Supportive Psychotherapy to support return to preferred baseline and navigating home personalities with healthy boundaries and undertanding. , .    [address] and Discharge Planning: Chidi Ca is aware of and agrees to continue to work on their treatment plan. They have identified and are working toward their discharge goals.  yes    Visit start and stop times:    12/04/23  Start Time: 1205  Stop Time: 1306  Total Visit Time: 61 minutes

## 2023-12-04 NOTE — TELEPHONE ENCOUNTER
Medicine Refill Request    Last Office Visit: 10/10/2023   Last Consult Visit: Visit date not found  Last Telemedicine Visit: Visit date not found    Next Appointment: Visit date not found      Current Outpatient Medications:     albuterol HFA (VENTOLIN HFA) 90 mcg/actuation inhaler, Inhale 2 puffs every 4 (four) hours as needed., Disp: , Rfl:     amoxicillin (AMOXIL) 500 mg capsule, , Disp: , Rfl:     buprenorphine-naloxone (SUBOXONE) 8-2 mg film, Place 2 Film under the tongue every morning. In addition to 1/2 film QPM, Disp: , Rfl:     cholecalciferol (VITAMIN D3) 1,250 mcg (50,000 unit) capsule, Take 50,000 Units by mouth once a week on Monday., Disp: , Rfl:     desvenlafaxine succinate (PRISTIQ) 25 mg tablet extended release 24 hr, Take 25 mg by mouth daily., Disp: , Rfl:     ferrous sulfate 325 mg (65 mg iron) tablet, Take 65 mg by mouth nightly., Disp: , Rfl:     fluticasone propion-salmeteroL (ADVAIR DISKUS) 250-50 mcg/dose diskus inhaler, inhale 1 puff by mouth and INTO THE LUNGS twice a day Rinse mouth after use, Disp: , Rfl:     fluticasone propionate (FLONASE) 50 mcg/actuation nasal spray, Administer 1 spray into each nostril daily as needed for rhinitis or allergies., Disp: , Rfl:     lisinopriL (PRINIVIL) 5 mg tablet, Take 1 tablet (5 mg total) by mouth daily., Disp: 90 tablet, Rfl: 1    magnesium oxide (MAG-OX) 400 mg (241.3 mg magnesium) tablet, Take 1 tablet (400 mg total) by mouth daily., Disp: 90 tablet, Rfl: 1    medical marijuana, 1 each See admin instr. Please be advised that an [facility] hospital staff member has listed medical marijuana as one of your home medications for documentation purposes. Please follow-up with your certified issuing provider for ongoing use, Disp: , Rfl:     metoprolol succinate XL (TOPROL-XL) 25 mg 24 hr tablet, Take 1 tablet (25 mg total) by mouth nightly., Disp: 90 tablet, Rfl: 3    NURTEC ODT 75 mg tablet,disintegrating, Take 1 tablet (75 mg total) by mouth every other  day., Disp: 16 tablet, Rfl: 3    onabotulinumtoxinA (BOTOX) 200 unit recon soln injection, Botox for chronic migraine headache every 3 months, Disp: 1 each, Rfl: 4    onabotulinumtoxinA (BOTOX) injection, Botox for chronic migraine headache every 3 months, Disp: 1 each, Rfl: 4    pantoprazole (PROTONIX) 40 mg EC tablet, Take 40 mg by mouth nightly., Disp: , Rfl:     potassium chloride (KLOR-CON M) 20 mEq CR tablet, Take 1 tablet (20 mEq total) by mouth daily., Disp: 90 tablet, Rfl: 3    predniSONE (DELTASONE) 1 mg tablet, Take 1 tablet (1 mg total) by mouth daily for 16 days. Patient states taking 7.5 mg daily, Disp: 16 tablet, Rfl: 0    pregabalin (LYRICA) 200 mg capsule, Take 1 capsule (200 mg total) by mouth 3 (three) times a day with meals., Disp: 270 capsule, Rfl: 1    QUEtiapine (SEROQUEL) 25 mg tablet, Take 1 tablet (25 mg total) by mouth nightly., Disp: 30 tablet, Rfl: 0    tamsulosin (FLOMAX) 0.4 mg capsule, , Disp: , Rfl:     TiZANidine (ZANAFLEX) 6 mg capsule, TAKE 1 CAPSULE BY MOUTH EVERY 6 - 8 HOURS AS NEEDED NOT TO EXCEED 3 DOSES IN 24 HOURS, Disp: , Rfl:     torsemide (DEMADEX) 10 mg tablet, Take 1 tablet (10 mg total) by mouth daily., Disp: 90 tablet, Rfl: 1    zolpidem (AMBIEN) 10 mg tablet, Take 1 tablet (10 mg total) by mouth nightly., Disp: 30 tablet, Rfl: 0      BP Readings from Last 3 Encounters:   10/31/23 118/70   10/18/23 124/77   10/18/23 124/77       Recent Lab results:  Lab Results   Component Value Date    CHOL 187 10/09/2023   ,   Lab Results   Component Value Date    HDL 47 10/09/2023   ,   Lab Results   Component Value Date    LDLCALC 112 (H) 10/09/2023   ,   Lab Results   Component Value Date    TRIG 159 (H) 10/09/2023        Lab Results   Component Value Date    GLUCOSE 92 10/09/2023   ,   Lab Results   Component Value Date    HGBA1C 5.4 06/26/2023         Lab Results   Component Value Date    CREATININE 0.68 10/09/2023       Lab Results   Component Value Date    TSH 2.500  06/23/2023           Lab Results   Component Value Date    HGBA1C 5.4 06/26/2023

## 2023-12-15 ENCOUNTER — OFFICE VISIT (OUTPATIENT)
Dept: PRIMARY CARE | Facility: CLINIC | Age: 38
End: 2023-12-15
Payer: COMMERCIAL

## 2023-12-15 VITALS
WEIGHT: 258 LBS | BODY MASS INDEX: 47.48 KG/M2 | SYSTOLIC BLOOD PRESSURE: 124 MMHG | OXYGEN SATURATION: 97 % | HEIGHT: 62 IN | DIASTOLIC BLOOD PRESSURE: 78 MMHG | TEMPERATURE: 97.3 F | HEART RATE: 87 BPM

## 2023-12-15 DIAGNOSIS — L03.116 CELLULITIS OF LEFT LOWER EXTREMITY: Primary | ICD-10-CM

## 2023-12-15 DIAGNOSIS — F51.01 PRIMARY INSOMNIA: ICD-10-CM

## 2023-12-15 DIAGNOSIS — F32.A DEPRESSION, UNSPECIFIED DEPRESSION TYPE: ICD-10-CM

## 2023-12-15 PROCEDURE — 3008F BODY MASS INDEX DOCD: CPT | Performed by: FAMILY MEDICINE

## 2023-12-15 PROCEDURE — 99214 OFFICE O/P EST MOD 30 MIN: CPT | Performed by: FAMILY MEDICINE

## 2023-12-15 PROCEDURE — 3078F DIAST BP <80 MM HG: CPT | Performed by: FAMILY MEDICINE

## 2023-12-15 PROCEDURE — 3074F SYST BP LT 130 MM HG: CPT | Performed by: FAMILY MEDICINE

## 2023-12-15 RX ORDER — DESVENLAFAXINE 50 MG/1
50 TABLET, FILM COATED, EXTENDED RELEASE ORAL DAILY
COMMUNITY
Start: 2023-11-16 | End: 2024-05-30

## 2023-12-15 RX ORDER — NALOXONE HYDROCHLORIDE 4 MG/.1ML
SPRAY NASAL
COMMUNITY
Start: 2023-11-29

## 2023-12-15 RX ORDER — RESVER/WINE/BFL/GRPSD/PC/C/POM 200MG-60MG
5000 CAPSULE ORAL
COMMUNITY
Start: 2023-10-12

## 2023-12-15 RX ORDER — DOXYCYCLINE HYCLATE 100 MG
100 TABLET ORAL 2 TIMES DAILY
Qty: 14 TABLET | Refills: 0 | Status: SHIPPED | OUTPATIENT
Start: 2023-12-15 | End: 2023-12-22

## 2023-12-15 RX ORDER — PANTOPRAZOLE SODIUM 40 MG/1
40 TABLET, DELAYED RELEASE ORAL NIGHTLY
Qty: 90 TABLET | Refills: 0 | Status: SHIPPED | OUTPATIENT
Start: 2023-12-15 | End: 2024-03-18

## 2023-12-15 NOTE — TELEPHONE ENCOUNTER
Medicine Refill Request    Last Office Visit: 10/10/2023   Last Consult Visit: Visit date not found  Last Telemedicine Visit: Visit date not found    Next Appointment: Visit date not found      Current Outpatient Medications:     albuterol HFA (VENTOLIN HFA) 90 mcg/actuation inhaler, Inhale 2 puffs every 4 (four) hours as needed., Disp: , Rfl:     amoxicillin (AMOXIL) 500 mg capsule, , Disp: , Rfl:     buprenorphine-naloxone (SUBOXONE) 8-2 mg film, Place 2 Film under the tongue every morning. In addition to 1/2 film QPM, Disp: , Rfl:     cholecalciferol (VITAMIN D3) 1,250 mcg (50,000 unit) capsule, Take 50,000 Units by mouth once a week on Monday., Disp: , Rfl:     desvenlafaxine succinate (PRISTIQ) 25 mg tablet extended release 24 hr, Take 25 mg by mouth daily., Disp: , Rfl:     ferrous sulfate 325 mg (65 mg iron) tablet, Take 65 mg by mouth nightly., Disp: , Rfl:     fluticasone propion-salmeteroL (ADVAIR DISKUS) 250-50 mcg/dose diskus inhaler, inhale 1 puff by mouth and INTO THE LUNGS twice a day Rinse mouth after use, Disp: , Rfl:     fluticasone propionate (FLONASE) 50 mcg/actuation nasal spray, Administer 1 spray into each nostril daily as needed for rhinitis or allergies., Disp: , Rfl:     lisinopriL (PRINIVIL) 5 mg tablet, Take 1 tablet (5 mg total) by mouth daily., Disp: 90 tablet, Rfl: 1    magnesium oxide (MAG-OX) 400 mg (241.3 mg magnesium) tablet, Take 1 tablet (400 mg total) by mouth daily., Disp: 90 tablet, Rfl: 1    medical marijuana, 1 each See admin instr. Please be advised that an [facility] hospital staff member has listed medical marijuana as one of your home medications for documentation purposes. Please follow-up with your certified issuing provider for ongoing use, Disp: , Rfl:     metoprolol succinate XL (TOPROL-XL) 25 mg 24 hr tablet, Take 1 tablet (25 mg total) by mouth nightly., Disp: 90 tablet, Rfl: 3    NURTEC ODT 75 mg tablet,disintegrating, Take 1 tablet (75 mg total) by mouth every other  day., Disp: 16 tablet, Rfl: 3    onabotulinumtoxinA (BOTOX) 200 unit recon soln injection, Botox for chronic migraine headache every 3 months, Disp: 1 each, Rfl: 4    onabotulinumtoxinA (BOTOX) injection, Botox for chronic migraine headache every 3 months, Disp: 1 each, Rfl: 4    pantoprazole (PROTONIX) 40 mg EC tablet, Take 40 mg by mouth nightly., Disp: , Rfl:     potassium chloride (KLOR-CON M) 20 mEq CR tablet, Take 1 tablet (20 mEq total) by mouth daily., Disp: 90 tablet, Rfl: 3    predniSONE (DELTASONE) 1 mg tablet, Take 1 tablet (1 mg total) by mouth daily for 16 days. Patient states taking 7.5 mg daily, Disp: 16 tablet, Rfl: 0    pregabalin (LYRICA) 200 mg capsule, Take 1 capsule (200 mg total) by mouth 3 (three) times a day with meals., Disp: 270 capsule, Rfl: 1    QUEtiapine (SEROQUEL) 25 mg tablet, Take 1 tablet (25 mg total) by mouth nightly., Disp: 30 tablet, Rfl: 0    tamsulosin (FLOMAX) 0.4 mg capsule, , Disp: , Rfl:     TiZANidine (ZANAFLEX) 6 mg capsule, Take 1 capsule (6 mg total) by mouth 3 (three) times a day as needed for muscle spasms., Disp: 90 capsule, Rfl: 0    torsemide (DEMADEX) 10 mg tablet, Take 1 tablet (10 mg total) by mouth daily., Disp: 90 tablet, Rfl: 1    zolpidem (AMBIEN) 10 mg tablet, Take 1 tablet (10 mg total) by mouth nightly., Disp: 30 tablet, Rfl: 0      BP Readings from Last 3 Encounters:   10/31/23 118/70   10/18/23 124/77   10/18/23 124/77       Recent Lab results:  Lab Results   Component Value Date    CHOL 187 10/09/2023   ,   Lab Results   Component Value Date    HDL 47 10/09/2023   ,   Lab Results   Component Value Date    LDLCALC 112 (H) 10/09/2023   ,   Lab Results   Component Value Date    TRIG 159 (H) 10/09/2023        Lab Results   Component Value Date    GLUCOSE 92 10/09/2023   ,   Lab Results   Component Value Date    HGBA1C 5.4 06/26/2023         Lab Results   Component Value Date    CREATININE 0.68 10/09/2023       Lab Results   Component Value Date    TSH  2.500 06/23/2023           Lab Results   Component Value Date    HGBA1C 5.4 06/26/2023

## 2023-12-15 NOTE — TELEPHONE ENCOUNTER
Medicine Refill Request    Last Office Visit: 10/10/2023   Last Consult Visit: Visit date not found  Last Telemedicine Visit: Visit date not found    Next Appointment: Visit date not found      Current Outpatient Medications:     albuterol HFA (VENTOLIN HFA) 90 mcg/actuation inhaler, Inhale 2 puffs every 4 (four) hours as needed., Disp: , Rfl:     amoxicillin (AMOXIL) 500 mg capsule, , Disp: , Rfl:     buprenorphine-naloxone (SUBOXONE) 8-2 mg film, Place 2 Film under the tongue every morning. In addition to 1/2 film QPM, Disp: , Rfl:     cholecalciferol (VITAMIN D3) 1,250 mcg (50,000 unit) capsule, Take 50,000 Units by mouth once a week on Monday., Disp: , Rfl:     desvenlafaxine succinate (PRISTIQ) 25 mg tablet extended release 24 hr, Take 25 mg by mouth daily., Disp: , Rfl:     ferrous sulfate 325 mg (65 mg iron) tablet, Take 65 mg by mouth nightly., Disp: , Rfl:     fluticasone propion-salmeteroL (ADVAIR DISKUS) 250-50 mcg/dose diskus inhaler, inhale 1 puff by mouth and INTO THE LUNGS twice a day Rinse mouth after use, Disp: , Rfl:     fluticasone propionate (FLONASE) 50 mcg/actuation nasal spray, Administer 1 spray into each nostril daily as needed for rhinitis or allergies., Disp: , Rfl:     lisinopriL (PRINIVIL) 5 mg tablet, Take 1 tablet (5 mg total) by mouth daily., Disp: 90 tablet, Rfl: 1    magnesium oxide (MAG-OX) 400 mg (241.3 mg magnesium) tablet, Take 1 tablet (400 mg total) by mouth daily., Disp: 90 tablet, Rfl: 1    medical marijuana, 1 each See admin instr. Please be advised that an [facility] hospital staff member has listed medical marijuana as one of your home medications for documentation purposes. Please follow-up with your certified issuing provider for ongoing use, Disp: , Rfl:     metoprolol succinate XL (TOPROL-XL) 25 mg 24 hr tablet, Take 1 tablet (25 mg total) by mouth nightly., Disp: 90 tablet, Rfl: 3    NURTEC ODT 75 mg tablet,disintegrating, Take 1 tablet (75 mg total) by mouth every other  day., Disp: 16 tablet, Rfl: 3    onabotulinumtoxinA (BOTOX) 200 unit recon soln injection, Botox for chronic migraine headache every 3 months, Disp: 1 each, Rfl: 4    onabotulinumtoxinA (BOTOX) injection, Botox for chronic migraine headache every 3 months, Disp: 1 each, Rfl: 4    pantoprazole (PROTONIX) 40 mg EC tablet, Take 40 mg by mouth nightly., Disp: , Rfl:     potassium chloride (KLOR-CON M) 20 mEq CR tablet, Take 1 tablet (20 mEq total) by mouth daily., Disp: 90 tablet, Rfl: 3    predniSONE (DELTASONE) 1 mg tablet, Take 1 tablet (1 mg total) by mouth daily for 16 days. Patient states taking 7.5 mg daily, Disp: 16 tablet, Rfl: 0    pregabalin (LYRICA) 200 mg capsule, Take 1 capsule (200 mg total) by mouth 3 (three) times a day with meals., Disp: 270 capsule, Rfl: 1    QUEtiapine (SEROQUEL) 25 mg tablet, Take 1 tablet (25 mg total) by mouth nightly., Disp: 30 tablet, Rfl: 0    tamsulosin (FLOMAX) 0.4 mg capsule, , Disp: , Rfl:     TiZANidine (ZANAFLEX) 6 mg capsule, Take 1 capsule (6 mg total) by mouth 3 (three) times a day as needed for muscle spasms., Disp: 90 capsule, Rfl: 0    torsemide (DEMADEX) 10 mg tablet, Take 1 tablet (10 mg total) by mouth daily., Disp: 90 tablet, Rfl: 1    zolpidem (AMBIEN) 10 mg tablet, Take 1 tablet (10 mg total) by mouth nightly., Disp: 30 tablet, Rfl: 0      BP Readings from Last 3 Encounters:   10/31/23 118/70   10/18/23 124/77   10/18/23 124/77       Recent Lab results:  Lab Results   Component Value Date    CHOL 187 10/09/2023   ,   Lab Results   Component Value Date    HDL 47 10/09/2023   ,   Lab Results   Component Value Date    LDLCALC 112 (H) 10/09/2023   ,   Lab Results   Component Value Date    TRIG 159 (H) 10/09/2023        Lab Results   Component Value Date    GLUCOSE 92 10/09/2023   ,   Lab Results   Component Value Date    HGBA1C 5.4 06/26/2023         Lab Results   Component Value Date    CREATININE 0.68 10/09/2023       Lab Results   Component Value Date    TSH  2.500 06/23/2023           Lab Results   Component Value Date    HGBA1C 5.4 06/26/2023

## 2023-12-15 NOTE — TELEPHONE ENCOUNTER
Medicine Refill Request    Last Office Visit: 10/10/2023   Last Consult Visit: Visit date not found  Last Telemedicine Visit: Visit date not found    Next Appointment: Visit date not found      Current Outpatient Medications:     albuterol HFA (VENTOLIN HFA) 90 mcg/actuation inhaler, Inhale 2 puffs every 4 (four) hours as needed., Disp: , Rfl:     amoxicillin (AMOXIL) 500 mg capsule, , Disp: , Rfl:     buprenorphine-naloxone (SUBOXONE) 8-2 mg film, Place 2 Film under the tongue every morning. In addition to 1/2 film QPM, Disp: , Rfl:     cholecalciferol (VITAMIN D3) 1,250 mcg (50,000 unit) capsule, Take 50,000 Units by mouth once a week on Monday., Disp: , Rfl:     desvenlafaxine succinate (PRISTIQ) 25 mg tablet extended release 24 hr, Take 25 mg by mouth daily., Disp: , Rfl:     ferrous sulfate 325 mg (65 mg iron) tablet, Take 65 mg by mouth nightly., Disp: , Rfl:     fluticasone propion-salmeteroL (ADVAIR DISKUS) 250-50 mcg/dose diskus inhaler, inhale 1 puff by mouth and INTO THE LUNGS twice a day Rinse mouth after use, Disp: , Rfl:     fluticasone propionate (FLONASE) 50 mcg/actuation nasal spray, Administer 1 spray into each nostril daily as needed for rhinitis or allergies., Disp: , Rfl:     lisinopriL (PRINIVIL) 5 mg tablet, Take 1 tablet (5 mg total) by mouth daily., Disp: 90 tablet, Rfl: 1    magnesium oxide (MAG-OX) 400 mg (241.3 mg magnesium) tablet, Take 1 tablet (400 mg total) by mouth daily., Disp: 90 tablet, Rfl: 1    medical marijuana, 1 each See admin instr. Please be advised that an [facility] hospital staff member has listed medical marijuana as one of your home medications for documentation purposes. Please follow-up with your certified issuing provider for ongoing use, Disp: , Rfl:     metoprolol succinate XL (TOPROL-XL) 25 mg 24 hr tablet, Take 1 tablet (25 mg total) by mouth nightly., Disp: 90 tablet, Rfl: 3    NURTEC ODT 75 mg tablet,disintegrating, Take 1 tablet (75 mg total) by mouth every other  day., Disp: 16 tablet, Rfl: 3    onabotulinumtoxinA (BOTOX) 200 unit recon soln injection, Botox for chronic migraine headache every 3 months, Disp: 1 each, Rfl: 4    onabotulinumtoxinA (BOTOX) injection, Botox for chronic migraine headache every 3 months, Disp: 1 each, Rfl: 4    pantoprazole (PROTONIX) 40 mg EC tablet, Take 40 mg by mouth nightly., Disp: , Rfl:     potassium chloride (KLOR-CON M) 20 mEq CR tablet, Take 1 tablet (20 mEq total) by mouth daily., Disp: 90 tablet, Rfl: 3    predniSONE (DELTASONE) 1 mg tablet, Take 1 tablet (1 mg total) by mouth daily for 16 days. Patient states taking 7.5 mg daily, Disp: 16 tablet, Rfl: 0    pregabalin (LYRICA) 200 mg capsule, Take 1 capsule (200 mg total) by mouth 3 (three) times a day with meals., Disp: 270 capsule, Rfl: 1    QUEtiapine (SEROQUEL) 25 mg tablet, Take 1 tablet (25 mg total) by mouth nightly., Disp: 30 tablet, Rfl: 0    tamsulosin (FLOMAX) 0.4 mg capsule, , Disp: , Rfl:     TiZANidine (ZANAFLEX) 6 mg capsule, Take 1 capsule (6 mg total) by mouth 3 (three) times a day as needed for muscle spasms., Disp: 90 capsule, Rfl: 0    torsemide (DEMADEX) 10 mg tablet, Take 1 tablet (10 mg total) by mouth daily., Disp: 90 tablet, Rfl: 1    zolpidem (AMBIEN) 10 mg tablet, Take 1 tablet (10 mg total) by mouth nightly., Disp: 30 tablet, Rfl: 0      BP Readings from Last 3 Encounters:   10/31/23 118/70   10/18/23 124/77   10/18/23 124/77       Recent Lab results:  Lab Results   Component Value Date    CHOL 187 10/09/2023   ,   Lab Results   Component Value Date    HDL 47 10/09/2023   ,   Lab Results   Component Value Date    LDLCALC 112 (H) 10/09/2023   ,   Lab Results   Component Value Date    TRIG 159 (H) 10/09/2023        Lab Results   Component Value Date    GLUCOSE 92 10/09/2023   ,   Lab Results   Component Value Date    HGBA1C 5.4 06/26/2023         Lab Results   Component Value Date    CREATININE 0.68 10/09/2023       Lab Results   Component Value Date    TSH  2.500 06/23/2023           Lab Results   Component Value Date    HGBA1C 5.4 06/26/2023

## 2023-12-15 NOTE — PROGRESS NOTES
[facility] in [place] [address]  [facility] [address]  Phone: [phone]  Fax: [phone]       CHIEF COMPLAINT   Chief Complaint   Patient presents with   • Skin Problem     Nicked her L leg that will not heal      HISTORY OF PRESENT ILLNESS      This is a 38 y.o. female who presents for   Chief Complaint   Patient presents with   • Skin Problem     Nicked her L leg that will not heal     Has an area on her L anterior lower leg that is getting worse since getting cut  Surrounding redness has been getting bigger  No fevers or chills    PAST MEDICAL AND SURGICAL HISTORY      Past Medical History:   Diagnosis Date   • Acute bacterial endocarditis 12/10/2020   • Anxiety    • Depressed    • Endocarditis    • Fibromyalgia    • Migraines    • Osteomyelitis (CMS/[facility]) 1/10/2023   • Pancreatitis    • Recurrent major depressive disorder (CMS/[facility]) 1/6/2023   • Substance abuse (CMS/[facility]) 2015   • Tachycardia    • Vasculitis (CMS/[facility])    • Vasculitis (CMS/[facility]) 9/26/2017     Past Surgical History:   Procedure Laterality Date   • CHOLECYSTECTOMY     • ERCP     • GALLBLADDER SURGERY     • TONSILLECTOMY  1991     MEDICATIONS        Current Outpatient Medications:   •  albuterol HFA (VENTOLIN HFA) 90 mcg/actuation inhaler, Inhale 2 puffs every 4 (four) hours as needed., Disp: , Rfl:   •  amoxicillin (AMOXIL) 500 mg capsule, See admin instr. Before dental procedures, Disp: , Rfl:   •  buprenorphine-naloxone (SUBOXONE) 8-2 mg film, Place 2 Film under the tongue every morning. In addition to 1/2 film QPM, Disp: , Rfl:   •  desvenlafaxine succinate (PRISTIQ) 50 mg 24 hr ER tablet, Take 50 mg by mouth daily., Disp: , Rfl:   •  doxycycline hyclate (VIBRA-TABS) 100 mg tablet, Take 1 tablet (100 mg total) by mouth 2 (two) times a day for 7 days., Disp: 14 tablet, Rfl: 0  •  ferrous sulfate 325 mg (65 mg iron) tablet, Take 65 mg by mouth nightly., Disp: , Rfl:   •  fluticasone propion-salmeteroL (ADVAIR  DISKUS) 250-50 mcg/dose diskus inhaler, inhale 1 puff by mouth and INTO THE LUNGS twice a day Rinse mouth after use, Disp: , Rfl:   •  fluticasone propionate (FLONASE) 50 mcg/actuation nasal spray, Administer 1 spray into each nostril daily as needed for rhinitis or allergies., Disp: , Rfl:   •  lisinopriL (PRINIVIL) 5 mg tablet, Take 1 tablet (5 mg total) by mouth daily., Disp: 90 tablet, Rfl: 1  •  magnesium oxide (MAG-OX) 400 mg (241.3 mg magnesium) tablet, Take 1 tablet (400 mg total) by mouth daily., Disp: 90 tablet, Rfl: 1  •  medical marijuana, 1 each See admin instr. Please be advised that an [facility] hospital staff member has listed medical marijuana as one of your home medications for documentation purposes. Please follow-up with your certified issuing provider for ongoing use, Disp: , Rfl:   •  metoprolol succinate XL (TOPROL-XL) 25 mg 24 hr tablet, Take 1 tablet (25 mg total) by mouth nightly., Disp: 90 tablet, Rfl: 3  •  naloxone (NARCAN) 4 mg/actuation spray,non-aerosol nasal spray, , Disp: , Rfl:   •  NURTEC ODT 75 mg tablet,disintegrating, Take 1 tablet (75 mg total) by mouth every other day., Disp: 16 tablet, Rfl: 3  •  onabotulinumtoxinA (BOTOX) 200 unit recon soln injection, Botox for chronic migraine headache every 3 months, Disp: 1 each, Rfl: 4  •  pantoprazole (PROTONIX) 40 mg EC tablet, Take 1 tablet (40 mg total) by mouth nightly., Disp: 90 tablet, Rfl: 0  •  potassium chloride (KLOR-CON M) 20 mEq CR tablet, Take 1 tablet (20 mEq total) by mouth daily., Disp: 90 tablet, Rfl: 3  •  pregabalin (LYRICA) 200 mg capsule, Take 1 capsule (200 mg total) by mouth 3 (three) times a day with meals., Disp: 270 capsule, Rfl: 1  •  QUEtiapine (SEROQUEL) 25 mg tablet, Take 1 tablet (25 mg total) by mouth nightly. (Patient taking differently: Take 0.5 mg by mouth nightly.), Disp: 30 tablet, Rfl: 0  •  tamsulosin (FLOMAX) 0.4 mg capsule, Take 0.4 mg by mouth daily., Disp: , Rfl:   •  TiZANidine (ZANAFLEX) 6 mg  capsule, Take 1 capsule (6 mg total) by mouth 3 (three) times a day as needed for muscle spasms., Disp: 90 capsule, Rfl: 0  •  torsemide (DEMADEX) 10 mg tablet, Take 1 tablet (10 mg total) by mouth daily., Disp: 90 tablet, Rfl: 1  •  zolpidem (AMBIEN) 10 mg tablet, Take 1 tablet (10 mg total) by mouth nightly., Disp: 30 tablet, Rfl: 0  •  VITAMIN D3 125 mcg (5,000 unit) tablet, Take 5,000 Units by mouth., Disp: , Rfl:     ALLERGIES      Tramadol, Amoxicillin, Nsaids (non-steroidal anti-inflammatory drug), and Trazodone    FAMILY HISTORY      Family History   Problem Relation Age of Onset   • Hypertension Biological Mother    • Lung cancer Biological Father 76        smoker   • Lung cancer Maternal Grandmother 69        smoker   • Heart disease Maternal Grandfather    • Heart attack Maternal Grandfather         unsure of age   • Colon cancer Maternal Grandfather        SOCIAL HISTORY      Social History     Socioeconomic History   • Marital status: Single     Spouse name: None   • Number of children: None   • Years of education: None   • Highest education level: None   Occupational History   • Occupation:    Tobacco Use   • Smoking status: Former     Packs/day: 0.50     Years: 5.00     Additional pack years: 0.00     Total pack years: 2.50     Types: Cigarettes     Quit date: 2020     Years since quitting: 3.9   • Smokeless tobacco: Current   • Tobacco comments:     quit 1 year ago, vapes currently   Vaping Use   • Vaping Use: Every day   • Substances: Nicotine, Flavoring   • Devices: Refillable tank   Substance and Sexual Activity   • Alcohol use: Not Currently   • Drug use: Yes     Types: Marijuana, Heroin     Comment: MM now 2023, previous heroin and opiod   • Sexual activity: Not Currently     Partners: Male, Female   Social History Narrative    Lives with mom     Social Determinants of Health     Financial Resource Strain: Low Risk  (6/27/2023)    Overall Financial Resource Strain (CARDIA)    •  "Difficulty of Paying Living Expenses: Not hard at all   Food Insecurity: No Food Insecurity (6/26/2023)    Hunger Vital Sign    • Worried About Running Out of Food in the Last Year: Never true    • Ran Out of Food in the Last Year: Never true   Transportation Needs: No Transportation Needs (6/27/2023)    PRAPARE - Transportation    • Lack of Transportation (Medical): No    • Lack of Transportation (Non-Medical): No   Housing Stability: Low Risk  (6/27/2023)    Housing Stability Vital Sign    • Unable to Pay for Housing in the Last Year: No    • Number of Places Lived in the Last Year: 1    • Unstable Housing in the Last Year: No       REVIEW OF SYSTEMS      Review of Systems   All other systems reviewed and are negative.    PHYSICAL EXAMINATION      Vitals:    12/15/23 1447   BP: 124/78   BP Location: Left upper arm   Patient Position: Sitting   Pulse: 87   Temp: 36.3 °C (97.3 °F)   TempSrc: Temporal   SpO2: 97%   Weight: 117 kg (258 lb)   Height: 1.575 m (5' 2\")     Body mass index is 47.19 kg/m².     BP Readings from Last 3 Encounters:   12/15/23 124/78   10/31/23 118/70   10/18/23 124/77     Wt Readings from Last 3 Encounters:   12/15/23 117 kg (258 lb)   10/31/23 119 kg (262 lb)   10/18/23 118 kg (260 lb)     Ht Readings from Last 3 Encounters:   12/15/23 1.575 m (5' 2\")   10/18/23 1.575 m (5' 2\")   10/18/23 1.575 m (5' 2\")     Physical Exam  Vitals and nursing note reviewed.   Constitutional:       General: She is not in acute distress.     Appearance: Normal appearance.   HENT:      Head: Normocephalic and atraumatic.   Cardiovascular:      Rate and Rhythm: Normal rate.   Pulmonary:      Effort: Pulmonary effort is normal. No respiratory distress.   Skin:     General: Skin is warm and dry.      Findings: Lesion (Lesion on anterior LLE with central break in skin and surrounding erythema, warmth and swelling, TTP) present.   Neurological:      Mental Status: She is alert and oriented to person, place, and time. " Mental status is at baseline.   Psychiatric:         Mood and Affect: Mood normal.         Behavior: Behavior normal.         LABS / IMAGING / STUDIES        Labs    Lab Results   Component Value Date    CREATININE 0.68 10/09/2023     Lab Results   Component Value Date    WBC 8.2 10/09/2023    HGB 11.8 10/09/2023    HCT 35.6 10/09/2023    MCV 86 10/09/2023     10/09/2023     Lab Results   Component Value Date    HGBA1C 5.4 06/26/2023     HEALTH MAINTENANCE              ASSESSMENT AND PLAN   Problem List Items Addressed This Visit    None  Visit Diagnoses     Cellulitis of left lower extremity    -  Primary    Lesion on LLE appears as cellulitis  Tx with doxycycline  RTO PRN    Relevant Medications    doxycycline hyclate (VIBRA-TABS) 100 mg tablet           This note was written with the assistance of voice recognition software, please excuse errors associated with voice recognition software.      Yumiko Lopez, DO  12/17/23

## 2023-12-17 RX ORDER — ZOLPIDEM TARTRATE 10 MG/1
10 TABLET ORAL NIGHTLY
Qty: 30 TABLET | Refills: 0 | Status: SHIPPED | OUTPATIENT
Start: 2023-12-20 | End: 2024-03-12

## 2023-12-17 RX ORDER — PREGABALIN 200 MG/1
200 CAPSULE ORAL
Qty: 90 CAPSULE | Refills: 0 | Status: SHIPPED | OUTPATIENT
Start: 2023-12-23 | End: 2024-01-16 | Stop reason: SDUPTHER

## 2024-01-03 ENCOUNTER — OFFICE VISIT (OUTPATIENT)
Dept: VASCULAR SURGERY | Facility: CLINIC | Age: 39
End: 2024-01-03
Payer: COMMERCIAL

## 2024-01-03 DIAGNOSIS — I87.2 VENOUS INSUFFICIENCY: Primary | ICD-10-CM

## 2024-01-03 PROCEDURE — 99999 PR OFFICE/OUTPT VISIT,PROCEDURE ONLY: CPT | Performed by: SURGERY

## 2024-01-03 PROCEDURE — 36475 ENDOVENOUS RF 1ST VEIN: CPT | Mod: LT | Performed by: SURGERY

## 2024-01-03 RX ORDER — LIDOCAINE HYDROCHLORIDE 10 MG/ML
5 INJECTION, SOLUTION INFILTRATION; PERINEURAL ONCE
Status: SHIPPED | OUTPATIENT
Start: 2024-01-03 | End: 2024-01-04

## 2024-01-03 RX ORDER — LIDOCAINE HYDROCHLORIDE AND EPINEPHRINE 10; 20 UG/ML; MG/ML
20 INJECTION, SOLUTION INFILTRATION; PERINEURAL ONCE
Status: SHIPPED | OUTPATIENT
Start: 2024-01-03 | End: 2024-01-04

## 2024-01-03 RX ORDER — SODIUM BICARBONATE 1 MEQ/ML
10 SYRINGE (ML) INTRAVENOUS ONCE
Status: SHIPPED | OUTPATIENT
Start: 2024-01-03 | End: 2024-01-04

## 2024-01-03 NOTE — PROCEDURES
Radiofrequency Ablation Procedure Note    Date of procedure: 1/3/2024    Treatment Timeout: Patient correctly identified prior to initiation of procedure with laterality documented as left and treatment as left    Procedure location: [facility] [place]    Start Time of Procedure: 14:31h    Finish Time of Procedure: 14:47h    Anesthesia: local    Concentration of Tumescent: 20 ml of 2% Lidocaine/10ml of Sodium Bicarbonate in 500 ml normal saline solution    Amount of Tumescent Administered: 400ml    Local Anesthetic: 5 ml of 1% lidocaine without epinephrine    Treatment Sites: left greater saphenous vein    Length of Vessel Treated: 35cm    Catheter insertion site: left greater saphenous vein near the knee    Amount of Energy deposited and treatment cycles: 6 cycles    Complications: none    Preoperative diagnosis: Symptomatic varicose veins, venous insufficiency left lower extremity    Postoperative diagnosis: Symptomatic varicose veins, venous insufficiency left lower extremity    Surgeon:  Talib Richmond MD    Procedure: left greater saphenous vein radiofrequency ablation    Description of procedure:  The patient was brought into the office, placed supine on the procedure table and the insufficient saphenous vein and tributaries were mapped by ultrasound and diagramed on the overlying skin.  Diameter and depth of the veins to be treated were documented.  The entire lower extremity limb was sterilely prepped.  The RF catheter was placed on the sterile field, flushed and connected by a sterile cable to the power source.    The patient was placed in a reverse Trendelenburg position and local anesthesia was instilled in the skin overlying the access site.  Under real-time ultrasound guidance, the patient's saphenous vein was punctured with a Micro Access needle distally.  A Mandrel wire was advanced through the needle was then exchanged for a 7 [place]ij[place] sheath.  The .035 wire was removed and the sheath was flushed.  The RF  "probe was placed into the vein through the sheath and positioned 2.5 cm distal from the saphenofemoral/saphenopopliteal junction.  This was confirmed with ultrasound in multiple projections.    After the radiofrequency probe position was verified by ultrasound, tumescent anesthesia was administered precisely into the perivenous compartment along the entire length of the vein from the entry site to the deep junction until a \"halo\" of fluid was noted around the vein.    The patient was then placed in the Trendelenburg position.  Moderate external compression was applied over the RF heating element and RF energy was applied to the vein.  The probe was withdrawn sequentially with mild overlap of 7 cm segments treatment segments, and closely monitored during treatments to maintain the probe temperature at 120 degrees centigrade and the generator output well below maximum power.  Once ablation of the vein was performed, a repeat ultrasound was performed to ensure the saphenous vein had undergone successful ablation, and that there was no evidence of deep venous thrombosis.  The catheter and sheath were withdrawn and hemostasis was established with direct pressure.  External compression dressings were applied from the level of the foot to the proximal thigh.  The patient tolerated the procedure well and without complaints of pain.  The patient ambulated without difficulty before leaving the office.    "

## 2024-01-04 ENCOUNTER — TELEPHONE (OUTPATIENT)
Dept: PRIMARY CARE | Facility: CLINIC | Age: 39
End: 2024-01-04

## 2024-01-04 NOTE — TELEPHONE ENCOUNTER
[facility] Appointment Request   Provider: Priscilla  Appointment Type sds  Reason for Visit: boils on upper leg and groin area, had to cancel appt for today  Available Day and Time: tomorrow  Best Contact Number: [phone]    The practice will reach out to schedule your appointment within the next 2 business days.

## 2024-01-05 ENCOUNTER — TELEPHONE (OUTPATIENT)
Dept: ENDOCRINOLOGY | Facility: CLINIC | Age: 39
End: 2024-01-05
Payer: COMMERCIAL

## 2024-01-06 LAB
ACTH PLAS-MCNC: 85.4 PG/ML (ref 7.2–63.3)
CORTIS SERPL-MCNC: 9 UG/DL (ref 6.2–19.4)

## 2024-01-08 ENCOUNTER — TELEMEDICINE (OUTPATIENT)
Dept: BEHAVIORAL/MENTAL HEALTH CLINIC | Facility: CLINIC | Age: 39
End: 2024-01-08
Payer: COMMERCIAL

## 2024-01-08 DIAGNOSIS — F41.1 GENERALIZED ANXIETY DISORDER: Primary | ICD-10-CM

## 2024-01-08 DIAGNOSIS — F33.9 EPISODE OF RECURRENT MAJOR DEPRESSIVE DISORDER, UNSPECIFIED DEPRESSION EPISODE SEVERITY (HCC): ICD-10-CM

## 2024-01-08 PROCEDURE — 90837 PSYTX W PT 60 MINUTES: CPT | Performed by: COUNSELOR

## 2024-01-08 NOTE — PSYCH
Virtual Regular Visit    Verification of patient location:    Patient is located at Home in the following state in which I hold an active license [place]      Assessment/Plan:    Problem List Items Addressed This Visit        Other    Recurrent major depressive disorder (HCC)    Generalized anxiety disorder - Primary       Goals addressed in session: Goal 1          Reason for visit is   Chief Complaint   Patient presents with   • Virtual Regular Visit          Encounter provider GAMALIEL Pennignton    Provider located at [facility]  [facility]  [address] [facility] [place] [id number] [phone]      Recent Visits  No visits were found meeting these conditions.  Showing recent visits within past 7 days and meeting all other requirements  Future Appointments  No visits were found meeting these conditions.  Showing future appointments within next 150 days and meeting all other requirements       The patient was identified by name and date of birth. Samantha Vila was informed that this is a telemedicine visit and that the visit is being conducted throughthe [employer] platform. She agrees to proceed..  My office door was closed. No one else was in the room.  She acknowledged consent and understanding of privacy and security of the video platform. The patient has agreed to participate and understands they can discontinue the visit at any time.    Patient is aware this is a billable service.     Subjective  Samantha Vila is a 38 y.o. female  .      HPI     No past medical history on file.    No past surgical history on file.    No current outpatient medications on file.     No current facility-administered medications for this visit.        Not on File    Review of Systems    Video Exam    There were no vitals filed for this visit.    Physical Exam     Behavioral Health Psychotherapy Progress Note    Psychotherapy Provided: Individual  Psychotherapy     1. Generalized anxiety disorder        2. Episode of recurrent major depressive disorder, unspecified depression episode severity (HCC)            Goals addressed in session: Goal 1     DATA:     -CT reported that she is recovering from vein surgery/procedure.   -CT reported that she is completely off of steroids. CT will see her rheumatologist and learn status and tx plan going  forward.   -CT reported that she is not on temazepam for sleep prescribed by psych. CT will follow up with psych to sort through the correct dose and desried effect.   -CT will be switching to a time release sublicade injection. Hoping this will even out effect and any cravings.(Could improve sleep)  -CT reflected on her fluctuations in mood and patiently waiting for hemostatic level.   -CT talked about her living situation and inability to get out of the house.   -CT reported and reflected on recent interaction with sister and mother.  TH  recommended Tova Molina's materials  regarding dealing/interacting with emotionally immature people for explanation, validation and guides with boundary setting.    -CT asked for assistance with releasing medical records for disability case. TH learned that a payment must be made prior to release. Forwarded 's info for CT to f/u.  During this session, this clinician used the following therapeutic modalities: Supportive Psychotherapy    Substance Abuse was not addressed during this session. If the client is diagnosed with a co-occurring substance use disorder, please indicate any changes in the frequency or amount of use: . Stage of change for addressing substance use diagnoses: No substance use/Not applicable    ASSESSMENT:  Samantha Vila presents with a Euthymic/ normal mood.     her affect is Normal range and intensity, which is congruent, with her mood and the content of the session. The client has made progress on their goals.     Samantha Vila presents with a  "none risk of suicide, none risk of self-harm, and none risk of harm to others.    For any risk assessment that surpasses a \"low\" rating, a safety plan must be developed.    A safety plan was indicated: no  If yes, describe in detail     PLAN: Between sessions, Samantha Vila will review podcast and info from Tova Molina (identifying and setting boundaries with emotionally immature people)address sleep concerns with psych, make transition to sublicade, f/u with [employer].. At the next session, the therapist will use Supportive Psychotherapy to review and process reading noted to better define boundaries.    Behavioral Health Treatment Plan and Discharge Planning: Samantha Vila is aware of and agrees to continue to work on their treatment plan. They have identified and are working toward their discharge goals. yes    Visit start and stop times:    01/08/23  Start Time: 1410  Stop Time: 1516  Total Visit Time: 66 minutes    "

## 2024-01-08 NOTE — TELEPHONE ENCOUNTER
----- Message from Marianne Kaur MD sent at 1/8/2024  8:17 AM EST -----  Please check if cortisol is in process as ordered and if not please add on, thanks  ----- Message -----  [facility]  Sent: 1/6/2024   2:40 PM EST  To: Marianne Kaur MD

## 2024-01-08 NOTE — TELEPHONE ENCOUNTER
----- Message from Marianne Kaur MD sent at 1/8/2024  8:17 AM EST -----  Please check if cortisol is in process as ordered and if not please add on, thanks  ----- Message -----  [facility]  Sent: 1/6/2024   2:40 PM EST  To: Marianne Kaur MD

## 2024-01-08 NOTE — TELEPHONE ENCOUNTER
Spoke with Gregory at [facility] Cortisol level was added on for this pt. Provider made aware.

## 2024-01-09 ENCOUNTER — HOSPITAL ENCOUNTER (OUTPATIENT)
Dept: CARDIOLOGY | Facility: HOSPITAL | Age: 39
Discharge: HOME | End: 2024-01-09
Attending: PHYSICIAN ASSISTANT
Payer: COMMERCIAL

## 2024-01-09 PROCEDURE — 93971 EXTREMITY STUDY: CPT | Mod: TC,LT

## 2024-01-10 ENCOUNTER — OFFICE VISIT (OUTPATIENT)
Dept: ENDOCRINOLOGY | Facility: CLINIC | Age: 39
End: 2024-01-10
Payer: COMMERCIAL

## 2024-01-10 VITALS
HEIGHT: 62 IN | BODY MASS INDEX: 47.37 KG/M2 | HEART RATE: 102 BPM | OXYGEN SATURATION: 97 % | DIASTOLIC BLOOD PRESSURE: 78 MMHG | SYSTOLIC BLOOD PRESSURE: 118 MMHG | WEIGHT: 257.4 LBS

## 2024-01-10 DIAGNOSIS — N91.1 SECONDARY AMENORRHEA: ICD-10-CM

## 2024-01-10 DIAGNOSIS — E66.813 CLASS 3 OBESITY WITH ALVEOLAR HYPOVENTILATION, SERIOUS COMORBIDITY, AND BODY MASS INDEX (BMI) OF 45.0 TO 49.9 IN ADULT (CMS/HCC): ICD-10-CM

## 2024-01-10 DIAGNOSIS — E78.2 MIXED HYPERLIPIDEMIA: ICD-10-CM

## 2024-01-10 DIAGNOSIS — E66.2 CLASS 3 OBESITY WITH ALVEOLAR HYPOVENTILATION, SERIOUS COMORBIDITY, AND BODY MASS INDEX (BMI) OF 45.0 TO 49.9 IN ADULT (CMS/HCC): ICD-10-CM

## 2024-01-10 DIAGNOSIS — E24.2: Primary | ICD-10-CM

## 2024-01-10 PROCEDURE — 99215 OFFICE O/P EST HI 40 MIN: CPT | Performed by: INTERNAL MEDICINE

## 2024-01-10 PROCEDURE — 3078F DIAST BP <80 MM HG: CPT | Performed by: INTERNAL MEDICINE

## 2024-01-10 PROCEDURE — 3074F SYST BP LT 130 MM HG: CPT | Performed by: INTERNAL MEDICINE

## 2024-01-10 PROCEDURE — 3008F BODY MASS INDEX DOCD: CPT | Performed by: INTERNAL MEDICINE

## 2024-01-10 RX ORDER — SEMAGLUTIDE 0.5 MG/.5ML
0.5 INJECTION, SOLUTION SUBCUTANEOUS
Qty: 2 ML | Refills: 0 | Status: SHIPPED | OUTPATIENT
Start: 2024-01-10 | End: 2024-02-01

## 2024-01-10 RX ORDER — TEMAZEPAM 15 MG/1
30 CAPSULE ORAL NIGHTLY
COMMUNITY
Start: 2023-12-21 | End: 2024-03-12

## 2024-01-10 RX ORDER — SEMAGLUTIDE 1 MG/.5ML
1 INJECTION, SOLUTION SUBCUTANEOUS
Qty: 6 ML | Refills: 1 | Status: SHIPPED | OUTPATIENT
Start: 2024-01-10 | End: 2024-03-28

## 2024-01-10 NOTE — PATIENT INSTRUCTIONS
Start Wegovy. This is a once a week injection which is not insulin. Start with 0.25 mg once a week for 4 weeks, then increase to 0.5 mg once a week for 4 weeks, then increase to 1 mg weekly. Call the office if you have any nausea or vomiting after you start this medication

## 2024-01-10 NOTE — PROGRESS NOTES
38 y.o. yo female with PMH of IVDA, cholecystitis, pancreatitis 2/2 ERCP, fibromyalgia, endocarditis, vasculitis, ARIANNA, class 3 obesity, spinal stenosis, depression, pHTN, HTN, venous insufficiency presenting for follow up of iatrogenic Cushing's    Initial history (7/10/23):  - Former pt of Dr. Cerna at [facility], seen 4/2019 in context of chronic steroid use with goal of discontinuation. Noted to have developed endocarditis in 2017 2/2 urosepsis with subsequent development of ? Vasculitis 4/2017 at [facility], at which time she was started on high dose steroids. Had been following with rheumatology Dr. Harris who started tapering the prednisone in winter of 2017. At that time she was on prednisone 10 mg daily. Recommended AM cortisol followed by ACTH stim test if equivocal. Recommended tapering steroids by 1 mg per week. Unable to find if pt did labs for Dr. Cerna at that time.  - Saw PCP Yumiko Lopez DO 6/13/23 to establish care. Noted to be on prednisone 5 mg daily at that time. Noted to have no menstrual cycle for > 1 year even after stopping OCP. Referred to endocrinology for AI. Post visit labs found low midday cortisol of 0.8 and pt was sent to ED  - Pt admitted to [facility] 6/26-6/30/23 for possible adrenal insufficiency, seen by Dr. Flores. Had a 4 pm cortisol of 1.7, then AM cortisol of 8.1. Noted to be on a steroids taper from prednisone 50 mg daily and was on 5 mg daily at the time of admission. Per consult note pt experiences swelling when tapering steroids. Dr. Flores felt she did not have AI. Per discharge summary had been tapered from 15 mg prednisone to 5 mg over 4 weeks. She was discharged on 7.5 mg daily    First visit with me 7/10/23  Last visit 7/10/23. Planned for slow prednisone taper    Interval history summarized as per review of records:  - Saw PCP Priscilla RODRIGUES 7/10 for post discharge visit. LLE and hip x rays ordered. Started lisinopril   - Saw neurology Jesus  Leia MAGALLON 7/19 in follow up. Planned to switch to dysport  - Went to ED 8/10 for chest pain which had resolved by time of presentation to ED. Discharged home.   - Saw pulmonary Dr. Ferrari 8/16 in follow up. Treatment plan continued   - Saw vascular Dr. Richmond 8/23 for BLE edema. US ordered. Recommended compression stockings  - Saw cardiology Dr. Middleton 8/28 in follow up. Treatment plan continued   - Saw PCP Priscilla RODRIGUES 9/14 for physical. Treatment plan continued   - Saw neurology Jesus MAGALLON 9/27 in follow up. Trigger point injections given  - Saw pulmonary Dr. Ferrari 9/29 in follow up. Treatment plan continued   - Saw PCP Priscilla RODRIGUES 10/10 in follow up. Flu shot given  - Saw neurology Jesus MAGALLON 10/11 in follow up. Trigger point injections given  - Saw neurology Dr. Nguyen 10/17 in follow up. Treatment plan continued   - Saw vascular Dr. Richmond 10/18 in follow up. Recommended BL greater saphenous vein ablation and R anterior accessory vein ablation  - Saw neurology Jesus MAGALLON 10/31 in follow up. Got botox injection  - Saw pulmonary Dr. Ferrari 11/22 in follow up. TTE ordered  - Saw PCP Yumiko Lopez DO 12/15 for LLE cellulitis. Rx doxycycline  - Saw vascular Dr. Richmond 1/3 for L greater saphenous vein radiofrequency ablation    Pt's mother was present for the entire visit and helped provide elements of the HPI     Steroid history at initial visit (7/10/23): Pt states she had vasculitis in 2017 in context of drug use. Has been sober for few years now. At that time had active vasculitis which came out as big bumps on arm, they thought she had MRSA. Did biopsy which confirmed vasculitis. At the same time had endocarditis. Started prednisone at that time for about a year, started in hospital with IV steroids, then discharged on prednisone 60 mg daily. Was seeing rheumatologist at that time who was trying to taper the steroids and start an immunotherapy. Started  "tapering gradually but then insurance changed so stopped seeing her and so she tapered the steroids on her own. With rheumatologist she tapered down to prednisone 40 mg daily and then started an immunosuppressant (can't remember the name). In spring of 2019 stopped the prednisone altogether on her own. Then 10/2022 started getting a rash on L foot. Was living in [place] at that time and stopped caring for herself. Went to a [facility] clinic and the NP suspected it was vasculitis and restarted prednisone 50 mg daily x5 days, then taper by 10 mg q5 days. When she restarted the steroids her mind was clear, vision was clear, hearing was better, pain and inflammation was better. When she got down to 30 mg she started seeing rheumatology Dr. Bradley at [facility] who tried to taper the steroids but \"kept hitting walls\". Felt like she couldn't go below 30 mg, then got down to 25 mg, then down to 20 mg. Thinks she got stuck on 30 mg daily for 6 mo. When she got admitted to the hospital 2 weeks ago she had been on 5 mg for ~1.5 weeks, down from 7.5 mg for ~1-2 weeks. Felt like this taper was too quick. States when she got down to 5 mg daily she started to \"blow up\". Currently on prednisone 7.5 mg daily since hospital discharge on 6/30/23 (10 days ago). Feels ok on the current dose, feels the same as she did on 5 mg dose    Last steroid dose: 12/13/23    States she feels better without the prednisone. Notes facial swelling has come down. Mother notes she is retaining fluid s/p vein ablation. Has not taken diuretic in a while.     States in the mornings she always has a smoothie. If doesn't have that within an hour of waking up she gets nauseated and has to take a deep breath. Sometimes vomits but not always. Happened with each prednisone taper dose and still since stopping  Postural dizziness: resolved    Finds she is urinating more easily now. Sees urology and takes flomax.     Sleep improved for a while but then went \"wonky " "again\". Has ARIANNA and some night has 2 events/hour but other nights has 27 events/hour    Fatigue is improving every day    Has some moments of deep depression but improving    Eating better than she used    Flushing: denies  Diaphoresis: only if anxiety  Easy bruising: denies  Purple striae: denies new striae  Weight gain: lost 8 lb since last visit  Wt Readings from Last 3 Encounters:   01/10/24 117 kg (257 lb 6.4 oz)   12/15/23 117 kg (258 lb)   10/31/23 119 kg (262 lb)   Skin thinning: yes. When went off steroids was shaving her legs and cut herself and it turned into giant rash which was itchy. Also had boils on upper thigh. Saw PCP who prescribed an antibiotic and everything resolved.   Menstrual history: started OCP so long ago that can't remember if cycles were regular prior. When on OCP would skip cycles occasionally. Stopped OCP summer 2022 and has not had cycle since    States cardiologist recommended she take GLP for weight management     Has cut out snacking. Does meditation, yoga, pilates as much as she can. Was previously doing HIIT exercises but stopped.     Has h/o pancreatitis in 2012 as a complication of an ERCP  No family history of thyroid cancer    Lives with mother    ROS: Complete ROS is otherwise negative except as mentioned in the HPI above  -------------------------------------------------------------------------  Past Medical History:   Diagnosis Date   • Acute bacterial endocarditis 12/10/2020   • Anxiety    • Depressed    • Endocarditis    • Fibromyalgia    • Migraines    • Osteomyelitis (CMS/HCC) 1/10/2023   • Pancreatitis    • Recurrent major depressive disorder (CMS/HCC) 1/6/2023   • Substance abuse (CMS/[facility]) 2015   • Tachycardia    • Vasculitis (CMS/[facility])    • Vasculitis (CMS/[facility]) 9/26/2017       Past Surgical History:   Procedure Laterality Date   • CHOLECYSTECTOMY     • ERCP     • GALLBLADDER SURGERY     • TONSILLECTOMY  1991       Family History   Problem Relation Age of Onset   • " Hypertension Biological Mother    • Lung cancer Biological Father 76        smoker   • Lung cancer Maternal Grandmother 69        smoker   • Heart disease Maternal Grandfather    • Heart attack Maternal Grandfather         unsure of age   • Colon cancer Maternal Grandfather        Social History     Socioeconomic History   • Marital status: Single     Spouse name: None   • Number of children: None   • Years of education: None   • Highest education level: None   Occupational History   • Occupation:    Tobacco Use   • Smoking status: Former     Packs/day: 0.50     Years: 5.00     Additional pack years: 0.00     Total pack years: 2.50     Types: Cigarettes     Quit date:      Years since quittin.0   • Smokeless tobacco: Current   • Tobacco comments:     quit 1 year ago, vapes currently   Vaping Use   • Vaping Use: Every day   • Substances: Nicotine, Flavoring   • Devices: Refillable tank   Substance and Sexual Activity   • Alcohol use: Not Currently   • Drug use: Yes     Types: Marijuana, Heroin     Comment: MM now , previous heroin and opiod   • Sexual activity: Not Currently     Partners: Male, Female   Social History Narrative    Lives with mom     Social Determinants of Health     Financial Resource Strain: Low Risk  (2023)    Overall Financial Resource Strain (CARDIA)    • Difficulty of Paying Living Expenses: Not hard at all   Food Insecurity: No Food Insecurity (2023)    Hunger Vital Sign    • Worried About Running Out of Food in the Last Year: Never true    • Ran Out of Food in the Last Year: Never true   Transportation Needs: No Transportation Needs (2023)    PRAPARE - Transportation    • Lack of Transportation (Medical): No    • Lack of Transportation (Non-Medical): No   Housing Stability: Low Risk  (2023)    Housing Stability Vital Sign    • Unable to Pay for Housing in the Last Year: No    • Number of Places Lived in the Last Year: 1    • Unstable Housing in  the Last Year: No         Current Outpatient Medications:   •  albuterol HFA (VENTOLIN HFA) 90 mcg/actuation inhaler, Inhale 2 puffs every 4 (four) hours as needed., Disp: , Rfl:   •  amoxicillin (AMOXIL) 500 mg capsule, See admin instr. Before dental procedures, Disp: , Rfl:   •  buprenorphine-naloxone (SUBOXONE) 8-2 mg film, Place 2 Film under the tongue every morning. In addition to 1/2 film QPM, Disp: , Rfl:   •  desvenlafaxine succinate (PRISTIQ) 50 mg 24 hr ER tablet, Take 50 mg by mouth daily., Disp: , Rfl:   •  ferrous sulfate 325 mg (65 mg iron) tablet, Take 65 mg by mouth nightly., Disp: , Rfl:   •  fluticasone propion-salmeteroL (ADVAIR DISKUS) 250-50 mcg/dose diskus inhaler, inhale 1 puff by mouth and INTO THE LUNGS twice a day Rinse mouth after use, Disp: , Rfl:   •  fluticasone propionate (FLONASE) 50 mcg/actuation nasal spray, Administer 1 spray into each nostril daily as needed for rhinitis or allergies., Disp: , Rfl:   •  lisinopriL (PRINIVIL) 5 mg tablet, Take 1 tablet (5 mg total) by mouth daily., Disp: 90 tablet, Rfl: 1  •  magnesium oxide (MAG-OX) 400 mg (241.3 mg magnesium) tablet, Take 1 tablet (400 mg total) by mouth daily., Disp: 90 tablet, Rfl: 1  •  medical marijuana, 1 each See admin instr. Please be advised that an [facility] hospital staff member has listed medical marijuana as one of your home medications for documentation purposes. Please follow-up with your certified issuing provider for ongoing use, Disp: , Rfl:   •  metoprolol succinate XL (TOPROL-XL) 25 mg 24 hr tablet, Take 1 tablet (25 mg total) by mouth nightly., Disp: 90 tablet, Rfl: 3  •  naloxone (NARCAN) 4 mg/actuation spray,non-aerosol nasal spray, , Disp: , Rfl:   •  NURTEC ODT 75 mg tablet,disintegrating, Take 1 tablet (75 mg total) by mouth every other day., Disp: 16 tablet, Rfl: 3  •  onabotulinumtoxinA (BOTOX) 200 unit recon soln injection, Botox for chronic migraine headache every 3 months, Disp: 1 each, Rfl: 4  •   "pantoprazole (PROTONIX) 40 mg EC tablet, Take 1 tablet (40 mg total) by mouth nightly., Disp: 90 tablet, Rfl: 0  •  potassium chloride (KLOR-CON M) 20 mEq CR tablet, Take 1 tablet (20 mEq total) by mouth daily., Disp: 90 tablet, Rfl: 3  •  pregabalin (LYRICA) 200 mg capsule, Take 1 capsule (200 mg total) by mouth 3 (three) times a day with meals., Disp: 90 capsule, Rfl: 0  •  QUEtiapine (SEROQUEL) 25 mg tablet, Take 1 tablet (25 mg total) by mouth nightly. (Patient taking differently: Take 0.5 mg by mouth nightly.), Disp: 30 tablet, Rfl: 0  •  tamsulosin (FLOMAX) 0.4 mg capsule, Take 0.4 mg by mouth daily., Disp: , Rfl:   •  temazepam (RESTORIL) 15 mg capsule, Take 15 mg by mouth nightly., Disp: , Rfl:   •  TiZANidine (ZANAFLEX) 6 mg capsule, Take 1 capsule (6 mg total) by mouth 3 (three) times a day as needed for muscle spasms., Disp: 90 capsule, Rfl: 0  •  torsemide (DEMADEX) 10 mg tablet, Take 1 tablet (10 mg total) by mouth daily., Disp: 90 tablet, Rfl: 1  •  VITAMIN D3 125 mcg (5,000 unit) tablet, Take 5,000 Units by mouth., Disp: , Rfl:   •  zolpidem (AMBIEN) 10 mg tablet, Take 1 tablet (10 mg total) by mouth nightly., Disp: 30 tablet, Rfl: 0    Allergies   Allergen Reactions   • Tramadol Palpitations   • Amoxicillin GI intolerance   • Nsaids (Non-Steroidal Anti-Inflammatory Drug) Nausea Only   • Trazodone Other (see comments)     Headaches     -------------------------------------------------------------------------  PHYSICAL EXAM  Visit Vitals  /78 (BP Location: Right upper arm, Patient Position: Sitting)   Pulse (!) 102   Ht 1.575 m (5' 2\")   Wt 117 kg (257 lb 6.4 oz)   SpO2 97%   BMI 47.08 kg/m²     Gen: well nourished, no acute distress, + round facies (improved from prior exam 7/2023)  Eyes: no proptosis, normal conjunctiva  Neck: no thyromegaly  CV: regular rate   Pulm: no use of accessory muscles, on room air  Abd: obese disproportionate to limbs, non distended  Neuro: AAOx3  MSK: steady gait, no " tremor of outstretched hands  Psych: normal mood, affect    LABS REVIEWED  Cortisol   Date/Time Value Ref Range Status   01/05/2024 1001 9.0 6.2 - 19.4 ug/dL Final     Comment:     Please Note: The reference interval and flagging for   this test is for an AM collection. If this is a PM   collection please use:         Cortisol PM: 2.3-11.9     06/29/2023 0738 0.5 ug/dL Final     Comment:     REFERENCE RANGE  AM: (08:00 +/- 1 hour) 6.7-22.6 ug/dL  PM: (16:00 +/- 1 hour)  <10.0 ug/dL   06/28/2023 0633 0.5 ug/dL Final     Comment:     REFERENCE RANGE  AM: (08:00 +/- 1 hour) 6.7-22.6 ug/dL  PM: (16:00 +/- 1 hour)  <10.0 ug/dL   06/27/2023 0353 8.1 ug/dL Final     Comment:     REFERENCE RANGE  AM: (08:00 +/- 1 hour) 6.7-22.6 ug/dL  PM: (16:00 +/- 1 hour)  <10.0 ug/dL   06/26/2023 1820 1.7 ug/dL Final     Comment:     REFERENCE RANGE  AM: (08:00 +/- 1 hour) 6.7-22.6 ug/dL  PM: (16:00 +/- 1 hour)  <10.0 ug/dL     Cortisol - AM   Date/Time Value Ref Range Status   07/06/2023 1127 0.5 (L) 6.2 - 19.4 ug/dL Final   06/23/2023 1141 0.8 (L) 6.2 - 19.4 ug/dL Final     ACTH, Plasma   Date/Time Value Ref Range Status   01/05/2024 1001 85.4 (H) 7.2 - 63.3 pg/mL Final     Comment:     ACTH reference interval for samples collected between 7 and 10 AM.   06/26/2023 1953 <5 (L) 6 - 50 pg/mL Final     Comment:     Reference range applies only to specimens collected  between 7am-10am.             Hemoglobin A1C   Date Value Ref Range Status   06/26/2023 5.4 <5.7 % Final      Component      Latest Ref Rng 6/28/2023 06:33     Glucose      70 - 99 mg/dL 103 (H)    C-Peptide      0.70 - 4.10 ng/mL 4.60 (H)       TSH   Date Value Ref Range Status   06/23/2023 2.500 0.450 - 4.500 uIU/mL Final   06/12/2023 2.490 0.450 - 4.500 uIU/mL Final   07/26/2021 3.300 0.450 - 4.500 uIU/mL Final     T4,Free(Direct)   Date Value Ref Range Status   06/23/2023 1.27 0.82 - 1.77 ng/dL Final   06/12/2023 1.43 0.82 - 1.77 ng/dL Final   07/26/2021 0.96 0.82 - 1.77  ng/dL Final          IMAGING REVIEWED  CT ABDOMEN PELVIS WITH IV CONTRAST 1/13/22    Narrative  CLINICAL HISTORY: Right-sided back pain, right upper quadrant abdominal pain, flank pain, clinical concern for pyelonephritis.  COVID-19 positive.  High probability of pulmonary emboli.    COMMENT:    Comparison: CT of the chest dated 12/9/2020.  Right upper quadrant ultrasound dated 12/17/2020.    TECHNIQUE: CT angiography of the chest was performed as per departmental pulmonary embolism protocol, with axial images acquired following the intravenous administration of 100 cc Omnipaque-350.  Portal venous phase imaging of the abdomen and pelvis was then performed.  Delayed phase imaging of the abdomen was performed through the level of the kidneys.  Oral contrast was not administered.  Sagittal and coronal reconstructed images were rendered.  3-D MIP and/or volume-rendered image reconstruction of the chest was performed and reviewed.    CT DOSE:  One or more dose reduction techniques (e.g. automated exposure control, adjustment of the mA and/or kV according to patient size, use of iterative reconstruction technique) utilized for this examination.    CHEST:  LUNGS and AIRWAYS: There are faint patchy groundglass alveolar opacities in both upper lobes and the lingula which lack a specific distribution.  These are nonspecific though most suggestive of an inflammatory or infectious pneumonitis. There is no consolidation or mass.  The trachea and central main airways are patent and normal in caliber.  PLEURA: Unremarkable. No pleural effusions.  VESSELS: No central, lobar, or proximal segmental pulmonary arterial filling defects to suggest pulmonary emboli.  The thoracic aorta and main pulmonary artery are normal in caliber.  HEART: Heart size is normal.  No pericardial effusion.  MEDIASTINUM and PAULINA: Mildly enlarged prevascular lymph node to the left of the aortic arch which measures 1.1 cm in short axis (previously 1.7 cm).   Mildly enlarged subcarinal lymph node measuring 1 cm (previously 1.2 cm).  These lymph nodes are nonspecific, possibly reactive.  No pathologically enlarged hilar lymph nodes.  CHEST WALL and LOWER NECK: Unremarkable.  No pathologically enlarged axillary  lymph nodes.    ABDOMEN:  LIVER: Top normal in size.  Otherwise unremarkable.  GALLBLADDER: Cholecystectomy.  BILE DUCTS: No intrahepatic or extrahepatic biliary ductal dilatation.  PANCREAS: Unremarkable.  SPLEEN: Unremarkable.  ADRENALS: Unremarkable.  KIDNEYS: There is normal and symmetric renal enhancement and excretion.  There are no findings typical of pyelonephritis.  There is no hydronephrosis.  There is no perinephric fat stranding or perinephric collection.  URETERS: Nondilated.  BOWEL: The stomach is nondilated.  The small and large bowel are normal in caliber.  A collapsed appendix is identified.  PERITONEUM: No ascites or pneumoperitoneum. No fluid collection.  RETROPERITONEUM: Unremarkable.  LYMPH NODES: None pathologically enlarged.  VESSELS: Unremarkable. Normal caliber abdominal aorta.  UPPER ABDOMINAL WALL SOFT TISSUES: Unremarkable.    PELVIS:  BLADDER: Essentially completely collapsed and therefore cannot be adequately assessed.  REPRODUCTIVE ORGANS: No pelvic masses.  PERITONEUM: No free fluid. No fluid collection.  RETROPERITONEUM: Unremarkable.  VESSELS: Unremarkable.  LYMPH NODES: None pathologically enlarged.  LOWER ABDOMINAL WALL SOFT TISSUES: Unremarkable.    BONES: Multilevel spondylosis and tiny multilevel Schmorl's nodes in the thoracic and lumbar spine.  Vacuum disc phenomenon is noted at several lower thoracic and lower lumbar levels.  Tiny sclerotic foci in the bony pelvis are nonspecific, most likely incidental benign bone islands.  No destructive osseous lesions.    --    Impression  1.  No evidence of pulmonary emboli as clinically questioned.  2.  Faint patchy groundglass alveolar opacities in both upper lobes and the lingula,  nonspecific.  An infectious or inflammatory pneumonitis might have this appearance.  No pulmonary consolidation.  3.  Mild mediastinal lymphadenopathy persists though has improved since 12/9/2020.  This is nonspecific, possibly reactive.  4.  No acute abnormality in the abdomen or pelvis.  No specific evidence of an inflammatory or obstructive process.  No evidence of pyelonephritis as clinically questioned.      ASSESSMENT AND PLAN:     1. Iatrogenic Cushing's  - Was on and off high dose steroids due to vasculitis since 2017. Was off altogether from 2019 until 10/2022 at which time prednisone was restarted by urgent care due to rash on L foot concerning for recurrent vasculitis. Was unable to taper down past prednisone 30 mg daily for ~6 mo, then started tapering few months prior to initial visit 7/023  - Had tapered down to 5 mg daily for ~1.5 weeks then got admitted to [facility] 6/2023 with concern for AI per PCP due to cortisol level of 0.8 around noon as well as worsening swelling on lower dose of prednisone. Was discharged from hospital on prednisone 7.5 mg daily  - At initial visit 7/2023 initiated slow taper of prednisone and she is now off it, last dose 12/13/23. Symptomatically improved since stopping prednisone  - 10 AM cortisol 9.0 3 weeks after last dose of prednisone. ACTH elevated however suspect this is a lab error as her prior exogenous steroid use would have suppressed ACTH (as it did previously)  - Continue off prednisone for now  - Check AM cortisol and ACTH prior to next visit for monitoring    2. Class 3 obesity Body mass index is 47.08 kg/m².  - Pt expressed interest in GLP1 therapy today and states her cardiologist recommended it  - She is losing some weight off steroids but given her BMI and comorbid cardiac issues and hyperlipidemia agree that she will benefit from GLP  - A1C normal 6/2023  - Start wegovy 0.25 mg weekly x4 weeks, then increase to 0.5 mg weekly x4 weeks, then increase to 1.0 mg  weekly. Counseled on risk of side effects including N/V, pancreatitis, and MTC   - Pt declined referral to weight management clinic  - Check A1C, FBG prior to next visit for monitoring    3. Pure hypercholesterolemia  - Recommend GLP for weight management as above  - Defer potential statin therapy to PCP    4. Secondary amenorrhea  - Previously on OCP for prolonged duration and pt could not previously remember if her cycles were regular prior to OCP initiation  - Stopped OCP summer 2022 and has not had cycle since that time  - Will monitor for now as pt only off steroids for ~1 mo. Will see if periods resume with longer duration off prednisone  - If pt still has had no cycle at time of next visit will evaluate further with testosterone, DHEAS, PRL, 17OHP, E2, LH, FSH, hCG, TSH      RTC 6 mo  Total time spent on the day of the visit, including record review, face to face time, and documentation: 51 min

## 2024-01-11 NOTE — TELEPHONE ENCOUNTER
----- Message from Carolyn Redmond sent at 1/11/2024 10:17 AM EST -----  Regarding: Wegovy   Contact: [phone]  I already talked to Dr Kaur about needing a prior authorization for the wegovy but I will reach out to my insurance and see if there is any other meds they will cover (highly doubtful). I have other health issues that may help convince the insurance to cover it. I will call them today and get back to you with what they say.

## 2024-01-11 NOTE — TELEPHONE ENCOUNTER
Spoke with pt and explained backorder issue, she understood she is going to check around and see where she can get if at all or if she wants to switch to saxenda. Explained it is a daily med and she was ok also with that as well. Pt is going to give the office a call or mychart after to gathers more information.

## 2024-01-16 ENCOUNTER — OFFICE VISIT (OUTPATIENT)
Dept: PRIMARY CARE | Facility: CLINIC | Age: 39
End: 2024-01-16
Payer: COMMERCIAL

## 2024-01-16 ENCOUNTER — TELEPHONE (OUTPATIENT)
Dept: ENDOCRINOLOGY | Facility: CLINIC | Age: 39
End: 2024-01-16
Payer: COMMERCIAL

## 2024-01-16 VITALS
SYSTOLIC BLOOD PRESSURE: 118 MMHG | HEIGHT: 62 IN | WEIGHT: 253 LBS | HEART RATE: 74 BPM | DIASTOLIC BLOOD PRESSURE: 82 MMHG | OXYGEN SATURATION: 91 % | TEMPERATURE: 97.3 F | BODY MASS INDEX: 46.56 KG/M2

## 2024-01-16 DIAGNOSIS — J01.00 ACUTE NON-RECURRENT MAXILLARY SINUSITIS: Primary | ICD-10-CM

## 2024-01-16 DIAGNOSIS — J45.30 MILD PERSISTENT ASTHMA, UNCOMPLICATED: ICD-10-CM

## 2024-01-16 DIAGNOSIS — M48.061 SPINAL STENOSIS OF LUMBAR REGION, UNSPECIFIED WHETHER NEUROGENIC CLAUDICATION PRESENT: ICD-10-CM

## 2024-01-16 DIAGNOSIS — I27.20 PULMONARY HYPERTENSION (CMS/HCC): ICD-10-CM

## 2024-01-16 DIAGNOSIS — R60.0 PERIPHERAL EDEMA: Primary | ICD-10-CM

## 2024-01-16 PROBLEM — Z00.00 PHYSICAL EXAM, ANNUAL: Status: RESOLVED | Noted: 2023-09-14 | Resolved: 2024-01-16

## 2024-01-16 PROBLEM — E66.9 OBESITY: Status: RESOLVED | Noted: 2022-12-13 | Resolved: 2024-01-16

## 2024-01-16 PROBLEM — B35.9 RINGWORM: Status: RESOLVED | Noted: 2023-05-25 | Resolved: 2024-01-16

## 2024-01-16 PROBLEM — Z23 NEEDS FLU SHOT: Status: RESOLVED | Noted: 2023-10-10 | Resolved: 2024-01-16

## 2024-01-16 PROBLEM — R63.5 WEIGHT GAIN: Status: RESOLVED | Noted: 2023-06-26 | Resolved: 2024-01-16

## 2024-01-16 PROBLEM — R78.81 STAPHYLOCOCCUS AUREUS BACTEREMIA: Status: RESOLVED | Noted: 2020-12-09 | Resolved: 2024-01-16

## 2024-01-16 PROBLEM — B95.61 STAPHYLOCOCCUS AUREUS BACTEREMIA: Status: RESOLVED | Noted: 2020-12-09 | Resolved: 2024-01-16

## 2024-01-16 PROCEDURE — 99214 OFFICE O/P EST MOD 30 MIN: CPT | Performed by: FAMILY MEDICINE

## 2024-01-16 PROCEDURE — 3079F DIAST BP 80-89 MM HG: CPT | Performed by: FAMILY MEDICINE

## 2024-01-16 PROCEDURE — 3074F SYST BP LT 130 MM HG: CPT | Performed by: FAMILY MEDICINE

## 2024-01-16 PROCEDURE — 3008F BODY MASS INDEX DOCD: CPT | Performed by: FAMILY MEDICINE

## 2024-01-16 RX ORDER — PREGABALIN 200 MG/1
200 CAPSULE ORAL
Qty: 90 CAPSULE | Refills: 2 | Status: SHIPPED | OUTPATIENT
Start: 2024-01-16 | End: 2024-04-06 | Stop reason: SDUPTHER

## 2024-01-16 RX ORDER — FLUTICASONE PROPIONATE 50 MCG
1 SPRAY, SUSPENSION (ML) NASAL DAILY PRN
Qty: 16 G | Refills: 1 | Status: SHIPPED | OUTPATIENT
Start: 2024-01-16 | End: 2024-04-11 | Stop reason: SDUPTHER

## 2024-01-16 RX ORDER — AMOXICILLIN AND CLAVULANATE POTASSIUM 875; 125 MG/1; MG/1
1 TABLET, FILM COATED ORAL 2 TIMES DAILY
Qty: 14 TABLET | Refills: 0 | Status: SHIPPED | OUTPATIENT
Start: 2024-01-16 | End: 2024-01-23

## 2024-01-16 NOTE — TELEPHONE ENCOUNTER
----- Message from Marianne Kaur MD sent at 1/15/2024  9:32 AM EST -----  Regarding: FW: Kimmy   Contact: [phone]  Pt can  second sample of ozempic 0.5 so she can take 4 weeks of 0.25 and 4 weeks of 0.5 and she can try to fill the wegovy 1 mg at the pharmacy. Can you please help with the PA for wegovy 1 mg?     ----- Message -----  From: Carolyn Redmond  Sent: 1/12/2024   2:31 PM EST  To: [facility]  Subject: Wegovy                                           Ayaz Astudillo, I have 2 updates for you.    1. I spoke with my insurance and they said that both Wegovy and Saxenda are on their formulary but both require prior authorizations. They do not require a speciality pharmacy; therefore, I can pick it up at any local pharmacy. They also said they can cover a 34 day supply so the dosing wouldn’t be a problem. They also mentioned that a separate prior authorization is needed for each individual dose (one for .5mg and one for 1mg and so on).    2. [facility] said they actually have the 1mg Wegovy in stock but the .5 is something they are having trouble getting. They were told they will be getting it in soon, but they are still waiting. He said the same thing goes for Saxenda since everyone is switching to that brand because Wegovy is [employer]SCAR.    I am going to call around to other pharmacies like [facility] or [facility] to see if they can get the lower dose we need. I was thinking if we can get the prior authorization approved and if your office would be willing to give me one additional sample (it was Ozempic), then I think I can get to the 1mg dose that is in stock. I’m not even sure if that is possible but I figured I’d throw the idea out there. Feel free to call me if needed. [phone]. As always, thank you so much for your help and have a great weekend!

## 2024-01-16 NOTE — ASSESSMENT & PLAN NOTE
Last TTE in 6/2023 with preserved ejection fraction and RVSP of 39 mmHg  Taking torsemide 10 mg, lisinopril 5 mg, metoprolol succinate 25 mg

## 2024-01-16 NOTE — TELEPHONE ENCOUNTER
LM for pt regarding / Angie information. Made pt aware sending the wegovy 1 mg PA and to come to office to  the ozempic sample per Dr Kaur.     Pa for cris sent

## 2024-01-16 NOTE — PROGRESS NOTES
[facility] [address]  [facility] [address]  Phone: [phone]  Fax: [phone]       CHIEF COMPLAINT   Chief Complaint   Patient presents with   • Sinus Problem     Pressure in sinus and headaches for 3 weeks    - wears CPAP nightly      HISTORY OF PRESENT ILLNESS      This is a 38 y.o. female who presents for   Chief Complaint   Patient presents with   • Sinus Problem     Pressure in sinus and headaches for 3 weeks    - wears CPAP nightly     Sinus pain for about 3 weeks  Getting headaches  Using CPAP nightly and wonders if caused by CPAP  Advised by pulm to use Flonase to start but ultimately follow up with primary care provider for evaluation  Getting head pain over cheeks and top of head   Using Sudafed    PAST MEDICAL AND SURGICAL HISTORY      Past Medical History:   Diagnosis Date   • Acute bacterial endocarditis 12/10/2020   • Anxiety    • Depressed    • Endocarditis    • Fibromyalgia    • Migraines    • Obstructive sleep apnea syndrome 01/10/2023    uses CPAP machine   • Osteomyelitis (CMS/[facility]) 01/10/2023   • Pancreatitis    • Recurrent major depressive disorder (CMS/[facility]) 01/06/2023   • Substance abuse (CMS/[facility]) 2015   • Tachycardia    • Vasculitis (CMS/[facility]) 09/26/2017       Past Surgical History:   Procedure Laterality Date   • CHOLECYSTECTOMY     • ERCP     • GALLBLADDER SURGERY     • TONSILLECTOMY  1991       MEDICATIONS        Current Outpatient Medications:   •  albuterol HFA (VENTOLIN HFA) 90 mcg/actuation inhaler, Inhale 2 puffs every 4 (four) hours as needed., Disp: , Rfl:   •  amoxicillin (AMOXIL) 500 mg capsule, See admin instr. Before dental procedures, Disp: , Rfl:   •  amoxicillin-pot clavulanate (AUGMENTIN) 875-125 mg per tablet, Take 1 tablet by mouth 2 (two) times a day for 7 days., Disp: 14 tablet, Rfl: 0  •  buprenorphine-naloxone (SUBOXONE) 8-2 mg film, Place 2 Film under the tongue every morning. In addition to 1/2 film QPM, Disp: , Rfl:   •   desvenlafaxine succinate (PRISTIQ) 50 mg 24 hr ER tablet, Take 50 mg by mouth daily., Disp: , Rfl:   •  ferrous sulfate 325 mg (65 mg iron) tablet, Take 65 mg by mouth nightly., Disp: , Rfl:   •  fluticasone propion-salmeteroL (ADVAIR DISKUS) 250-50 mcg/dose diskus inhaler, inhale 1 puff by mouth and INTO THE LUNGS twice a day Rinse mouth after use, Disp: , Rfl:   •  fluticasone propionate (FLONASE) 50 mcg/actuation nasal spray, Administer 1 spray into each nostril daily as needed for rhinitis or allergies., Disp: 16 g, Rfl: 1  •  lisinopriL (PRINIVIL) 5 mg tablet, Take 1 tablet (5 mg total) by mouth daily., Disp: 90 tablet, Rfl: 1  •  magnesium oxide (MAG-OX) 400 mg (241.3 mg magnesium) tablet, Take 1 tablet (400 mg total) by mouth daily., Disp: 90 tablet, Rfl: 1  •  medical marijuana, 1 each See admin instr. Please be advised that an [facility] hospital staff member has listed medical marijuana as one of your home medications for documentation purposes. Please follow-up with your certified issuing provider for ongoing use, Disp: , Rfl:   •  metoprolol succinate XL (TOPROL-XL) 25 mg 24 hr tablet, Take 1 tablet (25 mg total) by mouth nightly., Disp: 90 tablet, Rfl: 3  •  naloxone (NARCAN) 4 mg/actuation spray,non-aerosol nasal spray, , Disp: , Rfl:   •  NURTEC ODT 75 mg tablet,disintegrating, Take 1 tablet (75 mg total) by mouth every other day., Disp: 16 tablet, Rfl: 3  •  onabotulinumtoxinA (BOTOX) 200 unit recon soln injection, Botox for chronic migraine headache every 3 months, Disp: 1 each, Rfl: 4  •  pantoprazole (PROTONIX) 40 mg EC tablet, Take 1 tablet (40 mg total) by mouth nightly., Disp: 90 tablet, Rfl: 0  •  potassium chloride (KLOR-CON M) 20 mEq CR tablet, Take 1 tablet (20 mEq total) by mouth daily., Disp: 90 tablet, Rfl: 3  •  pregabalin (LYRICA) 200 mg capsule, Take 1 capsule (200 mg total) by mouth 3 (three) times a day with meals., Disp: 90 capsule, Rfl: 2  •  QUEtiapine (SEROQUEL) 25 mg tablet, Take 1  tablet (25 mg total) by mouth nightly. (Patient taking differently: Take 0.5 mg by mouth nightly.), Disp: 30 tablet, Rfl: 0  •  semaglutide, weight loss, (WEGOVY) 0.5 mg/0.5 mL subcutaneous injection, Inject 0.5 mg under the skin every (seven) 7 days for 4 doses. AFTER COMPLETING 4 WEEKS OF 0.25 MG DOSE, Disp: 2 mL, Rfl: 0  •  semaglutide, weight loss, (WEGOVY) 1 mg/0.5 mL subcutaneous injection, Inject 1 mg under the skin every (seven) 7 days for 12 doses. AFTER COMPLETING 4 WEEKS OF 0.5 MG DOSE, Disp: 6 mL, Rfl: 1  •  tamsulosin (FLOMAX) 0.4 mg capsule, Take 0.4 mg by mouth daily., Disp: , Rfl:   •  temazepam (RESTORIL) 15 mg capsule, Take 15 mg by mouth nightly., Disp: , Rfl:   •  TiZANidine (ZANAFLEX) 6 mg capsule, Take 1 capsule (6 mg total) by mouth 3 (three) times a day as needed for muscle spasms., Disp: 90 capsule, Rfl: 0  •  torsemide (DEMADEX) 10 mg tablet, Take 1 tablet (10 mg total) by mouth daily., Disp: 90 tablet, Rfl: 1  •  VITAMIN D3 125 mcg (5,000 unit) tablet, Take 5,000 Units by mouth., Disp: , Rfl:   •  zolpidem (AMBIEN) 10 mg tablet, Take 1 tablet (10 mg total) by mouth nightly., Disp: 30 tablet, Rfl: 0    ALLERGIES      Tramadol, Amoxicillin, Nsaids (non-steroidal anti-inflammatory drug), and Trazodone    FAMILY HISTORY      Family History   Problem Relation Age of Onset   • Hypertension Biological Mother    • Lung cancer Biological Father 76        smoker   • Lung cancer Maternal Grandmother 69        smoker   • Heart disease Maternal Grandfather    • Heart attack Maternal Grandfather         unsure of age   • Colon cancer Maternal Grandfather        SOCIAL HISTORY      Social History     Socioeconomic History   • Marital status: Single     Spouse name: None   • Number of children: None   • Years of education: None   • Highest education level: None   Occupational History   • Occupation:    Tobacco Use   • Smoking status: Former     Packs/day: 0.50     Years: 5.00      "Additional pack years: 0.00     Total pack years: 2.50     Types: Cigarettes     Quit date:      Years since quittin.0   • Smokeless tobacco: Current   • Tobacco comments:     quit 1 year ago, vapes currently   Vaping Use   • Vaping Use: Every day   • Substances: Nicotine, Flavoring   • Devices: Refillable tank   Substance and Sexual Activity   • Alcohol use: Not Currently   • Drug use: Yes     Types: Marijuana, Heroin     Comment: MM now , previous heroin and opiod   • Sexual activity: Not Currently     Partners: Male, Female   Social History Narrative    Lives with mom     Social Determinants of Health     Financial Resource Strain: Low Risk  (2023)    Overall Financial Resource Strain (CARDIA)    • Difficulty of Paying Living Expenses: Not hard at all   Food Insecurity: No Food Insecurity (2023)    Hunger Vital Sign    • Worried About Running Out of Food in the Last Year: Never true    • Ran Out of Food in the Last Year: Never true   Transportation Needs: No Transportation Needs (2023)    PRAPARE - Transportation    • Lack of Transportation (Medical): No    • Lack of Transportation (Non-Medical): No   Housing Stability: Low Risk  (2023)    Housing Stability Vital Sign    • Unable to Pay for Housing in the Last Year: No    • Number of Places Lived in the Last Year: 1    • Unstable Housing in the Last Year: No     REVIEW OF SYSTEMS      Review of Systems   All other systems reviewed and are negative.    PHYSICAL EXAMINATION      Vitals:    24 1520   BP: 118/82   BP Location: Left upper arm   Patient Position: Sitting   Pulse: 74   Temp: 36.3 °C (97.3 °F)   TempSrc: Temporal   SpO2: (!) 91%   Weight: 115 kg (253 lb)   Height: 1.575 m (5' 2\")     Body mass index is 46.27 kg/m².     BP Readings from Last 3 Encounters:   24 118/82   01/10/24 118/78   12/15/23 124/78     Wt Readings from Last 3 Encounters:   24 115 kg (253 lb)   01/10/24 117 kg (257 lb 6.4 oz)   12/15/23 " "117 kg (258 lb)     Ht Readings from Last 3 Encounters:   01/16/24 1.575 m (5' 2\")   01/10/24 1.575 m (5' 2\")   12/15/23 1.575 m (5' 2\")     Physical Exam  Vitals and nursing note reviewed.   Constitutional:       General: She is not in acute distress.     Appearance: Normal appearance.   HENT:      Head: Normocephalic and atraumatic.      Right Ear: Ear canal and external ear normal. Tympanic membrane is bulging. Tympanic membrane is not injected or erythematous.      Left Ear: Ear canal and external ear normal. Tympanic membrane is bulging. Tympanic membrane is not injected or erythematous.      Nose: Congestion present.      Mouth/Throat:      Mouth: Mucous membranes are moist.      Pharynx: Oropharynx is clear.   Cardiovascular:      Rate and Rhythm: Normal rate and regular rhythm.      Heart sounds: Normal heart sounds.   Pulmonary:      Effort: Pulmonary effort is normal. No respiratory distress.      Breath sounds: Normal breath sounds. No wheezing or rhonchi.   Skin:     General: Skin is warm and dry.   Neurological:      Mental Status: She is alert and oriented to person, place, and time. Mental status is at baseline.   Psychiatric:         Mood and Affect: Mood normal.         Behavior: Behavior normal.         LABS / IMAGING / STUDIES        Labs    Lab Results   Component Value Date    CREATININE 0.68 10/09/2023     Lab Results   Component Value Date    WBC 8.2 10/09/2023    HGB 11.8 10/09/2023    HCT 35.6 10/09/2023    MCV 86 10/09/2023     10/09/2023     Lab Results   Component Value Date    HGBA1C 5.4 06/26/2023     HEALTH MAINTENANCE              ASSESSMENT AND PLAN   Problem List Items Addressed This Visit        Nervous    Lumbar spinal stenosis     Chronic   Lyrica refilled today  Will continue to assess in follow up          Relevant Medications    pregabalin (LYRICA) 200 mg capsule       Respiratory    Mild persistent asthma, uncomplicated     Stable   Lungs CTAB            Circulatory    " Pulmonary hypertension (CMS/HCC)     Last TTE in 6/2023 with preserved ejection fraction and RVSP of 39 mmHg  Taking torsemide 10 mg, lisinopril 5 mg, metoprolol succinate 25 mg        Other Visit Diagnoses     Acute non-recurrent maxillary sinusitis    -  Primary    Tx with Augmentin  Flonase and irrigation  RTO PRN    Relevant Medications    fluticasone propionate (FLONASE) 50 mcg/actuation nasal spray    amoxicillin-pot clavulanate (AUGMENTIN) 875-125 mg per tablet          Patient Instructions           Return for previously scheduled visit.       This note was written with the assistance of voice recognition software, please excuse errors associated with voice recognition software.      Yumiko Lopez,   01/16/24

## 2024-01-22 ENCOUNTER — TELEMEDICINE (OUTPATIENT)
Dept: BEHAVIORAL/MENTAL HEALTH CLINIC | Facility: CLINIC | Age: 39
End: 2024-01-22
Payer: COMMERCIAL

## 2024-01-22 DIAGNOSIS — F41.1 GENERALIZED ANXIETY DISORDER: Primary | ICD-10-CM

## 2024-01-22 DIAGNOSIS — F33.9 EPISODE OF RECURRENT MAJOR DEPRESSIVE DISORDER, UNSPECIFIED DEPRESSION EPISODE SEVERITY (HCC): ICD-10-CM

## 2024-01-22 PROCEDURE — 90837 PSYTX W PT 60 MINUTES: CPT | Performed by: COUNSELOR
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
## 2024-01-23 ENCOUNTER — OFFICE VISIT (OUTPATIENT)
Dept: NEUROLOGY | Facility: CLINIC | Age: 39
End: 2024-01-23
Attending: NURSE PRACTITIONER
Payer: COMMERCIAL

## 2024-01-23 VITALS
BODY MASS INDEX: 45.91 KG/M2 | DIASTOLIC BLOOD PRESSURE: 72 MMHG | HEART RATE: 86 BPM | RESPIRATION RATE: 18 BRPM | OXYGEN SATURATION: 99 % | SYSTOLIC BLOOD PRESSURE: 104 MMHG | TEMPERATURE: 97.8 F | WEIGHT: 251 LBS

## 2024-01-23 DIAGNOSIS — G43.709 CHRONIC MIGRAINE WITHOUT AURA WITHOUT STATUS MIGRAINOSUS, NOT INTRACTABLE: Primary | ICD-10-CM

## 2024-01-23 DIAGNOSIS — G24.3 SPASMODIC TORTICOLLIS: ICD-10-CM

## 2024-01-23 PROCEDURE — 99999 PR OFFICE/OUTPT VISIT,PROCEDURE ONLY: CPT | Performed by: NURSE PRACTITIONER

## 2024-01-23 PROCEDURE — 200200 PR NO CHARGE: Performed by: NURSE PRACTITIONER

## 2024-01-23 PROCEDURE — 64615 CHEMODENERV MUSC MIGRAINE: CPT | Performed by: NURSE PRACTITIONER

## 2024-01-23 RX ORDER — BUPRENORPHINE 300 MG/1
SOLUTION SUBCUTANEOUS
COMMUNITY
Start: 2024-01-19

## 2024-01-23 RX ORDER — PRAZOSIN HYDROCHLORIDE 5 MG/1
CAPSULE ORAL
COMMUNITY
Start: 2024-01-14 | End: 2024-01-30

## 2024-01-23 NOTE — PROGRESS NOTES
Patient returns for injections .      Since last seen headaches have improved with Botox injections.  Correlated with headache diary, decreased abortive medication use.    At least 50 % of reduction of frequency and severity of moderate to severe headaches      Botox injections  Botox lot number: [id number]  Expiration Date: 06/2026      NDC#6904-2654-09    Patient informed and consent obtained.    General Consent including notice of privacy practices/patient bill of rights was reviewed and consent/authorization given.       4cc of Normal saline were added to one vial of Botox Type A resulting in 200 Units of Botox.  After the patient consented to the procedure the following muscles were injected after cleaning the overlying skin with alcohol. New FDA mandated warnings provided to the patient with instructions to seek medical attention for difficulty swallowing or breathing.    Frontalis 10 units right ( 2 sites each 5 units) and 10 units left ( 2 sites each 5 units)  /Glabellar 5 units right and 5 units left (medially)  Procerus 5 units    Temporalis 30 units right( 6 sites each 5 units) and 30 units left ( 6 sites each 5 units)  Trapezius 25 units ( 3 sites each 5 units) right and 25 units left ( 3 sites each 5 units)  Cervical Paraspinals 10 units right ( 2 sites each 5 units) and 10 units left (2 sites each 5 units)  Occipital 15 units right ( 3 sites each 5 units) , 15 units left ( 3 sites each 5 units)      A total of 195 units was used 5 units wasted. The patient tolerated the procedure well.    Diagnoses and all orders for this visit:    Chronic migraine without aura without status migrainosus, not intractable  -     onabotulinumtoxinA (BOTOX) 200 unit injection 200 Units    Spasmodic torticollis  -     [facility] Botulinum Toxin Injection Appointment Request  -     onabotulinumtoxinA (BOTOX) 200 unit injection 200 Units

## 2024-01-24 DIAGNOSIS — G43.709 CHRONIC MIGRAINE WITHOUT AURA WITHOUT STATUS MIGRAINOSUS, NOT INTRACTABLE: ICD-10-CM

## 2024-01-24 RX ORDER — ONABOTULINUMTOXINA 200 [USP'U]/1
INJECTION, POWDER, LYOPHILIZED, FOR SOLUTION INTRADERMAL; INTRAMUSCULAR
Qty: 1 EACH | Refills: 4 | Status: SHIPPED | OUTPATIENT
Start: 2024-01-24 | End: 2024-02-13 | Stop reason: SDUPTHER

## 2024-01-25 ENCOUNTER — TELEPHONE (OUTPATIENT)
Dept: PSYCHIATRY | Facility: CLINIC | Age: 39
End: 2024-01-25

## 2024-01-25 NOTE — TELEPHONE ENCOUNTER
Left voicemail informing patient of the Psych Encounter form needing to be signed as a requirement from the insurance company for billing purposes. Patient can access form via [employer] and sign electronically.     Please make patient aware this form must be signed for each visit as a requirement to continue future visits with provider.   Detail Level: Detailed

## 2024-01-30 ENCOUNTER — TELEMEDICINE (OUTPATIENT)
Dept: PRIMARY CARE | Facility: CLINIC | Age: 39
End: 2024-01-30
Payer: COMMERCIAL

## 2024-01-30 DIAGNOSIS — J40 BRONCHITIS: Primary | ICD-10-CM

## 2024-01-30 PROCEDURE — 99214 OFFICE O/P EST MOD 30 MIN: CPT | Mod: GT | Performed by: FAMILY MEDICINE

## 2024-01-30 RX ORDER — BENZONATATE 100 MG/1
100 CAPSULE ORAL 3 TIMES DAILY PRN
Qty: 30 CAPSULE | Refills: 0 | Status: SHIPPED | OUTPATIENT
Start: 2024-01-30 | End: 2024-02-09

## 2024-01-30 RX ORDER — AZITHROMYCIN 250 MG/1
TABLET, FILM COATED ORAL
Qty: 6 TABLET | Refills: 0 | Status: SHIPPED | OUTPATIENT
Start: 2024-01-30 | End: 2024-03-12

## 2024-01-30 RX ORDER — METHYLPREDNISOLONE 4 MG/1
TABLET ORAL
Qty: 21 TABLET | Refills: 0 | Status: SHIPPED | OUTPATIENT
Start: 2024-01-30 | End: 2024-02-06

## 2024-01-30 NOTE — PROGRESS NOTES
[facility] in [place] [address] [address] Pen[address]  [facility] [address]  Phone: [phone]  Fax: [phone]       TELEMEDICINE VISIT     Verification of Patient Location:  The patient affirms they are currently located in the following state: [place]    Request for Consent:   Audio and Video Encounter   Winsome, my name is Yumiko DO Jessica.  Before we proceed, can you please verify your identification by telling me your full name and date of birth?  Can you tell me who is in the room with you?    You and I are about to have a telemedicine check-in or visit because you have requested it.  This is a live video-conference.  I am a real person, speaking to you in real time.  There is no one else with me on the video-conference. I am not recording this conversation and I am asking you not to record it.  This telemedicine visit will be billed to your health insurance or you, if you are self-insured.  You understand you will be responsible for any copayments or coinsurances that apply to your telemedicine visit.  Communication platform used for this encounter:  [employer] Video Visit (Epic Video Client)       Before starting our telemedicine visit, I am required to get your consent for this virtual check-in or visit by telemedicine. Do you consent?      Patient Response to Request for Consent:  Yes     HISTORY OF PRESENT ILLNESS        Saw in the office 2 weeks ago and had sinusitis so treatment with Augmentin  Cough throughout day and at night  Sinus pain improved  Feels settling in chest  Coughing up mucus which is green and yellow  Was getting better and then uncle got sick  Has been irrigating her sinuses regularly    PAST MEDICAL AND SURGICAL HISTORY      Past Medical History:   Diagnosis Date   • Acute bacterial endocarditis 12/10/2020   • Anxiety    • Depressed    • Endocarditis    • Fibromyalgia    • Migraines    • Obstructive sleep apnea syndrome 01/10/2023    uses CPAP machine   • Osteomyelitis  (CMS/[facility]) 01/10/2023   • Pancreatitis    • Recurrent major depressive disorder (CMS/[facility]) 01/06/2023   • Substance abuse (CMS/[facility]) 2015   • Tachycardia    • Vasculitis (CMS/[facility]) 09/26/2017     Past Surgical History:   Procedure Laterality Date   • CHOLECYSTECTOMY     • ERCP     • GALLBLADDER SURGERY     • TONSILLECTOMY  1991     MEDICATIONS        Current Outpatient Medications:   •  azithromycin (ZITHROMAX Z-WOLFGANG) 250 mg tablet, Take 2 tablets the first day, then 1 tablet daily for 4 days., Disp: 6 tablet, Rfl: 0  •  benzonatate (TESSALON PERLES) 100 mg capsule, Take 1 capsule (100 mg total) by mouth 3 (three) times a day as needed for cough for up to 10 days., Disp: 30 capsule, Rfl: 0  •  methylPREDNISolone (MEDROL DOSEPACK) 4 mg tablet, Follow package directions., Disp: 21 tablet, Rfl: 0  •  albuterol HFA (VENTOLIN HFA) 90 mcg/actuation inhaler, Inhale 2 puffs every 4 (four) hours as needed., Disp: , Rfl:   •  amoxicillin (AMOXIL) 500 mg capsule, See admin instr. Before dental procedures, Disp: , Rfl:   •  buprenorphine-naloxone (SUBOXONE) 8-2 mg film, Place 2 Film under the tongue every morning. In addition to 1/2 film QPM, Disp: , Rfl:   •  desvenlafaxine succinate (PRISTIQ) 50 mg 24 hr ER tablet, Take 50 mg by mouth daily., Disp: , Rfl:   •  ferrous sulfate 325 mg (65 mg iron) tablet, Take 65 mg by mouth nightly., Disp: , Rfl:   •  fluticasone propion-salmeteroL (ADVAIR DISKUS) 250-50 mcg/dose diskus inhaler, inhale 1 puff by mouth and INTO THE LUNGS twice a day Rinse mouth after use, Disp: , Rfl:   •  fluticasone propionate (FLONASE) 50 mcg/actuation nasal spray, Administer 1 spray into each nostril daily as needed for rhinitis or allergies., Disp: 16 g, Rfl: 1  •  lisinopriL (PRINIVIL) 5 mg tablet, Take 1 tablet (5 mg total) by mouth daily., Disp: 90 tablet, Rfl: 1  •  magnesium oxide (MAG-OX) 400 mg (241.3 mg magnesium) tablet, Take 1 tablet (400 mg total) by mouth daily., Disp: 90 tablet, Rfl: 1  •  medical  marijuana, 1 each See admin instr. Please be advised that an [facility] hospital staff member has listed medical marijuana as one of your home medications for documentation purposes. Please follow-up with your certified issuing provider for ongoing use, Disp: , Rfl:   •  metoprolol succinate XL (TOPROL-XL) 25 mg 24 hr tablet, Take 1 tablet (25 mg total) by mouth nightly., Disp: 90 tablet, Rfl: 3  •  naloxone (NARCAN) 4 mg/actuation spray,non-aerosol nasal spray, , Disp: , Rfl:   •  NURTEC ODT 75 mg tablet,disintegrating, Take 1 tablet (75 mg total) by mouth every other day., Disp: 16 tablet, Rfl: 3  •  onabotulinumtoxinA (BOTOX) injection, Botox for chronic migraine headache every 3 months, Disp: 1 each, Rfl: 4  •  pantoprazole (PROTONIX) 40 mg EC tablet, Take 1 tablet (40 mg total) by mouth nightly., Disp: 90 tablet, Rfl: 0  •  potassium chloride (KLOR-CON M) 20 mEq CR tablet, Take 1 tablet (20 mEq total) by mouth daily., Disp: 90 tablet, Rfl: 3  •  pregabalin (LYRICA) 200 mg capsule, Take 1 capsule (200 mg total) by mouth 3 (three) times a day with meals., Disp: 90 capsule, Rfl: 2  •  QUEtiapine (SEROQUEL) 25 mg tablet, Take 1 tablet (25 mg total) by mouth nightly. (Patient not taking: Reported on 1/23/2024), Disp: 30 tablet, Rfl: 0  •  semaglutide, weight loss, (WEGOVY) 0.5 mg/0.5 mL subcutaneous injection, Inject 0.5 mg under the skin every (seven) 7 days for 4 doses. AFTER COMPLETING 4 WEEKS OF 0.25 MG DOSE, Disp: 2 mL, Rfl: 0  •  semaglutide, weight loss, (WEGOVY) 1 mg/0.5 mL subcutaneous injection, Inject 1 mg under the skin every (seven) 7 days for 12 doses. AFTER COMPLETING 4 WEEKS OF 0.5 MG DOSE, Disp: 6 mL, Rfl: 1  •  SUBLOCADE 300 mg/1.5 mL solution, extended rel syringe subcutaneous injection, , Disp: , Rfl:   •  tamsulosin (FLOMAX) 0.4 mg capsule, Take 0.4 mg by mouth daily., Disp: , Rfl:   •  temazepam (RESTORIL) 15 mg capsule, Take 30 mg by mouth nightly., Disp: , Rfl:   •  TiZANidine (ZANAFLEX) 6 mg  capsule, Take 1 capsule (6 mg total) by mouth 3 (three) times a day as needed for muscle spasms., Disp: 90 capsule, Rfl: 0  •  torsemide (DEMADEX) 10 mg tablet, Take 1 tablet (10 mg total) by mouth daily., Disp: 90 tablet, Rfl: 1  •  VITAMIN D3 125 mcg (5,000 unit) tablet, Take 5,000 Units by mouth., Disp: , Rfl:   •  zolpidem (AMBIEN) 10 mg tablet, Take 1 tablet (10 mg total) by mouth nightly. (Patient not taking: Reported on 2024), Disp: 30 tablet, Rfl: 0    ALLERGIES      Tramadol, Amoxicillin, Nsaids (non-steroidal anti-inflammatory drug), and Trazodone    FAMILY HISTORY      Family History   Problem Relation Age of Onset   • Hypertension Biological Mother    • Lung cancer Biological Father 76        smoker   • Lung cancer Maternal Grandmother 69        smoker   • Heart disease Maternal Grandfather    • Heart attack Maternal Grandfather         unsure of age   • Colon cancer Maternal Grandfather      SOCIAL HISTORY      Social History     Socioeconomic History   • Marital status: Single     Spouse name: None   • Number of children: None   • Years of education: None   • Highest education level: None   Occupational History   • Occupation:    Tobacco Use   • Smoking status: Former     Packs/day: 0.50     Years: 5.00     Additional pack years: 0.00     Total pack years: 2.50     Types: Cigarettes     Quit date:      Years since quittin.0   • Smokeless tobacco: Current   • Tobacco comments:     quit 1 year ago, vapes currently   Vaping Use   • Vaping Use: Every day   • Substances: Nicotine, Flavoring   • Devices: Refillable tank   Substance and Sexual Activity   • Alcohol use: Not Currently   • Drug use: Yes     Types: Marijuana, Heroin     Comment: MM now , previous heroin and opiod   • Sexual activity: Not Currently     Partners: Male, Female   Social History Narrative    Lives with mom     Social Determinants of Health     Financial Resource Strain: Low Risk  (2023)    Overall  Financial Resource Strain (CARDIA)    • Difficulty of Paying Living Expenses: Not hard at all   Food Insecurity: No Food Insecurity (6/26/2023)    Hunger Vital Sign    • Worried About Running Out of Food in the Last Year: Never true    • Ran Out of Food in the Last Year: Never true   Transportation Needs: No Transportation Needs (6/27/2023)    PRAPARE - Transportation    • Lack of Transportation (Medical): No    • Lack of Transportation (Non-Medical): No   Housing Stability: Low Risk  (6/27/2023)    Housing Stability Vital Sign    • Unable to Pay for Housing in the Last Year: No    • Number of Places Lived in the Last Year: 1    • Unstable Housing in the Last Year: No     REVIEW OF SYSTEMS      Review of Systems   All other systems reviewed and are negative.    PHYSICAL EXAMINATION        Physical Exam  Constitutional:       Appearance: Normal appearance.   HENT:      Head: Normocephalic and atraumatic.   Pulmonary:      Effort: Pulmonary effort is normal. No respiratory distress.      Breath sounds: Normal breath sounds.      Comments: Congested cough  Neurological:      Mental Status: She is alert and oriented to person, place, and time.   Psychiatric:         Mood and Affect: Mood normal.         Behavior: Behavior normal.         LABS / IMAGING / STUDIES        Labs    Lab Results   Component Value Date    CREATININE 0.68 10/09/2023     Lab Results   Component Value Date    WBC 8.2 10/09/2023    HGB 11.8 10/09/2023    HCT 35.6 10/09/2023    MCV 86 10/09/2023     10/09/2023     Lab Results   Component Value Date    HGBA1C 5.4 06/26/2023     HEALTH MAINTENANCE              ASSESSMENT AND PLAN   Problem List Items Addressed This Visit    None  Visit Diagnoses     Bronchitis    -  Primary    Tx with med dose pack, z pack and tessalon   RTO if sx worsen  Consider CXR if needed    Relevant Medications    methylPREDNISolone (MEDROL DOSEPACK) 4 mg tablet    azithromycin (ZITHROMAX Z-WOLFGANG) 250 mg tablet     benzonatate (TESSALON PERLES) 100 mg capsule        Patient Instructions   Tessalon for cough as needed  Z pack and medrol dose pack for bronchitis    Increase water intake to 60-80 oz every day  Use Mucinex (guafensin) 600-1200 mg every 12 hours if needed for congestion  Use nasal irrigation   Healthy nutrition with fruits and vegetables for antioxidants to support your immune system  Get plenty of rest     Come back to the office if your symptoms worsen      Return for PRN.       This note was written with the assistance of voice recognition software, please excuse errors associated with voice recognition software.    Time Spent:  I spent 15 minutes on this date of service performing the following activities: obtaining history, performing examination, entering orders, documenting and providing counseling and education.      Yumiko Lopez,   01/30/24

## 2024-01-30 NOTE — PATIENT INSTRUCTIONS
Tessalon for cough as needed  Z pack and medrol dose pack for bronchitis    Increase water intake to 60-80 oz every day  Use Mucinex (guafensin) 600-1200 mg every 12 hours if needed for congestion  Use nasal irrigation   Healthy nutrition with fruits and vegetables for antioxidants to support your immune system  Get plenty of rest     Come back to the office if your symptoms worsen

## 2024-02-01 ENCOUNTER — TELEPHONE (OUTPATIENT)
Dept: VASCULAR SURGERY | Facility: CLINIC | Age: 39
End: 2024-02-01
Payer: COMMERCIAL

## 2024-02-01 NOTE — TELEPHONE ENCOUNTER
Attempted to reach Ms. Redmond to inform her about her upcoming office visit on March 6, 2024. The original appointment was scheduled for 1:15pm but we had to move it up to 11:45 am. She has the option to reschedule to another day if that doesn't fit her schedule.

## 2024-02-07 ENCOUNTER — TELEPHONE (OUTPATIENT)
Dept: NEUROLOGY | Facility: CLINIC | Age: 39
End: 2024-02-07
Payer: COMMERCIAL

## 2024-02-07 ENCOUNTER — OFFICE VISIT (OUTPATIENT)
Dept: VASCULAR SURGERY | Facility: CLINIC | Age: 39
End: 2024-02-07
Payer: COMMERCIAL

## 2024-02-07 DIAGNOSIS — I87.2 VENOUS INSUFFICIENCY: Primary | ICD-10-CM

## 2024-02-07 PROCEDURE — 99999 PR OFFICE/OUTPT VISIT,PROCEDURE ONLY: CPT | Performed by: SURGERY

## 2024-02-07 PROCEDURE — 36475 ENDOVENOUS RF 1ST VEIN: CPT | Mod: RT | Performed by: SURGERY

## 2024-02-07 RX ORDER — LIDOCAINE HYDROCHLORIDE 10 MG/ML
5 INJECTION, SOLUTION INFILTRATION; PERINEURAL ONCE
Status: SHIPPED | OUTPATIENT
Start: 2024-02-07 | End: 2024-02-08

## 2024-02-07 RX ORDER — SODIUM BICARBONATE 1 MEQ/ML
10 SYRINGE (ML) INTRAVENOUS ONCE
Status: SHIPPED | OUTPATIENT
Start: 2024-02-07 | End: 2024-02-08

## 2024-02-07 RX ORDER — LIDOCAINE HYDROCHLORIDE AND EPINEPHRINE 10; 20 UG/ML; MG/ML
20 INJECTION, SOLUTION INFILTRATION; PERINEURAL ONCE
Status: SHIPPED | OUTPATIENT
Start: 2024-02-07 | End: 2024-02-08

## 2024-02-07 NOTE — PROCEDURES
Radiofrequency Ablation Procedure Note    Date of procedure: 2/7/2024    Treatment Timeout: Patient correctly identified prior to initiation of procedure with laterality documented as right and treatment as right    Procedure location: [place]    Start Time of Procedure: 14:58h    Finish Time of Procedure: 15:15h    Anesthesia: local    Concentration of Tumescent: 20 ml of 2% Lidocaine/10ml of Sodium Bicarbonate in 500 ml normal saline solution    Amount of Tumescent Administered: 250ml    Local Anesthetic: 10 ml of 1% lidocaine without epinephrine    Treatment Sites: right GSV    Length of Vessel Treated: 35cm    Catheter insertion site: distal medial thigh    Amount of Energy deposited and treatment cycles: 2:00 over 6 cycles    Complications: none    Preoperative diagnosis: Symptomatic varicose veins, venous insufficiency right lower extremity    Postoperative diagnosis: Symptomatic varicose veins, venous insufficiency right lower extremity    Surgeon:  Talib Richmond MD    Procedure: right greater saphenous vein radiofrequency ablation    Description of procedure:  The patient was brought into the office, placed supine on the procedure table and the insufficient saphenous vein and tributaries were mapped by ultrasound and diagramed on the overlying skin.  Diameter and depth of the veins to be treated were documented.  The entire lower extremity limb was sterilely prepped.  The RF catheter was placed on the sterile field, flushed and connected by a sterile cable to the power source.    The patient was placed in a reverse Trendelenburg position and local anesthesia was instilled in the skin overlying the access site.  Under real-time ultrasound guidance, the patient's saphenous vein was punctured with a Micro Access needle distally.  A Mandrel wire was advanced through the needle was then exchanged for a 7 Polish sheath.  The .035 wire was removed and the sheath was flushed.  The RF probe was placed into  "the vein through the sheath and positioned 2.5 cm distal from the saphenofemoral/saphenopopliteal junction.  This was confirmed with ultrasound in multiple projections.    After the radiofrequency probe position was verified by ultrasound, tumescent anesthesia was administered precisely into the perivenous compartment along the entire length of the vein from the entry site to the deep junction until a \"halo\" of fluid was noted around the vein.    The patient was then placed in the Trendelenburg position.  Moderate external compression was applied over the RF heating element and RF energy was applied to the vein.  The probe was withdrawn sequentially with mild overlap of 7 cm segments treatment segments, and closely monitored during treatments to maintain the probe temperature at 120 degrees centigrade and the generator output well below maximum power.  Once ablation of the vein was performed, a repeat ultrasound was performed to ensure the saphenous vein had undergone successful ablation, and that there was no evidence of deep venous thrombosis.  The catheter and sheath were withdrawn and hemostasis was established with direct pressure.  External compression dressings were applied from the level of the foot to the proximal thigh.  The patient tolerated the procedure well and without complaints of pain.  The patient ambulated without difficulty before leaving the office.    "

## 2024-02-12 ENCOUNTER — TELEMEDICINE (OUTPATIENT)
Dept: BEHAVIORAL/MENTAL HEALTH CLINIC | Facility: CLINIC | Age: 39
End: 2024-02-12
Payer: COMMERCIAL

## 2024-02-12 ENCOUNTER — HOSPITAL ENCOUNTER (OUTPATIENT)
Dept: CARDIOLOGY | Facility: HOSPITAL | Age: 39
Discharge: HOME | End: 2024-02-12
Attending: NURSE PRACTITIONER
Payer: COMMERCIAL

## 2024-02-12 DIAGNOSIS — R60.0 PERIPHERAL EDEMA: ICD-10-CM

## 2024-02-12 DIAGNOSIS — F33.0 MILD EPISODE OF RECURRENT MAJOR DEPRESSIVE DISORDER (HCC): ICD-10-CM

## 2024-02-12 DIAGNOSIS — F41.1 GENERALIZED ANXIETY DISORDER: Primary | ICD-10-CM

## 2024-02-12 PROCEDURE — 90837 PSYTX W PT 60 MINUTES: CPT | Performed by: COUNSELOR

## 2024-02-12 PROCEDURE — 93971 EXTREMITY STUDY: CPT | Mod: TC,RT

## 2024-02-12 NOTE — PSYCH
Virtual Regular Visit    Verification of patient location:    Patient is located at Home in the following state in which I hold an active license [place]      Assessment/Plan:    Problem List Items Addressed This Visit        Other    Recurrent major depressive disorder (HCC)    Generalized anxiety disorder - Primary       Goals addressed in session: Goal 1          Reason for visit is   Chief Complaint   Patient presents with   • Virtual Regular Visit          Encounter provider GAMALIEL Pennington    Provider located at [facility] THERAPIST [facility]  [facility] THERAPIST [facility] OUTPAT[facility]  [address]  [facility] [place] [address]  [phone]      Recent Visits  Date Type Provider Dept   02/19/24 Telemedicine GAMALIEL Pennington [facility] Therapist [place]   Showing recent visits within past 7 days and meeting all other requirements  Future Appointments  No visits were found meeting these conditions.  Showing future appointments within next 150 days and meeting all other requirements       The patient was identified by name and date of birth. Samantha Vila was informed that this is a telemedicine visit and that the visit is being conducted throughthe [employer] platform. She agrees to proceed..  My office door was closed. No one else was in the room.  She acknowledged consent and understanding of privacy and security of the video platform. The patient has agreed to participate and understands they can discontinue the visit at any time.    Patient is aware this is a billable service.     Subjective  Samantha Vila is a 38 y.o. female  .      HPI     No past medical history on file.    No past surgical history on file.    No current outpatient medications on file.     No current facility-administered medications for this visit.        Not on File    Review of Systems    Video Exam    There were no vitals filed for this visit.    Physical Exam     Behavioral Health Psychotherapy Progress  Note    Psychotherapy Provided: Individual Psychotherapy     1. Generalized anxiety disorder        2. Mild episode of recurrent major depressive disorder (HCC)            Goals addressed in session: Goal 1     DATA:     -CT reported and reflected on recent multiple sinus infections. CT was prescribed steroids which was helpful to treat infection. CT had made the transition to sublicade injection the week prior to steroid tx. CT noted that the wave of physical symptoms of steroid tx resembled withdraw.   -CT reflected on her new awareness of listening to her body and advocating for herself. CT noted a shift in her attitude from the period of time several years ago. CT felt as if she and providers were opposed. CT attributes this to the attitude of providers when she was abusing and misusing meds as well as her lack of honest cooperation and reactivity to providers.  -CT reflected on practicing kundelini yoga practice. CT stated that this practice provides a reset energetically. CT has signed up for a workshop and plans to attend the weekly gathering. -CT reflected on some new reading she has been doing; learning about mind body therapy.   -CT reported that she applied for a limited 's license and will work for a year to reinstated her 's license.   -CT reflected on her desire to gain knowledge, use the treatments herself, and perhaps become trained and be able to offer the learning to others in recovery.     During this session, this clinician used the following therapeutic modalities: Supportive Psychotherapy    Substance Abuse was not addressed during this session. If the client is diagnosed with a co-occurring substance use disorder, please indicate any changes in the frequency or amount of use: . Stage of change for addressing substance use diagnoses: No substance use/Not applicable    ASSESSMENT:  Samantha Vila presents with a Euthymic/ normal mood.     her affect is Normal range and intensity,  "which is congruent, with her mood and the content of the session. The client has made progress on their goals.     Samantha Vila presents with a none risk of suicide, none risk of self-harm, and none risk of harm to others.    For any risk assessment that surpasses a \"low\" rating, a safety plan must be developed.    A safety plan was indicated: no  If yes, describe in detail     PLAN: Between sessions, Samantha Vila will continue reading and practices to support energetic healing, attend meet-up with nathaniel yoga resource, regulate sleep.. At the next session, the therapist will use Supportive Psychotherapy to monitor and support mood and physical recovery, learn about CT's interest in EMDR, update tx plan and safety plan.    Behavioral Health Treatment Plan and Discharge Planning: Samantha Vila is aware of and agrees to continue to work on their treatment plan. They have identified and are working toward their discharge goals. yes    Visit start and stop times:    02/12/24  Start Time: 1206  Stop Time: 1259  Total Visit Time: 53 minutes      "

## 2024-02-13 DIAGNOSIS — G43.709 CHRONIC MIGRAINE WITHOUT AURA WITHOUT STATUS MIGRAINOSUS, NOT INTRACTABLE: ICD-10-CM

## 2024-02-13 RX ORDER — ONABOTULINUMTOXINA 200 [USP'U]/1
INJECTION, POWDER, LYOPHILIZED, FOR SOLUTION INTRADERMAL; INTRAMUSCULAR
Qty: 1 EACH | Refills: 4 | Status: SHIPPED | OUTPATIENT
Start: 2024-02-13

## 2024-02-15 ENCOUNTER — TELEPHONE (OUTPATIENT)
Dept: NEUROLOGY | Facility: CLINIC | Age: 39
End: 2024-02-15
Payer: COMMERCIAL

## 2024-02-15 NOTE — TELEPHONE ENCOUNTER
[facility] Appointment Request   Provider: Cheyenne  Appointment Type: Trigger point injections  Reason for Visit: headaches  Available Day and Time:   Best Contact Number: [phone]    The practice will reach out to schedule your appointment within the next 2 business days.

## 2024-02-19 ENCOUNTER — TELEMEDICINE (OUTPATIENT)
Dept: BEHAVIORAL/MENTAL HEALTH CLINIC | Facility: CLINIC | Age: 39
End: 2024-02-19
Payer: COMMERCIAL

## 2024-02-19 DIAGNOSIS — F41.1 GENERALIZED ANXIETY DISORDER: Primary | ICD-10-CM

## 2024-02-19 DIAGNOSIS — F33.0 MILD EPISODE OF RECURRENT MAJOR DEPRESSIVE DISORDER (HCC): ICD-10-CM

## 2024-02-19 PROCEDURE — 90837 PSYTX W PT 60 MINUTES: CPT | Performed by: COUNSELOR

## 2024-02-19 NOTE — PSYCH
Virtual Regular Visit    Verification of patient location:    Patient is located at Home in the following state in which I hold an active license [place]      Assessment/Plan:    Problem List Items Addressed This Visit        Other    Recurrent major depressive disorder (HCC)    Generalized anxiety disorder - Primary       Goals addressed in session: Goal 1          Reason for visit is   Chief Complaint   Patient presents with   • Virtual Regular Visit          Encounter provider GAMALIEL Pennington    Provider located at [facility] THERAPIST [facility]  [facility] THERAPIST MENTAL HEALTH OUTPATIENT  [address]  [facility] [place] [address]  [phone]      Recent Visits  Date Type Provider Dept   02/19/24 Telemedicine GAMALIEL Pennington [facility] Therapist [facility]   Showing recent visits within past 7 days and meeting all other requirements  Future Appointments  No visits were found meeting these conditions.  Showing future appointments within next 150 days and meeting all other requirements       The patient was identified by name and date of birth. Samantha Vila was informed that this is a telemedicine visit and that the visit is being conducted throughthe [employer] platform. She agrees to proceed..  My office door was closed. No one else was in the room.  She acknowledged consent and understanding of privacy and security of the video platform. The patient has agreed to participate and understands they can discontinue the visit at any time.    Patient is aware this is a billable service.     Subjective  Samantha Vila is a 38 y.o. female  .      HPI     No past medical history on file.    No past surgical history on file.    No current outpatient medications on file.     No current facility-administered medications for this visit.        Not on File    Review of Systems    Video Exam    There were no vitals filed for this visit.    Physical Exam     Behavioral Health Psychotherapy Progress  "Note    Psychotherapy Provided: Individual Psychotherapy     1. Generalized anxiety disorder        2. Mild episode of recurrent major depressive disorder (HCC)            Goals addressed in session: Goal 1     DATA:     -CT reported and reflected on appointment with ENT. This was positive experience and CT learned that this is likely due to migraine activity. The Dr ordered testing and will follow up on the ENT.  -CT will be following up with neurology and nerve ablation procedure.   -CT reflected on past medical challenges and providers who did note listen and treat her respectfully and as a partner in her care.   -CT reflected on her transition to partnering with providers instead of being suspicious.  -CT stated that she has made the decision that she wants to go back to school and pursue treatment provider working with people going through rehab. CT reflected on her change in perspective about her abilities and potential to learn and do \"big things\".  -CT updated safety plan with .   -CT noted some guilt about seeking disability, does she need it. Further exploration led to more acceptance for best present solution that will allow her to manage and prepare for a new future.    During this session, this clinician used the following therapeutic modalities: Supportive Psychotherapy    Substance Abuse was not addressed during this session. If the client is diagnosed with a co-occurring substance use disorder, please indicate any changes in the frequency or amount of use: . Stage of change for addressing substance use diagnoses: No substance use/Not applicable    ASSESSMENT:  Samantha Vila presents with a Euthymic/ normal mood.     her affect is Normal range and intensity, which is congruent, with her mood and the content of the session. The client has made progress on their goals.     Samantha Vila presents with a none risk of suicide, none risk of self-harm, and none risk of harm to others.    For any risk " "assessment that surpasses a \"low\" rating, a safety plan must be developed.    A safety plan was indicated: no  If yes, describe in detail     PLAN: Between sessions, Samantha Vila will continue her learning and reading, look for future opportunities to expand her connections with people who have similar interests, attend to physical/medical needs.. At the next session, the therapist will use Supportive Psychotherapy to support CT's process of managing health and looking forward to create plans that reflect her interests and desire to help others..    Behavioral Health Treatment Plan and Discharge Planning: Samantha Vila is aware of and agrees to continue to work on their treatment plan. They have identified and are working toward their discharge goals. yes    Visit start and stop times:    02/19/24  Start Time: 1202  Stop Time: 1303  Total Visit Time: 61 minutes    "

## 2024-02-21 ENCOUNTER — OFFICE VISIT (OUTPATIENT)
Dept: NEUROLOGY | Facility: CLINIC | Age: 39
End: 2024-02-21
Payer: COMMERCIAL

## 2024-02-21 VITALS
TEMPERATURE: 98.1 F | WEIGHT: 242 LBS | OXYGEN SATURATION: 99 % | HEART RATE: 70 BPM | SYSTOLIC BLOOD PRESSURE: 112 MMHG | DIASTOLIC BLOOD PRESSURE: 76 MMHG | RESPIRATION RATE: 18 BRPM | BODY MASS INDEX: 44.26 KG/M2

## 2024-02-21 DIAGNOSIS — M54.2 CERVICALGIA: ICD-10-CM

## 2024-02-21 DIAGNOSIS — G24.3 SPASMODIC TORTICOLLIS: Primary | ICD-10-CM

## 2024-02-21 DIAGNOSIS — G43.709 CHRONIC MIGRAINE WITHOUT AURA WITHOUT STATUS MIGRAINOSUS, NOT INTRACTABLE: ICD-10-CM

## 2024-02-21 PROCEDURE — 99999 PR OFFICE/OUTPT VISIT,PROCEDURE ONLY: CPT | Performed by: NURSE PRACTITIONER

## 2024-02-21 PROCEDURE — 200200 PR NO CHARGE: Performed by: NURSE PRACTITIONER

## 2024-02-21 PROCEDURE — 20553 NJX 1/MLT TRIGGER POINTS 3/>: CPT | Performed by: NURSE PRACTITIONER

## 2024-02-21 RX ORDER — TEMAZEPAM 30 MG/1
30 CAPSULE ORAL NIGHTLY
COMMUNITY
Start: 2024-02-13 | End: 2024-06-27 | Stop reason: DRUGHIGH

## 2024-02-21 NOTE — PROGRESS NOTES
Procedure Note  Trigger Point Injections       Bupivicaine 0.25 %  Lot # [id number]  Exp: 08/01/2024     Lidocaine   Lot# [id number]  Exp date: 07/01/2024    The risks, benefits and anticipated outcomes of the procedure, the risks and benefits of the alternatives to the procedure, and the roles and tasks of the personnel to be involved, were discussed with the patient, and the patient consents to the procedure and agrees to proceed.        A 50/50 combination of 1 % Lidocaine 1.5 cc  (NDC 1439-9537-74) and 0.25% bupivacaine 1.5 cc (NDC 02550235-48) were prepared in 3 syringes ( 3 cc) .           After aspiration to ensure no obstruction or presence of blood , the area was injected . The needle was repositioned in a fan-like manner and the entire area was injected . The needle was then repositioned at the occipital groove , and the greater occpital nerve was injected after ensuring there was no obstruction or backflow of blood. Pressure was held and no hematoma was noted.  The patient noted relief of pain after 5 minutes. The patient was told to use heat if there was discomfort later in the day.      Procedure : Trigger Point Injections      The risks , benefits and anticipated outcomes of the procedure , the risks and benefits of the alternatives to the procedure , and the roles and tasks of the personnel to be involved , were dicsussed with the patient , and the patient consents to the procedure and agrees to proceed.   .  Trigger point injections were identified by muscle spasms, band , and radiating pain. Each trigger point listed below was injected after aspiration to ensure no air, arterial , or venous blood was present. The patient had immediate relief of pain and was instructed to use heat (20 min at a time ) and do stretching exercises for 24 hrs.         Trigger Points injected :         Right Greater Occipital  2 cc     Left Greater Occipital 2 cc     Right Lesser Occipital  1 cc     Left Lesser Occipital 1  cc     Right and left trapezia 2 cc (divided in 3 areas each side)       Diagnoses and all orders for this visit:    Spasmodic torticollis  -     lidocaine (XYLOCAINE) 10 mg/mL (1 %) injection 4.5 mL    Cervicalgia  -     lidocaine (XYLOCAINE) 10 mg/mL (1 %) injection 4.5 mL    Chronic migraine without aura without status migrainosus, not intractable  -     lidocaine (XYLOCAINE) 10 mg/mL (1 %) injection 4.5 mL

## 2024-02-22 RX ORDER — LIDOCAINE HYDROCHLORIDE 10 MG/ML
4.5 INJECTION, SOLUTION INFILTRATION; PERINEURAL ONCE
Status: SHIPPED | OUTPATIENT
Start: 2024-02-22 | End: 2024-02-22

## 2024-02-23 PROBLEM — J30.9 ALLERGIC RHINITIS: Status: ACTIVE | Noted: 2024-02-23

## 2024-02-23 PROBLEM — Z99.89 DEPENDENCE ON OTHER ENABLING MACHINES AND DEVICES: Status: ACTIVE | Noted: 2024-02-23

## 2024-02-23 PROBLEM — I50.9 CONGESTIVE HEART FAILURE (CMS/HCC): Status: ACTIVE | Noted: 2024-02-23

## 2024-02-23 PROBLEM — N40.0 ENLARGED PROSTATE: Status: ACTIVE | Noted: 2024-02-23

## 2024-02-23 PROBLEM — K21.9 ACID REFLUX: Status: ACTIVE | Noted: 2024-02-23

## 2024-02-23 PROBLEM — M35.9 AUTOIMMUNE DISEASE (CMS/HCC): Status: ACTIVE | Noted: 2024-02-23

## 2024-02-29 DIAGNOSIS — G43.109 MIGRAINE WITH AURA AND WITHOUT STATUS MIGRAINOSUS, NOT INTRACTABLE: ICD-10-CM

## 2024-03-01 RX ORDER — RIMEGEPANT SULFATE 75 MG/75MG
75 TABLET, ORALLY DISINTEGRATING ORAL EVERY OTHER DAY
Qty: 16 TABLET | Refills: 3 | Status: SHIPPED | OUTPATIENT
Start: 2024-03-01 | End: 2024-12-26

## 2024-03-04 NOTE — PROGRESS NOTES
Carolyn Redmond is a 38 y.o. female is present in the office today for follow up    PMHX- CHF, HLD, Heart palpitations and LE Swelling       2023 ECHO- High RAP and mild secondary pulmonary hypertension.    Doing sleep apnea CPAP   Torsemide is working better for edema    Only taking torsemide prn now (didn't want to drink too much water and was having migraines and stomach pains)  Was having more loose bowels   And was having the inconvenience of find a bathroom      Had ablation in veins in legs got done about a month ago    Has noticed improvement with fluid retention now off the prednisone not really retaining fluid.         Past Medical History:   Diagnosis Date   • Acute bacterial endocarditis 12/10/2020   • Anxiety    • Depressed    • Endocarditis    • Fibromyalgia    • Migraines    • Obstructive sleep apnea syndrome 01/10/2023    uses CPAP machine   • Osteomyelitis (CMS/[facility]) 01/10/2023   • Pancreatitis    • Recurrent major depressive disorder (CMS/[facility]) 2023   • Substance abuse (CMS/[facility])    • Tachycardia    • Vasculitis (CMS/[facility]) 2017       Past Surgical History:   Procedure Laterality Date   • CHOLECYSTECTOMY     • ERCP     • GALLBLADDER SURGERY     • TONSILLECTOMY          Social History     Socioeconomic History   • Marital status: Single     Spouse name: None   • Number of children: None   • Years of education: None   • Highest education level: None   Occupational History   • Occupation:    Tobacco Use   • Smoking status: Former     Packs/day: 0.50     Years: 5.00     Additional pack years: 0.00     Total pack years: 2.50     Types: Cigarettes     Quit date: 2020     Years since quittin.1   • Smokeless tobacco: Current   • Tobacco comments:     quit 1 year ago, vapes currently   Vaping Use   • Vaping Use: Every day   • Substances: Nicotine, Flavoring   • Devices: Refillable tank   Substance and Sexual Activity   • Alcohol use: Not Currently   • Drug use:  Not Currently     Types: Marijuana, Heroin     Comment: MM now 2023, previous heroin and opiod   • Sexual activity: Not Currently     Partners: Male, Female   Social History Narrative    Lives with mom     Social Determinants of Health     Financial Resource Strain: Low Risk  (6/27/2023)    Overall Financial Resource Strain (CARDIA)    • Difficulty of Paying Living Expenses: Not hard at all   Food Insecurity: No Food Insecurity (6/26/2023)    Hunger Vital Sign    • Worried About Running Out of Food in the Last Year: Never true    • Ran Out of Food in the Last Year: Never true   Transportation Needs: No Transportation Needs (6/27/2023)    PRAPARE - Transportation    • Lack of Transportation (Medical): No    • Lack of Transportation (Non-Medical): No   Housing Stability: Low Risk  (6/27/2023)    Housing Stability Vital Sign    • Unable to Pay for Housing in the Last Year: No    • Number of Places Lived in the Last Year: 1    • Unstable Housing in the Last Year: No       Family History   Problem Relation Age of Onset   • Hypertension Biological Mother    • Lung cancer Biological Father 76        smoker   • Lung cancer Maternal Grandmother 69        smoker   • Heart disease Maternal Grandfather    • Heart attack Maternal Grandfather         unsure of age   • Colon cancer Maternal Grandfather        Tramadol, Amoxicillin, Nsaids (non-steroidal anti-inflammatory drug), and Trazodone    Current Outpatient Medications   Medication Sig Dispense Refill   • albuterol HFA (VENTOLIN HFA) 90 mcg/actuation inhaler Inhale 2 puffs every 4 (four) hours as needed.     • amoxicillin (AMOXIL) 500 mg capsule See admin instr. Before dental procedures     • desvenlafaxine succinate (PRISTIQ) 50 mg 24 hr ER tablet Take 50 mg by mouth daily.     • ferrous sulfate 325 mg (65 mg iron) tablet Take 65 mg by mouth nightly.     • fluticasone propion-salmeteroL (ADVAIR DISKUS) 250-50 mcg/dose diskus inhaler inhale 1 puff by mouth and INTO THE LUNGS  twice a day Rinse mouth after use     • fluticasone propionate (FLONASE) 50 mcg/actuation nasal spray Administer 1 spray into each nostril daily as needed for rhinitis or allergies. 16 g 1   • lisinopriL (PRINIVIL) 5 mg tablet Take 1 tablet (5 mg total) by mouth daily. 90 tablet 1   • magnesium oxide (MAG-OX) 400 mg (241.3 mg magnesium) tablet Take 1 tablet (400 mg total) by mouth daily. 90 tablet 1   • medical marijuana 1 each See admin instr. Please be advised that an [facility] hospital staff member has listed medical marijuana as one of your home medications for documentation purposes. Please follow-up with your certified issuing provider for ongoing use     • metoprolol succinate XL (TOPROL-XL) 25 mg 24 hr tablet Take 1 tablet (25 mg total) by mouth nightly. 90 tablet 3   • naloxone (NARCAN) 4 mg/actuation spray,non-aerosol nasal spray      • NURTEC ODT 75 mg tablet,disintegrating TAKE 1 TABLET (75 MG TOTAL) BY MOUTH EVERY OTHER DAY 16 tablet 3   • onabotulinumtoxinA (BOTOX) injection Botox for chronic migraine headache every 3 months 1 each 4   • pantoprazole (PROTONIX) 40 mg EC tablet Take 1 tablet (40 mg total) by mouth nightly. 90 tablet 0   • potassium chloride (KLOR-CON M) 20 mEq CR tablet Take 1 tablet (20 mEq total) by mouth daily. 90 tablet 3   • pregabalin (LYRICA) 200 mg capsule Take 1 capsule (200 mg total) by mouth 3 (three) times a day with meals. 90 capsule 2   • semaglutide, weight loss, (WEGOVY) 1 mg/0.5 mL subcutaneous injection Inject 1 mg under the skin every (seven) 7 days for 12 doses. AFTER COMPLETING 4 WEEKS OF 0.5 MG DOSE 6 mL 1   • SUBLOCADE 300 mg/1.5 mL solution, extended rel syringe subcutaneous injection 1x monthly     • tamsulosin (FLOMAX) 0.4 mg capsule Take 0.4 mg by mouth daily.     • temazepam (RESTORIL) 30 mg capsule Take 30 mg by mouth nightly.     • TiZANidine (ZANAFLEX) 6 mg capsule Take 1 capsule (6 mg total) by mouth 3 (three) times a day as needed for muscle spasms. 90 capsule  0   • VITAMIN D3 125 mcg (5,000 unit) tablet Take 5,000 Units by mouth.     • torsemide (DEMADEX) 10 mg tablet Take 1 tablet (10 mg total) by mouth daily. (Patient not taking: Reported on 3/12/2024) 90 tablet 1     No current facility-administered medications for this visit.       Review of Systems   Cardiovascular: Positive for dyspnea on exertion. Negative for chest pain, claudication, irregular heartbeat, leg swelling, palpitations and syncope.          Vitals:    03/12/24 1257   BP: 94/70   Pulse: 83   Resp: 16   SpO2: 97%       Body mass index is 41.45 kg/m².      Physical Exam  Constitutional:       Appearance: Normal appearance. She is obese. She is not ill-appearing.   HENT:      Head: Normocephalic.      Mouth/Throat:      Mouth: Mucous membranes are moist.      Pharynx: No posterior oropharyngeal erythema.   Eyes:      Extraocular Movements: Extraocular movements intact.   Cardiovascular:      Rate and Rhythm: Normal rate and regular rhythm.      Chest Wall: PMI is not displaced.      Pulses: Normal pulses.      Heart sounds: S1 normal and S2 normal. No murmur heard.     No gallop. No S3 or S4 sounds.   Pulmonary:      Effort: Pulmonary effort is normal.      Breath sounds: Normal breath sounds.   Abdominal:      General: Abdomen is flat. Bowel sounds are normal. There is no distension.      Tenderness: There is no abdominal tenderness.   Musculoskeletal:      Cervical back: Neck supple.      Right lower leg: No edema.      Left lower leg: No edema.      Comments: Functional status is:  good   Skin:     General: Skin is warm.   Neurological:      Mental Status: She is alert and oriented to person, place, and time. Mental status is at baseline.   Psychiatric:         Attention and Perception: Attention normal.         Mood and Affect: Mood and affect normal.         Speech: Speech normal.          Lab Results   Component Value Date    WBC 8.2 10/09/2023    RBC 4.14 10/09/2023    HGB 11.8 10/09/2023    HCT  35.6 10/09/2023    MCV 86 10/09/2023    MCH 28.5 10/09/2023    MCHC 33.1 10/09/2023    RDW 14.1 10/09/2023     10/09/2023        Lab Results   Component Value Date    GLUCOSE 92 10/09/2023    BUN 8 10/09/2023    CREATININE 0.68 10/09/2023    EGFR 116 10/09/2023    BUNCREAT 12 10/09/2023     10/09/2023    K 5.1 10/09/2023     10/09/2023    CO2 24 10/09/2023    CALCIUM 9.8 08/10/2023    PROTEIN 6.5 10/09/2023    ALBUMIN 4.1 10/09/2023    GLOB 2.4 10/09/2023    AGRATIO 1.7 10/09/2023    BILITOT 0.3 10/09/2023    ALKPHOS 83 10/09/2023    AST 16 10/09/2023    ALT 12 10/09/2023        Lab Results   Component Value Date    HGBA1C 5.4 06/26/2023    ESTAVGGLUC 108 06/26/2023        Lab Results   Component Value Date    ALBUMIN 4.1 10/09/2023        Lab Results   Component Value Date    CHOL 187 10/09/2023    TRIG 159 (H) 10/09/2023    HDL 47 10/09/2023    VLDL 28 10/09/2023    LDLCALC 112 (H) 10/09/2023        Cardiac Imaging    TRANSTHORACIC ECHO (TTE) COMPLETE 06/27/2023    Interpretation Summary  •  Left Ventricle: Normal ventricle size. Estimated EF 65-70% (68% by biplane). No regional wall motion abnormalities. Normal diastolic filling pattern for age.  •  Tricuspid Valve: Normal structure. Mild to moderate regurgitation. The regurgitation jet is eccentric. Estimated RVSP = 39 mmHg. No significant stenosis.  •  Pulmonic Valve: Normal structure. Trace regurgitation. No stenosis.  •  Mitral Valve: Normal leaflet structure. Trace regurgitation. No stenosis.  •  Aortic Valve: Normal structure. No regurgitation. No stenosis. Calculated dimensionless index = 0.64.  •  Right Ventricle: Normal ventricle size. Normal systolic function.  •  Left Atrium: Normal sized atrium.  •  Right Atrium: Normal sized atrium.  •  Aorta: Aortic root normal. Ascending aorta normal-sized. No evidence of coarctation by doppler.  •  IVC/SVC: Inferior vena cava is dilated (>2.1cm). Inferior vena cava collapses <50% during  inspiration.  •  Pericardium: Normal structure.    High RAP and mild secondary pulmonary hypertension.      Results for orders placed during the hospital encounter of 23    Transthoracic echo (TTE) complete    Interpretation Summary  •  Left Ventricle: Normal ventricle size. Estimated EF 65-70% (68% by biplane). No regional wall motion abnormalities. Normal diastolic filling pattern for age.  •  Tricuspid Valve: Normal structure. Mild to moderate regurgitation. The regurgitation jet is eccentric. Estimated RVSP = 39 mmHg. No significant stenosis.  •  Pulmonic Valve: Normal structure. Trace regurgitation. No stenosis.  •  Mitral Valve: Normal leaflet structure. Trace regurgitation. No stenosis.  •  Aortic Valve: Normal structure. No regurgitation. No stenosis. Calculated dimensionless index = 0.64.  •  Right Ventricle: Normal ventricle size. Normal systolic function.  •  Left Atrium: Normal sized atrium.  •  Right Atrium: Normal sized atrium.  •  Aorta: Aortic root normal. Ascending aorta normal-sized. No evidence of coarctation by doppler.  •  IVC/SVC: Inferior vena cava is dilated (>2.1cm). Inferior vena cava collapses <50% during inspiration.  •  Pericardium: Normal structure.    High RAP and mild secondary pulmonary hypertension.               Assessment/Plan        [id number]-Normal sinus rhythm   Possible Inferior infarct , age undetermined   Cannot rule out Anterior infarct , age undetermined   Abnormal ECG     Chronic diastolic congestive heart failure (CMS/HCC)  Doing well off Prednisone with euvolemic on exam.  Would continue PRN use of Torsemide and BP control.  Dyspnea is improved with weight loss and reconditioning.    She favors no stress test at present.    Essential hypertension  BP reduced since stopping Prednisone.  Continue Toprol but stop Lisinopril.      Bilateral leg edema  Resolved, continue PRN Torsemide.    Pure hypertriglyceridemia  Will be on Wegovy and off prednisone and  seroquel so likely to have improvement in Trigs along with diet.         No orders of the defined types were placed in this encounter.      Medications Discontinued During This Encounter   Medication Reason   • azithromycin (ZITHROMAX Z-WOLFGANG) 250 mg tablet    • buprenorphine-naloxone (SUBOXONE) 8-2 mg film    • metoprolol succinate 25 mg capsule,sprinkle,ER 24hr    • QUEtiapine (SEROQUEL) 25 mg tablet    • temazepam (RESTORIL) 15 mg capsule    • zolpidem (AMBIEN) 10 mg tablet         Orders Placed This Encounter   Procedures   • Comprehensive metabolic panel     Standing Status:   Future     Number of Occurrences:   1     Standing Expiration Date:   3/12/2025     Order Specific Question:   Release to patient     Answer:   Immediate [1]   • B-type natriuretic peptide     Standing Status:   Future     Number of Occurrences:   1     Standing Expiration Date:   3/12/2025     Order Specific Question:   Release to patient     Answer:   Immediate [1]   • Lipid panel     Standing Status:   Future     Number of Occurrences:   1     Standing Expiration Date:   3/12/2025     Order Specific Question:   Release to patient     Answer:   Immediate [1]

## 2024-03-06 ENCOUNTER — OFFICE VISIT (OUTPATIENT)
Dept: VASCULAR SURGERY | Facility: CLINIC | Age: 39
End: 2024-03-06
Payer: COMMERCIAL

## 2024-03-06 VITALS
BODY MASS INDEX: 43.35 KG/M2 | WEIGHT: 237 LBS | SYSTOLIC BLOOD PRESSURE: 118 MMHG | OXYGEN SATURATION: 99 % | DIASTOLIC BLOOD PRESSURE: 68 MMHG | HEART RATE: 62 BPM | RESPIRATION RATE: 16 BRPM

## 2024-03-06 DIAGNOSIS — I87.2 VENOUS INSUFFICIENCY: Primary | ICD-10-CM

## 2024-03-06 PROCEDURE — 3078F DIAST BP <80 MM HG: CPT | Performed by: SURGERY

## 2024-03-06 PROCEDURE — 3074F SYST BP LT 130 MM HG: CPT | Performed by: SURGERY

## 2024-03-06 PROCEDURE — 99213 OFFICE O/P EST LOW 20 MIN: CPT | Performed by: SURGERY

## 2024-03-06 PROCEDURE — 3008F BODY MASS INDEX DOCD: CPT | Performed by: SURGERY

## 2024-03-06 NOTE — PROGRESS NOTES
[facility]  With office locations at [facility] and [place]  P: [phone]     F: [phone]       FOLLOW UP VISIT   3/6/2024  Carolyn Redmond               YOB: 1985  [id number]    PCP:  Yumiko Lopez DO    Diagnosis: Venous insufficiency     HISTORY OF PRESENT ILLNESS        Carolyn Redmond is a 38 y.o. female returning to the office for follow-up evaluation after her bilateral greater saphenous vein radiofrequency ablations performed in early January and early February.  I note that her postprocedural ultrasounds demonstrated no evidence of DVT.  At today's visit she reports significant improvement in her edema control.  She has had resolution of her lower leg pain.  She is also lost some weight.  She is quite happy with the outcome.      PAST MEDICAL AND SURGICAL HISTORY        Past Medical History:   Diagnosis Date   • Acute bacterial endocarditis 12/10/2020   • Anxiety    • Depressed    • Endocarditis    • Fibromyalgia    • Migraines    • Obstructive sleep apnea syndrome 01/10/2023    uses CPAP machine   • Osteomyelitis (CMS/[facility]) 01/10/2023   • Pancreatitis    • Recurrent major depressive disorder (CMS/[facility]) 01/06/2023   • Substance abuse (CMS/[facility]) 2015   • Tachycardia    • Vasculitis (CMS/[facility]) 09/26/2017       Past Surgical History:   Procedure Laterality Date   • CHOLECYSTECTOMY     • ERCP     • GALLBLADDER SURGERY     • TONSILLECTOMY  1991       MEDICATIONS        Current Outpatient Medications on File Prior to Visit   Medication Sig Dispense Refill   • albuterol HFA (VENTOLIN HFA) 90 mcg/actuation inhaler Inhale 2 puffs every 4 (four) hours as needed.     • amoxicillin (AMOXIL) 500 mg capsule See admin instr. Before dental procedures     • azithromycin (ZITHROMAX Z-WOLFGANG) 250 mg tablet Take 2 tablets the first day, then 1 tablet daily for 4 days. 6 tablet 0   • buprenorphine-naloxone (SUBOXONE) 8-2 mg film Place 2 Film under the tongue every  morning. In addition to 1/2 film QPM     • desvenlafaxine succinate (PRISTIQ) 50 mg 24 hr ER tablet Take 50 mg by mouth daily.     • ferrous sulfate 325 mg (65 mg iron) tablet Take 65 mg by mouth nightly.     • fluticasone propion-salmeteroL (ADVAIR DISKUS) 250-50 mcg/dose diskus inhaler inhale 1 puff by mouth and INTO THE LUNGS twice a day Rinse mouth after use     • fluticasone propionate (FLONASE) 50 mcg/actuation nasal spray Administer 1 spray into each nostril daily as needed for rhinitis or allergies. 16 g 1   • lisinopriL (PRINIVIL) 5 mg tablet Take 1 tablet (5 mg total) by mouth daily. 90 tablet 1   • magnesium oxide (MAG-OX) 400 mg (241.3 mg magnesium) tablet Take 1 tablet (400 mg total) by mouth daily. 90 tablet 1   • medical marijuana 1 each See admin instr. Please be advised that an [facility] hospital staff member has listed medical marijuana as one of your home medications for documentation purposes. Please follow-up with your certified issuing provider for ongoing use     • metoprolol succinate 25 mg capsule,sprinkle,ER 24hr 1 capsule daily.     • metoprolol succinate XL (TOPROL-XL) 25 mg 24 hr tablet Take 1 tablet (25 mg total) by mouth nightly. 90 tablet 3   • naloxone (NARCAN) 4 mg/actuation spray,non-aerosol nasal spray      • NURTEC ODT 75 mg tablet,disintegrating TAKE 1 TABLET (75 MG TOTAL) BY MOUTH EVERY OTHER DAY 16 tablet 3   • onabotulinumtoxinA (BOTOX) injection Botox for chronic migraine headache every 3 months 1 each 4   • pantoprazole (PROTONIX) 40 mg EC tablet Take 1 tablet (40 mg total) by mouth nightly. 90 tablet 0   • potassium chloride (KLOR-CON M) 20 mEq CR tablet Take 1 tablet (20 mEq total) by mouth daily. 90 tablet 3   • pregabalin (LYRICA) 200 mg capsule Take 1 capsule (200 mg total) by mouth 3 (three) times a day with meals. 90 capsule 2   • QUEtiapine (SEROQUEL) 25 mg tablet Take 1 tablet (25 mg total) by mouth nightly. 30 tablet 0   • semaglutide, weight loss, (WEGOVY) 1 mg/0.5 mL  subcutaneous injection Inject 1 mg under the skin every (seven) 7 days for 12 doses. AFTER COMPLETING 4 WEEKS OF 0.5 MG DOSE 6 mL 1   • SUBLOCADE 300 mg/1.5 mL solution, extended rel syringe subcutaneous injection      • tamsulosin (FLOMAX) 0.4 mg capsule Take 0.4 mg by mouth daily.     • temazepam (RESTORIL) 15 mg capsule Take 30 mg by mouth nightly.     • temazepam (RESTORIL) 30 mg capsule Take 30 mg by mouth nightly.     • TiZANidine (ZANAFLEX) 6 mg capsule Take 1 capsule (6 mg total) by mouth 3 (three) times a day as needed for muscle spasms. 90 capsule 0   • torsemide (DEMADEX) 10 mg tablet Take 1 tablet (10 mg total) by mouth daily. 90 tablet 1   • VITAMIN D3 125 mcg (5,000 unit) tablet Take 5,000 Units by mouth.     • zolpidem (AMBIEN) 10 mg tablet Take 1 tablet (10 mg total) by mouth nightly. (Patient not taking: Reported on 1/23/2024) 30 tablet 0     No current facility-administered medications on file prior to visit.       ALLERGIES        Tramadol, Amoxicillin, Nsaids (non-steroidal anti-inflammatory drug), and Trazodone     PHYSICAL EXAMINATION      Visit Vitals  /68   Pulse 62   Resp 16   Wt 108 kg (237 lb)   SpO2 99%   BMI 43.35 kg/m²     Body mass index is 43.35 kg/m².      PHYSICAL EXAM:    Bilateral leg access sites have healed well.    LABS / IMAGING / EKG     Labs: No new labs    Imaging: I personally reviewed independently interpreted the postprocedural ultrasounds of the left leg performed January 9 and right leg performed February 12 which demonstrate successful ablation with no evidence of deep venous thrombosis.     ASSESSMENT AND PLAN         Problem List Items Addressed This Visit        Circulatory    Venous insufficiency - Primary    Current Assessment & Plan     Carolyn returns following her bilateral greater saphenous vein radiofrequency ablations performed January 3 and February 7.  Since I last saw her, she has lost weight and feels much better.  She reports significant  improvement in control of her lower extremity edema as well as decreased discomfort.  She is quite happy with the result.  Her postprocedural ultrasounds demonstrated no evidence of DVT.  We discussed the ongoing use of compression stockings.  I asked her to return on an as-needed basis.               Return if symptoms worsen or fail to improve.       Talib Richmond MD  Vascular and Endovascular Surgery  [facility]      Thank you very much for allowing us to participate in the care of your patient.  I will keep you informed of Carolyn Redmond's care.  Please not hesitate to call or email if there are any questions.  I can be reached at [phone] (office) or [email or web address].  Sincerely.    Kody Richmond    This document was generated utilizing voice recognition technology. An attempt at proofreading has been made to minimize errors but typographical errors may be present.

## 2024-03-06 NOTE — LETTER
March 6, 2024     Yumiko Lopez DO  [address]  [facility] [address]    Patient: Carolyn Redmond  YOB: 1985  Date of Visit: 3/6/2024      Dear Dr. Lopez:    Thank you for referring Carolyn Redmond to me for evaluation. Below are my notes for this consultation.    If you have questions, please do not hesitate to call me. I look forward to following your patient along with you.         Sincerely,        Talib Richmond MD        CC: No Recipients  Talib Richmond MD  3/6/2024 12:54 PM  Signed       [facility]  With office locations at [facility] and [place]  P: [phone]     F: [phone]       FOLLOW UP VISIT   3/6/2024  Carolyn Redmond               YOB: 1985  [id number]    PCP:  Yumiko Lopez DO    Diagnosis: Venous insufficiency     HISTORY OF PRESENT ILLNESS        Carolyn Redmond is a 38 y.o. female returning to the office for follow-up evaluation after her bilateral greater saphenous vein radiofrequency ablations performed in early January and early February.  I note that her postprocedural ultrasounds demonstrated no evidence of DVT.  At today's visit she reports significant improvement in her edema control.  She has had resolution of her lower leg pain.  She is also lost some weight.  She is quite happy with the outcome.      PAST MEDICAL AND SURGICAL HISTORY        Past Medical History:   Diagnosis Date   • Acute bacterial endocarditis 12/10/2020   • Anxiety    • Depressed    • Endocarditis    • Fibromyalgia    • Migraines    • Obstructive sleep apnea syndrome 01/10/2023    uses CPAP machine   • Osteomyelitis (CMS/[facility]) 01/10/2023   • Pancreatitis    • Recurrent major depressive disorder (CMS/[facility]) 01/06/2023   • Substance abuse (CMS/[facility]) 2015   • Tachycardia    • Vasculitis (CMS/[facility]) 09/26/2017       Past Surgical History:   Procedure Laterality Date   • CHOLECYSTECTOMY     • ERCP     • GALLBLADDER SURGERY     • TONSILLECTOMY   1991       MEDICATIONS        Current Outpatient Medications on File Prior to Visit   Medication Sig Dispense Refill   • albuterol HFA (VENTOLIN HFA) 90 mcg/actuation inhaler Inhale 2 puffs every 4 (four) hours as needed.     • amoxicillin (AMOXIL) 500 mg capsule See admin instr. Before dental procedures     • azithromycin (ZITHROMAX Z-WOLFGANG) 250 mg tablet Take 2 tablets the first day, then 1 tablet daily for 4 days. 6 tablet 0   • buprenorphine-naloxone (SUBOXONE) 8-2 mg film Place 2 Film under the tongue every morning. In addition to 1/2 film QPM     • desvenlafaxine succinate (PRISTIQ) 50 mg 24 hr ER tablet Take 50 mg by mouth daily.     • ferrous sulfate 325 mg (65 mg iron) tablet Take 65 mg by mouth nightly.     • fluticasone propion-salmeteroL (ADVAIR DISKUS) 250-50 mcg/dose diskus inhaler inhale 1 puff by mouth and INTO THE LUNGS twice a day Rinse mouth after use     • fluticasone propionate (FLONASE) 50 mcg/actuation nasal spray Administer 1 spray into each nostril daily as needed for rhinitis or allergies. 16 g 1   • lisinopriL (PRINIVIL) 5 mg tablet Take 1 tablet (5 mg total) by mouth daily. 90 tablet 1   • magnesium oxide (MAG-OX) 400 mg (241.3 mg magnesium) tablet Take 1 tablet (400 mg total) by mouth daily. 90 tablet 1   • medical marijuana 1 each See admin instr. Please be advised that an [facility] hospital staff member has listed medical marijuana as one of your home medications for documentation purposes. Please follow-up with your certified issuing provider for ongoing use     • metoprolol succinate 25 mg capsule,sprinkle,ER 24hr 1 capsule daily.     • metoprolol succinate XL (TOPROL-XL) 25 mg 24 hr tablet Take 1 tablet (25 mg total) by mouth nightly. 90 tablet 3   • naloxone (NARCAN) 4 mg/actuation spray,non-aerosol nasal spray      • NURTEC ODT 75 mg tablet,disintegrating TAKE 1 TABLET (75 MG TOTAL) BY MOUTH EVERY OTHER DAY 16 tablet 3   • onabotulinumtoxinA (BOTOX) injection Botox for chronic migraine  headache every 3 months 1 each 4   • pantoprazole (PROTONIX) 40 mg EC tablet Take 1 tablet (40 mg total) by mouth nightly. 90 tablet 0   • potassium chloride (KLOR-CON M) 20 mEq CR tablet Take 1 tablet (20 mEq total) by mouth daily. 90 tablet 3   • pregabalin (LYRICA) 200 mg capsule Take 1 capsule (200 mg total) by mouth 3 (three) times a day with meals. 90 capsule 2   • QUEtiapine (SEROQUEL) 25 mg tablet Take 1 tablet (25 mg total) by mouth nightly. 30 tablet 0   • semaglutide, weight loss, (WEGOVY) 1 mg/0.5 mL subcutaneous injection Inject 1 mg under the skin every (seven) 7 days for 12 doses. AFTER COMPLETING 4 WEEKS OF 0.5 MG DOSE 6 mL 1   • SUBLOCADE 300 mg/1.5 mL solution, extended rel syringe subcutaneous injection      • tamsulosin (FLOMAX) 0.4 mg capsule Take 0.4 mg by mouth daily.     • temazepam (RESTORIL) 15 mg capsule Take 30 mg by mouth nightly.     • temazepam (RESTORIL) 30 mg capsule Take 30 mg by mouth nightly.     • TiZANidine (ZANAFLEX) 6 mg capsule Take 1 capsule (6 mg total) by mouth 3 (three) times a day as needed for muscle spasms. 90 capsule 0   • torsemide (DEMADEX) 10 mg tablet Take 1 tablet (10 mg total) by mouth daily. 90 tablet 1   • VITAMIN D3 125 mcg (5,000 unit) tablet Take 5,000 Units by mouth.     • zolpidem (AMBIEN) 10 mg tablet Take 1 tablet (10 mg total) by mouth nightly. (Patient not taking: Reported on 1/23/2024) 30 tablet 0     No current facility-administered medications on file prior to visit.       ALLERGIES        Tramadol, Amoxicillin, Nsaids (non-steroidal anti-inflammatory drug), and Trazodone     PHYSICAL EXAMINATION      Visit Vitals  /68   Pulse 62   Resp 16   Wt 108 kg (237 lb)   SpO2 99%   BMI 43.35 kg/m²     Body mass index is 43.35 kg/m².      PHYSICAL EXAM:    Bilateral leg access sites have healed well.    LABS / IMAGING / EKG     Labs: No new labs    Imaging: I personally reviewed independently interpreted the postprocedural ultrasounds of the left leg  performed January 9 and right leg performed February 12 which demonstrate successful ablation with no evidence of deep venous thrombosis.     ASSESSMENT AND PLAN         Problem List Items Addressed This Visit        Circulatory    Venous insufficiency - Primary    Current Assessment & Plan     Carolyn returns following her bilateral greater saphenous vein radiofrequency ablations performed January 3 and February 7.  Since I last saw her, she has lost weight and feels much better.  She reports significant improvement in control of her lower extremity edema as well as decreased discomfort.  She is quite happy with the result.  Her postprocedural ultrasounds demonstrated no evidence of DVT.  We discussed the ongoing use of compression stockings.  I asked her to return on an as-needed basis.               Return if symptoms worsen or fail to improve.       Talib Richmond MD  Vascular and Endovascular Surgery  [facility]      Thank you very much for allowing us to participate in the care of your patient.  I will keep you informed of Carolyn Redmond's care.  Please not hesitate to call or email if there are any questions.  I can be reached at [phone] (office) or [email or web address].  Sincerely.    Kody Richmond    This document was generated utilizing voice recognition technology. An attempt at proofreading has been made to minimize errors but typographical errors may be present.

## 2024-03-06 NOTE — ASSESSMENT & PLAN NOTE
Carolyn returns following her bilateral greater saphenous vein radiofrequency ablations performed January 3 and February 7.  Since I last saw her, she has lost weight and feels much better.  She reports significant improvement in control of her lower extremity edema as well as decreased discomfort.  She is quite happy with the result.  Her postprocedural ultrasounds demonstrated no evidence of DVT.  We discussed the ongoing use of compression stockings.  I asked her to return on an as-needed basis.

## 2024-03-12 ENCOUNTER — OFFICE VISIT (OUTPATIENT)
Dept: CARDIOLOGY | Facility: CLINIC | Age: 39
End: 2024-03-12
Payer: COMMERCIAL

## 2024-03-12 VITALS
DIASTOLIC BLOOD PRESSURE: 70 MMHG | WEIGHT: 234 LBS | RESPIRATION RATE: 16 BRPM | SYSTOLIC BLOOD PRESSURE: 94 MMHG | HEIGHT: 63 IN | BODY MASS INDEX: 41.46 KG/M2 | OXYGEN SATURATION: 97 % | HEART RATE: 83 BPM

## 2024-03-12 DIAGNOSIS — I10 ESSENTIAL HYPERTENSION: ICD-10-CM

## 2024-03-12 DIAGNOSIS — R60.0 BILATERAL LEG EDEMA: ICD-10-CM

## 2024-03-12 DIAGNOSIS — E78.1 PURE HYPERTRIGLYCERIDEMIA: ICD-10-CM

## 2024-03-12 DIAGNOSIS — I27.20 PULMONARY HYPERTENSION (CMS/HCC): ICD-10-CM

## 2024-03-12 DIAGNOSIS — I50.32 CHRONIC DIASTOLIC CONGESTIVE HEART FAILURE (CMS/HCC): ICD-10-CM

## 2024-03-12 DIAGNOSIS — I50.9 CONGESTIVE HEART FAILURE, UNSPECIFIED HF CHRONICITY, UNSPECIFIED HEART FAILURE TYPE (CMS/HCC): Primary | ICD-10-CM

## 2024-03-12 PROCEDURE — 3008F BODY MASS INDEX DOCD: CPT | Performed by: INTERNAL MEDICINE

## 2024-03-12 PROCEDURE — 3074F SYST BP LT 130 MM HG: CPT | Performed by: INTERNAL MEDICINE

## 2024-03-12 PROCEDURE — 3078F DIAST BP <80 MM HG: CPT | Performed by: INTERNAL MEDICINE

## 2024-03-12 PROCEDURE — 99214 OFFICE O/P EST MOD 30 MIN: CPT | Performed by: INTERNAL MEDICINE

## 2024-03-12 RX ORDER — LISINOPRIL 5 MG/1
5 TABLET ORAL DAILY
Qty: 90 TABLET | Refills: 1 | Status: CANCELLED | OUTPATIENT
Start: 2024-03-12 | End: 2024-09-08

## 2024-03-12 ASSESSMENT — ENCOUNTER SYMPTOMS
DYSPNEA ON EXERTION: 1
PALPITATIONS: 0
CLAUDICATION: 0
SYNCOPE: 0
IRREGULAR HEARTBEAT: 0

## 2024-03-12 NOTE — LETTER
March 12, 2024     Yumiko Lopez DO  [address]  [facility] [address]    Patient: Carolyn Redmond  YOB: 1985  Date of Visit: 3/12/2024      Dear Dr. Lopez:    Thank you for referring Carolyn Redmond to me for evaluation. Below are my notes for this consultation.    If you have questions, please do not hesitate to call me. I look forward to following your patient along with you.     She's doing great off of Seroquel and Prednisone.  Stopping lisinopril due to reduction in pressures.    Sincerely,        Fabricio Middleton MD        CC: No Recipients    Fabricio Middleton MD  3/12/2024  1:25 PM  Sign when Signing Visit  Carolyn Redmond is a 38 y.o. female is present in the office today for follow up    PMHX- CHF, HLD, Heart palpitations and LE Swelling       06/27/2023 ECHO- High RAP and mild secondary pulmonary hypertension.    Doing sleep apnea CPAP   Torsemide is working better for edema    Only taking torsemide prn now (didn't want to drink too much water and was having migraines and stomach pains)  Was having more loose bowels   And was having the inconvenience of find a bathroom      Had ablation in veins in legs got done about a month ago    Has noticed improvement with fluid retention now off the prednisone not really retaining fluid.         Past Medical History:   Diagnosis Date   • Acute bacterial endocarditis 12/10/2020   • Anxiety    • Depressed    • Endocarditis    • Fibromyalgia    • Migraines    • Obstructive sleep apnea syndrome 01/10/2023    uses CPAP machine   • Osteomyelitis (CMS/[facility]) 01/10/2023   • Pancreatitis    • Recurrent major depressive disorder (CMS/[facility]) 01/06/2023   • Substance abuse (CMS/[facility]) 2015   • Tachycardia    • Vasculitis (CMS/[facility]) 09/26/2017       Past Surgical History:   Procedure Laterality Date   • CHOLECYSTECTOMY     • ERCP     • GALLBLADDER SURGERY     • TONSILLECTOMY  1991        Social History     Socioeconomic History   • Marital status: Single      Spouse name: None   • Number of children: None   • Years of education: None   • Highest education level: None   Occupational History   • Occupation:    Tobacco Use   • Smoking status: Former     Packs/day: 0.50     Years: 5.00     Additional pack years: 0.00     Total pack years: 2.50     Types: Cigarettes     Quit date:      Years since quittin.1   • Smokeless tobacco: Current   • Tobacco comments:     quit 1 year ago, vapes currently   Vaping Use   • Vaping Use: Every day   • Substances: Nicotine, Flavoring   • Devices: Refillable tank   Substance and Sexual Activity   • Alcohol use: Not Currently   • Drug use: Not Currently     Types: Marijuana, Heroin     Comment: MM now , previous heroin and opiod   • Sexual activity: Not Currently     Partners: Male, Female   Social History Narrative    Lives with mom     Social Determinants of Health     Financial Resource Strain: Low Risk  (2023)    Overall Financial Resource Strain (CARDIA)    • Difficulty of Paying Living Expenses: Not hard at all   Food Insecurity: No Food Insecurity (2023)    Hunger Vital Sign    • Worried About Running Out of Food in the Last Year: Never true    • Ran Out of Food in the Last Year: Never true   Transportation Needs: No Transportation Needs (2023)    PRAPARE - Transportation    • Lack of Transportation (Medical): No    • Lack of Transportation (Non-Medical): No   Housing Stability: Low Risk  (2023)    Housing Stability Vital Sign    • Unable to Pay for Housing in the Last Year: No    • Number of Places Lived in the Last Year: 1    • Unstable Housing in the Last Year: No       Family History   Problem Relation Age of Onset   • Hypertension Biological Mother    • Lung cancer Biological Father 76        smoker   • Lung cancer Maternal Grandmother 69        smoker   • Heart disease Maternal Grandfather    • Heart attack Maternal Grandfather         unsure of age   • Colon cancer Maternal  Grandfather        Tramadol, Amoxicillin, Nsaids (non-steroidal anti-inflammatory drug), and Trazodone    Current Outpatient Medications   Medication Sig Dispense Refill   • albuterol HFA (VENTOLIN HFA) 90 mcg/actuation inhaler Inhale 2 puffs every 4 (four) hours as needed.     • amoxicillin (AMOXIL) 500 mg capsule See admin instr. Before dental procedures     • desvenlafaxine succinate (PRISTIQ) 50 mg 24 hr ER tablet Take 50 mg by mouth daily.     • ferrous sulfate 325 mg (65 mg iron) tablet Take 65 mg by mouth nightly.     • fluticasone propion-salmeteroL (ADVAIR DISKUS) 250-50 mcg/dose diskus inhaler inhale 1 puff by mouth and INTO THE LUNGS twice a day Rinse mouth after use     • fluticasone propionate (FLONASE) 50 mcg/actuation nasal spray Administer 1 spray into each nostril daily as needed for rhinitis or allergies. 16 g 1   • lisinopriL (PRINIVIL) 5 mg tablet Take 1 tablet (5 mg total) by mouth daily. 90 tablet 1   • magnesium oxide (MAG-OX) 400 mg (241.3 mg magnesium) tablet Take 1 tablet (400 mg total) by mouth daily. 90 tablet 1   • medical marijuana 1 each See admin instr. Please be advised that an [facility] hospital staff member has listed medical marijuana as one of your home medications for documentation purposes. Please follow-up with your certified issuing provider for ongoing use     • metoprolol succinate XL (TOPROL-XL) 25 mg 24 hr tablet Take 1 tablet (25 mg total) by mouth nightly. 90 tablet 3   • naloxone (NARCAN) 4 mg/actuation spray,non-aerosol nasal spray      • NURTEC ODT 75 mg tablet,disintegrating TAKE 1 TABLET (75 MG TOTAL) BY MOUTH EVERY OTHER DAY 16 tablet 3   • onabotulinumtoxinA (BOTOX) injection Botox for chronic migraine headache every 3 months 1 each 4   • pantoprazole (PROTONIX) 40 mg EC tablet Take 1 tablet (40 mg total) by mouth nightly. 90 tablet 0   • potassium chloride (KLOR-CON M) 20 mEq CR tablet Take 1 tablet (20 mEq total) by mouth daily. 90 tablet 3   • pregabalin (LYRICA)  200 mg capsule Take 1 capsule (200 mg total) by mouth 3 (three) times a day with meals. 90 capsule 2   • semaglutide, weight loss, (WEGOVY) 1 mg/0.5 mL subcutaneous injection Inject 1 mg under the skin every (seven) 7 days for 12 doses. AFTER COMPLETING 4 WEEKS OF 0.5 MG DOSE 6 mL 1   • SUBLOCADE 300 mg/1.5 mL solution, extended rel syringe subcutaneous injection 1x monthly     • tamsulosin (FLOMAX) 0.4 mg capsule Take 0.4 mg by mouth daily.     • temazepam (RESTORIL) 30 mg capsule Take 30 mg by mouth nightly.     • TiZANidine (ZANAFLEX) 6 mg capsule Take 1 capsule (6 mg total) by mouth 3 (three) times a day as needed for muscle spasms. 90 capsule 0   • VITAMIN D3 125 mcg (5,000 unit) tablet Take 5,000 Units by mouth.     • torsemide (DEMADEX) 10 mg tablet Take 1 tablet (10 mg total) by mouth daily. (Patient not taking: Reported on 3/12/2024) 90 tablet 1     No current facility-administered medications for this visit.       Review of Systems   Cardiovascular: Positive for dyspnea on exertion. Negative for chest pain, claudication, irregular heartbeat, leg swelling, palpitations and syncope.          Vitals:    03/12/24 1257   BP: 94/70   Pulse: 83   Resp: 16   SpO2: 97%       Body mass index is 41.45 kg/m².      Physical Exam  Constitutional:       Appearance: Normal appearance. She is obese. She is not ill-appearing.   HENT:      Head: Normocephalic.      Mouth/Throat:      Mouth: Mucous membranes are moist.      Pharynx: No posterior oropharyngeal erythema.   Eyes:      Extraocular Movements: Extraocular movements intact.   Cardiovascular:      Rate and Rhythm: Normal rate and regular rhythm.      Chest Wall: PMI is not displaced.      Pulses: Normal pulses.      Heart sounds: S1 normal and S2 normal. No murmur heard.     No gallop. No S3 or S4 sounds.   Pulmonary:      Effort: Pulmonary effort is normal.      Breath sounds: Normal breath sounds.   Abdominal:      General: Abdomen is flat. Bowel sounds are normal.  There is no distension.      Tenderness: There is no abdominal tenderness.   Musculoskeletal:      Cervical back: Neck supple.      Right lower leg: No edema.      Left lower leg: No edema.      Comments: Functional status is:  good   Skin:     General: Skin is warm.   Neurological:      Mental Status: She is alert and oriented to person, place, and time. Mental status is at baseline.   Psychiatric:         Attention and Perception: Attention normal.         Mood and Affect: Mood and affect normal.         Speech: Speech normal.          Lab Results   Component Value Date    WBC 8.2 10/09/2023    RBC 4.14 10/09/2023    HGB 11.8 10/09/2023    HCT 35.6 10/09/2023    MCV 86 10/09/2023    MCH 28.5 10/09/2023    MCHC 33.1 10/09/2023    RDW 14.1 10/09/2023     10/09/2023        Lab Results   Component Value Date    GLUCOSE 92 10/09/2023    BUN 8 10/09/2023    CREATININE 0.68 10/09/2023    EGFR 116 10/09/2023    BUNCREAT 12 10/09/2023     10/09/2023    K 5.1 10/09/2023     10/09/2023    CO2 24 10/09/2023    CALCIUM 9.8 08/10/2023    PROTEIN 6.5 10/09/2023    ALBUMIN 4.1 10/09/2023    GLOB 2.4 10/09/2023    AGRATIO 1.7 10/09/2023    BILITOT 0.3 10/09/2023    ALKPHOS 83 10/09/2023    AST 16 10/09/2023    ALT 12 10/09/2023        Lab Results   Component Value Date    HGBA1C 5.4 06/26/2023    ESTAVGGLUC 108 06/26/2023        Lab Results   Component Value Date    ALBUMIN 4.1 10/09/2023        Lab Results   Component Value Date    CHOL 187 10/09/2023    TRIG 159 (H) 10/09/2023    HDL 47 10/09/2023    VLDL 28 10/09/2023    LDLCALC 112 (H) 10/09/2023        Cardiac Imaging    TRANSTHORACIC ECHO (TTE) COMPLETE 06/27/2023    Interpretation Summary  •  Left Ventricle: Normal ventricle size. Estimated EF 65-70% (68% by biplane). No regional wall motion abnormalities. Normal diastolic filling pattern for age.  •  Tricuspid Valve: Normal structure. Mild to moderate regurgitation. The regurgitation jet is eccentric.  Estimated RVSP = 39 mmHg. No significant stenosis.  •  Pulmonic Valve: Normal structure. Trace regurgitation. No stenosis.  •  Mitral Valve: Normal leaflet structure. Trace regurgitation. No stenosis.  •  Aortic Valve: Normal structure. No regurgitation. No stenosis. Calculated dimensionless index = 0.64.  •  Right Ventricle: Normal ventricle size. Normal systolic function.  •  Left Atrium: Normal sized atrium.  •  Right Atrium: Normal sized atrium.  •  Aorta: Aortic root normal. Ascending aorta normal-sized. No evidence of coarctation by doppler.  •  IVC/SVC: Inferior vena cava is dilated (>2.1cm). Inferior vena cava collapses <50% during inspiration.  •  Pericardium: Normal structure.    High RAP and mild secondary pulmonary hypertension.      Results for orders placed during the hospital encounter of 06/26/23    Transthoracic echo (TTE) complete    Interpretation Summary  •  Left Ventricle: Normal ventricle size. Estimated EF 65-70% (68% by biplane). No regional wall motion abnormalities. Normal diastolic filling pattern for age.  •  Tricuspid Valve: Normal structure. Mild to moderate regurgitation. The regurgitation jet is eccentric. Estimated RVSP = 39 mmHg. No significant stenosis.  •  Pulmonic Valve: Normal structure. Trace regurgitation. No stenosis.  •  Mitral Valve: Normal leaflet structure. Trace regurgitation. No stenosis.  •  Aortic Valve: Normal structure. No regurgitation. No stenosis. Calculated dimensionless index = 0.64.  •  Right Ventricle: Normal ventricle size. Normal systolic function.  •  Left Atrium: Normal sized atrium.  •  Right Atrium: Normal sized atrium.  •  Aorta: Aortic root normal. Ascending aorta normal-sized. No evidence of coarctation by doppler.  •  IVC/SVC: Inferior vena cava is dilated (>2.1cm). Inferior vena cava collapses <50% during inspiration.  •  Pericardium: Normal structure.    High RAP and mild secondary pulmonary hypertension.               Assessment/Plan        EKG:  06/26/2023-Normal sinus rhythm   Possible Inferior infarct , age undetermined   Cannot rule out Anterior infarct , age undetermined   Abnormal ECG     Chronic diastolic congestive heart failure (CMS/HCC)  Doing well off Prednisone with euvolemic on exam.  Would continue PRN use of Torsemide and BP control.  Dyspnea is improved with weight loss and reconditioning.    She favors no stress test at present.    Essential hypertension  BP reduced since stopping Prednisone.  Continue Toprol but stop Lisinopril.      Bilateral leg edema  Resolved, continue PRN Torsemide.    Pure hypertriglyceridemia  Will be on Wegovy and off prednisone and seroquel so likely to have improvement in Trigs along with diet.         No orders of the defined types were placed in this encounter.      Medications Discontinued During This Encounter   Medication Reason   • azithromycin (ZITHROMAX Z-WOLFGANG) 250 mg tablet    • buprenorphine-naloxone (SUBOXONE) 8-2 mg film    • metoprolol succinate 25 mg capsule,sprinkle,ER 24hr    • QUEtiapine (SEROQUEL) 25 mg tablet    • temazepam (RESTORIL) 15 mg capsule    • zolpidem (AMBIEN) 10 mg tablet         Orders Placed This Encounter   Procedures   • Comprehensive metabolic panel     Standing Status:   Future     Number of Occurrences:   1     Standing Expiration Date:   3/12/2025     Order Specific Question:   Release to patient     Answer:   Immediate [1]   • B-type natriuretic peptide     Standing Status:   Future     Number of Occurrences:   1     Standing Expiration Date:   3/12/2025     Order Specific Question:   Release to patient     Answer:   Immediate [1]   • Lipid panel     Standing Status:   Future     Number of Occurrences:   1     Standing Expiration Date:   3/12/2025     Order Specific Question:   Release to patient     Answer:   Immediate [1]

## 2024-03-12 NOTE — ASSESSMENT & PLAN NOTE
Doing well off Prednisone with euvolemic on exam.  Would continue PRN use of Torsemide and BP control.  Dyspnea is improved with weight loss and reconditioning.    She favors no stress test at present.

## 2024-03-12 NOTE — PATIENT INSTRUCTIONS
Keep and eye on BP and call if over 130/80.    Continue healthy diet and exercise as tolerated.    If breathing is getting worse call for stress test.    Doing great off of Seroquel and Prednisone.

## 2024-03-12 NOTE — ASSESSMENT & PLAN NOTE
Will be on Wegovy and off prednisone and seroquel so likely to have improvement in Trigs along with diet.

## 2024-03-18 ENCOUNTER — TELEMEDICINE (OUTPATIENT)
Dept: BEHAVIORAL/MENTAL HEALTH CLINIC | Facility: CLINIC | Age: 39
End: 2024-03-18
Payer: COMMERCIAL

## 2024-03-18 DIAGNOSIS — F33.0 MILD EPISODE OF RECURRENT MAJOR DEPRESSIVE DISORDER (HCC): ICD-10-CM

## 2024-03-18 DIAGNOSIS — F41.1 GENERALIZED ANXIETY DISORDER: Primary | ICD-10-CM

## 2024-03-18 PROCEDURE — 90837 PSYTX W PT 60 MINUTES: CPT | Performed by: COUNSELOR

## 2024-03-18 RX ORDER — PANTOPRAZOLE SODIUM 40 MG/1
40 TABLET, DELAYED RELEASE ORAL SEE ADMIN INSTRUCTIONS
Qty: 90 TABLET | Refills: 0 | Status: SHIPPED | OUTPATIENT
Start: 2024-03-18 | End: 2024-06-24

## 2024-03-18 NOTE — TELEPHONE ENCOUNTER
Medicine Refill Request    Last Office Visit: 1/16/2024   Last Consult Visit: Visit date not found  Last Telemedicine Visit: 1/30/2024 Yumiko Lopez,     Next Appointment: 4/1/2024      Current Outpatient Medications:     albuterol HFA (VENTOLIN HFA) 90 mcg/actuation inhaler, Inhale 2 puffs every 4 (four) hours as needed., Disp: , Rfl:     amoxicillin (AMOXIL) 500 mg capsule, See admin instr. Before dental procedures, Disp: , Rfl:     desvenlafaxine succinate (PRISTIQ) 50 mg 24 hr ER tablet, Take 50 mg by mouth daily., Disp: , Rfl:     ferrous sulfate 325 mg (65 mg iron) tablet, Take 65 mg by mouth nightly., Disp: , Rfl:     fluticasone propion-salmeteroL (ADVAIR DISKUS) 250-50 mcg/dose diskus inhaler, inhale 1 puff by mouth and INTO THE LUNGS twice a day Rinse mouth after use, Disp: , Rfl:     fluticasone propionate (FLONASE) 50 mcg/actuation nasal spray, Administer 1 spray into each nostril daily as needed for rhinitis or allergies., Disp: 16 g, Rfl: 1    lisinopriL (PRINIVIL) 5 mg tablet, Take 1 tablet (5 mg total) by mouth daily., Disp: 90 tablet, Rfl: 1    magnesium oxide (MAG-OX) 400 mg (241.3 mg magnesium) tablet, Take 1 tablet (400 mg total) by mouth daily., Disp: 90 tablet, Rfl: 1    medical marijuana, 1 each See admin instr. Please be advised that an [facility] hospital staff member has listed medical marijuana as one of your home medications for documentation purposes. Please follow-up with your certified issuing provider for ongoing use, Disp: , Rfl:     metoprolol succinate XL (TOPROL-XL) 25 mg 24 hr tablet, Take 1 tablet (25 mg total) by mouth nightly., Disp: 90 tablet, Rfl: 3    naloxone (NARCAN) 4 mg/actuation spray,non-aerosol nasal spray, , Disp: , Rfl:     NURTEC ODT 75 mg tablet,disintegrating, TAKE 1 TABLET (75 MG TOTAL) BY MOUTH EVERY OTHER DAY, Disp: 16 tablet, Rfl: 3    onabotulinumtoxinA (BOTOX) injection, Botox for chronic migraine headache every 3 months, Disp: 1 each, Rfl: 4    pantoprazole  (PROTONIX) 40 mg EC tablet, Take 1 tablet (40 mg total) by mouth nightly., Disp: 90 tablet, Rfl: 0    potassium chloride (KLOR-CON M) 20 mEq CR tablet, Take 1 tablet (20 mEq total) by mouth daily., Disp: 90 tablet, Rfl: 3    pregabalin (LYRICA) 200 mg capsule, Take 1 capsule (200 mg total) by mouth 3 (three) times a day with meals., Disp: 90 capsule, Rfl: 2    semaglutide, weight loss, (WEGOVY) 1 mg/0.5 mL subcutaneous injection, Inject 1 mg under the skin every (seven) 7 days for 12 doses. AFTER COMPLETING 4 WEEKS OF 0.5 MG DOSE, Disp: 6 mL, Rfl: 1    SUBLOCADE 300 mg/1.5 mL solution, extended rel syringe subcutaneous injection, 1x monthly, Disp: , Rfl:     tamsulosin (FLOMAX) 0.4 mg capsule, Take 0.4 mg by mouth daily., Disp: , Rfl:     temazepam (RESTORIL) 30 mg capsule, Take 30 mg by mouth nightly., Disp: , Rfl:     TiZANidine (ZANAFLEX) 6 mg capsule, Take 1 capsule (6 mg total) by mouth 3 (three) times a day as needed for muscle spasms., Disp: 90 capsule, Rfl: 0    torsemide (DEMADEX) 10 mg tablet, Take 1 tablet (10 mg total) by mouth daily. (Patient not taking: Reported on 3/12/2024), Disp: 90 tablet, Rfl: 1    VITAMIN D3 125 mcg (5,000 unit) tablet, Take 5,000 Units by mouth., Disp: , Rfl:       BP Readings from Last 3 Encounters:   03/12/24 94/70   03/06/24 118/68   02/21/24 112/76       Recent Lab results:  Lab Results   Component Value Date    CHOL 187 10/09/2023   ,   Lab Results   Component Value Date    HDL 47 10/09/2023   ,   Lab Results   Component Value Date    LDLCALC 112 (H) 10/09/2023   ,   Lab Results   Component Value Date    TRIG 159 (H) 10/09/2023        Lab Results   Component Value Date    GLUCOSE 92 10/09/2023   ,   Lab Results   Component Value Date    HGBA1C 5.4 06/26/2023         Lab Results   Component Value Date    CREATININE 0.68 10/09/2023       Lab Results   Component Value Date    TSH 2.500 06/23/2023           Lab Results   Component Value Date    HGBA1C 5.4 06/26/2023

## 2024-03-18 NOTE — PSYCH
Virtual Regular Visit    Verification of patient location:    Patient is located at Home in the following state in which I hold an active license [place]      Assessment/Plan:    Problem List Items Addressed This Visit        Behavioral Health    Recurrent major depressive disorder (HCC)    Generalized anxiety disorder - Primary       Goals addressed in session: Goal 1          Reason for visit is   Chief Complaint   Patient presents with   • Virtual Regular Visit          Encounter provider GAMALIEL Pennington    Provider located at [facility] THERAPIST [facility]  [facility] THERAPIST MENTAL HEALTH OUTPATIENT  [address]  [facility] [place] [address] [phone]      Recent Visits  No visits were found meeting these conditions.  Showing recent visits within past 7 days and meeting all other requirements  Today's Visits  Date Type Provider Dept   03/18/24 Telemedicine GAMALIEL Pen[address]ton [facility] Therap[facility]   Showing today's visits and meeting all other requirements  Future Appointments  No visits were found meeting these conditions.  Showing future appointments within next 150 days and meeting all other requirements       The patient was identified by name and date of birth. Samantha Vila was informed that this is a telemedicine visit and that the visit is being conducted throughthe [employer] platform. She agrees to proceed..  My office door was closed. No one else was in the room.  She acknowledged consent and understanding of privacy and security of the video platform. The patient has agreed to participate and understands they can discontinue the visit at any time.    Patient is aware this is a billable service.     Subjective  Samantha Vila is a 38 y.o. female  .      HPI     No past medical history on file.    No past surgical history on file.    No current outpatient medications on file.     No current facility-administered medications for this visit.        Not on  "File    Review of Systems    Video Exam    There were no vitals filed for this visit.    Physical Exam   Behavioral Health Psychotherapy Progress Note    Psychotherapy Provided: Individual Psychotherapy     1. Generalized anxiety disorder        2. Mild episode of recurrent major depressive disorder (HCC)            Goals addressed in session: Goal 1     DATA:     -CT reported that she received a positive disability decision for her case. CT reflected on what this may mean for her future as well as the toll the stress of it has been creating in her life.   -CT reflected on her experience with the process. CT noted the valuable lessons she gained through the last few years (meditation, yoga, ability to return to sense of presence and be in a patient head space.)  -CT reflected on the mental, emotional struggles to get to the place where she is now.   -CT described and reflected on her recent experience with a [employer] class she attended last week. This was a powerful and refreshing experience that she will likely continue.  -CT reflected on updates she has learned about her \"friend\" she had lived with prior to moving home with her mother and aunt. CT reflected on how this experience positively effected CT in the long run even though it was not positive initially.      During this session, this clinician used the following therapeutic modalities: Supportive Psychotherapy    Substance Abuse was not addressed during this session. If the client is diagnosed with a co-occurring substance use disorder, please indicate any changes in the frequency or amount of use: . Stage of change for addressing substance use diagnoses: No substance use/Not applicable    ASSESSMENT:  Samantha Vila presents with a Euthymic/ normal mood.     her affect is Normal range and intensity, which is congruent, with her mood and the content of the session. The client has made progress on their goals.     Samantha Vila presents with a " "none risk of suicide, none risk of self-harm, and none risk of harm to others.    For any risk assessment that surpasses a \"low\" rating, a safety plan must be developed.    A safety plan was indicated: no  If yes, describe in detail     PLAN: Between sessions, Samantha Vila will prioritize her rest, consider her plans for the future now that disability benefit was approved (follow through with next steps to receive), and continue with yoga practice including attending classes as able. . At the next session, the therapist will use Supportive Psychotherapy to support wellbeing and future plans regarding disability benefit.  Update tx plan.    Behavioral Health Treatment Plan and Discharge Planning: Samantha Vila is aware of and agrees to continue to work on their treatment plan. They have identified and are working toward their discharge goals. yes    Visit start and stop times:    03/18/24  Start Time: 1205  Stop Time: 1306  Total Visit Time: 61 minutes      "

## 2024-03-29 ENCOUNTER — TRANSCRIBE ORDERS (OUTPATIENT)
Dept: SCHEDULING | Age: 39
End: 2024-03-29

## 2024-03-29 DIAGNOSIS — J32.9 CHRONIC SINUSITIS, UNSPECIFIED: Primary | ICD-10-CM

## 2024-04-06 DIAGNOSIS — M48.061 SPINAL STENOSIS OF LUMBAR REGION, UNSPECIFIED WHETHER NEUROGENIC CLAUDICATION PRESENT: ICD-10-CM

## 2024-04-08 RX ORDER — PREGABALIN 200 MG/1
200 CAPSULE ORAL
Qty: 90 CAPSULE | Refills: 2 | Status: SHIPPED | OUTPATIENT
Start: 2024-04-11 | End: 2024-06-27 | Stop reason: SDUPTHER

## 2024-04-08 NOTE — TELEPHONE ENCOUNTER
Medicine Refill Request    Last Office Visit: 1/16/2024   Last Consult Visit: Visit date not found  Last Telemedicine Visit: 1/30/2024 Yumiko Lopez,     Next Appointment: 6/27/2024      Current Outpatient Medications:     albuterol HFA (VENTOLIN HFA) 90 mcg/actuation inhaler, Inhale 2 puffs every 4 (four) hours as needed., Disp: , Rfl:     amoxicillin (AMOXIL) 500 mg capsule, See admin instr. Before dental procedures, Disp: , Rfl:     desvenlafaxine succinate (PRISTIQ) 50 mg 24 hr ER tablet, Take 50 mg by mouth daily., Disp: , Rfl:     ferrous sulfate 325 mg (65 mg iron) tablet, Take 65 mg by mouth nightly., Disp: , Rfl:     fluticasone propion-salmeteroL (ADVAIR DISKUS) 250-50 mcg/dose diskus inhaler, inhale 1 puff by mouth and INTO THE LUNGS twice a day Rinse mouth after use, Disp: , Rfl:     fluticasone propionate (FLONASE) 50 mcg/actuation nasal spray, Administer 1 spray into each nostril daily as needed for rhinitis or allergies., Disp: 16 g, Rfl: 1    lisinopriL (PRINIVIL) 5 mg tablet, Take 1 tablet (5 mg total) by mouth daily., Disp: 90 tablet, Rfl: 1    magnesium oxide (MAG-OX) 400 mg (241.3 mg magnesium) tablet, Take 1 tablet (400 mg total) by mouth daily., Disp: 90 tablet, Rfl: 1    medical marijuana, 1 each See admin instr. Please be advised that an [facility] hospital staff member has listed medical marijuana as one of your home medications for documentation purposes. Please follow-up with your certified issuing provider for ongoing use, Disp: , Rfl:     metoprolol succinate XL (TOPROL-XL) 25 mg 24 hr tablet, Take 1 tablet (25 mg total) by mouth nightly., Disp: 90 tablet, Rfl: 3    naloxone (NARCAN) 4 mg/actuation spray,non-aerosol nasal spray, , Disp: , Rfl:     NURTEC ODT 75 mg tablet,disintegrating, TAKE 1 TABLET (75 MG TOTAL) BY MOUTH EVERY OTHER DAY, Disp: 16 tablet, Rfl: 3    onabotulinumtoxinA (BOTOX) injection, Botox for chronic migraine headache every 3 months, Disp: 1 each, Rfl: 4    pantoprazole  (PROTONIX) 40 mg EC tablet, TAKE 1 TABLET BY MOUTH EVERY DAY AT NIGHT, Disp: 90 tablet, Rfl: 0    potassium chloride (KLOR-CON M) 20 mEq CR tablet, Take 1 tablet (20 mEq total) by mouth daily., Disp: 90 tablet, Rfl: 3    pregabalin (LYRICA) 200 mg capsule, Take 1 capsule (200 mg total) by mouth 3 (three) times a day with meals., Disp: 90 capsule, Rfl: 2    SUBLOCADE 300 mg/1.5 mL solution, extended rel syringe subcutaneous injection, 1x monthly, Disp: , Rfl:     tamsulosin (FLOMAX) 0.4 mg capsule, Take 0.4 mg by mouth daily., Disp: , Rfl:     temazepam (RESTORIL) 30 mg capsule, Take 30 mg by mouth nightly., Disp: , Rfl:     TiZANidine (ZANAFLEX) 6 mg capsule, Take 1 capsule (6 mg total) by mouth 3 (three) times a day as needed for muscle spasms., Disp: 90 capsule, Rfl: 0    torsemide (DEMADEX) 10 mg tablet, Take 1 tablet (10 mg total) by mouth daily. (Patient not taking: Reported on 3/12/2024), Disp: 90 tablet, Rfl: 1    VITAMIN D3 125 mcg (5,000 unit) tablet, Take 5,000 Units by mouth., Disp: , Rfl:       BP Readings from Last 3 Encounters:   03/12/24 94/70   03/06/24 118/68   02/21/24 112/76       Recent Lab results:  Lab Results   Component Value Date    CHOL 187 10/09/2023   ,   Lab Results   Component Value Date    HDL 47 10/09/2023   ,   Lab Results   Component Value Date    LDLCALC 112 (H) 10/09/2023   ,   Lab Results   Component Value Date    TRIG 159 (H) 10/09/2023        Lab Results   Component Value Date    GLUCOSE 92 10/09/2023   ,   Lab Results   Component Value Date    HGBA1C 5.4 06/26/2023         Lab Results   Component Value Date    CREATININE 0.68 10/09/2023       Lab Results   Component Value Date    TSH 2.500 06/23/2023           Lab Results   Component Value Date    HGBA1C 5.4 06/26/2023

## 2024-04-11 ENCOUNTER — TELEPHONE (OUTPATIENT)
Dept: PRIMARY CARE | Facility: CLINIC | Age: 39
End: 2024-04-11
Payer: COMMERCIAL

## 2024-04-11 ENCOUNTER — HOSPITAL ENCOUNTER (OUTPATIENT)
Dept: RADIOLOGY | Age: 39
Discharge: HOME | End: 2024-04-11
Attending: OTOLARYNGOLOGY
Payer: COMMERCIAL

## 2024-04-11 DIAGNOSIS — J01.00 ACUTE NON-RECURRENT MAXILLARY SINUSITIS: ICD-10-CM

## 2024-04-11 DIAGNOSIS — J32.9 CHRONIC SINUSITIS, UNSPECIFIED: ICD-10-CM

## 2024-04-11 PROCEDURE — 70486 CT MAXILLOFACIAL W/O DYE: CPT

## 2024-04-11 RX ORDER — FLUTICASONE PROPIONATE 50 MCG
1 SPRAY, SUSPENSION (ML) NASAL DAILY PRN
Qty: 16 G | Refills: 1 | Status: SHIPPED | OUTPATIENT
Start: 2024-04-11 | End: 2024-06-06

## 2024-04-11 NOTE — TELEPHONE ENCOUNTER
Medicine Refill Request    Last Office Visit: 1/16/2024   Last Consult Visit: Visit date not found  Last Telemedicine Visit: 1/30/2024 Yumiko Lopez,     Next Appointment: 6/27/2024      Current Outpatient Medications:     albuterol HFA (VENTOLIN HFA) 90 mcg/actuation inhaler, Inhale 2 puffs every 4 (four) hours as needed., Disp: , Rfl:     amoxicillin (AMOXIL) 500 mg capsule, See admin instr. Before dental procedures, Disp: , Rfl:     desvenlafaxine succinate (PRISTIQ) 50 mg 24 hr ER tablet, Take 50 mg by mouth daily., Disp: , Rfl:     ferrous sulfate 325 mg (65 mg iron) tablet, Take 65 mg by mouth nightly., Disp: , Rfl:     fluticasone propion-salmeteroL (ADVAIR DISKUS) 250-50 mcg/dose diskus inhaler, inhale 1 puff by mouth and INTO THE LUNGS twice a day Rinse mouth after use, Disp: , Rfl:     fluticasone propionate (FLONASE) 50 mcg/actuation nasal spray, Administer 1 spray into each nostril daily as needed for rhinitis or allergies., Disp: 16 g, Rfl: 1    lisinopriL (PRINIVIL) 5 mg tablet, Take 1 tablet (5 mg total) by mouth daily., Disp: 90 tablet, Rfl: 1    magnesium oxide (MAG-OX) 400 mg (241.3 mg magnesium) tablet, Take 1 tablet (400 mg total) by mouth daily., Disp: 90 tablet, Rfl: 1    medical marijuana, 1 each See admin instr. Please be advised that an [facility] hospital staff member has listed medical marijuana as one of your home medications for documentation purposes. Please follow-up with your certified issuing provider for ongoing use, Disp: , Rfl:     metoprolol succinate XL (TOPROL-XL) 25 mg 24 hr tablet, Take 1 tablet (25 mg total) by mouth nightly., Disp: 90 tablet, Rfl: 3    naloxone (NARCAN) 4 mg/actuation spray,non-aerosol nasal spray, , Disp: , Rfl:     NURTEC ODT 75 mg tablet,disintegrating, TAKE 1 TABLET (75 MG TOTAL) BY MOUTH EVERY OTHER DAY, Disp: 16 tablet, Rfl: 3    onabotulinumtoxinA (BOTOX) injection, Botox for chronic migraine headache every 3 months, Disp: 1 each, Rfl: 4    pantoprazole  (PROTONIX) 40 mg EC tablet, TAKE 1 TABLET BY MOUTH EVERY DAY AT NIGHT, Disp: 90 tablet, Rfl: 0    potassium chloride (KLOR-CON M) 20 mEq CR tablet, Take 1 tablet (20 mEq total) by mouth daily., Disp: 90 tablet, Rfl: 3    pregabalin (LYRICA) 200 mg capsule, Take 1 capsule (200 mg total) by mouth 3 (three) times a day with meals., Disp: 90 capsule, Rfl: 2    SUBLOCADE 300 mg/1.5 mL solution, extended rel syringe subcutaneous injection, 1x monthly, Disp: , Rfl:     tamsulosin (FLOMAX) 0.4 mg capsule, Take 0.4 mg by mouth daily., Disp: , Rfl:     temazepam (RESTORIL) 30 mg capsule, Take 30 mg by mouth nightly., Disp: , Rfl:     TiZANidine (ZANAFLEX) 6 mg capsule, Take 1 capsule (6 mg total) by mouth 3 (three) times a day as needed for muscle spasms., Disp: 90 capsule, Rfl: 0    torsemide (DEMADEX) 10 mg tablet, Take 1 tablet (10 mg total) by mouth daily. (Patient not taking: Reported on 3/12/2024), Disp: 90 tablet, Rfl: 1    VITAMIN D3 125 mcg (5,000 unit) tablet, Take 5,000 Units by mouth., Disp: , Rfl:       BP Readings from Last 3 Encounters:   03/12/24 94/70   03/06/24 118/68   02/21/24 112/76       Recent Lab results:  Lab Results   Component Value Date    CHOL 187 10/09/2023   ,   Lab Results   Component Value Date    HDL 47 10/09/2023   ,   Lab Results   Component Value Date    LDLCALC 112 (H) 10/09/2023   ,   Lab Results   Component Value Date    TRIG 159 (H) 10/09/2023        Lab Results   Component Value Date    GLUCOSE 92 10/09/2023   ,   Lab Results   Component Value Date    HGBA1C 5.4 06/26/2023         Lab Results   Component Value Date    CREATININE 0.68 10/09/2023       Lab Results   Component Value Date    TSH 2.500 06/23/2023           Lab Results   Component Value Date    HGBA1C 5.4 06/26/2023

## 2024-04-11 NOTE — TELEPHONE ENCOUNTER
Pt has a sinus headache and pressure and wants a script for Flonase 50 mcg nasal spray submitted to [facility] (bolded below) as soon as possible.      Medication Request   Patient PCP: Yumiko Lopez, DO  Next Office Visit: 6/27/2024  Has this provider prescribed this medication before?: Yes  Medication Name: Flonase  Medication Dose: 50 mcg  Medication Frequency:   Preferred Pharmacy:     [facility]/pharmacy #[id number] - [place] - [address] AT [address]  [address] [address]  Phone: [phone] Fax: [phone]      Please allow 2 business days for your provider to send your medication request or to reach out to discuss.

## 2024-04-15 ENCOUNTER — TELEMEDICINE (OUTPATIENT)
Dept: BEHAVIORAL/MENTAL HEALTH CLINIC | Facility: CLINIC | Age: 39
End: 2024-04-15
Payer: COMMERCIAL

## 2024-04-15 DIAGNOSIS — F41.1 GENERALIZED ANXIETY DISORDER: Primary | ICD-10-CM

## 2024-04-15 DIAGNOSIS — F33.0 MILD EPISODE OF RECURRENT MAJOR DEPRESSIVE DISORDER (HCC): ICD-10-CM

## 2024-04-15 PROCEDURE — 90834 PSYTX W PT 45 MINUTES: CPT | Performed by: COUNSELOR

## 2024-04-15 NOTE — PSYCH
Virtual Regular Visit    Verification of patient location:    Patient is located at Home in the following state in which I hold an active license [place]      Assessment/Plan:    Problem List Items Addressed This Visit        Behavioral Health    Recurrent major depressive disorder (HCC)    Generalized anxiety disorder - Primary       Goals addressed in session: Goal 1          Reason for visit is   Chief Complaint   Patient presents with   • Virtual Regular Visit          Encounter provider GAMALIEL Pennington    Provider located at [facility] THERAPIST [facility]  [facility] THERAPIST MENTAL HEALTH OUTPATIENT  [address]  [facility] [place] [address]  [phone]      Recent Visits  Date Type Provider Dept   04/15/24 Telemedicine GAMALIEL Pennington [facility] Therapist [facility]   Showing recent visits within past 7 days and meeting all other requirements  Future Appointments  No visits were found meeting these conditions.  Showing future appointments within next 150 days and meeting all other requirements       The patient was identified by name and date of birth. Samantha Vila was informed that this is a telemedicine visit and that the visit is being conducted throughthe Sharetribe platform. She agrees to proceed..  My office door was closed. No one else was in the room.  She acknowledged consent and understanding of privacy and security of the video platform. The patient has agreed to participate and understands they can discontinue the visit at any time.    Patient is aware this is a billable service.     Subjective  Samantha Vila is a 38 y.o. female  .      HPI     No past medical history on file.    No past surgical history on file.    No current outpatient medications on file.     No current facility-administered medications for this visit.        Not on File    Review of Systems    Video Exam    There were no vitals filed for this visit.    Physical Exam     Behavioral Health  Psychotherapy Progress Note    Psychotherapy Provided: Individual Psychotherapy     1. Generalized anxiety disorder        2. Mild episode of recurrent major depressive disorder (HCC)            Goals addressed in session: Goal 1     DATA:       -CT reflected on recent developments that worked out in her favor. (Received early disability payment, got car device installed and CT has driven by herself, got her haircut, manicure and pedicure)  -CT reflected on the positive impact her mood improvement has had on her mother's and aunt's mood. CT's mother communicated that she believes that CT's presence and new learning/passion for healing is having a positive ripple affect.   -CT reflected on her recent interests and planned classes.  -CT's mother spoke with CT's sister about the improvements and positive changes in CT.   -CT described her experience over the weekend spending time with a previous co-worker. CT enjoyed her time and it was a very comfortable.   - invited CT to clarify the various actions she is taking consistently (yoga, sound tuning forks, meditation)  -CT expressed desire to be involved in support to inpatient participants.  will send email to connect CT with [facility]     During this session, this clinician used the following therapeutic modalities: Supportive Psychotherapy    Substance Abuse was not addressed during this session. If the client is diagnosed with a co-occurring substance use disorder, please indicate any changes in the frequency or amount of use: . Stage of change for addressing substance use diagnoses: No substance use/Not applicable    ASSESSMENT:  Samantha Vila presents with a Euthymic/ normal mood.     her affect is Normal range and intensity, which is congruent, with her mood and the content of the session. The client has made progress on their goals.     Samantha Vila presents with a none risk of suicide, none risk of self-harm, and none risk of harm to  "others.    For any risk assessment that surpasses a \"low\" rating, a safety plan must be developed.    A safety plan was indicated: no  If yes, describe in detail     PLAN: Between sessions, Samantha Vila will respond to intro email, maintain current self-care practices and add time outdoors, participate in holistic activities, identify future tx goals . At the next session, the therapist will use Supportive Psychotherapy to connect CT with [facility] outreach and review and update tx plan. .    Behavioral Health Treatment Plan and Discharge Planning: Samantha Vila is aware of and agrees to continue to work on their treatment plan. They have identified and are working toward their discharge goals. yes    Visit start and stop times:    04/15/24  Start Time: 1205  Stop Time: 1256  Total Visit Time: 51 minutes      "

## 2024-04-16 ENCOUNTER — OFFICE VISIT (OUTPATIENT)
Dept: NEUROLOGY | Facility: CLINIC | Age: 39
End: 2024-04-16
Attending: NURSE PRACTITIONER
Payer: COMMERCIAL

## 2024-04-16 VITALS
BODY MASS INDEX: 39.68 KG/M2 | WEIGHT: 224 LBS | OXYGEN SATURATION: 98 % | HEART RATE: 82 BPM | RESPIRATION RATE: 18 BRPM | TEMPERATURE: 98 F | SYSTOLIC BLOOD PRESSURE: 126 MMHG | DIASTOLIC BLOOD PRESSURE: 80 MMHG

## 2024-04-16 DIAGNOSIS — G43.709 CHRONIC MIGRAINE WITHOUT AURA WITHOUT STATUS MIGRAINOSUS, NOT INTRACTABLE: Primary | ICD-10-CM

## 2024-04-16 DIAGNOSIS — G24.3 SPASMODIC TORTICOLLIS: ICD-10-CM

## 2024-04-16 DIAGNOSIS — G43.901 STATUS MIGRAINOSUS: ICD-10-CM

## 2024-04-16 PROCEDURE — 99999 PR OFFICE/OUTPT VISIT,PROCEDURE ONLY: CPT | Performed by: NURSE PRACTITIONER

## 2024-04-16 PROCEDURE — 96372 THER/PROPH/DIAG INJ SC/IM: CPT | Mod: XU | Performed by: NURSE PRACTITIONER

## 2024-04-16 PROCEDURE — 64615 CHEMODENERV MUSC MIGRAINE: CPT | Performed by: NURSE PRACTITIONER

## 2024-04-16 RX ADMIN — KETOROLAC TROMETHAMINE 30 MG: 30 INJECTION, SOLUTION INTRAMUSCULAR; INTRAVENOUS at 09:00

## 2024-04-16 NOTE — PROGRESS NOTES
Patient returns for injections.     Since last seen headaches have improved with Botox injections. Since starting botox, the patient reports greater than 7 days of migraine relief from baseline, correlated with headache diary, decreased abortive medication use.    At least 50 % of reduction of frequency and severity of moderate to severe headaches      Botox injections  Botox lot number: [id number]  Expiration Date: 06/2026      NDC#8220-4729-49    Patient informed and consent obtained.    General Consent including notice of privacy practices/patient bill of rights was reviewed and consent/authorization given.       4cc of Normal saline were added to one vial of Botox Type A resulting in 200 Units of Botox.  After the patient consented to the procedure the following muscles were injected after cleaning the overlying skin with alcohol. New FDA mandated warnings provided to the patient with instructions to seek medical attention for difficulty swallowing or breathing.    Frontalis 10 units right ( 2 sites each 5 units) and 10 units left ( 2 sites each 5 units)  /Glabellar 5 units right and 5 units left (medially)  Procerus 5 units    Temporalis 30 units right( 6 sites each 5 units) and 30 units left ( 6 sites each 5 units)    Trapezius 25 units ( 3 sites each 5 units) right and 30 units left ( 3 sites each 5 units)  Cervical Paraspinals 10 units right ( 2 sites each 5 units) and 10 units left (2 sites each 5 units)  Occipital 15 units right ( 3 sites each 5 units) , 15 units left ( 3 sites each 5 units)      A total of 200 units was used The patient tolerated the procedure well.    Diagnoses and all orders for this visit:    Chronic migraine without aura without status migrainosus, not intractable  -     onabotulinumtoxinA (BOTOX) 200 unit injection 200 Units    Spasmodic torticollis  -     [facility] Botulinum Toxin Injection Appointment Request  -     onabotulinumtoxinA (BOTOX) 200 unit injection 200  Units    Status migrainosus  -     ketorolac (TORADOL) injection 30 mg

## 2024-04-17 RX ORDER — KETOROLAC TROMETHAMINE 30 MG/ML
30 INJECTION, SOLUTION INTRAMUSCULAR; INTRAVENOUS ONCE
Status: COMPLETED | OUTPATIENT
Start: 2024-04-17 | End: 2024-04-16

## 2024-04-18 ENCOUNTER — TELEPHONE (OUTPATIENT)
Dept: PSYCHIATRY | Facility: CLINIC | Age: 39
End: 2024-04-18

## 2024-04-18 NOTE — TELEPHONE ENCOUNTER
Left voicemail informing patient and/or parent/guardian of the Psych Encounter form needing to be signed as a requirement from the insurance company for billing purposes. Patient can access form via [employer] and sign electronically.     Please make patient aware this form must be signed for each visit as a requirement to continue future visits with provider.

## 2024-04-25 DIAGNOSIS — I10 ESSENTIAL HYPERTENSION: ICD-10-CM

## 2024-04-25 RX ORDER — TORSEMIDE 10 MG/1
10 TABLET ORAL DAILY PRN
Qty: 90 TABLET | Refills: 1 | Status: SHIPPED | OUTPATIENT
Start: 2024-04-25 | End: 2024-05-30

## 2024-04-25 NOTE — TELEPHONE ENCOUNTER
Rx request for torsemide 10mg received from [facility]. Noted per last ov note that pt is only using PRN. Ordered with this SIG.     Medicine Refill Request    Last Office Visit: 3/12/2024   Last Consult Visit: Visit date not found  Last Telemedicine Visit: Visit date not found    Next Appointment: 9/10/2024      Current Outpatient Medications:     albuterol HFA (VENTOLIN HFA) 90 mcg/actuation inhaler, Inhale 2 puffs every 4 (four) hours as needed., Disp: , Rfl:     amoxicillin (AMOXIL) 500 mg capsule, See admin instr. Before dental procedures, Disp: , Rfl:     desvenlafaxine succinate (PRISTIQ) 50 mg 24 hr ER tablet, Take 50 mg by mouth daily., Disp: , Rfl:     ferrous sulfate 325 mg (65 mg iron) tablet, Take 65 mg by mouth nightly., Disp: , Rfl:     fluticasone propion-salmeteroL (ADVAIR DISKUS) 250-50 mcg/dose diskus inhaler, inhale 1 puff by mouth and INTO THE LUNGS twice a day Rinse mouth after use, Disp: , Rfl:     fluticasone propionate (FLONASE) 50 mcg/actuation nasal spray, Administer 1 spray into each nostril daily as needed for rhinitis or allergies., Disp: 16 g, Rfl: 1    lisinopriL (PRINIVIL) 5 mg tablet, Take 1 tablet (5 mg total) by mouth daily. (Patient not taking: Reported on 4/16/2024), Disp: 90 tablet, Rfl: 1    magnesium oxide (MAG-OX) 400 mg (241.3 mg magnesium) tablet, Take 1 tablet (400 mg total) by mouth daily., Disp: 90 tablet, Rfl: 1    medical marijuana, 1 each See admin instr. Please be advised that an [facility] hospital staff member has listed medical marijuana as one of your home medications for documentation purposes. Please follow-up with your certified issuing provider for ongoing use, Disp: , Rfl:     metoprolol succinate XL (TOPROL-XL) 25 mg 24 hr tablet, Take 1 tablet (25 mg total) by mouth nightly., Disp: 90 tablet, Rfl: 3    naloxone (NARCAN) 4 mg/actuation spray,non-aerosol nasal spray, , Disp: , Rfl:     NURTEC ODT 75 mg tablet,disintegrating, TAKE 1 TABLET (75 MG TOTAL) BY  MOUTH EVERY OTHER DAY, Disp: 16 tablet, Rfl: 3    onabotulinumtoxinA (BOTOX) injection, Botox for chronic migraine headache every 3 months, Disp: 1 each, Rfl: 4    pantoprazole (PROTONIX) 40 mg EC tablet, TAKE 1 TABLET BY MOUTH EVERY DAY AT NIGHT, Disp: 90 tablet, Rfl: 0    potassium chloride (KLOR-CON M) 20 mEq CR tablet, Take 1 tablet (20 mEq total) by mouth daily. (Patient not taking: Reported on 4/16/2024), Disp: 90 tablet, Rfl: 3    pregabalin (LYRICA) 200 mg capsule, Take 1 capsule (200 mg total) by mouth 3 (three) times a day with meals., Disp: 90 capsule, Rfl: 2    SUBLOCADE 300 mg/1.5 mL solution, extended rel syringe subcutaneous injection, 1x monthly, Disp: , Rfl:     tamsulosin (FLOMAX) 0.4 mg capsule, Take 0.4 mg by mouth daily., Disp: , Rfl:     temazepam (RESTORIL) 30 mg capsule, Take 30 mg by mouth nightly., Disp: , Rfl:     TiZANidine (ZANAFLEX) 6 mg capsule, Take 1 capsule (6 mg total) by mouth 3 (three) times a day as needed for muscle spasms., Disp: 90 capsule, Rfl: 0    torsemide (DEMADEX) 10 mg tablet, Take 1 tablet (10 mg total) by mouth daily. (Patient not taking: Reported on 3/12/2024), Disp: 90 tablet, Rfl: 1    VITAMIN D3 125 mcg (5,000 unit) tablet, Take 5,000 Units by mouth., Disp: , Rfl:       BP Readings from Last 3 Encounters:   04/16/24 126/80   03/12/24 94/70   03/06/24 118/68       Recent Lab results:  Lab Results   Component Value Date    CHOL 187 10/09/2023   ,   Lab Results   Component Value Date    HDL 47 10/09/2023   ,   Lab Results   Component Value Date    LDLCALC 112 (H) 10/09/2023   ,   Lab Results   Component Value Date    TRIG 159 (H) 10/09/2023        Lab Results   Component Value Date    GLUCOSE 92 10/09/2023   ,   Lab Results   Component Value Date    HGBA1C 5.4 06/26/2023         Lab Results   Component Value Date    CREATININE 0.68 10/09/2023       Lab Results   Component Value Date    TSH 2.500 06/23/2023           Lab Results   Component Value Date    HGBA1C 5.4  06/26/2023

## 2024-04-29 ENCOUNTER — TELEMEDICINE (OUTPATIENT)
Dept: BEHAVIORAL/MENTAL HEALTH CLINIC | Facility: CLINIC | Age: 39
End: 2024-04-29
Payer: COMMERCIAL

## 2024-04-29 DIAGNOSIS — F41.1 GENERALIZED ANXIETY DISORDER: Primary | ICD-10-CM

## 2024-04-29 DIAGNOSIS — F33.0 MILD EPISODE OF RECURRENT MAJOR DEPRESSIVE DISORDER (HCC): ICD-10-CM

## 2024-04-29 PROCEDURE — 90837 PSYTX W PT 60 MINUTES: CPT | Performed by: COUNSELOR

## 2024-04-29 NOTE — PSYCH
Virtual Regular Visit    Verification of patient location:    Patient is located at Home in the following state in which I hold an active license [place]      Assessment/Plan:    Problem List Items Addressed This Visit        Behavioral Health    Recurrent major depressive disorder (HCC)    Generalized anxiety disorder - Primary       Goals addressed in session: Goal 1          Reason for visit is   Chief Complaint   Patient presents with   • Virtual Regular Visit          Encounter provider GAMALIEL Pennington      Recent Visits  No visits were found meeting these conditions.  Showing recent visits within past 7 days and meeting all other requirements  Today's Visits  Date Type Provider Dept   04/29/24 Telemedicine GAMALIEL Pennington [facility] Therapist [facility]   Showing today's visits and meeting all other requirements  Future Appointments  No visits were found meeting these conditions.  Showing future appointments within next 150 days and meeting all other requirements       The patient was identified by name and date of birth. Samantha Vila was informed that this is a telemedicine visit and that the visit is being conducted throughthe Figma platform. She agrees to proceed..  My office door was closed. No one else was in the room.  She acknowledged consent and understanding of privacy and security of the video platform. The patient has agreed to participate and understands they can discontinue the visit at any time.    Patient is aware this is a billable service.     Subjective  Samantha Vila is a 38 y.o. female  .      HPI     No past medical history on file.    No past surgical history on file.    No current outpatient medications on file.     No current facility-administered medications for this visit.        Not on File    Review of Systems    Video Exam    There were no vitals filed for this visit.    Physical Exam     Behavioral Health Psychotherapy Progress Note    Psychotherapy Provided:  "Individual Psychotherapy     1. Generalized anxiety disorder        2. Mild episode of recurrent major depressive disorder (HCC)            Goals addressed in session: Goal 1     DATA:     -CT reported and reflected on working through a glitch in the benefit system that dropped all of her medical coverage. CT worked through the details with the [facility] and her coverage is restored.   -CT reflected on her various new medications and the way she is adjusting.  -CT described her experience with a sound bath activity over the weekend. CT had a very positive experience.   -CT reflected on the way she responded to the medical coverage challenge.  -Reviewed and updated tx plan.  During this session, this clinician used the following therapeutic modalities: Supportive Psychotherapy    Substance Abuse was not addressed during this session. If the client is diagnosed with a co-occurring substance use disorder, please indicate any changes in the frequency or amount of use: . Stage of change for addressing substance use diagnoses: No substance use/Not applicable    ASSESSMENT:  Samantha Vila presents with a Euthymic/ normal mood.     her affect is Normal range and intensity, which is congruent, with her mood and the content of the session. The client has made progress on their goals.     Samantha Vila presents with a none risk of suicide, none risk of self-harm, and none risk of harm to others.    For any risk assessment that surpasses a \"low\" rating, a safety plan must be developed.    A safety plan was indicated: no  If yes, describe in detail     PLAN: Between sessions, Samantha Vila will continue to attend yoga and reiki workshops, manage activation/anxiety with identified process (notice, bring body response to baseline (breathing), be curious about fears, use facts to counter, and take action. . At the next session, the therapist will use Supportive Psychotherapy to support CT's healing goals and balance/mange " doubt and old patterns of anxiety freeze/sabotage .    Behavioral Health Treatment Plan and Discharge Planning: Samantha Vila is aware of and agrees to continue to work on their treatment plan. They have identified and are working toward their discharge goals. yes    Visit start and stop times:    04/29/24  Start Time: 1201  Stop Time: 1300  Total Visit Time: 59 minutes

## 2024-04-29 NOTE — BH TREATMENT PLAN
"Outpatient Behavioral Health Psychotherapy Treatment Plan    Samantha Vila  1985     Date of Initial Psychotherapy Assessment: 11/28/22  Date of Current Treatment Plan: 04/29/24  Treatment Plan Target Date: 08/29/24  Treatment Plan Expiration Date: 10/29/24    Diagnosis:   1. Generalized anxiety disorder        2. Mild episode of recurrent major depressive disorder (HCC)            Area(s) of Need: Reduce/manage anxiety and improve self-image    Long Term Goal 1 (in the client's own words): I want to recognize and manage panic anxiety effectively.     Stage of Change: Action    Target Date for completion: TBD     Anticipated therapeutic modalities: supportive therapy, mindfulness-based therapy, psychoeducation     People identified to complete this goal: client and therapist      Objective 1: (identify the means of measuring success in meeting the objective): Identify and clarify the steps involved in my process.  (Anxiety overwhelm, pause and address activation in the body (breathing to baseline), and take action)      Objective 2: (identify the means of measuring success in meeting the objective):  Maintain fundamental actions that support wellbeing. (Breathing, yoga, meditation. Add journaling)     Objective 3: (identify the means of measuring success in meeting the objective): Move to action steps to address anxiety- include journaling.      Long Term Goal 2 (in the client's own words): I want to have a healthier self-image to equip myself to have healthy connection with others.    Stage of Change: Action    Target Date for completion: TBD     Anticipated therapeutic modalities: Supportive therapy, mindfulness-based therapy     People identified to complete this goal: client and therapist      Objective 1: (identify the means of measuring success in meeting the objective): Shift \"scarcity\" thought pattern (recognize and correct). Allow purchases that enhance my growth.      Objective 2: (identify the " means of measuring success in meeting the objective): Continue to recognize inner critic, challenge validity, and use reframing and thought replacement tools.     Objective 3: (identify the means of measuring success in meeting the objective): Engage in activities with new like-minded people (sound bath, reiki training and/or certificaiton)      Objective 4: (identify the means of measuring success in meeting the objective): Connect with my higher self and guides and communicate more with them.      Long Term Goal 3 (in the client's own words): I want to complete a certification in alternative healing modality    Stage of Change: Preparation    Target Date for completion: TBD     Anticipated therapeutic modalities: Supportive therapy     People identified to complete this goal: Client and therapist     Objective 1: (identify the means of measuring success in meeting the objective): Make appointment for personal reiki session.     Objective 2: (identify the means of measuring success in meeting the objective): Consider enrollment in next level 1 reiki training                    I am currently under the care of a [facility] psychiatric provider: no    My [facility] psychiatric provider is: NA    I am currently taking psychiatric medications: Yes, as prescribed    I feel that I will be ready for discharge from mental health care when I reach the following (measurable goal/objective): When I have met long-term goals and maintain MH stability independent of MH provider.    For children and adults who have a legal guardian:   Has there been any change to custody orders and/or guardianship status? NA. If yes, attach updated documentation.    I have created my Crisis Plan and have been offered a copy of this plan    Behavioral Health Treatment Plan [facility]: Diagnosis and Treatment Plan explained to Samantha Vila acknowledges an understanding of their diagnosis. Samantha Vila agrees to this treatment  plan.    I have been offered a copy of this Treatment Plan. Yes    Samantha Vila, 1985, actively participated in the creation of this treatment plan during a virtual session, using the [employer] platform.   Samantha Vila  provided verbal consent on 04/29/24 @ 12:40 PM. The treatment plan was transcribed into the Electronic Health Record at a later time.

## 2024-05-17 DIAGNOSIS — G43.709 CHRONIC MIGRAINE WITHOUT AURA WITHOUT STATUS MIGRAINOSUS, NOT INTRACTABLE: ICD-10-CM

## 2024-05-17 RX ORDER — LANOLIN ALCOHOL/MO/W.PET/CERES
400 CREAM (GRAM) TOPICAL DAILY
Qty: 90 TABLET | Refills: 1 | Status: SHIPPED | OUTPATIENT
Start: 2024-05-17 | End: 2024-09-17 | Stop reason: SDUPTHER

## 2024-05-17 NOTE — TELEPHONE ENCOUNTER
Medicine Refill Request    Last Office Visit: 03/12/2024  Last Consult Visit: Visit date not found  Last Telemedicine Visit: Visit date not found    Next Appointment: 09/10/2024      Current Outpatient Medications:     albuterol HFA (VENTOLIN HFA) 90 mcg/actuation inhaler, Inhale 2 puffs every 4 (four) hours as needed., Disp: , Rfl:     amoxicillin (AMOXIL) 500 mg capsule, See admin instr. Before dental procedures, Disp: , Rfl:     desvenlafaxine succinate (PRISTIQ) 50 mg 24 hr ER tablet, Take 50 mg by mouth daily., Disp: , Rfl:     ferrous sulfate 325 mg (65 mg iron) tablet, Take 65 mg by mouth nightly., Disp: , Rfl:     fluticasone propion-salmeteroL (ADVAIR DISKUS) 250-50 mcg/dose diskus inhaler, inhale 1 puff by mouth and INTO THE LUNGS twice a day Rinse mouth after use, Disp: , Rfl:     fluticasone propionate (FLONASE) 50 mcg/actuation nasal spray, Administer 1 spray into each nostril daily as needed for rhinitis or allergies., Disp: 16 g, Rfl: 1    lisinopriL (PRINIVIL) 5 mg tablet, Take 1 tablet (5 mg total) by mouth daily. (Patient not taking: Reported on 4/16/2024), Disp: 90 tablet, Rfl: 1    magnesium oxide (MAG-OX) 400 mg (241.3 mg magnesium) tablet, Take 1 tablet (400 mg total) by mouth daily., Disp: 90 tablet, Rfl: 1    medical marijuana, 1 each See admin instr. Please be advised that an [facility] hospital staff member has listed medical marijuana as one of your home medications for documentation purposes. Please follow-up with your certified issuing provider for ongoing use, Disp: , Rfl:     metoprolol succinate XL (TOPROL-XL) 25 mg 24 hr tablet, Take 1 tablet (25 mg total) by mouth nightly., Disp: 90 tablet, Rfl: 3    naloxone (NARCAN) 4 mg/actuation spray,non-aerosol nasal spray, , Disp: , Rfl:     NURTEC ODT 75 mg tablet,disintegrating, TAKE 1 TABLET (75 MG TOTAL) BY MOUTH EVERY OTHER DAY, Disp: 16 tablet, Rfl: 3    onabotulinumtoxinA (BOTOX) injection, Botox for chronic migraine headache every 3 months,  Disp: 1 each, Rfl: 4    pantoprazole (PROTONIX) 40 mg EC tablet, TAKE 1 TABLET BY MOUTH EVERY DAY AT NIGHT, Disp: 90 tablet, Rfl: 0    potassium chloride (KLOR-CON M) 20 mEq CR tablet, Take 1 tablet (20 mEq total) by mouth daily. (Patient not taking: Reported on 4/16/2024), Disp: 90 tablet, Rfl: 3    pregabalin (LYRICA) 200 mg capsule, Take 1 capsule (200 mg total) by mouth 3 (three) times a day with meals., Disp: 90 capsule, Rfl: 2    SUBLOCADE 300 mg/1.5 mL solution, extended rel syringe subcutaneous injection, 1x monthly, Disp: , Rfl:     tamsulosin (FLOMAX) 0.4 mg capsule, Take 0.4 mg by mouth daily., Disp: , Rfl:     temazepam (RESTORIL) 30 mg capsule, Take 30 mg by mouth nightly., Disp: , Rfl:     TiZANidine (ZANAFLEX) 6 mg capsule, Take 1 capsule (6 mg total) by mouth 3 (three) times a day as needed for muscle spasms., Disp: 90 capsule, Rfl: 0    torsemide (DEMADEX) 10 mg tablet, Take 1 tablet (10 mg total) by mouth daily as needed (lower extremity edema)., Disp: 90 tablet, Rfl: 1    VITAMIN D3 125 mcg (5,000 unit) tablet, Take 5,000 Units by mouth., Disp: , Rfl:       BP Readings from Last 3 Encounters:   04/16/24 126/80   03/12/24 94/70   03/06/24 118/68       Recent Lab results:  Lab Results   Component Value Date    CHOL 187 10/09/2023   ,   Lab Results   Component Value Date    HDL 47 10/09/2023   ,   Lab Results   Component Value Date    LDLCALC 112 (H) 10/09/2023   ,   Lab Results   Component Value Date    TRIG 159 (H) 10/09/2023        Lab Results   Component Value Date    GLUCOSE 92 10/09/2023   ,   Lab Results   Component Value Date    HGBA1C 5.4 06/26/2023         Lab Results   Component Value Date    CREATININE 0.68 10/09/2023       Lab Results   Component Value Date    TSH 2.500 06/23/2023           Lab Results   Component Value Date    HGBA1C 5.4 06/26/2023

## 2024-05-24 ENCOUNTER — TELEMEDICINE (OUTPATIENT)
Dept: BEHAVIORAL/MENTAL HEALTH CLINIC | Facility: CLINIC | Age: 39
End: 2024-05-24
Payer: COMMERCIAL

## 2024-05-24 DIAGNOSIS — F33.0 MILD EPISODE OF RECURRENT MAJOR DEPRESSIVE DISORDER (HCC): ICD-10-CM

## 2024-05-24 DIAGNOSIS — F41.1 GENERALIZED ANXIETY DISORDER: Primary | ICD-10-CM

## 2024-05-24 PROCEDURE — 90834 PSYTX W PT 45 MINUTES: CPT | Performed by: COUNSELOR

## 2024-05-24 NOTE — PSYCH
Virtual Regular Visit    Verification of patient location:    Patient is located at Home in the following state in which I hold an active license [place]      Assessment/Plan:    Problem List Items Addressed This Visit        Behavioral Health    Recurrent major depressive disorder (HCC)    Generalized anxiety disorder - Primary         Goals addressed in session: Goal 1          Reason for visit is   Chief Complaint   Patient presents with   • Virtual Regular Visit   • Virtual Regular Visit          Encounter provider GAMALIEL Pennington      Recent Visits  Date Type Provider Dept   05/24/24 Telemedicine GAMALIEL Pennington [facility] Therapist [facility]   Showing recent visits within past 7 days and meeting all other requirements  Future Appointments  No visits were found meeting these conditions.  Showing future appointments within next 150 days and meeting all other requirements       The patient was identified by name and date of birth. Samantha Vila was informed that this is a telemedicine visit and that the visit is being conducted throughthe [employer] platform. She agrees to proceed..  My office door was closed. No one else was in the room.  She acknowledged consent and understanding of privacy and security of the video platform. The patient has agreed to participate and understands they can discontinue the visit at any time.    Patient is aware this is a billable service.     Subjective  Samantha Vila is a 38 y.o. female  .      HPI     No past medical history on file.    No past surgical history on file.    No current outpatient medications on file.     No current facility-administered medications for this visit.        Not on File    Review of Systems    Video Exam    There were no vitals filed for this visit.    Physical Exam     Behavioral Health Psychotherapy Progress Note    Psychotherapy Provided: Individual Psychotherapy     1. Generalized anxiety disorder        2. Mild episode of recurrent  "major depressive disorder (HCC)            Goals addressed in session: Goal 1     DATA:     -CT began session by describing the challenges she has had in working through details of her medical coverage and final details of obtaining her back settlement now that she has been granted disability. CT spent over 4 hours working with a a health care specialist. CT fears that her medications are going to be altered as insurance companies formularies are enforced and that her team of providers may need to change.   -TH offered ACM support through her process and provided Email with contact info.   -TH informed CT about potential change in TH assignment due to insurance rules. CT expressed her sadness and upset with the system. TH empathized and validated CT frustration.   -CT noted that her routines and recovery are suffering somewhat because of the time and energy required to address. Reviewed fundamentals of her self care and wellbeing (regular rest/sleep, meditation, yoga)    During this session, this clinician used the following therapeutic modalities: Supportive Psychotherapy    Substance Abuse was not addressed during this session. If the client is diagnosed with a co-occurring substance use disorder, please indicate any changes in the frequency or amount of use: . Stage of change for addressing substance use diagnoses: No substance use/Not applicable    ASSESSMENT:  Samantha Vila presents with a Anxious and Labile mood.     her affect is Constricted, Flat, and Tearful, which is congruent, with her mood and the content of the session. The client has made progress on their goals.     Samantha Vila presents with a none risk of suicide, none risk of self-harm, and none risk of harm to others.    For any risk assessment that surpasses a \"low\" rating, a safety plan must be developed.    A safety plan was indicated: no  If yes, describe in detail     PLAN: Between sessions, Samantha Vila will follow up with [facility] " regarding health plan options, seek to restore the fundamentals of her wellness routines, take the process one step at a time and resist catastrophist thinking pattern. . At the next session, the therapist will use Supportive Psychotherapy to clarify and take action to attend to wellbeing and work through adjustments to insurance changes. .    Behavioral Health Treatment Plan and Discharge Planning: Samantha Vila is aware of and agrees to continue to work on their treatment plan. They have identified and are working toward their discharge goals. yes    Visit start and stop times:    05/24/24  Start Time: 1108  Stop Time: 1159  Total Visit Time: 51 minutes

## 2024-05-27 NOTE — PSYCH
Virtual Regular Visit    Verification of patient location:    Patient is located at Home in the following state in which I hold an active license [place]      Assessment/Plan:    Problem List Items Addressed This Visit        Behavioral Health    Recurrent major depressive disorder (HCC)    Generalized anxiety disorder - Primary       Goals addressed in session: Goal 1          Reason for visit is   Chief Complaint   Patient presents with   • Virtual Regular Visit   • Virtual Regular Visit          Encounter provider GAMALIEL Pennington      Recent Visits  Date Type Provider Dept   05/24/24 Telemedicine GAMALIEL Pennington [facility] Therapist [facility]   Showing recent visits within past 7 days and meeting all other requirements  Future Appointments  No visits were found meeting these conditions.  Showing future appointments within next 150 days and meeting all other requirements       The patient was identified by name and date of birth. Samantha Vila was informed that this is a telemedicine visit and that the visit is being conducted throughthe [employer] platform. She agrees to proceed..  My office door was closed. No one else was in the room.  She acknowledged consent and understanding of privacy and security of the video platform. The patient has agreed to participate and understands they can discontinue the visit at any time.    Patient is aware this is a billable service.     Subjective  Samantha Vila is a 38 y.o. female  .    HPI     No past medical history on file.    No past surgical history on file.    No current outpatient medications on file.     No current facility-administered medications for this visit.        Not on File    Review of Systems    Video Exam    There were no vitals filed for this visit.    Physical Exam

## 2024-05-29 LAB
ACTH PLAS-MCNC: 30.5 PG/ML (ref 7.2–63.3)
BUN SERPL-MCNC: 8 MG/DL (ref 6–20)
BUN/CREAT SERPL: 10 (ref 9–23)
CALCIUM SERPL-MCNC: 9.6 MG/DL (ref 8.7–10.2)
CHLORIDE SERPL-SCNC: 102 MMOL/L (ref 96–106)
CO2 SERPL-SCNC: 24 MMOL/L (ref 20–29)
CORTIS SERPL-MCNC: 5.9 UG/DL (ref 6.2–19.4)
CREAT SERPL-MCNC: 0.79 MG/DL (ref 0.57–1)
EGFRCR SERPLBLD CKD-EPI 2021: 99 ML/MIN/1.73
GLUCOSE SERPL-MCNC: 65 MG/DL (ref 70–99)
HBA1C MFR BLD: 5.3 % (ref 4.8–5.6)
POTASSIUM SERPL-SCNC: 4.6 MMOL/L (ref 3.5–5.2)
SODIUM SERPL-SCNC: 143 MMOL/L (ref 134–144)

## 2024-05-30 ENCOUNTER — OFFICE VISIT (OUTPATIENT)
Dept: ENDOCRINOLOGY | Facility: CLINIC | Age: 39
End: 2024-05-30
Payer: COMMERCIAL

## 2024-05-30 ENCOUNTER — PATIENT MESSAGE (OUTPATIENT)
Dept: ENDOCRINOLOGY | Facility: CLINIC | Age: 39
End: 2024-05-30

## 2024-05-30 ENCOUNTER — TELEPHONE (OUTPATIENT)
Dept: NEUROLOGY | Facility: CLINIC | Age: 39
End: 2024-05-30
Payer: COMMERCIAL

## 2024-05-30 VITALS
DIASTOLIC BLOOD PRESSURE: 74 MMHG | BODY MASS INDEX: 38.13 KG/M2 | WEIGHT: 215.2 LBS | HEART RATE: 72 BPM | SYSTOLIC BLOOD PRESSURE: 122 MMHG | HEIGHT: 63 IN | OXYGEN SATURATION: 96 %

## 2024-05-30 DIAGNOSIS — E24.2: Primary | ICD-10-CM

## 2024-05-30 DIAGNOSIS — E66.812 CLASS 2 OBESITY WITH ALVEOLAR HYPOVENTILATION, SERIOUS COMORBIDITY, AND BODY MASS INDEX (BMI) OF 38.0 TO 38.9 IN ADULT (CMS/HCC): ICD-10-CM

## 2024-05-30 DIAGNOSIS — E66.2 CLASS 2 OBESITY WITH ALVEOLAR HYPOVENTILATION, SERIOUS COMORBIDITY, AND BODY MASS INDEX (BMI) OF 38.0 TO 38.9 IN ADULT (CMS/HCC): ICD-10-CM

## 2024-05-30 DIAGNOSIS — N91.1 SECONDARY AMENORRHEA: ICD-10-CM

## 2024-05-30 DIAGNOSIS — E88.819 INSULIN RESISTANCE: Primary | ICD-10-CM

## 2024-05-30 PROCEDURE — 3008F BODY MASS INDEX DOCD: CPT | Performed by: INTERNAL MEDICINE

## 2024-05-30 PROCEDURE — 3074F SYST BP LT 130 MM HG: CPT | Performed by: INTERNAL MEDICINE

## 2024-05-30 PROCEDURE — 99214 OFFICE O/P EST MOD 30 MIN: CPT | Performed by: INTERNAL MEDICINE

## 2024-05-30 PROCEDURE — 3078F DIAST BP <80 MM HG: CPT | Performed by: INTERNAL MEDICINE

## 2024-05-30 RX ORDER — SEMAGLUTIDE 1 MG/.5ML
INJECTION, SOLUTION SUBCUTANEOUS
COMMUNITY
Start: 2024-05-28 | End: 2024-05-30

## 2024-05-30 RX ORDER — DESVENLAFAXINE SUCCINATE 25 MG/1
25 TABLET, EXTENDED RELEASE ORAL DAILY
COMMUNITY
Start: 2024-05-28 | End: 2025-03-04

## 2024-05-30 NOTE — PROGRESS NOTES
38 y.o. yo female with PMH of IVDA, cholecystitis, pancreatitis 2/2 ERCP, fibromyalgia, endocarditis, vasculitis, ARIANNA, class 3 obesity, spinal stenosis, depression, pHTN, HTN, venous insufficiency presenting for follow up of iatrogenic Cushing's    Initial history (7/10/23):  - Former pt of Dr. Cerna at [place], seen 4/2019 in context of chronic steroid use with goal of discontinuation. Noted to have developed endocarditis in 2017 2/2 urosepsis with subsequent development of ? Vasculitis 4/2017 at [facility], at which time she was started on high dose steroids. Had been following with rheumatology Dr. Harris who started tapering the prednisone in winter of 2017. At that time she was on prednisone 10 mg daily. Recommended AM cortisol followed by ACTH stim test if equivocal. Recommended tapering steroids by 1 mg per week. Unable to find if pt did labs for Dr. Cerna at that time.  - Saw PCP Yumiko Lopez DO 6/13/23 to establish care. Noted to be on prednisone 5 mg daily at that time. Noted to have no menstrual cycle for > 1 year even after stopping OCP. Referred to endocrinology for AI. Post visit labs found low midday cortisol of 0.8 and pt was sent to ED  - Pt admitted to [facility] 6/26-6/30/23 for possible adrenal insufficiency, seen by Dr. Flores. Had a 4 pm cortisol of 1.7, then AM cortisol of 8.1. Noted to be on a steroids taper from prednisone 50 mg daily and was on 5 mg daily at the time of admission. Per consult note pt experiences swelling when tapering steroids. Dr. Flores felt she did not have AI. Per discharge summary had been tapered from 15 mg prednisone to 5 mg over 4 weeks. She was discharged on 7.5 mg daily    First visit with me 7/10/23  Last visit 1/10/24. Started wegovy    Interval history summarized as per review of records:  - Saw PCP Yumiko Lopez DO 1/16 in follow up. Rx augmentin for sinusitis  - Saw neurology Jesus MAGALLON 1/23 for botox injection  - Saw PCP Jessica  Yumiko, DO 1/30 in follow up. Rx Z pack + medrol dose pack for bronchitis  - Saw vascular Dr. Richmond 2/7 and underwent R greater saphenous vein radiofrequency ablation  - Saw neurology Jesus MAGALLON 2/21 for lidocaine/bupivacaine  point injections  - Saw vascular Dr. Richmond 3/6 in follow up. Noted to ahe lost weight. Treatment plan continued   - Saw cardiology Dr. Middleton 3/12 in follow up. Stopped lisinopril due to improved BP  - Saw pulmonary Dr. Ferrari 3/27 in follow up. Treatment plan continued   - Saw neurology Jesus MAGALLON 4/16 for botox injection  - Saw ENT Dr. Escoto 5/7 in follow up. Recommended regular use of flonase and zyrtec    Pt's mother was present for the entire visit and helped provide elements of the HPI     Off chornic prednisone since 12/13/23.   Took Medrol dose ron 1/30/24 for bronchitis  No other steroids    Feels better but still with some fatigue which she attributes to not sleeping well and stress. Doing yoga with breathing exercises    Weight gain: Started wegovy 1/2024, currently on 1 mg weekly for ~2-3 mo. Lost 42 lb since last visit. Wants to increase dose due to hunger pains at 3-4 days after each injection and weight loss has stabilized. Insurance is changing to Medicare which will not cover wegovy but they will do a formulary exception. New insurance stars 6/1. If not covered will not take contrave bc could not tolerate naltrexone or bupropion in the past. Also not willing to take qsymia bc took topamax in past and caused patchy hair loss  Wt Readings from Last 3 Encounters:   05/30/24 97.6 kg (215 lb 3.2 oz)   04/16/24 102 kg (224 lb)   03/12/24 106 kg (234 lb)     Menstrual history: started OCP so long ago that can't remember if cycles were regular prior. When on OCP would skip cycles occasionally. Stopped OCP summer 2022 and has not had cycle since    Has h/o pancreatitis in 2012 as a complication of an ERCP  No family history of thyroid cancer    Lives  with mother    ROS: Complete ROS is otherwise negative except as mentioned in the HPI above  -------------------------------------------------------------------------  Past Medical History:   Diagnosis Date    Acute bacterial endocarditis 12/10/2020    Anxiety     Depressed     Endocarditis     Fibromyalgia     Migraines     Obstructive sleep apnea syndrome 01/10/2023    uses CPAP machine    Osteomyelitis (CMS/HCC) 01/10/2023    Pancreatitis     Recurrent major depressive disorder (CMS/HCC) 2023    Substance abuse (CMS/HCC)     Tachycardia     Vasculitis (CMS/HCC) 2017       Past Surgical History:   Procedure Laterality Date    CHOLECYSTECTOMY      ERCP      GALLBLADDER SURGERY      TONSILLECTOMY         Family History   Problem Relation Age of Onset    Hypertension Biological Mother     Lung cancer Biological Father 76        smoker    Lung cancer Maternal Grandmother 69        smoker    Heart disease Maternal Grandfather     Heart attack Maternal Grandfather         unsure of age    Colon cancer Maternal Grandfather        Social History     Socioeconomic History    Marital status: Single     Spouse name: None    Number of children: None    Years of education: None    Highest education level: None   Occupational History    Occupation:    Tobacco Use    Smoking status: Former     Packs/day: 0.50     Years: 5.00     Additional pack years: 0.00     Total pack years: 2.50     Types: Cigarettes     Quit date:      Years since quittin.4    Smokeless tobacco: Former    Tobacco comments:     quit 1 year ago, vapes currently   Vaping Use    Vaping Use: Every day    Substances: Nicotine, Flavoring    Devices: Refillable tank   Substance and Sexual Activity    Alcohol use: Not Currently    Drug use: Not Currently     Types: Marijuana, Heroin     Comment: MM now , previous heroin and opiod    Sexual activity: Not Currently     Partners: Male, Female   Social History Narrative     Lives with mom     Social Determinants of Health     Financial Resource Strain: Low Risk  (6/27/2023)    Overall Financial Resource Strain (CARDIA)     Difficulty of Paying Living Expenses: Not hard at all   Food Insecurity: No Food Insecurity (6/26/2023)    Hunger Vital Sign     Worried About Running Out of Food in the Last Year: Never true     Ran Out of Food in the Last Year: Never true   Transportation Needs: No Transportation Needs (6/27/2023)    PRAPARE - Transportation     Lack of Transportation (Medical): No     Lack of Transportation (Non-Medical): No   Housing Stability: Low Risk  (6/27/2023)    Housing Stability Vital Sign     Unable to Pay for Housing in the Last Year: No     Number of Places Lived in the Last Year: 1     Unstable Housing in the Last Year: No         Current Outpatient Medications:     albuterol HFA (VENTOLIN HFA) 90 mcg/actuation inhaler, Inhale 2 puffs every 4 (four) hours as needed., Disp: , Rfl:     desvenlafaxine succinate (PRISTIQ) 25 mg tablet extended release 24 hr, , Disp: , Rfl:     ferrous sulfate 325 mg (65 mg iron) tablet, Take 65 mg by mouth nightly., Disp: , Rfl:     fluticasone propion-salmeteroL (ADVAIR DISKUS) 250-50 mcg/dose diskus inhaler, inhale 1 puff by mouth and INTO THE LUNGS twice a day Rinse mouth after use, Disp: , Rfl:     fluticasone propionate (FLONASE) 50 mcg/actuation nasal spray, Administer 1 spray into each nostril daily as needed for rhinitis or allergies., Disp: 16 g, Rfl: 1    magnesium oxide (MAG-OX) 400 mg (241.3 mg magnesium) tablet, Take 1 tablet (400 mg total) by mouth daily., Disp: 90 tablet, Rfl: 1    medical marijuana, 1 each See admin instr. Please be advised that an [facility] hospital staff member has listed medical marijuana as one of your home medications for documentation purposes. Please follow-up with your certified issuing provider for ongoing use, Disp: , Rfl:     metoprolol succinate XL (TOPROL-XL) 25 mg 24 hr tablet, Take 1 tablet (25 mg  total) by mouth nightly., Disp: 90 tablet, Rfl: 3    naloxone (NARCAN) 4 mg/actuation spray,non-aerosol nasal spray, , Disp: , Rfl:     NURTEC ODT 75 mg tablet,disintegrating, TAKE 1 TABLET (75 MG TOTAL) BY MOUTH EVERY OTHER DAY, Disp: 16 tablet, Rfl: 3    onabotulinumtoxinA (BOTOX) injection, Botox for chronic migraine headache every 3 months, Disp: 1 each, Rfl: 4    pantoprazole (PROTONIX) 40 mg EC tablet, TAKE 1 TABLET BY MOUTH EVERY DAY AT NIGHT, Disp: 90 tablet, Rfl: 0    pregabalin (LYRICA) 200 mg capsule, Take 1 capsule (200 mg total) by mouth 3 (three) times a day with meals., Disp: 90 capsule, Rfl: 2    SUBLOCADE 300 mg/1.5 mL solution, extended rel syringe subcutaneous injection, 1x monthly, Disp: , Rfl:     tamsulosin (FLOMAX) 0.4 mg capsule, Take 0.4 mg by mouth daily., Disp: , Rfl:     temazepam (RESTORIL) 30 mg capsule, Take 30 mg by mouth nightly., Disp: , Rfl:     TiZANidine (ZANAFLEX) 6 mg capsule, Take 1 capsule (6 mg total) by mouth 3 (three) times a day as needed for muscle spasms., Disp: 90 capsule, Rfl: 0    VITAMIN D3 125 mcg (5,000 unit) tablet, Take 5,000 Units by mouth., Disp: , Rfl:     WEGOVY 1 mg/0.5 mL subcutaneous injection, , Disp: , Rfl:     desvenlafaxine succinate (PRISTIQ) 50 mg 24 hr ER tablet, Take 50 mg by mouth daily., Disp: , Rfl:     lisinopriL (PRINIVIL) 5 mg tablet, Take 1 tablet (5 mg total) by mouth daily., Disp: 90 tablet, Rfl: 1    potassium chloride (KLOR-CON M) 20 mEq CR tablet, Take 1 tablet (20 mEq total) by mouth daily., Disp: 90 tablet, Rfl: 3    torsemide (DEMADEX) 10 mg tablet, Take 1 tablet (10 mg total) by mouth daily as needed (lower extremity edema). (Patient not taking: Reported on 5/30/2024), Disp: 90 tablet, Rfl: 1    Allergies   Allergen Reactions    Tramadol Palpitations    Amoxicillin GI intolerance    Nsaids (Non-Steroidal Anti-Inflammatory Drug) Nausea Only    Trazodone Other (see comments)     Headaches  "    -------------------------------------------------------------------------  PHYSICAL EXAM  Visit Vitals  /74 (BP Location: Left upper arm, Patient Position: Sitting)   Pulse 72   Ht 1.6 m (5' 3\")   Wt 97.6 kg (215 lb 3.2 oz)   SpO2 96%   BMI 38.12 kg/m²     Gen: well nourished, no acute distress  Eyes: no proptosis, normal conjunctiva  Neck: no thyromegaly  CV: regular rate   Pulm: no use of accessory muscles, on room air  Neuro: AAOx3  MSK: steady gait, no tremor of outstretched hands  Psych: normal mood, affect    LABS REVIEWED  Cortisol   Date/Time Value Ref Range Status   05/28/2024 1105 5.9 (L) 6.2 - 19.4 ug/dL Final     Comment:     Please Note: The reference interval and flagging for   this test is for an AM collection. If this is a PM   collection please use:         Cortisol PM: 2.3-11.9     01/05/2024 1001 9.0 6.2 - 19.4 ug/dL Final     Comment:     Please Note: The reference interval and flagging for   this test is for an AM collection. If this is a PM   collection please use:         Cortisol PM: 2.3-11.9     06/29/2023 0738 0.5 ug/dL Final     Comment:     REFERENCE RANGE  AM: (08:00 +/- 1 hour) 6.7-22.6 ug/dL  PM: (16:00 +/- 1 hour)  <10.0 ug/dL   06/28/2023 0633 0.5 ug/dL Final     Comment:     REFERENCE RANGE  AM: (08:00 +/- 1 hour) 6.7-22.6 ug/dL  PM: (16:00 +/- 1 hour)  <10.0 ug/dL   06/27/2023 0353 8.1 ug/dL Final     Comment:     REFERENCE RANGE  AM: (08:00 +/- 1 hour) 6.7-22.6 ug/dL  PM: (16:00 +/- 1 hour)  <10.0 ug/dL     Cortisol - AM   Date/Time Value Ref Range Status   07/06/2023 1127 0.5 (L) 6.2 - 19.4 ug/dL Final   06/23/2023 1141 0.8 (L) 6.2 - 19.4 ug/dL Final     ACTH, Plasma   Date/Time Value Ref Range Status   05/28/2024 1105 30.5 7.2 - 63.3 pg/mL Final     Comment:     ACTH reference interval for samples collected between 7 and 10 AM.   01/05/2024 1001 85.4 (H) 7.2 - 63.3 pg/mL Final     Comment:     ACTH reference interval for samples collected between 7 and 10 AM. "   06/26/2023 1953 <5 (L) 6 - 50 pg/mL Final     Comment:     Reference range applies only to specimens collected  between 7am-10am.             Hemoglobin A1C   Date Value Ref Range Status   06/26/2023 5.4 <5.7 % Final     Hemoglobin A1c   Date Value Ref Range Status   05/28/2024 5.3 4.8 - 5.6 % Final     Comment:              Prediabetes: 5.7 - 6.4           Diabetes: >6.4           Glycemic control for adults with diabetes: <7.0          Lab Results   Component Value Date    GLUCOSE 65 (L) 05/28/2024    BUN 8 05/28/2024    CREATININE 0.79 05/28/2024    EGFR 99 05/28/2024     05/28/2024    K 4.6 05/28/2024     05/28/2024    CO2 24 05/28/2024    CALCIUM 9.8 08/10/2023       TSH   Date Value Ref Range Status   06/23/2023 2.500 0.450 - 4.500 uIU/mL Final   06/12/2023 2.490 0.450 - 4.500 uIU/mL Final   07/26/2021 3.300 0.450 - 4.500 uIU/mL Final     T4,Free(Direct)   Date Value Ref Range Status   06/23/2023 1.27 0.82 - 1.77 ng/dL Final   06/12/2023 1.43 0.82 - 1.77 ng/dL Final   07/26/2021 0.96 0.82 - 1.77 ng/dL Final          IMAGING REVIEWED  CT ABDOMEN PELVIS WITH IV CONTRAST 1/13/22    Narrative  CLINICAL HISTORY: Right-sided back pain, right upper quadrant abdominal pain, flank pain, clinical concern for pyelonephritis.  COVID-19 positive.  High probability of pulmonary emboli.    COMMENT:    Comparison: CT of the chest dated 12/9/2020.  Right upper quadrant ultrasound dated 12/17/2020.    TECHNIQUE: CT angiography of the chest was performed as per departmental pulmonary embolism protocol, with axial images acquired following the intravenous administration of 100 cc Omnipaque-350.  Portal venous phase imaging of the abdomen and pelvis was then performed.  Delayed phase imaging of the abdomen was performed through the level of the kidneys.  Oral contrast was not administered.  Sagittal and coronal reconstructed images were rendered.  3-D MIP and/or volume-rendered image reconstruction of the chest was  performed and reviewed.    CT DOSE:  One or more dose reduction techniques (e.g. automated exposure control, adjustment of the mA and/or kV according to patient size, use of iterative reconstruction technique) utilized for this examination.    CHEST:  LUNGS and AIRWAYS: There are faint patchy groundglass alveolar opacities in both upper lobes and the lingula which lack a specific distribution.  These are nonspecific though most suggestive of an inflammatory or infectious pneumonitis. There is no consolidation or mass.  The trachea and central main airways are patent and normal in caliber.  PLEURA: Unremarkable. No pleural effusions.  VESSELS: No central, lobar, or proximal segmental pulmonary arterial filling defects to suggest pulmonary emboli.  The thoracic aorta and main pulmonary artery are normal in caliber.  HEART: Heart size is normal.  No pericardial effusion.  MEDIASTINUM and PAULINA: Mildly enlarged prevascular lymph node to the left of the aortic arch which measures 1.1 cm in short axis (previously 1.7 cm).  Mildly enlarged subcarinal lymph node measuring 1 cm (previously 1.2 cm).  These lymph nodes are nonspecific, possibly reactive.  No pathologically enlarged hilar lymph nodes.  CHEST WALL and LOWER NECK: Unremarkable.  No pathologically enlarged axillary  lymph nodes.    ABDOMEN:  LIVER: Top normal in size.  Otherwise unremarkable.  GALLBLADDER: Cholecystectomy.  BILE DUCTS: No intrahepatic or extrahepatic biliary ductal dilatation.  PANCREAS: Unremarkable.  SPLEEN: Unremarkable.  ADRENALS: Unremarkable.  KIDNEYS: There is normal and symmetric renal enhancement and excretion.  There are no findings typical of pyelonephritis.  There is no hydronephrosis.  There is no perinephric fat stranding or perinephric collection.  URETERS: Nondilated.  BOWEL: The stomach is nondilated.  The small and large bowel are normal in caliber.  A collapsed appendix is identified.  PERITONEUM: No ascites or pneumoperitoneum.  No fluid collection.  RETROPERITONEUM: Unremarkable.  LYMPH NODES: None pathologically enlarged.  VESSELS: Unremarkable. Normal caliber abdominal aorta.  UPPER ABDOMINAL WALL SOFT TISSUES: Unremarkable.    PELVIS:  BLADDER: Essentially completely collapsed and therefore cannot be adequately assessed.  REPRODUCTIVE ORGANS: No pelvic masses.  PERITONEUM: No free fluid. No fluid collection.  RETROPERITONEUM: Unremarkable.  VESSELS: Unremarkable.  LYMPH NODES: None pathologically enlarged.  LOWER ABDOMINAL WALL SOFT TISSUES: Unremarkable.    BONES: Multilevel spondylosis and tiny multilevel Schmorl's nodes in the thoracic and lumbar spine.  Vacuum disc phenomenon is noted at several lower thoracic and lower lumbar levels.  Tiny sclerotic foci in the bony pelvis are nonspecific, most likely incidental benign bone islands.  No destructive osseous lesions.    --    Impression  1.  No evidence of pulmonary emboli as clinically questioned.  2.  Faint patchy groundglass alveolar opacities in both upper lobes and the lingula, nonspecific.  An infectious or inflammatory pneumonitis might have this appearance.  No pulmonary consolidation.  3.  Mild mediastinal lymphadenopathy persists though has improved since 12/9/2020.  This is nonspecific, possibly reactive.  4.  No acute abnormality in the abdomen or pelvis.  No specific evidence of an inflammatory or obstructive process.  No evidence of pyelonephritis as clinically questioned.      ASSESSMENT AND PLAN:     1. Iatrogenic Cushing's  - Was on and off high dose steroids due to vasculitis since 2017. Was off altogether from 2019 until 10/2022 at which time prednisone was restarted by urgent care due to rash on L foot concerning for recurrent vasculitis. Was unable to taper down past prednisone 30 mg daily for ~6 mo, then started tapering few months prior to initial visit 7/023  - Had tapered down to 5 mg daily for ~1.5 weeks then got admitted to [place] 6/2023 with concern for AI  per PCP due to cortisol level of 0.8 around noon as well as worsening swelling on lower dose of prednisone. Was discharged from hospital on prednisone 7.5 mg daily  - At initial visit 7/2023 initiated slow taper of prednisone and she is now off it, last dose 12/13/23. Continues to feel better off prednisone  - Random cortisol (drawn at 11 am) is normal with normal ACTH  - Continue off prednisone     2. Class 2 obesity Body mass index is 38.12 kg/m².  - Lost 42 lb since starting wegovy at last visit but notes she is developing hunger pains and weight loss has plateaued  - Increase wegovy to 1.7 mg weekly    3. Secondary amenorrhea  - Previously on OCP for prolonged duration and pt could not previously remember if her cycles were regular prior to OCP initiation  - Stopped OCP summer 2022 and has not had cycle since that time even since being off prednisone since 12/2023  - Check testosterone, DHEAS, PRL, 17OHP, E2, LH, FSH, hCG, TSH for further evaluation. Will contact pt with results       RTC 3 mo

## 2024-05-30 NOTE — TELEPHONE ENCOUNTER
LV[facility] stating [facility] has Jury Duty - kept patients on schedule . If [facility] is canceled will bring in patients 6/5 appt

## 2024-06-05 ENCOUNTER — OFFICE VISIT (OUTPATIENT)
Dept: NEUROLOGY | Facility: CLINIC | Age: 39
End: 2024-06-05
Payer: COMMERCIAL

## 2024-06-05 VITALS
BODY MASS INDEX: 38.26 KG/M2 | OXYGEN SATURATION: 97 % | TEMPERATURE: 97.9 F | DIASTOLIC BLOOD PRESSURE: 68 MMHG | SYSTOLIC BLOOD PRESSURE: 102 MMHG | RESPIRATION RATE: 18 BRPM | HEART RATE: 88 BPM | WEIGHT: 216 LBS

## 2024-06-05 DIAGNOSIS — M54.2 CERVICALGIA: Primary | ICD-10-CM

## 2024-06-05 DIAGNOSIS — G24.3 SPASMODIC TORTICOLLIS: ICD-10-CM

## 2024-06-05 DIAGNOSIS — M79.18 MYOFASCIAL PAIN: ICD-10-CM

## 2024-06-05 PROCEDURE — 200200 PR NO CHARGE: Performed by: NURSE PRACTITIONER

## 2024-06-05 PROCEDURE — 20553 NJX 1/MLT TRIGGER POINTS 3/>: CPT | Performed by: NURSE PRACTITIONER

## 2024-06-05 PROCEDURE — 99999 PR OFFICE/OUTPT VISIT,PROCEDURE ONLY: CPT | Performed by: NURSE PRACTITIONER

## 2024-06-05 RX ORDER — LIDOCAINE HYDROCHLORIDE 10 MG/ML
4.5 INJECTION, SOLUTION INFILTRATION; PERINEURAL ONCE
Status: SHIPPED | OUTPATIENT
Start: 2024-06-05 | End: 2024-06-06

## 2024-06-05 NOTE — PROGRESS NOTES
Procedure Note  Trigger Point Injections       Bupivicaine 0.25 %  Lot # [id number]  Exp: 08/01/2024     Lidocaine   Lot# [id number]  Exp date: 07/01/2024    The risks, benefits and anticipated outcomes of the procedure, the risks and benefits of the alternatives to the procedure, and the roles and tasks of the personnel to be involved, were discussed with the patient, and the patient consents to the procedure and agrees to proceed.        A 50/50 combination of 1 % Lidocaine 1.5 cc  (NDC 9816-9076-69) and 0.25% bupivacaine 1.5 cc (NDC 09368642-12) were prepared in 3 syringes ( 3 cc) .           After aspiration to ensure no obstruction or presence of blood , the area was injected . The needle was repositioned in a fan-like manner and the entire area was injected . The needle was then repositioned at the occipital groove , and the greater occpital nerve was injected after ensuring there was no obstruction or backflow of blood. Pressure was held and no hematoma was noted.  The patient noted relief of pain after 5 minutes. The patient was told to use heat if there was discomfort later in the day.      Procedure : Trigger Point Injections      The risks , benefits and anticipated outcomes of the procedure , the risks and benefits of the alternatives to the procedure , and the roles and tasks of the personnel to be involved , were dicsussed with the patient , and the patient consents to the procedure and agrees to proceed.   .  Trigger point injections were identified by muscle spasms, band , and radiating pain. Each trigger point listed below was injected after aspiration to ensure no air, arterial , or venous blood was present. The patient had immediate relief of pain and was instructed to use heat (20 min at a time ) and do stretching exercises for 24 hrs.         Trigger Points injected :         Right Greater Occipital  2 cc     Left Greater Occipital 2 cc     Right Lesser Occipital  1 cc     Left Lesser Occipital 1 cc      Right and left trapezia 2 cc (divided in 3 areas each side)     Diagnoses and all orders for this visit:    Cervicalgia  -     lidocaine (XYLOCAINE) 10 mg/mL (1 %) injection 4.5 mL    Spasmodic torticollis  -     lidocaine (XYLOCAINE) 10 mg/mL (1 %) injection 4.5 mL    Myofascial pain  -     lidocaine (XYLOCAINE) 10 mg/mL (1 %) injection 4.5 mL

## 2024-06-06 DIAGNOSIS — J01.00 ACUTE NON-RECURRENT MAXILLARY SINUSITIS: ICD-10-CM

## 2024-06-06 RX ORDER — FLUTICASONE PROPIONATE 50 MCG
1 SPRAY, SUSPENSION (ML) NASAL DAILY PRN
Qty: 16 ML | Refills: 1 | Status: SHIPPED | OUTPATIENT
Start: 2024-06-06 | End: 2024-08-01 | Stop reason: SDUPTHER

## 2024-06-06 NOTE — TELEPHONE ENCOUNTER
Medicine Refill Request    Last Office Visit: 1/16/2024   Last Consult Visit: Visit date not found  Last Telemedicine Visit: 1/30/2024 Yumiko Lopez,     Next Appointment: 6/27/2024      Current Outpatient Medications:     albuterol HFA (VENTOLIN HFA) 90 mcg/actuation inhaler, Inhale 2 puffs every 4 (four) hours as needed., Disp: , Rfl:     desvenlafaxine succinate (PRISTIQ) 25 mg tablet extended release 24 hr, , Disp: , Rfl:     ferrous sulfate 325 mg (65 mg iron) tablet, Take 65 mg by mouth nightly., Disp: , Rfl:     fluticasone propion-salmeteroL (ADVAIR DISKUS) 250-50 mcg/dose diskus inhaler, inhale 1 puff by mouth and INTO THE LUNGS twice a day Rinse mouth after use, Disp: , Rfl:     fluticasone propionate (FLONASE) 50 mcg/actuation nasal spray, Administer 1 spray into each nostril daily as needed for rhinitis or allergies., Disp: 16 g, Rfl: 1    magnesium oxide (MAG-OX) 400 mg (241.3 mg magnesium) tablet, Take 1 tablet (400 mg total) by mouth daily., Disp: 90 tablet, Rfl: 1    medical marijuana, 1 each See admin instr. Please be advised that an [facility] hospital staff member has listed medical marijuana as one of your home medications for documentation purposes. Please follow-up with your certified issuing provider for ongoing use, Disp: , Rfl:     metoprolol succinate XL (TOPROL-XL) 25 mg 24 hr tablet, Take 1 tablet (25 mg total) by mouth nightly., Disp: 90 tablet, Rfl: 3    naloxone (NARCAN) 4 mg/actuation spray,non-aerosol nasal spray, , Disp: , Rfl:     NURTEC ODT 75 mg tablet,disintegrating, TAKE 1 TABLET (75 MG TOTAL) BY MOUTH EVERY OTHER DAY, Disp: 16 tablet, Rfl: 3    onabotulinumtoxinA (BOTOX) injection, Botox for chronic migraine headache every 3 months, Disp: 1 each, Rfl: 4    pantoprazole (PROTONIX) 40 mg EC tablet, TAKE 1 TABLET BY MOUTH EVERY DAY AT NIGHT, Disp: 90 tablet, Rfl: 0    pregabalin (LYRICA) 200 mg capsule, Take 1 capsule (200 mg total) by mouth 3 (three) times a day with meals., Disp: 90  capsule, Rfl: 2    semaglutide (WEGOVY) 1.7 mg/0.75 mL subcutaneous injection, Inject 1.7 mg under the skin every (seven) 7 days., Disp: 9 mL, Rfl: 1    SUBLOCADE 300 mg/1.5 mL solution, extended rel syringe subcutaneous injection, 1x monthly, Disp: , Rfl:     tamsulosin (FLOMAX) 0.4 mg capsule, Take 0.4 mg by mouth daily., Disp: , Rfl:     temazepam (RESTORIL) 30 mg capsule, Take 30 mg by mouth nightly., Disp: , Rfl:     TiZANidine (ZANAFLEX) 6 mg capsule, Take 1 capsule (6 mg total) by mouth 3 (three) times a day as needed for muscle spasms., Disp: 90 capsule, Rfl: 0    VITAMIN D3 125 mcg (5,000 unit) tablet, Take 5,000 Units by mouth., Disp: , Rfl:       BP Readings from Last 3 Encounters:   06/05/24 102/68   05/30/24 122/74   04/16/24 126/80       Recent Lab results:  Lab Results   Component Value Date    CHOL 187 10/09/2023   ,   Lab Results   Component Value Date    HDL 47 10/09/2023   ,   Lab Results   Component Value Date    LDLCALC 112 (H) 10/09/2023   ,   Lab Results   Component Value Date    TRIG 159 (H) 10/09/2023        Lab Results   Component Value Date    GLUCOSE 65 (L) 05/28/2024   ,   Lab Results   Component Value Date    HGBA1C 5.3 05/28/2024         Lab Results   Component Value Date    CREATININE 0.79 05/28/2024       Lab Results   Component Value Date    TSH 2.500 06/23/2023           Lab Results   Component Value Date    HGBA1C 5.3 05/28/2024

## 2024-06-09 ENCOUNTER — APPOINTMENT (EMERGENCY)
Dept: RADIOLOGY | Facility: HOSPITAL | Age: 39
End: 2024-06-09
Payer: COMMERCIAL

## 2024-06-09 ENCOUNTER — HOSPITAL ENCOUNTER (EMERGENCY)
Facility: HOSPITAL | Age: 39
Discharge: HOME | End: 2024-06-09
Attending: EMERGENCY MEDICINE
Payer: COMMERCIAL

## 2024-06-09 VITALS
HEIGHT: 63 IN | SYSTOLIC BLOOD PRESSURE: 90 MMHG | DIASTOLIC BLOOD PRESSURE: 52 MMHG | OXYGEN SATURATION: 94 % | WEIGHT: 215.17 LBS | BODY MASS INDEX: 38.12 KG/M2 | TEMPERATURE: 97.8 F | RESPIRATION RATE: 16 BRPM | HEART RATE: 74 BPM

## 2024-06-09 DIAGNOSIS — K63.89 EPIPLOIC APPENDAGITIS: Primary | ICD-10-CM

## 2024-06-09 DIAGNOSIS — T80.89XA PAIN AT INJECTION SITE, INITIAL ENCOUNTER: ICD-10-CM

## 2024-06-09 DIAGNOSIS — R52 PAIN AT INJECTION SITE, INITIAL ENCOUNTER: ICD-10-CM

## 2024-06-09 LAB
ALBUMIN SERPL-MCNC: 3.9 G/DL (ref 3.5–5.7)
ALP SERPL-CCNC: 75 IU/L (ref 34–125)
ALT SERPL-CCNC: 15 IU/L (ref 7–52)
ANION GAP SERPL CALC-SCNC: 5 MEQ/L (ref 3–15)
AST SERPL-CCNC: 20 IU/L (ref 13–39)
BASOPHILS # BLD: 0.07 K/UL (ref 0.01–0.1)
BASOPHILS NFR BLD: 0.6 %
BILIRUB SERPL-MCNC: 0.5 MG/DL (ref 0.3–1.2)
BILIRUB UR QL STRIP.AUTO: NEGATIVE MG/DL
BUN SERPL-MCNC: 7 MG/DL (ref 7–25)
CALCIUM SERPL-MCNC: 9.5 MG/DL (ref 8.6–10.3)
CHLORIDE SERPL-SCNC: 104 MEQ/L (ref 98–107)
CLARITY UR REFRACT.AUTO: CLEAR
CO2 SERPL-SCNC: 27 MEQ/L (ref 21–31)
COLOR UR AUTO: YELLOW
CREAT SERPL-MCNC: 0.7 MG/DL (ref 0.6–1.2)
DIFFERENTIAL METHOD BLD: ABNORMAL
EGFRCR SERPLBLD CKD-EPI 2021: >60 ML/MIN/1.73M*2
EOSINOPHIL # BLD: 0.18 K/UL (ref 0.04–0.36)
EOSINOPHIL NFR BLD: 1.5 %
ERYTHROCYTE [DISTWIDTH] IN BLOOD BY AUTOMATED COUNT: 14.6 % (ref 11.7–14.4)
GLUCOSE SERPL-MCNC: 76 MG/DL (ref 70–99)
GLUCOSE UR STRIP.AUTO-MCNC: NEGATIVE MG/DL
HCG UR QL: NEGATIVE
HCT VFR BLD AUTO: 39.9 % (ref 35–45)
HGB BLD-MCNC: 13.1 G/DL (ref 11.8–15.7)
HGB UR QL STRIP.AUTO: NEGATIVE
IMM GRANULOCYTES # BLD AUTO: 0.04 K/UL (ref 0–0.08)
IMM GRANULOCYTES NFR BLD AUTO: 0.3 %
KETONES UR STRIP.AUTO-MCNC: NEGATIVE MG/DL
LEUKOCYTE ESTERASE UR QL STRIP.AUTO: NEGATIVE
LIPASE SERPL-CCNC: 12 U/L (ref 11–82)
LYMPHOCYTES # BLD: 2.94 K/UL (ref 1.2–3.5)
LYMPHOCYTES NFR BLD: 24.7 %
MCH RBC QN AUTO: 29.8 PG (ref 28–33.2)
MCHC RBC AUTO-ENTMCNC: 32.8 G/DL (ref 32.2–35.5)
MCV RBC AUTO: 90.7 FL (ref 83–98)
MONOCYTES # BLD: 1.1 K/UL (ref 0.28–0.8)
MONOCYTES NFR BLD: 9.3 %
NEUTROPHILS # BLD: 7.55 K/UL (ref 1.7–7)
NEUTS SEG NFR BLD: 63.6 %
NITRITE UR QL STRIP.AUTO: NEGATIVE
NRBC BLD-RTO: 0 %
PDW BLD AUTO: 10.8 FL (ref 9.4–12.3)
PH UR STRIP.AUTO: 7 [PH]
PLATELET # BLD AUTO: 204 K/UL (ref 150–369)
POTASSIUM SERPL-SCNC: 4.3 MEQ/L (ref 3.5–5.1)
PROT SERPL-MCNC: 7.1 G/DL (ref 6–8.2)
PROT UR QL STRIP.AUTO: NEGATIVE
RBC # BLD AUTO: 4.4 M/UL (ref 3.93–5.22)
SODIUM SERPL-SCNC: 136 MEQ/L (ref 136–145)
SP GR UR REFRACT.AUTO: 1.01
UROBILINOGEN UR STRIP-ACNC: 0.2 EU/DL
WBC # BLD AUTO: 11.88 K/UL (ref 3.8–10.5)

## 2024-06-09 PROCEDURE — 80053 COMPREHEN METABOLIC PANEL: CPT

## 2024-06-09 PROCEDURE — 96374 THER/PROPH/DIAG INJ IV PUSH: CPT | Mod: 59

## 2024-06-09 PROCEDURE — 96361 HYDRATE IV INFUSION ADD-ON: CPT | Mod: 59

## 2024-06-09 PROCEDURE — 84703 CHORIONIC GONADOTROPIN ASSAY: CPT

## 2024-06-09 PROCEDURE — 81003 URINALYSIS AUTO W/O SCOPE: CPT

## 2024-06-09 PROCEDURE — 85025 COMPLETE CBC W/AUTO DIFF WBC: CPT

## 2024-06-09 PROCEDURE — 63600105 HC IODINE BASED CONTRAST: Mod: JZ

## 2024-06-09 PROCEDURE — 3E0333Z INTRODUCTION OF ANTI-INFLAMMATORY INTO PERIPHERAL VEIN, PERCUTANEOUS APPROACH: ICD-10-PCS | Performed by: EMERGENCY MEDICINE

## 2024-06-09 PROCEDURE — 74177 CT ABD & PELVIS W/CONTRAST: CPT

## 2024-06-09 PROCEDURE — 36415 COLL VENOUS BLD VENIPUNCTURE: CPT

## 2024-06-09 PROCEDURE — 63600000 HC DRUGS/DETAIL CODE

## 2024-06-09 PROCEDURE — 99284 EMERGENCY DEPT VISIT MOD MDM: CPT | Mod: U5,25

## 2024-06-09 PROCEDURE — 83690 ASSAY OF LIPASE: CPT | Performed by: EMERGENCY MEDICINE

## 2024-06-09 PROCEDURE — 25800000 HC PHARMACY IV SOLUTIONS

## 2024-06-09 PROCEDURE — 3E0337Z INTRODUCTION OF ELECTROLYTIC AND WATER BALANCE SUBSTANCE INTO PERIPHERAL VEIN, PERCUTANEOUS APPROACH: ICD-10-PCS | Performed by: EMERGENCY MEDICINE

## 2024-06-09 RX ORDER — KETOROLAC TROMETHAMINE 15 MG/ML
15 INJECTION, SOLUTION INTRAMUSCULAR; INTRAVENOUS ONCE
Status: COMPLETED | OUTPATIENT
Start: 2024-06-09 | End: 2024-06-09

## 2024-06-09 RX ORDER — IOPAMIDOL 755 MG/ML
100 INJECTION, SOLUTION INTRAVASCULAR
Status: COMPLETED | OUTPATIENT
Start: 2024-06-09 | End: 2024-06-09

## 2024-06-09 RX ADMIN — KETOROLAC TROMETHAMINE 15 MG: 15 INJECTION, SOLUTION INTRAMUSCULAR; INTRAVENOUS at 05:47

## 2024-06-09 RX ADMIN — SODIUM CHLORIDE 1000 ML: 9 INJECTION, SOLUTION INTRAVENOUS at 05:17

## 2024-06-09 RX ADMIN — IOPAMIDOL 100 ML: 755 INJECTION, SOLUTION INTRAVENOUS at 04:05

## 2024-06-09 NOTE — DISCHARGE INSTRUCTIONS
You can take 400-600mg of ibuprofen every 6-8 hours as needed for pain.  Please take this medication with food.    This condition is typically self limiting and resolves on it's own.  If you experience worsening symptoms, diarrhea, fever, please return to the ED.

## 2024-06-09 NOTE — ED PROVIDER NOTES
The patient is here for an acute concern    HPI    No chief complaint on file.       HPI   Patient is 38-year-old female with past medical history listed below presenting for evaluation of abdominal pain and concern for a lump in her subcutaneous tissue of her abdomen that she has noticed in the last 1 week.  She received subcutaneous injections of medication called Sublocade for previous opioid abuse-she notes that afterwards she has a lump in her abdomen but is usually not this big and does not last for this long.  She is 4 years clean from heroin use.  She describes a pressure sensation in her lower abdomen when she is urinating and is concerned for infection.  She has had fatigue over the last 1 week.  She attempted breathing exercises as she thought that she was just having anxiety but notes that her abdominal pain became worse with the breathing exercises so she stopped.  Most recent imaging of her abdomen and pelvis was in January 2022 showing no acute abnormality, inflammatory, or obstructive processes.  The patient notes that at baseline she has to strain to urinate and takes Flomax.  She follows with urology.  She currently denies headache, fever, chills, chest pain, shortness of breath, nausea, vomiting, decreased appetite, diarrhea, constipation, hematuria, flank pain, hematochezia, melena.      Past Medical History:   Diagnosis Date    Acute bacterial endocarditis 12/10/2020    Anxiety     Depressed     Endocarditis     Fibromyalgia     Migraines     Obstructive sleep apnea syndrome 01/10/2023    uses CPAP machine    Osteomyelitis (CMS/formerly [facility]) 01/10/2023    Pancreatitis     Recurrent major depressive disorder (CMS/formerly [facility]) 01/06/2023    Substance abuse (CMS/formerly [facility]) 2015    Tachycardia     Vasculitis (CMS/formerly [facility]) 09/26/2017         Past Surgical History:   Procedure Laterality Date    CHOLECYSTECTOMY      ERCP      GALLBLADDER SURGERY      TONSILLECTOMY  1991       Family History   Problem Relation Age of Onset     "Hypertension Biological Mother     Lung cancer Biological Father 76        smoker    Lung cancer Maternal Grandmother 69        smoker    Heart disease Maternal Grandfather     Heart attack Maternal Grandfather         unsure of age    Colon cancer Maternal Grandfather        Social History     Tobacco Use    Smoking status: Former     Packs/day: 0.50     Years: 5.00     Additional pack years: 0.00     Total pack years: 2.50     Types: Cigarettes     Quit date:      Years since quittin.4    Smokeless tobacco: Former    Tobacco comments:     quit 1 year ago, vapes currently   Vaping Use    Vaping Use: Every day    Substances: Nicotine, Flavoring    Devices: Refillable tank   Substance Use Topics    Alcohol use: Not Currently    Drug use: Not Currently     Types: Marijuana, Heroin     Comment: MM now , previous heroin and opiod       Systems Reviewed from Nursing Triage:               Review of Systems         ED Triage Vitals [24 0149]   Temp Heart Rate Resp BP SpO2   36.6 °C (97.8 °F) (!) 102 16 99/78 97 %      Temp Source Heart Rate Source Patient Position BP Location FiO2 (%) (Set)   Temporal -- Sitting Right upper arm --       Vitals:    24 0149 24 0508   BP: 99/78 (!) 90/52   BP Location: Right upper arm Right upper arm   Patient Position: Sitting Lying   Pulse: (!) 102 74   Resp: 16 16   Temp: 36.6 °C (97.8 °F)    TempSrc: Temporal    SpO2: 97% 94%   Weight: 97.6 kg (215 lb 2.7 oz)    Height: 1.6 m (5' 2.99\")                          Physical Exam  Vitals and nursing note reviewed.   Constitutional:       General: She is not in acute distress.     Appearance: She is well-developed. She is obese.   HENT:      Head: Normocephalic and atraumatic.   Cardiovascular:      Rate and Rhythm: Normal rate and regular rhythm.      Pulses: Normal pulses.   Pulmonary:      Effort: Pulmonary effort is normal.      Breath sounds: Normal breath sounds.   Abdominal:      General: There is no " distension.      Palpations: Abdomen is soft. There is no mass.      Tenderness: There is abdominal tenderness in the suprapubic area.       Musculoskeletal:         General: No tenderness or deformity. Normal range of motion.      Cervical back: Normal range of motion.   Skin:     General: Skin is warm and dry.      Capillary Refill: Capillary refill takes less than 2 seconds.   Neurological:      Mental Status: She is alert and oriented to person, place, and time. Mental status is at baseline.   Psychiatric:         Mood and Affect: Mood normal.         Behavior: Behavior normal.              CT ABDOMEN PELVIS WITH IV CONTRAST   Final Result   IMPRESSION: Inflammatory changes involving the distal cecum with associated   inflammation of the appendix which is otherwise normal. Findings may represent   focal enteritis/colitis or epiploic appendagitis.      TECHNIQUE: CT of the abdomen and pelvis was performed with IV contrast without   adverse reaction. Oral contrast was not administered.      CT DOSE: One or more dose reduction techniques (e.g. automated exposure control,   adjustment of the mA and/or kV according to patient size, use of iterative   reconstruction technique) were utilized for this examination.      ADMINISTERED CONTRAST AND DOSE: 100mL of iopamidoL (ISOVUE-370) 370 mg iodine   /mL (76 %) injection      COMMENT:      LOWER CHEST: unremarkable.      ABDOMEN:   LIVER: within normal limits.   BILE DUCTS: normal caliber.   GALLBLADDER: No calcified gallstones. Normal caliber wall.   PANCREAS: within normal limits.   SPLEEN: within normal limits.   ADRENALS: within normal limits.   KIDNEYS and URETERS: within normal limits.   STOMACH: Unremarkable.      PELVIS:   REPRODUCTIVE ORGANS: no pelvic masses.   BLADDER: within normal limits.      BOWEL: Normal caliber. There is mural thickening of the distal cecum with marked   surrounding inflammatory stranding. There is inflammation of the appendix   although  it is normal caliber and contains air.   PERITONEUM: no ascites or free air, no fluid collection.   VESSELS: Within normal limits.   LYMPH NODES: No enlarged nodes.   RETROPERITONEUM: within normal limits.   ABDOMINAL WALL: Mild multifocal ventral abdominal wall subcutaneous infiltration   and stranding likely due to recent subcutaneous injections.   BONES: within normal limits for age. Normal vertebral body heights.                   Labs Reviewed   CBC AND DIFF - Abnormal       Result Value    WBC 11.88 (*)     RBC 4.40      Hemoglobin 13.1      Hematocrit 39.9      MCV 90.7      MCH 29.8      MCHC 32.8      RDW 14.6 (*)     Platelets 204      MPV 10.8      Differential Type Auto      nRBC 0.0      Immature Granulocytes 0.3      Neutrophils 63.6      Lymphocytes 24.7      Monocytes 9.3      Eosinophils 1.5      Basophils 0.6      Immature Granulocytes, Absolute 0.04      Neutrophils, Absolute 7.55 (*)     Lymphocytes, Absolute 2.94      Monocytes, Absolute 1.10 (*)     Eosinophils, Absolute 0.18      Basophils, Absolute 0.07     BHCG, SERUM, QUAL - Normal    Preg Test, Serum Negative     COMPREHENSIVE METABOLIC PANEL - Normal    Sodium 136      Potassium 4.3      Chloride 104      CO2 27      BUN 7      Creatinine 0.7      Glucose 76      Calcium 9.5      AST (SGOT) 20      ALT (SGPT) 15      Alkaline Phosphatase 75      Total Protein 7.1      Albumin 3.9      Bilirubin, Total 0.5      eGFR >60.0      Anion Gap 5     UA REFLEX CULTURE (MACROSCOPIC) - Normal    Color, Urine Yellow      Clarity, Urine Clear      Specific Gravity, Urine 1.009      pH, Urine 7.0      Leukocyte Esterase Negative      Nitrite, Urine Negative      Protein, Urine Negative      Glucose, Urine Negative      Ketones, Urine Negative      Urobilinogen, Urine 0.2      Bilirubin, Urine Negative      Blood, Urine Negative     LIPASE - Normal    Lipase 12     URINALYSIS REFLEX CULTURE (ED AND OUTPATIENT ONLY)    Narrative:     The following orders  were created for panel order UA w/ reflex culture (ED Only).  Procedure                               Abnormality         Status                     ---------                               -----------         ------                     UA Reflex to Culture (Ma...[[id number]]  Normal              Final result                 Please view results for these tests on the individual orders.   RAINBOW DRAW PANEL    Narrative:     The following orders were created for panel order [facility] Draw Panel.  Procedure                               Abnormality         Status                     ---------                               -----------         ------                     RAINBOW RED[[id number]]                                      In process                 RAINBOW LT BLUE[[id number]]                                  In process                 RAINBOW LT GREEN[[id number]]                                 In process                   Please view results for these tests on the individual orders.   RAINBOW RED   RAINBOW LT BLUE   RAINBOW LT GREEN       CT ABDOMEN PELVIS WITH IV CONTRAST   Final Result   IMPRESSION: Inflammatory changes involving the distal cecum with associated   inflammation of the appendix which is otherwise normal. Findings may represent   focal enteritis/colitis or epiploic appendagitis.      TECHNIQUE: CT of the abdomen and pelvis was performed with IV contrast without   adverse reaction. Oral contrast was not administered.      CT DOSE: One or more dose reduction techniques (e.g. automated exposure control,   adjustment of the mA and/or kV according to patient size, use of iterative   reconstruction technique) were utilized for this examination.      ADMINISTERED CONTRAST AND DOSE: 100mL of iopamidoL (ISOVUE-370) 370 mg iodine   /mL (76 %) injection      COMMENT:      LOWER CHEST: unremarkable.      ABDOMEN:   LIVER: within normal limits.   BILE DUCTS: normal caliber.   GALLBLADDER: No calcified gallstones.  Normal caliber wall.   PANCREAS: within normal limits.   SPLEEN: within normal limits.   ADRENALS: within normal limits.   KIDNEYS and URETERS: within normal limits.   STOMACH: Unremarkable.      PELVIS:   REPRODUCTIVE ORGANS: no pelvic masses.   BLADDER: within normal limits.      BOWEL: Normal caliber. There is mural thickening of the distal cecum with marked   surrounding inflammatory stranding. There is inflammation of the appendix   although it is normal caliber and contains air.   PERITONEUM: no ascites or free air, no fluid collection.   VESSELS: Within normal limits.   LYMPH NODES: No enlarged nodes.   RETROPERITONEUM: within normal limits.   ABDOMINAL WALL: Mild multifocal ventral abdominal wall subcutaneous infiltration   and stranding likely due to recent subcutaneous injections.   BONES: within normal limits for age. Normal vertebral body heights.                     Procedures    Final diagnoses:   [K63.89] Epiploic appendagitis   [T80.89XA, R52] Pain at injection site, initial encounter       ED Course & [facility]     ED Course as of 06/09/24 1916   Sun Jun 09, 2024   0503 Labs relatively unremarkable, mild leukocytosis at 11.88.  Could be nonspecific, awaiting CT results. [SW]   0537 CT ABDOMEN PELVIS WITH IV CONTRAST  Preliminary Impression:    Comparison was made with prior study dated 1/13/22. Status post cholecystectomy. No evidence for hydronephrosis. No evidence for bowel obstruction. There is a suggestion of mural thickening of the cecum with surrounding fatty stranding surrounding epiploic appendage along the posterior aspect of the cecum. The adjacent appendix appears normal. Findings likely represent epiploic appendage-itis although primary cecal inflammation is another possibility. No evidence for organized fluid collection. Mild multifocal anterior abdominal wall subcutaneous stranding likely due to recent injections. Mild thoracolumbar spondylosis level degenerative disc  disease.        Interpreted by: Mildred Roberts MD, Jun 09, 2024 05:30 AM    [SW]   0545 Urinalysis unremarkable.  Will treat with NSAIDs. [SW]   0602 I have reviewed the results of the studies performed today with the patient.  I have discussed outpatient follow-up.  The patient is understanding and agreeable with the plan and is comfortable with discharge.  Ambulating with a steady gait at this time.  Strict return instructions given. [SW]      ED Course User Index  [SW] Lauren Ochoa CRNP           Medical Decision Making  Problems Addressed:  Epiploic appendagitis: acute illness or injury  Pain at injection site, initial encounter: acute illness or injury    Amount and/or Complexity of Data Reviewed  External Data Reviewed: labs, radiology and notes.  Labs: ordered. Decision-making details documented in ED Course.  Radiology: ordered. Decision-making details documented in ED Course.    Risk  Prescription drug management.        7:16 PM      Impression/Medical Decision Making: Abdominal pain and discomfort, lump in subcutaneous tissue in the abdomen after injection    Plan: Labs, UA, CT abdomen pelvis, observe    Vital Signs Review: Vital signs have been reviewed. The oxygen saturation is  SpO2: 97 % which is normal.      SHERITA Rajput  6/9/2024      We are in a period of time with increased volumes and decreased capacity.     This document was created using dragon dictation software.  There might be some typographical errors due to this technology.       Lauren Ochoa CRNP  06/09/24 1916

## 2024-06-10 ENCOUNTER — TELEPHONE (OUTPATIENT)
Dept: PRIMARY CARE | Facility: CLINIC | Age: 39
End: 2024-06-10
Payer: COMMERCIAL

## 2024-06-10 NOTE — TELEPHONE ENCOUNTER
HEMODIALYSIS TREATMENT NOTE    Date: 2/25/2021  Time: 6:21 PM    Data:  Pre Wt: 81 kg (178 lb 9.2 oz)   Desired Wt: 78 kg   Post Wt: 78 kg (171 lb 15.3 oz)(estimate)  Weight change: 3 kg  Ultrafiltration - Post Run Net Total Removed (mL): 3000 mL  Vascular Access Status: Fistula  patent  Dialyzer Rinse: Light  Total Blood Volume Processed: 90.48 L   Total Dialysis (Treatment) Time: 4   Dialysate Bath: K 3, Ca 3  Heparin: None    Lab:   No    Interventions & Assessment:  4hr TX, 3L removed.  PT was very pleasant with no complaints & remained asymptomatic throughout TX.  TX unremarkable.     Plan:    Next TX per renal team.     Yumiko Lopez DO [facility]  Offer follow up appointment

## 2024-06-12 NOTE — ED ATTESTATION NOTE
I have personally seen and examined Carolyn Redmond.  I was involved in the care and medical decision making for this patient.    I reviewed and agree with physician assistant / nurse practitioner’s assessment and plan of care; any exceptions are documented below.      My focused history, examination, assessment and plan of care of Carolyn Redmond is as follows:    Brief History:  HPI  37 yo F with hx of ARIANNA, fibromyalgia, depression, opioid abuse on sublicade p/w concern for abd cramps and subcutaneous lump she noticed on her right abdominal wall about 1 week ago. + mild pain with urination. No vomiting. No fevers. No CP or SOB.       Focused Physical Exam:  Physical Exam  Vitals and nursing note reviewed.   Constitutional:       Appearance: Normal appearance. She is normal weight. She is not ill-appearing or toxic-appearing.   HENT:      Head: Normocephalic and atraumatic.      Nose: Nose normal.      Mouth/Throat:      Mouth: Mucous membranes are moist.   Eyes:      Extraocular Movements: Extraocular movements intact.      Pupils: Pupils are equal, round, and reactive to light.   Cardiovascular:      Rate and Rhythm: Normal rate and regular rhythm.      Pulses: Normal pulses.      Heart sounds: Normal heart sounds.   Pulmonary:      Effort: Pulmonary effort is normal.      Breath sounds: Normal breath sounds.   Abdominal:      Palpations: Abdomen is soft.      Tenderness: There is abdominal tenderness. There is no guarding or rebound.      Comments: Nontender, firm nodule in subcutaneous abdominal wall on right. Nonfluctuant, non red. Elsewhere the pt has mild epigastric/ periumbilical TTP.   Musculoskeletal:         General: Normal range of motion.      Cervical back: Normal range of motion and neck supple.   Skin:     General: Skin is warm and dry.      Capillary Refill: Capillary refill takes less than 2 seconds.      Findings: No erythema or rash.   Neurological:      General: No focal deficit present.       Mental Status: She is alert and oriented to person, place, and time. Mental status is at baseline.   Psychiatric:         Mood and Affect: Mood normal.         Behavior: Behavior normal.             Assessment / Plan:        Medical Decision Making  Suspect possible gastritis, UTI, possible subcutaneous scarring, lipoma, less likely abscess, appendicitis, ureteral stone, cholecystitis. Plan labs, UA, CT abd/pelvis and will reassess.     Amount and/or Complexity of Data Reviewed  Labs: ordered.  Radiology: ordered. Decision-making details documented in ED Course.      Risk  Prescription drug management.          I was physically present for the key/critical portions of the following procedures:  Procedures           Jerry Malin MD  06/11/24 [id number]

## 2024-06-13 ENCOUNTER — OFFICE VISIT (OUTPATIENT)
Dept: PRIMARY CARE | Facility: CLINIC | Age: 39
End: 2024-06-13
Payer: COMMERCIAL

## 2024-06-13 ENCOUNTER — TELEPHONE (OUTPATIENT)
Dept: PRIMARY CARE | Facility: CLINIC | Age: 39
End: 2024-06-13

## 2024-06-13 ENCOUNTER — HOSPITAL ENCOUNTER (OUTPATIENT)
Dept: RADIOLOGY | Age: 39
Discharge: HOME | End: 2024-06-13
Attending: FAMILY MEDICINE
Payer: COMMERCIAL

## 2024-06-13 VITALS
TEMPERATURE: 97.6 F | HEIGHT: 62 IN | OXYGEN SATURATION: 97 % | DIASTOLIC BLOOD PRESSURE: 68 MMHG | SYSTOLIC BLOOD PRESSURE: 108 MMHG | BODY MASS INDEX: 38.64 KG/M2 | WEIGHT: 210 LBS | HEART RATE: 77 BPM

## 2024-06-13 DIAGNOSIS — K35.80 ACUTE APPENDICITIS, UNSPECIFIED ACUTE APPENDICITIS TYPE: ICD-10-CM

## 2024-06-13 DIAGNOSIS — K52.9 COLITIS: Primary | ICD-10-CM

## 2024-06-13 DIAGNOSIS — M35.9 AUTOIMMUNE DISEASE (CMS/HCC): ICD-10-CM

## 2024-06-13 DIAGNOSIS — K52.9 COLITIS: ICD-10-CM

## 2024-06-13 PROCEDURE — 3078F DIAST BP <80 MM HG: CPT | Performed by: FAMILY MEDICINE

## 2024-06-13 PROCEDURE — 3074F SYST BP LT 130 MM HG: CPT | Performed by: FAMILY MEDICINE

## 2024-06-13 PROCEDURE — 3008F BODY MASS INDEX DOCD: CPT | Performed by: FAMILY MEDICINE

## 2024-06-13 PROCEDURE — 99214 OFFICE O/P EST MOD 30 MIN: CPT | Performed by: FAMILY MEDICINE

## 2024-06-13 PROCEDURE — 74177 CT ABD & PELVIS W/CONTRAST: CPT

## 2024-06-13 PROCEDURE — 63600105 HC IODINE BASED CONTRAST: Mod: JZ | Performed by: FAMILY MEDICINE

## 2024-06-13 RX ORDER — IOPAMIDOL 755 MG/ML
100 INJECTION, SOLUTION INTRAVASCULAR
Status: COMPLETED | OUTPATIENT
Start: 2024-06-13 | End: 2024-06-13

## 2024-06-13 RX ADMIN — IOPAMIDOL 100 ML: 755 INJECTION, SOLUTION INTRAVENOUS at 16:36

## 2024-06-13 NOTE — PROGRESS NOTES
[facility] in [address] [address]  Phone: [phone]  Fax: [phone]       CHIEF COMPLAINT   Chief Complaint   Patient presents with    ER Follow Up      Seen in [facility] ER on 6/9/24 for Epiploic appendagitis/ abdominal pain   -gets injections monthly -SUBLOCADE Wednesday - last month  Wegovy - was Tuesday night before  - discuss new treatment place               HISTORY OF PRESENT ILLNESS      This is a 38 y.o. female who presents for   Chief Complaint   Patient presents with    ER Follow Up      Seen in [facility] ER on 6/9/24 for Epiploic appendagitis/ abdominal pain   -gets injections monthly -SUBLOCADE Wednesday - last month  Wegovy - was Tuesday night before  - discuss new treatment place              Consent obtained from patient and all parties present in the room? yes     I have obtained the consent of everyone present in the room to make an audio recording of this visit to assist me in documenting the encounter in the EMR.        History of Present Illness  The patient is a 38-year-old female who presents for evaluation of multiple medical concerns. She is accompanied by her mother.    The patient sought medical attention at [facility] Emergency Department on 6/9/2024 following a Sublocade injection administered a week prior. Following the Sublocade injection, she developed swelling on the right side of her abdomen, which she describes as a hard ball. This swelling, which she had not previously encountered with Sublocade, began approximately 3 weeks ago following a different physician's administration. The swelling has since enlarged, a condition she was informed would self-resolve. She was informed that she had epiploic appendagitis, but is uncertain if the injection was administered incorrectly. The patient also reports severe lower back pain and discomfort during urination. Despite these symptoms, her bowel movements remain normal,  albeit with pressure. She has a history of bladder issues, necessitating straining during urination due to muscular tension. The patient has been self-administering Wegovy on the left side, which she finds beneficial. The patient suspects a possible obstruction in her stomach. Her bowel movements are soft, and she takes MiraLAX daily. She denies vomiting but reports nausea and fatigue. She has not taken her night medications for over a week to avoid excessive sleepiness.    PAST MEDICAL AND SURGICAL HISTORY      Past Medical History:   Diagnosis Date    Acute bacterial endocarditis 12/10/2020    Anxiety     Depressed     Endocarditis     Fibromyalgia     Migraines     Obstructive sleep apnea syndrome 01/10/2023    uses CPAP machine    Osteomyelitis (CMS/HCC) 01/10/2023    Pancreatitis     Recurrent major depressive disorder (CMS/HCC) 01/06/2023    Substance abuse (CMS/HCC) 2015    Tachycardia     Vasculitis (CMS/HCC) 09/26/2017       Past Surgical History:   Procedure Laterality Date    CHOLECYSTECTOMY      ERCP      GALLBLADDER SURGERY      TONSILLECTOMY  1991       MEDICATIONS        Current Outpatient Medications:     albuterol HFA (VENTOLIN HFA) 90 mcg/actuation inhaler, Inhale 2 puffs every 4 (four) hours as needed., Disp: , Rfl:     desvenlafaxine succinate (PRISTIQ) 25 mg tablet extended release 24 hr, Take 25 mg by mouth daily., Disp: , Rfl:     ferrous sulfate 325 mg (65 mg iron) tablet, Take 65 mg by mouth nightly., Disp: , Rfl:     fluticasone propion-salmeteroL (ADVAIR DISKUS) 500-50 mcg/dose diskus inhaler, Inhale 1 puff 2 (two) times a day., Disp: , Rfl:     fluticasone propionate (FLONASE) 50 mcg/actuation nasal spray, ADMINISTER 1 SPRAY INTO EACH NOSTRIL DAILY AS NEEDED FOR RHINITIS OR ALLERGIES., Disp: 16 mL, Rfl: 1    magnesium oxide (MAG-OX) 400 mg (241.3 mg magnesium) tablet, Take 1 tablet (400 mg total) by mouth daily., Disp: 90 tablet, Rfl: 1    medical marijuana, 1 each See admin instr. Please  be advised that an [facility] hospital staff member has listed medical marijuana as one of your home medications for documentation purposes. Please follow-up with your certified issuing provider for ongoing use, Disp: , Rfl:     metoprolol succinate XL (TOPROL-XL) 25 mg 24 hr tablet, Take 1 tablet (25 mg total) by mouth nightly., Disp: 90 tablet, Rfl: 3    naloxone (NARCAN) 4 mg/actuation spray,non-aerosol nasal spray, , Disp: , Rfl:     NURTEC ODT 75 mg tablet,disintegrating, TAKE 1 TABLET (75 MG TOTAL) BY MOUTH EVERY OTHER DAY, Disp: 16 tablet, Rfl: 3    onabotulinumtoxinA (BOTOX) injection, Botox for chronic migraine headache every 3 months, Disp: 1 each, Rfl: 4    pantoprazole (PROTONIX) 40 mg EC tablet, TAKE 1 TABLET BY MOUTH EVERY DAY AT NIGHT, Disp: 90 tablet, Rfl: 0    pregabalin (LYRICA) 200 mg capsule, Take 1 capsule (200 mg total) by mouth 3 (three) times a day with meals., Disp: 90 capsule, Rfl: 2    semaglutide (WEGOVY) 1.7 mg/0.75 mL subcutaneous injection, Inject 1.7 mg under the skin every (seven) 7 days., Disp: 9 mL, Rfl: 1    SUBLOCADE 300 mg/1.5 mL solution, extended rel syringe subcutaneous injection, 1x monthly, Disp: , Rfl:     tamsulosin (FLOMAX) 0.4 mg capsule, Take 0.4 mg by mouth daily., Disp: , Rfl:     temazepam (RESTORIL) 30 mg capsule, Take 30 mg by mouth nightly., Disp: , Rfl:     TiZANidine (ZANAFLEX) 6 mg capsule, Take 1 capsule (6 mg total) by mouth 3 (three) times a day as needed for muscle spasms., Disp: 90 capsule, Rfl: 0    VITAMIN D3 125 mcg (5,000 unit) tablet, Take 5,000 Units by mouth., Disp: , Rfl:     ALLERGIES      Tramadol, Amoxicillin, Nsaids (non-steroidal anti-inflammatory drug), and Trazodone    FAMILY HISTORY      Family History   Problem Relation Age of Onset    Hypertension Biological Mother     Lung cancer Biological Father 76        smoker    Lung cancer Maternal Grandmother 69        smoker    Heart disease Maternal Grandfather     Heart attack Maternal Grandfather          unsure of age    Colon cancer Maternal Grandfather        SOCIAL HISTORY      Social History     Socioeconomic History    Marital status: Single     Spouse name: None    Number of children: None    Years of education: None    Highest education level: None   Occupational History    Occupation:    Tobacco Use    Smoking status: Former     Packs/day: 0.50     Years: 5.00     Additional pack years: 0.00     Total pack years: 2.50     Types: Cigarettes     Quit date:      Years since quittin.4    Smokeless tobacco: Former    Tobacco comments:     quit 1 year ago, vapes currently   Vaping Use    Vaping Use: Every day    Substances: Nicotine, Flavoring    Devices: Refillable tank   Substance and Sexual Activity    Alcohol use: Not Currently    Drug use: Not Currently     Types: Marijuana, Heroin     Comment: MM now , previous heroin and opiod    Sexual activity: Not Currently     Partners: Male, Female   Social History Narrative    Lives with mom     Social Determinants of Health     Financial Resource Strain: Low Risk  (2023)    Overall Financial Resource Strain (CARDIA)     Difficulty of Paying Living Expenses: Not hard at all   Food Insecurity: No Food Insecurity (2024)    Hunger Vital Sign     Worried About Running Out of Food in the Last Year: Never true     Ran Out of Food in the Last Year: Never true   Transportation Needs: No Transportation Needs (2023)    PRAPARE - Transportation     Lack of Transportation (Medical): No     Lack of Transportation (Non-Medical): No   Housing Stability: Low Risk  (2023)    Housing Stability Vital Sign     Unable to Pay for Housing in the Last Year: No     Number of Places Lived in the Last Year: 1     Unstable Housing in the Last Year: No     REVIEW OF SYSTEMS      Review of Systems   All other systems reviewed and are negative.    PHYSICAL EXAMINATION      Vitals:    24 1106   BP: 108/68   BP Location: Left upper arm  "  Patient Position: Sitting   Pulse: 77   Temp: 36.4 °C (97.6 °F)   TempSrc: Temporal   SpO2: 97%   Weight: 95.3 kg (210 lb)   Height: 1.575 m (5' 2\")     Body mass index is 38.41 kg/m².     BP Readings from Last 3 Encounters:   06/13/24 108/68   06/09/24 (!) 90/52   06/05/24 102/68     Wt Readings from Last 3 Encounters:   06/13/24 95.3 kg (210 lb)   06/09/24 97.6 kg (215 lb 2.7 oz)   06/05/24 98 kg (216 lb)     Ht Readings from Last 3 Encounters:   06/13/24 1.575 m (5' 2\")   06/09/24 1.6 m (5' 2.99\")   05/30/24 1.6 m (5' 3\")     Physical Exam  Vitals and nursing note reviewed.   Constitutional:       General: She is not in acute distress.     Appearance: Normal appearance.   HENT:      Head: Normocephalic and atraumatic.   Cardiovascular:      Rate and Rhythm: Normal rate and regular rhythm.   Pulmonary:      Effort: Pulmonary effort is normal. No respiratory distress.      Breath sounds: Normal breath sounds. No wheezing.   Abdominal:      Tenderness: There is abdominal tenderness (RLQ TTP and R lower back TTP). There is guarding. There is no rebound.   Skin:     General: Skin is warm and dry.   Neurological:      Mental Status: She is alert and oriented to person, place, and time. Mental status is at baseline.   Psychiatric:         Mood and Affect: Mood normal.         Behavior: Behavior normal.         LABS        Lab Results   Component Value Date     06/09/2024     05/28/2024     10/09/2023    K 4.3 06/09/2024    K 4.6 05/28/2024    K 5.1 10/09/2023    CREATININE 0.7 06/09/2024    CREATININE 0.79 05/28/2024    CREATININE 0.68 10/09/2023       Lab Results   Component Value Date    ALBUMIN 3.9 06/09/2024    ALBUMIN 4.1 10/09/2023    ALBUMIN 4.1 10/09/2023    AST 20 06/09/2024    AST 16 10/09/2023    AST 20 10/09/2023    ALT 15 06/09/2024    ALT 12 10/09/2023    ALT 9 10/09/2023    BILITOT 0.5 06/09/2024    BILITOT 0.3 10/09/2023    BILITOT 0.2 10/09/2023       Lab Results   Component Value " Date    HGBA1C 5.3 05/28/2024    HGBA1C 5.4 06/26/2023       Lab Results   Component Value Date    CHOL 187 10/09/2023    CHOL 180 06/23/2023    TRIG 159 (H) 10/09/2023    TRIG 273 (H) 06/23/2023    LDLCALC 112 (H) 10/09/2023    LDLCALC 85 06/23/2023    HDL 47 10/09/2023    HDL 50 06/23/2023       Lab Results   Component Value Date    WBC 11.88 (H) 06/09/2024    WBC 8.2 10/09/2023    WBC 10.02 08/10/2023    HGB 13.1 06/09/2024    HGB 11.8 10/09/2023    HGB 12.5 08/10/2023    MCV 90.7 06/09/2024    MCV 86 10/09/2023    MCV 93.1 08/10/2023    MCHC 32.8 06/09/2024    MCHC 33.1 10/09/2023    MCHC 30.7 (L) 08/10/2023     06/09/2024     10/09/2023     08/10/2023       Lab Results   Component Value Date    TSH 2.500 06/23/2023    TSH 2.490 06/12/2023    TSH 3.300 07/26/2021    B12 412 06/12/2023    FERRITIN 100 04/18/2017    FERRITIN 107 04/16/2017    FERRITIN 283 (H) 03/24/2017     HEALTH MAINTENANCE              ASSESSMENT AND PLAN     Assessment & Plan  1. Potential appendicitis.  The patient's symptoms are indicative of focal cecal colitis or epiploic appendagitis. It is important to note that the patient's use of Wegovy has at an elevated risk for colitis and slowing of the gastrointestinal tract. An immediate CT scan with intravenous contrast will be ordered to ascertain the extent of inflammation. Additionally, a complete blood count (CBC) will be ordered as WBC elevated in emergency department 4 days ago. Should the patient's symptoms persist, a referral to a general surgeon may be considered.      Problem List Items Addressed This Visit       Autoimmune disease (CMS/[facility])     History of autoimmune disease  Will monitor in follow up           Other Visit Diagnoses       Colitis    -  Primary    Relevant Orders    CT ABDOMEN PELVIS WITH IV CONTRAST (Completed)    CBC and differential    Acute appendicitis, unspecified acute appendicitis type        Relevant Orders    CT ABDOMEN PELVIS WITH IV  CONTRAST (Completed)    CBC and differential             This note was written with the assistance of voice recognition software, please excuse errors associated with voice recognition software.      Yumiko Lopez, DO  06/14/24

## 2024-06-13 NOTE — TELEPHONE ENCOUNTER
Precert started office notes sent over to [facility] for clinical review reference number Ref# [id number] for STAT CT ABDOMEN PELVIS WITH IV CONTRAST

## 2024-06-14 NOTE — RESULT ENCOUNTER NOTE
Call patient to discuss results  Improvement overall  Closely monitor symptoms and RTO or emergency department if needed

## 2024-06-15 LAB
BASOPHILS # BLD AUTO: 0.1 X10E3/UL (ref 0–0.2)
BASOPHILS NFR BLD AUTO: 1 %
EOSINOPHIL # BLD AUTO: 0.2 X10E3/UL (ref 0–0.4)
EOSINOPHIL NFR BLD AUTO: 3 %
ERYTHROCYTE [DISTWIDTH] IN BLOOD BY AUTOMATED COUNT: 13.9 % (ref 11.7–15.4)
HCT VFR BLD AUTO: 40.7 % (ref 34–46.6)
HGB BLD-MCNC: 12.9 G/DL (ref 11.1–15.9)
IMM GRANULOCYTES # BLD AUTO: 0 X10E3/UL (ref 0–0.1)
IMM GRANULOCYTES NFR BLD AUTO: 0 %
LYMPHOCYTES # BLD AUTO: 2.2 X10E3/UL (ref 0.7–3.1)
LYMPHOCYTES NFR BLD AUTO: 30 %
MCH RBC QN AUTO: 28.9 PG (ref 26.6–33)
MCHC RBC AUTO-ENTMCNC: 31.7 G/DL (ref 31.5–35.7)
MCV RBC AUTO: 91 FL (ref 79–97)
MONOCYTES # BLD AUTO: 0.6 X10E3/UL (ref 0.1–0.9)
MONOCYTES NFR BLD AUTO: 8 %
NEUTROPHILS # BLD AUTO: 4.3 X10E3/UL (ref 1.4–7)
NEUTROPHILS NFR BLD AUTO: 58 %
PLATELET # BLD AUTO: 205 X10E3/UL (ref 150–450)
RBC # BLD AUTO: 4.47 X10E6/UL (ref 3.77–5.28)
WBC # BLD AUTO: 7.3 X10E3/UL (ref 3.4–10.8)

## 2024-06-24 ENCOUNTER — TELEMEDICINE (OUTPATIENT)
Dept: BEHAVIORAL/MENTAL HEALTH CLINIC | Facility: CLINIC | Age: 39
End: 2024-06-24
Payer: COMMERCIAL

## 2024-06-24 DIAGNOSIS — F41.1 GENERALIZED ANXIETY DISORDER: Primary | ICD-10-CM

## 2024-06-24 DIAGNOSIS — F33.0 MILD EPISODE OF RECURRENT MAJOR DEPRESSIVE DISORDER (HCC): ICD-10-CM

## 2024-06-24 PROCEDURE — 90834 PSYTX W PT 45 MINUTES: CPT | Performed by: COUNSELOR

## 2024-06-24 RX ORDER — PANTOPRAZOLE SODIUM 40 MG/1
40 TABLET, DELAYED RELEASE ORAL SEE ADMIN INSTRUCTIONS
Qty: 30 TABLET | Refills: 2 | Status: SHIPPED | OUTPATIENT
Start: 2024-06-24 | End: 2024-06-27

## 2024-06-24 NOTE — PSYCH
Virtual Regular Visit    Verification of patient location:    Patient is located at Home in the following state in which I hold an active license [place]      Assessment/Plan:    Problem List Items Addressed This Visit        Behavioral Health    Recurrent major depressive disorder (HCC)    Generalized anxiety disorder - Primary       Goals addressed in session: Goal 1          Reason for visit is   Chief Complaint   Patient presents with   • Virtual Regular Visit          Encounter provider GAMALIEL Pennington      Recent Visits  Date Type Provider Dept   06/24/24 Telemedicine GAMALIEL Pennington [facility] Therapist [facility]   Showing recent visits within past 7 days and meeting all other requirements  Future Appointments  No visits were found meeting these conditions.  Showing future appointments within next 150 days and meeting all other requirements       The patient was identified by name and date of birth. Samantha Vila was informed that this is a telemedicine visit and that the visit is being conducted throughthe TheMobileGamer (TMG) platform. She agrees to proceed..  My office door was closed. No one else was in the room.  She acknowledged consent and understanding of privacy and security of the video platform. The patient has agreed to participate and understands they can discontinue the visit at any time.    Patient is aware this is a billable service.     Subjective  Samantha Vila is a 38 y.o. female  .      HPI     No past medical history on file.    No past surgical history on file.    No current outpatient medications on file.     No current facility-administered medications for this visit.        Not on File    Review of Systems    Video Exam    There were no vitals filed for this visit.    Physical Exam     Behavioral Health Psychotherapy Progress Note    Psychotherapy Provided: Individual Psychotherapy     1. Generalized anxiety disorder        2. Mild episode of recurrent major depressive disorder  "([facility])            Goals addressed in session: Goal 1     DATA:     -CT reported and reflected on her status. Returning home from mini-vacation at the [facility] with a friend.  -CT has made the change to her medical coverage. CT has gotten her providers and medications all approved and in place.    -Discussed the issues determining whether CT can continue to see this TH. TH reported that current status indicates a need for transfer because records show that CT has [facility] coverage. CT believes her renewal date is June 30. Agreed to make a new check at the beginning of July. CT will do some research on her end.   -CT described her routines and practices that she is giving priority to on a regular basis (kundilini yoga, meditation, setting intentions)  -CT  reflected on the health improvements she is experience (weight loss, increased energy)    During this session, this clinician used the following therapeutic modalities: Supportive Psychotherapy    Substance Abuse was not addressed during this session. If the client is diagnosed with a co-occurring substance use disorder, please indicate any changes in the frequency or amount of use: . Stage of change for addressing substance use diagnoses: No substance use/Not applicable    ASSESSMENT:  Samantha Vila presents with a Euthymic/ normal mood.     her affect is Normal range and intensity, which is congruent, with her mood and the content of the session. The client has made progress on their goals.     Samantha Vila presents with a none risk of suicide, none risk of self-harm, and none risk of harm to others.    For any risk assessment that surpasses a \"low\" rating, a safety plan must be developed.    A safety plan was indicated: no  If yes, describe in detail     PLAN: Between sessions, Samantha Vila will maintain her wellness routines, research [place] coverage. . At the next session, the therapist will use Supportive Psychotherapy to address possible TH transfer and " identify and encourage wellness routines.    Behavioral Health Treatment Plan and Discharge Planning: Samantha Vila is aware of and agrees to continue to work on their treatment plan. They have identified and are working toward their discharge goals. yes    Visit start and stop times:    06/24/24  Start Time: 1223  Stop Time: 1307  Total Visit Time: 44 minutes

## 2024-06-24 NOTE — TELEPHONE ENCOUNTER
Medicine Refill Request    Last Office Visit: 6/13/2024   Last Consult Visit: Visit date not found  Last Telemedicine Visit: 1/30/2024 Yumiko Lopez,     Next Appointment: 6/27/2024      Current Outpatient Medications:     albuterol HFA (VENTOLIN HFA) 90 mcg/actuation inhaler, Inhale 2 puffs every 4 (four) hours as needed., Disp: , Rfl:     desvenlafaxine succinate (PRISTIQ) 25 mg tablet extended release 24 hr, Take 25 mg by mouth daily., Disp: , Rfl:     ferrous sulfate 325 mg (65 mg iron) tablet, Take 65 mg by mouth nightly., Disp: , Rfl:     fluticasone propion-salmeteroL (ADVAIR DISKUS) 500-50 mcg/dose diskus inhaler, Inhale 1 puff 2 (two) times a day., Disp: , Rfl:     fluticasone propionate (FLONASE) 50 mcg/actuation nasal spray, ADMINISTER 1 SPRAY INTO EACH NOSTRIL DAILY AS NEEDED FOR RHINITIS OR ALLERGIES., Disp: 16 mL, Rfl: 1    magnesium oxide (MAG-OX) 400 mg (241.3 mg magnesium) tablet, Take 1 tablet (400 mg total) by mouth daily., Disp: 90 tablet, Rfl: 1    medical marijuana, 1 each See admin instr. Please be advised that an [facility] hospital staff member has listed medical marijuana as one of your home medications for documentation purposes. Please follow-up with your certified issuing provider for ongoing use, Disp: , Rfl:     metoprolol succinate XL (TOPROL-XL) 25 mg 24 hr tablet, Take 1 tablet (25 mg total) by mouth nightly., Disp: 90 tablet, Rfl: 3    naloxone (NARCAN) 4 mg/actuation spray,non-aerosol nasal spray, , Disp: , Rfl:     NURTEC ODT 75 mg tablet,disintegrating, TAKE 1 TABLET (75 MG TOTAL) BY MOUTH EVERY OTHER DAY, Disp: 16 tablet, Rfl: 3    onabotulinumtoxinA (BOTOX) injection, Botox for chronic migraine headache every 3 months, Disp: 1 each, Rfl: 4    pantoprazole (PROTONIX) 40 mg EC tablet, TAKE 1 TABLET BY MOUTH EVERY DAY AT NIGHT, Disp: 90 tablet, Rfl: 0    pregabalin (LYRICA) 200 mg capsule, Take 1 capsule (200 mg total) by mouth 3 (three) times a day with meals., Disp: 90 capsule, Rfl:  2    semaglutide (WEGOVY) 1.7 mg/0.75 mL subcutaneous injection, Inject 1.7 mg under the skin every (seven) 7 days., Disp: 9 mL, Rfl: 1    SUBLOCADE 300 mg/1.5 mL solution, extended rel syringe subcutaneous injection, 1x monthly, Disp: , Rfl:     tamsulosin (FLOMAX) 0.4 mg capsule, Take 0.4 mg by mouth daily., Disp: , Rfl:     temazepam (RESTORIL) 30 mg capsule, Take 30 mg by mouth nightly., Disp: , Rfl:     TiZANidine (ZANAFLEX) 6 mg capsule, Take 1 capsule (6 mg total) by mouth 3 (three) times a day as needed for muscle spasms., Disp: 90 capsule, Rfl: 0    VITAMIN D3 125 mcg (5,000 unit) tablet, Take 5,000 Units by mouth., Disp: , Rfl:       BP Readings from Last 3 Encounters:   06/13/24 108/68   06/09/24 (!) 90/52   06/05/24 102/68       Recent Lab results:  Lab Results   Component Value Date    CHOL 187 10/09/2023   ,   Lab Results   Component Value Date    HDL 47 10/09/2023   ,   Lab Results   Component Value Date    LDLCALC 112 (H) 10/09/2023   ,   Lab Results   Component Value Date    TRIG 159 (H) 10/09/2023        Lab Results   Component Value Date    GLUCOSE 76 06/09/2024   ,   Lab Results   Component Value Date    HGBA1C 5.3 05/28/2024         Lab Results   Component Value Date    CREATININE 0.7 06/09/2024       Lab Results   Component Value Date    TSH 2.500 06/23/2023           Lab Results   Component Value Date    HGBA1C 5.3 05/28/2024

## 2024-06-24 NOTE — PSYCH
Virtual Regular Visit    Verification of patient location:    Patient is located at {[facility] Virtual Patient Location:[address]} in the following state in which I hold an active license {[facility] virtual patient location:[address]}      Assessment/Plan:    Problem List Items Addressed This Visit        Behavioral Health    Recurrent major depressive disorder (HCC)    Generalized anxiety disorder - Primary       Goals addressed in session: {GOALS:94191}          Reason for visit is   Chief Complaint   Patient presents with   • Virtual Regular Visit          Encounter provider GAMALIEL Pennington      Recent Visits  No visits were found meeting these conditions.  Showing recent visits within past 7 days and meeting all other requirements  Today's Visits  Date Type Provider Dept   06/24/24 Telemedicine GAMALIEL Pennington [facility] Therapist [facility]   Showing today's visits and meeting all other requirements  Future Appointments  No visits were found meeting these conditions.  Showing future appointments within next 150 days and meeting all other requirements       The patient was identified by name and date of birth. Samantha Vila was informed that this is a telemedicine visit and that the visit is being conducted through{AMB VIRTUAL VISIT MEDIUM:20049}.  {Telemedicine confidentiality :60429} {Telemedicine participants:09924}  She acknowledged consent and understanding of privacy and security of the video platform. The patient has agreed to participate and understands they can discontinue the visit at any time.    Patient is aware this is a billable service.     Subjective  Samantha Vila is a 38 y.o. female *** .      HPI     No past medical history on file.    No past surgical history on file.    No current outpatient medications on file.     No current facility-administered medications for this visit.        Not on File    Review of Systems    Video Exam    There were no vitals filed for this visit.    Physical Exam      Visit Time    Visit Start Time: ***  Visit Stop Time: ***  Total Visit Duration: {Psych Total Visit Time:38337}

## 2024-06-27 ENCOUNTER — OFFICE VISIT (OUTPATIENT)
Dept: PRIMARY CARE | Facility: CLINIC | Age: 39
End: 2024-06-27
Payer: COMMERCIAL

## 2024-06-27 VITALS
DIASTOLIC BLOOD PRESSURE: 76 MMHG | HEIGHT: 62 IN | HEART RATE: 78 BPM | OXYGEN SATURATION: 97 % | SYSTOLIC BLOOD PRESSURE: 110 MMHG | TEMPERATURE: 97 F | BODY MASS INDEX: 38.28 KG/M2 | WEIGHT: 208 LBS

## 2024-06-27 DIAGNOSIS — M48.061 SPINAL STENOSIS OF LUMBAR REGION, UNSPECIFIED WHETHER NEUROGENIC CLAUDICATION PRESENT: ICD-10-CM

## 2024-06-27 DIAGNOSIS — F39 UNSPECIFIED MOOD (AFFECTIVE) DISORDER (CMS/HCC): ICD-10-CM

## 2024-06-27 DIAGNOSIS — Z79.899 MEDICAL MARIJUANA USE: ICD-10-CM

## 2024-06-27 DIAGNOSIS — R11.2 NAUSEA AND VOMITING, UNSPECIFIED VOMITING TYPE: Primary | ICD-10-CM

## 2024-06-27 DIAGNOSIS — K21.9 GASTROESOPHAGEAL REFLUX DISEASE, UNSPECIFIED WHETHER ESOPHAGITIS PRESENT: ICD-10-CM

## 2024-06-27 PROCEDURE — 3078F DIAST BP <80 MM HG: CPT | Performed by: FAMILY MEDICINE

## 2024-06-27 PROCEDURE — 3074F SYST BP LT 130 MM HG: CPT | Performed by: FAMILY MEDICINE

## 2024-06-27 PROCEDURE — 99214 OFFICE O/P EST MOD 30 MIN: CPT | Performed by: FAMILY MEDICINE

## 2024-06-27 PROCEDURE — 3008F BODY MASS INDEX DOCD: CPT | Performed by: FAMILY MEDICINE

## 2024-06-27 RX ORDER — PANTOPRAZOLE SODIUM 20 MG/1
20 TABLET, DELAYED RELEASE ORAL DAILY
Qty: 90 TABLET | Refills: 0 | Status: SHIPPED | OUTPATIENT
Start: 2024-06-27 | End: 2024-06-27

## 2024-06-27 RX ORDER — FAMOTIDINE 20 MG/1
20 TABLET, FILM COATED ORAL 2 TIMES DAILY
Qty: 180 TABLET | Refills: 1 | Status: SHIPPED | OUTPATIENT
Start: 2024-06-27 | End: 2024-06-27

## 2024-06-27 RX ORDER — FAMOTIDINE 20 MG/1
20 TABLET, FILM COATED ORAL
Qty: 90 TABLET | Refills: 1 | Status: SHIPPED | OUTPATIENT
Start: 2024-06-27 | End: 2024-12-19

## 2024-06-27 RX ORDER — PANTOPRAZOLE SODIUM 20 MG/1
20 TABLET, DELAYED RELEASE ORAL DAILY
Qty: 90 TABLET | Refills: 0 | Status: SHIPPED | OUTPATIENT
Start: 2024-06-27 | End: 2024-09-23

## 2024-06-27 RX ORDER — TEMAZEPAM 15 MG/1
15 CAPSULE ORAL NIGHTLY PRN
COMMUNITY
Start: 2024-06-25

## 2024-06-27 RX ORDER — PREGABALIN 200 MG/1
200 CAPSULE ORAL
Qty: 90 CAPSULE | Refills: 2 | Status: SHIPPED | OUTPATIENT
Start: 2024-06-27 | End: 2024-06-27

## 2024-06-27 RX ORDER — PREGABALIN 200 MG/1
200 CAPSULE ORAL
Qty: 90 CAPSULE | Refills: 2 | Status: SHIPPED | OUTPATIENT
Start: 2024-06-27 | End: 2024-09-18 | Stop reason: SDUPTHER

## 2024-06-27 NOTE — PROGRESS NOTES
Consent obtained from patient and all parties present in the room? yes    I have obtained the consent of everyone present in the room to make an audio recording of this visit to assist me in documenting the encounter in the EMR.     Subjective     Patient ID: Carolyn Redmond, : 1985 is a 38 y.o. female who presents for Follow-up (- discuss changing Protonix to 20 mg /-stop taking iron /- abdominal pains before having BMs/)    History of Present Illness  The patient presents for evaluation of multiple medical concerns.    The patient experienced lower quadrant discomfort during her last visit, which raised concerns about a potential infection. She received a Sublocade injection, which was administered by a new provider and felt different . The CAT scan results were deanna, no appendicitis. She reports that the steroids provided significant relief, with the inflammation subsiding by day 2, and the pain nearly subsided. By the end of the steroid pack, she felt normal, and her fatigue had completely managed.    The patient expresses a desire to discontinue as many medications as possible. She is currently on Wegovy. She is considering reducing her pantoprazole dosage to 20 mg. She has a history of severe acid reflux, which she attributes to her gallbladder removal. She experiences a burning sensation and pain in her stomach. She vomited this morning, which she attributes to eating late and taking a laxative. She is currently taking 40 mg of pantoprazole once daily. She experiences pain during bowel movements. She takes MiraLAX daily. She avoids sugar-free substances due to migraines caused by Aspartame. She has never taken famotidine. She reports random breakthrough symptoms throughout the day, which she describes as feeling like her chest is on fire. She did not  her prescription for iron. She is considering discontinuing iron due to constipation. She is eating more dark green vegetables. She  requests a refill of her Lyrica 200 mg, to be taken thrice daily. She has discontinued tizanidine.    The patient is experiencing allergies. She is currently taking Zyrtec, which she believes has improved her symptoms, but she continues to experience a runny nose. She also takes Flonase. She has discontinued irrigation.   She is still using medical marijuana.    The following have been reviewed and updated as appropriate in this visit:   Tobacco  Allergies  Meds  Problems  Med Hx  Surg Hx  Fam Hx           Current Outpatient Medications:     albuterol HFA (VENTOLIN HFA) 90 mcg/actuation inhaler, Inhale 2 puffs every 4 (four) hours as needed., Disp: , Rfl:     desvenlafaxine succinate (PRISTIQ) 25 mg tablet extended release 24 hr, Take 25 mg by mouth daily., Disp: , Rfl:     famotidine (PEPCID) 20 mg tablet, Take 1 tablet (20 mg total) by mouth daily before dinner., Disp: 90 tablet, Rfl: 1    ferrous sulfate 325 mg (65 mg iron) tablet, Take 65 mg by mouth nightly., Disp: , Rfl:     fluticasone propion-salmeteroL (ADVAIR DISKUS) 500-50 mcg/dose diskus inhaler, Inhale 1 puff 2 (two) times a day., Disp: , Rfl:     fluticasone propionate (FLONASE) 50 mcg/actuation nasal spray, ADMINISTER 1 SPRAY INTO EACH NOSTRIL DAILY AS NEEDED FOR RHINITIS OR ALLERGIES., Disp: 16 mL, Rfl: 1    magnesium oxide (MAG-OX) 400 mg (241.3 mg magnesium) tablet, Take 1 tablet (400 mg total) by mouth daily., Disp: 90 tablet, Rfl: 1    medical marijuana, 1 each See admin instr. Please be advised that an [facility] hospital staff member has listed medical marijuana as one of your home medications for documentation purposes. Please follow-up with your certified issuing provider for ongoing use, Disp: , Rfl:     metoprolol succinate XL (TOPROL-XL) 25 mg 24 hr tablet, Take 1 tablet (25 mg total) by mouth nightly., Disp: 90 tablet, Rfl: 3    naloxone (NARCAN) 4 mg/actuation spray,non-aerosol nasal spray, , Disp: , Rfl:     NURTEC ODT 75 mg  "tablet,disintegrating, TAKE 1 TABLET (75 MG TOTAL) BY MOUTH EVERY OTHER DAY, Disp: 16 tablet, Rfl: 3    onabotulinumtoxinA (BOTOX) injection, Botox for chronic migraine headache every 3 months, Disp: 1 each, Rfl: 4    pantoprazole (PROTONIX) 20 mg EC tablet, Take 1 tablet (20 mg total) by mouth daily., Disp: 90 tablet, Rfl: 0    pregabalin (LYRICA) 200 mg capsule, Take 1 capsule (200 mg total) by mouth 3 (three) times a day with meals., Disp: 90 capsule, Rfl: 2    semaglutide (WEGOVY) 1.7 mg/0.75 mL subcutaneous injection, Inject 1.7 mg under the skin every (seven) 7 days., Disp: 9 mL, Rfl: 1    SUBLOCADE 300 mg/1.5 mL solution, extended rel syringe subcutaneous injection, 1x monthly, Disp: , Rfl:     tamsulosin (FLOMAX) 0.4 mg capsule, Take 0.4 mg by mouth daily., Disp: , Rfl:     temazepam (RESTORIL) 15 mg capsule, Take 15 mg by mouth nightly as needed for sleep., Disp: , Rfl:     VITAMIN D3 125 mcg (5,000 unit) tablet, Take 5,000 Units by mouth., Disp: , Rfl:     Objective   Vitals:    06/27/24 1503   BP: 110/76   BP Location: Left upper arm   Patient Position: Sitting   Pulse: 78   Temp: 36.1 °C (97 °F)   TempSrc: Temporal   SpO2: 97%   Weight: 94.3 kg (208 lb)   Height: 1.575 m (5' 2\")     Body mass index is 38.04 kg/m².    Wt Readings from Last 3 Encounters:   06/27/24 94.3 kg (208 lb)   06/13/24 95.3 kg (210 lb)   06/09/24 97.6 kg (215 lb 2.7 oz)     BP Readings from Last 3 Encounters:   06/27/24 110/76   06/13/24 108/68   06/09/24 (!) 90/52       Physical Exam  Vitals and nursing note reviewed.   Constitutional:       General: She is not in acute distress.     Appearance: Normal appearance.   HENT:      Head: Normocephalic and atraumatic.   Cardiovascular:      Rate and Rhythm: Normal rate and regular rhythm.      Heart sounds: Normal heart sounds.   Pulmonary:      Effort: Pulmonary effort is normal. No respiratory distress.      Breath sounds: Normal breath sounds. No wheezing.   Abdominal:      General: " Abdomen is flat. Bowel sounds are normal.      Palpations: Abdomen is soft.      Tenderness: There is no abdominal tenderness.   Skin:     General: Skin is warm and dry.   Neurological:      Mental Status: She is alert and oriented to person, place, and time. Mental status is at baseline.   Psychiatric:         Mood and Affect: Mood normal.         Behavior: Behavior normal.         Physical Exam      Results  Imaging  CT AP scan was normal.       Assessment & Plan  1. Acid reflux.  The patient was advised to avoid eating within 3 hours of lying down and to abstain from trigger foods such as fried, greasy, and spicy foods. The dosage of pantoprazole was reduced to 20 mg in the morning and famotidine 20 mg added with dinner.    2. Constipation.  The dosage of MiraLAX was increased to twice daily. The patient was also advised to maintain adequate hydration.    3. Allergies.  The patient was advised to perform irrigation before bedtime.    Follow-up  A follow-up appointment is scheduled for 3 months from now.         Problem List Items Addressed This Visit       Lumbar spinal stenosis    Relevant Medications    pregabalin (LYRICA) 200 mg capsule    Unspecified mood (affective) disorder (CMS/HCC)    Relevant Medications    temazepam (RESTORIL) 15 mg capsule    Nausea & vomiting - Primary    Acid reflux    Relevant Medications    famotidine (PEPCID) 20 mg tablet    pantoprazole (PROTONIX) 20 mg EC tablet       There are no Patient Instructions on file for this visit.    Return in about 3 months (around 9/27/2024) for F/u chronic med conditions.        Yumiko Lopez DO

## 2024-06-28 PROBLEM — Z79.899 MEDICAL MARIJUANA USE: Status: ACTIVE | Noted: 2024-06-28

## 2024-06-28 PROBLEM — N40.0 ENLARGED PROSTATE: Status: RESOLVED | Noted: 2024-02-23 | Resolved: 2024-06-28

## 2024-07-09 DIAGNOSIS — Z86.79 HISTORY OF ENDOCARDITIS: ICD-10-CM

## 2024-07-09 RX ORDER — TAMSULOSIN HYDROCHLORIDE 0.4 MG/1
0.4 CAPSULE ORAL DAILY
OUTPATIENT
Start: 2024-07-09

## 2024-07-09 RX ORDER — AMOXICILLIN 500 MG/1
2000 CAPSULE ORAL ONCE
Qty: 4 CAPSULE | Refills: 1 | Status: SHIPPED | OUTPATIENT
Start: 2024-07-09 | End: 2024-07-09

## 2024-07-09 NOTE — TELEPHONE ENCOUNTER
Medicine Refill Request    Last Office Visit: 6/27/2024   Last Consult Visit: Visit date not found  Last Telemedicine Visit: 1/30/2024 Yumiko Lopez,     Next Appointment: 10/8/2024      Current Outpatient Medications:     albuterol HFA (VENTOLIN HFA) 90 mcg/actuation inhaler, Inhale 2 puffs every 4 (four) hours as needed., Disp: , Rfl:     amoxicillin (AMOXIL) 500 mg capsule, Take 4 capsules (2,000 mg total) by mouth once for 1 dose. Take 1 hour before scheduled dental work., Disp: 4 capsule, Rfl: 1    desvenlafaxine succinate (PRISTIQ) 25 mg tablet extended release 24 hr, Take 25 mg by mouth daily., Disp: , Rfl:     famotidine (PEPCID) 20 mg tablet, Take 1 tablet (20 mg total) by mouth daily before dinner., Disp: 90 tablet, Rfl: 1    ferrous sulfate 325 mg (65 mg iron) tablet, Take 65 mg by mouth nightly., Disp: , Rfl:     fluticasone propion-salmeteroL (ADVAIR DISKUS) 500-50 mcg/dose diskus inhaler, Inhale 1 puff 2 (two) times a day., Disp: , Rfl:     fluticasone propionate (FLONASE) 50 mcg/actuation nasal spray, ADMINISTER 1 SPRAY INTO EACH NOSTRIL DAILY AS NEEDED FOR RHINITIS OR ALLERGIES., Disp: 16 mL, Rfl: 1    magnesium oxide (MAG-OX) 400 mg (241.3 mg magnesium) tablet, Take 1 tablet (400 mg total) by mouth daily., Disp: 90 tablet, Rfl: 1    medical marijuana, 1 each See admin instr. Please be advised that an [facility] hospital staff member has listed medical marijuana as one of your home medications for documentation purposes. Please follow-up with your certified issuing provider for ongoing use, Disp: , Rfl:     metoprolol succinate XL (TOPROL-XL) 25 mg 24 hr tablet, Take 1 tablet (25 mg total) by mouth nightly., Disp: 90 tablet, Rfl: 3    naloxone (NARCAN) 4 mg/actuation spray,non-aerosol nasal spray, , Disp: , Rfl:     NURTEC ODT 75 mg tablet,disintegrating, TAKE 1 TABLET (75 MG TOTAL) BY MOUTH EVERY OTHER DAY, Disp: 16 tablet, Rfl: 3    onabotulinumtoxinA (BOTOX) injection, Botox for chronic migraine  headache every 3 months, Disp: 1 each, Rfl: 4    pantoprazole (PROTONIX) 20 mg EC tablet, Take 1 tablet (20 mg total) by mouth daily., Disp: 90 tablet, Rfl: 0    pregabalin (LYRICA) 200 mg capsule, Take 1 capsule (200 mg total) by mouth 3 (three) times a day with meals., Disp: 90 capsule, Rfl: 2    semaglutide (WEGOVY) 1.7 mg/0.75 mL subcutaneous injection, Inject 1.7 mg under the skin every (seven) 7 days., Disp: 9 mL, Rfl: 1    SUBLOCADE 300 mg/1.5 mL solution, extended rel syringe subcutaneous injection, 1x monthly, Disp: , Rfl:     tamsulosin (FLOMAX) 0.4 mg capsule, Take 0.4 mg by mouth daily., Disp: , Rfl:     temazepam (RESTORIL) 15 mg capsule, Take 15 mg by mouth nightly as needed for sleep., Disp: , Rfl:     VITAMIN D3 125 mcg (5,000 unit) tablet, Take 5,000 Units by mouth., Disp: , Rfl:       BP Readings from Last 3 Encounters:   06/27/24 110/76   06/13/24 108/68   06/09/24 (!) 90/52       Recent Lab results:  Lab Results   Component Value Date    CHOL 187 10/09/2023   ,   Lab Results   Component Value Date    HDL 47 10/09/2023   ,   Lab Results   Component Value Date    LDLCALC 112 (H) 10/09/2023   ,   Lab Results   Component Value Date    TRIG 159 (H) 10/09/2023        Lab Results   Component Value Date    GLUCOSE 76 06/09/2024   ,   Lab Results   Component Value Date    HGBA1C 5.3 05/28/2024         Lab Results   Component Value Date    CREATININE 0.7 06/09/2024       Lab Results   Component Value Date    TSH 2.500 06/23/2023           Lab Results   Component Value Date    HGBA1C 5.3 05/28/2024

## 2024-07-09 NOTE — TELEPHONE ENCOUNTER
From: Carolyn Redmond  To: Office of Yumiko Lopez  Sent: 7/9/2024 2:11 PM EDT  Subject: Medication Renewal Request    Refills have been requested for the following medications:   Other - I need amoxicillan 500mg because I have a lot of dental work starting tomorrow. This medicstion was removed but should remain on my profile. I’m supposed to take 4 capsules one hour prior to dental work. They usually send it as a quantity of 4 with multiple refills. Thank you!    Preferred pharmacy: [id number] - [place] - [address]  Delivery method: Pickup

## 2024-07-12 ENCOUNTER — TELEPHONE (OUTPATIENT)
Dept: PSYCHIATRY | Facility: CLINIC | Age: 39
End: 2024-07-12

## 2024-07-12 ENCOUNTER — TELEMEDICINE (OUTPATIENT)
Dept: BEHAVIORAL/MENTAL HEALTH CLINIC | Facility: CLINIC | Age: 39
End: 2024-07-12
Payer: COMMERCIAL

## 2024-07-12 DIAGNOSIS — F41.1 GENERALIZED ANXIETY DISORDER: Primary | ICD-10-CM

## 2024-07-12 DIAGNOSIS — F33.0 MILD EPISODE OF RECURRENT MAJOR DEPRESSIVE DISORDER (HCC): ICD-10-CM

## 2024-07-12 PROCEDURE — 90834 PSYTX W PT 45 MINUTES: CPT | Performed by: COUNSELOR

## 2024-07-12 NOTE — PSYCH
Virtual Regular Visit    Verification of patient location:    Patient is located at Home in the following state in which I hold an active license [place]      Assessment/Plan:    Problem List Items Addressed This Visit        Behavioral Health    Recurrent major depressive disorder (HCC)    Generalized anxiety disorder - Primary       Goals addressed in session: Goal 1          Reason for visit is   Chief Complaint   Patient presents with   • Virtual Regular Visit          Encounter provider GAMALIEL Pennington      Recent Visits  No visits were found meeting these conditions.  Showing recent visits within past 7 days and meeting all other requirements  Today's Visits  Date Type Provider Dept   07/12/24 Telemedicine GAMALIEL Pennington [facility]   Showing today's visits and meeting all other requirements  Future Appointments  No visits were found meeting these conditions.  Showing future appointments within next 150 days and meeting all other requirements       The patient was identified by name and date of birth. Samantha Vila was informed that this is a telemedicine visit and that the visit is being conducted throughthe [facility] platform. She agrees to proceed..  My office door was closed. No one else was in the room.  She acknowledged consent and understanding of privacy and security of the video platform. The patient has agreed to participate and understands they can discontinue the visit at any time.    Patient is aware this is a billable service.     Subjective  Samantha Vila is a 38 y.o. female  .      HPI     No past medical history on file.    No past surgical history on file.    No current outpatient medications on file.     No current facility-administered medications for this visit.        Not on File    Review of Systems    Video Exam    There were no vitals filed for this visit.    Physical Exam     Behavioral Health Psychotherapy Progress Note    Psychotherapy Provided:  "Individual Psychotherapy     1. Generalized anxiety disorder        2. Mild episode of recurrent major depressive disorder (HCC)            Goals addressed in session: Goal 1     DATA:     -CT reported and reflected on her recent focus on supporting her mother's prep and pre-op requirements for back surgery scheduled next week. CT is please to be able to support her and using her patient navigation and avocation skills.  -Discussed CT's intention for future therapist referral given the required transfer from this . CT would like to be transferred to a new therapist within [facility].   will complete documentation and email to facilitate this transfer. Confirmed that CT has contact information for dept clinical  if any questions or concerns arise in the process.   -TH and CT reflected on the progress and growth CT experienced over the past year of therapy engagement. TH reviewed the strengths she has witnessed in CT that contributed to meeting her goals. CT expressed her gratitude and hopes for the future.    During this session, this clinician used the following therapeutic modalities: Supportive Psychotherapy    Substance Abuse was not addressed during this session. If the client is diagnosed with a co-occurring substance use disorder, please indicate any changes in the frequency or amount of use: . Stage of change for addressing substance use diagnoses: No substance use/Not applicable    ASSESSMENT:  Samantha Vila presents with a Euthymic/ normal mood.     her affect is Normal range and intensity, which is congruent, with her mood and the content of the session. The client has made progress on their goals.     Samantha Vila presents with a none risk of suicide, none risk of self-harm, and none risk of harm to others.    For any risk assessment that surpasses a \"low\" rating, a safety plan must be developed.    A safety plan was indicated: no  If yes, describe in detail     PLAN: Between sessions, " Samantha Vila will maintain her self care as she supports mother's medical needs, engage with new therapist. . At the next session, the therapist will complete documentation for CT transfer..    Behavioral Health Treatment Plan and Discharge Planning: Samantha Vila is aware of and agrees to continue to work on their treatment plan. They have identified and are working toward their discharge goals. yes    Visit start and stop times:    07/12/24  Start Time: 1200  Stop Time: 1241  Total Visit Time: 41 minutes    TRANSFER SUMMARY    [facility]    Patient Name Samantha Vila     Date of Birth: 38 y.o. 1985      MRN: [id number]    Admission Date:  05/01/2023    Date of Transfer: July 12, 2024    Admission Diagnosis:     Major Depressive Disorder, recurrent, mild  Generalized Anxiety Disorder    Current Diagnosis:     1. Generalized anxiety disorder        2. Mild episode of recurrent major depressive disorder (HCC)            Reason for Admission: Samantha presented for treatment due to depression and anxiety symptoms. Primary complaints included DEPRESSIVE SYMPTOMS: helplessness, low energy, low motivation, decreased interest, difficulty with decision making, poor concentration, decreased memory, difficulty sleeping, weight gain, ANXIETY SYMPTOMS: feeling nervous, worrying about everyday issues, worrying daily, poor concentration, anxiety attacks, and COGNITIVE DIFFICULTIES: memory difficulties, poor concentration, decreased attention span, difficulty attending to activities of daily living, poor self-care. Samantha also reported several physical health challenges and hx of ongoing physical health issues.    Progress in Treatment: Samantha was seen for Individual Couseling. During the course of treatment she identified and attended to fundamentals of self-care and wellbeing, advocated for herself with health/medical providers, expanded her social  connections, gained disability settlement and designation..    Episodes of Higher Level of Care: No    Transfer request Initiated by: Psychiatrist:  Therapist: Linda De Dios    Reason for Transfer Request: clinician request due to four wall rule requires client to be seen in person    Does this individual need a clinician with specialized training/expertise?: Yes, [facility] and      Is this client working with any other [facility] Providers/Therapists? Psychiatrist: Psychiatrist with outside practice Therapist: None    Other pertinent issues:  chronic health challenges (currently stable and regularly engaged with medical providers)    Are there any specific individuals who would be a “best fit” or who have already agreed to accept this transfer request?      Psychiatrist: None   Therapist: None  Rationale: Not Applicable    Attempts to maintain the current therapeutic relationship: Yes, but insurance rules require transfer to therapist with in office care.    Transfer request routed to Clinical Supervisor for input and/or approval.     Comments from other involved providers and/or clinical coordinator : None    Linda De Dios, 07/12/24

## 2024-07-16 ENCOUNTER — OFFICE VISIT (OUTPATIENT)
Dept: NEUROLOGY | Facility: CLINIC | Age: 39
End: 2024-07-16
Attending: NURSE PRACTITIONER
Payer: COMMERCIAL

## 2024-07-16 VITALS
TEMPERATURE: 98.2 F | WEIGHT: 201 LBS | OXYGEN SATURATION: 98 % | HEART RATE: 74 BPM | BODY MASS INDEX: 36.76 KG/M2 | SYSTOLIC BLOOD PRESSURE: 108 MMHG | DIASTOLIC BLOOD PRESSURE: 64 MMHG

## 2024-07-16 DIAGNOSIS — G24.3 SPASMODIC TORTICOLLIS: ICD-10-CM

## 2024-07-16 PROCEDURE — 99999 PR OFFICE/OUTPT VISIT,PROCEDURE ONLY: CPT | Performed by: NURSE PRACTITIONER

## 2024-07-16 PROCEDURE — 64615 CHEMODENERV MUSC MIGRAINE: CPT | Performed by: NURSE PRACTITIONER

## 2024-07-16 NOTE — PROGRESS NOTES
Patient returns for injections.      Since last seen headaches have improved with Botox injections. Since starting botox, the patient reports greater than 7 days of migraine relief from baseline, correlated with headache diary, decreased abortive medication use.     At least 50 % of reduction of frequency and severity of moderate to severe headaches       Botox injections  Botox lot number: [id number]  Expiration Date: 08/2026      NDC#7415-0809-87    Patient informed and consent obtained.    General Consent including notice of privacy practices/patient bill of rights was reviewed and consent/authorization given.       4cc of Normal saline were added to one vial of Botox Type A resulting in 200 Units of Botox.  After the patient consented to the procedure the following muscles were injected after cleaning the overlying skin with alcohol. New FDA mandated warnings provided to the patient with instructions to seek medical attention for difficulty swallowing or breathing.    Frontalis 10 units right ( 2 sites each 5 units) and 10 units left ( 2 sites each 5 units)  /Glabellar 5 units right and 5 units left (medially)  Procerus 5 units    Temporalis 30 units right( 6 sites each 5 units) and 30 units left ( 6 sites each 5 units)    Trapezius 25 units ( 3 sites each 5 units) right and 30 units left ( 3 sites each 5 units)  Cervical Paraspinals 10 units right ( 2 sites each 5 units) and 10 units left (2 sites each 5 units)  Occipital 15 units right ( 3 sites each 5 units) , 15 units left ( 3 sites each 5 units)      A total of 200 units was used The patient tolerated the procedure well.    Diagnoses and all orders for this visit:    Spasmodic torticollis    -     [facility] Botulinum Toxin Injection Appointment Request  -     onabotulinumtoxinA (BOTOX) 200 unit injection 200 Units

## 2024-07-17 ENCOUNTER — TELEPHONE (OUTPATIENT)
Dept: VASCULAR SURGERY | Facility: CLINIC | Age: 39
End: 2024-07-17
Payer: COMMERCIAL

## 2024-07-17 NOTE — TELEPHONE ENCOUNTER
CARLOS for patient letting her know that there is no power at [facility]. SY Seay is more then happy to due a telemed visit.

## 2024-08-01 DIAGNOSIS — J01.00 ACUTE NON-RECURRENT MAXILLARY SINUSITIS: ICD-10-CM

## 2024-08-01 RX ORDER — FLUTICASONE PROPIONATE 50 MCG
1 SPRAY, SUSPENSION (ML) NASAL DAILY PRN
Qty: 24 ML | Refills: 1 | Status: SHIPPED | OUTPATIENT
Start: 2024-08-01 | End: 2024-09-30

## 2024-08-02 NOTE — TELEPHONE ENCOUNTER
Rec a fax for for PA for pts wegovy 1.7 mg dose. In EPIC pt has a PA already for this pt.   [id number]

## 2024-08-09 ENCOUNTER — TELEPHONE (OUTPATIENT)
Dept: PRIMARY CARE | Facility: CLINIC | Age: 39
End: 2024-08-09
Payer: COMMERCIAL

## 2024-08-14 ENCOUNTER — OFFICE VISIT (OUTPATIENT)
Dept: VASCULAR SURGERY | Facility: CLINIC | Age: 39
End: 2024-08-14
Payer: COMMERCIAL

## 2024-08-14 VITALS — RESPIRATION RATE: 16 BRPM

## 2024-08-14 DIAGNOSIS — I87.2 VENOUS INSUFFICIENCY: Primary | ICD-10-CM

## 2024-08-14 PROCEDURE — 99213 OFFICE O/P EST LOW 20 MIN: CPT | Performed by: SURGERY

## 2024-08-14 NOTE — ASSESSMENT & PLAN NOTE
Carolyn returns today for updated vein evaluation.  Over the last several months she has lost considerable weight and has residual varicose veins in both thighs following her great saphenous ablations performed earlier this year.  She will be an excellent candidate for Varithena Microfoam chemical ablation of her residual tributaries.  I asked her to go for an updated venous ultrasound with reflux study and I will see her back to discuss procedural planning.

## 2024-08-14 NOTE — LETTER
August 14, 2024     Yumiko Lopez DO  [address]  [facility] [address]    Patient: Carolyn Redmond  YOB: 1985  Date of Visit: 8/14/2024      Dear Dr. Lopez:    Thank you for referring Carolyn Redmond to me for evaluation. Below are my notes for this consultation.    If you have questions, please do not hesitate to call me. I look forward to following your patient along with you.         Sincerely,        Talib Richmond MD        CC: No Recipients  Talib Richmond MD  8/14/2024  1:54 PM  Sign when Signing Visit       [facility]  With office locations at [facility] and [place]  P: [phone]     F: [phone]       FOLLOW UP VISIT   8/14/2024  Carolyn Redmond               YOB: 1985  [id number]    PCP:  Yumiko Lopez DO    Diagnosis: Venous insufficiency     HISTORY OF PRESENT ILLNESS        Carolyn Redmond is a 39 y.o. female returning to the office for follow-up evaluation of her venous disease.  Carolyn has known venous insufficiency and underwent bilateral greater saphenous vein radiofrequency ablation early of this year in January and February.  Her result was excellent she was quite happy.  Over the last several months she has lost considerable weight and her residual varicosities become much more noticeable.      PAST MEDICAL AND SURGICAL HISTORY        Past Medical History:   Diagnosis Date   • Acute bacterial endocarditis 12/10/2020   • Anxiety    • Depressed    • Endocarditis    • Fibromyalgia    • Migraines    • Obstructive sleep apnea syndrome 01/10/2023    uses CPAP machine   • Osteomyelitis (CMS/[facility]) 01/10/2023   • Pancreatitis    • Recurrent major depressive disorder (CMS/[facility]) 01/06/2023   • Substance abuse (CMS/[facility]) 2015   • Tachycardia    • Vasculitis (CMS/[facility]) 09/26/2017       Past Surgical History:   Procedure Laterality Date   • CHOLECYSTECTOMY     • ERCP     • GALLBLADDER SURGERY     • TONSILLECTOMY  1991        MEDICATIONS        Current Outpatient Medications on File Prior to Visit   Medication Sig Dispense Refill   • albuterol HFA (VENTOLIN HFA) 90 mcg/actuation inhaler Inhale 2 puffs every 4 (four) hours as needed.     • desvenlafaxine succinate (PRISTIQ) 25 mg tablet extended release 24 hr Take 25 mg by mouth daily.     • famotidine (PEPCID) 20 mg tablet Take 1 tablet (20 mg total) by mouth daily before dinner. 90 tablet 1   • ferrous sulfate 325 mg (65 mg iron) tablet Take 65 mg by mouth nightly.     • fluticasone propion-salmeteroL (ADVAIR DISKUS) 500-50 mcg/dose diskus inhaler Inhale 1 puff 2 (two) times a day.     • fluticasone propionate (FLONASE) 50 mcg/actuation nasal spray Administer 1 spray into each nostril daily as needed for rhinitis or allergies. 24 mL 1   • magnesium oxide (MAG-OX) 400 mg (241.3 mg magnesium) tablet Take 1 tablet (400 mg total) by mouth daily. 90 tablet 1   • medical marijuana 1 each See admin instr. Please be advised that an [facility] hospital staff member has listed medical marijuana as one of your home medications for documentation purposes. Please follow-up with your certified issuing provider for ongoing use     • metoprolol succinate XL (TOPROL-XL) 25 mg 24 hr tablet Take 1 tablet (25 mg total) by mouth nightly. 90 tablet 3   • naloxone (NARCAN) 4 mg/actuation spray,non-aerosol nasal spray      • NURTEC ODT 75 mg tablet,disintegrating TAKE 1 TABLET (75 MG TOTAL) BY MOUTH EVERY OTHER DAY 16 tablet 3   • onabotulinumtoxinA (BOTOX) injection Botox for chronic migraine headache every 3 months 1 each 4   • pantoprazole (PROTONIX) 20 mg EC tablet Take 1 tablet (20 mg total) by mouth daily. 90 tablet 0   • pregabalin (LYRICA) 200 mg capsule Take 1 capsule (200 mg total) by mouth 3 (three) times a day with meals. 90 capsule 2   • semaglutide (WEGOVY) 1.7 mg/0.75 mL subcutaneous injection Inject 1.7 mg under the skin every (seven) 7 days. 9 mL 1   • SUBLOCADE 300 mg/1.5 mL solution, extended  rel syringe subcutaneous injection 1x monthly     • tamsulosin (FLOMAX) 0.4 mg capsule Take 0.4 mg by mouth daily.     • temazepam (RESTORIL) 15 mg capsule Take 15 mg by mouth nightly as needed for sleep.     • VITAMIN D3 125 mcg (5,000 unit) tablet Take 5,000 Units by mouth.       No current facility-administered medications on file prior to visit.       ALLERGIES        Tramadol, Amoxicillin, Nsaids (non-steroidal anti-inflammatory drug), and Trazodone     PHYSICAL EXAMINATION      Visit Vitals  Resp 16     There is no height or weight on file to calculate BMI.      PHYSICAL EXAM:    Extensive varicose veins across the bilateral thighs.  There is some mild tenderness in the area of her varicosities in both thighs anteriorly.  No stasis dermatitis or wounds.  No edema.    LABS / IMAGING / EKG     Labs: No recent labs    Imaging: No recent imaging     ASSESSMENT AND PLAN         Problem List Items Addressed This Visit          Circulatory    Venous insufficiency - Primary    Current Assessment & Plan     Carolyn returns today for updated vein evaluation.  Over the last several months she has lost considerable weight and has residual varicose veins in both thighs following her great saphenous ablations performed earlier this year.  She will be an excellent candidate for Varithena Microfoam chemical ablation of her residual tributaries.  I asked her to go for an updated venous ultrasound with reflux study and I will see her back to discuss procedural planning.           Relevant Orders    Ultrasound venous reflux leg bilateral          No follow-ups on file.       Talib Richmond MD  Vascular and Endovascular Surgery  [facility]      Thank you very much for allowing us to participate in the care of your patient.  I will keep you informed of Carolyn Redmond's care.  Please not hesitate to call or email if there are any questions.  I can be reached at [phone] (office) or  [email or web address].  Sincerely.    Kody Richmond    This document was generated utilizing voice recognition technology. An attempt at proofreading has been made to minimize errors but typographical errors may be present.

## 2024-08-14 NOTE — PATIENT INSTRUCTIONS
Dear Carolyn Redmond    Your physician has requested that you have follow up vascular testing.    To schedule your appointment at the [facility], and [place] location, please call [phone].    The name(s) of your test(s) is/are:    US VENOUS REFLUX LEG BILATERAL    We will schedule a telemed visit to review the ultrasound please contact [phone]    *If you are having the test outside of the [facility], we ask that you obtain a CD of the study and a copy of your report.   Please bring it to our office at least three (3) days prior to your appointment for the physician to review.    If you have any questions, please call the office at [phone].      Thank you,    [facility]

## 2024-08-14 NOTE — PROGRESS NOTES
[facility]  With office locations at [facility] and [place]  P: [phone]     F: [phone]       FOLLOW UP VISIT   8/14/2024  Carolyn Redmond               YOB: 1985  [id number]    PCP:  Yumiko Lopez DO    Diagnosis: Venous insufficiency     HISTORY OF PRESENT ILLNESS        Carolyn Redmond is a 39 y.o. female returning to the office for follow-up evaluation of her venous disease.  Carolyn has known venous insufficiency and underwent bilateral greater saphenous vein radiofrequency ablation early of this year in January and February.  Her result was excellent she was quite happy.  Over the last several months she has lost considerable weight and her residual varicosities become much more noticeable.      PAST MEDICAL AND SURGICAL HISTORY        Past Medical History:   Diagnosis Date    Acute bacterial endocarditis 12/10/2020    Anxiety     Depressed     Endocarditis     Fibromyalgia     Migraines     Obstructive sleep apnea syndrome 01/10/2023    uses CPAP machine    Osteomyelitis (CMS/[facility]) 01/10/2023    Pancreatitis     Recurrent major depressive disorder (CMS/[facility]) 01/06/2023    Substance abuse (CMS/[facility]) 2015    Tachycardia     Vasculitis (CMS/[facility]) 09/26/2017       Past Surgical History:   Procedure Laterality Date    CHOLECYSTECTOMY      ERCP      GALLBLADDER SURGERY      TONSILLECTOMY  1991       MEDICATIONS        Current Outpatient Medications on File Prior to Visit   Medication Sig Dispense Refill    albuterol HFA (VENTOLIN HFA) 90 mcg/actuation inhaler Inhale 2 puffs every 4 (four) hours as needed.      desvenlafaxine succinate (PRISTIQ) 25 mg tablet extended release 24 hr Take 25 mg by mouth daily.      famotidine (PEPCID) 20 mg tablet Take 1 tablet (20 mg total) by mouth daily before dinner. 90 tablet 1    ferrous sulfate 325 mg (65 mg iron) tablet Take 65 mg by mouth nightly.      fluticasone propion-salmeteroL (ADVAIR DISKUS) 500-50 mcg/dose  diskus inhaler Inhale 1 puff 2 (two) times a day.      fluticasone propionate (FLONASE) 50 mcg/actuation nasal spray Administer 1 spray into each nostril daily as needed for rhinitis or allergies. 24 mL 1    magnesium oxide (MAG-OX) 400 mg (241.3 mg magnesium) tablet Take 1 tablet (400 mg total) by mouth daily. 90 tablet 1    medical marijuana 1 each See admin instr. Please be advised that an [facility] hospital staff member has listed medical marijuana as one of your home medications for documentation purposes. Please follow-up with your certified issuing provider for ongoing use      metoprolol succinate XL (TOPROL-XL) 25 mg 24 hr tablet Take 1 tablet (25 mg total) by mouth nightly. 90 tablet 3    naloxone (NARCAN) 4 mg/actuation spray,non-aerosol nasal spray       NURTEC ODT 75 mg tablet,disintegrating TAKE 1 TABLET (75 MG TOTAL) BY MOUTH EVERY OTHER DAY 16 tablet 3    onabotulinumtoxinA (BOTOX) injection Botox for chronic migraine headache every 3 months 1 each 4    pantoprazole (PROTONIX) 20 mg EC tablet Take 1 tablet (20 mg total) by mouth daily. 90 tablet 0    pregabalin (LYRICA) 200 mg capsule Take 1 capsule (200 mg total) by mouth 3 (three) times a day with meals. 90 capsule 2    semaglutide (WEGOVY) 1.7 mg/0.75 mL subcutaneous injection Inject 1.7 mg under the skin every (seven) 7 days. 9 mL 1    SUBLOCADE 300 mg/1.5 mL solution, extended rel syringe subcutaneous injection 1x monthly      tamsulosin (FLOMAX) 0.4 mg capsule Take 0.4 mg by mouth daily.      temazepam (RESTORIL) 15 mg capsule Take 15 mg by mouth nightly as needed for sleep.      VITAMIN D3 125 mcg (5,000 unit) tablet Take 5,000 Units by mouth.       No current facility-administered medications on file prior to visit.       ALLERGIES        Tramadol, Amoxicillin, Nsaids (non-steroidal anti-inflammatory drug), and Trazodone     PHYSICAL EXAMINATION      Visit Vitals  Resp 16     There is no height or weight on file to calculate BMI.      PHYSICAL  EXAM:    Extensive varicose veins across the bilateral thighs.  There is some mild tenderness in the area of her varicosities in both thighs anteriorly.  No stasis dermatitis or wounds.  No edema.    LABS / IMAGING / EKG     Labs: No recent labs    Imaging: No recent imaging     ASSESSMENT AND PLAN         Problem List Items Addressed This Visit          Circulatory    Venous insufficiency - Primary    Current Assessment & Plan     Carolyn returns today for updated vein evaluation.  Over the last several months she has lost considerable weight and has residual varicose veins in both thighs following her great saphenous ablations performed earlier this year.  She will be an excellent candidate for Varithena Microfoam chemical ablation of her residual tributaries.  I asked her to go for an updated venous ultrasound with reflux study and I will see her back to discuss procedural planning.           Relevant Orders    Ultrasound venous reflux leg bilateral          No follow-ups on file.       Talib Richmond MD  Vascular and Endovascular Surgery  [facility]      Thank you very much for allowing us to participate in the care of your patient.  I will keep you informed of Carolyn Redmond's care.  Please not hesitate to call or email if there are any questions.  I can be reached at [phone] (office) or [email or web address].  Sincerely.    Kody Richmond    This document was generated utilizing voice recognition technology. An attempt at proofreading has been made to minimize errors but typographical errors may be present.

## 2024-08-27 ENCOUNTER — TELEMEDICINE (OUTPATIENT)
Dept: BEHAVIORAL/MENTAL HEALTH CLINIC | Facility: CLINIC | Age: 39
End: 2024-08-27
Payer: COMMERCIAL

## 2024-08-27 ENCOUNTER — HOSPITAL ENCOUNTER (OUTPATIENT)
Dept: CARDIOLOGY | Facility: CLINIC | Age: 39
Discharge: HOME | End: 2024-08-27
Attending: SURGERY
Payer: COMMERCIAL

## 2024-08-27 DIAGNOSIS — F33.1 MODERATE EPISODE OF RECURRENT MAJOR DEPRESSIVE DISORDER (HCC): Primary | ICD-10-CM

## 2024-08-27 DIAGNOSIS — F41.1 GENERALIZED ANXIETY DISORDER: ICD-10-CM

## 2024-08-27 DIAGNOSIS — I87.2 VENOUS INSUFFICIENCY: ICD-10-CM

## 2024-08-27 LAB
B-HCG SERPL QL: NEGATIVE MIU/ML
DHEA-S SERPL-MCNC: 49.3 UG/DL (ref 57.3–279.2)
ESTRADIOL SERPL-MCNC: <5 PG/ML
FSH SERPL-ACNC: 2 MIU/ML
LH SERPL-ACNC: <0.3 MIU/ML
LT CFV REFLUX: 0.74 SEC
LT GSV 2CM DISTAL TO SFJ AP: 0.6 CM
LT GSV 2CM DISTAL TO SFJ REFLUX: 0 SEC
LT GSV 2CM DISTAL TO SFJ TRANS: 0.64 CM
LT GSV AT SFJ AP: 0.86 CM
LT GSV AT SFJ REFLUX: 2.26 SEC
LT GSV AT SFJ TRANS: 0.96 CM
LT GSV DISTAL CALF AP: 0.25 CM
LT GSV DISTAL CALF REFLUX: 0 SEC
LT GSV DISTAL CALF TRANS: 0.3 CM
LT GSV PROX CALF AP: 0.26 CM
LT GSV PROX CALF REFLUX: 0 SEC
LT GSV PROX CALF TRANS: 0.26 CM
LT GSV TRIBUTARY 1 REFLUX: 2.86 SEC
LT GSV TRIBUTARY 1 VESSEL: NORMAL
LT POPLITEAL REFLUX: 0 SEC
LT SFV MID REFLUX: 0 SEC
LT SSV DISTAL AP: 0.21 CM
LT SSV DISTAL REFLUX: 4.91 SEC
LT SSV DISTAL TRANS: 0.2 CM
LT SSV MID AP: 0.15 CM
LT SSV MID REFLUX: 0 SEC
LT SSV MID TRANS: 0.14 CM
LT SSV PROX AP: 0.22 CM
LT SSV PROX REFLUX: 0 SEC
LT SSV PROX TRANS: 0.2 CM
PROLACTIN SERPL-MCNC: 15.2 NG/ML (ref 4.8–33.4)
RT AASV AP: 0.37 CM
RT AASV REFLUX: 2.94 SEC
RT AASV TRANS: 0.44 CM
RT CFV REFLUX: 1.56 SEC
RT GSV 2CM DISTAL TO SFJ AP: 0.52 CM
RT GSV 2CM DISTAL TO SFJ REFLUX: 1.78 SEC
RT GSV 2CM DISTAL TO SFJ TRANS: 0.67 CM
RT GSV AT SFJ AP: 0.82 CM
RT GSV AT SFJ REFLUX: 0.42 SEC
RT GSV AT SFJ TRANS: 0.96 CM
RT GSV DISTAL CALF AP: 0.26 CM
RT GSV DISTAL CALF REFLUX: 0 SEC
RT GSV DISTAL CALF TRANS: 0.25 CM
RT GSV KNEE AP: 0.26 CM
RT GSV KNEE REFLUX: 0.88 SEC
RT GSV KNEE TRANS: 0.25 CM
RT GSV MID CALF AP: 0.15 CM
RT GSV MID CALF REFLUX: 0 SEC
RT GSV MID CALF TRANS: 0.33 CM
RT GSV PROX CALF AP: 0.31 CM
RT GSV PROX CALF REFLUX: 0 SEC
RT GSV PROX CALF TRANS: 0.36 CM
RT POPLITEAL REFLUX: 0 SEC
RT SFV MID REFLUX: 0 SEC
RT SSV DISTAL AP: 0.21 CM
RT SSV DISTAL REFLUX: 0 SEC
RT SSV DISTAL TRANS: 0.25 CM
RT SSV MID AP: 0.25 CM
RT SSV MID REFLUX: 0 SEC
RT SSV MID TRANS: 0.23 CM
RT SSV PROX AP: 0.36 CM
RT SSV PROX REFLUX: 0 SEC
RT SSV PROX TRANS: 0.47 CM
T4 FREE SERPL-MCNC: 1.25 NG/DL (ref 0.82–1.77)
TESTOST SERPL-MCNC: 5 NG/DL (ref 8–60)
TSH SERPL DL<=0.005 MIU/L-ACNC: 2.29 UIU/ML (ref 0.45–4.5)

## 2024-08-27 PROCEDURE — 90837 PSYTX W PT 60 MINUTES: CPT | Performed by: COUNSELOR

## 2024-08-27 PROCEDURE — 93970 EXTREMITY STUDY: CPT
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
## 2024-08-27 NOTE — BH CRISIS PLAN
"Client Name: Samantha Vila       Client YOB: 1985  : 1985    Treatment Team (include name and contact information):     Psychotherapist: Maryann Tillman LPC, MA    Psychiatrist: [facility] ([facility]), see MITCHELL Pérez   Release of information completed: no        Healthcare Provider: Current  Loretta Troncoso DO,  [facility], [address] [phone]  [address]  [facility] [address]    Type of Plan   * Child plans (children 14 yo and younger) must be completed and signed by the child's legal guardian   * Plans for all individuals 15 yo and above must be signed by the client.     Plan Type: adolescent/adult (14 and over) Initial      My Personal Strengths are (in the client's own words):  I don't give up when i'm very passionate about something, empathetic, compassionate, care about others, self-aware, loyal, determined and resilient.    The stressors and triggers that may put me at risk are: \"Feel like starting my life over, sometimes that becomes overwhelming.\".    being physically tired, being hungry, loneliness, feeling a lack of control, people (describe - names, etc) dealing with passive aggressive or avoidant people and events (include important dates that might be a trigger) Duc time (miss connection with others)    Coping skills I can use to keep myself calm and safe: Kundalini Yoga. Listen to music, Journal and Other (describe) painting    Coping skills/supports I can use to maintain abstinence from substance use: same coping skills.      The people that provide me with help and support: (Include name, contact, and how they can help)   Support person #1: (mom) Kary    * [phone]    * How can they help me? Can tell her anything and count on her to help me (\"a rock\")       In the past, the following has helped me in times of crisis:    Being in a quiet space, Being with other people, Taking medications, " Talking to a professional on the telephone, Going into the hospital, Calling a friend, Calling a family member, Taking a walk or exercising, Breathing exercises (or other mindfulness-based activities), Praying or meditating, Using de-escalation tools that I have learned, Listening to music and Watching television or a movie      If it is an emergency and you need immediate help, call 9-1-1    If there is a possibility of danger to yourself or others, call the following crisis hotline resources:     Adult Crisis Numbers  Suicide Prevention Hotline - Dial 9-8-8  [facility]: [phone]  [facility]: [phone]  [facility]: [phone]  [facility]: [phone]  [facility]: [phone]  [facility]: [phone]  [facility]: [phone]  [facility]: [phone] (daytime).       [phone] (after hours, weekends, holidays)     Child/Adolescent Crisis Numbers   [facility]: [phone]   [facility]: [phone]   [place]: [phone]   [facility]: [phone]    Please note: Some [facility] do not have a separate number for Child/Adolescent specific crisis. If your county is not listed under Child/Adolescent, please call the adult number for your county     National Talk to Text Line   All Ages - [phone]    In the event your feelings become unmanageable, and you cannot reach your support system, you will call [phone] immediately or go to the nearest hospital emergency room.

## 2024-08-28 NOTE — PLAN OF CARE
Problem: Adult Inpatient Plan of Care  Goal: Plan of Care Review  Flowsheets (Taken 6/28/2023 9866)  Progress: no change  Outcome Evaluation: Patient resting comfortably in room. Independent and ambulating in room. Pleasant and cooperative. Trace BLE edema. Recent coritsol level 0.5. No other concerns.  Plan of Care Reviewed With: patient     Name band;

## 2024-08-30 PROBLEM — N31.9 NEUROGENIC BLADDER: Status: ACTIVE | Noted: 2024-08-30

## 2024-09-03 ENCOUNTER — TELEPHONE (OUTPATIENT)
Dept: PSYCHIATRY | Facility: CLINIC | Age: 39
End: 2024-09-03

## 2024-09-03 LAB — 17OHP SERPL-MCNC: 45 NG/DL

## 2024-09-03 NOTE — TELEPHONE ENCOUNTER
Called patient to inform that Maryann will be out of the office due to sickness today. Was not able to reach patient or voicemail to relay this message.

## 2024-09-03 NOTE — TELEPHONE ENCOUNTER
Left voicemail informing patient and/or parent/guardian of the Psych Encounter form needing to be signed as a requirement from the insurance company for billing purposes. Patient can access form via [employer] and sign electronically.     Please make patient aware this form must be signed for each visit as a requirement to continue future visits with provider.

## 2024-09-05 ENCOUNTER — OFFICE VISIT (OUTPATIENT)
Dept: NEUROLOGY | Facility: CLINIC | Age: 39
End: 2024-09-05
Payer: COMMERCIAL

## 2024-09-05 VITALS
RESPIRATION RATE: 18 BRPM | SYSTOLIC BLOOD PRESSURE: 109 MMHG | BODY MASS INDEX: 36.4 KG/M2 | HEART RATE: 78 BPM | DIASTOLIC BLOOD PRESSURE: 72 MMHG | OXYGEN SATURATION: 98 % | WEIGHT: 199 LBS | TEMPERATURE: 98.1 F

## 2024-09-05 DIAGNOSIS — M79.18 MYOFASCIAL PAIN: ICD-10-CM

## 2024-09-05 DIAGNOSIS — G43.709 CHRONIC MIGRAINE WITHOUT AURA WITHOUT STATUS MIGRAINOSUS, NOT INTRACTABLE: ICD-10-CM

## 2024-09-05 DIAGNOSIS — M54.2 CERVICALGIA: Primary | ICD-10-CM

## 2024-09-05 PROCEDURE — 20553 NJX 1/MLT TRIGGER POINTS 3/>: CPT | Performed by: NURSE PRACTITIONER

## 2024-09-05 PROCEDURE — 64405 NJX AA&/STRD GR OCPL NRV: CPT | Mod: 50,XU | Performed by: NURSE PRACTITIONER

## 2024-09-05 PROCEDURE — 64450 NJX AA&/STRD OTHER PN/BRANCH: CPT | Mod: 50,XU | Performed by: NURSE PRACTITIONER

## 2024-09-05 PROCEDURE — 99999 PR OFFICE/OUTPT VISIT,PROCEDURE ONLY: CPT | Performed by: NURSE PRACTITIONER

## 2024-09-05 RX ORDER — LIDOCAINE HYDROCHLORIDE 10 MG/ML
4.5 INJECTION, SOLUTION INFILTRATION; PERINEURAL ONCE
Status: SHIPPED | OUTPATIENT
Start: 2024-09-05 | End: 2024-09-06

## 2024-09-05 NOTE — PROGRESS NOTES
Procedure Note  Trigger Point Injections        Bupivicaine 0.25 %  Lot # [id number]  Exp: 08/01/2025   NDC 6714-1511-37      Lidocaine   Lot# [id number]  Exp date: 01/2026          [id number]     The risks, benefits and anticipated outcomes of the procedure, the risks and benefits of the alternatives to the procedure, and the roles and tasks of the personnel to be involved, were discussed with the patient, and the patient consents to the procedure and agrees to proceed.         3 syringes used (each 3 cc ) - 4.5 cc Lidocaine and 4.5 cc Bupivacaine administered        Trigger Points injected :         Right Greater Occipital  2 cc     Left Greater Occipital 2 cc     Right Lesser Occipital  1 cc     Left Lesser Occipital 1 cc     Right and left trapezia 1.5  cc each side (divided in 3 areas       Diagnoses and all orders for this visit:    Cervicalgia  -     lidocaine (XYLOCAINE) 10 mg/mL (1 %) injection 4.5 mL    Myofascial pain  -     lidocaine (XYLOCAINE) 10 mg/mL (1 %) injection 4.5 mL    Chronic migraine without aura without status migrainosus, not intractable  -     lidocaine (XYLOCAINE) 10 mg/mL (1 %) injection 4.5 mL

## 2024-09-09 ENCOUNTER — TELEPHONE (OUTPATIENT)
Dept: ENDOCRINOLOGY | Facility: CLINIC | Age: 39
End: 2024-09-09
Payer: COMMERCIAL

## 2024-09-09 NOTE — TELEPHONE ENCOUNTER
Rec a fax for 1.7mg wegovy approval needed. Approval in system from 6/5/2024, attached in media and in mar if needed for further information. NO Pa required at this time as one is already on file.

## 2024-09-12 ENCOUNTER — TELEPHONE (OUTPATIENT)
Dept: BEHAVIORAL/MENTAL HEALTH CLINIC | Facility: CLINIC | Age: 39
End: 2024-09-12

## 2024-09-12 NOTE — TELEPHONE ENCOUNTER
Called ct to offer opening before next appt next thursday since I had cx'd last week due to illness. Apolotgized for cx'tion. Ct. reports having had a family issue arise, so was occupied with that and worked out. Ct. fine wating until next appt.

## 2024-09-17 DIAGNOSIS — G43.709 CHRONIC MIGRAINE WITHOUT AURA WITHOUT STATUS MIGRAINOSUS, NOT INTRACTABLE: ICD-10-CM

## 2024-09-17 RX ORDER — LANOLIN ALCOHOL/MO/W.PET/CERES
400 CREAM (GRAM) TOPICAL DAILY
Qty: 90 TABLET | Refills: 1 | Status: SHIPPED | OUTPATIENT
Start: 2024-09-17 | End: 2025-03-16

## 2024-09-17 NOTE — TELEPHONE ENCOUNTER
Medicine Refill Request    Last Office Visit: 03/2024  Last Consult Visit: Visit date not found  Last Telemedicine Visit: Visit date not found    Next Appointment: Visit date not found      Current Outpatient Medications:     albuterol HFA (VENTOLIN HFA) 90 mcg/actuation inhaler, Inhale 2 puffs every 4 (four) hours as needed., Disp: , Rfl:     desvenlafaxine succinate (PRISTIQ) 25 mg tablet extended release 24 hr, Take 25 mg by mouth daily., Disp: , Rfl:     famotidine (PEPCID) 20 mg tablet, Take 1 tablet (20 mg total) by mouth daily before dinner., Disp: 90 tablet, Rfl: 1    ferrous sulfate 325 mg (65 mg iron) tablet, Take 65 mg by mouth nightly., Disp: , Rfl:     fluticasone propion-salmeteroL (ADVAIR DISKUS) 500-50 mcg/dose diskus inhaler, Inhale 1 puff 2 (two) times a day., Disp: , Rfl:     fluticasone propionate (FLONASE) 50 mcg/actuation nasal spray, Administer 1 spray into each nostril daily as needed for rhinitis or allergies., Disp: 24 mL, Rfl: 1    magnesium oxide (MAG-OX) 400 mg (241.3 mg magnesium) tablet, Take 1 tablet (400 mg total) by mouth daily., Disp: 90 tablet, Rfl: 1    medical marijuana, 1 each See admin instr. Please be advised that an [facility] hospital staff member has listed medical marijuana as one of your home medications for documentation purposes. Please follow-up with your certified issuing provider for ongoing use, Disp: , Rfl:     metoprolol succinate XL (TOPROL-XL) 25 mg 24 hr tablet, Take 1 tablet (25 mg total) by mouth nightly., Disp: 90 tablet, Rfl: 3    naloxone (NARCAN) 4 mg/actuation spray,non-aerosol nasal spray, , Disp: , Rfl:     NURTEC ODT 75 mg tablet,disintegrating, TAKE 1 TABLET (75 MG TOTAL) BY MOUTH EVERY OTHER DAY, Disp: 16 tablet, Rfl: 3    onabotulinumtoxinA (BOTOX) injection, Botox for chronic migraine headache every 3 months, Disp: 1 each, Rfl: 4    pantoprazole (PROTONIX) 20 mg EC tablet, Take 1 tablet (20 mg total) by mouth daily., Disp: 90 tablet, Rfl: 0     pregabalin (LYRICA) 200 mg capsule, Take 1 capsule (200 mg total) by mouth 3 (three) times a day with meals., Disp: 90 capsule, Rfl: 2    semaglutide (WEGOVY) 1.7 mg/0.75 mL subcutaneous injection, Inject 1.7 mg under the skin every (seven) 7 days., Disp: 9 mL, Rfl: 1    SUBLOCADE 300 mg/1.5 mL solution, extended rel syringe subcutaneous injection, 1x monthly, Disp: , Rfl:     tamsulosin (FLOMAX) 0.4 mg capsule, Take 0.4 mg by mouth daily., Disp: , Rfl:     temazepam (RESTORIL) 15 mg capsule, Take 15 mg by mouth nightly as needed for sleep., Disp: , Rfl:     VITAMIN D3 125 mcg (5,000 unit) tablet, Take 5,000 Units by mouth., Disp: , Rfl:       BP Readings from Last 3 Encounters:   09/05/24 109/72   07/16/24 108/64   06/27/24 110/76       Recent Lab results:  Lab Results   Component Value Date    CHOL 187 10/09/2023   ,   Lab Results   Component Value Date    HDL 47 10/09/2023   ,   Lab Results   Component Value Date    LDLCALC 112 (H) 10/09/2023   ,   Lab Results   Component Value Date    TRIG 159 (H) 10/09/2023        Lab Results   Component Value Date    GLUCOSE 76 06/09/2024   ,   Lab Results   Component Value Date    HGBA1C 5.3 05/28/2024         Lab Results   Component Value Date    CREATININE 0.7 06/09/2024       Lab Results   Component Value Date    TSH 2.290 08/26/2024           Lab Results   Component Value Date    HGBA1C 5.3 05/28/2024

## 2024-09-18 DIAGNOSIS — M48.061 SPINAL STENOSIS OF LUMBAR REGION, UNSPECIFIED WHETHER NEUROGENIC CLAUDICATION PRESENT: ICD-10-CM

## 2024-09-18 NOTE — TELEPHONE ENCOUNTER
Medicine Refill Request    Last Office Visit: 6/27/2024   Last Consult Visit: Visit date not found  Last Telemedicine Visit: 1/30/2024 Yumiko Lopez,     Next Appointment: 10/8/2024      Current Outpatient Medications:     albuterol HFA (VENTOLIN HFA) 90 mcg/actuation inhaler, Inhale 2 puffs every 4 (four) hours as needed., Disp: , Rfl:     desvenlafaxine succinate (PRISTIQ) 25 mg tablet extended release 24 hr, Take 25 mg by mouth daily., Disp: , Rfl:     famotidine (PEPCID) 20 mg tablet, Take 1 tablet (20 mg total) by mouth daily before dinner., Disp: 90 tablet, Rfl: 1    ferrous sulfate 325 mg (65 mg iron) tablet, Take 65 mg by mouth nightly., Disp: , Rfl:     fluticasone propion-salmeteroL (ADVAIR DISKUS) 500-50 mcg/dose diskus inhaler, Inhale 1 puff 2 (two) times a day., Disp: , Rfl:     fluticasone propionate (FLONASE) 50 mcg/actuation nasal spray, Administer 1 spray into each nostril daily as needed for rhinitis or allergies., Disp: 24 mL, Rfl: 1    magnesium oxide (MAG-OX) 400 mg (241.3 mg magnesium) tablet, Take 1 tablet (400 mg total) by mouth daily., Disp: 90 tablet, Rfl: 1    medical marijuana, 1 each See admin instr. Please be advised that an [facility] hospital staff member has listed medical marijuana as one of your home medications for documentation purposes. Please follow-up with your certified issuing provider for ongoing use, Disp: , Rfl:     metoprolol succinate XL (TOPROL-XL) 25 mg 24 hr tablet, Take 1 tablet (25 mg total) by mouth nightly., Disp: 90 tablet, Rfl: 3    naloxone (NARCAN) 4 mg/actuation spray,non-aerosol nasal spray, , Disp: , Rfl:     NURTEC ODT 75 mg tablet,disintegrating, TAKE 1 TABLET (75 MG TOTAL) BY MOUTH EVERY OTHER DAY, Disp: 16 tablet, Rfl: 3    OLANZapine (ZyPREXA) 2.5 mg tablet, Take one tab at bedtime as needed for severe migraine, Disp: 10 tablet, Rfl: 0    onabotulinumtoxinA (BOTOX) injection, Botox for chronic migraine headache every 3 months, Disp: 1 each, Rfl: 4     pantoprazole (PROTONIX) 20 mg EC tablet, Take 1 tablet (20 mg total) by mouth daily., Disp: 90 tablet, Rfl: 0    pregabalin (LYRICA) 200 mg capsule, Take 1 capsule (200 mg total) by mouth 3 (three) times a day with meals., Disp: 90 capsule, Rfl: 2    semaglutide (WEGOVY) 1.7 mg/0.75 mL subcutaneous injection, Inject 1.7 mg under the skin every (seven) 7 days., Disp: 9 mL, Rfl: 1    SUBLOCADE 300 mg/1.5 mL solution, extended rel syringe subcutaneous injection, 1x monthly, Disp: , Rfl:     tamsulosin (FLOMAX) 0.4 mg capsule, Take 0.4 mg by mouth daily., Disp: , Rfl:     temazepam (RESTORIL) 15 mg capsule, Take 15 mg by mouth nightly as needed for sleep., Disp: , Rfl:     VITAMIN D3 125 mcg (5,000 unit) tablet, Take 5,000 Units by mouth., Disp: , Rfl:       BP Readings from Last 3 Encounters:   09/05/24 109/72   07/16/24 108/64   06/27/24 110/76       Recent Lab results:  Lab Results   Component Value Date    CHOL 187 10/09/2023   ,   Lab Results   Component Value Date    HDL 47 10/09/2023   ,   Lab Results   Component Value Date    LDLCALC 112 (H) 10/09/2023   ,   Lab Results   Component Value Date    TRIG 159 (H) 10/09/2023        Lab Results   Component Value Date    GLUCOSE 76 06/09/2024   ,   Lab Results   Component Value Date    HGBA1C 5.3 05/28/2024         Lab Results   Component Value Date    CREATININE 0.7 06/09/2024       Lab Results   Component Value Date    TSH 2.290 08/26/2024           Lab Results   Component Value Date    HGBA1C 5.3 05/28/2024

## 2024-09-19 ENCOUNTER — TELEPHONE (OUTPATIENT)
Age: 39
End: 2024-09-19

## 2024-09-19 RX ORDER — PREGABALIN 200 MG/1
200 CAPSULE ORAL
Qty: 90 CAPSULE | Refills: 2 | Status: SHIPPED | OUTPATIENT
Start: 2024-09-19 | End: 2024-12-08 | Stop reason: SDUPTHER

## 2024-09-19 NOTE — TELEPHONE ENCOUNTER
Patient is calling regarding cancelling an appointment.    Date/Time: 11/19 @ 1pm    Reason: migraine    Patient was rescheduled: YES [] NO [x]  If yes, when was Patient reschedule for:     Patient requesting call back to reschedule: YES [] NO [x]    Pt will see provider at next appt

## 2024-09-21 DIAGNOSIS — K21.9 GASTROESOPHAGEAL REFLUX DISEASE, UNSPECIFIED WHETHER ESOPHAGITIS PRESENT: ICD-10-CM

## 2024-09-23 ENCOUNTER — TELEPHONE (OUTPATIENT)
Dept: VASCULAR SURGERY | Facility: CLINIC | Age: 39
End: 2024-09-23

## 2024-09-23 RX ORDER — PANTOPRAZOLE SODIUM 20 MG/1
20 TABLET, DELAYED RELEASE ORAL DAILY
Qty: 90 TABLET | Refills: 0 | Status: SHIPPED | OUTPATIENT
Start: 2024-09-23 | End: 2024-12-18 | Stop reason: SDUPTHER

## 2024-09-23 NOTE — TELEPHONE ENCOUNTER
Pt wants to schedule a vein procedure with Dr. Richmond.  She is flexible with days and times.  Please call pt at [phone].      [facility] Appointment Request   Provider: Dr. Richmond  Reason for Visit: Vein procedure  Available Day and Time: Flexible  Best Contact Number: [phone]    The practice will reach out to schedule your appointment within the next 2 business days.

## 2024-09-23 NOTE — TELEPHONE ENCOUNTER
Medicine Refill Request    Last Office Visit: 6/27/2024   Last Consult Visit: Visit date not found  Last Telemedicine Visit: 1/30/2024 Yumiko Lopez,     Next Appointment: 10/8/2024      Current Outpatient Medications:     albuterol HFA (VENTOLIN HFA) 90 mcg/actuation inhaler, Inhale 2 puffs every 4 (four) hours as needed., Disp: , Rfl:     desvenlafaxine succinate (PRISTIQ) 25 mg tablet extended release 24 hr, Take 25 mg by mouth daily., Disp: , Rfl:     famotidine (PEPCID) 20 mg tablet, Take 1 tablet (20 mg total) by mouth daily before dinner., Disp: 90 tablet, Rfl: 1    ferrous sulfate 325 mg (65 mg iron) tablet, Take 65 mg by mouth nightly., Disp: , Rfl:     fluticasone propion-salmeteroL (ADVAIR DISKUS) 500-50 mcg/dose diskus inhaler, Inhale 1 puff 2 (two) times a day., Disp: , Rfl:     fluticasone propionate (FLONASE) 50 mcg/actuation nasal spray, Administer 1 spray into each nostril daily as needed for rhinitis or allergies., Disp: 24 mL, Rfl: 1    magnesium oxide (MAG-OX) 400 mg (241.3 mg magnesium) tablet, Take 1 tablet (400 mg total) by mouth daily., Disp: 90 tablet, Rfl: 1    medical marijuana, 1 each See admin instr. Please be advised that an [facility] hospital staff member has listed medical marijuana as one of your home medications for documentation purposes. Please follow-up with your certified issuing provider for ongoing use, Disp: , Rfl:     metoprolol succinate XL (TOPROL-XL) 25 mg 24 hr tablet, Take 1 tablet (25 mg total) by mouth nightly., Disp: 90 tablet, Rfl: 3    naloxone (NARCAN) 4 mg/actuation spray,non-aerosol nasal spray, , Disp: , Rfl:     NURTEC ODT 75 mg tablet,disintegrating, TAKE 1 TABLET (75 MG TOTAL) BY MOUTH EVERY OTHER DAY, Disp: 16 tablet, Rfl: 3    OLANZapine (ZyPREXA) 2.5 mg tablet, Take one tab at bedtime as needed for severe migraine, Disp: 10 tablet, Rfl: 0    onabotulinumtoxinA (BOTOX) injection, Botox for chronic migraine headache every 3 months, Disp: 1 each, Rfl: 4     pantoprazole (PROTONIX) 20 mg EC tablet, Take 1 tablet (20 mg total) by mouth daily., Disp: 90 tablet, Rfl: 0    pregabalin (LYRICA) 200 mg capsule, Take 1 capsule (200 mg total) by mouth 3 (three) times a day with meals., Disp: 90 capsule, Rfl: 2    semaglutide (WEGOVY) 1.7 mg/0.75 mL subcutaneous injection, Inject 1.7 mg under the skin every (seven) 7 days., Disp: 9 mL, Rfl: 1    SUBLOCADE 300 mg/1.5 mL solution, extended rel syringe subcutaneous injection, 1x monthly, Disp: , Rfl:     tamsulosin (FLOMAX) 0.4 mg capsule, Take 0.4 mg by mouth daily., Disp: , Rfl:     temazepam (RESTORIL) 15 mg capsule, Take 15 mg by mouth nightly as needed for sleep., Disp: , Rfl:     VITAMIN D3 125 mcg (5,000 unit) tablet, Take 5,000 Units by mouth., Disp: , Rfl:       BP Readings from Last 3 Encounters:   09/05/24 109/72   07/16/24 108/64   06/27/24 110/76       Recent Lab results:  Lab Results   Component Value Date    CHOL 187 10/09/2023   ,   Lab Results   Component Value Date    HDL 47 10/09/2023   ,   Lab Results   Component Value Date    LDLCALC 112 (H) 10/09/2023   ,   Lab Results   Component Value Date    TRIG 159 (H) 10/09/2023        Lab Results   Component Value Date    GLUCOSE 76 06/09/2024   ,   Lab Results   Component Value Date    HGBA1C 5.3 05/28/2024         Lab Results   Component Value Date    CREATININE 0.7 06/09/2024       Lab Results   Component Value Date    TSH 2.290 08/26/2024           Lab Results   Component Value Date    HGBA1C 5.3 05/28/2024

## 2024-09-24 ENCOUNTER — TELEPHONE (OUTPATIENT)
Dept: VASCULAR SURGERY | Facility: CLINIC | Age: 39
End: 2024-09-24
Payer: COMMERCIAL

## 2024-09-26 ENCOUNTER — OFFICE VISIT (OUTPATIENT)
Dept: ENDOCRINOLOGY | Facility: CLINIC | Age: 39
End: 2024-09-26
Payer: COMMERCIAL

## 2024-09-26 ENCOUNTER — APPOINTMENT (OUTPATIENT)
Dept: RADIOLOGY | Age: 39
End: 2024-09-26
Attending: EMERGENCY MEDICINE
Payer: COMMERCIAL

## 2024-09-26 ENCOUNTER — HOSPITAL ENCOUNTER (OUTPATIENT)
Facility: CLINIC | Age: 39
Discharge: HOME | End: 2024-09-26
Attending: EMERGENCY MEDICINE
Payer: COMMERCIAL

## 2024-09-26 VITALS
BODY MASS INDEX: 34.78 KG/M2 | WEIGHT: 189 LBS | HEIGHT: 62 IN | SYSTOLIC BLOOD PRESSURE: 140 MMHG | HEART RATE: 85 BPM | DIASTOLIC BLOOD PRESSURE: 92 MMHG | OXYGEN SATURATION: 98 % | TEMPERATURE: 97.5 F

## 2024-09-26 VITALS
HEART RATE: 88 BPM | BODY MASS INDEX: 34.93 KG/M2 | SYSTOLIC BLOOD PRESSURE: 116 MMHG | HEIGHT: 62 IN | DIASTOLIC BLOOD PRESSURE: 68 MMHG | WEIGHT: 189.8 LBS | OXYGEN SATURATION: 97 %

## 2024-09-26 DIAGNOSIS — S60.052A CONTUSION OF LEFT LITTLE FINGER WITHOUT DAMAGE TO NAIL, INITIAL ENCOUNTER: Primary | ICD-10-CM

## 2024-09-26 DIAGNOSIS — R79.89 LOW SERUM ESTRADIOL: ICD-10-CM

## 2024-09-26 DIAGNOSIS — S63.637A SPRAIN OF INTERPHALANGEAL JOINT OF LEFT LITTLE FINGER, INITIAL ENCOUNTER: ICD-10-CM

## 2024-09-26 DIAGNOSIS — R68.89 ABNORMAL ENDOCRINE LABORATORY TEST FINDING: ICD-10-CM

## 2024-09-26 DIAGNOSIS — N91.1 SECONDARY AMENORRHEA: ICD-10-CM

## 2024-09-26 DIAGNOSIS — E24.2: Primary | ICD-10-CM

## 2024-09-26 PROCEDURE — 3074F SYST BP LT 130 MM HG: CPT | Performed by: INTERNAL MEDICINE

## 2024-09-26 PROCEDURE — 3078F DIAST BP <80 MM HG: CPT | Performed by: INTERNAL MEDICINE

## 2024-09-26 PROCEDURE — 73140 X-RAY EXAM OF FINGER(S): CPT | Mod: LT | Performed by: EMERGENCY MEDICINE

## 2024-09-26 PROCEDURE — 3008F BODY MASS INDEX DOCD: CPT | Performed by: INTERNAL MEDICINE

## 2024-09-26 PROCEDURE — 99214 OFFICE O/P EST MOD 30 MIN: CPT | Performed by: INTERNAL MEDICINE

## 2024-09-26 PROCEDURE — 99213 OFFICE O/P EST LOW 20 MIN: CPT | Performed by: EMERGENCY MEDICINE

## 2024-09-26 ASSESSMENT — ENCOUNTER SYMPTOMS
JOINT SWELLING: 1
ARTHRALGIAS: 1
COLOR CHANGE: 1

## 2024-09-26 NOTE — ED PROVIDER NOTES
History  Chief Complaint   Patient presents with    Injury     Possible broken pinky finger/hand - Entered by patient     LHD      History provided by:  Patient   used: No    Injury  Location:  Left little finger PIP/MCP pain, swelling after helping mother up  Severity:  Moderate  Onset quality:  Gradual  Duration:  1 day  Timing:  Constant  Progression:  Unchanged  Chronicity:  New      Past Medical History:   Diagnosis Date    Acute bacterial endocarditis 12/10/2020    Anxiety     Depressed     Endocarditis     Fibromyalgia     Migraines     Obstructive sleep apnea syndrome 01/10/2023    uses CPAP machine    Osteomyelitis (CMS/HCC) 01/10/2023    Pancreatitis     Recurrent major depressive disorder (CMS/HCC) 2023    Substance abuse (CMS/HCC)     Tachycardia     Vasculitis (CMS/HCC) 2017       Past Surgical History   Procedure Laterality Date    Cholecystectomy      Ercp      Gallbladder surgery      Tonsillectomy         Family History   Problem Relation Name Age of Onset    Hypertension Biological Mother      Lung cancer Biological Father  76        smoker    Lung cancer Maternal Grandmother  69        smoker    Heart disease Maternal Grandfather      Heart attack Maternal Grandfather          unsure of age    Colon cancer Maternal Grandfather         Social History     Tobacco Use    Smoking status: Former     Current packs/day: 0.00     Average packs/day: 0.5 packs/day for 5.0 years (2.5 ttl pk-yrs)     Types: Cigarettes     Start date:      Quit date:      Years since quittin.7    Smokeless tobacco: Former    Tobacco comments:     quit 1 year ago, vapes currently   Vaping Use    Vaping status: Every Day    Substances: Nicotine, Flavoring    Devices: Refillable tank   Substance Use Topics    Alcohol use: Not Currently    Drug use: Not Currently     Types: Marijuana, Heroin     Comment: MM now , previous heroin and opiod       Review of Systems    Musculoskeletal:  Positive for arthralgias and joint swelling.   Skin:  Positive for color change.       Physical Exam  ED Triage Vitals [09/26/24 1711]   Temp Heart Rate Resp BP SpO2   36.4 °C (97.5 °F) 85 -- (!) 140/92 98 %      Temp src Heart Rate Source Patient Position BP Location FiO2 (%) (Set)   -- -- -- -- --       Physical Exam  Constitutional:       Appearance: She is normal weight.   Musculoskeletal:         General: Swelling and tenderness present. Normal range of motion.   Skin:     Findings: Bruising present.      Comments: Swelling, tenderness, ecchymosis over PIP and MCP  Full ROM   Neurological:      General: No focal deficit present.      Mental Status: She is alert. Mental status is at baseline.           Procedures  Procedures    UC Course       Medical Decision Making  TECHNIQUE: PA, lateral and 2 oblique views of the left fifth digit        COMPARISON: None available     FINDINGS:  There is no fracture.  Alignment is maintained.  No bone lesion or erosion.  Mild soft tissue swelling.        --  IMPRESSION:  No acute bone abnormality.    Finger splint applied  Ice, rest, nsaids  If no improvement, f/u with dr caraballo (number provided)                     Rossy Jefferson,   09/26/24 2559

## 2024-09-26 NOTE — DISCHARGE INSTRUCTIONS
You may take Ibuprofen 600mg every 8 hours as needed for pain.  You may apply ICE to the area- 20minutes at a time, multiple times a day to help with pain.  You may wear the splint as needed for immobilization and comfort  Avoid any activities that exacerbate the pain.

## 2024-09-26 NOTE — PROGRESS NOTES
39 y.o. yo female with PMH of IVDA, cholecystitis, pancreatitis 2/2 ERCP, fibromyalgia, endocarditis, vasculitis, ARIANNA, class 3 obesity, spinal stenosis, depression, pHTN, HTN, venous insufficiency presenting for follow up of iatrogenic Cushing's    Initial history (7/10/23):  - Former pt of Dr. Cerna at [facility], seen 4/2019 in context of chronic steroid use with goal of discontinuation. Noted to have developed endocarditis in 2017 2/2 urosepsis with subsequent development of ? Vasculitis 4/2017 at [facility], at which time she was started on high dose steroids. Had been following with rheumatology Dr. Harris who started tapering the prednisone in winter of 2017. At that time she was on prednisone 10 mg daily. Recommended AM cortisol followed by ACTH stim test if equivocal. Recommended tapering steroids by 1 mg per week. Unable to find if pt did labs for Dr. Cerna at that time.  - Saw PCP Yumiko Lopez DO 6/13/23 to establish care. Noted to be on prednisone 5 mg daily at that time. Noted to have no menstrual cycle for > 1 year even after stopping OCP. Referred to endocrinology for AI. Post visit labs found low midday cortisol of 0.8 and pt was sent to ED  - Pt admitted to [facility] 6/26-6/30/23 for possible adrenal insufficiency, seen by Dr. Flores. Had a 4 pm cortisol of 1.7, then AM cortisol of 8.1. Noted to be on a steroids taper from prednisone 50 mg daily and was on 5 mg daily at the time of admission. Per consult note pt experiences swelling when tapering steroids. Dr. Flores felt she did not have AI. Per discharge summary had been tapered from 15 mg prednisone to 5 mg over 4 weeks. She was discharged on 7.5 mg daily    First visit with me 7/10/23  Last visit 5/30/24. Increased wegovy, ordered amenorrhea evaluation    Interval history summarized as per review of records:  - Saw neurology Jesus MAGALLON 6/5 for trigger point injection  - Went to ED 6/9 for abd pain and abdominal SC lump.  Imaging found epiploic appendagitis. Given fluids and ketorolac, discharged home.   - Saw PCP Yumiko Lopez DO 6/13 for ED follow up. CT ordered, which showed improved inflammation  - 6/14 PCP rx medrol dose ron for back pain  - Saw PCP Yumiko Lopez DO 6/27 in follow up. Decreased PPI and added famotidine, increased miralax  - Saw neurology Jesus MAGALLON 7/16 for botox injection  - Saw vascular Dr. Richmond 8/14 in follow up. Ordered venous US with reflex  - Saw neurology Jesus MAGALLON 9/5 for trigger point injection  - Saw GYN Dr. Reeves 9/9 for annual visit.     C/o migraine currently. Has been stressed lately, sleeping and eating have been slightly out of control. Admits to lack of nutrition and exercise that she normally does. Trying to get back on track    Off chronic prednisone since 12/13/23.   Took Medrol dose ron 6/2024 for back pain  No other steroids      Weight gain: Started wegovy 1/2024, currently on 1.7 mg weekly since 5/30/24. Lost another 26 lb since last visit (68 lb total/26% BW since starting wegovy). Tolerating. Getting some cravings but feels full when eats   Wt Readings from Last 3 Encounters:   09/26/24 86.1 kg (189 lb 12.8 oz)   09/05/24 90.3 kg (199 lb)   07/16/24 91.2 kg (201 lb)     Menstrual history: started OCP so long ago that can't remember if cycles were regular prior. When on OCP would skip cycles occasionally. Stopped OCP summer 2022 and has not had cycle since    Pregnancy history: never  Interested in getting pregnant now/soon: no  Currently sexually active: no    Has h/o pancreatitis in 2012 as a complication of an ERCP  No family history of thyroid cancer    Lives with mother    ROS: Complete ROS is otherwise negative except as mentioned in the HPI above  -------------------------------------------------------------------------  Past Medical History:   Diagnosis Date    Acute bacterial endocarditis 12/10/2020    Anxiety     Depressed     Endocarditis      Fibromyalgia     Migraines     Obstructive sleep apnea syndrome 01/10/2023    uses CPAP machine    Osteomyelitis (CMS/[facility]) 01/10/2023    Pancreatitis     Recurrent major depressive disorder (CMS/[facility]) 2023    Substance abuse (CMS/HCC)     Tachycardia     Vasculitis (CMS/HCC) 2017       Past Surgical History   Procedure Laterality Date    Cholecystectomy      Ercp      Gallbladder surgery      Tonsillectomy         Family History   Problem Relation Name Age of Onset    Hypertension Biological Mother      Lung cancer Biological Father  76        smoker    Lung cancer Maternal Grandmother  69        smoker    Heart disease Maternal Grandfather      Heart attack Maternal Grandfather          unsure of age    Colon cancer Maternal Grandfather         Social History     Socioeconomic History    Marital status: Single     Spouse name: None    Number of children: None    Years of education: None    Highest education level: None   Occupational History    Occupation:    Tobacco Use    Smoking status: Former     Current packs/day: 0.00     Average packs/day: 0.5 packs/day for 5.0 years (2.5 ttl pk-yrs)     Types: Cigarettes     Start date:      Quit date:      Years since quittin.7    Smokeless tobacco: Former    Tobacco comments:     quit 1 year ago, vapes currently   Vaping Use    Vaping status: Every Day    Substances: Nicotine, Flavoring    Devices: Refillable tank   Substance and Sexual Activity    Alcohol use: Not Currently    Drug use: Not Currently     Types: Marijuana, Heroin     Comment: MM now , previous heroin and opiod    Sexual activity: Not Currently     Partners: Male, Female   Social History Narrative    Lives with mom     Social Drivers of Health     Financial Resource Strain: Low Risk  (2023)    Overall Financial Resource Strain (CARDIA)     Difficulty of Paying Living Expenses: Not hard at all   Food Insecurity: No Food Insecurity (2024)     Hunger Vital Sign     Worried About Running Out of Food in the Last Year: Never true     Ran Out of Food in the Last Year: Never true   Transportation Needs: No Transportation Needs (6/27/2023)    PRAPARE - Transportation     Lack of Transportation (Medical): No     Lack of Transportation (Non-Medical): No   Housing Stability: Low Risk  (6/27/2023)    Housing Stability Vital Sign     Unable to Pay for Housing in the Last Year: No     Number of Places Lived in the Last Year: 1     Unstable Housing in the Last Year: No         Current Outpatient Medications:     albuterol HFA (VENTOLIN HFA) 90 mcg/actuation inhaler, Inhale 2 puffs every 4 (four) hours as needed., Disp: , Rfl:     desvenlafaxine succinate (PRISTIQ) 25 mg tablet extended release 24 hr, Take 25 mg by mouth daily., Disp: , Rfl:     famotidine (PEPCID) 20 mg tablet, Take 1 tablet (20 mg total) by mouth daily before dinner., Disp: 90 tablet, Rfl: 1    ferrous sulfate 325 mg (65 mg iron) tablet, Take 65 mg by mouth nightly., Disp: , Rfl:     fluticasone propion-salmeteroL (ADVAIR DISKUS) 500-50 mcg/dose diskus inhaler, Inhale 1 puff 2 (two) times a day., Disp: , Rfl:     fluticasone propionate (FLONASE) 50 mcg/actuation nasal spray, Administer 1 spray into each nostril daily as needed for rhinitis or allergies., Disp: 24 mL, Rfl: 1    magnesium oxide (MAG-OX) 400 mg (241.3 mg magnesium) tablet, Take 1 tablet (400 mg total) by mouth daily., Disp: 90 tablet, Rfl: 1    medical marijuana, 1 each See admin instr. Please be advised that an [facility] hospital staff member has listed medical marijuana as one of your home medications for documentation purposes. Please follow-up with your certified issuing provider for ongoing use, Disp: , Rfl:     metoprolol succinate XL (TOPROL-XL) 25 mg 24 hr tablet, Take 1 tablet (25 mg total) by mouth nightly., Disp: 90 tablet, Rfl: 3    naloxone (NARCAN) 4 mg/actuation spray,non-aerosol nasal spray, , Disp: , Rfl:     NURTEC ODT 75 mg  "tablet,disintegrating, TAKE 1 TABLET (75 MG TOTAL) BY MOUTH EVERY OTHER DAY, Disp: 16 tablet, Rfl: 3    OLANZapine (ZyPREXA) 2.5 mg tablet, Take one tab at bedtime as needed for severe migraine, Disp: 10 tablet, Rfl: 0    onabotulinumtoxinA (BOTOX) injection, Botox for chronic migraine headache every 3 months, Disp: 1 each, Rfl: 4    pantoprazole (PROTONIX) 20 mg EC tablet, TAKE 1 TABLET BY MOUTH EVERY DAY, Disp: 90 tablet, Rfl: 0    pregabalin (LYRICA) 200 mg capsule, Take 1 capsule (200 mg total) by mouth 3 (three) times a day with meals., Disp: 90 capsule, Rfl: 2    semaglutide (WEGOVY) 1.7 mg/0.75 mL subcutaneous injection, Inject 1.7 mg under the skin every (seven) 7 days., Disp: 9 mL, Rfl: 1    SUBLOCADE 300 mg/1.5 mL solution, extended rel syringe subcutaneous injection, 1x monthly, Disp: , Rfl:     tamsulosin (FLOMAX) 0.4 mg capsule, Take 0.4 mg by mouth daily., Disp: , Rfl:     temazepam (RESTORIL) 15 mg capsule, Take 15 mg by mouth nightly as needed for sleep., Disp: , Rfl:     VITAMIN D3 125 mcg (5,000 unit) tablet, Take 5,000 Units by mouth., Disp: , Rfl:     Allergies   Allergen Reactions    Tramadol Palpitations    Amoxicillin GI intolerance    Nsaids (Non-Steroidal Anti-Inflammatory Drug) Nausea Only    Trazodone Other (see comments)     Headaches     -------------------------------------------------------------------------  PHYSICAL EXAM  Visit Vitals  /82 (BP Location: Right upper arm, Patient Position: Sitting)   Pulse 88   Ht 1.575 m (5' 2\")   Wt 86.1 kg (189 lb 12.8 oz)   SpO2 97%   BMI 34.71 kg/m²     Gen: well nourished, no acute distress  Eyes: no proptosis, normal conjunctiva  Neck: no thyromegaly  CV: regular rate   Pulm: no use of accessory muscles, on room air  Neuro: AAOx3  MSK: steady gait, no tremor of outstretched hands  Psych: normal mood, affect    LABS REVIEWED  Cortisol   Date/Time Value Ref Range Status   05/28/2024 1105 5.9 (L) 6.2 - 19.4 ug/dL Final     Comment:     Please " Note: The reference interval and flagging for   this test is for an AM collection. If this is a PM   collection please use:         Cortisol PM: 2.3-11.9     01/05/2024 1001 9.0 6.2 - 19.4 ug/dL Final     Comment:     Please Note: The reference interval and flagging for   this test is for an AM collection. If this is a PM   collection please use:         Cortisol PM: 2.3-11.9     06/29/2023 0738 0.5 ug/dL Final     Comment:     REFERENCE RANGE  AM: (08:00 +/- 1 hour) 6.7-22.6 ug/dL  PM: (16:00 +/- 1 hour)  <10.0 ug/dL   06/28/2023 0633 0.5 ug/dL Final     Comment:     REFERENCE RANGE  AM: (08:00 +/- 1 hour) 6.7-22.6 ug/dL  PM: (16:00 +/- 1 hour)  <10.0 ug/dL   06/27/2023 0353 8.1 ug/dL Final     Comment:     REFERENCE RANGE  AM: (08:00 +/- 1 hour) 6.7-22.6 ug/dL  PM: (16:00 +/- 1 hour)  <10.0 ug/dL     Cortisol - AM   Date/Time Value Ref Range Status   07/06/2023 1127 0.5 (L) 6.2 - 19.4 ug/dL Final   06/23/2023 1141 0.8 (L) 6.2 - 19.4 ug/dL Final     ACTH, Plasma   Date/Time Value Ref Range Status   05/28/2024 1105 30.5 7.2 - 63.3 pg/mL Final     Comment:     ACTH reference interval for samples collected between 7 and 10 AM.   01/05/2024 1001 85.4 (H) 7.2 - 63.3 pg/mL Final     Comment:     ACTH reference interval for samples collected between 7 and 10 AM.   06/26/2023 1953 <5 (L) 6 - 50 pg/mL Final     Comment:     Reference range applies only to specimens collected  between 7am-10am.             Hemoglobin A1C   Date Value Ref Range Status   06/26/2023 5.4 <5.7 % Final     Hemoglobin A1c   Date Value Ref Range Status   05/28/2024 5.3 4.8 - 5.6 % Final     Comment:              Prediabetes: 5.7 - 6.4           Diabetes: >6.4           Glycemic control for adults with diabetes: <7.0          Lab Results   Component Value Date    GLUCOSE 76 06/09/2024    BUN 7 06/09/2024    CREATININE 0.7 06/09/2024    EGFR >60.0 06/09/2024     06/09/2024    K 4.3 06/09/2024     06/09/2024    CO2 27 06/09/2024    CALCIUM  9.5 06/09/2024       TSH   Date Value Ref Range Status   08/26/2024 2.290 0.450 - 4.500 uIU/mL Final   06/23/2023 2.500 0.450 - 4.500 uIU/mL Final   06/12/2023 2.490 0.450 - 4.500 uIU/mL Final     T4,Free(Direct)   Date Value Ref Range Status   08/26/2024 1.25 0.82 - 1.77 ng/dL Final   06/23/2023 1.27 0.82 - 1.77 ng/dL Final   06/12/2023 1.43 0.82 - 1.77 ng/dL Final      Lab Results   Component Value Date    HCGBSUBQN Negative 08/26/2024     LH   Date/Time Value Ref Range Status   08/26/2024 1033 <0.3 mIU/mL Final     Comment:                          Adult Female              Range                        Follicular phase      2.4 -  12.6                        Ovulation phase      14.0 -  95.6                        Luteal phase          1.0 -  11.4                        Postmenopausal        7.7 -  58.5       FSH   Date/Time Value Ref Range Status   08/26/2024 1033 2.0 mIU/mL Final     Comment:                          Adult Female             Range                        Follicular phase      3.5 -  12.5                        Ovulation phase       4.7 -  21.5                        Luteal phase          1.7 -   7.7                        Postmenopausal       25.8 - 134.8       Estradiol   Date/Time Value Ref Range Status   08/26/2024 1033 <5.0 pg/mL Final     Comment:                          Adult Female             Range                        Follicular phase     12.5 - 166.0                        Ovulation phase      85.8 - 498.0                        Luteal phase         43.8 - 211.0                        Postmenopausal       <6.0 -  54.7                       Pregnancy                        1st trimester     215.0 - >4300.0  Roche ECLIA methodology       Prolactin   Date/Time Value Ref Range Status   06/28/2023 0633 22.1 3.3 - 26.7 ng/mL Final     17-OH Progesterone LCMS   Date/Time Value Ref Range Status   08/26/2024 1033 45 ng/dL Final     Comment:                               Adult Female                              Follicular        15 -  70                             Luteal            35 - 290       DHEA-Sulfate   Date/Time Value Ref Range Status   08/26/2024 1033 49.3 (L) 57.3 - 279.2 ug/dL Final     Testosterone, Serum   Date/Time Value Ref Range Status   08/26/2024 1033 5 (L) 8 - 60 ng/dL Final        IMAGING REVIEWED  CT ABDOMEN PELVIS WITH IV CONTRAST 1/13/22    Narrative  CLINICAL HISTORY: Right-sided back pain, right upper quadrant abdominal pain, flank pain, clinical concern for pyelonephritis.  COVID-19 positive.  High probability of pulmonary emboli.    COMMENT:    Comparison: CT of the chest dated 12/9/2020.  Right upper quadrant ultrasound dated 12/17/2020.    TECHNIQUE: CT angiography of the chest was performed as per departmental pulmonary embolism protocol, with axial images acquired following the intravenous administration of 100 cc Omnipaque-350.  Portal venous phase imaging of the abdomen and pelvis was then performed.  Delayed phase imaging of the abdomen was performed through the level of the kidneys.  Oral contrast was not administered.  Sagittal and coronal reconstructed images were rendered.  3-D MIP and/or volume-rendered image reconstruction of the chest was performed and reviewed.    CT DOSE:  One or more dose reduction techniques (e.g. automated exposure control, adjustment of the mA and/or kV according to patient size, use of iterative reconstruction technique) utilized for this examination.    CHEST:  LUNGS and AIRWAYS: There are faint patchy groundglass alveolar opacities in both upper lobes and the lingula which lack a specific distribution.  These are nonspecific though most suggestive of an inflammatory or infectious pneumonitis. There is no consolidation or mass.  The trachea and central main airways are patent and normal in caliber.  PLEURA: Unremarkable. No pleural effusions.  VESSELS: No central, lobar, or proximal segmental pulmonary arterial filling defects to suggest  pulmonary emboli.  The thoracic aorta and main pulmonary artery are normal in caliber.  HEART: Heart size is normal.  No pericardial effusion.  MEDIASTINUM and PAULINA: Mildly enlarged prevascular lymph node to the left of the aortic arch which measures 1.1 cm in short axis (previously 1.7 cm).  Mildly enlarged subcarinal lymph node measuring 1 cm (previously 1.2 cm).  These lymph nodes are nonspecific, possibly reactive.  No pathologically enlarged hilar lymph nodes.  CHEST WALL and LOWER NECK: Unremarkable.  No pathologically enlarged axillary  lymph nodes.    ABDOMEN:  LIVER: Top normal in size.  Otherwise unremarkable.  GALLBLADDER: Cholecystectomy.  BILE DUCTS: No intrahepatic or extrahepatic biliary ductal dilatation.  PANCREAS: Unremarkable.  SPLEEN: Unremarkable.  ADRENALS: Unremarkable.  KIDNEYS: There is normal and symmetric renal enhancement and excretion.  There are no findings typical of pyelonephritis.  There is no hydronephrosis.  There is no perinephric fat stranding or perinephric collection.  URETERS: Nondilated.  BOWEL: The stomach is nondilated.  The small and large bowel are normal in caliber.  A collapsed appendix is identified.  PERITONEUM: No ascites or pneumoperitoneum. No fluid collection.  RETROPERITONEUM: Unremarkable.  LYMPH NODES: None pathologically enlarged.  VESSELS: Unremarkable. Normal caliber abdominal aorta.  UPPER ABDOMINAL WALL SOFT TISSUES: Unremarkable.    PELVIS:  BLADDER: Essentially completely collapsed and therefore cannot be adequately assessed.  REPRODUCTIVE ORGANS: No pelvic masses.  PERITONEUM: No free fluid. No fluid collection.  RETROPERITONEUM: Unremarkable.  VESSELS: Unremarkable.  LYMPH NODES: None pathologically enlarged.  LOWER ABDOMINAL WALL SOFT TISSUES: Unremarkable.    BONES: Multilevel spondylosis and tiny multilevel Schmorl's nodes in the thoracic and lumbar spine.  Vacuum disc phenomenon is noted at several lower thoracic and lower lumbar levels.  Tiny  sclerotic foci in the bony pelvis are nonspecific, most likely incidental benign bone islands.  No destructive osseous lesions.    --    Impression  1.  No evidence of pulmonary emboli as clinically questioned.  2.  Faint patchy groundglass alveolar opacities in both upper lobes and the lingula, nonspecific.  An infectious or inflammatory pneumonitis might have this appearance.  No pulmonary consolidation.  3.  Mild mediastinal lymphadenopathy persists though has improved since 12/9/2020.  This is nonspecific, possibly reactive.  4.  No acute abnormality in the abdomen or pelvis.  No specific evidence of an inflammatory or obstructive process.  No evidence of pyelonephritis as clinically questioned.      ASSESSMENT AND PLAN:     1. Iatrogenic Cushing'  - Was on and off high dose steroids due to vasculitis since 2017. Was off altogether from 2019 until 10/2022 at which time prednisone was restarted by urgent care due to rash on L foot concerning for recurrent vasculitis. Was unable to taper down past prednisone 30 mg daily for ~6 mo, then started tapering few months prior to initial visit 7/023  - Had tapered down to 5 mg daily for ~1.5 weeks then got admitted to [facility] 6/2023 with concern for AI per PCP due to cortisol level of 0.8 around noon as well as worsening swelling on lower dose of prednisone. Was discharged from hospital on prednisone 7.5 mg daily  - At initial visit 7/2023 initiated slow taper of prednisone and she is now off it, last dose 12/13/23. Continues to feel better off prednisone  - Continue off prednisone     2. Class 1 obesity Body mass index is 34.71 kg/m².  - Lost 42 lb since starting wegovy at last visit but notes she is developing hunger pains and weight loss has plateaued  - Increase wegovy to 1.7 mg weekly    3. Secondary amenorrhea  - Previously on OCP for prolonged duration and pt could not previously remember if her cycles were regular prior to OCP initiation  - Stopped OCP summer 2022  and has not had cycle since that time even since being off prednisone since 12/2023  - Lab evaluation 8/2024 showed normal PRL, 17OHP but low testosterone, DHEAS, E2, LH, FSH. Suspect low DHEAS/testosterone may be due to prior iatrogenic Cushing's as above. Undetectable E2 with low LH/FSH may be related to prior opiate/current sublocade use, which pt is trying to taper off gradually.   - Check repeat DHEAS, E2, LH, FSH prior to next visit for monitoring. If remains low will discuss HRT for bone health    RTC 3-4 mo

## 2024-09-29 DIAGNOSIS — J01.00 ACUTE NON-RECURRENT MAXILLARY SINUSITIS: ICD-10-CM

## 2024-09-30 RX ORDER — FLUTICASONE PROPIONATE 50 MCG
1 SPRAY, SUSPENSION (ML) NASAL DAILY PRN
Qty: 16 ML | Refills: 1 | Status: SHIPPED | OUTPATIENT
Start: 2024-09-30 | End: 2024-12-17

## 2024-09-30 NOTE — TELEPHONE ENCOUNTER
Medicine Refill Request    Last Office Visit: 6/27/2024   Last Consult Visit: Visit date not found  Last Telemedicine Visit: 1/30/2024 Yumiko Lopez,     Next Appointment: 10/8/2024      Current Outpatient Medications:     albuterol HFA (VENTOLIN HFA) 90 mcg/actuation inhaler, Inhale 2 puffs every 4 (four) hours as needed., Disp: , Rfl:     desvenlafaxine succinate (PRISTIQ) 25 mg tablet extended release 24 hr, Take 25 mg by mouth daily., Disp: , Rfl:     famotidine (PEPCID) 20 mg tablet, Take 1 tablet (20 mg total) by mouth daily before dinner., Disp: 90 tablet, Rfl: 1    ferrous sulfate 325 mg (65 mg iron) tablet, Take 65 mg by mouth nightly., Disp: , Rfl:     fluticasone propion-salmeteroL (ADVAIR DISKUS) 500-50 mcg/dose diskus inhaler, Inhale 1 puff 2 (two) times a day., Disp: , Rfl:     fluticasone propionate (FLONASE) 50 mcg/actuation nasal spray, Administer 1 spray into each nostril daily as needed for rhinitis or allergies., Disp: 24 mL, Rfl: 1    magnesium oxide (MAG-OX) 400 mg (241.3 mg magnesium) tablet, Take 1 tablet (400 mg total) by mouth daily., Disp: 90 tablet, Rfl: 1    medical marijuana, 1 each See admin instr. Please be advised that an [facility] hospital staff member has listed medical marijuana as one of your home medications for documentation purposes. Please follow-up with your certified issuing provider for ongoing use, Disp: , Rfl:     metoprolol succinate XL (TOPROL-XL) 25 mg 24 hr tablet, Take 1 tablet (25 mg total) by mouth nightly., Disp: 90 tablet, Rfl: 3    naloxone (NARCAN) 4 mg/actuation spray,non-aerosol nasal spray, , Disp: , Rfl:     NURTEC ODT 75 mg tablet,disintegrating, TAKE 1 TABLET (75 MG TOTAL) BY MOUTH EVERY OTHER DAY, Disp: 16 tablet, Rfl: 3    onabotulinumtoxinA (BOTOX) injection, Botox for chronic migraine headache every 3 months, Disp: 1 each, Rfl: 4    pantoprazole (PROTONIX) 20 mg EC tablet, TAKE 1 TABLET BY MOUTH EVERY DAY, Disp: 90 tablet, Rfl: 0    pregabalin (LYRICA)  200 mg capsule, Take 1 capsule (200 mg total) by mouth 3 (three) times a day with meals., Disp: 90 capsule, Rfl: 2    semaglutide (WEGOVY) 1.7 mg/0.75 mL subcutaneous injection, Inject 1.7 mg under the skin every (seven) 7 days., Disp: 9 mL, Rfl: 1    SUBLOCADE 300 mg/1.5 mL solution, extended rel syringe subcutaneous injection, 1x monthly, Disp: , Rfl:     tamsulosin (FLOMAX) 0.4 mg capsule, Take 0.4 mg by mouth daily., Disp: , Rfl:     temazepam (RESTORIL) 15 mg capsule, Take 15 mg by mouth nightly as needed for sleep., Disp: , Rfl:     VITAMIN D3 125 mcg (5,000 unit) tablet, Take 5,000 Units by mouth., Disp: , Rfl:       BP Readings from Last 3 Encounters:   09/26/24 (!) 140/92   09/26/24 116/68   09/05/24 109/72       Recent Lab results:  Lab Results   Component Value Date    CHOL 187 10/09/2023   ,   Lab Results   Component Value Date    HDL 47 10/09/2023   ,   Lab Results   Component Value Date    LDLCALC 112 (H) 10/09/2023   ,   Lab Results   Component Value Date    TRIG 159 (H) 10/09/2023        Lab Results   Component Value Date    GLUCOSE 76 06/09/2024   ,   Lab Results   Component Value Date    HGBA1C 5.3 05/28/2024         Lab Results   Component Value Date    CREATININE 0.7 06/09/2024       Lab Results   Component Value Date    TSH 2.290 08/26/2024           Lab Results   Component Value Date    HGBA1C 5.3 05/28/2024

## 2024-10-04 ENCOUNTER — TELEMEDICINE (OUTPATIENT)
Dept: BEHAVIORAL/MENTAL HEALTH CLINIC | Facility: CLINIC | Age: 39
End: 2024-10-04
Payer: COMMERCIAL

## 2024-10-04 DIAGNOSIS — F33.1 MODERATE EPISODE OF RECURRENT MAJOR DEPRESSIVE DISORDER (HCC): Primary | ICD-10-CM

## 2024-10-04 DIAGNOSIS — F41.1 GENERALIZED ANXIETY DISORDER: ICD-10-CM

## 2024-10-04 PROCEDURE — 90837 PSYTX W PT 60 MINUTES: CPT | Performed by: COUNSELOR

## 2024-10-04 NOTE — PSYCH
Virtual Regular Visit    Verification of patient location:    Patient is located at Home in the following state in which I hold an active license [place]      Assessment/Plan:    Problem List Items Addressed This Visit       Recurrent major depressive disorder (HCC) - Primary    Generalized anxiety disorder       Goals addressed in session: Goal 1, Goal 2, and Goal 3           Reason for visit is No chief complaint on file.       Encounter provider GAMALIEL Jones      Recent Visits  No visits were found meeting these conditions.  Showing recent visits within past 7 days and meeting all other requirements  Today's Visits  Date Type Provider Dept   10/04/24 Telemedicine GAMALIEL Jones [facility] Therapist [facility]   Showing today's visits and meeting all other requirements  Future Appointments  No visits were found meeting these conditions.  Showing future appointments within next 150 days and meeting all other requirements       The patient was identified by name and date of birth. Samantha Vila was informed that this is a telemedicine visit and that the visit is being conducted throughthe Epic Embedded platform. She agrees to proceed..  My office door was closed. No one else was in the room.  She acknowledged consent and understanding of privacy and security of the video platform. The patient has agreed to participate and understands they can discontinue the visit at any time.    Patient is aware this is a billable service.     Subjective  Samantha Vila is a 39 y.o. female presenting for counseling support.    HPI     No past medical history on file.    No past surgical history on file.    No current outpatient medications on file.     No current facility-administered medications for this visit.        Not on File    Review of Systems    Video Exam    There were no vitals filed for this visit.    Physical Exam     Behavioral Health Psychotherapy Progress Note    Psychotherapy Provided: Individual  "Psychotherapy     1. Moderate episode of recurrent major depressive disorder (HCC)        2. Generalized anxiety disorder            Goals addressed in session: Goal 1, Goal 2, and Goal 3      DATA:     Samantha shared about current events and status.  We began to explore more about her TATUM and history.  \"I have not gotten into this and have wanted to do so.  I was focused on health before.  I want to get more into this.  My parents split when I was 18 after a big fight. They used to fight all the time.  F started dating a single mom with 3 kids and moved on.  We lost our house.  Mother hadn't worked in 20 years, so suddenly had to and did cleaning at the hospital.  M did not take him to court for support.  He would talk her out of it and still did not pay.  Further, M was cheating on F since client was 12.   M would say he was a friend, and can't tell dad because he would be upset.  I was honor school student all through [employer] and college.  M used to vent too much to us kids.  F would reach out and they pushed him away because of how he was characterized to her/her sister.  He was an alcoholic. My F  in  - client was about 29.  His mom  when he was really young.  He was close with arvin.  His stepM was awful with him.  He found it with my mom.  She would bark at him.  He found alcohol at 16.  We did not have contact for 2 years.  I reconnected with him for one day just before he  and felt like we had closure.\"      Ct said she is of Somali, Guinean, eastern  decent.  \"My F  in  (75) of lung cancer, Live with mat aunt Courtney (borderline, and hoarder), Uncle Tino (justina); and my Mother Daria (73).\".    Samantha and I processed feelings, disucssed issues of codependency, growth, focus and healing.  Rec reading \"Co-dependent no more, by Lisa Daugherty.  Also explored Assertiveness chapter topics from Anxiety and phobia workbook.  Ct. To look at techniques to avoid manipulation.\". and also " "explored \"5 love languages\" by Steve Irene.  Ct. To complete quiz and return results to me.    During this session, this clinician used the following therapeutic modalities: Cognitive Behavioral Therapy, Dialectical Behavior Therapy, Mindfulness-based Strategies, Motivational Interviewing, and Supportive Psychotherapy    Substance Abuse was addressed during this session. If the client is diagnosed with a co-occurring substance use disorder, please indicate any changes in the frequency or amount of use: Ct maintains commitment to her sobriety. Stage of change for addressing substance use diagnoses: No substance use/Not applicable      ASSESSMENT:  Samantha Vila presents with a Euthymic/ normal mood.  Ct. Appreciative of therapy and is engaged and enthusiastic.   her affect is Normal range and intensity, which is congruent, with her mood and the content of the session. The client has made progress on their goals.    Samantha Vila presents with a none risk of suicide, none risk of self-harm, and none risk of harm to others.    For any risk assessment that surpasses a \"low\" rating, a safety plan must be developed.    A safety plan was indicated: no  If yes, describe in detail NA    PLAN: Between sessions, Samantha Vila will continue to work on above issues and tx goals.. At the next session, the therapist will use Cognitive Behavioral Therapy, Dialectical Behavior Therapy, Mindfulness-based Strategies, Motivational Interviewing, and Supportive Psychotherapy to address same.    Behavioral Health Treatment Plan and Discharge Planning: Samantha Vila is aware of and agrees to continue to work on their treatment plan. They have identified and are working toward their discharge goals. yes    Visit start and stop times:    10/04/24  Start Time: 0904  Stop Time: 1001  Total Visit Time: 57 minutes  "

## 2024-10-08 ENCOUNTER — TELEPHONE (OUTPATIENT)
Dept: NEUROLOGY | Facility: CLINIC | Age: 39
End: 2024-10-08

## 2024-10-08 ENCOUNTER — OFFICE VISIT (OUTPATIENT)
Dept: NEUROLOGY | Facility: CLINIC | Age: 39
End: 2024-10-08
Attending: NURSE PRACTITIONER
Payer: COMMERCIAL

## 2024-10-08 VITALS
DIASTOLIC BLOOD PRESSURE: 72 MMHG | OXYGEN SATURATION: 98 % | RESPIRATION RATE: 18 BRPM | HEART RATE: 86 BPM | WEIGHT: 190 LBS | BODY MASS INDEX: 34.75 KG/M2 | TEMPERATURE: 97.6 F | SYSTOLIC BLOOD PRESSURE: 108 MMHG

## 2024-10-08 DIAGNOSIS — G43.901 STATUS MIGRAINOSUS: ICD-10-CM

## 2024-10-08 DIAGNOSIS — M54.2 CERVICALGIA: Primary | ICD-10-CM

## 2024-10-08 DIAGNOSIS — G43.709 CHRONIC MIGRAINE WITHOUT AURA WITHOUT STATUS MIGRAINOSUS, NOT INTRACTABLE: ICD-10-CM

## 2024-10-08 DIAGNOSIS — G24.3 SPASMODIC TORTICOLLIS: ICD-10-CM

## 2024-10-08 PROCEDURE — 99999 PR OFFICE/OUTPT VISIT,PROCEDURE ONLY: CPT | Performed by: NURSE PRACTITIONER

## 2024-10-08 PROCEDURE — 64615 CHEMODENERV MUSC MIGRAINE: CPT | Performed by: NURSE PRACTITIONER

## 2024-10-08 PROCEDURE — 200200 PR NO CHARGE: Performed by: NURSE PRACTITIONER

## 2024-10-08 RX ORDER — KETOROLAC TROMETHAMINE 30 MG/ML
30 INJECTION, SOLUTION INTRAMUSCULAR; INTRAVENOUS ONCE
Status: COMPLETED | OUTPATIENT
Start: 2024-10-08 | End: 2024-10-08

## 2024-10-08 RX ADMIN — KETOROLAC TROMETHAMINE 30 MG: 30 INJECTION, SOLUTION INTRAMUSCULAR; INTRAVENOUS at 14:30

## 2024-10-08 NOTE — TELEPHONE ENCOUNTER
Perform specialty called stated that pa has been submitted her patient part d benefits, and is considered buy and bill no delivery needed for today visit.

## 2024-10-08 NOTE — PROGRESS NOTES
Patient returns for injections.      Since last seen headaches have improved with Botox injections.    Correlated with headache diary, decreased abortive medication use.     At least 50 % of reduction of frequency and severity of moderate to severe headaches      Botox injections  Botox lot number: [id number]  Expiration Date: 02/2027      NDC#5136-9824-04    Patient informed and consent obtained.    General Consent including notice of privacy practices/patient bill of rights was reviewed and consent/authorization given.       4cc of Normal saline were added to one vial of Botox Type A resulting in 200 Units of Botox.  After the patient consented to the procedure the following muscles were injected after cleaning the overlying skin with alcohol. New FDA mandated warnings provided to the patient with instructions to seek medical attention for difficulty swallowing or breathing.    Frontalis 10 units right ( 2 sites each 5 units) and 10 units left ( 2 sites each 5 units)  /Glabellar 5 units right and 5 units left (medially)  Procerus 5 units    Temporalis 30 units right( 6 sites each 5 units) and 30 units left ( 6 sites each 5 units)    Trapezius 25 units ( 3 sites each 5 units) right and 30 units left ( 3 sites each 5 units)  Cervical Paraspinals 10 units right ( 2 sites each 5 units) and 10 units left (2 sites each 5 units)  Occipital 15 units right ( 3 sites each 5 units) , 15 units left ( 3 sites each 5 units)      A total of 200 units was used The patient tolerated the procedure well.         Toradol IM injection   Lot: [id number]  Exp date : Oct 2025    30 mg   Left deltoid     Tolerated well     Diagnoses and all orders for this visit:    Cervicalgia    Spasmodic torticollis  -     onabotulinumtoxinA (BOTOX) 200 unit injection 200 Units    Chronic migraine without aura without status migrainosus, not intractable  -     onabotulinumtoxinA (BOTOX) 200 unit injection 200 Units    Status migrainosus  -      ketorolac (TORADOL) injection 30 mg

## 2024-10-10 ENCOUNTER — TELEPHONE (OUTPATIENT)
Dept: BEHAVIORAL/MENTAL HEALTH CLINIC | Facility: CLINIC | Age: 39
End: 2024-10-10

## 2024-10-15 ENCOUNTER — TELEPHONE (OUTPATIENT)
Age: 39
End: 2024-10-15

## 2024-10-21 ENCOUNTER — TELEPHONE (OUTPATIENT)
Dept: NEUROLOGY | Facility: CLINIC | Age: 39
End: 2024-10-21

## 2024-10-21 NOTE — TELEPHONE ENCOUNTER
Returned patient phone call explained that we do have 1/2 on hold for next Botox appointment. I also let patient know that we are pending the new approval and if for some reason we do not get the new approval back before that appointment she will be rescheduled.

## 2024-10-24 NOTE — TELEPHONE ENCOUNTER
I also called patient . She left voicemail on direct line . I will follow up with her today    PELVIS ONE IMAGE - BILATERAL HIP FOUR Images



HISTORY:  Pressure sores, evaluate for osteomyelitis



COMPARISON: CT pelvis performed at [facility] 5/22/2019

     

FINDINGS:

Bones:

Hips: Intact and normally positioned. Mild degenerative changes of the right

hip.



Pelvis: Intact. No diastases of the pubic symphysis or sacroiliac joints.

Cortical thickening and heterotopic ossifications of the inferior pubic

rami/ischial tuberosities, right greater than left. There are erosive changes

of the left issue tuberosity similar to previous exam. Cortical thickening of

the lateral aspect of the left greater trochanter.



Lower lumbar spine: Unremarkable.



Soft tissues:

Heterotopic ossifications of the soft tissues of the greater trochanter of the

right femur and the lesser trochanter of the left femur. Extensive heterotopic

ossifications in the soft tissues of the posterior left pelvis extending to the

level of the iliac crest and throughout the proximal left thigh. Deep soft

tissue ulceration of the lateral aspect of the left thigh/hip extending to the

bone is larger. Soft tissue ulceration at the posterior aspect of the left

proximal thigh is redemonstrated.





IMPRESSION: 

Large decubitus ulcer of the lateral aspect of the left thigh/hip and decubitus

ulcer of the proximal left thigh.



Extensive heterotopic ossifications of the left pelvis, ischial tuberosities,

and right greater trochanter suggestive of chronic osteomyelitis.



Signed by: Dr. Sada Holly MD on 2/21/2020 8:54 PM

## 2024-10-27 DIAGNOSIS — I10 ESSENTIAL HYPERTENSION: ICD-10-CM

## 2024-10-27 DIAGNOSIS — R00.2 PALPITATIONS: ICD-10-CM

## 2024-10-28 ENCOUNTER — TELEPHONE (OUTPATIENT)
Age: 39
End: 2024-10-28

## 2024-10-28 RX ORDER — METOPROLOL SUCCINATE 25 MG/1
25 TABLET, EXTENDED RELEASE ORAL NIGHTLY
Qty: 90 TABLET | Refills: 1 | Status: SHIPPED | OUTPATIENT
Start: 2024-10-28

## 2024-10-28 NOTE — TELEPHONE ENCOUNTER
Rx request for metoprolol received from pharmacy. Noted that pt has cancelled 4 appt sinces last seen. Needs to be seen before 1 yr f/u time. Currently scheduled for 11/20/2024.

## 2024-10-28 NOTE — TELEPHONE ENCOUNTER
The patient contacted the office, requesting an update on her TAYLOR appt. The patient informed the writer that her insurance is Non-Par with her current provider.       Please review. Thank you.

## 2024-10-28 NOTE — TELEPHONE ENCOUNTER
Patient called in regard to insurance coverage for her upcoming appts. Writer provided billing phone number for patient to verify.

## 2024-10-31 ENCOUNTER — TELEPHONE (OUTPATIENT)
Dept: PSYCHIATRY | Facility: CLINIC | Age: 39
End: 2024-10-31

## 2024-10-31 NOTE — TELEPHONE ENCOUNTER
BARTOLOMEM regarding a possible TAYLOR apt from SHEILA Haynes.  Clt has insurance that the provider is not credentialed for.

## 2024-11-05 ENCOUNTER — NURSE TRIAGE (OUTPATIENT)
Dept: PRIMARY CARE | Facility: CLINIC | Age: 39
End: 2024-11-05

## 2024-11-05 ENCOUNTER — OFFICE VISIT (OUTPATIENT)
Dept: PRIMARY CARE | Facility: CLINIC | Age: 39
End: 2024-11-05
Payer: COMMERCIAL

## 2024-11-05 VITALS
OXYGEN SATURATION: 98 % | WEIGHT: 186.4 LBS | BODY MASS INDEX: 34.3 KG/M2 | HEIGHT: 62 IN | SYSTOLIC BLOOD PRESSURE: 112 MMHG | HEART RATE: 70 BPM | DIASTOLIC BLOOD PRESSURE: 82 MMHG

## 2024-11-05 DIAGNOSIS — R23.8 SKIN PIMPLE: Primary | ICD-10-CM

## 2024-11-05 DIAGNOSIS — L30.9 ACUTE ECZEMA: ICD-10-CM

## 2024-11-05 PROCEDURE — 3079F DIAST BP 80-89 MM HG: CPT | Performed by: PHYSICIAN ASSISTANT

## 2024-11-05 PROCEDURE — 99214 OFFICE O/P EST MOD 30 MIN: CPT | Performed by: PHYSICIAN ASSISTANT

## 2024-11-05 PROCEDURE — 3074F SYST BP LT 130 MM HG: CPT | Performed by: PHYSICIAN ASSISTANT

## 2024-11-05 PROCEDURE — 3008F BODY MASS INDEX DOCD: CPT | Performed by: PHYSICIAN ASSISTANT

## 2024-11-05 RX ORDER — MOMETASONE FUROATE 1 MG/G
CREAM TOPICAL DAILY
Qty: 45 G | Refills: 1 | Status: SHIPPED | OUTPATIENT
Start: 2024-11-05 | End: 2025-02-13

## 2024-11-05 RX ORDER — CLINDAMYCIN AND BENZOYL PEROXIDE 1 %-5 %
KIT TOPICAL 2 TIMES DAILY
Qty: 35 G | Refills: 1 | Status: SHIPPED | OUTPATIENT
Start: 2024-11-05

## 2024-11-05 ASSESSMENT — PAIN SCALES - GENERAL: PAINLEVEL_OUTOF10: 0-NO PAIN

## 2024-11-05 NOTE — PATIENT INSTRUCTIONS
Use the clindamycin for pimple on face  Twice a day    For dry skin on chin use elocon twice a day       Go see dermatology for ongoing issues in the legs and genital area

## 2024-11-05 NOTE — TELEPHONE ENCOUNTER
Regarding: Severe Pain between 5-6/10  ----- Message from Francoise MACK sent at 11/5/2024  9:07 AM EST -----  Patient called today with red flag complaint of boils on R side of face starting to affect tongue/throat along w/ some on the inside of R thigh. Rates pain 5 or 6/10. Pt clarified it is not an allergic reaction nor is there bleeding.  Call transferred to Primary Care Nurse Triage Line

## 2024-11-05 NOTE — PROGRESS NOTES
Consent obtained from patient and all parties present in the room? yes    I have obtained the consent of everyone present in the room to make an audio recording of this visit to assist me in documenting the encounter in the EMR.     Subjective     Patient ID: Carolyn Redmond, : 1985 is a 39 y.o. female who presents for boils (1 on face and 1 on inner right thigh)    History of Present Illness  The patient presents for evaluation of multiple medical concerns.    She reports a sensation of pus-filled pimples in her throat and in her ear. Additionally, she has noticed a pus-filled lesion on her thigh, which has been intermittently present. Despite using tea tree oil, the lesion occasionally enlarges. She is considering a referral to a dermatologist. She denies any possibility of pregnancy. The lesions on her legs and face are new, and she has been under significant stress. She has previously taken clindamycin orally. She maintains good personal hygiene, including yoga, showering, and changing her shoes. She wears a chinstrap at night and has recently started using a light softener.    ALLERGIES  She is not allergic to any antibiotics.    The following have been reviewed and updated as appropriate in this visit:   Problems           albuterol HFA, Inhale 2 puffs every 4 (four) hours as needed.    clindamycin-benzoyl peroxide, Apply topically 2 (two) times a day.    desvenlafaxine succinate, Take 25 mg by mouth daily.    famotidine, Take 1 tablet (20 mg total) by mouth daily before dinner.    ferrous sulfate, Take 65 mg by mouth nightly.    fluticasone propion-salmeteroL, Inhale 1 puff 2 (two) times a day.    fluticasone propionate, ADMINISTER 1 SPRAY INTO EACH NOSTRIL DAILY AS NEEDED FOR RHINITIS OR ALLERGIES.    magnesium oxide, Take 1 tablet (400 mg total) by mouth daily.    medical marijuana, 1 each See admin instr. Please be advised that an [facility] hospital staff member has listed medical marijuana as  "one of your home medications for documentation purposes. Please follow-up with your certified issuing provider for ongoing use    metoprolol succinate XL, Take 1 tablet (25 mg total) by mouth nightly.    mometasone, Apply topically daily.    naloxone,     NURTEC ODT, TAKE 1 TABLET (75 MG TOTAL) BY MOUTH EVERY OTHER DAY    BOTOX, Botox for chronic migraine headache every 3 months    pantoprazole, TAKE 1 TABLET BY MOUTH EVERY DAY    pregabalin, Take 1 capsule (200 mg total) by mouth 3 (three) times a day with meals.    semaglutide, Inject 1.7 mg under the skin every (seven) 7 days.    SUBLOCADE, 1x monthly    tamsulosin, Take 0.4 mg by mouth daily.    temazepam, Take 15 mg by mouth nightly as needed for sleep.    VITAMIN D3, Take 5,000 Units by mouth.    PH    Objective   Vitals:    11/05/24 1201   BP: 112/82   BP Location: Left upper arm   Patient Position: Sitting   Pulse: 70   SpO2: 98%   Weight: 84.6 kg (186 lb 6.4 oz)   Height: 1.575 m (5' 2\")   PainSc: 0-No pain       No LMP recorded. (Menstrual status: Amenorrhea).     Body mass index is 34.09 kg/m².    Wt Readings from Last 3 Encounters:   11/05/24 84.6 kg (186 lb 6.4 oz)   10/08/24 86.2 kg (190 lb)   09/26/24 85.7 kg (189 lb)     BP Readings from Last 3 Encounters:   11/05/24 112/82   10/08/24 108/72   09/26/24 (!) 140/92       Physical Exam  HENT:      Right Ear: Tympanic membrane normal.      Left Ear: Tympanic membrane normal.   Cardiovascular:      Pulses: Normal pulses.      Heart sounds: Normal heart sounds.   Pulmonary:      Effort: Pulmonary effort is normal.      Breath sounds: Normal breath sounds.   Skin:     Comments: Small pimple on her right chin  Eczematous skin around her mouth  Bruising noticed on her upper thigh no active infection               Physical Exam  No abnormalities in the ear.  No lymph nodes detected in the neck.    Results         Assessment & Plan  1.pimple-clindamycin topical, warm compresses    2. excema  The condition also " appears to be eczema, particularly during stressful situations. y. Elocon cream was prescribed for her face, to be applied twice daily to reduce inflammation. She was advised to clean her equipment regularly. A referral to [facility] was made for ongoing issues in her legs and genital area. If there is no improvement, she will inform me.    2. Health maintenance.  She declined the influenza vaccine. She plans to get it at [facility].         Problem List Items Addressed This Visit    None  Visit Diagnoses       Skin pimple    -  Primary    Relevant Medications    clindamycin-benzoyl peroxide 1-5 % gel with pump    mometasone (ELOCON) 0.1 % cream    Other Relevant Orders    Ambulatory referral to Dermatology    Acute eczema        Relevant Medications    clindamycin-benzoyl peroxide 1-5 % gel with pump    mometasone (ELOCON) 0.1 % cream            Patient Instructions   Use the clindamycin for pimple on face  Twice a day    For dry skin on chin use elocon twice a day       Go see dermatology for ongoing issues in the legs and genital area    Return if symptoms worsen or fail to improve.        RAVI Rodgers C

## 2024-11-05 NOTE — TELEPHONE ENCOUNTER
Patient transferred to the Primary Care Telephonic Nurse Triage Line with complaints of boils     HPI:  Hx of Boils come & go chronically    One now on side of R face - noticed 2 days ago - red/inflamed   One on R inner thigh - been there 2 weeks - bruised color   Pain 5-6/10 inside throat, radiates to ear   On steroids last week from pain management d/t flare up of autoimmune issue   Remains with previous infection under nail still - using hypocleanse   Afebrile     Disposition:  See Today in Office - Scheduled with available provider     Care Advice:  Care Advice Given    Patient/Caregiver understands and will follow care advice?: Yes, plans to follow advice      Given By Given At Carla Morris 11/5/2024  9:17 AM Yes           CALL BACK IF:  * Fever occurs  * Spreading redness occurs  * Swelling becomes painful  * You become worse               Reason for Disposition   Looks like a boil, infected sore, deep ulcer or other infected rash    Protocols used: Skin Lump or Localized Swelling-ADULT-[place]

## 2024-11-05 NOTE — PROGRESS NOTES
Determine Patient Interest  Today, Priscilla is using a new technology to assist with their documentation in the electronic medical record.  The technology is called MARTIN, and it helps Priscilla capture all the details of your conversation using an audio recording of your visit.  The recording is then used to help Priscilla write their visit note, and it ultimately allows Priscilla to be more focused on you during the visit.  Are you ok if Priscilla uses the MARTIN technology during today's appointment?)     Patient Agreeable to MARTIN?  yes  {(IF YES-If at any point during the visit anyone present wants the recording stopped, please let your provider know and they will do so.  If NO- No problem.  We'll conduct today's visit without an audio recording.)

## 2024-11-08 NOTE — ASSESSMENT & PLAN NOTE
-S/p Sumatriptan, Fioricet, IV Benadryl.  No relief and describes ongoing migraine since admit  -Continue Methadone for opioid dependence, dose increased to 40mg scheduled + 10mg PRN per psych  -Seen by Dr Gonzalez who gave injections with immediate relief, along with scheduling 3 days Pheonbarb to try to wean from Fioricet   given:  3rd trimester education packet given:      FOB Name: Zachery-   KNOWS GENDER - ITS A GIRL (Denise Bass)    Last Pap Smear: records request sent to [facility] 7/10/2024 Michelle Mahmood DO  [x] Urine collected for culture 7/10/2024 Michelle Mahmood DO    [x] Negative date 24   [] Positive date   [x] UDS collected 7/10/2024 NEGATIVE 7/10/2024 Michelle Mahmood DO   [x] Gc/ct collected 7/10/2024 NEGATIVE 7/10/2024 Michelle Mahmood DO   [x] Prenatal Labs completed     Genetic Screening:  [x]Accepted NIPS vs FTS : NIPT W/GENDER  [x]Completed  [x] Low risk     Carrier Screening:  [x] Declined    Baby aspirin screen:  [x]Positive (BMI + parity)  []Negative    Early 1 hour GTT:  [x]Yes (BMI) 60 pass   [] No    AFP: declined  []Ordered  []Completed    Anatomy scan:  [x]Referral Sent 7/10/2024 Michelle Mahmood DO   [x]Scheduled 24  [x]Completed: Anterior placenta, normal cord insertion, 3VC, Female. ABSENT RIGHT FETAL KIDNEY AND ABSENT RENAL ARTERY. Return 4 wks for echo and repeat scan  [x] Follow up     Fetal Echo:   [x] Yes > completed and normal   [] No    High Risk Factors:    Right Renal Agenesis (Fetal)  - NIPT low risk  - fetal echo scheduled > normal   - MFM follow up and referral to pediatric urology   - pediatric urology: After delivery while in the hospital, would recommend genital/perineal examination and renal and pelvis imaging to help delineate diagnosis. Antibiotic prophylaxis after delivery is not necessary. Consult urology after ultrasound completed.     Obesity   - pregravid BMI 31  - early 1 hour: passed  - baby ASA  Fm Hx T21  - sister has down syndrome   LGA fetus - 24 9#2oz 91.5th %-tile   - 24 counseled on risk of shoulder dystocia, counseled on risk of 3rd and 4th degree laceration    [x]Placed on High Risk List    Fetal Surveillance:  [] Yes  [x] No    Covid Vaccine:DECLINED   Flu:  [x]Given 10/3/24  [] Not indicated   Tdap: counseled

## 2024-11-14 LAB
ALBUMIN SERPL-MCNC: 3.9 G/DL (ref 3.9–4.9)
ALP SERPL-CCNC: 89 IU/L (ref 44–121)
ALT SERPL-CCNC: 18 IU/L (ref 0–32)
AST SERPL-CCNC: 13 IU/L (ref 0–40)
BILIRUB SERPL-MCNC: <0.2 MG/DL (ref 0–1.2)
BNP SERPL-MCNC: 4.8 PG/ML (ref 0–100)
BUN SERPL-MCNC: 9 MG/DL (ref 6–20)
BUN/CREAT SERPL: 16 (ref 9–23)
CALCIUM SERPL-MCNC: 9.7 MG/DL (ref 8.7–10.2)
CHLORIDE SERPL-SCNC: 102 MMOL/L (ref 96–106)
CHOLEST SERPL-MCNC: 167 MG/DL (ref 100–199)
CO2 SERPL-SCNC: 28 MMOL/L (ref 20–29)
CREAT SERPL-MCNC: 0.58 MG/DL (ref 0.57–1)
EGFRCR SERPLBLD CKD-EPI 2021: 119 ML/MIN/1.73
GLOBULIN SER CALC-MCNC: 2.8 G/DL (ref 1.5–4.5)
GLUCOSE SERPL-MCNC: 85 MG/DL (ref 70–99)
HDLC SERPL-MCNC: 45 MG/DL
LDLC SERPL CALC-MCNC: 81 MG/DL (ref 0–99)
POTASSIUM SERPL-SCNC: 4.1 MMOL/L (ref 3.5–5.2)
PROT SERPL-MCNC: 6.7 G/DL (ref 6–8.5)
SODIUM SERPL-SCNC: 145 MMOL/L (ref 134–144)
TRIGL SERPL-MCNC: 248 MG/DL (ref 0–149)
VLDLC SERPL CALC-MCNC: 41 MG/DL (ref 5–40)

## 2024-11-18 NOTE — PROGRESS NOTES
Carolyn Redmond is a 39 y.o. female is present in the office today for follow up    PMHX- CHF, HLD, Heart palpitations and LE Swelling     Hx of vein ablation any improvement in LE swelling    2023 ECHO- High RAP and mild secondary pulmonary hypertension.      Pulm requesting pt have an updated ECHO     Boil on R side under side of chin thought became infected  Saw Priscilla was put on clindamycin  Hx of endocarditis was concerned just worried about infection    Gums are really tender last night  Sinus pressure   She was flossing and felt a pop with some bleeding.      Saw pain doctor recently was put on steroids was having numbness in R arm   Hair was falling out     A lot stress and with compromised immune system concerned about injection          Past Medical History:   Diagnosis Date    Acute bacterial endocarditis 12/10/2020    Anxiety     Depressed     Endocarditis     Fibromyalgia     Migraines     Obstructive sleep apnea syndrome 01/10/2023    uses CPAP machine    Osteomyelitis (CMS/[facility]) 01/10/2023    Pancreatitis     Recurrent major depressive disorder (CMS/[facility]) 2023    Substance abuse (CMS/[facility])     Tachycardia     Vasculitis (CMS/[facility]) 2017       Past Surgical History   Procedure Laterality Date    Cholecystectomy      Ercp      Gallbladder surgery      Tonsillectomy          Social History     Socioeconomic History    Marital status: Single     Spouse name: None    Number of children: None    Years of education: None    Highest education level: None   Occupational History    Occupation:    Tobacco Use    Smoking status: Former     Current packs/day: 0.00     Average packs/day: 0.5 packs/day for 5.0 years (2.5 ttl pk-yrs)     Types: Cigarettes     Start date:      Quit date: 2020     Years since quittin.8    Smokeless tobacco: Former    Tobacco comments:     quit 1 year ago, vapes currently   Vaping Use    Vaping status: Every Day    Substances: Nicotine,  Flavoring    Devices: Refjiglble tank   Substance and Sexual Activity    Alcohol use: Not Currently    Drug use: Not Currently     Types: Marijuana, Heroin     Comment: MM now 2023, previous heroin and opiod    Sexual activity: Not Currently     Partners: Male, Female   Social History Narrative    Lives with mom     Social Drivers of Health     Financial Resource Strain: Low Risk  (6/27/2023)    Overall Financial Resource Strain (CARDIA)     Difficulty of Paying Living Expenses: Not hard at all   Food Insecurity: No Food Insecurity (6/9/2024)    Hunger Vital Sign     Worried About Running Out of Food in the Last Year: Never true     Ran Out of Food in the Last Year: Never true   Transportation Needs: No Transportation Needs (6/27/2023)    PRAPARE - Transportation     Lack of Transportation (Medical): No     Lack of Transportation (Non-Medical): No   Housing Stability: Low Risk  (6/27/2023)    Housing Stability Vital Sign     Unable to Pay for Housing in the Last Year: No     Number of Places Lived in the Last Year: 1     Unstable Housing in the Last Year: No       Family History   Problem Relation Name Age of Onset    Hypertension Biological Mother      Lung cancer Biological Father  76        smoker    Lung cancer Maternal Grandmother  69        smoker    Heart disease Maternal Grandfather      Heart attack Maternal Grandfather          unsure of age    Colon cancer Maternal Grandfather         Tramadol, Amoxicillin, Nsaids (non-steroidal anti-inflammatory drug), and Trazodone    Current Outpatient Medications   Medication Sig Dispense Refill    albuterol HFA (VENTOLIN HFA) 90 mcg/actuation inhaler Inhale 2 puffs every 4 (four) hours as needed.      amoxicillin (AMOXIL) 500 mg capsule Take 4 capsules (2,000 mg total) by mouth once for 1 dose. For dental work 4 capsule 0    clindamycin-benzoyl peroxide 1-5 % gel with pump Apply topically 2 (two) times a day. 35 g 1    desvenlafaxine succinate (PRISTIQ) 25 mg tablet  extended release 24 hr Take 25 mg by mouth daily.      famotidine (PEPCID) 20 mg tablet Take 1 tablet (20 mg total) by mouth daily before dinner. 90 tablet 1    ferrous sulfate 325 mg (65 mg iron) tablet Take 65 mg by mouth nightly.      fluticasone propion-salmeteroL (ADVAIR DISKUS) 500-50 mcg/dose diskus inhaler Inhale 1 puff 2 (two) times a day.      fluticasone propionate (FLONASE) 50 mcg/actuation nasal spray ADMINISTER 1 SPRAY INTO EACH NOSTRIL DAILY AS NEEDED FOR RHINITIS OR ALLERGIES. 16 mL 1    magnesium oxide (MAG-OX) 400 mg (241.3 mg magnesium) tablet Take 1 tablet (400 mg total) by mouth daily. 90 tablet 1    medical marijuana 1 each See admin instr. Please be advised that an [facility] hospital staff member has listed medical marijuana as one of your home medications for documentation purposes. Please follow-up with your certified issuing provider for ongoing use      metoprolol succinate XL (TOPROL-XL) 25 mg 24 hr tablet Take 1 tablet (25 mg total) by mouth nightly. 90 tablet 1    mometasone (ELOCON) 0.1 % cream Apply topically daily. 45 g 1    naloxone (NARCAN) 4 mg/actuation spray,non-aerosol nasal spray       NURTEC ODT 75 mg tablet,disintegrating TAKE 1 TABLET (75 MG TOTAL) BY MOUTH EVERY OTHER DAY 16 tablet 3    onabotulinumtoxinA (BOTOX) injection Botox for chronic migraine headache every 3 months 1 each 4    pantoprazole (PROTONIX) 20 mg EC tablet TAKE 1 TABLET BY MOUTH EVERY DAY 90 tablet 0    pregabalin (LYRICA) 200 mg capsule Take 1 capsule (200 mg total) by mouth 3 (three) times a day with meals. 90 capsule 2    semaglutide (WEGOVY) 1.7 mg/0.75 mL subcutaneous injection Inject 1.7 mg under the skin every (seven) 7 days. 9 mL 1    SUBLOCADE 300 mg/1.5 mL solution, extended rel syringe subcutaneous injection 1x monthly      tamsulosin (FLOMAX) 0.4 mg capsule Take 0.4 mg by mouth daily.      temazepam (RESTORIL) 15 mg capsule Take 15 mg by mouth nightly as needed for sleep.      VITAMIN D3 125 mcg  (5,000 unit) tablet Take 5,000 Units by mouth.       No current facility-administered medications for this visit.       Review of Systems   HENT:  Positive for ear pain and sore throat.    Cardiovascular:  Negative for chest pain, claudication, cyanosis, dyspnea on exertion, irregular heartbeat, leg swelling, near-syncope, orthopnea, palpitations, paroxysmal nocturnal dyspnea and syncope.          Vitals:    11/20/24 1452   BP: 108/72   Pulse: 91   Resp: 16   SpO2: 99%       Body mass index is 33.65 kg/m².      Physical Exam  Constitutional:       Appearance: Normal appearance. She is obese.   HENT:      Head: Normocephalic.      Mouth/Throat:      Mouth: Mucous membranes are moist.      Pharynx: No posterior oropharyngeal erythema.   Eyes:      Extraocular Movements: Extraocular movements intact.   Cardiovascular:      Rate and Rhythm: Normal rate and regular rhythm.      Chest Wall: PMI is not displaced.      Pulses: Normal pulses.      Heart sounds: S1 normal and S2 normal. No murmur heard.     No gallop. No S3 or S4 sounds.   Pulmonary:      Effort: Pulmonary effort is normal.      Breath sounds: Normal breath sounds.   Abdominal:      General: Abdomen is flat. Bowel sounds are normal. There is no distension.      Tenderness: There is no abdominal tenderness. There is no guarding.   Musculoskeletal:      Cervical back: Neck supple.      Right lower leg: No edema.      Left lower leg: No edema.      Comments: Functional status is:  good   Skin:     General: Skin is warm.   Neurological:      Mental Status: She is alert and oriented to person, place, and time. Mental status is at baseline.   Psychiatric:         Attention and Perception: Attention normal.         Mood and Affect: Mood and affect normal.         Speech: Speech normal.          Lab Results   Component Value Date    WBC 7.3 06/14/2024    RBC 4.47 06/14/2024    HGB 12.9 06/14/2024    HCT 40.7 06/14/2024    MCV 91 06/14/2024    MCH 28.9 06/14/2024     MCHC 31.7 06/14/2024    RDW 13.9 06/14/2024     06/14/2024        Lab Results   Component Value Date    GLUCOSE 85 11/13/2024    BUN 9 11/13/2024    CREATININE 0.58 11/13/2024    EGFR 119 11/13/2024    BUNCREAT 16 11/13/2024     (H) 11/13/2024    K 4.1 11/13/2024     11/13/2024    CO2 28 11/13/2024    CALCIUM 9.5 06/09/2024    PROTEIN 6.7 11/13/2024    ALBUMIN 3.9 11/13/2024    GLOB 2.8 11/13/2024    AGRATIO 1.7 10/09/2023    BILITOT <0.2 11/13/2024    ALKPHOS 89 11/13/2024    AST 13 11/13/2024    ALT 18 11/13/2024        Lab Results   Component Value Date    HGBA1C 5.3 05/28/2024    ESTAVGGLUC 108 06/26/2023        Lab Results   Component Value Date    ALBUMIN 3.9 11/13/2024        Lab Results   Component Value Date    CHOL 167 11/13/2024    TRIG 248 (H) 11/13/2024    HDL 45 11/13/2024    VLDL 41 (H) 11/13/2024    LDLCALC 81 11/13/2024        Cardiac Imaging    TRANSTHORACIC ECHO (TTE) COMPLETE 06/27/2023    Interpretation Summary    Left Ventricle: Normal ventricle size. Estimated EF 65-70% (68% by biplane). No regional wall motion abnormalities. Normal diastolic filling pattern for age.    Tricuspid Valve: Normal structure. Mild to moderate regurgitation. The regurgitation jet is eccentric. Estimated RVSP = 39 mmHg. No significant stenosis.    Pulmonic Valve: Normal structure. Trace regurgitation. No stenosis.    Mitral Valve: Normal leaflet structure. Trace regurgitation. No stenosis.    Aortic Valve: Normal structure. No regurgitation. No stenosis. Calculated dimensionless index = 0.64.    Right Ventricle: Normal ventricle size. Normal systolic function.    Left Atrium: Normal sized atrium.    Right Atrium: Normal sized atrium.    Aorta: Aortic root normal. Ascending aorta normal-sized. No evidence of coarctation by doppler.    IVC/SVC: Inferior vena cava is dilated (>2.1cm). Inferior vena cava collapses <50% during inspiration.    Pericardium: Normal structure.    High RAP and mild secondary  pulmonary hypertension.      Results for orders placed during the hospital encounter of 06/26/23    Transthoracic echo (TTE) complete    Interpretation Summary    Left Ventricle: Normal ventricle size. Estimated EF 65-70% (68% by biplane). No regional wall motion abnormalities. Normal diastolic filling pattern for age.    Tricuspid Valve: Normal structure. Mild to moderate regurgitation. The regurgitation jet is eccentric. Estimated RVSP = 39 mmHg. No significant stenosis.    Pulmonic Valve: Normal structure. Trace regurgitation. No stenosis.    Mitral Valve: Normal leaflet structure. Trace regurgitation. No stenosis.    Aortic Valve: Normal structure. No regurgitation. No stenosis. Calculated dimensionless index = 0.64.    Right Ventricle: Normal ventricle size. Normal systolic function.    Left Atrium: Normal sized atrium.    Right Atrium: Normal sized atrium.    Aorta: Aortic root normal. Ascending aorta normal-sized. No evidence of coarctation by doppler.    IVC/SVC: Inferior vena cava is dilated (>2.1cm). Inferior vena cava collapses <50% during inspiration.    Pericardium: Normal structure.    High RAP and mild secondary pulmonary hypertension.               Assessment/Plan        EKG: NSR, Normal EKG    Pulmonary hypertension (CMS/HCC)  F/u TTE is pending, Would reassess pulmonary pressures and RAP.      Morbid obesity (CMS/HCC)  On Wegovy, working on weight loss.    Congestive heart failure (CMS/HCC)  Has done well off diuretics.  F/u TTE for PASP And RAP.  Appears euvolemic at present.     Venous insufficiency  Improved, f/u vascular.    Essential hypertension  Good control, continue Toprol.    Bilateral leg edema  Improved, f/u TTE is pending, continue low Na diet.    History of endocarditis  Wants to do endocarditis prophylaxis with dental work/gum line interruption.    Repeat TTE is pending, no physical exam findings consistent with endocarditis.       New Medications Ordered This Visit   Medications     amoxicillin (AMOXIL) 500 mg capsule     Sig: Take 4 capsules (2,000 mg total) by mouth once for 1 dose. For dental work     Dispense:  4 capsule     Refill:  0       There are no discontinued medications.     Orders Placed This Encounter   Procedures    [facility] MUSE ECG 12 lead (clinic performed)     Scheduling Instructions:      PLEASE USE THIS ORDER FOR ECG'S PERFORMED IN PHYSICIAN OFFICES     Order Specific Question:   Release to patient     Answer:   Immediate [1]    Transthoracic echo (TTE) complete     Standing Status:   Future     Standing Expiration Date:   11/20/2025     Scheduling Instructions:      To schedule cardiology appointments with [facility], call Central Scheduling at [phone]. Thank you.     Order Specific Question:   Is contrast and/or agitated saline (bubbles) indicated for the study? (Contrast = Definity OR Lumason)     Answer:   No     Order Specific Question:   Release to patient     Answer:   Immediate [1]

## 2024-11-20 ENCOUNTER — OFFICE VISIT (OUTPATIENT)
Dept: CARDIOLOGY | Facility: CLINIC | Age: 39
End: 2024-11-20
Payer: COMMERCIAL

## 2024-11-20 VITALS
HEIGHT: 62 IN | DIASTOLIC BLOOD PRESSURE: 72 MMHG | HEART RATE: 91 BPM | SYSTOLIC BLOOD PRESSURE: 108 MMHG | RESPIRATION RATE: 16 BRPM | BODY MASS INDEX: 33.86 KG/M2 | WEIGHT: 184 LBS | OXYGEN SATURATION: 99 %

## 2024-11-20 DIAGNOSIS — E78.1 PURE HYPERTRIGLYCERIDEMIA: ICD-10-CM

## 2024-11-20 DIAGNOSIS — R60.0 BILATERAL LEG EDEMA: ICD-10-CM

## 2024-11-20 DIAGNOSIS — Z86.79 HISTORY OF ENDOCARDITIS: ICD-10-CM

## 2024-11-20 DIAGNOSIS — I50.9 CONGESTIVE HEART FAILURE, UNSPECIFIED HF CHRONICITY, UNSPECIFIED HEART FAILURE TYPE (CMS/HCC): Primary | ICD-10-CM

## 2024-11-20 DIAGNOSIS — E66.01 MORBID OBESITY (CMS/HCC): ICD-10-CM

## 2024-11-20 DIAGNOSIS — I27.20 PULMONARY HYPERTENSION (CMS/HCC): ICD-10-CM

## 2024-11-20 DIAGNOSIS — I87.2 VENOUS INSUFFICIENCY: ICD-10-CM

## 2024-11-20 DIAGNOSIS — E88.819 INSULIN RESISTANCE: ICD-10-CM

## 2024-11-20 DIAGNOSIS — I10 ESSENTIAL HYPERTENSION: ICD-10-CM

## 2024-11-20 LAB
ATRIAL RATE: 86
P AXIS: 65
PR INTERVAL: 160
QRS DURATION: 92
QT INTERVAL: 354
QTC CALCULATION(BAZETT): 423
R AXIS: 59
T WAVE AXIS: 57
VENTRICULAR RATE: 86

## 2024-11-20 PROCEDURE — 3008F BODY MASS INDEX DOCD: CPT | Performed by: INTERNAL MEDICINE

## 2024-11-20 PROCEDURE — 3074F SYST BP LT 130 MM HG: CPT | Performed by: INTERNAL MEDICINE

## 2024-11-20 PROCEDURE — 99214 OFFICE O/P EST MOD 30 MIN: CPT | Performed by: INTERNAL MEDICINE

## 2024-11-20 PROCEDURE — 93000 ELECTROCARDIOGRAM COMPLETE: CPT | Performed by: INTERNAL MEDICINE

## 2024-11-20 PROCEDURE — 3078F DIAST BP <80 MM HG: CPT | Performed by: INTERNAL MEDICINE

## 2024-11-20 RX ORDER — AMOXICILLIN 500 MG/1
2000 CAPSULE ORAL ONCE
Qty: 4 CAPSULE | Refills: 0 | Status: SHIPPED | OUTPATIENT
Start: 2024-11-20 | End: 2024-11-20

## 2024-11-20 ASSESSMENT — ENCOUNTER SYMPTOMS
ORTHOPNEA: 0
SORE THROAT: 1
DYSPNEA ON EXERTION: 0
PALPITATIONS: 0
IRREGULAR HEARTBEAT: 0
PND: 0
SYNCOPE: 0
NEAR-SYNCOPE: 0
CLAUDICATION: 0

## 2024-11-20 NOTE — PATIENT INSTRUCTIONS
Can take Amoxicillin for dental work    Follow-up on suspected oral infection    Repeat heart pressures on echocardiogram.

## 2024-11-20 NOTE — ASSESSMENT & PLAN NOTE
Wants to do endocarditis prophylaxis with dental work/gum line interruption.    Repeat TTE is pending, no physical exam findings consistent with endocarditis.

## 2024-11-21 RX ORDER — CLINDAMYCIN HYDROCHLORIDE 300 MG/1
300 CAPSULE ORAL 3 TIMES DAILY
Qty: 21 CAPSULE | Refills: 0 | Status: SHIPPED | OUTPATIENT
Start: 2024-11-21 | End: 2024-11-28

## 2024-11-22 ENCOUNTER — TELEPHONE (OUTPATIENT)
Age: 39
End: 2024-11-22

## 2024-11-22 NOTE — TELEPHONE ENCOUNTER
Patient called office requesting to cancel appt due to being sick and will still be sick on Monday for appt. Writer warm transferred call to support service for assistance.

## 2024-11-25 ENCOUNTER — TELEPHONE (OUTPATIENT)
Dept: PSYCHIATRY | Facility: CLINIC | Age: 39
End: 2024-11-25

## 2024-11-25 ENCOUNTER — TELEPHONE (OUTPATIENT)
Age: 39
End: 2024-11-25

## 2024-11-25 NOTE — TELEPHONE ENCOUNTER
Pt called to cx TAYLOR appt for 11/25 @ 11am due to being sick and would like a call back to r/s appt.

## 2024-11-25 NOTE — TELEPHONE ENCOUNTER
LVM regarding rescheduling her TAYLOR apt.  Clt was scheduled with FELIPE Monaco, but need to cancel it.

## 2024-11-27 ENCOUNTER — TRANSCRIBE ORDERS (OUTPATIENT)
Dept: SCHEDULING | Age: 39
End: 2024-11-27

## 2024-11-27 DIAGNOSIS — M50.90 CERVICAL DISC DISORDER, UNSPECIFIED, UNSPECIFIED CERVICAL REGION: Primary | ICD-10-CM

## 2024-11-27 DIAGNOSIS — M54.16 RADICULOPATHY, LUMBAR REGION: ICD-10-CM

## 2024-12-03 ENCOUNTER — TELEMEDICINE (OUTPATIENT)
Dept: PRIMARY CARE | Facility: CLINIC | Age: 39
End: 2024-12-03
Payer: COMMERCIAL

## 2024-12-03 DIAGNOSIS — Z87.01 HISTORY OF PNEUMONIA: ICD-10-CM

## 2024-12-03 DIAGNOSIS — G47.33 OBSTRUCTIVE SLEEP APNEA SYNDROME: Primary | ICD-10-CM

## 2024-12-03 DIAGNOSIS — J40 BRONCHITIS: ICD-10-CM

## 2024-12-03 PROCEDURE — 99214 OFFICE O/P EST MOD 30 MIN: CPT | Mod: GT | Performed by: PHYSICIAN ASSISTANT

## 2024-12-03 RX ORDER — METHYLPREDNISOLONE 4 MG/1
TABLET ORAL
Qty: 21 TABLET | Refills: 0 | Status: SHIPPED | OUTPATIENT
Start: 2024-12-03 | End: 2024-12-10

## 2024-12-03 RX ORDER — AZITHROMYCIN 250 MG/1
TABLET, FILM COATED ORAL
Qty: 6 TABLET | Refills: 0 | Status: SHIPPED | OUTPATIENT
Start: 2024-12-03 | End: 2024-12-08

## 2024-12-03 NOTE — PROGRESS NOTES
Verification of Patient Location:  The patient affirms they are currently located in the following state: [place]    Are you in your home or a private residence? Yes    Request for Consent:    Audio and Video Encounter   Winsome, my name is RAVI Rogders.  Before we proceed, can you please verify your identification by telling me your full name and date of birth?  Can you tell me who is in the room with you?    You and I are about to have a telemedicine check-in or visit because you have requested it.  This is a live video-conference.  I am a real person, speaking to you in real time.  There is no one else with me on the video-conference. I am not recording this conversation and I am asking you not to record it.  This telemedicine visit will be billed to your health insurance or you, if you are self-insured.  You understand you will be responsible for any copayments or coinsurances that apply to your telemedicine visit.  Communication platform used for this encounter:  [employer] Video Visit (Epic Video Client)       Before starting our telemedicine visit, I am required to get your consent for this virtual check-in or visit by telemedicine. Do you consent?    Patient Response to Request for Consent:  Yes      Visit Documentation:  Subjective     Patient ID: Carolyn Redmond is a 39 y.o. female.  1985      Patient presents with a previous history of bronchitis and pneumonia with a 2448-hour history of sinus congestion green secretions coming from [facility], cough that is tight, feels like her previous bronchitis reports she was previously treated with early Z-Chai and Medrol note able to avoid pneumonia with last visit here with   Her uncle had a bronchitis, reports he is clearing and she is just starting with it.  She did test for COVID at home and reports it was negative.  No known fever, no swelling in either lower extremity          The following have been reviewed and updated as  appropriate in this visit:        Review of Systems   All other systems reviewed and are negative.      Assessment & Plan  Obstructive sleep apnea syndrome         Bronchitis  Will treat with a Z-Chai and Medrol  Encourage fluids, Mucinex, [facility]  Get x-ray if no improvement in symptoms with previous history of pneumonia      Orders:    azithromycin (ZITHROMAX) 250 mg tablet; Take 2 tablets the first day, then 1 tablet daily for 4 days.    methylPREDNISolone (MEDROL DOSEPACK) 4 mg tablet; Follow package directions.    X-RAY CHEST 2 VIEWS    History of pneumonia    Orders:    X-RAY CHEST 2 VIEWS      Time Spent:  I spent 10 minutes on this date of service performing the following activities: obtaining history, documenting, preparing for visit, communicating results, and coordinating care.

## 2024-12-03 NOTE — PATIENT INSTRUCTIONS
Increase water to about 80 ounces daily  Use the zpak and medrol, if not improving, go get chest xray and make in person visit for us to examine

## 2024-12-03 NOTE — ASSESSMENT & PLAN NOTE
Will treat with a Z-Chai and Medrol  Encourage fluids, Mucinex, [facility]  Get x-ray if no improvement in symptoms with previous history of pneumonia      Orders:    azithromycin (ZITHROMAX) 250 mg tablet; Take 2 tablets the first day, then 1 tablet daily for 4 days.    methylPREDNISolone (MEDROL DOSEPACK) 4 mg tablet; Follow package directions.    X-RAY CHEST 2 VIEWS

## 2024-12-04 ENCOUNTER — TELEPHONE (OUTPATIENT)
Dept: PSYCHIATRY | Facility: CLINIC | Age: 39
End: 2024-12-04

## 2024-12-06 NOTE — PATIENT CARE CONFERENCE
Care Progression Rounds Note  Date: 12/10/2020  Time: 10:30 AM     Patient Name: Carolyn Redmond     Medical Record Number: [id number]   YOB: 1985  Sex: Female      Room/Bed: 3023    Admitting Diagnosis: Septic embolism (CMS/[facility]) [I76]  Infection of lumbar spine (CMS/[facility]) [M46.26]  Acute left-sided low back pain without sciatica [M54.5]   Admit Date/Time: 12/9/2020 11:10 AM    Primary Diagnosis: Acute bacterial endocarditis  Principal Problem: Acute bacterial endocarditis    GMLOS: pending  Anticipated Discharge Date: 12/11/2020    AM-PAC  Mobility Score:      Discharge Planning:       Barriers to Discharge:  Barriers to Discharge: Medical issues not resolved    Participants:  social work/services, , nursing   show

## 2024-12-08 DIAGNOSIS — M48.061 SPINAL STENOSIS OF LUMBAR REGION, UNSPECIFIED WHETHER NEUROGENIC CLAUDICATION PRESENT: ICD-10-CM

## 2024-12-09 ENCOUNTER — HOSPITAL ENCOUNTER (OUTPATIENT)
Dept: CARDIOLOGY | Facility: CLINIC | Age: 39
Discharge: HOME | End: 2024-12-09
Attending: INTERNAL MEDICINE
Payer: COMMERCIAL

## 2024-12-09 VITALS
DIASTOLIC BLOOD PRESSURE: 73 MMHG | SYSTOLIC BLOOD PRESSURE: 110 MMHG | BODY MASS INDEX: 33.86 KG/M2 | HEIGHT: 62 IN | WEIGHT: 184 LBS

## 2024-12-09 DIAGNOSIS — I50.9 CONGESTIVE HEART FAILURE, UNSPECIFIED HF CHRONICITY, UNSPECIFIED HEART FAILURE TYPE (CMS/HCC): ICD-10-CM

## 2024-12-09 DIAGNOSIS — Z86.79 HISTORY OF ENDOCARDITIS: ICD-10-CM

## 2024-12-09 DIAGNOSIS — I27.20 PULMONARY HYPERTENSION (CMS/HCC): ICD-10-CM

## 2024-12-09 LAB
AORTIC ARCH: 2.6 CM
AORTIC ROOT ANNULUS: 2.4 CM
ASCENDING AORTA: 2.6 CM
AV PEAK GRADIENT: 8 MMHG
AV PEAK VELOCITY-S: 1.4 M/S
AV VALVE AREA: 1.9 CM2
BSA FOR ECHO PROCEDURE: 1.91 M2
DESCENDING AORTA: 1.6 CM
E WAVE DECELERATION TIME: 199 MS
E/A RATIO: 1.2
E/E' RATIO: 10.6
E/LAT E' RATIO: 7.2
EDV (BP): 95.8 CM3
EF (A4C): 71 %
EF A2C: 58 %
EJECTION FRACTION: 65 %
EST RIGHT VENT SYSTOLIC PRESSURE BY TRICUSPID REGURGITATION JET: 30 MMHG
ESV (BP): 33.5 CM3
FRACTIONAL SHORTENING: 30 %
GLOBAL LONGITUDINAL STRAIN: -18.9 %
INTERVENTRICULAR SEPTUM: 0.7 CM
LA ESV (BP): 46.2 CM3
LA ESV INDEX (A2C): 26.28 CM3/M2
LA ESV INDEX (BP): 24.19 CM3/M2
LA/AORTA RATIO: 1.46
LAAS-AP2: 18.6 CM2
LAAS-AP4: 17.3 CM2
LAD 2D: 3.5 CM
LALD A4C: 5.69 CM
LALD A4C: 5.76 CM
LAV-S: 50.2 CM3
LEFT ATRIUM VOLUME INDEX: 22.15 CM3/M2
LEFT ATRIUM VOLUME: 42.3 CM3
LEFT INTERNAL DIMENSION IN SYSTOLE: 3.5 CM (ref 2.56–3.88)
LEFT VENTRICLE DIASTOLIC VOLUME INDEX: 51.2 CM3/M2
LEFT VENTRICLE DIASTOLIC VOLUME: 97.8 CM3
LEFT VENTRICLE SYSTOLIC VOLUME INDEX: 14.82 CM3/M2
LEFT VENTRICLE SYSTOLIC VOLUME: 28.3 CM3
LEFT VENTRICULAR INTERNAL DIMENSION IN DIASTOLE: 5 CM (ref 4.33–6.02)
LEFT VENTRICULAR POSTERIOR WALL IN END DIASTOLE: 0.7 CM (ref 0.57–1.06)
LV DIASTOLIC VOLUME: 92.9 CM3
LV ESV (APICAL 2 CHAMBER): 39 CM3
LVAD-AP2: 28.2 CM2
LVAD-AP4: 29.3 CM2
LVAS-AP2: 16.6 CM2
LVAS-AP4: 14 CM2
LVEDVI(A2C): 48.64 CM3/M2
LVEDVI(BP): 50.16 CM3/M2
LVESVI(A2C): 20.42 CM3/M2
LVESVI(BP): 17.54 CM3/M2
LVLD-AP2: 7.39 CM
LVLD-AP4: 7.26 CM
LVLS-AP2: 5.98 CM
LVLS-AP4: 5.7 CM
LVOT 2D: 2.1 CM
LVOT A: 3.46 CM2
LVOT PEAK VELOCITY: 0.98 M/S
LVOT PG: 4 MMHG
MLH CV ECHO AVA INDEX VELOCITY RATIO: 1
MV E'TISSUE VEL-LAT: 0.14 M/S
MV E'TISSUE VEL-MED: 0.09 M/S
MV PEAK A VEL: 0.81 M/S
MV PEAK E VEL: 0.98 M/S
MV STENOSIS PRESSURE HALF TIME: 58 MS
MV VALVE AREA P 1/2 METHOD: 3.79 CM2
POSTERIOR WALL: 0.7 CM
PV PEAK GRADIENT: 4 MMHG
PV PV: 0.99 M/S
RAP: 3 MMHG
RVOT VMAX: 0.61 M/S
SEPTAL TISSUE DOPPLER FREE WALL LATE DIA VELOCITY (APICAL 4 CHAMBER VIEW): 0.14 M/S
TAPSE: 2.04 CM
TR MAX PG: 26.63 MMHG
TRICUSPID VALVE PEAK REGURGITATION VELOCITY: 2.58 M/S
Z-SCORE OF LEFT VENTRICULAR DIMENSION IN END DIASTOLE: -0.21
Z-SCORE OF LEFT VENTRICULAR DIMENSION IN END SYSTOLE: 0.83
Z-SCORE OF LEFT VENTRICULAR POSTERIOR WALL IN END DIASTOLE: -0.52

## 2024-12-09 PROCEDURE — 93356 MYOCRD STRAIN IMG SPCKL TRCK: CPT | Performed by: INTERNAL MEDICINE

## 2024-12-09 PROCEDURE — 93306 TTE W/DOPPLER COMPLETE: CPT | Performed by: INTERNAL MEDICINE

## 2024-12-09 NOTE — TELEPHONE ENCOUNTER
Medicine Refill Request    Last Office Visit: 11/5/2024   Last Consult Visit: Visit date not found  Last Telemedicine Visit: 12/3/2024 Priscilla Leyva PA C    Next Appointment: Visit date not found      Current Outpatient Medications:     albuterol HFA (VENTOLIN HFA) 90 mcg/actuation inhaler, Inhale 2 puffs every 4 (four) hours as needed., Disp: , Rfl:     clindamycin-benzoyl peroxide 1-5 % gel with pump, Apply topically 2 (two) times a day., Disp: 35 g, Rfl: 1    desvenlafaxine succinate (PRISTIQ) 25 mg tablet extended release 24 hr, Take 25 mg by mouth daily., Disp: , Rfl:     famotidine (PEPCID) 20 mg tablet, Take 1 tablet (20 mg total) by mouth daily before dinner., Disp: 90 tablet, Rfl: 1    ferrous sulfate 325 mg (65 mg iron) tablet, Take 65 mg by mouth nightly., Disp: , Rfl:     fluticasone propion-salmeteroL (ADVAIR DISKUS) 500-50 mcg/dose diskus inhaler, Inhale 1 puff 2 (two) times a day., Disp: , Rfl:     fluticasone propionate (FLONASE) 50 mcg/actuation nasal spray, ADMINISTER 1 SPRAY INTO EACH NOSTRIL DAILY AS NEEDED FOR RHINITIS OR ALLERGIES., Disp: 16 mL, Rfl: 1    magnesium oxide (MAG-OX) 400 mg (241.3 mg magnesium) tablet, Take 1 tablet (400 mg total) by mouth daily., Disp: 90 tablet, Rfl: 1    medical marijuana, 1 each See admin instr. Please be advised that an [facility] hospital staff member has listed medical marijuana as one of your home medications for documentation purposes. Please follow-up with your certified issuing provider for ongoing use, Disp: , Rfl:     methylPREDNISolone (MEDROL DOSEPACK) 4 mg tablet, Follow package directions., Disp: 21 tablet, Rfl: 0    metoprolol succinate XL (TOPROL-XL) 25 mg 24 hr tablet, Take 1 tablet (25 mg total) by mouth nightly., Disp: 90 tablet, Rfl: 1    mometasone (ELOCON) 0.1 % cream, Apply topically daily., Disp: 45 g, Rfl: 1    naloxone (NARCAN) 4 mg/actuation spray,non-aerosol nasal spray, , Disp: , Rfl:     NURTEC ODT 75 mg tablet,disintegrating,  TAKE 1 TABLET (75 MG TOTAL) BY MOUTH EVERY OTHER DAY, Disp: 16 tablet, Rfl: 3    onabotulinumtoxinA (BOTOX) injection, Botox for chronic migraine headache every 3 months, Disp: 1 each, Rfl: 4    pantoprazole (PROTONIX) 20 mg EC tablet, TAKE 1 TABLET BY MOUTH EVERY DAY, Disp: 90 tablet, Rfl: 0    pregabalin (LYRICA) 200 mg capsule, Take 1 capsule (200 mg total) by mouth 3 (three) times a day with meals., Disp: 90 capsule, Rfl: 2    semaglutide (WEGOVY) 2.4 mg/0.75 mL subcutaneous injection, Inject 2.4 mg under the skin every (seven) 7 days., Disp: 9 mL, Rfl: 1    SUBLOCADE 300 mg/1.5 mL solution, extended rel syringe subcutaneous injection, 1x monthly, Disp: , Rfl:     tamsulosin (FLOMAX) 0.4 mg capsule, Take 0.4 mg by mouth daily., Disp: , Rfl:     temazepam (RESTORIL) 15 mg capsule, Take 15 mg by mouth nightly as needed for sleep., Disp: , Rfl:     VITAMIN D3 125 mcg (5,000 unit) tablet, Take 5,000 Units by mouth., Disp: , Rfl:       BP Readings from Last 3 Encounters:   12/09/24 110/73   11/20/24 108/72   11/05/24 112/82       Recent Lab results:  Lab Results   Component Value Date    CHOL 167 11/13/2024   ,   Lab Results   Component Value Date    HDL 45 11/13/2024   ,   Lab Results   Component Value Date    LDLCALC 81 11/13/2024   ,   Lab Results   Component Value Date    TRIG 248 (H) 11/13/2024        Lab Results   Component Value Date    GLUCOSE 85 11/13/2024   ,   Lab Results   Component Value Date    HGBA1C 5.3 05/28/2024         Lab Results   Component Value Date    CREATININE 0.58 11/13/2024       Lab Results   Component Value Date    TSH 2.290 08/26/2024           Lab Results   Component Value Date    HGBA1C 5.3 05/28/2024

## 2024-12-10 RX ORDER — PREGABALIN 200 MG/1
200 CAPSULE ORAL
Qty: 90 CAPSULE | Refills: 2 | Status: SHIPPED | OUTPATIENT
Start: 2024-12-10 | End: 2025-03-02 | Stop reason: SDUPTHER

## 2024-12-13 ENCOUNTER — TELEMEDICINE (OUTPATIENT)
Dept: BEHAVIORAL/MENTAL HEALTH CLINIC | Facility: CLINIC | Age: 39
End: 2024-12-13
Payer: COMMERCIAL

## 2024-12-13 DIAGNOSIS — F33.1 MODERATE EPISODE OF RECURRENT MAJOR DEPRESSIVE DISORDER (HCC): Primary | ICD-10-CM

## 2024-12-13 DIAGNOSIS — F41.1 GENERALIZED ANXIETY DISORDER: ICD-10-CM

## 2024-12-13 PROCEDURE — 90837 PSYTX W PT 60 MINUTES: CPT | Performed by: COUNSELOR

## 2024-12-13 NOTE — PSYCH
"Behavioral Health Psychotherapy Progress Note    Psychotherapy Provided: Individual Psychotherapy     1. Moderate episode of recurrent major depressive disorder (HCC)        2. Generalized anxiety disorder            Goals addressed in session: Goal 1     DATA: Client and therapist met for a virtual session. Client and therapist worked on building rapport and client provided a detailed history of her mental health and medical struggles.  Client and therapist worked on creating a treatment plan to identify goals to work on in session.  Therapist used open ended questions, education about different trauma modalities, rapport building, reflection and validation to assist client in identifying goals and building rapport.   During this session, this clinician used the following therapeutic modalities: Cognitive Behavioral Therapy and Mindfulness-based Strategies    Substance Abuse was addressed during this session. If the client is diagnosed with a co-occurring substance use disorder, please indicate any changes in the frequency or amount of use: in recovery. Stage of change for addressing substance use diagnoses: Action    ASSESSMENT:  Samantha Vila presents with a Euthymic/ normal mood.     her affect is Normal range and intensity, which is congruent, with her mood and the content of the session. The client has made progress on their goals.    Client is continuing to seek treatment and navigating insurance challenges.  Samantha Vila presents with a none risk of suicide, none risk of self-harm, and none risk of harm to others.    For any risk assessment that surpasses a \"low\" rating, a safety plan must be developed.    A safety plan was indicated: no  If yes, describe in detail N/A    PLAN: Between sessions, Samantha Vila will research AIP protocol. At the next session, the therapist will use Cognitive Behavioral Therapy, Gestalt Therapy Techniques, and Mindfulness-based Strategies to address anxiety, " yuliya.    Behavioral Health Treatment Plan and Discharge Planning: Samantha Vila is aware of and agrees to continue to work on their treatment plan. They have identified and are working toward their discharge goals. yes    Depression Follow-up Plan Completed: Not applicable    Visit start and stop times:    12/13/24  Start Time: 0900  Stop Time: 0953  Total Visit Time: 53 minutes

## 2024-12-13 NOTE — BH TREATMENT PLAN
Outpatient Behavioral Health Psychotherapy Treatment Plan    Samantha Vila  1985     Date of Initial Psychotherapy Assessment: 2/1/23   Date of Current Treatment Plan: 12/13/24  Treatment Plan Target Date: TBD  Treatment Plan Expiration Date: 6/13/25    Diagnosis:   1. Moderate episode of recurrent major depressive disorder (HCC)        2. Generalized anxiety disorder            Area(s) of Need: Emotional regulation, coping skills, trauma, focus, anxiety, depression, somatic complaints.     Long Term Goal 1 (in the client's own words): I would like to feel more balanced and harmonized in life.     Stage of Change: Action    Target Date for completion: TBD     Anticipated therapeutic modalities: CBT, ACT, Mindfulness, DBT skills     People identified to complete this goal: Samantha Vila, Baylee Monaco, [facility]      Objective 1: (identify the means of measuring success in meeting the objective): In 6 months I will be able to see that my sleep has improved.       Objective 2: (identify the means of measuring success in meeting the objective): In 6 months I will have developed an exercise routine that I am following.    Objective 3: (identify the means of measuring success in meeting the objective): In 6 months I will see myself going further into my studies around Reiki.          I am currently under the care of a [facility] psychiatric provider: yes    My [facility] psychiatric provider is: un assigned    I am currently taking psychiatric medications: Yes, as prescribed    I feel that I will be ready for discharge from mental health care when I reach the following (measurable goal/objective): Once I feel emotionally regulated and have decreased symptoms.     For children and adults who have a legal guardian:   Has there been any change to custody orders and/or guardianship status? NA. If yes, attach updated documentation.    I have created my Crisis Plan and have been offered a copy of this plan    Behavioral  Health Treatment Plan [facility]: Diagnosis and Treatment Plan explained to Samantha Vila acknowledges an understanding of their diagnosis. Samantha Vila agrees to this treatment plan.    I have been offered a copy of this Treatment Plan. yes

## 2024-12-16 ENCOUNTER — OFFICE VISIT (OUTPATIENT)
Dept: NEUROLOGY | Facility: CLINIC | Age: 39
End: 2024-12-16
Payer: COMMERCIAL

## 2024-12-16 VITALS
HEART RATE: 68 BPM | BODY MASS INDEX: 33.49 KG/M2 | SYSTOLIC BLOOD PRESSURE: 105 MMHG | OXYGEN SATURATION: 97 % | DIASTOLIC BLOOD PRESSURE: 62 MMHG | WEIGHT: 182 LBS | HEIGHT: 62 IN

## 2024-12-16 DIAGNOSIS — M54.2 CERVICALGIA: Primary | ICD-10-CM

## 2024-12-16 DIAGNOSIS — M79.18 MYOFASCIAL PAIN: ICD-10-CM

## 2024-12-16 DIAGNOSIS — G43.709 CHRONIC MIGRAINE WITHOUT AURA WITHOUT STATUS MIGRAINOSUS, NOT INTRACTABLE: ICD-10-CM

## 2024-12-16 PROCEDURE — 20553 NJX 1/MLT TRIGGER POINTS 3/>: CPT | Performed by: NURSE PRACTITIONER

## 2024-12-16 PROCEDURE — 99999 PR OFFICE/OUTPT VISIT,PROCEDURE ONLY: CPT | Performed by: NURSE PRACTITIONER

## 2024-12-16 NOTE — PROGRESS NOTES
Procedure Note  Trigger Point Injections      Lidocaine   Lot# [id number]  Exp date: 01/2025            [id number]    9 cc used     The risks, benefits and anticipated outcomes of the procedure, the risks and benefits of the alternatives to the procedure, and the roles and tasks of the personnel to be involved, were discussed with the patient, and the patient consents to the procedure and agrees to proceed.         3 syringes used (each 3 cc )         Trigger Points injected :         Right Greater Occipital  2 cc     Left Greater Occipital 2 cc     Right Lesser Occipital  1 cc     Left Lesser Occipital 1 cc     Right and left trapezia 1.5  cc each side (divided in 3 areas       Diagnoses and all orders for this visit:    Cervicalgia    Myofascial pain    Chronic migraine without aura without status migrainosus, not intractable

## 2024-12-17 ENCOUNTER — TELEPHONE (OUTPATIENT)
Dept: NEUROLOGY | Facility: CLINIC | Age: 39
End: 2024-12-17
Payer: COMMERCIAL

## 2024-12-17 DIAGNOSIS — J01.00 ACUTE NON-RECURRENT MAXILLARY SINUSITIS: ICD-10-CM

## 2024-12-17 RX ORDER — FLUTICASONE PROPIONATE 50 MCG
1 SPRAY, SUSPENSION (ML) NASAL DAILY PRN
Qty: 16 ML | Refills: 1 | Status: SHIPPED | OUTPATIENT
Start: 2024-12-17 | End: 2025-02-03

## 2024-12-17 NOTE — TELEPHONE ENCOUNTER
Medicine Refill Request    Last Office Visit: 11/5/2024   Last Consult Visit: Visit date not found  Last Telemedicine Visit: 12/3/2024 Priscilla Leyva PA C    Next Appointment: Visit date not found    Current Medications[1]      BP Readings from Last 3 Encounters:   12/16/24 105/62   12/09/24 110/73   11/20/24 108/72       Recent Lab results:  Lab Results   Component Value Date    CHOL 167 11/13/2024   ,   Lab Results   Component Value Date    HDL 45 11/13/2024   ,   Lab Results   Component Value Date    LDLCALC 81 11/13/2024   ,   Lab Results   Component Value Date    TRIG 248 (H) 11/13/2024        Lab Results   Component Value Date    GLUCOSE 85 11/13/2024   ,   Lab Results   Component Value Date    HGBA1C 5.3 05/28/2024         Lab Results   Component Value Date    CREATININE 0.58 11/13/2024       Lab Results   Component Value Date    TSH 2.290 08/26/2024           Lab Results   Component Value Date    HGBA1C 5.3 05/28/2024            [1]   Current Outpatient Medications:     albuterol HFA (VENTOLIN HFA) 90 mcg/actuation inhaler, Inhale 2 puffs every 4 (four) hours as needed., Disp: , Rfl:     clindamycin-benzoyl peroxide 1-5 % gel with pump, Apply topically 2 (two) times a day., Disp: 35 g, Rfl: 1    desvenlafaxine succinate (PRISTIQ) 25 mg tablet extended release 24 hr, Take 25 mg by mouth daily., Disp: , Rfl:     famotidine (PEPCID) 20 mg tablet, Take 1 tablet (20 mg total) by mouth daily before dinner., Disp: 90 tablet, Rfl: 1    ferrous sulfate 325 mg (65 mg iron) tablet, Take 65 mg by mouth nightly., Disp: , Rfl:     fluticasone propion-salmeteroL (ADVAIR DISKUS) 500-50 mcg/dose diskus inhaler, Inhale 1 puff 2 (two) times a day., Disp: , Rfl:     fluticasone propionate (FLONASE) 50 mcg/actuation nasal spray, ADMINISTER 1 SPRAY INTO EACH NOSTRIL DAILY AS NEEDED FOR RHINITIS OR ALLERGIES., Disp: 16 mL, Rfl: 1    magnesium oxide (MAG-OX) 400 mg (241.3 mg magnesium) tablet, Take 1 tablet (400 mg  total) by mouth daily., Disp: 90 tablet, Rfl: 1    medical marijuana, 1 each See admin instr. Please be advised that an [facility] hospital staff member has listed medical marijuana as one of your home medications for documentation purposes. Please follow-up with your certified issuing provider for ongoing use, Disp: , Rfl:     metoprolol succinate XL (TOPROL-XL) 25 mg 24 hr tablet, Take 1 tablet (25 mg total) by mouth nightly., Disp: 90 tablet, Rfl: 1    mometasone (ELOCON) 0.1 % cream, Apply topically daily., Disp: 45 g, Rfl: 1    naloxone (NARCAN) 4 mg/actuation spray,non-aerosol nasal spray, , Disp: , Rfl:     NURTEC ODT 75 mg tablet,disintegrating, TAKE 1 TABLET (75 MG TOTAL) BY MOUTH EVERY OTHER DAY, Disp: 16 tablet, Rfl: 3    onabotulinumtoxinA (BOTOX) injection, Botox for chronic migraine headache every 3 months, Disp: 1 each, Rfl: 4    pantoprazole (PROTONIX) 20 mg EC tablet, TAKE 1 TABLET BY MOUTH EVERY DAY, Disp: 90 tablet, Rfl: 0    pregabalin (LYRICA) 200 mg capsule, Take 1 capsule (200 mg total) by mouth 3 (three) times a day with meals., Disp: 90 capsule, Rfl: 2    semaglutide (WEGOVY) 2.4 mg/0.75 mL subcutaneous injection, Inject 2.4 mg under the skin every (seven) 7 days., Disp: 9 mL, Rfl: 1    SUBLOCADE 300 mg/1.5 mL solution, extended rel syringe subcutaneous injection, 1x monthly, Disp: , Rfl:     tamsulosin (FLOMAX) 0.4 mg capsule, Take 0.4 mg by mouth daily., Disp: , Rfl:     temazepam (RESTORIL) 15 mg capsule, Take 15 mg by mouth nightly as needed for sleep., Disp: , Rfl:     VITAMIN D3 125 mcg (5,000 unit) tablet, Take 5,000 Units by mouth., Disp: , Rfl:

## 2024-12-18 ENCOUNTER — TELEPHONE (OUTPATIENT)
Dept: ENDOCRINOLOGY | Facility: CLINIC | Age: 39
End: 2024-12-18
Payer: COMMERCIAL

## 2024-12-18 DIAGNOSIS — K21.9 GASTROESOPHAGEAL REFLUX DISEASE, UNSPECIFIED WHETHER ESOPHAGITIS PRESENT: ICD-10-CM

## 2024-12-18 RX ORDER — PANTOPRAZOLE SODIUM 20 MG/1
20 TABLET, DELAYED RELEASE ORAL DAILY
Qty: 90 TABLET | Refills: 0 | Status: SHIPPED | OUTPATIENT
Start: 2024-12-18 | End: 2025-03-19

## 2024-12-18 NOTE — TELEPHONE ENCOUNTER
Pharmacy called stating pt needes a prior auth for WEGOVY medication key code provided below    Key#[id number]

## 2024-12-18 NOTE — TELEPHONE ENCOUNTER
Medicine Refill Request    Last Office Visit: 11/5/2024   Last Consult Visit: Visit date not found  Last Telemedicine Visit: 12/3/2024 Priscilla Leyva PA C    Next Appointment: Visit date not found      Current Outpatient Medications:     albuterol HFA (VENTOLIN HFA) 90 mcg/actuation inhaler, Inhale 2 puffs every 4 (four) hours as needed., Disp: , Rfl:     clindamycin-benzoyl peroxide 1-5 % gel with pump, Apply topically 2 (two) times a day., Disp: 35 g, Rfl: 1    desvenlafaxine succinate (PRISTIQ) 25 mg tablet extended release 24 hr, Take 25 mg by mouth daily., Disp: , Rfl:     famotidine (PEPCID) 20 mg tablet, Take 1 tablet (20 mg total) by mouth daily before dinner., Disp: 90 tablet, Rfl: 1    ferrous sulfate 325 mg (65 mg iron) tablet, Take 65 mg by mouth nightly., Disp: , Rfl:     fluticasone propion-salmeteroL (ADVAIR DISKUS) 500-50 mcg/dose diskus inhaler, Inhale 1 puff 2 (two) times a day., Disp: , Rfl:     fluticasone propionate (FLONASE) 50 mcg/actuation nasal spray, ADMINISTER 1 SPRAY INTO EACH NOSTRIL DAILY AS NEEDED FOR RHINITIS OR ALLERGIES., Disp: 16 mL, Rfl: 1    magnesium oxide (MAG-OX) 400 mg (241.3 mg magnesium) tablet, Take 1 tablet (400 mg total) by mouth daily., Disp: 90 tablet, Rfl: 1    medical marijuana, 1 each See admin instr. Please be advised that an [facility] hospital staff member has listed medical marijuana as one of your home medications for documentation purposes. Please follow-up with your certified issuing provider for ongoing use, Disp: , Rfl:     metoprolol succinate XL (TOPROL-XL) 25 mg 24 hr tablet, Take 1 tablet (25 mg total) by mouth nightly., Disp: 90 tablet, Rfl: 1    mometasone (ELOCON) 0.1 % cream, Apply topically daily., Disp: 45 g, Rfl: 1    naloxone (NARCAN) 4 mg/actuation spray,non-aerosol nasal spray, , Disp: , Rfl:     NURTEC ODT 75 mg tablet,disintegrating, TAKE 1 TABLET (75 MG TOTAL) BY MOUTH EVERY OTHER DAY, Disp: 16 tablet, Rfl: 3    onabotulinumtoxinA  (BOTOX) injection, Botox for chronic migraine headache every 3 months, Disp: 1 each, Rfl: 4    pantoprazole (PROTONIX) 20 mg EC tablet, TAKE 1 TABLET BY MOUTH EVERY DAY, Disp: 90 tablet, Rfl: 0    pregabalin (LYRICA) 200 mg capsule, Take 1 capsule (200 mg total) by mouth 3 (three) times a day with meals., Disp: 90 capsule, Rfl: 2    semaglutide (WEGOVY) 2.4 mg/0.75 mL subcutaneous injection, Inject 2.4 mg under the skin every (seven) 7 days., Disp: 9 mL, Rfl: 1    SUBLOCADE 300 mg/1.5 mL solution, extended rel syringe subcutaneous injection, 1x monthly, Disp: , Rfl:     tamsulosin (FLOMAX) 0.4 mg capsule, Take 0.4 mg by mouth daily., Disp: , Rfl:     temazepam (RESTORIL) 15 mg capsule, Take 15 mg by mouth nightly as needed for sleep., Disp: , Rfl:     VITAMIN D3 125 mcg (5,000 unit) tablet, Take 5,000 Units by mouth., Disp: , Rfl:       BP Readings from Last 3 Encounters:   12/16/24 105/62   12/09/24 110/73   11/20/24 108/72       Recent Lab results:  Lab Results   Component Value Date    CHOL 167 11/13/2024   ,   Lab Results   Component Value Date    HDL 45 11/13/2024   ,   Lab Results   Component Value Date    LDLCALC 81 11/13/2024   ,   Lab Results   Component Value Date    TRIG 248 (H) 11/13/2024        Lab Results   Component Value Date    GLUCOSE 85 11/13/2024   ,   Lab Results   Component Value Date    HGBA1C 5.3 05/28/2024         Lab Results   Component Value Date    CREATININE 0.58 11/13/2024       Lab Results   Component Value Date    TSH 2.290 08/26/2024           Lab Results   Component Value Date    HGBA1C 5.3 05/28/2024

## 2024-12-19 ENCOUNTER — TELEPHONE (OUTPATIENT)
Dept: PSYCHIATRY | Facility: CLINIC | Age: 39
End: 2024-12-19

## 2024-12-19 DIAGNOSIS — K21.9 GASTROESOPHAGEAL REFLUX DISEASE, UNSPECIFIED WHETHER ESOPHAGITIS PRESENT: ICD-10-CM

## 2024-12-19 DIAGNOSIS — Z00.6 ENCOUNTER FOR EXAMINATION FOR NORMAL COMPARISON OR CONTROL IN CLINICAL RESEARCH PROGRAM: ICD-10-CM

## 2024-12-19 RX ORDER — FAMOTIDINE 20 MG/1
20 TABLET, FILM COATED ORAL
Qty: 90 TABLET | Refills: 1 | Status: SHIPPED | OUTPATIENT
Start: 2024-12-19 | End: 2025-06-17

## 2024-12-19 NOTE — TELEPHONE ENCOUNTER
Medicine Refill Request    Last Office Visit: 11/5/2024   Last Consult Visit: Visit date not found  Last Telemedicine Visit: 12/3/2024 Priscilla Leyva PA C    Next Appointment: Visit date not found      Current Outpatient Medications:     albuterol HFA (VENTOLIN HFA) 90 mcg/actuation inhaler, Inhale 2 puffs every 4 (four) hours as needed., Disp: , Rfl:     clindamycin-benzoyl peroxide 1-5 % gel with pump, Apply topically 2 (two) times a day., Disp: 35 g, Rfl: 1    desvenlafaxine succinate (PRISTIQ) 25 mg tablet extended release 24 hr, Take 25 mg by mouth daily., Disp: , Rfl:     famotidine (PEPCID) 20 mg tablet, Take 1 tablet (20 mg total) by mouth daily before dinner., Disp: 90 tablet, Rfl: 1    ferrous sulfate 325 mg (65 mg iron) tablet, Take 65 mg by mouth nightly., Disp: , Rfl:     fluticasone propion-salmeteroL (ADVAIR DISKUS) 500-50 mcg/dose diskus inhaler, Inhale 1 puff 2 (two) times a day., Disp: , Rfl:     fluticasone propionate (FLONASE) 50 mcg/actuation nasal spray, ADMINISTER 1 SPRAY INTO EACH NOSTRIL DAILY AS NEEDED FOR RHINITIS OR ALLERGIES., Disp: 16 mL, Rfl: 1    magnesium oxide (MAG-OX) 400 mg (241.3 mg magnesium) tablet, Take 1 tablet (400 mg total) by mouth daily., Disp: 90 tablet, Rfl: 1    medical marijuana, 1 each See admin instr. Please be advised that an [facility] hospital staff member has listed medical marijuana as one of your home medications for documentation purposes. Please follow-up with your certified issuing provider for ongoing use, Disp: , Rfl:     metoprolol succinate XL (TOPROL-XL) 25 mg 24 hr tablet, Take 1 tablet (25 mg total) by mouth nightly., Disp: 90 tablet, Rfl: 1    mometasone (ELOCON) 0.1 % cream, Apply topically daily., Disp: 45 g, Rfl: 1    naloxone (NARCAN) 4 mg/actuation spray,non-aerosol nasal spray, , Disp: , Rfl:     NURTEC ODT 75 mg tablet,disintegrating, TAKE 1 TABLET (75 MG TOTAL) BY MOUTH EVERY OTHER DAY, Disp: 16 tablet, Rfl: 3    onabotulinumtoxinA  (BOTOX) injection, Botox for chronic migraine headache every 3 months, Disp: 1 each, Rfl: 4    pantoprazole (PROTONIX) 20 mg EC tablet, Take 1 tablet (20 mg total) by mouth daily., Disp: 90 tablet, Rfl: 0    pregabalin (LYRICA) 200 mg capsule, Take 1 capsule (200 mg total) by mouth 3 (three) times a day with meals., Disp: 90 capsule, Rfl: 2    semaglutide (WEGOVY) 2.4 mg/0.75 mL subcutaneous injection, Inject 2.4 mg under the skin every (seven) 7 days., Disp: 9 mL, Rfl: 1    SUBLOCADE 300 mg/1.5 mL solution, extended rel syringe subcutaneous injection, 1x monthly, Disp: , Rfl:     tamsulosin (FLOMAX) 0.4 mg capsule, Take 0.4 mg by mouth daily., Disp: , Rfl:     temazepam (RESTORIL) 15 mg capsule, Take 15 mg by mouth nightly as needed for sleep., Disp: , Rfl:     VITAMIN D3 125 mcg (5,000 unit) tablet, Take 5,000 Units by mouth., Disp: , Rfl:       BP Readings from Last 3 Encounters:   12/16/24 105/62   12/09/24 110/73   11/20/24 108/72       Recent Lab results:  Lab Results   Component Value Date    CHOL 167 11/13/2024   ,   Lab Results   Component Value Date    HDL 45 11/13/2024   ,   Lab Results   Component Value Date    LDLCALC 81 11/13/2024   ,   Lab Results   Component Value Date    TRIG 248 (H) 11/13/2024        Lab Results   Component Value Date    GLUCOSE 85 11/13/2024   ,   Lab Results   Component Value Date    HGBA1C 5.3 05/28/2024         Lab Results   Component Value Date    CREATININE 0.58 11/13/2024       Lab Results   Component Value Date    TSH 2.290 08/26/2024           Lab Results   Component Value Date    HGBA1C 5.3 05/28/2024

## 2024-12-19 NOTE — TELEPHONE ENCOUNTER
Rn called [facility] [facility]. Wegovy PA denied again.  Additional information given over the phone. Will await determination. Case ID- [id number]

## 2024-12-19 NOTE — TELEPHONE ENCOUNTER
Left voicemail informing patient and/or parent/guardian of the Psych Encounter form needing to be signed as a requirement from the insurance company for billing purposes. Patient can access form via Slingbox and sign electronically.     Please make patient aware this form must be signed for each visit as a requirement to continue future visits with provider.

## 2024-12-20 DIAGNOSIS — E66.813 CLASS 3 OBESITY WITH ALVEOLAR HYPOVENTILATION, SERIOUS COMORBIDITY, AND BODY MASS INDEX (BMI) OF 45.0 TO 49.9 IN ADULT (CMS/HCC): ICD-10-CM

## 2024-12-20 DIAGNOSIS — E24.2: ICD-10-CM

## 2024-12-20 DIAGNOSIS — E66.2 CLASS 3 OBESITY WITH ALVEOLAR HYPOVENTILATION, SERIOUS COMORBIDITY, AND BODY MASS INDEX (BMI) OF 45.0 TO 49.9 IN ADULT (CMS/HCC): ICD-10-CM

## 2024-12-23 ENCOUNTER — TELEPHONE (OUTPATIENT)
Dept: VASCULAR SURGERY | Facility: CLINIC | Age: 39
End: 2024-12-23
Payer: COMMERCIAL

## 2024-12-23 ENCOUNTER — TELEPHONE (OUTPATIENT)
Dept: NEUROLOGY | Facility: CLINIC | Age: 39
End: 2024-12-23
Payer: COMMERCIAL

## 2024-12-23 ENCOUNTER — HOSPITAL ENCOUNTER (OUTPATIENT)
Dept: RADIOLOGY | Facility: HOSPITAL | Age: 39
Discharge: HOME | End: 2024-12-23
Attending: ANESTHESIOLOGY
Payer: COMMERCIAL

## 2024-12-23 DIAGNOSIS — M54.16 RADICULOPATHY, LUMBAR REGION: ICD-10-CM

## 2024-12-23 DIAGNOSIS — M50.90 CERVICAL DISC DISORDER, UNSPECIFIED, UNSPECIFIED CERVICAL REGION: ICD-10-CM

## 2024-12-24 ENCOUNTER — TELEPHONE (OUTPATIENT)
Dept: NEUROLOGY | Facility: CLINIC | Age: 39
End: 2024-12-24
Payer: COMMERCIAL

## 2024-12-24 ENCOUNTER — TELEPHONE (OUTPATIENT)
Dept: FAMILY MEDICINE | Facility: CLINIC | Age: 39
End: 2024-12-24

## 2024-12-26 ENCOUNTER — OFFICE VISIT (OUTPATIENT)
Dept: NEUROLOGY | Facility: CLINIC | Age: 39
End: 2024-12-26
Payer: COMMERCIAL

## 2024-12-26 VITALS
OXYGEN SATURATION: 98 % | DIASTOLIC BLOOD PRESSURE: 78 MMHG | BODY MASS INDEX: 32.01 KG/M2 | RESPIRATION RATE: 18 BRPM | WEIGHT: 175 LBS | HEART RATE: 88 BPM | SYSTOLIC BLOOD PRESSURE: 122 MMHG

## 2024-12-26 DIAGNOSIS — G43.901 STATUS MIGRAINOSUS: ICD-10-CM

## 2024-12-26 DIAGNOSIS — G43.109 MIGRAINE WITH AURA AND WITHOUT STATUS MIGRAINOSUS, NOT INTRACTABLE: ICD-10-CM

## 2024-12-26 DIAGNOSIS — G24.3 SPASMODIC TORTICOLLIS: ICD-10-CM

## 2024-12-26 DIAGNOSIS — M54.2 CERVICALGIA: Primary | ICD-10-CM

## 2024-12-26 DIAGNOSIS — G43.709 CHRONIC MIGRAINE WITHOUT AURA WITHOUT STATUS MIGRAINOSUS, NOT INTRACTABLE: ICD-10-CM

## 2024-12-26 PROCEDURE — 200200 PR NO CHARGE: Performed by: NURSE PRACTITIONER

## 2024-12-26 PROCEDURE — 64615 CHEMODENERV MUSC MIGRAINE: CPT | Performed by: NURSE PRACTITIONER

## 2024-12-26 PROCEDURE — 99999 PR OFFICE/OUTPT VISIT,PROCEDURE ONLY: CPT | Performed by: NURSE PRACTITIONER

## 2024-12-26 RX ORDER — RIMEGEPANT SULFATE 75 MG/75MG
75 TABLET, ORALLY DISINTEGRATING ORAL EVERY OTHER DAY
Qty: 15 TABLET | Refills: 4 | Status: SHIPPED | OUTPATIENT
Start: 2024-12-26

## 2024-12-26 RX ORDER — KETOROLAC TROMETHAMINE 30 MG/ML
60 INJECTION, SOLUTION INTRAMUSCULAR; INTRAVENOUS ONCE
Status: COMPLETED | OUTPATIENT
Start: 2024-12-26 | End: 2024-12-26

## 2024-12-26 RX ADMIN — KETOROLAC TROMETHAMINE 60 MG: 30 INJECTION, SOLUTION INTRAMUSCULAR; INTRAVENOUS at 14:00

## 2024-12-26 NOTE — PROGRESS NOTES
Patient returns for injections.      Since last seen headaches have improved with Botox injections.    Correlated with headache diary, decreased abortive medication use.     At least 50 % of reduction of frequency and severity of moderate to severe headaches      Botox injections  Botox lot number: [id number]  Expiration Date: 03/2027      NDC#7676-9635-71    Patient informed and consent obtained.    General Consent including notice of privacy practices/patient bill of rights was reviewed and consent/authorization given.       4cc of Normal saline were added to one vial of Botox Type A resulting in 200 Units of Botox.  After the patient consented to the procedure the following muscles were injected after cleaning the overlying skin with alcohol. New FDA mandated warnings provided to the patient with instructions to seek medical attention for difficulty swallowing or breathing.    Frontalis 10 units right ( 2 sites each 5 units) and 10 units left ( 2 sites each 5 units)  /Glabellar 5 units right and 5 units left (medially)  Procerus 5 units    Temporalis 30 units right( 6 sites each 5 units) and 30 units left ( 6 sites each 5 units)    Trapezius 25 units ( 3 sites each 5 units) right and 30 units left ( 3 sites each 5 units)  Cervical Paraspinals 10 units right ( 2 sites each 5 units) and 10 units left (2 sites each 5 units)  Occipital 15 units right ( 3 sites each 5 units) , 15 units left ( 3 sites each 5 units)      A total of 200 units was used The patient tolerated the procedure well.      Diagnoses and all orders for this visit:    Cervicalgia    Chronic migraine without aura without status migrainosus, not intractable  -     onabotulinumtoxinA (BOTOX) 200 unit injection 200 Units    Spasmodic torticollis    Status migrainosus  -     ketorolac (TORADOL) injection 60 mg    Other orders  -     onabotulinumtoxinA (BOTOX) 200 unit injection 200 Units

## 2024-12-27 ENCOUNTER — TELEPHONE (OUTPATIENT)
Dept: PSYCHIATRY | Facility: CLINIC | Age: 39
End: 2024-12-27

## 2024-12-27 NOTE — TELEPHONE ENCOUNTER
Writer called and left voicemail for client to complete VC consent and Outpatient Gen Consent for her 1/10/25 appointment with Baylee Monaco. Writer also sent out forms expiring at end of January if client wants to do them all now to have them  at the same time next year.

## 2024-12-30 NOTE — TELEPHONE ENCOUNTER
Pt advised her insurance had switched to Medicare. No new insurance info was put into her chart. Left voicemail for pt requesting new insurance information so we can update her coverages.

## 2025-01-08 ENCOUNTER — TELEPHONE (OUTPATIENT)
Dept: PSYCHIATRY | Facility: CLINIC | Age: 40
End: 2025-01-08

## 2025-01-08 NOTE — TELEPHONE ENCOUNTER
Writer called client and spoke with her regarding the [facility] behavioral health consent form and the outpatient general consent required for 1/10/25 appointment with Baylee Monaco.     Writer also told her that the yearly forms have all been sent as they  shortly after in case she wants to do them all at once.

## 2025-01-09 ENCOUNTER — DOCUMENTATION (OUTPATIENT)
Dept: BEHAVIORAL/MENTAL HEALTH CLINIC | Facility: CLINIC | Age: 40
End: 2025-01-09

## 2025-01-09 DIAGNOSIS — F41.1 GENERALIZED ANXIETY DISORDER: ICD-10-CM

## 2025-01-09 DIAGNOSIS — F33.1 MODERATE EPISODE OF RECURRENT MAJOR DEPRESSIVE DISORDER (HCC): Primary | ICD-10-CM

## 2025-01-09 NOTE — PROGRESS NOTES
TRANSFER SUMMARY    [facility]    Patient Name Samantha Vila     Date of Birth: 39 y.o. 1985      MRN: [id number]    Admission Date:  unknown, see prior EHR    Date of Transfer: 11/11/2024    Admission Diagnosis:     unknown    Current Diagnosis:     1. Moderate episode of recurrent major depressive disorder (HCC)        2. Generalized anxiety disorder            Reason for Admission: Samantha presented for treatment due to depressive symptoms and anxiety symptoms. Primary complaints included DEPRESSIVE SYMPTOMS: depressed mood and ANXIETY SYMPTOMS: anxiety .     Progress in Treatment: Samantha was seen for Medication Management and Individual Couseling. During the course of treatment she was engaged and invested in treatment..    Episodes of Higher Level of Care: see chart    Transfer request Initiated by: client due to insurance change.    Reason for Transfer Request:  therapist was not covered under client's new insurance.    Does this individual need a clinician with specialized training/expertise?: No    Is this client working with any other [facility] Providers/Therapists? See chart    Other pertinent issues: None    Are there any specific individuals who would be a “best fit” or who have already agreed to accept this transfer request?      Psychiatrist: None   Therapist: client already assigend to new provider  Rationale: Not Applicable    Attempts to maintain the current therapeutic relationship: Not Applicable    Transfer request routed to  Wilma Johnson  for input and/or approval.         Maryann Haynes, PC01/09/25

## 2025-01-10 ENCOUNTER — TELEMEDICINE (OUTPATIENT)
Dept: BEHAVIORAL/MENTAL HEALTH CLINIC | Facility: CLINIC | Age: 40
End: 2025-01-10
Payer: COMMERCIAL

## 2025-01-10 DIAGNOSIS — F41.1 GENERALIZED ANXIETY DISORDER: ICD-10-CM

## 2025-01-10 DIAGNOSIS — F33.1 MODERATE EPISODE OF RECURRENT MAJOR DEPRESSIVE DISORDER (HCC): Primary | ICD-10-CM

## 2025-01-10 PROCEDURE — 90837 PSYTX W PT 60 MINUTES: CPT | Performed by: COUNSELOR

## 2025-01-10 NOTE — PSYCH
"Behavioral Health Psychotherapy Progress Note    Psychotherapy Provided: Individual Psychotherapy     1. Moderate episode of recurrent major depressive disorder (HCC)        2. Generalized anxiety disorder            Goals addressed in session: Goal 1     DATA: Client and therapist met for a virtual session. Client and therapist worked on building rapport and updating treatment plan.  Client shared history with anxiety, substances, sobriety journey, relationships with family and interests in Reiki.  Client has become level 2 Reiki and is enjoying this practice.  Therapist used open ended questions,rapport building, reflection and validation.   During this session, this clinician used the following therapeutic modalities: Cognitive Behavioral Therapy    Substance Abuse was addressed during this session. If the client is diagnosed with a co-occurring substance use disorder, please indicate any changes in the frequency or amount of use: None using suboxone. Stage of change for addressing substance use diagnoses: Maintenance    ASSESSMENT:  Samantha Vila presents with a Euthymic/ normal mood.     her affect is Normal range and intensity, which is congruent, with her mood and the content of the session. The client has made progress on their goals.    Client continues to take medication as prescribed and has made strides with her reiki study.  Samantha Vila presents with a none risk of suicide, none risk of self-harm, and none risk of harm to others.    For any risk assessment that surpasses a \"low\" rating, a safety plan must be developed.    A safety plan was indicated: no  If yes, describe in detail N/A    PLAN: Between sessions, Samantha Vila will continue to take meds as prescribed. At the next session, the therapist will use Cognitive Behavioral Therapy to address anxiety.    Behavioral Health Treatment Plan and Discharge Planning: Samantha Vila is aware of and agrees to continue to work on their " treatment plan. They have identified and are working toward their discharge goals. yes    Depression Follow-up Plan Completed: Not applicable    Visit start and stop times:    01/10/25  Start Time: 1300  Stop Time: 1354  Total Visit Time: 54 minutes

## 2025-01-15 ENCOUNTER — TELEPHONE (OUTPATIENT)
Dept: PSYCHIATRY | Facility: CLINIC | Age: 40
End: 2025-01-15

## 2025-01-15 ENCOUNTER — OFFICE VISIT (OUTPATIENT)
Dept: VASCULAR SURGERY | Facility: CLINIC | Age: 40
End: 2025-01-15
Payer: COMMERCIAL

## 2025-01-15 VITALS
WEIGHT: 173.6 LBS | HEIGHT: 62 IN | BODY MASS INDEX: 31.94 KG/M2 | HEART RATE: 82 BPM | DIASTOLIC BLOOD PRESSURE: 67 MMHG | SYSTOLIC BLOOD PRESSURE: 111 MMHG | OXYGEN SATURATION: 96 %

## 2025-01-15 DIAGNOSIS — I83.813 VARICOSE VEINS OF BILATERAL LOWER EXTREMITIES WITH PAIN: ICD-10-CM

## 2025-01-15 DIAGNOSIS — I87.2 VENOUS INSUFFICIENCY: Primary | ICD-10-CM

## 2025-01-15 PROCEDURE — 3008F BODY MASS INDEX DOCD: CPT | Performed by: SURGERY

## 2025-01-15 PROCEDURE — 3078F DIAST BP <80 MM HG: CPT | Performed by: SURGERY

## 2025-01-15 PROCEDURE — 99213 OFFICE O/P EST LOW 20 MIN: CPT | Performed by: SURGERY

## 2025-01-15 PROCEDURE — 3074F SYST BP LT 130 MM HG: CPT | Performed by: SURGERY

## 2025-01-15 NOTE — LETTER
January 15, 2025     Yumiko Lopez DO  [address]  [facility] [address]    Patient: Carolyn Redmond  YOB: 1985  Date of Visit: 1/15/2025      Dear Dr. Lopez:    Thank you for referring Carolyn Redmond to me for evaluation. Below are my notes for this consultation.    If you have questions, please do not hesitate to call me. I look forward to following your patient along with you.         Sincerely,        Talib Richmond MD        CC: No Recipients  Talib Richmond MD  1/15/2025  4:22 PM  Sign when Signing Visit       [facility]  With office locations at [facility] and [place]  P: [phone]     F: [phone]       FOLLOW UP VISIT   1/15/2025  Carolyn Redmond               YOB: 1985  [id number]    PCP:  Yumiko Lopez DO    Diagnosis: Venous insufficiency, varicose veins with pain.     HISTORY OF PRESENT ILLNESS        Carolyn Redmond is a 39 y.o. female returning to the office for follow-up evaluation of varicose veins.  Carolyn underwent GSV ablations last year.  She had been interested in therapy for residual varicosities across both thighs Ed underwent ultrasound Ed August for procedure planning.  Unfortunately she has not been able to return until today.  She continues to have thigh level varicose veins bilaterally with local discomfort bothering her on a daily basis.      PAST MEDICAL AND SURGICAL HISTORY        Past Medical History:   Diagnosis Date   • Acute bacterial endocarditis 12/10/2020   • Anxiety    • Depressed    • Endocarditis    • Fibromyalgia    • Migraines    • Obstructive sleep apnea syndrome 01/10/2023    uses CPAP machine   • Osteomyelitis (CMS/HCC) 01/10/2023   • Pancreatitis    • Recurrent major depressive disorder (CMS/HCC) 01/06/2023   • Substance abuse (CMS/[facility]) 2015   • Tachycardia    • Vasculitis (CMS/HCC) 09/26/2017       Past Surgical History   Procedure Laterality Date   • Cholecystectomy     • Ercp    "  • Gallbladder surgery     • Tonsillectomy  1991       MEDICATIONS        Medications Ordered Prior to Encounter[1]    ALLERGIES        Tramadol, Amitriptyline hcl, Amoxicillin, Nsaids (non-steroidal anti-inflammatory drug), and Trazodone     PHYSICAL EXAMINATION      Visit Vitals  /67   Pulse 82   Ht 1.575 m (5' 2\") Comment: Pt. reported   Wt 78.7 kg (173 lb 9.6 oz) Comment: Pt. reported   SpO2 96%   BMI 31.75 kg/m²     Body mass index is 31.75 kg/m².      PHYSICAL EXAM:    Varicose veins across the thigh bilaterally.  Minimal lower leg swelling.  No stasis dermatitis.    LABS / IMAGING / EKG     Labs: Labs from November reveal a creatinine of 0.58.    Imaging: I personally reviewed and independently interpreted the venous reflux study from August 2024.  There was minimal bilateral residual GSV reflux after bilateral GSV ablation.  No DVT bilaterally.     ASSESSMENT AND PLAN         Problem List Items Addressed This Visit          Circulatory    Venous insufficiency - Primary    Current Assessment & Plan     Carolyn is a 39-year-old female who is undergone previous great saphenous vein ablations.  She has been interested in further therapy for residual varicose veins which bother her with localized discomfort.  We had planned to do this last summer although some life events got in her way and she returns today for follow-up.  She underwent her most recent venous ultrasound in August 2024.  Based on her recurrent varicosities, I have recommended Varithena Microfoam chemical ablation for her bilateral residual varicosities.  We discussed the details of the procedure and its anticipated recovery including specific risks of Varithena extravasation, phlebitis, deep vein thrombosis.         Relevant Orders    [facility] OFFICE PROCEDURES REQUEST     Other Visit Diagnoses       Varicose veins of bilateral lower extremities with pain        Relevant Orders    [facility] OFFICE PROCEDURES REQUEST      "          No follow-ups on file.       Talib Richmond MD  Vascular and Endovascular Surgery  [facility]      Thank you very much for allowing us to participate in the care of your patient.  I will keep you informed of Carolyn Redmond's care.  Please not hesitate to call or email if there are any questions.  I can be reached at [phone] (office) or [email or web address].  Sincerely.    Kody Richmond    This document was generated utilizing voice recognition technology. An attempt at proofreading has been made to minimize errors but typographical errors may be present.       [1]   Current Outpatient Medications on File Prior to Visit   Medication Sig Dispense Refill   • albuterol HFA (VENTOLIN HFA) 90 mcg/actuation inhaler Inhale 2 puffs every 4 (four) hours as needed.     • clindamycin-benzoyl peroxide 1-5 % gel with pump Apply topically 2 (two) times a day. 35 g 1   • desvenlafaxine succinate (PRISTIQ) 25 mg tablet extended release 24 hr Take 25 mg by mouth daily.     • famotidine (PEPCID) 20 mg tablet TAKE 1 TABLET (20 MG TOTAL) BY MOUTH DAILY BEFORE DINNER. 90 tablet 1   • ferrous sulfate 325 mg (65 mg iron) tablet Take 65 mg by mouth nightly.     • fluticasone propion-salmeteroL (ADVAIR DISKUS) 500-50 mcg/dose diskus inhaler Inhale 1 puff 2 (two) times a day.     • fluticasone propionate (FLONASE) 50 mcg/actuation nasal spray ADMINISTER 1 SPRAY INTO EACH NOSTRIL DAILY AS NEEDED FOR RHINITIS OR ALLERGIES. 16 mL 1   • magnesium oxide (MAG-OX) 400 mg (241.3 mg magnesium) tablet Take 1 tablet (400 mg total) by mouth daily. 90 tablet 1   • medical marijuana 1 each See admin instr. Please be advised that an [facility] hospital staff member has listed medical marijuana as one of your home medications for documentation purposes. Please follow-up with your certified issuing provider for ongoing use     • metoprolol succinate XL (TOPROL-XL) 25 mg 24 hr tablet Take 1 tablet (25 mg total) by mouth  nightly. 90 tablet 1   • naloxone (NARCAN) 4 mg/actuation spray,non-aerosol nasal spray      • NURTEC ODT 75 mg tablet,disintegrating TAKE 1 TABLET (75 MG TOTAL) BY MOUTH EVERY OTHER DAY 15 tablet 4   • onabotulinumtoxinA (BOTOX) injection Botox for chronic migraine headache every 3 months 1 each 4   • pantoprazole (PROTONIX) 20 mg EC tablet Take 1 tablet (20 mg total) by mouth daily. 90 tablet 0   • pregabalin (LYRICA) 200 mg capsule Take 1 capsule (200 mg total) by mouth 3 (three) times a day with meals. 90 capsule 2   • semaglutide (WEGOVY) 2.4 mg/0.75 mL subcutaneous injection Inject 2.4 mg under the skin every (seven) 7 days. 9 mL 1   • SUBLOCADE 300 mg/1.5 mL solution, extended rel syringe subcutaneous injection 1x monthly     • tamsulosin (FLOMAX) 0.4 mg capsule Take 0.4 mg by mouth daily.     • temazepam (RESTORIL) 15 mg capsule Take 15 mg by mouth nightly as needed for sleep.     • VITAMIN D3 125 mcg (5,000 unit) tablet Take 5,000 Units by mouth.     • mometasone (ELOCON) 0.1 % cream Apply topically daily. 45 g 1     No current facility-administered medications on file prior to visit.

## 2025-01-15 NOTE — PROGRESS NOTES
"     [facility]  With office locations at [facility] and [place]  P: [phone]     F: [phone]       FOLLOW UP VISIT   1/15/2025  Carolyn Redmond               YOB: 1985  [id number]    PCP:  Yumiko Lopez DO    Diagnosis: Venous insufficiency, varicose veins with pain.     HISTORY OF PRESENT ILLNESS        Carolyn Redmond is a 39 y.o. female returning to the office for follow-up evaluation of varicose veins.  Carolyn underwent GSV ablations last year.  She had been interested in therapy for residual varicosities across both thighs Ed underwent ultrasound Ed August for procedure planning.  Unfortunately she has not been able to return until today.  She continues to have thigh level varicose veins bilaterally with local discomfort bothering her on a daily basis.      PAST MEDICAL AND SURGICAL HISTORY        Past Medical History:   Diagnosis Date    Acute bacterial endocarditis 12/10/2020    Anxiety     Depressed     Endocarditis     Fibromyalgia     Migraines     Obstructive sleep apnea syndrome 01/10/2023    uses CPAP machine    Osteomyelitis (CMS/[facility]) 01/10/2023    Pancreatitis     Recurrent major depressive disorder (CMS/[facility]) 01/06/2023    Substance abuse (CMS/[facility]) 2015    Tachycardia     Vasculitis (CMS/[facility]) 09/26/2017       Past Surgical History   Procedure Laterality Date    Cholecystectomy      Ercp      Gallbladder surgery      Tonsillectomy  1991       MEDICATIONS        Medications Ordered Prior to Encounter[1]    ALLERGIES        Tramadol, Amitriptyline hcl, Amoxicillin, Nsaids (non-steroidal anti-inflammatory drug), and Trazodone     PHYSICAL EXAMINATION      Visit Vitals  /67   Pulse 82   Ht 1.575 m (5' 2\") Comment: Pt. reported   Wt 78.7 kg (173 lb 9.6 oz) Comment: Pt. reported   SpO2 96%   BMI 31.75 kg/m²     Body mass index is 31.75 kg/m².      PHYSICAL EXAM:    Varicose veins across the thigh bilaterally.  Minimal lower leg swelling.  " No stasis dermatitis.    LABS / IMAGING / EKG     Labs: Labs from November reveal a creatinine of 0.58.    Imaging: I personally reviewed and independently interpreted the venous reflux study from August 2024.  There was minimal bilateral residual GSV reflux after bilateral GSV ablation.  No DVT bilaterally.     ASSESSMENT AND PLAN         Problem List Items Addressed This Visit          Circulatory    Venous insufficiency - Primary    Current Assessment & Plan     Carolyn is a 39-year-old female who is undergone previous great saphenous vein ablations.  She has been interested in further therapy for residual varicose veins which bother her with localized discomfort.  We had planned to do this last summer although some life events got in her way and she returns today for follow-up.  She underwent her most recent venous ultrasound in August 2024.  Based on her recurrent varicosities, I have recommended Varithena Microfoam chemical ablation for her bilateral residual varicosities.  We discussed the details of the procedure and its anticipated recovery including specific risks of Varithena extravasation, phlebitis, deep vein thrombosis.         Relevant Orders    [facility] OFFICE PROCEDURES REQUEST     Other Visit Diagnoses       Varicose veins of bilateral lower extremities with pain        Relevant Orders    [facility] OFFICE PROCEDURES REQUEST               No follow-ups on file.       Talib Richmond MD  Vascular and Endovascular Surgery  [facility]      Thank you very much for allowing us to participate in the care of your patient.  I will keep you informed of Carolyn Redmond's care.  Please not hesitate to call or email if there are any questions.  I can be reached at [phone] (office) or [email or web address].  Sincerely.    Kody Richmond    This document was generated utilizing voice recognition technology. An attempt at proofreading has been made to minimize errors but  typographical errors may be present.       [1]   Current Outpatient Medications on File Prior to Visit   Medication Sig Dispense Refill    albuterol HFA (VENTOLIN HFA) 90 mcg/actuation inhaler Inhale 2 puffs every 4 (four) hours as needed.      clindamycin-benzoyl peroxide 1-5 % gel with pump Apply topically 2 (two) times a day. 35 g 1    desvenlafaxine succinate (PRISTIQ) 25 mg tablet extended release 24 hr Take 25 mg by mouth daily.      famotidine (PEPCID) 20 mg tablet TAKE 1 TABLET (20 MG TOTAL) BY MOUTH DAILY BEFORE DINNER. 90 tablet 1    ferrous sulfate 325 mg (65 mg iron) tablet Take 65 mg by mouth nightly.      fluticasone propion-salmeteroL (ADVAIR DISKUS) 500-50 mcg/dose diskus inhaler Inhale 1 puff 2 (two) times a day.      fluticasone propionate (FLONASE) 50 mcg/actuation nasal spray ADMINISTER 1 SPRAY INTO EACH NOSTRIL DAILY AS NEEDED FOR RHINITIS OR ALLERGIES. 16 mL 1    magnesium oxide (MAG-OX) 400 mg (241.3 mg magnesium) tablet Take 1 tablet (400 mg total) by mouth daily. 90 tablet 1    medical marijuana 1 each See admin instr. Please be advised that an [facility] hospital staff member has listed medical marijuana as one of your home medications for documentation purposes. Please follow-up with your certified issuing provider for ongoing use      metoprolol succinate XL (TOPROL-XL) 25 mg 24 hr tablet Take 1 tablet (25 mg total) by mouth nightly. 90 tablet 1    naloxone (NARCAN) 4 mg/actuation spray,non-aerosol nasal spray       NURTEC ODT 75 mg tablet,disintegrating TAKE 1 TABLET (75 MG TOTAL) BY MOUTH EVERY OTHER DAY 15 tablet 4    onabotulinumtoxinA (BOTOX) injection Botox for chronic migraine headache every 3 months 1 each 4    pantoprazole (PROTONIX) 20 mg EC tablet Take 1 tablet (20 mg total) by mouth daily. 90 tablet 0    pregabalin (LYRICA) 200 mg capsule Take 1 capsule (200 mg total) by mouth 3 (three) times a day with meals. 90 capsule 2    semaglutide (WEGOVY) 2.4 mg/0.75 mL subcutaneous injection  Inject 2.4 mg under the skin every (seven) 7 days. 9 mL 1    SUBLOCADE 300 mg/1.5 mL solution, extended rel syringe subcutaneous injection 1x monthly      tamsulosin (FLOMAX) 0.4 mg capsule Take 0.4 mg by mouth daily.      temazepam (RESTORIL) 15 mg capsule Take 15 mg by mouth nightly as needed for sleep.      VITAMIN D3 125 mcg (5,000 unit) tablet Take 5,000 Units by mouth.      mometasone (ELOCON) 0.1 % cream Apply topically daily. 45 g 1     No current facility-administered medications on file prior to visit.

## 2025-01-15 NOTE — ASSESSMENT & PLAN NOTE
Carolyn is a 39-year-old female who is undergone previous great saphenous vein ablations.  She has been interested in further therapy for residual varicose veins which bother her with localized discomfort.  We had planned to do this last summer although some life events got in her way and she returns today for follow-up.  She underwent her most recent venous ultrasound in August 2024.  Based on her recurrent varicosities, I have recommended Varithena Microfoam chemical ablation for her bilateral residual varicosities.  We discussed the details of the procedure and its anticipated recovery including specific risks of Varithena extravasation, phlebitis, deep vein thrombosis.

## 2025-01-15 NOTE — TELEPHONE ENCOUNTER
Left voicemail informing patient and/or parent/guardian of the Psych Encounter form needing to be signed as a requirement from the insurance company for billing purposes. Patient can access form via [employer] and sign electronically.     Please make patient aware this form must be signed for each visit as a requirement to continue future visits with provider.

## 2025-01-21 ENCOUNTER — TELEPHONE (OUTPATIENT)
Dept: BEHAVIORAL/MENTAL HEALTH CLINIC | Facility: CLINIC | Age: 40
End: 2025-01-21

## 2025-01-21 ENCOUNTER — TRANSCRIBE ORDERS (OUTPATIENT)
Dept: REGISTRATION | Facility: HOSPITAL | Age: 40
End: 2025-01-21

## 2025-01-21 DIAGNOSIS — R60.9 EDEMA, UNSPECIFIED TYPE: Primary | ICD-10-CM

## 2025-01-21 NOTE — TELEPHONE ENCOUNTER
Left voicemail informing patient and/or parent/guardian of the Psych Encounter form needing to be signed as a requirement from the insurance company for billing purposes. Patient can access form via [employer] and sign electronically.     Please make patient aware this form must be signed for each visit as a requirement to continue future visits with provider.    Regarding virtual visit with Baylee Monaco on 1/10/25. Office number was provided for any questions.

## 2025-02-03 DIAGNOSIS — J01.00 ACUTE NON-RECURRENT MAXILLARY SINUSITIS: ICD-10-CM

## 2025-02-03 RX ORDER — FLUTICASONE PROPIONATE 50 MCG
1 SPRAY, SUSPENSION (ML) NASAL DAILY PRN
Qty: 16 ML | Refills: 1 | Status: SHIPPED | OUTPATIENT
Start: 2025-02-03

## 2025-02-03 NOTE — TELEPHONE ENCOUNTER
Medicine Refill Request    Last Office Visit: 11/5/2024   Last Consult Visit: Visit date not found  Last Telemedicine Visit: 12/3/2024 Priscilla Leyva PA C    Next Appointment: Visit date not found      Current Outpatient Medications:     albuterol HFA (VENTOLIN HFA) 90 mcg/actuation inhaler, Inhale 2 puffs every 4 (four) hours as needed., Disp: , Rfl:     amoxicillin (AMOXIL) 500 mg capsule, Take 4 capsules (2,000 mg total) by mouth once for 1 dose. Take 1 hour before scheduled dental work., Disp: 4 capsule, Rfl: 2    clindamycin-benzoyl peroxide 1-5 % gel with pump, Apply topically 2 (two) times a day., Disp: 35 g, Rfl: 1    desvenlafaxine succinate (PRISTIQ) 25 mg tablet extended release 24 hr, Take 25 mg by mouth daily., Disp: , Rfl:     famotidine (PEPCID) 20 mg tablet, TAKE 1 TABLET (20 MG TOTAL) BY MOUTH DAILY BEFORE DINNER., Disp: 90 tablet, Rfl: 1    ferrous sulfate 325 mg (65 mg iron) tablet, Take 65 mg by mouth nightly., Disp: , Rfl:     fluticasone propion-salmeteroL (ADVAIR DISKUS) 500-50 mcg/dose diskus inhaler, Inhale 1 puff 2 (two) times a day., Disp: , Rfl:     fluticasone propionate (FLONASE) 50 mcg/actuation nasal spray, ADMINISTER 1 SPRAY INTO EACH NOSTRIL DAILY AS NEEDED FOR RHINITIS OR ALLERGIES., Disp: 16 mL, Rfl: 1    magnesium oxide (MAG-OX) 400 mg (241.3 mg magnesium) tablet, Take 1 tablet (400 mg total) by mouth daily., Disp: 90 tablet, Rfl: 1    medical marijuana, 1 each See admin instr. Please be advised that an [facility] hospital staff member has listed medical marijuana as one of your home medications for documentation purposes. Please follow-up with your certified issuing provider for ongoing use, Disp: , Rfl:     metoprolol succinate XL (TOPROL-XL) 25 mg 24 hr tablet, Take 1 tablet (25 mg total) by mouth nightly., Disp: 90 tablet, Rfl: 1    mometasone (ELOCON) 0.1 % cream, Apply topically daily., Disp: 45 g, Rfl: 1    naloxone (NARCAN) 4 mg/actuation spray,non-aerosol nasal  spray, , Disp: , Rfl:     NURTEC ODT 75 mg tablet,disintegrating, TAKE 1 TABLET (75 MG TOTAL) BY MOUTH EVERY OTHER DAY, Disp: 15 tablet, Rfl: 4    onabotulinumtoxinA (BOTOX) injection, Botox for chronic migraine headache every 3 months, Disp: 1 each, Rfl: 4    pantoprazole (PROTONIX) 20 mg EC tablet, Take 1 tablet (20 mg total) by mouth daily., Disp: 90 tablet, Rfl: 0    pregabalin (LYRICA) 200 mg capsule, Take 1 capsule (200 mg total) by mouth 3 (three) times a day with meals., Disp: 90 capsule, Rfl: 2    semaglutide (WEGOVY) 2.4 mg/0.75 mL subcutaneous injection, Inject 2.4 mg under the skin every (seven) 7 days., Disp: 9 mL, Rfl: 1    SUBLOCADE 300 mg/1.5 mL solution, extended rel syringe subcutaneous injection, 1x monthly, Disp: , Rfl:     tamsulosin (FLOMAX) 0.4 mg capsule, Take 0.4 mg by mouth daily., Disp: , Rfl:     temazepam (RESTORIL) 15 mg capsule, Take 15 mg by mouth nightly as needed for sleep., Disp: , Rfl:     VITAMIN D3 125 mcg (5,000 unit) tablet, Take 5,000 Units by mouth., Disp: , Rfl:       BP Readings from Last 3 Encounters:   01/15/25 111/67   12/26/24 122/78   12/16/24 105/62       Recent Lab results:  Lab Results   Component Value Date    CHOL 167 11/13/2024   ,   Lab Results   Component Value Date    HDL 45 11/13/2024   ,   Lab Results   Component Value Date    LDLCALC 81 11/13/2024   ,   Lab Results   Component Value Date    TRIG 248 (H) 11/13/2024        Lab Results   Component Value Date    GLUCOSE 85 11/13/2024   ,   Lab Results   Component Value Date    HGBA1C 5.3 05/28/2024         Lab Results   Component Value Date    CREATININE 0.58 11/13/2024       Lab Results   Component Value Date    TSH 2.290 08/26/2024           Lab Results   Component Value Date    HGBA1C 5.3 05/28/2024

## 2025-02-04 ENCOUNTER — HOSPITAL ENCOUNTER (OUTPATIENT)
Facility: HOSPITAL | Age: 40
End: 2025-02-04
Attending: INTERNAL MEDICINE | Admitting: INTERNAL MEDICINE
Payer: COMMERCIAL

## 2025-02-04 ENCOUNTER — TRANSCRIBE ORDERS (OUTPATIENT)
Dept: SCHEDULING | Age: 40
End: 2025-02-04

## 2025-02-04 LAB
DHEA-S SERPL-MCNC: 54.9 UG/DL (ref 57.3–279.2)
ESTRADIOL SERPL-MCNC: 8.8 PG/ML
FSH SERPL-ACNC: 4.1 MIU/ML
LH SERPL-ACNC: 0.5 MIU/ML

## 2025-02-11 ENCOUNTER — HOSPITAL ENCOUNTER (OUTPATIENT)
Dept: RADIOLOGY | Facility: HOSPITAL | Age: 40
Discharge: HOME | End: 2025-02-11
Attending: PHYSICIAN ASSISTANT
Payer: COMMERCIAL

## 2025-02-11 DIAGNOSIS — K59.09 OTHER CONSTIPATION: ICD-10-CM

## 2025-02-13 ENCOUNTER — OFFICE VISIT (OUTPATIENT)
Dept: ENDOCRINOLOGY | Facility: CLINIC | Age: 40
End: 2025-02-13
Payer: COMMERCIAL

## 2025-02-13 VITALS
DIASTOLIC BLOOD PRESSURE: 64 MMHG | HEIGHT: 62 IN | WEIGHT: 168 LBS | OXYGEN SATURATION: 98 % | BODY MASS INDEX: 30.91 KG/M2 | RESPIRATION RATE: 18 BRPM | HEART RATE: 86 BPM | SYSTOLIC BLOOD PRESSURE: 112 MMHG

## 2025-02-13 DIAGNOSIS — N91.1 SECONDARY AMENORRHEA: ICD-10-CM

## 2025-02-13 DIAGNOSIS — E66.813 CLASS 3 OBESITY WITH ALVEOLAR HYPOVENTILATION, SERIOUS COMORBIDITY, AND BODY MASS INDEX (BMI) OF 45.0 TO 49.9 IN ADULT (CMS/HCC): Primary | ICD-10-CM

## 2025-02-13 DIAGNOSIS — E66.2 CLASS 3 OBESITY WITH ALVEOLAR HYPOVENTILATION, SERIOUS COMORBIDITY, AND BODY MASS INDEX (BMI) OF 45.0 TO 49.9 IN ADULT (CMS/HCC): Primary | ICD-10-CM

## 2025-02-13 PROCEDURE — 99214 OFFICE O/P EST MOD 30 MIN: CPT | Performed by: INTERNAL MEDICINE

## 2025-02-13 PROCEDURE — 3008F BODY MASS INDEX DOCD: CPT | Performed by: INTERNAL MEDICINE

## 2025-02-13 PROCEDURE — 3074F SYST BP LT 130 MM HG: CPT | Performed by: INTERNAL MEDICINE

## 2025-02-13 PROCEDURE — 3078F DIAST BP <80 MM HG: CPT | Performed by: INTERNAL MEDICINE

## 2025-02-13 NOTE — PROGRESS NOTES
39 y.o. yo female with PMH of IVDA, cholecystitis, pancreatitis 2/2 ERCP, fibromyalgia, endocarditis, vasculitis, ARIANNA, class 3 obesity, spinal stenosis, depression, pHTN, HTN, venous insufficiency presenting for follow up of iatrogenic Cushing's    Initial history (7/10/23):  - Former pt of Dr. Cerna at [place], seen 4/2019 in context of chronic steroid use with goal of discontinuation. Noted to have developed endocarditis in 2017 2/2 urosepsis with subsequent development of ? Vasculitis 4/2017 at [facility], at which time she was started on high dose steroids. Had been following with rheumatology Dr. Harris who started tapering the prednisone in winter of 2017. At that time she was on prednisone 10 mg daily. Recommended AM cortisol followed by ACTH stim test if equivocal. Recommended tapering steroids by 1 mg per week. Unable to find if pt did labs for Dr. Cerna at that time.  - Saw PCP Yumiko Lopez DO 6/13/23 to establish care. Noted to be on prednisone 5 mg daily at that time. Noted to have no menstrual cycle for > 1 year even after stopping OCP. Referred to endocrinology for AI. Post visit labs found low midday cortisol of 0.8 and pt was sent to ED  - Pt admitted to [facility] 6/26-6/30/23 for possible adrenal insufficiency, seen by Dr. Flores. Had a 4 pm cortisol of 1.7, then AM cortisol of 8.1. Noted to be on a steroids taper from prednisone 50 mg daily and was on 5 mg daily at the time of admission. Per consult note pt experiences swelling when tapering steroids. Dr. Flores felt she did not have AI. Per discharge summary had been tapered from 15 mg prednisone to 5 mg over 4 weeks. She was discharged on 7.5 mg daily    First visit with me 7/10/23  Last visit 9/26/24. Treatment plan continued     Interval history summarized as per review of records:  - Went to urgent care 9/26 for finger injury. Splint applied  - Saw neurology Jesus MAGALLON 10/8 for botox injection  - Saw pulmonary   Vega 10/16 in follow up. Treatment plan continued   - Saw PC Priscilla Leyva 11/5 for pimples, eczema. Rx topical clindamycin and elocon. Referred to dermatology  - Saw cardiology Dr. Middleton 11/20 in follow up. Treatment plan continued  - Saw PC Priscilla Autumn 12/3 for bronchitis. Rx Z pack and medrol dose ron. CXR ordered  - 12/8 increased wegovy dose  - Saw neurology Jesus MAGALLON 12/16 for trigger point injection  - Saw neurology Jesus MAGALLON 12/26 for botox injection  - Saw vascular Dr. Richmond 1/15 in follow up. Planned for ablation of varicose veins.   - Saw GI Elizabeth RODRIGUES 1/20 for constipation. Planned for colonoscopy and MRI pelvis with defecography. Rx linzess. Stopped senna and colace    Had normal TTE recently    Feels like she has more inflammation than before. Notes worse back pain. Got MRI C and T spine. States it is due to degenerative issue. Was recommended to go [place] [facility] [place] be on board in case she ever needed surgery. Got epidural 3 weeks ago but states stenosis was so bad he couldn't get the needle all the way in. Following up again in 1 week.     Off chronic prednisone since 12/13/23.   Took Medrol dose ron 12/2024 for bronchitis    Weight gain: Started wegovy 1/2024 at 257 lb, currently on 2.4 mg weekly since 12/8/24. Lost another 21 lb since last visit (89 lb total/34% BW since starting wegovy).   Notes new constipation in past few months. GI does not think wegovy is causing the problem. Got an MRI of pelvis 2 days ago, GI feels like pelvic floor muscles are collapsing and causing the constipation. Awaiting colonoscopy 4/2025  Wt Readings from Last 3 Encounters:   02/13/25 76.2 kg (168 lb)   02/11/25 76.7 kg (169 lb)   01/15/25 78.7 kg (173 lb 9.6 oz)     Menstrual history: started OCP so long ago that can't remember if cycles were regular prior. When on OCP would skip cycles occasionally. Stopped OCP summer 2022 and has not had cycle  since    Pregnancy history: never  Interested in getting pregnant now/soon: no  Currently sexually active: no    Has h/o pancreatitis in 2012 as a complication of an ERCP  No family history of thyroid cancer    Lives with mother    ROS: Complete ROS is otherwise negative except as mentioned in the HPI above  -------------------------------------------------------------------------  Past Medical History:   Diagnosis Date    Acute bacterial endocarditis 12/10/2020    Anxiety     Depressed     Endocarditis     Fibromyalgia     Migraines     Obstructive sleep apnea syndrome 01/10/2023    uses CPAP machine    Osteomyelitis (CMS/HCC) 01/10/2023    Pancreatitis     Recurrent major depressive disorder (CMS/HCC) 2023    Substance abuse (CMS/HCC) 2015    Tachycardia     Vasculitis (CMS/HCC) 2017       Past Surgical History   Procedure Laterality Date    Cholecystectomy      Ercp      Gallbladder surgery      Tonsillectomy         Family History   Problem Relation Name Age of Onset    Hypertension Biological Mother      Lung cancer Biological Father  76        smoker    Lung cancer Maternal Grandmother  69        smoker    Heart disease Maternal Grandfather      Heart attack Maternal Grandfather          unsure of age    Colon cancer Maternal Grandfather         Social History     Socioeconomic History    Marital status: Single     Spouse name: None    Number of children: None    Years of education: None    Highest education level: None   Occupational History    Occupation:    Tobacco Use    Smoking status: Former     Current packs/day: 0.00     Average packs/day: 0.5 packs/day for 5.0 years (2.5 ttl pk-yrs)     Types: Cigarettes     Start date:      Quit date:      Years since quittin.1    Smokeless tobacco: Former    Tobacco comments:     quit 1 year ago, vapes currently   Vaping Use    Vaping status: Every Day    Substances: Nicotine, Flavoring    Devices: [email or web address]    Substance and Sexual Activity    Alcohol use: Not Currently    Drug use: Not Currently     Types: Marijuana, Heroin     Comment: MM now 2023, previous heroin and opiod    Sexual activity: Not Currently     Partners: Male, Female   Social History Narrative    Lives with mom     Social Drivers of Health     Financial Resource Strain: Low Risk  (6/27/2023)    Overall Financial Resource Strain (CARDIA)     Difficulty of Paying Living Expenses: Not hard at all   Food Insecurity: No Food Insecurity (6/9/2024)    Hunger Vital Sign     Worried About Running Out of Food in the Last Year: Never true     Ran Out of Food in the Last Year: Never true   Transportation Needs: No Transportation Needs (6/27/2023)    PRAPARE - Transportation     Lack of Transportation (Medical): No     Lack of Transportation (Non-Medical): No   Housing Stability: Low Risk  (6/27/2023)    Housing Stability Vital Sign     Unable to Pay for Housing in the Last Year: No     Number of Places Lived in the Last Year: 1     Unstable Housing in the Last Year: No         Current Outpatient Medications:     albuterol HFA (VENTOLIN HFA) 90 mcg/actuation inhaler, Inhale 2 puffs every 4 (four) hours as needed., Disp: , Rfl:     amoxicillin (AMOXIL) 500 mg capsule, Take 4 capsules (2,000 mg total) by mouth once for 1 dose. Take 1 hour before scheduled dental work., Disp: 4 capsule, Rfl: 2    clindamycin-benzoyl peroxide 1-5 % gel with pump, Apply topically 2 (two) times a day., Disp: 35 g, Rfl: 1    desvenlafaxine succinate (PRISTIQ) 25 mg tablet extended release 24 hr, Take 25 mg by mouth daily., Disp: , Rfl:     famotidine (PEPCID) 20 mg tablet, TAKE 1 TABLET (20 MG TOTAL) BY MOUTH DAILY BEFORE DINNER., Disp: 90 tablet, Rfl: 1    ferrous sulfate 325 mg (65 mg iron) tablet, Take 65 mg by mouth nightly., Disp: , Rfl:     fluticasone propion-salmeteroL (ADVAIR DISKUS) 500-50 mcg/dose diskus inhaler, Inhale 1 puff 2 (two) times a day., Disp: , Rfl:     fluticasone  propionate (FLONASE) 50 mcg/actuation nasal spray, ADMINISTER 1 SPRAY INTO EACH NOSTRIL DAILY AS NEEDED FOR RHINITIS OR ALLERGIES., Disp: 16 mL, Rfl: 1    magnesium oxide (MAG-OX) 400 mg (241.3 mg magnesium) tablet, Take 1 tablet (400 mg total) by mouth daily., Disp: 90 tablet, Rfl: 1    medical marijuana, 1 each See admin instr. Please be advised that an [facility] hospital staff member has listed medical marijuana as one of your home medications for documentation purposes. Please follow-up with your certified issuing provider for ongoing use, Disp: , Rfl:     metoprolol succinate XL (TOPROL-XL) 25 mg 24 hr tablet, Take 1 tablet (25 mg total) by mouth nightly., Disp: 90 tablet, Rfl: 1    mometasone (ELOCON) 0.1 % cream, Apply topically daily., Disp: 45 g, Rfl: 1    naloxone (NARCAN) 4 mg/actuation spray,non-aerosol nasal spray, , Disp: , Rfl:     NURTEC ODT 75 mg tablet,disintegrating, TAKE 1 TABLET (75 MG TOTAL) BY MOUTH EVERY OTHER DAY, Disp: 15 tablet, Rfl: 4    onabotulinumtoxinA (BOTOX) injection, Botox for chronic migraine headache every 3 months, Disp: 1 each, Rfl: 4    pantoprazole (PROTONIX) 20 mg EC tablet, Take 1 tablet (20 mg total) by mouth daily., Disp: 90 tablet, Rfl: 0    pregabalin (LYRICA) 200 mg capsule, Take 1 capsule (200 mg total) by mouth 3 (three) times a day with meals., Disp: 90 capsule, Rfl: 2    semaglutide (WEGOVY) 2.4 mg/0.75 mL subcutaneous injection, Inject 2.4 mg under the skin every (seven) 7 days., Disp: 9 mL, Rfl: 1    SUBLOCADE 300 mg/1.5 mL solution, extended rel syringe subcutaneous injection, 1x monthly, Disp: , Rfl:     tamsulosin (FLOMAX) 0.4 mg capsule, Take 0.4 mg by mouth daily., Disp: , Rfl:     temazepam (RESTORIL) 15 mg capsule, Take 15 mg by mouth nightly as needed for sleep., Disp: , Rfl:     VITAMIN D3 125 mcg (5,000 unit) tablet, Take 5,000 Units by mouth., Disp: , Rfl:     Allergies   Allergen Reactions    Tramadol Palpitations    Amitriptyline Hcl      Other  "Reaction(s): liz racing at 10 mg    Amoxicillin GI intolerance    Nsaids (Non-Steroidal Anti-Inflammatory Drug) Nausea Only    Trazodone Other (see comments)     Headaches     -------------------------------------------------------------------------  PHYSICAL EXAM  Visit Vitals  /64 (BP Location: Right upper arm, Patient Position: Sitting)   Pulse 86   Resp 18   Ht 1.575 m (5' 2\")   Wt 76.2 kg (168 lb)   SpO2 98%   BMI 30.73 kg/m²     Gen: well nourished, no acute distress  Eyes: no proptosis, normal conjunctiva  Neck: no thyromegaly  CV: regular rate   Pulm: no use of accessory muscles, on room air  Neuro: AAOx3  MSK: steady gait, no tremor of outstretched hands  Psych: normal mood, affect    LABS REVIEWED  Cortisol   Date/Time Value Ref Range Status   05/28/2024 1105 5.9 (L) 6.2 - 19.4 ug/dL Final     Comment:     Please Note: The reference interval and flagging for   this test is for an AM collection. If this is a PM   collection please use:         Cortisol PM: 2.3-11.9     01/05/2024 1001 9.0 6.2 - 19.4 ug/dL Final     Comment:     Please Note: The reference interval and flagging for   this test is for an AM collection. If this is a PM   collection please use:         Cortisol PM: 2.3-11.9     06/29/2023 0738 0.5 ug/dL Final     Comment:     REFERENCE RANGE  AM: (08:00 +/- 1 hour) 6.7-22.6 ug/dL  PM: (16:00 +/- 1 hour)  <10.0 ug/dL   06/28/2023 0633 0.5 ug/dL Final     Comment:     REFERENCE RANGE  AM: (08:00 +/- 1 hour) 6.7-22.6 ug/dL  PM: (16:00 +/- 1 hour)  <10.0 ug/dL   06/27/2023 0353 8.1 ug/dL Final     Comment:     REFERENCE RANGE  AM: (08:00 +/- 1 hour) 6.7-22.6 ug/dL  PM: (16:00 +/- 1 hour)  <10.0 ug/dL     Cortisol - AM   Date/Time Value Ref Range Status   07/06/2023 1127 0.5 (L) 6.2 - 19.4 ug/dL Final   06/23/2023 1141 0.8 (L) 6.2 - 19.4 ug/dL Final     ACTH, Plasma   Date/Time Value Ref Range Status   05/28/2024 1105 30.5 7.2 - 63.3 pg/mL Final     Comment:     ACTH reference interval for " samples collected between 7 and 10 AM.   01/05/2024 1001 85.4 (H) 7.2 - 63.3 pg/mL Final     Comment:     ACTH reference interval for samples collected between 7 and 10 AM.   06/26/2023 1953 <5 (L) 6 - 50 pg/mL Final     Comment:     Reference range applies only to specimens collected  between 7am-10am.             Hemoglobin A1C   Date Value Ref Range Status   06/26/2023 5.4 <5.7 % Final     Hemoglobin A1c   Date Value Ref Range Status   05/28/2024 5.3 4.8 - 5.6 % Final     Comment:              Prediabetes: 5.7 - 6.4           Diabetes: >6.4           Glycemic control for adults with diabetes: <7.0          Lab Results   Component Value Date    GLUCOSE 85 11/13/2024    BUN 9 11/13/2024    CREATININE 0.58 11/13/2024    EGFR 119 11/13/2024     (H) 11/13/2024    K 4.1 11/13/2024     11/13/2024    CO2 28 11/13/2024    CALCIUM 9.5 06/09/2024       TSH   Date Value Ref Range Status   08/26/2024 2.290 0.450 - 4.500 uIU/mL Final   06/23/2023 2.500 0.450 - 4.500 uIU/mL Final   06/12/2023 2.490 0.450 - 4.500 uIU/mL Final     T4,Free(Direct)   Date Value Ref Range Status   08/26/2024 1.25 0.82 - 1.77 ng/dL Final   06/23/2023 1.27 0.82 - 1.77 ng/dL Final   06/12/2023 1.43 0.82 - 1.77 ng/dL Final      Lab Results   Component Value Date    HCGBSUBQN Negative 08/26/2024     Leutenizing Hormone   Date/Time Value Ref Range Status   06/27/2023 0353 6.8 IU/L Final     Comment:     REFERENCE RANGE  Mid-Follicular Phase  2.1-10.9 IU/L  Mid-Cycle Peak      19.2-103.0 IU/L  Mid-Luteal Phase      1.2-12.7 IU/L  Postmenopausal       10.9-58.6 IU/L     LH   Date/Time Value Ref Range Status   02/03/2025 1432 0.5 mIU/mL Final     Comment:                          Adult Female              Range                        Follicular phase      2.4 -  12.6                        Ovulation phase      14.0 -  95.6                        Luteal phase          1.0 -  11.4                        Postmenopausal        7.7 -  58.5      08/26/2024 1033 <0.3 mIU/mL Final     Comment:                          Adult Female              Range                        Follicular phase      2.4 -  12.6                        Ovulation phase      14.0 -  95.6                        Luteal phase          1.0 -  11.4                        Postmenopausal        7.7 -  58.5       Follicle Stimulating Hormone   Date/Time Value Ref Range Status   06/27/2023 0353 13.3 IU/L Final     Comment:     REFERENCE RANGE  Mid-Follicular Phase 3.8-8.8 IU/L  Mid-Cycle Peak      4.5-22.5 IU/L  Mid-Luteal Phase     1.8-5.1 IU/L  Postmenopausal    16.7-113.6 IU/L     FSH   Date/Time Value Ref Range Status   02/03/2025 1432 4.1 mIU/mL Final     Comment:                          Adult Female             Range                        Follicular phase      3.5 -  12.5                        Ovulation phase       4.7 -  21.5                        Luteal phase          1.7 -   7.7                        Postmenopausal       25.8 - 134.8     08/26/2024 1033 2.0 mIU/mL Final     Comment:                          Adult Female             Range                        Follicular phase      3.5 -  12.5                        Ovulation phase       4.7 -  21.5                        Luteal phase          1.7 -   7.7                        Postmenopausal       25.8 - 134.8       Estradiol   Date/Time Value Ref Range Status   02/03/2025 1432 8.8 pg/mL Final     Comment:                          Adult Female             Range                        Follicular phase     12.5 - 166.0                        Ovulation phase      85.8 - 498.0                        Luteal phase         43.8 - 211.0                        Postmenopausal       <6.0 -  54.7                       Pregnancy                        1st trimester     215.0 - >4300.0  Roche ECLIA methodology     08/26/2024 1033 <5.0 pg/mL Final     Comment:                          Adult Female             Range                         Follicular phase     12.5 - 166.0                        Ovulation phase      85.8 - 498.0                        Luteal phase         43.8 - 211.0                        Postmenopausal       <6.0 -  54.7                       Pregnancy                        1st trimester     215.0 - >4300.0  Roche ECLIA methodology       Prolactin   Date/Time Value Ref Range Status   06/28/2023 0633 22.1 3.3 - 26.7 ng/mL Final     17-OH Progesterone LCMS   Date/Time Value Ref Range Status   08/26/2024 1033 45 ng/dL Final     Comment:                               Adult Female                             Follicular        15 -  70                             Luteal            35 - 290       DHEA-Sulfate   Date/Time Value Ref Range Status   02/03/2025 1432 54.9 (L) 57.3 - 279.2 ug/dL Final   08/26/2024 1033 49.3 (L) 57.3 - 279.2 ug/dL Final     Testosterone, Serum   Date/Time Value Ref Range Status   08/26/2024 1033 5 (L) 8 - 60 ng/dL Final        IMAGING REVIEWED  CT ABDOMEN PELVIS WITH IV CONTRAST 1/13/22    Narrative  CLINICAL HISTORY: Right-sided back pain, right upper quadrant abdominal pain, flank pain, clinical concern for pyelonephritis.  COVID-19 positive.  High probability of pulmonary emboli.    COMMENT:    Comparison: CT of the chest dated 12/9/2020.  Right upper quadrant ultrasound dated 12/17/2020.    TECHNIQUE: CT angiography of the chest was performed as per departmental pulmonary embolism protocol, with axial images acquired following the intravenous administration of 100 cc Omnipaque-350.  Portal venous phase imaging of the abdomen and pelvis was then performed.  Delayed phase imaging of the abdomen was performed through the level of the kidneys.  Oral contrast was not administered.  Sagittal and coronal reconstructed images were rendered.  3-D MIP and/or volume-rendered image reconstruction of the chest was performed and reviewed.    CT DOSE:  One or more dose reduction techniques (e.g. automated exposure control,  adjustment of the mA and/or kV according to patient size, use of iterative reconstruction technique) utilized for this examination.    CHEST:  LUNGS and AIRWAYS: There are faint patchy groundglass alveolar opacities in both upper lobes and the lingula which lack a specific distribution.  These are nonspecific though most suggestive of an inflammatory or infectious pneumonitis. There is no consolidation or mass.  The trachea and central main airways are patent and normal in caliber.  PLEURA: Unremarkable. No pleural effusions.  VESSELS: No central, lobar, or proximal segmental pulmonary arterial filling defects to suggest pulmonary emboli.  The thoracic aorta and main pulmonary artery are normal in caliber.  HEART: Heart size is normal.  No pericardial effusion.  MEDIASTINUM and PAULINA: Mildly enlarged prevascular lymph node to the left of the aortic arch which measures 1.1 cm in short axis (previously 1.7 cm).  Mildly enlarged subcarinal lymph node measuring 1 cm (previously 1.2 cm).  These lymph nodes are nonspecific, possibly reactive.  No pathologically enlarged hilar lymph nodes.  CHEST WALL and LOWER NECK: Unremarkable.  No pathologically enlarged axillary  lymph nodes.    ABDOMEN:  LIVER: Top normal in size.  Otherwise unremarkable.  GALLBLADDER: Cholecystectomy.  BILE DUCTS: No intrahepatic or extrahepatic biliary ductal dilatation.  PANCREAS: Unremarkable.  SPLEEN: Unremarkable.  ADRENALS: Unremarkable.  KIDNEYS: There is normal and symmetric renal enhancement and excretion.  There are no findings typical of pyelonephritis.  There is no hydronephrosis.  There is no perinephric fat stranding or perinephric collection.  URETERS: Nondilated.  BOWEL: The stomach is nondilated.  The small and large bowel are normal in caliber.  A collapsed appendix is identified.  PERITONEUM: No ascites or pneumoperitoneum. No fluid collection.  RETROPERITONEUM: Unremarkable.  LYMPH NODES: None pathologically enlarged.  VESSELS:  Unremarkable. Normal caliber abdominal aorta.  UPPER ABDOMINAL WALL SOFT TISSUES: Unremarkable.    PELVIS:  BLADDER: Essentially completely collapsed and therefore cannot be adequately assessed.  REPRODUCTIVE ORGANS: No pelvic masses.  PERITONEUM: No free fluid. No fluid collection.  RETROPERITONEUM: Unremarkable.  VESSELS: Unremarkable.  LYMPH NODES: None pathologically enlarged.  LOWER ABDOMINAL WALL SOFT TISSUES: Unremarkable.    BONES: Multilevel spondylosis and tiny multilevel Schmorl's nodes in the thoracic and lumbar spine.  Vacuum disc phenomenon is noted at several lower thoracic and lower lumbar levels.  Tiny sclerotic foci in the bony pelvis are nonspecific, most likely incidental benign bone islands.  No destructive osseous lesions.    --    Impression  1.  No evidence of pulmonary emboli as clinically questioned.  2.  Faint patchy groundglass alveolar opacities in both upper lobes and the lingula, nonspecific.  An infectious or inflammatory pneumonitis might have this appearance.  No pulmonary consolidation.  3.  Mild mediastinal lymphadenopathy persists though has improved since 12/9/2020.  This is nonspecific, possibly reactive.  4.  No acute abnormality in the abdomen or pelvis.  No specific evidence of an inflammatory or obstructive process.  No evidence of pyelonephritis as clinically questioned.      ASSESSMENT AND PLAN:     1. Class 3 obesity  - Now down to class 1 obesity category by BMI: Body mass index is 30.73 kg/m².  - Lost 89 lb total/34% BW since starting wegovy 1/2024  - Continue wegovy 2.4 mg weekly    2. Secondary amenorrhea  - Previously on OCP for prolonged duration and pt could not previously remember if her cycles were regular prior to OCP initiation  - Stopped OCP summer 2022 and has not had cycle since that time even since being off prednisone since 12/2023  - Lab evaluation 8/2024 showed normal PRL, 17OHP but low testosterone, DHEAS, E2, LH, FSH.   - Repeat DHEAS, E2, LH, FSH  2/2025 shows persistent low DHEAS and E2 but both are improved and E2 is now detectable.   - Suspect low DHEAS/testosterone may be due to prior iatrogenic Cushing's as below. Undetectable E2 with low LH/FSH may be related to prior opiate/current sublocade use, which pt is trying to taper off gradually.   - Check MRI pituitary since pt still with amenorrhea. If there is a pituitary adenoma she may require surgical evaluation. Will contact pt with results   - If E2 remains low at next visit and pt still without menses will start HRT and check screening DXA. Pt in agreement with this plan    3. Iatrogenic Cushing'  - Was on and off high dose steroids due to vasculitis since 2017. Was off altogether from 2019 until 10/2022 at which time prednisone was restarted by urgent care due to rash on L foot concerning for recurrent vasculitis. Was unable to taper down past prednisone 30 mg daily for ~6 mo, then started tapering few months prior to initial visit 7/023  - Had tapered down to 5 mg daily for ~1.5 weeks then got admitted to [facility] 6/2023 with concern for AI per PCP due to cortisol level of 0.8 around noon as well as worsening swelling on lower dose of prednisone. Was discharged from hospital on prednisone 7.5 mg daily  - At initial visit 7/2023 initiated slow taper of prednisone and she is now off it, last dose 12/13/23. Continues to feel better off chronic prednisone, though has needed intermittent steroids since then (medrol dose ron, epidural injections)  - Continue off prednisone     RTC 6 mo

## 2025-02-24 ENCOUNTER — TELEPHONE (OUTPATIENT)
Age: 40
End: 2025-02-24

## 2025-03-02 DIAGNOSIS — M48.061 SPINAL STENOSIS OF LUMBAR REGION, UNSPECIFIED WHETHER NEUROGENIC CLAUDICATION PRESENT: ICD-10-CM

## 2025-03-03 NOTE — TELEPHONE ENCOUNTER
Medicine Refill Request    Last Office Visit: 11/5/2024   Last Consult Visit: Visit date not found  Last Telemedicine Visit: 12/3/2024 Priscilla Leyva PA C    Next Appointment: 4/8/2025      Current Outpatient Medications:     albuterol HFA (VENTOLIN HFA) 90 mcg/actuation inhaler, Inhale 2 puffs every 4 (four) hours as needed., Disp: , Rfl:     amoxicillin (AMOXIL) 500 mg capsule, Take 4 capsules (2,000 mg total) by mouth once for 1 dose. Take 1 hour before scheduled dental work., Disp: 4 capsule, Rfl: 2    clindamycin-benzoyl peroxide 1-5 % gel with pump, Apply topically 2 (two) times a day., Disp: 35 g, Rfl: 1    desvenlafaxine succinate (PRISTIQ) 25 mg tablet extended release 24 hr, Take 25 mg by mouth daily., Disp: , Rfl:     famotidine (PEPCID) 20 mg tablet, TAKE 1 TABLET (20 MG TOTAL) BY MOUTH DAILY BEFORE DINNER., Disp: 90 tablet, Rfl: 1    ferrous sulfate 325 mg (65 mg iron) tablet, Take 65 mg by mouth nightly., Disp: , Rfl:     fluticasone propion-salmeteroL (ADVAIR DISKUS) 500-50 mcg/dose diskus inhaler, Inhale 1 puff 2 (two) times a day., Disp: , Rfl:     fluticasone propionate (FLONASE) 50 mcg/actuation nasal spray, ADMINISTER 1 SPRAY INTO EACH NOSTRIL DAILY AS NEEDED FOR RHINITIS OR ALLERGIES., Disp: 16 mL, Rfl: 1    magnesium oxide (MAG-OX) 400 mg (241.3 mg magnesium) tablet, Take 1 tablet (400 mg total) by mouth daily., Disp: 90 tablet, Rfl: 1    medical marijuana, 1 each See admin instr. Please be advised that an [facility] hospital staff member has listed medical marijuana as one of your home medications for documentation purposes. Please follow-up with your certified issuing provider for ongoing use, Disp: , Rfl:     metoprolol succinate XL (TOPROL-XL) 25 mg 24 hr tablet, Take 1 tablet (25 mg total) by mouth nightly., Disp: 90 tablet, Rfl: 1    mometasone (ELOCON) 0.1 % cream, Apply topically daily., Disp: 45 g, Rfl: 1    naloxone (NARCAN) 4 mg/actuation spray,non-aerosol nasal spray, , Disp: ,  Rfl:     NURTEC ODT 75 mg tablet,disintegrating, TAKE 1 TABLET (75 MG TOTAL) BY MOUTH EVERY OTHER DAY, Disp: 15 tablet, Rfl: 4    onabotulinumtoxinA (BOTOX) injection, Botox for chronic migraine headache every 3 months, Disp: 1 each, Rfl: 4    pantoprazole (PROTONIX) 20 mg EC tablet, Take 1 tablet (20 mg total) by mouth daily., Disp: 90 tablet, Rfl: 0    pregabalin (LYRICA) 200 mg capsule, Take 1 capsule (200 mg total) by mouth 3 (three) times a day with meals., Disp: 90 capsule, Rfl: 2    semaglutide (WEGOVY) 2.4 mg/0.75 mL subcutaneous injection, Inject 2.4 mg under the skin every (seven) 7 days., Disp: 9 mL, Rfl: 1    SUBLOCADE 300 mg/1.5 mL solution, extended rel syringe subcutaneous injection, 1x monthly, Disp: , Rfl:     tamsulosin (FLOMAX) 0.4 mg capsule, Take 0.4 mg by mouth daily., Disp: , Rfl:     temazepam (RESTORIL) 15 mg capsule, Take 15 mg by mouth nightly as needed for sleep., Disp: , Rfl:     VITAMIN D3 125 mcg (5,000 unit) tablet, Take 5,000 Units by mouth., Disp: , Rfl:     BP Readings from Last 3 Encounters:   02/13/25 112/64   01/15/25 111/67   12/26/24 122/78       Recent Lab results:  Lab Results   Component Value Date    CHOL 167 11/13/2024   ,   Lab Results   Component Value Date    HDL 45 11/13/2024   ,   Lab Results   Component Value Date    LDLCALC 81 11/13/2024   ,   Lab Results   Component Value Date    TRIG 248 (H) 11/13/2024        Lab Results   Component Value Date    GLUCOSE 85 11/13/2024   ,   Lab Results   Component Value Date    HGBA1C 5.3 05/28/2024         Lab Results   Component Value Date    CREATININE 0.58 11/13/2024       Lab Results   Component Value Date    TSH 2.290 08/26/2024           Lab Results   Component Value Date    HGBA1C 5.3 05/28/2024

## 2025-03-04 RX ORDER — PREGABALIN 200 MG/1
200 CAPSULE ORAL
Qty: 90 CAPSULE | Refills: 2 | Status: SHIPPED | OUTPATIENT
Start: 2025-03-04 | End: 2025-05-22 | Stop reason: SDUPTHER

## 2025-03-04 NOTE — TELEPHONE ENCOUNTER
Patient has called again requesting refill of Lyrica   Last filled 12/10/2024  Patient is concerned that she will be out of medication   The pharmacy typically has to order it.   RN explained that this is a controlled substance and is not due for next fill until 3/10/2025 - that it can only be sent a few days early not 1 week    As soon as RN hung up the phone, patient called back to report that she was just notified of prescription being sent to [facility].     Patient admits to being anxious about not having her medication.

## 2025-03-07 ENCOUNTER — OFFICE VISIT (OUTPATIENT)
Dept: UROGYNECOLOGY | Facility: CLINIC | Age: 40
End: 2025-03-07
Payer: COMMERCIAL

## 2025-03-07 VITALS
WEIGHT: 163 LBS | BODY MASS INDEX: 30 KG/M2 | HEIGHT: 62 IN | DIASTOLIC BLOOD PRESSURE: 60 MMHG | SYSTOLIC BLOOD PRESSURE: 90 MMHG

## 2025-03-07 DIAGNOSIS — R39.14 FEELING OF INCOMPLETE BLADDER EMPTYING: ICD-10-CM

## 2025-03-07 DIAGNOSIS — K56.41 OBSTRUCTIVE DEFECATION (CMS/HCC): Primary | ICD-10-CM

## 2025-03-07 DIAGNOSIS — N91.2 AMENORRHEA: ICD-10-CM

## 2025-03-07 PROBLEM — Z79.899 MEDICAL MARIJUANA USE: Status: RESOLVED | Noted: 2024-06-28 | Resolved: 2025-03-07

## 2025-03-07 PROBLEM — N31.9 NEUROGENIC BLADDER: Status: RESOLVED | Noted: 2024-08-30 | Resolved: 2025-03-07

## 2025-03-07 PROBLEM — R11.2 NAUSEA & VOMITING: Status: RESOLVED | Noted: 2021-01-16 | Resolved: 2025-03-07

## 2025-03-07 PROCEDURE — 99204 OFFICE O/P NEW MOD 45 MIN: CPT | Performed by: OBSTETRICS & GYNECOLOGY

## 2025-03-07 PROCEDURE — 3078F DIAST BP <80 MM HG: CPT | Performed by: OBSTETRICS & GYNECOLOGY

## 2025-03-07 PROCEDURE — 3074F SYST BP LT 130 MM HG: CPT | Performed by: OBSTETRICS & GYNECOLOGY

## 2025-03-07 PROCEDURE — 3008F BODY MASS INDEX DOCD: CPT | Performed by: OBSTETRICS & GYNECOLOGY

## 2025-03-07 RX ORDER — MEDROXYPROGESTERONE ACETATE 10 MG/1
10 TABLET ORAL DAILY
Qty: 7 TABLET | Refills: 0 | Status: SHIPPED | OUTPATIENT
Start: 2025-03-07 | End: 2025-05-13

## 2025-03-07 NOTE — ASSESSMENT & PLAN NOTE
Labs suggest possible hypothalamic hypogonadism  Doubt premature ovarian failure  Will do a progestin challenge  This will require gyn follow up.

## 2025-03-07 NOTE — PROGRESS NOTES
"Visit Date: 03/07/2025   Carolyn Redmond is 39 y.o. female who is referred for evaluation of Prolapse  She presented to GI about a month ago because of chronic constipation for the past five years.  She infrequently has a bowel movement once a week. She now has to take four laxatives.  She has symptoms of obstructive defecation and needs to pull up her buttocks to empty rectum.  She has also has had difficulty emptying her bladder as well since 2019. She saw urology who felt that her \"muscles do not relax\". She was treated with flomax that did not work. Cystoscopy was normal.   Her bladder always feels full, she has urinary hesitancy and poor stream. Void intermittently. She voids at least once an hour when she is at home.  She does not like to use public restrooms and only goes if she absolutely has to.     She has had a choleycystectomy in 2012.   She has never been pregnant. She has been amenorrheic for two years.     Endocrine workup:  E2 8.8,  FSH 4.1. LH 0.5      Visit Vitals  BP 90/60   Ht 1.575 m (5' 2\")   Wt 73.9 kg (163 lb)   BMI 29.81 kg/m²       Medications:   Outpatient Medications Prior to Visit   Medication Sig    albuterol HFA (VENTOLIN HFA) 90 mcg/actuation inhaler Inhale 2 puffs every 4 (four) hours as needed.    famotidine (PEPCID) 20 mg tablet TAKE 1 TABLET (20 MG TOTAL) BY MOUTH DAILY BEFORE DINNER.    ferrous sulfate 325 mg (65 mg iron) tablet Take 65 mg by mouth nightly.    fluticasone propion-salmeteroL (ADVAIR DISKUS) 500-50 mcg/dose diskus inhaler Inhale 1 puff 2 (two) times a day.    fluticasone propionate (FLONASE) 50 mcg/actuation nasal spray ADMINISTER 1 SPRAY INTO EACH NOSTRIL DAILY AS NEEDED FOR RHINITIS OR ALLERGIES.    linaCLOtide (LINZESS) 290 mcg capsule Take 290 mcg by mouth daily.    magnesium oxide (MAG-OX) 400 mg (241.3 mg magnesium) tablet Take 1 tablet (400 mg total) by mouth daily.    medical marijuana 1 each See admin instr. Please be advised that an [facility] hospital staff " member has listed medical marijuana as one of your home medications for documentation purposes. Please follow-up with your certified issuing provider for ongoing use    metoprolol succinate XL (TOPROL-XL) 25 mg 24 hr tablet Take 1 tablet (25 mg total) by mouth nightly.    NURTEC ODT 75 mg tablet,disintegrating TAKE 1 TABLET (75 MG TOTAL) BY MOUTH EVERY OTHER DAY    onabotulinumtoxinA (BOTOX) injection Botox for chronic migraine headache every 3 months    pantoprazole (PROTONIX) 20 mg EC tablet Take 1 tablet (20 mg total) by mouth daily.    pregabalin (LYRICA) 200 mg capsule Take 1 capsule (200 mg total) by mouth 3 (three) times a day with meals.    semaglutide (WEGOVY) 2.4 mg/0.75 mL subcutaneous injection Inject 2.4 mg under the skin every (seven) 7 days.    SUBLOCADE 300 mg/1.5 mL solution, extended rel syringe subcutaneous injection 1x monthly    temazepam (RESTORIL) 15 mg capsule Take 15 mg by mouth nightly as needed for sleep.    VITAMIN D3 125 mcg (5,000 unit) tablet Take 5,000 Units by mouth.    amoxicillin (AMOXIL) 500 mg capsule Take 4 capsules (2,000 mg total) by mouth once for 1 dose. Take 1 hour before scheduled dental work.    clindamycin-benzoyl peroxide 1-5 % gel with pump Apply topically 2 (two) times a day.    mometasone (ELOCON) 0.1 % cream Apply topically daily.    naloxone (NARCAN) 4 mg/actuation spray,non-aerosol nasal spray     [DISCONTINUED] tamsulosin (FLOMAX) 0.4 mg capsule Take 0.4 mg by mouth daily.     Allergies: is allergic to tramadol, amitriptyline hcl, amoxicillin, nsaids (non-steroidal anti-inflammatory drug), and trazodone.     Past Medical History:  has a past medical history of Acute bacterial endocarditis (12/10/2020), Anxiety, Depressed, Endocarditis, Fibromyalgia, Migraines, Obstructive sleep apnea syndrome (01/10/2023), Osteomyelitis (CMS/[facility]) (01/10/2023), Pancreatitis, Recurrent major depressive disorder (CMS/[facility]) (01/06/2023), Substance abuse (CMS/[facility]) (2015), Tachycardia,  and Vasculitis (CMS/[facility]) (2017).  Past Surgical History:  has a past surgical history that includes Gallbladder surgery; Cholecystectomy; ERCP; and Tonsillectomy ().  Family History: family history includes Colon cancer in her maternal grandfather; Heart attack in her maternal grandfather; Heart disease in her maternal grandfather; Hypertension in her biological mother; Lung cancer (age of onset: 69) in her maternal grandmother; Lung cancer (age of onset: 76) in her biological father.  Social History:   Social History     Tobacco Use    Smoking status: Former     Current packs/day: 0.00     Average packs/day: 0.5 packs/day for 5.0 years (2.5 ttl pk-yrs)     Types: Cigarettes     Start date:      Quit date:      Years since quittin.1    Smokeless tobacco: Former    Tobacco comments:     quit 1 year ago, vapes currently   Vaping Use    Vaping status: Every Day    Substances: Nicotine, Flavoring    Devices: [employer] tank   Substance Use Topics    Alcohol use: Not Currently    Drug use: Not Currently     Types: Marijuana, Heroin     Comment: MM now , previous heroin and opiod         Review of Systems   Constitutional:  Negative for chills, fever and unexpected weight change.   Respiratory:  Negative for cough and shortness of breath.    Cardiovascular:  Negative for chest pain, palpitations and leg swelling.   Gastrointestinal:  Positive for constipation. Negative for abdominal distention and abdominal pain.   Endocrine: Negative for polyphagia.   Genitourinary:  Positive for difficulty urinating. Negative for dysuria, enuresis, flank pain, frequency, pelvic pain and vaginal discharge.   Musculoskeletal:  Positive for myalgias.   Skin:  Negative for rash.   Neurological:  Negative for numbness.   Hematological:  Negative for adenopathy. Does not bruise/bleed easily.   Psychiatric/Behavioral:  Negative for dysphoric mood. The patient is not nervous/anxious.      Physical Exam  Constitutional:        Appearance: Normal appearance. She is well-developed. She is obese.     Genitourinary:    Urethra, bladder, cervix, urethral meatus, external female genitalia and adnexa normal.   Body Habitus limits findings.Pelvic exam was performed with patient in lithotomy exam position.   Pelvic exam conducted with staff chaperone present. There is no urethral urethrocele or urethral diverticulum. No stress urinary incontinence.There is no cystocele, uterine prolapse, rectocele or enterocele present.   Rectal exam shows no rectal prolapse and no external hemorrhoid.    Genitourinary Comments: Genitourinary exam was conducted with a chaperone present in the room  No significant prolapse  Normal introitus  Normal hiatus  No levator spasm     Neck:      Thyroid: No thyromegaly.      Vascular: No JVD.   Cardiovascular:      Pulses: Normal pulses.      Comments: No  JVD  No peripheral edema  Peripheral vascular normal  Pulmonary:      Effort: Pulmonary effort is normal. No tachypnea or respiratory distress.   Abdominal:      General: Abdomen is flat. There is no distension.      Palpations: There is no hepatomegaly.      Tenderness: There is no abdominal tenderness. There is no right CVA tenderness, left CVA tenderness, guarding or rebound.      Hernia: No hernia is present.   Musculoskeletal:      Right lower leg: No edema.      Left lower leg: No edema.   Neurological:      Mental Status: She is alert and oriented to person, place, and time.      Sensory: No sensory deficit.   Skin:     Findings: No bruising or rash.   Psychiatric:         Attention and Perception: She is attentive.         Mood and Affect: Affect is not inappropriate.         Behavior: Behavior normal.         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       Feeling of incomplete bladder emptying  Assessment & Plan:  She does no have urinary retention and does not meet criteria of neurogenic bladder  No role for flomax  This is likely secondary to nonrelaxing pelvic floor and/or her constipation  I will do urodynamics to further evaluate    Orders:  -     Ambulatory referral to Physical Therapy; Future    Other orders  -     medroxyPROGESTERone (PROVERA) 10 mg tablet; Take 1 tablet (10 mg total) by mouth daily.        Return for urodynamics.     Demond Martínez MD

## 2025-03-07 NOTE — ASSESSMENT & PLAN NOTE
She does no have urinary retention and does not meet criteria of neurogenic bladder  No role for flomax  This is likely secondary to nonrelaxing pelvic floor and/or her constipation  I will do urodynamics to further evaluate

## 2025-03-07 NOTE — ASSESSMENT & PLAN NOTE
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

## 2025-03-07 NOTE — PATIENT INSTRUCTIONS
[facility]  - Dr Marija Mann,     Nonrelaxing pelvic floor (puborectalis syndrome)  Physical therapy   Dr Kathrin Morales

## 2025-03-12 ASSESSMENT — ENCOUNTER SYMPTOMS
DYSPHORIC MOOD: 0
NUMBNESS: 0
FEVER: 0
ABDOMINAL DISTENTION: 0
DIFFICULTY URINATING: 1
DYSURIA: 0
ABDOMINAL PAIN: 0
FLANK PAIN: 0
NERVOUS/ANXIOUS: 0
UNEXPECTED WEIGHT CHANGE: 0
SHORTNESS OF BREATH: 0
CONSTIPATION: 1
FREQUENCY: 0
MYALGIAS: 1
CHILLS: 0
ADENOPATHY: 0
POLYPHAGIA: 0
BRUISES/BLEEDS EASILY: 0
PALPITATIONS: 0
COUGH: 0

## 2025-03-16 DIAGNOSIS — K21.9 GASTROESOPHAGEAL REFLUX DISEASE, UNSPECIFIED WHETHER ESOPHAGITIS PRESENT: ICD-10-CM

## 2025-03-17 ENCOUNTER — TELEMEDICINE (OUTPATIENT)
Dept: BEHAVIORAL/MENTAL HEALTH CLINIC | Facility: CLINIC | Age: 40
End: 2025-03-17
Payer: COMMERCIAL

## 2025-03-17 ENCOUNTER — TRANSCRIBE ORDERS (OUTPATIENT)
Dept: SCHEDULING | Age: 40
End: 2025-03-17

## 2025-03-17 DIAGNOSIS — F41.1 GENERALIZED ANXIETY DISORDER: Primary | ICD-10-CM

## 2025-03-17 DIAGNOSIS — F33.1 MODERATE EPISODE OF RECURRENT MAJOR DEPRESSIVE DISORDER (HCC): ICD-10-CM

## 2025-03-17 DIAGNOSIS — M51.34 OTHER INTERVERTEBRAL DISC DEGENERATION, THORACIC REGION: Primary | ICD-10-CM

## 2025-03-17 PROCEDURE — 90837 PSYTX W PT 60 MINUTES: CPT | Performed by: COUNSELOR

## 2025-03-17 NOTE — BH CRISIS PLAN
Client Name: Samantha Vila       Client YOB: 1985    [facility] Safety Plan      Creation Date: 3/17/25 Update Date: 3/17/25   Created By: Baylee Monaco Last Updated By: Baylee Monaco      Step 1: Warning Signs:   Warning Signs   Sleep challenges 4-5 days   Watch too much TV/lay in bed multiple days   Isolating from people (no contact with friends, spend time in room)   Extra irritability with mother/aunt   Fear of Male doctors            Step 2: Internal Coping Strategies:   Internal Coping Strategies    a book-learning   Stretching, yoga, body movement   Painting, draing   listen to music            Step 3: People and social settings that provide distraction:   Name Contact Information   Johana    Doppelganger   Outside   [facility]           Step 4: People whom I can ask for help during a crisis:      Name Contact Information    Mother     Aunt       Step 5: Professionals or agencies I can contact during a crisis:      Clinican/Agency Name Phone Emergency Contact    [facility] [phone]       [facility] Emergency Department Emergency Department Phone Emergency Department Address    [place] (favorite)      [facility] (closest) [phone] [address] [place] [address]        Crisis Phone Numbers:   Suicide Prevention Lifeline: Call or Text  984 Crisis Text Line: Text HOME to 994-577   Please note: Some [facility] do not have a separate number for Child/Adolescent specific crisis. If your county is not listed under Child/Adolescent, please call the adult number for your county      Adult Crisis Numbers: Child/Adolescent Crisis Numbers   [facility]: [phone] [facility]: [phone]   [facility]: [phone] [facility]: [phone]   [facility]: [phone] [place]: [phone]   [facility]: [phone] [place]: [phone]   [place] [facility]: [phone]   [facility]: [phone]   [facility]:  [phone]   [facility]: [phone] (daytime) [phone] (after hours, weekends, holidays)      Step 6: Making the environment safer (plan for lethal means safety):   Patient did not identify any lethal methods: Yes     Optional: What is most important to me and worth living for?   Family and Friends  Plans to help people in the future (my purpose)       [place] Safety Plan. Lily Gibson and Niranjan Pang. Used with permission of the authors.

## 2025-03-17 NOTE — PSYCH
Virtual Regular VisitName: Samantha Vila      : 1985      MRN: [id number]  Encounter Provider: Baylee Monaco  Encounter Date: 3/17/2025   Encounter department: [facility] THERAPIST MENTAL HEALTH OUTPATIENT  :  Assessment & Plan  Generalized anxiety disorder         Moderate episode of recurrent major depressive disorder (HCC)             Goals addressed in session: Goal 1     DATA: Client and therapist met for a virtual session. Client endorses recent diagnosis with pelvic floor and needing surgery. Client processed on recent medical appt and how traumatic this was for her. Client met with physician who dismisses her concerns and ignored MRI results she received. Client feels that this person misused his position and considering writing a letter to his supervisor. Client processed on how this triggered her fears of male doctors due to other incidents she has had in the past. Client is having a rough time physically and not able to get out much. Client explored how this situation is affecting her anxiety and depression.  Client identifies some things she can do to decrease stress and care for herself. Client is going to try to utilize the teachings of Reiki to keep herself calm. Client was told by this doctor not to exercise and instead try to relax and she is confused by this advice.  Therapist used active listening, open ended questions, reflection and validation.      During this session, this clinician used the following therapeutic modalities: Client-centered Therapy, Gestalt Therapy Techniques, Mindfulness-based Strategies, and Solution-Focused Therapy    Substance Abuse was not addressed during this session. If the client is diagnosed with a co-occurring substance use disorder, please indicate any changes in the frequency or amount of use: N/A. Stage of change for addressing substance use diagnoses: No substance use/Not applicable    ASSESSMENT:  Samantha presents with a Euthymic/  "normal mood. Alicias affect is Normal range and intensity, which is congruent, with their mood and the content of the session. The client has made progress on their goals as evidenced by Client is identifying physical health problems to help decrease anxiety.    Samantha presents with a none risk of suicide, none risk of self-harm, and none risk of harm to others.    For any risk assessment that surpasses a \"low\" rating, a safety plan must be developed.    A safety plan was indicated: no  If yes, describe in detail N/A    PLAN: Between sessions, Samantha will continue therapy. At the next session, the therapist will use Client-centered Therapy, Cognitive Behavioral Therapy, Gestalt Therapy Techniques, Mindfulness-based Strategies, and Motivational Interviewing to address anxiety and depression.    Behavioral Health Treatment Plan [facility]: Diagnosis and Treatment Plan explained to Samantha, Samantha relates understanding diagnosis and is agreeable to Treatment Plan. Yes     Depression Follow-up Plan Completed: Not applicable     Reason for visit is No chief complaint on file.     Recent Visits  No visits were found meeting these conditions.  Showing recent visits within past 7 days and meeting all other requirements  Today's Visits  Date Type Provider Dept   03/17/25 Telemedicine Baylee Monaco [facility] Therapist op   Showing today's visits and meeting all other requirements  Future Appointments  No visits were found meeting these conditions.  Showing future appointments within next 150 days and meeting all other requirements     History of Present Illness     HPI    Past Medical History   No past medical history on file.  No past surgical history on file.  No current outpatient medications  Not on File    Objective   There were no vitals taken for this visit.    Video Exam  Physical Exam     Administrative Statements   Encounter provider Baylee Monaco    The Patient is located at Home and in the " following state in which I hold an active license [place].    The patient was identified by name and date of birth. Samantha Vila was informed that this is a telemedicine visit and that the visit is being conducted through the Epic Embedded platform. She agrees to proceed..  My office door was closed. No one else was in the room.  She acknowledged consent and understanding of privacy and security of the video platform. The patient has agreed to participate and understands they can discontinue the visit at any time.    I have spent a total time of 54 minutes in caring for this patient on the day of the visit/encounter including Counseling / Coordination of care, not including the time spent for establishing the audio/video connection.    Visit Time  Start Time: 1300  Stop Time: 1354  Total Visit Time: 54 minutes

## 2025-03-18 ENCOUNTER — TELEPHONE (OUTPATIENT)
Dept: ENDOCRINOLOGY | Facility: CLINIC | Age: 40
End: 2025-03-18
Payer: COMMERCIAL

## 2025-03-18 NOTE — TELEPHONE ENCOUNTER
MRI of pituitary denied by insurance and requires peer to peer. Rn called [facility].  Per [facility], office/provider calls  when ready for peer to peer.  Reference number is [id number]. Please advise.

## 2025-03-18 NOTE — TELEPHONE ENCOUNTER
Rn discussed with Dr Kaur.  Chart note addended.  Rn called [facility] back.  Faxed updated chart note back to .  Case ID [id number]. Call reference number [id number]. Will await determination.

## 2025-03-19 RX ORDER — PANTOPRAZOLE SODIUM 20 MG/1
20 TABLET, DELAYED RELEASE ORAL DAILY
Qty: 90 TABLET | Refills: 0 | Status: SHIPPED | OUTPATIENT
Start: 2025-03-19

## 2025-03-19 NOTE — TELEPHONE ENCOUNTER
Medicine Refill Request    Last Office Visit: 11/5/2024   Last Consult Visit: Visit date not found  Last Telemedicine Visit: 12/3/2024 Priscilla Leyva PA C    Next Appointment: 4/8/2025      Current Outpatient Medications:     albuterol HFA (VENTOLIN HFA) 90 mcg/actuation inhaler, Inhale 2 puffs every 4 (four) hours as needed., Disp: , Rfl:     amoxicillin (AMOXIL) 500 mg capsule, Take 4 capsules (2,000 mg total) by mouth once for 1 dose. Take 1 hour before scheduled dental work., Disp: 4 capsule, Rfl: 2    clindamycin-benzoyl peroxide 1-5 % gel with pump, Apply topically 2 (two) times a day., Disp: 35 g, Rfl: 1    famotidine (PEPCID) 20 mg tablet, TAKE 1 TABLET (20 MG TOTAL) BY MOUTH DAILY BEFORE DINNER., Disp: 90 tablet, Rfl: 1    ferrous sulfate 325 mg (65 mg iron) tablet, Take 65 mg by mouth nightly., Disp: , Rfl:     fluticasone propion-salmeteroL (ADVAIR DISKUS) 500-50 mcg/dose diskus inhaler, Inhale 1 puff 2 (two) times a day., Disp: , Rfl:     fluticasone propionate (FLONASE) 50 mcg/actuation nasal spray, ADMINISTER 1 SPRAY INTO EACH NOSTRIL DAILY AS NEEDED FOR RHINITIS OR ALLERGIES., Disp: 16 mL, Rfl: 1    linaCLOtide (LINZESS) 290 mcg capsule, Take 290 mcg by mouth daily., Disp: , Rfl:     magnesium oxide (MAG-OX) 400 mg (241.3 mg magnesium) tablet, Take 1 tablet (400 mg total) by mouth daily., Disp: 90 tablet, Rfl: 1    medical marijuana, 1 each See admin instr. Please be advised that an [facility] hospital staff member has listed medical marijuana as one of your home medications for documentation purposes. Please follow-up with your certified issuing provider for ongoing use, Disp: , Rfl:     medroxyPROGESTERone (PROVERA) 10 mg tablet, Take 1 tablet (10 mg total) by mouth daily., Disp: 7 tablet, Rfl: 0    metoprolol succinate XL (TOPROL-XL) 25 mg 24 hr tablet, Take 1 tablet (25 mg total) by mouth nightly., Disp: 90 tablet, Rfl: 1    mometasone (ELOCON) 0.1 % cream, Apply topically daily., Disp: 45 g,  Rfl: 1    naloxone (NARCAN) 4 mg/actuation spray,non-aerosol nasal spray, , Disp: , Rfl:     NURTEC ODT 75 mg tablet,disintegrating, TAKE 1 TABLET (75 MG TOTAL) BY MOUTH EVERY OTHER DAY, Disp: 15 tablet, Rfl: 4    onabotulinumtoxinA (BOTOX) injection, Botox for chronic migraine headache every 3 months, Disp: 1 each, Rfl: 4    pantoprazole (PROTONIX) 20 mg EC tablet, Take 1 tablet (20 mg total) by mouth daily., Disp: 90 tablet, Rfl: 0    pregabalin (LYRICA) 200 mg capsule, Take 1 capsule (200 mg total) by mouth 3 (three) times a day with meals., Disp: 90 capsule, Rfl: 2    semaglutide (WEGOVY) 2.4 mg/0.75 mL subcutaneous injection, Inject 2.4 mg under the skin every (seven) 7 days., Disp: 9 mL, Rfl: 1    SUBLOCADE 300 mg/1.5 mL solution, extended rel syringe subcutaneous injection, 1x monthly, Disp: , Rfl:     temazepam (RESTORIL) 15 mg capsule, Take 15 mg by mouth nightly as needed for sleep., Disp: , Rfl:     VITAMIN D3 125 mcg (5,000 unit) tablet, Take 5,000 Units by mouth., Disp: , Rfl:     BP Readings from Last 3 Encounters:   03/07/25 90/60   02/13/25 112/64   01/15/25 111/67       Recent Lab results:  Lab Results   Component Value Date    CHOL 167 11/13/2024   ,   Lab Results   Component Value Date    HDL 45 11/13/2024   ,   Lab Results   Component Value Date    LDLCALC 81 11/13/2024   ,   Lab Results   Component Value Date    TRIG 248 (H) 11/13/2024        Lab Results   Component Value Date    GLUCOSE 85 11/13/2024   ,   Lab Results   Component Value Date    HGBA1C 5.3 05/28/2024         Lab Results   Component Value Date    CREATININE 0.58 11/13/2024       Lab Results   Component Value Date    TSH 2.290 08/26/2024           Lab Results   Component Value Date    HGBA1C 5.3 05/28/2024

## 2025-03-20 NOTE — TELEPHONE ENCOUNTER
RN called [employer] to initiate peer to peer for dr zhang.  Per [facility], case is denied and no peer to peer can be done but an appeal can be submitted.  Rn called [employer] and initiated appeal.  Per [employer], if any more information is needed regarding the appeal, they will call or mail a letter to the office. Rn stated phone call or fax is preferred rather than standard mail. Reference number for appeal is [id number]. Per [employer], determination can be made in about 30 calendar days.     Rn called pt and left voicemail with this information and to call office if any questions.

## 2025-03-26 ENCOUNTER — OFFICE VISIT (OUTPATIENT)
Dept: NEUROLOGY | Facility: CLINIC | Age: 40
End: 2025-03-26
Attending: NURSE PRACTITIONER
Payer: COMMERCIAL

## 2025-03-26 VITALS
WEIGHT: 162 LBS | DIASTOLIC BLOOD PRESSURE: 68 MMHG | SYSTOLIC BLOOD PRESSURE: 102 MMHG | BODY MASS INDEX: 29.63 KG/M2 | HEART RATE: 82 BPM | OXYGEN SATURATION: 98 % | RESPIRATION RATE: 18 BRPM

## 2025-03-26 DIAGNOSIS — G43.709 CHRONIC MIGRAINE WITHOUT AURA WITHOUT STATUS MIGRAINOSUS, NOT INTRACTABLE: ICD-10-CM

## 2025-03-26 DIAGNOSIS — G24.3 SPASMODIC TORTICOLLIS: ICD-10-CM

## 2025-03-26 DIAGNOSIS — G43.901 STATUS MIGRAINOSUS: Primary | ICD-10-CM

## 2025-03-26 PROCEDURE — 99999 PR OFFICE/OUTPT VISIT,PROCEDURE ONLY: CPT | Performed by: NURSE PRACTITIONER

## 2025-03-26 PROCEDURE — 64615 CHEMODENERV MUSC MIGRAINE: CPT | Performed by: NURSE PRACTITIONER

## 2025-03-26 RX ORDER — KETOROLAC TROMETHAMINE 30 MG/ML
30 INJECTION, SOLUTION INTRAMUSCULAR; INTRAVENOUS ONCE
Status: COMPLETED | OUTPATIENT
Start: 2025-03-26 | End: 2025-03-26

## 2025-03-26 RX ADMIN — KETOROLAC TROMETHAMINE 30 MG: 30 INJECTION, SOLUTION INTRAMUSCULAR; INTRAVENOUS at 14:26

## 2025-03-26 NOTE — PROGRESS NOTES
Patient returns for injections.      Since last seen headaches have improved with Botox injections.    Correlated with headache diary, decreased abortive medication use.     At least 50 % of reduction of frequency and severity of moderate to severe headaches      Botox injections  Botox lot number: [id number]  Expiration Date: 04/2027      NDC#4113-6663-48    Patient informed and consent obtained.    General Consent including notice of privacy practices/patient bill of rights was reviewed and consent/authorization given.       4cc of Normal saline were added to one vial of Botox Type A resulting in 200 Units of Botox.  After the patient consented to the procedure the following muscles were injected after cleaning the overlying skin with alcohol. New FDA mandated warnings provided to the patient with instructions to seek medical attention for difficulty swallowing or breathing.    Frontalis 10 units right ( 2 sites each 5 units) and 10 units left ( 2 sites each 5 units)  /Glabellar 5 units right and 5 units left (medially)  Procerus 5 units    Temporalis 30 units right( 6 sites each 5 units) and 30 units left ( 6 sites each 5 units)    Trapezius 25 units ( 3 sites each 5 units) right and 30 units left ( 3 sites each 5 units)  Cervical Paraspinals 10 units right ( 2 sites each 5 units) and 10 units left (2 sites each 5 units)  Occipital 15 units right ( 3 sites each 5 units) , 15 units left ( 3 sites each 5 units)      A total of 200 units was used The patient tolerated the procedure well.     Diagnoses and all orders for this visit:    Status migrainosus  -     ketorolac (TORADOL) injection 30 mg    Chronic migraine without aura without status migrainosus, not intractable  -     [facility] Botulinum Toxin Injection Appointment Request  -     onabotulinumtoxinA (BOTOX) 200 unit injection 200 Units    Spasmodic torticollis  -     [facility] Botulinum Toxin Injection Appointment Request  -     onabotulinumtoxinA (BOTOX) 200  unit injection 200 Units

## 2025-03-27 ENCOUNTER — HOSPITAL ENCOUNTER (OUTPATIENT)
Dept: RADIOLOGY | Facility: HOSPITAL | Age: 40
Discharge: HOME | End: 2025-03-27
Attending: ANESTHESIOLOGY
Payer: COMMERCIAL

## 2025-03-27 DIAGNOSIS — M51.34 OTHER INTERVERTEBRAL DISC DEGENERATION, THORACIC REGION: ICD-10-CM

## 2025-04-01 ENCOUNTER — TELEPHONE (OUTPATIENT)
Dept: ENDOCRINOLOGY | Facility: CLINIC | Age: 40
End: 2025-04-01
Payer: COMMERCIAL

## 2025-04-01 NOTE — TELEPHONE ENCOUNTER
Destiny phoned from [employer].    They are working on the appeal for the MRI of the brain and they are requesting supporting data be faxed to them at [phone]    Her phone number is [phone]  Department: [facility] ENDO    Clinical Pool: ENDO [facility] CLINICAL SUPPORT P   PSR Pool: ENDO [facility] PSR P

## 2025-04-02 NOTE — TELEPHONE ENCOUNTER
Destiny called from [facility]. Rn let destiny know that mri denial and chart note faxed yesterday and verified fax number.  Destiny will call office back if anything else is needed.

## 2025-04-07 NOTE — TELEPHONE ENCOUNTER
Rn attempted to contact Destiny from [facility] and could not connect. Will try and call back.

## 2025-04-08 ENCOUNTER — OFFICE VISIT (OUTPATIENT)
Dept: PRIMARY CARE | Facility: CLINIC | Age: 40
End: 2025-04-08
Payer: COMMERCIAL

## 2025-04-08 VITALS
DIASTOLIC BLOOD PRESSURE: 72 MMHG | HEIGHT: 62 IN | HEART RATE: 91 BPM | OXYGEN SATURATION: 99 % | BODY MASS INDEX: 29.44 KG/M2 | SYSTOLIC BLOOD PRESSURE: 116 MMHG | TEMPERATURE: 97.7 F | WEIGHT: 160 LBS

## 2025-04-08 DIAGNOSIS — D50.0 IRON DEFICIENCY ANEMIA DUE TO CHRONIC BLOOD LOSS: ICD-10-CM

## 2025-04-08 DIAGNOSIS — G47.61 PERIODIC LIMB MOVEMENT DISORDER (PLMD): ICD-10-CM

## 2025-04-08 DIAGNOSIS — E53.8 VITAMIN B12 DEFICIENCY: ICD-10-CM

## 2025-04-08 DIAGNOSIS — Z01.84 IMMUNITY STATUS TESTING: ICD-10-CM

## 2025-04-08 DIAGNOSIS — N81.4 CYSTOCELE WITH PROLAPSE: Primary | ICD-10-CM

## 2025-04-08 DIAGNOSIS — E66.01 MORBID OBESITY (CMS/HCC): ICD-10-CM

## 2025-04-08 DIAGNOSIS — I50.9 CONGESTIVE HEART FAILURE, UNSPECIFIED HF CHRONICITY, UNSPECIFIED HEART FAILURE TYPE (CMS/HCC): ICD-10-CM

## 2025-04-08 DIAGNOSIS — G43.709 CHRONIC MIGRAINE WITHOUT AURA WITHOUT STATUS MIGRAINOSUS, NOT INTRACTABLE: ICD-10-CM

## 2025-04-08 DIAGNOSIS — E55.9 VITAMIN D DEFICIENCY: ICD-10-CM

## 2025-04-08 DIAGNOSIS — G47.33 OBSTRUCTIVE SLEEP APNEA SYNDROME: ICD-10-CM

## 2025-04-08 DIAGNOSIS — R53.82 CHRONIC FATIGUE: ICD-10-CM

## 2025-04-08 DIAGNOSIS — N81.4 UTERINE PROLAPSE: ICD-10-CM

## 2025-04-08 DIAGNOSIS — I10 ESSENTIAL HYPERTENSION: ICD-10-CM

## 2025-04-08 DIAGNOSIS — K56.41 OBSTRUCTIVE DEFECATION (CMS/HCC): ICD-10-CM

## 2025-04-08 DIAGNOSIS — M62.89 PELVIC FLOOR DYSFUNCTION IN FEMALE: ICD-10-CM

## 2025-04-08 PROCEDURE — 99214 OFFICE O/P EST MOD 30 MIN: CPT | Performed by: FAMILY MEDICINE

## 2025-04-08 PROCEDURE — 3008F BODY MASS INDEX DOCD: CPT | Performed by: FAMILY MEDICINE

## 2025-04-08 PROCEDURE — 3078F DIAST BP <80 MM HG: CPT | Performed by: FAMILY MEDICINE

## 2025-04-08 PROCEDURE — 3074F SYST BP LT 130 MM HG: CPT | Performed by: FAMILY MEDICINE

## 2025-04-08 RX ORDER — VENLAFAXINE HYDROCHLORIDE 37.5 MG/1
CAPSULE, EXTENDED RELEASE ORAL
COMMUNITY
Start: 2025-02-18 | End: 2025-04-08

## 2025-04-08 RX ORDER — SODIUM FLUORIDE 1.1 G/100G
CREAM ORAL
COMMUNITY
Start: 2024-12-31 | End: 2025-05-13

## 2025-04-08 ASSESSMENT — PATIENT HEALTH QUESTIONNAIRE - PHQ9
SUM OF ALL RESPONSES TO PHQ9 QUESTIONS 1 & 2: 1
SUM OF ALL RESPONSES TO PHQ QUESTIONS 1-9: 5

## 2025-04-08 NOTE — TELEPHONE ENCOUNTER
Rn called Destiny from [facility]. Appeal still in MD review and should hear back by end of this week or next week.  Destiny will call office and fax determination when its made.

## 2025-04-08 NOTE — PROGRESS NOTES
Consent obtained from patient and all parties present in the room? yes    I have obtained the consent of everyone present in the room to make an audio recording of this visit to assist me in documenting the encounter in the EMR.     Subjective     Patient ID: Carolyn Redmond, : 1985 is a 39 y.o. female who presents for Follow-up (6 month /- discuss tests and symptoms )    History of Present Illness  The patient presents for evaluation of pelvic floor dysfunction, constipation, and back pain.    She was referred to a urogynecologist by her gastroenterologist, Dr. Afua Ren. She has an upcoming appointment with Dr. Yoon at the [facility] [place] on 2025. She reports a history of severe groin pain radiating down her thigh, which she describes as a pulling sensation. She also experiences back pain, which she believes is contributing to her other symptoms. Despite being informed that physical therapy would not be beneficial, she has been performing pelvic floor strengthening exercises at home for the past 1.5 years. She was advised to discontinue Kegel exercises. She reports a sensation of something protruding when she contracts her pelvic muscles. She has been experiencing difficulty with bowel movements and urination. She has a history of constipation from Suboxone, but MiraLAX has always been effective. She is considering reducing her Sublocade dosage due to her pain. She has a high pain tolerance but reports significant discomfort, to the point where she feels like she is dragging her leg. She has lost weight and feels like there is nothing supporting her pelvic organs. She has been performing core strengthening exercises and stretching for the past 1.5 years, which have contributed to her weight loss. She wears a brace around her stomach and leg to support her back.    She has a history of constipation, which she attributes to her weight loss and use of Sublocade. She has been  taking MiraLAX, Linzess, and four stool softeners, occasionally supplemented with senna. She also consumes extra electrolyte water due to her high fluid output. She reports that her GI doctor is aware of her iron and Sublocade use, which can cause constipation. She has been taking MiraLAX once daily, which has been effective in managing her constipation.    She has received the hepatitis B vaccine and has a history of measles.    Supplemental Information  She has had two epidurals for her back pain.    MEDICATIONS  MiraLAX, Linzess, senna, Sublocade, Pristiq (discontinued)    IMMUNIZATIONS  She has received the hepatitis B vaccine and has a history of measles.    The following have been reviewed and updated as appropriate in this visit:   Tobacco  Allergies  Meds  Problems  Med Hx  Surg Hx  Fam Hx           albuterol HFA, Inhale 2 puffs every 4 (four) hours as needed.    amoxicillin, Take 4 capsules (2,000 mg total) by mouth once for 1 dose. Take 1 hour before scheduled dental work.    clindamycin-benzoyl peroxide, Apply topically 2 (two) times a day.    DENTA 5000 PLUS, BRUSH WITH AT NIGHT AND SPIT OUT. DO NOT RINSE, EAT, OR DRINK FOR 30 MINUTES AFTER.    famotidine, TAKE 1 TABLET (20 MG TOTAL) BY MOUTH DAILY BEFORE DINNER.    ferrous sulfate, Take 65 mg by mouth nightly.    fluticasone propion-salmeteroL, Inhale 1 puff 2 (two) times a day.    fluticasone propionate, ADMINISTER 1 SPRAY INTO EACH NOSTRIL DAILY AS NEEDED FOR RHINITIS OR ALLERGIES.    linaCLOtide, 290 mcg daily.    magnesium oxide, Take 1 tablet (400 mg total) by mouth daily.    medical marijuana, 1 each See admin instr. Please be advised that an [facility] hospital staff member has listed medical marijuana as one of your home medications for documentation purposes. Please follow-up with your certified issuing provider for ongoing use    metoprolol succinate XL, Take 1 tablet (25 mg total) by mouth nightly.    naloxone,     NURTEC ODT, TAKE 1 TABLET  (75 MG TOTAL) BY MOUTH EVERY OTHER DAY    BOTOX, Botox for chronic migraine headache every 3 months    pantoprazole, TAKE 1 TABLET BY MOUTH EVERY DAY    pregabalin, Take 1 capsule (200 mg total) by mouth 3 (three) times a day with meals.    semaglutide, Inject 2.4 mg under the skin every (seven) 7 days.    SUBLOCADE, 1x monthly    temazepam, Take 15 mg by mouth nightly as needed for sleep.    VITAMIN D3, Take 5,000 Units by mouth.    PHQ-9 Results  Will the patient answer the depression questions?: Y    Little interest or pleasure in doing things: Several days    Feeling down, depressed, or hopeless: Not at all    Depression Risk: 1    Trouble falling or staying asleep, or sleeping too much: Several days    Feeling tired or having little energy: Over half    Poor appetite or overeating: Not at all    Feeling bad about yourself - or that you are a failure or have let yourself or your family down: Not at all    Trouble concentrating on things, such as reading the newspaper or watching television: Several days    Moving or speaking so slowly that other people could have noticed? Or the opposite - being so fidgety or restless that you have been moving around a lot more than usual.: Not at all    Thoughts that you would be better off dead or hurting yourself in some way: Not at all    Depression Risk Score: 5    If You Checked Off Any Problems, How Difficult Have These Problems Made It For You to Do Your Work, Take Care of Things at Home, or Get Along with Other People?: somewhat difficult    PHQ-9 interpretation: PHQ9 result may indicate mild depression      ROLAN-7  Feeling nervous, anxious or on edge: 0-->Not at all    Not being able to stop or control worryin-->Not at all    Worrying too much about different things: 1-->Several days    Trouble relaxin-->Not at all    Being so restless that it is hard to sit still: 2-->More than half the days    Becoming easily annoyed or irritable: 0-->Not at all    Feeling  "afraid as if something awful might happen: 0-->Not at all      GAD7 Total Score: : 3      If you checked off any problems, how difficult have these made it for you to do your work, take care of things at home, or get along with other people?: Somewhat difficult      Objective   Vitals:    04/08/25 0915   BP: 116/72   BP Location: Left upper arm   Patient Position: Sitting   Pulse: 91   Temp: 36.5 °C (97.7 °F)   TempSrc: Temporal   SpO2: 99%   Weight: 72.6 kg (160 lb)   Height: 1.575 m (5' 2\")       No LMP recorded. (Menstrual status: Amenorrhea).     Body mass index is 29.26 kg/m².    Wt Readings from Last 3 Encounters:   04/08/25 72.6 kg (160 lb)   03/26/25 73.5 kg (162 lb)   03/27/25 73 kg (161 lb)     BP Readings from Last 3 Encounters:   04/08/25 116/72   03/26/25 102/68   03/07/25 90/60       Physical Exam  Vitals and nursing note reviewed.   Constitutional:       General: She is not in acute distress.     Appearance: Normal appearance.   HENT:      Head: Normocephalic and atraumatic.   Cardiovascular:      Rate and Rhythm: Normal rate and regular rhythm.   Pulmonary:      Effort: Pulmonary effort is normal. No respiratory distress.      Breath sounds: Normal breath sounds.   Skin:     General: Skin is warm and dry.   Neurological:      Mental Status: She is alert and oriented to person, place, and time. Mental status is at baseline.   Psychiatric:         Mood and Affect: Mood normal.         Behavior: Behavior normal.         Physical Exam  Lungs were auscultated.  Heart was examined.    Results  Imaging  MRI showed issues with the pelvic floor.       Assessment & Plan  1. Pelvic floor dysfunction.  She reports severe groin pain radiating down her thigh and back pain, which may be related to pelvic floor dysfunction. She has been performing pelvic floor strengthening exercises at home for the last year and a half but has not seen improvement. A referral to [facility] Timmy Durand will be made for " further evaluation and management. She is advised to contact them promptly to schedule an appointment. Additionally, she will be referred to a women's pelvic floor physical therapy center nearby to potentially alleviate some symptoms until she can be seen by the specialist.    2. Constipation.  She is currently on Sublocade, which can cause constipation. She is also taking MiraLAX, Linzess, and stool softeners to manage her symptoms. Despite these measures, she continues to experience significant constipation. She is advised to continue her current regimen and monitor her symptoms closely. If there is no improvement, further adjustments to her medication may be considered.    3. Health maintenance.  Routine labs will be ordered to ensure her health status is up-to-date. Hepatitis B titers will also be checked due to the lack of immunization records.         Problem List Items Addressed This Visit       Chronic fatigue    Relevant Orders    CBC and Differential    Comprehensive metabolic panel    Microalbumin/Creatinine Ur Random    TSH w reflex FT4    Urinalysis with Reflex Culture (ED and Outpatient only)    Hemoglobin A1c    Chronic migraine without aura without status migrainosus, not intractable    Congestive heart failure (CMS/HCC)    Cystocele with prolapse - Primary    Relevant Orders    Ambulatory Referral to Pelvic Floor Rehab (Physical Therapy)    Essential hypertension    Relevant Orders    Comprehensive metabolic panel    Microalbumin/Creatinine Ur Random    TSH w reflex FT4    Urinalysis with Reflex Culture (ED and Outpatient only)    Hemoglobin A1c    Iron deficiency anemia due to chronic blood loss    Relevant Orders    CBC and Differential    Transferrin    Iron and TIBC    Ferritin    Morbid obesity (CMS/HCC)    Relevant Orders    Lipid panel    Obstructive defecation (CMS/HCC)    Obstructive sleep apnea syndrome    Pelvic floor dysfunction in female    Relevant Orders    Ambulatory Referral to  Pelvic Floor Rehab (Physical Therapy)    Periodic limb movement disorder (PLMD)    Relevant Orders    CBC and Differential    Comprehensive metabolic panel    Uterine prolapse    Relevant Orders    Ambulatory Referral to Pelvic Floor Rehab (Physical Therapy)    Vitamin D deficiency    Relevant Orders    Vitamin D 25 hydroxy     Other Visit Diagnoses         Vitamin B12 deficiency        Relevant Orders    Vitamin B12      Immunity status testing        Relevant Orders    Hepatitis B surface antigen    Hepatitis B core antibody, total    Hepatitis B core antibody, IgM    Hepatitis B surface antibody            Patient Instructions       Return in about 4 months (around 8/8/2025) for AWV, Lab review.        Yumiko Lopez DO

## 2025-04-09 ENCOUNTER — OFFICE VISIT (OUTPATIENT)
Dept: VASCULAR SURGERY | Facility: CLINIC | Age: 40
End: 2025-04-09
Payer: COMMERCIAL

## 2025-04-09 VITALS
HEART RATE: 83 BPM | SYSTOLIC BLOOD PRESSURE: 95 MMHG | WEIGHT: 160 LBS | HEIGHT: 62 IN | OXYGEN SATURATION: 93 % | BODY MASS INDEX: 29.44 KG/M2 | DIASTOLIC BLOOD PRESSURE: 63 MMHG

## 2025-04-09 DIAGNOSIS — I87.2 VENOUS INSUFFICIENCY: Primary | ICD-10-CM

## 2025-04-09 PROCEDURE — 99999 PR OFFICE/OUTPT VISIT,PROCEDURE ONLY: CPT | Performed by: SURGERY

## 2025-04-09 PROCEDURE — 36465 NJX NONCMPND SCLRSNT 1 VEIN: CPT | Mod: LT | Performed by: SURGERY

## 2025-04-09 RX ORDER — LIDOCAINE HYDROCHLORIDE 10 MG/ML
5 INJECTION, SOLUTION INFILTRATION; PERINEURAL ONCE
Status: COMPLETED | OUTPATIENT
Start: 2025-04-09 | End: 2025-04-09

## 2025-04-09 RX ADMIN — LIDOCAINE HYDROCHLORIDE 5 ML: 10 INJECTION, SOLUTION INFILTRATION; PERINEURAL at 16:00

## 2025-04-09 NOTE — PROCEDURES
Procedures  Ultrasound-guided microfoam chemical ablation with Varithena procedure Note    Date of procedure: 4/9/2025     Surgeon:  Talib Richmond MD    Procedure: ultrasound microfoam chemical ablation    Preoperative diagnosis: Symptomatic varicose veins, venous insufficiency Left lower extremity    Postoperative diagnosis: Symptomatic varicose veins, venous insufficiency Left lower extremity    Treatment Timeout: Patient correctly identified prior to initiation of procedure with laterality documented as Left and treatment as Left    Procedure location: [place]    Start Time of Procedure: 15:20h    Finish Time of Procedure: 15:35h    Local Anesthetic: 10 ml of 1% lidocaine without epinephrine    Treatment Sites: left lateral leg    Catheter insertion site: left lateral leg    Complications: none    Description of procedure:  The patient was brought into the office, placed supine on the procedure table and the insufficient veins and tributaries were mapped by ultrasound and diagramed on the overlying skin.  A sterile prep of the patient's affected leg was performed and sterile draping was completed.  A procedure timeout was performed.  1% lidocaine was administered at the chosen puncture site to numb the access site.  The vein was then accessed using a 23g butterfly needle.   Needle position was confirmed by ultrasound.  The catheter was aspirated and blood was demonstrated.  Leg was then positioned at 45 degrees of elevation in relation to the torso.  The Varithena canister was primed and purged as required by the instructions.  A 5 cc aliquot was drawn into a sterile syringe and attached to the access catheter.  Varithena was then slowly administered at 1 cc/s with close observation by ultrasound of the its course into the affected veins.  The leading edge of foam was followed carefully and compression on the veins at this location was performed to arrest travel of polidocanol into the more proximal  venous system.  Compression was held at this location for 3 minutes.  The vein was observed for spasm under ultrasound and once the vein was noted to be in full spasm the administration of Varithena was stopped.  The patient was then asked to dorsiflex the ankle to limit flow of foam into the perforating veins.  Once appropriate spasm had been confirmed the access site was removed and the leg was cleaned and a light pressure dressing was applied to the puncture site for hemostasis.  Lower extremity was kept elevated at a 45 degree angle and a multilayer dressing was applied including an under layer, compression pad, and a thigh-high 20 to 30 mm compression stocking to the patient.  The patient ambulated for 10 minutes under supervision immediately following the procedure and was without concerns at the time of discharge.  Post care instructions were provided to the patient.  A repeat venous duplex of the patient's affected limb will be performed in 5 to 10 days post procedure.      CPT Codes:   07753 - injection of non-compounded foam sclerosant with ultrasound compression maneuvers to guide dispersion of the injectate, inclusive of all imaging guidance and monitoring; single incompetent extremity truncal vein  30347 - injection of non-compounded foam sclerosant with ultrasound compression maneuvers to guide dispersion of the injectate, inclusive of all imaging guidance and monitoring; multiple incompetent truncal veins same leg

## 2025-04-14 ENCOUNTER — HOSPITAL ENCOUNTER (OUTPATIENT)
Dept: CARDIOLOGY | Facility: HOSPITAL | Age: 40
Discharge: HOME | End: 2025-04-14
Attending: NURSE PRACTITIONER
Payer: COMMERCIAL

## 2025-04-14 DIAGNOSIS — R60.9 EDEMA, UNSPECIFIED TYPE: ICD-10-CM

## 2025-04-14 PROCEDURE — 93971 EXTREMITY STUDY: CPT | Mod: TC,LT

## 2025-04-14 PROCEDURE — 93971 EXTREMITY STUDY: CPT | Mod: 26,LT | Performed by: SURGERY

## 2025-04-15 DIAGNOSIS — R60.0 EDEMA OF EXTREMITIES: Primary | ICD-10-CM

## 2025-04-16 NOTE — TELEPHONE ENCOUNTER
Rn received call from Destiny from [facility], that MRI is now approved 4/15/25- 7/14/25. Approval number is [id number]. Per Destiny, approval is for [facility] system and [place] location. Rn called pt and left voicemail with this information and next appointment with Dr Kaur is May 13th.

## 2025-04-21 ENCOUNTER — TELEPHONE (OUTPATIENT)
Age: 40
End: 2025-04-21

## 2025-04-21 NOTE — TELEPHONE ENCOUNTER
Patient called and requested to reschedule appointment from 4/22 to 4/23. She reported that she had a surgery consult on 4/22.

## 2025-04-22 ENCOUNTER — OFFICE VISIT (OUTPATIENT)
Dept: COLORECTAL SURGERY | Facility: CLINIC | Age: 40
End: 2025-04-22
Payer: COMMERCIAL

## 2025-04-22 VITALS — WEIGHT: 155 LBS | BODY MASS INDEX: 28.52 KG/M2 | HEIGHT: 62 IN

## 2025-04-22 DIAGNOSIS — K59.01 SLOW TRANSIT CONSTIPATION: Primary | ICD-10-CM

## 2025-04-22 PROCEDURE — 3008F BODY MASS INDEX DOCD: CPT | Performed by: COLON & RECTAL SURGERY

## 2025-04-22 PROCEDURE — 99204 OFFICE O/P NEW MOD 45 MIN: CPT | Mod: 25 | Performed by: COLON & RECTAL SURGERY

## 2025-04-22 PROCEDURE — 45330 DIAGNOSTIC SIGMOIDOSCOPY: CPT | Performed by: COLON & RECTAL SURGERY

## 2025-04-22 NOTE — ASSESSMENT & PLAN NOTE
Constipation     CT: 06/09/2024, 06/13/2024    MRI: 02/11/2025      CT: 06/09/2024  Inflammatory changes involving the distal cecum with associated  inflammation of the appendix which is otherwise normal. Findings may represent  focal enteritis/colitis or epiploic appendagitis.     CT: 06/13/2024  Interval slight improvement of the inflammatory changes of the cecum.  The  majority of the appendix is visualized and normal in caliber.  Tip of the  appendix somewhat obscured.  Findings likely related to the cecum, less likely  reflects tip appendicitis.    MRI: 02/11/2025  *  Moderate to severe pelvic floor relaxation.  *  Medium-size anterior rectocele with incomplete emptying.  *  Low grade rectal mucosal infolding.    Wellness she has a constellation of problems is likely that there is a significant contribution to her difficulty with extruding the stool and constipation from the Wegovy.  I will tell her to continue on her current regimen and continue to proceed with regular gynecologic treatments and when she meets her target weight for Wegovy to consider coming off that and see where we left things in terms of bowel function as I predicted I would improve matters.  She is relating details.  She had a follow-up with her gynecologist for up with her gastroenterologist for colonoscopy.  I will see her back as needed in that regard.

## 2025-04-22 NOTE — PROGRESS NOTES
History and Physical    Patient was referred by uro-gyn.   The diagnosis on referral was   pelvic floor weakness  of the  rectum.    The presenting symptom was  constipation of the  other.    HPI:  Carolyn is here for opinion regarding constipation and pelvic organ prolapse. She noted she has been having issues with pushing stool out, will have to get in odd positions to expel stool. She also takes linzess and miralax in order to have soft/watery stools. Previously, she took miralax daily and she use to have normal caliber stools one year ago. She did lose a significant amount of weight (150+ pounds) after coming off of chronic steroids for vasculitis. She noticed this change in her bowel and urinary habits (now having urinary retention, trouble excreting urine) with the weight loss. Previously, she had a bowel regimen that worked even while taking wegovy and suboxone.     Studies included: FFC: none FFS:  CT Scan:  Be:    GI issues included:   No nausea,  no vomiting, no bloating, no abdominal pain, bowel habits were constipation, weight loss  yes,  Patient goes to the bathroom daily .  Patient experiences fecal accidents  never,    Delivery History:  0,  not applicable,    Pelvic Floor Disorders:   urinary retention ,    PMH:  Previous Cancer:  No  Site of Previous Cancer: Not Applicable  Previous Pelvic Radiation:  No,   N/A  Dose:   Year:    Cardiovascular History: MI:  No,  Angina:  No, CHF:  No,  HTN:  No,  Valve Disease:  No,  AFIB  No,  Other: No    Pulmonary Disease: COPD:  No,  Asthma:  No, Emphysema:  No, Pulmonary HTN:  No,  Other: no    Endocrine:  IDDM:  No,  NIDDM:  No, Other: no    Coagulation Disorders:  DVT:  no, Located in the N/A,  Pulmonary Embolus:  No, Clotting Factor Deficiency:  No, Thrombocytopenia:  No,      Morbid Obesity:   previously  Elevated Cholesterol:  No    PSH (ABDOMINAL):  cholecystectomy.     MEDICATIONS:     Current Outpatient Medications:     albuterol HFA (VENTOLIN HFA) 90  mcg/actuation inhaler, Inhale 2 puffs every 4 (four) hours as needed., Disp: , Rfl:     amoxicillin (AMOXIL) 500 mg capsule, Take 4 capsules (2,000 mg total) by mouth once for 1 dose. Take 1 hour before scheduled dental work., Disp: 4 capsule, Rfl: 2    clindamycin-benzoyl peroxide 1-5 % gel with pump, Apply topically 2 (two) times a day., Disp: 35 g, Rfl: 1    DENTA 5000 PLUS 1.1 % cream, BRUSH WITH AT NIGHT AND SPIT OUT. DO NOT RINSE, EAT, OR DRINK FOR 30 MINUTES AFTER., Disp: , Rfl:     famotidine (PEPCID) 20 mg tablet, TAKE 1 TABLET (20 MG TOTAL) BY MOUTH DAILY BEFORE DINNER., Disp: 90 tablet, Rfl: 1    ferrous sulfate 325 mg (65 mg iron) tablet, Take 65 mg by mouth nightly., Disp: , Rfl:     fluticasone propion-salmeteroL (ADVAIR DISKUS) 500-50 mcg/dose diskus inhaler, Inhale 1 puff 2 (two) times a day., Disp: , Rfl:     fluticasone propionate (FLONASE) 50 mcg/actuation nasal spray, ADMINISTER 1 SPRAY INTO EACH NOSTRIL DAILY AS NEEDED FOR RHINITIS OR ALLERGIES., Disp: 16 mL, Rfl: 1    linaCLOtide (LINZESS) 290 mcg capsule, 290 mcg daily., Disp: , Rfl:     magnesium oxide (MAG-OX) 400 mg (241.3 mg magnesium) tablet, Take 1 tablet (400 mg total) by mouth daily., Disp: 90 tablet, Rfl: 1    medical marijuana, 1 each See admin instr. Please be advised that an [facility] hospital staff member has listed medical marijuana as one of your home medications for documentation purposes. Please follow-up with your certified issuing provider for ongoing use, Disp: , Rfl:     medroxyPROGESTERone (PROVERA) 10 mg tablet, Take 1 tablet (10 mg total) by mouth daily., Disp: 7 tablet, Rfl: 0    metoprolol succinate XL (TOPROL-XL) 25 mg 24 hr tablet, Take 1 tablet (25 mg total) by mouth nightly., Disp: 90 tablet, Rfl: 1    naloxone (NARCAN) 4 mg/actuation spray,non-aerosol nasal spray, , Disp: , Rfl:     NURTEC ODT 75 mg tablet,disintegrating, TAKE 1 TABLET (75 MG TOTAL) BY MOUTH EVERY OTHER DAY, Disp: 15 tablet, Rfl: 4     onabotulinumtoxinA (BOTOX) injection, Botox for chronic migraine headache every 3 months, Disp: 1 each, Rfl: 4    pantoprazole (PROTONIX) 20 mg EC tablet, TAKE 1 TABLET BY MOUTH EVERY DAY, Disp: 90 tablet, Rfl: 0    pregabalin (LYRICA) 200 mg capsule, Take 1 capsule (200 mg total) by mouth 3 (three) times a day with meals., Disp: 90 capsule, Rfl: 2    semaglutide (WEGOVY) 2.4 mg/0.75 mL subcutaneous injection, Inject 2.4 mg under the skin every (seven) 7 days., Disp: 9 mL, Rfl: 1    SUBLOCADE 300 mg/1.5 mL solution, extended rel syringe subcutaneous injection, 1x monthly, Disp: , Rfl:     temazepam (RESTORIL) 15 mg capsule, Take 15 mg by mouth nightly as needed for sleep., Disp: , Rfl:     VITAMIN D3 125 mcg (5,000 unit) tablet, Take 5,000 Units by mouth., Disp: , Rfl:     DRUG ALLERGIES:   Allergies   Allergen Reactions    Tramadol Palpitations    Amitriptyline Hcl      Other Reaction(s): heaart racing at 10 mg    Amoxicillin GI intolerance    Nsaids (Non-Steroidal Anti-Inflammatory Drug) Nausea Only    Trazodone Other (see comments)     Headaches       FAMILY HISTORY:  Family History   Problem Relation Name Age of Onset    Hypertension Biological Mother Ching quijano     Mental illness Biological Mother Ching quijano     Lung cancer Biological Father  76        smoker    Lung cancer Maternal Grandmother  69        smoker    Heart disease Maternal Grandfather      Heart attack Maternal Grandfather          unsure of age    Colon cancer Maternal Grandfather         SOCIAL HISTORY:  Smoking History:   never,  n/a, number of years    Alcohol Use: Number of drinks per week (PUT IN NUMBER) 0  History of Alcoholism:    No, Number of years sober: 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    Social History     Socioeconomic History    Marital status: Single     Spouse name: Not on file    Number of children: Not on file    Years of education: Not on file    Highest education level: Not on file   Occupational History    Occupation:     Tobacco Use    Smoking status: Former     Current packs/day: 0.00     Average packs/day: 0.5 packs/day for 5.0 years (2.5 ttl pk-yrs)     Types: Cigarettes     Start date:      Quit date:      Years since quittin.3    Smokeless tobacco: Former    Tobacco comments:     quit 1 year ago, vapes currently   Vaping Use    Vaping status: Every Day    Substances: Nicotine, Flavoring    Devices: Refillable tank   Substance and Sexual Activity    Alcohol use: Not Currently    Drug use: Not Currently     Types: Marijuana, Heroin     Comment: MM now , previous heroin and opiod    Sexual activity: Not Currently     Partners: Male, Female   Other Topics Concern    Not on file   Social History Narrative    Lives with mom     Social Drivers of Health     Financial Resource Strain: Low Risk  (2023)    Overall Financial Resource Strain (CARDIA)     Difficulty of Paying Living Expenses: Not hard at all   Food Insecurity: No Food Insecurity (2024)    Hunger Vital Sign     Worried About Running Out of Food in the Last Year: Never true     Ran Out of Food in the Last Year: Never true   Transportation Needs: No Transportation Needs (2023)    PRAPARE - Transportation     Lack of Transportation (Medical): No     Lack of Transportation (Non-Medical): No   Physical Activity: Not on file   Stress: Not on file   Social Connections: Not on file   Intimate Partner Violence: Not on file   Housing Stability: Low Risk  (2023)    Housing Stability Vital Sign     Unable to Pay for Housing in the Last Year: No     Number of Places Lived in the Last Year: 1     Unstable Housing in the Last Year: No       Review of Systems    Physical Exam    Abdominal Exam: Soft nontender nondistended    Perineal Inspection: She is full pelvic descent  Digital Rectal Exam: She has anterior rectocele  Fiberoptic Flexible Sigmoidoscopy: Mucosa is pink and healthy in the mid sigmoid colon where formed stools met      IMAGING: MRI is  reviewed which shows that seems to be an internal prolapse and a rectocele    Assessment and Plan:   Slow transit constipation  Constipation     CT: 06/09/2024, 06/13/2024    MRI: 02/11/2025      CT: 06/09/2024  Inflammatory changes involving the distal cecum with associated  inflammation of the appendix which is otherwise normal. Findings may represent  focal enteritis/colitis or epiploic appendagitis.     CT: 06/13/2024  Interval slight improvement of the inflammatory changes of the cecum.  The  majority of the appendix is visualized and normal in caliber.  Tip of the  appendix somewhat obscured.  Findings likely related to the cecum, less likely  reflects tip appendicitis.    MRI: 02/11/2025  *  Moderate to severe pelvic floor relaxation.  *  Medium-size anterior rectocele with incomplete emptying.  *  Low grade rectal mucosal infolding.    Wellness she has a constellation of problems is likely that there is a significant contribution to her difficulty with extruding the stool and constipation from the Wegovy.  I will tell her to continue on her current regimen and continue to proceed with regular gynecologic treatments and when she meets her target weight for Wegovy to consider coming off that and see where we left things in terms of bowel function as I predicted I would improve matters.  She is relating details.  She had a follow-up with her gynecologist for up with her gastroenterologist for colonoscopy.  I will see her back as needed in that regard.      This note was dictated by Dr. Yanick Crook, but not proofread. We apologize for any typographical errors due to dictation software.

## 2025-04-23 ENCOUNTER — OFFICE VISIT (OUTPATIENT)
Dept: VASCULAR SURGERY | Facility: CLINIC | Age: 40
End: 2025-04-23
Payer: COMMERCIAL

## 2025-04-23 ENCOUNTER — TELEMEDICINE (OUTPATIENT)
Dept: BEHAVIORAL/MENTAL HEALTH CLINIC | Facility: CLINIC | Age: 40
End: 2025-04-23
Payer: COMMERCIAL

## 2025-04-23 VITALS — OXYGEN SATURATION: 99 % | HEART RATE: 105 BPM | DIASTOLIC BLOOD PRESSURE: 73 MMHG | SYSTOLIC BLOOD PRESSURE: 114 MMHG

## 2025-04-23 DIAGNOSIS — I10 ESSENTIAL HYPERTENSION: ICD-10-CM

## 2025-04-23 DIAGNOSIS — I87.2 VENOUS INSUFFICIENCY: Primary | ICD-10-CM

## 2025-04-23 DIAGNOSIS — I83.813 VARICOSE VEINS OF BILATERAL LOWER EXTREMITIES WITH PAIN: ICD-10-CM

## 2025-04-23 DIAGNOSIS — R00.2 PALPITATIONS: ICD-10-CM

## 2025-04-23 DIAGNOSIS — F33.1 MODERATE EPISODE OF RECURRENT MAJOR DEPRESSIVE DISORDER (HCC): Primary | ICD-10-CM

## 2025-04-23 DIAGNOSIS — F41.1 GENERALIZED ANXIETY DISORDER: ICD-10-CM

## 2025-04-23 PROCEDURE — 90837 PSYTX W PT 60 MINUTES: CPT | Performed by: COUNSELOR

## 2025-04-23 PROCEDURE — 36466 NJX NONCMPND SCLRSNT MLT VN: CPT | Mod: RT | Performed by: SURGERY

## 2025-04-23 PROCEDURE — 99999 PR OFFICE/OUTPT VISIT,PROCEDURE ONLY: CPT | Performed by: SURGERY

## 2025-04-23 RX ORDER — METOPROLOL SUCCINATE 25 MG/1
25 TABLET, EXTENDED RELEASE ORAL NIGHTLY
Qty: 90 TABLET | Refills: 1 | Status: SHIPPED | OUTPATIENT
Start: 2025-04-23

## 2025-04-23 RX ORDER — LIDOCAINE HYDROCHLORIDE 10 MG/ML
5 INJECTION, SOLUTION INFILTRATION; PERINEURAL ONCE
Status: COMPLETED | OUTPATIENT
Start: 2025-04-23 | End: 2025-04-23

## 2025-04-23 RX ADMIN — LIDOCAINE HYDROCHLORIDE 5 ML: 10 INJECTION, SOLUTION INFILTRATION; PERINEURAL at 15:42

## 2025-04-23 NOTE — PSYCH
Virtual Regular VisitName: Samantha Vila      : 1985      MRN: [id number]  Encounter Provider: Baylee Monaco  Encounter Date: 2025   Encounter department: [facility] THERAPIST MENTAL HEALTH OUTPATIENT  :  Assessment & Plan  Moderate episode of recurrent major depressive disorder (HCC)         Generalized anxiety disorder             Goals addressed in session: Goal 1     DATA: Client processed around medical issues and the specialist she is seeing. Client examined her relationships with these doctors and the results she is getting. Client endorses feeling overwhelmed and depressed by what she is hearing. Client feels that she is getting mixed messages about her medical issues. Client endorses anxiety during sleep and not getting more than 2-3 hours of sleep.  Client is willing to examine some of the factors contributing like wearing a CPAP, worry over her bladder and feeling uncomfortable due to pain. Client is willing to practice diaphramatic breathing techniques and feels that this will help her. Client examines the role her CNS plays and ways to affect change by breathing and self-talk.  Client processed around ideas she has to give back to the sober community and ways to apply for grants. Client feels she can use her Reiki to help others.  Therapist used active listening, open ended questions, reflection and validation.    During this session, this clinician used the following therapeutic modalities: Cognitive Behavioral Therapy and Mindfulness-based Strategies    Substance Abuse was addressed during this session. If the client is diagnosed with a co-occurring substance use disorder, please indicate any changes in the frequency or amount of use: NONE. Stage of change for addressing substance use diagnoses: Maintenance    ASSESSMENT:  Samantha presents with a Euthymic/ normal mood. Samantha's affect is Normal range and intensity, which is congruent, with their mood and the content  "of the session. The client has made progress on their goals as evidenced by .    Samantha presents with a none risk of suicide, none risk of self-harm, and none risk of harm to others.    For any risk assessment that surpasses a \"low\" rating, a safety plan must be developed.    A safety plan was indicated: no  If yes, describe in detail NA    PLAN: Between sessions, Samantha will will practice belly breathing and observe how it works for her. At the next session, the therapist will use Cognitive Behavioral Therapy, Mindfulness-based Strategies, and Motivational Interviewing to address anxiety, depression.    Behavioral Health Treatment Plan [facility]: Diagnosis and Treatment Plan explained to Samantha, Samantha relates understanding diagnosis and is agreeable to Treatment Plan. Yes     Depression Follow-up Plan Completed: Not applicable     Reason for visit is No chief complaint on file.     Recent Visits  No visits were found meeting these conditions.  Showing recent visits within past 7 days and meeting all other requirements  Today's Visits  Date Type Provider Dept   04/23/25 Telemedicine Baylee Monaco [facility] Therapist [facility]   Showing today's visits and meeting all other requirements  Future Appointments  No visits were found meeting these conditions.  Showing future appointments within next 150 days and meeting all other requirements     History of Present Illness     HPI    Past Medical History   No past medical history on file.  No past surgical history on file.  No current outpatient medications  Not on File    Objective   There were no vitals taken for this visit.    Video Exam  Physical Exam     Administrative Statements   Encounter provider Baylee Monaco    The Patient is located at Home and in the following state in which I hold an active license [place].    The patient was identified by name and date of birth. Samantha Vila was informed that this is a telemedicine visit and that the visit is being " conducted through the Epic Embedded platform. She agrees to proceed..  My office door was closed. No one else was in the room.  She acknowledged consent and understanding of privacy and security of the video platform. The patient has agreed to participate and understands they can discontinue the visit at any time.    I have spent a total time of 53 minutes in caring for this patient on the day of the visit/encounter including Counseling / Coordination of care, not including the time spent for establishing the audio/video connection.    Visit Time  Start Time: 1202  Stop Time: 1255  Total Visit Time: 53 minutes

## 2025-04-23 NOTE — PATIENT INSTRUCTIONS
Varithena     Post-Procedure Instructions  Expected symptoms    Mild bruising, discoloration, and swelling may occur in your leg.  Your treated vein may feel hard, or may cause a pulling/tugging sensation.  This will disappear with time.  Gentle stretching, OTC pain medication, or a warm compress may help with this.     Activity:    Normal activity, including walking stairs is okay immediately following your procedure.    No heavy lifting (greater than 20 pounds) or strenuous exercise for one (1) week following your procedure.      Walk several times a day.  Avoid prolonged sitting or standing.    You may return to work and drive after your procedure.      Travel: Avoid long distance car travel and flying for the first week after your procedure.                    3.)  Dressing/Incision Care:    It is important to wear your compression stockings after your procedure for maximum results. The more you wear them, the better you will feel. For best outcomes it is recommended you use them for 14 days after the procedure.     You may shower after the wrap placed in the office is removed.  Remove the bandaging on your leg the second morning following the procedure (ex: If your procedure is Friday, remove your bandaging on Sunday morning).  Discard the dressings in the trash, and begin wearing your compression stocking for the following fourteen days. You may remove the stocking to shower.  Please remove the office bandage if there is a problem and use compression if needed.     At the end of the fourteen (14) days, you may stop wearing your compression stockings. For maximum results and comfort, we recommend that you continue to wear light gauge knee-high compression stockings as much as possible.    If during the first night after the procedure if you develop severe pain and/or swelling in your foot or leg, remove the bandaging immediately and apply your stocking.    In extremely rare occasions, bleeding through the  bandages may occur. Lie down, elevate your leg and apply direct pressure until bleeding has stopped. If bleeding persists, please call your physician and/or report to the nearest emergency room.    4.)   Follow-Up:  You will need to obtain an ultrasound 3-5 days after your procedure. This has been scheduled for you. Please see your appointment sheet for further details.    You will also need a follow-up phone call with an RN after your procedure. This has also been scheduled for you. Please see your appointment sheet for details.      ** If you have any questions or concerns, please contact our office [phone] **

## 2025-04-23 NOTE — PROCEDURES
Procedures  Ultrasound-guided microfoam chemical ablation with Varithena procedure Note    Date of procedure: 4/23/2025     Surgeon:  Talib Richmond MD    Procedure: ultrasound microfoam chemical ablation    Preoperative diagnosis: Symptomatic varicose veins, venous insufficiency Right lower extremity    Postoperative diagnosis: Symptomatic varicose veins, venous insufficiency Right lower extremity    Treatment Timeout: Patient correctly identified prior to initiation of procedure with laterality documented as Right and treatment as Right    Procedure location: [place]    Start Time of Procedure: 15:20h    Finish Time of Procedure: 15:38h    Local Anesthetic: 10 ml of 1% lidocaine without epinephrine    Treatment Sites: Right leg GSV tributaries    Catheter insertion site: Lateral thigh, lateral lower leg    Complications: none    Description of procedure:  The patient was brought into the office, placed supine on the procedure table and the insufficient veins and tributaries were mapped by ultrasound and diagramed on the overlying skin.  A sterile prep of the patient's affected leg was performed and sterile draping was completed.  A procedure timeout was performed.  1% lidocaine was administered at the chosen puncture site to numb the access site.  The vein was then accessed using a 23g butterfly needle.   Needle position was confirmed by ultrasound.  The catheter was aspirated and blood was demonstrated.  Leg was then positioned at 45 degrees of elevation in relation to the torso.  The Varithena canister was primed and purged as required by the instructions.  A 4 cc aliquot was drawn into a sterile syringe and attached to the access catheter.  Varithena was then slowly administered at 1 cc/s with close observation by ultrasound of the its course into the affected veins.  The leading edge of foam was followed carefully and compression on the veins at this location was performed to arrest travel of  polidocanol into the more proximal venous system.  Compression was held at this location for 3 minutes.  The vein was observed for spasm under ultrasound and once the vein was noted to be in full spasm the administration of Varithena was stopped.  A second site was selected on the lower leg and the procedure was repeated with an additional 4 cc aliquot.  The patient was then asked to dorsiflex the ankle to limit flow of foam into the perforating veins.  Once appropriate spasm had been confirmed the access site was removed and the leg was cleaned and a light pressure dressing was applied to the puncture site for hemostasis.  Lower extremity was kept elevated at a 45 degree angle and a multilayer dressing was applied including an under layer, compression pad, and a thigh-high 20 to 30 mm compression stocking to the patient.  The patient ambulated for 10 minutes under supervision immediately following the procedure and was without concerns at the time of discharge.  Post care instructions were provided to the patient.  A repeat venous duplex of the patient's affected limb will be performed in 5 to 10 days post procedure.      CPT Codes:   63507 - injection of non-compounded foam sclerosant with ultrasound compression maneuvers to guide dispersion of the injectate, inclusive of all imaging guidance and monitoring; single incompetent extremity truncal vein  69580 - injection of non-compounded foam sclerosant with ultrasound compression maneuvers to guide dispersion of the injectate, inclusive of all imaging guidance and monitoring; multiple incompetent truncal veins same leg

## 2025-04-28 ENCOUNTER — HOSPITAL ENCOUNTER (OUTPATIENT)
Dept: CARDIOLOGY | Facility: HOSPITAL | Age: 40
Discharge: HOME | End: 2025-04-28
Attending: PHYSICIAN ASSISTANT
Payer: COMMERCIAL

## 2025-04-28 DIAGNOSIS — R60.0 EDEMA OF EXTREMITIES: ICD-10-CM

## 2025-04-28 PROCEDURE — 93971 EXTREMITY STUDY: CPT | Mod: 26,RT | Performed by: SURGERY

## 2025-04-28 PROCEDURE — 93971 EXTREMITY STUDY: CPT | Mod: TC,RT

## 2025-05-05 ENCOUNTER — TELEPHONE (OUTPATIENT)
Dept: VASCULAR SURGERY | Facility: CLINIC | Age: 40
End: 2025-05-05
Payer: COMMERCIAL

## 2025-05-06 ENCOUNTER — HOSPITAL ENCOUNTER (OUTPATIENT)
Dept: RADIOLOGY | Facility: HOSPITAL | Age: 40
Discharge: HOME | End: 2025-05-06
Attending: INTERNAL MEDICINE
Payer: COMMERCIAL

## 2025-05-06 DIAGNOSIS — N91.1 SECONDARY AMENORRHEA: ICD-10-CM

## 2025-05-06 RX ORDER — GADOBUTROL 604.72 MG/ML
7 INJECTION INTRAVENOUS ONCE
Status: COMPLETED | OUTPATIENT
Start: 2025-05-06 | End: 2025-05-06

## 2025-05-06 RX ADMIN — GADOBUTROL 7 ML: 604.72 INJECTION INTRAVENOUS at 14:07

## 2025-05-08 ENCOUNTER — RESULTS FOLLOW-UP (OUTPATIENT)
Dept: ENDOCRINOLOGY | Facility: CLINIC | Age: 40
End: 2025-05-08

## 2025-05-08 ENCOUNTER — RESULTS FOLLOW-UP (OUTPATIENT)
Dept: PRIMARY CARE | Facility: CLINIC | Age: 40
End: 2025-05-08

## 2025-05-08 DIAGNOSIS — Z15.89 MTHFR MUTATION: Primary | ICD-10-CM

## 2025-05-08 DIAGNOSIS — S22.000D COMPRESSION FRACTURE OF THORACIC VERTEBRA WITH ROUTINE HEALING, UNSPECIFIED THORACIC VERTEBRAL LEVEL, SUBSEQUENT ENCOUNTER: ICD-10-CM

## 2025-05-08 DIAGNOSIS — E53.8 VITAMIN B12 DEFICIENCY: ICD-10-CM

## 2025-05-08 PROBLEM — R10.2 PELVIC AND PERINEAL PAIN: Status: ACTIVE | Noted: 2025-05-08

## 2025-05-08 PROBLEM — R35.0 URINARY FREQUENCY: Status: ACTIVE | Noted: 2025-05-08

## 2025-05-08 PROBLEM — N81.2 CYSTOCELE AND RECTOCELE WITH INCOMPLETE UTEROVAGINAL PROLAPSE: Status: ACTIVE | Noted: 2025-05-08

## 2025-05-08 LAB
25(OH)D3+25(OH)D2 SERPL-MCNC: 136 NG/ML (ref 30–100)
ALBUMIN SERPL-MCNC: 3.7 G/DL (ref 3.9–4.9)
ALP SERPL-CCNC: 98 IU/L (ref 44–121)
ALT SERPL-CCNC: 15 IU/L (ref 0–32)
APPEARANCE UR: CLEAR
AST SERPL-CCNC: 15 IU/L (ref 0–40)
BACTERIA #/AREA URNS HPF: NORMAL /[HPF]
BASOPHILS # BLD AUTO: 0.1 X10E3/UL (ref 0–0.2)
BASOPHILS NFR BLD AUTO: 1 %
BILIRUB SERPL-MCNC: 0.3 MG/DL (ref 0–1.2)
BILIRUB UR QL STRIP: NEGATIVE
BUN SERPL-MCNC: 8 MG/DL (ref 6–20)
BUN/CREAT SERPL: 14 (ref 9–23)
CALCIUM SERPL-MCNC: 9.3 MG/DL (ref 8.7–10.2)
CASTS URNS QL MICRO: NORMAL /LPF
CHLORIDE SERPL-SCNC: 102 MMOL/L (ref 96–106)
CHOLEST SERPL-MCNC: 140 MG/DL (ref 100–199)
CO2 SERPL-SCNC: 21 MMOL/L (ref 20–29)
COLOR UR: YELLOW
CREAT SERPL-MCNC: 0.57 MG/DL (ref 0.57–1)
DHEA-S SERPL-MCNC: 50.1 UG/DL (ref 57.3–279.2)
EGFRCR SERPLBLD CKD-EPI 2021: 120 ML/MIN/1.73
EOSINOPHIL # BLD AUTO: 0.2 X10E3/UL (ref 0–0.4)
EOSINOPHIL NFR BLD AUTO: 2 %
EPI CELLS #/AREA URNS HPF: NORMAL /HPF (ref 0–10)
ERYTHROCYTE [DISTWIDTH] IN BLOOD BY AUTOMATED COUNT: 13.3 % (ref 11.7–15.4)
ESTRADIOL SERPL-MCNC: 29.3 PG/ML
FERRITIN SERPL-MCNC: 113 NG/ML (ref 15–150)
FSH SERPL-ACNC: 4.6 MIU/ML
GLOBULIN SER CALC-MCNC: 2.6 G/DL (ref 1.5–4.5)
GLUCOSE SERPL-MCNC: 90 MG/DL (ref 70–99)
GLUCOSE UR QL STRIP: NEGATIVE
HBA1C MFR BLD: 5.2 % (ref 4.8–5.6)
HBV CORE AB SERPL QL IA: NEGATIVE
HBV CORE IGM SERPL QL IA: NEGATIVE
HBV SURFACE AB SER QL: REACTIVE
HBV SURFACE AG SERPL QL IA: NEGATIVE
HCT VFR BLD AUTO: 32.5 % (ref 34–46.6)
HDLC SERPL-MCNC: 37 MG/DL
HGB BLD-MCNC: 10.4 G/DL (ref 11.1–15.9)
HGB UR QL STRIP: NEGATIVE
IMM GRANULOCYTES # BLD AUTO: 0 X10E3/UL (ref 0–0.1)
IMM GRANULOCYTES NFR BLD AUTO: 0 %
IRON SATN MFR SERPL: 7 % (ref 15–55)
IRON SERPL-MCNC: 16 UG/DL (ref 27–159)
KETONES UR QL STRIP: NEGATIVE
LAB CORP URINALYSIS REFLEX: NORMAL
LDLC SERPL CALC-MCNC: 80 MG/DL (ref 0–99)
LEUKOCYTE ESTERASE UR QL STRIP: NEGATIVE
LH SERPL-ACNC: 2 MIU/ML
LYMPHOCYTES # BLD AUTO: 2 X10E3/UL (ref 0.7–3.1)
LYMPHOCYTES NFR BLD AUTO: 22 %
MCH RBC QN AUTO: 28.9 PG (ref 26.6–33)
MCHC RBC AUTO-ENTMCNC: 32 G/DL (ref 31.5–35.7)
MCV RBC AUTO: 90 FL (ref 79–97)
MICRO URNS: NORMAL
MICRO URNS: NORMAL
MONOCYTES # BLD AUTO: 0.8 X10E3/UL (ref 0.1–0.9)
MONOCYTES NFR BLD AUTO: 8 %
NEUTROPHILS # BLD AUTO: 6.1 X10E3/UL (ref 1.4–7)
NEUTROPHILS NFR BLD AUTO: 67 %
NITRITE UR QL STRIP: NEGATIVE
PH UR STRIP: 6 [PH] (ref 5–7.5)
PLATELET # BLD AUTO: 290 X10E3/UL (ref 150–450)
POTASSIUM SERPL-SCNC: 4.6 MMOL/L (ref 3.5–5.2)
PROT SERPL-MCNC: 6.3 G/DL (ref 6–8.5)
PROT UR QL STRIP: NEGATIVE
RBC # BLD AUTO: 3.6 X10E6/UL (ref 3.77–5.28)
RBC #/AREA URNS HPF: NORMAL /HPF (ref 0–2)
SODIUM SERPL-SCNC: 140 MMOL/L (ref 134–144)
SP GR UR STRIP: 1.01 (ref 1–1.03)
T4 FREE SERPL-MCNC: 1.42 NG/DL (ref 0.82–1.77)
TIBC SERPL-MCNC: 246 UG/DL (ref 250–450)
TRANSFERRIN SERPL-MCNC: 201 MG/DL (ref 192–364)
TRIGL SERPL-MCNC: 131 MG/DL (ref 0–149)
TSH SERPL DL<=0.005 MIU/L-ACNC: 1.03 UIU/ML (ref 0.45–4.5)
UIBC SERPL-MCNC: 230 UG/DL (ref 131–425)
UROBILINOGEN UR STRIP-MCNC: 0.2 MG/DL (ref 0.2–1)
VIT B12 SERPL-MCNC: >2000 PG/ML (ref 232–1245)
VLDLC SERPL CALC-MCNC: 23 MG/DL (ref 5–40)
WBC # BLD AUTO: 9.1 X10E3/UL (ref 3.4–10.8)
WBC #/AREA URNS HPF: NORMAL /HPF (ref 0–5)

## 2025-05-08 RX ORDER — KETOCONAZOLE 20 MG/G
1 CREAM TOPICAL EVERY 12 HOURS
COMMUNITY
End: 2025-05-13

## 2025-05-10 LAB
ALBUMIN/CREAT UR: <6 MG/G CREAT (ref 0–29)
CREAT UR-MCNC: 49.8 MG/DL
MICROALBUMIN UR-MCNC: <3 UG/ML

## 2025-05-13 ENCOUNTER — OFFICE VISIT (OUTPATIENT)
Dept: ENDOCRINOLOGY | Facility: CLINIC | Age: 40
End: 2025-05-13
Payer: COMMERCIAL

## 2025-05-13 VITALS
WEIGHT: 154 LBS | HEIGHT: 62 IN | BODY MASS INDEX: 28.34 KG/M2 | DIASTOLIC BLOOD PRESSURE: 70 MMHG | HEART RATE: 89 BPM | SYSTOLIC BLOOD PRESSURE: 118 MMHG | OXYGEN SATURATION: 96 %

## 2025-05-13 DIAGNOSIS — E24.2: ICD-10-CM

## 2025-05-13 DIAGNOSIS — E66.813 CLASS 3 OBESITY WITH ALVEOLAR HYPOVENTILATION, SERIOUS COMORBIDITY, AND BODY MASS INDEX (BMI) OF 45.0 TO 49.9 IN ADULT (CMS/HCC): Primary | ICD-10-CM

## 2025-05-13 DIAGNOSIS — S22.000S COMPRESSION FRACTURE OF THORACIC VERTEBRA, UNSPECIFIED THORACIC VERTEBRAL LEVEL, SEQUELA: ICD-10-CM

## 2025-05-13 DIAGNOSIS — N91.1 SECONDARY AMENORRHEA: ICD-10-CM

## 2025-05-13 DIAGNOSIS — E66.2 CLASS 3 OBESITY WITH ALVEOLAR HYPOVENTILATION, SERIOUS COMORBIDITY, AND BODY MASS INDEX (BMI) OF 45.0 TO 49.9 IN ADULT (CMS/HCC): Primary | ICD-10-CM

## 2025-05-13 PROCEDURE — 99215 OFFICE O/P EST HI 40 MIN: CPT | Performed by: INTERNAL MEDICINE

## 2025-05-13 PROCEDURE — 3074F SYST BP LT 130 MM HG: CPT | Performed by: INTERNAL MEDICINE

## 2025-05-13 PROCEDURE — 3008F BODY MASS INDEX DOCD: CPT | Performed by: INTERNAL MEDICINE

## 2025-05-13 PROCEDURE — 3078F DIAST BP <80 MM HG: CPT | Performed by: INTERNAL MEDICINE

## 2025-05-14 ENCOUNTER — TELEMEDICINE (OUTPATIENT)
Dept: BEHAVIORAL/MENTAL HEALTH CLINIC | Facility: CLINIC | Age: 40
End: 2025-05-14
Payer: COMMERCIAL

## 2025-05-14 DIAGNOSIS — F33.1 MODERATE EPISODE OF RECURRENT MAJOR DEPRESSIVE DISORDER (HCC): ICD-10-CM

## 2025-05-14 DIAGNOSIS — F41.1 GENERALIZED ANXIETY DISORDER: Primary | ICD-10-CM

## 2025-05-14 PROCEDURE — 90837 PSYTX W PT 60 MINUTES: CPT | Performed by: COUNSELOR

## 2025-05-14 NOTE — BH TREATMENT PLAN
Outpatient Behavioral Health Psychotherapy Treatment Plan    Samantha Vila  1985     Date of Initial Psychotherapy Assessment: 2/1/23   Date of Current Treatment Plan: 05/15/25  Treatment Plan Target Date: TBD  Treatment Plan Expiration Date: 11/14/25    Diagnosis:   1. Generalized anxiety disorder        2. Moderate episode of recurrent major depressive disorder (HCC)              Area(s) of Need: Emotional regulation, coping skills, trauma, focus, anxiety, depression, somatic complaints.     Long Term Goal 1 (in the client's own words): I would like to feel more balanced and harmonized in life.  I feel I am supposed to create something and would like to explore this idea.     Stage of Change: Action    Target Date for completion: TBD     Anticipated therapeutic modalities: CBT, ACT, Mindfulness, DBT skills     People identified to complete this goal: Samantha Vila, Baylee Monaco, [facility]      Objective 1: (identify the means of measuring success in meeting the objective): In 6 months I will be able to see that my sleep has improved. Update:Trying harder to put things down and just go to bed. I have gotten on a better sleep schedule.       Objective 2: (identify the means of measuring success in meeting the objective): In 6 months I will have developed an exercise routine that I am following. Update:I am following my exercise routine now at least 5 days a week.  Will need to take a break due to surgery but still doing yoga and stretching.     Objective 3: (identify the means of measuring success in meeting the objective): In 6 months I will see myself going further into my studies around Reiki. Update: I have been studying a lot of meditations with Reiki and learning different modalities.              Objective 4: (identify the means of measuring success in meeting the objective): In 6 months I will join the [facility] and actively go.              Objective 5: (identify the means of measuring success in meeting the  objective): I want to start drawing and painting more and allow my creativity to flow.                     I am currently under the care of a [facility] psychiatric provider: yes    My [facility] psychiatric provider is: un assigned    I am currently taking psychiatric medications: Yes, as prescribed    I feel that I will be ready for discharge from mental health care when I reach the following (measurable goal/objective): Once I feel emotionally regulated and have decreased symptoms.     For children and adults who have a legal guardian:   Has there been any change to custody orders and/or guardianship status? NA. If yes, attach updated documentation.    I have created my Crisis Plan and have been offered a copy of this plan    Behavioral Health Treatment Plan [facility]: Diagnosis and Treatment Plan explained to Samantha Vila acknowledges an understanding of their diagnosis. Samantha Vila agrees to this treatment plan.    I have been offered a copy of this Treatment Plan. yes
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
## 2025-05-15 ENCOUNTER — OFFICE VISIT (OUTPATIENT)
Dept: PRIMARY CARE | Facility: CLINIC | Age: 40
End: 2025-05-15
Payer: COMMERCIAL

## 2025-05-15 VITALS
BODY MASS INDEX: 28.16 KG/M2 | OXYGEN SATURATION: 100 % | HEART RATE: 84 BPM | HEIGHT: 62 IN | DIASTOLIC BLOOD PRESSURE: 82 MMHG | WEIGHT: 153 LBS | SYSTOLIC BLOOD PRESSURE: 118 MMHG | TEMPERATURE: 97.5 F

## 2025-05-15 DIAGNOSIS — L02.91 ABSCESS: ICD-10-CM

## 2025-05-15 DIAGNOSIS — J45.30 MILD PERSISTENT ASTHMA, UNCOMPLICATED: ICD-10-CM

## 2025-05-15 DIAGNOSIS — N81.2 CYSTOCELE AND RECTOCELE WITH INCOMPLETE UTEROVAGINAL PROLAPSE: ICD-10-CM

## 2025-05-15 DIAGNOSIS — Z00.00 WELCOME TO MEDICARE PREVENTIVE VISIT: Primary | ICD-10-CM

## 2025-05-15 DIAGNOSIS — S22.000D COMPRESSION FRACTURE OF THORACIC VERTEBRA WITH ROUTINE HEALING, UNSPECIFIED THORACIC VERTEBRAL LEVEL, SUBSEQUENT ENCOUNTER: ICD-10-CM

## 2025-05-15 DIAGNOSIS — Z23 NEED FOR PNEUMOCOCCAL VACCINE: ICD-10-CM

## 2025-05-15 PROBLEM — N81.4 CYSTOCELE WITH PROLAPSE: Status: RESOLVED | Noted: 2025-04-08 | Resolved: 2025-05-15

## 2025-05-15 PROBLEM — I50.9 CONGESTIVE HEART FAILURE (CMS/HCC): Status: RESOLVED | Noted: 2024-02-23 | Resolved: 2025-05-15

## 2025-05-15 PROBLEM — M62.89 PELVIC FLOOR DYSFUNCTION IN FEMALE: Status: RESOLVED | Noted: 2025-04-08 | Resolved: 2025-05-15

## 2025-05-15 PROBLEM — N81.4 UTERINE PROLAPSE: Status: RESOLVED | Noted: 2025-04-08 | Resolved: 2025-05-15

## 2025-05-15 PROBLEM — J40 BRONCHITIS: Status: RESOLVED | Noted: 2024-12-03 | Resolved: 2025-05-15

## 2025-05-15 PROBLEM — E66.01 MORBID OBESITY (CMS/HCC): Status: RESOLVED | Noted: 2023-06-13 | Resolved: 2025-05-15

## 2025-05-15 PROBLEM — R10.2 PELVIC AND PERINEAL PAIN: Status: RESOLVED | Noted: 2025-05-08 | Resolved: 2025-05-15

## 2025-05-15 PROBLEM — R35.0 URINARY FREQUENCY: Status: RESOLVED | Noted: 2025-05-08 | Resolved: 2025-05-15

## 2025-05-15 PROBLEM — R39.14 FEELING OF INCOMPLETE BLADDER EMPTYING: Status: RESOLVED | Noted: 2025-03-07 | Resolved: 2025-05-15

## 2025-05-15 PROBLEM — N36.44 DETRUSOR AND SPHINCTER DYSSYNERGIA: Status: ACTIVE | Noted: 2025-05-15

## 2025-05-15 PROCEDURE — G0402 INITIAL PREVENTIVE EXAM: HCPCS | Performed by: FAMILY MEDICINE

## 2025-05-15 PROCEDURE — G0403 EKG FOR INITIAL PREVENT EXAM: HCPCS | Performed by: FAMILY MEDICINE

## 2025-05-15 PROCEDURE — G0009 ADMIN PNEUMOCOCCAL VACCINE: HCPCS | Performed by: FAMILY MEDICINE

## 2025-05-15 PROCEDURE — 90677 PCV20 VACCINE IM: CPT | Performed by: FAMILY MEDICINE

## 2025-05-15 RX ORDER — SULFAMETHOXAZOLE AND TRIMETHOPRIM 800; 160 MG/1; MG/1
1 TABLET ORAL 2 TIMES DAILY
Qty: 14 TABLET | Refills: 0 | Status: SHIPPED | OUTPATIENT
Start: 2025-05-15 | End: 2025-05-15

## 2025-05-15 RX ORDER — SULFAMETHOXAZOLE AND TRIMETHOPRIM 800; 160 MG/1; MG/1
1 TABLET ORAL 2 TIMES DAILY
Qty: 14 TABLET | Refills: 0 | Status: SHIPPED | OUTPATIENT
Start: 2025-05-15 | End: 2025-05-21

## 2025-05-19 ENCOUNTER — TELEPHONE (OUTPATIENT)
Dept: VASCULAR SURGERY | Facility: CLINIC | Age: 40
End: 2025-05-19
Payer: COMMERCIAL

## 2025-05-19 DIAGNOSIS — E66.813 CLASS 3 OBESITY WITH ALVEOLAR HYPOVENTILATION, SERIOUS COMORBIDITY, AND BODY MASS INDEX (BMI) OF 45.0 TO 49.9 IN ADULT (CMS/HCC): ICD-10-CM

## 2025-05-19 DIAGNOSIS — E66.2 CLASS 3 OBESITY WITH ALVEOLAR HYPOVENTILATION, SERIOUS COMORBIDITY, AND BODY MASS INDEX (BMI) OF 45.0 TO 49.9 IN ADULT (CMS/HCC): ICD-10-CM

## 2025-05-19 NOTE — ASSESSMENT & PLAN NOTE
Post procedure ultrasound reviewed  05/19/25 , procedure successful and no abnormal findings present.

## 2025-05-20 ENCOUNTER — TELEPHONE (OUTPATIENT)
Dept: ENDOCRINOLOGY | Facility: CLINIC | Age: 40
End: 2025-05-20
Payer: COMMERCIAL

## 2025-05-20 ENCOUNTER — TELEPHONE (OUTPATIENT)
Dept: SCHEDULING | Facility: CLINIC | Age: 40
End: 2025-05-20
Payer: COMMERCIAL

## 2025-05-21 ENCOUNTER — OFFICE VISIT (OUTPATIENT)
Dept: CARDIOLOGY | Facility: CLINIC | Age: 40
End: 2025-05-21
Payer: COMMERCIAL

## 2025-05-21 VITALS
OXYGEN SATURATION: 98 % | DIASTOLIC BLOOD PRESSURE: 60 MMHG | SYSTOLIC BLOOD PRESSURE: 92 MMHG | HEART RATE: 97 BPM | WEIGHT: 153 LBS | HEIGHT: 62 IN | BODY MASS INDEX: 28.16 KG/M2 | RESPIRATION RATE: 16 BRPM

## 2025-05-21 DIAGNOSIS — R60.0 BILATERAL LEG EDEMA: ICD-10-CM

## 2025-05-21 DIAGNOSIS — I27.20 PULMONARY HYPERTENSION (CMS/HCC): ICD-10-CM

## 2025-05-21 DIAGNOSIS — I10 ESSENTIAL HYPERTENSION: Primary | ICD-10-CM

## 2025-05-21 DIAGNOSIS — G47.33 OBSTRUCTIVE SLEEP APNEA SYNDROME: ICD-10-CM

## 2025-05-21 DIAGNOSIS — E78.1 PURE HYPERTRIGLYCERIDEMIA: ICD-10-CM

## 2025-05-21 DIAGNOSIS — R00.2 PALPITATIONS: ICD-10-CM

## 2025-05-21 PROBLEM — M47.817 LUMBOSACRAL SPONDYLOSIS WITHOUT MYELOPATHY: Status: ACTIVE | Noted: 2025-05-21

## 2025-05-21 PROBLEM — R33.9 INCOMPLETE BLADDER EMPTYING: Status: ACTIVE | Noted: 2025-05-21

## 2025-05-21 PROBLEM — M54.16 LUMBAR RADICULOPATHY: Status: ACTIVE | Noted: 2025-05-21

## 2025-05-21 PROBLEM — G89.4 CHRONIC PAIN SYNDROME: Status: ACTIVE | Noted: 2025-05-21

## 2025-05-21 PROBLEM — M79.10 MUSCLE PAIN: Status: ACTIVE | Noted: 2025-05-21

## 2025-05-21 PROCEDURE — 3074F SYST BP LT 130 MM HG: CPT | Performed by: INTERNAL MEDICINE

## 2025-05-21 PROCEDURE — 99214 OFFICE O/P EST MOD 30 MIN: CPT | Performed by: INTERNAL MEDICINE

## 2025-05-21 PROCEDURE — 3008F BODY MASS INDEX DOCD: CPT | Performed by: INTERNAL MEDICINE

## 2025-05-21 PROCEDURE — 3078F DIAST BP <80 MM HG: CPT | Performed by: INTERNAL MEDICINE

## 2025-05-22 DIAGNOSIS — M48.061 SPINAL STENOSIS OF LUMBAR REGION, UNSPECIFIED WHETHER NEUROGENIC CLAUDICATION PRESENT: ICD-10-CM

## 2025-05-23 RX ORDER — PREGABALIN 200 MG/1
200 CAPSULE ORAL
Qty: 90 CAPSULE | Refills: 2 | Status: SHIPPED | OUTPATIENT
Start: 2025-05-23 | End: 2025-08-21

## 2025-05-24 DIAGNOSIS — J01.00 ACUTE NON-RECURRENT MAXILLARY SINUSITIS: ICD-10-CM

## 2025-05-24 RX ORDER — FLUTICASONE PROPIONATE 50 MCG
1 SPRAY, SUSPENSION (ML) NASAL DAILY PRN
Qty: 16 ML | Refills: 1 | Status: SHIPPED | OUTPATIENT
Start: 2025-05-24

## 2025-05-25 DIAGNOSIS — G43.709 CHRONIC MIGRAINE WITHOUT AURA WITHOUT STATUS MIGRAINOSUS, NOT INTRACTABLE: ICD-10-CM

## 2025-05-27 RX ORDER — LANOLIN ALCOHOL/MO/W.PET/CERES
1 CREAM (GRAM) TOPICAL DAILY
Qty: 90 TABLET | Refills: 1 | Status: SHIPPED | OUTPATIENT
Start: 2025-05-27

## 2025-05-30 ENCOUNTER — TELEPHONE (OUTPATIENT)
Age: 40
End: 2025-05-30

## 2025-05-30 NOTE — TELEPHONE ENCOUNTER
Patient called and requested to reschedule appointment from 6/3 to 6/12 due tp having a back procedure.

## 2025-06-01 DIAGNOSIS — K21.9 GASTROESOPHAGEAL REFLUX DISEASE, UNSPECIFIED WHETHER ESOPHAGITIS PRESENT: ICD-10-CM

## 2025-06-01 RX ORDER — FAMOTIDINE 20 MG/1
20 TABLET, FILM COATED ORAL
Qty: 90 TABLET | Refills: 1 | Status: SHIPPED | OUTPATIENT
Start: 2025-06-01 | End: 2025-11-28

## 2025-06-03 ENCOUNTER — DOCUMENTATION (OUTPATIENT)
Dept: BEHAVIORAL/MENTAL HEALTH CLINIC | Facility: CLINIC | Age: 40
End: 2025-06-03

## 2025-06-12 ENCOUNTER — TELEMEDICINE (OUTPATIENT)
Dept: BEHAVIORAL/MENTAL HEALTH CLINIC | Facility: CLINIC | Age: 40
End: 2025-06-12
Payer: COMMERCIAL

## 2025-06-12 DIAGNOSIS — F33.1 MODERATE EPISODE OF RECURRENT MAJOR DEPRESSIVE DISORDER (HCC): ICD-10-CM

## 2025-06-12 DIAGNOSIS — F41.1 GENERALIZED ANXIETY DISORDER: Primary | ICD-10-CM

## 2025-06-12 PROCEDURE — 90837 PSYTX W PT 60 MINUTES: CPT | Performed by: COUNSELOR

## 2025-06-12 NOTE — PSYCH
Virtual Regular VisitName: Samantha Vila      : 1985      MRN: [id number]  Encounter Provider: Baylee Monaco  Encounter Date: 2025   Encounter department: [facility] THERAPIST MENTAL HEALTH OUTPATIENT  :  Assessment & Plan  Generalized anxiety disorder         Moderate episode of recurrent major depressive disorder (HCC)             Goals addressed in session: Goal 1     DATA: Client processed on her goal of health and finding a new doctor who understands her needs. Client examined past interactions with doctors who did not give her sound medical advice and instead pushed her to do unnecessary procedures because they were not knowledgeable about other types of treatments that worked.  Client is endorsing progress with issues and increasing her use of yoga, encouraged by her current physician.  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 Client has made progress with goals around a sound work out routine and is seeing benefits.  Client has identified online supports for creating better mindfulness routines.          During this session, this clinician used the following therapeutic modalities: Cognitive Behavioral Therapy and Mindfulness-based Strategies    Substance Abuse was addressed during this session. If the client is diagnosed with a co-occurring substance use disorder, please indicate any changes in the frequency or amount of use: NONE. Stage of change for addressing substance use diagnoses: Maintenance    ASSESSMENT:  Samantha presents with a Euthymic/ normal mood. Alicias affect is Normal range and intensity, which is congruent, with their mood and the content of the session. The client has made progress on their goals as evidenced by Client is developing insight and listening to it.    Samantha presents with a none risk of suicide, none risk of self-harm, and none risk of harm to  "others.    For any risk assessment that surpasses a \"low\" rating, a safety plan must be developed.    A safety plan was indicated: no  If yes, describe in detail NA    PLAN: Between sessions, Samantha will continue using skills. At the next session, the therapist will use Cognitive Behavioral Therapy, Mindfulness-based Strategies, and Motivational Interviewing to address anxiety, depression.    Behavioral Health Treatment Plan [facility]: Diagnosis and Treatment Plan explained to Samantha, Samantha relates understanding diagnosis and is agreeable to Treatment Plan. Yes     Depression Follow-up Plan Completed: Not applicable     Reason for visit is No chief complaint on file.     Recent Visits  No visits were found meeting these conditions.  Showing recent visits within past 7 days and meeting all other requirements  Today's Visits  Date Type Provider Dept   06/12/25 Telemedicine Baylee Monaco [facility] Therapist [facility]   Showing today's visits and meeting all other requirements  Future Appointments  No visits were found meeting these conditions.  Showing future appointments within next 150 days and meeting all other requirements     History of Present Illness     HPI    Past Medical History   Past Medical History[1]  Past Surgical History[2]  No current outpatient medications  Allergies[3]    Objective   There were no vitals taken for this visit.    Video Exam  Physical Exam     Administrative Statements   Encounter provider Baylee Monaco    The Patient is located at Home and in the following state in which I hold an active license [place].    The patient was identified by name and date of birth. Samantha Vila was informed that this is a telemedicine visit and that the visit is being conducted through the Epic Embedded platform. She agrees to proceed..  My office door was closed. No one else was in the room.  She acknowledged consent and understanding of privacy and security of the video platform. The patient has " agreed to participate and understands they can discontinue the visit at any time.    I have spent a total time of 53 minutes in caring for this patient on the day of the visit/encounter including Counseling / Coordination of care, not including the time spent for establishing the audio/video connection.    Visit Time  Start Time: 1300  Stop Time: 1353  Total Visit Time: 53 minutes       [1] No past medical history on file.  [2] No past surgical history on file.  [3] Not on File

## 2025-06-13 ENCOUNTER — HOSPITAL ENCOUNTER (OUTPATIENT)
Dept: RADIOLOGY | Facility: CLINIC | Age: 40
Discharge: HOME | End: 2025-06-13
Attending: INTERNAL MEDICINE
Payer: COMMERCIAL

## 2025-06-13 ENCOUNTER — HOSPITAL ENCOUNTER (OUTPATIENT)
Dept: RADIOLOGY | Facility: CLINIC | Age: 40
Discharge: HOME | End: 2025-06-13
Attending: OBSTETRICS & GYNECOLOGY
Payer: COMMERCIAL

## 2025-06-13 DIAGNOSIS — R33.9 RETENTION OF URINE, UNSPECIFIED: ICD-10-CM

## 2025-06-13 DIAGNOSIS — E24.2: ICD-10-CM

## 2025-06-13 DIAGNOSIS — N91.1 SECONDARY AMENORRHEA: ICD-10-CM

## 2025-06-13 PROCEDURE — 76770 US EXAM ABDO BACK WALL COMP: CPT

## 2025-06-13 PROCEDURE — 77080 DXA BONE DENSITY AXIAL: CPT

## 2025-06-14 DIAGNOSIS — K21.9 GASTROESOPHAGEAL REFLUX DISEASE, UNSPECIFIED WHETHER ESOPHAGITIS PRESENT: ICD-10-CM

## 2025-06-14 RX ORDER — PANTOPRAZOLE SODIUM 20 MG/1
20 TABLET, DELAYED RELEASE ORAL DAILY
Qty: 90 TABLET | Refills: 0 | Status: SHIPPED | OUTPATIENT
Start: 2025-06-14

## 2025-06-15 NOTE — TELEPHONE ENCOUNTER
Medicine Refill Request    Last Office Visit: 5/15/2025   Last Consult Visit: Visit date not found  Last Telemedicine Visit: 12/3/2024 Priscilla Leyva PA C    Next Appointment: 9/16/2025      Current Outpatient Medications:     albuterol HFA (VENTOLIN HFA) 90 mcg/actuation inhaler, Inhale 2 puffs every 4 (four) hours as needed., Disp: , Rfl:     amoxicillin (AMOXIL) 500 mg capsule, Take 4 capsules (2,000 mg total) by mouth once for 1 dose. Take 1 hour before scheduled dental work., Disp: 4 capsule, Rfl: 2    clindamycin-benzoyl peroxide 1-5 % gel with pump, Apply topically 2 (two) times a day., Disp: 35 g, Rfl: 1    famotidine (PEPCID) 20 mg tablet, TAKE 1 TABLET (20 MG TOTAL) BY MOUTH DAILY BEFORE DINNER., Disp: 90 tablet, Rfl: 1    ferrous sulfate 325 mg (65 mg iron) tablet, Take 65 mg by mouth nightly., Disp: , Rfl:     fluticasone propion-salmeteroL (ADVAIR DISKUS) 500-50 mcg/dose diskus inhaler, Inhale 1 puff 2 (two) times a day., Disp: , Rfl:     fluticasone propionate (FLONASE) 50 mcg/actuation nasal spray, ADMINISTER 1 SPRAY INTO EACH NOSTRIL DAILY AS NEEDED FOR RHINITIS OR ALLERGIES., Disp: 16 mL, Rfl: 1    linaCLOtide (LINZESS) 290 mcg capsule, 290 mcg daily., Disp: , Rfl:     magnesium oxide (MAG-OX) 400 mg (241.3 mg magnesium) tablet, TAKE 1 TABLET BY MOUTH EVERY DAY, Disp: 90 tablet, Rfl: 1    medical marijuana, 1 each See admin instr. Please be advised that an [facility] hospital staff member has listed medical marijuana as one of your home medications for documentation purposes. Please follow-up with your certified issuing provider for ongoing use, Disp: , Rfl:     metoprolol succinate XL (TOPROL-XL) 25 mg 24 hr tablet, Take 1 tablet (25 mg total) by mouth nightly., Disp: 90 tablet, Rfl: 1    NURTEC ODT 75 mg tablet,disintegrating, TAKE 1 TABLET (75 MG TOTAL) BY MOUTH EVERY OTHER DAY, Disp: 15 tablet, Rfl: 4    onabotulinumtoxinA (BOTOX) injection, Botox for chronic migraine headache every 3  months, Disp: 1 each, Rfl: 4    pantoprazole (PROTONIX) 20 mg EC tablet, TAKE 1 TABLET BY MOUTH EVERY DAY, Disp: 90 tablet, Rfl: 0    pregabalin (LYRICA) 200 mg capsule, Take 1 capsule (200 mg total) by mouth 3 (three) times a day with meals., Disp: 90 capsule, Rfl: 2    semaglutide (WEGOVY) 1.7 mg/0.75 mL subcutaneous injection, Inject 1.7 mg under the skin every (seven) 7 days., Disp: 9 mL, Rfl: 0    SUBLOCADE 300 mg/1.5 mL solution, extended rel syringe subcutaneous injection, 1x monthly, Disp: , Rfl:     temazepam (RESTORIL) 15 mg capsule, Take 15 mg by mouth nightly as needed for sleep., Disp: , Rfl:     BP Readings from Last 3 Encounters:   05/21/25 92/60   05/15/25 118/82   05/13/25 118/70       Recent Lab results:  Lab Results   Component Value Date    CHOL 140 05/07/2025   ,   Lab Results   Component Value Date    HDL 37 (L) 05/07/2025   ,   Lab Results   Component Value Date    LDLCALC 80 05/07/2025   ,   Lab Results   Component Value Date    TRIG 131 05/07/2025        Lab Results   Component Value Date    GLUCOSE 90 05/07/2025   ,   Lab Results   Component Value Date    HGBA1C 5.2 05/07/2025         Lab Results   Component Value Date    CREATININE 0.57 05/07/2025       Lab Results   Component Value Date    TSH 1.030 05/07/2025           Lab Results   Component Value Date    HGBA1C 5.2 05/07/2025

## 2025-06-16 ENCOUNTER — RESULTS FOLLOW-UP (OUTPATIENT)
Dept: ENDOCRINOLOGY | Facility: CLINIC | Age: 40
End: 2025-06-16

## 2025-06-24 ENCOUNTER — TELEMEDICINE (OUTPATIENT)
Dept: BEHAVIORAL/MENTAL HEALTH CLINIC | Facility: CLINIC | Age: 40
End: 2025-06-24
Payer: COMMERCIAL

## 2025-06-24 DIAGNOSIS — F33.1 MODERATE EPISODE OF RECURRENT MAJOR DEPRESSIVE DISORDER (HCC): ICD-10-CM

## 2025-06-24 DIAGNOSIS — F41.1 GENERALIZED ANXIETY DISORDER: Primary | ICD-10-CM

## 2025-06-24 PROCEDURE — 90837 PSYTX W PT 60 MINUTES: CPT | Performed by: COUNSELOR

## 2025-06-24 NOTE — PSYCH
"Virtual Regular VisitName: Samantha Vila      : 1985      MRN: [id number]  Encounter Provider: Baylee Monaco  Encounter Date: 2025   Encounter department: [facility] THERAPIST MENTAL HEALTH OUTPATIENT  :  Assessment & Plan  Generalized anxiety disorder         Moderate episode of recurrent major depressive disorder (HCC)             Goals addressed in session: Goal 1     DATA: Client processed on goal of decreasing anxiety and depression. Client explored how chronic pain has changed her life. Client processed on several physical situations causing her chronic pain and her quest to get these issues addressed by physicians.  Client endorses that these issues cause her to isolate and prevent her from living her life fully.  Client examines things that she can do to help distract from pain issues.  Client is endorsing interest in a chronic pain support group and will check out [facility] in [facility].      During this session, this clinician used the following therapeutic modalities: Client-centered Therapy, Mindfulness-based Strategies, Motivational Interviewing, Solution-Focused Therapy, and Supportive Psychotherapy    Substance Abuse was addressed during this session. If the client is diagnosed with a co-occurring substance use disorder, please indicate any changes in the frequency or amount of use: NONE. Stage of change for addressing substance use diagnoses: Maintenance    ASSESSMENT:  Samantha presents with a Euthymic/ normal mood. Samantha's affect is Normal range and intensity, which is congruent, with their mood and the content of the session. The client has made progress on their goals as evidenced by Client using coping skills.    Samantha presents with a none risk of suicide, none risk of self-harm, and none risk of harm to others.    For any risk assessment that surpasses a \"low\" rating, a safety plan must be developed.    A safety plan was indicated: no  If yes, describe in " detail NA    PLAN: Between sessions, Samantha will continue using coping skills. At the next session, the therapist will use Client-centered Therapy, Cognitive Behavioral Therapy, Mindfulness-based Strategies, Motivational Interviewing, Solution-Focused Therapy, and Supportive Psychotherapy to address anxiety, depression.    Behavioral Health Treatment Plan [facility]: Diagnosis and Treatment Plan explained to Samantha, Samantha relates understanding diagnosis and is agreeable to Treatment Plan. Yes     Depression Follow-up Plan Completed: Not applicable     Reason for visit is No chief complaint on file.     Recent Visits  No visits were found meeting these conditions.  Showing recent visits within past 7 days and meeting all other requirements  Today's Visits  Date Type Provider Dept   06/24/25 Telemedicine Baylee Monaco [facility] Therapist [facility]   Showing today's visits and meeting all other requirements  Future Appointments  No visits were found meeting these conditions.  Showing future appointments within next 150 days and meeting all other requirements     History of Present Illness     HPI    Past Medical History   Past Medical History[1]  Past Surgical History[2]  No current outpatient medications  Allergies[3]    Objective   There were no vitals taken for this visit.    Video Exam  Physical Exam     Administrative Statements   Encounter provider Baylee Monaco    The Patient is located at Home and in the following state in which I hold an active license [place].    The patient was identified by name and date of birth. Samantha Vila was informed that this is a telemedicine visit and that the visit is being conducted through the Epic Embedded platform. She agrees to proceed..  My office door was closed. No one else was in the room.  She acknowledged consent and understanding of privacy and security of the video platform. The patient has agreed to participate and understands they can discontinue the visit at  any time.    I have spent a total time of 53 minutes in caring for this patient on the day of the visit/encounter including Counseling / Coordination of care, not including the time spent for establishing the audio/video connection.    Visit Time  Start Time: 1502  Stop Time: 1555  Total Visit Time: 53 minutes       [1] No past medical history on file.  [2] No past surgical history on file.  [3] Not on File

## 2025-06-26 ENCOUNTER — OFFICE VISIT (OUTPATIENT)
Facility: HOSPITAL | Age: 40
End: 2025-06-26
Attending: NURSE PRACTITIONER
Payer: COMMERCIAL

## 2025-06-26 VITALS
RESPIRATION RATE: 14 BRPM | BODY MASS INDEX: 26.52 KG/M2 | WEIGHT: 145 LBS | SYSTOLIC BLOOD PRESSURE: 98 MMHG | DIASTOLIC BLOOD PRESSURE: 78 MMHG

## 2025-06-26 DIAGNOSIS — G43.109 MIGRAINE WITH AURA AND WITHOUT STATUS MIGRAINOSUS, NOT INTRACTABLE: ICD-10-CM

## 2025-06-26 DIAGNOSIS — G24.3 SPASMODIC TORTICOLLIS: Primary | ICD-10-CM

## 2025-06-26 DIAGNOSIS — G43.709 CHRONIC MIGRAINE WITHOUT AURA WITHOUT STATUS MIGRAINOSUS, NOT INTRACTABLE: ICD-10-CM

## 2025-06-26 PROCEDURE — 64615 CHEMODENERV MUSC MIGRAINE: CPT | Performed by: NURSE PRACTITIONER

## 2025-06-26 PROCEDURE — 63600000 HC DRUGS/DETAIL CODE: Mod: JZ,TB | Performed by: NURSE PRACTITIONER

## 2025-06-26 RX ORDER — RIMEGEPANT SULFATE 75 MG/75MG
75 TABLET, ORALLY DISINTEGRATING ORAL EVERY OTHER DAY
Qty: 15 TABLET | Refills: 4 | Status: SHIPPED | OUTPATIENT
Start: 2025-06-26

## 2025-06-26 RX ORDER — LANOLIN ALCOHOL/MO/W.PET/CERES
1 CREAM (GRAM) TOPICAL DAILY
Qty: 90 TABLET | Refills: 1 | Status: SHIPPED | OUTPATIENT
Start: 2025-06-26

## 2025-06-26 RX ADMIN — ONABOTULINUMTOXINA 200 UNITS: 200 INJECTION, POWDER, LYOPHILIZED, FOR SOLUTION INTRADERMAL; INTRAMUSCULAR at 12:50

## 2025-06-26 NOTE — PROGRESS NOTES
Patient returns for injections.      Since last seen headaches have improved with Botox injections.    Correlated with headache diary, decreased abortive medication use.     At least 50 % of reduction of frequency and severity of moderate to severe headaches      Botox injections  Botox lot number: [id number]  Expiration Date: 11/2027      NDC#9130-9124-27    Patient informed and consent obtained.    General Consent including notice of privacy practices/patient bill of rights was reviewed and consent/authorization given.       4cc of Normal saline were added to one vial of Botox Type A resulting in 200 Units of Botox.  After the patient consented to the procedure the following muscles were injected after cleaning the overlying skin with alcohol. New FDA mandated warnings provided to the patient with instructions to seek medical attention for difficulty swallowing or breathing.    Frontalis 10 units right ( 2 sites each 5 units) and 10 units left ( 2 sites each 5 units)  /Glabellar 5 units right and 5 units left (medially)  Procerus 5 units    Temporalis 30 units right( 6 sites each 5 units) and 30 units left ( 6 sites each 5 units)    Trapezius 25 units ( 3 sites each 5 units) right and 30 units left ( 3 sites each 5 units)  Cervical Paraspinals 10 units right ( 2 sites each 5 units) and 10 units left (2 sites each 5 units)  Occipital 15 units right ( 3 sites each 5 units) , 15 units left ( 3 sites each 5 units)      A total of 200 units was used The patient tolerated the procedure well.     Diagnoses and all orders for this visit:    Spasmodic torticollis  -     [facility] Botulinum Toxin Injection Appointment Request  -     botulinum toxin Type A (BOTOX) 200 unit injection 200 Units    Chronic migraine without aura without status migrainosus, not intractable    -     [facility] Botulinum Toxin Injection Appointment Request  -     botulinum toxin Type A (BOTOX) 200 unit injection 200 Units  -     magnesium oxide  (MAG-OX) 400 mg (241.3 mg magnesium) tablet; Take 1 tablet (400 mg total) by mouth daily.    Migraine with aura and without status migrainosus, not intractable  -     NURTEC ODT 75 mg tablet,disintegrating; Take 1 tablet (75 mg total) by mouth every other day.    Other orders  -     botulinum toxin Type A (BOTOX) 200 unit injection 200 Units

## 2025-07-11 ENCOUNTER — HOSPITAL ENCOUNTER (OUTPATIENT)
Facility: HOSPITAL | Age: 40
End: 2025-07-11
Attending: INTERNAL MEDICINE | Admitting: INTERNAL MEDICINE
Payer: COMMERCIAL

## 2025-07-15 ENCOUNTER — TELEMEDICINE (OUTPATIENT)
Dept: BEHAVIORAL/MENTAL HEALTH CLINIC | Facility: CLINIC | Age: 40
End: 2025-07-15
Payer: COMMERCIAL

## 2025-07-15 DIAGNOSIS — M79.605 LEFT LEG PAIN: Primary | ICD-10-CM

## 2025-07-15 DIAGNOSIS — F41.1 GENERALIZED ANXIETY DISORDER: Primary | ICD-10-CM

## 2025-07-15 DIAGNOSIS — F33.1 MODERATE EPISODE OF RECURRENT MAJOR DEPRESSIVE DISORDER (HCC): ICD-10-CM

## 2025-07-15 PROCEDURE — 90837 PSYTX W PT 60 MINUTES: CPT | Performed by: COUNSELOR

## 2025-07-16 ENCOUNTER — OFFICE VISIT (OUTPATIENT)
Dept: PRIMARY CARE | Facility: CLINIC | Age: 40
End: 2025-07-16
Payer: COMMERCIAL

## 2025-07-16 VITALS
HEIGHT: 62 IN | TEMPERATURE: 97.7 F | SYSTOLIC BLOOD PRESSURE: 102 MMHG | BODY MASS INDEX: 25.76 KG/M2 | WEIGHT: 140 LBS | DIASTOLIC BLOOD PRESSURE: 68 MMHG | OXYGEN SATURATION: 97 % | HEART RATE: 87 BPM | RESPIRATION RATE: 16 BRPM

## 2025-07-16 DIAGNOSIS — H66.92 LEFT OTITIS MEDIA, UNSPECIFIED OTITIS MEDIA TYPE: ICD-10-CM

## 2025-07-16 DIAGNOSIS — J01.00 ACUTE NON-RECURRENT MAXILLARY SINUSITIS: Primary | ICD-10-CM

## 2025-07-16 PROCEDURE — 3008F BODY MASS INDEX DOCD: CPT | Performed by: STUDENT IN AN ORGANIZED HEALTH CARE EDUCATION/TRAINING PROGRAM

## 2025-07-16 PROCEDURE — 3078F DIAST BP <80 MM HG: CPT | Performed by: STUDENT IN AN ORGANIZED HEALTH CARE EDUCATION/TRAINING PROGRAM

## 2025-07-16 PROCEDURE — 99214 OFFICE O/P EST MOD 30 MIN: CPT | Performed by: STUDENT IN AN ORGANIZED HEALTH CARE EDUCATION/TRAINING PROGRAM

## 2025-07-16 PROCEDURE — 3074F SYST BP LT 130 MM HG: CPT | Performed by: STUDENT IN AN ORGANIZED HEALTH CARE EDUCATION/TRAINING PROGRAM

## 2025-07-16 RX ORDER — KETOCONAZOLE 20 MG/G
1 CREAM TOPICAL EVERY 12 HOURS
COMMUNITY

## 2025-07-16 RX ORDER — RESVER/WINE/BFL/GRPSD/PC/C/POM 200MG-60MG
125 CAPSULE ORAL
COMMUNITY
Start: 2025-06-27

## 2025-07-16 RX ORDER — AZITHROMYCIN 250 MG/1
TABLET, FILM COATED ORAL
Qty: 6 TABLET | Refills: 0 | Status: SHIPPED | OUTPATIENT
Start: 2025-07-16 | End: 2025-07-21

## 2025-07-16 RX ORDER — TAMSULOSIN HYDROCHLORIDE 0.4 MG/1
1 CAPSULE ORAL
COMMUNITY

## 2025-07-16 RX ORDER — LINACLOTIDE 145 UG/1
CAPSULE, GELATIN COATED ORAL
COMMUNITY
Start: 2025-07-10

## 2025-07-17 ENCOUNTER — TELEPHONE (OUTPATIENT)
Dept: VASCULAR SURGERY | Facility: CLINIC | Age: 40
End: 2025-07-17
Payer: COMMERCIAL

## 2025-07-17 PROBLEM — J01.00 ACUTE NON-RECURRENT MAXILLARY SINUSITIS: Status: ACTIVE | Noted: 2025-07-17

## 2025-07-17 PROBLEM — H66.92 LEFT OTITIS MEDIA: Status: ACTIVE | Noted: 2025-07-17

## 2025-07-17 ASSESSMENT — ENCOUNTER SYMPTOMS
SINUS PAIN: 1
NUMBNESS: 0
EYES NEGATIVE: 1
DIFFICULTY URINATING: 0
HEADACHES: 0
ACTIVITY CHANGE: 0
RHINORRHEA: 1
APPETITE CHANGE: 0
SHORTNESS OF BREATH: 0
UNEXPECTED WEIGHT CHANGE: 0
VOMITING: 0
FATIGUE: 0
CHILLS: 0
DYSURIA: 0
WEAKNESS: 0
NAUSEA: 0
ALLERGIC/IMMUNOLOGIC NEGATIVE: 1
WHEEZING: 0
SINUS PRESSURE: 1
HEMATOLOGIC/LYMPHATIC NEGATIVE: 1
DIZZINESS: 0
COUGH: 0
DIAPHORESIS: 0
ABDOMINAL PAIN: 0
PALPITATIONS: 0
BLOOD IN STOOL: 0
FEVER: 0
ARTHRALGIAS: 0
ENDOCRINE NEGATIVE: 1
DYSPHORIC MOOD: 0
DIARRHEA: 0

## 2025-07-17 NOTE — PROGRESS NOTES
BAR Be MD.   [facility]  [address] [place] [address]   [phone]         Consent obtained from patient and all parties present in the room? yes  I have obtained the consent of everyone present in the room to make an audio recording of this visit to assist me in   documenting the encounter in the EMR.    This is a 39 y.o. female with pmh of     Past Medical History:   Diagnosis Date    Acute bacterial endocarditis 12/10/2020    Anxiety 2008    Depressed     Endocarditis     Fibromyalgia     Fibromyalgia, primary 2017    History of snoring     Joint pain 2017    Low back pain 2017    Migraines     Obesity 2000    Obstructive sleep apnea syndrome 01/10/2023    uses CPAP machine    Osteomyelitis (CMS/[facility]) 01/10/2023    Pancreatitis 2012    Recurrent major depressive disorder (CMS/[facility]) 2023    Substance abuse (CMS/[facility]) 2015    Tachycardia     Vasculitis (CMS/[facility]) 2017       Family History   Problem Relation Name Age of Onset    Hypertension Biological Mother Ching quijano     Mental illness Biological Mother Ching quijano     Lung cancer Biological Father  76        smoker    Lung cancer Maternal Grandmother  69        smoker    Heart disease Maternal Grandfather      Heart attack Maternal Grandfather          unsure of age    Colon cancer Maternal Grandfather         Past Surgical History   Procedure Laterality Date    Cholecystectomy      Ercp      Tonsillectomy         Social History     Tobacco Use    Smoking status: Former     Current packs/day: 0.00     Average packs/day: 0.5 packs/day for 5.0 years (2.5 ttl pk-yrs)     Types: Cigarettes     Start date:      Quit date: 2020     Years since quittin.5    Smokeless tobacco: Former    Tobacco comments:     quit 1 year ago, vapes currently   Vaping Use    Vaping status: Every Day    Substances: Nicotine, Flavoring    Devices: Refillable tank   Substance Use Topics    Alcohol use: Not  Currently    Drug use: Not Currently     Types: Marijuana, Heroin     Comment: MM now 2023, previous heroin and opiod         Allergies  Tramadol, Amitriptyline hcl, Amoxicillin, Bactrim [sulfamethoxazole-trimethoprim], Nsaids (non-steroidal anti-inflammatory drug), and Trazodone      Current Outpatient Medications:     albuterol HFA (VENTOLIN HFA) 90 mcg/actuation inhaler, Inhale 2 puffs every 4 (four) hours as needed., Disp: , Rfl:     amoxicillin (AMOXIL) 500 mg capsule, Take 4 capsules (2,000 mg total) by mouth once for 1 dose. Take 1 hour before scheduled dental work., Disp: 4 capsule, Rfl: 2    azithromycin (ZITHROMAX) 250 mg tablet, Take 2 tablets the first day, then 1 tablet daily for 4 days., Disp: 6 tablet, Rfl: 0    clindamycin-benzoyl peroxide 1-5 % gel with pump, Apply topically 2 (two) times a day., Disp: 35 g, Rfl: 1    famotidine (PEPCID) 20 mg tablet, TAKE 1 TABLET (20 MG TOTAL) BY MOUTH DAILY BEFORE DINNER., Disp: 90 tablet, Rfl: 1    ferrous sulfate 325 mg (65 mg iron) tablet, Take 65 mg by mouth nightly., Disp: , Rfl:     fluticasone propion-salmeteroL (ADVAIR DISKUS) 500-50 mcg/dose diskus inhaler, Inhale 1 puff 2 (two) times a day., Disp: , Rfl:     fluticasone propionate (FLONASE) 50 mcg/actuation nasal spray, ADMINISTER 1 SPRAY INTO EACH NOSTRIL DAILY AS NEEDED FOR RHINITIS OR ALLERGIES., Disp: 16 mL, Rfl: 1    ketoconazole (NIZORAL) 2 % cream, 1 Application every 12 hours., Disp: , Rfl:     linaCLOtide (LINZESS) 290 mcg capsule, 290 mcg daily., Disp: , Rfl:     magnesium oxide (MAG-OX) 400 mg (241.3 mg magnesium) tablet, Take 1 tablet (400 mg total) by mouth daily., Disp: 90 tablet, Rfl: 1    medical marijuana, 1 each See admin instr. Please be advised that an [facility] hospital staff member has listed medical marijuana as one of your home medications for documentation purposes. Please follow-up with your certified issuing provider for ongoing use, Disp: , Rfl:     metoprolol succinate XL  (TOPROL-XL) 25 mg 24 hr tablet, Take 1 tablet (25 mg total) by mouth nightly., Disp: 90 tablet, Rfl: 1    NURTEC ODT 75 mg tablet,disintegrating, Take 1 tablet (75 mg total) by mouth every other day., Disp: 15 tablet, Rfl: 4    onabotulinumtoxinA (BOTOX) injection, Botox for chronic migraine headache every 3 months, Disp: 1 each, Rfl: 4    pantoprazole (PROTONIX) 20 mg EC tablet, TAKE 1 TABLET BY MOUTH EVERY DAY, Disp: 90 tablet, Rfl: 0    pregabalin (LYRICA) 200 mg capsule, Take 1 capsule (200 mg total) by mouth 3 (three) times a day with meals., Disp: 90 capsule, Rfl: 2    semaglutide (WEGOVY) 1.7 mg/0.75 mL subcutaneous injection, Inject 1.7 mg under the skin every (seven) 7 days., Disp: 9 mL, Rfl: 0    SUBLOCADE 300 mg/1.5 mL solution, extended rel syringe subcutaneous injection, 1x monthly, Disp: , Rfl:     temazepam (RESTORIL) 15 mg capsule, Take 15 mg by mouth nightly as needed for sleep., Disp: , Rfl:     VITAMIN D3 125 mcg (5,000 unit) tablet, Take 125 mcg by mouth., Disp: , Rfl:     dexAMETHasone (DECADRON) 1 mg tablet, Take one tab with food daily for 3-5 days to break migraine cycle (Patient not taking: Reported on 7/16/2025), Disp: 5 tablet, Rfl: 1    LINZESS 145 mcg capsule, , Disp: , Rfl:     tamsulosin (FLOMAX) 0.4 mg capsule, 1 capsule daily., Disp: , Rfl:           Last set of basic labs:   Lab Results   Component Value Date     05/07/2025     (H) 11/13/2024     06/09/2024    K 4.6 05/07/2025    K 4.1 11/13/2024    K 4.3 06/09/2024     05/07/2025    CO2 21 05/07/2025    BUN 8 05/07/2025    CREATININE 0.57 05/07/2025    CREATININE 0.58 11/13/2024    CREATININE 0.7 06/09/2024       Lab Results   Component Value Date    ALBUMIN 3.7 (L) 05/07/2025    ALBUMIN 3.9 11/13/2024    ALBUMIN 3.9 06/09/2024    AST 15 05/07/2025    AST 13 11/13/2024    AST 20 06/09/2024    ALT 15 05/07/2025    ALT 18 11/13/2024    ALT 15 06/09/2024    BILITOT 0.3 05/07/2025    BILITOT <0.2 11/13/2024     "BILITOT 0.5 06/09/2024       Lab Results   Component Value Date    TSH 1.030 05/07/2025    HGBA1C 5.2 05/07/2025    MICROALBUR <3.0 05/07/2025       Lab Results   Component Value Date    INR 1.0 01/26/2021    PT 12.7 01/26/2021    PTT 31 01/26/2021       Lab Results   Component Value Date    CHOL 140 05/07/2025    CHOL 167 11/13/2024    CHOL 187 10/09/2023    TRIG 131 05/07/2025    TRIG 248 (H) 11/13/2024    TRIG 159 (H) 10/09/2023    LDLCALC 80 05/07/2025    LDLCALC 81 11/13/2024    LDLCALC 112 (H) 10/09/2023    HDL 37 (L) 05/07/2025    HDL 45 11/13/2024    HDL 47 10/09/2023       Lab Results   Component Value Date    WBC 9.1 05/07/2025    WBC 7.3 06/14/2024    WBC 11.88 (H) 06/09/2024    HGB 10.4 (L) 05/07/2025    HGB 12.9 06/14/2024    HGB 13.1 06/09/2024    MCV 90 05/07/2025    MCV 91 06/14/2024    MCV 90.7 06/09/2024    MCHC 32.0 05/07/2025    MCHC 31.7 06/14/2024    MCHC 32.8 06/09/2024     05/07/2025     06/14/2024     06/09/2024       Lab Results   Component Value Date    TSH 1.030 05/07/2025    TSH 2.290 08/26/2024    TSH 2.500 06/23/2023    B12 >2000 (H) 05/07/2025    B12 412 06/12/2023    FERRITIN 100 04/18/2017    FERRITIN 107 04/16/2017    FERRITIN 283 (H) 03/24/2017       Results          Today's Encounter       Vitals:    07/16/25 1710   BP: 102/68   BP Location: Left upper arm   Patient Position: Sitting   Pulse: 87   Resp: 16   Temp: 36.5 °C (97.7 °F)   TempSrc: Temporal   SpO2: 97%   Weight: 63.5 kg (140 lb)   Height: 1.575 m (5' 2\")       Body mass index is 25.61 kg/m².     BP Readings from Last 3 Encounters:   07/16/25 102/68   06/26/25 98/78   05/21/25 92/60     Wt Readings from Last 3 Encounters:   07/16/25 63.5 kg (140 lb)   06/26/25 65.8 kg (145 lb)   05/21/25 69.4 kg (153 lb)     Ht Readings from Last 3 Encounters:   07/16/25 1.575 m (5' 2\")   05/21/25 1.575 m (5' 2\")   05/15/25 1.575 m (5' 2\")       Last seen: 5/15/2025     Today patient presents for Acute same-day " visit    History of Present Illness  The patient presents for evaluation of a sinus infection.    Ear Condition  - Echoey, squishing sound in her ears when bending down a week ago  - No improvement despite using ear drops  - Left ear more affected than the right    Nasal Discharge  - Thick, yellow discharge from one nostril during nightly saltwater rinse  - Producing yellow and green mucus    Sinus Headaches  - Severe sinus headaches over the past two days    Sinus Infection History  - Suspects a sinus infection, possibly contracted from her sister  - Used to experience these infections frequently, but they have become less common recently    Medications  - Currently taking Sudafed and Excedrin for her symptoms  - Uses Flonase daily  - Previously prescribed clindamycin and Augmentin but was unable to complete the course due to vomiting    Other Symptoms  - Reports no coughing  - Mentions feeling chills earlier    Sleep  - Uses CPAP machine         Physical Exam  HENT:      Left Ear: A middle ear effusion is present.   Cardiovascular:      Rate and Rhythm: Normal rate and regular rhythm.      Heart sounds: Normal heart sounds.   Pulmonary:      Effort: Pulmonary effort is normal.      Breath sounds: Normal breath sounds and air entry.            Review of Systems   Constitutional:  Negative for activity change, appetite change, chills, diaphoresis, fatigue, fever and unexpected weight change.   HENT:  Positive for congestion, ear pain, rhinorrhea, sinus pressure and sinus pain.    Eyes: Negative.    Respiratory:  Negative for cough, shortness of breath and wheezing.    Cardiovascular:  Negative for chest pain, palpitations and leg swelling.   Gastrointestinal:  Negative for abdominal pain, blood in stool, diarrhea, nausea and vomiting.   Endocrine: Negative.    Genitourinary:  Negative for difficulty urinating and dysuria.   Musculoskeletal:  Negative for arthralgias.   Skin: Negative.    Allergic/Immunologic:  Negative.    Neurological:  Negative for dizziness, weakness, numbness and headaches.   Hematological: Negative.    Psychiatric/Behavioral:  Negative for dysphoric mood and suicidal ideas.             Assessment & Plan  1. Sinusitis and otitis.  - Symptoms include echoey and squishing sounds in the ears, yellow and green nasal discharge, and sinus headaches.  - Examination revealed cloudy fluid in the left ear.  - Symptoms are typically viral and do not require antibiotics unless there is fluid in the ears, which can lead to infection.  - Prescription for Z-Chai (azithromycin) sent to pharmacy due to her intolerance to Augmentin. Continue using Flonase and performing nasal rinses. Tylenol can be taken for headache relief. Adequate hydration recommended.       Problem List Items Addressed This Visit       Acute non-recurrent maxillary sinusitis - Primary    Left otitis media                07/17/25     Attestation

## 2025-07-17 NOTE — TELEPHONE ENCOUNTER
Called and lvm for patient to call back to schedule appt with us for concerns after varithena, also left on voicemail that we placed order for ultrasound to be completed before appt.

## 2025-07-18 ENCOUNTER — HOSPITAL ENCOUNTER (OUTPATIENT)
Dept: CARDIOLOGY | Facility: HOSPITAL | Age: 40
Discharge: HOME | End: 2025-07-18
Payer: COMMERCIAL

## 2025-07-18 ENCOUNTER — TELEPHONE (OUTPATIENT)
Dept: PSYCHIATRY | Facility: CLINIC | Age: 40
End: 2025-07-18

## 2025-07-18 PROCEDURE — 93971 EXTREMITY STUDY: CPT | Mod: TC,LT

## 2025-07-18 NOTE — TELEPHONE ENCOUNTER
Left voicemail informing patient and/or parent/guardian of the Psych Encounter form needing to be signed as a requirement from the insurance company for billing purposes. Patient can access form via [employer] and sign electronically.     Please make patient aware this form must be signed for each visit as a requirement to continue future visits with provider.    Virtual ENcounter form 7/15/25 call #1

## 2025-07-20 DIAGNOSIS — J01.00 ACUTE NON-RECURRENT MAXILLARY SINUSITIS: ICD-10-CM

## 2025-07-20 RX ORDER — FLUTICASONE PROPIONATE 50 MCG
1 SPRAY, SUSPENSION (ML) NASAL DAILY PRN
Qty: 16 ML | Refills: 1 | Status: SHIPPED | OUTPATIENT
Start: 2025-07-20

## 2025-07-21 ENCOUNTER — TELEPHONE (OUTPATIENT)
Dept: VASCULAR SURGERY | Facility: CLINIC | Age: 40
End: 2025-07-21
Payer: COMMERCIAL

## 2025-07-21 NOTE — TELEPHONE ENCOUNTER
Medicine Refill Request    Last Office Visit: 7/16/2025   Last Consult Visit: Visit date not found  Last Telemedicine Visit: 12/3/2024 Priscilla Leyva PA C    Next Appointment: 9/16/2025      Current Outpatient Medications:     albuterol HFA (VENTOLIN HFA) 90 mcg/actuation inhaler, Inhale 2 puffs every 4 (four) hours as needed., Disp: , Rfl:     amoxicillin (AMOXIL) 500 mg capsule, Take 4 capsules (2,000 mg total) by mouth once for 1 dose. Take 1 hour before scheduled dental work., Disp: 4 capsule, Rfl: 2    azithromycin (ZITHROMAX) 250 mg tablet, Take 2 tablets the first day, then 1 tablet daily for 4 days., Disp: 6 tablet, Rfl: 0    clindamycin-benzoyl peroxide 1-5 % gel with pump, Apply topically 2 (two) times a day., Disp: 35 g, Rfl: 1    dexAMETHasone (DECADRON) 1 mg tablet, Take one tab with food daily for 3-5 days to break migraine cycle (Patient not taking: Reported on 7/16/2025), Disp: 5 tablet, Rfl: 1    famotidine (PEPCID) 20 mg tablet, TAKE 1 TABLET (20 MG TOTAL) BY MOUTH DAILY BEFORE DINNER., Disp: 90 tablet, Rfl: 1    ferrous sulfate 325 mg (65 mg iron) tablet, Take 65 mg by mouth nightly., Disp: , Rfl:     fluticasone propion-salmeteroL (ADVAIR DISKUS) 500-50 mcg/dose diskus inhaler, Inhale 1 puff 2 (two) times a day., Disp: , Rfl:     fluticasone propionate (FLONASE) 50 mcg/actuation nasal spray, ADMINISTER 1 SPRAY INTO EACH NOSTRIL DAILY AS NEEDED FOR RHINITIS OR ALLERGIES., Disp: 16 mL, Rfl: 1    ketoconazole (NIZORAL) 2 % cream, 1 Application every 12 hours., Disp: , Rfl:     linaCLOtide (LINZESS) 290 mcg capsule, 290 mcg daily., Disp: , Rfl:     LINZESS 145 mcg capsule, , Disp: , Rfl:     magnesium oxide (MAG-OX) 400 mg (241.3 mg magnesium) tablet, Take 1 tablet (400 mg total) by mouth daily., Disp: 90 tablet, Rfl: 1    medical marijuana, 1 each See admin instr. Please be advised that an [facility] hospital staff member has listed medical marijuana as one of your home medications for  documentation purposes. Please follow-up with your certified issuing provider for ongoing use, Disp: , Rfl:     metoprolol succinate XL (TOPROL-XL) 25 mg 24 hr tablet, Take 1 tablet (25 mg total) by mouth nightly., Disp: 90 tablet, Rfl: 1    NURTEC ODT 75 mg tablet,disintegrating, Take 1 tablet (75 mg total) by mouth every other day., Disp: 15 tablet, Rfl: 4    onabotulinumtoxinA (BOTOX) injection, Botox for chronic migraine headache every 3 months, Disp: 1 each, Rfl: 4    pantoprazole (PROTONIX) 20 mg EC tablet, TAKE 1 TABLET BY MOUTH EVERY DAY, Disp: 90 tablet, Rfl: 0    pregabalin (LYRICA) 200 mg capsule, Take 1 capsule (200 mg total) by mouth 3 (three) times a day with meals., Disp: 90 capsule, Rfl: 2    semaglutide (WEGOVY) 1.7 mg/0.75 mL subcutaneous injection, Inject 1.7 mg under the skin every (seven) 7 days., Disp: 9 mL, Rfl: 0    SUBLOCADE 300 mg/1.5 mL solution, extended rel syringe subcutaneous injection, 1x monthly, Disp: , Rfl:     tamsulosin (FLOMAX) 0.4 mg capsule, 1 capsule daily., Disp: , Rfl:     temazepam (RESTORIL) 15 mg capsule, Take 15 mg by mouth nightly as needed for sleep., Disp: , Rfl:     VITAMIN D3 125 mcg (5,000 unit) tablet, Take 125 mcg by mouth., Disp: , Rfl:     BP Readings from Last 3 Encounters:   07/16/25 102/68   06/26/25 98/78   05/21/25 92/60       Recent Lab results:  Lab Results   Component Value Date    CHOL 140 05/07/2025   ,   Lab Results   Component Value Date    HDL 37 (L) 05/07/2025   ,   Lab Results   Component Value Date    LDLCALC 80 05/07/2025   ,   Lab Results   Component Value Date    TRIG 131 05/07/2025        Lab Results   Component Value Date    GLUCOSE 90 05/07/2025   ,   Lab Results   Component Value Date    HGBA1C 5.2 05/07/2025         Lab Results   Component Value Date    CREATININE 0.57 05/07/2025       Lab Results   Component Value Date    TSH 1.030 05/07/2025           Lab Results   Component Value Date    HGBA1C 5.2 05/07/2025

## 2025-07-21 NOTE — TELEPHONE ENCOUNTER
VM left patient was negative for a DVT. Office numbers provided to call back with any questions or concerns.

## 2025-08-03 DIAGNOSIS — E66.813 CLASS 3 OBESITY WITH ALVEOLAR HYPOVENTILATION, SERIOUS COMORBIDITY, AND BODY MASS INDEX (BMI) OF 45.0 TO 49.9 IN ADULT (CMS/HCC): ICD-10-CM

## 2025-08-03 DIAGNOSIS — E66.2 CLASS 3 OBESITY WITH ALVEOLAR HYPOVENTILATION, SERIOUS COMORBIDITY, AND BODY MASS INDEX (BMI) OF 45.0 TO 49.9 IN ADULT (CMS/HCC): ICD-10-CM

## 2025-08-04 ENCOUNTER — TELEPHONE (OUTPATIENT)
Dept: SLEEP MEDICINE | Facility: HOSPITAL | Age: 40
End: 2025-08-04
Payer: COMMERCIAL

## 2025-08-10 ENCOUNTER — HOSPITAL ENCOUNTER (OUTPATIENT)
Dept: SLEEP MEDICINE | Facility: HOSPITAL | Age: 40
Discharge: HOME | End: 2025-08-10
Attending: INTERNAL MEDICINE
Payer: COMMERCIAL

## 2025-08-10 DIAGNOSIS — G47.31 SECONDARY CENTRAL SLEEP APNEA: ICD-10-CM

## 2025-08-10 PROCEDURE — 95810 POLYSOM 6/> YRS 4/> PARAM: CPT

## 2025-08-11 VITALS — WEIGHT: 140 LBS | HEART RATE: 96 BPM | BODY MASS INDEX: 25.76 KG/M2 | HEIGHT: 62 IN | OXYGEN SATURATION: 98 %

## 2025-08-12 ENCOUNTER — OFFICE VISIT (OUTPATIENT)
Facility: HOSPITAL | Age: 40
End: 2025-08-12
Payer: COMMERCIAL

## 2025-08-12 VITALS
DIASTOLIC BLOOD PRESSURE: 70 MMHG | OXYGEN SATURATION: 98 % | RESPIRATION RATE: 18 BRPM | BODY MASS INDEX: 24.33 KG/M2 | WEIGHT: 133 LBS | SYSTOLIC BLOOD PRESSURE: 104 MMHG

## 2025-08-12 DIAGNOSIS — M54.2 CERVICALGIA: ICD-10-CM

## 2025-08-12 DIAGNOSIS — G43.709 CHRONIC MIGRAINE WITHOUT AURA WITHOUT STATUS MIGRAINOSUS, NOT INTRACTABLE: ICD-10-CM

## 2025-08-12 DIAGNOSIS — M79.18 MYOFASCIAL PAIN: Primary | ICD-10-CM

## 2025-08-12 PROCEDURE — 20553 NJX 1/MLT TRIGGER POINTS 3/>: CPT | Performed by: NURSE PRACTITIONER

## 2025-08-18 ENCOUNTER — HOSPITAL ENCOUNTER (OUTPATIENT)
Dept: RADIOLOGY | Facility: HOSPITAL | Age: 40
Discharge: HOME | End: 2025-08-18
Attending: PODIATRIST
Payer: COMMERCIAL

## 2025-08-18 DIAGNOSIS — M19.072 ARTHRITIS OF LEFT FOOT: ICD-10-CM

## 2025-08-18 DIAGNOSIS — M48.061 SPINAL STENOSIS OF LUMBAR REGION, UNSPECIFIED WHETHER NEUROGENIC CLAUDICATION PRESENT: ICD-10-CM

## 2025-08-18 PROCEDURE — 73700 CT LOWER EXTREMITY W/O DYE: CPT | Mod: LT

## 2025-08-18 RX ORDER — PREGABALIN 200 MG/1
200 CAPSULE ORAL
Qty: 90 CAPSULE | Refills: 2 | Status: SHIPPED | OUTPATIENT
Start: 2025-08-18 | End: 2025-11-16

## 2025-08-28 LAB
25(OH)D3+25(OH)D2 SERPL-MCNC: 70.6 NG/ML (ref 30–100)
DHEA-S SERPL-MCNC: 25.6 UG/DL (ref 57.3–279.2)

## 2025-09-04 LAB
ESTRADIOL SERPL-MCNC: <5 PG/ML
FSH SERPL-ACNC: 4.5 MIU/ML
LH SERPL-ACNC: 1 MIU/ML
SPECIMEN STATUS: NORMAL